# Patient Record
Sex: MALE | Race: WHITE | NOT HISPANIC OR LATINO | Employment: OTHER | URBAN - METROPOLITAN AREA
[De-identification: names, ages, dates, MRNs, and addresses within clinical notes are randomized per-mention and may not be internally consistent; named-entity substitution may affect disease eponyms.]

---

## 2017-01-05 ENCOUNTER — TELEPHONE (OUTPATIENT)
Dept: UROLOGY | Facility: CLINIC | Age: 82
End: 2017-01-05

## 2017-01-05 DIAGNOSIS — R31.9 HEMATURIA: Primary | ICD-10-CM

## 2017-01-07 ENCOUNTER — HOSPITAL ENCOUNTER (INPATIENT)
Facility: HOSPITAL | Age: 82
LOS: 2 days | Discharge: NURSING, REHAB OR LONG-TERM CARE | DRG: 392 | End: 2017-01-09
Attending: EMERGENCY MEDICINE | Admitting: INTERNAL MEDICINE
Payer: MEDICARE

## 2017-01-07 DIAGNOSIS — N17.9 AKI (ACUTE KIDNEY INJURY): Primary | ICD-10-CM

## 2017-01-07 DIAGNOSIS — K59.00 CONSTIPATION: ICD-10-CM

## 2017-01-07 PROBLEM — K59.01 CONSTIPATION BY DELAYED COLONIC TRANSIT: Status: ACTIVE | Noted: 2017-01-07

## 2017-01-07 LAB
ALBUMIN SERPL BCP-MCNC: 3.1 G/DL
ALP SERPL-CCNC: 128 U/L
ALT SERPL W/O P-5'-P-CCNC: 16 U/L
ANION GAP SERPL CALC-SCNC: 17 MMOL/L
AST SERPL-CCNC: 19 U/L
BASOPHILS # BLD AUTO: 0.01 K/UL
BASOPHILS NFR BLD: 0.1 %
BILIRUB SERPL-MCNC: 0.9 MG/DL
BUN SERPL-MCNC: 28 MG/DL
CALCIUM SERPL-MCNC: 8.7 MG/DL
CHLORIDE SERPL-SCNC: 86 MMOL/L
CO2 SERPL-SCNC: 29 MMOL/L
CREAT SERPL-MCNC: 1.9 MG/DL
DIFFERENTIAL METHOD: ABNORMAL
EOSINOPHIL # BLD AUTO: 0 K/UL
EOSINOPHIL NFR BLD: 0 %
ERYTHROCYTE [DISTWIDTH] IN BLOOD BY AUTOMATED COUNT: 14.5 %
EST. GFR  (AFRICAN AMERICAN): 36.9 ML/MIN/1.73 M^2
EST. GFR  (NON AFRICAN AMERICAN): 31.9 ML/MIN/1.73 M^2
GLUCOSE SERPL-MCNC: 106 MG/DL
HCT VFR BLD AUTO: 41 %
HGB BLD-MCNC: 13.5 G/DL
LYMPHOCYTES # BLD AUTO: 0.8 K/UL
LYMPHOCYTES NFR BLD: 5.4 %
MCH RBC QN AUTO: 27.9 PG
MCHC RBC AUTO-ENTMCNC: 32.9 %
MCV RBC AUTO: 85 FL
MONOCYTES # BLD AUTO: 0.6 K/UL
MONOCYTES NFR BLD: 3.7 %
NEUTROPHILS # BLD AUTO: 13.5 K/UL
NEUTROPHILS NFR BLD: 90.4 %
PLATELET # BLD AUTO: 236 K/UL
PMV BLD AUTO: 9.1 FL
POTASSIUM SERPL-SCNC: 4 MMOL/L
PROT SERPL-MCNC: 7.7 G/DL
RBC # BLD AUTO: 4.84 M/UL
SODIUM SERPL-SCNC: 132 MMOL/L
WBC # BLD AUTO: 14.93 K/UL

## 2017-01-07 PROCEDURE — 83930 ASSAY OF BLOOD OSMOLALITY: CPT

## 2017-01-07 PROCEDURE — 99283 EMERGENCY DEPT VISIT LOW MDM: CPT | Mod: ,,, | Performed by: EMERGENCY MEDICINE

## 2017-01-07 PROCEDURE — 96361 HYDRATE IV INFUSION ADD-ON: CPT

## 2017-01-07 PROCEDURE — 85025 COMPLETE CBC W/AUTO DIFF WBC: CPT | Mod: 91

## 2017-01-07 PROCEDURE — 80053 COMPREHEN METABOLIC PANEL: CPT

## 2017-01-07 PROCEDURE — 96360 HYDRATION IV INFUSION INIT: CPT

## 2017-01-07 PROCEDURE — 99285 EMERGENCY DEPT VISIT HI MDM: CPT | Mod: 25

## 2017-01-07 PROCEDURE — 99223 1ST HOSP IP/OBS HIGH 75: CPT | Mod: AI,,, | Performed by: INTERNAL MEDICINE

## 2017-01-07 PROCEDURE — 25000003 PHARM REV CODE 250: Performed by: ANESTHESIOLOGY

## 2017-01-07 PROCEDURE — 11000001 HC ACUTE MED/SURG PRIVATE ROOM

## 2017-01-07 RX ADMIN — SODIUM CHLORIDE 1000 ML: 0.9 INJECTION, SOLUTION INTRAVENOUS at 12:01

## 2017-01-07 NOTE — ED PROVIDER NOTES
Encounter Date: 1/7/2017    SCRIBE #1 NOTE: I, Dr. Seay, am scribing for, and in the presence of,  Esther Jimenez. I have scribed the following portions of the note - the Resident attestation.       History     Chief Complaint   Patient presents with    Nausea and Vomiting     from [facility]; nausea and vomiting x three days; lamenectomy last month     Review of patient's allergies indicates:  No Known Allergies  HPI Comments: admitted to inpatient rehabilitation on 12/30/2016 for gait instability following compression fractures with impaired mobility and ADLs. Patient remains appropriate for PT, OT, and as required Speech therapy. Patient continues to require 24 hour nursing care as well as daily Physician assessment. Patient sent from Rehab this morning for continued N/V and has not had BM in 5 days. Patient reports BM while waiting to be seen in the ED- and he feels much better. He now denies N/V, abdominal pain.     The history is provided by the patient and the EMS personnel.     Past Medical History   Diagnosis Date    Arthritis     Blood transfusion     CKD (chronic kidney disease) stage 3, GFR 30-59 ml/min     Compression fracture of lumbar vertebra     Depression     Dyslipidemia     GERD (gastroesophageal reflux disease)     Hypertension     Thyroid disease     UTI (urinary tract infection)      Past Medical History Pertinent Negatives   Diagnosis Date Noted    Anticoagulant long-term use 10/20/2016    Asthma 10/20/2016    Cancer 10/20/2016    CHF (congestive heart failure) 10/20/2016    COPD (chronic obstructive pulmonary disease) 10/20/2016    Coronary artery disease 10/20/2016    Diabetes mellitus 10/9/2013    Seizures 10/20/2016    Stroke 10/20/2016    Transfusion reaction 10/9/2013     Past Surgical History   Procedure Laterality Date    Hernia repair      Cholecystectomy      Parathyroidectomy      Total knee arthroplasty       Right    Joint replacement       Laminectomy  12/27/2016     L2-L4    Lumbar laminectomy  12/2016     Family History   Problem Relation Age of Onset    Hypertension Mother     Diabetes Father     Esophageal cancer Father      Social History   Substance Use Topics    Smoking status: Former Smoker     Years: 20.00    Smokeless tobacco: Never Used      Comment: quit 1999    Alcohol use 1.2 oz/week     2 Shots of liquor per week      Comment: 1 drink a week     Review of Systems   Constitutional: Positive for appetite change and fatigue. Negative for fever.   HENT: Negative.    Eyes: Negative.    Respiratory: Negative for cough and shortness of breath.    Cardiovascular: Negative for chest pain.   Gastrointestinal: Positive for constipation and nausea (now resolved). Negative for abdominal distention, abdominal pain, anal bleeding and rectal pain.   Endocrine: Negative.    Genitourinary: Negative for dysuria.   Musculoskeletal: Positive for back pain.   Skin: Negative for color change.   Allergic/Immunologic: Negative.    Neurological: Negative for light-headedness and headaches.   Hematological: Negative.    Psychiatric/Behavioral: Negative.        Physical Exam   Initial Vitals   BP Pulse Resp Temp SpO2   01/07/17 1208 01/07/17 1208 01/07/17 1208 01/07/17 1208 01/07/17 1208   137/67 94 16 97.7 °F (36.5 °C) 92 %     Physical Exam    Nursing note and vitals reviewed.  Constitutional: He appears well-developed. He is not diaphoretic. No distress.   HENT:   Head: Normocephalic and atraumatic.   Eyes: EOM are normal. Pupils are equal, round, and reactive to light. Right eye exhibits no discharge. Left eye exhibits no discharge.   Neck: Normal range of motion. Neck supple.   Cardiovascular: Normal rate, regular rhythm, normal heart sounds and intact distal pulses.   Pulmonary/Chest: Breath sounds normal. No respiratory distress.   Abdominal: Soft. Bowel sounds are normal. He exhibits no distension. There is no tenderness.   Musculoskeletal: Normal  range of motion. He exhibits no edema or tenderness.   Neurological: He is alert and oriented to person, place, and time.   Skin: Skin is warm and dry.   Psychiatric: He has a normal mood and affect. His behavior is normal. Judgment and thought content normal.         ED Course   Procedures  Labs Reviewed   COMPREHENSIVE METABOLIC PANEL   CBC W/ AUTO DIFFERENTIAL             Medical Decision Making:   History:   Old Medical Records: I decided to obtain old medical records.       APC / Resident Notes:   Patient with constipation and N/V, now resolved. Will order basic labs, flat and erect Xrays, and give IVFs. Will give PO challenge depending on Xrays.     Patient with TERE- increasing Cr over the last 2 days and with poor PO intake- Will admit to [facility] B-Dr. Corbin.       Scribe Attestation:   Scribe #1: I performed the above scribed service and the documentation accurately describes the services I performed. I attest to the accuracy of the note.    Attending Attestation:   Physician Attestation Statement for Resident:  As the supervising MD   Physician Attestation Statement: I have personally seen and examined this patient.   I agree with the above history. -: Pt sent over from rehab for vomiting. Pt feels much better after bowel movement. No further nausea or abdominal pain. Abdomen soft and right sided hernia is soft. Will check X-Rays and labs. If unremarkable will try a PO challenge   As the supervising MD I agree with the above PE.    As the supervising MD I agree with the above treatment, course, plan, and disposition.          Physician Attestation for Scribe:  Physician Attestation Statement for Scribe #1: I, Dr. Seay, reviewed documentation, as scribed by Esther Jimenez in my presence, and it is both accurate and complete.            [unfilled]     ED Course     Clinical Impression:   The encounter diagnosis was Constipation.    Disposition:   Disposition: Admitted  Condition: Fair        Immanuel Seay MD  01/11/17 0210

## 2017-01-07 NOTE — ED PROVIDER NOTES
Encounter Date: 1/7/2017    SCRIBE #1 NOTE: I, Esther Jimenez, am scribing for, and in the presence of,  Dr. Seay. I have scribed the following portions of the note - the Resident attestation.       History     Chief Complaint   Patient presents with    Nausea and Vomiting     from [facility]; nausea and vomiting x three days; lamenectomy last month     Review of patient's allergies indicates:  No Known Allergies  HPI  Past Medical History   Diagnosis Date    Arthritis     Blood transfusion     CKD (chronic kidney disease) stage 3, GFR 30-59 ml/min     Compression fracture of lumbar vertebra     Depression     Dyslipidemia     GERD (gastroesophageal reflux disease)     Hypertension     Thyroid disease     UTI (urinary tract infection)      Past Medical History Pertinent Negatives   Diagnosis Date Noted    Anticoagulant long-term use 10/20/2016    Asthma 10/20/2016    Cancer 10/20/2016    CHF (congestive heart failure) 10/20/2016    COPD (chronic obstructive pulmonary disease) 10/20/2016    Coronary artery disease 10/20/2016    Diabetes mellitus 10/9/2013    Seizures 10/20/2016    Stroke 10/20/2016    Transfusion reaction 10/9/2013     Past Surgical History   Procedure Laterality Date    Hernia repair      Cholecystectomy      Parathyroidectomy      Total knee arthroplasty       Right    Joint replacement      Laminectomy  12/27/2016     L2-L4    Lumbar laminectomy  12/2016     Family History   Problem Relation Age of Onset    Hypertension Mother     Diabetes Father     Esophageal cancer Father      Social History   Substance Use Topics    Smoking status: Former Smoker     Years: 20.00    Smokeless tobacco: Never Used      Comment: quit 1999    Alcohol use 1.2 oz/week     2 Shots of liquor per week      Comment: 1 drink a week     Review of Systems    Physical Exam   Initial Vitals   BP Pulse Resp Temp SpO2   01/07/17 1208 01/07/17 1208 01/07/17 1208 01/07/17 1208 01/07/17 1208    137/67 94 16 97.7 °F (36.5 °C) 92 %     Physical Exam    ED Course   Procedures  Labs Reviewed   COMPREHENSIVE METABOLIC PANEL   CBC W/ AUTO DIFFERENTIAL             Medical Decision Making:   History:   Old Medical Records: I decided to obtain old medical records.            Scribe Attestation:   Scribe #1: I performed the above scribed service and the documentation accurately describes the services I performed. I attest to the accuracy of the note.    Attending Attestation:   Physician Attestation Statement for Resident:  As the supervising MD   Physician Attestation Statement: I have personally seen and examined this patient.   I agree with the above history. -: Pt sent over from rehab for vomiting. Feels much better after having bowel movement. No further nausea or abdominal pain. Abdomen soft and right sided hernia is soft. Will check X-Rays and labs. If unremarkable will try a PO challenge.    As the supervising MD I agree with the above PE.    As the supervising MD I agree with the above treatment, course, plan, and disposition.          Physician Attestation for Scribe:  Physician Attestation Statement for Scribe #1: I, Dr. Seay, reviewed documentation, as scribed by Esther Jimenez in my presence, and it is both accurate and complete.            [unfilled]     ED Course     Clinical Impression:   The encounter diagnosis was Constipation.

## 2017-01-07 NOTE — H&P
[facility] Hospital Medicine  History & Physical    Patient Name: Chucho Prado  MRN: [id number]  Admission Date: 1/7/2017  Attending Physician: Howard Corbin MD  Primary Care Provider: Obed Mcguire MD    [facility] Medicine Team: [facility]OSP MED B Howard Crobin MD     Patient information was obtained from patient and ER records.     Subjective:     Principal Problem:<principal problem not specified>    Chief Complaint:  Abdominal pain, from rehab    HPI:  admitted to inpatient rehabilitation on 12/30/2016 for gait instability following compression fractures with impaired mobility and ADLs. Patient remains appropriate for PT, OT, and as required Speech therapy. Patient continues to require 24 hour nursing care as well as daily Physician assessment. Patient sent from Rehab this morning for continued N/V and has not had BM in 5 days. Patient reports BM while waiting to be seen in the ED- and he feels much better. He now denies N/V, abdominal pain    Past Medical History   Diagnosis Date    Arthritis     Blood transfusion     CKD (chronic kidney disease) stage 3, GFR 30-59 ml/min     Compression fracture of lumbar vertebra     Depression     Dyslipidemia     GERD (gastroesophageal reflux disease)     Hypertension     Thyroid disease     UTI (urinary tract infection)        Past Surgical History   Procedure Laterality Date    Hernia repair      Cholecystectomy      Parathyroidectomy      Total knee arthroplasty       Right    Joint replacement      Laminectomy  12/27/2016     L2-L4    Lumbar laminectomy  12/2016       Review of patient's allergies indicates:  No Known Allergies    Current Facility-Administered Medications on File Prior to Encounter   Medication    [MAR Hold - Suspended Admission] acetaminophen tablet 650 mg    [MAR Hold - Suspended Admission] aluminum-magnesium hydroxide-simethicone 200-200-20 mg/5 mL suspension 30 mL    [MAR Hold - Suspended Admission]  bisacodyl suppository 10 mg    ciprofloxacin HCl tablet 500 mg    [MAR Hold - Suspended Admission] gabapentin capsule 800 mg    [MAR Hold - Suspended Admission] heparin (porcine) injection 5,000 Units    [MAR Hold - Suspended Admission] hydrocodone-acetaminophen 10-325mg per tablet 1 tablet    [MAR Hold - Suspended Admission] hydrocodone-acetaminophen 5-325mg per tablet 1 tablet    [MAR Hold - Suspended Admission] lactulose 20 gram/30 mL solution Soln 20 g    [MAR Hold - Suspended Admission] levothyroxine tablet 50 mcg    [MAR Hold - Suspended Admission] lidocaine 5 % patch 1 patch    lidocaine HCl 2% urojet    [MAR Hold - Suspended Admission] magnesium hydroxide 400 mg/5 ml suspension 2,400 mg    [MAR Hold - Suspended Admission] methocarbamol tablet 500 mg    [MAR Hold - Suspended Admission] mirtazapine tablet 30 mg    [MAR Hold - Suspended Admission] ondansetron disintegrating tablet 8 mg    [MAR Hold - Suspended Admission] ramelteon tablet 8 mg    [MAR Hold - Suspended Admission] simethicone chewable tablet 80 mg    [MAR Hold - Suspended Admission] simvastatin tablet 40 mg    [MAR Hold - Suspended Admission] tamsulosin 24 hr capsule 0.4 mg    [DISCONTINUED] hydrocodone-acetaminophen 10-325mg per tablet 1 tablet    [DISCONTINUED] hydrocodone-acetaminophen 10-325mg per tablet 1 tablet    [DISCONTINUED] triamterene-hydrochlorothiazide 37.5-25 mg per capsule 1 capsule     Current Outpatient Prescriptions on File Prior to Encounter   Medication Sig    acetaminophen (TYLENOL) 500 MG tablet Take 2 tablets (1,000 mg total) by mouth 3 (three) times daily as needed for Pain.    aspirin 81 MG Chew Take 1 tablet (81 mg total) by mouth once daily.    calcitonin, salmon, (FORTICAL) 200 unit/actuation nasal spray 1 spray by Nasal route once daily.    cetirizine (ZYRTEC) 5 MG tablet Take 1 tablet (5 mg total) by mouth once daily. (Patient taking differently: Take 5 mg by mouth daily as needed for  Allergies. )    ferrous sulfate 325 (65 FE) MG EC tablet Take 1 tablet (325 mg total) by mouth every evening.    hydrocodone-acetaminophen 10-325mg (NORCO)  mg Tab Take 1 tablet by mouth every 4 (four) hours as needed for Pain (pain).    levothyroxine (SYNTHROID) 50 MCG tablet Take 1 tablet (50 mcg total) by mouth once daily.    mirtazapine (REMERON) 30 MG tablet Take 1 tablet (30 mg total) by mouth every evening.    ondansetron (ZOFRAN ODT) 4 MG TbDL Take 2 tablets (8 mg total) by mouth every 6 (six) hours as needed (nausea).    simvastatin (ZOCOR) 40 MG tablet Take 1 tablet (40 mg total) by mouth every evening.    trazodone (DESYREL) 50 MG tablet Take 1 tablet (50 mg total) by mouth every evening. Try half tablet at first    triamterene-hydrochlorothiazide 37.5-25 mg (DYAZIDE) 37.5-25 mg per capsule Take 1 capsule by mouth every morning.     Family History     Problem Relation (Age of Onset)    Diabetes Father    Esophageal cancer Father    Hypertension Mother        Social History Main Topics    Smoking status: Former Smoker     Years: 20.00    Smokeless tobacco: Never Used      Comment: quit 1999    Alcohol use 1.2 oz/week     2 Shots of liquor per week      Comment: 1 drink a week    Drug use: No    Sexual activity: No     Review of Systems   Constitutional: Negative.    Eyes: Negative.    Respiratory: Negative.    Gastrointestinal: Positive for abdominal distention and abdominal pain.   Genitourinary: Negative.    Allergic/Immunologic: Negative.    Neurological: Negative.    Hematological: Negative.    Psychiatric/Behavioral: Negative.      Objective:     Vital Signs (Most Recent):  Temp: 97.7 °F (36.5 °C) (01/07/17 1208)  Pulse: 94 (01/07/17 1208)  Resp: 16 (01/07/17 1208)  BP: 137/67 (01/07/17 1208)  SpO2: (!) 92 % (01/07/17 1208) Vital Signs (24h Range):  Temp:  [97.7 °F (36.5 °C)-98.2 °F (36.8 °C)] 97.7 °F (36.5 °C)  Pulse:  [71-94] 94  Resp:  [16-18] 16  BP: (112137)/(5867) 137/67         There is no height or weight on file to calculate BMI.    Physical Exam   Constitutional: He appears well-nourished.   HENT:   Head: Atraumatic.   Eyes: EOM are normal. Pupils are equal, round, and reactive to light.   Neck: Neck supple.   Cardiovascular: Normal rate and regular rhythm.    Pulmonary/Chest: Effort normal and breath sounds normal.   Abdominal: He exhibits distension. There is tenderness. There is guarding.   Neurological: He is alert. He has normal reflexes.   Skin: Skin is warm and dry.            Assessment/Plan:     Hypothyroidism  C/W synthroid      TERE (acute kidney injury)  Cautious hydration      VTE Risk Mitigation     None        Howard Corbin MD  Department of Hospital Medicine   [facility]

## 2017-01-07 NOTE — SUBJECTIVE & OBJECTIVE
Past Medical History   Diagnosis Date    Arthritis     Blood transfusion     CKD (chronic kidney disease) stage 3, GFR 30-59 ml/min     Compression fracture of lumbar vertebra     Depression     Dyslipidemia     GERD (gastroesophageal reflux disease)     Hypertension     Thyroid disease     UTI (urinary tract infection)        Past Surgical History   Procedure Laterality Date    Hernia repair      Cholecystectomy      Parathyroidectomy      Total knee arthroplasty       Right    Joint replacement      Laminectomy  12/27/2016     L2-L4    Lumbar laminectomy  12/2016       Review of patient's allergies indicates:  No Known Allergies    Current Facility-Administered Medications on File Prior to Encounter   Medication    [MAR Hold - Suspended Admission] acetaminophen tablet 650 mg    [MAR Hold - Suspended Admission] aluminum-magnesium hydroxide-simethicone 200-200-20 mg/5 mL suspension 30 mL    [MAR Hold - Suspended Admission] bisacodyl suppository 10 mg    ciprofloxacin HCl tablet 500 mg    [MAR Hold - Suspended Admission] gabapentin capsule 800 mg    [MAR Hold - Suspended Admission] heparin (porcine) injection 5,000 Units    [MAR Hold - Suspended Admission] hydrocodone-acetaminophen 10-325mg per tablet 1 tablet    [MAR Hold - Suspended Admission] hydrocodone-acetaminophen 5-325mg per tablet 1 tablet    [MAR Hold - Suspended Admission] lactulose 20 gram/30 mL solution Soln 20 g    [MAR Hold - Suspended Admission] levothyroxine tablet 50 mcg    [MAR Hold - Suspended Admission] lidocaine 5 % patch 1 patch    lidocaine HCl 2% urojet    [MAR Hold - Suspended Admission] magnesium hydroxide 400 mg/5 ml suspension 2,400 mg    [MAR Hold - Suspended Admission] methocarbamol tablet 500 mg    [MAR Hold - Suspended Admission] mirtazapine tablet 30 mg    [MAR Hold - Suspended Admission] ondansetron disintegrating tablet 8 mg    [MAR Hold - Suspended Admission] ramelteon tablet 8 mg    [MAR Hold -  Suspended Admission] simethicone chewable tablet 80 mg    [MAR Hold - Suspended Admission] simvastatin tablet 40 mg    [MAR Hold - Suspended Admission] tamsulosin 24 hr capsule 0.4 mg    [DISCONTINUED] hydrocodone-acetaminophen 10-325mg per tablet 1 tablet    [DISCONTINUED] hydrocodone-acetaminophen 10-325mg per tablet 1 tablet    [DISCONTINUED] triamterene-hydrochlorothiazide 37.5-25 mg per capsule 1 capsule     Current Outpatient Prescriptions on File Prior to Encounter   Medication Sig    acetaminophen (TYLENOL) 500 MG tablet Take 2 tablets (1,000 mg total) by mouth 3 (three) times daily as needed for Pain.    aspirin 81 MG Chew Take 1 tablet (81 mg total) by mouth once daily.    calcitonin, salmon, (FORTICAL) 200 unit/actuation nasal spray 1 spray by Nasal route once daily.    cetirizine (ZYRTEC) 5 MG tablet Take 1 tablet (5 mg total) by mouth once daily. (Patient taking differently: Take 5 mg by mouth daily as needed for Allergies. )    ferrous sulfate 325 (65 FE) MG EC tablet Take 1 tablet (325 mg total) by mouth every evening.    hydrocodone-acetaminophen 10-325mg (NORCO)  mg Tab Take 1 tablet by mouth every 4 (four) hours as needed for Pain (pain).    levothyroxine (SYNTHROID) 50 MCG tablet Take 1 tablet (50 mcg total) by mouth once daily.    mirtazapine (REMERON) 30 MG tablet Take 1 tablet (30 mg total) by mouth every evening.    ondansetron (ZOFRAN ODT) 4 MG TbDL Take 2 tablets (8 mg total) by mouth every 6 (six) hours as needed (nausea).    simvastatin (ZOCOR) 40 MG tablet Take 1 tablet (40 mg total) by mouth every evening.    trazodone (DESYREL) 50 MG tablet Take 1 tablet (50 mg total) by mouth every evening. Try half tablet at first    triamterene-hydrochlorothiazide 37.5-25 mg (DYAZIDE) 37.5-25 mg per capsule Take 1 capsule by mouth every morning.     Family History     Problem Relation (Age of Onset)    Diabetes Father    Esophageal cancer Father    Hypertension Mother         Social History Main Topics    Smoking status: Former Smoker     Years: 20.00    Smokeless tobacco: Never Used      Comment: quit 1999    Alcohol use 1.2 oz/week     2 Shots of liquor per week      Comment: 1 drink a week    Drug use: No    Sexual activity: No     Review of Systems   Constitutional: Negative.    Eyes: Negative.    Respiratory: Negative.    Gastrointestinal: Positive for abdominal distention and abdominal pain.   Genitourinary: Negative.    Allergic/Immunologic: Negative.    Neurological: Negative.    Hematological: Negative.    Psychiatric/Behavioral: Negative.      Objective:     Vital Signs (Most Recent):  Temp: 97.7 °F (36.5 °C) (01/07/17 1208)  Pulse: 94 (01/07/17 1208)  Resp: 16 (01/07/17 1208)  BP: 137/67 (01/07/17 1208)  SpO2: (!) 92 % (01/07/17 1208) Vital Signs (24h Range):  Temp:  [97.7 °F (36.5 °C)-98.2 °F (36.8 °C)] 97.7 °F (36.5 °C)  Pulse:  [71-94] 94  Resp:  [16-18] 16  BP: (112-137)/(58-67) 137/67        There is no height or weight on file to calculate BMI.    Physical Exam   Constitutional: He appears well-nourished.   HENT:   Head: Atraumatic.   Eyes: EOM are normal. Pupils are equal, round, and reactive to light.   Neck: Neck supple.   Cardiovascular: Normal rate and regular rhythm.    Pulmonary/Chest: Effort normal and breath sounds normal.   Abdominal: He exhibits distension. There is tenderness. There is guarding.   Neurological: He is alert. He has normal reflexes.   Skin: Skin is warm and dry.

## 2017-01-07 NOTE — ED NOTES
Pt initially refused xray.  States he had enough today and he has been through a lot.  Explained to pt with his chief complaint of abd pain with constipation we needed to take a look at his stomach.  Voiced understanding and agreed to xray

## 2017-01-07 NOTE — ED TRIAGE NOTES
Patient received with complaint of constipation for four days, nausea, vomiting for two days.  Elevated white blood cell count.  LBM just prior to arrival, feeling better.  Recent L2-L4 laminectomy.     No LDA's in place on arrival to department.    Family not present.    Pain:  Denies.    Psychosocial:  Patient is calm and cooperative.  Patients insight and judgement are appropriate to situation.  Appears clean, well maintained, with clothing appropriate to environment.  No evidence of delusions, hallucinations, or psychosis.    Neuro:  Eyes open spontaneously.  Awake, alert, oriented x 4.  Speech clear and appropriate.  Tolerating saliva secretions well.  Able to follow commands, demonstrating ability to actively and appropriately communicate within context of current conversation.  Symmetrical facial muscles.  Moving all extremities though bilateral lower leg weakness..      Airway:  Bilateral chest rise and fall.  RR regular and non-labored.  No crepitus or subcutaneous emphysema noted on palpation.      Circulatory:  Skin warm, dry, and pink.  Radial pulses strong and regular.  Capillary refill/skin blanching less than 3 seconds to distal of 4 extremities.    Abdomen:  See notes and primary nurse assessment.       Urinary:  YONI patient being assessed by physician at this time.    Extremities:  No redness, heat, swelling, deformity, or pain.    Skin:  Intact with no bruising/discolorations noted.

## 2017-01-07 NOTE — ED NOTES
Pt identifiers checked and correct.    LOC: The patient is awake, alert and aware of environment with an appropriate affect, the patient is oriented x 3 and speaking appropriately.   APPEARANCE: Patient resting comfortably and in no acute distress, patient is clean and well groomed, patient's clothing is properly fastened.   SKIN: The skin is warm and dry, color consistent with ethnicity, patient has normal skin turgor and moist mucus membranes, skin intact.  Redness noted to lower back and upper buttocks concerning for area that could become a bed sore as pt is s/p back surgery and is in rehab.   MUSCULOSKELETAL: Patient moving all extremities spontaneously, no obvious swelling or deformities noted.   RESPIRATORY: Airway is open and patent, respirations are spontaneous, patient has a normal effort and rate, no accessory muscle use noted.   CARDIAC: Patient has a normal rate and regular rhythm, no periphreal edema noted, capillary refill < 3 seconds.   ABDOMEN: Soft and non tender to palpation, no distention noted, active bowel sounds present in all four quadrants.     Presents with galvan to gravity in place from rehab.

## 2017-01-07 NOTE — ED NOTES
Pt identifiers checked and correct.    LOC: The patient is awake, alert and aware of environment with an appropriate affect, the patient is oriented x 3 and speaking appropriately.   APPEARANCE: Patient resting comfortably and in no acute distress, patient is clean and well groomed, patient's clothing is properly fastened.   SKIN: The skin is warm and dry, color consistent with ethnicity, patient has normal skin turgor and moist mucus membranes, skin intact, no breakdown or bruising noted.   MUSCULOSKELETAL: Patient moving all extremities spontaneously, no obvious swelling or deformities noted.   RESPIRATORY: Airway is open and patent, respirations are spontaneous, patient has a normal effort and rate, no accessory muscle use noted.   CARDIAC: Patient has a normal rate and regular rhythm, no periphreal edema noted, capillary refill < 3 seconds.   ABDOMEN: Soft and non tender to palpation,Large abd hernia to R quad.   NEUROLOGIC: PERRL, 3 mm bilaterally, eyes open spontaneously, behavior appropriate to situation, follows commands, facial expression symmetrical, bilateral hand grasp equal and even, purposeful motor response noted, normal sensation in all extremities when touched with a finger.

## 2017-01-08 PROCEDURE — 25000003 PHARM REV CODE 250: Performed by: INTERNAL MEDICINE

## 2017-01-08 PROCEDURE — 11000001 HC ACUTE MED/SURG PRIVATE ROOM

## 2017-01-08 PROCEDURE — 99232 SBSQ HOSP IP/OBS MODERATE 35: CPT | Mod: ,,, | Performed by: INTERNAL MEDICINE

## 2017-01-08 RX ORDER — SIMVASTATIN 40 MG/1
40 TABLET, FILM COATED ORAL NIGHTLY
Status: DISCONTINUED | OUTPATIENT
Start: 2017-01-08 | End: 2017-01-09 | Stop reason: HOSPADM

## 2017-01-08 RX ORDER — ONDANSETRON 4 MG/1
8 TABLET, ORALLY DISINTEGRATING ORAL EVERY 6 HOURS PRN
Status: DISCONTINUED | OUTPATIENT
Start: 2017-01-08 | End: 2017-01-09 | Stop reason: HOSPADM

## 2017-01-08 RX ORDER — HYDROCODONE BITARTRATE AND ACETAMINOPHEN 10; 325 MG/1; MG/1
1 TABLET ORAL EVERY 4 HOURS PRN
Status: DISCONTINUED | OUTPATIENT
Start: 2017-01-08 | End: 2017-01-09 | Stop reason: HOSPADM

## 2017-01-08 RX ORDER — MIRTAZAPINE 15 MG/1
30 TABLET, FILM COATED ORAL NIGHTLY
Status: DISCONTINUED | OUTPATIENT
Start: 2017-01-08 | End: 2017-01-09 | Stop reason: HOSPADM

## 2017-01-08 RX ORDER — TRIAMTERENE AND HYDROCHLOROTHIAZIDE 37.5; 25 MG/1; MG/1
1 CAPSULE ORAL EVERY MORNING
Status: DISCONTINUED | OUTPATIENT
Start: 2017-01-08 | End: 2017-01-09 | Stop reason: HOSPADM

## 2017-01-08 RX ORDER — LEVOTHYROXINE SODIUM 50 UG/1
50 TABLET ORAL DAILY
Status: DISCONTINUED | OUTPATIENT
Start: 2017-01-08 | End: 2017-01-09 | Stop reason: HOSPADM

## 2017-01-08 RX ADMIN — HYDROCODONE BITARTRATE AND ACETAMINOPHEN 1 TABLET: 10; 325 TABLET ORAL at 12:01

## 2017-01-08 RX ADMIN — TRIAMTERENE AND HYDROCHLOROTHIAZIDE 1 CAPSULE: 25; 37.5 CAPSULE ORAL at 12:01

## 2017-01-08 RX ADMIN — TRAZODONE HYDROCHLORIDE 50 MG: 50 TABLET ORAL at 08:01

## 2017-01-08 RX ADMIN — ONDANSETRON 8 MG: 4 TABLET, ORALLY DISINTEGRATING ORAL at 09:01

## 2017-01-08 RX ADMIN — HYDROCODONE BITARTRATE AND ACETAMINOPHEN 1 TABLET: 10; 325 TABLET ORAL at 04:01

## 2017-01-08 RX ADMIN — SIMVASTATIN 40 MG: 40 TABLET, FILM COATED ORAL at 08:01

## 2017-01-08 RX ADMIN — ONDANSETRON 8 MG: 4 TABLET, ORALLY DISINTEGRATING ORAL at 12:01

## 2017-01-08 RX ADMIN — LEVOTHYROXINE SODIUM 50 MCG: 50 TABLET ORAL at 12:01

## 2017-01-08 RX ADMIN — MIRTAZAPINE 30 MG: 15 TABLET, FILM COATED ORAL at 08:01

## 2017-01-08 NOTE — ED NOTES
The patient was incontinent of stool, linens changed and perineal care given. Patient repositioned for comfort, will continue to monitor.

## 2017-01-09 VITALS
HEART RATE: 66 BPM | BODY MASS INDEX: 27.02 KG/M2 | DIASTOLIC BLOOD PRESSURE: 59 MMHG | RESPIRATION RATE: 16 BRPM | OXYGEN SATURATION: 94 % | SYSTOLIC BLOOD PRESSURE: 127 MMHG | TEMPERATURE: 98 F | WEIGHT: 178.31 LBS | HEIGHT: 68 IN

## 2017-01-09 PROBLEM — K59.01 CONSTIPATION BY DELAYED COLONIC TRANSIT: Status: ACTIVE | Noted: 2017-01-09

## 2017-01-09 PROCEDURE — 99238 HOSP IP/OBS DSCHRG MGMT 30/<: CPT | Mod: ,,, | Performed by: INTERNAL MEDICINE

## 2017-01-09 PROCEDURE — 25000003 PHARM REV CODE 250: Performed by: INTERNAL MEDICINE

## 2017-01-09 RX ORDER — TRIAMTERENE AND HYDROCHLOROTHIAZIDE 37.5; 25 MG/1; MG/1
1 CAPSULE ORAL EVERY MORNING
Status: CANCELLED | OUTPATIENT
Start: 2017-01-09

## 2017-01-09 RX ORDER — LEVOTHYROXINE SODIUM 50 UG/1
50 TABLET ORAL DAILY
Status: CANCELLED | OUTPATIENT
Start: 2017-01-09

## 2017-01-09 RX ORDER — ONDANSETRON 4 MG/1
8 TABLET, ORALLY DISINTEGRATING ORAL EVERY 6 HOURS PRN
Status: CANCELLED | OUTPATIENT
Start: 2017-01-09

## 2017-01-09 RX ORDER — FERROUS SULFATE 325(65) MG
325 TABLET, DELAYED RELEASE (ENTERIC COATED) ORAL NIGHTLY
Status: CANCELLED | OUTPATIENT
Start: 2017-01-09

## 2017-01-09 RX ORDER — SIMVASTATIN 40 MG/1
40 TABLET, FILM COATED ORAL NIGHTLY
Status: CANCELLED | OUTPATIENT
Start: 2017-01-09

## 2017-01-09 RX ORDER — NAPROXEN SODIUM 220 MG/1
81 TABLET, FILM COATED ORAL DAILY
Status: CANCELLED | OUTPATIENT
Start: 2017-01-09

## 2017-01-09 RX ORDER — HYDROCODONE BITARTRATE AND ACETAMINOPHEN 10; 325 MG/1; MG/1
1 TABLET ORAL EVERY 4 HOURS PRN
Status: CANCELLED | OUTPATIENT
Start: 2017-01-09

## 2017-01-09 RX ORDER — MIRTAZAPINE 15 MG/1
30 TABLET, FILM COATED ORAL NIGHTLY
Status: CANCELLED | OUTPATIENT
Start: 2017-01-09

## 2017-01-09 RX ORDER — CALCITONIN SALMON 200 [IU]/.09ML
1 SPRAY, METERED NASAL DAILY
Status: CANCELLED | OUTPATIENT
Start: 2017-01-10

## 2017-01-09 RX ADMIN — HYDROCODONE BITARTRATE AND ACETAMINOPHEN 1 TABLET: 10; 325 TABLET ORAL at 01:01

## 2017-01-09 RX ADMIN — LEVOTHYROXINE SODIUM 50 MCG: 50 TABLET ORAL at 08:01

## 2017-01-09 RX ADMIN — TRIAMTERENE AND HYDROCHLOROTHIAZIDE 1 CAPSULE: 25; 37.5 CAPSULE ORAL at 08:01

## 2017-01-09 RX ADMIN — HYDROCODONE BITARTRATE AND ACETAMINOPHEN 1 TABLET: 10; 325 TABLET ORAL at 08:01

## 2017-01-09 NOTE — SUBJECTIVE & OBJECTIVE
Interval History: Plan on dc to rehab in am.    Review of Systems   Constitutional: Negative.    HENT: Negative.    Eyes: Negative.    Respiratory: Negative.    Cardiovascular: Negative.    Gastrointestinal: Negative.    Endocrine: Negative.    Genitourinary: Negative.    Allergic/Immunologic: Negative.    Neurological: Negative.    Hematological: Negative.      Objective:     Vital Signs (Most Recent):  Temp: 97.4 °F (36.3 °C) (01/08/17 1634)  Pulse: 85 (01/08/17 1634)  Resp: 18 (01/08/17 1634)  BP: 128/66 (01/08/17 1634)  SpO2: 98 % (01/08/17 1634) Vital Signs (24h Range):  Temp:  [97.4 °F (36.3 °C)-98.4 °F (36.9 °C)] 97.4 °F (36.3 °C)  Pulse:  [71-85] 85  Resp:  [16-18] 18  BP: (115-150)/(62-68) 128/66     Weight: 80.9 kg (178 lb 4.8 oz)  Body mass index is 27.11 kg/(m^2).    Intake/Output Summary (Last 24 hours) at 01/08/17 1822  Last data filed at 01/07/17 1855   Gross per 24 hour   Intake                0 ml   Output              400 ml   Net             -400 ml      Physical Exam   Constitutional: He is oriented to person, place, and time. He appears well-developed and well-nourished.   HENT:   Head: Normocephalic and atraumatic.   Eyes: EOM are normal. Pupils are equal, round, and reactive to light.   Neck: Normal range of motion. Neck supple.   Cardiovascular: Normal rate and regular rhythm.    Pulmonary/Chest: Effort normal and breath sounds normal.   Abdominal: Soft. Bowel sounds are normal.   Neurological: He is alert and oriented to person, place, and time.

## 2017-01-09 NOTE — PLAN OF CARE
Sw informed pt can return to [facility]. Shaina scheduled reliant WC for 12:30.     Liban Colunga, [facility]   [id number]

## 2017-01-09 NOTE — PROGRESS NOTES
[facility] Hospital Medicine  Progress Note    Patient Name: Chucho Prado  MRN: [id number]  Patient Class: IP- Inpatient   Admission Date: 1/7/2017  Length of Stay: 1 days  Expected Discharge Date: 1/11/2017  Attending Physician: Howard Corbin MD  Primary Care Provider: Obed Mcguire MD    [facility] Medicine Team: [facility] B Howard Corbin MD    Subjective:     Principal Problem:<principal problem not specified>    HPI:   admitted to inpatient rehabilitation on 12/30/2016 for gait instability following compression fractures with impaired mobility and ADLs. Patient remains appropriate for PT, OT, and as required Speech therapy. Patient continues to require 24 hour nursing care as well as daily Physician assessment. Patient sent from Rehab this morning for continued N/V and has not had BM in 5 days. Patient reports BM while waiting to be seen in the ED- and he feels much better. He now denies N/V, abdominal pain    Hospital Course:       Interval History: Plan on dc to rehab in am.    Review of Systems   Constitutional: Negative.    HENT: Negative.    Eyes: Negative.    Respiratory: Negative.    Cardiovascular: Negative.    Gastrointestinal: Negative.    Endocrine: Negative.    Genitourinary: Negative.    Allergic/Immunologic: Negative.    Neurological: Negative.    Hematological: Negative.      Objective:     Vital Signs (Most Recent):  Temp: 97.4 °F (36.3 °C) (01/08/17 1634)  Pulse: 85 (01/08/17 1634)  Resp: 18 (01/08/17 1634)  BP: 128/66 (01/08/17 1634)  SpO2: 98 % (01/08/17 1634) Vital Signs (24h Range):  Temp:  [97.4 °F (36.3 °C)-98.4 °F (36.9 °C)] 97.4 °F (36.3 °C)  Pulse:  [71-85] 85  Resp:  [16-18] 18  BP: (115-150)/(62-68) 128/66     Weight: 80.9 kg (178 lb 4.8 oz)  Body mass index is 27.11 kg/(m^2).    Intake/Output Summary (Last 24 hours) at 01/08/17 1822  Last data filed at 01/07/17 1855   Gross per 24 hour   Intake                0 ml   Output              400 ml   Net              -400 ml      Physical Exam   Constitutional: He is oriented to person, place, and time. He appears well-developed and well-nourished.   HENT:   Head: Normocephalic and atraumatic.   Eyes: EOM are normal. Pupils are equal, round, and reactive to light.   Neck: Normal range of motion. Neck supple.   Cardiovascular: Normal rate and regular rhythm.    Pulmonary/Chest: Effort normal and breath sounds normal.   Abdominal: Soft. Bowel sounds are normal.   Neurological: He is alert and oriented to person, place, and time.           Assessment/Plan:      Hypothyroidism  C/W synthroid      TERE (acute kidney injury)  Cautious hydration      Constipation by delayed colonic transit  Resolved      VTE Risk Mitigation     None          Howard Corbin MD  Department of Hospital Medicine   [facility]

## 2017-01-10 ENCOUNTER — ANESTHESIA EVENT (OUTPATIENT)
Dept: SURGERY | Facility: HOSPITAL | Age: 82
End: 2017-01-10
Payer: MEDICARE

## 2017-01-10 LAB — OSMOLALITY SERPL: 287 MOSM/KG

## 2017-01-10 NOTE — ANESTHESIA PREPROCEDURE EVALUATION
Anesthesia Assessment: Preoperative EQUATION     Planned Procedure: Procedure(s) (LRB):  CYSTOSCOPY/ RETROGRADE PYELOGRAM/ STENT PLACEMENT/STENT EXCHANGE (N/A)  Requested Anesthesia Type:General  Surgeon: Keyur Anderson MD  Service: Urology  Known or anticipated Date of Surgery:1/17/2017     Surgeon notes: reviewed     Electronic QUestionnaire Assessment completed via nurse interview with patient.                           Chucho Prado [[id number]] - 83 y.o. Male           Providers Outside of [facility]        No data to display          Surgical Risk Level        Surgical Risk Level:   1                caRDScore (Clinical Anesthesia Rapid Decision Score)           Moderate  Total Score: 20        20 Sum of Clinical Scores          caRDScores (Grouped)        caRDScore - Ane:   4                         caRDScore - CVD:   0                         caRDScore - Pul:   0                         caRDScore - Met:   8                         caRDScore - Phy:   8                caRDScore Items              Pre-admit from 1/17/2017 in [facility]    Pre-admit from 1/3/2017 in [facility]       Anesthesia             Very slow awakening from anesthesia       Yes       Has painful neck extension       Yes [fell recently]       Has severe degenerative arthritis (osteoarthritis)       Yes [radha knees]       Do you wake up frequently with an arm or leg temporarily numb or asleep?   Yes   Yes       CVD             Activity similar to best ability for maximal activity or exercise   Has a physical disability that limits assessment of max activity   Has a physical disability that limits assessment of max activity       Pulmonary             Metabolic             Currently drinks on most days   Yes   Yes       Has been diagnosed with CKD   Yes [stage 3]   Yes       Is on Rx for depression or bipolar disease   Yes   Yes       Thyroid disease (Specify)   Yes [hypothyroid]   Yes  [hypothyroid]       Has hyperlipoproteinemia   Yes           Physiologic             Currently or usually anemic       Yes       Having abnormal bleeding (intestinal, urinary, vaginal, coughing up blood, etc.)   Yes [hematuria]   Yes [hematuria]       Other blood thinners   Heparin [sq post op]           Has other disease or abnormality of nerves or muscles   Yes [recent fall requiring surgery]              Flags        Red Flag Score:   3                         Yellow Flag Score:   13                Red Flags              Pre-admit from 1/17/2017 in [facility]    Pre-admit from 1/3/2017 in [facility]      History of antibiotic resistant organism       Yes      Other blood thinners   Heparin [sq post op]          Has been diagnosed with CKD   Yes [stage 3]   Yes         Yellow Flags              Pre-admit from 1/17/2017 in [facility]    Pre-admit from 1/3/2017 in [facility]      Has had surgery within the last 3 months   Yes          Is or has been a Smoker   Yes   Yes      Currently or usually anemic       Yes      Has Iron Deficiency Anemia   Yes   Yes      Having abnormal bleeding (intestinal, urinary, vaginal, coughing up blood, etc.)   Yes [hematuria]   Yes [hematuria]      Aspirin   Yes   Yes      NSAID       Yes      Has painful neck extension       Yes [fell recently]      Do you wake up frequently with an arm or leg temporarily numb or asleep?   Yes   Yes      Has other disease or abnormality of nerves or muscles   Yes [recent fall requiring surgery]          Has pain   Yes   Yes         PONV Risk Score (assumes periop narcotic use = +1, Max=4)        PONV Risk Score:   2                PONV Risk Factors  Total Score: 1        1 Non-Smoker at present          Sleep Apnea  Total Score: 0           MAMI STOP-Bang Risk Factors (Max=8)  Total Score: 2        1 Age >50       1 Male          MAMI Risk Level - 1 (Low), 2 (Moderate), 3  (High)        MAMI Risk Level:   1                RCRI (Revised Cardiac Risk Indices of ACC/AHA guidelines, Max=6)  Total Score: 0           CAD Risk Factors  Total Score: 2        1 Male. Age >45       1 Has hyperlipoproteinemia          CHADS Score if applicable (history of atrial fib/flutter, Max=6)  Total Score: 1        1 Age >75          Maximal Exercise Capacity              Pre-admit from 1/17/2017 in [facility]       Maximal Exercise Capacity   Has a physical disability that limits assessment of max activity          Summary of Dependence  Total Score: 1        1 Partially dependent on others for activities of daily living          Phone Fraility Score (Max = 17)  Total Score: 4        1 Has had 1 hospitalization in last year       1 Uses 5 or more meds on reg basis       1 Describes health as Fair       1 intermediate dependency on others for higher functional activities          Pain Factors              Pre-admit from 1/17/2017 in [facility]       Has pain   Yes       Location and description of pain   -- [back pain]       Typical Pain Scores   7 to 10       Depends on strong Rx (opioids, adjunctive meds)   Yes [yes // norco]       Takes other adjunctive medications:   -- [muscle relaxers]          Risk Triggers (Evidence-Based Risk Triggers)           Pulmonary Risk Triggers  Total Score: 2        1 Age > or = 60       1 Currently drinks on most days          Renal Risk Triggers  Total Score: 1        1 Has been diagnosed with CKD          Delirium Risk Triggers  Total Score: 1        1 Age > or = 75          Urologic Risk Triggers  Total Score: 0           Logistics           Pre-op Clinic Logistics  Total Score: 13        1 Has had 1 hospitalization in last year       1 Has had surgery within the last 3 months       1 Has Hx of MRSA       1 Major Ambulatory limits (cane, walker, wheelchair, stretcher)       1 Has had anesthesia, either as adult or as a child        1 Very slow awakening from anesthesia       3 Has Iron Deficiency Anemia       2 NSAID       1 Has been diagnosed with CKD       1 Has chronic pain / depends on strong Rx (opioids, adjunctive meds)          DOSC Logistics  Total Score: 7        2 Has Hx of MRSA       1 Partially dependent on others for actvities of daily living       1 Major Ambulatory limits (cane, walker, wheelchair, stretcher)       1 Very slow awakening from anesthesia       1 NSAID       1 Has been diagnosed with CKD          Discharge Logistics  Total Score: 3        2 Major Ambulatory limits (cane, walker, wheelchair, stretcher)       1 Has severe degenerative arthritis (osteoarthritis)          Discharge Planning              Pre-admit from 1/17/2017 in [facility]    Pre-admit from 1/3/2017 in [facility]      Discharge Planning            Dependent on others for some functioning and activities of daily living       Yes [since fall]      Discharge plans   -- [pt currently at [facility]]   Home with assistance      Will assist patient 24/7, if needed   -- [neighbor]   -- [neighbor]         Fast Track <For office use only>        Total Score: 16           Surgical Risk Level Assessment                         Triage considerations:      The patient has no apparent active cardiac condition (No unstable coronary Syndrome such as severe unstable angina or recent [<1 month] myocardial infarction, decompensated CHF, severe valvular disease or significant arrhythmia)     Previous anesthesia records:THOM 12/27/16  Airway (Primary) Present Prior to Hospital Arrival?: No; Placement Date: 12/27/16; Placement Time: 1749; Method of Intubation: Direct laryngoscopy; Inserted by: CRNA; Airway Device: Endotracheal Tube; Mask Ventilation: Easy; Intubated: Postinduction; Blade: Deleon #2; Airway Device Size: 8.0; Style: Cuffed; Cuff Inflation: Minimal occlusive pressure; Placement Verified By: Auscultation,  Capnometry, ETT Condensation; Grade: Grade II; Complicating Factors: None; Intubation Findings: Positive EtCO2, Bilateral breath sounds, Atraumatic/Condition of teeth unchanged; Securment: Lips; Complications: None; Breath Sounds: Equal Bilateral; Insertion Attempts: 1; Removal Date: 12/27/16;  Removal Time: 2107 12         Last PCP note: within 3 months , within [facility] Dr. Mcguire  Subspecialty notes: Gastroenterology, Nephrology, infectious disease     Other important co-morbidities:        Diagnosis Date    Arthritis       Depression       Dyslipidemia       GERD (gastroesophageal reflux disease)       Hypertension       Thyroid disease           Tests already available:  Available tests, within 1 month , within [facility] .      Instructions given. (See in Nurse's note)     Optimization:   TBD-----recent anesthesia   Plan: TBD  Navigation:83 yr old male cards score 20// clifton 1. Pt. Fell during Christmas holiday / surgery 12/27 . Lumbar / laminectomy/ kyphoplasty/ currently in rehab. Potential discharge 1/13. Current labs// had EKG 12/20/16 was abnormal.   Phone  ILIANA Corrales RN BC 1/10/17      Electronically signed by Nora Corrales RN at 1/10/2017  3:49 PM                                                                                                               01/10/2017  Chucho Prado is a 83 y.o., male.    [facility] Anesthesia Evaluation         Review of Systems  Anesthesia Hx:  No problems with previous Anesthesia History of prior surgery of interest to airway management or planning: Denies Family Hx of Anesthesia complications.   Denies Personal Hx of Anesthesia complications.   Social:  Social Alcohol Use, Non-Smoker    Cardiovascular:   Hypertension, well controlled    Pulmonary:  Pulmonary Normal    Renal/:   Chronic Renal Disease, CRI    Hepatic/GI:   GERD    Endocrine:   Hypothyroidism    Psych:   Psychiatric History depression          Physical Exam  General:  Well nourished     Airway/Jaw/Neck:  Airway Findings: Mouth Opening: Normal Tongue: Normal  General Airway Assessment: Adult  Mallampati: I  Improves to II with phonation.  Jaw/Neck Findings:  Neck ROM: Normal ROM      Dental:  Dental Findings: In tact   Chest/Lungs:  Chest/Lungs Findings: Clear to auscultation     Heart/Vascular:  Heart Findings: Rate: Normal  Rhythm: Regular Rhythm  Sounds: Normal        Mental Status:  Mental Status Findings:  Cooperative         Anesthesia Plan  Type of Anesthesia, risks & benefits discussed:  Anesthesia Type:  general  Patient's Preference: general  Intra-op Monitoring Plan:   Intra-op Monitoring Plan Comments:   Post Op Pain Control Plan:   Post Op Pain Control Plan Comments:   Induction:   IV  Beta Blocker:  Patient is not currently on a Beta-Blocker (No further documentation required).       Informed Consent: Patient understands risks and agrees with Anesthesia plan.  Questions answered. Anesthesia consent signed with patient.  ASA Score: 3     Day of Surgery Review of History & Physical:    H&P update referred to the surgeon.         Ready For Surgery From Anesthesia Perspective.     Lab Results   Component Value Date    WBC 6.28 01/26/2017    HGB 10.8 (L) 01/26/2017    HCT 35.9 (L) 01/26/2017    MCV 88 01/26/2017     01/26/2017       Chemistry        Component Value Date/Time     (L) 01/26/2017 0542    K 4.2 01/26/2017 0542     01/26/2017 0542    CO2 26 01/26/2017 0542    BUN 17 01/26/2017 0542    CREATININE 1.5 (H) 01/26/2017 0542    GLU 79 01/26/2017 0542        Component Value Date/Time    CALCIUM 8.4 (L) 01/26/2017 0542    ALKPHOS 83 01/26/2017 0542    AST 16 01/26/2017 0542    ALT 7 (L) 01/26/2017 0542    BILITOT 0.4 01/26/2017 0542        Lab Results   Component Value Date    INR 1.0 12/27/2016    INR 0.9 12/25/2016    INR 1.0 10/19/2016

## 2017-01-17 ENCOUNTER — ANESTHESIA (OUTPATIENT)
Dept: SURGERY | Facility: HOSPITAL | Age: 82
End: 2017-01-17
Payer: MEDICARE

## 2017-01-17 ENCOUNTER — TELEPHONE (OUTPATIENT)
Dept: UROLOGY | Facility: CLINIC | Age: 82
End: 2017-01-17

## 2017-01-17 NOTE — TELEPHONE ENCOUNTER
----- Message from Keyur Anderson MD sent at 1/13/2017  8:23 AM CST -----  Deepali, please call Mr. Prado and let him know that we are doing this to rule out cancer.  Delaying the operation can lead to a delay in diagnosis of his cancer.  Please document with telephone note.    Keyur    ----- Message -----     From: Jane Isaac MA     Sent: 1/12/2017  12:18 PM       To: Keyur Anderson MD    Mr Prado nurse called patient cancel procedure into he get out of rehab.

## 2017-01-23 ENCOUNTER — OFFICE VISIT (OUTPATIENT)
Dept: NEUROSURGERY | Facility: CLINIC | Age: 82
DRG: 560 | End: 2017-01-23
Attending: PHYSICAL MEDICINE & REHABILITATION
Payer: MEDICARE

## 2017-01-23 ENCOUNTER — HOSPITAL ENCOUNTER (OUTPATIENT)
Dept: RADIOLOGY | Facility: HOSPITAL | Age: 82
Discharge: HOME | End: 2017-01-23
Attending: NEUROLOGICAL SURGERY
Payer: MEDICARE

## 2017-01-23 VITALS
SYSTOLIC BLOOD PRESSURE: 117 MMHG | HEIGHT: 68 IN | WEIGHT: 190 LBS | BODY MASS INDEX: 28.79 KG/M2 | DIASTOLIC BLOOD PRESSURE: 70 MMHG

## 2017-01-23 DIAGNOSIS — Z98.890 S/P KYPHOPLASTY: Primary | ICD-10-CM

## 2017-01-23 DIAGNOSIS — Z98.890 S/P LUMBAR LAMINECTOMY: ICD-10-CM

## 2017-01-23 DIAGNOSIS — Z98.890 S/P KYPHOPLASTY: ICD-10-CM

## 2017-01-23 PROCEDURE — 99999 PR PBB SHADOW E&M-EST. PATIENT-LVL IV: CPT | Mod: PBBFAC,,, | Performed by: PHYSICIAN ASSISTANT

## 2017-01-23 PROCEDURE — 72100 X-RAY EXAM L-S SPINE 2/3 VWS: CPT | Mod: 26,,, | Performed by: RADIOLOGY

## 2017-01-23 PROCEDURE — 99024 POSTOP FOLLOW-UP VISIT: CPT | Mod: ,,, | Performed by: PHYSICIAN ASSISTANT

## 2017-01-23 PROCEDURE — 72100 X-RAY EXAM L-S SPINE 2/3 VWS: CPT | Mod: TC

## 2017-01-23 PROCEDURE — 99214 OFFICE O/P EST MOD 30 MIN: CPT | Mod: PBBFAC | Performed by: PHYSICIAN ASSISTANT

## 2017-01-23 NOTE — TELEPHONE ENCOUNTER
Called pt to discuss surgery. Received v/m, unable to leave message, memory is full. No other contact numbers listed.  covering for  notified.

## 2017-01-23 NOTE — MR AVS SNAPSHOT
[facility]  [address], [address]  [place] [address]  Phone: [phone]                  Chucho Prado   2017 1:00 PM   Office Visit    Description:  Male : 1933   Provider:  Neeta Taylor PA-C   Department:  [place] [facility]           Reason for Visit     Follow-up                To Do List           Future Appointments        Provider Department Dept Phone    2017 1:00 PM Neeta Taylor PA-C [facility] [phone]      Your Future Surgeries/Procedures     2017   Surgery with Chaitanya Taylor Jr., MD   [facility])    [address] [address] [address] [phone]              Goals (5 Years of Data)     None      [facility]     [facility] Care Line -  Assistance  Registered nurses in the [facility] provide clinical advisement, health education, appointment booking, and other advisory services.  Call for this free service at [phone].             Medications           Message regarding Medications     Verify the changes and/or additions to your medication regime listed below are the same as discussed with your clinician today.  If any of these changes or additions are incorrect, please notify your healthcare provider.             Verify that the below list of medications is an accurate representation of the medications you are currently taking.  If none reported, the list may be blank. If incorrect, please contact your healthcare provider. Carry this list with you in case of emergency.           Current Medications     acetaminophen (TYLENOL) 500 MG tablet Take 2 tablets (1,000 mg total) by mouth 3 (three) times daily as needed for Pain.    aspirin 81 MG Chew Take 1 tablet (81 mg total) by mouth once daily.    calcitonin, salmon, (FORTICAL) 200 unit/actuation nasal spray 1 spray by Nasal route once daily.    cetirizine (ZYRTEC) 5 MG tablet  "Take 1 tablet (5 mg total) by mouth once daily.    ferrous sulfate 325 (65 FE) MG EC tablet Take 1 tablet (325 mg total) by mouth every evening.    hydrocodone-acetaminophen 10-325mg (NORCO)  mg Tab Take 1 tablet by mouth every 4 (four) hours as needed for Pain (pain).    levothyroxine (SYNTHROID) 50 MCG tablet Take 1 tablet (50 mcg total) by mouth once daily.    mirtazapine (REMERON) 30 MG tablet Take 1 tablet (30 mg total) by mouth every evening.    ondansetron (ZOFRAN ODT) 4 MG TbDL Take 2 tablets (8 mg total) by mouth every 6 (six) hours as needed (nausea).    simvastatin (ZOCOR) 40 MG tablet Take 1 tablet (40 mg total) by mouth every evening.    trazodone (DESYREL) 50 MG tablet Take 1 tablet (50 mg total) by mouth every evening. Try half tablet at first    triamterene-hydrochlorothiazide 37.5-25 mg (DYAZIDE) 37.5-25 mg per capsule Take 1 capsule by mouth every morning.           Clinical Reference Information           Vital Signs - Last Recorded  Most recent update: 1/23/2017  1:16 PM by Mila Pacheco MA    BP Ht Wt BMI       117/70 (BP Location: Right arm, Patient Position: Sitting) 5' 8" (1.727 m) 86.2 kg (190 lb) 28.89 kg/m2       Blood Pressure          Most Recent Value    BP  117/70      Allergies as of 1/23/2017     No Known Allergies      Immunizations Administered on Date of Encounter - 1/23/2017     None      Advance Directives     An advance directive is a document which, in the event you are no longer able to make decisions for yourself, tells your healthcare team what kind of treatment you do or do not want to receive, or who you would like to make those decisions for you.  If you do not currently have an advance directive, [facility] encourages you to create one.  For more information call:  [phone]), [phone] ([phone]),  or log on to [email or web address].        "

## 2017-01-23 NOTE — PROGRESS NOTES
[facility] - Neurosurgery  Progress Note      SUBJECTIVE:     Chief Complaint: follow up    History of Present Illness:  Chucho Prado is a 83 y.o. male who is 4 weeks status post L3 kyphoplasty and L2-4 laminectomy for L1 and L3 compression fractures with RLE radicular pain. Patient is currently in inpatient rehab. He reports he is enjoying his physical therapy at rehab. He is able to walk with a walker with therapy. Today, he is in a wheelchair. Patient reports occasional low back pain and occasional right anterior thigh pain at night. He describes the thigh pain as a deep, aching pain. Currently, denies back pain or leg pain. Mahajan in place for urinary retention.       Review of patient's allergies indicates:  No Known Allergies    Past Medical History   Diagnosis Date    Arthritis     Blood transfusion     CKD (chronic kidney disease) stage 3, GFR 30-59 ml/min     Compression fracture of lumbar vertebra     Depression     Dyslipidemia     GERD (gastroesophageal reflux disease)     Hypertension     Thyroid disease     UTI (urinary tract infection)      Past Surgical History   Procedure Laterality Date    Hernia repair      Cholecystectomy      Parathyroidectomy      Total knee arthroplasty       Right    Joint replacement      Laminectomy  12/27/2016     L2-L4    Lumbar laminectomy  12/2016     Family History   Problem Relation Age of Onset    Hypertension Mother     Diabetes Father     Esophageal cancer Father      Social History   Substance Use Topics    Smoking status: Former Smoker     Years: 20.00    Smokeless tobacco: Never Used      Comment: quit 1999    Alcohol use 1.2 oz/week     2 Shots of liquor per week      Comment: 1 drink a week        Review of Systems:  Constitutional: no fever, chills or night sweats. No changes in weight   Eyes: no visual changes   ENT: no nasal congestion or sore throat   Respiratory: no cough or shortness of breath   Cardiovascular: no chest pain  or palpitations   Gastrointestinal: no nausea or vomiting   Genitourinary: urinary retention, galvan in place   Musculoskeletal: no arthralgias or myalgias.   Neurological: no seizures or tremors       OBJECTIVE:     Vital Signs (Most Recent):  BP: 117/70 (01/23/17 1314)    Physical Exam:  General: well developed, well nourished, no distress.   Neurologic: Alert and oriented. Thought content appropriate.  Head: normocephalic, atraumatic  Eyes: EOMI.  Neck: trachea midline, no JVD   Cardiovascular: no LE edema  Pulmonary: normal respirations, no signs of respiratory distress  Abdomen: large right sided hernia   Sensory: intact to light touch throughout  Skin: Skin is warm, dry and intact.  Motor Strength:    Iliopsoas Quadriceps Knee  Flexion Tibialis  anterior Gastro- cnemius EHL   R 4/5 5/5 5/5 5/5 5/5 5/5   L 4/5 5/5 5/5 5/5 5/5 5/5     Wound is healing. Clean, dry, intact     Diagnostic Results:  N/A    ASSESSMENT/PLAN:     84 yo male 4 weeks s/p L3 kyphoplasty and L2-4 laminectomies. Improvement in low back pain and right leg pain.     -Continue PT at rehab  -Xray lumbar spine AP and Lateral today  -Ok to stop wearing TLSO brace except when walking   -Follow up in 6 weeks      Neeta Taylor PA-C  Neurosurgery

## 2017-01-26 ENCOUNTER — TELEPHONE (OUTPATIENT)
Dept: UROLOGY | Facility: CLINIC | Age: 82
End: 2017-01-26

## 2017-01-26 NOTE — TELEPHONE ENCOUNTER
Spoke with nurse at the rehab told patient need to arrive for 11;30 also went over fasting instructions.

## 2017-01-27 ENCOUNTER — HOSPITAL ENCOUNTER (OUTPATIENT)
Facility: HOSPITAL | Age: 82
Discharge: NURSING, REHAB OR LONG-TERM CARE | End: 2017-01-27
Attending: UROLOGY | Admitting: UROLOGY
Payer: MEDICARE

## 2017-01-27 VITALS
RESPIRATION RATE: 17 BRPM | HEART RATE: 86 BPM | BODY MASS INDEX: 28.79 KG/M2 | HEIGHT: 68 IN | WEIGHT: 190 LBS | DIASTOLIC BLOOD PRESSURE: 62 MMHG | OXYGEN SATURATION: 100 % | TEMPERATURE: 98 F | SYSTOLIC BLOOD PRESSURE: 134 MMHG

## 2017-01-27 DIAGNOSIS — R31.0 GROSS HEMATURIA: ICD-10-CM

## 2017-01-27 DIAGNOSIS — N13.5 URETERAL OBSTRUCTION: ICD-10-CM

## 2017-01-27 PROCEDURE — 36000707: Performed by: UROLOGY

## 2017-01-27 PROCEDURE — 71000015 HC POSTOP RECOV 1ST HR: Performed by: UROLOGY

## 2017-01-27 PROCEDURE — 74420 UROGRAPHY RTRGR +-KUB: CPT | Mod: 26,,, | Performed by: UROLOGY

## 2017-01-27 PROCEDURE — 37000009 HC ANESTHESIA EA ADD 15 MINS: Performed by: UROLOGY

## 2017-01-27 PROCEDURE — C1758 CATHETER, URETERAL: HCPCS | Performed by: UROLOGY

## 2017-01-27 PROCEDURE — 63600175 PHARM REV CODE 636 W HCPCS: Performed by: NURSE ANESTHETIST, CERTIFIED REGISTERED

## 2017-01-27 PROCEDURE — D9220A PRA ANESTHESIA: Mod: ANES,,, | Performed by: ANESTHESIOLOGY

## 2017-01-27 PROCEDURE — 36000706: Performed by: UROLOGY

## 2017-01-27 PROCEDURE — 25000003 PHARM REV CODE 250: Performed by: NURSE ANESTHETIST, CERTIFIED REGISTERED

## 2017-01-27 PROCEDURE — C1769 GUIDE WIRE: HCPCS | Performed by: UROLOGY

## 2017-01-27 PROCEDURE — 63600175 PHARM REV CODE 636 W HCPCS: Performed by: STUDENT IN AN ORGANIZED HEALTH CARE EDUCATION/TRAINING PROGRAM

## 2017-01-27 PROCEDURE — 52005 CYSTO W/URTRL CATHJ: CPT | Mod: GC,,, | Performed by: UROLOGY

## 2017-01-27 PROCEDURE — D9220A PRA ANESTHESIA: Mod: CRNA,,, | Performed by: NURSE ANESTHETIST, CERTIFIED REGISTERED

## 2017-01-27 PROCEDURE — 27200921 HC BAG, CYSTO DRAINAGE: Performed by: UROLOGY

## 2017-01-27 PROCEDURE — 27200971 HC CYSTO SUPPLY II (SCOPE PRCDR.): Performed by: UROLOGY

## 2017-01-27 PROCEDURE — 71000033 HC RECOVERY, INTIAL HOUR: Performed by: UROLOGY

## 2017-01-27 PROCEDURE — 25000003 PHARM REV CODE 250

## 2017-01-27 PROCEDURE — 25000003 PHARM REV CODE 250: Performed by: STUDENT IN AN ORGANIZED HEALTH CARE EDUCATION/TRAINING PROGRAM

## 2017-01-27 PROCEDURE — 37000008 HC ANESTHESIA 1ST 15 MINUTES: Performed by: UROLOGY

## 2017-01-27 PROCEDURE — 25500020 PHARM REV CODE 255: Performed by: UROLOGY

## 2017-01-27 RX ORDER — CIPROFLOXACIN 2 MG/ML
400 INJECTION, SOLUTION INTRAVENOUS
Status: DISCONTINUED | OUTPATIENT
Start: 2017-01-27 | End: 2017-01-27

## 2017-01-27 RX ORDER — TRIAMTERENE AND HYDROCHLOROTHIAZIDE 37.5; 25 MG/1; MG/1
1 CAPSULE ORAL EVERY MORNING
Status: DISCONTINUED | OUTPATIENT
Start: 2017-01-28 | End: 2017-01-27 | Stop reason: HOSPADM

## 2017-01-27 RX ORDER — ACETAMINOPHEN 500 MG
1000 TABLET ORAL 3 TIMES DAILY PRN
Status: DISCONTINUED | OUTPATIENT
Start: 2017-01-27 | End: 2017-01-27

## 2017-01-27 RX ORDER — DEXTROSE MONOHYDRATE AND SODIUM CHLORIDE 5; .45 G/100ML; G/100ML
INJECTION, SOLUTION INTRAVENOUS CONTINUOUS
Status: DISCONTINUED | OUTPATIENT
Start: 2017-01-27 | End: 2017-01-27

## 2017-01-27 RX ORDER — MIDAZOLAM HYDROCHLORIDE 1 MG/ML
INJECTION, SOLUTION INTRAMUSCULAR; INTRAVENOUS
Status: DISCONTINUED | OUTPATIENT
Start: 2017-01-27 | End: 2017-01-27

## 2017-01-27 RX ORDER — HYDROCODONE BITARTRATE AND ACETAMINOPHEN 5; 325 MG/1; MG/1
2 TABLET ORAL EVERY 4 HOURS PRN
Status: DISCONTINUED | OUTPATIENT
Start: 2017-01-27 | End: 2017-01-27 | Stop reason: HOSPADM

## 2017-01-27 RX ORDER — SODIUM CHLORIDE 9 MG/ML
INJECTION, SOLUTION INTRAVENOUS CONTINUOUS PRN
Status: DISCONTINUED | OUTPATIENT
Start: 2017-01-27 | End: 2017-01-27

## 2017-01-27 RX ORDER — PROPOFOL 10 MG/ML
VIAL (ML) INTRAVENOUS
Status: DISCONTINUED | OUTPATIENT
Start: 2017-01-27 | End: 2017-01-27

## 2017-01-27 RX ORDER — GENTAMICIN SULFATE 40 MG/ML
INJECTION, SOLUTION INTRAMUSCULAR; INTRAVENOUS
Status: DISCONTINUED
Start: 2017-01-27 | End: 2017-01-27 | Stop reason: HOSPADM

## 2017-01-27 RX ORDER — SIMVASTATIN 40 MG/1
40 TABLET, FILM COATED ORAL NIGHTLY
Status: DISCONTINUED | OUTPATIENT
Start: 2017-01-27 | End: 2017-01-27 | Stop reason: HOSPADM

## 2017-01-27 RX ORDER — HYDROCODONE BITARTRATE AND ACETAMINOPHEN 5; 325 MG/1; MG/1
TABLET ORAL
Status: COMPLETED
Start: 2017-01-27 | End: 2017-01-27

## 2017-01-27 RX ORDER — HYDROCODONE BITARTRATE AND ACETAMINOPHEN 5; 325 MG/1; MG/1
1 TABLET ORAL EVERY 4 HOURS PRN
Status: DISCONTINUED | OUTPATIENT
Start: 2017-01-27 | End: 2017-01-27 | Stop reason: HOSPADM

## 2017-01-27 RX ORDER — SODIUM CHLORIDE 0.9 % (FLUSH) 0.9 %
3 SYRINGE (ML) INJECTION EVERY 8 HOURS
Status: DISCONTINUED | OUTPATIENT
Start: 2017-01-27 | End: 2017-01-27 | Stop reason: HOSPADM

## 2017-01-27 RX ORDER — PROPOFOL 10 MG/ML
VIAL (ML) INTRAVENOUS CONTINUOUS PRN
Status: DISCONTINUED | OUTPATIENT
Start: 2017-01-27 | End: 2017-01-27

## 2017-01-27 RX ORDER — FENTANYL CITRATE 50 UG/ML
INJECTION, SOLUTION INTRAMUSCULAR; INTRAVENOUS
Status: DISCONTINUED | OUTPATIENT
Start: 2017-01-27 | End: 2017-01-27

## 2017-01-27 RX ORDER — TRAZODONE HYDROCHLORIDE 50 MG/1
50 TABLET ORAL NIGHTLY
Status: DISCONTINUED | OUTPATIENT
Start: 2017-01-27 | End: 2017-01-27 | Stop reason: HOSPADM

## 2017-01-27 RX ORDER — MIRTAZAPINE 30 MG/1
30 TABLET, FILM COATED ORAL NIGHTLY
Status: DISCONTINUED | OUTPATIENT
Start: 2017-01-27 | End: 2017-01-27 | Stop reason: HOSPADM

## 2017-01-27 RX ORDER — LEVOTHYROXINE SODIUM 50 UG/1
50 TABLET ORAL DAILY
Status: DISCONTINUED | OUTPATIENT
Start: 2017-01-28 | End: 2017-01-27 | Stop reason: HOSPADM

## 2017-01-27 RX ORDER — ONDANSETRON 8 MG/1
8 TABLET, ORALLY DISINTEGRATING ORAL EVERY 6 HOURS PRN
Status: DISCONTINUED | OUTPATIENT
Start: 2017-01-27 | End: 2017-01-27 | Stop reason: HOSPADM

## 2017-01-27 RX ADMIN — PROPOFOL 100 MCG/KG/MIN: 10 INJECTION, EMULSION INTRAVENOUS at 01:01

## 2017-01-27 RX ADMIN — FENTANYL CITRATE 50 MCG: 50 INJECTION, SOLUTION INTRAMUSCULAR; INTRAVENOUS at 01:01

## 2017-01-27 RX ADMIN — MIDAZOLAM HYDROCHLORIDE 1 MG: 1 INJECTION, SOLUTION INTRAMUSCULAR; INTRAVENOUS at 01:01

## 2017-01-27 RX ADMIN — SODIUM CHLORIDE: 0.9 INJECTION, SOLUTION INTRAVENOUS at 01:01

## 2017-01-27 RX ADMIN — PROPOFOL 20 MG: 10 INJECTION, EMULSION INTRAVENOUS at 01:01

## 2017-01-27 RX ADMIN — GENTAMICIN SULFATE 160 MG: 40 INJECTION, SOLUTION INTRAMUSCULAR; INTRAVENOUS at 01:01

## 2017-01-27 RX ADMIN — VANCOMYCIN HYDROCHLORIDE 1000 MG: 1 INJECTION, POWDER, LYOPHILIZED, FOR SOLUTION INTRAVENOUS at 01:01

## 2017-01-27 RX ADMIN — HYDROCODONE BITARTRATE AND ACETAMINOPHEN 1 TABLET: 5; 325 TABLET ORAL at 03:01

## 2017-01-27 RX ADMIN — PROPOFOL 20 MG: 10 INJECTION, EMULSION INTRAVENOUS at 02:01

## 2017-01-27 NOTE — ANESTHESIA RELEASE NOTE
Anesthesia Release from PACU Note    Patient: Chucho Prado    Procedure(s) Performed: Procedure(s) (LRB):  CYSTOSCOPY WITH RETROGRADE PYELOGRAM (Bilateral)    Anesthesia type: General    Post pain: Adequate analgesia    Post assessment: no apparent anesthetic complications    Last Vitals:   Vitals:    01/27/17 1515   BP: (!) 150/58   Pulse: 90   Resp: 14   Temp:    SpO2: 100%       Post vital signs: stable    Level of consciousness: awake    Complications: none    Airway Patency: patent    Respiratory: spontaneous    Cardiovascular: stable    Hydration: euvolemic

## 2017-01-27 NOTE — ANESTHESIA POSTPROCEDURE EVALUATION
"Anesthesia Post Evaluation    Patient: Chucho Prado    Procedure(s) Performed: Procedure(s) (LRB):  CYSTOSCOPY WITH RETROGRADE PYELOGRAM (Bilateral)    Final Anesthesia Type: general  Patient location during evaluation: PACU  Patient participation: Yes- Able to Participate  Level of consciousness: awake and alert  Post-procedure vital signs: reviewed and stable  Pain management: adequate  Airway patency: patent  PONV status at discharge: No PONV  Anesthetic complications: no      Cardiovascular status: blood pressure returned to baseline  Respiratory status: unassisted  Hydration status: euvolemic  Follow-up not needed.        Visit Vitals    BP (!) 150/58 (BP Location: Left arm, Patient Position: Lying, BP Method: Automatic)    Pulse 90    Temp 36.5 °C (97.7 °F) (Temporal)    Resp 14    Ht 5' 8" (1.727 m)    Wt 86.2 kg (190 lb)    SpO2 100%    BMI 28.89 kg/m2       Pain/Nathaniel Score: Pain Assessment Performed: Yes (1/27/2017  2:35 PM)  Presence of Pain: complains of pain/discomfort (1/27/2017  2:35 PM)  Pain Rating Prior to Med Admin: 4 (1/27/2017  3:10 PM)  Pain Rating Post Med Admin: 0 (1/26/2017  9:48 PM)  Nathaniel Score: 10 (1/27/2017  2:35 PM)      "

## 2017-01-27 NOTE — OP NOTE
[facility]  Operative Note    Date: 01/27/2017    Pre-Op Diagnosis: gross hematuria  Patient Active Problem List    Diagnosis Date Noted    Gross hematuria 01/27/2017    Constipation by delayed colonic transit 01/09/2017    TERE (acute kidney injury) 01/07/2017    Lumbar myelopathy 12/30/2016    Gait instability 12/30/2016    S/P lumbar laminectomy 12/28/2016    S/P kyphoplasty 12/28/2016    Sciatica neuralgia 12/26/2016    CKD (chronic kidney disease) stage 3, GFR 30-59 ml/min 12/26/2016    Abdominal wall hernia 12/26/2016    Urinary retention 12/25/2016    UTI due to Klebsiella species 12/25/2016    Primary osteoarthritis of both knees 11/08/2016    Staph aureus infection 10/21/2016    Hematuria 10/20/2016    Inguinal hernia unilateral, non-recurrent 10/09/2013    Renal impairment 07/31/2013    Iron deficiency anemia 07/31/2013    Hypothyroidism 07/30/2013    Hypertension 07/30/2013    Inguinal hernia, right 07/30/2013    Dyslipidemia 07/30/2013    Depression 07/30/2013    Anemia 07/30/2013         Post-Op Diagnosis: same    Procedure(s) Performed:   1.  Cystoscopy   2.  Bilateral retrograde pyelograms     Specimen(s): none    Staff Surgeon: Chaitanya Taylor MD    Assistant Surgeon: Fortunato Butts MD    Anesthesia: MAC    Indications: Chucho Prado is a 83 y.o. male with L1 and L3 compression fractures with aglvan catheter in place for urinary retention with intermittent gross hematuria for the past 15 years.  He underwent CT urogram in 2013, but never followed up for cystoscopy.  Recent Retroperitoneal US showed no masses in the kidneys.      Findings:   - tight phimosis  - prostate non-obstructing, bladder neck patent  - bladder severely trabeculated   - large bladder diverticulum with containing sediment  - bilateral retrograde pyelograms without filling defect and demonstrating delicate collecting system       Estimated Blood Loss: min    Drains: 18 fr  [place]     Procedure in Detail:  After risks, benefits and possible complications of cystoscopy were explained, the patient elected to undergo the procedure and informed consent was obtained. All questions were answered in the maverick-operative area. The patient was transferred to the cystoscopy suite and placed in the supine position.  SCDs were applied and working.  Anesthesia was administered.  Once the patient was adequately sedated, he was placed in the dorsal lithotomy position and prepped and draped in the usual sterile fashion.  Time out was performed, maverick-procedural antibiotics were confirmed.     The patient had a very tight phimosis.  A hemostat was used to gently spread the prepuce until wide enough to allow the cystoscope to pass.      A rigid cystoscope in a 22 Fr sheath was introduced into the bladder per urethra. This passed easily.  There was evidence of catheter irritation just distal to the urethral sphincter.  The entire urethra was visualized which showed no masses or strictures.      The bladder was very abnormal in appearance.  It was severely trabeculated with many diverticula, including one very large diverticula on the left posterior wall of the bladder. This contained a large amount of foul smelling pus.      The right and left ureteral orifices were identified in the normal anatomic position and were seen effluxing clear urine.  Formal cystoscopy was performed which revealed no masses or lesions suspicious for malignancy.  There was no evidence of bleeding from the ureteral orifices, bladder mucosa, diverticula, or prostate.     A Rutner catheter was introduced through the cystoscope into the bladder.  Bilateral retrograde pyelograms were performed in standard fashion.  This revealed delicate collecting systems bilaterally with no filling defects.      The patient tolerated the procedure well and was transferred to recovery in stable condition.    Disposition:  The patient will be admitted  to the floor for monitoring overnight.      Fortunato Butts MD

## 2017-01-27 NOTE — PLAN OF CARE
JIMBO spoke with Dr. Butts concerning pt. MD states pt can transfer back to [facility] tomorrow. JIMBO called Crystal from [facility]. JIMBO informed her pt arrived for procedure at 1143 today. She states pt can return to rehab tomorrow pt has not been in hospital for 3 days. Unless there are any changes no new orders needed.  Nurse to call report to [phone]. On call Sw to set up transportation.

## 2017-01-27 NOTE — PROGRESS NOTES
Admit Date: 1/27/2017   LOS: 0 days     HPI, PFSH reviewed -- No change from admission.    Interval History/ROS: Patient not seen this AM as he was in cystoscopy.   Functional:    Min A w bed mobility, Min A  for all transfers.     Physical Exam:  Vital Signs Range (Last 24H):  Temp:  [97.4 °F (36.3 °C)-98.5 °F (36.9 °C)]   Pulse:  [78-80]   Resp:  [18]   BP: (139-156)/(63-70)   SpO2:  [97 %]   General Appearance:  In no acute distress and comfortable, sitting up in WC   - Vital Signs reviewed  Neuro/Cognition: Alert, Awake, Oriented to person, place, situation    Psych/Affect: Appropriate   Cardiac: Normal rate   Pulmonary: No labored breathing, on RA   Abdomen: soft, non-tender, and not distended  - large right sided hernia    : + galvan   Extremities: No calf tenderness, no local swelling   Skin: Intact, Warm  - excoriation to buttocks  Musculoskeletal:   UE: WFL  LE HF 4/5, KE 5/5, DF/PF 5/5  R wrist pain w flexion, otherwise no pain w abd/adduction, or extent ion. No pain in snuff box. Residual bruising along forearm resolving.     Scheduled Medications:      PRN Medications:       Review of patient's allergies indicates:  No Known Allergies    Recent Laboratory Results:  Lab Results   Component Value Date    CREATININE 1.5 (H) 01/26/2017    BUN 17 01/26/2017     (L) 01/26/2017    K 4.2 01/26/2017     01/26/2017    CO2 26 01/26/2017     Lab Results   Component Value Date    WBC 6.28 01/26/2017    HGB 10.8 (L) 01/26/2017    HCT 35.9 (L) 01/26/2017    MCV 88 01/26/2017     01/26/2017     No results found for: PTT      Evaluation:   Chucho Prado is a 83 y.o. male admitted to inpatient rehabilitation on 1/27/2017 for gait instability following compression fractures with impaired mobility and ADLs. Patient remains appropriate for PT, OT, and as required Speech therapy. Patient continues to require 24 hour nursing care as well as daily Physician assessment.     Current Medical Mgmt:    L2 and L3 compression fractures with RLE radiculopathy   -- S/p L3 kyphoplasty and L2-4 laminectomy on 12/27  -- Pravalon boots to protect heels    -- neurosurgery appt 1/24. Ok to use TLSO brace only when standing. F/u in 6 weeks.   -- New Left heel ulcer developed 1/19; strict pressure relief      HTN  --   Triamterene/HCTZ on hold give TERE/dehydration. Monitor BP/HR, and will re-initiate as clinically indicated.   ---140, cont to monitor      Pain  -- Tylenol 650mg BID scheduled  -- Methocarbamol 500mg QHS scheduled  -- Norco QHS scheduled  -- cont w gabapentin to 800 mg TID, lidoderm patch to back, Norco PRN   -- Will monitor Acetaminophen intake < 3g daily     Urinary retention  -- HX of UTI s/p Rocephin course, repeat UA on 1/6 neg  -- Cont w Flomax  -- Galvan in place, leave for now  -- UA/UCX ordered on 1/20 for prep for cystoscopy - UA positive, with Ucx w > 100K E coli, will treat w Cipro. Day 5/7.   -- Cytoscopy today. Follow up results.      Bowel Management: monitor for continence   - Miralax scheduled daily  - Colace and Suppository PRN   - Will monitor for regularity    Hyponatremia (improved)  -- Serum Osm 287  -- Likely pseudohyponatremia 2/2 to increased TG's or Protein  -- No intervention needed at this time; will continue to monitor    TERE on CKD3 (improved)   -- Unclear etiology of TERE  -- FeNa suggestive of pre-renal, however can't fully exclude intrinsic etiology with previous nephrotic range proteinuria  -- Pt with known post-renal obstruction and bladder diverticulum; leave galvan in place  -- Daily weights and Strict I/O's  1/26 BUN/Cr 17/1.5. Within baseline. Encourage to take in plenty of fluids PO.     Monitoring:    Nutrition/Swallow Status: Reg/thin  - Prealbumin - 10, and Boost  - Nutritionist on board   Mood: stable, cont w Mirtazapine qHS  DVT ppx: Heparin Q8  Sleep: monitor; Ramelteon PRN  Hypothyroidism - cont w synthroid    Discharge Planning:  Tentative Discharge Date:  per TAYLER, 1/31  Tentative DC location: Home with Caregiver (former tenant) from [place]    Future Appointments  Date Time Provider Department Center   4/25/2017 1:00 PM Neeta Taylor PA-C [facility] NEUROSU NELLY [place]  [facility] [place]

## 2017-01-27 NOTE — TRANSFER OF CARE
"Anesthesia Transfer of Care Note    Patient: Chucho Prado    Procedure(s) Performed: Procedure(s) (LRB):  CYSTOSCOPY WITH RETROGRADE PYELOGRAM (Bilateral)    Patient location: PACU    Anesthesia Type: general    Transport from OR: Transported from OR on 6-10 L/min O2 by face mask with adequate spontaneous ventilation    Post pain: adequate analgesia    Post assessment: no apparent anesthetic complications and tolerated procedure well    Post vital signs: stable    Level of consciousness: awake    Nausea/Vomiting: no nausea/vomiting    Complications: none          Last vitals:   Visit Vitals    /86 (BP Location: Left arm, Patient Position: Lying, BP Method: Automatic)    Pulse 73    Temp 36.9 °C (98.5 °F) (Oral)    Resp 16    Ht 5' 8" (1.727 m)    Wt 86.2 kg (190 lb)    BMI 28.89 kg/m2     "

## 2017-01-27 NOTE — PROGRESS NOTES
"Procedure care complete, pt waiting on transportation from [facility]- transportation team called by RN states," they are on their way".   "

## 2017-01-27 NOTE — PROGRESS NOTES
Dr Taylor @ bedside talking to patient. Dr Taylor stated patient can go back to SNF @ [place] if patient wants to or can stay overnight. Pt [facility]. Pt AAO, in stable condition. No signs of distress noted.

## 2017-01-27 NOTE — PROGRESS NOTES
Spoke and gave report to Mindy ( nurse @ [facility]). Pt AAO. Denies pain. Tolerating clear liquids with no problems. Mahajan catheter intact draining clear yellow urine. Vital signs stable. Pt in stable condition, no signs of distress noted.

## 2017-01-27 NOTE — H&P
Urology ([facility]) H&P  Staff: Chaitanya Taylor MD    Is this patient in a research study?  No    CC: gross hematuria     HPI:  Chucho Prado is a 83 y.o. male with hx of urinary retention and gross hematuria multiple times over the past 15 years.  He had a CT Urogram in 2013 which demonstrated a very large bladder diverticulum and irregular appearance of the bladder wall, although he never followed up for cystoscopy.      He recently presented to [facility] for back pain and neurosurgery consultation. On CT he was noted to have severe distention of his bladder with diverticula present.     He has been at [facility] and has been doing well.  He has a 12 fr galvan catheter in.      ROS:  Neg except per HPI    Past Medical History   Diagnosis Date    Arthritis     Blood transfusion     CKD (chronic kidney disease) stage 3, GFR 30-59 ml/min     Compression fracture of lumbar vertebra     Depression     Dyslipidemia     GERD (gastroesophageal reflux disease)     Hypertension     Thyroid disease     UTI (urinary tract infection)        Past Surgical History   Procedure Laterality Date    Hernia repair      Cholecystectomy      Parathyroidectomy      Total knee arthroplasty       Right    Joint replacement      Laminectomy  12/27/2016     L2-L4    Lumbar laminectomy  12/2016       Social History     Social History    Marital status: Single     Spouse name: N/A    Number of children: N/A    Years of education: N/A     Social History Main Topics    Smoking status: Former Smoker     Years: 20.00    Smokeless tobacco: Never Used      Comment: quit 1999    Alcohol use 1.2 oz/week     2 Shots of liquor per week      Comment: 1 drink a week    Drug use: No    Sexual activity: No     Other Topics Concern    None     Social History Narrative    Lives alone, owns house and rents part.  helps. 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        Family History   Problem Relation Age of Onset     Hypertension Mother     Diabetes Father     Esophageal cancer Father        Review of patient's allergies indicates:  No Known Allergies    Current Facility-Administered Medications on File Prior to Encounter   Medication Dose Route Frequency Provider Last Rate Last Dose    [MAR Hold - Suspended Admission] acetaminophen tablet 650 mg  650 mg Oral BID Brayan Cleaning MD   650 mg at 01/26/17 1206    [MAR Hold - Suspended Admission] aluminum-magnesium hydroxide-simethicone 200-200-20 mg/5 mL suspension 30 mL  30 mL Oral Q4H PRN Sen Kinney MD   30 mL at 01/06/17 1343    [MAR Hold - Suspended Admission] ciprofloxacin HCl tablet 500 mg  500 mg Oral Q12H Chelsie Dueñas MD   500 mg at 01/26/17 2047    [MAR Hold - Suspended Admission] docusate sodium capsule 100 mg  100 mg Oral BID PRN Brayan Cleaning MD        [MAR Hold - Suspended Admission] gabapentin capsule 800 mg  800 mg Oral TID Chelsie Dueñas MD   800 mg at 01/26/17 2148    [MAR Hold - Suspended Admission] heparin (porcine) injection 5,000 Units  5,000 Units Subcutaneous Q8H Sen Kinney MD   Stopped at 01/26/17 2200    [MAR Hold - Suspended Admission] hydrocodone-acetaminophen 5-325mg per tablet 1 tablet  1 tablet Oral Q4H PRN Brayan Cleaning MD   1 tablet at 01/23/17 0909    [MAR Hold - Suspended Admission] hydrocodone-acetaminophen 5-325mg per tablet 1 tablet  1 tablet Oral QHS Chelsie Dueñas MD   1 tablet at 01/26/17 2048    [MAR Hold - Suspended Admission] levothyroxine tablet 50 mcg  50 mcg Oral Before breakfast Sen Kinney MD   50 mcg at 01/27/17 0730    [MAR Hold - Suspended Admission] lidocaine 5 % patch 1 patch  1 patch Transdermal Q24H Sen Kinney MD   1 patch at 01/26/17 0912    [MAR Hold - Suspended Admission] methocarbamol tablet 500 mg  500 mg Oral TID PRN Chelsie Dueñas MD   500 mg at 01/23/17 0909    [MAR Hold - Suspended Admission] methocarbamol tablet 500 mg  500 mg Oral QHS  Chelsie Dueñas MD   500 mg at 01/26/17 2048    [MAR Hold - Suspended Admission] mirtazapine tablet 15 mg  15 mg Oral QHS Chelsie Dueñas MD   15 mg at 01/26/17 2048    [MAR Hold - Suspended Admission] ondansetron disintegrating tablet 8 mg  8 mg Oral Q8H PRN Chelsie Dueñas MD   8 mg at 01/23/17 0909    [MAR Hold - Suspended Admission] polyethylene glycol packet 17 g  17 g Oral Daily Brayan Cleaning MD   17 g at 01/26/17 0738    [MAR Hold - Suspended Admission] ramelteon tablet 8 mg  8 mg Oral Nightly PRN Sen Kinney MD   8 mg at 01/22/17 1950    [MAR Hold - Suspended Admission] simethicone chewable tablet 80 mg  1 tablet Oral TID PRN Chelsie Dueñas MD   80 mg at 01/26/17 1208    [MAR Hold - Suspended Admission] simethicone chewable tablet 80 mg  1 tablet Oral TID PC Chelsie Dueñas MD   80 mg at 01/26/17 1927    [MAR Hold - Suspended Admission] simvastatin tablet 40 mg  40 mg Oral QHS Chelsie Dueñas MD   40 mg at 01/26/17 2048    [MAR Hold - Suspended Admission] tamsulosin 24 hr capsule 0.4 mg  0.4 mg Oral Daily Sen Kinney MD   0.4 mg at 01/26/17 0738     Current Outpatient Prescriptions on File Prior to Encounter   Medication Sig Dispense Refill    acetaminophen (TYLENOL) 500 MG tablet Take 2 tablets (1,000 mg total) by mouth 3 (three) times daily as needed for Pain. 30 tablet 0    aspirin 81 MG Chew Take 1 tablet (81 mg total) by mouth once daily. 30 tablet 0    calcitonin, salmon, (FORTICAL) 200 unit/actuation nasal spray 1 spray by Nasal route once daily. 1 Bottle 0    cetirizine (ZYRTEC) 5 MG tablet Take 1 tablet (5 mg total) by mouth once daily. (Patient taking differently: Take 5 mg by mouth daily as needed for Allergies. ) 30 tablet 11    ferrous sulfate 325 (65 FE) MG EC tablet Take 1 tablet (325 mg total) by mouth every evening. 30 tablet 11    hydrocodone-acetaminophen 10-325mg (NORCO)  mg Tab Take 1 tablet by mouth every 4 (four) hours as needed for Pain  (pain). 20 tablet 0    levothyroxine (SYNTHROID) 50 MCG tablet Take 1 tablet (50 mcg total) by mouth once daily. 30 tablet 11    mirtazapine (REMERON) 30 MG tablet Take 1 tablet (30 mg total) by mouth every evening. 30 tablet 11    ondansetron (ZOFRAN ODT) 4 MG TbDL Take 2 tablets (8 mg total) by mouth every 6 (six) hours as needed (nausea). 30 tablet 0    simvastatin (ZOCOR) 40 MG tablet Take 1 tablet (40 mg total) by mouth every evening. 30 tablet 11    trazodone (DESYREL) 50 MG tablet Take 1 tablet (50 mg total) by mouth every evening. Try half tablet at first 30 tablet 3    triamterene-hydrochlorothiazide 37.5-25 mg (DYAZIDE) 37.5-25 mg per capsule Take 1 capsule by mouth every morning. 90 capsule 3       Anticoagulation:  Yes aspirine    Physical Exam:  weight 190 lb/86.2 kg      AAOx4, NAD, WDWN  NC/AT, EOMI, PER, sclerae anicteric, speech normal, tongue midline  Nl effort  RRR  Soft, non-tender, non-distended  12 fr galvan catheter in place draining clear yellow urine    Labs:      Lab Results   Component Value Date    WBC 6.28 01/26/2017    HGB 10.8 (L) 01/26/2017    HCT 35.9 (L) 01/26/2017    MCV 88 01/26/2017     01/26/2017       BMP  Lab Results   Component Value Date     (L) 01/26/2017    K 4.2 01/26/2017     01/26/2017    CO2 26 01/26/2017    BUN 17 01/26/2017    CREATININE 1.5 (H) 01/26/2017    CALCIUM 8.4 (L) 01/26/2017    ANIONGAP 5 (L) 01/26/2017    ESTGFRAFRICA 49.1 (A) 01/26/2017    EGFRNONAA 42.4 (A) 01/26/2017       No results found for: PSA    Imaging:  CT A/p non-con 12/25/2016:  Distended bladder with diverticuula with debris in dependent portions of bladder and diverticula    Assessment: Chucho Prado is a 83 y.o. male with gross hematuria    Plan:     1. To OR today for cystoscopy with bilateral retrograde pyelograms, possible TURBT  2. Consents signed   3. I have explained the risk, benefits, and alternatives of the procedure in detail. The patient voices  understanding and all questions have been answered. The patient agrees to proceed as planned.       Fortunato Butts MD

## 2017-01-31 NOTE — DISCHARGE SUMMARY
[facility]  Discharge Note  Short Stay    Admit Date: 1/27/2017    Discharge Date and Time: 1/27/2017  5:15 PM     Attending Physician: Chaitanya Taylor MD     Discharge Provider: Fortunato Butts MD    Diagnoses:  Active Hospital Problems    Diagnosis  POA    Gross hematuria [R31.0]  Yes      Resolved Hospital Problems    Diagnosis Date Resolved POA   No resolved problems to display.       Discharged Condition: good    Hospital Course: Patient was admitted for a cystoscopy with bilateral retrograde pyelograms and tolerated the procedure well with no complications.    Final Diagnoses: Same as principal problem.    Disposition: Rehab Facility    Follow up/Patient Instructions:    Medications:  Reconciled Home Medications:   Discharge Medication List as of 1/27/2017  5:39 PM      CONTINUE these medications which have NOT CHANGED    Details   acetaminophen (TYLENOL) 500 MG tablet Take 2 tablets (1,000 mg total) by mouth 3 (three) times daily as needed for Pain., Starting 12/20/2016, Until Discontinued, No Print      aspirin 81 MG Chew Take 1 tablet (81 mg total) by mouth once daily., Starting 10/25/2016, Until Discontinued, Print      calcitonin, salmon, (FORTICAL) 200 unit/actuation nasal spray 1 spray by Nasal route once daily., Starting 12/20/2016, Until Wed 12/20/17, Print      ferrous sulfate 325 (65 FE) MG EC tablet Take 1 tablet (325 mg total) by mouth every evening., Starting 10/25/2016, Until Discontinued, Print      hydrocodone-acetaminophen 10-325mg (NORCO)  mg Tab Take 1 tablet by mouth every 4 (four) hours as needed for Pain (pain)., Starting 12/20/2016, Until Discontinued, Print      levothyroxine (SYNTHROID) 50 MCG tablet Take 1 tablet (50 mcg total) by mouth once daily., Starting 11/1/2016, Until Discontinued, Print      mirtazapine (REMERON) 30 MG tablet Take 1 tablet (30 mg total) by mouth every evening., Starting 11/1/2016, Until Wed 11/1/17, Print      ondansetron (ZOFRAN ODT) 4 MG  TbDL Take 2 tablets (8 mg total) by mouth every 6 (six) hours as needed (nausea)., Starting 12/20/2016, Until Discontinued, Print      simvastatin (ZOCOR) 40 MG tablet Take 1 tablet (40 mg total) by mouth every evening., Starting 11/1/2016, Until Wed 11/1/17, Print      trazodone (DESYREL) 50 MG tablet Take 1 tablet (50 mg total) by mouth every evening. Try half tablet at first, Starting 12/14/2016, Until Discontinued, Print      triamterene-hydrochlorothiazide 37.5-25 mg (DYAZIDE) 37.5-25 mg per capsule Take 1 capsule by mouth every morning., Starting 11/9/2016, Until Thu 11/9/17, Print      cetirizine (ZYRTEC) 5 MG tablet Take 1 tablet (5 mg total) by mouth once daily., Starting 10/25/2016, Until Wed 10/25/17, Print                 Discharge Procedure Orders (must include Diet, Follow-up, Activity):  Follow up with Dr. Anderson as needed

## 2017-02-06 ENCOUNTER — PATIENT OUTREACH (OUTPATIENT)
Dept: ADMINISTRATIVE | Facility: CLINIC | Age: 82
End: 2017-02-06
Payer: MEDICARE

## 2017-02-06 NOTE — PATIENT INSTRUCTIONS
Understanding Lumbar Radiculopathy    Lumbar radiculopathy is irritation or inflammation of a nerve root in the low back. It causes symptoms that spread out from the back down one or both legs. To understand this condition, it helps to understand the parts of the spine:  · Vertebrae. These are bones that stack to form the spine. The lumbar spine contains the 5 bottom vertebrae.  · Disks. These are soft pads of tissue between the vertebrae. They act as shock absorbers for the spine.  · Spinal canal. This is a tunnel formed within the stacked vertebrae. In the lumbar spine, nerves run through this canal.  · Nerves. These branch off and leave the spinal canal, traveling out to parts of the body. As they leave the spinal canal, nerves pass through openings between the vertebrae. The nerve root is the part of the nerve that is closest to the spinal canal.  · Sciatic nerve. This is a large nerve formed from several nerve roots in the low back. This nerve extends down the back of the leg to the foot.  With lumbar radiculopathy, nerve roots in the low back become irritated. This leads to pain and symptoms. The sciatic nerve is commonly involved, so the condition is often called sciatica.  What causes lumbar radiculopathy?  Aging, injury, poor posture, extra body weight, and other issues can lead to problems in the low back. These problems may then irritate nerve roots. They include:  · Damage to a disk in the lumbar spine. The damaged disk may then press on nearby nerve roots.  · Degeneration from wear and tear, and aging. This can lead to narrowing (stenosis) of the openings between the vertebrae. The narrowed openings press on nerve roots as they leave the spinal canal.  · Unstable spine. This is when a vertebra slips forward. It can then press on a nerve root.  Other, less common things can put pressure on nerves in the low back. These include diabetes, infection, or a tumor.  Symptoms of lumbar radiculopathy  These  include:  · Pain in the low back  · Pain, numbness, tingling, or weakness that travels into the buttocks, hip, groin, or leg  · Muscle spasms  Treatment for lumbar radiculopathy  In most cases, your healthcare provider will first try treatments that help relieve symptoms. These may include:  · Prescription and over-the-counter pain medicines. These help relieve pain, swelling, and irritation.  · Limits on positions and activities that increase pain. But lying in bed or avoiding all movement is only recommended for a short period of time.  · Physical therapy, including exercises and stretches. This helps decrease pain and increase movement and function.  · Steroid shots into the lower back. This may help relieve symptoms for a time.  · Weight-loss program. If you are overweight, losing extra pounds may help relieve symptoms.  In some cases, you may need surgery to fix the underlying problem. This depends on the cause, the symptoms, and how long the pain has lasted.  Possible complications  Over time, an irritated and inflamed nerve may become damaged. This may lead to long-lasting (permanent) numbness or weakness in your legs and feet. If symptoms change suddenly or get worse, be sure to let your healthcare provider know.  When to call your healthcare provider  Call your healthcare provider right away if you have any of these:  · New pain or pain that gets worse  · New or increasing weakness, tingling, or numbness in your leg or foot  · Problems controlling your bladder or bowel   Date Last Reviewed: 3/10/2016  © 9672-1464 [employer] [address] [place] [address], [place] [address]. All rights reserved. This information is not intended as a substitute for professional medical care. Always follow your healthcare professional's instructions.

## 2017-02-06 NOTE — PROGRESS NOTES
C3 nurse attempted to contact patient. The following occurred:   C3 nurse attempted to contact Chucho Prado for a TCC post hospital discharge follow up call. The patient is unable to conduct the call @ this time. The patient requested a callback.    The patient has a scheduled HOSFU appointment with Obed Mcguire MD on 3/9/17 @ 0386. Message sent to Physician staff.

## 2017-02-23 ENCOUNTER — OFFICE VISIT (OUTPATIENT)
Dept: UROLOGY | Facility: CLINIC | Age: 82
End: 2017-02-23
Payer: MEDICARE

## 2017-02-23 VITALS
DIASTOLIC BLOOD PRESSURE: 44 MMHG | BODY MASS INDEX: 28.79 KG/M2 | HEART RATE: 75 BPM | WEIGHT: 190 LBS | HEIGHT: 68 IN | SYSTOLIC BLOOD PRESSURE: 96 MMHG

## 2017-02-23 DIAGNOSIS — N31.9 NEUROGENIC BLADDER: Primary | ICD-10-CM

## 2017-02-23 DIAGNOSIS — N30.00 ACUTE CYSTITIS WITHOUT HEMATURIA: ICD-10-CM

## 2017-02-23 PROCEDURE — 99999 PR PBB SHADOW E&M-EST. PATIENT-LVL III: CPT | Mod: PBBFAC,,, | Performed by: UROLOGY

## 2017-02-23 PROCEDURE — 99213 OFFICE O/P EST LOW 20 MIN: CPT | Mod: PBBFAC | Performed by: UROLOGY

## 2017-02-23 PROCEDURE — 87086 URINE CULTURE/COLONY COUNT: CPT

## 2017-02-23 PROCEDURE — 99213 OFFICE O/P EST LOW 20 MIN: CPT | Mod: S$PBB,,, | Performed by: UROLOGY

## 2017-02-23 NOTE — MR AVS SNAPSHOT
[facility] Urology 4th Floor  [address]  [facility] [address]  Phone: [phone]                  Chucho Prado   2017 9:45 AM   Office Visit    Description:  Male : 1933   Provider:  Chaitanya Taylor Jr., MD   Department:  [facility] Urolog 4th Floor           Reason for Visit     Follow-up           Diagnoses this Visit        Comments    Neurogenic bladder    -  Primary     Acute cystitis without hematuria                To Do List           Future Appointments        Provider Department Dept Phone    3/9/2017 9:40 AM bOed Mcguire MD [facility] - Internal Medicine [phone]    3/24/2017 8:00 AM [facility], UROLOGY [facility]rolog 4th Floor [phone]    2017 10:00 AM Chelsie Dueñas MD [place] - Physical Med & Rehab [phone]    2017 1:00 PM [facility] - Neurosurgery [phone]      Goals (5 Years of Data)     None      [facility] On Call     [facility] On Call Nurse Care Line -  Assistance  Registered nurses in the [facility] On Call Center provide clinical advisement, health education, appointment booking, and other advisory services.  Call for this free service at [phone].             Medications           Message regarding Medications     Verify the changes and/or additions to your medication regime listed below are the same as discussed with your clinician today.  If any of these changes or additions are incorrect, please notify your healthcare provider.             Verify that the below list of medications is an accurate representation of the medications you are currently taking.  If none reported, the list may be blank. If incorrect, please contact your healthcare provider. Carry this list with you in case of emergency.           Current Medications     acetaminophen (TYLENOL) 500 MG tablet Take 2 tablets (1,000 mg total) by mouth 3 (three) times daily as needed for Pain.    aspirin 81 MG Chew Take 1 tablet (81 mg total) by mouth  "once daily. HOLD UNTIL TOLD TO RESUME BY PHYSICIAN    calcitonin, salmon, (FORTICAL) 200 unit/actuation nasal spray 1 spray by Nasal route once daily.    docusate sodium (COLACE) 100 MG capsule Take 1 capsule (100 mg total) by mouth 2 (two) times daily as needed for Constipation (2nd line constipation).    ferrous sulfate 325 (65 FE) MG EC tablet Take 1 tablet (325 mg total) by mouth every evening.    gabapentin (NEURONTIN) 400 MG capsule Take 2 capsules (800 mg total) by mouth 3 (three) times daily.    hydrocodone-acetaminophen 5-325mg (NORCO) 5-325 mg per tablet Take 1 tablet by mouth every 6 (six) hours as needed for Pain.    levothyroxine (SYNTHROID) 50 MCG tablet Take 1 tablet (50 mcg total) by mouth once daily.    lidocaine (LIDODERM) 5 % Place 1 patch onto the skin once daily. Remove & Discard patch within 12 hours or as directed by MD  Please apply to lower back every evening, remove in AM    methocarbamol (ROBAXIN) 500 MG Tab Take 1 tablet (500 mg total) by mouth 3 (three) times daily as needed (muscle spasms).    mirtazapine (REMERON) 30 MG tablet Take 0.5 tablets (15 mg total) by mouth every evening.    polyethylene glycol (GLYCOLAX) 17 gram PwPk Take 17 g by mouth once daily.    simethicone (MYLICON) 80 MG chewable tablet Take 1 tablet (80 mg total) by mouth 3 (three) times daily after meals.    simvastatin (ZOCOR) 40 MG tablet Take 1 tablet (40 mg total) by mouth every evening.    tamsulosin (FLOMAX) 0.4 mg Cp24 Take 1 capsule (0.4 mg total) by mouth once daily.    triamterene-hydrochlorothiazide 37.5-25 mg (DYAZIDE) 37.5-25 mg per capsule Take 1 capsule by mouth every morning. HOLD UNTIL TOLD TO RESUME BY PHYSICIAN           Clinical Reference Information           Your Vitals Were     BP Pulse Height Weight BMI    96/44 75 5' 8" (1.727 m) 86.2 kg (190 lb) 28.89 kg/m2      Blood Pressure          Most Recent Value    BP  (!)  96/44      Allergies as of 2/23/2017     No Known Allergies    "   Immunizations Administered on Date of Encounter - 2/23/2017     None      Orders Placed During Today's Visit      Normal Orders This Visit    Urine culture     Future Labs/Procedures Expected by Expires    Simple urodynamics  As directed 2/23/2018      Language Assistance Services     ATTENTION: Language assistance services are available, free of charge. Please call [phone].      ATENCIÓN: Si habla español, tiene a benson disposición servicios gratuitos de asistencia lingüística. Llame al [phone].     CHÚ Ý: N?u b?n nói Ti?ng Vi?t, có các d?ch v? h? tr? ngôn ng? mi?n phí dành cho b?n. G?i s? [phone].         [facility] 4th Floor complies with applicable Federal civil rights laws and does not discriminate on the basis of race, color, national origin, age, disability, or sex.

## 2017-02-23 NOTE — PROGRESS NOTES
Subjective:       Patient ID: Chucho Prado is a 83 y.o. male.    Chief Complaint: Follow-up (per patient he thinks that that cath needs to be changed or removed)    HPI patient is known to me and is status post cystoscopy bilateral retrograde pyelograms.  His upper tracts were normal and his prostate and bladder neck looked open.  He had large diverticulum with sediment.  He has a Mahajan catheter in place at the present time and has had recent neurosurgery.  Certain that he may have a neurogenic bladder salt change his catheter today get a culture start appropriate antibiotics and get a simple urodynamics.  He voices no complaints at this time    Past Medical History   Diagnosis Date    Arthritis     Blood transfusion     CKD (chronic kidney disease) stage 3, GFR 30-59 ml/min     Compression fracture of lumbar vertebra     Depression     Dyslipidemia     GERD (gastroesophageal reflux disease)     Hypertension     Thyroid disease     UTI (urinary tract infection)        Past Surgical History   Procedure Laterality Date    Hernia repair      Cholecystectomy      Parathyroidectomy      Total knee arthroplasty       Right    Joint replacement      Laminectomy  12/27/2016     L2-L4    Lumbar laminectomy  12/2016       Family History   Problem Relation Age of Onset    Hypertension Mother     Diabetes Father     Esophageal cancer Father        Social History     Social History    Marital status: Single     Spouse name: N/A    Number of children: N/A    Years of education: N/A     Occupational History    Not on file.     Social History Main Topics    Smoking status: Former Smoker     Years: 20.00    Smokeless tobacco: Never Used      Comment: quit 1999    Alcohol use 1.2 oz/week     2 Shots of liquor per week      Comment: 1 drink a week    Drug use: No    Sexual activity: No     Other Topics Concern    Not on file     Social History Narrative    Lives alone, owns house and rents  part.  helps. 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        Allergies:  Review of patient's allergies indicates no known allergies.    Medications:    Current Outpatient Prescriptions:     acetaminophen (TYLENOL) 500 MG tablet, Take 2 tablets (1,000 mg total) by mouth 3 (three) times daily as needed for Pain., Disp: 30 tablet, Rfl: 0    aspirin 81 MG Chew, Take 1 tablet (81 mg total) by mouth once daily. HOLD UNTIL TOLD TO RESUME BY PHYSICIAN, Disp: 30 tablet, Rfl: 0    calcitonin, salmon, (FORTICAL) 200 unit/actuation nasal spray, 1 spray by Nasal route once daily., Disp: 1 Bottle, Rfl: 0    docusate sodium (COLACE) 100 MG capsule, Take 1 capsule (100 mg total) by mouth 2 (two) times daily as needed for Constipation (2nd line constipation)., Disp: , Rfl: 0    ferrous sulfate 325 (65 FE) MG EC tablet, Take 1 tablet (325 mg total) by mouth every evening., Disp: 30 tablet, Rfl: 11    gabapentin (NEURONTIN) 400 MG capsule, Take 2 capsules (800 mg total) by mouth 3 (three) times daily., Disp: 180 capsule, Rfl: 11    hydrocodone-acetaminophen 5-325mg (NORCO) 5-325 mg per tablet, Take 1 tablet by mouth every 6 (six) hours as needed for Pain., Disp: 60 tablet, Rfl: 0    levothyroxine (SYNTHROID) 50 MCG tablet, Take 1 tablet (50 mcg total) by mouth once daily., Disp: 30 tablet, Rfl: 11    lidocaine (LIDODERM) 5 %, Place 1 patch onto the skin once daily. Remove & Discard patch within 12 hours or as directed by MD Please apply to lower back every evening, remove in AM, Disp: 30 patch, Rfl: 6    methocarbamol (ROBAXIN) 500 MG Tab, Take 1 tablet (500 mg total) by mouth 3 (three) times daily as needed (muscle spasms)., Disp: 60 tablet, Rfl: 0    mirtazapine (REMERON) 30 MG tablet, Take 0.5 tablets (15 mg total) by mouth every evening., Disp: 30 tablet, Rfl: 11    polyethylene glycol (GLYCOLAX) 17 gram PwPk, Take 17 g by mouth once daily., Disp: 30 each, Rfl: 0    simethicone (MYLICON) 80 MG chewable tablet, Take  1 tablet (80 mg total) by mouth 3 (three) times daily after meals., Disp: , Rfl: 0    simvastatin (ZOCOR) 40 MG tablet, Take 1 tablet (40 mg total) by mouth every evening., Disp: 30 tablet, Rfl: 11    tamsulosin (FLOMAX) 0.4 mg Cp24, Take 1 capsule (0.4 mg total) by mouth once daily., Disp: 30 capsule, Rfl: 3    triamterene-hydrochlorothiazide 37.5-25 mg (DYAZIDE) 37.5-25 mg per capsule, Take 1 capsule by mouth every morning. HOLD UNTIL TOLD TO RESUME BY PHYSICIAN, Disp: 90 capsule, Rfl: 3    Review of Systems   Constitutional: Negative for activity change, appetite change, chills, diaphoresis, fatigue, fever and unexpected weight change.   HENT: Negative for congestion, dental problem, hearing loss, mouth sores, postnasal drip, rhinorrhea, sinus pressure and trouble swallowing.    Eyes: Negative for pain, discharge and itching.   Respiratory: Negative for apnea, cough, choking, chest tightness, shortness of breath and wheezing.    Cardiovascular: Negative for chest pain, palpitations and leg swelling.   Gastrointestinal: Negative for abdominal distention, abdominal pain, anal bleeding, blood in stool, constipation, diarrhea, nausea, rectal pain and vomiting.   Endocrine: Negative for polydipsia and polyuria.   Genitourinary: Negative for decreased urine volume, difficulty urinating, discharge, dysuria, enuresis, flank pain, frequency, genital sores, hematuria, penile pain, penile swelling, scrotal swelling, testicular pain and urgency.        Mahajan in place   Musculoskeletal: Negative for arthralgias, back pain and myalgias.   Skin: Negative for color change, rash and wound.   Neurological: Negative for dizziness, syncope, speech difficulty, light-headedness and headaches.   Hematological: Negative for adenopathy. Does not bruise/bleed easily.   Psychiatric/Behavioral: Negative for behavioral problems, confusion, hallucinations and sleep disturbance.       Objective:      Physical Exam   Constitutional: He  appears well-developed.   HENT:   Head: Normocephalic.   Cardiovascular: Normal rate.    Pulmonary/Chest: Effort normal.   Abdominal: Soft.   Genitourinary: Prostate normal.   Neurological: He is alert.   Skin: Skin is warm.     Psychiatric: He has a normal mood and affect.       Assessment:       1. Neurogenic bladder        Plan:       Chucho was seen today for follow-up.    Diagnoses and all orders for this visit:    Neurogenic bladder  -     Simple urodynamics; Future        Mahajan was exchanged in culture was sent I will start culture specific antibiotics a week prior to the simple urodynamics.

## 2017-02-25 LAB — BACTERIA UR CULT: NORMAL

## 2017-02-27 DIAGNOSIS — N18.30 CHRONIC KIDNEY DISEASE, STAGE III (MODERATE): Primary | ICD-10-CM

## 2017-03-03 ENCOUNTER — TELEPHONE (OUTPATIENT)
Dept: PHYSICAL MEDICINE AND REHAB | Facility: CLINIC | Age: 82
End: 2017-03-03

## 2017-03-03 RX ORDER — HYDROCODONE BITARTRATE AND ACETAMINOPHEN 5; 325 MG/1; MG/1
1 TABLET ORAL EVERY 6 HOURS PRN
Qty: 60 TABLET | Refills: 0 | Status: SHIPPED | OUTPATIENT
Start: 2017-03-03 | End: 2017-04-12 | Stop reason: SDUPTHER

## 2017-03-03 NOTE — TELEPHONE ENCOUNTER
----- Message from Chelsie Dueñas MD sent at 3/3/2017  3:47 PM CST -----  Contact: Pt home health nurse Nancy  Will refill as patient has a follow up appt with me. No pharmacy listed, so will print the rx.     Thanks!    ----- Message -----     From: Kathie Butler MA     Sent: 3/3/2017   1:49 PM       To: Chelsie Dueñas MD        ----- Message -----     From: Kimberlee Talbert     Sent: 3/3/2017   1:19 PM       To: Leana Holguin Staff    Pt home health nurse Nancy would like a call back in regards to refilling pt rx hydrocodone-acetaminophen 5-325mg (NORCO) 5-325 mg per tablet    Pt home health nurse Nancy can be contacted at [phone]

## 2017-04-11 ENCOUNTER — OFFICE VISIT (OUTPATIENT)
Dept: ORTHOPEDICS | Facility: CLINIC | Age: 82
End: 2017-04-11
Payer: MEDICARE

## 2017-04-11 ENCOUNTER — HOSPITAL ENCOUNTER (OUTPATIENT)
Dept: RADIOLOGY | Facility: HOSPITAL | Age: 82
Discharge: HOME | End: 2017-04-11
Attending: ORTHOPAEDIC SURGERY
Payer: MEDICARE

## 2017-04-11 VITALS — HEIGHT: 68 IN

## 2017-04-11 DIAGNOSIS — G89.29 CHRONIC PAIN OF LEFT KNEE: Primary | ICD-10-CM

## 2017-04-11 DIAGNOSIS — M25.562 CHRONIC PAIN OF LEFT KNEE: Primary | ICD-10-CM

## 2017-04-11 DIAGNOSIS — M25.562 CHRONIC PAIN OF LEFT KNEE: ICD-10-CM

## 2017-04-11 DIAGNOSIS — M17.12 PRIMARY OSTEOARTHRITIS OF LEFT KNEE: ICD-10-CM

## 2017-04-11 DIAGNOSIS — G89.29 CHRONIC PAIN OF LEFT KNEE: ICD-10-CM

## 2017-04-11 PROCEDURE — 73562 X-RAY EXAM OF KNEE 3: CPT | Mod: 26,LT,, | Performed by: RADIOLOGY

## 2017-04-11 PROCEDURE — 73560 X-RAY EXAM OF KNEE 1 OR 2: CPT | Mod: TC,RT

## 2017-04-11 PROCEDURE — 99999 PR PBB SHADOW E&M-EST. PATIENT-LVL II: CPT | Mod: PBBFAC,,, | Performed by: ORTHOPAEDIC SURGERY

## 2017-04-11 PROCEDURE — 99204 OFFICE O/P NEW MOD 45 MIN: CPT | Mod: S$PBB,,, | Performed by: ORTHOPAEDIC SURGERY

## 2017-04-11 PROCEDURE — 73560 X-RAY EXAM OF KNEE 1 OR 2: CPT | Mod: 26,59,RT, | Performed by: RADIOLOGY

## 2017-04-11 NOTE — PROGRESS NOTES
HPI:    Chucho Prado is a 83 y.o. male who is here today for   Chief Complaint   Patient presents with    Left Knee - Pain   .  Patient had a right total knee done by me November 2004.  Patient now has severe valgus deformity and pain of the left knee and is using a wheelchair.  Once to have a left knee replacement.  Tolerated a spine stabilization procedure.  4 months ago.  Has a Mahajan now and needs a urologic procedure.    Duration: 12 months  Intensity: severe  Character of pain: sharp  Location: He reports that the pain is predominately  lateral    Past Medical History:   Diagnosis Date    Arthritis     Blood transfusion     CKD (chronic kidney disease) stage 3, GFR 30-59 ml/min     Compression fracture of lumbar vertebra     Depression     Dyslipidemia     GERD (gastroesophageal reflux disease)     Hypertension     Thyroid disease     UTI (urinary tract infection)           Current Outpatient Prescriptions:     acetaminophen (TYLENOL) 500 MG tablet, Take 2 tablets (1,000 mg total) by mouth 3 (three) times daily as needed for Pain., Disp: 30 tablet, Rfl: 0    aspirin 81 MG Chew, Take 1 tablet (81 mg total) by mouth once daily. HOLD UNTIL TOLD TO RESUME BY PHYSICIAN, Disp: 30 tablet, Rfl: 0    calcitonin, salmon, (FORTICAL) 200 unit/actuation nasal spray, 1 spray by Nasal route once daily., Disp: 1 Bottle, Rfl: 0    docusate sodium (COLACE) 100 MG capsule, Take 1 capsule (100 mg total) by mouth 2 (two) times daily as needed for Constipation (2nd line constipation)., Disp: , Rfl: 0    ferrous sulfate 325 (65 FE) MG EC tablet, Take 1 tablet (325 mg total) by mouth every evening., Disp: 30 tablet, Rfl: 11    gabapentin (NEURONTIN) 400 MG capsule, Take 2 capsules (800 mg total) by mouth 3 (three) times daily., Disp: 180 capsule, Rfl: 11    hydrocodone-acetaminophen 5-325mg (NORCO) 5-325 mg per tablet, Take 1 tablet by mouth every 6 (six) hours as needed for Pain., Disp: 60 tablet, Rfl:  "0    levothyroxine (SYNTHROID) 50 MCG tablet, Take 1 tablet (50 mcg total) by mouth once daily., Disp: 30 tablet, Rfl: 11    mirtazapine (REMERON) 30 MG tablet, Take 0.5 tablets (15 mg total) by mouth every evening., Disp: 30 tablet, Rfl: 11    simethicone (MYLICON) 80 MG chewable tablet, Take 1 tablet (80 mg total) by mouth 3 (three) times daily after meals., Disp: , Rfl: 0    simvastatin (ZOCOR) 40 MG tablet, Take 1 tablet (40 mg total) by mouth every evening., Disp: 30 tablet, Rfl: 11    tamsulosin (FLOMAX) 0.4 mg Cp24, Take 1 capsule (0.4 mg total) by mouth once daily., Disp: 30 capsule, Rfl: 3    triamterene-hydrochlorothiazide 37.5-25 mg (DYAZIDE) 37.5-25 mg per capsule, Take 1 capsule by mouth every morning. HOLD UNTIL TOLD TO RESUME BY PHYSICIAN, Disp: 90 capsule, Rfl: 3    polyethylene glycol (GLYCOLAX) 17 gram PwPk, Take 17 g by mouth once daily., Disp: 30 each, Rfl: 0     Review of patient's allergies indicates:  No Known Allergies     ROS  Constitutional: Negative for fever, Negative for weight loss  HENT Negative for congestion  Cardiovascular: Negative chest pain  Respiratory: Negative Shortness of breath  Heme: Negative excessive bleeding  Skin:NegativeItching, Negative breakdown  Musculoskeletal:Positive for back pain, Positive for joint pain, Positive muscle pain, Positive muscle weakness  Neurological: Negative for numbness and paresthesias   Psychiatric/Behavioral: Negative altered mental status, Negative for depression    Physical Exam:   Ht 5' 8" (1.727 m)  General appearance: This is a well-developed, Well nourished male No obvious acute distress.  Psychiatric: normal mood and affect;  pleasant and conversant; judgment and thought content normal  Patient has difficulty standing.  Coordination and strength are diminished.  Cardiovascular: There are no swelling or varicosities present.   Respiratory: normal respiratory effort   Lymphatic: no adenopathy   Neurologic: alert and oriented to " person, place, and time   Deep Tendon Reflexes are normal;  Coordination and Balance: Normal   Musculoskeletal   Neck    ROM shows normal flexion and extension and lateral rotation    Palpation: Non-tender    Stability is normal    Strength is normal    Skin is normal without masses and lesions    Sensation is intact to light touch       Skin shows no rashes or cafe au lait spots;     Sensation is intact to light touch    Right Knee  Swelling None  TendernessNone  Range of Motion: 120    Left Knee: Swelling Mild  TendernessLateral joint line  Range of Motion:   Large valgus deformity.severe    Radiograph   Osteoarthritis: severe  Angle: significant valgus    Assessment: Osteoarthritis and collapse of left knee    Plan:  Once the patient gets his urologic problems cleared up.  He cannot do a knee replacement with a Mahajan catheter present.  He will call and we can schedule left total knee.

## 2017-04-12 ENCOUNTER — OFFICE VISIT (OUTPATIENT)
Dept: INTERNAL MEDICINE | Facility: CLINIC | Age: 82
End: 2017-04-12
Payer: MEDICARE

## 2017-04-12 ENCOUNTER — OFFICE VISIT (OUTPATIENT)
Dept: PHYSICAL MEDICINE AND REHAB | Facility: CLINIC | Age: 82
End: 2017-04-12
Payer: MEDICARE

## 2017-04-12 VITALS
DIASTOLIC BLOOD PRESSURE: 64 MMHG | OXYGEN SATURATION: 98 % | SYSTOLIC BLOOD PRESSURE: 124 MMHG | HEART RATE: 79 BPM | HEIGHT: 68 IN | WEIGHT: 195.13 LBS | BODY MASS INDEX: 29.57 KG/M2

## 2017-04-12 VITALS
SYSTOLIC BLOOD PRESSURE: 142 MMHG | HEIGHT: 68 IN | DIASTOLIC BLOOD PRESSURE: 86 MMHG | WEIGHT: 195 LBS | HEART RATE: 73 BPM | BODY MASS INDEX: 29.55 KG/M2

## 2017-04-12 DIAGNOSIS — R33.9 URINARY RETENTION: ICD-10-CM

## 2017-04-12 DIAGNOSIS — N18.30 CKD (CHRONIC KIDNEY DISEASE) STAGE 3, GFR 30-59 ML/MIN: ICD-10-CM

## 2017-04-12 DIAGNOSIS — F32.A DEPRESSION, UNSPECIFIED DEPRESSION TYPE: Chronic | ICD-10-CM

## 2017-04-12 DIAGNOSIS — H54.3 DECREASED VISION IN BOTH EYES: ICD-10-CM

## 2017-04-12 DIAGNOSIS — M17.0 PRIMARY OSTEOARTHRITIS OF BOTH KNEES: ICD-10-CM

## 2017-04-12 DIAGNOSIS — I10 ESSENTIAL HYPERTENSION: Chronic | ICD-10-CM

## 2017-04-12 DIAGNOSIS — Z98.890 S/P LUMBAR LAMINECTOMY: ICD-10-CM

## 2017-04-12 DIAGNOSIS — R26.81 GAIT INSTABILITY: Primary | ICD-10-CM

## 2017-04-12 PROCEDURE — 99214 OFFICE O/P EST MOD 30 MIN: CPT | Mod: S$PBB,,, | Performed by: INTERNAL MEDICINE

## 2017-04-12 PROCEDURE — 99214 OFFICE O/P EST MOD 30 MIN: CPT | Mod: S$PBB,,, | Performed by: PHYSICAL MEDICINE & REHABILITATION

## 2017-04-12 PROCEDURE — 99999 PR PBB SHADOW E&M-EST. PATIENT-LVL III: CPT | Mod: PBBFAC,,, | Performed by: PHYSICAL MEDICINE & REHABILITATION

## 2017-04-12 PROCEDURE — 99213 OFFICE O/P EST LOW 20 MIN: CPT | Mod: PBBFAC,27,PO | Performed by: PHYSICAL MEDICINE & REHABILITATION

## 2017-04-12 PROCEDURE — 99213 OFFICE O/P EST LOW 20 MIN: CPT | Mod: PBBFAC | Performed by: INTERNAL MEDICINE

## 2017-04-12 PROCEDURE — 99999 PR PBB SHADOW E&M-EST. PATIENT-LVL III: CPT | Mod: PBBFAC,,, | Performed by: INTERNAL MEDICINE

## 2017-04-12 RX ORDER — HYDROCODONE BITARTRATE AND ACETAMINOPHEN 5; 325 MG/1; MG/1
1 TABLET ORAL EVERY 6 HOURS PRN
Qty: 60 TABLET | Refills: 0 | Status: SHIPPED | OUTPATIENT
Start: 2017-04-12 | End: 2017-06-16 | Stop reason: CLARIF

## 2017-04-12 RX ORDER — DICLOFENAC SODIUM 10 MG/G
2 GEL TOPICAL 3 TIMES DAILY
Qty: 100 G | Refills: 3 | Status: SHIPPED | OUTPATIENT
Start: 2017-04-12 | End: 2017-06-02 | Stop reason: SDUPTHER

## 2017-04-12 RX ORDER — POLYETHYLENE GLYCOL 3350 17 G/17G
17 POWDER, FOR SOLUTION ORAL DAILY
Qty: 30 EACH | Refills: 0 | Status: SHIPPED | OUTPATIENT
Start: 2017-04-12 | End: 2017-06-16 | Stop reason: CLARIF

## 2017-04-12 RX ORDER — MIRTAZAPINE 30 MG/1
30 TABLET, FILM COATED ORAL NIGHTLY
Qty: 30 TABLET | Refills: 6
Start: 2017-04-12 | End: 2017-04-12 | Stop reason: SDUPTHER

## 2017-04-12 RX ORDER — MIRTAZAPINE 30 MG/1
30 TABLET, FILM COATED ORAL NIGHTLY
Qty: 30 TABLET | Refills: 6 | Status: SHIPPED | OUTPATIENT
Start: 2017-04-12 | End: 2017-06-02 | Stop reason: SDUPTHER

## 2017-04-12 RX ORDER — DICLOFENAC SODIUM 10 MG/G
GEL TOPICAL 3 TIMES DAILY
Status: DISCONTINUED | OUTPATIENT
Start: 2017-04-12 | End: 2017-04-12

## 2017-04-12 RX ORDER — DOCUSATE SODIUM 100 MG/1
100 CAPSULE, LIQUID FILLED ORAL DAILY
Refills: 0 | COMMUNITY
Start: 2017-04-12 | End: 2017-06-16 | Stop reason: CLARIF

## 2017-04-12 NOTE — MR AVS SNAPSHOT
[facility]  [address]  [facility] [address]  Phone: [phone]                  Chucho Prado   2017 10:00 AM   Office Visit    Description:  Male : 1933   Provider:  Chelsie Dueñas MD   Department:  [facility]           Diagnoses this Visit        Comments    Depression, unspecified depression type                To Do List           Future Appointments        Provider Department Dept Phone    2017 1:00 PM Obed Mcguire MD [facility] - Internal Medicine [phone]    2017 1:00 PM Neeta Taylor PA-C [place] - Neurosurgery [phone]    2017 2:30 PM APPOINTMENT LAB, [facility] [phone]    2017 2:40 PM SPECIMEN, [facility] [phone]    2017 11:20 AM Jonathan Restrepo MD [facility] - Nephrology [phone]      Goals (5 Years of Data)     None      Follow-Up and Disposition     Return in about 6 weeks (around 2017).       These Medications        Disp Refills Start End    polyethylene glycol (GLYCOLAX) 17 gram PwPk 30 each 0 2017     Take 17 g by mouth once daily. - Oral    hydrocodone-acetaminophen 5-325mg (NORCO) 5-325 mg per tablet 60 tablet 0 2017     Take 1 tablet by mouth every 6 (six) hours as needed for Pain. - Oral    mirtazapine (REMERON) 30 MG tablet 30 tablet 6 2017    Take 1 tablet (30 mg total) by mouth every evening. - Oral    diclofenac sodium (VOLTAREN) 1 % Gel 100 g 3 2017     Apply 2 g topically 3 (three) times daily. To left knee - Topical      PURCHASE these Medications (No prescription required)        Start End    docusate sodium (COLACE) 100 MG capsule 2017     Sig - Route: Take 1 capsule (100 mg total) by mouth once daily. - Oral    Class: OTC      [facility]     [facility] Care [facility] -  Assistance  Unless otherwise directed by your provider, please contact  [facility], our nurse care line that is available for 24/7 assistance.     Registered nurses in the [facility] provide: appointment scheduling, clinical advisement, health education, and other advisory services.  Call: [phone] (toll free)               Medications           Message regarding Medications     Verify the changes and/or additions to your medication regime listed below are the same as discussed with your clinician today.  If any of these changes or additions are incorrect, please notify your healthcare provider.        START taking these NEW medications        Refills    diclofenac sodium (VOLTAREN) 1 % Gel 3    Sig: Apply 2 g topically 3 (three) times daily. To left knee    Class: Print    Route: Topical      CHANGE how you are taking these medications     Start Taking Instead of    docusate sodium (COLACE) 100 MG capsule docusate sodium (COLACE) 100 MG capsule    Dosage:  Take 1 capsule (100 mg total) by mouth once daily. Dosage:  Take 1 capsule (100 mg total) by mouth 2 (two) times daily as needed for Constipation (2nd line constipation).    Reason for Change:  Reorder     mirtazapine (REMERON) 30 MG tablet mirtazapine (REMERON) 30 MG tablet    Dosage:  Take 1 tablet (30 mg total) by mouth every evening. Dosage:  Take 0.5 tablets (15 mg total) by mouth every evening.    Reason for Change:  Reorder            Verify that the below list of medications is an accurate representation of the medications you are currently taking.  If none reported, the list may be blank. If incorrect, please contact your healthcare provider. Carry this list with you in case of emergency.           Current Medications     acetaminophen (TYLENOL) 500 MG tablet Take 2 tablets (1,000 mg total) by mouth 3 (three) times daily as needed for Pain.    aspirin 81 MG Chew Take 1 tablet (81 mg total) by mouth once daily. HOLD UNTIL TOLD TO RESUME BY PHYSICIAN    calcitonin, salmon, (FORTICAL) 200 unit/actuation  "nasal spray 1 spray by Nasal route once daily.    diclofenac sodium (VOLTAREN) 1 % Gel Apply 2 g topically 3 (three) times daily. To left knee    docusate sodium (COLACE) 100 MG capsule Take 1 capsule (100 mg total) by mouth once daily.    ferrous sulfate 325 (65 FE) MG EC tablet Take 1 tablet (325 mg total) by mouth every evening.    gabapentin (NEURONTIN) 400 MG capsule Take 2 capsules (800 mg total) by mouth 3 (three) times daily.    hydrocodone-acetaminophen 5-325mg (NORCO) 5-325 mg per tablet Take 1 tablet by mouth every 6 (six) hours as needed for Pain.    levothyroxine (SYNTHROID) 50 MCG tablet Take 1 tablet (50 mcg total) by mouth once daily.    mirtazapine (REMERON) 30 MG tablet Take 1 tablet (30 mg total) by mouth every evening.    polyethylene glycol (GLYCOLAX) 17 gram PwPk Take 17 g by mouth once daily.    simethicone (MYLICON) 80 MG chewable tablet Take 1 tablet (80 mg total) by mouth 3 (three) times daily after meals.    simvastatin (ZOCOR) 40 MG tablet Take 1 tablet (40 mg total) by mouth every evening.    tamsulosin (FLOMAX) 0.4 mg Cp24 Take 1 capsule (0.4 mg total) by mouth once daily.    triamterene-hydrochlorothiazide 37.5-25 mg (DYAZIDE) 37.5-25 mg per capsule Take 1 capsule by mouth every morning. HOLD UNTIL TOLD TO RESUME BY PHYSICIAN           Clinical Reference Information           Your Vitals Were     BP Pulse Height Weight BMI    142/86 73 5' 8" (1.727 m) 88.5 kg (195 lb) 29.65 kg/m2      Blood Pressure          Most Recent Value    BP  (!)  142/86      Allergies as of 4/12/2017     No Known Allergies      Immunizations Administered on Date of Encounter - 4/12/2017     None      Orders Placed During Today's Visit      Normal Orders This Visit    Ambulatory referral to Home Health       Instructions    - Resume PT to work on sit to stand, transfers, and Household ambulation w RW  - Awaiting surgical intervention to L knee  - Pls take Tylenol or 1 Norco prior to PT session. Cont with " Voltaren gel BID - TID to L knee to allow ongoing mobility   - resume Colace daily along w Miralx to address Bowel regularity   - Increase remeron to 30mg at bedtime to address insomnia. Return to 15mg if increased daytime sleepiness        Language Assistance Services     ATTENTION: Language assistance services are available, free of charge. Please call [phone].      ATENCIÓN: Si habla español, tiene a benson disposición servicios gratuitos de asistencia lingüística. Llame al [phone].     CHÚ Ý: N?u b?n nói Ti?ng Vi?t, có các d?ch v? h? tr? ngôn ng? mi?n phí dành cho b?n. G?i s? [phone].         [facility] complies with applicable Federal civil rights laws and does not discriminate on the basis of race, color, national origin, age, disability, or sex.

## 2017-04-12 NOTE — PROGRESS NOTES
Subjective:       Patient ID: Chucho Prado is a 83 y.o. male.    Chief Complaint: Follow-up    HPI Comments: Had a fall and was down on floor for 17 hours in December and sustained lumbar vertebral fxs.    Scheduled for UDS Suds testing in May, still mandy orellana.    Hoping for L knee TKA in future, which he hope will help mobility.    Currently has a home health aide comes daily 8-4.    W/o diuretic his feet swell too much.    No new other symptoms.    mood has been ok despite medical issues.    Review of Systems   Constitutional: Negative for activity change, appetite change, fatigue, fever and unexpected weight change.   Respiratory: Negative for cough, chest tightness, shortness of breath and wheezing.    Cardiovascular: Negative for chest pain, palpitations and leg swelling.   Gastrointestinal: Negative for abdominal distention, abdominal pain, nausea and vomiting.   Genitourinary: Positive for difficulty urinating. Negative for decreased urine volume, dysuria and hematuria.   Musculoskeletal: Positive for arthralgias and back pain (only when he is helped to lay down).   Skin: Negative for rash and wound.   Neurological: Negative for tremors, syncope and weakness.   Psychiatric/Behavioral: Negative for confusion.       Objective:      Physical Exam   Constitutional: He is oriented to person, place, and time. He appears well-developed and well-nourished. No distress.   Much stronger than last visit   HENT:   Head: Normocephalic and atraumatic.   Mouth/Throat: No oropharyngeal exudate.   Eyes: EOM are normal. Pupils are equal, round, and reactive to light. No scleral icterus.   Neck: Normal range of motion. Neck supple. No thyromegaly present.   Cardiovascular: Normal rate and regular rhythm.  Exam reveals no gallop and no friction rub.    Murmur heard.  Pulmonary/Chest: Effort normal and breath sounds normal. No respiratory distress. He has no wheezes. He has no rales. He exhibits no tenderness.    Abdominal: Soft. Bowel sounds are normal. He exhibits no distension and no mass. There is no tenderness. There is no rebound and no guarding. A hernia is present.   Large R sided abd wall hernia   Musculoskeletal: He exhibits no edema.   R knee TKA is not warm or swollen.    L knee decreased rom and significant crepitus   Lymphadenopathy:     He has no cervical adenopathy.   Neurological: He is alert and oriented to person, place, and time.   Skin: No rash noted. He is not diaphoretic.   Psychiatric: He has a normal mood and affect. His behavior is normal.       Assessment:       1. Decreased vision in both eyes        Plan:       Chucho was seen today for follow-up.    Diagnoses and all orders for this visit:    Decreased vision in both eyes  -     Ambulatory Referral to Ophthalmology  Has diff reading.    HTn and edema controlled on diuretic, remain on.    Neurogenic bladder, has upcoming SUDS.    L TKA, may benefit from TKA, first bladder.    Lumbar fx, much better, no current pain. Consider BMD in future.    advanced directives --  Patient Instructions   Call us with Carolina Pak's phone number  he would not want prolonged life support    Now wheelchair dep and has aide and med alert    Return in about 4 months (around 8/12/2017).

## 2017-04-12 NOTE — MR AVS SNAPSHOT
[facility] - [facility] Medicine  [address]  [facility] [address]  Phone: [phone]  Fax: [phone]                  Chucho Prado   2017 1:00 PM   Office Visit    Description:  Male : 1933   Provider:  Obed Mcguire MD   Department:  [facility] - Internal Medicine           Reason for Visit     Follow-up           Diagnoses this Visit        Comments    Decreased vision in both eyes    -  Primary            To Do List           Future Appointments        Provider Department Dept Phone    2017 1:00 PM Neeta Taylor PA-C [place] - Neurosurgery [phone]    2017 2:30 PM APPOINTMENT LAB, [facility] [phone]    2017 2:40 PM SPECIMEN, [facility] [phone]    2017 11:20 AM Jonathan Restrepo MD [facility] - Nephrology [phone]    5/10/2017 1:00 PM Chelsie Dueñas MD [place] [facility] [phone]      Goals (5 Years of Data)     None      Follow-Up and Disposition     Return in about 4 months (around 2017).      [facility]     [facility] Nurse Care [facility] -  Assistance  Unless otherwise directed by your provider, please contact [facility], our nurse care line that is available for  assistance.     Registered nurses in the [facility] provide: appointment scheduling, clinical advisement, health education, and other advisory services.  Call: [phone] (toll free)               Medications           Message regarding Medications     Verify the changes and/or additions to your medication regime listed below are the same as discussed with your clinician today.  If any of these changes or additions are incorrect, please notify your healthcare provider.             Verify that the below list of medications is an accurate representation of the medications you are currently taking.  If none reported, the list may be blank. If incorrect, please contact  "your healthcare provider. Carry this list with you in case of emergency.           Current Medications     acetaminophen (TYLENOL) 500 MG tablet Take 2 tablets (1,000 mg total) by mouth 3 (three) times daily as needed for Pain.    aspirin 81 MG Chew Take 1 tablet (81 mg total) by mouth once daily. HOLD UNTIL TOLD TO RESUME BY PHYSICIAN    calcitonin, salmon, (FORTICAL) 200 unit/actuation nasal spray 1 spray by Nasal route once daily.    diclofenac sodium (VOLTAREN) 1 % Gel Apply 2 g topically 3 (three) times daily. To left knee    docusate sodium (COLACE) 100 MG capsule Take 1 capsule (100 mg total) by mouth once daily.    ferrous sulfate 325 (65 FE) MG EC tablet Take 1 tablet (325 mg total) by mouth every evening.    gabapentin (NEURONTIN) 400 MG capsule Take 2 capsules (800 mg total) by mouth 3 (three) times daily.    hydrocodone-acetaminophen 5-325mg (NORCO) 5-325 mg per tablet Take 1 tablet by mouth every 6 (six) hours as needed for Pain.    levothyroxine (SYNTHROID) 50 MCG tablet Take 1 tablet (50 mcg total) by mouth once daily.    mirtazapine (REMERON) 30 MG tablet Take 1 tablet (30 mg total) by mouth every evening.    polyethylene glycol (GLYCOLAX) 17 gram PwPk Take 17 g by mouth once daily.    simethicone (MYLICON) 80 MG chewable tablet Take 1 tablet (80 mg total) by mouth 3 (three) times daily after meals.    simvastatin (ZOCOR) 40 MG tablet Take 1 tablet (40 mg total) by mouth every evening.    tamsulosin (FLOMAX) 0.4 mg Cp24 Take 1 capsule (0.4 mg total) by mouth once daily.    triamterene-hydrochlorothiazide 37.5-25 mg (DYAZIDE) 37.5-25 mg per capsule Take 1 capsule by mouth every morning. HOLD UNTIL TOLD TO RESUME BY PHYSICIAN           Clinical Reference Information           Your Vitals Were     BP Pulse Height Weight SpO2 BMI    124/64 (BP Location: Left arm, Patient Position: Sitting) 79 5' 8" (1.727 m) 88.5 kg (195 lb 1.7 oz) 98% 29.67 kg/m2      Blood Pressure          Most Recent Value    BP  " 124/64      Allergies as of 4/12/2017     No Known Allergies      Immunizations Administered on Date of Encounter - 4/12/2017     None      Orders Placed During Today's Visit      Normal Orders This Visit    Ambulatory Referral to Ophthalmology       Instructions    Call us with [facility] Pak's phone number       Language Assistance Services     ATTENTION: Language assistance services are available, free of charge. Please call [phone].      ATENCIÓN: Si habla español, tiene a benson disposición servicios gratuitos de asistencia lingüística. Llame al [phone].     CHÚ Ý: N?u b?n nói Ti?ng Vi?t, có các d?ch v? h? tr? ngôn ng? mi?n phí dành cho b?n. G?i s? [phone].         [facility] complies with applicable Federal civil rights laws and does not discriminate on the basis of race, color, national origin, age, disability, or sex.

## 2017-04-12 NOTE — PATIENT INSTRUCTIONS
- Resume Homehealth PT to work on sit to stand, transfers, and Household ambulation w RW  - Awaiting surgical intervention to L knee  - Pls take Tylenol or 1 Norco prior to PT session. Cont with Voltaren gel BID - TID to L knee to allow ongoing mobility   - resume Colace daily along w Miralx to address Bowel regularity   - Increase remeron to 30mg at bedtime to address insomnia. Return to 15mg if increased daytime sleepiness

## 2017-04-12 NOTE — PROGRESS NOTES
"Initial PM&R Outpatient Evaluation    CC: Impaired mobility and ADLs    HPI: Chucho Prado is a 83 y.o. male withL2 and L3 compression fractures with RLE radiculopathy, S/p L3 kyphoplasty and L2-4 laminectomy on 12/27. Discharged from acute inpatient rehab on 2/2/2017.     Major medical issues since discharge: F/U urology, Ortho for L knee, and no longer using TLSO  Diet: Appetite is stable   Mood: stable   Sleep:stable, cont w Remeron also not as effective as it once was   Bowel/bladder: Bladder stable (Mahajan), Bowel stable   Pain: L knee pain with an WB/mobility  Falls: once, when L knee " gave" out   Therapy: Finished HH 1 month ago   Other: has 2 attendants that provide assistance during day until midnight,   Uses WC the majority of time now since fall in Feb. Has not walked since then. Attendants help w ADLs. Can come from sit to stand with what appears to be Min A.     PMH:   Past Medical History:   Diagnosis Date    Arthritis     Blood transfusion     CKD (chronic kidney disease) stage 3, GFR 30-59 ml/min     Compression fracture of lumbar vertebra     Depression     Dyslipidemia     GERD (gastroesophageal reflux disease)     Hypertension     Thyroid disease     UTI (urinary tract infection)        PSH:   Past Surgical History:   Procedure Laterality Date    CHOLECYSTECTOMY      HERNIA REPAIR      JOINT REPLACEMENT      LAMINECTOMY  12/27/2016    L2-L4    LUMBAR LAMINECTOMY  12/2016    PARATHYROIDECTOMY      TOTAL KNEE ARTHROPLASTY      Right     Current Outpatient Prescriptions on File Prior to Visit   Medication Sig Dispense Refill    acetaminophen (TYLENOL) 500 MG tablet Take 2 tablets (1,000 mg total) by mouth 3 (three) times daily as needed for Pain. 30 tablet 0    aspirin 81 MG Chew Take 1 tablet (81 mg total) by mouth once daily. HOLD UNTIL TOLD TO RESUME BY PHYSICIAN 30 tablet 0    calcitonin, salmon, (FORTICAL) 200 unit/actuation nasal spray 1 spray by Nasal route once " daily. 1 Bottle 0    docusate sodium (COLACE) 100 MG capsule Take 1 capsule (100 mg total) by mouth 2 (two) times daily as needed for Constipation (2nd line constipation).  0    ferrous sulfate 325 (65 FE) MG EC tablet Take 1 tablet (325 mg total) by mouth every evening. 30 tablet 11    gabapentin (NEURONTIN) 400 MG capsule Take 2 capsules (800 mg total) by mouth 3 (three) times daily. 180 capsule 11    hydrocodone-acetaminophen 5-325mg (NORCO) 5-325 mg per tablet Take 1 tablet by mouth every 6 (six) hours as needed for Pain. 60 tablet 0    levothyroxine (SYNTHROID) 50 MCG tablet Take 1 tablet (50 mcg total) by mouth once daily. 30 tablet 11    mirtazapine (REMERON) 30 MG tablet Take 0.5 tablets (15 mg total) by mouth every evening. 30 tablet 11    polyethylene glycol (GLYCOLAX) 17 gram PwPk Take 17 g by mouth once daily. 30 each 0    simethicone (MYLICON) 80 MG chewable tablet Take 1 tablet (80 mg total) by mouth 3 (three) times daily after meals.  0    simvastatin (ZOCOR) 40 MG tablet Take 1 tablet (40 mg total) by mouth every evening. 30 tablet 11    tamsulosin (FLOMAX) 0.4 mg Cp24 Take 1 capsule (0.4 mg total) by mouth once daily. 30 capsule 3    triamterene-hydrochlorothiazide 37.5-25 mg (DYAZIDE) 37.5-25 mg per capsule Take 1 capsule by mouth every morning. HOLD UNTIL TOLD TO RESUME BY PHYSICIAN 90 capsule 3     No current facility-administered medications on file prior to visit.      Review of patient's allergies indicates:  No Known Allergies    Family History:   Family History   Problem Relation Age of Onset    Hypertension Mother     Diabetes Father     Esophageal cancer Father        Social History: Lives  alone in a house, with  steps to enter.       Tobacco:  denies   ETOH:  denies   Other drug use: denies    ROS:   CONSTITUTIONAL:  (-) weight change, fever, chills, night sweats   EYES:  (-)  double vision (-) blurry vision  EARS, NOSE, THROAT: (-) dysphagia (-) hearing loss  RESPIRATORY:  "(-)   shortness of breath  (-) cough  CARDIOVASCULAR (-) chest pain  (-) swelling  GENITOURINARY  (-) bladder incontinence (galvan)  (-) hematuria  GASTROINTESTINAL(-)  bowel incontinence (-) nausea/vomiting   ENDOCRINE  (-)  heat or cold intolerance  (-) hair loss  PSYCHIATRIC  (-) depression  (-) anxiety  HEMATOL/LYMPHATIC (-) easy bruising (-) enlarged lymph nodes  ALLERGIC/IMMUN  (-) seasonal allergies (-) allergies to environmental stimuli    SKIN (-) changes (-) rashes (-) wounds  The rest of the review of systems is unremarkable.          Pertinent Prior Work Up (Imaging/EMGs):  Study Date Pertinent findings   Reviewed                   Physical Examination:  Vitals: BP (!) 142/86  Pulse 73  Ht 5' 8" (1.727 m)  Wt 88.5 kg (195 lb)  BMI 29.65 kg/m2   Gen: NAD, appearing stated age sitting up in WC  Gait: NT  Mood/affect: pleasant and appropriate    Speech: Normal language, volume, pitch     No dysarthria       Neuro: Occulomotot intact, tongue midline  - Cognition:  Awake, alert, oriented x 3  Skin: Intact     Motor:    UE: SA 3/5, EF/EE 4/5,  4/5; HF 2.5, KE 4/5, DF/PF 4/5     Tone: Normal tone in bilateral upper and lower extremities.       Impression:   82 yo M progressing well functionally until fall, now awaiting operative intervention to L knee.  + galvan      Plan:     Medications: Risks/benefits reviewed  - Resume Norco 5/325mg Q6 hours PRN for knee, add Voltaren gel TID to L knee   - Colace 100mg daily   - Increase Remeron to 30mg to qhs   Therapy: REorder PT to work on sit to stand, transfers, and resume gait as tolerated given pain. In effort to maintain conditioning for pending surgery.  Other: F/U w Neuro surgery is scheduled       Patient instructions given:  "- Resume Homehealth PT to work on sit to stand, transfers, and Household ambulation w RW  - Awaiting surgical intervention to L knee  - Pls take Tylenol or 1 Norco prior to PT session. Cont with Voltaren gel BID - TID to L knee to " "allow ongoing mobility   - resume Colace daily along w Miralx to address Bowel regularity   - Increase remeron to 30mg at bedtime to address insomnia. Return to 15mg if increased daytime sleepiness "    Follow up: 6 weeks      Chelsie Dueñas MD    "

## 2017-04-17 ENCOUNTER — TELEPHONE (OUTPATIENT)
Dept: ADMINISTRATIVE | Facility: CLINIC | Age: 82
End: 2017-04-17

## 2017-04-28 ENCOUNTER — OFFICE VISIT (OUTPATIENT)
Dept: NEUROSURGERY | Facility: CLINIC | Age: 82
End: 2017-04-28
Payer: MEDICARE

## 2017-04-28 VITALS
HEIGHT: 68 IN | SYSTOLIC BLOOD PRESSURE: 139 MMHG | WEIGHT: 195 LBS | BODY MASS INDEX: 29.55 KG/M2 | HEART RATE: 65 BPM | DIASTOLIC BLOOD PRESSURE: 71 MMHG

## 2017-04-28 DIAGNOSIS — Z98.890 S/P LAMINECTOMY: ICD-10-CM

## 2017-04-28 DIAGNOSIS — Z98.890 S/P KYPHOPLASTY: Primary | ICD-10-CM

## 2017-04-28 PROCEDURE — 99212 OFFICE O/P EST SF 10 MIN: CPT | Mod: S$PBB,,, | Performed by: PHYSICIAN ASSISTANT

## 2017-04-28 PROCEDURE — 99213 OFFICE O/P EST LOW 20 MIN: CPT | Mod: PBBFAC,PO | Performed by: PHYSICIAN ASSISTANT

## 2017-04-28 PROCEDURE — 99999 PR PBB SHADOW E&M-EST. PATIENT-LVL III: CPT | Mod: PBBFAC,,, | Performed by: PHYSICIAN ASSISTANT

## 2017-04-28 NOTE — PROGRESS NOTES
[facility]  Progress Note      SUBJECTIVE:     Chief Complaint/Reason for Visit: follow up     History of Present Illness:  Chucho Prado is a 83 y.o. male who is 4 months s/p L3 kyphoplasty and L2-4 laminectomy for L1 and L3 compression fractures with RLE radicular pain. Patient was discharged from inpatient rehab on 2/2/17. Patient reports he no longer experiences back pain and denies radicular leg pain. He does report left knee pain. He was evaluated by ortho and is planning for total knee replacement. He is receiving home health therapy. He is able to ambulate with a walker with therapy, but is wheelchair bound otherwise due to knee pain. Mahajan in place for urinary retention. He is established with urology with a procedure scheduled in the future.        Review of patient's allergies indicates:  No Known Allergies    Past Medical History:   Diagnosis Date    Arthritis     Blood transfusion     CKD (chronic kidney disease) stage 3, GFR 30-59 ml/min     Compression fracture of lumbar vertebra     Depression     Dyslipidemia     GERD (gastroesophageal reflux disease)     Hypertension     Thyroid disease     UTI (urinary tract infection)      Past Surgical History:   Procedure Laterality Date    CHOLECYSTECTOMY      HERNIA REPAIR      JOINT REPLACEMENT      LAMINECTOMY  12/27/2016    L2-L4    LUMBAR LAMINECTOMY  12/2016    PARATHYROIDECTOMY      TOTAL KNEE ARTHROPLASTY      Right     Family History   Problem Relation Age of Onset    Hypertension Mother     Diabetes Father     Esophageal cancer Father      Social History   Substance Use Topics    Smoking status: Former Smoker     Years: 20.00    Smokeless tobacco: Never Used      Comment: quit 1999    Alcohol use 1.2 oz/week     2 Shots of liquor per week      Comment: 1 drink a week        Review of Systems:    Genitourinary: Neurogenic bladder. Mahajan in place   Musculoskeletal: Positive for left knee pain. Negative for  back pain         OBJECTIVE:     Vital Signs (Most Recent):  Vitals:    04/28/17 1525   BP: 139/71   Pulse: 65       Physical Exam:  General: well developed, well nourished, no distress.   Neurologic: Alert and oriented. Thought content appropriate.  GCS: Motor: 6/Verbal: 5/Eyes: 4 GCS Total: 15  Mental Status: Awake, Alert, Oriented x 4  Head: normocephalic, atraumatic  Eyes: EOMI  Neck: trachea midline, no JVD   Sensory: intact to light touch throughout  Skin: Skin is warm, dry and intact.    Motor Strength: Moves all extremities spontaneously with good tone.  No abnormal movements seen.     Iliopsoas Quadriceps Knee  Flexion Tibialis  anterior Gastro- cnemius EHL   Lower: R 5/5 5/5 5/5 5/5 5/5 5/5    L 5/5 5/5 5/5 5/5 5/5 5/5       Diagnostic Results:  N/A    ASSESSMENT/PLAN:     84 yo male 4 months s/p L3 kyphoplasty and L2-4 laminectomies. Resolution of back pain and leg pain. Full strength bilateral lower extremities    -follow up as needed       Neeta Taylor PA-C  Neurosurgery

## 2017-04-28 NOTE — MR AVS SNAPSHOT
[facility]  [address], [address]  [place] [address]  Phone: [phone]                  Chucho Prado   2017 3:00 PM   Office Visit    Description:  Male : 1933   Provider:  Neeta Taylor PA-C   Department:  [facility]           Reason for Visit     Post-op Evaluation           Diagnoses this Visit        Comments    S/P kyphoplasty    -  Primary     S/P laminectomy                To Do List           Future Appointments        Provider Department Dept Phone    2017 11:20 AM Jonathan Restrepo MD [facility] - Nephrology [phone]    5/10/2017 1:00 PM Chelsie Dueñas MD [place] - Physical Med & Rehab [phone]    2017 12:45 PM LISA, UROLOGY [facility] - Urology 4th Floor [phone]    2017 11:20 AM Obed Mcguire MD [facility] - Internal Medicine [phone]      Goals (5 Years of Data)     None      [facility]     [facility] -  Assistance  Unless otherwise directed by your provider, please contact [facility], our nurse care line that is available for  assistance.     Registered nurses in the [facility] provide: appointment scheduling, clinical advisement, health education, and other advisory services.  Call: [phone] (toll free)               Medications           Message regarding Medications     Verify the changes and/or additions to your medication regime listed below are the same as discussed with your clinician today.  If any of these changes or additions are incorrect, please notify your healthcare provider.             Verify that the below list of medications is an accurate representation of the medications you are currently taking.  If none reported, the list may be blank. If incorrect, please contact your healthcare provider. Carry this list with you in case of emergency.           Current Medications     acetaminophen (TYLENOL) 500 MG tablet Take 2 tablets (1,000 mg  "total) by mouth 3 (three) times daily as needed for Pain.    aspirin 81 MG Chew Take 1 tablet (81 mg total) by mouth once daily. HOLD UNTIL TOLD TO RESUME BY PHYSICIAN    calcitonin, salmon, (FORTICAL) 200 unit/actuation nasal spray 1 spray by Nasal route once daily.    diclofenac sodium (VOLTAREN) 1 % Gel Apply 2 g topically 3 (three) times daily. To left knee    docusate sodium (COLACE) 100 MG capsule Take 1 capsule (100 mg total) by mouth once daily.    ferrous sulfate 325 (65 FE) MG EC tablet Take 1 tablet (325 mg total) by mouth every evening.    gabapentin (NEURONTIN) 400 MG capsule Take 2 capsules (800 mg total) by mouth 3 (three) times daily.    hydrocodone-acetaminophen 5-325mg (NORCO) 5-325 mg per tablet Take 1 tablet by mouth every 6 (six) hours as needed for Pain.    levothyroxine (SYNTHROID) 50 MCG tablet Take 1 tablet (50 mcg total) by mouth once daily.    mirtazapine (REMERON) 30 MG tablet Take 1 tablet (30 mg total) by mouth every evening.    polyethylene glycol (GLYCOLAX) 17 gram PwPk Take 17 g by mouth once daily.    simethicone (MYLICON) 80 MG chewable tablet Take 1 tablet (80 mg total) by mouth 3 (three) times daily after meals.    simvastatin (ZOCOR) 40 MG tablet Take 1 tablet (40 mg total) by mouth every evening.    tamsulosin (FLOMAX) 0.4 mg Cp24 Take 1 capsule (0.4 mg total) by mouth once daily.    triamterene-hydrochlorothiazide 37.5-25 mg (DYAZIDE) 37.5-25 mg per capsule Take 1 capsule by mouth every morning. HOLD UNTIL TOLD TO RESUME BY PHYSICIAN           Clinical Reference Information           Your Vitals Were     BP Pulse Height Weight BMI    139/71 (BP Location: Right arm, Patient Position: Sitting) 65 5' 8" (1.727 m) 88.5 kg (195 lb) 29.65 kg/m2      Blood Pressure          Most Recent Value    BP  139/71      Allergies as of 4/28/2017     No Known Allergies      Immunizations Administered on Date of Encounter - 4/28/2017     None      Language Assistance Services     ATTENTION: " Language assistance services are available, free of charge. Please call [phone].      ATENCIÓN: Si habla lavonne, tiene a benson disposición servicios gratuitos de asistencia lingüística. Llame al [phone].     CHÚ Ý: N?u b?n nói Ti?ng Vi?t, có các d?ch v? h? tr? ngôn ng? mi?n phí dành cho b?n. G?i s? [phone].         [facility] complies with applicable Federal civil rights laws and does not discriminate on the basis of race, color, national origin, age, disability, or sex.

## 2017-05-01 ENCOUNTER — TELEPHONE (OUTPATIENT)
Dept: PHYSICAL MEDICINE AND REHAB | Facility: CLINIC | Age: 82
End: 2017-05-01

## 2017-05-01 NOTE — TELEPHONE ENCOUNTER
----- Message from Chelsie Dueñas MD sent at 5/1/2017  2:26 PM CDT -----  Contact: Pt  Spoke to the patient at length, clarified to take a tylenol or Norco as needed prior to therapy for knee pain.     Thanks!  ----- Message -----     From: Kathie Butler MA     Sent: 5/1/2017  10:04 AM       To: Chelsie Dueñas MD    He called back about the rx said he is not in any pain but you told him to start back taking it TID. Tried to tell him he did not need it. He said he wanted to do what you said.     ----- Message -----     From: Chelsie Dueñas MD     Sent: 4/28/2017   3:52 PM       To: VANIA Davidson gave a prescription on 4/12.     hydrocodone-acetaminophen 5-325mg (NORCO) 5-325 mg per tablet 60 tablet 0 4/12/2017  No  Sig - Route: Take 1 tablet by mouth every 6 (six) hours as needed for Pain. - Oral  Class: Print  Order: [id number]  Date/Time Signed: 4/12/2017 10:54 AM          Thanks!   ----- Message -----     From: Kathie Butler MA     Sent: 4/27/2017   1:31 PM       To: Chelsie Dueñas MD        ----- Message -----     From: Daron Lui MD     Sent: 4/27/2017   1:27 PM       To: Kathie Butler MA    This is Dr. Dueñas's pt    ----- Message -----     From: Kathie Butler MA     Sent: 4/27/2017  11:13 AM       To: Daron Lui MD        ----- Message -----     From: Carmen Donahue     Sent: 4/27/2017  10:44 AM       To: Leana Holguin Staff    Pt is requesting a refill on Norco 5-325mg to be sent to [facility] [phone]    Pt contact number  [phone]  Thanks

## 2017-05-05 ENCOUNTER — OFFICE VISIT (OUTPATIENT)
Dept: NEPHROLOGY | Facility: CLINIC | Age: 82
End: 2017-05-05
Payer: MEDICARE

## 2017-05-05 VITALS
DIASTOLIC BLOOD PRESSURE: 78 MMHG | WEIGHT: 195 LBS | HEIGHT: 68 IN | BODY MASS INDEX: 29.55 KG/M2 | SYSTOLIC BLOOD PRESSURE: 160 MMHG | OXYGEN SATURATION: 96 % | HEART RATE: 66 BPM

## 2017-05-05 DIAGNOSIS — N17.9 AKI (ACUTE KIDNEY INJURY): Primary | ICD-10-CM

## 2017-05-05 DIAGNOSIS — N18.30 CKD (CHRONIC KIDNEY DISEASE) STAGE 3, GFR 30-59 ML/MIN: ICD-10-CM

## 2017-05-05 PROCEDURE — 99213 OFFICE O/P EST LOW 20 MIN: CPT | Mod: PBBFAC | Performed by: INTERNAL MEDICINE

## 2017-05-05 PROCEDURE — 99214 OFFICE O/P EST MOD 30 MIN: CPT | Mod: S$PBB,,, | Performed by: INTERNAL MEDICINE

## 2017-05-05 PROCEDURE — 99999 PR PBB SHADOW E&M-EST. PATIENT-LVL III: CPT | Mod: PBBFAC,,, | Performed by: INTERNAL MEDICINE

## 2017-05-05 NOTE — PROGRESS NOTES
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     Interval Hx:  84 yo white man returns for follow up visit of unresolved TERE. He was treated for MRSA UTI in the past. During the hospitalization,  He was found to have a serum Cr of 4.0 mg/dL and a UPCR of 8 g/g. At that time, he had rona hematuria. After the hematuria resolved, his kidney function progressively improved and the proteinuria decreased. Today, he arrives for follow up stating that he has not noticed more gross hematuria. He does not take NSAIDs. He feels fine except for weakness and inability to ambulate. Uses a wheelchair. Scheduled to have knee surgery later this year.      PMH:  HTN  HPL  Depression  Hypothyroidism     OBJECTIVE:      Vital Signs (Most Recent)  BP: 140/80 mmHg     Physical Exam:  General: Well developed, frail, in a wheelchair  HEENT: Conjunctiva clear; Oropharynx clear  Neck: No JVD noted, Supple  CV- Normal S1, S2 with no murmurs,gallops,rubs  Resp- Lungs CTA Bilaterally, Unlabored  Abdomen- NTND, BS normoactive x4 quads, soft, urine bag noted  Extrem- No cyanosis, clubbing, 1+ pitting edema LE  Skin- No rashes, lesions, ulcers  Neuro: awake, Oriented x3, no FND     Laboratory:  Serum Cr 1.2 (from 1.4 mg/dL)  Urine: not done today    ASSESSMENT/PLAN:      1. 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 Likely caused by bladder outlet obstruction from blood clots/gross hematuria combined with volume depletion. Serum Cr gradually decreased from 4 to 1.2 mg/dL. Continue avoiding NSAIDs and staying well hydrated. Will continue holding ACEI for now.   2. Proteinuria. Resolved. Possibly artifactual in the setting of rona hematuria.    3. Hematuria. Had cystoscopy, no cancer. Has neurogenic bladder. Will monitor UA  4. HTN. Mild LE edema. Back on Dyazide. Pushing for low salt diet.       SUMMARY:  1. Continue Dyazide (37.3 / 25 mg) once daily  2. Avoid NSAIDs  3. Follow up with  urology  4. RTC in 4 months

## 2017-05-12 DIAGNOSIS — E78.5 DYSLIPIDEMIA: Chronic | ICD-10-CM

## 2017-05-12 RX ORDER — SIMVASTATIN 40 MG/1
40 TABLET, FILM COATED ORAL NIGHTLY
Qty: 90 TABLET | Refills: 11 | Status: SHIPPED | OUTPATIENT
Start: 2017-05-12 | End: 2018-08-02 | Stop reason: SDUPTHER

## 2017-05-12 NOTE — TELEPHONE ENCOUNTER
----- Message from Daniela Devlin sent at 5/12/2017 10:21 AM CDT -----  Contact: Self/ [phone]   Type: Rx    Name of medication(s):  simvastatin (ZOCOR) 40 MG tablet    Is this a refill? New rx? Refill     Who prescribed medication? Dr. Mcguire     Pharmacy Name, Phone, & Location: [facility]/ [phone]     Comments: pt is calling to have a refill on the medication above. Please call and advise       Thank you

## 2017-05-18 ENCOUNTER — PATIENT MESSAGE (OUTPATIENT)
Dept: UROLOGY | Facility: CLINIC | Age: 82
End: 2017-05-18

## 2017-05-22 ENCOUNTER — PROCEDURE VISIT (OUTPATIENT)
Dept: UROLOGY | Facility: CLINIC | Age: 82
End: 2017-05-22
Payer: MEDICARE

## 2017-05-22 VITALS
HEIGHT: 68 IN | DIASTOLIC BLOOD PRESSURE: 80 MMHG | BODY MASS INDEX: 29.86 KG/M2 | WEIGHT: 197 LBS | SYSTOLIC BLOOD PRESSURE: 167 MMHG | TEMPERATURE: 98 F | HEART RATE: 72 BPM

## 2017-05-22 DIAGNOSIS — N31.9 NEUROGENIC BLADDER: ICD-10-CM

## 2017-05-22 PROCEDURE — 51784 ANAL/URINARY MUSCLE STUDY: CPT | Mod: 26,51,S$PBB, | Performed by: UROLOGY

## 2017-05-22 PROCEDURE — 51784 ANAL/URINARY MUSCLE STUDY: CPT | Mod: PBBFAC | Performed by: UROLOGY

## 2017-05-22 PROCEDURE — 51728 CYSTOMETROGRAM W/VP: CPT | Mod: PBBFAC | Performed by: UROLOGY

## 2017-05-22 PROCEDURE — 51728 CYSTOMETROGRAM W/VP: CPT | Mod: 26,S$PBB,, | Performed by: UROLOGY

## 2017-05-22 PROCEDURE — 51797 INTRAABDOMINAL PRESSURE TEST: CPT | Mod: 26,S$PBB,, | Performed by: UROLOGY

## 2017-05-22 PROCEDURE — 51741 ELECTRO-UROFLOWMETRY FIRST: CPT | Mod: PBBFAC | Performed by: UROLOGY

## 2017-05-22 NOTE — PROCEDURES
Simple urodynamics  Date/Time: 5/22/2017 3:04 PM  Performed by: GEORGE SMITH JR  Authorized by: GEORGE SMITH JR   Preparation: Patient was prepped and draped in the usual sterile fashion.  Local anesthesia used: no    Anesthesia:  Local anesthesia used: no  Sedation:  Patient sedated: no    Patient tolerance: Patient tolerated the procedure well with no immediate complications         This office note has been dictated.  Procedure Date: 5/22/2017

## 2017-05-22 NOTE — PATIENT INSTRUCTIONS
SIMPLE URODYNAMIC STUDY (SUDS) & CYSTOSCOPY  UROLOGY CLINIC DISCHARGE INSTRUCTIONS    You have had a procedure that will require time to properly heal. Follow the instructions you have been given on how to care for yourself once you are home. Below is additional information to help in your recovery.    ACTIVITY  · There are no restrictions in activity. Start doing again the things you did before the procedure.  · You may experience a slight burning sensation. You may notice a small amount of blood in your urine. This will clear up within a day. Call the clinic if this continues beyond 48 hours.    DIET  · Continue your normal diet. You may eat the same foods you ate before your procedure.  · Drink plenty of fluids during the first 24-48 hours following your procedure.    MEDICATIONS  · Resume all other previous medications from your prescribing physician.  · Continue any pre=procedure antibiotics until they are all gone.    SIGNS AND SYMPTOMS TO REPORT TO THE DOCTOR  · Chills or fever greater than 101° F within 24 hours of procedure.  · Changes in urination, such as increased bleeding, foul smell, cloudy urine, or painful urination.  · Call your doctor with any questions or concerns.    For any emergency situation, call [phone] immediately or go to your nearest emergency room.    [facility]  [phone]      Instructed by_________________________________          Patient Signature___________________________________                                                                                                                                                                                             If any problems after hours or weekends, you may call [phone] and ask for the urology resident on call.

## 2017-05-23 ENCOUNTER — PATIENT MESSAGE (OUTPATIENT)
Dept: PHYSICAL MEDICINE AND REHAB | Facility: CLINIC | Age: 82
End: 2017-05-23

## 2017-05-23 RX ORDER — GABAPENTIN 400 MG/1
800 CAPSULE ORAL 3 TIMES DAILY
Qty: 180 CAPSULE | Refills: 11 | Status: CANCELLED | OUTPATIENT
Start: 2017-05-23 | End: 2018-05-23

## 2017-05-23 NOTE — PROCEDURES
DATE OF PROCEDURE:  05/22/2017.    PREOPERATIVE DIAGNOSIS:  BPH with outlet obstruction.    POSTOPERATIVE DIAGNOSES:  BPH with outlet obstruction plus neurogenic bladder.    OPERATION PERFORMED:  Simple urodynamics.    PROCEDURE IN DETAIL:  This patient has urinary retention.  He had an   obstructing-looking prostate, but it was felt that simple urodynamics was   indicated to rule out neurogenic bladder.  A two-catheter CMG study was   performed using subtractive rectal pressure monitoring.  Sterile water was used   as a medium at 30 mL per minute.  First sensation was at 76 mL and first desire   at 137.  The patient felt his capacity was 198; however, he was able to hold 236   mL.  He was asked to void and he was unable to do so.  His maximum detrusor   pressure was 15 cm of water.  His residual was 226.    IMPRESSION:  Probable neurogenic bladder.    RECOMMENDATIONS:  The Mahajan catheter was replaced.  We discussed the possibility   of a suprapubic tube versus in and out catheterizations versus Mahajan catheter.    He does not feel he could do in and out catheterizations and would prefer a   suprapubic tube.  He has had multiple abdominal surgeries and may need a CT scan   before doing suprapubic tube placement.  The patient will be called and we will   discuss this further.      KRISTINE  dd: 05/22/2017 15:08:01 (CDT)  td: 05/22/2017 19:04:58 (CDT)  Doc ID   #[id number]  Job ID #[id number]    CC:

## 2017-05-23 NOTE — TELEPHONE ENCOUNTER
02/02/17last RX refill  0412/17 last office visit          Melchor Guillermo's prescription for Gabapentin (400mg 2 capsules three times a day) was filed at [facility] in [place], but now that pharmacist is out of town and Melchor cannot refill  his prescription.  He's requesting one be called into to [facility] at [address]  [phone].   Thank you

## 2017-05-24 RX ORDER — GABAPENTIN 400 MG/1
800 CAPSULE ORAL 3 TIMES DAILY
Qty: 180 CAPSULE | Refills: 11 | Status: ON HOLD | OUTPATIENT
Start: 2017-05-24 | End: 2017-10-25 | Stop reason: HOSPADM

## 2017-05-30 ENCOUNTER — PATIENT MESSAGE (OUTPATIENT)
Dept: UROLOGY | Facility: CLINIC | Age: 82
End: 2017-05-30

## 2017-05-30 ENCOUNTER — TELEPHONE (OUTPATIENT)
Dept: UROLOGY | Facility: CLINIC | Age: 82
End: 2017-05-30

## 2017-05-30 DIAGNOSIS — N31.9 NEUROGENIC BLADDER: Primary | ICD-10-CM

## 2017-06-02 ENCOUNTER — PATIENT MESSAGE (OUTPATIENT)
Dept: PHYSICAL MEDICINE AND REHAB | Facility: CLINIC | Age: 82
End: 2017-06-02

## 2017-06-02 DIAGNOSIS — F32.A DEPRESSION, UNSPECIFIED DEPRESSION TYPE: Chronic | ICD-10-CM

## 2017-06-02 RX ORDER — MIRTAZAPINE 30 MG/1
30 TABLET, FILM COATED ORAL NIGHTLY
Qty: 30 TABLET | Refills: 6 | Status: ON HOLD | OUTPATIENT
Start: 2017-06-02 | End: 2017-11-06 | Stop reason: HOSPADM

## 2017-06-02 RX ORDER — DICLOFENAC SODIUM 10 MG/G
2 GEL TOPICAL 3 TIMES DAILY
Qty: 100 G | Refills: 3 | Status: SHIPPED | OUTPATIENT
Start: 2017-06-02 | End: 2017-08-11 | Stop reason: SDUPTHER

## 2017-06-02 RX ORDER — TAMSULOSIN HYDROCHLORIDE 0.4 MG/1
0.4 CAPSULE ORAL DAILY
Qty: 30 CAPSULE | Refills: 3 | Status: SHIPPED | OUTPATIENT
Start: 2017-06-02 | End: 2017-09-08

## 2017-06-05 ENCOUNTER — TELEPHONE (OUTPATIENT)
Dept: RADIOLOGY | Facility: HOSPITAL | Age: 82
End: 2017-06-05

## 2017-06-06 ENCOUNTER — HOSPITAL ENCOUNTER (OUTPATIENT)
Dept: RADIOLOGY | Facility: HOSPITAL | Age: 82
Discharge: HOME | End: 2017-06-06
Attending: UROLOGY
Payer: MEDICARE

## 2017-06-06 DIAGNOSIS — N31.9 NEUROGENIC BLADDER: ICD-10-CM

## 2017-06-06 PROCEDURE — 25500020 PHARM REV CODE 255: Performed by: UROLOGY

## 2017-06-06 PROCEDURE — 74177 CT ABD & PELVIS W/CONTRAST: CPT | Mod: TC

## 2017-06-06 PROCEDURE — 74177 CT ABD & PELVIS W/CONTRAST: CPT | Mod: 26,GC,, | Performed by: RADIOLOGY

## 2017-06-06 RX ADMIN — IOHEXOL 15 ML: 350 INJECTION, SOLUTION INTRAVENOUS at 02:06

## 2017-06-06 RX ADMIN — IOHEXOL 100 ML: 350 INJECTION, SOLUTION INTRAVENOUS at 03:06

## 2017-06-06 RX ADMIN — IOHEXOL 15 ML: 350 INJECTION, SOLUTION INTRAVENOUS at 01:06

## 2017-06-12 ENCOUNTER — TELEPHONE (OUTPATIENT)
Dept: INTERNAL MEDICINE | Facility: CLINIC | Age: 82
End: 2017-06-12

## 2017-06-12 NOTE — TELEPHONE ENCOUNTER
----- Message from Jane Isaac MA sent at 6/12/2017  3:04 PM CDT -----  Good Afternoon Dr Taylor would like to Set Mr Prado up for a sp tube but patient would need to be cleared first.

## 2017-06-12 NOTE — TELEPHONE ENCOUNTER
Mike Taylor, I cannot think of a medical reason to hold him back from this important procedure.  Let me know if you have any questions.  Obed Mcguire

## 2017-06-13 ENCOUNTER — TELEPHONE (OUTPATIENT)
Dept: UROLOGY | Facility: CLINIC | Age: 82
End: 2017-06-13

## 2017-06-13 DIAGNOSIS — R33.9 RETENTION OF URINE: Primary | ICD-10-CM

## 2017-06-14 ENCOUNTER — PATIENT MESSAGE (OUTPATIENT)
Dept: UROLOGY | Facility: CLINIC | Age: 82
End: 2017-06-14

## 2017-06-16 ENCOUNTER — ANESTHESIA EVENT (OUTPATIENT)
Dept: SURGERY | Facility: HOSPITAL | Age: 82
End: 2017-06-16
Payer: MEDICARE

## 2017-06-16 NOTE — ANESTHESIA PREPROCEDURE EVALUATION
Anesthesia Assessment: Preoperative EQUATION    Planned Procedure: Procedure(s) (LRB):  INSERTION-CATHETER-SUPRAPUBIC (N/A)  Requested Anesthesia Type:General  Surgeon: Chaitanya Taylor Jr., MD  Service: Urology  Known or anticipated Date of Surgery:6/23/2017    Surgeon notes: reviewed  Triage considerations:     The patient has no apparent active cardiac condition (No unstable coronary Syndrome such as severe unstable angina or recent [<1 month] myocardial infarction, decompensated CHF, severe valvular   disease or significant arrhythmia)    Previous anesthesia records:general    Last PCP note: within 3 months , [facility]   Subspecialty notes: Nephrology    Other important co-morbidities: hypertension, depression, anemia, CKD STAGE 3     Tests already available:  12/20/16 EKG, 5/5/17 CBC, BMP            Instructions given. (See in Nurse's note)    Optimization:  Anesthesia Preop Clinic Assessment NOT  Indicated        Plan:     Navigation:             Straight Line to surgery.               No tests, anesthesia preop clinic visit, or consult required.                                                                                                   06/16/2017  Chucho Prado is a 83 y.o., male.    Pre-op Assessment         Review of Systems  Social:  Tobacco Use: Former smoker Alcohol Use: Pt consumes 1.2 oz per wk,    Hematology/Oncology:         -- Anemia: Iron Deficiency Anemia   Cardiovascular:   Hypertension, well controlled  Hypertension, Essential Hypertension    Renal/:   Chronic Renal Disease CKD STAGE 3 Kidney Function/Disease    Hepatic/GI:   GERD, well controlled    Neurological:   Neuromuscular Disease,    Endocrine:   Hypothyroidism  Thyroid Disease Hypothyroidism    Psych:   Psychiatric History depression  Depression.         CARLA RODRÍGUEZ 6/16/17

## 2017-06-16 NOTE — ANESTHESIA PREPROCEDURE EVALUATION
06/16/2017  Chucho Prado is a 83 y.o., male with HTN and urinary retention here for insertion of suprapubic catheter.    Anesthesia Evaluation         Review of Systems  Social:  Former Smoker, Social Alcohol Use   Hematology/Oncology:         -- Anemia: Denies Current/Recent Cancer   Cardiovascular:   Hypertension  Functional Capacity very limited / < 3 METS  Hypertension    Pulmonary:   Denies Shortness of breath.    Renal/:   Chronic Renal Disease    Hepatic/GI:   GERD    Neurological:   Neuromuscular Disease,    Endocrine:   Hypothyroidism    Psych:   Psychiatric History depression          Physical Exam  General:  Well nourished    Airway/Jaw/Neck:  Airway Findings: Mouth Opening: Normal Tongue: Normal  General Airway Assessment: Adult  Oropharynx Findings: poor effort.  Mallampati: IV      Dental:  Dental Findings:   Chest/Lungs:  Chest/Lungs Findings: Normal Respiratory Rate, Clear to auscultation     Heart/Vascular:  Heart Findings: Rate: Normal  Rhythm: Regular Rhythm        Mental Status:  Mental Status Findings:  Cooperative, Alert and Oriented         Anesthesia Plan  Type of Anesthesia, risks & benefits discussed:  Anesthesia Type:  general  Patient's Preference:   Intra-op Monitoring Plan: standard ASA monitors  Intra-op Monitoring Plan Comments:   Post Op Pain Control Plan:   Post Op Pain Control Plan Comments:   Induction:   IV  Beta Blocker:  Patient is not currently on a Beta-Blocker (No further documentation required).       Informed Consent: Patient understands risks and agrees with Anesthesia plan.  Questions answered. Anesthesia consent signed with patient.  ASA Score: 3     Day of Surgery Review of History & Physical:    H&P update referred to the surgeon.         Ready For Surgery From Anesthesia Perspective.

## 2017-06-16 NOTE — PRE ADMISSION SCREENING
Anesthesia Assessment: Preoperative EQUATION    Planned Procedure: Procedure(s) (LRB):  INSERTION-CATHETER-SUPRAPUBIC (N/A)  Requested Anesthesia Type:General  Surgeon: Chaitanya Taylor Jr., MD  Service: Urology  Known or anticipated Date of Surgery:6/23/2017    Surgeon notes: reviewed  Triage considerations:     The patient has no apparent active cardiac condition (No unstable coronary Syndrome such as severe unstable angina or recent [<1 month] myocardial infarction, decompensated CHF, severe valvular   disease or significant arrhythmia)    Previous anesthesia records:general    Last PCP note: within 3 months , [facility]   Subspecialty notes: Nephrology    Other important co-morbidities: hypertension, depression, anemia, CKD STAGE 3     Tests already available:  12/20/16 EKG, 5/5/17 CBC, BMP            Instructions given. (See in Nurse's note)    Optimization:  Anesthesia Preop Clinic Assessment NOT  Indicated        Plan:     Navigation:             Straight Line to surgery.               No tests, anesthesia preop clinic visit, or consult required.

## 2017-06-21 ENCOUNTER — PATIENT MESSAGE (OUTPATIENT)
Dept: INTERNAL MEDICINE | Facility: CLINIC | Age: 82
End: 2017-06-21

## 2017-06-21 DIAGNOSIS — E03.9 HYPOTHYROIDISM, UNSPECIFIED TYPE: Chronic | ICD-10-CM

## 2017-06-21 RX ORDER — LEVOTHYROXINE SODIUM 50 UG/1
50 TABLET ORAL DAILY
Qty: 100 TABLET | Refills: 11 | Status: SHIPPED | OUTPATIENT
Start: 2017-06-21 | End: 2018-08-17 | Stop reason: SDUPTHER

## 2017-06-22 ENCOUNTER — TELEPHONE (OUTPATIENT)
Dept: UROLOGY | Facility: CLINIC | Age: 82
End: 2017-06-22

## 2017-06-22 NOTE — TELEPHONE ENCOUNTER
Called pt to confirm 9:30 arrival time for procedure. Gave pt NPO instructions and gave pt opportunity to ask questions. Pt verbalized understanding.

## 2017-06-23 ENCOUNTER — ANESTHESIA (OUTPATIENT)
Dept: SURGERY | Facility: HOSPITAL | Age: 82
End: 2017-06-23
Payer: MEDICARE

## 2017-06-23 ENCOUNTER — HOSPITAL ENCOUNTER (OUTPATIENT)
Facility: HOSPITAL | Age: 82
Discharge: HOME | End: 2017-06-23
Attending: UROLOGY | Admitting: UROLOGY
Payer: MEDICARE

## 2017-06-23 ENCOUNTER — SURGERY (OUTPATIENT)
Age: 82
End: 2017-06-23

## 2017-06-23 VITALS
WEIGHT: 195 LBS | RESPIRATION RATE: 20 BRPM | HEIGHT: 68 IN | TEMPERATURE: 98 F | SYSTOLIC BLOOD PRESSURE: 127 MMHG | BODY MASS INDEX: 29.55 KG/M2 | DIASTOLIC BLOOD PRESSURE: 83 MMHG | OXYGEN SATURATION: 92 % | HEART RATE: 110 BPM

## 2017-06-23 DIAGNOSIS — R33.9 URINARY RETENTION: ICD-10-CM

## 2017-06-23 PROCEDURE — 71000033 HC RECOVERY, INTIAL HOUR: Performed by: UROLOGY

## 2017-06-23 PROCEDURE — 27000221 HC OXYGEN, UP TO 24 HOURS

## 2017-06-23 PROCEDURE — 25000003 PHARM REV CODE 250: Performed by: STUDENT IN AN ORGANIZED HEALTH CARE EDUCATION/TRAINING PROGRAM

## 2017-06-23 PROCEDURE — 63600175 PHARM REV CODE 636 W HCPCS: Performed by: ANESTHESIOLOGY

## 2017-06-23 PROCEDURE — D9220A PRA ANESTHESIA: Mod: ,,, | Performed by: ANESTHESIOLOGY

## 2017-06-23 PROCEDURE — 37000008 HC ANESTHESIA 1ST 15 MINUTES: Performed by: UROLOGY

## 2017-06-23 PROCEDURE — C1729 CATH, DRAINAGE: HCPCS | Performed by: UROLOGY

## 2017-06-23 PROCEDURE — 71000016 HC POSTOP RECOV ADDL HR: Performed by: UROLOGY

## 2017-06-23 PROCEDURE — 27201037 HC PRESSURE MONITORING SET UP

## 2017-06-23 PROCEDURE — 37000009 HC ANESTHESIA EA ADD 15 MINS: Performed by: UROLOGY

## 2017-06-23 PROCEDURE — 36000706: Performed by: UROLOGY

## 2017-06-23 PROCEDURE — 25000003 PHARM REV CODE 250: Performed by: UROLOGY

## 2017-06-23 PROCEDURE — 36000707: Performed by: UROLOGY

## 2017-06-23 PROCEDURE — C2627 CATH, SUPRAPUBIC/CYSTOSCOPIC: HCPCS | Performed by: UROLOGY

## 2017-06-23 PROCEDURE — 71000039 HC RECOVERY, EACH ADD'L HOUR: Performed by: UROLOGY

## 2017-06-23 PROCEDURE — 25000003 PHARM REV CODE 250: Performed by: ANESTHESIOLOGY

## 2017-06-23 PROCEDURE — 63600175 PHARM REV CODE 636 W HCPCS

## 2017-06-23 PROCEDURE — 71000015 HC POSTOP RECOV 1ST HR: Performed by: UROLOGY

## 2017-06-23 PROCEDURE — 63600175 PHARM REV CODE 636 W HCPCS: Performed by: STUDENT IN AN ORGANIZED HEALTH CARE EDUCATION/TRAINING PROGRAM

## 2017-06-23 RX ORDER — PHENYLEPHRINE HYDROCHLORIDE 10 MG/ML
INJECTION INTRAVENOUS
Status: DISCONTINUED | OUTPATIENT
Start: 2017-06-23 | End: 2017-06-23

## 2017-06-23 RX ORDER — FENTANYL CITRATE 50 UG/ML
INJECTION, SOLUTION INTRAMUSCULAR; INTRAVENOUS
Status: COMPLETED
Start: 2017-06-23 | End: 2017-06-23

## 2017-06-23 RX ORDER — ONDANSETRON 2 MG/ML
INJECTION INTRAMUSCULAR; INTRAVENOUS
Status: DISCONTINUED | OUTPATIENT
Start: 2017-06-23 | End: 2017-06-23

## 2017-06-23 RX ORDER — FENTANYL CITRATE 50 UG/ML
25 INJECTION, SOLUTION INTRAMUSCULAR; INTRAVENOUS EVERY 5 MIN PRN
Status: COMPLETED | OUTPATIENT
Start: 2017-06-23 | End: 2017-06-23

## 2017-06-23 RX ORDER — ONDANSETRON 2 MG/ML
4 INJECTION INTRAMUSCULAR; INTRAVENOUS DAILY PRN
Status: DISCONTINUED | OUTPATIENT
Start: 2017-06-23 | End: 2017-06-23 | Stop reason: HOSPADM

## 2017-06-23 RX ORDER — TRAMADOL HYDROCHLORIDE 50 MG/1
50 TABLET ORAL EVERY 6 HOURS PRN
Qty: 21 TABLET | Refills: 0 | Status: SHIPPED | OUTPATIENT
Start: 2017-06-23 | End: 2017-07-03

## 2017-06-23 RX ORDER — FENTANYL CITRATE 50 UG/ML
INJECTION, SOLUTION INTRAMUSCULAR; INTRAVENOUS
Status: DISCONTINUED | OUTPATIENT
Start: 2017-06-23 | End: 2017-06-23

## 2017-06-23 RX ORDER — CLINDAMYCIN PHOSPHATE 900 MG/50ML
900 INJECTION, SOLUTION INTRAVENOUS
Status: COMPLETED | OUTPATIENT
Start: 2017-06-23 | End: 2017-06-23

## 2017-06-23 RX ORDER — SODIUM CHLORIDE 0.9 % (FLUSH) 0.9 %
3 SYRINGE (ML) INJECTION
Status: DISCONTINUED | OUTPATIENT
Start: 2017-06-23 | End: 2017-06-23 | Stop reason: HOSPADM

## 2017-06-23 RX ORDER — SODIUM CHLORIDE 9 MG/ML
INJECTION, SOLUTION INTRAVENOUS CONTINUOUS
Status: DISCONTINUED | OUTPATIENT
Start: 2017-06-23 | End: 2017-06-23 | Stop reason: HOSPADM

## 2017-06-23 RX ORDER — TRAMADOL HYDROCHLORIDE 50 MG/1
50 TABLET ORAL EVERY 6 HOURS PRN
Status: DISCONTINUED | OUTPATIENT
Start: 2017-06-23 | End: 2017-06-23 | Stop reason: HOSPADM

## 2017-06-23 RX ORDER — LIDOCAINE HYDROCHLORIDE 10 MG/ML
INJECTION, SOLUTION INTRAVENOUS
Status: DISCONTINUED | OUTPATIENT
Start: 2017-06-23 | End: 2017-06-23

## 2017-06-23 RX ORDER — SULFAMETHOXAZOLE AND TRIMETHOPRIM 800; 160 MG/1; MG/1
1 TABLET ORAL 2 TIMES DAILY
Qty: 14 TABLET | Refills: 0 | Status: SHIPPED | OUTPATIENT
Start: 2017-06-23 | End: 2017-06-30

## 2017-06-23 RX ORDER — LIDOCAINE HYDROCHLORIDE 10 MG/ML
1 INJECTION, SOLUTION EPIDURAL; INFILTRATION; INTRACAUDAL; PERINEURAL ONCE
Status: COMPLETED | OUTPATIENT
Start: 2017-06-23 | End: 2017-06-23

## 2017-06-23 RX ORDER — PROPOFOL 10 MG/ML
VIAL (ML) INTRAVENOUS
Status: DISCONTINUED | OUTPATIENT
Start: 2017-06-23 | End: 2017-06-23

## 2017-06-23 RX ORDER — SODIUM CHLORIDE 0.9 % (FLUSH) 0.9 %
3 SYRINGE (ML) INJECTION EVERY 8 HOURS
Status: DISCONTINUED | OUTPATIENT
Start: 2017-06-23 | End: 2017-06-23 | Stop reason: HOSPADM

## 2017-06-23 RX ORDER — POLYETHYLENE GLYCOL 3350 17 G/17G
17 POWDER, FOR SOLUTION ORAL DAILY
Qty: 30 PACKET | Refills: 0 | Status: SHIPPED | OUTPATIENT
Start: 2017-06-23 | End: 2017-09-08

## 2017-06-23 RX ADMIN — FENTANYL CITRATE 25 MCG: 50 INJECTION, SOLUTION INTRAMUSCULAR; INTRAVENOUS at 12:06

## 2017-06-23 RX ADMIN — LIDOCAINE HYDROCHLORIDE 50 MG: 10 INJECTION, SOLUTION INTRAVENOUS at 11:06

## 2017-06-23 RX ADMIN — PROPOFOL 40 MG: 10 INJECTION, EMULSION INTRAVENOUS at 11:06

## 2017-06-23 RX ADMIN — PROPOFOL 120 MG: 10 INJECTION, EMULSION INTRAVENOUS at 11:06

## 2017-06-23 RX ADMIN — FENTANYL CITRATE 25 MCG: 50 INJECTION, SOLUTION INTRAMUSCULAR; INTRAVENOUS at 01:06

## 2017-06-23 RX ADMIN — GENTAMICIN SULFATE 240 MG: 40 INJECTION, SOLUTION INTRAMUSCULAR; INTRAVENOUS at 11:06

## 2017-06-23 RX ADMIN — FENTANYL CITRATE 50 MCG: 50 INJECTION, SOLUTION INTRAMUSCULAR; INTRAVENOUS at 11:06

## 2017-06-23 RX ADMIN — TRAMADOL HYDROCHLORIDE 50 MG: 50 TABLET, FILM COATED ORAL at 12:06

## 2017-06-23 RX ADMIN — PHENYLEPHRINE HYDROCHLORIDE 100 MCG: 10 INJECTION INTRAVENOUS at 11:06

## 2017-06-23 RX ADMIN — LIDOCAINE HYDROCHLORIDE 0.1 MG: 10 INJECTION, SOLUTION EPIDURAL; INFILTRATION; INTRACAUDAL; PERINEURAL at 10:06

## 2017-06-23 RX ADMIN — ONDANSETRON 4 MG: 2 INJECTION INTRAMUSCULAR; INTRAVENOUS at 11:06

## 2017-06-23 RX ADMIN — SODIUM CHLORIDE: 0.9 INJECTION, SOLUTION INTRAVENOUS at 11:06

## 2017-06-23 RX ADMIN — CLINDAMYCIN IN 5 PERCENT DEXTROSE 900 MG: 18 INJECTION, SOLUTION INTRAVENOUS at 10:06

## 2017-06-23 NOTE — DISCHARGE INSTRUCTIONS
Discharge Instructions: Caring for Your Suprapubic Catheter  You are going home with a suprapubic catheter in place. This tube is placed directly into the bladder through your abdomen to drain urine from your bladder. You were shown how to care for your catheter in the hospital. This sheet will help remind you of those steps and guidelines when you are at home.  Follow-up  Make a follow-up appointment as directed by your health care provider.    Ok to remove dressing in 48 hours and shower. No need for new dressing. May place new dressing if patient would like.    When to call your health care provider  Call your health care provider right away if you have any of the following:  · Catheter that falls out, or is clogged or feels clogged  · Stitches that fall out  · Urine leaking around catheter  · Urine that is cloudy, bloody, or smells bad  · No urine drainage  · Bladder that feels full or painful  · Rash, itching, redness, swelling, or drainage at the catheter site  · Fever above 100.4°F (38.°C) or shaking chills   Date Last Reviewed: 9/24/2014  © 2018-6009 [employer]. [address] [place] [address]. All rights reserved. This information is not intended as a substitute for professional medical care. Always follow your healthcare professional's instructions.

## 2017-06-23 NOTE — PROGRESS NOTES
Dr. Elaina ha, returned page immediately. Asked about d/c orders, showering and dressing. States ok to remove dressing in 48 hours and shower in 24 hours. May replace dressing if patient wants, but not necessary.

## 2017-06-23 NOTE — ANESTHESIA POSTPROCEDURE EVALUATION
"Anesthesia Post Evaluation    Patient: Chucho Prado    Procedure(s) Performed: Procedure(s) (LRB):  INSERTION-CATHETER-SUPRAPUBIC (N/A)    Final Anesthesia Type: general  Patient location during evaluation: PACU  Patient participation: Yes- Able to Participate  Level of consciousness: awake and alert  Post-procedure vital signs: reviewed and stable  Pain management: adequate  Airway patency: patent  PONV status at discharge: No PONV  Anesthetic complications: no      Cardiovascular status: blood pressure returned to baseline  Respiratory status: unassisted, spontaneous ventilation and room air  Hydration status: euvolemic  Follow-up not needed.        Visit Vitals  /61   Pulse 78   Temp 36.4 °C (97.5 °F) (Oral)   Resp 18   Ht 5' 8" (1.727 m)   Wt 88.5 kg (195 lb)   SpO2 95%   BMI 29.65 kg/m²       Pain/Nathaniel Score: Pain Assessment Performed: Yes (6/23/2017  1:00 PM)  Presence of Pain: complains of pain/discomfort (6/23/2017  1:00 PM)  Pain Rating Prior to Med Admin: 5 (6/23/2017  1:20 PM)  Nathaniel Score: 10 (6/23/2017  1:00 PM)      "

## 2017-06-23 NOTE — DISCHARGE SUMMARY
[facility]  Discharge Note  Short Stay    Admit Date: 6/23/2017    Discharge Date and Time:  6/23/2017       Attending Physician: Chaitanya Taylor Jr., MD     Discharge Provider: Fortunato Butts MD    Diagnoses:  Active Hospital Problems    Diagnosis  POA    *Urinary retention [R33.9]  Yes    TERE (acute kidney injury) [N17.9]  Yes    Renal impairment [N28.9]  Yes    Iron deficiency anemia [D50.9]  Yes    Hypothyroidism [E03.9]  Yes     Chronic    Essential hypertension [I10]  Yes     Chronic    Inguinal hernia, right [K40.90]  Yes     Chronic    Dyslipidemia [E78.5]  Yes     Chronic    Depression [F32.9]  Yes     Chronic      Resolved Hospital Problems    Diagnosis Date Resolved POA   No resolved problems to display.       Discharged Condition: good    Hospital Course: Patient was admitted for a suprapubic tube insertion and tolerated the procedure well with no complications.    Final Diagnoses: Same as principal problem.    Disposition: Home or Self Care    Follow up/Patient Instructions:    Medications:  Reconciled Home Medications:   Current Discharge Medication List      START taking these medications    Details   tramadol (ULTRAM) 50 mg tablet Take 1 tablet (50 mg total) by mouth every 6 (six) hours as needed for Pain.  Qty: 21 tablet, Refills: 0         CONTINUE these medications which have NOT CHANGED    Details   acetaminophen (TYLENOL) 500 MG tablet Take 2 tablets (1,000 mg total) by mouth 3 (three) times daily as needed for Pain.  Qty: 30 tablet, Refills: 0    Associated Diagnoses: Lumbar compression fracture, closed, initial encounter      aspirin 81 MG Chew Take 1 tablet (81 mg total) by mouth once daily. HOLD UNTIL TOLD TO RESUME BY PHYSICIAN  Qty: 30 tablet, Refills: 0      gabapentin (NEURONTIN) 400 MG capsule Take 2 capsules (800 mg total) by mouth 3 (three) times daily.  Qty: 180 capsule, Refills: 11      levothyroxine (SYNTHROID) 50 MCG tablet Take 1 tablet (50 mcg total) by  mouth once daily.  Qty: 100 tablet, Refills: 11    Associated Diagnoses: Hypothyroidism, unspecified type      mirtazapine (REMERON) 30 MG tablet Take 1 tablet (30 mg total) by mouth every evening.  Qty: 30 tablet, Refills: 6    Associated Diagnoses: Depression, unspecified depression type      simvastatin (ZOCOR) 40 MG tablet Take 1 tablet (40 mg total) by mouth every evening.  Qty: 90 tablet, Refills: 11    Associated Diagnoses: Dyslipidemia      tamsulosin (FLOMAX) 0.4 mg Cp24 Take 1 capsule (0.4 mg total) by mouth once daily.  Qty: 30 capsule, Refills: 3      triamterene-hydrochlorothiazide 37.5-25 mg (DYAZIDE) 37.5-25 mg per capsule Take 1 capsule by mouth every morning. HOLD UNTIL TOLD TO RESUME BY PHYSICIAN  Qty: 90 capsule, Refills: 3    Associated Diagnoses: Essential hypertension      calcitonin, salmon, (FORTICAL) 200 unit/actuation nasal spray 1 spray by Nasal route once daily.  Qty: 1 Bottle, Refills: 0      diclofenac sodium (VOLTAREN) 1 % Gel Apply 2 g topically 3 (three) times daily. To left knee  Qty: 100 g, Refills: 3      simethicone (MYLICON) 80 MG chewable tablet Take 1 tablet (80 mg total) by mouth 3 (three) times daily after meals.  Refills: 0             Discharge Procedure Orders  Diet general     Activity as tolerated     Call MD for:  temperature >100.4     Call MD for:  persistent nausea and vomiting or diarrhea     Call MD for:  severe uncontrolled pain     Call MD for:  redness, tenderness, or signs of infection (pain, swelling, redness, odor or green/yellow discharge around incision site)     Call MD for:  difficulty breathing or increased cough     Call MD for:  severe persistent headache     Call MD for:  worsening rash     Call MD for:  persistent dizziness, light-headedness, or visual disturbances     Call MD for:  increased confusion or weakness     No dressing needed       Follow-up Information     Neelam Hooper NP In 6 weeks.    Specialties:  Family Medicine, Urology  Why:   post op percutaneous SP tube insertion-- needs first SP tube exchange   Contact information:  [address]  [facility] [address]  [phone]                   Discharge Procedure Orders (must include Diet, Follow-up, Activity):    Discharge Procedure Orders (must include Diet, Follow-up, Activity)  Diet general     Activity as tolerated     Call MD for:  temperature >100.4     Call MD for:  persistent nausea and vomiting or diarrhea     Call MD for:  severe uncontrolled pain     Call MD for:  redness, tenderness, or signs of infection (pain, swelling, redness, odor or green/yellow discharge around incision site)     Call MD for:  difficulty breathing or increased cough     Call MD for:  severe persistent headache     Call MD for:  worsening rash     Call MD for:  persistent dizziness, light-headedness, or visual disturbances     Call MD for:  increased confusion or weakness     No dressing needed

## 2017-06-23 NOTE — PLAN OF CARE
Patient arrived from PACU with ARTIS Preciado RN.  Patient stable.  Report received at this time.  Assumed care of patient at this time.

## 2017-06-23 NOTE — TRANSFER OF CARE
"Anesthesia Transfer of Care Note    Patient: Chucho Prado    Procedure(s) Performed: Procedure(s) (LRB):  INSERTION-CATHETER-SUPRAPUBIC (N/A)    Patient location: PACU    Anesthesia Type: general    Transport from OR: Transported from OR on 6-10 L/min O2 by face mask with adequate spontaneous ventilation    Post pain: adequate analgesia    Post assessment: no apparent anesthetic complications    Post vital signs: stable    Level of consciousness: awake and alert    Nausea/Vomiting: no nausea/vomiting    Complications: none          Last vitals:   Visit Vitals  BP (!) 142/65 (BP Location: Left arm, Patient Position: Lying, BP Method: Automatic)   Pulse 80   Temp 36.5 °C (97.7 °F) (Oral)   Resp 18   Ht 5' 8" (1.727 m)   Wt 88.5 kg (195 lb)   SpO2 100%   BMI 29.65 kg/m²     "

## 2017-06-23 NOTE — OP NOTE
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    CKD (chronic kidney disease) stage 3, GFR 30-59 ml/min 12/26/2016    Abdominal wall hernia 12/26/2016    Urinary retention 12/25/2016    UTI due to Klebsiella species 12/25/2016    Primary osteoarthritis of both knees 11/08/2016    Staph aureus infection 10/21/2016    Hematuria 10/20/2016    Inguinal hernia unilateral, non-recurrent 10/09/2013    Renal impairment 07/31/2013    Iron deficiency anemia 07/31/2013    Hypothyroidism 07/30/2013    Essential hypertension 07/30/2013    Inguinal hernia, right 07/30/2013    Dyslipidemia 07/30/2013    Depression 07/30/2013    Anemia 07/30/2013         Post-Op Diagnosis: same    Procedure(s) Performed:   1.  Percutaneous suprapubic catheter placement  2.  Cystoscopy     Specimen(s): none    Staff Surgeon: Chaitanya Taylor MD    Assistant Surgeon: Fortunato Butts MD    Anesthesia: General endotracheal anesthesia    Indications: Chucho Prado is a 83 y.o. male with neurogenic bladder and a chronic indwelling galvan catheter who presents today for SP tube placement.      Findings:  18 Fr SP tube placed without difficulty    Estimated Blood Loss: min    Drains: 18 Fr SP tube    Procedure in Detail:  After informed consent was obtained, the patient was brought to the operating suite and placed int he supine position.  SCDs were applied and working.  Anesthesia was administered.  The patient was then placed in the supine position and the urethral catheter was removed.  The patient was prepped and draped in  the usual sterile fashion.      A flexible cystoscope was advanced into the patient's bladder via the urethra.  This passed easily.  The entire urethra was visualized which showed no masses or strictures.  The right and left ureteral orifices were identified in the normal anatomic position and were seen effluxing clear urine.  Formal cystoscopy revealed no masses or lesions suspicious for malignancy, no bladder stones and no bladder diverticuli.  The bladder was filled with irrigation until it was distended.      A point two finger breadths above the pubic symphysis was marked in the midline. A spinal needle was passed through the skin at this point and into the identified bladder and clear fluid was returned, confirming bladder identification.  This was also seen directly entering the bladder with the cystoscope, again confirming identification.      The spinal needle was removed.  A small skin incision was made sharply in the location of the spinal needle.      The Cook one-step SP tube insertion kit was advanced through the skin incision, following the same course as the spinal needle.  This was advanced into the bladder, but could not be advanced into the bladder far enough for the sheath to penetrate the bladder wall.  The trocar was backed out, the angle was changed, angling slightly less inferiorly, and the trocar was advanced again.  This time the sheath of the Cook one-step was advanced into the bladder lumen.  The obturator was removed, and an 18 fr galvan catheter was inserted through the sheath.  The balloon was filled with 20cc.  The sheath was removed, leaving the suprapubic catheter in place.  This was sewn in place using a 2-0 nylon.  A sterile dressing was applied.     Disposition:  The patient will follow up with Neelam Hooper in 6 weeks for SP tube exchange.       Fortunato Butts MD

## 2017-06-23 NOTE — PROGRESS NOTES
Notified pt's friend, Josh Burr, to update plan of care. Friend verbalized an understanding. Care assumed by ANNE Lundberg.

## 2017-06-23 NOTE — PLAN OF CARE
Patient and patient's friend received discharge instructions and prescriptions from KYM Álvarez RN.  Patient and patient's friend verbalized understanding of all instructions given and all questions were addressed prior to patient's discharge.  Patient's vital signs are stable and within patient's baseline.  Patient tolerated clear liquids PO, but states he's still nauseated after administration of IV Zofran. However, patient states that nausea is tolerable and he wants to go home now.  Patient going home with suprapubic catheter in place draining light pink urine.  Patient denies pain surgical pain.  Patient denies nausea and vomiting at this time.  Patient meets all criteria for discharge at this time.  All required consents present in patient's chart upon patient's discharge.

## 2017-06-23 NOTE — ANESTHESIA RELEASE NOTE
"Anesthesia Release from PACU Note    Patient: Chucho Prado    Procedure(s) Performed: Procedure(s) (LRB):  INSERTION-CATHETER-SUPRAPUBIC (N/A)    Anesthesia type: GEN    Post pain: Adequate analgesia reported    Post assessment: no apparent anesthetic complications, tolerated procedure well and no evidence of recall    Post vital signs: /61   Pulse 78   Temp 36.4 °C (97.5 °F) (Oral)   Resp 18   Ht 5' 8" (1.727 m)   Wt 88.5 kg (195 lb)   SpO2 95%   BMI 29.65 kg/m²     Level of consciousness: awake, alert and oriented    Nausea/Vomiting: no nausea/no vomiting    Complications: none    Airway Patency: patent    Respiratory: unassisted, spontaneous ventilation, room air    Cardiovascular: stable and blood pressure at baseline    Hydration: euvolemic    "

## 2017-06-23 NOTE — PLAN OF CARE
Discharge instructions reviewed with patient and family. Verbalizes understanding. Copies of instructions given to patient. 3 prescriptions given to patient. Tolerating PO fluids.

## 2017-06-23 NOTE — H&P
Urology ([facility]) H&P  Staff: Chaitanya Taylor MD    Is this patient in a research study?  No    CC: neurogenic bladder     HPI:  Chucho Prado is a 83 y.o. male with neurogenic bladder and urinary retention with galvan catheter in place.      ROS:  Neg except per HPI    Past Medical History:   Diagnosis Date    Arthritis     Blood transfusion     CKD (chronic kidney disease) stage 3, GFR 30-59 ml/min     Compression fracture of lumbar vertebra     Depression     Dyslipidemia     GERD (gastroesophageal reflux disease)     Hypertension     Thyroid disease     UTI (urinary tract infection)        Past Surgical History:   Procedure Laterality Date    CHOLECYSTECTOMY      HERNIA REPAIR      JOINT REPLACEMENT      LAMINECTOMY  12/27/2016    L2-L4    LUMBAR LAMINECTOMY  12/2016    PARATHYROIDECTOMY      TOTAL KNEE ARTHROPLASTY      Right       Social History     Social History    Marital status: Single     Spouse name: N/A    Number of children: N/A    Years of education: N/A     Social History Main Topics    Smoking status: Former Smoker     Years: 20.00    Smokeless tobacco: Never Used      Comment: quit 1999    Alcohol use Yes      Comment: rarely/6 months    Drug use: No    Sexual activity: No     Other Topics Concern    None     Social History Narrative    Lives alone, owns house and rents part. [employer] helps. 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        Family History   Problem Relation Age of Onset    Hypertension Mother     Diabetes Father     Esophageal cancer Father        Review of patient's allergies indicates:  No Known Allergies    No current facility-administered medications on file prior to encounter.      Current Outpatient Prescriptions on File Prior to Encounter   Medication Sig Dispense Refill    acetaminophen (TYLENOL) 500 MG tablet Take 2 tablets (1,000 mg total) by mouth 3 (three) times daily as needed for Pain. 30 tablet 0    aspirin 81 MG Chew Take 1 tablet  (81 mg total) by mouth once daily. HOLD UNTIL TOLD TO RESUME BY PHYSICIAN 30 tablet 0    gabapentin (NEURONTIN) 400 MG capsule Take 2 capsules (800 mg total) by mouth 3 (three) times daily. 180 capsule 11    mirtazapine (REMERON) 30 MG tablet Take 1 tablet (30 mg total) by mouth every evening. 30 tablet 6    simvastatin (ZOCOR) 40 MG tablet Take 1 tablet (40 mg total) by mouth every evening. 90 tablet 11    tamsulosin (FLOMAX) 0.4 mg Cp24 Take 1 capsule (0.4 mg total) by mouth once daily. 30 capsule 3    triamterene-hydrochlorothiazide 37.5-25 mg (DYAZIDE) 37.5-25 mg per capsule Take 1 capsule by mouth every morning. HOLD UNTIL TOLD TO RESUME BY PHYSICIAN 90 capsule 3    calcitonin, salmon, (FORTICAL) 200 unit/actuation nasal spray 1 spray by Nasal route once daily. 1 Bottle 0    diclofenac sodium (VOLTAREN) 1 % Gel Apply 2 g topically 3 (three) times daily. To left knee 100 g 3    simethicone (MYLICON) 80 MG chewable tablet Take 1 tablet (80 mg total) by mouth 3 (three) times daily after meals.  0       Anticoagulation:  No    Physical Exam:  weight 195 lb/88.5 kg    AAOx4, NAD, WDWN  NC/AT, EOMI, PER, sclerae anicteric, speech normal, tongue midline  Nl effort, unlabored   RRR  Soft, non-tender, non-distended  Galvan draining clear yellow urine    Labs:      Lab Results   Component Value Date    WBC 8.40 05/05/2017    HGB 12.3 (L) 05/05/2017    HCT 37.6 (L) 05/05/2017    MCV 81 (L) 05/05/2017     05/05/2017       BMP  Lab Results   Component Value Date     (L) 05/05/2017    K 4.4 05/05/2017    CL 91 (L) 05/05/2017    CO2 29 05/05/2017    BUN 18 05/05/2017    CREATININE 1.0 06/06/2017    CALCIUM 9.4 05/05/2017    ANIONGAP 12 05/05/2017    ESTGFRAFRICA >60.0 06/06/2017    EGFRNONAA >60.0 06/06/2017       No results found for: PSA    Imaging:      Assessment: Chucho Prado is a 83 y.o. male with neurogenic bladder and urinary retention with galvan catheter in place who presents fro SP  tube insertion     Plan:     1. To OR today for suprapubic catheter insertion, percutaneous, possible open  2. Consents signed   3. I have explained the risk, benefits, and alternatives of the procedure in detail. The patient voices understanding and all questions have been answered. The patient agrees to proceed as planned.       Fortunato Butts MD

## 2017-06-24 RX ORDER — HYOSCYAMINE SULFATE 0.12 MG/1
0.12 TABLET SUBLINGUAL EVERY 4 HOURS PRN
Qty: 30 TABLET | Refills: 1 | Status: SHIPPED | OUTPATIENT
Start: 2017-06-24 | End: 2017-08-12 | Stop reason: SDUPTHER

## 2017-06-27 ENCOUNTER — TELEPHONE (OUTPATIENT)
Dept: UROLOGY | Facility: CLINIC | Age: 82
End: 2017-06-27

## 2017-06-27 NOTE — TELEPHONE ENCOUNTER
Pt states he was given levsin over the weekend but did not associate the leakage with spasms. He will take the levsin and let us know if he has any add'l problems

## 2017-06-27 NOTE — TELEPHONE ENCOUNTER
----- Message from Tasia Hooper sent at 6/27/2017 12:47 PM CDT -----  Contact: pt's neighbor Josh [phone]  Josh states pt had INSERTION-CATHETER-SUPRAPUBIC on 6/23 and it started to leak last night. Josh states the pt would like the nurse to call to discuss troubleshooting before possibly scheduling a office visit if needed. Please call pt at [phone]

## 2017-06-29 ENCOUNTER — TELEPHONE (OUTPATIENT)
Dept: UROLOGY | Facility: CLINIC | Age: 82
End: 2017-06-29

## 2017-06-30 ENCOUNTER — NURSE TRIAGE (OUTPATIENT)
Dept: ADMINISTRATIVE | Facility: CLINIC | Age: 82
End: 2017-06-30

## 2017-06-30 ENCOUNTER — TELEPHONE (OUTPATIENT)
Dept: UROLOGY | Facility: CLINIC | Age: 82
End: 2017-06-30

## 2017-06-30 NOTE — TELEPHONE ENCOUNTER
Spoke to his friend jocelyn-he was again advised to to to the ER. The friend states that the patient is aware , but wants to wait a day to see if it clears up. I told him that I would advise against that and to go to the ER today. He said he would relay the message.

## 2017-06-30 NOTE — TELEPHONE ENCOUNTER
Tried calling several times in the past hour. Line busy. Patient needs to go to the ER if he calls back

## 2017-06-30 NOTE — TELEPHONE ENCOUNTER
----- Message from Christy Chu MA sent at 6/30/2017  2:11 PM CDT -----  Contact: [phone]  I spoke with his friend  Josh , he is at the pt's home and I  explained that he needed to take him to the e.r.  He wants to talk to a nurse but said he will tell the pt he needs to go call this number back please [phone]  ----- Message -----  From: Helene Ellison LPN  Sent: 6/30/2017   1:58 PM  To: VANIA Cheatham-I tried calling several times-line busy. He needs to go to the ER if he calls back. I will document, thanks   ----- Message -----  From: Christy Chu MA  Sent: 6/30/2017  12:11 PM  To: Claudia CAMPOVERDE Jr Staff    1 week post op , feels dehydrated, black stool and vomit  and dark urine in his cath bag.   Said he is ok waiting  for a call back til after lunch

## 2017-07-03 ENCOUNTER — TELEPHONE (OUTPATIENT)
Dept: UROLOGY | Facility: CLINIC | Age: 82
End: 2017-07-03

## 2017-07-03 NOTE — TELEPHONE ENCOUNTER
Spoke with pt care attendant. Pt still with dark  blood in stool. Pt has not gone to ed. Instructed her that pt still needs to go ed. She will discuss with pt.

## 2017-07-03 NOTE — TELEPHONE ENCOUNTER
----- Message from Anu Maldonado sent at 7/3/2017  2:45 PM CDT -----  Contact: Pt# [phone]  Pt states he was calling to return nurse Jinny phone call back

## 2017-07-24 ENCOUNTER — PATIENT MESSAGE (OUTPATIENT)
Dept: INTERNAL MEDICINE | Facility: CLINIC | Age: 82
End: 2017-07-24

## 2017-07-25 ENCOUNTER — TELEPHONE (OUTPATIENT)
Dept: UROLOGY | Facility: CLINIC | Age: 82
End: 2017-07-25

## 2017-07-26 ENCOUNTER — PATIENT MESSAGE (OUTPATIENT)
Dept: INTERNAL MEDICINE | Facility: CLINIC | Age: 82
End: 2017-07-26

## 2017-07-26 NOTE — TELEPHONE ENCOUNTER
Good afternoon, unfortunately there isn't anything available after your Urology appointment at 1 pm.    VANIA Hutchins.    ----- Message -----     From: Melchor Johan     Sent: 7/26/2017  1:14 PM CDT       To: Obed Mcguire MD  Subject: Visit Follow-Up    Melchor Good has a urology appointment on the 8th at 1 p.m. which should take about 30 minutes.  Is it possible he can see Dr. Mcguire on the 8th after 2 p.m.?    Thank you

## 2017-07-27 ENCOUNTER — PATIENT MESSAGE (OUTPATIENT)
Dept: INTERNAL MEDICINE | Facility: CLINIC | Age: 82
End: 2017-07-27

## 2017-07-30 ENCOUNTER — PATIENT MESSAGE (OUTPATIENT)
Dept: INTERNAL MEDICINE | Facility: CLINIC | Age: 82
End: 2017-07-30

## 2017-07-30 DIAGNOSIS — I10 ESSENTIAL HYPERTENSION: Chronic | ICD-10-CM

## 2017-07-31 RX ORDER — TRIAMTERENE AND HYDROCHLOROTHIAZIDE 37.5; 25 MG/1; MG/1
1 CAPSULE ORAL EVERY MORNING
Qty: 90 CAPSULE | Refills: 3 | Status: ON HOLD
Start: 2017-07-31 | End: 2017-11-06 | Stop reason: HOSPADM

## 2017-08-01 RX ORDER — HYOSCYAMINE SULFATE 0.12 MG/1
0.12 TABLET SUBLINGUAL EVERY 4 HOURS PRN
Qty: 30 TABLET | Refills: 1 | Status: CANCELLED | OUTPATIENT
Start: 2017-08-01

## 2017-08-01 NOTE — TELEPHONE ENCOUNTER
I sent the resident the request for medication  HYOSCYAMINE 0.125mg  ( place under tongue every 4hrs as needed for bladder spasms) to Dr. Pratibha Delaney. raymundo sent request 2 times.

## 2017-08-08 ENCOUNTER — OFFICE VISIT (OUTPATIENT)
Dept: UROLOGY | Facility: CLINIC | Age: 82
End: 2017-08-08
Payer: MEDICARE

## 2017-08-08 VITALS
HEIGHT: 68 IN | HEART RATE: 78 BPM | RESPIRATION RATE: 16 BRPM | WEIGHT: 195 LBS | SYSTOLIC BLOOD PRESSURE: 157 MMHG | DIASTOLIC BLOOD PRESSURE: 72 MMHG | BODY MASS INDEX: 29.55 KG/M2

## 2017-08-08 DIAGNOSIS — R33.9 URINARY RETENTION: ICD-10-CM

## 2017-08-08 DIAGNOSIS — N31.9 NEUROGENIC BLADDER: Primary | ICD-10-CM

## 2017-08-08 DIAGNOSIS — Z43.5 ENCOUNTER FOR SUPRAPUBIC CATHETER CARE: ICD-10-CM

## 2017-08-08 PROCEDURE — 99214 OFFICE O/P EST MOD 30 MIN: CPT | Mod: S$PBB,25,, | Performed by: NURSE PRACTITIONER

## 2017-08-08 PROCEDURE — 3077F SYST BP >= 140 MM HG: CPT | Mod: ,,, | Performed by: NURSE PRACTITIONER

## 2017-08-08 PROCEDURE — 99214 OFFICE O/P EST MOD 30 MIN: CPT | Mod: PBBFAC,25 | Performed by: NURSE PRACTITIONER

## 2017-08-08 PROCEDURE — 99999 PR PBB SHADOW E&M-EST. PATIENT-LVL IV: CPT | Mod: PBBFAC,,, | Performed by: NURSE PRACTITIONER

## 2017-08-08 PROCEDURE — 1159F MED LIST DOCD IN RCRD: CPT | Mod: ,,, | Performed by: NURSE PRACTITIONER

## 2017-08-08 PROCEDURE — 51705 CHANGE OF BLADDER TUBE: CPT | Mod: S$PBB,,, | Performed by: NURSE PRACTITIONER

## 2017-08-08 PROCEDURE — 1126F AMNT PAIN NOTED NONE PRSNT: CPT | Mod: ,,, | Performed by: NURSE PRACTITIONER

## 2017-08-08 PROCEDURE — 3078F DIAST BP <80 MM HG: CPT | Mod: ,,, | Performed by: NURSE PRACTITIONER

## 2017-08-08 PROCEDURE — 51705 CHANGE OF BLADDER TUBE: CPT | Mod: PBBFAC | Performed by: NURSE PRACTITIONER

## 2017-08-08 NOTE — PROGRESS NOTES
Subjective:       Patient ID: Chucho Prado is a 83 y.o. male.    Chief Complaint: Urinary Retention (galvan change)    Chucho Prado is a 83 y.o. male with neurogenic bladder.  He had 18fr SPT placed by Dr. Taylor on 06/23/2017 to manage his Neurogenic Bladder.    He is here today for exchange.  He reports good urine flow.  Occasional bladder spasms;  Some tenderness around his SP stoma;         Past Medical History:  No date: Arthritis  No date: Blood transfusion  No date: CKD (chronic kidney disease) stage 3, GFR 30-5*  No date: Compression fracture of lumbar vertebra  No date: Depression  No date: Dyslipidemia  No date: GERD (gastroesophageal reflux disease)  No date: Hypertension  No date: Thyroid disease  No date: UTI (urinary tract infection)    Past Surgical History:  No date: CHOLECYSTECTOMY  No date: HERNIA REPAIR  No date: JOINT REPLACEMENT  12/27/2016: LAMINECTOMY      Comment: L2-L4  12/2016: LUMBAR LAMINECTOMY  No date: PARATHYROIDECTOMY  No date: TOTAL KNEE ARTHROPLASTY      Comment: Right    Review of patient's family history indicates:    Hypertension                   Mother                    Diabetes                       Father                    Esophageal cancer              Father                      Social History    Marital status: Single              Spouse name:                       Years of education:                 Number of children:               Occupational History    None on file    Social History Main Topics    Smoking status: Former Smoker                                                                Packs/day: 0.00      Years: 20.00       Smokeless tobacco: Never Used                        Comment: quit 1999    Alcohol use: Yes                Comment: rarely/6 months    Drug use: No              Sexual activity: No                   Other Topics            Concern    None on file    Social History Narrative    Lives alone, owns house and rents part.   helps. 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         Allergies:  Review of patient's allergies indicates no known allergies.    Medications:  Current Outpatient Prescriptions:   acetaminophen (TYLENOL) 500 MG tablet, Take 2 tablets (1,000 mg total) by mouth 3 (three) times daily as needed for Pain., Disp: 30 tablet, Rfl: 0  aspirin 81 MG Chew, Take 1 tablet (81 mg total) by mouth once daily. HOLD UNTIL TOLD TO RESUME BY PHYSICIAN, Disp: 30 tablet, Rfl: 0  calcitonin, salmon, (FORTICAL) 200 unit/actuation nasal spray, 1 spray by Nasal route once daily., Disp: 1 Bottle, Rfl: 0  diclofenac sodium (VOLTAREN) 1 % Gel, Apply 2 g topically 3 (three) times daily. To left knee, Disp: 100 g, Rfl: 3  gabapentin (NEURONTIN) 400 MG capsule, Take 2 capsules (800 mg total) by mouth 3 (three) times daily., Disp: 180 capsule, Rfl: 11  hyoscyamine (LEVSIN/SL) 0.125 mg Subl, Place 1 tablet (0.125 mg total) under the tongue every 4 (four) hours as needed (bladder spasms)., Disp: 30 tablet, Rfl: 1  levothyroxine (SYNTHROID) 50 MCG tablet, Take 1 tablet (50 mcg total) by mouth once daily., Disp: 100 tablet, Rfl: 11  mirtazapine (REMERON) 30 MG tablet, Take 1 tablet (30 mg total) by mouth every evening., Disp: 30 tablet, Rfl: 6  polyethylene glycol (GLYCOLAX) 17 gram PwPk, Take 17 g by mouth once daily., Disp: 30 packet, Rfl: 0  simethicone (MYLICON) 80 MG chewable tablet, Take 1 tablet (80 mg total) by mouth 3 (three) times daily after meals., Disp: , Rfl: 0  simvastatin (ZOCOR) 40 MG tablet, Take 1 tablet (40 mg total) by mouth every evening., Disp: 90 tablet, Rfl: 11  tamsulosin (FLOMAX) 0.4 mg Cp24, Take 1 capsule (0.4 mg total) by mouth once daily., Disp: 30 capsule, Rfl: 3  triamterene-hydrochlorothiazide 37.5-25 mg (DYAZIDE) 37.5-25 mg per capsule, Take 1 capsule by mouth every morning. HOLD UNTIL TOLD TO RESUME BY PHYSICIAN, Disp: 90 capsule, Rfl: 3                  Review of Systems   Constitutional: Negative.   Negative for activity change, appetite change and fever.   HENT: Negative.  Negative for facial swelling and trouble swallowing.    Eyes: Negative.    Respiratory: Negative.  Negative for shortness of breath.    Cardiovascular: Negative.  Negative for chest pain and palpitations.   Gastrointestinal: Positive for abdominal pain. Negative for constipation, diarrhea, nausea and vomiting.        Tenderness around SP stoma.     Genitourinary: Positive for difficulty urinating. Negative for dysuria, enuresis, flank pain, frequency, genital sores, nocturia, penile pain, scrotal swelling, testicular pain and urgency.   Musculoskeletal: Positive for arthralgias and gait problem. Negative for back pain and neck stiffness.   Skin: Negative.  Negative for wound.   Neurological: Negative for dizziness, tremors, seizures, syncope, speech difficulty, light-headedness and headaches.   Hematological: Does not bruise/bleed easily.   Psychiatric/Behavioral: Negative for confusion and hallucinations. The patient is not nervous/anxious.        Objective:      Physical Exam   Nursing note and vitals reviewed.  Constitutional: He is oriented to person, place, and time. Vital signs are normal. He appears well-developed and well-nourished. He is active and cooperative.  Non-toxic appearance. He does not have a sickly appearance.   HENT:   Head: Normocephalic and atraumatic.   Right Ear: External ear normal.   Left Ear: External ear normal.   Nose: Nose normal.   Mouth/Throat: Mucous membranes are normal.   Eyes: Conjunctivae and lids are normal. No scleral icterus.   Neck: Trachea normal, normal range of motion and full passive range of motion without pain. Neck supple. No JVD present. No tracheal deviation present.   Cardiovascular: Normal rate, regular rhythm, S1 normal and S2 normal.    Pulmonary/Chest: Effort normal and breath sounds normal. No respiratory distress. He exhibits no tenderness.   Abdominal: Soft. Normal appearance and  bowel sounds are normal. There is no hepatosplenomegaly. There is no tenderness. There is no rebound, no guarding and no CVA tenderness.       Genitourinary: No penile tenderness.   Musculoskeletal: Normal range of motion.   Neurological: He is alert and oriented to person, place, and time. He has normal strength.   Skin: Skin is warm, dry and intact.     Psychiatric: He has a normal mood and affect. His behavior is normal. Judgment and thought content normal.       Assessment:       1. Neurogenic bladder    2. Encounter for suprapubic catheter care    3. Urinary retention        Plan:         I spent 30 minutes with the patient of which more than half was spent in direct consultation with the patient in regards to our treatment and plan.    Education and recommendations of today's plan of care including home remedies.  I discussed his SPT and expectations; he still healing.  Diet modifications; avoiding bladder irritants.  I easily removed his SPT; the stitch holding it in place was not in place.   I placed a 18fr SPT easily using sterile technique.   I irrigated the bladder to verify the position then filled the balloon with 10cc sterile water.  Skin carrier cream placed around stoma; then applied dsg.  We discussed daily care.   Voiced understanding  RTC one month for new SPT change.

## 2017-08-10 ENCOUNTER — TELEPHONE (OUTPATIENT)
Dept: INTERNAL MEDICINE | Facility: CLINIC | Age: 82
End: 2017-08-10

## 2017-08-10 DIAGNOSIS — R26.81 GAIT INSTABILITY: Primary | ICD-10-CM

## 2017-08-10 NOTE — TELEPHONE ENCOUNTER
----- Message from Tonia Contreras sent at 8/10/2017 11:34 AM CDT -----  Contact: self [phone]  Patient's stated [facility]'s pharmacy don't have refill on medication triamterene-hydrochlorothiazide 37.5-25 mg (DYAZIDE) 37.5-25 mg per capsule . Please call and advise , Thanks !

## 2017-08-10 NOTE — TELEPHONE ENCOUNTER
Attempted to contact pt to inform him that his medication has been called in and awaiting him at the pharmacy, no success. Mailbox full could not leave a message.

## 2017-08-11 ENCOUNTER — PATIENT MESSAGE (OUTPATIENT)
Dept: UROLOGY | Facility: CLINIC | Age: 82
End: 2017-08-11

## 2017-08-11 RX ORDER — DICLOFENAC SODIUM 10 MG/G
2 GEL TOPICAL 3 TIMES DAILY
Qty: 100 G | Refills: 6 | Status: ON HOLD | OUTPATIENT
Start: 2017-08-11 | End: 2017-10-25 | Stop reason: HOSPADM

## 2017-08-12 RX ORDER — HYOSCYAMINE SULFATE 0.12 MG/1
0.12 TABLET SUBLINGUAL EVERY 4 HOURS PRN
Qty: 30 TABLET | Refills: 1 | Status: SHIPPED | OUTPATIENT
Start: 2017-08-12 | End: 2018-05-12 | Stop reason: SDUPTHER

## 2017-08-27 ENCOUNTER — PATIENT MESSAGE (OUTPATIENT)
Dept: INTERNAL MEDICINE | Facility: CLINIC | Age: 82
End: 2017-08-27

## 2017-08-29 RX ORDER — TRAZODONE HYDROCHLORIDE 50 MG/1
50 TABLET ORAL NIGHTLY
Qty: 30 TABLET | Refills: 11 | Status: SHIPPED | OUTPATIENT
Start: 2017-08-29 | End: 2017-09-08 | Stop reason: SDUPTHER

## 2017-09-07 ENCOUNTER — OUTPATIENT CASE MANAGEMENT (OUTPATIENT)
Dept: ADMINISTRATIVE | Facility: OTHER | Age: 82
End: 2017-09-07

## 2017-09-07 DIAGNOSIS — G95.9 LUMBAR MYELOPATHY: Primary | ICD-10-CM

## 2017-09-07 NOTE — PROGRESS NOTES
Thank you for the referral.   For your information:    The following patient has been assigned to Arielle Calderon RN with [facility] for high risk screening.    Reason: Lumbar myelopathy    Please contact [facility] at [phone] with any questions.    Thank you,  Neeta Saxena

## 2017-09-07 NOTE — PROGRESS NOTES
Contacted pt and he reports he is not interested in any assistance, does not need assistance of any kind at this time from [facility]. This nurse gave pt my contact information for future reference if needed. Will close case at this time. CANDIS Calderon,RN-OPCM

## 2017-09-08 ENCOUNTER — OFFICE VISIT (OUTPATIENT)
Dept: INTERNAL MEDICINE | Facility: CLINIC | Age: 82
End: 2017-09-08
Payer: MEDICARE

## 2017-09-08 ENCOUNTER — HOSPITAL ENCOUNTER (OUTPATIENT)
Dept: RADIOLOGY | Facility: HOSPITAL | Age: 82
Discharge: HOME | End: 2017-09-08
Attending: INTERNAL MEDICINE
Payer: MEDICARE

## 2017-09-08 ENCOUNTER — OFFICE VISIT (OUTPATIENT)
Dept: UROLOGY | Facility: CLINIC | Age: 82
End: 2017-09-08
Payer: MEDICARE

## 2017-09-08 VITALS
DIASTOLIC BLOOD PRESSURE: 78 MMHG | DIASTOLIC BLOOD PRESSURE: 68 MMHG | HEIGHT: 68 IN | SYSTOLIC BLOOD PRESSURE: 165 MMHG | SYSTOLIC BLOOD PRESSURE: 128 MMHG | HEART RATE: 67 BPM | WEIGHT: 195 LBS | HEART RATE: 73 BPM | OXYGEN SATURATION: 97 % | BODY MASS INDEX: 29.55 KG/M2

## 2017-09-08 DIAGNOSIS — M54.2 NECK PAIN: ICD-10-CM

## 2017-09-08 DIAGNOSIS — Z43.5 ENCOUNTER FOR SUPRAPUBIC CATHETER CARE: ICD-10-CM

## 2017-09-08 DIAGNOSIS — N31.9 NEUROGENIC BLADDER: Primary | ICD-10-CM

## 2017-09-08 DIAGNOSIS — M54.2 NECK PAIN: Primary | ICD-10-CM

## 2017-09-08 DIAGNOSIS — H57.9 BILATERAL EYE COMPLAINT: ICD-10-CM

## 2017-09-08 DIAGNOSIS — Z93.59 CHRONIC SUPRAPUBIC CATHETER: ICD-10-CM

## 2017-09-08 DIAGNOSIS — R33.9 URINARY RETENTION: ICD-10-CM

## 2017-09-08 PROCEDURE — 3074F SYST BP LT 130 MM HG: CPT | Mod: ,,, | Performed by: INTERNAL MEDICINE

## 2017-09-08 PROCEDURE — 1125F AMNT PAIN NOTED PAIN PRSNT: CPT | Mod: ,,, | Performed by: INTERNAL MEDICINE

## 2017-09-08 PROCEDURE — 99999 PR PBB SHADOW E&M-EST. PATIENT-LVL IV: CPT | Mod: PBBFAC,,, | Performed by: NURSE PRACTITIONER

## 2017-09-08 PROCEDURE — 99999 PR PBB SHADOW E&M-EST. PATIENT-LVL III: CPT | Mod: PBBFAC,,, | Performed by: INTERNAL MEDICINE

## 2017-09-08 PROCEDURE — 1126F AMNT PAIN NOTED NONE PRSNT: CPT | Mod: ,,, | Performed by: NURSE PRACTITIONER

## 2017-09-08 PROCEDURE — 51705 CHANGE OF BLADDER TUBE: CPT | Mod: PBBFAC | Performed by: NURSE PRACTITIONER

## 2017-09-08 PROCEDURE — 1159F MED LIST DOCD IN RCRD: CPT | Mod: ,,, | Performed by: NURSE PRACTITIONER

## 2017-09-08 PROCEDURE — 72040 X-RAY EXAM NECK SPINE 2-3 VW: CPT | Mod: 26,,, | Performed by: RADIOLOGY

## 2017-09-08 PROCEDURE — 3078F DIAST BP <80 MM HG: CPT | Mod: ,,, | Performed by: NURSE PRACTITIONER

## 2017-09-08 PROCEDURE — 72040 X-RAY EXAM NECK SPINE 2-3 VW: CPT | Mod: TC

## 2017-09-08 PROCEDURE — 99214 OFFICE O/P EST MOD 30 MIN: CPT | Mod: S$PBB,,, | Performed by: INTERNAL MEDICINE

## 2017-09-08 PROCEDURE — 1159F MED LIST DOCD IN RCRD: CPT | Mod: ,,, | Performed by: INTERNAL MEDICINE

## 2017-09-08 PROCEDURE — 51705 CHANGE OF BLADDER TUBE: CPT | Mod: S$PBB,,, | Performed by: NURSE PRACTITIONER

## 2017-09-08 PROCEDURE — 99213 OFFICE O/P EST LOW 20 MIN: CPT | Mod: PBBFAC,25,27 | Performed by: INTERNAL MEDICINE

## 2017-09-08 PROCEDURE — 3078F DIAST BP <80 MM HG: CPT | Mod: ,,, | Performed by: INTERNAL MEDICINE

## 2017-09-08 PROCEDURE — 3074F SYST BP LT 130 MM HG: CPT | Mod: ,,, | Performed by: NURSE PRACTITIONER

## 2017-09-08 PROCEDURE — 99214 OFFICE O/P EST MOD 30 MIN: CPT | Mod: S$PBB,25,, | Performed by: NURSE PRACTITIONER

## 2017-09-08 PROCEDURE — 99214 OFFICE O/P EST MOD 30 MIN: CPT | Mod: PBBFAC,25 | Performed by: NURSE PRACTITIONER

## 2017-09-08 RX ORDER — TRAZODONE HYDROCHLORIDE 50 MG/1
50 TABLET ORAL NIGHTLY PRN
Qty: 30 TABLET | Refills: 11
Start: 2017-09-08 | End: 2018-08-31

## 2017-09-08 NOTE — PROGRESS NOTES
Subjective:       Patient ID: Chucho Prado is a 83 y.o. male.    Chief Complaint: Neurogenic Bladder (here to have his cath change)    Chucho Prado is a 83 y.o. male with neurogenic bladder.  He had 18fr SPT placed by Dr. Taylor on 06/23/2017 to manage his Neurogenic Bladder.    He is here today for exchange.  His last exchange 8/08/2017    He reports good urine flow.  Occasional bladder spasms;  Some tenderness around his SP stoma;     Going to [employer] for b'day dinner          Past Medical History:  No date: Arthritis  No date: Blood transfusion  No date: CKD (chronic kidney disease) stage 3, GFR 30-5*  No date: Compression fracture of lumbar vertebra  No date: Depression  No date: Dyslipidemia  No date: GERD (gastroesophageal reflux disease)  No date: Hypertension  No date: Thyroid disease  No date: UTI (urinary tract infection)    Past Surgical History:  No date: CHOLECYSTECTOMY  No date: HERNIA REPAIR  No date: JOINT REPLACEMENT  12/27/2016: LAMINECTOMY      Comment: L2-L4  12/2016: LUMBAR LAMINECTOMY  No date: PARATHYROIDECTOMY  No date: TOTAL KNEE ARTHROPLASTY      Comment: Right    Review of patient's family history indicates:    Hypertension                   Mother                    Diabetes                       Father                    Esophageal cancer              Father                      Social History    Marital status: Single              Spouse name:                       Years of education:                 Number of children:               Occupational History    None on file    Social History Main Topics    Smoking status: Former Smoker                                                                Packs/day: 0.00      Years: 20.00       Smokeless tobacco: Never Used                        Comment: quit 1999    Alcohol use: Yes                Comment: rarely/6 months    Drug use: No              Sexual activity: No                   Other Topics            Concern     None on file    Social History Narrative    Lives alone, owns house and rents part. Delaney helps. 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         Allergies:  Review of patient's allergies indicates no known allergies.    Medications:  Current Outpatient Prescriptions:   acetaminophen (TYLENOL) 500 MG tablet, Take 2 tablets (1,000 mg total) by mouth 3 (three) times daily as needed for Pain., Disp: 30 tablet, Rfl: 0  aspirin 81 MG Chew, Take 1 tablet (81 mg total) by mouth once daily. HOLD UNTIL TOLD TO RESUME BY PHYSICIAN, Disp: 30 tablet, Rfl: 0  calcitonin, salmon, (FORTICAL) 200 unit/actuation nasal spray, 1 spray by Nasal route once daily., Disp: 1 Bottle, Rfl: 0  diclofenac sodium (VOLTAREN) 1 % Gel, Apply 2 g topically 3 (three) times daily. To left knee, Disp: 100 g, Rfl: 3  gabapentin (NEURONTIN) 400 MG capsule, Take 2 capsules (800 mg total) by mouth 3 (three) times daily., Disp: 180 capsule, Rfl: 11  hyoscyamine (LEVSIN/SL) 0.125 mg Subl, Place 1 tablet (0.125 mg total) under the tongue every 4 (four) hours as needed (bladder spasms)., Disp: 30 tablet, Rfl: 1  levothyroxine (SYNTHROID) 50 MCG tablet, Take 1 tablet (50 mcg total) by mouth once daily., Disp: 100 tablet, Rfl: 11  mirtazapine (REMERON) 30 MG tablet, Take 1 tablet (30 mg total) by mouth every evening., Disp: 30 tablet, Rfl: 6  polyethylene glycol (GLYCOLAX) 17 gram PwPk, Take 17 g by mouth once daily., Disp: 30 packet, Rfl: 0  simethicone (MYLICON) 80 MG chewable tablet, Take 1 tablet (80 mg total) by mouth 3 (three) times daily after meals., Disp: , Rfl: 0  simvastatin (ZOCOR) 40 MG tablet, Take 1 tablet (40 mg total) by mouth every evening., Disp: 90 tablet, Rfl: 11  tamsulosin (FLOMAX) 0.4 mg Cp24, Take 1 capsule (0.4 mg total) by mouth once daily., Disp: 30 capsule, Rfl: 3  triamterene-hydrochlorothiazide 37.5-25 mg (DYAZIDE) 37.5-25 mg per capsule, Take 1 capsule by mouth every morning. HOLD UNTIL TOLD TO RESUME BY PHYSICIAN, Disp:  90 capsule, Rfl: 3                  Review of Systems   Constitutional: Negative for activity change, appetite change and fever.   HENT: Negative.  Negative for facial swelling and trouble swallowing.    Eyes: Negative.    Respiratory: Negative.  Negative for shortness of breath.    Cardiovascular: Negative.  Negative for chest pain and palpitations.   Gastrointestinal: Negative for abdominal pain, constipation, diarrhea, nausea and vomiting.   Genitourinary: Positive for difficulty urinating. Negative for dysuria, enuresis, flank pain, frequency, genital sores, hematuria, nocturia, penile pain, scrotal swelling, testicular pain and urgency.        SPT has been draining well     Musculoskeletal: Positive for arthralgias and back pain. Negative for gait problem and neck stiffness.   Skin: Negative.  Negative for wound.   Neurological: Positive for weakness. Negative for dizziness, tremors, seizures, syncope, speech difficulty, light-headedness and headaches.   Hematological: Does not bruise/bleed easily.   Psychiatric/Behavioral: Negative for confusion and hallucinations. The patient is not nervous/anxious.        Objective:      Physical Exam   Nursing note and vitals reviewed.  Constitutional: He is oriented to person, place, and time. He appears well-developed and well-nourished.  Non-toxic appearance. He does not have a sickly appearance.   HENT:   Head: Normocephalic and atraumatic.   Right Ear: External ear normal.   Left Ear: External ear normal.   Nose: Nose normal.   Mouth/Throat: Mucous membranes are normal.   Eyes: Conjunctivae and lids are normal. No scleral icterus.   Neck: Trachea normal, normal range of motion and full passive range of motion without pain. Neck supple. No JVD present. No tracheal deviation present.   Cardiovascular: Normal rate, regular rhythm, S1 normal and S2 normal.    Pulmonary/Chest: Effort normal and breath sounds normal. No respiratory distress. He exhibits no tenderness.    Abdominal: Soft. Normal appearance and bowel sounds are normal. There is no hepatosplenomegaly. There is no tenderness. There is no rebound, no guarding and no CVA tenderness.       Musculoskeletal: Normal range of motion.   Neurological: He is alert and oriented to person, place, and time. He has normal strength.   Skin: Skin is warm, dry and intact.     Psychiatric: He has a normal mood and affect. His behavior is normal. Judgment and thought content normal.       Assessment:       1. Neurogenic bladder    2. Urinary retention    3. Chronic suprapubic catheter    4. Encounter for suprapubic catheter care        Plan:         I spent 30 minutes with the patient of which more than half was spent in direct consultation with the patient in regards to our treatment and plan.    Education and recommendations of today's plan of care including home remedies.  I was able to easily exchange out his 18fr SPT using sterile technique.  I irrigated the bladder to verify the position.  Balloon inflated with 9cc sterile water.  Tolerated well.  RTC one month

## 2017-09-08 NOTE — PATIENT INSTRUCTIONS
Take Trazodone only as needed, sleeping pill.    Gabapentin/Neurotin, take 1 instead 2 twice per day instead three times per day.  In 1 week try 1 tab once per day.  After one week then stop.    Moisturizer for your dry legs and arms, eucerin or cocoa butter.    Let home health nurse know that I want you to start physical therapy at the home for your neck.    Try over the counter artificial tears for the right

## 2017-09-08 NOTE — PROGRESS NOTES
Subjective:       Patient ID: Cuhcho Prado is a 83 y.o. male.    Chief Complaint: Follow-up (6 months)    Comes here once per month to have SPT changed.    Here for f/u.    Neck crooked, worsened recently.    No recent falls.    Mild R sided lumbar back pains, 4/10.    Bad taste in mouth, chronic as well.      Review of Systems   Constitutional: Negative for activity change, appetite change, fatigue, fever and unexpected weight change.   Respiratory: Negative for cough, chest tightness, shortness of breath and wheezing.    Cardiovascular: Negative for chest pain, palpitations and leg swelling.   Gastrointestinal: Negative for abdominal distention, abdominal pain, nausea and vomiting.   Genitourinary: Negative for decreased urine volume, difficulty urinating, dysuria and hematuria.   Musculoskeletal: Positive for arthralgias and back pain (only when he is helped to lay down).   Skin: Negative for rash and wound.   Neurological: Negative for tremors, syncope and weakness.   Psychiatric/Behavioral: Negative for confusion.       Objective:      Physical Exam   Constitutional: He is oriented to person, place, and time. He appears well-developed and well-nourished. No distress.   Much stronger than last visit   HENT:   Head: Normocephalic and atraumatic.   Mouth/Throat: No oropharyngeal exudate.   Eyes: EOM are normal. Pupils are equal, round, and reactive to light. No scleral icterus.   Neck: Normal range of motion. Neck supple. No thyromegaly present.   Head tilted to the R, decreased rom   Cardiovascular: Normal rate and regular rhythm.  Exam reveals no gallop and no friction rub.    Murmur heard.  Pulmonary/Chest: Effort normal and breath sounds normal. No respiratory distress. He has no wheezes. He has no rales. He exhibits no tenderness.   Abdominal: Soft. Bowel sounds are normal. He exhibits no distension and no mass. There is no tenderness. There is no rebound and no guarding. A hernia is present.    Large R sided abd wall hernia   Musculoskeletal: He exhibits no edema.   R knee TKA is not warm or swollen.    L knee decreased rom and significant crepitus   Lymphadenopathy:     He has no cervical adenopathy.   Neurological: He is alert and oriented to person, place, and time.   Skin: No rash noted. He is not diaphoretic.   Psychiatric: He has a normal mood and affect. His behavior is normal.       Assessment:       1. Neck pain    2. Bilateral eye complaint        Plan:       Chucho was seen today for follow-up.    Diagnoses and all orders for this visit:    Neck pain  -     X-Ray Cervical Spine AP And Lateral; Future  Not severe and more tilted is the complaint.  Not clearly med related.    Bilateral eye complaint  -     Ambulatory Referral to Optometry    Other orders  -     trazodone (DESYREL) 50 MG tablet; Take 1 tablet (50 mg total) by mouth nightly as needed for Insomnia.     polypharm  Patient Instructions   Take Trazodone only as needed, sleeping pill.    Gabapentin/Neurotin, take 1 instead 2 twice per day instead three times per day.  In 1 week try 1 tab once per day.  After one week then stop.    Moisturizer for your dry legs and arms, eucerin or cocoa butter.    Let home health nurse know that I want you to start physical therapy at the home for your neck.    Try over the counter artificial tears for the right    Return in about 6 months (around 3/8/2018).

## 2017-09-21 ENCOUNTER — PATIENT MESSAGE (OUTPATIENT)
Dept: ORTHOPEDICS | Facility: CLINIC | Age: 82
End: 2017-09-21

## 2017-09-25 ENCOUNTER — TELEPHONE (OUTPATIENT)
Dept: INTERNAL MEDICINE | Facility: CLINIC | Age: 82
End: 2017-09-25

## 2017-09-25 NOTE — TELEPHONE ENCOUNTER
----- Message from Leni Zuniga sent at 9/25/2017  2:54 PM CDT -----  Contact: Katie Rowell with [facility]  [phone]  Patient stated you were going to order more Neck therapy for his arthritis . Please reorder Physical therapy. The last one was in may. Need verbal ok. He want the previous  PT to do this.

## 2017-09-26 ENCOUNTER — OFFICE VISIT (OUTPATIENT)
Dept: ORTHOPEDICS | Facility: CLINIC | Age: 82
End: 2017-09-26
Payer: MEDICARE

## 2017-09-26 ENCOUNTER — HOSPITAL ENCOUNTER (OUTPATIENT)
Dept: RADIOLOGY | Facility: HOSPITAL | Age: 82
Discharge: HOME | End: 2017-09-26
Attending: ORTHOPAEDIC SURGERY
Payer: MEDICARE

## 2017-09-26 VITALS — HEIGHT: 68 IN | WEIGHT: 195 LBS | BODY MASS INDEX: 29.55 KG/M2

## 2017-09-26 DIAGNOSIS — M17.12 PRIMARY OSTEOARTHRITIS OF LEFT KNEE: ICD-10-CM

## 2017-09-26 DIAGNOSIS — M17.9 OSTEOARTHRITIS OF KNEE, UNSPECIFIED: Primary | ICD-10-CM

## 2017-09-26 DIAGNOSIS — M17.9 OSTEOARTHRITIS OF KNEE, UNSPECIFIED: ICD-10-CM

## 2017-09-26 PROCEDURE — 99214 OFFICE O/P EST MOD 30 MIN: CPT | Mod: S$PBB,,, | Performed by: ORTHOPAEDIC SURGERY

## 2017-09-26 PROCEDURE — 73560 X-RAY EXAM OF KNEE 1 OR 2: CPT | Mod: 26,LT,, | Performed by: RADIOLOGY

## 2017-09-26 PROCEDURE — 99212 OFFICE O/P EST SF 10 MIN: CPT | Mod: PBBFAC,25 | Performed by: ORTHOPAEDIC SURGERY

## 2017-09-26 PROCEDURE — 1125F AMNT PAIN NOTED PAIN PRSNT: CPT | Mod: ,,, | Performed by: ORTHOPAEDIC SURGERY

## 2017-09-26 PROCEDURE — 1159F MED LIST DOCD IN RCRD: CPT | Mod: ,,, | Performed by: ORTHOPAEDIC SURGERY

## 2017-09-26 PROCEDURE — 73560 X-RAY EXAM OF KNEE 1 OR 2: CPT | Mod: TC,LT

## 2017-09-26 PROCEDURE — 99999 PR PBB SHADOW E&M-EST. PATIENT-LVL II: CPT | Mod: PBBFAC,,, | Performed by: ORTHOPAEDIC SURGERY

## 2017-09-26 NOTE — PROGRESS NOTES
HPI:    Chucho Prado is a 84 y.o. male who is here today for   Chief Complaint   Patient presents with    Left Knee - Pain   .     Duration: 12 months  Intensity: severe  Character of pain: sharp  Location: He reports that the pain is predominately  lateral  Patient's pain increases with activity.  Pain is increased with weightbearing and interferes with activities of daily living.    He has tried conservative management including NSAIDS, injections, and activity modification without relief.    He has discussed options with his family and wishes to schedule TKA.     PMSFSH reviewed per clinic record       Past Medical History:   Diagnosis Date    Arthritis     Blood transfusion     CKD (chronic kidney disease) stage 3, GFR 30-59 ml/min     Compression fracture of lumbar vertebra     Depression     Dyslipidemia     GERD (gastroesophageal reflux disease)     Hypertension     Thyroid disease     UTI (urinary tract infection)           Current Outpatient Prescriptions:     acetaminophen (TYLENOL) 500 MG tablet, Take 2 tablets (1,000 mg total) by mouth 3 (three) times daily as needed for Pain., Disp: 30 tablet, Rfl: 0    aspirin 81 MG Chew, Take 1 tablet (81 mg total) by mouth once daily. HOLD UNTIL TOLD TO RESUME BY PHYSICIAN, Disp: 30 tablet, Rfl: 0    calcitonin, salmon, (FORTICAL) 200 unit/actuation nasal spray, 1 spray by Nasal route once daily., Disp: 1 Bottle, Rfl: 0    diclofenac sodium (VOLTAREN) 1 % Gel, Apply 2 g topically 3 (three) times daily. To left knee, Disp: 100 g, Rfl: 6    gabapentin (NEURONTIN) 400 MG capsule, Take 2 capsules (800 mg total) by mouth 3 (three) times daily., Disp: 180 capsule, Rfl: 11    hyoscyamine (LEVSIN/SL) 0.125 mg Subl, Place 1 tablet (0.125 mg total) under the tongue every 4 (four) hours as needed (bladder spasms)., Disp: 30 tablet, Rfl: 1    levothyroxine (SYNTHROID) 50 MCG tablet, Take 1 tablet (50 mcg total) by mouth once daily., Disp: 100  "tablet, Rfl: 11    mirtazapine (REMERON) 30 MG tablet, Take 1 tablet (30 mg total) by mouth every evening., Disp: 30 tablet, Rfl: 6    simethicone (MYLICON) 80 MG chewable tablet, Take 1 tablet (80 mg total) by mouth 3 (three) times daily after meals., Disp: , Rfl: 0    simvastatin (ZOCOR) 40 MG tablet, Take 1 tablet (40 mg total) by mouth every evening., Disp: 90 tablet, Rfl: 11    trazodone (DESYREL) 50 MG tablet, Take 1 tablet (50 mg total) by mouth nightly as needed for Insomnia., Disp: 30 tablet, Rfl: 11    triamterene-hydrochlorothiazide 37.5-25 mg (DYAZIDE) 37.5-25 mg per capsule, Take 1 capsule by mouth every morning. HOLD UNTIL TOLD TO RESUME BY PHYSICIAN, Disp: 90 capsule, Rfl: 3     Review of patient's allergies indicates:  No Known Allergies     ROS  Constitutional: Negative for fever, Negative for weight loss  HENT Negative for congestion  Cardiovascular: Negative chest pain  Respiratory: Negative Shortness of breath  Heme: Negative excessive bleeding  Skin:NegativeItching, Negative breakdown  Musculoskeletal:Positive for back pain, Positive for joint pain, Positive muscle pain, Positive muscle weakness  Neurological: Negative for numbness and paresthesias   Psychiatric/Behavioral: Negative altered mental status, Negative for depression    Physical Exam:   Ht 5' 8" (1.727 m)   Wt 88.5 kg (195 lb)   BMI 29.65 kg/m²   General appearance: This is a well-developed, Well nourished male No obvious acute distress.  Psychiatric: normal mood and affect;  pleasant and conversant; judgment and thought content normal  Gait is coordinated. Patient has antalgic gait to the left  Cardiovascular: There are no swelling or varicosities present.   Respiratory: normal respiratory effort   Lymphatic: no adenopathy   Neurologic: alert and oriented to person, place, and time   Deep Tendon Reflexes are normal;  Coordination and Balance: Normal   Musculoskeletal   Neck    ROM shows normal flexion and extension and " lateral rotation    Palpation: Non-tender    Stability is normal    Strength is normal    Skin is normal without masses and lesions    Sensation is intact to light touch   Back    ROM showsabnormal flexion, extension and     rotation   Kyphotic deformity.    Stability is normal    Strength to flexion and extension well maintained    Core strength is diminished    Skin shows no rashes or cafe au lait spots;     Sensation is intact to light touch  Right hip   Range of motion normal    Left Hip  Range of motionnormal    Right Knee  Swelling None  TendernessNone  Range of Motion: 120  Motion is painfulNo  Crepitance presentNo    Right Leg  Neurologic Intact  Pulses Intact    Left Knee: Swelling Mild  TendernessLateral joint line  Range of Motion:    Motion is painful Yes    Left Leg   Neurologic Intact  Pulses Intact    Radiograph: Show severe degenerative arthritis with subchondral sclerosis, periarticular osteophytes and narrowing of joint space.  Angle: significant valgus    Physical therapy is contraindicated due to potential bone loss on this severe arthritic joint.    Assessment:  Knee arthritis left      He will need to be cleared in our PreOp center.         .    He  has a past medical history of Arthritis; Blood transfusion; CKD (chronic kidney disease) stage 3, GFR 30-59 ml/min; Compression fracture of lumbar vertebra; Depression; Dyslipidemia; GERD (gastroesophageal reflux disease); Hypertension; Thyroid disease; and UTI (urinary tract infection). . We will have to take this into account. He  will be followed by the hospitalist service while in the hospital.       We have gone over the hospitalization and recovery with him as well.  This is typically around 2 weeks on a walker and transition to a cane after that.  He will have home health likely for 3-4 weeks and then transition to outpatient if necessary.  He is agreement with this plan of care and is ready to proceed.  I will see him back for clinical  recheck at the 2-week postop ronnie.  I will see him back for clinical recheck for any other questions or problems as needed and certainly for any other issues in the interim.    We have discussed risks of total knee replacement which include but are not limited to blood clots in the legs that can travel to the lungs (pulmonary embolism). Pulmonary embolism can cause shortness of breath, chest pain, and even shock. Other risks include urinary tract infection, nausea and vomiting (usually related to pain medication), chronic knee pain and stiffness, bleeding into the knee joint, nerve damage, blood vessel injury, and infection of the knee which can require re-operation. Furthermore, the risks of anesthesia include potential heart, lung, kidney, and liver damage.

## 2017-10-03 DIAGNOSIS — M17.9 OSTEOARTHRITIS OF KNEE, UNSPECIFIED (CODE): Primary | ICD-10-CM

## 2017-10-04 ENCOUNTER — TELEPHONE (OUTPATIENT)
Dept: ORTHOPEDICS | Facility: CLINIC | Age: 82
End: 2017-10-04

## 2017-10-04 NOTE — TELEPHONE ENCOUNTER
----- Message from Peyton Vilchis MA sent at 10/4/2017  1:05 PM CDT -----  Contact: self      ----- Message -----  From: Juan Haque  Sent: 10/4/2017  12:58 PM  To: [facility] Staff    Pt request a call to reschedule his PRE OP

## 2017-10-08 ENCOUNTER — ANESTHESIA EVENT (OUTPATIENT)
Dept: SURGERY | Facility: HOSPITAL | Age: 82
DRG: 470 | End: 2017-10-08
Payer: MEDICARE

## 2017-10-08 NOTE — PRE ADMISSION SCREENING
Anesthesia Assessment: Preoperative EQUATION    Planned Procedure: Procedure(s) (LRB):  REPLACEMENT-KNEE-TOTAL (Left)  Requested Anesthesia Type:Femoral Block  Surgeon: [facility] L. [facility] Jr., MD  Service: Orthopedics  Known or anticipated Date of Surgery:10/25/2017        Plan:    Testing:  Hematology Profile, BMP, PT/INR and UA   Pre-anesthesia  visit       Visit focus: possible regional anesthesia and/or nerve block  and discharge disposition     Consultation:Patient's PCP for a statement of optimization      Genna Chin RN  10/08/2017        :

## 2017-10-08 NOTE — ANESTHESIA PREPROCEDURE EVALUATION
Anesthesia Assessment: Preoperative EQUATION    Planned Procedure: Procedure(s) (LRB):  REPLACEMENT-KNEE-TOTAL (Left)  Requested Anesthesia Type:Femoral Block  Surgeon: [facility] Jr., MD  Service: Orthopedics  Known or anticipated Date of Surgery:10/25/2017        Plan:    Testing:  Hematology Profile, BMP, PT/INR and UA   Pre-anesthesia  visit       Visit focus: possible regional anesthesia and/or nerve block  and discharge disposition     Consultation:Patient's PCP for a statement of optimization or OPOC    Genna Chin RN  10/08/2017                                                                                                                  10/08/2017  Chucho Prado is a 84 y.o., male.    Anesthesia Evaluation    I have reviewed the Patient Summary Reports.     I have reviewed the Medications.     Review of Systems  Anesthesia Hx:  Hx of Anesthetic complications Patient reports slight memory impairment after anesthesia History of prior surgery of interest to airway management or planning:  Denies Personal Hx of Anesthesia complications.   Social:  Former Smoker, Social Alcohol Use  Tobacco Use: Former smoker of cigarette for 20 years, 1 pack per day, quit smoking >10 years ago Alcohol Use: Pt consumes @3 drinks per night,    Hematology/Oncology:         -- Anemia: Iron Deficiency Anemia   EENT/Dental:  EENT/Dental Normal Eyes: Visual Impairment    Cardiovascular:   Exercise tolerance: poor Hypertension Needs assistance with ADL's. Denies chestpain and GRANT with activity   Pulmonary:  Pulmonary Normal  Possible Obstructive Sleep Apnea , (STOP/BANG) Symptoms G - Gender (Male > Female), N - Neck circumference > 40 cms, A - Age > 50, P - Pressure being treated for high BP and S - Snoring (loud)    Renal/:   Chronic Renal Disease, CRI  Kidney Function/Disease, Chronic Kidney Disease (CKD) , CKD Stage III (GFR 30-59) Hx neurogenic bladder s/p suprapubic cath placement 6/2017, hx of MRSA UTI  in 2016 and current MRSA UTI    Hepatic/GI:   Hiatal Hernia, (large s/p repair) GERD (PRN meds. H/o difficulty swallowing 8/2013 Esophageal dilatation) Denies Liver Disease.  Hepatic/GI Symptoms:  Hernia, Inguinal Hernia (right)   Musculoskeletal:   Arthritis  Hx of fall 12/2016 with compression fx s/p kyphoplasty   Neurological:   Denies CVA. Neuromuscular Disease,  Denies Seizures.   Peripheral Neuropathy    Endocrine:   Hypothyroidism  Parathyroid Disease, s/p parathyroidectomy  Metabolic Disorders, Hyperlipoproteinemia  Dermatological:  Skin Normal    Psych:   Psychiatric History depression  Depression and Chronic Depression, Treated.          Physical Exam  General:  Obesity    Airway/Jaw/Neck:  Airway Findings: Mouth Opening: Normal Tongue: Normal  General Airway Assessment: Adult  Mallampati: III  Jaw/Neck Findings:  Neck ROM: Extension Decreased, Severe, Decreased Lateral Motion, to the right, to the left, Decreased flexion      Dental:  Dental Findings: In tact, molar caps   Chest/Lungs:  Chest/Lungs Clear    Heart/Vascular:  Heart Findings: Normal       Mental Status:  Mental Status Findings:  Cooperative, Alert and Oriented           Labs reviewed sodium level 126    Discharge disposition: Patient lives alone and has care givers that comes in to assist with ADL's from 8 am to 12 mn and his tenant is available after that if needed.(needs help getting out of the bed, dressing, bathing, toileting, etc.) Has a tenant that will bring patient to the hospital. Patient wants to go to SNF    Discuss urine culture results with Dr. Leopold.  Message sent to Dr. [facility], Dr. Garcia, and NP for urology.    Please refer to , Internal Medicine, perioperative risk assessment and recommendations.     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 I collected urine at last SPT change so they would  have results to treat prior to surgery. Asymptomatic at visit rx for bacteria in urine.    Patient contacted today and confirmed that he received his prescription for Bactrim DS. Patient took his first dose today.                  Anesthesia Plan  Type of Anesthesia, risks & benefits discussed:  Anesthesia Type:  regional, spinal, MAC, general  Patient's Preference:   Intra-op Monitoring Plan: standard ASA monitors  Intra-op Monitoring Plan Comments:   Post Op Pain Control Plan: multimodal analgesia, IV/PO Opioids PRN and peripheral nerve block  Post Op Pain Control Plan Comments:   Induction:   IV  Beta Blocker:  Patient is not currently on a Beta-Blocker (No further documentation required).       Informed Consent: Patient understands risks and agrees with Anesthesia plan.  Questions answered. Anesthesia consent signed with patient.  ASA Score: 3     Day of Surgery Review of History & Physical:    H&P update referred to the surgeon.         Ready For Surgery From Anesthesia Perspective.

## 2017-10-10 ENCOUNTER — LAB VISIT (OUTPATIENT)
Dept: LAB | Facility: HOSPITAL | Age: 82
End: 2017-10-10
Payer: MEDICARE

## 2017-10-10 ENCOUNTER — OFFICE VISIT (OUTPATIENT)
Dept: UROLOGY | Facility: CLINIC | Age: 82
End: 2017-10-10
Payer: MEDICARE

## 2017-10-10 ENCOUNTER — OFFICE VISIT (OUTPATIENT)
Dept: ORTHOPEDICS | Facility: CLINIC | Age: 82
End: 2017-10-10
Payer: MEDICARE

## 2017-10-10 ENCOUNTER — OFFICE VISIT (OUTPATIENT)
Dept: OPTOMETRY | Facility: CLINIC | Age: 82
End: 2017-10-10
Payer: MEDICARE

## 2017-10-10 VITALS — HEART RATE: 74 BPM | DIASTOLIC BLOOD PRESSURE: 86 MMHG | SYSTOLIC BLOOD PRESSURE: 189 MMHG

## 2017-10-10 DIAGNOSIS — H53.8 BLURRED VISION, BILATERAL: ICD-10-CM

## 2017-10-10 DIAGNOSIS — R82.71 BACTERIA IN URINE: ICD-10-CM

## 2017-10-10 DIAGNOSIS — I10 ESSENTIAL HYPERTENSION: Chronic | ICD-10-CM

## 2017-10-10 DIAGNOSIS — H52.4 PRESBYOPIA: ICD-10-CM

## 2017-10-10 DIAGNOSIS — H10.13 CONJUNCTIVITIS, ALLERGIC, BILATERAL: ICD-10-CM

## 2017-10-10 DIAGNOSIS — R33.9 URINARY RETENTION: ICD-10-CM

## 2017-10-10 DIAGNOSIS — N31.9 NEUROGENIC BLADDER: Primary | ICD-10-CM

## 2017-10-10 DIAGNOSIS — Z43.5 ENCOUNTER FOR CARE OR REPLACEMENT OF SUPRAPUBIC TUBE: ICD-10-CM

## 2017-10-10 DIAGNOSIS — Z93.59 CHRONIC SUPRAPUBIC CATHETER: ICD-10-CM

## 2017-10-10 DIAGNOSIS — H25.13 NS (NUCLEAR SCLEROSIS), BILATERAL: Primary | ICD-10-CM

## 2017-10-10 DIAGNOSIS — M17.9 OSTEOARTHRITIS OF KNEE, UNSPECIFIED (CODE): ICD-10-CM

## 2017-10-10 DIAGNOSIS — M17.12 PRIMARY OSTEOARTHRITIS OF LEFT KNEE: Primary | ICD-10-CM

## 2017-10-10 DIAGNOSIS — D31.32 CHOROIDAL NEVUS OF LEFT EYE: ICD-10-CM

## 2017-10-10 LAB
CRP SERPL-MCNC: 3.9 MG/L
ERYTHROCYTE [SEDIMENTATION RATE] IN BLOOD BY WESTERGREN METHOD: 18 MM/HR

## 2017-10-10 PROCEDURE — 99213 OFFICE O/P EST LOW 20 MIN: CPT | Mod: PBBFAC,25 | Performed by: NURSE PRACTITIONER

## 2017-10-10 PROCEDURE — 36415 COLL VENOUS BLD VENIPUNCTURE: CPT

## 2017-10-10 PROCEDURE — 86140 C-REACTIVE PROTEIN: CPT

## 2017-10-10 PROCEDURE — 99999 PR PBB SHADOW E&M-EST. PATIENT-LVL III: CPT | Mod: PBBFAC,,, | Performed by: PHYSICIAN ASSISTANT

## 2017-10-10 PROCEDURE — 99213 OFFICE O/P EST LOW 20 MIN: CPT | Mod: PBBFAC,27 | Performed by: OPTOMETRIST

## 2017-10-10 PROCEDURE — 51705 CHANGE OF BLADDER TUBE: CPT | Mod: S$PBB,,, | Performed by: NURSE PRACTITIONER

## 2017-10-10 PROCEDURE — 99213 OFFICE O/P EST LOW 20 MIN: CPT | Mod: PBBFAC,25,27 | Performed by: PHYSICIAN ASSISTANT

## 2017-10-10 PROCEDURE — 99499 UNLISTED E&M SERVICE: CPT | Mod: S$PBB,,, | Performed by: PHYSICIAN ASSISTANT

## 2017-10-10 PROCEDURE — 92004 COMPRE OPH EXAM NEW PT 1/>: CPT | Mod: S$PBB,,, | Performed by: OPTOMETRIST

## 2017-10-10 PROCEDURE — 87186 SC STD MICRODIL/AGAR DIL: CPT

## 2017-10-10 PROCEDURE — 99999 PR PBB SHADOW E&M-EST. PATIENT-LVL III: CPT | Mod: PBBFAC,,, | Performed by: OPTOMETRIST

## 2017-10-10 PROCEDURE — 87088 URINE BACTERIA CULTURE: CPT

## 2017-10-10 PROCEDURE — 85651 RBC SED RATE NONAUTOMATED: CPT

## 2017-10-10 PROCEDURE — 87077 CULTURE AEROBIC IDENTIFY: CPT

## 2017-10-10 PROCEDURE — 99999 PR PBB SHADOW E&M-EST. PATIENT-LVL III: CPT | Mod: PBBFAC,,, | Performed by: NURSE PRACTITIONER

## 2017-10-10 PROCEDURE — 51705 CHANGE OF BLADDER TUBE: CPT | Mod: PBBFAC | Performed by: NURSE PRACTITIONER

## 2017-10-10 PROCEDURE — 99214 OFFICE O/P EST MOD 30 MIN: CPT | Mod: S$PBB,25,, | Performed by: NURSE PRACTITIONER

## 2017-10-10 PROCEDURE — 87086 URINE CULTURE/COLONY COUNT: CPT

## 2017-10-10 RX ORDER — FLUOROMETHOLONE 1 MG/ML
1 SUSPENSION/ DROPS OPHTHALMIC 3 TIMES DAILY
Qty: 5 ML | Refills: 0 | Status: SHIPPED | OUTPATIENT
Start: 2017-10-10 | End: 2017-10-20

## 2017-10-10 NOTE — PROGRESS NOTES
HPI     Patient's age: 84 y.o.    Approximate date of last eye examination:  2 mos  Name of last eye doctor seen:     Pt states that he has a discharge from the right eye which hurts some and   sting, have a lot of eye strain when reading which causes eye pain on a   scale from 5 to 6.  Using a over the counter eye drop from another doctor   which gives temporally relief, can't remember the name of it.    Wears glasses? yes      Wears CLs?:  no            Headaches?  no  Eye pain/discomfort?  no                                                                                     Flashes?  no  Floaters?  no  Diplopia/Double vision?  no    Patient's Ocular History:         Any eye surgeries? no         Any eye injury?  no         Any treatment for eye disease?  no  Family history of eye disease?  no    Significant patient medical history:         1. Diabetes?  no       If yes, IDDM or NIDDM? no   2. HBP?  yes                 ! OTC eyedrops currently using:  none   ! Prescription eye meds currently using:            Last edited by Lisy España MA on 10/10/2017  2:49 PM. (History)            Assessment /Plan     For exam results, see Encounter Report.    NS (nuclear sclerosis), bilateral  Blurred vision, bilateral   Consult for cataract eval OU    Essential hypertension   No retinopathy, monitor    Conjunctivitis, allergic, bilateral   FML TID OU x 10 days   Then Zaditor PRN    Presbyopia   Current specs ok     PVD OU  Choroidal nevus of left eye   Stable, monitor

## 2017-10-10 NOTE — LETTER
October 10, 2017      Obed Mcguire MD  [address]  [facility] [id number]           [facility]  [address]  [facility] [address]  Phone: [phone]  Fax: [phone]          Patient: Chucho Prado   MR Number: [id number]   YOB: 1933   Date of Visit: 10/10/2017       Dear Dr. Obed Mcguire:    Thank you for referring Chucho Prado to me for evaluation. Attached you will find relevant portions of my assessment and plan of care.    If you have questions, please do not hesitate to call me. I look forward to following Chucho Prado along with you.    Sincerely,    Dionne Hernandez, OD    Enclosure  CC:  No Recipients    If you would like to receive this communication electronically, please contact [email or web address] or [phone] to request more information on [employer] access.    For providers and/or their staff who would like to refer a patient to [facility], please contact us through our one-stop-shop provider referral line, [facility], at [phone].    If you feel you have received this communication in error or would no longer like to receive these types of communications, please e-mail [email or web address]

## 2017-10-10 NOTE — PROGRESS NOTES
Subjective:       Patient ID: Chucho Prado is a 84 y.o. male.    Chief Complaint: Urinary Retention (Neurogenic Bladder)    Chucho Prado is a 83 y.o. male with neurogenic bladder.  He had 18fr SPT placed by Dr. Taylor on 06/23/2017 to manage his Neurogenic Bladder.    He is here today for exchange.  His last exchange 9/08/2017    He reports good urine flow.  Occasional bladder spasms;  Some tenderness around his SP stoma;     Went to [employer] for b'day dinner; had great time  He is about to have knee surgery in two weeks            Past Medical History:  No date: Arthritis  No date: Blood transfusion  No date: CKD (chronic kidney disease) stage 3, GFR 30-5*  No date: Compression fracture of lumbar vertebra  No date: Depression  No date: Dyslipidemia  No date: GERD (gastroesophageal reflux disease)  No date: Hypertension  No date: Thyroid disease  No date: UTI (urinary tract infection)    Past Surgical History:  No date: CHOLECYSTECTOMY  No date: HERNIA REPAIR  No date: JOINT REPLACEMENT  12/27/2016: LAMINECTOMY      Comment: L2-L4  12/2016: LUMBAR LAMINECTOMY  No date: PARATHYROIDECTOMY  No date: TOTAL KNEE ARTHROPLASTY      Comment: Right    Review of patient's family history indicates:    Hypertension                   Mother                    Diabetes                       Father                    Esophageal cancer              Father                      Social History    Marital status: Single              Spouse name:                       Years of education:                 Number of children:               Occupational History    None on file    Social History Main Topics    Smoking status: Former Smoker                                                                Packs/day: 0.00      Years: 20.00       Smokeless tobacco: Never Used                        Comment: quit 1999    Alcohol use: Yes                Comment: rarely/6 months    Drug use: No              Sexual activity: No                    Other Topics            Concern    None on file    Social History Narrative    Lives alone, owns house and rents part.  helps. 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         Allergies:  Review of patient's allergies indicates no known allergies.    Medications:  Current Outpatient Prescriptions:   acetaminophen (TYLENOL) 500 MG tablet, Take 2 tablets (1,000 mg total) by mouth 3 (three) times daily as needed for Pain., Disp: 30 tablet, Rfl: 0  aspirin 81 MG Chew, Take 1 tablet (81 mg total) by mouth once daily. HOLD UNTIL TOLD TO RESUME BY PHYSICIAN, Disp: 30 tablet, Rfl: 0  calcitonin, salmon, (FORTICAL) 200 unit/actuation nasal spray, 1 spray by Nasal route once daily., Disp: 1 Bottle, Rfl: 0  diclofenac sodium (VOLTAREN) 1 % Gel, Apply 2 g topically 3 (three) times daily. To left knee, Disp: 100 g, Rfl: 3  gabapentin (NEURONTIN) 400 MG capsule, Take 2 capsules (800 mg total) by mouth 3 (three) times daily., Disp: 180 capsule, Rfl: 11  hyoscyamine (LEVSIN/SL) 0.125 mg Subl, Place 1 tablet (0.125 mg total) under the tongue every 4 (four) hours as needed (bladder spasms)., Disp: 30 tablet, Rfl: 1  levothyroxine (SYNTHROID) 50 MCG tablet, Take 1 tablet (50 mcg total) by mouth once daily., Disp: 100 tablet, Rfl: 11  mirtazapine (REMERON) 30 MG tablet, Take 1 tablet (30 mg total) by mouth every evening., Disp: 30 tablet, Rfl: 6  polyethylene glycol (GLYCOLAX) 17 gram PwPk, Take 17 g by mouth once daily., Disp: 30 packet, Rfl: 0  simethicone (MYLICON) 80 MG chewable tablet, Take 1 tablet (80 mg total) by mouth 3 (three) times daily after meals., Disp: , Rfl: 0  simvastatin (ZOCOR) 40 MG tablet, Take 1 tablet (40 mg total) by mouth every evening., Disp: 90 tablet, Rfl: 11  tamsulosin (FLOMAX) 0.4 mg Cp24, Take 1 capsule (0.4 mg total) by mouth once daily., Disp: 30 capsule, Rfl: 3  triamterene-hydrochlorothiazide 37.5-25 mg (DYAZIDE) 37.5-25 mg per capsule, Take 1 capsule by mouth every  morning. HOLD UNTIL TOLD TO RESUME BY PHYSICIAN, Disp: 90 capsule, Rfl: 3                  Review of Systems   Constitutional: Negative.  Negative for activity change, appetite change and fever.   HENT: Negative.  Negative for facial swelling and trouble swallowing.    Eyes: Negative.    Respiratory: Negative.  Negative for shortness of breath.    Cardiovascular: Negative.  Negative for chest pain and palpitations.   Gastrointestinal: Negative for abdominal pain, constipation, diarrhea, nausea and vomiting.   Genitourinary: Positive for difficulty urinating. Negative for dysuria, enuresis, flank pain, frequency, genital sores, hematuria, nocturia, penile pain, scrotal swelling, testicular pain and urgency.        SPT draining well     Musculoskeletal: Positive for arthralgias, back pain and gait problem. Negative for neck stiffness.   Skin: Negative.  Negative for wound.   Neurological: Negative for dizziness, tremors, seizures, syncope, speech difficulty, light-headedness and headaches.   Hematological: Does not bruise/bleed easily.   Psychiatric/Behavioral: Negative for confusion and hallucinations. The patient is not nervous/anxious.        Objective:      Physical Exam   Nursing note and vitals reviewed.  Constitutional: He is oriented to person, place, and time. Vital signs are normal. He appears well-developed and well-nourished. He is active and cooperative.  Non-toxic appearance. He does not have a sickly appearance.   HENT:   Head: Normocephalic and atraumatic.   Right Ear: External ear normal.   Left Ear: External ear normal.   Nose: Nose normal.   Mouth/Throat: Mucous membranes are normal.   Eyes: Conjunctivae and lids are normal. No scleral icterus.   Neck: Trachea normal, normal range of motion and full passive range of motion without pain. Neck supple. No JVD present. No tracheal deviation present.   Cardiovascular: Normal rate, regular rhythm, S1 normal and S2 normal.    Pulmonary/Chest: Effort normal  and breath sounds normal. No respiratory distress. He exhibits no tenderness.   Abdominal: Soft. Normal appearance and bowel sounds are normal. There is no hepatosplenomegaly. There is no tenderness. There is no rebound, no guarding and no CVA tenderness.       Genitourinary: Penis normal.   Musculoskeletal: Normal range of motion.   Neurological: He is alert and oriented to person, place, and time. He has normal strength.   Skin: Skin is warm, dry and intact.     Psychiatric: He has a normal mood and affect. His behavior is normal. Judgment and thought content normal.       Assessment:       1. Neurogenic bladder    2. Encounter for care or replacement of suprapubic tube    3. Urinary retention    4. Bacteria in urine    5. Chronic suprapubic catheter        Plan:         I spent 25 minutes with the patient of which more than half was spent in direct consultation with the patient in regards to our treatment and plan.    Education and recommendations of today's plan of care including home remedies.  We discussed (+) bacteria in urine.  I will collect urine with new SPT change and send for cx; Ortho will have for pre-op.  I removed the old 18fr SPT without difficulty.  I easily placed a new 18fr SPT using sterile technique.  Irrigated the bladder to verify the position.  Urine was collected and sent to lab for cx  RTC one month for new SPT

## 2017-10-10 NOTE — Clinical Note
Please call pt to tell him to start on drops that were sent into pharmacy, let me know if that does not help with eyes

## 2017-10-11 ENCOUNTER — TELEPHONE (OUTPATIENT)
Dept: OPHTHALMOLOGY | Facility: CLINIC | Age: 82
End: 2017-10-11

## 2017-10-11 NOTE — TELEPHONE ENCOUNTER
Spoke with pt informed Dr[facility] Hernandez [facility]  To start using as soon as he gets it and let us know how things are going in a few days.

## 2017-10-11 NOTE — TELEPHONE ENCOUNTER
----- Message from Dionne Hernandez, OD sent at 10/10/2017  5:02 PM CDT -----  Please call pt to tell him to start on drops that were sent into pharmacy, let me know if that does not help with eyes

## 2017-10-13 ENCOUNTER — PATIENT MESSAGE (OUTPATIENT)
Dept: INTERNAL MEDICINE | Facility: CLINIC | Age: 82
End: 2017-10-13

## 2017-10-13 DIAGNOSIS — Z29.89 NEED FOR SBE (SUBACUTE BACTERIAL ENDOCARDITIS) PROPHYLAXIS: Primary | ICD-10-CM

## 2017-10-13 LAB — BACTERIA UR CULT: NORMAL

## 2017-10-16 VITALS — BODY MASS INDEX: 29.57 KG/M2 | HEIGHT: 68 IN | WEIGHT: 195.13 LBS

## 2017-10-16 RX ORDER — FAMOTIDINE 20 MG/1
20 TABLET, FILM COATED ORAL 2 TIMES DAILY
Status: CANCELLED | OUTPATIENT
Start: 2017-10-16

## 2017-10-16 RX ORDER — FENTANYL CITRATE 50 UG/ML
25 INJECTION, SOLUTION INTRAMUSCULAR; INTRAVENOUS EVERY 5 MIN PRN
Status: CANCELLED | OUTPATIENT
Start: 2017-10-16

## 2017-10-16 RX ORDER — SODIUM CHLORIDE 9 MG/ML
INJECTION, SOLUTION INTRAVENOUS
Status: CANCELLED | OUTPATIENT
Start: 2017-10-16

## 2017-10-16 RX ORDER — MUPIROCIN 20 MG/G
1 OINTMENT TOPICAL
Status: CANCELLED | OUTPATIENT
Start: 2017-10-16

## 2017-10-16 RX ORDER — OXYCODONE HYDROCHLORIDE 5 MG/1
5 TABLET ORAL
Status: CANCELLED | OUTPATIENT
Start: 2017-10-16

## 2017-10-16 RX ORDER — PREGABALIN 25 MG/1
75 CAPSULE ORAL
Status: CANCELLED | OUTPATIENT
Start: 2017-10-16

## 2017-10-16 RX ORDER — MIDAZOLAM HYDROCHLORIDE 5 MG/ML
1 INJECTION INTRAMUSCULAR; INTRAVENOUS EVERY 5 MIN PRN
Status: CANCELLED | OUTPATIENT
Start: 2017-10-16

## 2017-10-16 RX ORDER — AMOXICILLIN 500 MG/1
2000 TABLET, FILM COATED ORAL ONCE
Qty: 4 TABLET | Refills: 1 | Status: SHIPPED | OUTPATIENT
Start: 2017-10-16 | End: 2017-10-16

## 2017-10-16 RX ORDER — RAMELTEON 8 MG/1
8 TABLET ORAL NIGHTLY PRN
Status: CANCELLED | OUTPATIENT
Start: 2017-10-16

## 2017-10-16 RX ORDER — LIDOCAINE HYDROCHLORIDE 10 MG/ML
1 INJECTION, SOLUTION EPIDURAL; INFILTRATION; INTRACAUDAL; PERINEURAL
Status: CANCELLED | OUTPATIENT
Start: 2017-10-16

## 2017-10-16 RX ORDER — SODIUM CHLORIDE 0.9 % (FLUSH) 0.9 %
3 SYRINGE (ML) INJECTION EVERY 8 HOURS PRN
Status: CANCELLED | OUTPATIENT
Start: 2017-10-16

## 2017-10-16 RX ORDER — PREGABALIN 25 MG/1
75 CAPSULE ORAL NIGHTLY
Status: CANCELLED | OUTPATIENT
Start: 2017-10-16

## 2017-10-16 RX ORDER — AMOXICILLIN 250 MG
1 CAPSULE ORAL 2 TIMES DAILY
Status: CANCELLED | OUTPATIENT
Start: 2017-10-16

## 2017-10-16 RX ORDER — CELECOXIB 100 MG/1
400 CAPSULE ORAL
Status: CANCELLED | OUTPATIENT
Start: 2017-10-16

## 2017-10-16 RX ORDER — NALOXONE HCL 0.4 MG/ML
0.02 VIAL (ML) INJECTION
Status: CANCELLED | OUTPATIENT
Start: 2017-10-16

## 2017-10-16 RX ORDER — OXYCODONE HYDROCHLORIDE 5 MG/1
10 TABLET ORAL
Status: CANCELLED | OUTPATIENT
Start: 2017-10-16

## 2017-10-16 RX ORDER — ONDANSETRON 2 MG/ML
4 INJECTION INTRAMUSCULAR; INTRAVENOUS EVERY 12 HOURS PRN
Status: CANCELLED | OUTPATIENT
Start: 2017-10-16

## 2017-10-16 RX ORDER — ACETAMINOPHEN 10 MG/ML
1000 INJECTION, SOLUTION INTRAVENOUS ONCE
Status: CANCELLED | OUTPATIENT
Start: 2017-10-16 | End: 2017-10-16

## 2017-10-16 RX ORDER — ACETAMINOPHEN 500 MG
1000 TABLET ORAL EVERY 6 HOURS
Status: CANCELLED | OUTPATIENT
Start: 2017-10-16 | End: 2017-10-18

## 2017-10-16 RX ORDER — OXYCODONE HYDROCHLORIDE 5 MG/1
15 TABLET ORAL
Status: CANCELLED | OUTPATIENT
Start: 2017-10-16

## 2017-10-16 RX ORDER — ROPIVACAINE HYDROCHLORIDE 2 MG/ML
8 INJECTION, SOLUTION EPIDURAL; INFILTRATION; PERINEURAL CONTINUOUS
Status: CANCELLED | OUTPATIENT
Start: 2017-10-16

## 2017-10-16 RX ORDER — MORPHINE SULFATE 10 MG/ML
2 INJECTION, SOLUTION INTRAMUSCULAR; INTRAVENOUS
Status: CANCELLED | OUTPATIENT
Start: 2017-10-16

## 2017-10-16 RX ORDER — BISACODYL 10 MG
10 SUPPOSITORY, RECTAL RECTAL EVERY 12 HOURS PRN
Status: CANCELLED | OUTPATIENT
Start: 2017-10-16

## 2017-10-16 RX ORDER — ASPIRIN 325 MG
325 TABLET, DELAYED RELEASE (ENTERIC COATED) ORAL 2 TIMES DAILY
Status: CANCELLED | OUTPATIENT
Start: 2017-10-16

## 2017-10-16 RX ORDER — CELECOXIB 100 MG/1
200 CAPSULE ORAL DAILY
Status: CANCELLED | OUTPATIENT
Start: 2017-10-16

## 2017-10-16 RX ORDER — POLYETHYLENE GLYCOL 3350 17 G/17G
17 POWDER, FOR SOLUTION ORAL DAILY
Status: CANCELLED | OUTPATIENT
Start: 2017-10-16

## 2017-10-16 RX ORDER — SODIUM CHLORIDE 9 MG/ML
INJECTION, SOLUTION INTRAVENOUS CONTINUOUS
Status: CANCELLED | OUTPATIENT
Start: 2017-10-16 | End: 2017-10-17

## 2017-10-16 NOTE — H&P
CC: Left knee pain    Chucho Prado is a 84 y.o. male with several year history of Left knee pain. Pain is worse with activity and weight bearing.  Patient has experienced interference of activities of daily living due to decreased range of motion and an increase in joint pain and swelling.  Patient has failed non-operative treatment including NSAIDs, corticosteroid injections, viscosupplement injections, and activity modification.  Chucho Prado currently ambulates using assistive device.  Patient had R TKA in 2004.     Past Medical History:   Diagnosis Date    Arthritis     Blood transfusion     Cataract     CKD (chronic kidney disease) stage 3, GFR 30-59 ml/min     Compression fracture of lumbar vertebra     Depression     Dyslipidemia     GERD (gastroesophageal reflux disease)     Hypertension     Thyroid disease     UTI (urinary tract infection)        Past Surgical History:   Procedure Laterality Date    CHOLECYSTECTOMY      HERNIA REPAIR      JOINT REPLACEMENT      LAMINECTOMY  12/27/2016    L2-L4    LUMBAR LAMINECTOMY  12/2016    PARATHYROIDECTOMY      TOTAL KNEE ARTHROPLASTY      Right       Family History   Problem Relation Age of Onset    Hypertension Mother     Diabetes Father     Esophageal cancer Father        Review of patient's allergies indicates:  No Known Allergies      Current Outpatient Prescriptions:     acetaminophen (TYLENOL) 500 MG tablet, Take 2 tablets (1,000 mg total) by mouth 3 (three) times daily as needed for Pain., Disp: 30 tablet, Rfl: 0    aspirin 81 MG Chew, Take 1 tablet (81 mg total) by mouth once daily. HOLD UNTIL TOLD TO RESUME BY PHYSICIAN, Disp: 30 tablet, Rfl: 0    calcitonin, salmon, (FORTICAL) 200 unit/actuation nasal spray, 1 spray by Nasal route once daily., Disp: 1 Bottle, Rfl: 0    diclofenac sodium (VOLTAREN) 1 % Gel, Apply 2 g topically 3 (three) times daily. To left knee, Disp: 100 g, Rfl: 6    fluorometholone 0.1%  "(FML) 0.1 % DrpS, Place 1 drop into both eyes 3 (three) times daily., Disp: 5 mL, Rfl: 0    gabapentin (NEURONTIN) 400 MG capsule, Take 2 capsules (800 mg total) by mouth 3 (three) times daily., Disp: 180 capsule, Rfl: 11    hyoscyamine (LEVSIN/SL) 0.125 mg Subl, Place 1 tablet (0.125 mg total) under the tongue every 4 (four) hours as needed (bladder spasms)., Disp: 30 tablet, Rfl: 1    levothyroxine (SYNTHROID) 50 MCG tablet, Take 1 tablet (50 mcg total) by mouth once daily., Disp: 100 tablet, Rfl: 11    mirtazapine (REMERON) 30 MG tablet, Take 1 tablet (30 mg total) by mouth every evening., Disp: 30 tablet, Rfl: 6    simethicone (MYLICON) 80 MG chewable tablet, Take 1 tablet (80 mg total) by mouth 3 (three) times daily after meals., Disp: , Rfl: 0    simvastatin (ZOCOR) 40 MG tablet, Take 1 tablet (40 mg total) by mouth every evening., Disp: 90 tablet, Rfl: 11    trazodone (DESYREL) 50 MG tablet, Take 1 tablet (50 mg total) by mouth nightly as needed for Insomnia., Disp: 30 tablet, Rfl: 11    triamterene-hydrochlorothiazide 37.5-25 mg (DYAZIDE) 37.5-25 mg per capsule, Take 1 capsule by mouth every morning. HOLD UNTIL TOLD TO RESUME BY PHYSICIAN, Disp: 90 capsule, Rfl: 3    Review of Systems:  Constitutional: no fever or chills  Eyes: no visual changes  ENT: no nasal congestion or sore throat  Respiratory: no cough or shortness of breath  Cardiovascular: no chest pain or palpitations  Gastrointestinal: no nausea or vomiting, tolerating diet  Genitourinary: no hematuria or dysuria  Integument/Breast: no rash or pruritis  Hematologic/Lymphatic: no easy bruising or lymphadenopathy  Musculoskeletal: positive for left knee pain  Neurological: no seizures or tremors  Behavioral/Psych: no auditory or visual hallucinations  Endocrine: no heat or cold intolerance    PE:  Ht 5' 8" (1.727 m)   Wt 88.5 kg (195 lb 1.7 oz)   BMI 29.67 kg/m²   General: Pleasant, cooperative, NAD   Gait: antalgic  HEENT: NCAT, sclera " nonicteric   Lungs: Respirations clear bilaterally; equal and unlabored.   CV: S1S2; 2+ bilateral upper and lower extremity pulses.   Skin: Intact throughout with no rashes, erythema, or lesions  Extremities: No LE edema,  no erythema or warmth of the skin in either lower extremity.    Left knee exam:  Knee Range of Motion: active, pain with passive range of motion  Effusion:yes  Condition of skin:intact  Location of tenderness:Lateral joint line   Strength:4 of 5 quadriceps strength and 5 of 5 hamstring strength  Stability: stable to testing    Hip Examination:full painless range of motion, without tenderness    Radiographs: Radiographs reveal advanced degenerative changes including subchondral cyst formation, subchondral sclerosis, osteophyte formation, joint space narrowing.     Knee Alignment:  Moderate valgus    Diagnosis: osteoarthritis Left knee    Plan: Left total knee arthroplasty    Due to the serious nature of total joint infection and the high prevalence of community acquired MRSA, vancomycin will be used perioperatively.

## 2017-10-16 NOTE — PROGRESS NOTES
Chucho Prado is a 84 y.o. year old here today for a pre-operative visit in preparation for a Left total kene arthroplasty to be performed by  Dr. [facility] on 10/25/2017.  he was last seen and treated in the clinic on 9/26/2017. he will be medically optimized by the pre op center. There has been no significant change in medical status since last visit. No fever, chills, malaise, or unexplained weight change.      Allergies, Medications, past medical and surgical history reviewed.    Focused examination performed.    Dr. [facility] saw this patient today in clinic. All questions answered. Patient encouraged to call with questions. Contact information given.     Pre, maverick, and post operative procedures and expectations discussed. Questions were answered. Chucho Prado has been educated and is ready to proceed with surgery. Approximately 30 minutes was spent discussing surgical outcomes, plans, procedures pre, maverick, and post operative expections and care.  Surgical consent signed.    Chucho Prado will contact us if there are any questions, concerns, or changes in medical status prior to surgery.

## 2017-10-23 ENCOUNTER — HOSPITAL ENCOUNTER (OUTPATIENT)
Dept: PREADMISSION TESTING | Facility: HOSPITAL | Age: 82
Discharge: HOME | DRG: 470 | End: 2017-10-23
Attending: ANESTHESIOLOGY
Payer: MEDICARE

## 2017-10-23 ENCOUNTER — INITIAL CONSULT (OUTPATIENT)
Dept: INTERNAL MEDICINE | Facility: CLINIC | Age: 82
DRG: 470 | End: 2017-10-23
Payer: MEDICARE

## 2017-10-23 ENCOUNTER — TELEPHONE (OUTPATIENT)
Dept: UROLOGY | Facility: CLINIC | Age: 82
End: 2017-10-23

## 2017-10-23 VITALS
BODY MASS INDEX: 29.54 KG/M2 | HEIGHT: 68 IN | OXYGEN SATURATION: 96 % | HEART RATE: 78 BPM | RESPIRATION RATE: 20 BRPM | DIASTOLIC BLOOD PRESSURE: 58 MMHG | SYSTOLIC BLOOD PRESSURE: 131 MMHG | WEIGHT: 194.88 LBS

## 2017-10-23 VITALS
WEIGHT: 194.81 LBS | SYSTOLIC BLOOD PRESSURE: 131 MMHG | BODY MASS INDEX: 29.52 KG/M2 | TEMPERATURE: 96 F | DIASTOLIC BLOOD PRESSURE: 58 MMHG | OXYGEN SATURATION: 96 % | HEIGHT: 68 IN | HEART RATE: 78 BPM

## 2017-10-23 DIAGNOSIS — D64.9 ANEMIA, UNSPECIFIED TYPE: ICD-10-CM

## 2017-10-23 DIAGNOSIS — Z86.14 HISTORY OF MRSA INFECTION: ICD-10-CM

## 2017-10-23 DIAGNOSIS — Z98.890 PONV (POSTOPERATIVE NAUSEA AND VOMITING): ICD-10-CM

## 2017-10-23 DIAGNOSIS — N18.30 CKD (CHRONIC KIDNEY DISEASE) STAGE 3, GFR 30-59 ML/MIN: ICD-10-CM

## 2017-10-23 DIAGNOSIS — E03.9 HYPOTHYROIDISM, UNSPECIFIED TYPE: Chronic | ICD-10-CM

## 2017-10-23 DIAGNOSIS — I10 ESSENTIAL HYPERTENSION: Chronic | ICD-10-CM

## 2017-10-23 DIAGNOSIS — E87.1 HYPONATREMIA: ICD-10-CM

## 2017-10-23 DIAGNOSIS — Z93.59 SUPRAPUBIC CATHETER: ICD-10-CM

## 2017-10-23 DIAGNOSIS — R82.71 BACTERIA IN URINE: Primary | ICD-10-CM

## 2017-10-23 DIAGNOSIS — M43.6 STIFFNESS OF NECK: ICD-10-CM

## 2017-10-23 DIAGNOSIS — R60.9 EDEMA, UNSPECIFIED TYPE: ICD-10-CM

## 2017-10-23 DIAGNOSIS — E78.5 DYSLIPIDEMIA: Chronic | ICD-10-CM

## 2017-10-23 DIAGNOSIS — Z01.818 PREOP EXAMINATION: Primary | ICD-10-CM

## 2017-10-23 DIAGNOSIS — M79.606 PAIN OF LOWER EXTREMITY, UNSPECIFIED LATERALITY: Primary | ICD-10-CM

## 2017-10-23 DIAGNOSIS — Z98.890 S/P KYPHOPLASTY: ICD-10-CM

## 2017-10-23 DIAGNOSIS — R11.2 PONV (POSTOPERATIVE NAUSEA AND VOMITING): ICD-10-CM

## 2017-10-23 PROBLEM — K59.01 CONSTIPATION BY DELAYED COLONIC TRANSIT: Status: RESOLVED | Noted: 2017-01-09 | Resolved: 2017-10-23

## 2017-10-23 PROBLEM — N17.9 AKI (ACUTE KIDNEY INJURY): Status: RESOLVED | Noted: 2017-01-07 | Resolved: 2017-10-23

## 2017-10-23 PROBLEM — R31.0 GROSS HEMATURIA: Status: RESOLVED | Noted: 2017-01-27 | Resolved: 2017-10-23

## 2017-10-23 PROCEDURE — 99999 PR PBB SHADOW E&M-EST. PATIENT-LVL III: CPT | Mod: PBBFAC,,, | Performed by: HOSPITALIST

## 2017-10-23 PROCEDURE — 99214 OFFICE O/P EST MOD 30 MIN: CPT | Mod: S$PBB,,, | Performed by: HOSPITALIST

## 2017-10-23 PROCEDURE — 99213 OFFICE O/P EST LOW 20 MIN: CPT | Mod: PBBFAC | Performed by: HOSPITALIST

## 2017-10-23 RX ORDER — SULFAMETHOXAZOLE AND TRIMETHOPRIM 800; 160 MG/1; MG/1
1 TABLET ORAL 2 TIMES DAILY
Qty: 14 TABLET | Refills: 0 | Status: SHIPPED | OUTPATIENT
Start: 2017-10-23 | End: 2017-10-30

## 2017-10-23 NOTE — OUTPATIENT SUBJECTIVE & OBJECTIVE
Outpatient Subjective & Objective     Chief complaint-Preoperative evaluation, Perioperative Medical management, complication reduction plan     Active cardiac conditions- none    Revised cardiac risk index predictors- none    Functional capacity -Examples of physical activity ,limited due to knee , back problem  walks some in the house with therapist with walker ,since back surgery in Dec 2016 been on wheel chair, up until  Dec 2016, was active and was able to take a flight of stairsHe can undertake all the above activities without  chest pain,chest tightness,  making his exercise tolerance more  than 4 Mets unto Dec 2016 .   Winded on exertion, not new ,stable over time     Review of Systems   Constitutional: Negative for chills and fever.        No unusual weight changes   HENT:           Elevated BP  Age over 50 years  Neck size over 40 CM  Male gender   Eyes:         vision changes  Under follow up   Respiratory:        Occasional dry Cough   No hemoptysis    Cardiovascular:        As noted   Gastrointestinal:        Bowels- Regular   Some loose stools - since Dec 2016  No overt GI/ blood losses   Endocrine:        Recent steroid use- none   Genitourinary:        Catheter   Mild Supra pubic area pain    Musculoskeletal:          As above   Skin: Negative for rash.   Neurological: Negative for syncope.        No unilateral weakness   Hematological:        Current use of Anticoagulants- none   Psychiatric/Behavioral:        Depression controlled   No SI/HI   GERD controlled     No vascular stenting   Family History   Problem Relation Age of Onset    Hypertension Mother     Diabetes Father     Esophageal cancer Father      Past Surgical History:   Procedure Laterality Date    CHOLECYSTECTOMY      HERNIA REPAIR      JOINT REPLACEMENT      LAMINECTOMY  12/27/2016    L2-L4    LUMBAR LAMINECTOMY  12/2016    PARATHYROIDECTOMY      TOTAL KNEE ARTHROPLASTY      Right   No anesthesia, bleeding , cardiac  "problemswith previous surgeries/ procedures   FH- No anesthesia, thrombosis/ bleeding  ,in family   Mother and her side of family - had heart disease - mother  at 53 of heart disease  Help available  post op      No anesthesia, bleeding, cardiac problems with previous surgeries/procedures.  Medications and Allergies reviewed in epic.     Physical Exam   HENT:   Head: Normocephalic.       Physical Exam   HENT:   Head: Normocephalic.     Constitutional- Vitals - Body mass index is 29.62 kg/m².,   Vitals:    10/23/17 1606   BP: (!) 131/58   Pulse: 78   Temp: 96.4 °F (35.8 °C)     General appearance-Conscious,Coherent  Eyes- No conjunctival icterus,pupils  round  and reactive to light   ENT-Oral cavity- moist  , Hearing grossly normal   Neck- No thyromegaly ,Trachea -central, No jugular venous distension,   No Carotid Bruit   Cardiovascular -Heart Sounds- Normal  and  no murmur   , No gallop rhythm   Respiratory - Normal Respiratory Effort, Normal breath sounds,  Crepitationsrt base,  no wheeze  and  no forced expiratory wheeze    Peripheral pitting pedal edema-- mild and  bilateral lower extremity telangiectasia , no calf pain   Gastrointestinal -Soft abdomen, No palpable masses, Non Tender,Liver,Spleen not palpable. No-- free fluid and shifting dullness  Musculoskeletal- No finger Clubbing. Strength grossly normal   Lymphatic-No Palpable cervical, axillary,Inguinal lymphadenopathy   Psychiatric - normal effect,Orientation  Rt Dorsalis pedis pulses-palpable    Lt Dorsalis pedis pulses- palpable   Rt Posterior tibial pulses -palpable   Left posterior tibial pulses -palpable   Miscellaneous - hernia Rt UQ,  no asterixis,  no dupuytren's contracture,  Surgical scarRUQ ,  no renal bruit,  bowel sounds positive  and  examined on wheel chair   Blood pressure (!) 131/58, pulse 78, temperature 96.4 °F (35.8 °C), temperature source Oral, height 5' 8" (1.727 m), weight 88.4 kg (194 lb 12.8 oz), SpO2 96 " %.      Investigations  Lab and Imaging have been reviewed in [facility].    Review of Medicine tests    EKG- I had independently reviewed the EKG from--Dec 2016     Has occasional stabbing pains all over his body, rarely in  The chest, twinge , cannot remember last  theresa had it   No chest pain on exertion    May 2017     Liver: Normal in size and attenuation, with no focal hepatic lesions      Review of clinical lab tests-Date---6/6/2017  Creatinine-1.0  Date--5/5/2017 Hemoglobin--12.3 Platelet count--229    Review of old records- Was done and information gathered regards to events leading to surgery and health conditions of significance in the perioperative period.    Outpatient Subjective & Objective

## 2017-10-23 NOTE — HPI
History of present illness- I had the pleasure of meeting this pleasant 84 y.o. gentleman in the pre op clinic prior to his elective Orthopedic surgery. The patient is known  to me from hospitalization on 2013  .   Goes by his  Middle name Melchor   I have obtained the history by speaking to the patient and by reviewing the electronic health records.    Events leading up to surgery / History of presenting illness -    He has been troubled with moderate-severe  Left knee  pain for 1 year  . Pain increases with activity and decreases with resting.    Relevant health conditions of significance for the perioperative period/ History of presenting illness -    Fell on his back In Dec 2016 . It was late at night and was getting ready to go to bed and possibly stumbled and was on the floor of 17 hours until his tenant came ti help   Ct scan that the time Linear/oblique fracture extending from the anterior-superior aspect of the T 10 vertebral body extending into the inferior posterior portion.  There is no displacement of the fracture.  Had L3 kyphoplasty  . Had been skilled nursing unit   Has a helpful tenant .Lives alone and has care givers that comes in to assist with ADL's(needs help getting out of the bed, dressing, bathing, toileting, etc.)    Patient wants to go to SNF and to his understanding has discussed with surgeon       History of neurogenic bladder. unknown cause to him   He had 18fr SPT  to manage his Neurogenic Bladder.   History of MRSA UTI   Urine culture from 10/10 showed MRSA   Unable to find urinalysis from that day   Does not feel sick  Was commenced on Bactrim today     Essential hypertension   On Dyazide   When had spine surgery , Dec 2016  /JAN 2017- had one of his BP medication stopped   BP usually is about 130's/60's      Iron deficiency anemia , in   October 2016   Was on Iron   Most recent Hb-CBC in process         CKD (chronic kidney disease) stage 3, GFR 30-59 ml/min     Base line creat 1.2-  1.4   History of chronic NSAID use ( Aleve ) until last year     ASA - prevention reason    Not known to have heart disease , Diabetes Mellitus, Lung disease

## 2017-10-23 NOTE — LETTER
October 23, 2017      Justa Tam MD  [address] [facility] [address]           [facility]  [address] [address]  Phone: [phone]          Patient: Chucho Prado   MR Number: [id number]   YOB: 1933   Date of Visit: 10/23/2017       Dear Dr. Justa Tam:    Thank you for referring Chucho Prado to me for evaluation. Attached you will find relevant portions of my assessment and plan of care.    If you have questions, please do not hesitate to call me. I look forward to following Chucho Prado along with you.    Sincerely,    Millicent Garcia MD    Enclosure  CC:  Evan L. Dvorin, MD [facility]    If you would like to receive this communication electronically, please contact [email or web address] or [phone] to request more information on [facility] access.    For providers and/or their staff who would like to refer a patient to [facility], please contact us through our one-stop-shop provider referral line, [facility], at [phone].    If you feel you have received this communication in error or would no longer like to receive these types of communications, please e-mail [email or web address]

## 2017-10-23 NOTE — ASSESSMENT & PLAN NOTE
Drinks up to 50 ounces of water a day  Drinks per thirst which I suggested continuation  Suggest sodium monitoring periop   Did not limit fluid intake as he is drinking per thirst and fluid restriction might contribute to the risk of acute kidney injury with a back ground of CKD

## 2017-10-23 NOTE — ASSESSMENT & PLAN NOTE
I  suggest monitoring renal function, in put and out put status maverick-operatively. I  suggest avoiding nephrotoxic medication including NSAIDs, COX2 inhibitors, intravenous contrast agent,avoiding hypotension to prevent further renal impairment.

## 2017-10-23 NOTE — ASSESSMENT & PLAN NOTE
Anemia:  I suggest monitoring his Hemoglobin perioperatively in view of his history of pre existing anemia.

## 2017-10-23 NOTE — ASSESSMENT & PLAN NOTE
Hypertension-  Blood pressure is acceptable .I suggest holding Dyazide- on the morning of the surgery and can continue that  post operatively under blood pressure, electrolyte and renal function monitoring as long as they are acceptable.I suggest addressing pain control as uncontrolled pain can increased blood pressure

## 2017-10-23 NOTE — ASSESSMENT & PLAN NOTE
History of neurogenic bladder. unknown cause to him   He had 18fr SPT  to manage his Neurogenic Bladder.   History of MRSA UTI   Urine culture from 10/10/2017 showed MRSA   Unable to find urinalysis from that day   Does not feel sick  Was commenced on Bactrim today

## 2017-10-23 NOTE — ASSESSMENT & PLAN NOTE
Post operative nausea, vomiting - I suggest that the perioperative team be aware of this so that appropriate preventive care can be taken

## 2017-10-23 NOTE — TELEPHONE ENCOUNTER
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   I collected urine at last SPT change so they would have results to treat prior to surgery.  Asymptomatic at visit  rx for bacteria in urine;   Attempted to call him but unable to reach  He currently in preop

## 2017-10-23 NOTE — ASSESSMENT & PLAN NOTE
Edema- I suggested avoidance of added salt,avoidance of NSAID's, except ASA 81 mg  and suggested Limb elevation and aubree hose use

## 2017-10-23 NOTE — PROGRESS NOTES
[facility] - Pre Op Consult  Progress Note    Patient Name: Chucho Prado  MRN: [id number]  Date of Evaluation- 10/23/2017  PCP- Obed Mcguire MD    Future cases for Chucho Prado [[id number]]     Case ID Status Date Time Benji Procedure Provider Location    [id number] [facility] 10/25/2017  8:00  REPLACEMENT-KNEE-TOTAL [facility] [[id number]] [facility] OR 2ND FLR      Left     HPI:  History of present illness- I had the pleasure of meeting this pleasant 84 y.o. gentleman in the pre op clinic prior to his elective Orthopedic surgery. The patient is known  to me from hospitalization on 2013  .   Goes by his  Middle name Melchor   I have obtained the history by speaking to the patient and by reviewing the electronic health records.    Events leading up to surgery / History of presenting illness -    He has been troubled with moderate-severe  Left knee  pain for 1 year  . Pain increases with activity and decreases with resting.    Relevant health conditions of significance for the perioperative period/ History of presenting illness -    Fell on his back In Dec 2016 . It was late at night and was getting ready to go to bed and possibly stumbled and was on the floor of 17 hours until his tenant came ti help   Ct scan that the time Linear/oblique fracture extending from the anterior-superior aspect of the T 10 vertebral body extending into the inferior posterior portion.  There is no displacement of the fracture.  Had L3 kyphoplasty  . Had been skilled nursing unit   Has a helpful tenant .Lives alone and has care givers that comes in to assist with ADL's(needs help getting out of the bed, dressing, bathing, toileting, etc.)    Patient wants to go to SNF and to his understanding has discussed with surgeon       History of neurogenic bladder. unknown cause to him   He had 18fr SPT  to manage his Neurogenic Bladder.   History of MRSA UTI   Urine culture from 10/10 showed MRSA   Unable to find urinalysis from that day    Does not feel sick  Was commenced on Bactrim today     Essential hypertension   On Dyazide   When had spine surgery , Dec 2016  /JAN 2017- had one of his BP medication stopped   BP usually is about 130's/60's      Iron deficiency anemia , in   October 2016   Was on Iron   Most recent Hb-CBC in process         CKD (chronic kidney disease) stage 3, GFR 30-59 ml/min     Base line creat 1.2- 1.4   History of chronic NSAID use ( Aleve ) until last year     ASA - prevention reason    Not known to have heart disease , Diabetes Mellitus, Lung disease         Subjective/ Objective:          Chief complaint-Preoperative evaluation, Perioperative Medical management, complication reduction plan     Active cardiac conditions- none    Revised cardiac risk index predictors- none    Functional capacity -Examples of physical activity ,limited due to knee , back problem  walks some in the house with therapist with walker ,since back surgery in Dec 2016 been on wheel chair, up until  Dec 2016, was active and was able to take a flight of stairsHe can undertake all the above activities without  chest pain,chest tightness,  making his exercise tolerance more  than 4 Mets unto Dec 2016 .   Winded on exertion, not new ,stable over time     Review of Systems   Constitutional: Negative for chills and fever.        No unusual weight changes   HENT:           Elevated BP  Age over 50 years  Neck size over 40 CM  Male gender   Eyes:         vision changes  Under follow up   Respiratory:        Occasional dry Cough   No hemoptysis    Cardiovascular:        As noted   Gastrointestinal:        Bowels- Regular   Some loose stools - since Dec 2016  No overt GI/ blood losses   Endocrine:        Recent steroid use- none   Genitourinary:        Catheter   Mild Supra pubic area pain    Musculoskeletal:          As above   Skin: Negative for rash.   Neurological: Negative for syncope.        No unilateral weakness   Hematological:        Current use  of Anticoagulants- none   Psychiatric/Behavioral:        Depression controlled   No SI/HI   GERD controlled     No vascular stenting   Family History   Problem Relation Age of Onset    Hypertension Mother     Diabetes Father     Esophageal cancer Father      Past Surgical History:   Procedure Laterality Date    CHOLECYSTECTOMY      HERNIA REPAIR      JOINT REPLACEMENT      LAMINECTOMY  2016    L2-L4    LUMBAR LAMINECTOMY  2016    PARATHYROIDECTOMY      TOTAL KNEE ARTHROPLASTY      Right   No anesthesia, bleeding , cardiac problemswith previous surgeries/ procedures   FH- No anesthesia, thrombosis/ bleeding  ,in family   Mother and her side of family - had heart disease - mother  at 53 of heart disease  Help available  post op      No anesthesia, bleeding, cardiac problems with previous surgeries/procedures.  Medications and Allergies reviewed in epic.     Physical Exam   HENT:   Head: Normocephalic.       Physical Exam   HENT:   Head: Normocephalic.     Constitutional- Vitals - Body mass index is 29.62 kg/m².,   Vitals:    10/23/17 1606   BP: (!) 131/58   Pulse: 78   Temp: 96.4 °F (35.8 °C)     General appearance-Conscious,Coherent  Eyes- No conjunctival icterus,pupils  round  and reactive to light   ENT-Oral cavity- moist  , Hearing grossly normal   Neck- No thyromegaly ,Trachea -central, No jugular venous distension,   No Carotid Bruit   Cardiovascular -Heart Sounds- Normal  and  no murmur   , No gallop rhythm   Respiratory - Normal Respiratory Effort, Normal breath sounds,  Crepitationsrt base,  no wheeze  and  no forced expiratory wheeze    Peripheral pitting pedal edema-- mild and  bilateral lower extremity telangiectasia , no calf pain   Gastrointestinal -Soft abdomen, No palpable masses, Non Tender,Liver,Spleen not palpable. No-- free fluid and shifting dullness  Musculoskeletal- No finger Clubbing. Strength grossly normal   Lymphatic-No Palpable cervical, axillary,Inguinal  "lymphadenopathy   Psychiatric - normal effect,Orientation  Rt Dorsalis pedis pulses-palpable    Lt Dorsalis pedis pulses- palpable   Rt Posterior tibial pulses -palpable   Left posterior tibial pulses -palpable   Miscellaneous - hernia Rt UQ,  no asterixis,  no dupuytren's contracture,  Surgical scarRUQ ,  no renal bruit,  bowel sounds positive  and  examined on wheel chair   Blood pressure (!) 131/58, pulse 78, temperature 96.4 °F (35.8 °C), temperature source Oral, height 5' 8" (1.727 m), weight 88.4 kg (194 lb 12.8 oz), SpO2 96 %.      Investigations  Lab and Imaging have been reviewed in [facility].    Review of Medicine tests    EKG- I had independently reviewed the EKG from--Dec 2016     Has occasional stabbing pains all over his body, rarely in  The chest, twinge , cannot remember last  theresa had it   No chest pain on exertion    May 2017     Liver: Normal in size and attenuation, with no focal hepatic lesions      Review of clinical lab tests-Date---6/6/2017  Creatinine-1.0  Date--5/5/2017 Hemoglobin--12.3 Platelet count--229    Review of old records- Was done and information gathered regards to events leading to surgery and health conditions of significance in the perioperative period.        Preoperative cardiac risk assessment-  The patient does not have any active cardiac conditions . Revised cardiac risk index predictors- 0---.Functional capacity is less than 4 Mets. He will be undergoing a Orthopedic procedure that carries a intermediate risk     The estimated risk of the rate of adverse cardiac outcomes  0.4%     He has atypical chest pain, none recently , not related to exertion, no Myocardial infarction history   Discussed Pre op cardiology evaluation that he deferred at this time      American Society of Anesthesiologists Physical status classification ( ASA ) class: 3     Postoperative pulmonary complication risk assessment:    BP (!) 131/58   Pulse 78   Temp 96.4 °F (35.8 °C) (Oral)   Ht 5' 8" (1.727 " m)   Wt 88.4 kg (194 lb 12.8 oz)   SpO2 96%   BMI 29.62 kg/m²      ARISCAT ( Canet) risk index- risk class -  Low, if duration of surgery is under 2 hours, intermediate, if duration of surgery is over 2 hours        Assessment/Plan:     Dyslipidemia  HLD-I  suggest continuation of statin during the entire perioperative period.    Essential hypertension  Hypertension-  Blood pressure is acceptable .I suggest holding Dyazide- on the morning of the surgery and can continue that  post operatively under blood pressure, electrolyte and renal function monitoring as long as they are acceptable.I suggest addressing pain control as uncontrolled pain can increased blood pressure     History of MRSA infection  Urine Culture grew MRSA   Was commenced in Bactrim     Anemia  Anemia:  I suggest monitoring his Hemoglobin perioperatively in view of his history of pre existing anemia.      Hypothyroidism  Hypothyroidism- I suggest continuation of synthroid replacement in the perioperative period        PONV (postoperative nausea and vomiting)  Post operative nausea, vomiting - I suggest that the perioperative team be aware of this so that appropriate preventive care can be taken     Suprapubic catheter  History of neurogenic bladder. unknown cause to him   He had 18fr SPT  to manage his Neurogenic Bladder.   History of MRSA UTI   Urine culture from 10/10/2017 showed MRSA   Unable to find urinalysis from that day   Does not feel sick  Was commenced on Bactrim today     S/P kyphoplasty- L3 kyphoplasty     Had L3 kyphoplasty     I suggest that the perioperative team be aware of this       Edema  Edema- I suggested avoidance of added salt,avoidance of NSAID's, except ASA 81 mg  and suggested Limb elevation and aubree hose use    Stiffness of neck  Arthritis of neck         Preventive perioperative care    Thromboembolic prophylaxis:  His risk factors for thrombosis include surgical procedure, age and reduced mobility.I suggest   thromboembolic prophylaxis ( mechanical/pharmacological, weighing the risk benefits of pharmacological agent use considering maverick procedural bleeding )  during the perioperative period.I suggested being active in the post operative period.   The patient is a candidate for extended DVT prophylaxis     Postoperative pulmonary complication prophylaxis-Risk factors for post operative pulmonary complications include age over 65 years, ASA class >2 and functional dependence- I suggest incentive spirometry use, early ambulation and end tidal carbon dioxide monitoring  , oral care , head end of bed elevation     Renal complication prophylaxis-Risk factors for renal complications include age and hypertension . I suggest keeping him well hydrated .I suggested drinking 2 litre's of water a day      Surgical site Infection Prophylaxis-I  suggest appropriate antibiotic for Prophylaxis against Surgical site infections     Delirium prophylaxis-Risk factors - Advanced Age and Reduced Mobility - I suggest avoidance / minimizing the use of  Benzodiazepines ( unless the patient has been taking it on a regular basis ),Anticholinergic medication,Antihistamines ( like  Benadryl).I suggest minimizing the use of opioid medication and use of IV tylenol,if it is appropriate. I suggest using the lowest possible dose of opioids for the shortest duration possible in the perioperative period. I suggest to Keep shades/blinds open during the day, lights off and shades closed at night to encourage normal sleep/wake cycle.I encourage the presence of the family member with the patient at all times, if at all possible as mental status changes can be picked up early by the family members and they help with reorientation. I encouraged the presence of family to help with orientation in the perioperative period. Benadryl avoidance suggested      In view of regional anesthesia , joint replacement  the patient  is at risk of postoperative urinary retention.   I suggest avoidance / minimizing the of  Benzodiazepines,Anticholinergic medication,antihistamines ( Benadryl) , if possible in the perioperative period. I suggest using the minimum possible use of opioids for the minimum period of time in the perioperative period. Benadryl avoidance suggested      This visit was focused on Preoperative evaluation, Perioperative Medical management, complication reduction plans. I suggest that the patient follows up with primary care or relevant sub specialists for ongoing health care.    I appreciate the opportunity to be involved in this patients care. Please feel free to contact me if there were any questions about this consultation.    Patient is optimized    Millicentmatthew Garcia MD  Perioperative Medicine  [facility]   Pager [phone]  ---------    10/23- 18 19     CBC- Hb 12.8  BMP - Creat 1.3 - Sodium 126   Hyponatremia- his volume status is normovolemic  Apart from mild peripheral edema, no hypervolemia  HCTZ may be causing / contributing to the low sodium in addition to SIADH from pain  He is known to have hyponatremia  Spoke to patient   Drinks up to 50 ounces of water a day  Drinks per thirst which I suggested continuation  Did not limit fluid intake as he is drinking per thirst and fluid restriction might contribute to the risk of acute kidney injury with a back ground of CKD    Suggest avoidance of hypotonic IVFluids   ------\  10/24- 15 03     Called to follow up   Doing good   No changes to medication, Health   Plans on using Antibiotic through maverick op   suggest taking into account his  MRSA infection in deciding the agent for surgical site infections prophylaxis

## 2017-10-23 NOTE — DISCHARGE INSTRUCTIONS
Your surgery has been scheduled for:__________________________________________    You should report to:  ____[facility], located on the [employer] side of the first floor of the           [facility] ([phone])  ____[facility], located on the [facility] side of the            Second floor of the [facility] ([phone])  ____3rd Floor [facility] located on the [facility] side of the [facility] [phone]  Please Note   - Tell your doctor if you take Aspirin, products containing Aspirin, herbal medications  or blood thinners, such as Coumadin, Ticlid, or Plavix.  (Consult your provider regarding holding or stopping before surgery).  - Arrange for someone to drive you home following surgery.  You will not be allowed to leave the surgical facility alone or drive yourself home following sedation and anesthesia.  Before Surgery  - Stop taking all herbal medications 14days prior to surgery  - No Motrin/Advil (Ibuprofen) 7 days before surgery  - No Aleve (Naproxen) 7 days before surgery  - Stop Taking Asprin, products containing Asprin _____days before surgery  - Stop taking blood thinners_______days before surgery  - Refrain from drinking alcoholic beverages for 24hours before and after surgery  - Stop or limit smoking _________days before surgery  Night before Surgery  - DO NOT EAT OR DRINK ANYTHING AFTER MIDNIGHT, INCLUDING GUM, HARD CANDY, MINTS, OR CHEWING TOBACCO.  - Take a shower or bath (shower is recommended).  Bathe with Hibiclens soap or an antibacterial soap from the neck down.  If not supplied by your surgeon, hibiclens soap will need to be purchased over the counter in pharmacy.  Rinse soap off thoroughly.  - Shampoo your hair with your regular shampoo  The Day of Surgery  - Take another bath or shower with hibiclens or any antibacterial soap, to reduce the chance of infection.  - Take heart and blood  pressure medications with a small sip of water, as advised by the perioperative team.  - Do not take fluid pills  - You may brush your teeth and rinse your mouth, but do not swall any additional water.   - Do not apply perfumes, powder, body lotions or deodorant on the day of surgery.  - Nail polish should be removed.  - Do not wear makeup or moisturizer  - Wear comfortable clothes, such as a button front shirt and loose fitting pants.  - Leave all jewelry, including body piercings, and valuables at home.    - Bring any devices you will neeed after surgery such as crutches or canes.  - If you have sleep apnea, please bring your CPAP machine  In the event that your physical condition changes including the onset of a cold or respiratory illness, or if you have to delay or cancel your surgery, please notify your surgeon.Anesthesia: Regional Anesthesia  Youre scheduled for surgery. During surgery, youll receive medication called anesthesia to keep you comfortable and pain-free. Your surgeon has decided that youll receive regional anesthesia. This sheet tells you what to expect with this type of anesthesia.  What Is Regional Anesthesia?  Regional anesthesia numbs one region of your body. The anesthesia may be given around nerves or into veins in your arms, neck, or legs (nerve block or Tadeo block). Or it may be sent into the spinal fluid (spinal anesthesia) or into the space just outside the spinal fluid (epidural anesthesia). Sedatives may also be given to relax you.  Nerve Block or Tadeo Block  A small area of the body, such as an arm or leg, can be numbed using a nerve block or Galt block.  · Nerve block: During a nerve block, your skin is numbed. A needle is then inserted near nerves that serve the area to be numbed. Anesthetic is sent through the needle.  · IV regional or Tadeo block: For this type of block, an IV line is put into a vein. The blood flow to the area to be numbed is blocked for a short time.  Anesthetic is sent through the IV.  Spinal Anesthesia  Spinal anesthesia numbs your body from about the waist down.  · Anesthetic is injected into the spinal fluid. This is a substance that surrounds the spinal cord in your spinal column. The anesthetic blocks pain traveling from the body to the brain.  · To receive the anesthetic, your skin is numbed at the injection site.  · A needle is then inserted into the spinal fluid. Anesthetic is sent through the needle.  Anesthesia Tools and Medications  · Local anesthetic given through a needle numbs one region of your body.  · Electrocardiography leads (electrodes) record your heart rate and rhythm.  · A blood pressure cuff monitors your blood pressure.  · A pulse oximeter on the end of the finger measures your blood oxygen level.  · Sedatives may be given through an IV to relax you and keep you comfortable. You may stay awake or sleep slightly.  · Oxygen may be given to you through a facemask.  Risks and Possible Complications  Regional anesthesia carries some risks. These include:  · Nausea and vomiting  · Headache  · Backache  · Decreased blood pressure  · Allergic reaction to the anesthetic  · Ongoing numbness (rare)  · Irregular heartbeat (rare)  · Cardiac arrest (rare)   © 1214-4402 [employer] [facility], [address] [place] [address], [place] [address]. All rights reserved. This information is not intended as a substitute for professional medical care. Always follow your healthcare professional's instructions.

## 2017-10-24 ENCOUNTER — TELEPHONE (OUTPATIENT)
Dept: ORTHOPEDICS | Facility: CLINIC | Age: 82
End: 2017-10-24

## 2017-10-24 NOTE — TELEPHONE ENCOUNTER
Spoke with friend Mr Burr  : Reminded to eat light the night before surgery, NPO after midnight, take hibclens shower the night before surgery  & again in the am , sleep on clean sheets  in clean clothes, no laxatives or stool softeners , report to the 2nd floor surgery unit for 10:30 am tomorrow  He said he would go over everything with him

## 2017-10-25 ENCOUNTER — ANESTHESIA (OUTPATIENT)
Dept: SURGERY | Facility: HOSPITAL | Age: 82
DRG: 470 | End: 2017-10-25
Payer: MEDICARE

## 2017-10-25 ENCOUNTER — HOSPITAL ENCOUNTER (INPATIENT)
Facility: HOSPITAL | Age: 82
LOS: 1 days | Discharge: NURSING, REHAB OR LONG-TERM CARE | DRG: 470 | End: 2017-10-26
Attending: ORTHOPAEDIC SURGERY | Admitting: ORTHOPAEDIC SURGERY
Payer: MEDICARE

## 2017-10-25 DIAGNOSIS — M17.12 PRIMARY OSTEOARTHRITIS OF LEFT KNEE: Primary | ICD-10-CM

## 2017-10-25 LAB
ABO + RH BLD: NORMAL
ANION GAP SERPL CALC-SCNC: 10 MMOL/L
BLD GP AB SCN CELLS X3 SERPL QL: NORMAL
BUN SERPL-MCNC: 15 MG/DL
CALCIUM SERPL-MCNC: 8.3 MG/DL
CHLORIDE SERPL-SCNC: 97 MMOL/L
CO2 SERPL-SCNC: 25 MMOL/L
CREAT SERPL-MCNC: 1.3 MG/DL
EST. GFR  (AFRICAN AMERICAN): 57.9 ML/MIN/1.73 M^2
EST. GFR  (NON AFRICAN AMERICAN): 50.1 ML/MIN/1.73 M^2
GLUCOSE SERPL-MCNC: 118 MG/DL
POTASSIUM SERPL-SCNC: 3.1 MMOL/L
SODIUM SERPL-SCNC: 129 MMOL/L
SODIUM SERPL-SCNC: 132 MMOL/L

## 2017-10-25 PROCEDURE — 36415 COLL VENOUS BLD VENIPUNCTURE: CPT

## 2017-10-25 PROCEDURE — 27447 TOTAL KNEE ARTHROPLASTY: CPT | Mod: LT,,, | Performed by: ORTHOPAEDIC SURGERY

## 2017-10-25 PROCEDURE — 84295 ASSAY OF SERUM SODIUM: CPT

## 2017-10-25 PROCEDURE — 94799 UNLISTED PULMONARY SVC/PX: CPT

## 2017-10-25 PROCEDURE — 37000008 HC ANESTHESIA 1ST 15 MINUTES: Performed by: ORTHOPAEDIC SURGERY

## 2017-10-25 PROCEDURE — 97530 THERAPEUTIC ACTIVITIES: CPT

## 2017-10-25 PROCEDURE — 63600175 PHARM REV CODE 636 W HCPCS: Performed by: NURSE ANESTHETIST, CERTIFIED REGISTERED

## 2017-10-25 PROCEDURE — 63600175 PHARM REV CODE 636 W HCPCS: Performed by: PHYSICIAN ASSISTANT

## 2017-10-25 PROCEDURE — 0SRD0J9 REPLACEMENT OF LEFT KNEE JOINT WITH SYNTHETIC SUBSTITUTE, CEMENTED, OPEN APPROACH: ICD-10-PCS | Performed by: ORTHOPAEDIC SURGERY

## 2017-10-25 PROCEDURE — 76942 ECHO GUIDE FOR BIOPSY: CPT | Mod: 26,,, | Performed by: ANESTHESIOLOGY

## 2017-10-25 PROCEDURE — 97165 OT EVAL LOW COMPLEX 30 MIN: CPT

## 2017-10-25 PROCEDURE — 80048 BASIC METABOLIC PNL TOTAL CA: CPT

## 2017-10-25 PROCEDURE — 11000001 HC ACUTE MED/SURG PRIVATE ROOM

## 2017-10-25 PROCEDURE — 25000003 PHARM REV CODE 250: Performed by: NURSE ANESTHETIST, CERTIFIED REGISTERED

## 2017-10-25 PROCEDURE — 86850 RBC ANTIBODY SCREEN: CPT

## 2017-10-25 PROCEDURE — 25000003 PHARM REV CODE 250: Performed by: PHYSICIAN ASSISTANT

## 2017-10-25 PROCEDURE — 27447 TOTAL KNEE ARTHROPLASTY: CPT | Mod: AS,LT,, | Performed by: PHYSICIAN ASSISTANT

## 2017-10-25 PROCEDURE — 86900 BLOOD TYPING SEROLOGIC ABO: CPT

## 2017-10-25 PROCEDURE — 27800517 HC TRAY,EPIDURAL-CONTINUOUS: Performed by: ANESTHESIOLOGY

## 2017-10-25 PROCEDURE — D9220A PRA ANESTHESIA: Mod: ANES,,, | Performed by: ANESTHESIOLOGY

## 2017-10-25 PROCEDURE — C1713 ANCHOR/SCREW BN/BN,TIS/BN: HCPCS | Performed by: ORTHOPAEDIC SURGERY

## 2017-10-25 PROCEDURE — 27201423 OPTIME MED/SURG SUP & DEVICES STERILE SUPPLY: Performed by: ORTHOPAEDIC SURGERY

## 2017-10-25 PROCEDURE — 64448 NJX AA&/STRD FEM NRV NFS IMG: CPT | Mod: 59,LT,, | Performed by: ANESTHESIOLOGY

## 2017-10-25 PROCEDURE — 97535 SELF CARE MNGMENT TRAINING: CPT

## 2017-10-25 PROCEDURE — C1776 JOINT DEVICE (IMPLANTABLE): HCPCS | Performed by: ORTHOPAEDIC SURGERY

## 2017-10-25 PROCEDURE — 71000039 HC RECOVERY, EACH ADD'L HOUR: Performed by: ORTHOPAEDIC SURGERY

## 2017-10-25 PROCEDURE — 36000711: Performed by: ORTHOPAEDIC SURGERY

## 2017-10-25 PROCEDURE — 36000710: Performed by: ORTHOPAEDIC SURGERY

## 2017-10-25 PROCEDURE — 27100025 HC TUBING, SET FLUID WARMER: Performed by: NURSE ANESTHETIST, CERTIFIED REGISTERED

## 2017-10-25 PROCEDURE — 3E0T3BZ INTRODUCTION OF ANESTHETIC AGENT INTO PERIPHERAL NERVES AND PLEXI, PERCUTANEOUS APPROACH: ICD-10-PCS | Performed by: ANESTHESIOLOGY

## 2017-10-25 PROCEDURE — 88305 TISSUE EXAM BY PATHOLOGIST: CPT | Performed by: PATHOLOGY

## 2017-10-25 PROCEDURE — 64450 NJX AA&/STRD OTHER PN/BRANCH: CPT | Performed by: ANESTHESIOLOGY

## 2017-10-25 PROCEDURE — 71000033 HC RECOVERY, INTIAL HOUR: Performed by: ORTHOPAEDIC SURGERY

## 2017-10-25 PROCEDURE — 88305 TISSUE EXAM BY PATHOLOGIST: CPT | Mod: 26,,, | Performed by: PATHOLOGY

## 2017-10-25 PROCEDURE — 88311 DECALCIFY TISSUE: CPT | Mod: 26,,, | Performed by: PATHOLOGY

## 2017-10-25 PROCEDURE — 63600175 PHARM REV CODE 636 W HCPCS: Performed by: ORTHOPAEDIC SURGERY

## 2017-10-25 PROCEDURE — 97162 PT EVAL MOD COMPLEX 30 MIN: CPT

## 2017-10-25 PROCEDURE — D9220A PRA ANESTHESIA: Mod: CRNA,,, | Performed by: NURSE ANESTHETIST, CERTIFIED REGISTERED

## 2017-10-25 PROCEDURE — S0028 INJECTION, FAMOTIDINE, 20 MG: HCPCS | Performed by: NURSE ANESTHETIST, CERTIFIED REGISTERED

## 2017-10-25 PROCEDURE — 37000009 HC ANESTHESIA EA ADD 15 MINS: Performed by: ORTHOPAEDIC SURGERY

## 2017-10-25 PROCEDURE — 27200750 HC INSULATED NEEDLE/ STIMUPLEX: Performed by: ANESTHESIOLOGY

## 2017-10-25 PROCEDURE — 94760 N-INVAS EAR/PLS OXIMETRY 1: CPT

## 2017-10-25 DEVICE — IMPLANTABLE DEVICE: Type: IMPLANTABLE DEVICE | Site: KNEE | Status: FUNCTIONAL

## 2017-10-25 DEVICE — TIBIAL NEXGEN PRECOAT STEM: Type: IMPLANTABLE DEVICE | Site: KNEE | Status: FUNCTIONAL

## 2017-10-25 DEVICE — CEMENT BONE SIMPLE P INDIVID: Type: IMPLANTABLE DEVICE | Site: KNEE | Status: FUNCTIONAL

## 2017-10-25 RX ORDER — MIRTAZAPINE 15 MG/1
30 TABLET, FILM COATED ORAL NIGHTLY
Status: DISCONTINUED | OUTPATIENT
Start: 2017-10-25 | End: 2017-10-26 | Stop reason: HOSPADM

## 2017-10-25 RX ORDER — TRAZODONE HYDROCHLORIDE 50 MG/1
50 TABLET ORAL NIGHTLY PRN
Status: DISCONTINUED | OUTPATIENT
Start: 2017-10-25 | End: 2017-10-26 | Stop reason: HOSPADM

## 2017-10-25 RX ORDER — OXYCODONE HYDROCHLORIDE 5 MG/1
10 TABLET ORAL
Status: DISCONTINUED | OUTPATIENT
Start: 2017-10-25 | End: 2017-10-26 | Stop reason: HOSPADM

## 2017-10-25 RX ORDER — FAMOTIDINE 10 MG/ML
INJECTION INTRAVENOUS
Status: DISCONTINUED | OUTPATIENT
Start: 2017-10-25 | End: 2017-10-25

## 2017-10-25 RX ORDER — ROPIVACAINE HYDROCHLORIDE 2 MG/ML
8 INJECTION, SOLUTION EPIDURAL; INFILTRATION; PERINEURAL CONTINUOUS
Status: DISCONTINUED | OUTPATIENT
Start: 2017-10-25 | End: 2017-10-25

## 2017-10-25 RX ORDER — TRIAMTERENE AND HYDROCHLOROTHIAZIDE 37.5; 25 MG/1; MG/1
1 CAPSULE ORAL EVERY MORNING
Status: DISCONTINUED | OUTPATIENT
Start: 2017-10-26 | End: 2017-10-26 | Stop reason: HOSPADM

## 2017-10-25 RX ORDER — OXYCODONE AND ACETAMINOPHEN 10; 325 MG/1; MG/1
1 TABLET ORAL
Qty: 90 TABLET | Refills: 0 | Status: ON HOLD | OUTPATIENT
Start: 2017-10-25 | End: 2017-11-06 | Stop reason: HOSPADM

## 2017-10-25 RX ORDER — MORPHINE SULFATE 2 MG/ML
2 INJECTION, SOLUTION INTRAMUSCULAR; INTRAVENOUS
Status: DISCONTINUED | OUTPATIENT
Start: 2017-10-25 | End: 2017-10-26

## 2017-10-25 RX ORDER — LIDOCAINE HCL/PF 100 MG/5ML
SYRINGE (ML) INTRAVENOUS
Status: DISCONTINUED | OUTPATIENT
Start: 2017-10-25 | End: 2017-10-25

## 2017-10-25 RX ORDER — POLYETHYLENE GLYCOL 3350 17 G/17G
17 POWDER, FOR SOLUTION ORAL DAILY
Status: DISCONTINUED | OUTPATIENT
Start: 2017-10-25 | End: 2017-10-26 | Stop reason: HOSPADM

## 2017-10-25 RX ORDER — PROPOFOL 10 MG/ML
VIAL (ML) INTRAVENOUS
Status: DISCONTINUED | OUTPATIENT
Start: 2017-10-25 | End: 2017-10-25

## 2017-10-25 RX ORDER — KETAMINE HYDROCHLORIDE 100 MG/ML
INJECTION, SOLUTION INTRAMUSCULAR; INTRAVENOUS
Status: DISCONTINUED | OUTPATIENT
Start: 2017-10-25 | End: 2017-10-25

## 2017-10-25 RX ORDER — FAMOTIDINE 20 MG/1
20 TABLET, FILM COATED ORAL 2 TIMES DAILY
Status: DISCONTINUED | OUTPATIENT
Start: 2017-10-25 | End: 2017-10-26 | Stop reason: HOSPADM

## 2017-10-25 RX ORDER — LEVOTHYROXINE SODIUM 50 UG/1
50 TABLET ORAL
Status: DISCONTINUED | OUTPATIENT
Start: 2017-10-26 | End: 2017-10-26 | Stop reason: HOSPADM

## 2017-10-25 RX ORDER — ASPIRIN 325 MG
325 TABLET ORAL 2 TIMES DAILY
Qty: 60 TABLET | Refills: 0 | Status: SHIPPED | OUTPATIENT
Start: 2017-10-25 | End: 2018-02-07 | Stop reason: CLARIF

## 2017-10-25 RX ORDER — SODIUM CHLORIDE 9 MG/ML
INJECTION, SOLUTION INTRAVENOUS CONTINUOUS
Status: DISCONTINUED | OUTPATIENT
Start: 2017-10-25 | End: 2017-10-26 | Stop reason: HOSPADM

## 2017-10-25 RX ORDER — FENTANYL CITRATE 50 UG/ML
25 INJECTION, SOLUTION INTRAMUSCULAR; INTRAVENOUS EVERY 5 MIN PRN
Status: DISCONTINUED | OUTPATIENT
Start: 2017-10-25 | End: 2017-10-25 | Stop reason: HOSPADM

## 2017-10-25 RX ORDER — SODIUM CHLORIDE 9 MG/ML
INJECTION, SOLUTION INTRAVENOUS
Status: COMPLETED | OUTPATIENT
Start: 2017-10-25 | End: 2017-10-25

## 2017-10-25 RX ORDER — NALOXONE HCL 0.4 MG/ML
0.02 VIAL (ML) INJECTION
Status: DISCONTINUED | OUTPATIENT
Start: 2017-10-25 | End: 2017-10-26 | Stop reason: HOSPADM

## 2017-10-25 RX ORDER — ACETAMINOPHEN 10 MG/ML
1000 INJECTION, SOLUTION INTRAVENOUS ONCE
Status: COMPLETED | OUTPATIENT
Start: 2017-10-25 | End: 2017-10-25

## 2017-10-25 RX ORDER — FENTANYL CITRATE 50 UG/ML
INJECTION, SOLUTION INTRAMUSCULAR; INTRAVENOUS
Status: DISCONTINUED | OUTPATIENT
Start: 2017-10-25 | End: 2017-10-25

## 2017-10-25 RX ORDER — PROPOFOL 10 MG/ML
VIAL (ML) INTRAVENOUS CONTINUOUS PRN
Status: DISCONTINUED | OUTPATIENT
Start: 2017-10-25 | End: 2017-10-25

## 2017-10-25 RX ORDER — PHENYLEPHRINE HYDROCHLORIDE 10 MG/ML
INJECTION INTRAVENOUS
Status: DISCONTINUED | OUTPATIENT
Start: 2017-10-25 | End: 2017-10-25

## 2017-10-25 RX ORDER — PREGABALIN 75 MG/1
75 CAPSULE ORAL
Status: COMPLETED | OUTPATIENT
Start: 2017-10-25 | End: 2017-10-25

## 2017-10-25 RX ORDER — DEXAMETHASONE SODIUM PHOSPHATE 4 MG/ML
INJECTION, SOLUTION INTRA-ARTICULAR; INTRALESIONAL; INTRAMUSCULAR; INTRAVENOUS; SOFT TISSUE
Status: DISCONTINUED | OUTPATIENT
Start: 2017-10-25 | End: 2017-10-25

## 2017-10-25 RX ORDER — SODIUM CHLORIDE 0.9 % (FLUSH) 0.9 %
3 SYRINGE (ML) INJECTION
Status: DISCONTINUED | OUTPATIENT
Start: 2017-10-25 | End: 2017-10-25 | Stop reason: HOSPADM

## 2017-10-25 RX ORDER — DOCUSATE SODIUM 100 MG/1
100 CAPSULE, LIQUID FILLED ORAL 2 TIMES DAILY PRN
Qty: 60 CAPSULE | Refills: 0 | Status: SHIPPED | OUTPATIENT
Start: 2017-10-25 | End: 2019-04-04

## 2017-10-25 RX ORDER — CEFAZOLIN SODIUM 2 G/50ML
2 SOLUTION INTRAVENOUS
Status: COMPLETED | OUTPATIENT
Start: 2017-10-25 | End: 2017-10-26

## 2017-10-25 RX ORDER — SODIUM CHLORIDE 0.9 % (FLUSH) 0.9 %
3 SYRINGE (ML) INJECTION EVERY 8 HOURS PRN
Status: DISCONTINUED | OUTPATIENT
Start: 2017-10-25 | End: 2017-10-26 | Stop reason: HOSPADM

## 2017-10-25 RX ORDER — SIMVASTATIN 40 MG/1
40 TABLET, FILM COATED ORAL NIGHTLY
Status: DISCONTINUED | OUTPATIENT
Start: 2017-10-25 | End: 2017-10-26 | Stop reason: HOSPADM

## 2017-10-25 RX ORDER — RAMELTEON 8 MG/1
8 TABLET ORAL NIGHTLY PRN
Status: DISCONTINUED | OUTPATIENT
Start: 2017-10-25 | End: 2017-10-26 | Stop reason: HOSPADM

## 2017-10-25 RX ORDER — PREGABALIN 75 MG/1
75 CAPSULE ORAL NIGHTLY
Status: DISCONTINUED | OUTPATIENT
Start: 2017-10-25 | End: 2017-10-26 | Stop reason: HOSPADM

## 2017-10-25 RX ORDER — OXYCODONE HYDROCHLORIDE 5 MG/1
5 TABLET ORAL
Status: DISCONTINUED | OUTPATIENT
Start: 2017-10-25 | End: 2017-10-26 | Stop reason: HOSPADM

## 2017-10-25 RX ORDER — ACETAMINOPHEN 500 MG
1000 TABLET ORAL EVERY 6 HOURS
Status: DISCONTINUED | OUTPATIENT
Start: 2017-10-25 | End: 2017-10-26 | Stop reason: HOSPADM

## 2017-10-25 RX ORDER — GENTAMICIN SULFATE 40 MG/ML
INJECTION, SOLUTION INTRAMUSCULAR; INTRAVENOUS
Status: DISCONTINUED | OUTPATIENT
Start: 2017-10-25 | End: 2017-10-25 | Stop reason: HOSPADM

## 2017-10-25 RX ORDER — OXYCODONE HYDROCHLORIDE 5 MG/1
15 TABLET ORAL
Status: DISCONTINUED | OUTPATIENT
Start: 2017-10-25 | End: 2017-10-26 | Stop reason: HOSPADM

## 2017-10-25 RX ORDER — AMOXICILLIN 250 MG
1 CAPSULE ORAL 2 TIMES DAILY
Status: DISCONTINUED | OUTPATIENT
Start: 2017-10-25 | End: 2017-10-26 | Stop reason: HOSPADM

## 2017-10-25 RX ORDER — SIMETHICONE 80 MG
80 TABLET,CHEWABLE ORAL
Status: DISCONTINUED | OUTPATIENT
Start: 2017-10-25 | End: 2017-10-26 | Stop reason: HOSPADM

## 2017-10-25 RX ORDER — HYOSCYAMINE SULFATE 0.12 MG/1
0.12 TABLET SUBLINGUAL EVERY 4 HOURS PRN
Status: DISCONTINUED | OUTPATIENT
Start: 2017-10-25 | End: 2017-10-26 | Stop reason: HOSPADM

## 2017-10-25 RX ORDER — MIDAZOLAM HYDROCHLORIDE 1 MG/ML
1 INJECTION INTRAMUSCULAR; INTRAVENOUS EVERY 5 MIN PRN
Status: DISCONTINUED | OUTPATIENT
Start: 2017-10-25 | End: 2017-10-25 | Stop reason: HOSPADM

## 2017-10-25 RX ORDER — ONDANSETRON 2 MG/ML
4 INJECTION INTRAMUSCULAR; INTRAVENOUS EVERY 8 HOURS PRN
Status: DISCONTINUED | OUTPATIENT
Start: 2017-10-25 | End: 2017-10-26 | Stop reason: HOSPADM

## 2017-10-25 RX ORDER — LIDOCAINE HYDROCHLORIDE 10 MG/ML
1 INJECTION, SOLUTION EPIDURAL; INFILTRATION; INTRACAUDAL; PERINEURAL
Status: DISCONTINUED | OUTPATIENT
Start: 2017-10-25 | End: 2017-10-25 | Stop reason: HOSPADM

## 2017-10-25 RX ORDER — MUPIROCIN 20 MG/G
1 OINTMENT TOPICAL
Status: COMPLETED | OUTPATIENT
Start: 2017-10-25 | End: 2017-10-25

## 2017-10-25 RX ORDER — BISACODYL 10 MG
10 SUPPOSITORY, RECTAL RECTAL EVERY 12 HOURS PRN
Status: DISCONTINUED | OUTPATIENT
Start: 2017-10-25 | End: 2017-10-26 | Stop reason: HOSPADM

## 2017-10-25 RX ORDER — ONDANSETRON 8 MG/1
8 TABLET, ORALLY DISINTEGRATING ORAL EVERY 12 HOURS PRN
Qty: 20 TABLET | Refills: 0 | Status: ON HOLD | OUTPATIENT
Start: 2017-10-25 | End: 2017-11-06 | Stop reason: HOSPADM

## 2017-10-25 RX ORDER — ASPIRIN 325 MG
325 TABLET, DELAYED RELEASE (ENTERIC COATED) ORAL 2 TIMES DAILY
Status: DISCONTINUED | OUTPATIENT
Start: 2017-10-25 | End: 2017-10-26 | Stop reason: HOSPADM

## 2017-10-25 RX ORDER — SULFAMETHOXAZOLE AND TRIMETHOPRIM 800; 160 MG/1; MG/1
1 TABLET ORAL 2 TIMES DAILY
Status: DISCONTINUED | OUTPATIENT
Start: 2017-10-25 | End: 2017-10-26 | Stop reason: HOSPADM

## 2017-10-25 RX ORDER — ONDANSETRON 2 MG/ML
INJECTION INTRAMUSCULAR; INTRAVENOUS
Status: DISCONTINUED | OUTPATIENT
Start: 2017-10-25 | End: 2017-10-25

## 2017-10-25 RX ADMIN — PROPOFOL 20 MG: 10 INJECTION, EMULSION INTRAVENOUS at 02:10

## 2017-10-25 RX ADMIN — STANDARDIZED SENNA CONCENTRATE AND DOCUSATE SODIUM 1 TABLET: 8.6; 5 TABLET, FILM COATED ORAL at 09:10

## 2017-10-25 RX ADMIN — OXYCODONE HYDROCHLORIDE 10 MG: 5 TABLET ORAL at 04:10

## 2017-10-25 RX ADMIN — PHENYLEPHRINE HYDROCHLORIDE 100 MCG: 10 INJECTION INTRAVENOUS at 01:10

## 2017-10-25 RX ADMIN — SODIUM CHLORIDE: 0.9 INJECTION, SOLUTION INTRAVENOUS at 03:10

## 2017-10-25 RX ADMIN — FENTANYL CITRATE 25 MCG: 50 INJECTION, SOLUTION INTRAMUSCULAR; INTRAVENOUS at 02:10

## 2017-10-25 RX ADMIN — PHENYLEPHRINE HYDROCHLORIDE 100 MCG: 10 INJECTION INTRAVENOUS at 02:10

## 2017-10-25 RX ADMIN — LIDOCAINE HYDROCHLORIDE 40 MG: 20 INJECTION, SOLUTION INTRAVENOUS at 01:10

## 2017-10-25 RX ADMIN — KETAMINE HYDROCHLORIDE 20 MG: 100 INJECTION, SOLUTION, CONCENTRATE INTRAMUSCULAR; INTRAVENOUS at 01:10

## 2017-10-25 RX ADMIN — ASPIRIN 325 MG: 325 TABLET, DELAYED RELEASE ORAL at 09:10

## 2017-10-25 RX ADMIN — SODIUM CHLORIDE: 0.9 INJECTION, SOLUTION INTRAVENOUS at 02:10

## 2017-10-25 RX ADMIN — CEFAZOLIN SODIUM 2 G: 2 SOLUTION INTRAVENOUS at 09:10

## 2017-10-25 RX ADMIN — EPHEDRINE SULFATE 10 MG: 50 INJECTION, SOLUTION INTRAMUSCULAR; INTRAVENOUS; SUBCUTANEOUS at 02:10

## 2017-10-25 RX ADMIN — SODIUM CHLORIDE: 0.9 INJECTION, SOLUTION INTRAVENOUS at 01:10

## 2017-10-25 RX ADMIN — MORPHINE SULFATE 2 MG: 2 INJECTION, SOLUTION INTRAMUSCULAR; INTRAVENOUS at 06:10

## 2017-10-25 RX ADMIN — Medication 2 G: at 01:10

## 2017-10-25 RX ADMIN — SULFAMETHOXAZOLE AND TRIMETHOPRIM 1 TABLET: 800; 160 TABLET ORAL at 09:10

## 2017-10-25 RX ADMIN — ROPIVACAINE HYDROCHLORIDE 8 ML/HR: 2 INJECTION, SOLUTION EPIDURAL; INFILTRATION at 04:10

## 2017-10-25 RX ADMIN — OXYCODONE HYDROCHLORIDE 10 MG: 5 TABLET ORAL at 11:10

## 2017-10-25 RX ADMIN — PREGABALIN 75 MG: 75 CAPSULE ORAL at 09:10

## 2017-10-25 RX ADMIN — FAMOTIDINE 20 MG: 10 INJECTION, SOLUTION INTRAVENOUS at 01:10

## 2017-10-25 RX ADMIN — PROPOFOL 20 MG: 10 INJECTION, EMULSION INTRAVENOUS at 01:10

## 2017-10-25 RX ADMIN — ACETAMINOPHEN 1000 MG: 10 INJECTION, SOLUTION INTRAVENOUS at 04:10

## 2017-10-25 RX ADMIN — FENTANYL CITRATE 25 MCG: 50 INJECTION INTRAMUSCULAR; INTRAVENOUS at 11:10

## 2017-10-25 RX ADMIN — ACETAMINOPHEN 1000 MG: 500 TABLET ORAL at 09:10

## 2017-10-25 RX ADMIN — EPHEDRINE SULFATE 5 MG: 50 INJECTION, SOLUTION INTRAMUSCULAR; INTRAVENOUS; SUBCUTANEOUS at 02:10

## 2017-10-25 RX ADMIN — KETAMINE HYDROCHLORIDE 20 MG: 100 INJECTION, SOLUTION, CONCENTRATE INTRAMUSCULAR; INTRAVENOUS at 02:10

## 2017-10-25 RX ADMIN — MIRTAZAPINE 30 MG: 15 TABLET, FILM COATED ORAL at 09:10

## 2017-10-25 RX ADMIN — SIMVASTATIN 40 MG: 40 TABLET, FILM COATED ORAL at 09:10

## 2017-10-25 RX ADMIN — FENTANYL CITRATE 50 MCG: 50 INJECTION, SOLUTION INTRAMUSCULAR; INTRAVENOUS at 01:10

## 2017-10-25 RX ADMIN — EPHEDRINE SULFATE 10 MG: 50 INJECTION, SOLUTION INTRAMUSCULAR; INTRAVENOUS; SUBCUTANEOUS at 01:10

## 2017-10-25 RX ADMIN — MIDAZOLAM HYDROCHLORIDE 1 MG: 1 INJECTION, SOLUTION INTRAMUSCULAR; INTRAVENOUS at 11:10

## 2017-10-25 RX ADMIN — PHENYLEPHRINE HYDROCHLORIDE 100 MCG: 10 INJECTION INTRAVENOUS at 03:10

## 2017-10-25 RX ADMIN — MUPIROCIN 1 G: 20 OINTMENT TOPICAL at 11:10

## 2017-10-25 RX ADMIN — VANCOMYCIN HYDROCHLORIDE 1500 MG: 10 INJECTION, POWDER, LYOPHILIZED, FOR SOLUTION INTRAVENOUS at 11:10

## 2017-10-25 RX ADMIN — PROPOFOL 10 MG: 10 INJECTION, EMULSION INTRAVENOUS at 01:10

## 2017-10-25 RX ADMIN — PREGABALIN 75 MG: 75 CAPSULE ORAL at 11:10

## 2017-10-25 RX ADMIN — FAMOTIDINE 20 MG: 20 TABLET, FILM COATED ORAL at 09:10

## 2017-10-25 RX ADMIN — SODIUM CHLORIDE: 0.9 INJECTION, SOLUTION INTRAVENOUS at 12:10

## 2017-10-25 RX ADMIN — ONDANSETRON 4 MG: 2 INJECTION INTRAMUSCULAR; INTRAVENOUS at 01:10

## 2017-10-25 RX ADMIN — DEXAMETHASONE SODIUM PHOSPHATE 8 MG: 4 INJECTION, SOLUTION INTRAMUSCULAR; INTRAVENOUS at 01:10

## 2017-10-25 RX ADMIN — PROPOFOL 50 MCG/KG/MIN: 10 INJECTION, EMULSION INTRAVENOUS at 01:10

## 2017-10-25 RX ADMIN — PROPOFOL 10 MG: 10 INJECTION, EMULSION INTRAVENOUS at 02:10

## 2017-10-25 NOTE — ANESTHESIA PROCEDURE NOTES
Spinal    Diagnosis: arthritis  Patient location during procedure: OR  Start time: 10/25/2017 1:07 PM  Timeout: 10/25/2017 1:05 PM  End time: 10/25/2017 1:09 PM  Staffing  Anesthesiologist: SABINA JONES  Performed: anesthesiologist   Preanesthetic Checklist  Completed: patient identified, site marked, surgical consent, pre-op evaluation, timeout performed, IV checked, risks and benefits discussed and monitors and equipment checked  Spinal Block  Patient position: sitting  Prep: ChloraPrep  Patient monitoring: heart rate, cardiac monitor and continuous pulse ox  Approach: midline  Location: L4-5  Injection technique: single shot  CSF Fluid: clear free-flowing CSF  Needle  Needle type: pencil-tip   Needle gauge: 25 G  Needle length: 3.5 in  Additional Documentation: incremental injection, negative aspiration for heme and no paresthesia on injection  Needle localization: anatomical landmarks  Assessment  Patient's tolerance of the procedure: no complaints and comfortable throughout block  Medications:  Bolus administered: 2.4 mL of 2.0 mepivacaine  Epinephrine added: none

## 2017-10-25 NOTE — PROGRESS NOTES
Helene at bedside to reassess perineural catheter, line removed by Helene after several unsuccessful attempts to flush the catheter.

## 2017-10-25 NOTE — TRANSFER OF CARE
"Anesthesia Transfer of Care Note    Patient: Chucho Prado    Procedure(s) Performed: Procedure(s) (LRB):  REPLACEMENT-KNEE-TOTAL (Left)    Patient location: PACU    Anesthesia Type: spinal    Transport from OR: Transported from OR on room air with adequate spontaneous ventilation    Post pain: adequate analgesia    Post assessment: no apparent anesthetic complications and tolerated procedure well    Post vital signs: stable    Level of consciousness: awake, alert and oriented    Nausea/Vomiting: no nausea/vomiting    Complications: none    Transfer of care protocol was followed      Last vitals:   Visit Vitals  /60 (BP Location: Left arm, Patient Position: Lying)   Pulse 80   Temp 36.4 °C (97.5 °F) (Oral)   Resp 16   Ht 5' 9" (1.753 m)   Wt 86.2 kg (190 lb)   SpO2 96%   BMI 28.06 kg/m²     "

## 2017-10-25 NOTE — OPERATIVE NOTE ADDENDUM
Certification of Assistant at Surgery       Surgery Date: 10/25/2017     Participating Surgeons:  Surgeon(s) and Role:     * [facility] MD - Primary    Procedures:  Procedure(s) (LRB):  REPLACEMENT-KNEE-TOTAL (Left)    Assistant Surgeon's Certification of Necessity:  I understand that section 1842 (b) (6) (d) of the Social Security Act generally prohibits Medicare Part B reasonable charge payment for the services of assistants at surgery in teaching hospitals when qualified residents are available to furnish such services. I certify that the services for which payment is claimed were medically necessary, and that no qualified resident was available to perform the services. I further understand that these services are subject to post-payment review by the Medicare carrier.      Neeta Dunne PA-C    10/25/2017  3:25 PM

## 2017-10-25 NOTE — ANESTHESIA RELEASE NOTE
"Anesthesia Release from PACU Note    Patient: Chucho Prado    Procedure(s) Performed: Procedure(s) (LRB):  REPLACEMENT-KNEE-TOTAL (Left)    Anesthesia type: spinal      Post pain: Adequate analgesia reported    Post assessment: no apparent anesthetic complications, tolerated procedure well and no evidence of recall    Post vital signs: /62   Pulse 96   Temp 36.4 °C (97.5 °F) (Oral)   Resp 18   Ht 5' 9" (1.753 m)   Wt 86.2 kg (190 lb)   SpO2 95%   BMI 28.06 kg/m²     Level of consciousness: awake, alert and oriented    Nausea/Vomiting: no nausea/no vomiting    Complications: none    Airway Patency: patent    Respiratory: unassisted, spontaneous ventilation, room air    Cardiovascular: stable and blood pressure at baseline    Hydration: euvolemic    "

## 2017-10-25 NOTE — PROGRESS NOTES
Rec'd call from PACU, PNC occluded.  Attempts to flush catheter unsuccessful.  After donning sterile gloves, catheter cleaned, trimmed and replaced alligator clamp.  Still unable to flush catheter.  Attempted a second site, more distal without resolution of occlusion.  Catheter was pulled, tip intact.  Pt tolerated well.  Pt relates pain 2/10 presently.  Educated regarding continued pain management.  Per Dr. Esteban, will make patient NPO after midnight and, if pain is not well managed overnight, will replace adductor PNC in the AM.  Understanding verbalized.

## 2017-10-25 NOTE — PLAN OF CARE
Problem: Occupational Therapy Goal  Goal: Occupational Therapy Goal  Goals to be met by: 7 days    Patient will increase functional independence with ADLs by performing:    UE Dressing with Supervision.  LE Dressing with Supervision with AD as needed.  Grooming while standing with Supervision.  Toileting from bedside commode with Supervision for hygiene and clothing management.   Stand pivot transfers with Supervision.  Toilet transfer to bedside commode with Supervision.       DINO Garcia  10/25/2017

## 2017-10-25 NOTE — PLAN OF CARE
[facility]    HOME HEALTH ORDERS  FACE TO FACE ENCOUNTER    Patient Name: Chucho Prado  YOB: 1933    PCP: Obed Mcguire MD   [place] [address] [facility] [place] [address]  PCP Phone Number: [phone]  PCP Fax: [phone]    Encounter Date: 10/25/2017    Admit to Home Health    Diagnoses:  Active Hospital Problems    Diagnosis  POA    Primary osteoarthritis of left knee [M17.12]  Yes      Resolved Hospital Problems    Diagnosis Date Resolved POA   No resolved problems to display.       Future Appointments  Date Time Provider Department Center   11/7/2017 1:30 PM Neeta Dunne PA-C [facility] ORTHO [facility] [address]   11/8/2017 1:40 PM [facility]   11/29/2017 1:30 PM Trinity Vazquez MD [facility]     Follow-up Information     Follow up In 2 weeks.                   I have seen and examined this patient face to face today. My clinical findings that support the need for the home health skilled services and home bound status are the following:  Weakness/numbness causing balance and gait disturbance due to Joint Replacement making it taxing to leave home.  Requiring assistive device to leave home due to unsteady gait caused by Joint Replacement.    Allergies:Review of patient's allergies indicates:  No Known Allergies    Diet: regular diet    Activities: activity as tolerated and no driving for today    Home Health Admitting Clinician:   SN/PT to complete comprehensive assessment including routine vital signs. Instruct on disease process and s/s of complications to report to MD. Follow specific home health arthoplasty protocol. Review/verify medication list sent home with the patient at time of discharge  and instruct patient/caregiver as needed.  Notify MD if SBP > 160 or < 90; DBP > 90 or < 50; HR > 120 or < 50; Temp > 101    Home Medical Equipment:  Walker, 3-1 bedside commode, transfer tub bench    CONSULTS:    Physical  Therapy may admit if patient not on coumadin, PT to perform comprehensive assessment if performing admit visit and generate therapy plan of care. Evaluate for home safety and equipment needs; Establish/upgrade home exercise program. Perform/instruct on therapeutic exercises, gait training, transfer training, and Range of Motion.    WOUND CARE ORDERS:  Assess Surgical Incision/DSRG each TX  Aquacel AG drsg applied post-op leave on 14 days post op. Call MD if any drainage reaches border to border of drsg horizontally, s/s of infection, temp >101, induration, swelling or redness.  If dressing is removed per MD order, then apply island dressing, change/teach caregiver to perform daily dressing change if island dressing present.    Medications: Review discharge medications with patient and family and provide education.      Current Discharge Medication List      START taking these medications    Details   aspirin 325 MG tablet Take 1 tablet (325 mg total) by mouth 2 (two) times daily.  Qty: 60 tablet, Refills: 0      docusate sodium (COLACE) 100 MG capsule Take 1 capsule (100 mg total) by mouth 2 (two) times daily as needed for Constipation.  Qty: 60 capsule, Refills: 0      ondansetron (ZOFRAN-ODT) 8 MG TbDL Take 1 tablet (8 mg total) by mouth every 12 (twelve) hours as needed (nausea).  Qty: 20 tablet, Refills: 0      oxyCODONE-acetaminophen (PERCOCET)  mg per tablet Take 1 tablet by mouth every 4 to 6 hours as needed for Pain.  Qty: 90 tablet, Refills: 0         CONTINUE these medications which have NOT CHANGED    Details   hyoscyamine (LEVSIN/SL) 0.125 mg Subl Place 1 tablet (0.125 mg total) under the tongue every 4 (four) hours as needed (bladder spasms).  Qty: 30 tablet, Refills: 1      levothyroxine (SYNTHROID) 50 MCG tablet Take 1 tablet (50 mcg total) by mouth once daily.  Qty: 100 tablet, Refills: 11    Associated Diagnoses: Hypothyroidism, unspecified type      mirtazapine (REMERON) 30 MG tablet Take 1  tablet (30 mg total) by mouth every evening.  Qty: 30 tablet, Refills: 6    Associated Diagnoses: Depression, unspecified depression type      simethicone (MYLICON) 80 MG chewable tablet Take 1 tablet (80 mg total) by mouth 3 (three) times daily after meals.  Refills: 0      simvastatin (ZOCOR) 40 MG tablet Take 1 tablet (40 mg total) by mouth every evening.  Qty: 90 tablet, Refills: 11    Associated Diagnoses: Dyslipidemia      sulfamethoxazole-trimethoprim 800-160mg (BACTRIM DS) 800-160 mg Tab Take 1 tablet by mouth 2 (two) times daily.  Qty: 14 tablet, Refills: 0    Associated Diagnoses: Bacteria in urine      trazodone (DESYREL) 50 MG tablet Take 1 tablet (50 mg total) by mouth nightly as needed for Insomnia.  Qty: 30 tablet, Refills: 11      triamterene-hydrochlorothiazide 37.5-25 mg (DYAZIDE) 37.5-25 mg per capsule Take 1 capsule by mouth every morning. HOLD UNTIL TOLD TO RESUME BY PHYSICIAN  Qty: 90 capsule, Refills: 3    Associated Diagnoses: Essential hypertension         STOP taking these medications       acetaminophen (TYLENOL) 500 MG tablet Comments:   Reason for Stopping:         aspirin 81 MG Chew Comments:   Reason for Stopping:         diclofenac sodium (VOLTAREN) 1 % Gel Comments:   Reason for Stopping:         gabapentin (NEURONTIN) 400 MG capsule Comments:   Reason for Stopping:               I certify that this patient is confined to his home and needs intermittent skilled nursing care and physical therapy.

## 2017-10-25 NOTE — ADDENDUM NOTE
Addendum  created 10/25/17 [id number] by Helene Starr RN    Actions taken from a BestPractice Advisory, LDA properties accepted, Order list changed, Sign clinical note

## 2017-10-25 NOTE — NURSING TRANSFER
Nursing Transfer Note      10/25/2017     Transfer To: 504    Transfer via stretcher    Transfer with room air    Transported by Nurse    Medicines sent: Yes    Chart send with patient: Yes    Notified: Patient's guardian

## 2017-10-25 NOTE — PROGRESS NOTES
Spoke to Dr. [facility] about patient's kidney function and vancomycin order. Dr. [facility] would still like vancomycin given.

## 2017-10-25 NOTE — BRIEF OP NOTE
[facility]  Surgery Department  Operative Note    SUMMARY     Date of Procedure: 10/25/2017     Procedure: Procedure(s) (LRB):  REPLACEMENT-KNEE-TOTAL (Left)     Surgeon(s) and Role:     * [facility] L. [facility] Jr., MD - Primary    Assisting Surgeon: None    Pre-Operative Diagnosis: Osteoarthritis of knee, unspecified (CODE) [M17.9]    Post-Operative Diagnosis: Post-Op Diagnosis Codes:     * Osteoarthritis of knee, unspecified (CODE) [M17.9]    Anesthesia: Spinal    Technical Procedures Used:  LCCK       Description of the Findings of the Procedure: Valgus    Significant Surgical Tasks Conducted by the Assistant(s), if Applicable: closure    Complications: No    Estimated Blood Loss (EBL): 100cc             Implants:   Implant Name Type Inv. Item Serial No.  Lot No. LRB No. Used   CEMENT BONE SIMPLE P INDIVID - [id number]  CEMENT BONE SIMPLE P INDIVID  Two[employer]. [id number] Left 2   STEM FEM SHARP NEXGEN [id number] - [id number]  STEM FEM SHARP NEXGEN [id number]  NICOLE,INC [id number] Left 1   STEM FEM EXT NEXGEN [id number] - [id number]  STEM FEM EXT NEXGEN [id number]  NICOLE,INC [id number] Left 1   TIBIAL NEXGEN PRECOAT STEM - [id number]  TIBIAL NEXGEN PRECOAT STEM  NICOLE,INC [id number] Left 1   FEMORAL LCCK - [id number]  FEMORAL LCCK  NICOLE,INC [id number] Left 1   SCREW HEX HEAD - [id number]  SCREW HEX HEAD  NICOLE,INC [id number] Left 1   DRILL PIN TROCAR 3.2MM - [id number]  DRILL PIN TROCAR 3.2MM  NICOLE,INC  Left 2   SCREW HEX HEAD 3.5X38MM - [id number]  SCREW HEX HEAD 3.5X38MM  NICOLE,INC  Left 2   ARTICULAR CCK - [id number]   ARTICULAR CCK   NICOLE,INC [id number] Left 1       Specimens:   Specimen (12h ago through future)    Start     Ordered    10/25/17 1416  Specimen to Pathology - Surgery  Once     Comments:  1.  Bone an soft tissue, Left Knee - permanent      10/25/17 1415                  Condition: Good    Disposition: PACU - hemodynamically stable.    Attestation: I was present and scrubbed for the  entire procedure.

## 2017-10-25 NOTE — PROGRESS NOTES
Patient's perineural catheter is occluded, surgical not available at this time. Helene, pain control nurse notified and came to bedside to assess patient's perineural catheter. Infusion currently stopped, patient denies pain at present. Will continue to monitor.

## 2017-10-25 NOTE — ANESTHESIA POSTPROCEDURE EVALUATION
"Anesthesia Post Evaluation    Patient: Chucho Prado    Procedure(s) Performed: Procedure(s) (LRB):  REPLACEMENT-KNEE-TOTAL (Left)    Final Anesthesia Type: spinal  Patient location during evaluation: PACU  Patient participation: Yes- Able to Participate  Level of consciousness: awake and alert and oriented  Post-procedure vital signs: reviewed and stable  Pain management: adequate  Airway patency: patent  PONV status at discharge: No PONV  Anesthetic complications: no      Cardiovascular status: stable  Respiratory status: unassisted, spontaneous ventilation and room air  Hydration status: euvolemic  Follow-up not needed.        Visit Vitals  /62   Pulse 96   Temp 36.4 °C (97.5 °F) (Oral)   Resp 18   Ht 5' 9" (1.753 m)   Wt 86.2 kg (190 lb)   SpO2 95%   BMI 28.06 kg/m²       Pain/Nathaniel Score: Pain Assessment Performed: Yes (10/25/2017 12:40 PM)  Presence of Pain: denies (10/25/2017 12:40 PM)  Pain Rating Prior to Med Admin: 0 (10/25/2017  4:10 PM)  Nathaniel Score: 9 (10/25/2017 12:40 PM)      "

## 2017-10-25 NOTE — PLAN OF CARE
Problem: Physical Therapy Goal  Goal: Physical Therapy Goal  Goals to be met by: 10/31/17     Patient will increase functional independence with mobility by performin. Supine to sit with Supervision  2. Sit to supine with Supervision  3. Sit to stand transfer with Stand-by Assistance  4. Gait  x 150 feet with Stand-by Assistance using Rolling Walker.   5. Lower extremity exercise program x 30 reps per handout, with independence    Outcome: Ongoing (interventions implemented as appropriate)  PT eval complete and POC initiated.

## 2017-10-25 NOTE — SUBJECTIVE & OBJECTIVE
"Principal Problem:<principal problem not specified>    Principal Orthopedic Problem: s/p L TKA    Interval History:   POD #1 s/p L TKA, OSMANION. NPO overnight for placement of PNC this am.     Review of patient's allergies indicates:  No Known Allergies    Current Facility-Administered Medications   Medication    0.9%  NaCl infusion    acetaminophen (10 mg/mL) injection 1,000 mg    Followed by    acetaminophen tablet 1,000 mg    aspirin EC tablet 325 mg    bisacodyl suppository 10 mg    cefazolin (ANCEF) 2 gram in dextrose 5% 50 mL IVPB (premix)    famotidine tablet 20 mg    fentaNYL injection 25 mcg    fentaNYL injection 25 mcg    lidocaine (PF) 10 mg/ml (1%) injection 10 mg    midazolam (VERSED) 1 mg/mL injection 1 mg    morphine injection 2 mg    morphine injection 2 mg    morphine injection 2 mg    naloxone 0.4 mg/mL injection 0.02 mg    ondansetron injection 4 mg    oxyCODONE immediate release tablet 10 mg    oxyCODONE immediate release tablet 15 mg    oxyCODONE immediate release tablet 5 mg    polyethylene glycol packet 17 g    pregabalin capsule 75 mg    promethazine (PHENERGAN) 6.25 mg in dextrose 5 % 50 mL IVPB    ramelteon tablet 8 mg    ropivacaine (PF) 2 mg/ml (0.2%) infusion    senna-docusate 8.6-50 mg per tablet 1 tablet    sodium chloride 0.9% flush 3 mL    sodium chloride 0.9% flush 3 mL    [START ON 10/26/2017] vancomycin (VANCOCIN) 1,500 mg in dextrose 5 % 250 mL IVPB     Objective:     Vital Signs (Most Recent):  Temp: 97.5 °F (36.4 °C) (10/25/17 1533)  Pulse: 104 (10/25/17 1533)  Resp: 20 (10/25/17 1533)  BP: 127/60 (10/25/17 1533)  SpO2: 97 % (10/25/17 1533) Vital Signs (24h Range):  Temp:  [97.5 °F (36.4 °C)] 97.5 °F (36.4 °C)  Pulse:  [] 104  Resp:  [15-20] 20  SpO2:  [96 %-100 %] 97 %  BP: (102-150)/(41-67) 127/60     Weight: 86.2 kg (190 lb)  Height: 5' 9" (175.3 cm)  Body mass index is 28.06 kg/m².      Intake/Output Summary (Last 24 hours) at 10/25/17 " 1602  Last data filed at 10/25/17 1526   Gross per 24 hour   Intake             2600 ml   Output              700 ml   Net             1900 ml       Ortho/SPM Exam   AAOx4  NAD  RRR  No increased WOB  Aquacel c/d/i- bulky dressing d/c this am  Polar ice in place  SILT and motor intact T/SP/DP  WWP extremities  FCDs in place and functioning    Significant Labs:   BMP:     Recent Labs  Lab 10/25/17  1526   *   *   K 3.1*   CL 97   CO2 25   BUN 15   CREATININE 1.3   CALCIUM 8.3*     CBC: No results for input(s): WBC, HGB, HCT, PLT in the last 48 hours.  All pertinent labs within the past 24 hours have been reviewed.    Significant Imaging: I have reviewed and interpreted all pertinent imaging results/findings.

## 2017-10-25 NOTE — ANESTHESIA PROCEDURE NOTES
IPACK Single Injection Block    Patient location during procedure: pre-op   Block not for primary anesthetic.  Reason for block: at surgeon's request and post-op pain management   Post-op Pain Location: left knee pain  Start time: 10/25/2017 11:46 AM  Timeout: 10/25/2017 11:35 AM   End time: 10/25/2017 11:50 AM  Staffing  Anesthesiologist: KIKA LINK  Performed: anesthesiologist   Preanesthetic Checklist  Completed: patient identified, site marked, surgical consent, pre-op evaluation, timeout performed, IV checked, risks and benefits discussed and monitors and equipment checked  Peripheral Block  Patient position: supine  Prep: ChloraPrep  Patient monitoring: heart rate, cardiac monitor, continuous pulse ox, continuous capnometry and frequent blood pressure checks  Block type: I PACK  Laterality: left  Injection technique: single shot  Needle  Needle type: Stimuplex   Needle gauge: 21 G  Needle length: 4 in  Needle localization: anatomical landmarks and ultrasound guidance   -ultrasound image captured on disc.  Assessment  Injection assessment: negative aspiration, negative parasthesia and local visualized surrounding nerve  Paresthesia pain: none  Heart rate change: no  Slow fractionated injection: yes  Medications:  Bolus administered: 20 mL of 0.25 ropivacaine  Epinephrine added: 3.75 mcg/mL (1/300,000)  Additional Notes  VSS.  DOSC RN monitoring vitals throughout procedure.  Patient tolerated procedure well.

## 2017-10-25 NOTE — ANESTHESIA PROCEDURE NOTES
Adductor Canal Catheter    Patient location during procedure: pre-op   Block not for primary anesthetic.  Reason for block: at surgeon's request and post-op pain management   Post-op Pain Location: left knee pain  Start time: 10/25/2017 11:38 AM  Timeout: 10/25/2017 11:35 AM   End time: 10/25/2017 11:45 AM  Staffing  Anesthesiologist: KIKA LINK  Performed: anesthesiologist   Preanesthetic Checklist  Completed: patient identified, site marked, surgical consent, pre-op evaluation, timeout performed, IV checked, risks and benefits discussed and monitors and equipment checked  Peripheral Block  Patient position: supine  Prep: ChloraPrep and site prepped and draped  Patient monitoring: heart rate, cardiac monitor, continuous pulse ox, continuous capnometry and frequent blood pressure checks  Block type: adductor canal  Laterality: left  Injection technique: continuous  Needle  Needle type: Tuohy   Needle gauge: 17 G  Needle length: 3.5 in  Needle localization: anatomical landmarks and ultrasound guidance  Catheter type: spring wound  Catheter size: 19 G  Test dose: lidocaine 1.5% with Epi 1-to-200,000 and negative   -ultrasound image captured on disc.  Assessment  Injection assessment: negative aspiration, negative parasthesia and local visualized surrounding nerve  Paresthesia pain: none  Heart rate change: no  Slow fractionated injection: yes  Medications:  Bolus administered: 20 mL of 0.25 ropivacaine  Epinephrine added: 3.75 mcg/mL (1/300,000)  Additional Notes  VSS.  DOSC RN monitoring vitals throughout procedure.  Patient tolerated procedure well.

## 2017-10-26 ENCOUNTER — ANESTHESIA EVENT (OUTPATIENT)
Dept: ENDOSCOPY | Facility: HOSPITAL | Age: 82
DRG: 470 | End: 2017-10-26
Payer: MEDICARE

## 2017-10-26 ENCOUNTER — ANESTHESIA (OUTPATIENT)
Dept: ENDOSCOPY | Facility: HOSPITAL | Age: 82
DRG: 470 | End: 2017-10-26
Payer: MEDICARE

## 2017-10-26 ENCOUNTER — HOSPITAL ENCOUNTER (INPATIENT)
Facility: HOSPITAL | Age: 82
LOS: 7 days | Discharge: ANOTHER HOSPITAL | DRG: 560 | End: 2017-11-02
Attending: HOSPITALIST | Admitting: HOSPITALIST
Payer: MEDICARE

## 2017-10-26 VITALS
HEIGHT: 69 IN | TEMPERATURE: 98 F | RESPIRATION RATE: 18 BRPM | SYSTOLIC BLOOD PRESSURE: 135 MMHG | OXYGEN SATURATION: 95 % | BODY MASS INDEX: 28.14 KG/M2 | HEART RATE: 88 BPM | WEIGHT: 190 LBS | DIASTOLIC BLOOD PRESSURE: 68 MMHG

## 2017-10-26 DIAGNOSIS — M17.12 PRIMARY OSTEOARTHRITIS OF LEFT KNEE: ICD-10-CM

## 2017-10-26 DIAGNOSIS — R53.81 DEBILITY: ICD-10-CM

## 2017-10-26 PROCEDURE — 63600175 PHARM REV CODE 636 W HCPCS: Performed by: PHYSICIAN ASSISTANT

## 2017-10-26 PROCEDURE — 27800517 HC TRAY,EPIDURAL-CONTINUOUS: Performed by: ANESTHESIOLOGY

## 2017-10-26 PROCEDURE — 63600175 PHARM REV CODE 636 W HCPCS: Performed by: ANESTHESIOLOGY

## 2017-10-26 PROCEDURE — 97110 THERAPEUTIC EXERCISES: CPT

## 2017-10-26 PROCEDURE — 25000003 PHARM REV CODE 250: Performed by: STUDENT IN AN ORGANIZED HEALTH CARE EDUCATION/TRAINING PROGRAM

## 2017-10-26 PROCEDURE — 97116 GAIT TRAINING THERAPY: CPT

## 2017-10-26 PROCEDURE — 97535 SELF CARE MNGMENT TRAINING: CPT

## 2017-10-26 PROCEDURE — 94799 UNLISTED PULMONARY SVC/PX: CPT

## 2017-10-26 PROCEDURE — 99231 SBSQ HOSP IP/OBS SF/LOW 25: CPT | Mod: 25,,, | Performed by: ANESTHESIOLOGY

## 2017-10-26 PROCEDURE — 25000003 PHARM REV CODE 250: Performed by: PHYSICIAN ASSISTANT

## 2017-10-26 PROCEDURE — 76942 ECHO GUIDE FOR BIOPSY: CPT | Performed by: ANESTHESIOLOGY

## 2017-10-26 PROCEDURE — 63600175 PHARM REV CODE 636 W HCPCS

## 2017-10-26 PROCEDURE — 12000000 HC SNF SEMI-PRIVATE ROOM

## 2017-10-26 PROCEDURE — 94760 N-INVAS EAR/PLS OXIMETRY 1: CPT

## 2017-10-26 PROCEDURE — 64448 NJX AA&/STRD FEM NRV NFS IMG: CPT | Mod: LT,,, | Performed by: ANESTHESIOLOGY

## 2017-10-26 RX ORDER — HYOSCYAMINE SULFATE 0.12 MG/1
0.12 TABLET SUBLINGUAL EVERY 4 HOURS PRN
Status: CANCELLED | OUTPATIENT
Start: 2017-10-26

## 2017-10-26 RX ORDER — ROPIVACAINE HYDROCHLORIDE 2 MG/ML
INJECTION, SOLUTION EPIDURAL; INFILTRATION; PERINEURAL
Status: COMPLETED
Start: 2017-10-26 | End: 2017-10-26

## 2017-10-26 RX ORDER — ACETAMINOPHEN 325 MG/1
650 TABLET ORAL EVERY 6 HOURS PRN
Status: CANCELLED | OUTPATIENT
Start: 2017-10-26

## 2017-10-26 RX ORDER — TRAZODONE HYDROCHLORIDE 50 MG/1
50 TABLET ORAL NIGHTLY PRN
Status: DISCONTINUED | OUTPATIENT
Start: 2017-10-26 | End: 2017-11-03 | Stop reason: HOSPADM

## 2017-10-26 RX ORDER — SIMETHICONE 80 MG
80 TABLET,CHEWABLE ORAL
Status: CANCELLED | OUTPATIENT
Start: 2017-10-26

## 2017-10-26 RX ORDER — SIMVASTATIN 40 MG/1
40 TABLET, FILM COATED ORAL NIGHTLY
Status: CANCELLED | OUTPATIENT
Start: 2017-10-26

## 2017-10-26 RX ORDER — ONDANSETRON 2 MG/ML
4 INJECTION INTRAMUSCULAR; INTRAVENOUS EVERY 12 HOURS PRN
Status: DISCONTINUED | OUTPATIENT
Start: 2017-10-26 | End: 2017-10-26 | Stop reason: HOSPADM

## 2017-10-26 RX ORDER — AMOXICILLIN 250 MG
1 CAPSULE ORAL 2 TIMES DAILY
Status: DISCONTINUED | OUTPATIENT
Start: 2017-10-26 | End: 2017-10-26 | Stop reason: SDUPTHER

## 2017-10-26 RX ORDER — FAMOTIDINE 20 MG/1
20 TABLET, FILM COATED ORAL 2 TIMES DAILY
Status: DISCONTINUED | OUTPATIENT
Start: 2017-10-26 | End: 2017-11-03 | Stop reason: HOSPADM

## 2017-10-26 RX ORDER — POTASSIUM CHLORIDE 750 MG/1
50 CAPSULE, EXTENDED RELEASE ORAL DAILY
Status: DISCONTINUED | OUTPATIENT
Start: 2017-10-26 | End: 2017-10-26 | Stop reason: HOSPADM

## 2017-10-26 RX ORDER — POLYETHYLENE GLYCOL 3350 17 G/17G
17 POWDER, FOR SOLUTION ORAL DAILY
Status: DISCONTINUED | OUTPATIENT
Start: 2017-10-27 | End: 2017-11-03 | Stop reason: HOSPADM

## 2017-10-26 RX ORDER — OXYCODONE HYDROCHLORIDE 5 MG/1
5 TABLET ORAL
Status: DISCONTINUED | OUTPATIENT
Start: 2017-10-26 | End: 2017-10-30

## 2017-10-26 RX ORDER — AMOXICILLIN 250 MG
1 CAPSULE ORAL 2 TIMES DAILY
Status: CANCELLED | OUTPATIENT
Start: 2017-10-26

## 2017-10-26 RX ORDER — TRIAMTERENE AND HYDROCHLOROTHIAZIDE 37.5; 25 MG/1; MG/1
1 CAPSULE ORAL EVERY MORNING
Status: CANCELLED | OUTPATIENT
Start: 2017-10-27

## 2017-10-26 RX ORDER — FAMOTIDINE 20 MG/1
20 TABLET, FILM COATED ORAL 2 TIMES DAILY
Status: CANCELLED | OUTPATIENT
Start: 2017-10-26

## 2017-10-26 RX ORDER — ASPIRIN 325 MG
325 TABLET, DELAYED RELEASE (ENTERIC COATED) ORAL 2 TIMES DAILY
Status: DISCONTINUED | OUTPATIENT
Start: 2017-10-26 | End: 2017-11-03 | Stop reason: HOSPADM

## 2017-10-26 RX ORDER — RAMELTEON 8 MG/1
8 TABLET ORAL NIGHTLY PRN
Status: CANCELLED | OUTPATIENT
Start: 2017-10-26

## 2017-10-26 RX ORDER — LEVOTHYROXINE SODIUM 50 UG/1
50 TABLET ORAL
Status: DISCONTINUED | OUTPATIENT
Start: 2017-10-27 | End: 2017-11-03 | Stop reason: HOSPADM

## 2017-10-26 RX ORDER — OXYCODONE HYDROCHLORIDE 5 MG/1
10 TABLET ORAL
Status: CANCELLED | OUTPATIENT
Start: 2017-10-26

## 2017-10-26 RX ORDER — RAMELTEON 8 MG/1
8 TABLET ORAL NIGHTLY PRN
Status: DISCONTINUED | OUTPATIENT
Start: 2017-10-26 | End: 2017-10-27

## 2017-10-26 RX ORDER — ACETAMINOPHEN 325 MG/1
650 TABLET ORAL EVERY 6 HOURS PRN
Status: DISCONTINUED | OUTPATIENT
Start: 2017-10-26 | End: 2017-10-27

## 2017-10-26 RX ORDER — POLYETHYLENE GLYCOL 3350 17 G/17G
17 POWDER, FOR SOLUTION ORAL DAILY
Status: CANCELLED | OUTPATIENT
Start: 2017-10-27

## 2017-10-26 RX ORDER — SULFAMETHOXAZOLE AND TRIMETHOPRIM 800; 160 MG/1; MG/1
1 TABLET ORAL 2 TIMES DAILY
Status: CANCELLED | OUTPATIENT
Start: 2017-10-26

## 2017-10-26 RX ORDER — SIMVASTATIN 40 MG/1
40 TABLET, FILM COATED ORAL NIGHTLY
Status: DISCONTINUED | OUTPATIENT
Start: 2017-10-26 | End: 2017-11-03 | Stop reason: HOSPADM

## 2017-10-26 RX ORDER — OXYCODONE HYDROCHLORIDE 5 MG/1
5 TABLET ORAL
Status: CANCELLED | OUTPATIENT
Start: 2017-10-26

## 2017-10-26 RX ORDER — CALCIUM CARBONATE 200(500)MG
500 TABLET,CHEWABLE ORAL 2 TIMES DAILY PRN
Status: CANCELLED | OUTPATIENT
Start: 2017-10-26

## 2017-10-26 RX ORDER — HYOSCYAMINE SULFATE 0.12 MG/1
0.12 TABLET SUBLINGUAL EVERY 4 HOURS PRN
Status: DISCONTINUED | OUTPATIENT
Start: 2017-10-26 | End: 2017-11-03 | Stop reason: HOSPADM

## 2017-10-26 RX ORDER — AMOXICILLIN 250 MG
1 CAPSULE ORAL 2 TIMES DAILY
Status: DISCONTINUED | OUTPATIENT
Start: 2017-10-26 | End: 2017-11-03 | Stop reason: HOSPADM

## 2017-10-26 RX ORDER — ROPIVACAINE HYDROCHLORIDE 2 MG/ML
8 INJECTION, SOLUTION EPIDURAL; INFILTRATION; PERINEURAL CONTINUOUS
Status: DISCONTINUED | OUTPATIENT
Start: 2017-10-26 | End: 2017-10-26 | Stop reason: HOSPADM

## 2017-10-26 RX ORDER — FENTANYL CITRATE 50 UG/ML
25 INJECTION, SOLUTION INTRAMUSCULAR; INTRAVENOUS EVERY 5 MIN PRN
Status: DISCONTINUED | OUTPATIENT
Start: 2017-10-26 | End: 2017-10-26 | Stop reason: HOSPADM

## 2017-10-26 RX ORDER — TRIAMTERENE AND HYDROCHLOROTHIAZIDE 37.5; 25 MG/1; MG/1
1 CAPSULE ORAL EVERY MORNING
Status: DISCONTINUED | OUTPATIENT
Start: 2017-10-27 | End: 2017-11-03 | Stop reason: HOSPADM

## 2017-10-26 RX ORDER — TRAZODONE HYDROCHLORIDE 50 MG/1
50 TABLET ORAL NIGHTLY PRN
Status: CANCELLED | OUTPATIENT
Start: 2017-10-26

## 2017-10-26 RX ORDER — CALCIUM CARBONATE 200(500)MG
500 TABLET,CHEWABLE ORAL 2 TIMES DAILY PRN
Status: DISCONTINUED | OUTPATIENT
Start: 2017-10-26 | End: 2017-11-03 | Stop reason: HOSPADM

## 2017-10-26 RX ORDER — SULFAMETHOXAZOLE AND TRIMETHOPRIM 800; 160 MG/1; MG/1
1 TABLET ORAL 2 TIMES DAILY
Status: DISCONTINUED | OUTPATIENT
Start: 2017-10-26 | End: 2017-11-03 | Stop reason: HOSPADM

## 2017-10-26 RX ORDER — MIRTAZAPINE 15 MG/1
30 TABLET, FILM COATED ORAL NIGHTLY
Status: DISCONTINUED | OUTPATIENT
Start: 2017-10-26 | End: 2017-11-03 | Stop reason: HOSPADM

## 2017-10-26 RX ORDER — OXYCODONE HYDROCHLORIDE 5 MG/1
10 TABLET ORAL
Status: DISCONTINUED | OUTPATIENT
Start: 2017-10-26 | End: 2017-10-30

## 2017-10-26 RX ORDER — ASPIRIN 325 MG
325 TABLET, DELAYED RELEASE (ENTERIC COATED) ORAL 2 TIMES DAILY
Status: CANCELLED | OUTPATIENT
Start: 2017-10-26

## 2017-10-26 RX ORDER — SIMETHICONE 80 MG
80 TABLET,CHEWABLE ORAL
Status: DISCONTINUED | OUTPATIENT
Start: 2017-10-26 | End: 2017-11-03 | Stop reason: HOSPADM

## 2017-10-26 RX ORDER — LEVOTHYROXINE SODIUM 50 UG/1
50 TABLET ORAL
Status: CANCELLED | OUTPATIENT
Start: 2017-10-27

## 2017-10-26 RX ORDER — RAMELTEON 8 MG/1
8 TABLET ORAL NIGHTLY PRN
Status: DISCONTINUED | OUTPATIENT
Start: 2017-10-26 | End: 2017-10-26 | Stop reason: SDUPTHER

## 2017-10-26 RX ORDER — MIRTAZAPINE 15 MG/1
30 TABLET, FILM COATED ORAL NIGHTLY
Status: CANCELLED | OUTPATIENT
Start: 2017-10-26

## 2017-10-26 RX ADMIN — FAMOTIDINE 20 MG: 20 TABLET, FILM COATED ORAL at 08:10

## 2017-10-26 RX ADMIN — TRAZODONE HYDROCHLORIDE 50 MG: 50 TABLET ORAL at 08:10

## 2017-10-26 RX ADMIN — OXYCODONE HYDROCHLORIDE 10 MG: 5 TABLET ORAL at 11:10

## 2017-10-26 RX ADMIN — POTASSIUM CHLORIDE 50 MEQ: 750 CAPSULE, EXTENDED RELEASE ORAL at 02:10

## 2017-10-26 RX ADMIN — STANDARDIZED SENNA CONCENTRATE AND DOCUSATE SODIUM 1 TABLET: 8.6; 5 TABLET, FILM COATED ORAL at 09:10

## 2017-10-26 RX ADMIN — SULFAMETHOXAZOLE AND TRIMETHOPRIM 1 TABLET: 800; 160 TABLET ORAL at 08:10

## 2017-10-26 RX ADMIN — ROPIVACAINE HYDROCHLORIDE 8 ML/HR: 2 INJECTION, SOLUTION EPIDURAL; INFILTRATION at 09:10

## 2017-10-26 RX ADMIN — MORPHINE SULFATE 2 MG: 2 INJECTION, SOLUTION INTRAMUSCULAR; INTRAVENOUS at 03:10

## 2017-10-26 RX ADMIN — ACETAMINOPHEN 1000 MG: 500 TABLET ORAL at 12:10

## 2017-10-26 RX ADMIN — SIMVASTATIN 40 MG: 40 TABLET, FILM COATED ORAL at 08:10

## 2017-10-26 RX ADMIN — OXYCODONE HYDROCHLORIDE 10 MG: 5 TABLET ORAL at 03:10

## 2017-10-26 RX ADMIN — Medication: at 02:10

## 2017-10-26 RX ADMIN — SULFAMETHOXAZOLE AND TRIMETHOPRIM 1 TABLET: 800; 160 TABLET ORAL at 09:10

## 2017-10-26 RX ADMIN — STANDARDIZED SENNA CONCENTRATE AND DOCUSATE SODIUM 1 TABLET: 8.6; 5 TABLET, FILM COATED ORAL at 08:10

## 2017-10-26 RX ADMIN — CEFAZOLIN SODIUM 2 G: 2 SOLUTION INTRAVENOUS at 05:10

## 2017-10-26 RX ADMIN — FAMOTIDINE 20 MG: 20 TABLET, FILM COATED ORAL at 09:10

## 2017-10-26 RX ADMIN — MIRTAZAPINE 30 MG: 15 TABLET, FILM COATED ORAL at 08:10

## 2017-10-26 RX ADMIN — ASPIRIN 325 MG: 325 TABLET, DELAYED RELEASE ORAL at 09:10

## 2017-10-26 RX ADMIN — SIMETHICONE CHEW TAB 80 MG 80 MG: 80 TABLET ORAL at 09:10

## 2017-10-26 RX ADMIN — VANCOMYCIN HYDROCHLORIDE 1500 MG: 10 INJECTION, POWDER, LYOPHILIZED, FOR SOLUTION INTRAVENOUS at 12:10

## 2017-10-26 RX ADMIN — ASPIRIN 325 MG: 325 TABLET, DELAYED RELEASE ORAL at 08:10

## 2017-10-26 RX ADMIN — FENTANYL CITRATE 50 MCG: 50 INJECTION INTRAMUSCULAR; INTRAVENOUS at 08:10

## 2017-10-26 RX ADMIN — SIMETHICONE CHEW TAB 80 MG 80 MG: 80 TABLET ORAL at 08:10

## 2017-10-26 RX ADMIN — ROPIVACAINE HYDROCHLORIDE 8 ML/HR: 2 INJECTION, SOLUTION EPIDURAL; INFILTRATION; PERINEURAL at 09:10

## 2017-10-26 RX ADMIN — SIMETHICONE CHEW TAB 80 MG 80 MG: 80 TABLET ORAL at 01:10

## 2017-10-26 NOTE — PLAN OF CARE
8:04 AM  TAYLER received notification from CM that pt will need SNF. Pt's preference is [facility]. CM placed consult. Faxed referral via [employer].   Will inform Lilia with [facility] that consult has been placed.    Elsy George LMSW   [facility]  Ext [phone]

## 2017-10-26 NOTE — PLAN OF CARE
POD 1 s/p left TKA. PT/OT recommended SNF placement. Plans to discharge patient to [facility] today. CM updated patient on facility acceptance. Patient verbalized understanding. All questions and concerns addressed. SW and CM will continue to follow for any additional needs. Discharge and follow-up instructions to be completed by the bedside nurse.    Future Appointments  Date Time Provider Department Center   11/7/2017 1:30 PM Neeta Dunne PA-C [facility] ORTHO [facility]   11/8/2017 1:40 PM [facility]oper [facility]   11/29/2017 1:30 PM Trinity Vazquez MD [facility]      10/26/17 1343   Final Note   Assessment Type Final Discharge Note   Discharge Disposition SNF  ([facility])   What phone number can be called within the next 1-3 days to see how you are doing after discharge? ([phone])   Hospital Follow Up  Appt(s) scheduled? Yes   Discharge plans and expectations educations in teach back method with documentation complete? Yes

## 2017-10-26 NOTE — PROGRESS NOTES
Pt consents to converting left adductor PNC to OnQ.  Dressing CDI.  Educated regarding continued pain management, as well as discontinuing OnQ 10/28 at 1430.  Brochure given to Kailyn caregiver.  Understanding verbalized.

## 2017-10-26 NOTE — PLAN OF CARE
Medicare discharge appeals right discussed with patient. IMM signed and placed in patient's chart. Patient verbalized understanding of IMM rights.     10/26/17 0800   Medicare Message   Important Message from Medicare regarding Discharge Appeal Rights Given to patient/caregiver;Explained to patient/caregiver;Signed/date by patient/caregiver   Date IMM was signed 10/26/17   Time IMM was signed 0800

## 2017-10-26 NOTE — DISCHARGE SUMMARY
[facility]  Orthopedics  Discharge Summary      Patient Name: Chucho Prado  MRN: [id number]  Admission Date: 10/25/2017  Hospital Length of Stay: 1 days  Discharge Date and Time: 10/26/2017  3:34 PM  Attending Physician: [facility] L [facility], MD  Discharging Provider: Lucio Thayer MD  Primary Care Provider: Obed Mcguire MD    HPI:   Chucho Prado is a 84 y.o. male with several year history of Left knee pain. Pain is worse with activity and weight bearing.  Patient has experienced interference of activities of daily living due to decreased range of motion and an increase in joint pain and swelling.  Patient has failed non-operative treatment including NSAIDs, corticosteroid injections, viscosupplement injections, and activity modification.  Chucho Prado currently ambulates using assistive device.  Patient had R TKA in 2004. Admitted for L TKA.     Procedure(s) (LRB):  BLOCK-NERVE (Left)      Hospital Course:  Patient presented for above procedure.  Tolerated it well and was discharged to SNF POD1 after voiding, tolerating diet, ambulating, pain controlled.  Discharge instructions, follow-up appointment, and med rec are below.    Consults         Status Ordering Provider     Inpatient consult to [facility]  Once     Provider:  (Not yet assigned)    Acknowledged LUCIO THAYER     Inpatient consult to Social Work  Once     Provider:  (Not yet assigned)    Acknowledged GIOVANNY ALEJANDRO          Significant Diagnostic Studies: Labs:   BMP:   Recent Labs  Lab 10/25/17  1100 10/25/17  1526   GLU  --  118*   * 132*   K  --  3.1*   CL  --  97   CO2  --  25   BUN  --  15   CREATININE  --  1.3   CALCIUM  --  8.3*    and CBC No results for input(s): WBC, HGB, HCT, PLT in the last 48 hours.    Pending Diagnostic Studies:     None        Final Active Diagnoses:    Diagnosis Date Noted POA    PRINCIPAL PROBLEM:  Primary osteoarthritis of left knee [M17.12] 10/25/2017 Yes       Problems Resolved During this Admission:    Diagnosis Date Noted Date Resolved POA      Discharged Condition: stable    Disposition: Home or Self Care    Follow Up:  Follow-up Information     Follow up In 2 weeks.               Patient Instructions:     Diet general       Medications:  Reconciled Home Medications:   Discharge Medication List as of 10/26/2017  3:35 PM      START taking these medications    Details   aspirin 325 MG tablet Take 1 tablet (325 mg total) by mouth 2 (two) times daily., Starting Wed 10/25/2017, Until Fri 11/24/2017, Print      docusate sodium (COLACE) 100 MG capsule Take 1 capsule (100 mg total) by mouth 2 (two) times daily as needed for Constipation., Starting Wed 10/25/2017, Print      ondansetron (ZOFRAN-ODT) 8 MG TbDL Take 1 tablet (8 mg total) by mouth every 12 (twelve) hours as needed (nausea)., Starting Wed 10/25/2017, Print      oxyCODONE-acetaminophen (PERCOCET)  mg per tablet Take 1 tablet by mouth every 4 to 6 hours as needed for Pain., Starting Wed 10/25/2017, Until Sat 11/4/2017, Print         CONTINUE these medications which have NOT CHANGED    Details   hyoscyamine (LEVSIN/SL) 0.125 mg Subl Place 1 tablet (0.125 mg total) under the tongue every 4 (four) hours as needed (bladder spasms)., Starting Sat 8/12/2017, Normal      levothyroxine (SYNTHROID) 50 MCG tablet Take 1 tablet (50 mcg total) by mouth once daily., Starting Wed 6/21/2017, Normal      mirtazapine (REMERON) 30 MG tablet Take 1 tablet (30 mg total) by mouth every evening., Starting Fri 6/2/2017, Until Sat 6/2/2018, Normal      simethicone (MYLICON) 80 MG chewable tablet Take 1 tablet (80 mg total) by mouth 3 (three) times daily after meals., Starting 2/2/2017, Until Discontinued, OTC      simvastatin (ZOCOR) 40 MG tablet Take 1 tablet (40 mg total) by mouth every evening., Starting Fri 5/12/2017, Normal      sulfamethoxazole-trimethoprim 800-160mg (BACTRIM DS) 800-160 mg Tab Take 1 tablet by mouth 2  (two) times daily., Starting Mon 10/23/2017, Until Mon 10/30/2017, Normal      trazodone (DESYREL) 50 MG tablet Take 1 tablet (50 mg total) by mouth nightly as needed for Insomnia., Starting Fri 9/8/2017, No Print      triamterene-hydrochlorothiazide 37.5-25 mg (DYAZIDE) 37.5-25 mg per capsule Take 1 capsule by mouth every morning. HOLD UNTIL TOLD TO RESUME BY PHYSICIAN, Starting Mon 7/31/2017, Until Tue 7/31/2018, No Print         STOP taking these medications       acetaminophen (TYLENOL) 500 MG tablet Comments:   Reason for Stopping:         aspirin 81 MG Chew Comments:   Reason for Stopping:         diclofenac sodium (VOLTAREN) 1 % Gel Comments:   Reason for Stopping:         gabapentin (NEURONTIN) 400 MG capsule Comments:   Reason for Stopping:               Oma Nam MD  Orthopedics  [facility]

## 2017-10-26 NOTE — PLAN OF CARE
Problem: Patient Care Overview  Goal: Plan of Care Review  Outcome: Ongoing (interventions implemented as appropriate)  AAO x 4, VSS, no falls noted, fall precautions remain, skin intact, pain assessed, dressing clean, dry and intact, no pain noted, call light within reach, bed wheels locked, bed in lowest position, side rails x 2, safety maintained, NADN, will continue to monitor.

## 2017-10-26 NOTE — ANESTHESIA POST-OP PAIN MANAGEMENT
Acute Pain Service and Perioperative [facility] Home Progress Note    HPI  Chucho Prado is a 84 y.o., male,     Interval history      Surgery:  Procedure(s) (LRB):  REPLACEMENT-KNEE-TOTAL (Left)    Post Op Day #: 1    Catheter type: Perineural Adductor Canal. Removed last night as it was occluded.      Problem List:    Active Hospital Problems    Diagnosis  POA    *Primary osteoarthritis of left knee [M17.12]  Yes      Resolved Hospital Problems    Diagnosis Date Resolved POA   No resolved problems to display.       Subjective:       General appearance of alert, oriented, no complaints   Pain with rest: 2    Numbers   Pain with movement: 7    Numbers   Side Effects    1. Pruritis No    2. Nausea No      Schedule Medications:    acetaminophen  1,000 mg Oral Q6H    aspirin  325 mg Oral BID    famotidine  20 mg Oral BID    levothyroxine  50 mcg Oral Before breakfast    mirtazapine  30 mg Oral QHS    polyethylene glycol  17 g Oral Daily    pregabalin  75 mg Oral QHS    senna-docusate 8.6-50 mg  1 tablet Oral BID    simethicone  80 mg Oral TID PC    simvastatin  40 mg Oral QHS    sulfamethoxazole-trimethoprim 800-160mg  1 tablet Oral BID    triamterene-hydrochlorothiazide 37.5-25 mg  1 capsule Oral QAM        Continuous Infusions:   sodium chloride 0.9% 150 mL/hr at 10/25/17 1550        PRN Medications:  bisacodyl, hyoscyamine, influenza, morphine, morphine, morphine, naloxone, ondansetron, oxyCODONE, oxyCODONE, oxyCODONE, ramelteon, sodium chloride 0.9%, traZODone       Antibiotics:  Antibiotics     Start     Stop Route Frequency Ordered    10/25/17 2100  sulfamethoxazole-trimethoprim 800-160mg per tablet 1 tablet      -- Oral 2 times daily 10/25/17 1849             Objective:          Vital Signs (Most Recent):  Temp: 36.7 °C (98 °F) (10/26/17 0352)  Pulse: 89 (10/26/17 0352)  Resp: 18 (10/26/17 0352)  BP: (!) 162/74 (10/26/17 0352)  SpO2: 96 % (10/26/17 0352) Vital Signs Range (Last  24H):  Temp:  [36.3 °C (97.3 °F)-36.7 °C (98 °F)]   Pulse:  []   Resp:  [14-20]   BP: (100-162)/(41-76)   SpO2:  [94 %-100 %]          I & O (Last 24H):  Intake/Output Summary (Last 24 hours) at 10/26/17 0656  Last data filed at 10/26/17 0400   Gross per 24 hour   Intake             3315 ml   Output             1875 ml   Net             1440 ml       Physical Exam:    GA: Alert, comfortable, no acute distress.   Pulmonary: Clear to auscultation A/P/L. No wheezing, crackles, or rhonchi.  Cardiac: RRR S1 & S2 w/o rubs/murmurs/gallops.   Abdominal:Bowel sounds present. No tenderness to palpation or distension. No appreciable hepatosplenomegaly.   Skin: No jaundice, rashes, or visible lesions.         Laboratory:  CBC: Recent Labs      10/23/17   1536   WBC  8.09   RBC  4.65   HGB  12.8*   HCT  37.6*   PLT  234   MCV  81*   MCH  27.5   MCHC  34.0       BMP: Recent Labs      10/23/17   1536  10/25/17   1100  10/25/17   1526   NA  126*  129*  132*   K  3.7   --   3.1*   CO2  29   --   25   CL  86*   --   97   BUN  18   --   15   CREATININE  1.3   --   1.3   GLU  102   --   118*   CALCIUM  9.2   --   8.3*       Recent Labs      10/23/17   1536   INR  0.9         Anticoagulant dose ASA at 325mg.    Assessment:         Pain control adequate    Plan:     1) Pain:    Adductor canal perineural catheter removed yesterday as it could not be flushed. Multimodal pain regimen with acetaminophen, Celebrex, Lyrica, and prn oxycodone given  Will re-do adductor PNC this AM.   2) HTN: restarted home meds   3) FEN/GI: Tolerating liquids, advance diet as tolerated.    4) Dispo: Pt working well with PT/OT. Case management and SW for    placement. Plan for D/C to [facility] with [facility].      Evaluator Yanet Hopper MD    Pt seen and examined.  Dr. Hopper's note reviewed.  Agree with assessment and plan.

## 2017-10-26 NOTE — PLAN OF CARE
Problem: Physical Therapy Goal  Goal: Physical Therapy Goal  Goals to be met by: 10/31/17     Patient will increase functional independence with mobility by performin. Supine to sit with Supervision  2. Sit to supine with Supervision  3. Sit to stand transfer with Stand-by Assistance  4. Gait  x 150 feet with Stand-by Assistance using Rolling Walker.   5. Lower extremity exercise program x 30 reps per handout, with independence     Outcome: Ongoing (interventions implemented as appropriate)  Goals appropriate to improve functional mobility.    Nagi Freed III, DPT, PT  10/26/2017

## 2017-10-26 NOTE — ANESTHESIA PROCEDURE NOTES
Adductor Canal Catheter    Patient location during procedure: pre-op   Block not for primary anesthetic.  Reason for block: at surgeon's request and post-op pain management   Post-op Pain Location: left knee pain  Start time: 10/26/2017 8:37 AM  Timeout: 10/26/2017 8:36 AM   End time: 10/26/2017 8:56 AM  Staffing  Anesthesiologist: KIKA LINK  Other anesthesia staff: MARLYN JACKSON  Performed: other anesthesia staff   Preanesthetic Checklist  Completed: patient identified, site marked, surgical consent, pre-op evaluation, timeout performed, IV checked, risks and benefits discussed and monitors and equipment checked  Peripheral Block  Patient position: supine  Prep: ChloraPrep and site prepped and draped  Patient monitoring: heart rate, cardiac monitor, continuous pulse ox, continuous capnometry and frequent blood pressure checks  Block type: adductor canal  Laterality: left  Injection technique: continuous  Needle  Needle type: Tuohy   Needle gauge: 17 G  Needle length: 3.5 in  Needle localization: anatomical landmarks and ultrasound guidance  Catheter type: spring wound  Catheter size: 19 G  Test dose: lidocaine 1.5% with Epi 1-to-200,000 and negative   -ultrasound image captured on disc.  Assessment  Injection assessment: negative aspiration, negative parasthesia and local visualized surrounding nerve  Paresthesia pain: none  Heart rate change: no  Slow fractionated injection: yes  Medications:  Bolus administered: 20 mL of 0.25 ropivacaine  Epinephrine added: 3.75 mcg/mL (1/300,000)  Additional Notes  VSS.  [facility] RN monitoring vitals throughout procedure.  Patient tolerated procedure well.

## 2017-10-26 NOTE — HOSPITAL COURSE
Patient presented for above procedure.  Tolerated it well and was discharged to SNF POD[facility] after voiding, tolerating diet, ambulating, pain controlled.  Discharge instructions, follow-up appointment, and med rec are below.

## 2017-10-26 NOTE — PT/OT/SLP PROGRESS
Physical Therapy  Treatment    Chucho Prado   MRN: [id number]   Admitting Diagnosis: Primary osteoarthritis of left knee    PT Received On: 10/26/17  PT Start Time: 1110     PT Stop Time: 1135    PT Total Time (min): 25 min       Billable Minutes:  Gait Training 15 and Therapeutic Exercise 10    Treatment Type: Treatment  PT/PTA: PT             General Precautions: Standard, fall  Orthopedic Precautions: LLE weight bearing as tolerated   Braces: N/A         Subjective:  Communicated with RN prior to session.  Patient agreeable to PT session. Minimal reports of LLE pain at 3/10.     Pain/Comfort  Pain Rating 1: 3/10  Location - Side 1: Left  Location 1: knee  Pain Addressed 1: Reposition, Distraction, Cessation of Activity  Pain Rating Post-Intervention 1: 3/10    Objective:   Patient found with: perineural catheter, Polar ice, FCD, galvan catheter    Functional Mobility:  Bed Mobility:   Rolling/Turning to Left:  (not observed; patient up in chair upon room entry)    Transfers:  Sit <> Stand Assistance: Moderate Assistance  Sit <> Stand Assistive Device: Standard Walker  Bed <> Chair Assistance: Activity did not occur (chair follow)    Gait:   Gait Distance: 44ftx1  Assistance 1: Contact Guard Assistance  Gait Assistive Device: Standard walker  Gait Pattern: swing-to gait  Gait Deviation(s): decreased eri, increased time in double stance, decreased velocity of limb motion, decreased step length, decreased stride length, decreased swing-to-stance ratio, decreased toe-to-floor clearance    Stairs:  NT    Balance:   Static Sit: FAIR: Maintains without assist, but unable to take any challenges   Dynamic Sit: FAIR+: Maintains balance through MINIMAL excursions of active trunk motion  Static Stand: POOR+: Needs MINIMAL assist to maintain  Dynamic stand: FAIR: Needs CONTACT GUARD during gait     Therapeutic Activities and Exercises:  Patient educated on role of PT/POC.    Bed mobility not observed; patient  seated in bedside chair upon room entry.    LE therex TKA protocol performed x 15 reps except for  SAQs.    Sit<>stand Mod Assist from bedside chair. Despite education on forward lean and BUE push up; patient demonstrates excessive hip flexion with posterior lean during sit-stand transfer using standard walker.    Gait 44ftx1 CGA using standard walker. Patient prefers to use standard walker secondary to fear of falling. Good tolerance to gait training.  Educated on gait sequencing using standard walker    Safest to transfer and ambulate with assist of 1 using standard walker    AM-PAC 6 CLICK MOBILITY  How much help from another person does this patient currently need?   1 = Unable, Total/Dependent Assistance  2 = A lot, Maximum/Moderate Assistance  3 = A little, Minimum/Contact Guard/Supervision  4 = None, Modified [place]/Independent    Turning over in bed (including adjusting bedclothes, sheets and blankets)?: 4  Sitting down on and standing up from a chair with arms (e.g., wheelchair, bedside commode, etc.): 2  Moving from lying on back to sitting on the side of the bed?: 3  Moving to and from a bed to a chair (including a wheelchair)?: 3  Need to walk in hospital room?: 3  Climbing 3-5 steps with a railing?: 1  Total Score: 16    AM-PAC Raw Score CMS G-Code Modifier Level of Impairment Assistance   6 % Total / Unable   7 - 9 CM 80 - 100% Maximal Assist   10 - 14 CL 60 - 80% Moderate Assist   15 - 19 CK 40 - 60% Moderate Assist   20 - 22 CJ 20 - 40% Minimal Assist   23 CI 1-20% SBA / CGA   24 CH 0% Independent/ Mod I     Patient left up in chair with all lines intact, call button in reach, RN notified and PT technician and sitter present.    Assessment:  Chucho Prado is a 84 y.o. male with a medical diagnosis of Primary osteoarthritis of left knee s/p L TKA and presents with rehab identified impairments listed below. Bed mobility not observed. Sit<>stand transfer with Mod Assist  secondary to excessive posterior lean. Gait 44ftx1 CGA using standard walker. Patient with significant fear of falling. Good tolerance to seated LE therex in bedside chair. To benefit from continued skilled intervention to address deficits.    Rehab identified problem list/impairments: Rehab identified problem list/impairments: weakness, impaired endurance, impaired self care skills, impaired functional mobilty, gait instability, impaired balance, decreased lower extremity function, pain, decreased safety awareness, orthopedic precautions    Rehab potential is good.    Activity tolerance: Good    Discharge recommendations: Discharge Facility/Level Of Care Needs: nursing facility, skilled     Barriers to discharge: Barriers to Discharge: None    Equipment recommendations: Equipment Needed After Discharge:  (TBD)     GOALS:    Physical Therapy Goals        Problem: Physical Therapy Goal    Goal Priority Disciplines Outcome Goal Variances Interventions   Physical Therapy Goal     PT/OT, PT Ongoing (interventions implemented as appropriate)     Description:  Goals to be met by: 10/31/17     Patient will increase functional independence with mobility by performin. Supine to sit with Supervision  2. Sit to supine with Supervision  3. Sit to stand transfer with Stand-by Assistance  4. Gait  x 150 feet with Stand-by Assistance using Rolling Walker.   5. Lower extremity exercise program x 30 reps per handout, with independence                      PLAN:    Patient to be seen daily  to address the above listed problems via gait training, therapeutic activities, therapeutic exercises, neuromuscular re-education  Plan of Care expires: 17  Plan of Care reviewed with: patient (pato)         Nagi Freed III, PT  10/26/2017

## 2017-10-26 NOTE — PT/OT/SLP PROGRESS
Occupational Therapy  Treatment    Chucho Prado   MRN: [id number]   Admitting Diagnosis: Primary osteoarthritis of left knee    OT Date of Treatment: 10/26/17   OT Start Time: 0950  OT Stop Time: 1030  OT Total Time (min): 40 min    Billable Minutes:  Self Care/Home Management 40    General Precautions: Standard, fall  Orthopedic Precautions: LLE weight bearing as tolerated  Braces: N/A         Subjective:  Communicated with RN prior to session.    Pain/Comfort  Pain Rating 1: 0/10  Pain Rating Post-Intervention 1: 0/10    Objective:  Patient found with: perineural catheter, Polar ice, galvan catheter, FCD     Functional Mobility:  Bed Mobility:  Supine to Sit: Minimum Assistance    Transfers:   Sit <> Stand Assistance: Moderate Assistance  Sit <> Stand Assistive Device: Standard Walker  Bed <> Chair Technique: Stand Pivot  Bed <> Chair Transfer Assistance: Minimum Assistance  Bed <> Chair Assistive Device: Standard Walker        Activities of Daily Living:       UE Dressing Level of Assistance: Stand by assistance (To don t-shirt and scrub top.)    LE Dressing Level of Assistance: Maximum assistance, Minimum assistance (To don scrub pants and min A to don slip on shoes.)      Balance:   Static Sit: GOOD: Takes MODERATE challenges from all directions  Dynamic Sit: FAIR: Cannot move trunk without losing balance  Static Stand: FAIR: Maintains without assist but unable to take challenges  Dynamic stand: FAIR: Needs CONTACT GUARD during gait        AM-PAC 6 CLICK ADL   How much help from another person does this patient currently need?   1 = Unable, Total/Dependent Assistance  2 = A lot, Maximum/Moderate Assistance  3 = A little, Minimum/Contact Guard/Supervision  4 = None, Modified [place]/Independent    Putting on and taking off regular lower body clothing? : 2  Bathing (including washing, rinsing, drying)?: 2  Toileting, which includes using toilet, bedpan, or urinal? : 3  Putting on and taking off  "regular upper body clothing?: 3  Taking care of personal grooming such as brushing teeth?: 3  Eating meals?: 4  Total Score: 17     AM-PAC Raw Score CMS "G-Code Modifier Level of Impairment Assistance   6 % Total / Unable   7 - 8 CM 80 - 100% Maximal Assist   9-13 CL 60 - 80% Moderate Assist   14 - 19 CK 40 - 60% Moderate Assist   20 - 22 CJ 20 - 40% Minimal Assist   23 CI 1-20% SBA / CGA   24 CH 0% Independent/ Mod I       Patient left up in chair with all lines intact, call button in reach, RN notified and caregiver present    ASSESSMENT:  Chucho Prado is a 84 y.o. male with a medical diagnosis of Primary osteoarthritis of left knee and presents with deficits in ADL's, functional T/F's, decreased strength and endurance.  Pt was able to perform UB dressing c set-up and LB dressing c max A 2* decreased standing balance.  Able to perform supine/sit T/F c min A and sit/stand T/F c mod A and bed/chair T/F c min A.    Rehab identified problem list/impairments: Rehab identified problem list/impairments: impaired endurance, impaired self care skills, impaired functional mobilty, orthopedic precautions, weakness    Rehab potential is good.    Activity tolerance: Good    Discharge recommendations: Discharge Facility/Level Of Care Needs: nursing facility, skilled     Barriers to discharge: Barriers to Discharge: None    Equipment recommendations:  (TBD)     GOALS:    Occupational Therapy Goals        Problem: Occupational Therapy Goal    Goal Priority Disciplines Outcome Interventions   Occupational Therapy Goal     OT, PT/OT     Description:  Goals to be met by: 7 days    Patient will increase functional independence with ADLs by performing:    UE Dressing with Supervision.  LE Dressing with Supervision with AD as needed.  Grooming while standing with Supervision.  Toileting from bedside commode with Supervision for hygiene and clothing management.   Stand pivot transfers with Supervision.  Toilet " transfer to bedside commode with Supervision.                         Plan:  Patient to be seen daily to address the above listed problems via self-care/home management, therapeutic activities, therapeutic exercises  Plan of Care expires:    Plan of Care reviewed with: patient, caregiver         DINO Mendiola  10/26/2017

## 2017-10-26 NOTE — HPI
Chucho Prado is a 84 y.o. male with several year history of Left knee pain. Pain is worse with activity and weight bearing.  Patient has experienced interference of activities of daily living due to decreased range of motion and an increase in joint pain and swelling.  Patient has failed non-operative treatment including NSAIDs, corticosteroid injections, viscosupplement injections, and activity modification.  Chucho Prado currently ambulates using assistive device.  Patient had R TKA in 2004. Admitted for L TKA.

## 2017-10-26 NOTE — ADDENDUM NOTE
Addendum  created 10/26/17 5972 by Helene Starr RN    Sign clinical note, Visit Navigator SmartForm Flowsheet section accepted

## 2017-10-26 NOTE — CONSULTS
SNF consult-Patient approved to admit to SNF today, going to room 313B. Nurse to call report to [phone] and MD to call report to Dr Benson at [phone]

## 2017-10-26 NOTE — OP NOTE
DATE OF PROCEDURE:  10/25/2017.    SURGEON:  John Lockwood [facility], Jr., M.D.    ASSISTANT:  Neeta Dunne PA-C    OPERATION PERFORMED:  Left total knee arthroplasty.    PREOPERATIVE DIAGNOSIS:  Osteoarthritis, left knee.    POSTOPERATIVE DIAGNOSIS:  Osteoarthritis, left knee.    COMPONENTS USED:  Tanvi LCCK system.  This is the Legacy constrained knee.  We   used a size D left femur with a 10 x 175 stem tibia, size 3 NexGen tibia with a   11 x 75 stem.  The insert was a 10 mm articular surface constrained condylar   knee LCCK.    OPERATIVE TECHNIQUE:  No residents were available.  SHAWNEE Prajapati, scrubbed   on this case and was necessary.    ESTIMATED BLOOD LOSS:  100 mL    SPECIMEN:  Resected bone and tissue sent to Pathology for routine examination.    OPERATIVE TECHNIQUE:  The patient was placed supine on the operating table.    Anesthesia was introduced.  The left leg was prepped and draped in sterile   fashion.  The room was laminar airflow.  The patient was given preoperative   antibiotics and the OR team wore the sterile exhaust suits in order to minimize   risk for infection.  A foot pump was placed on the right foot to help prevent   DVT.  Left leg was exsanguinated and the tourniquet was inflated to 300 pounds   of pressure.  A straight anterior incision was made.  Sharp dissection was   carried down to the extensor mechanism.  The extensor mechanism was opened in a   straight Insall approach.  Partial release of the anterior fibers was performed.    It was a big time valgus knee, so we did the intramedullary guide in the   femur, made the distal cut on the femur first and we did it a +2 setting and   then we went ahead and there was a good flexion contracture, so I took a little   extra.  Then, we did the proximal tibial cut, measured the femur for a D and cut   it for the D.  The flexion gap was a little bigger than the extension gap, so I   took another 2 mm off the extension cut.  Then, we did  the notch and   chamfer-plasty on the femur.  I had to release popliteus, the lateral collateral   and the iliotibial band and posterolateral capsule.  I did a full release.    Then, we prepared the tibia and sized it to a 3 and drilled and prepared it with   the same rotation as the femur.  I did not want to do the patella because I was   afraid it might articulate with the LCCK post, but I did do a patelloplasty.    At that point, we went ahead and Pulsavac'd and dried the surfaces, cemented the   tibial baseplate, then the femoral component, removed the excess cement, put   the 10 mm trial in, put the knee out in extension and waited for the cement to   hard, bathed the knee in the Betadine solution.  Once the cement was hard, we   put the actual 10 mm LCCK insert in and then closed the extensor mechanism with   1 Vicryl over tranexamic acid solution, closed the subcutaneous tissues with 0   and 3-0 Vicryl and the skin with a running 3-0 Monocryl and a skin adhesive.    Sterile surgical dressing was applied.  There were no complications.      MATTHEW  dd: 10/25/2017 15:23:19 (CDT)  td: 10/25/2017 19:17:31 (CD)  Doc ID   #[id number]  Job ID #[id number]    CC:

## 2017-10-26 NOTE — PLAN OF CARE
Problem: Occupational Therapy Goal  Goal: Occupational Therapy Goal  Goals to be met by: 7 days    Patient will increase functional independence with ADLs by performing:    UE Dressing with Supervision.  LE Dressing with Supervision with AD as needed.  Grooming while standing with Supervision.  Toileting from bedside commode with Supervision for hygiene and clothing management.   Stand pivot transfers with Supervision.  Toilet transfer to bedside commode with Supervision.        Cont. POC

## 2017-10-26 NOTE — PLAN OF CARE
1:41 PM  SW received notification that nurse can call report to [phone]. Arranged transportation with [facility]. [facility] will arrive at 3:00PM. Informed RN, Tata.    Elsy George, ALEC   [facility]  Ext [phone]

## 2017-10-26 NOTE — PROGRESS NOTES
[facility]  Orthopedics  Progress Note    Patient Name: Chucho Prado  MRN: [id number]  Admission Date: 10/25/2017  Hospital Length of Stay: 1 days  Attending Provider: [facility] L. [facility] Jr., MD  Primary Care Provider: Obed Mcguire MD  Follow-up For: Procedure(s) (LRB):  REPLACEMENT-KNEE-TOTAL (Left)    Post-Operative Day: 1 Day Post-Op  Subjective:     Principal Problem:<principal problem not specified>    Principal Orthopedic Problem: s/p L TKA    Interval History:   POD #1 s/p L TKA, NAEON. NPO overnight for placement of PNC this am.     Review of patient's allergies indicates:  No Known Allergies    Current Facility-Administered Medications   Medication    0.9%  NaCl infusion    acetaminophen (10 mg/mL) injection 1,000 mg    Followed by    acetaminophen tablet 1,000 mg    aspirin EC tablet 325 mg    bisacodyl suppository 10 mg    cefazolin (ANCEF) 2 gram in dextrose 5% 50 mL IVPB (premix)    famotidine tablet 20 mg    fentaNYL injection 25 mcg    fentaNYL injection 25 mcg    lidocaine (PF) 10 mg/ml (1%) injection 10 mg    midazolam (VERSED) 1 mg/mL injection 1 mg    morphine injection 2 mg    morphine injection 2 mg    morphine injection 2 mg    naloxone 0.4 mg/mL injection 0.02 mg    ondansetron injection 4 mg    oxyCODONE immediate release tablet 10 mg    oxyCODONE immediate release tablet 15 mg    oxyCODONE immediate release tablet 5 mg    polyethylene glycol packet 17 g    pregabalin capsule 75 mg    promethazine (PHENERGAN) 6.25 mg in dextrose 5 % 50 mL IVPB    ramelteon tablet 8 mg    ropivacaine (PF) 2 mg/ml (0.2%) infusion    senna-docusate 8.6-50 mg per tablet 1 tablet    sodium chloride 0.9% flush 3 mL    sodium chloride 0.9% flush 3 mL    [START ON 10/26/2017] vancomycin (VANCOCIN) 1,500 mg in dextrose 5 % 250 mL IVPB     Objective:     Vital Signs (Most Recent):  Temp: 97.5 °F (36.4 °C) (10/25/17 1533)  Pulse: 104 (10/25/17 1533)  Resp: 20  "(10/25/17 1533)  BP: 127/60 (10/25/17 1533)  SpO2: 97 % (10/25/17 1533) Vital Signs (24h Range):  Temp:  [97.5 °F (36.4 °C)] 97.5 °F (36.4 °C)  Pulse:  [] 104  Resp:  [15-20] 20  SpO2:  [96 %-100 %] 97 %  BP: (102-150)/(41-67) 127/60     Weight: 86.2 kg (190 lb)  Height: 5' 9" (175.3 cm)  Body mass index is 28.06 kg/m².      Intake/Output Summary (Last 24 hours) at 10/25/17 1602  Last data filed at 10/25/17 1526   Gross per 24 hour   Intake             2600 ml   Output              700 ml   Net             1900 ml       Ortho/SPM Exam   AAOx4  NAD  RRR  No increased WOB  Aquacel c/d/i- bulky dressing d/c this am  Polar ice in place  SILT and motor intact T/SP/DP  WWP extremities  FCDs in place and functioning    Significant Labs:   BMP:     Recent Labs  Lab 10/25/17  1526   *   *   K 3.1*   CL 97   CO2 25   BUN 15   CREATININE 1.3   CALCIUM 8.3*     CBC: No results for input(s): WBC, HGB, HCT, PLT in the last 48 hours.  All pertinent labs within the past 24 hours have been reviewed.    Significant Imaging: I have reviewed and interpreted all pertinent imaging results/findings.    Assessment/Plan:     * Primary osteoarthritis of left knee    84 y.o. male POD1 s/p L TKA    Pain control: multimodal; surgical home  PT/OT: WBAT LLE  DVT PPx:  BID, FCDs at all times when not ambulating  Abx: postop Ancef  Mahajan: suprapubic cath in place    Dispo: f/u PT recs              Oma Nam MD  Orthopedics  [facility]  "

## 2017-10-26 NOTE — ADDENDUM NOTE
Addendum  created 10/26/17 1413 by Mikal Montano MD    Charge Capture section accepted, Sign clinical note

## 2017-10-26 NOTE — NURSING
RECEIVED ON FLOOR FROM [facility] BY WHEELCHAIR AND [facility]ELS TRANSPORT SERVICE ACCOMPANIED BY TRANSPORTER AND SITTER.AWAKE AND ALERT ORIENTED X4.NO COMPLAINT OF DISCOMFORT VERBALIZED.TRANSPORTED TO BED WITH ASSISTANCE.

## 2017-10-26 NOTE — PLAN OF CARE
POD 1 s/p left TKA. PT/OT ordered to eval and treat. PT/OT recommending SNF placement. Patient currently lives alone but has a sitter service ([employer]) that stays with him Monday through Sunday from 8:00 am till 12:00 midnight. Patient has good support at home. CM completed discharge assessment and planning with patient. Patient verbalized understanding. All questions and concerns addressed. SW and CM will continue to follow for any additional needs. Plan A to discharge to SNF as soon as medically stable. Plan B to discharge home with home health. Patient requested [facility].    PCP: Obed Mcguire MD    Pharmacy:   [facility] [id number] [place] [address] [address]  [address] [facility] [address]  Phone: [phone] Fax: [phone]    Payor: MEDICARE / Plan: MEDICARE PART A & B / Product Type: [facility] /      10/26/17 0800   Discharge Assessment   Assessment Type Discharge Planning Assessment   Confirmed/corrected address and phone number on facesheet? Yes   Assessment information obtained from? Patient;Medical Record   Expected Length of Stay (days) 3   Communicated expected length of stay with patient/caregiver yes   Prior to hospitilization cognitive status: Alert/Oriented   Prior to hospitalization functional status: Assistive Equipment   Current cognitive status: Alert/Oriented   Current Functional Status: Assistive Equipment   Lives With alone;other (see comments)  ([employer] Sitter Service Monday through Sunday from 8am till 12 midnight)   Able to Return to Prior Arrangements no   Is patient able to care for self after discharge? No   Who are your caregiver(s) and their phone number(s)? friend- Josh Engellivan [phone]   Patient's perception of discharge disposition skilled nursing facility   Readmission Within The Last 30 Days no previous admission in last 30 days   Patient currently being followed by outpatient case  management? No   Patient currently receives any other outside agency services? No   Equipment Currently Used at Home other (see comments);walker, rolling;walker, standard;bedside commode;shower chair  (elevator access to home)   Do you have any problems affording any of your prescribed medications? No   Is the patient taking medications as prescribed? yes   Does the patient have transportation home? Yes   Transportation Available family or friend will provide;van, wheelchair accessible   Does the patient receive services at the [facility]? No   Discharge Plan A Skilled Nursing Facility   Discharge Plan B Home Health;Home;Other  (sitter service)   Patient/Family In Agreement With Plan yes

## 2017-10-27 PROBLEM — R53.81 DEBILITY: Status: ACTIVE | Noted: 2017-10-27

## 2017-10-27 PROCEDURE — 97535 SELF CARE MNGMENT TRAINING: CPT

## 2017-10-27 PROCEDURE — 25000003 PHARM REV CODE 250: Performed by: STUDENT IN AN ORGANIZED HEALTH CARE EDUCATION/TRAINING PROGRAM

## 2017-10-27 PROCEDURE — 99306 1ST NF CARE HIGH MDM 50: CPT | Mod: ,,, | Performed by: HOSPITALIST

## 2017-10-27 PROCEDURE — 97530 THERAPEUTIC ACTIVITIES: CPT

## 2017-10-27 PROCEDURE — 97162 PT EVAL MOD COMPLEX 30 MIN: CPT

## 2017-10-27 PROCEDURE — 97116 GAIT TRAINING THERAPY: CPT

## 2017-10-27 PROCEDURE — 12000000 HC SNF SEMI-PRIVATE ROOM

## 2017-10-27 PROCEDURE — 97110 THERAPEUTIC EXERCISES: CPT

## 2017-10-27 PROCEDURE — 97166 OT EVAL MOD COMPLEX 45 MIN: CPT

## 2017-10-27 RX ORDER — ACETAMINOPHEN 325 MG/1
650 TABLET ORAL EVERY 6 HOURS PRN
Status: DISCONTINUED | OUTPATIENT
Start: 2017-10-27 | End: 2017-11-03 | Stop reason: HOSPADM

## 2017-10-27 RX ADMIN — SIMVASTATIN 40 MG: 40 TABLET, FILM COATED ORAL at 10:10

## 2017-10-27 RX ADMIN — FAMOTIDINE 20 MG: 20 TABLET, FILM COATED ORAL at 10:10

## 2017-10-27 RX ADMIN — STANDARDIZED SENNA CONCENTRATE AND DOCUSATE SODIUM 1 TABLET: 8.6; 5 TABLET, FILM COATED ORAL at 10:10

## 2017-10-27 RX ADMIN — MIRTAZAPINE 30 MG: 15 TABLET, FILM COATED ORAL at 08:10

## 2017-10-27 RX ADMIN — ASPIRIN 325 MG: 325 TABLET, DELAYED RELEASE ORAL at 10:10

## 2017-10-27 RX ADMIN — SULFAMETHOXAZOLE AND TRIMETHOPRIM 1 TABLET: 800; 160 TABLET ORAL at 10:10

## 2017-10-27 RX ADMIN — SIMETHICONE CHEW TAB 80 MG 80 MG: 80 TABLET ORAL at 10:10

## 2017-10-27 RX ADMIN — LEVOTHYROXINE SODIUM 50 MCG: 50 TABLET ORAL at 05:10

## 2017-10-27 RX ADMIN — FAMOTIDINE 20 MG: 20 TABLET, FILM COATED ORAL at 08:10

## 2017-10-27 RX ADMIN — TRAZODONE HYDROCHLORIDE 50 MG: 50 TABLET ORAL at 08:10

## 2017-10-27 RX ADMIN — SIMETHICONE CHEW TAB 80 MG 80 MG: 80 TABLET ORAL at 02:10

## 2017-10-27 RX ADMIN — ASPIRIN 325 MG: 325 TABLET, DELAYED RELEASE ORAL at 08:10

## 2017-10-27 RX ADMIN — SIMETHICONE CHEW TAB 80 MG 80 MG: 80 TABLET ORAL at 07:10

## 2017-10-27 RX ADMIN — POLYETHYLENE GLYCOL 3350 17 G: 17 POWDER, FOR SOLUTION ORAL at 10:10

## 2017-10-27 RX ADMIN — TRIAMTERENE AND HYDROCHLOROTHIAZIDE 1 CAPSULE: 25; 37.5 CAPSULE ORAL at 10:10

## 2017-10-27 NOTE — H&P
[facility] Department of Hospital Medicine  History & Physical    Patient Name: Chucho Prado  MRN: [id number]  Code Status: Full Code  Admission Date: 10/26/2017  Attending Physician: Fabio Benson MD   Primary Care Provider: Obed Mcguire MD    Subjective:     Principal Problem:Primary osteoarthritis of left knee    No chief complaint on file.       HPI: Chief Complaint/Reason for Admission: Debility    History of Present Illness:  Patient is a 84 y.o. male who has a past medical history of Arthritis; Cataract; CKD stage 3; Compression fracture of lumbar vertebra; Depression; Dyslipidemia; GERD; Hypertension; Thyroid disease presented with chronic left knee pain due to osteoarthritis. Pain has caused interference of activities of daily living. Patient has failed non-operative treatment and elected to undergo surgical management. He was admitted to orthopedics and underwent left total knee arthroplasty on 10/25/17 by Dr. [facility]. Patient tolerated procedure well with no significant post operative complications. Patient has been working with PT/OT who recommend SNF for further balance/mobility training. Patient currently has no complaints. States pain is well controlled and has not required any PRN pain medication. Sitter at bedside and updated on plan of care. All questions answered.       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    GERD (gastroesophageal reflux disease)     Hypertension     Thyroid disease     UTI (urinary tract infection)        Past Surgical History:   Procedure Laterality Date    CHOLECYSTECTOMY      HERNIA REPAIR       JOINT REPLACEMENT      LAMINECTOMY  12/27/2016    L2-L4    LUMBAR LAMINECTOMY  12/2016    PARATHYROIDECTOMY      TOTAL KNEE ARTHROPLASTY      Right       Review of patient's allergies indicates:  No Known Allergies    Current Facility-Administered Medications on File Prior to Encounter   Medication    [DISCONTINUED] 0.9%  NaCl infusion    [DISCONTINUED] acetaminophen tablet 1,000 mg    [DISCONTINUED] aspirin EC tablet 325 mg    [DISCONTINUED] bisacodyl suppository 10 mg    [DISCONTINUED] famotidine tablet 20 mg    [DISCONTINUED] fentaNYL injection 25 mcg    [DISCONTINUED] hyoscyamine SL tablet 0.125 mg    [DISCONTINUED] influenza (FLUZONE HIGH-DOSE) vaccine 0.5 mL    [DISCONTINUED] levothyroxine tablet 50 mcg    [DISCONTINUED] mirtazapine tablet 30 mg    [DISCONTINUED] naloxone 0.4 mg/mL injection 0.02 mg    [DISCONTINUED] ondansetron injection 4 mg    [DISCONTINUED] ondansetron injection 4 mg    [DISCONTINUED] oxyCODONE immediate release tablet 10 mg    [DISCONTINUED] oxyCODONE immediate release tablet 15 mg    [DISCONTINUED] oxyCODONE immediate release tablet 5 mg    [DISCONTINUED] polyethylene glycol packet 17 g    [DISCONTINUED] potassium chloride CR capsule 50 mEq    [DISCONTINUED] pregabalin capsule 75 mg    [DISCONTINUED] promethazine (PHENERGAN) 6.25 mg in dextrose 5 % 50 mL IVPB    [DISCONTINUED] ramelteon tablet 8 mg    [DISCONTINUED] ropivacaine (PF) 2 mg/ml (0.2%) infusion    [DISCONTINUED] ropivacaine 0.2% ON-Q C-BLOC 400 ML (SELECT A FLOW)    [DISCONTINUED] senna-docusate 8.6-50 mg per tablet 1 tablet    [DISCONTINUED] simethicone chewable tablet 80 mg    [DISCONTINUED] simvastatin tablet 40 mg    [DISCONTINUED] sodium chloride 0.9% flush 3 mL    [DISCONTINUED] sulfamethoxazole-trimethoprim 800-160mg per tablet 1 tablet    [DISCONTINUED] traZODone tablet 50 mg    [DISCONTINUED] triamterene-hydrochlorothiazide 37.5-25 mg per capsule 1 capsule     Current Outpatient  Prescriptions on File Prior to Encounter   Medication Sig    aspirin 325 MG tablet Take 1 tablet (325 mg total) by mouth 2 (two) times daily.    docusate sodium (COLACE) 100 MG capsule Take 1 capsule (100 mg total) by mouth 2 (two) times daily as needed for Constipation.    hyoscyamine (LEVSIN/SL) 0.125 mg Subl Place 1 tablet (0.125 mg total) under the tongue every 4 (four) hours as needed (bladder spasms).    levothyroxine (SYNTHROID) 50 MCG tablet Take 1 tablet (50 mcg total) by mouth once daily.    mirtazapine (REMERON) 30 MG tablet Take 1 tablet (30 mg total) by mouth every evening.    ondansetron (ZOFRAN-ODT) 8 MG TbDL Take 1 tablet (8 mg total) by mouth every 12 (twelve) hours as needed (nausea).    oxyCODONE-acetaminophen (PERCOCET)  mg per tablet Take 1 tablet by mouth every 4 to 6 hours as needed for Pain.    simethicone (MYLICON) 80 MG chewable tablet Take 1 tablet (80 mg total) by mouth 3 (three) times daily after meals.    simvastatin (ZOCOR) 40 MG tablet Take 1 tablet (40 mg total) by mouth every evening.    sulfamethoxazole-trimethoprim 800-160mg (BACTRIM DS) 800-160 mg Tab Take 1 tablet by mouth 2 (two) times daily.    trazodone (DESYREL) 50 MG tablet Take 1 tablet (50 mg total) by mouth nightly as needed for Insomnia.    triamterene-hydrochlorothiazide 37.5-25 mg (DYAZIDE) 37.5-25 mg per capsule Take 1 capsule by mouth every morning. HOLD UNTIL TOLD TO RESUME BY PHYSICIAN     Family History     Problem Relation (Age of Onset)    Diabetes Father    Esophageal cancer Father    Hypertension Mother        Social History Main Topics    Smoking status: Former Smoker     Years: 20.00     Quit date: 1990    Smokeless tobacco: Never Used      Comment: quit 1999    Alcohol use No      Comment: rarely/6 months    Drug use: No    Sexual activity: No     Review of Systems   Constitutional: Negative for chills, fatigue and fever.   HENT: Negative for congestion, facial swelling, hearing loss  and trouble swallowing.    Eyes: Negative for photophobia and visual disturbance.   Respiratory: Negative for chest tightness, shortness of breath and wheezing.    Cardiovascular: Negative for chest pain, palpitations and leg swelling.   Gastrointestinal: Negative for abdominal pain, blood in stool, constipation, diarrhea, nausea and vomiting.   Endocrine: Negative.    Genitourinary: Negative.    Musculoskeletal: Negative for back pain, joint swelling and myalgias.   Skin: Negative.    Allergic/Immunologic: Negative.    Neurological: Negative for dizziness, facial asymmetry, speech difficulty, weakness and numbness.   Hematological: Negative.    Psychiatric/Behavioral: Negative for agitation, confusion and dysphoric mood. The patient is not nervous/anxious.      Objective:     Vital Signs (Most Recent):  Temp: 98.1 °F (36.7 °C) (10/26/17 2100)  Pulse: 87 (10/26/17 2100)  Resp: 18 (10/26/17 2100)  BP: 128/68 (10/26/17 2100)  SpO2: 98 % (10/26/17 2100) Vital Signs (24h Range):  Temp:  [97.5 °F (36.4 °C)-98.1 °F (36.7 °C)] 98.1 °F (36.7 °C)  Pulse:  [86-98] 87  Resp:  [17-23] 18  SpO2:  [95 %-99 %] 98 %  BP: (118-154)/(60-76) 128/68     Weight: 88.4 kg (194 lb 14.2 oz)  Body mass index is 29.63 kg/m².    Physical Exam   Constitutional: He is oriented to person, place, and time. Vital signs are normal. He appears well-developed and well-nourished.  Non-toxic appearance. He does not appear ill. No distress.   HENT:   Head: Normocephalic and atraumatic.   Eyes: Conjunctivae and EOM are normal. Pupils are equal, round, and reactive to light. No scleral icterus.   Neck: Normal range of motion and full passive range of motion without pain. Neck supple. No JVD present. Carotid bruit is not present. No thyromegaly present.   Cardiovascular: Normal rate, regular rhythm, S1 normal, S2 normal, normal heart sounds and normal pulses.  Exam reveals no gallop, no S3, no S4 and no friction rub.    No murmur heard.  Pulmonary/Chest:  Effort normal and breath sounds normal. No accessory muscle usage. No tachypnea. No respiratory distress. He has no decreased breath sounds. He has no wheezes. He has no rhonchi. He has no rales.   Abdominal: Soft. Normal appearance and bowel sounds are normal. He exhibits no shifting dullness, no distension, no abdominal bruit and no ascites. There is no hepatosplenomegaly. There is no tenderness. There is no rigidity and no guarding.   Musculoskeletal: Normal range of motion. He exhibits no edema.        Left knee: Tenderness found.        Legs:  Neurological: He is alert and oriented to person, place, and time. He has normal strength. He is not disoriented. No cranial nerve deficit or sensory deficit. GCS eye subscore is 4. GCS verbal subscore is 5. GCS motor subscore is 6.   Skin: Skin is warm, dry and intact. No abrasion and no lesion noted.   Psychiatric: He has a normal mood and affect. His behavior is normal. Judgment and thought content normal. His mood appears not anxious. His speech is not slurred. He is not actively hallucinating. Cognition and memory are normal. He does not exhibit a depressed mood. He is attentive.       Significant Labs: All pertinent labs within the past 24 hours have been reviewed.    Significant Imaging: I have reviewed all pertinent imaging results/findings within the past 24 hours.    Assessment/Plan:     * Primary osteoarthritis of left knee    S/p left TKA  Surgical wound to be managed by ortho NP while at [facility]  Cont with current pain control   Cont bowel regimen for opioid induced constipation  Cont with PT/OT for gait training and strengthening and restoration of ADL's: WBAT per ortho   Fall precautions   Cont DVT prophylaxis with  mg per ortho. Cont famotidine for GI prophylaxis   Future Appointments  Date Time Provider Department Center   11/7/2017 1:30 PM Neeta Dunne PA-C [facility] ORTHO [place]   11/8/2017 1:40 PM Neelam Hooper NP [facility] UROLOGC [facility]    11/29/2017 1:30 PM Trinity Vazquez MD [facility] OPHTHAL [facility] [place]               Debility    Cont PT/OT as outlined above.        Hyponatremia    Cont to monitor  Likely due to HCTZ  Stable.         Edema    Low Na diet  BEE hose  Leg elevation           CKD (chronic kidney disease) stage 3, GFR 30-59 ml/min    Sr Cr stable  Cont to monitor   Avoid nephrotoxins.           History of MRSA infection    Cont Bactrim to treat.   Follow up as outpatient.         Urinary retention    Has history of neurogenic bladder  S/p suprapubic cath  Cont galvan care  Cont hyoscyamine for bladder spasms         Depression    Chronic and stable  Cont mirtazapine to treat.         Dyslipidemia    Cont with home simvastatin to treat.         Essential hypertension    Well controlled  Cont home Triamterene-HCTZ to treat  Cont to monitor BP  Cont to monitor electrolytes with biweekly labs.         Hypothyroidism    Cont home levothyroxine to treat            Fabio Brewer MD  Department of Hospital Medicine  [facility]

## 2017-10-27 NOTE — PROGRESS NOTES
"When making rounds during the night this patient was asleep with no signs of distress or discomfort noted. This patient was awaken by this nurse and Patient stated " I didn't sleep all night long ". There was water all over the top of his table and this nurse stated " you have water on your table ,let me get a towel and dry it up."  Patient stated the PCT carried the galvan bag and dumped it on the table". I spoke to the PCT Jv and she states " I emptied the bag on the other side of bed from the table on the floor and he said he couldn't sleep all night because everybody was arguing in the suero ". The halls were extremely quiet last night , we had no problems with anyone. This has been reported to the charge nurse Adan . Charge nurse also stated " there was no noise on the halls last night ". Will continue to monitor.  "

## 2017-10-27 NOTE — ASSESSMENT & PLAN NOTE
Well controlled  Cont home Triamterene-HCTZ to treat  Cont to monitor BP  Cont to monitor electrolytes with biweekly labs.

## 2017-10-27 NOTE — CLINICAL REVIEW
Clinical Pharmacy Chart Review Note      Admit Date: 10/26/2017   LOS: 1 day       Chucho Prado is a 84 y.o. male admitted to SNF for PT/OT after hospitalization for debility, primary arthritis of left knee.    Active Hospital Problems    Diagnosis  POA    *Primary osteoarthritis of left knee [M17.12]  Yes    Debility [R53.81]  Yes    Edema [R60.9]  Yes    Hyponatremia [E87.1]  Yes    CKD (chronic kidney disease) stage 3, GFR 30-59 ml/min [N18.3]  Yes    Urinary retention [R33.9]  Yes    History of MRSA infection [Z86.14]  Yes    Hypothyroidism [E03.9]  Yes     Chronic    Essential hypertension [I10]  Yes     Chronic    Dyslipidemia [E78.5]  Yes     Chronic    Depression [F32.9]  Yes     Chronic      Resolved Hospital Problems    Diagnosis Date Resolved POA   No resolved problems to display.     Review of patient's allergies indicates:  No Known Allergies  Patient Active Problem List    Diagnosis Date Noted    Debility 10/27/2017    Primary osteoarthritis of left knee 10/25/2017    PONV (postoperative nausea and vomiting) 10/23/2017    Suprapubic catheter 10/23/2017    Edema 10/23/2017    Stiffness of neck 10/23/2017    Hyponatremia 10/23/2017    Lumbar myelopathy 12/30/2016    Gait instability 12/30/2016    S/P lumbar laminectomy 12/28/2016    S/P kyphoplasty- L3 kyphoplasty  12/28/2016    Sciatica neuralgia 12/26/2016    CKD (chronic kidney disease) stage 3, GFR 30-59 ml/min 12/26/2016    Abdominal wall hernia 12/26/2016    Urinary retention 12/25/2016    History of MRSA infection 12/25/2016    Primary osteoarthritis of both knees 11/08/2016    Staph aureus infection 10/21/2016    Inguinal hernia unilateral, non-recurrent 10/09/2013    Renal impairment 07/31/2013    Iron deficiency anemia 07/31/2013    Hypothyroidism 07/30/2013    Essential hypertension 07/30/2013    Inguinal hernia, right 07/30/2013    Dyslipidemia 07/30/2013    Depression 07/30/2013    Anemia  07/30/2013       Scheduled Meds:    aspirin  325 mg Oral BID    famotidine  20 mg Oral BID    levothyroxine  50 mcg Oral Before breakfast    mirtazapine  30 mg Oral QHS    polyethylene glycol  17 g Oral Daily    senna-docusate 8.6-50 mg  1 tablet Oral BID    simethicone  80 mg Oral TID PC    simvastatin  40 mg Oral QHS    sulfamethoxazole-trimethoprim 800-160mg  1 tablet Oral BID    triamterene-hydrochlorothiazide 37.5-25 mg  1 capsule Oral QAM     Continuous Infusions:    PRN Meds: acetaminophen, calcium carbonate, hyoscyamine, oxyCODONE, oxyCODONE, traZODone    OBJECTIVE:     Vital Signs (Last 24H)  Temp:  [97.5 °F (36.4 °C)-98.1 °F (36.7 °C)]   Pulse:  [86-98]   Resp:  [18]   BP: (128-154)/(65-76)   SpO2:  [95 %-98 %]     Laboratory:  CBC:   Recent Labs  Lab 10/23/17  1536   WBC 8.09   RBC 4.65   HGB 12.8*   HCT 37.6*      MCV 81*   MCH 27.5   MCHC 34.0     BMP:   Recent Labs  Lab 10/23/17  1536 10/25/17  1100 10/25/17  1526     --  118*   * 129* 132*   K 3.7  --  3.1*   CL 86*  --  97   CO2 29  --  25   BUN 18  --  15   CREATININE 1.3  --  1.3   CALCIUM 9.2  --  8.3*     CMP:   Recent Labs  Lab 10/23/17  1536 10/25/17  1100 10/25/17  1526     --  118*   CALCIUM 9.2  --  8.3*   * 129* 132*   K 3.7  --  3.1*   CO2 29  --  25   CL 86*  --  97   BUN 18  --  15   CREATININE 1.3  --  1.3     LFTs: No results for input(s): ALT, AST, ALKPHOS, BILITOT, PROT, ALBUMIN in the last 168 hours.  Coagulation:   Recent Labs  Lab 10/23/17  1536   INR 0.9     Cardiac markers: No results for input(s): CKMB, TROPONINT, MYOGLOBIN in the last 168 hours.  ABGs: No results for input(s): PH, PCO2, PO2, HCO3, POCSATURATED, BE in the last 168 hours.  Microbiology Results (last 7 days)     ** No results found for the last 168 hours. **        Specimen (12h ago through future)    None        No results for input(s): COLORU, CLARITYU, SPECGRAV, PHUR, PROTEINUA, GLUCOSEU, BILIRUBINCON, BLOODU, WBCU,  RBCU, BACTERIA, MUCUS, NITRITE, LEUKOCYTESUR, UROBILINOGEN, HYALINECASTS in the last 168 hours.  Others: No results for input(s): [id number], TSH, T4FREE, LABLIPI in the last 168 hours.    Invalid input(s): A1C      ASSESSMENT/PLAN:     Active Hospital Problems    Diagnosis    *Primary osteoarthritis of left knee     Debility   --PT/OT: APAP 650 mg q5h prn mild pain; Oxycodone 5 mg q3h prn 4-5/10; 10 mg q3h 6-7/10 pain  --bowel regimen for constipation; hold for loose or frequent stools: Miralax daily; Senna-Docusate twice daily  --DVT prophylaxis:  mg twice daily (monitor CBC)  Lab Results   Component Value Date    WBC 8.09 10/23/2017    HGB 12.8 (L) 10/23/2017    HCT 37.6 (L) 10/23/2017    MCV 81 (L) 10/23/2017     10/23/2017       Edema  **Monitor weight  --Triamterene/HCTZ 37.5/25 mg q AM (monitor BMP)  BMP  Lab Results   Component Value Date     (L) 10/25/2017    K 3.1 (L) 10/25/2017    CL 97 10/25/2017    CO2 25 10/25/2017    BUN 15 10/25/2017    CREATININE 1.3 10/25/2017    CALCIUM 8.3 (L) 10/25/2017    ANIONGAP 10 10/25/2017    ESTGFRAFRICA 57.9 (A) 10/25/2017    EGFRNONAA 50.1 (A) 10/25/2017         Hyponatremia   **Monitor serum sodium  Serum Bt=663 on 10/25/2017    CKD (chronic kidney disease) stage 3  **Monitor renal function/SCr  10/27/2017 Estimated Creatinine Clearance: 45.7 mL/min (based on SCr of 1.3 mg/dL).       Urinary retention   --Hyoscyamine SL 0.125 mg q4h prn bladder spasms    History of MRSA infection   --Bactrim DS twice daily    Hypothyroidism   **Monitor thyroid levels/fuunction  --Levothyroxine 50 mcg daily before breakfast  Lab Results   Component Value Date    TSH 2.258 10/21/2016         Essential hypertension   **Monitor BP  --Triamterene/HCTZ daily  BP Readings from Last 3 Encounters:   10/27/17 (!) 140/65   10/26/17 135/68   10/23/17 (!) 131/58         Dyslipidemia   --Simvastatin 40 mg nightly  Lab Results   Component Value Date    CHOL 176  11/08/2016    CHOL 254 (H) 08/01/2005     Lab Results   Component Value Date    HDL 30 (L) 11/08/2016    HDL 32.0 (L) 08/01/2005     Lab Results   Component Value Date    LDLCALC 105.8 11/08/2016    LDLCALC 202.6 (H) 08/01/2005     Lab Results   Component Value Date    TRIG 201 (H) 11/08/2016    TRIG 97 08/01/2005     Lab Results   Component Value Date    CHOLHDL 17.0 (L) 11/08/2016    CHOLHDL 12.6 (L) 08/01/2005         Depression   --Mirtazapine 30 mg nightly  Monitor: mental status for depression, suicide ideation, anxiety, serotonin syndrome, hyponatremia        · GERD         --Famotidine 20 mg twice daily         --Simethicone 80 mg three times daily     · Trazodone 50 mg at bedtime as needed for insomnia, will monitor use

## 2017-10-27 NOTE — HPI
Chief Complaint/Reason for Admission: Debility    History of Present Illness:  Patient is a 84 y.o. male who has a past medical history of Arthritis; Cataract; CKD stage 3; Compression fracture of lumbar vertebra; Depression; Dyslipidemia; GERD; Hypertension; Thyroid disease presented with chronic left knee pain due to osteoarthritis. Pain has caused interference of activities of daily living. Patient has failed non-operative treatment and elected to undergo surgical management. He was admitted to orthopedics and underwent left total knee arthroplasty on 10/25/17 by Dr. [facility]. Patient tolerated procedure well with no significant post operative complications. Patient has been working with PT/OT who recommend SNF for further balance/mobility training. Patient currently has no complaints. States pain is well controlled and has not required any PRN pain medication. Sitter at bedside and updated on plan of care. All questions answered.

## 2017-10-27 NOTE — ASSESSMENT & PLAN NOTE
Has history of neurogenic bladder  S/p suprapubic cath  Cont galvan care  Cont hyoscyamine for bladder spasms

## 2017-10-27 NOTE — PT/OT/SLP EVAL
PhysicalTherapy   Evaluation and treatment    Chucho Prado   MRN: [id number]     PT Received On: 10/27/17  PT Start Time: 1324     PT Stop Time: 1424    PT Total Time (min): 60 min       Billable Minutes:  Evaluation 1, Gait [id number], Therapeutic Activity 1515 and Therapeutic Exercise 15    Diagnosis: Primary osteoarthritis of left knee  Past Medical History:   Diagnosis Date    Arthritis- neck     Blood transfusion     before 2005 - whe had gangrenous gall bladder    Cataract     CKD (chronic kidney disease) stage 3, GFR 30-59 ml/min     Compression fracture of lumbar vertebra * DEC 2016 -JAN 2017     Depression     Dyslipidemia     General anesthetics causing adverse effect in therapeutic use     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    GERD (gastroesophageal reflux disease)     Hypertension     Thyroid disease     UTI (urinary tract infection)       Past Surgical History:   Procedure Laterality Date    CHOLECYSTECTOMY      HERNIA REPAIR      JOINT REPLACEMENT      LAMINECTOMY  12/27/2016    L2-L4    LUMBAR LAMINECTOMY  12/2016    PARATHYROIDECTOMY      TOTAL KNEE ARTHROPLASTY      Right         General Precautions: Standard, fall  Orthopedic Precautions: LLE weight bearing as tolerated   Braces:      Do you have any cultural, spiritual, Sikh conflicts, given your current situation?: no    Patient History:  Lives With: alone ([employer] service (7 days a week, 8AM-12AM))  Living Arrangements: house (elevator access)  Transportation Available: family or friend will provide  Living Environment Comment: Pt live alone but has sitter services 18 hours /day.  He requires assistance for bathing/dressing. Uses his standard walker preferably at home (owns a rollator and rolling walker- feels these are unstable). Has a shower chair with clawfoot tub. Does not drive. Has a PhD in English from [employer] (from [place], [place]).  No family nearby. Has a suprapubic catheter. Lives on  [address] [facility].  Equipment Currently Used at Home: wheelchair, walker, rolling, walker, standard, shower chair, rollator  DME owned (not currently used): none    Previous Level of Function:       Subjective:  Communicated with pt prior to session.  Pt was agreeable to PT services today. He reports that he used to go to grammar school with [employer]ai. 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 He has poor posture due to reading and writing as well as arthritis.     Chief Complaint: Poor posture and inability to perform transfers on his own.  Patient goals: To be able to transfer on his own.    Pain/Comfort  Pain Rating 1: 0/10    Objective:  Patient found seated in wheelchair with   Reggie whyte, shonna.    Cognitive Exam:  Oriented to: Person, Place, Time and Situation  Follows Commands/attention: Follows multistep  commands  Communication: clear/fluent  Safety awareness/insight to disability: intact    Physical Exam:  Postural examination/scapula alignment: Rounded shoulder, Head forward, scapulae adducted, Posterior pelvic tilt and Kyphosis    Skin integrity: Visible skin intact  Edema: Mild L knee    Sensation:   Intact    Upper Extremity Range of Motion:  Right Upper Extremity: WNL- limited shoulder flexion/abduction due to posture  Left Upper Extremity: WNL- limited shoulder flexion/abduction due to posture    Upper Extremity Strength:   Right Upper Extremity: WNL biceps/triceps shoulders not tested   Left Upper Extremity: WNL biceps/tricps; shoulders not tested     Lower Extremity Range of Motion:  Right Lower Extremity: WNL  Left Lower Extremity: Deficits: knee flexion: 62 active, 80 degrees passive;  Knee extension (7 degrees deficit)    Lower Extremity Strength:  Right Lower Extremity: WNL  Left Lower Extremity: 2+/5 overall     Fine motor coordination:  Intact    Gross motor coordination: WFL    Functional Status:  MDS G  Bed Mobility Functional Status:  total(A)  Bed Mobility Level of (A): 3: Two+ persons physical (A) (on mat)  Transfer Functional Status: mod(A)-Max(A)  Transfer Level of (A):  (1-2 people from wheelchair/mat)  Walk in Room Functional Status: CGA-Min (A)  Walk in Corridor Functional Status: CGA-Min (A)  Locomotion on Unit Functional Status: total(A)  Eval Only: Number of L/E limb <4/5 MMT: 1    MDS GG  Sit to lying Performance: Substantial/maximal assistance., See goal below  Sit to lying Goal: Partial/moderate assistance.  Lying to sitting on side of bed Performance: Substantial/maximal assistance.  Sit to Stand Performance: Substantial/maximal assistance.  Chair/bed-to-chair transfer Performance: Partial/moderate assistance.  Does the resident walk?: Yes --> Continue to Walk 50 feet with two turns assessment  Walk 50 feet with two turns Performance: Partial/moderate assistance.  Walk 150 feet Performance: Not attempted due to medical condition or safety concerns  Does the resident use a wheelchair/scooter?: Yes --> Continue to Wheel 50 feet with two turns assessment  Wheel 50 feet with two turns Performance: Not attempted due to medical condition or safety concerns  Indicate the type of wheelchair/scooter used: Manual    Bed Mobility:  Sit>Supine: Total A (Max A x 2) on mat  Supine>Sit: Max A on mat    Transfers:  Sit<>Stand: Max A from edge of mat, wheelchair with a posterior lean and immobile pelvis (stuck in posterior pelvic tilt). Will need to address his pelvic mobility for transfer purposes and seated balance as well posture.  Stand Pivot Transfer: Min A with use of rolling walker    Gait:  Amb 50 feet with minimal assistance, step-to gait pattern, use of rolling walker, and forward flexed trunk.      Therex (x15 reps on LLE):  LAQ  Hip flexion  Ankle pumps  SAQ  Hip abduction  Heel slides  Quad sets    Seated rows with facilitation of scapulae- 20 reps     Balance:  Seated- SBA-CGA with forward flexed trunk and slight posterior  lean.  Standing- posterior lean noted, requiring CGA-Joe with use of rolling walker.    Patient left up in chair with call button in reach and Reggie whyte, present.    Assessment:  Chucho Prado is a 84 y.o. male with a medical diagnosis of Primary osteoarthritis of left knee. Mr. Prado presents with the following deficits: impaired knee flexion/extension, weakness in the structures surrounding his L knee, impaired ability to transfer (sit<>stand) and ambulate on his own with a slightly immobile pelvis that causes a posterior lean. His clinical presentation involves not only his L knee but his balance and posture, thus his clinical presentation is evolving. His personal factors that will affect the plan of care are as follows: alone from 12AM-8AM every day (needs 24 hr assistance at this time due to debility), chronic back pain (since Dec-Javy lumbar fracture/repair), and history of falls. Mr. Prado's evaluation is thus labeled moderate complexity, and he will benefit greatly from skilled PT services to improve his pelvic mobility, seated and standing balance, and ability to perform transfers and ambulate with a rolling walker safely.     Rehab identified problem list/impairments: weakness, impaired endurance, impaired self care skills, impaired functional mobilty, gait instability, impaired balance, decreased lower extremity function, decreased upper extremity function, decreased safety awareness, decreased ROM, edema, impaired joint extensibility    Rehab potential is fair.    Activity tolerance: Fair    Discharge recommendations: home health PT (with 24 hr care)     Barriers to discharge: Decreased caregiver support (Alone 12am-8am every day)    Equipment recommendations: bedside commode     GOALS:    Physical Therapy Goals        Problem: Physical Therapy Goal    Goal Priority Disciplines Outcome Goal Variances Interventions   Physical Therapy Goal     PT/OT, PT Ongoing (interventions  implemented as appropriate)     Description:  Goals to be met by: 2-3 weeks     Patient will increase functional independence with mobility by performin. Supine to sit with Minimal Assistance on mat. Not met  2. Sit to supine with Minimal Assistance on mat. Not met  3. Sit to stand transfer with Minimal Assistance from wheelchair. Not met  4. Bed to chair transfer with Minimal Assistance using Rolling Walker. Not met  5. Gait  x 150 feet with Stand-by Assistance using Rolling Walker. Not met  6. Ascend/Descend 4 inch curb step with Moderate Assistance using Rolling Walker. Not met  7. Stand for 3 minutes with Contact Guard Assistance using Rolling Walker while performing a task or partaking in an activity.  Not met  8. Lower extremity, TKA exercise program x 20 reps per handout, with assistance as needed.  Not met                      PLAN:    Patient to be seen  (5x/week)  to address the above listed problems via gait training, therapeutic activities, therapeutic exercises, neuromuscular re-education  Plan of Care Expires: 17    Luda Veronica, PT 10/27/2017

## 2017-10-27 NOTE — ASSESSMENT & PLAN NOTE
S/p left TKA  Surgical wound to be managed by ortho NP while at [facility]  Cont with current pain control   Cont bowel regimen for opioid induced constipation  Cont with PT/OT for gait training and strengthening and restoration of ADL's: WBAT per ortho   Fall precautions   Cont DVT prophylaxis with  mg per ortho. Cont famotidine for GI prophylaxis   Future Appointments  Date Time Provider Department Center   11/7/2017 1:30 PM Neeta Dunne PA-C [facility] ORTHO [facility]   11/8/2017 1:40 PM Neelam Hooper NP [facility] UROLOG [facility]   11/29/2017 1:30 PM Trinity Vazquez MD [facility] OPHTHAL [facility]

## 2017-10-27 NOTE — PT/OT/SLP EVAL
Occupational Therapy  Evaluation    Chucho Prado   MRN: [id number]   Admitting Diagnosis: Primary osteoarthritis of left knee     OT Date of Treatment: 10/27/17   OT Start Time: 0840  OT Stop Time: 0945  OT Total Time (min): 65 min    Billable Minutes:  Evaluation 30  Self Care/Home Management 35    Diagnosis: Primary osteoarthritis of left knee    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    GERD (gastroesophageal reflux disease)     Hypertension     Thyroid disease     UTI (urinary tract infection)       Past Surgical History:   Procedure Laterality Date    CHOLECYSTECTOMY      HERNIA REPAIR      JOINT REPLACEMENT      LAMINECTOMY  12/27/2016    L2-L4    LUMBAR LAMINECTOMY  12/2016    PARATHYROIDECTOMY      TOTAL KNEE ARTHROPLASTY      Right         General Precautions: Standard, fall  Orthopedic Precautions: LLE weight bearing as tolerated  Braces:      Do you have any cultural, spiritual, Christianity conflicts, given your current situation?: none noted     Patient History:  Lives With: alone ([email or web address] service (7 days a week, 8AM-12AM))  Living Arrangements: house (has elevator)  Transportation Available: family or friend will provide  Living Environment Comment: Patient lives alone however has 18 hour a day assistance with all self-care and homemaking tasks.  He uses a wheelchair primarily and reports prevously  walking 20 - 40 feet per day.  Allso reports preferring to sponge bathe vs shower secondary to being fearful of falling the bathroom.  Has medic-alert call button and reports his tenant is close by and can assist in an  "emergency.  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  "I haven't had any sleep in 3 days."  Chief Complaint: fatigue and inability to walk  Patient/Family stated goals: "To be able to walk like I used to every day about 85 feet a couple of times a day."    Pain/Comfort  Pain Rating 1: 5/10  Location - Side 1: Left  Location - Orientation 1: generalized  Location 1: knee  Pain Addressed 1: Pre-medicate for activity, Reposition, Distraction, Cessation of Activity  Pain Rating Post-Intervention 1: 5/10    Objective:   Patient found with: FCD, galvan catheter, Polar ice    Cognitive Exam:  Oriented to: Person, Place, Time and Situation  Follows Commands/attention: Easily distracted  Communication: clear/fluent  Memory:  Presents impaired for short term however may be confusion versus memory impairment  Safety awareness/insight to disability: impaired  Coping skills/emotional control: Appropriate to situation    Visual/perceptual:  Intact    Physical Exam:  Postural examination/scapula alignment: Rounded shoulder, Head forward, Posterior pelvic tilt and Abnormal trunk flexion  Skin integrity: Visible skin intact  Edema: Mild B feet    Sensation:   Intact    Upper Extremity Range of Motion:  Right Upper Extremity: Deficits: moderately impaired  Left Upper Extremity: Deficits: moderately impaired    Upper Extremity Strength:  Right Upper " Extremity: moderately impaired  Left Upper Extremity: moderately impaired   Strength: fair    Fine motor coordination:   Intact    Gross motor coordination: WFL    Functional Status:  MDS G  Bed Mobility Functional Status: total(A)  Transfer Functional Status: mod(A)-Max(A)  Dressing Functional Status: 3:mod(A)-Max(A)  Toilet Use Functional Status: mod(A)-Max(A)  Personal Hygiene Functional Status: mod(A)-Max(A)  Bathing Functional Status: mod(A)-Max(A)  Eval Only: Number of U/E limb <4/5 MMT: 2  Moving from seated to standing position: Not steady, only able to stabilize with staff assistance  Turning around and facing the opposite direction while walking: Not steady, only able to stabilize with staff assistance  Moving on and off the toilet: Not steady, only able to stabilize with staff assistance  Surface-to-surface transfer (transfer between bed and chair or wheelchair): Not steady, only able to stabilize with staff assistance    MDS GG  Eating Performance: Set-up or clean-up assistance.  Oral Hygiene Performance: Setup or clean-up assistance  Toileting Hygiene Performance: Substantial/maximal assistance.  Toilet transfer Performance: Partial/moderate assistance.      Treatment:  Education and training provided for safety during dressing, grooming, sponge bath and transfers. Personal care attendant present for evaluation and treatment session. Instruction given for safe hand placement, body positioning and sequencing of tasks to increase safety and independence and decrease risk of falls.    Patient left up in chair with call button in reach and personal care attendant present    Assessment:  Chucho Prado is a 84 y.o. male with a medical diagnosis of Primary osteoarthritis of left knee.  Patient presents s/p L TKA. Has been living at home alone with a personal care attendant providing minimum assistance with all self-care and homemaking tasks. Currently requires Mod/max A with decreased standing  balance, standing tolerance, safety and endurance for OOB activities.     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 He is a retired professor and enjoys reading and writing as well as socializing and having friends over. He would like to be able to walk around the [place] again as this is a very meaningful occupation for him.     Pt presented with a moderate complexity OT evaluation. Pt required an extensive and expanded review of medical history and occupational profile. Pt demod 5+ performance deficits (physical, cognitive, or psychosocial) resulting in limitations and engagement restrictions. Clinical decision making required analytical complexity with multiple treatment options. Pt with cormorbidities and required moderate modification of task/assistance with assessment.       Physical- skills refer to impairments of body structure or functions, balance, mobility; strength, endurance, FMC, GMC, sensation, dexterity, and posture.  Cognitive- skills refer to ability to attend, communicate, perceive, think, understand, problem solve, mentally sequence, learn, and remember resulting in ability to organize occupational performance in timely and safe manner.   Psychosocial- skills refer to interpersonal interactions, habits, routines, and behaviors, active use of coping strategies, and environmental adaptations to appropriately participate in everyday tasks and situations.        Rehab identified problem list/impairments: weakness, impaired endurance, impaired self care skills, impaired functional mobilty, gait instability, impaired balance, impaired cognition, decreased upper extremity function, decreased lower extremity function, decreased safety awareness, pain, decreased ROM, impaired coordination    Rehab potential is good    Activity tolerance: Poor    Discharge recommendations: home with home health, home health OT      Barriers to discharge: Decreased caregiver support     Equipment recommendations:  (TBD)     GOALS:    Occupational Therapy Goals        Problem: Occupational Therapy Goal    Goal Priority Disciplines Outcome Interventions   Occupational Therapy Goal     OT, PT/OT Ongoing (interventions implemented as appropriate)    Description:  Goals to be met by: 21 days  Patient will increase functional independence with ADLs by performing:    UE Dressing with Stand-by Assistance.  LE Dressing with Minimal Assistance.  Grooming while seated with Set-up Assistance.  Toileting from bedside commode with Stand-by Assistance for hygiene and clothing management.   Bathing from  edge of bed with Minimal Assistance.  Sitting at edge of bed x 15  minutes with Modified [place].  Stand pivot transfers with Supervision.  Toilet transfer to toilet with Stand-by Assistance.                      PLAN: Patient to be seen 5 x/week to address the above listed problems via self-care/home management, therapeutic activities, therapeutic exercises, neuromuscular re-education  Plan of Care expires:    Plan of Care reviewed with: patient    Yaz DINO Watts  10/27/2017

## 2017-10-27 NOTE — SUBJECTIVE & OBJECTIVE
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    GERD (gastroesophageal reflux disease)     Hypertension     Thyroid disease     UTI (urinary tract infection)        Past Surgical History:   Procedure Laterality Date    CHOLECYSTECTOMY      HERNIA REPAIR      JOINT REPLACEMENT      LAMINECTOMY  12/27/2016    L2-L4    LUMBAR LAMINECTOMY  12/2016    PARATHYROIDECTOMY      TOTAL KNEE ARTHROPLASTY      Right       Review of patient's allergies indicates:  No Known Allergies    Current Facility-Administered Medications on File Prior to Encounter   Medication    [DISCONTINUED] 0.9%  NaCl infusion    [DISCONTINUED] acetaminophen tablet 1,000 mg    [DISCONTINUED] aspirin EC tablet 325 mg    [DISCONTINUED] bisacodyl suppository 10 mg    [DISCONTINUED] famotidine tablet 20 mg    [DISCONTINUED] fentaNYL injection 25 mcg    [DISCONTINUED] hyoscyamine SL tablet 0.125 mg    [DISCONTINUED] influenza (FLUZONE HIGH-DOSE) vaccine 0.5 mL    [DISCONTINUED] levothyroxine tablet 50 mcg    [DISCONTINUED] mirtazapine tablet 30 mg    [DISCONTINUED] naloxone 0.4 mg/mL injection 0.02 mg    [DISCONTINUED] ondansetron injection 4 mg    [DISCONTINUED] ondansetron injection 4 mg    [DISCONTINUED] oxyCODONE immediate release tablet 10 mg    [DISCONTINUED] oxyCODONE immediate release tablet 15 mg    [DISCONTINUED] oxyCODONE immediate release tablet 5 mg    [DISCONTINUED] polyethylene glycol packet 17 g    [DISCONTINUED] potassium chloride CR capsule 50 mEq    [DISCONTINUED] pregabalin capsule 75 mg    [DISCONTINUED] promethazine (PHENERGAN) 6.25 mg in dextrose 5 % 50 mL IVPB     [DISCONTINUED] ramelteon tablet 8 mg    [DISCONTINUED] ropivacaine (PF) 2 mg/ml (0.2%) infusion    [DISCONTINUED] ropivacaine 0.2% ON-Q C-BLOC 400 ML (SELECT A FLOW)    [DISCONTINUED] senna-docusate 8.6-50 mg per tablet 1 tablet    [DISCONTINUED] simethicone chewable tablet 80 mg    [DISCONTINUED] simvastatin tablet 40 mg    [DISCONTINUED] sodium chloride 0.9% flush 3 mL    [DISCONTINUED] sulfamethoxazole-trimethoprim 800-160mg per tablet 1 tablet    [DISCONTINUED] traZODone tablet 50 mg    [DISCONTINUED] triamterene-hydrochlorothiazide 37.5-25 mg per capsule 1 capsule     Current Outpatient Prescriptions on File Prior to Encounter   Medication Sig    aspirin 325 MG tablet Take 1 tablet (325 mg total) by mouth 2 (two) times daily.    docusate sodium (COLACE) 100 MG capsule Take 1 capsule (100 mg total) by mouth 2 (two) times daily as needed for Constipation.    hyoscyamine (LEVSIN/SL) 0.125 mg Subl Place 1 tablet (0.125 mg total) under the tongue every 4 (four) hours as needed (bladder spasms).    levothyroxine (SYNTHROID) 50 MCG tablet Take 1 tablet (50 mcg total) by mouth once daily.    mirtazapine (REMERON) 30 MG tablet Take 1 tablet (30 mg total) by mouth every evening.    ondansetron (ZOFRAN-ODT) 8 MG TbDL Take 1 tablet (8 mg total) by mouth every 12 (twelve) hours as needed (nausea).    oxyCODONE-acetaminophen (PERCOCET)  mg per tablet Take 1 tablet by mouth every 4 to 6 hours as needed for Pain.    simethicone (MYLICON) 80 MG chewable tablet Take 1 tablet (80 mg total) by mouth 3 (three) times daily after meals.    simvastatin (ZOCOR) 40 MG tablet Take 1 tablet (40 mg total) by mouth every evening.    sulfamethoxazole-trimethoprim 800-160mg (BACTRIM DS) 800-160 mg Tab Take 1 tablet by mouth 2 (two) times daily.    trazodone (DESYREL) 50 MG tablet Take 1 tablet (50 mg total) by mouth nightly as needed for Insomnia.    triamterene-hydrochlorothiazide 37.5-25 mg (DYAZIDE) 37.5-25  mg per capsule Take 1 capsule by mouth every morning. HOLD UNTIL TOLD TO RESUME BY PHYSICIAN     Family History     Problem Relation (Age of Onset)    Diabetes Father    Esophageal cancer Father    Hypertension Mother        Social History Main Topics    Smoking status: Former Smoker     Years: 20.00     Quit date: 1990    Smokeless tobacco: Never Used      Comment: quit 1999    Alcohol use No      Comment: rarely/6 months    Drug use: No    Sexual activity: No     Review of Systems   Constitutional: Negative for chills, fatigue and fever.   HENT: Negative for congestion, facial swelling, hearing loss and trouble swallowing.    Eyes: Negative for photophobia and visual disturbance.   Respiratory: Negative for chest tightness, shortness of breath and wheezing.    Cardiovascular: Negative for chest pain, palpitations and leg swelling.   Gastrointestinal: Negative for abdominal pain, blood in stool, constipation, diarrhea, nausea and vomiting.   Endocrine: Negative.    Genitourinary: Negative.    Musculoskeletal: Negative for back pain, joint swelling and myalgias.   Skin: Negative.    Allergic/Immunologic: Negative.    Neurological: Negative for dizziness, facial asymmetry, speech difficulty, weakness and numbness.   Hematological: Negative.    Psychiatric/Behavioral: Negative for agitation, confusion and dysphoric mood. The patient is not nervous/anxious.      Objective:     Vital Signs (Most Recent):  Temp: 98.1 °F (36.7 °C) (10/26/17 2100)  Pulse: 87 (10/26/17 2100)  Resp: 18 (10/26/17 2100)  BP: 128/68 (10/26/17 2100)  SpO2: 98 % (10/26/17 2100) Vital Signs (24h Range):  Temp:  [97.5 °F (36.4 °C)-98.1 °F (36.7 °C)] 98.1 °F (36.7 °C)  Pulse:  [86-98] 87  Resp:  [17-23] 18  SpO2:  [95 %-99 %] 98 %  BP: (118-154)/(60-76) 128/68     Weight: 88.4 kg (194 lb 14.2 oz)  Body mass index is 29.63 kg/m².    Physical Exam   Constitutional: He is oriented to person, place, and time. Vital signs are normal. He appears  well-developed and well-nourished.  Non-toxic appearance. He does not appear ill. No distress.   HENT:   Head: Normocephalic and atraumatic.   Eyes: Conjunctivae and EOM are normal. Pupils are equal, round, and reactive to light. No scleral icterus.   Neck: Normal range of motion and full passive range of motion without pain. Neck supple. No JVD present. Carotid bruit is not present. No thyromegaly present.   Cardiovascular: Normal rate, regular rhythm, S1 normal, S2 normal, normal heart sounds and normal pulses.  Exam reveals no gallop, no S3, no S4 and no friction rub.    No murmur heard.  Pulmonary/Chest: Effort normal and breath sounds normal. No accessory muscle usage. No tachypnea. No respiratory distress. He has no decreased breath sounds. He has no wheezes. He has no rhonchi. He has no rales.   Abdominal: Soft. Normal appearance and bowel sounds are normal. He exhibits no shifting dullness, no distension, no abdominal bruit and no ascites. There is no hepatosplenomegaly. There is no tenderness. There is no rigidity and no guarding.   Musculoskeletal: Normal range of motion. He exhibits no edema.        Left knee: Tenderness found.        Legs:  Neurological: He is alert and oriented to person, place, and time. He has normal strength. He is not disoriented. No cranial nerve deficit or sensory deficit. GCS eye subscore is 4. GCS verbal subscore is 5. GCS motor subscore is 6.   Skin: Skin is warm, dry and intact. No abrasion and no lesion noted.   Psychiatric: He has a normal mood and affect. His behavior is normal. Judgment and thought content normal. His mood appears not anxious. His speech is not slurred. He is not actively hallucinating. Cognition and memory are normal. He does not exhibit a depressed mood. He is attentive.       Significant Labs: All pertinent labs within the past 24 hours have been reviewed.    Significant Imaging: I have reviewed all pertinent imaging results/findings within the past 24  hours.

## 2017-10-27 NOTE — PLAN OF CARE
Problem: Occupational Therapy Goal  Goal: Occupational Therapy Goal  Goals to be met by: 21 days  Patient will increase functional independence with ADLs by performing:    UE Dressing with Stand-by Assistance.  LE Dressing with Minimal Assistance.  Grooming while seated with Set-up Assistance.  Toileting from bedside commode with Stand-by Assistance for hygiene and clothing management.   Bathing from  edge of bed with Minimal Assistance.  Sitting at edge of bed x 15  minutes with Modified [place].  Stand pivot transfers with Supervision.  Toilet transfer to toilet with Stand-by Assistance.    Outcome: Ongoing (interventions implemented as appropriate)  DINO Harris  10/27/2017

## 2017-10-27 NOTE — PLAN OF CARE
Problem: Physical Therapy Goal  Goal: Physical Therapy Goal  Goals to be met by: 2-3 weeks     Patient will increase functional independence with mobility by performin. Supine to sit with Minimal Assistance on mat. Not met  2. Sit to supine with Minimal Assistance on mat. Not met  3. Sit to stand transfer with Minimal Assistance from wheelchair. Not met  4. Bed to chair transfer with Minimal Assistance using Rolling Walker. Not met  5. Gait  x 150 feet with Stand-by Assistance using Rolling Walker. Not met  6. Ascend/Descend 4 inch curb step with Moderate Assistance using Rolling Walker. Not met  7. Stand for 3 minutes with Contact Guard Assistance using Rolling Walker while performing a task or partaking in an activity.  Not met  8. Lower extremity, TKA exercise program x 20 reps per handout, with assistance as needed.  Not met    Outcome: Ongoing (interventions implemented as appropriate)  Goals set today.

## 2017-10-28 PROCEDURE — 25000003 PHARM REV CODE 250: Performed by: STUDENT IN AN ORGANIZED HEALTH CARE EDUCATION/TRAINING PROGRAM

## 2017-10-28 PROCEDURE — 12000000 HC SNF SEMI-PRIVATE ROOM

## 2017-10-28 PROCEDURE — 97802 MEDICAL NUTRITION INDIV IN: CPT | Performed by: NUTRITIONIST

## 2017-10-28 RX ADMIN — STANDARDIZED SENNA CONCENTRATE AND DOCUSATE SODIUM 1 TABLET: 8.6; 5 TABLET, FILM COATED ORAL at 09:10

## 2017-10-28 RX ADMIN — SULFAMETHOXAZOLE AND TRIMETHOPRIM 1 TABLET: 800; 160 TABLET ORAL at 09:10

## 2017-10-28 RX ADMIN — SIMETHICONE CHEW TAB 80 MG 80 MG: 80 TABLET ORAL at 09:10

## 2017-10-28 RX ADMIN — OXYCODONE HYDROCHLORIDE 10 MG: 5 TABLET ORAL at 02:10

## 2017-10-28 RX ADMIN — LEVOTHYROXINE SODIUM 50 MCG: 50 TABLET ORAL at 07:10

## 2017-10-28 RX ADMIN — OXYCODONE HYDROCHLORIDE 10 MG: 5 TABLET ORAL at 09:10

## 2017-10-28 RX ADMIN — OXYCODONE HYDROCHLORIDE 10 MG: 5 TABLET ORAL at 03:10

## 2017-10-28 RX ADMIN — FAMOTIDINE 20 MG: 20 TABLET, FILM COATED ORAL at 09:10

## 2017-10-28 RX ADMIN — SULFAMETHOXAZOLE AND TRIMETHOPRIM 1 TABLET: 800; 160 TABLET ORAL at 10:10

## 2017-10-28 RX ADMIN — SIMETHICONE CHEW TAB 80 MG 80 MG: 80 TABLET ORAL at 10:10

## 2017-10-28 RX ADMIN — SIMVASTATIN 40 MG: 40 TABLET, FILM COATED ORAL at 09:10

## 2017-10-28 RX ADMIN — FAMOTIDINE 20 MG: 20 TABLET, FILM COATED ORAL at 10:10

## 2017-10-28 RX ADMIN — TRIAMTERENE AND HYDROCHLOROTHIAZIDE 1 CAPSULE: 25; 37.5 CAPSULE ORAL at 06:10

## 2017-10-28 RX ADMIN — ASPIRIN 325 MG: 325 TABLET, DELAYED RELEASE ORAL at 09:10

## 2017-10-28 RX ADMIN — TRAZODONE HYDROCHLORIDE 50 MG: 50 TABLET ORAL at 09:10

## 2017-10-28 RX ADMIN — ASPIRIN 325 MG: 325 TABLET, DELAYED RELEASE ORAL at 10:10

## 2017-10-28 RX ADMIN — HYOSCYAMINE SULFATE 0.12 MG: 0.12 TABLET ORAL; SUBLINGUAL at 12:10

## 2017-10-28 RX ADMIN — MIRTAZAPINE 30 MG: 15 TABLET, FILM COATED ORAL at 09:10

## 2017-10-28 RX ADMIN — SIMETHICONE CHEW TAB 80 MG 80 MG: 80 TABLET ORAL at 01:10

## 2017-10-28 RX ADMIN — HYOSCYAMINE SULFATE 0.12 MG: 0.12 TABLET ORAL; SUBLINGUAL at 09:10

## 2017-10-28 NOTE — CONSULTS
"  [facility]  Adult Nutrition  Consult Note    SUMMARY     Recommendations    Recommendation/Intervention: 1.  Continue Rx diet   2.  Encourage increased PO intake  3.  Offer alternates when <50% consumed  4.  Consider supplement if PO does not improve   5.  RD to follow   Goals: Patient to meet >85% of EEN/EPN   Nutrition Goal Status: new  Communication of RD Recs: reviewed with RN    Continuum of Care Plan      Reason for Assessment    Reason for Assessment: physician consult  Diagnosis:  (Debility)  Relevent Medical History: Depression, Dyslipidemia, HTN   Interdisciplinary Rounds: did not attend          Nutrition Discharge Planning: Home on Regular diet with adequate PO intake     Nutrition Prescription Ordered    Current Diet Order: Regular   Nutrition Order Comments: Fair PO intake consuming 50% of meals           Oral Nutrition Supplement: May need to consider if PO does not improve     Evaluation of Received Nutrients/Fluid Intake    Energy Calories Required: not meeting needs                 Protein Required: not meeting needs  Fluid Required: meeting needs     Tolerance: tolerating       Nutrition Risk Screen     Nutrition Risk Screen: no indicators present    Nutrition/Diet History    Patient Reported Diet/Restrictions/Preferences: general  Typical Food/Fluid Intake: Fair prior to admit  Food Preferences: No cultural or Gnosticist preferences identified   Meal/Snack Patterns: Varies daily      Factors Affecting Nutritional Intake: pain, early satiety    Labs/Tests/Procedures/Meds       Pertinent Labs Reviewed: reviewed, pertinent     Pertinent Medications Reviewed: reviewed, pertinent       Physical Findings    Overall Physical Appearance: weak     Oral/Mouth Cavity: WDL  Skin: incision (Bruising )    Anthropometrics    Temp: 96.8 °F (36 °C)  Height Method: Stated  Height: 5' 8" (172.7 cm)  Weight Method: Bed Scale  Weight: 88.4 kg (194 lb 14.2 oz)  Ideal Body Weight (IBW), Male: 154 " lb     % Ideal Body Weight, Male (lb): 126.55 lb     BMI (Calculated): 29.7  BMI Grade: 25 - 29.9 - overweight    Estimated/Assessed Needs    Weight Used For Calorie Calculations: 88.4 kg (194 lb 14.2 oz)      Energy Calorie Requirements (kcal): 5127-1616  Energy Need Method: Kcal/kg (17-20kcals/kg/BW)       RMR ([employer]-St. Jeor Equation): 1548.5        Weight Used For Protein Calculations: 88.4 kg (194 lb 14.2 oz)  Protein Requirements: 100-125 (1.2-1.4gmsProtein/kg/BW)       Fluid Need Method: (S) RDA Method (1ml/1kcal or MD Rx)        RDA Method (mL): 1500               Assessment and Plan    P:  Potential for inadequate nutrient intake  E:  Related to diagnosis  S:  As evidenced by patient history; patient interview; RD assessment  Status:  New    Monitor and Evaluation    Food and Nutrient Intake: food and beverage intake  Food and Nutrient Adminstration: diet order     Physical Activity and Function: nutrition-related ADLs and IADLs  Anthropometric Measurements: weight, weight change  Biochemical Data, Medical Tests and Procedures: electrolyte and renal panel, gastrointestinal profile, glucose/endocrine profile, inflammatory profile  Nutrition-Focused Physical Findings: overall appearance    Nutrition Risk    Level of Risk: other (see comments) (FOllow up x2/week)    Nutrition Follow-Up    RD Follow-up?: Yes

## 2017-10-28 NOTE — PLAN OF CARE
Problem: Fall Risk (Adult)  Goal: Absence of Falls  Patient will demonstrate the desired outcomes by discharge/transition of care.   Outcome: Ongoing (interventions implemented as appropriate)   10/27/17 2000   Fall Risk (Adult)   Absence of Falls making progress toward outcome   ot instructed to call prior to getting oob. Call light w/i reach. Bedside table w/i reach will monitor

## 2017-10-28 NOTE — PLAN OF CARE
Problem: Patient Care Overview  Goal: Plan of Care Review   10/28/17 1534   Coping/Psychosocial   Plan Of Care Reviewed With patient       Problem: Fall Risk (Adult)  Goal: Absence of Falls  Patient will demonstrate the desired outcomes by discharge/transition of care.   Outcome: Ongoing (interventions implemented as appropriate)   10/28/17 1534   Fall Risk (Adult)   Absence of Falls making progress toward outcome       Problem: Pressure Ulcer Risk (Cooper Scale) (Adult,Obstetrics,Pediatric)  Goal: Skin Integrity  Patient will demonstrate the desired outcomes by discharge/transition of care.   Outcome: Ongoing (interventions implemented as appropriate)   10/28/17 1534   Pressure Ulcer Risk (Cooper Scale) (Adult,Obstetrics,Pediatric)   Skin Integrity making progress toward outcome       Problem: Kidney Disease, Chronic/End Stage Renal Disease (Adult)  Goal: Signs and Symptoms of Listed Potential Problems Will be Absent, Minimized or Managed (Kidney Disease, Chronic/End Stage Renal Disease)  Signs and symptoms of listed potential problems will be absent, minimized or managed by discharge/transition of care (reference Kidney Disease, Chronic/End Stage Renal Disease (Adult) CPG).   Outcome: Ongoing (interventions implemented as appropriate)   10/28/17 1534   Kidney Disease, Chronic/End Stage Renal Disease   Problems Assessed (Chronic Kidney Disease/ESRD) all   Problems Present (Chronic Kidney Disease/ESRD) none       Problem: Depression (Adult,Obstetrics,Pediatric)  Goal: Improved/Stable Mood  Patient will demonstrate the desired outcomes by discharge/transition of care.   Outcome: Ongoing (interventions implemented as appropriate)   10/28/17 1534   Depression (Adult,Obstetrics,Pediatric)   Improved/Stable Mood making progress toward outcome      10/28/17 1534   Depression (Adult,Obstetrics,Pediatric)   Improved/Stable Mood making progress toward outcome       Comments: Patient  Monitored every 1 to 2 hours for pain and  safety.  Safety maintained.  Patient has incision to Left knee with aquacel ag dressing and polar ice.  Patient has large hernia to right flank.  Patient instructed to call for assistance.Call  Light and persoanl items in reach.

## 2017-10-28 NOTE — PLAN OF CARE
Problem: Patient Care Overview  Goal: Plan of Care Review  Outcome: Ongoing (interventions implemented as appropriate)  Recommendations     Recommendation/Intervention: 1.  Continue Rx diet   2.  Encourage increased PO intake  3.  Offer alternates when <50% consumed  4.  Consider supplement if PO does not improve   5.  RD to follow   Goals: Patient to meet >85% of EEN/EPN   Nutrition Goal Status: new  Communication of RD Recs: reviewed with RN         Assessment and Plan     P:  Potential for inadequate nutrient intake  E:  Related to diagnosis  S:  As evidenced by patient history; patient interview; RD assessment  Status:  New

## 2017-10-29 ENCOUNTER — PATIENT MESSAGE (OUTPATIENT)
Dept: PHYSICAL MEDICINE AND REHAB | Facility: CLINIC | Age: 82
End: 2017-10-29

## 2017-10-29 PROCEDURE — 97535 SELF CARE MNGMENT TRAINING: CPT

## 2017-10-29 PROCEDURE — 97110 THERAPEUTIC EXERCISES: CPT

## 2017-10-29 PROCEDURE — 97116 GAIT TRAINING THERAPY: CPT

## 2017-10-29 PROCEDURE — 12000000 HC SNF SEMI-PRIVATE ROOM

## 2017-10-29 PROCEDURE — 25000003 PHARM REV CODE 250: Performed by: STUDENT IN AN ORGANIZED HEALTH CARE EDUCATION/TRAINING PROGRAM

## 2017-10-29 PROCEDURE — 97530 THERAPEUTIC ACTIVITIES: CPT

## 2017-10-29 PROCEDURE — 25000003 PHARM REV CODE 250: Performed by: HOSPITALIST

## 2017-10-29 RX ORDER — ONDANSETRON 4 MG/1
4 TABLET, ORALLY DISINTEGRATING ORAL EVERY 6 HOURS PRN
Status: DISCONTINUED | OUTPATIENT
Start: 2017-10-29 | End: 2017-10-31

## 2017-10-29 RX ADMIN — OXYCODONE HYDROCHLORIDE 10 MG: 5 TABLET ORAL at 05:10

## 2017-10-29 RX ADMIN — SULFAMETHOXAZOLE AND TRIMETHOPRIM 1 TABLET: 800; 160 TABLET ORAL at 09:10

## 2017-10-29 RX ADMIN — ASPIRIN 325 MG: 325 TABLET, DELAYED RELEASE ORAL at 09:10

## 2017-10-29 RX ADMIN — SIMVASTATIN 40 MG: 40 TABLET, FILM COATED ORAL at 09:10

## 2017-10-29 RX ADMIN — STANDARDIZED SENNA CONCENTRATE AND DOCUSATE SODIUM 1 TABLET: 8.6; 5 TABLET, FILM COATED ORAL at 09:10

## 2017-10-29 RX ADMIN — FAMOTIDINE 20 MG: 20 TABLET, FILM COATED ORAL at 09:10

## 2017-10-29 RX ADMIN — OXYCODONE HYDROCHLORIDE 10 MG: 5 TABLET ORAL at 10:10

## 2017-10-29 RX ADMIN — OXYCODONE HYDROCHLORIDE 10 MG: 5 TABLET ORAL at 09:10

## 2017-10-29 RX ADMIN — TRAZODONE HYDROCHLORIDE 50 MG: 50 TABLET ORAL at 09:10

## 2017-10-29 RX ADMIN — SIMETHICONE CHEW TAB 80 MG 80 MG: 80 TABLET ORAL at 09:10

## 2017-10-29 RX ADMIN — SIMETHICONE CHEW TAB 80 MG 80 MG: 80 TABLET ORAL at 01:10

## 2017-10-29 RX ADMIN — ONDANSETRON 4 MG: 4 TABLET, ORALLY DISINTEGRATING ORAL at 02:10

## 2017-10-29 RX ADMIN — MIRTAZAPINE 30 MG: 15 TABLET, FILM COATED ORAL at 09:10

## 2017-10-29 RX ADMIN — TRIAMTERENE AND HYDROCHLOROTHIAZIDE 1 CAPSULE: 25; 37.5 CAPSULE ORAL at 07:10

## 2017-10-29 RX ADMIN — LEVOTHYROXINE SODIUM 50 MCG: 50 TABLET ORAL at 05:10

## 2017-10-29 RX ADMIN — HYOSCYAMINE SULFATE 0.12 MG: 0.12 TABLET ORAL; SUBLINGUAL at 09:10

## 2017-10-29 RX ADMIN — POLYETHYLENE GLYCOL 3350 17 G: 17 POWDER, FOR SOLUTION ORAL at 09:10

## 2017-10-29 RX ADMIN — SIMETHICONE CHEW TAB 80 MG 80 MG: 80 TABLET ORAL at 06:10

## 2017-10-29 NOTE — PLAN OF CARE
Problem: Physical Therapy Goal  Goal: Physical Therapy Goal  Goals to be met by: 2-3 weeks     Patient will increase functional independence with mobility by performin. Supine to sit with Minimal Assistance on mat. Not met  2. Sit to supine with Minimal Assistance on mat. Not met  3. Sit to stand transfer with Minimal Assistance from wheelchair. Not met  4. Bed to chair transfer with Minimal Assistance using Rolling Walker. Not met  5. Gait  x 150 feet with Stand-by Assistance using Rolling Walker. Not met  6. Ascend/Descend 4 inch curb step with Moderate Assistance using Rolling Walker. Not met  7. Stand for 3 minutes with Contact Guard Assistance using Rolling Walker while performing a task or partaking in an activity.  Not met  8. Lower extremity, TKA exercise program x 20 reps per handout, with assistance as needed.  Not met     Outcome: Ongoing (interventions implemented as appropriate)  LTGs remain appropriate. Pt will continue PT POC.    Orly Mckinney, PT  10/29/2017

## 2017-10-29 NOTE — PLAN OF CARE
Problem: Fall Risk (Adult)  Goal: Absence of Falls  Patient will demonstrate the desired outcomes by discharge/transition of care.   Outcome: Ongoing (interventions implemented as appropriate)  Pt lying in bed,watching tv, ndn, pt is free of falls or injuries this shift. Call light in reach.

## 2017-10-29 NOTE — PLAN OF CARE
Problem: Occupational Therapy Goal  Goal: Occupational Therapy Goal  Goals to be met by: 21 days  Patient will increase functional independence with ADLs by performing:    UE Dressing with Stand-by Assistance.  LE Dressing with Minimal Assistance.  Grooming while seated with Set-up Assistance.  Toileting from bedside commode with Stand-by Assistance for hygiene and clothing management.   Bathing from  edge of bed with Minimal Assistance.  Sitting at edge of bed x 15  minutes with Modified [place].  Stand pivot transfers with Supervision.  Toilet transfer to toilet with Stand-by Assistance.     Outcome: Ongoing (interventions implemented as appropriate)  CHIDI Ramirez/GENARO      10/29/2017

## 2017-10-29 NOTE — PT/OT/SLP PROGRESS
Physical Therapy  Treatment    Chucho Prado   MRN: [id number]   Admitting Diagnosis: Primary osteoarthritis of left knee    PT Received On: 10/29/17  Total Time (min): 63       Billable Minutes:  Gait [id number], Therapeutic Activity 20, Therapeutic Exercise 30 and Total Time 63    Treatment Type: Treatment  PT/PTA: PT     PTA Visit Number: 0       General Precautions: Standard, fall  Orthopedic Precautions: LLE weight bearing as tolerated   Braces:      Do you have any cultural, spiritual, Yarsanism conflicts, given your current situation?: no    Subjective:  Communicated with nursing prior to session.  Pt was pre-medicated for PT session. Pt agreeable to session.    Pain/Comfort  Pain Rating 1: 9/10 (w/ mvmt)  Location - Side 1: Left  Location - Orientation 1: generalized  Location 1: knee  Pain Addressed 1: Pre-medicate for activity, Reposition  Pain Rating Post-Intervention 1: 3/10 (at rest)    Objective:  Patient found supine in bed w/ sitter present. Patient found with: galvan catheter     Functional Status:  MDS G  Bed Mobility Functional Status: total(A)  Bed Mobility Level of (A): 3: Two+ persons physical (A)  Transfer Functional Status: mod(A)-Max(A)  Walk in Room Functional Status: CGA-Min (A)  Walk in Corridor Functional Status: CGA-Min (A)  Locomotion on Unit Functional Status: total(A)    Bed Mobility:  Sit>Supine:on mat w/ ModA(x2) for trunk and BLE  Supine>Sit: on bed w/ MaxA(x2) for trunk, on mat w/ SBA    Transfers:  Sit<>Stand: to/from w/c w/ SW and ModA to rise  Stand Pivot Transfer: EOB>w/c w/ MaxA to rise, w/c<>EOM w/ Joe to rise, all w/ SW  Cueing for forward weight shifting upon standing    Gait:  Amb 41ft w/ SW and Min/CGA for stability , slow pace, limited by pain, cueing for upright posture (pt prefers to use SW)    Therex:  Supine and seated therex 2x10 reps per TKA protocol (GS, SAQ, AP, QS, HS, ABd/ADd, HF, LAQ, KF)  (L) knee ROM: -7 to 87 degrees AROM    Patient left up in  chair with all lines intact, call button in reach and sitter present.    Assessment:  Chucho Prado is a 84 y.o. male with a medical diagnosis of Primary osteoarthritis of left knee.  Pt limited t/o session by pain and nausea/vomitting. He attributes N&V to pain medication- nursing notified of vomiting which occurred during supine therex. Pt was still agreeable and participated well t/o session despite symptoms. His transfers and bed mobility vary greatly in assistance level. He improved t/o session requiring less assistance w/ each stand. Pt will continue PT POC.    Rehab identified problem list/impairments: weakness, impaired endurance, impaired self care skills, impaired functional mobilty, gait instability, impaired balance, decreased lower extremity function, decreased upper extremity function, decreased safety awareness, decreased ROM, edema, impaired joint extensibility    Rehab potential is good.    Activity tolerance: Good    Discharge recommendations: home health PT (with 24 hr care)     Barriers to discharge: Decreased caregiver support (Alone 12am-8am every day)    Equipment recommendations: bedside commode     GOALS:    Physical Therapy Goals        Problem: Physical Therapy Goal    Goal Priority Disciplines Outcome Goal Variances Interventions   Physical Therapy Goal     PT/OT, PT Ongoing (interventions implemented as appropriate)     Description:  Goals to be met by: 2-3 weeks     Patient will increase functional independence with mobility by performin. Supine to sit with Minimal Assistance on mat. Not met  2. Sit to supine with Minimal Assistance on mat. Not met  3. Sit to stand transfer with Minimal Assistance from wheelchair. Not met  4. Bed to chair transfer with Minimal Assistance using Rolling Walker. Not met  5. Gait  x 150 feet with Stand-by Assistance using Rolling Walker. Not met  6. Ascend/Descend 4 inch curb step with Moderate Assistance using Rolling Walker. Not met  7.  Stand for 3 minutes with Contact Guard Assistance using Rolling Walker while performing a task or partaking in an activity.  Not met  8. Lower extremity, TKA exercise program x 20 reps per handout, with assistance as needed.  Not met                      PLAN:    Patient to be seen  (5x/week)  to address the above listed problems via gait training, therapeutic activities, therapeutic exercises, neuromuscular re-education  Plan of Care expires: 11/26/17  Plan of Care reviewed with: patient    Orly Mckinney, PT  10/29/2017

## 2017-10-30 ENCOUNTER — PATIENT OUTREACH (OUTPATIENT)
Dept: ADMINISTRATIVE | Facility: CLINIC | Age: 82
End: 2017-10-30

## 2017-10-30 LAB
ANION GAP SERPL CALC-SCNC: 9 MMOL/L
BASOPHILS # BLD AUTO: 0.02 K/UL
BASOPHILS NFR BLD: 0.4 %
BUN SERPL-MCNC: 16 MG/DL
CALCIUM SERPL-MCNC: 8.8 MG/DL
CHLORIDE SERPL-SCNC: 92 MMOL/L
CO2 SERPL-SCNC: 28 MMOL/L
CREAT SERPL-MCNC: 1.4 MG/DL
DIFFERENTIAL METHOD: ABNORMAL
EOSINOPHIL # BLD AUTO: 0.3 K/UL
EOSINOPHIL NFR BLD: 5 %
ERYTHROCYTE [DISTWIDTH] IN BLOOD BY AUTOMATED COUNT: 14.8 %
EST. GFR  (AFRICAN AMERICAN): 53 ML/MIN/1.73 M^2
EST. GFR  (NON AFRICAN AMERICAN): 45.8 ML/MIN/1.73 M^2
GLUCOSE SERPL-MCNC: 74 MG/DL
HCT VFR BLD AUTO: 30.8 %
HGB BLD-MCNC: 9.8 G/DL
LYMPHOCYTES # BLD AUTO: 1.5 K/UL
LYMPHOCYTES NFR BLD: 27.3 %
MAGNESIUM SERPL-MCNC: 1.8 MG/DL
MCH RBC QN AUTO: 26.9 PG
MCHC RBC AUTO-ENTMCNC: 31.8 G/DL
MCV RBC AUTO: 85 FL
MONOCYTES # BLD AUTO: 0.5 K/UL
MONOCYTES NFR BLD: 9.5 %
NEUTROPHILS # BLD AUTO: 3.2 K/UL
NEUTROPHILS NFR BLD: 57.8 %
PHOSPHATE SERPL-MCNC: 3.1 MG/DL
PLATELET # BLD AUTO: 261 K/UL
PMV BLD AUTO: 9.5 FL
POTASSIUM SERPL-SCNC: 3.8 MMOL/L
RBC # BLD AUTO: 3.64 M/UL
SODIUM SERPL-SCNC: 129 MMOL/L
WBC # BLD AUTO: 5.56 K/UL

## 2017-10-30 PROCEDURE — 36415 COLL VENOUS BLD VENIPUNCTURE: CPT

## 2017-10-30 PROCEDURE — 80048 BASIC METABOLIC PNL TOTAL CA: CPT

## 2017-10-30 PROCEDURE — 85025 COMPLETE CBC W/AUTO DIFF WBC: CPT

## 2017-10-30 PROCEDURE — 97535 SELF CARE MNGMENT TRAINING: CPT

## 2017-10-30 PROCEDURE — 97803 MED NUTRITION INDIV SUBSEQ: CPT

## 2017-10-30 PROCEDURE — 25000003 PHARM REV CODE 250: Performed by: NURSE PRACTITIONER

## 2017-10-30 PROCEDURE — 84100 ASSAY OF PHOSPHORUS: CPT

## 2017-10-30 PROCEDURE — 25000003 PHARM REV CODE 250: Performed by: HOSPITALIST

## 2017-10-30 PROCEDURE — 25000003 PHARM REV CODE 250: Performed by: STUDENT IN AN ORGANIZED HEALTH CARE EDUCATION/TRAINING PROGRAM

## 2017-10-30 PROCEDURE — 97110 THERAPEUTIC EXERCISES: CPT

## 2017-10-30 PROCEDURE — 99309 SBSQ NF CARE MODERATE MDM 30: CPT | Mod: ,,, | Performed by: NURSE PRACTITIONER

## 2017-10-30 PROCEDURE — 83735 ASSAY OF MAGNESIUM: CPT

## 2017-10-30 PROCEDURE — 97530 THERAPEUTIC ACTIVITIES: CPT

## 2017-10-30 PROCEDURE — 12000000 HC SNF SEMI-PRIVATE ROOM

## 2017-10-30 RX ORDER — OXYCODONE HYDROCHLORIDE 5 MG/1
5 TABLET ORAL EVERY 4 HOURS PRN
Status: DISCONTINUED | OUTPATIENT
Start: 2017-10-30 | End: 2017-10-31

## 2017-10-30 RX ORDER — OXYCODONE HYDROCHLORIDE 5 MG/1
10 TABLET ORAL EVERY 4 HOURS PRN
Status: DISCONTINUED | OUTPATIENT
Start: 2017-10-30 | End: 2017-10-31

## 2017-10-30 RX ADMIN — LEVOTHYROXINE SODIUM 50 MCG: 50 TABLET ORAL at 05:10

## 2017-10-30 RX ADMIN — FAMOTIDINE 20 MG: 20 TABLET, FILM COATED ORAL at 08:10

## 2017-10-30 RX ADMIN — ASPIRIN 325 MG: 325 TABLET, DELAYED RELEASE ORAL at 11:10

## 2017-10-30 RX ADMIN — TRIAMTERENE AND HYDROCHLOROTHIAZIDE 1 CAPSULE: 25; 37.5 CAPSULE ORAL at 06:10

## 2017-10-30 RX ADMIN — OXYCODONE HYDROCHLORIDE 10 MG: 5 TABLET ORAL at 04:10

## 2017-10-30 RX ADMIN — FAMOTIDINE 20 MG: 20 TABLET, FILM COATED ORAL at 11:10

## 2017-10-30 RX ADMIN — ASPIRIN 325 MG: 325 TABLET, DELAYED RELEASE ORAL at 08:10

## 2017-10-30 RX ADMIN — STANDARDIZED SENNA CONCENTRATE AND DOCUSATE SODIUM 1 TABLET: 8.6; 5 TABLET, FILM COATED ORAL at 11:10

## 2017-10-30 RX ADMIN — SIMETHICONE CHEW TAB 80 MG 80 MG: 80 TABLET ORAL at 02:10

## 2017-10-30 RX ADMIN — SIMVASTATIN 40 MG: 40 TABLET, FILM COATED ORAL at 08:10

## 2017-10-30 RX ADMIN — SIMETHICONE CHEW TAB 80 MG 80 MG: 80 TABLET ORAL at 10:10

## 2017-10-30 RX ADMIN — SULFAMETHOXAZOLE AND TRIMETHOPRIM 1 TABLET: 800; 160 TABLET ORAL at 11:10

## 2017-10-30 RX ADMIN — MIRTAZAPINE 30 MG: 15 TABLET, FILM COATED ORAL at 08:10

## 2017-10-30 RX ADMIN — ONDANSETRON 4 MG: 4 TABLET, ORALLY DISINTEGRATING ORAL at 10:10

## 2017-10-30 RX ADMIN — SIMETHICONE CHEW TAB 80 MG 80 MG: 80 TABLET ORAL at 07:10

## 2017-10-30 RX ADMIN — ONDANSETRON 4 MG: 4 TABLET, ORALLY DISINTEGRATING ORAL at 02:10

## 2017-10-30 RX ADMIN — SULFAMETHOXAZOLE AND TRIMETHOPRIM 1 TABLET: 800; 160 TABLET ORAL at 08:10

## 2017-10-30 NOTE — PLAN OF CARE
Problem: Occupational Therapy Goal  Goal: Occupational Therapy Goal  Goals to be met by: 21 days  Patient will increase functional independence with ADLs by performing:    UE Dressing with Stand-by Assistance.  LE Dressing with Minimal Assistance.  Grooming while seated with Set-up Assistance.  Toileting from bedside commode with Stand-by Assistance for hygiene and clothing management.   Bathing from  edge of bed with Minimal Assistance.  Sitting at edge of bed x 15  minutes with Modified [place].  Stand pivot transfers with Supervision.  Toilet transfer to toilet with Stand-by Assistance.     Outcome: Ongoing (interventions implemented as appropriate)    Pt was agreeable to OT and was noted to make progress towards his goals in therapy. Pt met goal for LE dressing for socks today, however, goal maintained in order to work on donning/doffing pants/underwear as well.  Pt's goals remain appropriate at this time.  He will continue to benefit from skilled OT services in order to assist him with increasing his safety and level of independence with self care and mobility tasks.     Katie Bryan, OT  10/30/2017

## 2017-10-30 NOTE — PT/OT/SLP PROGRESS
Physical Therapy  Treatment    Chucho Prado   MRN: [id number]   Admitting Diagnosis: Primary osteoarthritis of left knee    PT Received On: 10/30/17  Total Time (min): 45       Billable Minutes:  Gait [id number], Therapeutic Activity 15 and Therapeutic Exercise 15    Treatment Type: Treatment  PT/PTA: PT     PTA Visit Number: 0       General Precautions: Standard, fall  Orthopedic Precautions: LLE weight bearing as tolerated   Braces:      Do you have any cultural, spiritual, Roman Catholic conflicts, given your current situation?: no    Subjective:  Communicated with pt prior to session.  Pt was agreeable to PT services but did complain of nausea.      Pain/Comfort  Pain Rating 1: 0/10    Objective:  Patient found supine in bed with polar ice attached to LLE with Patient found with: galvan catheter       Functional Status:  MDS G  Bed Mobility Functional Status: mod(A)-Max(A)  Bed Mobility Level of (A): 2: One person physical (A)  Transfer Functional Status: mod(A)-Max(A)  Walk in Room Functional Status: CGA-Min (A)  Walk in Corridor Functional Status: CGA-Min (A)          Bed Mobility:  Supine>Sit: Max A provided from supine in bed and on mat. Able to come up onto elbows for support.  Sit>supine: Max A x 2 on mat- unable to lift LEs onto mat.    Transfers:  Sit<>Stand: Mod A from edge of bed, mat, wheelchair with difficulty translating weight forward.  Stand Pivot Transfer: With RW- Joe  (forward flexion noted)    Gait:  Ambulated 30 feet from mat with forward flexed posture     Therex (x20 reps LLE):  LAQ  Hip flexion  Ankle pumps  SAQ  Hip abduction  Heel slides      Quad sets  Gluteal sets    L Knee AROM: -12 to 87 degrees measured in supine    Stretching: Pt's pec major and minor were stretched while seated x 20 seconds (3 trials).  His levator scapulae and SCM were stretched as well while supine, as pt tends to have tightness (torticollis) of R SCM.    Balance:  Seated- posterior lean and tightness noted  in pelvis. Requires SBA-CGA.    HEP: Was given a rolled towel on L side to maintain on his shoulder (as if holding a phone between one's ear and shoulder) to encourage L lateral cervical flexion/rotation.      Patient left up in chair with call button in reach.    Assessment:  Chucho Prado is a 84 y.o. male with a medical diagnosis of Primary osteoarthritis of left knee.  Mr. Prado still requires much assistance for bed mobility and transfers, displaying decreased mobility at the pelvis and decreased ROM in his L knee.  He will benefit from further PT services to continue improving functionally.    Rehab identified problem list/impairments: weakness, impaired endurance, impaired self care skills, impaired functional mobilty, gait instability, impaired balance, decreased lower extremity function, decreased upper extremity function, decreased safety awareness, decreased ROM, edema, impaired joint extensibility    Rehab potential is good.    Activity tolerance: Good    Discharge recommendations: home health PT (with 24 hr care)     Barriers to discharge: Decreased caregiver support (Alone 12am-8am every day)    Equipment recommendations: bedside commode     GOALS:    Physical Therapy Goals        Problem: Physical Therapy Goal    Goal Priority Disciplines Outcome Goal Variances Interventions   Physical Therapy Goal     PT/OT, PT Ongoing (interventions implemented as appropriate)     Description:  Goals to be met by: 2-3 weeks     Patient will increase functional independence with mobility by performin. Supine to sit with Minimal Assistance on mat. Not met  2. Sit to supine with Minimal Assistance on mat. Not met  3. Sit to stand transfer with Minimal Assistance from wheelchair. Not met  4. Bed to chair transfer with Minimal Assistance using Rolling Walker. Not met  5. Gait  x 150 feet with Stand-by Assistance using Rolling Walker. Not met  6. Ascend/Descend 4 inch curb step with Moderate  Assistance using Rolling Walker. Not met  7. Stand for 3 minutes with Contact Guard Assistance using Rolling Walker while performing a task or partaking in an activity.  Not met  8. Lower extremity, TKA exercise program x 20 reps per handout, with assistance as needed.  Not met                      PLAN:    Patient to be seen  (5x/week)  to address the above listed problems via gait training, therapeutic activities, therapeutic exercises, neuromuscular re-education  Plan of Care expires: 11/26/17  Plan of Care reviewed with: patient    Luda Veronica, PT  10/30/2017

## 2017-10-30 NOTE — PLAN OF CARE
Problem: Fall Risk (Adult)  Goal: Absence of Falls  Patient will demonstrate the desired outcomes by discharge/transition of care.   Outcome: Ongoing (interventions implemented as appropriate)   10/30/17 0655   Fall Risk (Adult)   Absence of Falls making progress toward outcome   Pt remain free from falls/inury/trauma. Call light w/i reach. Bedside table w/i reach. Will monitor

## 2017-10-30 NOTE — SUBJECTIVE & OBJECTIVE
Hospital Course:  10/26/17: Patient admitted to SNF for ongoing PT/OT following a hospitalization for left total knee arthroplasty.  10/30: Patient seen at bedside, reports pain is currently controlled, having intermittent nausea today without vomiting, reviewed labs, sitter at bedside    Interval History: Patient seen at bedside, denies vomiting but with intermittent nausea today, pain tolerable, no acute events overnight.    Review of Systems   Constitutional: Negative for appetite change, chills, fatigue and fever.   HENT: Negative for trouble swallowing.    Respiratory: Negative for cough, chest tightness, shortness of breath and wheezing.    Cardiovascular: Negative for chest pain, palpitations and leg swelling.   Gastrointestinal: Positive for nausea. Negative for abdominal pain, constipation and diarrhea.        Intermittent nausea   Genitourinary: Negative for difficulty urinating, frequency and urgency.   Musculoskeletal: Positive for arthralgias. Negative for myalgias.        Left knee pain 2/10 with medication   Skin: Negative for rash.   Neurological: Negative for dizziness, weakness, light-headedness and headaches.   Psychiatric/Behavioral: Negative for sleep disturbance.     Scheduled Meds:   aspirin  325 mg Oral BID    famotidine  20 mg Oral BID    levothyroxine  50 mcg Oral Before breakfast    mirtazapine  30 mg Oral QHS    polyethylene glycol  17 g Oral Daily    senna-docusate 8.6-50 mg  1 tablet Oral BID    simethicone  80 mg Oral TID PC    simvastatin  40 mg Oral QHS    sulfamethoxazole-trimethoprim 800-160mg  1 tablet Oral BID    triamterene-hydrochlorothiazide 37.5-25 mg  1 capsule Oral QAM     Continuous Infusions:   PRN Meds:.acetaminophen, calcium carbonate, hyoscyamine, ondansetron, oxyCODONE, oxyCODONE, traZODone    Objective:     Vital Signs (Most Recent):  Temp: 97.5 °F (36.4 °C) (10/30/17 0800)  Pulse: 92 (10/30/17 0800)  Resp: 18 (10/30/17 0800)  BP: (!) 142/64 (10/30/17  0800)  SpO2: 95 % (10/30/17 0800) Vital Signs (24h Range):  Temp:  [97 °F (36.1 °C)-97.5 °F (36.4 °C)] 97.5 °F (36.4 °C)  Pulse:  [79-92] 92  Resp:  [18-20] 18  SpO2:  [95 %] 95 %  BP: (142-152)/(64) 142/64     Weight: 88.4 kg (194 lb 14.2 oz)  Body mass index is 29.63 kg/m².    Intake/Output Summary (Last 24 hours) at 10/30/17 1319  Last data filed at 10/30/17 0830   Gross per 24 hour   Intake              420 ml   Output              725 ml   Net             -305 ml      Physical Exam   Constitutional: He is oriented to person, place, and time. He appears well-developed and well-nourished. No distress.   Cardiovascular: Normal rate, regular rhythm and normal heart sounds.  Exam reveals no gallop and no friction rub.    No murmur heard.  Pulmonary/Chest: Effort normal and breath sounds normal. No respiratory distress. He has no wheezes. He has no rales.   Abdominal: Soft. Bowel sounds are normal. He exhibits no distension. There is no tenderness.   Musculoskeletal: He exhibits edema and tenderness.   Left knee tenderness with 2+ edema to left knee without erythema, Surgical Aquacel dressing in place without drainage   Neurological: He is alert and oriented to person, place, and time.   Skin: Skin is warm and dry. No rash noted. He is not diaphoretic. No cyanosis. Nails show no clubbing.   Psychiatric: He has a normal mood and affect. His behavior is normal.     Significant Labs:     Recent Labs  Lab 10/23/17  1536 10/30/17  0432   WBC 8.09 5.56   HGB 12.8* 9.8*   HCT 37.6* 30.8*    261       Recent Labs  Lab 10/23/17  1536 10/25/17  1100 10/25/17  1526 10/30/17  0432   * 129* 132* 129*   K 3.7  --  3.1* 3.8   CL 86*  --  97 92*   CO2 29  --  25 28   BUN 18  --  15 16   CREATININE 1.3  --  1.3 1.4   CALCIUM 9.2  --  8.3* 8.8     Lab Results   Component Value Date    LABPROT 10.0 10/23/2017    ALBUMIN 2.5 (L) 01/26/2017     Lab Results   Component Value Date    CALCIUM 8.8 10/30/2017    PHOS 3.1  10/30/2017     Results for PENNY VALLE (MRN [id number]) as of 10/30/2017 13:25   Ref. Range 10/30/2017 04:32   Magnesium Latest Ref Range: 1.6 - 2.6 mg/dL 1.8     Significant Imaging: n/a

## 2017-10-30 NOTE — ASSESSMENT & PLAN NOTE
-S/p left TKA  -Surgical wound to be managed by ortho NP while at SNF  -Continue with current pain control with oxycodone 5/10mg every 4hrs prn pain and premedicate prior to therapy   -Continue bowel regimen for opioid induced constipation with senokot-s and miralax hold for loose stooling  -Continue with PT/OT for gait training and strengthening and restoration of ADL's: WBAT per ortho   -Fall precautions   -Continue DVT prophylaxis with  mg per ortho.   -Continue famotidine for GI prophylaxis

## 2017-10-30 NOTE — PROGRESS NOTES
[facility]  Adult Nutrition  Progress Note    SUMMARY     Recommendations    Recommendation/Intervention: [place] low sodium diet, Recommend boost breeze tid, RD to follow, Consider medicating for nausea prior to meals  Goals: Patient to meet >85% of EEN/EPN   Nutrition Goal Status: goal not met  Communication of RD Recs: reviewed with physician       Reason for Assessment    Reason for Assessment: RD follow-up  Diagnosis:  (sp TKA)  Relevent Medical History: Depression, Dyslipidemia, HTN   Interdisciplinary Rounds: did not attend     General Information Comments: Pt intake is poor due to nausea , pt believes the pain medicine is to blame, taking liquids, pt states this happened before    Nutrition Discharge Planning: DC home on low sodium diet    Nutrition Prescription Ordered    Current Diet Order: 2 gram sodium  Nutrition Order Comments: pt agrees to try Boost breeze if ordered peach flavored           Oral Nutrition Supplement: May need to consider if PO does not improve     Evaluation of Received Nutrients/Fluid Intake           Energy Calories Required: not meeting needs               Protein Required: not meeting needs             Fluid Required: meeting needs     Tolerance: tolerating     % Intake of Estimated Energy Needs: 25 - 50 %  % Meal Intake: 25%     Nutrition Risk Screen     Nutrition Risk Screen: no indicators present    Nutrition/Diet History    Patient Reported Diet/Restrictions/Preferences: general  Typical Food/Fluid Intake: Fair prior to admit  Food Preferences: No cultural or Quaker preferences identified   Meal/Snack Patterns: Varies daily      Factors Affecting Nutritional Intake: nausea/vomiting                Labs/Tests/Procedures/Meds       Pertinent Labs Reviewed: reviewed, pertinent     Pertinent Medications Reviewed: reviewed, pertinent       Physical Findings    Overall Physical Appearance: weak     Oral/Mouth Cavity: WDL  Skin: incision (Bruising  ")    Anthropometrics    Temp: 97.5 °F (36.4 °C)  Height Method: Stated  Height: 5' 8" (172.7 cm)  Weight Method: Bed Scale  Weight: 88.4 kg (194 lb 14.2 oz)  Ideal Body Weight (IBW), Male: 154 lb     % Ideal Body Weight, Male (lb): 126.55 lb     BMI (Calculated): 29.7  BMI Grade: 25 - 29.9 - overweight                  Estimated/Assessed Needs    Weight Used For Calorie Calculations: 88.4 kg (194 lb 14.2 oz)    Energy Calorie Requirements (kcal): 4600-7080  Energy Need Method: Kcal/kg (17-20kcals/kg/BW)      Weight Used For Protein Calculations: 88.4 kg (194 lb 14.2 oz)  Protein Requirements: 100-125 (1.2-1.4gmsProtein/kg/BW)       Fluid Need Method: (S) RDA Method (1ml/1kcal or MD Rx)   RDA Method (mL): 1500         Assessment and Plan    Inadequate oral intake related to appetite changes from nausea caused by pain meds as evidenced by po 25%    Monitor and Evaluation  Food and Nutrient Intake: food and beverage intake  Food and Nutrient Adminstration: diet order   Physical Activity and Function: nutrition-related ADLs and IADLs  Anthropometric Measurements: weight, weight change  Biochemical Data, Medical Tests and Procedures: electrolyte and renal panel, gastrointestinal profile, glucose/endocrine profile, inflammatory profile  Nutrition-Focused Physical Findings: overall appearance    Nutrition Risk    Level of Risk:  (follow two times per week)    Nutrition Follow-Up    RD Follow-up?: Yes  "

## 2017-10-30 NOTE — PROGRESS NOTES
[facility] Department of Hospital Medicine  Progress Note    Patient Name: Chucho Prado  MRN: [id number]  Code Status: Full Code  Admission Date: 10/26/2017  Length of Stay: 4 days  Attending Physician: Fabio Benson MD  Primary Care Provider: Obed Mcguire MD    Subjective:     Principal Problem:Primary osteoarthritis of left knee    Chief Complaint/Reason for Admission: Debility    History of Present Illness:  Patient is a 84 y.o. male who has a past medical history of Arthritis; Cataract; CKD stage 3; Compression fracture of lumbar vertebra; Depression; Dyslipidemia; GERD; Hypertension; Thyroid disease presented with chronic left knee pain due to osteoarthritis. Pain has caused interference of activities of daily living. Patient has failed non-operative treatment and elected to undergo surgical management. He was admitted to orthopedics and underwent left total knee arthroplasty on 10/25/17 by Dr. [facility]. Patient tolerated procedure well with no significant post operative complications. Patient has been working with PT/OT who recommend SNF for further balance/mobility training. Patient currently has no complaints. States pain is well controlled and has not required any PRN pain medication. Sitter at bedside and updated on plan of care. All questions answered.     Hospital Course:  10/26/17: Patient admitted to SNF for ongoing PT/OT following a hospitalization for left total knee arthroplasty.  10/30: Patient seen at bedside, reports pain is currently controlled, having intermittent nausea today without vomiting, reviewed labs, sitter at bedside    Interval History: Patient seen at bedside, denies vomiting but with intermittent nausea today, pain tolerable, no acute events overnight.    Review of Systems   Constitutional: Negative for appetite change, chills, fatigue and fever.   HENT: Negative for trouble swallowing.    Respiratory: Negative for cough, chest tightness, shortness of  breath and wheezing.    Cardiovascular: Negative for chest pain, palpitations and leg swelling.   Gastrointestinal: Positive for nausea. Negative for abdominal pain, constipation and diarrhea.        Intermittent nausea   Genitourinary: Negative for difficulty urinating, frequency and urgency.   Musculoskeletal: Positive for arthralgias. Negative for myalgias.        Left knee pain 2/10 with medication   Skin: Negative for rash.   Neurological: Negative for dizziness, weakness, light-headedness and headaches.   Psychiatric/Behavioral: Negative for sleep disturbance.     Scheduled Meds:   aspirin  325 mg Oral BID    famotidine  20 mg Oral BID    levothyroxine  50 mcg Oral Before breakfast    mirtazapine  30 mg Oral QHS    polyethylene glycol  17 g Oral Daily    senna-docusate 8.6-50 mg  1 tablet Oral BID    simethicone  80 mg Oral TID PC    simvastatin  40 mg Oral QHS    sulfamethoxazole-trimethoprim 800-160mg  1 tablet Oral BID    triamterene-hydrochlorothiazide 37.5-25 mg  1 capsule Oral QAM     Continuous Infusions:   PRN Meds:.acetaminophen, calcium carbonate, hyoscyamine, ondansetron, oxyCODONE, oxyCODONE, traZODone    Objective:     Vital Signs (Most Recent):  Temp: 97.5 °F (36.4 °C) (10/30/17 0800)  Pulse: 92 (10/30/17 0800)  Resp: 18 (10/30/17 0800)  BP: (!) 142/64 (10/30/17 0800)  SpO2: 95 % (10/30/17 0800) Vital Signs (24h Range):  Temp:  [97 °F (36.1 °C)-97.5 °F (36.4 °C)] 97.5 °F (36.4 °C)  Pulse:  [79-92] 92  Resp:  [18-20] 18  SpO2:  [95 %] 95 %  BP: (142-152)/(64) 142/64     Weight: 88.4 kg (194 lb 14.2 oz)  Body mass index is 29.63 kg/m².    Intake/Output Summary (Last 24 hours) at 10/30/17 1319  Last data filed at 10/30/17 0830   Gross per 24 hour   Intake              420 ml   Output              725 ml   Net             -305 ml      Physical Exam   Constitutional: He is oriented to person, place, and time. He appears well-developed and well-nourished. No distress.   Cardiovascular: Normal  rate, regular rhythm and normal heart sounds.  Exam reveals no gallop and no friction rub.    No murmur heard.  Pulmonary/Chest: Effort normal and breath sounds normal. No respiratory distress. He has no wheezes. He has no rales.   Abdominal: Soft. Bowel sounds are normal. He exhibits no distension. There is no tenderness.   Musculoskeletal: He exhibits edema and tenderness.   Left knee tenderness with 2+ edema to left knee without erythema, Surgical Aquacel dressing in place without drainage   Neurological: He is alert and oriented to person, place, and time.   Skin: Skin is warm and dry. No rash noted. He is not diaphoretic. No cyanosis. Nails show no clubbing.   Psychiatric: He has a normal mood and affect. His behavior is normal.     Significant Labs:     Recent Labs  Lab 10/23/17  1536 10/30/17  0432   WBC 8.09 5.56   HGB 12.8* 9.8*   HCT 37.6* 30.8*    261       Recent Labs  Lab 10/23/17  1536 10/25/17  1100 10/25/17  1526 10/30/17  0432   * 129* 132* 129*   K 3.7  --  3.1* 3.8   CL 86*  --  97 92*   CO2 29  --  25 28   BUN 18  --  15 16   CREATININE 1.3  --  1.3 1.4   CALCIUM 9.2  --  8.3* 8.8     Lab Results   Component Value Date    LABPROT 10.0 10/23/2017    ALBUMIN 2.5 (L) 01/26/2017     Lab Results   Component Value Date    CALCIUM 8.8 10/30/2017    PHOS 3.1 10/30/2017     Results for PENNY VLALE (MRN [id number]) as of 10/30/2017 13:25   Ref. Range 10/30/2017 04:32   Magnesium Latest Ref Range: 1.6 - 2.6 mg/dL 1.8     Significant Imaging: n/a    Assessment/Plan:      * Primary osteoarthritis of left knee    -S/p left TKA  -Surgical wound to be managed by ortho NP while at [facility]  -Continue with current pain control with oxycodone 5/10mg every 4hrs prn pain and premedicate prior to therapy   -Continue bowel regimen for opioid induced constipation with senokot-s and miralax hold for loose stooling  -Continue with PT/OT for gait training and strengthening and restoration of ADL's: WBAT  per ortho   -Fall precautions   -Continue DVT prophylaxis with  mg per ortho.   -Continue famotidine for GI prophylaxis         Debility    -Continue PT/OT as outlined above.  -fall precauctions  -personal sitter at bedside        Hyponatremia    -stable  -Cont to monitor  -Likely due to HCTZ  -continue to monitor with BIW labs         Edema    -Low Na diet  -BEE hose  -Leg elevation   -polar ice care for left knee        PONV (postoperative nausea and vomiting)    -was having vomiting with nausea through the weekend  -only with nausea today which is being managed with Zofran         CKD (chronic kidney disease) stage 3, GFR 30-59 ml/min    -Sr Cr stable  -Continue to monitor with BIW labs   -Avoid nephrotoxins.         History of MRSA infection    -Continue Bactrim to treat will clarify with orthopedics when stopping is recommended from their standpoint  -Follow up as outpatient.         Urinary retention    -Has history of neurogenic bladder  -S/p suprapubic cath  -Continue galvan care  -Continue hyoscyamine for bladder spasms         Depression    -Chronic and stable  -Continue mirtazapine 30mg nightly to treat.         Dyslipidemia    -Continue with home simvastatin 40mg nightly to treat.         Essential hypertension    -Well controlled  -Continue home Triamterene-HCTZ 37.5-25mg daily to treat  -Continue to monitor BP  -medication with hold parameters  -Continue to monitor electrolytes with biweekly labs.         Hypothyroidism    -Continue home levothyroxine to treat          Future Appointments  Date Time Provider Department Center   11/7/2017 1:30 PM Neeta Dunne PA-C [facility] ORTHO [place]   11/8/2017 1:40 PM Neelam Hooper NP [facility] UROLOGC [place]   11/29/2017 1:30 PM Trinity Vazquez MD [facility] OPHTHAL [place] NP  Department of Hospital Medicine  [facility]

## 2017-10-30 NOTE — PLAN OF CARE
Problem: Patient Care Overview  Goal: Plan of Care Review  Outcome: Ongoing (interventions implemented as appropriate)    Inadequate oral intake related to appetite changes from nausea caused by pain meds as evidenced by po 25%    Recommendation/Intervention: Contine low sodium diet, Recommend boost breeze tid, RD to follow, Consider medicating for nausea prior to meals  Goals: Patient to meet >85% of EEN/EPN   Nutrition Goal Status: goal not met

## 2017-10-30 NOTE — HOSPITAL COURSE
10/26/17: Patient admitted to SNF for ongoing PT/OT following a hospitalization for left total knee arthroplasty.  10/30: Patient seen at bedside, reports pain is currently controlled, having intermittent nausea today without vomiting, reviewed labs, sitter at bedside  11/1/17: (per ortho NP) pt working with therapist at bedside. He transferred from the bed to the chair with minimal assistance. Reports that he has had a lot of nausea with his pain medication. He is taking sublingual zofran and his pain medication was changed last night. He does report a lot of pain in the left knee.  11/2: nausea and vomiting persists, kub- with small bowel obstruction- sent patient to ED, ED MD unavailable for report, report given to MELBA López

## 2017-10-30 NOTE — ASSESSMENT & PLAN NOTE
-Well controlled  -Continue home Triamterene-HCTZ 37.5-25mg daily to treat  -Continue to monitor BP  -medication with hold parameters  -Continue to monitor electrolytes with biweekly labs.

## 2017-10-30 NOTE — ASSESSMENT & PLAN NOTE
-Continue Bactrim to treat will clarify with orthopedics when stopping is recommended from their standpoint  -Follow up as outpatient.

## 2017-10-30 NOTE — PT/OT/SLP PROGRESS
"Occupational Therapy  Treatment    Chucho Prado   MRN: [id number]   Admitting Diagnosis: Primary osteoarthritis of left knee    OT Date of Treatment: 10/30/17  Total Time (min): 58 min    Billable Minutes:  Self Care/Home Management 10, Therapeutic Activity 15 and Therapeutic Exercise 33    General Precautions: Standard, fall  Orthopedic Precautions: LLE weight bearing as tolerated  Braces: N/A    Do you have any cultural, spiritual, Sabianism conflicts, given your current situation?: none noted    Subjective:  Communicated with nurse prior to session.  "Can we bring the bucket with us?  I'm still nauseous.  Yesterday I threw up a few times."     Pain/Comfort  Pain Rating 1: 0/10  Pain Rating Post-Intervention 1: 0/10    Objective:  Patient found with: galvan catheter (sitter at bedside)    Functional Status:  MDS G  Bed Mobility Functional Status: mod(A)-Max(A)  Bed Mobility Level of (A):  (Mod A: Pt needed assist to lift B LEs onto bed)    Transfer Functional Status: mod(A)-Max(A)  Transfer Level of (A):  (pt required lifting assist from w/c)    Dressing Functional Status: 2:CGA-Min (A)  Dressing Level of (A) :  (Min A to freddie/doff socks using sock aide and reacher - assist needed to adjust sock once on his R foot)    Eating Functional Status: mod(I) - (I)    Toilet Use Functional Status: mod(A)-Max(A)    Moving from seated to standing position: Not steady, only able to stabilize with staff assistance  Walking (with assistive device if used): Not steady, only able to stabilize with staff assistance  Turning around and facing the opposite direction while walking: Not steady, only able to stabilize with staff assistance  Moving on and off the toilet: Not steady, only able to stabilize with staff assistance  Surface-to-surface transfer (transfer between bed and chair or wheelchair): Not steady, only able to stabilize with staff assistance          OT Exercises:  Pt worked on UE strength/endurance to facilitate " transfers, mobility (with RW and w/c mobility), and standing ADLs (grooming at sink, etc.)  · AROM using 1# dowel, pt completed 3 sets of 10 reps of the following exercises: shoulder flex/ext, biceps curls, shoulder hor abd/add and chest press  · UE Ergometer 15 minutes in forward motion with moderate resistance - pt only required 1 rest break    Additional Treatment:  · Pt worked on LE dressing using AD - pt familiar with use and able to perform with only 1 cue on proper placement of sock on sock aide for better alignment  · Pt required rest breaks during UE exercises  · Pt c/o nausea limited his mobility - pt declined attempt to propel w/c with UEs  · OT reviewed pt's POC      Patient left HOB elevated with call button in reach    ASSESSMENT:  Chucho Prado is a 84 y.o. male with a medical diagnosis of Primary osteoarthritis of left knee.  Pt was agreeable to OT and was noted to make progress towards his goals in therapy. Pt met goal for LE dressing for socks today, however, goal maintained in order to work on donning/doffing pants/underwear as well.  Pt's goals remain appropriate at this time. Pt's main barriers during today's session were nausea and decreased strength/endurance. He will continue to benefit from skilled OT services in order to assist him with increasing his safety and level of independence with self care and mobility tasks.         Rehab identified problem list/impairments: weakness, impaired endurance, impaired self care skills, impaired functional mobilty, gait instability, impaired balance, impaired cognition, decreased upper extremity function, decreased lower extremity function, decreased safety awareness, pain, decreased ROM, impaired coordination    Rehab potential is good    Activity tolerance: Fair    Discharge recommendations: home with home health, home health OT     Barriers to discharge: Decreased caregiver support     Equipment recommendations:  (TBD)     GOALS:     Occupational Therapy Goals        Problem: Occupational Therapy Goal    Goal Priority Disciplines Outcome Interventions   Occupational Therapy Goal     OT, PT/OT Ongoing (interventions implemented as appropriate)    Description:  Goals to be met by: 21 days  Patient will increase functional independence with ADLs by performing:    UE Dressing with Stand-by Assistance.  LE Dressing with Minimal Assistance.  Grooming while seated with Set-up Assistance.  Toileting from bedside commode with Stand-by Assistance for hygiene and clothing management.   Bathing from  edge of bed with Minimal Assistance.  Sitting at edge of bed x 15  minutes with Modified [place].  Stand pivot transfers with Supervision.  Toilet transfer to toilet with Stand-by Assistance.                      Plan:  Patient to be seen 5 x/week to address the above listed problems via self-care/home management, therapeutic activities, therapeutic exercises, neuromuscular re-education  Plan of Care expires: 11/27/17  Plan of Care reviewed with: patient    Katie CASON Leroyshayanon, OT  10/30/2017

## 2017-10-30 NOTE — ASSESSMENT & PLAN NOTE
-Has history of neurogenic bladder  -S/p suprapubic cath  -Continue galvan care  -Continue hyoscyamine for bladder spasms

## 2017-10-31 PROCEDURE — 97535 SELF CARE MNGMENT TRAINING: CPT

## 2017-10-31 PROCEDURE — 97116 GAIT TRAINING THERAPY: CPT

## 2017-10-31 PROCEDURE — 97530 THERAPEUTIC ACTIVITIES: CPT

## 2017-10-31 PROCEDURE — 25000003 PHARM REV CODE 250: Performed by: STUDENT IN AN ORGANIZED HEALTH CARE EDUCATION/TRAINING PROGRAM

## 2017-10-31 PROCEDURE — 25000003 PHARM REV CODE 250: Performed by: NURSE PRACTITIONER

## 2017-10-31 PROCEDURE — 25000003 PHARM REV CODE 250: Performed by: HOSPITALIST

## 2017-10-31 PROCEDURE — 12000000 HC SNF SEMI-PRIVATE ROOM

## 2017-10-31 PROCEDURE — 97110 THERAPEUTIC EXERCISES: CPT

## 2017-10-31 RX ORDER — ONDANSETRON 8 MG/1
8 TABLET, ORALLY DISINTEGRATING ORAL EVERY 6 HOURS PRN
Status: DISCONTINUED | OUTPATIENT
Start: 2017-10-31 | End: 2017-11-03 | Stop reason: HOSPADM

## 2017-10-31 RX ORDER — HYDROCODONE BITARTRATE AND ACETAMINOPHEN 5; 325 MG/1; MG/1
1 TABLET ORAL EVERY 6 HOURS PRN
Status: DISCONTINUED | OUTPATIENT
Start: 2017-10-31 | End: 2017-11-03 | Stop reason: HOSPADM

## 2017-10-31 RX ORDER — HYDROCODONE BITARTRATE AND ACETAMINOPHEN 7.5; 325 MG/1; MG/1
1 TABLET ORAL EVERY 6 HOURS PRN
Status: DISCONTINUED | OUTPATIENT
Start: 2017-10-31 | End: 2017-11-03 | Stop reason: HOSPADM

## 2017-10-31 RX ADMIN — FAMOTIDINE 20 MG: 20 TABLET, FILM COATED ORAL at 08:10

## 2017-10-31 RX ADMIN — OXYCODONE HYDROCHLORIDE 5 MG: 5 TABLET ORAL at 09:10

## 2017-10-31 RX ADMIN — ASPIRIN 325 MG: 325 TABLET, DELAYED RELEASE ORAL at 08:10

## 2017-10-31 RX ADMIN — ONDANSETRON 4 MG: 4 TABLET, ORALLY DISINTEGRATING ORAL at 09:10

## 2017-10-31 RX ADMIN — ONDANSETRON 8 MG: 8 TABLET, ORALLY DISINTEGRATING ORAL at 06:10

## 2017-10-31 RX ADMIN — STANDARDIZED SENNA CONCENTRATE AND DOCUSATE SODIUM 1 TABLET: 8.6; 5 TABLET, FILM COATED ORAL at 08:10

## 2017-10-31 RX ADMIN — STANDARDIZED SENNA CONCENTRATE AND DOCUSATE SODIUM 1 TABLET: 8.6; 5 TABLET, FILM COATED ORAL at 09:10

## 2017-10-31 RX ADMIN — SIMETHICONE CHEW TAB 80 MG 80 MG: 80 TABLET ORAL at 01:10

## 2017-10-31 RX ADMIN — MIRTAZAPINE 30 MG: 15 TABLET, FILM COATED ORAL at 09:10

## 2017-10-31 RX ADMIN — LEVOTHYROXINE SODIUM 50 MCG: 50 TABLET ORAL at 06:10

## 2017-10-31 RX ADMIN — SIMVASTATIN 40 MG: 40 TABLET, FILM COATED ORAL at 09:10

## 2017-10-31 RX ADMIN — ACETAMINOPHEN 650 MG: 325 TABLET ORAL at 10:10

## 2017-10-31 RX ADMIN — FAMOTIDINE 20 MG: 20 TABLET, FILM COATED ORAL at 09:10

## 2017-10-31 RX ADMIN — ASPIRIN 325 MG: 325 TABLET, DELAYED RELEASE ORAL at 09:10

## 2017-10-31 RX ADMIN — SULFAMETHOXAZOLE AND TRIMETHOPRIM 1 TABLET: 800; 160 TABLET ORAL at 09:10

## 2017-10-31 RX ADMIN — SIMETHICONE CHEW TAB 80 MG 80 MG: 80 TABLET ORAL at 06:10

## 2017-10-31 RX ADMIN — SIMETHICONE CHEW TAB 80 MG 80 MG: 80 TABLET ORAL at 09:10

## 2017-10-31 RX ADMIN — SULFAMETHOXAZOLE AND TRIMETHOPRIM 1 TABLET: 800; 160 TABLET ORAL at 08:10

## 2017-10-31 RX ADMIN — TRAZODONE HYDROCHLORIDE 50 MG: 50 TABLET ORAL at 09:10

## 2017-10-31 RX ADMIN — TRIAMTERENE AND HYDROCHLOROTHIAZIDE 1 CAPSULE: 25; 37.5 CAPSULE ORAL at 06:10

## 2017-10-31 NOTE — PLAN OF CARE
Problem: Fall Risk (Adult)  Goal: Absence of Falls  Patient will demonstrate the desired outcomes by discharge/transition of care.   Outcome: Ongoing (interventions implemented as appropriate)  Pt. Had no falls at this time. Will continue to monitor.

## 2017-10-31 NOTE — PLAN OF CARE
Problem: Pressure Ulcer Risk (Cooper Scale) (Adult,Obstetrics,Pediatric)  Intervention: Turn/Reposition Often   10/31/17 0100 10/31/17 0148   Skin Interventions   Pressure Reduction Techniques --  frequent weight shift encouraged;heels elevated off bed;weight shift assistance provided   Positioning   Body Position side-lying, left --

## 2017-10-31 NOTE — PLAN OF CARE
Problem: Occupational Therapy Goal  Goal: Occupational Therapy Goal  Goals to be met by: 21 days  Patient will increase functional independence with ADLs by performing:    UE Dressing with Stand-by Assistance.  LE Dressing with Minimal Assistance.  Grooming while seated with Set-up Assistance. GOAL MET 10/31/17  Toileting from bedside commode with Stand-by Assistance for hygiene and clothing management.   Bathing from  edge of bed with Minimal Assistance.  Sitting at edge of bed x 15  minutes with Modified [place].  Stand pivot transfers with Supervision.  Toilet transfer to toilet with Stand-by Assistance.     Outcome: Ongoing (interventions implemented as appropriate)    Pt was agreeable to OT and was noted to make progress towards his goals in therapy.  During today's session, pt met goal for grooming. Pt's goals remain appropriate at this time.  He will continue to benefit from skilled OT services in order to assist him with increasing his safety and level of independence with self care and mobility tasks.     Katie Bryan, OT  10/31/2017

## 2017-10-31 NOTE — PLAN OF CARE
Problem: Fall Risk (Adult)  Intervention: Patient Rounds   10/30/17 1951   Safety Interventions   Patient Rounds bed in low position;bed wheels locked;call light in reach;clutter free environment maintained;placement of personal items at bedside;toileting offered;visualized patient;ID band on

## 2017-10-31 NOTE — PLAN OF CARE
Problem: Fall Risk (Adult)  Goal: Absence of Falls  Patient will demonstrate the desired outcomes by discharge/transition of care.   Outcome: Ongoing (interventions implemented as appropriate)  Pt. Had no falls this shift.

## 2017-10-31 NOTE — PT/OT/SLP PROGRESS
"Occupational Therapy  Treatment    Chucho Prado   MRN: [id number]   Admitting Diagnosis: Primary osteoarthritis of left knee    OT Date of Treatment: 10/31/17  Total Time (min): 55 min    Billable Minutes:  Self Care/Home Management 45 and Therapeutic Activity 10    General Precautions: Standard, fall  Orthopedic Precautions: LLE weight bearing as tolerated  Braces: N/A    Do you have any cultural, spiritual, Sikhism conflicts, given your current situation?: none noted    Subjective:  Communicated with nurse prior to session.  "I'm feeling better so far."  Referring to nausea    Pain/Comfort  Pain Rating 1: 4/10  Location - Side 1: Left  Location 1: knee  Pain Addressed 1: Reposition, Distraction, Cessation of Activity  Pain Rating Post-Intervention 1: 4/10    Objective:  Patient found with:  (suprapubic catheter)    Functional Status:  MDS G  Bed Mobility Functional Status: mod(A)-Max(A)  Bed Mobility Level of (A):  (Mod A for supine to sit EOB - assist to lower L LE and lift trunk)    Transfer Functional Status: CGA-Min (A)  Transfer Level of (A):  (Min A for sit to stand and transfer to w/c using RW)    Dressing Functional Status: 3:mod(A)-Max(A)  Dressing Level of (A) :  (UE = min A; LE = mod A;  for UE dressing, pt needed assist to remove R UE from button down sweater and bring around back to remove - when donning sweater, pt needed assist to bring sweater around back - pt able to freddie/doff pull over style shirt;  For LE dressing, pt needed assistance donning pants with catheter)    Eating Functional Status: mod(I) - (I)  Eating Level of (A) :  (mod I)    Personal Hygiene Functional Status: S-SBA  Personal Hygiene Level of (A) :  (set up A to wash face, brush teeth and comb hair while seated in w/c)    Bathing Functional Status: mod(A)-Max(A)  Bathing Level of (A) :  (Mod A with sponge bath - needed A to wash buttocks and B lower legs)    Moving from seated to standing position: Not steady, only able " to stabilize with staff assistance  Walking (with assistive device if used): Not steady, only able to stabilize with staff assistance  Turning around and facing the opposite direction while walking: Not steady, only able to stabilize with staff assistance  Moving on and off the toilet: Not steady, only able to stabilize with staff assistance  Surface-to-surface transfer (transfer between bed and chair or wheelchair): Not steady, only able to stabilize with staff assistance        Additional Treatment:  · Pt completed ADLs and func mobility activities for tx session as noted above  · Pt required increased time to complete tasks and rest breaks due to fatigue and pain  · Pt needed assistance with reaching feet for bathing and donning pants with catheter.  · Whiteboard updated  · Pt educated on role of OT and POC      Patient left up in chair with all lines intact, call button in reach and caregiver present    ASSESSMENT:  Chucho Prado is a 84 y.o. male with a medical diagnosis of Primary osteoarthritis of left knee.  Pt was agreeable to OT and was noted to make progress towards his goals in therapy.  During today's session, pt met goal for grooming. Pt's goals remain appropriate at this time. He continues to require assistance reaching his feet for bathing and LB dressing which may be compensated for by further training on DME equipment (which he previously used).  Additional assistance needed due to pain and deconditioning.  He will continue to benefit from skilled OT services in order to assist him with increasing his safety and level of independence with self care and mobility tasks.         Rehab identified problem list/impairments: weakness, impaired endurance, impaired self care skills, impaired functional mobilty, gait instability, impaired balance, impaired cognition, decreased upper extremity function, decreased lower extremity function, decreased safety awareness, pain, decreased ROM, impaired  coordination    Rehab potential is fair    Activity tolerance: Fair    Discharge recommendations: home with home health, home health OT     Barriers to discharge: Decreased caregiver support     Equipment recommendations:  (TBD)     GOALS:    Occupational Therapy Goals        Problem: Occupational Therapy Goal    Goal Priority Disciplines Outcome Interventions   Occupational Therapy Goal     OT, PT/OT Ongoing (interventions implemented as appropriate)    Description:  Goals to be met by: 21 days  Patient will increase functional independence with ADLs by performing:    UE Dressing with Stand-by Assistance.  LE Dressing with Minimal Assistance.  Grooming while seated with Set-up Assistance. GOAL MET 10/31/17  Toileting from bedside commode with Stand-by Assistance for hygiene and clothing management.   Bathing from  edge of bed with Minimal Assistance.  Sitting at edge of bed x 15  minutes with Modified [place].  Stand pivot transfers with Supervision.  Toilet transfer to toilet with Stand-by Assistance.                       Plan:  Patient to be seen 5 x/week to address the above listed problems via self-care/home management, therapeutic activities, therapeutic exercises, neuromuscular re-education  Plan of Care expires: 11/27/17  Plan of Care reviewed with: patient    Katie CASON Randi, OT  10/31/2017

## 2017-11-01 PROCEDURE — 97530 THERAPEUTIC ACTIVITIES: CPT

## 2017-11-01 PROCEDURE — 97110 THERAPEUTIC EXERCISES: CPT

## 2017-11-01 PROCEDURE — 25000003 PHARM REV CODE 250: Performed by: NURSE PRACTITIONER

## 2017-11-01 PROCEDURE — 12000000 HC SNF SEMI-PRIVATE ROOM

## 2017-11-01 PROCEDURE — 97116 GAIT TRAINING THERAPY: CPT

## 2017-11-01 PROCEDURE — 25000003 PHARM REV CODE 250: Performed by: STUDENT IN AN ORGANIZED HEALTH CARE EDUCATION/TRAINING PROGRAM

## 2017-11-01 RX ADMIN — SULFAMETHOXAZOLE AND TRIMETHOPRIM 1 TABLET: 800; 160 TABLET ORAL at 09:11

## 2017-11-01 RX ADMIN — SIMETHICONE CHEW TAB 80 MG 80 MG: 80 TABLET ORAL at 02:11

## 2017-11-01 RX ADMIN — ASPIRIN 325 MG: 325 TABLET, DELAYED RELEASE ORAL at 09:11

## 2017-11-01 RX ADMIN — HYDROCODONE BITARTRATE AND ACETAMINOPHEN 1 TABLET: 7.5; 325 TABLET ORAL at 09:11

## 2017-11-01 RX ADMIN — SIMETHICONE CHEW TAB 80 MG 80 MG: 80 TABLET ORAL at 07:11

## 2017-11-01 RX ADMIN — TRIAMTERENE AND HYDROCHLOROTHIAZIDE 1 CAPSULE: 25; 37.5 CAPSULE ORAL at 06:11

## 2017-11-01 RX ADMIN — TRAZODONE HYDROCHLORIDE 50 MG: 50 TABLET ORAL at 09:11

## 2017-11-01 RX ADMIN — LEVOTHYROXINE SODIUM 50 MCG: 50 TABLET ORAL at 06:11

## 2017-11-01 RX ADMIN — FAMOTIDINE 20 MG: 20 TABLET, FILM COATED ORAL at 09:11

## 2017-11-01 RX ADMIN — SIMVASTATIN 40 MG: 40 TABLET, FILM COATED ORAL at 09:11

## 2017-11-01 RX ADMIN — MIRTAZAPINE 30 MG: 15 TABLET, FILM COATED ORAL at 09:11

## 2017-11-01 RX ADMIN — HYDROCODONE BITARTRATE AND ACETAMINOPHEN 1 TABLET: 7.5; 325 TABLET ORAL at 04:11

## 2017-11-01 NOTE — PLAN OF CARE
Problem: Occupational Therapy Goal  Goal: Occupational Therapy Goal  Goals to be met by: 21 days  Patient will increase functional independence with ADLs by performing:    UE Dressing with Stand-by Assistance.  LE Dressing with Minimal Assistance.  Grooming while seated with Set-up Assistance. GOAL MET 10/31/17  Toileting from bedside commode with Stand-by Assistance for hygiene and clothing management.   Bathing from  edge of bed with Minimal Assistance.  Sitting at edge of bed x 15  minutes with Modified [place].  Stand pivot transfers with Supervision.  Toilet transfer to toilet with Stand-by Assistance.      Outcome: Ongoing (interventions implemented as appropriate)    Pt was agreeable to OT and was noted to make progress towards his goals in therapy.  Pt's goals remain appropriate at this time.  He will continue to benefit from skilled OT services in order to assist him with increasing his safety and level of independence with self care and mobility tasks.     Katie Bryan, OT  11/1/2017

## 2017-11-01 NOTE — PLAN OF CARE
Problem: Patient Care Overview  Goal: Plan of Care Review  Outcome: Ongoing (interventions implemented as appropriate)   11/01/17 0128   Coping/Psychosocial   Plan Of Care Reviewed With patient

## 2017-11-01 NOTE — PT/OT/SLP PROGRESS
"Physical Therapy  Treatment (10/31/17)    Chucho Prado   MRN: [id number]   Admitting Diagnosis: Primary osteoarthritis of left knee    PT Received On: 11/01/17  Total Time (min): 45       Billable Minutes:  Gait [id number], Therapeutic Activity 15 and Therapeutic Exercise 15    Treatment Type: Treatment  PT/PTA: PT     PTA Visit Number: 0       General Precautions: Standard, fall  Orthopedic Precautions: LLE weight bearing as tolerated   Braces:      Do you have any cultural, spiritual, Mormon conflicts, given your current situation?: no    Subjective:  Communicated with pt prior to session.  "You may want to bring my pink bucket."      Pain/Comfort  Pain Rating 1: 5/10  Location - Side 1: Left  Location - Orientation 1: generalized  Location 1: knee  Pain Addressed 1: Reposition  Pain Rating Post-Intervention 1: 5/10    Objective:  Patient found seated.         Functional Status:  MDS G  Transfer Functional Status: mod(A)-Max(A)  Walk in Room Functional Status: CGA-Min (A)  Walk in Corridor Functional Status: CGA-Min (A)  Locomotion on Unit Functional Status: total(A)    Transfers:  Sit<>Stand: Min-ModA    Gait:  Ambulated 30 feet today with RW, CGA, step-to gait pattern, forward flexed posture.     Therex:  Seated- hip flexion, long arc quads, knee flexion, ankle pumps (x20 reps)    85 degrees flexion (unable to test extension)    Patient left up in chair with call button in reach.    Assessment:  Chucho Prado is a 84 y.o. male with a medical diagnosis of Primary osteoarthritis of left knee.  Mr. Prado will benefit from further PT services to improve his knee AROM and his overall functional mobility.    Rehab identified problem list/impairments: weakness, impaired endurance, impaired self care skills, impaired functional mobilty, gait instability, impaired balance, decreased lower extremity function, decreased upper extremity function, decreased safety awareness, decreased ROM, edema, " impaired joint extensibility    Rehab potential is good.    Activity tolerance: Fair    Discharge recommendations: home health PT (with 24 hr care)     Barriers to discharge: Decreased caregiver support (Alone 12am-8am every day)    Equipment recommendations: bedside commode     GOALS:    Physical Therapy Goals        Problem: Physical Therapy Goal    Goal Priority Disciplines Outcome Goal Variances Interventions   Physical Therapy Goal     PT/OT, PT Ongoing (interventions implemented as appropriate)     Description:  Goals to be met by: 2-3 weeks     Patient will increase functional independence with mobility by performin. Supine to sit with Minimal Assistance on mat. Not met  2. Sit to supine with Minimal Assistance on mat. Not met  3. Sit to stand transfer with Minimal Assistance from wheelchair. Not met  4. Bed to chair transfer with Minimal Assistance using Rolling Walker. Not met  5. Gait  x 150 feet with Stand-by Assistance using Rolling Walker. Not met  6. Ascend/Descend 4 inch curb step with Moderate Assistance using Rolling Walker. Not met  7. Stand for 3 minutes with Contact Guard Assistance using Rolling Walker while performing a task or partaking in an activity.  Not met  8. Lower extremity, TKA exercise program x 20 reps per handout, with assistance as needed.  Not met                      PLAN:    Patient to be seen  (5x/week)  to address the above listed problems via gait training, therapeutic activities, therapeutic exercises, neuromuscular re-education  Plan of Care expires: 17  Plan of Care reviewed with: patient    Luda Veronica, PT  2017

## 2017-11-01 NOTE — PLAN OF CARE
Problem: Physical Therapy Goal  Goal: Physical Therapy Goal  Goals to be met by: 2-3 weeks     Patient will increase functional independence with mobility by performin. Supine to sit with Minimal Assistance on mat. Not met  2. Sit to supine with Minimal Assistance on mat. Not met  3. Sit to stand transfer with Minimal Assistance from wheelchair. Not met  4. Bed to chair transfer with Minimal Assistance using Rolling Walker. Not met  5. Gait  x 150 feet with Stand-by Assistance using Rolling Walker. Not met  6. Ascend/Descend 4 inch curb step with Moderate Assistance using Rolling Walker. Not met  7. Stand for 3 minutes with Contact Guard Assistance using Rolling Walker while performing a task or partaking in an activity.  Not met  8. Lower extremity, TKA exercise program x 20 reps per handout, with assistance as needed.  Not met     Outcome: Ongoing (interventions implemented as appropriate)  LTGs remain appropriate. Pt will continue PT POC.    Orly Mckinney, PT  2017

## 2017-11-01 NOTE — PLAN OF CARE
Problem: Physical Therapy Goal  Goal: Physical Therapy Goal  Goals to be met by: 2-3 weeks     Patient will increase functional independence with mobility by performin. Supine to sit with Minimal Assistance on mat. Not met  2. Sit to supine with Minimal Assistance on mat. Not met  3. Sit to stand transfer with Minimal Assistance from wheelchair. Not met  4. Bed to chair transfer with Minimal Assistance using Rolling Walker. Not met  5. Gait  x 150 feet with Stand-by Assistance using Rolling Walker. Not met  6. Ascend/Descend 4 inch curb step with Moderate Assistance using Rolling Walker. Not met  7. Stand for 3 minutes with Contact Guard Assistance using Rolling Walker while performing a task or partaking in an activity.  Not met  8. Lower extremity, TKA exercise program x 20 reps per handout, with assistance as needed.  Not met     Outcome: Ongoing (interventions implemented as appropriate)  Goals remain appropriate.

## 2017-11-01 NOTE — PT/OT/SLP DISCHARGE
Physical Therapy Discharge Summary    Chucho Prado  MRN: [id number]   Primary osteoarthritis of left knee   Patient Discharged from acute Physical Therapy on 10/26/2017.  Please refer to prior PT noted date on 10/26/2017 for functional status.     Assessment:   Patient appropriate for care in another setting.  GOALS:    Physical Therapy Goals     Not on file          Multidisciplinary Problems (Resolved)        Problem: Physical Therapy Goal    Goal Priority Disciplines Outcome Goal Variances Interventions   Physical Therapy Goal   (Resolved)     PT/OT, PT Outcome(s) achieved     Description:  Goals to be met by: 10/31/17     Patient will increase functional independence with mobility by performin. Supine to sit with Supervision  2. Sit to supine with Supervision  3. Sit to stand transfer with Stand-by Assistance  4. Gait  x 150 feet with Stand-by Assistance using Rolling Walker.   5. Lower extremity exercise program x 30 reps per handout, with independence                    Reasons for Discontinuation of Therapy Services  Transfer to alternate level of care.      Plan:  Patient Discharged to: Skilled Nursing Facility.    Nagi Freed III, DPT, PT  2017

## 2017-11-01 NOTE — PT/OT/SLP PROGRESS
Physical Therapy  Treatment    Chucho Prado   MRN: [id number]   Admitting Diagnosis: Primary osteoarthritis of left knee    PT Received On: 11/01/17  Total Time (min): 59       Billable Minutes:  Gait [id number], Therapeutic Activity 15, Therapeutic Exercise 30 and Total Time 59    Treatment Type: Treatment  PT/PTA: PT     PTA Visit Number: 0       General Precautions: Standard, fall  Orthopedic Precautions: LLE weight bearing as tolerated   Braces:      Do you have any cultural, spiritual, Confucianism conflicts, given your current situation?: no    Subjective:  Pt agreeable to session.    Pain/Comfort  Pain Rating 1: 8/10 (w/ activity)  Location - Side 1: Left  Location - Orientation 1: generalized  Location 1: knee  Pain Addressed 1: Pre-medicate for activity, Reposition  Pain Rating Post-Intervention 1: 3/10 (at rest)    Objective:  Patient found sitting in w/c w/ Mahajan  catheter     Functional Status:  MDS G  Bed Mobility Functional Status: mod(A)-Max(A)  Transfer Functional Status: CGA-Min (A)  Walk in Room Functional Status: CGA-Min (A)  Walk in Corridor Functional Status: CGA-Min (A)  Locomotion on Unit Functional Status: total(A)    Bed Mobility:  Sit>Supine:on mat w/ ModA (x2)  Supine>Sit: on mat w/ SBA, inc'd time required    Transfers:  Sit<>Stand: to/from w/c w/ RW and Joe to rise  Stand Pivot Transfer: w/c<>EOM w/ RW and Joe to rise  inc'd time required to come to full stand    Gait:  Amb 64ft w/ RW and CGA for safety, cueing for LLE knee flexion in swing, slow pace     Therex:  Supine and seated BLE therex 2x10 reps per TKA protocol (GS, SAQ, AP, QS, HS, ABd/ADd, HF, LAQ, KF), inc'd time required  (L) knee ROM: -7 to 87 degrees AROM    Patient left up in chair with all lines intact, call button in reach and polar ice applied to left knee w/ towel layer.    Assessment:  Chucho Prado is a 84 y.o. male with a medical diagnosis of Primary osteoarthritis of left knee.  Pt wyatt session  well w/ good participation. He was able to inc amb distance on this date. His transfers are also improving. Pt is progressing well and will continue PT POC.    Rehab identified problem list/impairments: weakness, impaired endurance, impaired self care skills, impaired functional mobilty, gait instability, impaired balance, decreased lower extremity function, decreased upper extremity function, decreased safety awareness, decreased ROM, edema, impaired joint extensibility    Rehab potential is good.    Activity tolerance: Good    Discharge recommendations: home health PT (with 24 hr care)     Barriers to discharge: Decreased caregiver support (Alone 12am-8am every day)    Equipment recommendations: bedside commode     GOALS:    Physical Therapy Goals        Problem: Physical Therapy Goal    Goal Priority Disciplines Outcome Goal Variances Interventions   Physical Therapy Goal     PT/OT, PT Ongoing (interventions implemented as appropriate)     Description:  Goals to be met by: 2-3 weeks     Patient will increase functional independence with mobility by performin. Supine to sit with Minimal Assistance on mat. Not met  2. Sit to supine with Minimal Assistance on mat. Not met  3. Sit to stand transfer with Minimal Assistance from wheelchair. Not met  4. Bed to chair transfer with Minimal Assistance using Rolling Walker. Not met  5. Gait  x 150 feet with Stand-by Assistance using Rolling Walker. Not met  6. Ascend/Descend 4 inch curb step with Moderate Assistance using Rolling Walker. Not met  7. Stand for 3 minutes with Contact Guard Assistance using Rolling Walker while performing a task or partaking in an activity.  Not met  8. Lower extremity, TKA exercise program x 20 reps per handout, with assistance as needed.  Not met                      PLAN:    Patient to be seen  (5x/week)  to address the above listed problems via gait training, therapeutic activities, therapeutic exercises, neuromuscular  re-education  Plan of Care expires: 11/26/17  Plan of Care reviewed with: patient    Orly CASON Faye, PT  11/01/2017

## 2017-11-01 NOTE — PT/OT/SLP PROGRESS
"Occupational Therapy  Treatment    Chucho Prado   MRN: [id number]   Admitting Diagnosis: Primary osteoarthritis of left knee    OT Date of Treatment: 11/01/17  Total Time (min): 54 min    Billable Minutes:  Therapeutic Activity 8 and Therapeutic Exercise 46    General Precautions: Standard, fall  Orthopedic Precautions: LLE weight bearing as tolerated  Braces: N/A    Do you have any cultural, spiritual, Sikhism conflicts, given your current situation?: none noted    Subjective:  Communicated with nurse prior to session.  "These are the exercises my PT showed me when he would come to my house."    Pain/Comfort  Pain Rating 1: 4/10  Location - Side 1: Left  Location 1: knee  Pain Addressed 1: Pre-medicate for activity, Reposition, Distraction  Pain Rating Post-Intervention 1: 3/10    Objective:  Patient found with: galvan catheter, Polar ice (sitter present)    Functional Status:  MDS G  Transfer Functional Status: CGA-Min (A)  Transfer Level of (A):  (Min A for sit to stand)    Dressing Functional Status: 1: S-SBA  Dressing Level of (A) :  (set up A to freddie shirt)    Eating Functional Status: S-SBA  Eating Level of (A) :  (set up A to open container)    Moving from seated to standing position: Not steady, only able to stabilize with staff assistance  Walking (with assistive device if used): Not steady, only able to stabilize with staff assistance  Turning around and facing the opposite direction while walking: Not steady, only able to stabilize with staff assistance  Moving on and off the toilet: Not steady, only able to stabilize with staff assistance  Surface-to-surface transfer (transfer between bed and chair or wheelchair): Not steady, only able to stabilize with staff assistance       OT Exercises: Pt completed UE exercises to improve his overall strength/endurance in order to improve his mobility and transfers, as well as, standing activities.   · AROM using 1# dowel, pt completed 3 sets of 10 reps of " the following exercises: shoulder flex/ext, forward rows, reverse rows, chest press, shoulder hor abd/add, and biceps curls.  · UE Ergometer 15 minutes in forward motion with low resistance - pt only needed 1 rest break during task  · Nichol 10# - pt completed 6 sets of 10 reps amd 2 sets of 20 reps    Additional Treatment:  · Pt worked on UE strengthening exercises with rest breaks needed between sets.   · Pt educated on OT POC    Patient left up in chair with call button in reach and polar ice replaced on knee    ASSESSMENT:  Chucho Prado is a 84 y.o. male with a medical diagnosis of Primary osteoarthritis of left knee.  Pt was agreeable to OT and was noted to make progress towards his goals in therapy.  Pt's goals remain appropriate at this time. Today he worked on UE strength and endurance to improve the quality and safety of his transfers and mobility.  He will continue to benefit from skilled OT services in order to assist him with increasing his safety and level of independence with self care and mobility tasks.       Rehab identified problem list/impairments: weakness, impaired endurance, impaired self care skills, impaired functional mobilty, gait instability, impaired balance, impaired cognition, decreased upper extremity function, decreased lower extremity function, decreased safety awareness, pain, decreased ROM, impaired coordination    Rehab potential is good    Activity tolerance: Fair    Discharge recommendations: home with home health, home health OT     Barriers to discharge: Decreased caregiver support     Equipment recommendations:  (TBD)     GOALS:    Occupational Therapy Goals        Problem: Occupational Therapy Goal    Goal Priority Disciplines Outcome Interventions   Occupational Therapy Goal     OT, PT/OT Ongoing (interventions implemented as appropriate)    Description:  Goals to be met by: 21 days  Patient will increase functional independence with ADLs by performing:    UE  Dressing with Stand-by Assistance.  LE Dressing with Minimal Assistance.  Grooming while seated with Set-up Assistance. GOAL MET 10/31/17  Toileting from bedside commode with Stand-by Assistance for hygiene and clothing management.   Bathing from  edge of bed with Minimal Assistance.  Sitting at edge of bed x 15  minutes with Modified [place].  Stand pivot transfers with Supervision.  Toilet transfer to toilet with Stand-by Assistance.                       Plan:  Patient to be seen 5 x/week to address the above listed problems via self-care/home management, therapeutic activities, therapeutic exercises, neuromuscular re-education  Plan of Care expires: 11/27/17  Plan of Care reviewed with: patient    Katie CASON Randi, OT  11/01/2017

## 2017-11-01 NOTE — PLAN OF CARE
Problem: Patient Care Overview  Goal: Plan of Care Review  Outcome: Ongoing (interventions implemented as appropriate)   11/01/17 1751   Coping/Psychosocial   Plan Of Care Reviewed With patient       Problem: Fall Risk (Adult)  Goal: Absence of Falls  Patient will demonstrate the desired outcomes by discharge/transition of care.   Outcome: Ongoing (interventions implemented as appropriate)   11/01/17 1751   Fall Risk (Adult)   Absence of Falls making progress toward outcome       Problem: Pressure Ulcer Risk (Cooper Scale) (Adult,Obstetrics,Pediatric)  Goal: Skin Integrity  Patient will demonstrate the desired outcomes by discharge/transition of care.   Outcome: Ongoing (interventions implemented as appropriate)   11/01/17 1751   Pressure Ulcer Risk (Cooper Scale) (Adult,Obstetrics,Pediatric)   Skin Integrity making progress toward outcome       Problem: Kidney Disease, Chronic/End Stage Renal Disease (Adult)  Goal: Signs and Symptoms of Listed Potential Problems Will be Absent, Minimized or Managed (Kidney Disease, Chronic/End Stage Renal Disease)  Signs and symptoms of listed potential problems will be absent, minimized or managed by discharge/transition of care (reference Kidney Disease, Chronic/End Stage Renal Disease (Adult) CPG).   Outcome: Ongoing (interventions implemented as appropriate)   11/01/17 1751   Kidney Disease, Chronic/End Stage Renal Disease   Problems Assessed (Chronic Kidney Disease/ESRD) all   Problems Present (Chronic Kidney Disease/ESRD) none       Problem: Depression (Adult,Obstetrics,Pediatric)  Goal: Establish/Maintain Self-Care Routine  Patient will demonstrate the desired outcomes by discharge/transition of care.   Outcome: Ongoing (interventions implemented as appropriate)   11/01/17 1751   Depression (Adult,Obstetrics,Pediatric)   Establish/Maintain Self-Care Routine making progress toward outcome       Comments: Patient  Monitored every 1 to 2 hours for pain and safety.  Safety   Maintained.  Dressing to left knee dry and intact with polar ice intact.    Patient has supra pubic intact and draining clear yellow urine to gu bag.  Patient instructed to call for assistance.Call  Light and persoanl items in reach.

## 2017-11-01 NOTE — PLAN OF CARE
Problem: Patient Care Overview  Goal: Plan of Care Review  Outcome: Ongoing (interventions implemented as appropriate)   11/01/17 1756   Coping/Psychosocial   Plan Of Care Reviewed With patient       Problem: Fall Risk (Adult)  Goal: Absence of Falls  Patient will demonstrate the desired outcomes by discharge/transition of care.   Outcome: Ongoing (interventions implemented as appropriate)   11/01/17 1756   Fall Risk (Adult)   Absence of Falls making progress toward outcome       Problem: Pressure Ulcer Risk (Cooper Scale) (Adult,Obstetrics,Pediatric)  Goal: Skin Integrity  Patient will demonstrate the desired outcomes by discharge/transition of care.   Outcome: Ongoing (interventions implemented as appropriate)   11/01/17 1756   Pressure Ulcer Risk (Cooper Scale) (Adult,Obstetrics,Pediatric)   Skin Integrity making progress toward outcome       Problem: Kidney Disease, Chronic/End Stage Renal Disease (Adult)  Goal: Signs and Symptoms of Listed Potential Problems Will be Absent, Minimized or Managed (Kidney Disease, Chronic/End Stage Renal Disease)  Signs and symptoms of listed potential problems will be absent, minimized or managed by discharge/transition of care (reference Kidney Disease, Chronic/End Stage Renal Disease (Adult) CPG).   Outcome: Ongoing (interventions implemented as appropriate)   11/01/17 1756   Kidney Disease, Chronic/End Stage Renal Disease   Problems Assessed (Chronic Kidney Disease/ESRD) all   Problems Present (Chronic Kidney Disease/ESRD) none       Comments: Patient  Monitored every 1 to 2 hours for pain and safety.  Safety maintained.  Patient instructed to call for assistance.Call  Light and persoanl items in reach.

## 2017-11-02 ENCOUNTER — HOSPITAL ENCOUNTER (INPATIENT)
Facility: HOSPITAL | Age: 82
LOS: 4 days | Discharge: HOME HEALTH | DRG: 394 | End: 2017-11-06
Attending: EMERGENCY MEDICINE | Admitting: EMERGENCY MEDICINE
Payer: MEDICARE

## 2017-11-02 VITALS
HEART RATE: 83 BPM | OXYGEN SATURATION: 95 % | HEIGHT: 68 IN | SYSTOLIC BLOOD PRESSURE: 156 MMHG | RESPIRATION RATE: 18 BRPM | WEIGHT: 194.88 LBS | BODY MASS INDEX: 29.54 KG/M2 | TEMPERATURE: 98 F | DIASTOLIC BLOOD PRESSURE: 74 MMHG

## 2017-11-02 DIAGNOSIS — R53.81 DEBILITY: ICD-10-CM

## 2017-11-02 DIAGNOSIS — K56.609 INTESTINAL OBSTRUCTION, UNSPECIFIED CAUSE, UNSPECIFIED WHETHER PARTIAL OR COMPLETE: Primary | ICD-10-CM

## 2017-11-02 DIAGNOSIS — K56.690 OTHER PARTIAL INTESTINAL OBSTRUCTION: ICD-10-CM

## 2017-11-02 PROBLEM — K56.600 PARTIAL INTESTINAL OBSTRUCTION: Status: ACTIVE | Noted: 2017-11-02

## 2017-11-02 LAB
ALBUMIN SERPL BCP-MCNC: 3.6 G/DL
ALP SERPL-CCNC: 73 U/L
ALT SERPL W/O P-5'-P-CCNC: 5 U/L
ANION GAP SERPL CALC-SCNC: 11 MMOL/L
ANION GAP SERPL CALC-SCNC: 11 MMOL/L
AST SERPL-CCNC: 14 U/L
BASOPHILS # BLD AUTO: 0.02 K/UL
BASOPHILS # BLD AUTO: 0.02 K/UL
BASOPHILS NFR BLD: 0.2 %
BASOPHILS NFR BLD: 0.2 %
BILIRUB SERPL-MCNC: 0.9 MG/DL
BUN SERPL-MCNC: 18 MG/DL
BUN SERPL-MCNC: 21 MG/DL
CALCIUM SERPL-MCNC: 8.8 MG/DL
CALCIUM SERPL-MCNC: 9.2 MG/DL
CHLORIDE SERPL-SCNC: 88 MMOL/L
CHLORIDE SERPL-SCNC: 89 MMOL/L
CO2 SERPL-SCNC: 25 MMOL/L
CO2 SERPL-SCNC: 28 MMOL/L
CREAT SERPL-MCNC: 1.5 MG/DL
CREAT SERPL-MCNC: 1.6 MG/DL
DIFFERENTIAL METHOD: ABNORMAL
DIFFERENTIAL METHOD: ABNORMAL
EOSINOPHIL # BLD AUTO: 0 K/UL
EOSINOPHIL # BLD AUTO: 0.3 K/UL
EOSINOPHIL NFR BLD: 0.4 %
EOSINOPHIL NFR BLD: 2.6 %
ERYTHROCYTE [DISTWIDTH] IN BLOOD BY AUTOMATED COUNT: 14.4 %
ERYTHROCYTE [DISTWIDTH] IN BLOOD BY AUTOMATED COUNT: 14.6 %
EST. GFR  (AFRICAN AMERICAN): 45.1 ML/MIN/1.73 M^2
EST. GFR  (AFRICAN AMERICAN): 48.7 ML/MIN/1.73 M^2
EST. GFR  (NON AFRICAN AMERICAN): 39 ML/MIN/1.73 M^2
EST. GFR  (NON AFRICAN AMERICAN): 42.1 ML/MIN/1.73 M^2
GLUCOSE SERPL-MCNC: 89 MG/DL
GLUCOSE SERPL-MCNC: 99 MG/DL
HCT VFR BLD AUTO: 31.3 %
HCT VFR BLD AUTO: 32.8 %
HGB BLD-MCNC: 10.6 G/DL
HGB BLD-MCNC: 10.8 G/DL
IMM GRANULOCYTES # BLD AUTO: 0.05 K/UL
IMM GRANULOCYTES NFR BLD AUTO: 0.6 %
LACTATE SERPL-SCNC: 0.8 MMOL/L
LIPASE SERPL-CCNC: 11 U/L
LYMPHOCYTES # BLD AUTO: 1.4 K/UL
LYMPHOCYTES # BLD AUTO: 1.8 K/UL
LYMPHOCYTES NFR BLD: 15.8 %
LYMPHOCYTES NFR BLD: 16.4 %
MAGNESIUM SERPL-MCNC: 1.7 MG/DL
MAGNESIUM SERPL-MCNC: 1.9 MG/DL
MCH RBC QN AUTO: 27.1 PG
MCH RBC QN AUTO: 27.5 PG
MCHC RBC AUTO-ENTMCNC: 32.9 G/DL
MCHC RBC AUTO-ENTMCNC: 33.9 G/DL
MCV RBC AUTO: 80 FL
MCV RBC AUTO: 84 FL
MONOCYTES # BLD AUTO: 0.7 K/UL
MONOCYTES # BLD AUTO: 0.9 K/UL
MONOCYTES NFR BLD: 7.8 %
MONOCYTES NFR BLD: 8.3 %
NEUTROPHILS # BLD AUTO: 6.1 K/UL
NEUTROPHILS # BLD AUTO: 8.5 K/UL
NEUTROPHILS NFR BLD: 73.6 %
NEUTROPHILS NFR BLD: 74.1 %
NRBC BLD-RTO: 0 /100 WBC
PHOSPHATE SERPL-MCNC: 3.4 MG/DL
PHOSPHATE SERPL-MCNC: 4.2 MG/DL
PLATELET # BLD AUTO: 271 K/UL
PLATELET # BLD AUTO: 312 K/UL
PMV BLD AUTO: 9 FL
PMV BLD AUTO: 9.4 FL
POTASSIUM SERPL-SCNC: 3.3 MMOL/L
POTASSIUM SERPL-SCNC: 4 MMOL/L
PROT SERPL-MCNC: 7.2 G/DL
RBC # BLD AUTO: 3.91 M/UL
RBC # BLD AUTO: 3.93 M/UL
SODIUM SERPL-SCNC: 125 MMOL/L
SODIUM SERPL-SCNC: 127 MMOL/L
WBC # BLD AUTO: 11.61 K/UL
WBC # BLD AUTO: 8.24 K/UL

## 2017-11-02 PROCEDURE — 85025 COMPLETE CBC W/AUTO DIFF WBC: CPT

## 2017-11-02 PROCEDURE — 81003 URINALYSIS AUTO W/O SCOPE: CPT

## 2017-11-02 PROCEDURE — 99900058 HC 022 PAID UNDER SNF PPS

## 2017-11-02 PROCEDURE — 97803 MED NUTRITION INDIV SUBSEQ: CPT

## 2017-11-02 PROCEDURE — 99316 NF DSCHRG MGMT 30 MIN+: CPT | Mod: ,,, | Performed by: HOSPITALIST

## 2017-11-02 PROCEDURE — 25000003 PHARM REV CODE 250: Performed by: NURSE PRACTITIONER

## 2017-11-02 PROCEDURE — 25000003 PHARM REV CODE 250: Performed by: STUDENT IN AN ORGANIZED HEALTH CARE EDUCATION/TRAINING PROGRAM

## 2017-11-02 PROCEDURE — 83735 ASSAY OF MAGNESIUM: CPT

## 2017-11-02 PROCEDURE — 83735 ASSAY OF MAGNESIUM: CPT | Mod: 91

## 2017-11-02 PROCEDURE — 87086 URINE CULTURE/COLONY COUNT: CPT

## 2017-11-02 PROCEDURE — 84100 ASSAY OF PHOSPHORUS: CPT | Mod: 91

## 2017-11-02 PROCEDURE — 96374 THER/PROPH/DIAG INJ IV PUSH: CPT

## 2017-11-02 PROCEDURE — 25500020 PHARM REV CODE 255: Performed by: STUDENT IN AN ORGANIZED HEALTH CARE EDUCATION/TRAINING PROGRAM

## 2017-11-02 PROCEDURE — 81001 URINALYSIS AUTO W/SCOPE: CPT

## 2017-11-02 PROCEDURE — 83605 ASSAY OF LACTIC ACID: CPT

## 2017-11-02 PROCEDURE — 99285 EMERGENCY DEPT VISIT HI MDM: CPT | Mod: 25

## 2017-11-02 PROCEDURE — 85025 COMPLETE CBC W/AUTO DIFF WBC: CPT | Mod: 91

## 2017-11-02 PROCEDURE — 83690 ASSAY OF LIPASE: CPT

## 2017-11-02 PROCEDURE — 99285 EMERGENCY DEPT VISIT HI MDM: CPT | Mod: ,,, | Performed by: EMERGENCY MEDICINE

## 2017-11-02 PROCEDURE — 96361 HYDRATE IV INFUSION ADD-ON: CPT

## 2017-11-02 PROCEDURE — 84100 ASSAY OF PHOSPHORUS: CPT

## 2017-11-02 PROCEDURE — 63600175 PHARM REV CODE 636 W HCPCS: Performed by: STUDENT IN AN ORGANIZED HEALTH CARE EDUCATION/TRAINING PROGRAM

## 2017-11-02 PROCEDURE — 80053 COMPREHEN METABOLIC PANEL: CPT

## 2017-11-02 PROCEDURE — 36415 COLL VENOUS BLD VENIPUNCTURE: CPT

## 2017-11-02 PROCEDURE — 25000003 PHARM REV CODE 250: Performed by: EMERGENCY MEDICINE

## 2017-11-02 PROCEDURE — 80048 BASIC METABOLIC PNL TOTAL CA: CPT

## 2017-11-02 PROCEDURE — 18500000 HC LEAVE OF ABSENCE HOSPITAL SERVICES

## 2017-11-02 PROCEDURE — 12000002 HC ACUTE/MED SURGE SEMI-PRIVATE ROOM

## 2017-11-02 RX ORDER — BISACODYL 10 MG
10 SUPPOSITORY, RECTAL RECTAL DAILY PRN
Status: DISCONTINUED | OUTPATIENT
Start: 2017-11-02 | End: 2017-11-03 | Stop reason: HOSPADM

## 2017-11-02 RX ORDER — POTASSIUM CHLORIDE 20 MEQ/1
20 TABLET, EXTENDED RELEASE ORAL ONCE
Status: COMPLETED | OUTPATIENT
Start: 2017-11-02 | End: 2017-11-02

## 2017-11-02 RX ORDER — BISACODYL 10 MG
10 SUPPOSITORY, RECTAL RECTAL ONCE
Status: COMPLETED | OUTPATIENT
Start: 2017-11-02 | End: 2017-11-02

## 2017-11-02 RX ORDER — SODIUM CHLORIDE 9 MG/ML
1000 INJECTION, SOLUTION INTRAVENOUS
Status: COMPLETED | OUTPATIENT
Start: 2017-11-02 | End: 2017-11-02

## 2017-11-02 RX ORDER — PROMETHAZINE HYDROCHLORIDE 12.5 MG/1
12.5 TABLET ORAL EVERY 12 HOURS PRN
Status: DISCONTINUED | OUTPATIENT
Start: 2017-11-02 | End: 2017-11-03 | Stop reason: HOSPADM

## 2017-11-02 RX ORDER — ONDANSETRON 2 MG/ML
4 INJECTION INTRAMUSCULAR; INTRAVENOUS
Status: COMPLETED | OUTPATIENT
Start: 2017-11-02 | End: 2017-11-02

## 2017-11-02 RX ORDER — ONDANSETRON 2 MG/ML
4 INJECTION INTRAMUSCULAR; INTRAVENOUS EVERY 6 HOURS PRN
Status: DISCONTINUED | OUTPATIENT
Start: 2017-11-02 | End: 2017-11-06 | Stop reason: HOSPADM

## 2017-11-02 RX ADMIN — SODIUM CHLORIDE 1000 ML: 0.9 INJECTION, SOLUTION INTRAVENOUS at 10:11

## 2017-11-02 RX ADMIN — SULFAMETHOXAZOLE AND TRIMETHOPRIM 1 TABLET: 800; 160 TABLET ORAL at 08:11

## 2017-11-02 RX ADMIN — ASPIRIN 325 MG: 325 TABLET, DELAYED RELEASE ORAL at 08:11

## 2017-11-02 RX ADMIN — FAMOTIDINE 20 MG: 20 TABLET, FILM COATED ORAL at 08:11

## 2017-11-02 RX ADMIN — SIMETHICONE CHEW TAB 80 MG 80 MG: 80 TABLET ORAL at 02:11

## 2017-11-02 RX ADMIN — LEVOTHYROXINE SODIUM 50 MCG: 50 TABLET ORAL at 06:11

## 2017-11-02 RX ADMIN — POTASSIUM CHLORIDE 20 MEQ: 1500 TABLET, EXTENDED RELEASE ORAL at 12:11

## 2017-11-02 RX ADMIN — ONDANSETRON 8 MG: 8 TABLET, ORALLY DISINTEGRATING ORAL at 08:11

## 2017-11-02 RX ADMIN — TRIAMTERENE AND HYDROCHLOROTHIAZIDE 1 CAPSULE: 25; 37.5 CAPSULE ORAL at 06:11

## 2017-11-02 RX ADMIN — SIMETHICONE CHEW TAB 80 MG 80 MG: 80 TABLET ORAL at 09:11

## 2017-11-02 RX ADMIN — ONDANSETRON 4 MG: 2 INJECTION, SOLUTION INTRAMUSCULAR; INTRAVENOUS at 07:11

## 2017-11-02 RX ADMIN — SODIUM CHLORIDE 1000 ML: 0.9 INJECTION, SOLUTION INTRAVENOUS at 07:11

## 2017-11-02 RX ADMIN — IOHEXOL 100 ML: 350 INJECTION, SOLUTION INTRAVENOUS at 09:11

## 2017-11-02 RX ADMIN — BISACODYL 10 MG: 10 SUPPOSITORY RECTAL at 12:11

## 2017-11-02 NOTE — PT/OT/SLP PROGRESS
Physical Therapy      Chucho Melchor Prado  MRN: [id number]    Patient not seen today secondary to N&V in AM and then being sent out for imaging in PM  . Will follow-up tomorrow.    Orly Mckinney, PT   11/2/2017

## 2017-11-02 NOTE — SUBJECTIVE & OBJECTIVE
Interval History: c/o left knee pain and nausea with pain medication    Review of Systems   Constitutional: Positive for appetite change.   Respiratory: Negative.    Gastrointestinal: Positive for nausea.   Musculoskeletal:        C/o left knee pain   Skin: Negative.      Objective:     Vital Signs (Most Recent):  Temp: 97.9 °F (36.6 °C) (11/01/17 0745)  Pulse: 77 (11/01/17 0745)  Resp: 18 (11/01/17 0745)  BP: (!) 153/67 (11/01/17 0745)  SpO2: 96 % (11/01/17 0745) Vital Signs (24h Range):  Temp:  [97.5 °F (36.4 °C)-97.9 °F (36.6 °C)] 97.9 °F (36.6 °C)  Pulse:  [76-77] 77  Resp:  [18] 18  SpO2:  [96 %] 96 %  BP: (133-153)/(63-67) 153/67     Weight: 88.4 kg (194 lb 14.2 oz)  Body mass index is 29.63 kg/m².    Intake/Output Summary (Last 24 hours) at 11/01/17 1914  Last data filed at 11/01/17 0700   Gross per 24 hour   Intake                0 ml   Output             1000 ml   Net            -1000 ml      Physical Exam   Constitutional: He is oriented to person, place, and time. He appears well-developed and well-nourished.   Pulmonary/Chest: Effort normal.   Musculoskeletal:   Left knee range of motion limited by pain   Neurological: He is alert and oriented to person, place, and time.   Skin:   aquacel dressing in place to left knee with no erythema or drainage   Psychiatric: He has a normal mood and affect.

## 2017-11-02 NOTE — PROGRESS NOTES
"[facility]  Adult Nutrition  Progress Note    SUMMARY     Recommendations    Recommendation/Intervention: Contnue low sodium diet, Recommend change boost breeze to boost plus bid  Goals: Patient to meet >85% of EEN/EPN   Nutrition Goal Status: goal not met  Communication of RD Recs: discussed on rounds       Reason for Assessment    Reason for Assessment: RD follow-up  Diagnosis:  (sp TKA)  Relevent Medical History: Depression, Dyslipidemia, HTN   Interdisciplinary Rounds: attended     General Information Comments: pain meds have been changed to reduce nausea    Nutrition Discharge Planning: DC home on low sodium diet    Nutrition Prescription Ordered    Current Diet Order: 2 gram sodium  Nutrition Order Comments: poor po continues pt refusing when nauseated all therapies           Oral Nutrition Supplement: boost breeze pt does not like     Evaluation of Received Nutrients/Fluid Intake               Energy Calories Required: not meeting needs            Protein Required: not meeting needs            Fluid Required: meeting needs  Comments: Recommend trial of vanilla boost plus  Tolerance: not tolerating     % Intake of Estimated Energy Needs: 25 - 50 %  % Meal Intake: 50%     Nutrition Risk Screen     Nutrition Risk Screen: no indicators present    Nutrition/Diet History    Patient Reported Diet/Restrictions/Preferences: general  Typical Food/Fluid Intake: Fair prior to admit  Food Preferences: No cultural or [employer] preferences identified   Meal/Snack Patterns: Varies daily      Factors Affecting Nutritional Intake: nausea/vomiting                Labs/Tests/Procedures/Meds       Pertinent Labs Reviewed: other (see comments)     Pertinent Medications Reviewed: reviewed, pertinent       Physical Findings    Overall Physical Appearance:  (in bed nausea)     Oral/Mouth Cavity: WDL  Skin: incision (Bruising )    Anthropometrics    Temp: 97.8 °F (36.6 °C)  Height Method: Stated  Height: 5' 8" " (172.7 cm)  Weight Method: Bed Scale  Weight: 88.4 kg (194 lb 14.2 oz)  Ideal Body Weight (IBW), Male: 154 lb     % Ideal Body Weight, Male (lb): 126.55 lb     BMI (Calculated): 29.7  BMI Grade: 25 - 29.9 - overweight       Estimated/Assessed Needs  Weight Used For Calorie Calculations: 88.4 kg (194 lb 14.2 oz)    Energy Calorie Requirements (kcal): 2953-6353  Energy Need Method: Kcal/kg (17-20kcals/kg/BW)       Weight Used For Protein Calculations: 88.4 kg (194 lb 14.2 oz)  Protein Requirements: 100-125 (1.2-1.4gmsProtein/kg/BW)     Fluid Need Method: (S) RDA Method (1ml/1kcal or MD Rx)    RDA Method (mL): 1500     Assessment and Plan    Inadequate oral intake related to appetite changes from nausea caused by pain meds as evidenced by po 25%       Monitor and Evaluation    Food and Nutrient Intake: food and beverage intake  Food and Nutrient Adminstration: diet order   Physical Activity and Function: nutrition-related ADLs and IADLs  Anthropometric Measurements: weight, weight change  Biochemical Data, Medical Tests and Procedures: electrolyte and renal panel, gastrointestinal profile, glucose/endocrine profile, inflammatory profile  Nutrition-Focused Physical Findings: overall appearance    Nutrition Risk    Level of Risk:  (follow twice per week till po improves)    Nutrition Follow-Up    RD Follow-up?: Yes

## 2017-11-02 NOTE — PT/OT/SLP PROGRESS
Occupational Therapy      Chucho Prado  MRN: [id number]    Patient not seen today secondary to not feeling well (vomiting and with diarrhea).  OT attempted in AM and PM. Will follow-up at next scheduled visit.    Katie Bryan, ENRIQUE  11/2/2017

## 2017-11-02 NOTE — PLAN OF CARE
Problem: Patient Care Overview  Goal: Plan of Care Review  Outcome: Ongoing (interventions implemented as appropriate)   11/02/17 0200   Coping/Psychosocial   Plan Of Care Reviewed With patient

## 2017-11-02 NOTE — PLAN OF CARE
Problem: Patient Care Overview  Goal: Plan of Care Review  Outcome: Ongoing (interventions implemented as appropriate)  Inadequate oral intake related to appetite changes from nausea caused by pain meds as evidenced by po 25%   Recommendation/Intervention: Contnue low sodium diet, Recommend change boost breeze to boost plus bid  Goals: Patient to meet >85% of EEN/EPN   Nutrition Goal Status: goal not met  Communication of RD Recs: discussed on rounds

## 2017-11-02 NOTE — NURSING
REPORT CALLED TO [facility]  ROOM BY PHUC WORTHY NP.TO [facility] ROOM BY [facility] STRETCHER ACCOMPANIED BY EMT X2  FOR FURTHER EVALUATION OF COLON STATUS.SITTER PRESENT WITH PATIENT.AWAKE AND ALERT ORIENTED X4.

## 2017-11-02 NOTE — PROGRESS NOTES
[facility] Department of [facility] Medicine  Progress Note    Patient Name: Chucho Prado  MRN: [id number]  Code Status: Full Code  Admission Date: 10/26/2017  Length of Stay: 6 days  Attending Physician: Fabio Benson MD  Primary Care Provider: Obed Mcguire MD    Subjective:     Principal Problem:Primary osteoarthritis of left knee    HPI:  Chief Complaint/Reason for Admission: Debility    History of Present Illness:  Patient is a 84 y.o. male who has a past medical history of Arthritis; Cataract; CKD stage 3; Compression fracture of lumbar vertebra; Depression; Dyslipidemia; GERD; Hypertension; Thyroid disease presented with chronic left knee pain due to osteoarthritis. Pain has caused interference of activities of daily living. Patient has failed non-operative treatment and elected to undergo surgical management. He was admitted to orthopedics and underwent left total knee arthroplasty on 10/25/17 by Dr. [facility]. Patient tolerated procedure well with no significant post operative complications. Patient has been working with PT/OT who recommend SNF for further balance/mobility training. Patient currently has no complaints. States pain is well controlled and has not required any PRN pain medication. Sitter at bedside and updated on plan of care. All questions answered.     Interval History: c/o left knee pain and nausea with pain medication    Review of Systems   Constitutional: Positive for appetite change.   Respiratory: Negative.    Gastrointestinal: Positive for nausea.   Musculoskeletal:        C/o left knee pain   Skin: Negative.      Objective:     Vital Signs (Most Recent):  Temp: 97.9 °F (36.6 °C) (11/01/17 0745)  Pulse: 77 (11/01/17 0745)  Resp: 18 (11/01/17 0745)  BP: (!) 153/67 (11/01/17 0745)  SpO2: 96 % (11/01/17 0745) Vital Signs (24h Range):  Temp:  [97.5 °F (36.4 °C)-97.9 °F (36.6 °C)] 97.9 °F (36.6 °C)  Pulse:  [76-77] 77  Resp:  [18] 18  SpO2:  [96 %] 96 %  BP:  (133-153)/(63-67) 153/67     Weight: 88.4 kg (194 lb 14.2 oz)  Body mass index is 29.63 kg/m².    Intake/Output Summary (Last 24 hours) at 11/01/17 1914  Last data filed at 11/01/17 0700   Gross per 24 hour   Intake                0 ml   Output             1000 ml   Net            -1000 ml      Physical Exam   Constitutional: He is oriented to person, place, and time. He appears well-developed and well-nourished.   Pulmonary/Chest: Effort normal.   Musculoskeletal:   Left knee range of motion limited by pain   Neurological: He is alert and oriented to person, place, and time.   Skin:   aquacel dressing in place to left knee with no erythema or drainage   Psychiatric: He has a normal mood and affect.         Assessment/Plan:     Assessment:   1 week s/p left  total knee arthroplasty    Plan:  PT/OT  Pain Control  DVT Prophylaxis  Follow up in one week with SHAWNEE Prajapati and Dr. [facility] as scheduled        Roxann Zeng NP  Department of Orthopedic Surgery  [facility]

## 2017-11-02 NOTE — NURSING
TO AND FROM XRAY OF CHEST AND ABDOMEN BY STRETCHER ACCOMPANIED BY SITTER AND PCT.UA AND CULTURE SENT TO LAB.

## 2017-11-02 NOTE — NURSING
PHUC WORTHY NP NOTIFIED OF PATIENT HAVING PROBLEM WITH NAUSEA AND ZOFRAN GAVE ORAL WITH RELIEF OBTAINED.COMPLAINS OF FEELING WEAK TODAY AND CONSTIPATED.

## 2017-11-03 ENCOUNTER — TELEPHONE (OUTPATIENT)
Dept: INTERNAL MEDICINE | Facility: CLINIC | Age: 82
End: 2017-11-03

## 2017-11-03 PROBLEM — Z96.652 STATUS POST TOTAL LEFT KNEE REPLACEMENT: Status: ACTIVE | Noted: 2017-11-03

## 2017-11-03 PROBLEM — K45.8 FLANK HERNIA: Status: ACTIVE | Noted: 2017-11-03

## 2017-11-03 LAB
BACTERIA #/AREA URNS AUTO: ABNORMAL /HPF
BACTERIA UR CULT: NORMAL
BACTERIA UR CULT: NORMAL
BILIRUB UR QL STRIP: NEGATIVE
CLARITY UR REFRACT.AUTO: ABNORMAL
COLOR UR AUTO: ABNORMAL
GLUCOSE UR QL STRIP: NEGATIVE
HGB UR QL STRIP: ABNORMAL
HYALINE CASTS UR QL AUTO: 0 /LPF
KETONES UR QL STRIP: NEGATIVE
LEUKOCYTE ESTERASE UR QL STRIP: ABNORMAL
MICROSCOPIC COMMENT: ABNORMAL
NITRITE UR QL STRIP: NEGATIVE
PH UR STRIP: 6 [PH] (ref 5–8)
PROT UR QL STRIP: ABNORMAL
RBC #/AREA URNS AUTO: 16 /HPF (ref 0–4)
SP GR UR STRIP: 1.01 (ref 1–1.03)
URN SPEC COLLECT METH UR: ABNORMAL
UROBILINOGEN UR STRIP-ACNC: 2 EU/DL
WBC #/AREA URNS AUTO: 22 /HPF (ref 0–5)

## 2017-11-03 PROCEDURE — G8979 MOBILITY GOAL STATUS: HCPCS | Mod: CK

## 2017-11-03 PROCEDURE — 97161 PT EVAL LOW COMPLEX 20 MIN: CPT

## 2017-11-03 PROCEDURE — 25000003 PHARM REV CODE 250: Performed by: HOSPITALIST

## 2017-11-03 PROCEDURE — 63600175 PHARM REV CODE 636 W HCPCS: Performed by: HOSPITALIST

## 2017-11-03 PROCEDURE — 99233 SBSQ HOSP IP/OBS HIGH 50: CPT | Mod: ,,, | Performed by: HOSPITALIST

## 2017-11-03 PROCEDURE — 99223 1ST HOSP IP/OBS HIGH 75: CPT | Mod: ,,, | Performed by: SURGERY

## 2017-11-03 PROCEDURE — G8978 MOBILITY CURRENT STATUS: HCPCS | Mod: CK

## 2017-11-03 PROCEDURE — 99223 1ST HOSP IP/OBS HIGH 75: CPT | Mod: AI,,, | Performed by: HOSPITALIST

## 2017-11-03 PROCEDURE — 11000001 HC ACUTE MED/SURG PRIVATE ROOM

## 2017-11-03 RX ORDER — SYRING-NEEDL,DISP,INSUL,0.3 ML 29 G X1/2"
296 SYRINGE, EMPTY DISPOSABLE MISCELLANEOUS
Status: COMPLETED | OUTPATIENT
Start: 2017-11-03 | End: 2017-11-03

## 2017-11-03 RX ORDER — PSEUDOEPHEDRINE/ACETAMINOPHEN 30MG-500MG
100 TABLET ORAL
Status: COMPLETED | OUTPATIENT
Start: 2017-11-03 | End: 2017-11-03

## 2017-11-03 RX ORDER — MAGNESIUM SULFATE HEPTAHYDRATE 40 MG/ML
2 INJECTION, SOLUTION INTRAVENOUS ONCE
Status: COMPLETED | OUTPATIENT
Start: 2017-11-03 | End: 2017-11-03

## 2017-11-03 RX ORDER — ENOXAPARIN SODIUM 100 MG/ML
30 INJECTION SUBCUTANEOUS EVERY 24 HOURS
Status: DISCONTINUED | OUTPATIENT
Start: 2017-11-03 | End: 2017-11-04

## 2017-11-03 RX ORDER — SODIUM CHLORIDE 9 MG/ML
INJECTION, SOLUTION INTRAVENOUS CONTINUOUS
Status: ACTIVE | OUTPATIENT
Start: 2017-11-03 | End: 2017-11-04

## 2017-11-03 RX ORDER — MIRTAZAPINE 15 MG/1
30 TABLET, FILM COATED ORAL NIGHTLY
Status: DISCONTINUED | OUTPATIENT
Start: 2017-11-03 | End: 2017-11-06 | Stop reason: HOSPADM

## 2017-11-03 RX ORDER — TRAZODONE HYDROCHLORIDE 50 MG/1
50 TABLET ORAL NIGHTLY PRN
Status: DISCONTINUED | OUTPATIENT
Start: 2017-11-03 | End: 2017-11-06 | Stop reason: HOSPADM

## 2017-11-03 RX ORDER — LEVOTHYROXINE SODIUM 50 UG/1
50 TABLET ORAL DAILY
Status: DISCONTINUED | OUTPATIENT
Start: 2017-11-03 | End: 2017-11-06 | Stop reason: HOSPADM

## 2017-11-03 RX ORDER — SIMVASTATIN 20 MG/1
40 TABLET, FILM COATED ORAL NIGHTLY
Status: DISCONTINUED | OUTPATIENT
Start: 2017-11-03 | End: 2017-11-06 | Stop reason: HOSPADM

## 2017-11-03 RX ORDER — ASPIRIN 325 MG
325 TABLET ORAL 2 TIMES DAILY
Status: DISCONTINUED | OUTPATIENT
Start: 2017-11-03 | End: 2017-11-06 | Stop reason: HOSPADM

## 2017-11-03 RX ADMIN — MIRTAZAPINE 30 MG: 15 TABLET, FILM COATED ORAL at 09:11

## 2017-11-03 RX ADMIN — ASPIRIN 325 MG ORAL TABLET 325 MG: 325 PILL ORAL at 08:11

## 2017-11-03 RX ADMIN — Medication 100 ML: at 06:11

## 2017-11-03 RX ADMIN — LEVOTHYROXINE SODIUM 50 MCG: 50 TABLET ORAL at 06:11

## 2017-11-03 RX ADMIN — MIRTAZAPINE 30 MG: 15 TABLET, FILM COATED ORAL at 01:11

## 2017-11-03 RX ADMIN — SIMVASTATIN 40 MG: 20 TABLET, FILM COATED ORAL at 09:11

## 2017-11-03 RX ADMIN — ASPIRIN 325 MG ORAL TABLET 325 MG: 325 PILL ORAL at 09:11

## 2017-11-03 RX ADMIN — SODIUM CHLORIDE: 0.9 INJECTION, SOLUTION INTRAVENOUS at 02:11

## 2017-11-03 RX ADMIN — ENOXAPARIN SODIUM 30 MG: 100 INJECTION SUBCUTANEOUS at 06:11

## 2017-11-03 RX ADMIN — MAGNESIUM SULFATE IN WATER 2 G: 40 INJECTION, SOLUTION INTRAVENOUS at 01:11

## 2017-11-03 RX ADMIN — SIMVASTATIN 40 MG: 20 TABLET, FILM COATED ORAL at 01:11

## 2017-11-03 RX ADMIN — MAGESIUM CITRATE 296 ML: 1.75 LIQUID ORAL at 06:11

## 2017-11-03 NOTE — H&P
[facility] Medicine  History & Physical    Patient Name: Chucho Prado  MRN: [id number]  Admission Date: 11/2/2017  Attending Physician: Oscar Barbosa MD   Primary Care Provider: Obed Mcguire MD    [facility] Medicine Team: [facility] MED  Alessandro Stewart MD     Patient information was obtained from patient and ER records.     Subjective:     Principal Problem:Partial intestinal obstruction    Chief Complaint:   Chief Complaint   Patient presents with    Vomiting     possible bowel obstruction     Nausea        HPI: Mr. Prado is a 85 yo man from [facility] with recent left TKA 10/25/17, flank hernia, CKD stage 3, HLD, HTN, hypothyroid, depression who presented to ED on 11/2 with nausea and non-bloody vomiting since 10/27. There was concern for small bowel obstruction due to abdominal xray at [place], so pt was sent to ED. Last solid bowel movement was before surgery, though patient has had multiple loose Bms and continues to pas gas. Denies stomach cramping or enlargement of his hernia that has been present for years.     In Ed, CT abdomen was done which revealed partial small obstruction secondary to hernia. Patient was given 2 L NS and general surgery was consulted.    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    GERD (gastroesophageal reflux disease)     Hypertension     Thyroid disease     UTI (urinary tract infection)        Past Surgical History:   Procedure Laterality Date    CHOLECYSTECTOMY      HERNIA REPAIR      JOINT REPLACEMENT      LAMINECTOMY  12/27/2016    L2-L4    LUMBAR LAMINECTOMY  12/2016     PARATHYROIDECTOMY      TOTAL KNEE ARTHROPLASTY      Right       Review of patient's allergies indicates:  No Known Allergies    Current Facility-Administered Medications on File Prior to Encounter   Medication    [MAR Hold - Suspended Admission] acetaminophen tablet 650 mg    [MAR Hold - Suspended Admission] aspirin EC tablet 325 mg    [MAR Hold - Suspended Admission] bisacodyl suppository 10 mg    [COMPLETED] bisacodyl suppository 10 mg    [MAR Hold - Suspended Admission] calcium carbonate 200 mg calcium (500 mg) chewable tablet 500 mg    [MAR Hold - Suspended Admission] famotidine tablet 20 mg    [MAR Hold - Suspended Admission] hydrocodone-acetaminophen 5-325mg per tablet 1 tablet    [MAR Hold - Suspended Admission] hydrocodone-acetaminophen 7.5-325mg per tablet 1 tablet    [MAR Hold - Suspended Admission] hyoscyamine SL tablet 0.125 mg    [MAR Hold - Suspended Admission] levothyroxine tablet 50 mcg    [MAR Hold - Suspended Admission] mirtazapine tablet 30 mg    [MAR Hold - Suspended Admission] ondansetron disintegrating tablet 8 mg    [MAR Hold - Suspended Admission] polyethylene glycol packet 17 g    [COMPLETED] potassium chloride SA CR tablet 20 mEq    [MAR Hold - Suspended Admission] promethazine tablet 12.5 mg    [MAR Hold - Suspended Admission] senna-docusate 8.6-50 mg per tablet 1 tablet    [MAR Hold - Suspended Admission] simethicone chewable tablet 80 mg    [MAR Hold - Suspended Admission] simvastatin tablet 40 mg    [MAR Hold - Suspended Admission] sulfamethoxazole-trimethoprim 800-160mg per tablet 1 tablet    [MAR Hold - Suspended Admission] traZODone tablet 50 mg    [MAR Hold - Suspended Admission] triamterene-hydrochlorothiazide 37.5-25 mg per capsule 1 capsule     Current Outpatient Prescriptions on File Prior to Encounter   Medication Sig    aspirin 325 MG tablet Take 1 tablet (325 mg total) by mouth 2 (two) times daily.    docusate sodium (COLACE) 100 MG capsule Take 1  capsule (100 mg total) by mouth 2 (two) times daily as needed for Constipation.    hyoscyamine (LEVSIN/SL) 0.125 mg Subl Place 1 tablet (0.125 mg total) under the tongue every 4 (four) hours as needed (bladder spasms).    levothyroxine (SYNTHROID) 50 MCG tablet Take 1 tablet (50 mcg total) by mouth once daily.    mirtazapine (REMERON) 30 MG tablet Take 1 tablet (30 mg total) by mouth every evening.    ondansetron (ZOFRAN-ODT) 8 MG TbDL Take 1 tablet (8 mg total) by mouth every 12 (twelve) hours as needed (nausea).    oxyCODONE-acetaminophen (PERCOCET)  mg per tablet Take 1 tablet by mouth every 4 to 6 hours as needed for Pain.    simethicone (MYLICON) 80 MG chewable tablet Take 1 tablet (80 mg total) by mouth 3 (three) times daily after meals.    simvastatin (ZOCOR) 40 MG tablet Take 1 tablet (40 mg total) by mouth every evening.    trazodone (DESYREL) 50 MG tablet Take 1 tablet (50 mg total) by mouth nightly as needed for Insomnia.    triamterene-hydrochlorothiazide 37.5-25 mg (DYAZIDE) 37.5-25 mg per capsule Take 1 capsule by mouth every morning. HOLD UNTIL TOLD TO RESUME BY PHYSICIAN     Family History     Problem Relation (Age of Onset)    Diabetes Father    Esophageal cancer Father    Hypertension Mother        Social History Main Topics    Smoking status: Former Smoker     Years: 20.00     Quit date: 1990    Smokeless tobacco: Never Used      Comment: quit 1999    Alcohol use No      Comment: rarely/6 months    Drug use: No    Sexual activity: No     Review of Systems   Constitutional: Negative for appetite change, chills, fever and unexpected weight change.   HENT: Negative for congestion, rhinorrhea, sinus pain, sneezing and sore throat.    Eyes: Negative for visual disturbance.   Respiratory: Negative for cough, shortness of breath and wheezing.    Cardiovascular: Negative for chest pain, palpitations and leg swelling.   Gastrointestinal: Positive for diarrhea and nausea. Negative for  abdominal distention, abdominal pain, blood in stool, constipation and vomiting.   Genitourinary: Negative for discharge, dysuria and hematuria.   Skin: Negative for rash.   Neurological: Negative for dizziness, seizures, syncope and headaches.   Psychiatric/Behavioral: Negative for suicidal ideas.     Objective:     Vital Signs (Most Recent):  Temp: 98.3 °F (36.8 °C) (11/02/17 2230)  Pulse: 81 (11/02/17 2231)  Resp: 20 (11/02/17 2231)  BP: (!) 144/63 (11/02/17 2231)  SpO2: 98 % (11/02/17 2231) Vital Signs (24h Range):  Temp:  [97.8 °F (36.6 °C)-98.3 °F (36.8 °C)] 98.3 °F (36.8 °C)  Pulse:  [74-85] 81  Resp:  [15-25] 20  SpO2:  [95 %-98 %] 98 %  BP: (117-156)/(57-74) 144/63     Weight: 83.9 kg (185 lb)  Body mass index is 28.13 kg/m².    Physical Exam   Constitutional: He is oriented to person, place, and time. He appears well-developed and well-nourished.   HENT:   Head: Normocephalic and atraumatic.   Nose: Nose normal.   Mouth/Throat: Oropharynx is clear and moist. No oropharyngeal exudate.   Eyes: Pupils are equal, round, and reactive to light. Right eye exhibits no discharge. Left eye exhibits no discharge. No scleral icterus.   Neck: Normal range of motion. Neck supple. No JVD present.   Cardiovascular: Normal rate, regular rhythm and intact distal pulses.    Murmur heard.  Pulmonary/Chest: Effort normal and breath sounds normal. No respiratory distress. He has no wheezes. He exhibits no tenderness.   Abdominal: Soft. Bowel sounds are normal. He exhibits no distension. There is no tenderness. There is no guarding. A hernia is present.   Large right flank hernia wraps around to the back, non-reducible    Musculoskeletal: Normal range of motion. He exhibits no edema, tenderness or deformity.   Lymphadenopathy:     He has no cervical adenopathy.   Neurological: He is alert and oriented to person, place, and time. No cranial nerve deficit or sensory deficit.   Skin: Skin is warm and dry. No rash noted. No  erythema.   Psychiatric: He has a normal mood and affect. His behavior is normal. Judgment and thought content normal.        Significant Labs: All pertinent labs within the past 24 hours have been reviewed.    Significant Imaging: I have reviewed and interpreted all pertinent imaging results/findings within the past 24 hours.    Assessment/Plan:     * Partial intestinal obstruction    Patient with large flank hernia that has been present for years is likely culprit of nausea/vomiting and partial obstruction though post-op ileus due to recent left TKA 10/25/17 is a possibility. Patient is having loose bowel movements and passing gas. No longer complaining of nausea vomtiing Appreciate surgery reccs    - NPO   - Will hold off on NG unless pt has emesis with nausea   - soap suds enema ordered   - monitor electrolytes, will give Mg   - s/p 2 L NS            Flank hernia    Present for years, non-reducible. Per patient has not increased in size recently. No cramping. Continues to pass gas and have loose bowel movements.    - monitor          Status post total left knee replacement    On 10/25   - continue asa BID   - no DVT ppx   - PT ordered          Hyponatremia    Trending down likely due to hypovolemia from partial bowel obstruction   - fluid challenge s/p 2 L NS   - monitor and re-assess in am          CKD (chronic kidney disease) stage 3, GFR 30-59 ml/min    Creatinine fluctuates, but slightly above baseline.   - avoid nephrotoxic meds           Anemia    Hg on 10/23 12.8, now 10.6   - monitor   - continue asa for now   - no dvt ppx          Depression    - continue mirtazapine   - trazadone PRN          Hypothyroidism    - continue levothyroxine            VTE Risk Mitigation     None        LUPE THAPA MD  Attending Staff Physician   [facility] Medicine  Pager: [phone]  Spectralink: [id number]   Cell: [phone]

## 2017-11-03 NOTE — SUBJECTIVE & OBJECTIVE
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    GERD (gastroesophageal reflux disease)     Hypertension     Thyroid disease     UTI (urinary tract infection)        Past Surgical History:   Procedure Laterality Date    CHOLECYSTECTOMY      HERNIA REPAIR      JOINT REPLACEMENT      LAMINECTOMY  12/27/2016    L2-L4    LUMBAR LAMINECTOMY  12/2016    PARATHYROIDECTOMY      TOTAL KNEE ARTHROPLASTY      Right       Review of patient's allergies indicates:  No Known Allergies    Current Facility-Administered Medications on File Prior to Encounter   Medication    [MAR Hold - Suspended Admission] acetaminophen tablet 650 mg    [MAR Hold - Suspended Admission] aspirin EC tablet 325 mg    [MAR Hold - Suspended Admission] bisacodyl suppository 10 mg    [COMPLETED] bisacodyl suppository 10 mg    [MAR Hold - Suspended Admission] calcium carbonate 200 mg calcium (500 mg) chewable tablet 500 mg    [MAR Hold - Suspended Admission] famotidine tablet 20 mg    [MAR Hold - Suspended Admission] hydrocodone-acetaminophen 5-325mg per tablet 1 tablet    [MAR Hold - Suspended Admission] hydrocodone-acetaminophen 7.5-325mg per tablet 1 tablet    [MAR Hold - Suspended Admission] hyoscyamine SL tablet 0.125 mg    [MAR Hold - Suspended Admission] levothyroxine tablet 50 mcg    [MAR Hold - Suspended Admission] mirtazapine tablet 30 mg    [MAR Hold - Suspended Admission] ondansetron disintegrating tablet 8 mg    [MAR Hold - Suspended Admission] polyethylene glycol packet 17 g    [COMPLETED] potassium chloride SA CR tablet 20 mEq    [MAR Hold - Suspended Admission] promethazine  tablet 12.5 mg    [MAR Hold - Suspended Admission] senna-docusate 8.6-50 mg per tablet 1 tablet    [MAR Hold - Suspended Admission] simethicone chewable tablet 80 mg    [MAR Hold - Suspended Admission] simvastatin tablet 40 mg    [MAR Hold - Suspended Admission] sulfamethoxazole-trimethoprim 800-160mg per tablet 1 tablet    [MAR Hold - Suspended Admission] traZODone tablet 50 mg    [MAR Hold - Suspended Admission] triamterene-hydrochlorothiazide 37.5-25 mg per capsule 1 capsule     Current Outpatient Prescriptions on File Prior to Encounter   Medication Sig    aspirin 325 MG tablet Take 1 tablet (325 mg total) by mouth 2 (two) times daily.    docusate sodium (COLACE) 100 MG capsule Take 1 capsule (100 mg total) by mouth 2 (two) times daily as needed for Constipation.    hyoscyamine (LEVSIN/SL) 0.125 mg Subl Place 1 tablet (0.125 mg total) under the tongue every 4 (four) hours as needed (bladder spasms).    levothyroxine (SYNTHROID) 50 MCG tablet Take 1 tablet (50 mcg total) by mouth once daily.    mirtazapine (REMERON) 30 MG tablet Take 1 tablet (30 mg total) by mouth every evening.    ondansetron (ZOFRAN-ODT) 8 MG TbDL Take 1 tablet (8 mg total) by mouth every 12 (twelve) hours as needed (nausea).    oxyCODONE-acetaminophen (PERCOCET)  mg per tablet Take 1 tablet by mouth every 4 to 6 hours as needed for Pain.    simethicone (MYLICON) 80 MG chewable tablet Take 1 tablet (80 mg total) by mouth 3 (three) times daily after meals.    simvastatin (ZOCOR) 40 MG tablet Take 1 tablet (40 mg total) by mouth every evening.    trazodone (DESYREL) 50 MG tablet Take 1 tablet (50 mg total) by mouth nightly as needed for Insomnia.    triamterene-hydrochlorothiazide 37.5-25 mg (DYAZIDE) 37.5-25 mg per capsule Take 1 capsule by mouth every morning. HOLD UNTIL TOLD TO RESUME BY PHYSICIAN     Family History     Problem Relation (Age of Onset)    Diabetes Father    Esophageal cancer Father    Hypertension Mother         Social History Main Topics    Smoking status: Former Smoker     Years: 20.00     Quit date: 1990    Smokeless tobacco: Never Used      Comment: quit 1999    Alcohol use No      Comment: rarely/6 months    Drug use: No    Sexual activity: No     Review of Systems   Constitutional: Negative for appetite change, chills, fever and unexpected weight change.   HENT: Negative for congestion, rhinorrhea, sinus pain, sneezing and sore throat.    Eyes: Negative for visual disturbance.   Respiratory: Negative for cough, shortness of breath and wheezing.    Cardiovascular: Negative for chest pain, palpitations and leg swelling.   Gastrointestinal: Positive for diarrhea and nausea. Negative for abdominal distention, abdominal pain, blood in stool, constipation and vomiting.   Genitourinary: Negative for discharge, dysuria and hematuria.   Skin: Negative for rash.   Neurological: Negative for dizziness, seizures, syncope and headaches.   Psychiatric/Behavioral: Negative for suicidal ideas.     Objective:     Vital Signs (Most Recent):  Temp: 98.3 °F (36.8 °C) (11/02/17 2230)  Pulse: 81 (11/02/17 2231)  Resp: 20 (11/02/17 2231)  BP: (!) 144/63 (11/02/17 2231)  SpO2: 98 % (11/02/17 2231) Vital Signs (24h Range):  Temp:  [97.8 °F (36.6 °C)-98.3 °F (36.8 °C)] 98.3 °F (36.8 °C)  Pulse:  [74-85] 81  Resp:  [15-25] 20  SpO2:  [95 %-98 %] 98 %  BP: (117-156)/(57-74) 144/63     Weight: 83.9 kg (185 lb)  Body mass index is 28.13 kg/m².    Physical Exam   Constitutional: He is oriented to person, place, and time. He appears well-developed and well-nourished.   HENT:   Head: Normocephalic and atraumatic.   Nose: Nose normal.   Mouth/Throat: Oropharynx is clear and moist. No oropharyngeal exudate.   Eyes: Pupils are equal, round, and reactive to light. Right eye exhibits no discharge. Left eye exhibits no discharge. No scleral icterus.   Neck: Normal range of motion. Neck supple. No JVD present.   Cardiovascular: Normal rate,  regular rhythm and intact distal pulses.    Murmur heard.  Pulmonary/Chest: Effort normal and breath sounds normal. No respiratory distress. He has no wheezes. He exhibits no tenderness.   Abdominal: Soft. Bowel sounds are normal. He exhibits no distension. There is no tenderness. There is no guarding. A hernia is present.   Large right flank hernia wraps around to the back, non-reducible    Musculoskeletal: Normal range of motion. He exhibits no edema, tenderness or deformity.   Lymphadenopathy:     He has no cervical adenopathy.   Neurological: He is alert and oriented to person, place, and time. No cranial nerve deficit or sensory deficit.   Skin: Skin is warm and dry. No rash noted. No erythema.   Psychiatric: He has a normal mood and affect. His behavior is normal. Judgment and thought content normal.        Significant Labs: All pertinent labs within the past 24 hours have been reviewed.    Significant Imaging: I have reviewed and interpreted all pertinent imaging results/findings within the past 24 hours.

## 2017-11-03 NOTE — SUBJECTIVE & OBJECTIVE
Current Facility-Administered Medications on File Prior to Encounter   Medication    [MAR Hold - Suspended Admission] acetaminophen tablet 650 mg    [MAR Hold - Suspended Admission] aspirin EC tablet 325 mg    [MAR Hold - Suspended Admission] bisacodyl suppository 10 mg    [COMPLETED] bisacodyl suppository 10 mg    [MAR Hold - Suspended Admission] calcium carbonate 200 mg calcium (500 mg) chewable tablet 500 mg    [MAR Hold - Suspended Admission] famotidine tablet 20 mg    [MAR Hold - Suspended Admission] hydrocodone-acetaminophen 5-325mg per tablet 1 tablet    [MAR Hold - Suspended Admission] hydrocodone-acetaminophen 7.5-325mg per tablet 1 tablet    [MAR Hold - Suspended Admission] hyoscyamine SL tablet 0.125 mg    [MAR Hold - Suspended Admission] levothyroxine tablet 50 mcg    [MAR Hold - Suspended Admission] mirtazapine tablet 30 mg    [MAR Hold - Suspended Admission] ondansetron disintegrating tablet 8 mg    [MAR Hold - Suspended Admission] polyethylene glycol packet 17 g    [COMPLETED] potassium chloride SA CR tablet 20 mEq    [MAR Hold - Suspended Admission] promethazine tablet 12.5 mg    [MAR Hold - Suspended Admission] senna-docusate 8.6-50 mg per tablet 1 tablet    [MAR Hold - Suspended Admission] simethicone chewable tablet 80 mg    [MAR Hold - Suspended Admission] simvastatin tablet 40 mg    [MAR Hold - Suspended Admission] sulfamethoxazole-trimethoprim 800-160mg per tablet 1 tablet    [MAR Hold - Suspended Admission] traZODone tablet 50 mg    [MAR Hold - Suspended Admission] triamterene-hydrochlorothiazide 37.5-25 mg per capsule 1 capsule     Current Outpatient Prescriptions on File Prior to Encounter   Medication Sig    aspirin 325 MG tablet Take 1 tablet (325 mg total) by mouth 2 (two) times daily.    docusate sodium (COLACE) 100 MG capsule Take 1 capsule (100 mg total) by mouth 2 (two) times daily as needed for Constipation.    hyoscyamine (LEVSIN/SL) 0.125 mg Subl Place 1  tablet (0.125 mg total) under the tongue every 4 (four) hours as needed (bladder spasms).    levothyroxine (SYNTHROID) 50 MCG tablet Take 1 tablet (50 mcg total) by mouth once daily.    mirtazapine (REMERON) 30 MG tablet Take 1 tablet (30 mg total) by mouth every evening.    ondansetron (ZOFRAN-ODT) 8 MG TbDL Take 1 tablet (8 mg total) by mouth every 12 (twelve) hours as needed (nausea).    oxyCODONE-acetaminophen (PERCOCET)  mg per tablet Take 1 tablet by mouth every 4 to 6 hours as needed for Pain.    simethicone (MYLICON) 80 MG chewable tablet Take 1 tablet (80 mg total) by mouth 3 (three) times daily after meals.    simvastatin (ZOCOR) 40 MG tablet Take 1 tablet (40 mg total) by mouth every evening.    trazodone (DESYREL) 50 MG tablet Take 1 tablet (50 mg total) by mouth nightly as needed for Insomnia.    triamterene-hydrochlorothiazide 37.5-25 mg (DYAZIDE) 37.5-25 mg per capsule Take 1 capsule by mouth every morning. HOLD UNTIL TOLD TO RESUME BY PHYSICIAN       Review of patient's allergies indicates:  No Known Allergies    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    GERD (gastroesophageal reflux disease)     Hypertension     Thyroid disease     UTI (urinary tract infection)      Past Surgical History:   Procedure Laterality Date    CHOLECYSTECTOMY      HERNIA REPAIR      JOINT REPLACEMENT      LAMINECTOMY  12/27/2016    L2-L4    LUMBAR LAMINECTOMY  12/2016    PARATHYROIDECTOMY      TOTAL KNEE ARTHROPLASTY      Right     Family History     Problem Relation (Age of Onset)    Diabetes Father    Esophageal cancer Father    Hypertension Mother         Social History Main Topics    Smoking status: Former Smoker     Years: 20.00     Quit date: 1990    Smokeless tobacco: Never Used      Comment: quit 1999    Alcohol use No      Comment: rarely/6 months    Drug use: No    Sexual activity: No     Review of Systems   Constitutional: Positive for activity change (post operative) and appetite change (poor since surgery). Negative for chills, diaphoresis and fever.   HENT: Negative for congestion and sore throat.    Respiratory: Negative for chest tightness and shortness of breath.    Cardiovascular: Negative for chest pain and palpitations.   Gastrointestinal: Positive for constipation, nausea and vomiting (sporadic). Negative for abdominal pain.   Genitourinary:        SP tube for neurogenic bladder   Musculoskeletal: Positive for arthralgias and back pain.   Skin: Negative.    Allergic/Immunologic: Negative for immunocompromised state.   Neurological: Negative for dizziness, light-headedness and headaches.   Hematological: Does not bruise/bleed easily.   Psychiatric/Behavioral: Negative for behavioral problems and confusion. The patient is nervous/anxious.      Objective:     Vital Signs (Most Recent):  Temp: 98.3 °F (36.8 °C) (11/02/17 2230)  Pulse: 81 (11/02/17 2231)  Resp: 20 (11/02/17 2231)  BP: (!) 144/63 (11/02/17 2231)  SpO2: 98 % (11/02/17 2231) Vital Signs (24h Range):  Temp:  [97.8 °F (36.6 °C)-98.3 °F (36.8 °C)] 98.3 °F (36.8 °C)  Pulse:  [74-85] 81  Resp:  [15-25] 20  SpO2:  [95 %-98 %] 98 %  BP: (117-156)/(57-74) 144/63     Weight: 83.9 kg (185 lb)  Body mass index is 28.13 kg/m².    Physical Exam   Constitutional: He is oriented to person, place, and time. He appears well-developed and well-nourished. No distress.   HENT:   Head: Normocephalic and atraumatic.   Eyes: EOM are normal. Pupils are equal, round, and reactive to light. No scleral icterus.   Neck: Normal range of motion. No tracheal deviation present.   S/p thyroidectomy    Cardiovascular: Normal rate, regular rhythm and intact distal pulses.    Pulmonary/Chest: Effort normal. No respiratory distress.   Abdominal: Soft. He exhibits no distension. There is no tenderness. There is no guarding. A hernia is present.       Morbidly obese abdomen   Musculoskeletal:   Left knee brace   Neurological: He is alert and oriented to person, place, and time.   Psychiatric: He has a normal mood and affect.       Significant Labs:  CBC:   Recent Labs  Lab 11/02/17 1942   WBC 8.24   RBC 3.91*   HGB 10.6*   HCT 31.3*      MCV 80*   MCH 27.1   MCHC 33.9     BMP:   Recent Labs  Lab 11/02/17 1942   GLU 99   *   K 4.0   CL 89*   CO2 25   BUN 21   CREATININE 1.6*   CALCIUM 8.8   MG 1.7     LFTs:   Recent Labs  Lab 11/02/17 1942   ALT 5*   AST 14   ALKPHOS 73   BILITOT 0.9   PROT 7.2   ALBUMIN 3.6       Significant Diagnostics:  I have reviewed all pertinent imaging results/findings within the past 24 hours.   CT with stable hiatal hernia, right flank hernia containing distal small bowel and right colon; mild dilation of distal small bowel both within and upstream from hernia, up to 3.1cm just proximal to hernia. No proximal small bowel or gastric distention. Air and stool in colon with rectal stool burden.

## 2017-11-03 NOTE — PLAN OF CARE
Problem: Patient Care Overview  Goal: Plan of Care Review  Outcome: Ongoing (interventions implemented as appropriate)  Pt aaox4, vs stable, no falls or injuries. Fall precautions in place w/ personal items near by. No Pain verbalized. No signs of infection or distress. Call light in reach; will continue to monitor pt.

## 2017-11-03 NOTE — PT/OT/SLP EVAL
Physical Therapy  Evaluation    Chucho Prado   MRN: [id number]   Admitting Diagnosis: Partial intestinal obstruction    PT Received On: 11/03/17  PT Start Time: 1322     PT Stop Time: 1348    PT Total Time (min): 26 min       Billable Minutes:  Evaluation 26    Diagnosis: Partial intestinal obstruction      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    GERD (gastroesophageal reflux disease)     Hypertension     Thyroid disease     UTI (urinary tract infection)       Past Surgical History:   Procedure Laterality Date    CHOLECYSTECTOMY      HERNIA REPAIR      JOINT REPLACEMENT      LAMINECTOMY  12/27/2016    L2-L4    LUMBAR LAMINECTOMY  12/2016    PARATHYROIDECTOMY      TOTAL KNEE ARTHROPLASTY      Right       Referring physician: Alessandro Stewart MD  Date referred to PT: 11/3/2017    General Precautions: Standard, fall  Orthopedic Precautions: LLE weight bearing as tolerated   Braces:              Patient History:  Lives With: alone  Living Arrangements: house  Living Environment Comment: Pt. has 18-hour/day assistance. He uses w/c primarily, but amb. 20-40 feet/day. Pt. has Med-Alert call button  Equipment Currently Used at Home: 3-in-1 commode, walker, rolling, wheelchair, shower chair      Previous Level of Function:  Ambulation Skills: needs device  Transfer Skills: needs device  ADL Skills: needs assist    Subjective:  Communicated with nursing prior to session.    Chief Complaint: abd. discomfort  Patient goals: to go home    Pain/Comfort  Pain Rating 1:  (pt. did not rate)  Location - Side 1: Left  Location 1: knee  Pain Addressed 1:  Pre-medicate for activity, Reposition, Cessation of Activity      Objective:   Patient found with: FCD, SCD, galvan catheter, peripheral IV (sitter present)     Cognitive Exam:  Oriented to: Person, Place, Time and Situation    Follows Commands/attention: Follows multistep  commands  Communication: clear/fluent  Safety awareness/insight to disability: intact    Physical Exam:  Postural examination/scapula alignment: Rounded shoulder and Head forward    Skin integrity: Visible skin intact  Edema: Mild    Sensation:   Intact    Upper Extremity Range of Motion:  Right Upper Extremity: WFL  Left Upper Extremity: WFL    Upper Extremity Strength:  Right Upper Extremity: WFL  Left Upper Extremity: WFL    Lower Extremity Range of Motion:  Right Lower Extremity: WFL  Left Lower Extremity: pain limited    Lower Extremity Strength:  Right Lower Extremity: WFL  Left Lower Extremity: pain limited     Fine motor coordination:  Intact    Gross motor coordination: WFL    Functional Mobility:  Bed Mobility:  Rolling/Turning to Left: Minimum assistance  Scooting/Bridging: Contact Guard Assistance, Minimum Assistance  Supine to Sit: Minimum Assistance  Sit to Supine: Minimum Assistance, With leg lift    Transfers:  Sit <> Stand Assistance: Minimum Assistance  Sit <> Stand Assistive Device: Rolling Walker  Bed <> Chair Technique: Stand Pivot  Bed <> Chair Assistance: Minimum Assistance  Bed <> Chair Assistive Device: Rolling Walker    Gait:   Gait Distance: ~30'  Assistance 1: Contact Guard Assistance  Gait Assistive Device: Rolling walker  Gait Pattern: swing-to gait  Gait Deviation(s): decreased eri, decreased step length, decreased stride length, decreased toe-to-floor clearance    Stairs:      Balance:   Static Sit: FAIR+: Able to take MINIMAL challenges from all directions  Dynamic Sit: FAIR+: Maintains balance through MINIMAL excursions of active trunk motion  Static Stand: FAIR+: Takes MINIMAL challenges from all  directions  Dynamic stand: FAIR: Needs CONTACT GUARD during gait    Therapeutic Activities and Exercises:  Discussed PT POC    AM-PAC 6 CLICK MOBILITY  How much help from another person does this patient currently need?   1 = Unable, Total/Dependent Assistance  2 = A lot, Maximum/Moderate Assistance  3 = A little, Minimum/Contact Guard/Supervision  4 = None, Modified Steele/Independent    Turning over in bed (including adjusting bedclothes, sheets and blankets)?: 3  Sitting down on and standing up from a chair with arms (e.g., wheelchair, bedside commode, etc.): 3  Moving from lying on back to sitting on the side of the bed?: 3  Moving to and from a bed to a chair (including a wheelchair)?: 3  Need to walk in hospital room?: 3  Climbing 3-5 steps with a railing?: 1  Total Score: 16     AM-PAC Raw Score CMS G-Code Modifier Level of Impairment Assistance   6 % Total / Unable   7 - 9 CM 80 - 100% Maximal Assist   10 - 14 CL 60 - 80% Moderate Assist   15 - 19 CK 40 - 60% Moderate Assist   20 - 22 CJ 20 - 40% Minimal Assist   23 CI 1-20% SBA / CGA   24 CH 0% Independent/ Mod I     Patient left supine with all lines intact, call button in reach and sitter present.    Assessment:   Chucho Prado is a 84 y.o. male with a medical diagnosis of Partial intestinal obstruction and presents with deconditioning and abd. and (L) knee discomfort. Pt. cooperative and tolerated treatment fairly well. Pt. would benefit from continued PT to increase strength/endurance and improve functional mobility    Rehab identified problem list/impairments: Rehab identified problem list/impairments: weakness, impaired endurance, impaired self care skills, impaired functional mobilty, gait instability, impaired balance, decreased lower extremity function, decreased ROM, pain    Rehab potential is fair.    Activity tolerance: Fair    Discharge recommendations: Discharge Facility/Level Of Care Needs: nursing facility, skilled  (short-stay)     Barriers to discharge: Barriers to Discharge: Decreased caregiver support    Equipment recommendations:       GOALS:    Physical Therapy Goals        Problem: Physical Therapy Goal    Goal Priority Disciplines Outcome Goal Variances Interventions   Physical Therapy Goal     PT/OT, PT Ongoing (interventions implemented as appropriate)     Description:  Goals to be met by: 2017     Patient will increase functional independence with mobility by performin. Supine to sit with Contact Guard Assistance  2. Sit to supine with Contact Guard Assistance  3. Sit to stand transfer with Contact Guard Assistance  4. Bed to chair transfer with Contact Guard Assistance using Rolling/standard Walker  5. Gait  x 100 feet with Stand-by Assistance using Rolling/standard Walker.   6. Lower extremity exercise program x15 reps per handout, with assistance as needed                      PLAN:    Patient to be seen 5 x/week to address the above listed problems via gait training, therapeutic activities, therapeutic exercises  Plan of Care expires: 17  Plan of Care reviewed with: patient    Functional Assessment Tool Used: AM-PAC  Score: 16  Functional Limitation: Mobility: Walking and moving around  Mobility: Walking and Moving Around Current Status (): SANJAY  Mobility: Walking and Moving Around Goal Status (): SANJAY Copeland, PT  2017

## 2017-11-03 NOTE — CONSULTS
"[facility]  General Surgery  Consult Note    Patient Name: Chucho Prado  MRN: [id number]  Code Status: Prior  Admission Date: 11/2/2017  Hospital Length of Stay: 0 days  Attending Physician: Oscar Colin MD  Primary Care Provider: Obed Mcguire MD    Patient information was obtained from patient, past medical records and ER records.     Inpatient consult to General surgery  Consult performed by: DOMINICK CORLEY  Consult ordered by: DALLIN SOL    Inpatient consult to General surgery  Consult performed by: DOMINICK CORLEY  Consult ordered by: OSCAR COLIN        Subjective:     Principal Problem: Partial intestinal obstruction    History of Present Illness: Chucho Prado is an 84 y.o. male with h/o HTN, GERD, chronic back pain related to compression fractures, neurogenic bladder s/p SP tube placement and OA recently s/p left total knee replacement 10/25/2017. He did well post-operatively and was discharged from the medicine service to [facility]. Over the past few days he reports being "bunged up" - was not passing flatus or bowel movements for a few days but was given a suppository yesterday and since passed several BMs yesterday and one today. Has been passing flatus. Intermittently over this time has had some nausea that he mostly relates to pain medications but also reflux symptoms and occasional emesis - a few days ago as well as yesterday; none today. KUB today at the [facility] demonstrated mildly dilated loops of SB concerning for obstruction and he was sent to the ER. He does have a stable large R abdominal wall hernia. Vitals stable.  Labs wnl.       Current Facility-Administered Medications on File Prior to Encounter   Medication    [MAR Hold - Suspended Admission] acetaminophen tablet 650 mg    [MAR Hold - Suspended Admission] aspirin EC tablet 325 mg    [MAR Hold - Suspended Admission] bisacodyl suppository 10 mg    [COMPLETED] bisacodyl suppository " 10 mg    [MAR Hold - Suspended Admission] calcium carbonate 200 mg calcium (500 mg) chewable tablet 500 mg    [MAR Hold - Suspended Admission] famotidine tablet 20 mg    [MAR Hold - Suspended Admission] hydrocodone-acetaminophen 5-325mg per tablet 1 tablet    [MAR Hold - Suspended Admission] hydrocodone-acetaminophen 7.5-325mg per tablet 1 tablet    [MAR Hold - Suspended Admission] hyoscyamine SL tablet 0.125 mg    [MAR Hold - Suspended Admission] levothyroxine tablet 50 mcg    [MAR Hold - Suspended Admission] mirtazapine tablet 30 mg    [MAR Hold - Suspended Admission] ondansetron disintegrating tablet 8 mg    [MAR Hold - Suspended Admission] polyethylene glycol packet 17 g    [COMPLETED] potassium chloride SA CR tablet 20 mEq    [MAR Hold - Suspended Admission] promethazine tablet 12.5 mg    [MAR Hold - Suspended Admission] senna-docusate 8.6-50 mg per tablet 1 tablet    [MAR Hold - Suspended Admission] simethicone chewable tablet 80 mg    [MAR Hold - Suspended Admission] simvastatin tablet 40 mg    [MAR Hold - Suspended Admission] sulfamethoxazole-trimethoprim 800-160mg per tablet 1 tablet    [MAR Hold - Suspended Admission] traZODone tablet 50 mg    [MAR Hold - Suspended Admission] triamterene-hydrochlorothiazide 37.5-25 mg per capsule 1 capsule     Current Outpatient Prescriptions on File Prior to Encounter   Medication Sig    aspirin 325 MG tablet Take 1 tablet (325 mg total) by mouth 2 (two) times daily.    docusate sodium (COLACE) 100 MG capsule Take 1 capsule (100 mg total) by mouth 2 (two) times daily as needed for Constipation.    hyoscyamine (LEVSIN/SL) 0.125 mg Subl Place 1 tablet (0.125 mg total) under the tongue every 4 (four) hours as needed (bladder spasms).    levothyroxine (SYNTHROID) 50 MCG tablet Take 1 tablet (50 mcg total) by mouth once daily.    mirtazapine (REMERON) 30 MG tablet Take 1 tablet (30 mg total) by mouth every evening.    ondansetron (ZOFRAN-ODT) 8 MG TbDL  Take 1 tablet (8 mg total) by mouth every 12 (twelve) hours as needed (nausea).    oxyCODONE-acetaminophen (PERCOCET)  mg per tablet Take 1 tablet by mouth every 4 to 6 hours as needed for Pain.    simethicone (MYLICON) 80 MG chewable tablet Take 1 tablet (80 mg total) by mouth 3 (three) times daily after meals.    simvastatin (ZOCOR) 40 MG tablet Take 1 tablet (40 mg total) by mouth every evening.    trazodone (DESYREL) 50 MG tablet Take 1 tablet (50 mg total) by mouth nightly as needed for Insomnia.    triamterene-hydrochlorothiazide 37.5-25 mg (DYAZIDE) 37.5-25 mg per capsule Take 1 capsule by mouth every morning. HOLD UNTIL TOLD TO RESUME BY PHYSICIAN       Review of patient's allergies indicates:  No Known Allergies    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    GERD (gastroesophageal reflux disease)     Hypertension     Thyroid disease     UTI (urinary tract infection)      Past Surgical History:   Procedure Laterality Date    CHOLECYSTECTOMY      HERNIA REPAIR      JOINT REPLACEMENT      LAMINECTOMY  12/27/2016    L2-L4    LUMBAR LAMINECTOMY  12/2016    PARATHYROIDECTOMY      TOTAL KNEE ARTHROPLASTY      Right     Family History     Problem Relation (Age of Onset)    Diabetes Father    Esophageal cancer Father    Hypertension Mother        Social History Main Topics    Smoking status: Former Smoker     Years: 20.00     Quit date: 1990    Smokeless tobacco: Never Used      Comment: quit 1999    Alcohol use No      Comment: rarely/6 months    Drug use: No    Sexual activity: No     Review of Systems   Constitutional: Positive for activity  change (post operative) and appetite change (poor since surgery). Negative for chills, diaphoresis and fever.   HENT: Negative for congestion and sore throat.    Respiratory: Negative for chest tightness and shortness of breath.    Cardiovascular: Negative for chest pain and palpitations.   Gastrointestinal: Positive for constipation, nausea and vomiting (sporadic). Negative for abdominal pain.   Genitourinary:        SP tube for neurogenic bladder   Musculoskeletal: Positive for arthralgias and back pain.   Skin: Negative.    Allergic/Immunologic: Negative for immunocompromised state.   Neurological: Negative for dizziness, light-headedness and headaches.   Hematological: Does not bruise/bleed easily.   Psychiatric/Behavioral: Negative for behavioral problems and confusion. The patient is nervous/anxious.      Objective:     Vital Signs (Most Recent):  Temp: 98.3 °F (36.8 °C) (11/02/17 2230)  Pulse: 81 (11/02/17 2231)  Resp: 20 (11/02/17 2231)  BP: (!) 144/63 (11/02/17 2231)  SpO2: 98 % (11/02/17 2231) Vital Signs (24h Range):  Temp:  [97.8 °F (36.6 °C)-98.3 °F (36.8 °C)] 98.3 °F (36.8 °C)  Pulse:  [74-85] 81  Resp:  [15-25] 20  SpO2:  [95 %-98 %] 98 %  BP: (117-156)/(57-74) 144/63     Weight: 83.9 kg (185 lb)  Body mass index is 28.13 kg/m².    Physical Exam   Constitutional: He is oriented to person, place, and time. He appears well-developed and well-nourished. No distress.   HENT:   Head: Normocephalic and atraumatic.   Eyes: EOM are normal. Pupils are equal, round, and reactive to light. No scleral icterus.   Neck: Normal range of motion. No tracheal deviation present.   S/p thyroidectomy   Cardiovascular: Normal rate, regular rhythm and intact distal pulses.    Pulmonary/Chest: Effort normal. No respiratory distress.   Abdominal: Soft. He exhibits no distension. There is no tenderness. There is no guarding. A hernia is present.       Morbidly obese abdomen   Musculoskeletal:   Left knee brace   Neurological:  He is alert and oriented to person, place, and time.   Psychiatric: He has a normal mood and affect.       Significant Labs:  CBC:   Recent Labs  Lab 11/02/17 1942   WBC 8.24   RBC 3.91*   HGB 10.6*   HCT 31.3*      MCV 80*   MCH 27.1   MCHC 33.9     BMP:   Recent Labs  Lab 11/02/17 1942   GLU 99   *   K 4.0   CL 89*   CO2 25   BUN 21   CREATININE 1.6*   CALCIUM 8.8   MG 1.7     LFTs:   Recent Labs  Lab 11/02/17 1942   ALT 5*   AST 14   ALKPHOS 73   BILITOT 0.9   PROT 7.2   ALBUMIN 3.6       Significant Diagnostics:  I have reviewed all pertinent imaging results/findings within the past 24 hours.   CT with stable hiatal hernia, right flank hernia containing distal small bowel and right colon; mild dilation of distal small bowel both within and upstream from hernia, up to 3.1cm just proximal to hernia. No proximal small bowel or gastric distention. Air and stool in colon with rectal stool burden.    Assessment/Plan:     * Partial intestinal obstruction    84 y.o. male with chronic right flank hernia and recent orthopedic surgery. Given history, labs, imaging and recent return of bowel function favor resolving post-op ileus rather than partial SBO, though this remains a less likely possibility. Abdomen entirely benign. Rectal exam with some stool in vault but does not feel impacted.  -No acute surgical intervention  -Recommend admission for observation  -NPO for now; monitor for on going bowel function; mointor for nausea; if vomiting+ will need NG tube; will defer for now since imaging without significant proximal bowel distention.  -Replace electrolytes K>4, Phos>3, Mg>2; correct Na / Cl  -Enema now (ordered) given rectal stool burden on imaging  -Will follow  -Minimize narcotic pain medications      Hyponatremia    Na 125; monitor and correct.        Renal impairment    TERE on CKD  Recommend IV fluids          VTE Risk Mitigation     None          Thank you for your consult. I will follow-up with  patient. Please contact us if you have any additional questions.    Jodi Zhang MD  General Surgery  [facility]     I have personally performed a detailed history and physical examination on this patient. My findings are summarized in the resident's note included in the record.  Given patient's co-morbid conditions a trial of non-operative management is reasonable  Will follow closely  Dr Arthur on call in my absence  Will sign out this patient to him

## 2017-11-03 NOTE — HPI
Mr. Prado is a 85 yo man from [facility] with recent left TKA 10/25/17, flank hernia, chronic superpubic catheter, CKD stage 3, HLD, HTN, hypothyroid, depression who presented to ED on 11/2 with nausea and non-bloody vomiting since 10/27. There was concern for small bowel obstruction due to abdominal xray at [facility], so pt was sent to ED. Last solid bowel movement was before surgery, though patient has had multiple loose Bms and continues to pas gas. Denies stomach cramping or enlargement of his hernia that has been present for years.     In Ed, CT abdomen was done which revealed partial small obstruction secondary to hernia. Patient was given 2 L NS and general surgery was consulted.

## 2017-11-03 NOTE — HOSPITAL COURSE
11/3 - pt is comfortable, no abd pain, denies flattus or BMs,  Reviewed CT abd - dilaated loops of Small bowel, right hernia, sequella of constipation.  Will give enema today.      11/4 - feels better, + Flattus, xray abd neg for stool, abnormal gas pattern.will advance diet  11/5 - much better mentally, conversing well. Eating. No BMs yet.  11/6 - working w PT today, expresssed that he would prefer to go home. Has day time sitters. Eating, superpubic catheter is changed monthly.

## 2017-11-03 NOTE — ASSESSMENT & PLAN NOTE
Patient with large flank hernia that has been present for years is likely culprit of nausea/vomiting and partial obstruction though post-op ileus due to recent left TKA 10/25/17 is a possibility. Patient is having loose bowel movements and passing gas. No longer complaining of nausea vomtiing Appreciate surgery reccs    - NPO   - Will hold off on NG unless pt has emesis with nausea   - soap suds enema ordered   - monitor electrolytes, will give Mg   - s/p 2 L NS

## 2017-11-03 NOTE — PROGRESS NOTES
[facility] Hospital Medicine  Progress Note    Patient Name: Chucho Prado  MRN: [id number]  Patient Class: IP- Inpatient   Admission Date: 11/2/2017  Length of Stay: 1 days  Attending Physician: Selma Ndiaye MD  Primary Care Provider: Obed Mcguire MD    [facility] Medicine Team: [facility] Selma Ndiaye MD    Subjective:     Principal Problem:Partial intestinal obstruction    HPI:  Mr. Prado is a 83 yo man from [facility] with recent left TKA 10/25/17, flank hernia, CKD stage 3, HLD, HTN, hypothyroid, depression who presented to ED on 11/2 with nausea and non-bloody vomiting since 10/27. There was concern for small bowel obstruction due to abdominal xray at [place], so pt was sent to ED. Last solid bowel movement was before surgery, though patient has had multiple loose Bms and continues to pas gas. Denies stomach cramping or enlargement of his hernia that has been present for years.     In Ed, CT abdomen was done which revealed partial small obstruction secondary to hernia. Patient was given 2 L NS and general surgery was consulted.    Hospital Course:  11/3 - pt is comfortable, no abd pain, denies flattus or BMs,  Reviewed CT abd - dilaated loops of Small bowel, right hernia, sequella of constipation.  Will give enema today.      Interval History: comfortable    Review of Systems   Constitutional: Negative for chills, diaphoresis and fatigue.   HENT: Negative for trouble swallowing.    Eyes: Negative for visual disturbance.   Respiratory: Negative for cough, choking, shortness of breath and stridor.    Cardiovascular: Negative for chest pain and leg swelling.   Gastrointestinal: Positive for constipation. Negative for abdominal distention, abdominal pain, anal bleeding, blood in stool, nausea and vomiting.   Genitourinary: Negative for difficulty urinating and flank pain.   Musculoskeletal: Negative for arthralgias, back pain, myalgias and neck stiffness.        Left  knee brace      Skin: Negative for rash.   Neurological: Negative for dizziness and headaches.   Psychiatric/Behavioral: Negative for agitation, behavioral problems, confusion and decreased concentration.     Objective:     Vital Signs (Most Recent):  Temp: 98.9 °F (37.2 °C) (11/03/17 0815)  Pulse: 73 (11/03/17 0815)  Resp: 12 (11/03/17 0815)  BP: (!) 162/67 (11/03/17 0815)  SpO2: (!) 94 % (11/03/17 0815) Vital Signs (24h Range):  Temp:  [96 °F (35.6 °C)-98.9 °F (37.2 °C)] 98.9 °F (37.2 °C)  Pulse:  [73-85] 73  Resp:  [12-25] 12  SpO2:  [94 %-98 %] 94 %  BP: (117-162)/(57-75) 162/67     Weight: 83.9 kg (185 lb)  Body mass index is 28.13 kg/m².    Intake/Output Summary (Last 24 hours) at 11/03/17 1105  Last data filed at 11/03/17 0343   Gross per 24 hour   Intake             1000 ml   Output              400 ml   Net              600 ml      Physical Exam   Constitutional: He is oriented to person, place, and time. He appears well-developed and well-nourished.   HENT:   Head: Normocephalic and atraumatic.   Mouth/Throat: Oropharynx is clear and moist.   Eyes: EOM are normal. Pupils are equal, round, and reactive to light. No scleral icterus.   Neck: Normal range of motion. Neck supple.   Cardiovascular: Normal rate, regular rhythm, normal heart sounds and intact distal pulses.    Pulmonary/Chest: Effort normal and breath sounds normal. He has no wheezes. He has no rales.   Abdominal: Soft. He exhibits no distension and no mass. There is no tenderness. There is no rebound and no guarding. A hernia is present.   Quiet BS   Musculoskeletal: Normal range of motion. He exhibits no edema, tenderness or deformity.   Lymphadenopathy:     He has no cervical adenopathy.   Neurological: He is alert and oriented to person, place, and time.   Skin: Skin is warm and dry. No rash noted. He is not diaphoretic.   Psychiatric: He has a normal mood and affect. His behavior is normal. Thought content normal.       Significant Labs:    CBC:   Recent Labs  Lab 11/02/17 0442 11/02/17 1942   WBC 11.61 8.24   HGB 10.8* 10.6*   HCT 32.8* 31.3*    271     CMP:   Recent Labs  Lab 11/02/17 0442 11/02/17 1942   * 125*   K 3.3* 4.0   CL 88* 89*   CO2 28 25   GLU 89 99   BUN 18 21   CREATININE 1.5* 1.6*   CALCIUM 9.2 8.8   PROT  --  7.2   ALBUMIN  --  3.6   BILITOT  --  0.9   ALKPHOS  --  73   AST  --  14   ALT  --  5*   ANIONGAP 11 11   EGFRNONAA 42.1* 39.0*       Significant Imaging: I have reviewed and interpreted all pertinent imaging results/findings within the past 24 hours. CT abd    Assessment/Plan:      * Partial intestinal obstruction    Patient with large flank hernia that has been present for years is likely culprit of nausea/vomiting and partial obstruction though post-op ileus due to recent left TKA 10/25/17 is a possibility. Patient is having loose bowel movements and passing gas. No longer complaining of nausea vomtiing Appreciate surgery reccs    - NPO   - Will hold off on NG unless pt has emesis with nausea   - soap suds enema ordered   - monitor electrolytes, will give Mg   - s/p 2 L NS    11/3 - associated w constipation, enema today, hold constipating meds, normal saline            Flank hernia    Present for years, non-reducible. Per patient has not increased in size recently. No cramping. Continues to pass gas and have loose bowel movements.    - monitor          Status post total left knee replacement    On 10/25   - continue asa BID   - DVT ppx - ovenox 30, SCDs and BEE  - PT ordered          Hyponatremia    Trending down likely due to hypovolemia from partial bowel obstruction   - fluid challenge s/p 2 L NS   - monitor and re-assess in am  Normal saline 100 cc per hour.  Clear liquid diet          CKD (chronic kidney disease) stage 3, GFR 30-59 ml/min    Creatinine fluctuates, at baseline- avoid nephrotoxic meds           Anemia    Hg on 10/23 12.8, now 10.6   - monitor   - continue asa for now   - no dvt ppx           Depression    - continue mirtazapine   - trazadone PRN          Hypothyroidism    - continue levothyroxine            VTE Risk Mitigation         Ordered     enoxaparin injection 30 mg  Daily     Route:  Subcutaneous        11/03/17 1113     Place BEE hose  Until discontinued      11/03/17 1116     Place sequential compression device  Until discontinued      11/03/17 1116              Selma Ndiaye MD  Department of Hospital Medicine   [facility]

## 2017-11-03 NOTE — PROGRESS NOTES
Patient without signs of actual obstruction.    Significant constipation seen on CT scan with chronic long-standing hernia.    Patient refused attempt to reduce but bowel within hernia is soft.    Recommend aggressive bowel regimen with enema and miralax daily and colace BID.    Will follow.    Lynsey Molina MD  General Surgery, PGY-5  Pager: [phone]  Cell: [phone]

## 2017-11-03 NOTE — ASSESSMENT & PLAN NOTE
84 y.o. male with chronic right flank hernia and recent orthopedic surgery. Given history, labs, imaging and recent return of bowel function favor resolving post-op ileus rather than partial SBO, though this remains a less likely possibility. Abdomen entirely benign. Rectal exam with some stool in vault but does not feel impacted.  -No acute surgical intervention  -Recommend admission for observation  -NPO for now; monitor for on going bowel function; mointor for nausea; if vomiting+ will need NG tube; will defer for now since imaging without significant proximal bowel distention.  -Replace electrolytes K>4, Phos>3, Mg>2; correct Na / Cl  -Enema now (ordered) given rectal stool burden on imaging  -Will follow

## 2017-11-03 NOTE — ED PROVIDER NOTES
Encounter Date: 11/2/2017    SCRIBE #1 NOTE: I, Daron Peters, am scribing for, and in the presence of,  Dr. Barbosa. I have scribed the following portions of the note - the Resident attestation. Other sections scribed: CTabd.       History     Chief Complaint   Patient presents with    Vomiting     possible bowel obstruction     Nausea     84-year-old male with history of CKD, HLD, HTN, GERD, TKA on 10/25/17 by Dr. [facility] presents with concerns for constipation.  Patient endorses 4-5 days of constipation with daily passing of small hard stools.  He endorses passing gas.  He states he has been taking postop narcotics daily for pain, but he has decreased his bowel regiment because in the past he has had multiple bowel movements due to taking senna/Colace.  He was evaluated at his outpatient rehab today with upright abdominal x-ray, which is concerning for obstructive pattern.  He has a large abdominal ventral hernia along his right abdomen, which he endorses has been present for many years and is currently nontender.  He endorses nausea with occasional episodes of vomiting for the last 2 days.  His caretaker endorses the vomit has a foul odor.  He denies any abdominal pain.  He otherwise denies any fevers, chills, chest pain, shortness of breath, abdominal trauma.  He has had one abdominal hernia repair.           Review of patient's allergies indicates: working  No Known Allergies  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    GERD (gastroesophageal reflux disease)     Hypertension     Thyroid disease     UTI (urinary tract infection)      Past Surgical History:    Procedure Laterality Date    CHOLECYSTECTOMY      HERNIA REPAIR      JOINT REPLACEMENT      LAMINECTOMY  12/27/2016    L2-L4    LUMBAR LAMINECTOMY  12/2016    PARATHYROIDECTOMY      TOTAL KNEE ARTHROPLASTY      Right     Family History   Problem Relation Age of Onset    Hypertension Mother     Diabetes Father     Esophageal cancer Father      Social History   Substance Use Topics    Smoking status: Former Smoker     Years: 20.00     Quit date: 1990    Smokeless tobacco: Never Used      Comment: quit 1999    Alcohol use No      Comment: rarely/6 months     Review of Systems   Constitutional: Negative for chills and fever.   HENT: Negative for congestion and rhinorrhea.    Respiratory: Negative for chest tightness and shortness of breath.    Cardiovascular: Negative for chest pain and palpitations.   Gastrointestinal: Positive for abdominal distention (mild), nausea and vomiting. Negative for abdominal pain.   Genitourinary: Negative for dysuria and frequency.   Musculoskeletal: Positive for arthralgias. Negative for myalgias.   Skin: Negative for color change and pallor.   Neurological: Negative for dizziness and headaches.   Psychiatric/Behavioral: Negative for agitation and confusion.   All other systems reviewed and are negative.      Physical Exam     Initial Vitals [11/02/17 1726]   BP Pulse Resp Temp SpO2   136/68 85 16 97.9 °F (36.6 °C) 96 %      MAP       90.67         Physical Exam    Nursing note and vitals reviewed.  Constitutional: He appears well-developed. No distress.   HENT:   Head: Normocephalic and atraumatic.   Eyes: Conjunctivae are normal. Pupils are equal, round, and reactive to light.   Neck: No tracheal deviation present.   Cardiovascular: Normal rate and intact distal pulses.   Pulmonary/Chest: Breath sounds normal. No stridor. No respiratory distress.   Abdominal: Soft. He exhibits no distension. There is no tenderness. There is no rebound and no guarding.   Large abdominal  hernia, non-reducible, non-tender along right abdomen. Abdomen is tympanic to percussion, but not overtly distended.    Musculoskeletal: Normal range of motion. He exhibits no edema.   Neurological: He is alert and oriented to person, place, and time.   Skin: Skin is warm. No pallor.   Psychiatric: He has a normal mood and affect. His behavior is normal.         ED Course   Procedures  Labs Reviewed   CBC W/ AUTO DIFFERENTIAL - Abnormal; Notable for the following:        Result Value    RBC 3.91 (*)     Hemoglobin 10.6 (*)     Hematocrit 31.3 (*)     MCV 80 (*)     MPV 9.0 (*)     Immature Granulocytes 0.6 (*)     Immature Grans (Abs) 0.05 (*)     Gran% 74.1 (*)     Lymph% 16.4 (*)     All other components within normal limits   COMPREHENSIVE METABOLIC PANEL - Abnormal; Notable for the following:     Sodium 125 (*)     Chloride 89 (*)     Creatinine 1.6 (*)     ALT 5 (*)     eGFR if  45.1 (*)     eGFR if non  39.0 (*)     All other components within normal limits   MAGNESIUM   PHOSPHORUS   LIPASE   LACTIC ACID, PLASMA          X-Rays:   Independently Interpreted Readings:   Abdomen:   Abdomen and Pelvis CT with Contrast - Dilated loops of bowel with air fluid levels     Medical Decision Making:   History:   Old Medical Records: I decided to obtain old medical records.  Independently Interpreted Test(s):   I have ordered and independently interpreted X-rays - see prior notes.  Clinical Tests:   Lab Tests: Ordered and Reviewed  Radiological Study: Ordered and Reviewed  ED Management:  22:40  Attempted to call General Surgery.  Other:   I have discussed this case with another health care provider.       APC / Resident Notes:   HO-2 MDM:  This is an emergent evaluation of an 84-year-old male who is one week S/P TKA who seems to have developed postoperative constipation within the last 4-5 days, transferred to [facility] ED for concern for SBO, presenting with complaints of nausea/vomiting,  nonbilious and nonbloody, and abdominal exam notable for tympany to percussion and a large chronic right ventral hernia that is nonreducible.  Vitals are stable.  Abdominal exam really does not suggest acute abdomen.  This presentation is in the setting of daily postop narcotic regimen and decreased use of bowel regimen.  DDx includes but is not limited to partial SBO, SBO, volvulus, constipation from medication side effect.  We'll obtain CT abdomen and pelvis with contrast to evaluate for obstruction, and provide antiemetic.  We'll anticipate consult to surgical service pending results and placement of NG tube.  Lenin Duggan M.D.  [facility] Emergency Medicine, PGY-2  11/02/2017 7:10 PM    HO-2 Update:  Patient remains hemodynamically stable with nausea that is well controlled.  CT abdomen and pelvis returned with partial small bowel obstruction, with ventral hernia as potential cause.  General surgery consult for evaluation.  I will hold off on NG tube placement until patient is evaluated by general surgery.  Lenin Duggan M.D.  [facility] Emergency Medicine, PGY-2  11/02/2017 10:59 PM    HO-2 Update:  Pt to be admitted to medicine as primary with general surgery following. He remains hemodynamically stable. No nausea.  Lenin Duggan M.D.  [facility] Emergency Medicine, PGY-2  11/03/2017 12:01 AM         Scribe Attestation:   Scribe #1: I performed the above scribed service and the documentation accurately describes the services I performed. I attest to the accuracy of the note.    Attending Attestation:   Physician Attestation Statement for Resident:  As the supervising MD   Physician Attestation Statement: I have personally seen and examined this patient.   I agree with the above history. -:   As the supervising MD I agree with the above PE.   -: On exam, pt is ill-appearing and has dry mucous membranes. Soft, smells of stool   As the supervising MD I agree with the above treatment, course, plan, and disposition.   -:  Surgery consulted  Treatment plan includes physical exam, cardiac monitoring, labs, imaging studies, antiemetic, IVF and supportive care.    Patient improved after treatment and tolerating PO    Family and patient updated on care.  Pt agrees with assessment, disposition and treatment plan and has no further questions or complaints at this time.  Further plan per inpatient team    I have reviewed and agree with the residents interpretation of the following: lab data and CT scans.  I have reviewed the following: old records at this facility.          Physician Attestation for Scribe:      Comments: I, Dr. Oscar Barbosa, personally performed the services described in this documentation. All medical record entries made by the scribe were at my direction and in my presence.  I have reviewed the chart and agree that the record reflects my personal performance and is accurate and complete. Oscar Barbosa MD.  1:48 AM 11/03/2017         [unfilled]     ED Course      Clinical Impression:   The encounter diagnosis was Intestinal obstruction, unspecified cause, unspecified whether partial or complete.    Disposition:   Disposition: Admitted  Condition: Manuel Palacios MD  Resident  11/03/17 0001       Oscar Barbosa MD  11/03/17 0149

## 2017-11-03 NOTE — SUBJECTIVE & OBJECTIVE
Interval History: comfortable    Review of Systems   Constitutional: Negative for chills, diaphoresis and fatigue.   HENT: Negative for trouble swallowing.    Eyes: Negative for visual disturbance.   Respiratory: Negative for cough, choking, shortness of breath and stridor.    Cardiovascular: Negative for chest pain and leg swelling.   Gastrointestinal: Positive for constipation. Negative for abdominal distention, abdominal pain, anal bleeding, blood in stool, nausea and vomiting.   Genitourinary: Negative for difficulty urinating and flank pain.   Musculoskeletal: Negative for arthralgias, back pain, myalgias and neck stiffness.        Left knee brace      Skin: Negative for rash.   Neurological: Negative for dizziness and headaches.   Psychiatric/Behavioral: Negative for agitation, behavioral problems, confusion and decreased concentration.     Objective:     Vital Signs (Most Recent):  Temp: 98.9 °F (37.2 °C) (11/03/17 0815)  Pulse: 73 (11/03/17 0815)  Resp: 12 (11/03/17 0815)  BP: (!) 162/67 (11/03/17 0815)  SpO2: (!) 94 % (11/03/17 0815) Vital Signs (24h Range):  Temp:  [96 °F (35.6 °C)-98.9 °F (37.2 °C)] 98.9 °F (37.2 °C)  Pulse:  [73-85] 73  Resp:  [12-25] 12  SpO2:  [94 %-98 %] 94 %  BP: (117-162)/(57-75) 162/67     Weight: 83.9 kg (185 lb)  Body mass index is 28.13 kg/m².    Intake/Output Summary (Last 24 hours) at 11/03/17 1105  Last data filed at 11/03/17 0343   Gross per 24 hour   Intake             1000 ml   Output              400 ml   Net              600 ml      Physical Exam   Constitutional: He is oriented to person, place, and time. He appears well-developed and well-nourished.   HENT:   Head: Normocephalic and atraumatic.   Mouth/Throat: Oropharynx is clear and moist.   Eyes: EOM are normal. Pupils are equal, round, and reactive to light. No scleral icterus.   Neck: Normal range of motion. Neck supple.   Cardiovascular: Normal rate, regular rhythm, normal heart sounds and intact distal pulses.     Pulmonary/Chest: Effort normal and breath sounds normal. He has no wheezes. He has no rales.   Abdominal: Soft. He exhibits no distension and no mass. There is no tenderness. There is no rebound and no guarding. A hernia is present.   Quiet BS   Musculoskeletal: Normal range of motion. He exhibits no edema, tenderness or deformity.   Lymphadenopathy:     He has no cervical adenopathy.   Neurological: He is alert and oriented to person, place, and time.   Skin: Skin is warm and dry. No rash noted. He is not diaphoretic.   Psychiatric: He has a normal mood and affect. His behavior is normal. Thought content normal.       Significant Labs:   CBC:   Recent Labs  Lab 11/02/17 0442 11/02/17 1942   WBC 11.61 8.24   HGB 10.8* 10.6*   HCT 32.8* 31.3*    271     CMP:   Recent Labs  Lab 11/02/17 0442 11/02/17 1942   * 125*   K 3.3* 4.0   CL 88* 89*   CO2 28 25   GLU 89 99   BUN 18 21   CREATININE 1.5* 1.6*   CALCIUM 9.2 8.8   PROT  --  7.2   ALBUMIN  --  3.6   BILITOT  --  0.9   ALKPHOS  --  73   AST  --  14   ALT  --  5*   ANIONGAP 11 11   EGFRNONAA 42.1* 39.0*       Significant Imaging: I have reviewed and interpreted all pertinent imaging results/findings within the past 24 hours. CT abd

## 2017-11-03 NOTE — PLAN OF CARE
CM met with patient for discharge planning.  Plan is to discharge back to SNF when medically ready.    Pharmacy:    [id number] - [place], [place] - [address]  [address]  [facility] [address]  Phone: [phone] Fax: [phone]    PCP:  Obed Mcguire MD    Payor: MEDICARE / Plan: MEDICARE PART A & B / Product Type: [facility] /      11/03/17 1322   Discharge Assessment   Assessment Type Discharge Planning Assessment   Confirmed/corrected address and phone number on facesheet? Yes   Assessment information obtained from? Patient   Expected Length of Stay (days) 3   Communicated expected length of stay with patient/caregiver yes   Prior to hospitilization cognitive status: Alert/Oriented   Prior to hospitalization functional status: Assistive Equipment   Current cognitive status: Alert/Oriented   Current Functional Status: Assistive Equipment   Facility Arrived From: From [facility]   Lives With alone   Able to Return to Prior Arrangements unable to determine at this time (comments)   Is patient able to care for self after discharge? Unable to determine at this time (comments)   Who are your caregiver(s) and their phone number(s)? Josh Burr - friend [phone]   Patient's perception of discharge disposition skilled nursing facility   Readmission Within The Last 30 Days current reason for admission unrelated to previous admission   If yes, most recent facility name: [facility]   Patient currently being followed by outpatient case management? No   Patient currently receives any other outside agency services? No   Equipment Currently Used at Home 3-in-1 commode;walker, rolling;wheelchair;shower chair   Do you have any problems affording any of your prescribed medications? No   Is the patient taking medications as prescribed? yes   Does the patient have transportation home? Yes   Transportation Available family or friend will provide    Does the patient receive services at the [facility]? No   Discharge Plan A Skilled Nursing Facility   Discharge Plan B Home Health   Patient/Family In Agreement With Plan yes   Readmission Questionnaire   At the time of your discharge, did someone talk to you about what your health problems were? Yes   At the time of discharge, did someone talk to you about what to watch out for regarding worsening of your health problem? Yes   At the time of discharge, did someone talk to you about what to do if you experienced worsening of your health problem? Yes   At the time of discharge, did someone talk to you about which medication to take when you left the hospital and which ones to stop taking? Yes   At the time of discharge, did someone talk to you about when and where to follow up with a doctor after you left the hospital? Yes   Do you have problems taking your medications as prescribed? No   Do you have any problems affording any of  your prescribed medications? No   Do you have problems obtaining/receiving your medications? No   Living Arrangements house   Are you currently feeling confused? No   Are you currently having problems thinking? No   Are you currently having memory problems? No   Have you felt down, depressed, or hopeless? 0   Have you felt little interest or pleasure in doing things? 0

## 2017-11-03 NOTE — ASSESSMENT & PLAN NOTE
Patient with large flank hernia that has been present for years is likely culprit of nausea/vomiting and partial obstruction though post-op ileus due to recent left TKA 10/25/17 is a possibility. Patient is having loose bowel movements and passing gas. No longer complaining of nausea vomtiing Appreciate surgery reccs    - NPO   - Will hold off on NG unless pt has emesis with nausea   - soap suds enema ordered   - monitor electrolytes, will give Mg   - s/p 2 L NS    11/3 - associated w constipation, enema today, hold constipating meds, normal saline

## 2017-11-03 NOTE — HPI
"Chucho Prado is an 84 y.o. male with h/o HTN, GERD, chronic back pain related to compression fractures, neurogenic bladder s/p SP tube placement and OA recently s/p left total knee replacement 10/25/2017. He did well post-operatively and was discharged from the medicine service to [facility]. Over the past few days he reports being "bunged up" - was not passing flatus or bowel movements for a few days but was given a suppository yesterday and since passed several BMs yesterday and one today. Has been passing flatus. Intermittently over this time has had some nausea that he mostly relates to pain medications but also reflux symptoms and occasional emesis - a few days ago as well as yesterday; none today. KUB today at the [facility] demonstrated mildly dilated loops of SB concerning for obstruction and he was sent to the ER. He does have a stable large R abdominal wall hernia. Vitals stable.  Labs wnl.     "

## 2017-11-03 NOTE — ED TRIAGE NOTES
Pt states having nausea and vomiting for couple of days. (-) bowel movement for 4-5days. Denies abdominal pain, chestpain, sob. Pt with suprapubic catheter connected to urine bag      LOC: The patient is awake, alert, aware of environment with an appropriate affect. Oriented x4, speaking appropriately  APPEARANCE: Pt resting comfortably, in no acute distress, pt is clean and well groomed, clothing properly fastened  SKIN:The skin is warm and dry, color consistent with ethnicity, patient has normal skin turgor and moist mucus membranes  RESPIRATORY:Airway is open and patent, respirations are spontaneous, patient has a normal effort and rate, no accessory muscle use noted.  CARDIAC: normal rate  and  rhythm, no peripheral edema noted, capillary refill < 3 seconds, bilateral radial pulses 2+.  ABDOMEN: Soft, non tender,+ distention (hernia).  NEUROLOGIC: PERRLA, facial expression is symmetrical, patient moving all extremities spontaneously, normal sensation in all extremities when touched with a finger.  Follows all commands appropriately  MUSCULOSKELETAL: Patient moving all extremities spontaneously, no obvious swelling or deformities noted.

## 2017-11-03 NOTE — TELEPHONE ENCOUNTER
Spoke to Kirstin (friend) and states that the patient is currently in the hospital and Is wanting to know if Dr. Mcguire can see patient or talk to him to advise him whats going on---states she does not know who is in charged---advise her to talk to the floor nurse---pls advise

## 2017-11-03 NOTE — ASSESSMENT & PLAN NOTE
Trending down likely due to hypovolemia from partial bowel obstruction   - fluid challenge s/p 2 L NS   - monitor and re-assess in am

## 2017-11-03 NOTE — ADDENDUM NOTE
Addendum  created 11/03/17 1525 by Adelia Esteban MD    Anesthesia Intra Blocks edited, Sign clinical note

## 2017-11-03 NOTE — ASSESSMENT & PLAN NOTE
Present for years, non-reducible. Per patient has not increased in size recently. No cramping. Continues to pass gas and have loose bowel movements.    - monitor

## 2017-11-03 NOTE — PLAN OF CARE
Problem: Physical Therapy Goal  Goal: Physical Therapy Goal  Goals to be met by: 2017     Patient will increase functional independence with mobility by performin. Supine to sit with Contact Guard Assistance  2. Sit to supine with Contact Guard Assistance  3. Sit to stand transfer with Contact Guard Assistance  4. Bed to chair transfer with Contact Guard Assistance using Rolling/standard Walker  5. Gait  x 100 feet with Stand-by Assistance using Rolling/standard Walker.   6. Lower extremity exercise program x15 reps per handout, with assistance as needed    Outcome: Ongoing (interventions implemented as appropriate)  Goals set

## 2017-11-03 NOTE — ASSESSMENT & PLAN NOTE
Trending down likely due to hypovolemia from partial bowel obstruction   - fluid challenge s/p 2 L NS   - monitor and re-assess in am  Normal saline 100 cc per hour.  Clear liquid diet

## 2017-11-03 NOTE — PLAN OF CARE
11/03/2017  8:20 AM    Patient was sent to ER last night for evaluation on n/v, no BM. Patient admitted.    Future Appointments  Date Time Provider Department Center   11/7/2017 1:30 PM Neeta Dunne PA-C [facility] ORTHO [facility]   11/8/2017 1:40 PM Neelam Hooper NP [facility]   11/29/2017 1:30 PM Trinity Vazquez MD [facility] OPHTHAL [facility]        11/03/17 0820   Final Note   Assessment Type Final Discharge Note   Discharge Disposition Admitted     Lilia Zafar RN, CM Skilled  [id number]

## 2017-11-03 NOTE — TELEPHONE ENCOUNTER
----- Message from Karmen Solorzano sent at 11/3/2017  4:19 PM CDT -----  Contact: pt friend natividad [email or web address]  Pt is in the hospital,please advise

## 2017-11-04 LAB
ANION GAP SERPL CALC-SCNC: 8 MMOL/L
BASOPHILS # BLD AUTO: 0.01 K/UL
BASOPHILS NFR BLD: 0.2 %
BUN SERPL-MCNC: 16 MG/DL
CALCIUM SERPL-MCNC: 8.3 MG/DL
CHLORIDE SERPL-SCNC: 97 MMOL/L
CO2 SERPL-SCNC: 24 MMOL/L
CREAT SERPL-MCNC: 1.2 MG/DL
DIFFERENTIAL METHOD: ABNORMAL
EOSINOPHIL # BLD AUTO: 0.2 K/UL
EOSINOPHIL NFR BLD: 3.4 %
ERYTHROCYTE [DISTWIDTH] IN BLOOD BY AUTOMATED COUNT: 14.7 %
EST. GFR  (AFRICAN AMERICAN): >60 ML/MIN/1.73 M^2
EST. GFR  (NON AFRICAN AMERICAN): 55.2 ML/MIN/1.73 M^2
GLUCOSE SERPL-MCNC: 73 MG/DL
HCT VFR BLD AUTO: 29.4 %
HGB BLD-MCNC: 9.6 G/DL
IMM GRANULOCYTES # BLD AUTO: 0.04 K/UL
IMM GRANULOCYTES NFR BLD AUTO: 0.7 %
LYMPHOCYTES # BLD AUTO: 1.5 K/UL
LYMPHOCYTES NFR BLD: 25.4 %
MAGNESIUM SERPL-MCNC: 2.3 MG/DL
MCH RBC QN AUTO: 26.9 PG
MCHC RBC AUTO-ENTMCNC: 32.7 G/DL
MCV RBC AUTO: 82 FL
MONOCYTES # BLD AUTO: 0.6 K/UL
MONOCYTES NFR BLD: 11 %
NEUTROPHILS # BLD AUTO: 3.5 K/UL
NEUTROPHILS NFR BLD: 59.3 %
NRBC BLD-RTO: 0 /100 WBC
PHOSPHATE SERPL-MCNC: 2.8 MG/DL
PLATELET # BLD AUTO: 250 K/UL
PMV BLD AUTO: 8.9 FL
POTASSIUM SERPL-SCNC: 3.9 MMOL/L
RBC # BLD AUTO: 3.57 M/UL
SODIUM SERPL-SCNC: 129 MMOL/L
WBC # BLD AUTO: 5.83 K/UL

## 2017-11-04 PROCEDURE — 25000003 PHARM REV CODE 250: Performed by: HOSPITALIST

## 2017-11-04 PROCEDURE — 63600175 PHARM REV CODE 636 W HCPCS: Performed by: HOSPITALIST

## 2017-11-04 PROCEDURE — 11000001 HC ACUTE MED/SURG PRIVATE ROOM

## 2017-11-04 PROCEDURE — 83735 ASSAY OF MAGNESIUM: CPT

## 2017-11-04 PROCEDURE — 84100 ASSAY OF PHOSPHORUS: CPT

## 2017-11-04 PROCEDURE — 99233 SBSQ HOSP IP/OBS HIGH 50: CPT | Mod: ,,, | Performed by: HOSPITALIST

## 2017-11-04 PROCEDURE — 36415 COLL VENOUS BLD VENIPUNCTURE: CPT

## 2017-11-04 PROCEDURE — 80048 BASIC METABOLIC PNL TOTAL CA: CPT

## 2017-11-04 PROCEDURE — 85025 COMPLETE CBC W/AUTO DIFF WBC: CPT

## 2017-11-04 RX ORDER — AMLODIPINE BESYLATE 5 MG/1
5 TABLET ORAL DAILY
Status: DISCONTINUED | OUTPATIENT
Start: 2017-11-04 | End: 2017-11-05

## 2017-11-04 RX ORDER — ENOXAPARIN SODIUM 100 MG/ML
40 INJECTION SUBCUTANEOUS EVERY 24 HOURS
Status: DISCONTINUED | OUTPATIENT
Start: 2017-11-04 | End: 2017-11-06 | Stop reason: HOSPADM

## 2017-11-04 RX ADMIN — ASPIRIN 325 MG ORAL TABLET 325 MG: 325 PILL ORAL at 08:11

## 2017-11-04 RX ADMIN — MIRTAZAPINE 30 MG: 15 TABLET, FILM COATED ORAL at 09:11

## 2017-11-04 RX ADMIN — ENOXAPARIN SODIUM 40 MG: 100 INJECTION SUBCUTANEOUS at 05:11

## 2017-11-04 RX ADMIN — SIMVASTATIN 40 MG: 20 TABLET, FILM COATED ORAL at 09:11

## 2017-11-04 RX ADMIN — AMLODIPINE BESYLATE 5 MG: 5 TABLET ORAL at 08:11

## 2017-11-04 RX ADMIN — ASPIRIN 325 MG ORAL TABLET 325 MG: 325 PILL ORAL at 09:11

## 2017-11-04 RX ADMIN — LEVOTHYROXINE SODIUM 50 MCG: 50 TABLET ORAL at 05:11

## 2017-11-04 NOTE — PROGRESS NOTES
Patient continues without signs of bowel obstruction.    Passing flatus and having multiple bowel movements after enema yesterday evening.    Recommend continued aggressive bowel regimen with miralax daily and colace BID.    Surgery will sign off, please call with questions.      Lynsey Molina MD  General Surgery, PGY-5  Pager: [phone]  Cell: [phone]

## 2017-11-04 NOTE — ASSESSMENT & PLAN NOTE
Hg on 10/23 12.8, now 10.6   - monitor   - continue asa for now   - dvt ppx - lovenox 30  11/4 - Hb 9.6

## 2017-11-04 NOTE — ASSESSMENT & PLAN NOTE
Trending down likely due to hypovolemia from partial bowel obstruction   - fluid challenge s/p 2 L NS   - monitor and re-assess in am  Normal saline 100 cc per hour.  Clear liquid diet  11/4 Na 125 -> 129, pt reports he was not eating much on the SNF

## 2017-11-04 NOTE — SUBJECTIVE & OBJECTIVE
Interval History: no  No or V    Review of Systems   Constitutional: Negative for chills, diaphoresis and fatigue.   Respiratory: Negative for cough, choking, shortness of breath and stridor.    Cardiovascular: Negative for chest pain and leg swelling.   Gastrointestinal: Negative for abdominal distention, abdominal pain, blood in stool, constipation, nausea and vomiting.   Genitourinary: Negative for difficulty urinating and flank pain.   Musculoskeletal: Negative for back pain, myalgias and neck stiffness.        Left knee brace      Skin: Negative for rash.   Psychiatric/Behavioral: Negative for agitation, behavioral problems, confusion and decreased concentration.     Objective:     Vital Signs (Most Recent):  Temp: 97.5 °F (36.4 °C) (11/04/17 0850)  Pulse: 71 (11/04/17 0850)  Resp: 16 (11/04/17 0850)  BP: (!) 180/75 (11/04/17 0850)  SpO2: 98 % (11/04/17 0850) Vital Signs (24h Range):  Temp:  [96 °F (35.6 °C)-98.8 °F (37.1 °C)] 97.5 °F (36.4 °C)  Pulse:  [71-80] 71  Resp:  [16-20] 16  SpO2:  [95 %-99 %] 98 %  BP: (103-188)/(61-85) 180/75     Weight: 83.9 kg (185 lb)  Body mass index is 28.13 kg/m².    Intake/Output Summary (Last 24 hours) at 11/04/17 1143  Last data filed at 11/04/17 0500   Gross per 24 hour   Intake             2245 ml   Output             1500 ml   Net              745 ml      Physical Exam   Constitutional: He is oriented to person, place, and time. He appears well-developed and well-nourished.   HENT:   Head: Normocephalic and atraumatic.   Mouth/Throat: Oropharynx is clear and moist.   Eyes: EOM are normal. Pupils are equal, round, and reactive to light. No scleral icterus.   Neck: Normal range of motion. Neck supple.   Cardiovascular: Normal rate, regular rhythm and normal heart sounds.    Pulmonary/Chest: Effort normal and breath sounds normal. He has no wheezes. He has no rales.   Abdominal: Soft. Bowel sounds are normal. He exhibits no distension and no mass. There is no tenderness.  There is no rebound and no guarding. A hernia is present.   Musculoskeletal: Normal range of motion. He exhibits no edema, tenderness or deformity.   Neurological: He is alert and oriented to person, place, and time.   Skin: Skin is warm and dry. No rash noted. He is not diaphoretic.   Psychiatric: He has a normal mood and affect. His behavior is normal. Thought content normal.       Significant Labs:   CBC:   Recent Labs  Lab 11/02/17 1942 11/04/17  0533   WBC 8.24 5.83   HGB 10.6* 9.6*   HCT 31.3* 29.4*    250     CMP:   Recent Labs  Lab 11/02/17 1942 11/04/17  0533   * 129*   K 4.0 3.9   CL 89* 97   CO2 25 24   GLU 99 73   BUN 21 16   CREATININE 1.6* 1.2   CALCIUM 8.8 8.3*   PROT 7.2  --    ALBUMIN 3.6  --    BILITOT 0.9  --    ALKPHOS 73  --    AST 14  --    ALT 5*  --    ANIONGAP 11 8   EGFRNONAA 39.0* 55.2*

## 2017-11-04 NOTE — ASSESSMENT & PLAN NOTE
Patient with large flank hernia that has been present for years is likely culprit of nausea/vomiting and partial obstruction though post-op ileus due to recent left TKA 10/25/17 is a possibility. Patient is having loose bowel movements and passing gas. No longer complaining of nausea vomtiing Appreciate surgery reccs    - NPO   - Will hold off on NG unless pt has emesis with nausea   - soap suds enema ordered   - monitor electrolytes, will give Mg   - s/p 2 L NS    11/3 - associated w constipation, enema today, hold constipating meds, normal saline  11/4 - resolving, continue LR, full liquid diet

## 2017-11-04 NOTE — PLAN OF CARE
Problem: Patient Care Overview  Goal: Plan of Care Review  Outcome: Ongoing (interventions implemented as appropriate)  Pt aaox4, vs stable, no falls or injuries. Fall precautions in place w/ personal items near by. No Pain verbalized. No signs of infection or distress. caregiver at bedside. Call light in reach; will continue to monitor pt.

## 2017-11-04 NOTE — PROGRESS NOTES
[facility] Hospital Medicine  Progress Note    Patient Name: Chucho Prado  MRN: [id number]  Patient Class: IP- Inpatient   Admission Date: 11/2/2017  Length of Stay: 2 days  Attending Physician: Selma Ndiaye MD  Primary Care Provider: Obed Mcguire MD    [facility] [place] [facility] [place] Selma Ndiaye MD    Subjective:     Principal Problem:Partial intestinal obstruction    HPI:  Mr. Prado is a 83 yo man from [facility] with recent left TKA 10/25/17, flank hernia, CKD stage 3, HLD, HTN, hypothyroid, depression who presented to ED on 11/2 with nausea and non-bloody vomiting since 10/27. There was concern for small bowel obstruction due to abdominal xray at [place], so pt was sent to ED. Last solid bowel movement was before surgery, though patient has had multiple loose Bms and continues to pas gas. Denies stomach cramping or enlargement of his hernia that has been present for years.     In Ed, CT abdomen was done which revealed partial small obstruction secondary to hernia. Patient was given 2 L NS and general surgery was consulted.    Hospital Course:  11/3 - pt is comfortable, no abd pain, denies flattus or BMs,  Reviewed CT abd - dilaated loops of Small bowel, right hernia, sequella of constipation.  Will give enema today.      11/4 - feels better, + Flattus, xray abd neg for stool, abnormal gas pattern.will advance diet    Interval History: no  No or V    Review of Systems   Constitutional: Negative for chills, diaphoresis and fatigue.   Respiratory: Negative for cough, choking, shortness of breath and stridor.    Cardiovascular: Negative for chest pain and leg swelling.   Gastrointestinal: Negative for abdominal distention, abdominal pain, blood in stool, constipation, nausea and vomiting.   Genitourinary: Negative for difficulty urinating and flank pain.   Musculoskeletal: Negative for back pain, myalgias and neck stiffness.        Left knee brace      Skin:  Negative for rash.   Psychiatric/Behavioral: Negative for agitation, behavioral problems, confusion and decreased concentration.     Objective:     Vital Signs (Most Recent):  Temp: 97.5 °F (36.4 °C) (11/04/17 0850)  Pulse: 71 (11/04/17 0850)  Resp: 16 (11/04/17 0850)  BP: (!) 180/75 (11/04/17 0850)  SpO2: 98 % (11/04/17 0850) Vital Signs (24h Range):  Temp:  [96 °F (35.6 °C)-98.8 °F (37.1 °C)] 97.5 °F (36.4 °C)  Pulse:  [71-80] 71  Resp:  [16-20] 16  SpO2:  [95 %-99 %] 98 %  BP: (103-188)/(61-85) 180/75     Weight: 83.9 kg (185 lb)  Body mass index is 28.13 kg/m².    Intake/Output Summary (Last 24 hours) at 11/04/17 1143  Last data filed at 11/04/17 0500   Gross per 24 hour   Intake             2245 ml   Output             1500 ml   Net              745 ml      Physical Exam   Constitutional: He is oriented to person, place, and time. He appears well-developed and well-nourished.   HENT:   Head: Normocephalic and atraumatic.   Mouth/Throat: Oropharynx is clear and moist.   Eyes: EOM are normal. Pupils are equal, round, and reactive to light. No scleral icterus.   Neck: Normal range of motion. Neck supple.   Cardiovascular: Normal rate, regular rhythm and normal heart sounds.    Pulmonary/Chest: Effort normal and breath sounds normal. He has no wheezes. He has no rales.   Abdominal: Soft. Bowel sounds are normal. He exhibits no distension and no mass. There is no tenderness. There is no rebound and no guarding. A hernia is present.   Musculoskeletal: Normal range of motion. He exhibits no edema, tenderness or deformity.   Neurological: He is alert and oriented to person, place, and time.   Skin: Skin is warm and dry. No rash noted. He is not diaphoretic.   Psychiatric: He has a normal mood and affect. His behavior is normal. Thought content normal.       Significant Labs:   CBC:   Recent Labs  Lab 11/02/17 1942 11/04/17  0533   WBC 8.24 5.83   HGB 10.6* 9.6*   HCT 31.3* 29.4*    250     CMP:   Recent  Labs  Lab 11/02/17  1942 11/04/17  0533   * 129*   K 4.0 3.9   CL 89* 97   CO2 25 24   GLU 99 73   BUN 21 16   CREATININE 1.6* 1.2   CALCIUM 8.8 8.3*   PROT 7.2  --    ALBUMIN 3.6  --    BILITOT 0.9  --    ALKPHOS 73  --    AST 14  --    ALT 5*  --    ANIONGAP 11 8   EGFRNONAA 39.0* 55.2*         Assessment/Plan:      * Partial intestinal obstruction    Patient with large flank hernia that has been present for years is likely culprit of nausea/vomiting and partial obstruction though post-op ileus due to recent left TKA 10/25/17 is a possibility. Patient is having loose bowel movements and passing gas. No longer complaining of nausea vomtiing Appreciate surgery reccs    - NPO   - Will hold off on NG unless pt has emesis with nausea   - soap suds enema ordered   - monitor electrolytes, will give Mg   - s/p 2 L NS    11/3 - associated w constipation, enema today, hold constipating meds, normal saline  11/4 - resolving, continue LR, full liquid diet            Hyponatremia    Trending down likely due to hypovolemia from partial bowel obstruction   - fluid challenge s/p 2 L NS   - monitor and re-assess in am  Normal saline 100 cc per hour.  Clear liquid diet  11/4 Na 125 -> 129, pt reports he was not eating much on the SNF        CKD (chronic kidney disease) stage 3, GFR 30-59 ml/min    Creatinine fluctuates, at baseline- avoid nephrotoxic meds   11/4 - cr 1.6 -> 1.2          Status post total left knee replacement    On 10/25   - continue asa BID   - DVT ppx - ovenox 30, SCDs and BEE  - PT ordered          Flank hernia    Present for years, non-reducible. Per patient has not increased in size recently. No cramping. Continues to pass gas and have loose bowel movements.    - monitor          Anemia    Hg on 10/23 12.8, now 10.6   - monitor   - continue asa for now   - dvt ppx - lovenox 30  11/4 - Hb 9.6        Depression    - continue mirtazapine   - trazadone PRN          Hypothyroidism    - continue  levothyroxine            VTE Risk Mitigation         Ordered     enoxaparin injection 30 mg  Daily     Route:  Subcutaneous        11/03/17 1113     Place BEE hose  Until discontinued      11/03/17 1116     Place sequential compression device  Until discontinued      11/03/17 1116              Selma Ndiaye MD  Department of Hospital Medicine   [facility]

## 2017-11-05 LAB
ANION GAP SERPL CALC-SCNC: 9 MMOL/L
BASOPHILS # BLD AUTO: 0.03 K/UL
BASOPHILS NFR BLD: 0.4 %
BUN SERPL-MCNC: 15 MG/DL
CALCIUM SERPL-MCNC: 8.5 MG/DL
CHLORIDE SERPL-SCNC: 96 MMOL/L
CO2 SERPL-SCNC: 25 MMOL/L
CREAT SERPL-MCNC: 1.4 MG/DL
DIFFERENTIAL METHOD: ABNORMAL
EOSINOPHIL # BLD AUTO: 0.2 K/UL
EOSINOPHIL NFR BLD: 3.3 %
ERYTHROCYTE [DISTWIDTH] IN BLOOD BY AUTOMATED COUNT: 14.8 %
EST. GFR  (AFRICAN AMERICAN): 53 ML/MIN/1.73 M^2
EST. GFR  (NON AFRICAN AMERICAN): 45.8 ML/MIN/1.73 M^2
GLUCOSE SERPL-MCNC: 87 MG/DL
HCT VFR BLD AUTO: 28.1 %
HGB BLD-MCNC: 9.3 G/DL
IMM GRANULOCYTES # BLD AUTO: 0.05 K/UL
IMM GRANULOCYTES NFR BLD AUTO: 0.7 %
LYMPHOCYTES # BLD AUTO: 1.7 K/UL
LYMPHOCYTES NFR BLD: 23.7 %
MAGNESIUM SERPL-MCNC: 2.2 MG/DL
MCH RBC QN AUTO: 27.5 PG
MCHC RBC AUTO-ENTMCNC: 33.1 G/DL
MCV RBC AUTO: 83 FL
MONOCYTES # BLD AUTO: 0.7 K/UL
MONOCYTES NFR BLD: 10.2 %
NEUTROPHILS # BLD AUTO: 4.3 K/UL
NEUTROPHILS NFR BLD: 61.7 %
NRBC BLD-RTO: 0 /100 WBC
PHOSPHATE SERPL-MCNC: 3.1 MG/DL
PLATELET # BLD AUTO: 238 K/UL
PMV BLD AUTO: 8.8 FL
POTASSIUM SERPL-SCNC: 3.8 MMOL/L
RBC # BLD AUTO: 3.38 M/UL
SODIUM SERPL-SCNC: 130 MMOL/L
WBC # BLD AUTO: 6.99 K/UL

## 2017-11-05 PROCEDURE — 36415 COLL VENOUS BLD VENIPUNCTURE: CPT

## 2017-11-05 PROCEDURE — 97116 GAIT TRAINING THERAPY: CPT

## 2017-11-05 PROCEDURE — 99232 SBSQ HOSP IP/OBS MODERATE 35: CPT | Mod: ,,, | Performed by: HOSPITALIST

## 2017-11-05 PROCEDURE — 63600175 PHARM REV CODE 636 W HCPCS: Performed by: HOSPITALIST

## 2017-11-05 PROCEDURE — 80048 BASIC METABOLIC PNL TOTAL CA: CPT

## 2017-11-05 PROCEDURE — 85025 COMPLETE CBC W/AUTO DIFF WBC: CPT

## 2017-11-05 PROCEDURE — 97110 THERAPEUTIC EXERCISES: CPT

## 2017-11-05 PROCEDURE — 83735 ASSAY OF MAGNESIUM: CPT

## 2017-11-05 PROCEDURE — 11000001 HC ACUTE MED/SURG PRIVATE ROOM

## 2017-11-05 PROCEDURE — 84100 ASSAY OF PHOSPHORUS: CPT

## 2017-11-05 PROCEDURE — 25000003 PHARM REV CODE 250: Performed by: HOSPITALIST

## 2017-11-05 PROCEDURE — 97530 THERAPEUTIC ACTIVITIES: CPT

## 2017-11-05 RX ORDER — ACETAMINOPHEN 325 MG/1
650 TABLET ORAL EVERY 8 HOURS PRN
Status: DISCONTINUED | OUTPATIENT
Start: 2017-11-05 | End: 2017-11-06 | Stop reason: HOSPADM

## 2017-11-05 RX ORDER — AMOXICILLIN 250 MG
1 CAPSULE ORAL 2 TIMES DAILY
Status: DISCONTINUED | OUTPATIENT
Start: 2017-11-05 | End: 2017-11-06 | Stop reason: HOSPADM

## 2017-11-05 RX ORDER — POLYETHYLENE GLYCOL 3350 17 G/17G
17 POWDER, FOR SOLUTION ORAL 2 TIMES DAILY
Status: DISCONTINUED | OUTPATIENT
Start: 2017-11-05 | End: 2017-11-06 | Stop reason: HOSPADM

## 2017-11-05 RX ORDER — HEPARIN SODIUM 5000 [USP'U]/ML
5000 INJECTION, SOLUTION INTRAVENOUS; SUBCUTANEOUS EVERY 8 HOURS
Status: DISCONTINUED | OUTPATIENT
Start: 2017-11-05 | End: 2017-11-05

## 2017-11-05 RX ORDER — AMLODIPINE BESYLATE 10 MG/1
10 TABLET ORAL DAILY
Status: DISCONTINUED | OUTPATIENT
Start: 2017-11-05 | End: 2017-11-06 | Stop reason: HOSPADM

## 2017-11-05 RX ADMIN — ACETAMINOPHEN 650 MG: 325 TABLET ORAL at 03:11

## 2017-11-05 RX ADMIN — AMLODIPINE BESYLATE 10 MG: 10 TABLET ORAL at 08:11

## 2017-11-05 RX ADMIN — ASPIRIN 325 MG ORAL TABLET 325 MG: 325 PILL ORAL at 08:11

## 2017-11-05 RX ADMIN — LEVOTHYROXINE SODIUM 50 MCG: 50 TABLET ORAL at 05:11

## 2017-11-05 RX ADMIN — STANDARDIZED SENNA CONCENTRATE AND DOCUSATE SODIUM 1 TABLET: 8.6; 5 TABLET, FILM COATED ORAL at 08:11

## 2017-11-05 RX ADMIN — MIRTAZAPINE 30 MG: 15 TABLET, FILM COATED ORAL at 09:11

## 2017-11-05 RX ADMIN — ENOXAPARIN SODIUM 40 MG: 100 INJECTION SUBCUTANEOUS at 06:11

## 2017-11-05 RX ADMIN — ASPIRIN 325 MG ORAL TABLET 325 MG: 325 PILL ORAL at 09:11

## 2017-11-05 RX ADMIN — SIMVASTATIN 40 MG: 20 TABLET, FILM COATED ORAL at 09:11

## 2017-11-05 RX ADMIN — POLYETHYLENE GLYCOL 3350 17 G: 17 POWDER, FOR SOLUTION ORAL at 08:11

## 2017-11-05 NOTE — PLAN OF CARE
Problem: Patient Care Overview  Goal: Plan of Care Review  Outcome: Ongoing (interventions implemented as appropriate)  Pt AAO x 3 Pt reports no N/V overnight. Pain managed. Pt free from falls, Q 2 hour monitoring for pain and safety, SCD's on. Call light in reach.

## 2017-11-05 NOTE — PLAN OF CARE
Problem: Physical Therapy Goal  Goal: Physical Therapy Goal  Goals to be met by: 2017     Patient will increase functional independence with mobility by performin. Supine to sit with Contact Guard Assistance  2. Sit to supine with Contact Guard Assistance  3. Sit to stand transfer with Contact Guard Assistance  4. Bed to chair transfer with Contact Guard Assistance using Rolling/standard Walker  5. Gait  x 100 feet with Stand-by Assistance using Rolling/standard Walker.   6. Lower extremity exercise program x15 reps per handout, with assistance as needed     Outcome: Ongoing (interventions implemented as appropriate)  Patient fatigued very easily with gait training, but showed good endurance with There Ex.

## 2017-11-05 NOTE — ASSESSMENT & PLAN NOTE
Trending down likely due to hypovolemia from partial bowel obstruction   - fluid challenge s/p 2 L NS   - monitor and re-assess in am  Normal saline 100 cc per hour.  Clear liquid diet  11/4 Na 125 -> 129, pt reports he was not eating much on the SNF  11/5 - Na 130

## 2017-11-05 NOTE — PROGRESS NOTES
[facility] Hospital Medicine  Progress Note    Patient Name: Chucho Prado  MRN: [id number]  Patient Class: IP- Inpatient   Admission Date: 11/2/2017  Length of Stay: 3 days  Attending Physician: Selma Ndiaye MD  Primary Care Provider: Obed Mcguire MD    Hospital Medicine Team: [facility] [place] [facility]P MED G Selma Ndiaye MD    Subjective:     Principal Problem:Partial intestinal obstruction    HPI:  Mr. Prado is a 83 yo man from [facility] with recent left TKA 10/25/17, flank hernia, CKD stage 3, HLD, HTN, hypothyroid, depression who presented to ED on 11/2 with nausea and non-bloody vomiting since 10/27. There was concern for small bowel obstruction due to abdominal xray at [place], so pt was sent to ED. Last solid bowel movement was before surgery, though patient has had multiple loose Bms and continues to pas gas. Denies stomach cramping or enlargement of his hernia that has been present for years.     In Ed, CT abdomen was done which revealed partial small obstruction secondary to hernia. Patient was given 2 L NS and general surgery was consulted.    Hospital Course:  11/3 - pt is comfortable, no abd pain, denies flattus or BMs,  Reviewed CT abd - dilaated loops of Small bowel, right hernia, sequella of constipation.  Will give enema today.      11/4 - feels better, + Flattus, xray abd neg for stool, abnormal gas pattern.will advance diet  11/5 - much better mentally, conversing well. Eating. No BMs yet.    Interval History: no  No or V    Review of Systems   Constitutional: Negative for chills, diaphoresis and fatigue.   Respiratory: Negative for cough, choking and shortness of breath.    Cardiovascular: Negative for chest pain and leg swelling.   Gastrointestinal: Negative for abdominal distention, abdominal pain, blood in stool, constipation, nausea and vomiting.   Genitourinary: Negative for difficulty urinating and flank pain.   Musculoskeletal: Negative for back pain, myalgias and  neck stiffness.        Left knee brace      Skin: Negative for rash.   Psychiatric/Behavioral: Negative for agitation, behavioral problems, confusion and decreased concentration.     Objective:     Vital Signs (Most Recent):  Temp: 97.5 °F (36.4 °C) (11/05/17 1129)  Pulse: 72 (11/05/17 1129)  Resp: 18 (11/05/17 1129)  BP: (!) 141/65 (11/05/17 1129)  SpO2: 96 % (11/05/17 1129) Vital Signs (24h Range):  Temp:  [96.8 °F (36 °C)-97.8 °F (36.6 °C)] 97.5 °F (36.4 °C)  Pulse:  [60-80] 72  Resp:  [18-20] 18  SpO2:  [93 %-97 %] 96 %  BP: (141-164)/(65-76) 141/65     Weight: 83.9 kg (185 lb)  Body mass index is 28.13 kg/m².    Intake/Output Summary (Last 24 hours) at 11/05/17 1152  Last data filed at 11/05/17 1000   Gross per 24 hour   Intake             1160 ml   Output             1600 ml   Net             -440 ml      Physical Exam   Constitutional: He is oriented to person, place, and time. He appears well-developed and well-nourished.   HENT:   Head: Normocephalic and atraumatic.   Mouth/Throat: Oropharynx is clear and moist.   Eyes: EOM are normal. Pupils are equal, round, and reactive to light. No scleral icterus.   Neck: Normal range of motion. Neck supple.   Cardiovascular: Normal rate, regular rhythm and normal heart sounds.    Pulmonary/Chest: Effort normal and breath sounds normal. He has no wheezes. He has no rales.   Abdominal: Soft. Bowel sounds are normal. He exhibits no distension and no mass. There is no tenderness. There is no rebound and no guarding. A hernia is present.   Musculoskeletal: Normal range of motion. He exhibits no edema, tenderness or deformity.   Neurological: He is alert and oriented to person, place, and time.   Skin: Skin is warm and dry. No rash noted. He is not diaphoretic.   Psychiatric: He has a normal mood and affect. His behavior is normal. Thought content normal.       Significant Labs:   CBC:     Recent Labs  Lab 11/04/17  0533 11/05/17  0450   WBC 5.83 6.99   HGB 9.6* 9.3*   HCT  29.4* 28.1*    238     CMP:     Recent Labs  Lab 11/04/17  0533 11/05/17  0450   * 130*   K 3.9 3.8   CL 97 96   CO2 24 25   GLU 73 87   BUN 16 15   CREATININE 1.2 1.4   CALCIUM 8.3* 8.5*   ANIONGAP 8 9   EGFRNONAA 55.2* 45.8*         Assessment/Plan:      * Partial intestinal obstruction    Patient with large flank hernia that has been present for years is likely culprit of nausea/vomiting and partial obstruction though post-op ileus due to recent left TKA 10/25/17 is a possibility. Patient is having loose bowel movements and passing gas. No longer complaining of nausea vomtiing Appreciate surgery reccs    - NPO   - Will hold off on NG unless pt has emesis with nausea   - soap suds enema ordered   - monitor electrolytes, will give Mg   - s/p 2 L NS    11/3 - associated w constipation, enema today, hold constipating meds, normal saline  11/4 - resolving, continue LR, full liquid diet  11/5 - on regular diet, no BMs recorded yesterday            Hyponatremia    Trending down likely due to hypovolemia from partial bowel obstruction   - fluid challenge s/p 2 L NS   - monitor and re-assess in am  Normal saline 100 cc per hour.  Clear liquid diet  11/4 Na 125 -> 129, pt reports he was not eating much on the SNF  11/5 - Na 130        CKD (chronic kidney disease) stage 3, GFR 30-59 ml/min    Creatinine fluctuates, at baseline- avoid nephrotoxic meds   11/5 - cr 1.6 -> 1.2-> 1.4          Status post total left knee replacement    L TKR done on 10/25   - continue asa BID   - DVT ppx - Lovenox 30, SCDs and BEE  - PT ordered          Flank hernia    Present for years, non-reducible. Per patient has not increased in size recently. No cramping. Continues to pass gas and have loose bowel movements.    - monitor          Anemia    Hg on 10/23 12.8, now 10.6   - monitor   - continue asa for now   - dvt ppx - lovenox 30  11/5 - Hb 9.6 - > 9.3        Depression    - continue mirtazapine - may need to discontinue if  hyponatremia fails to improve   - trazadone PRN          Essential hypertension    11/4 - norvasc 5  Started  11/5 - increase norvasc 10, avoid HCTZ due to hyponatremia          Hypothyroidism    - continue levothyroxine            VTE Risk Mitigation         Ordered     High Risk of VTE  Once      11/05/17 0420     enoxaparin injection 40 mg  Daily     Route:  Subcutaneous        11/04/17 1210     Place BEE hose  Until discontinued      11/03/17 1116     Place sequential compression device  Until discontinued      11/03/17 1116              Selma Ndiaye MD  Department of Hospital Medicine   [facility]

## 2017-11-05 NOTE — ASSESSMENT & PLAN NOTE
- continue mirtazapine - may need to discontinue if hyponatremia fails to improve   - trazadone PRN

## 2017-11-05 NOTE — ASSESSMENT & PLAN NOTE
Patient with large flank hernia that has been present for years is likely culprit of nausea/vomiting and partial obstruction though post-op ileus due to recent left TKA 10/25/17 is a possibility. Patient is having loose bowel movements and passing gas. No longer complaining of nausea vomtiing Appreciate surgery reccs    - NPO   - Will hold off on NG unless pt has emesis with nausea   - soap suds enema ordered   - monitor electrolytes, will give Mg   - s/p 2 L NS    11/3 - associated w constipation, enema today, hold constipating meds, normal saline  11/4 - resolving, continue LR, full liquid diet  11/5 - on regular diet, no BMs recorded yesterday

## 2017-11-05 NOTE — SUBJECTIVE & OBJECTIVE
Interval History: no  No or V    Review of Systems   Constitutional: Negative for chills, diaphoresis and fatigue.   Respiratory: Negative for cough, choking and shortness of breath.    Cardiovascular: Negative for chest pain and leg swelling.   Gastrointestinal: Negative for abdominal distention, abdominal pain, blood in stool, constipation, nausea and vomiting.   Genitourinary: Negative for difficulty urinating and flank pain.   Musculoskeletal: Negative for back pain, myalgias and neck stiffness.        Left knee brace      Skin: Negative for rash.   Psychiatric/Behavioral: Negative for agitation, behavioral problems, confusion and decreased concentration.     Objective:     Vital Signs (Most Recent):  Temp: 97.5 °F (36.4 °C) (11/05/17 1129)  Pulse: 72 (11/05/17 1129)  Resp: 18 (11/05/17 1129)  BP: (!) 141/65 (11/05/17 1129)  SpO2: 96 % (11/05/17 1129) Vital Signs (24h Range):  Temp:  [96.8 °F (36 °C)-97.8 °F (36.6 °C)] 97.5 °F (36.4 °C)  Pulse:  [60-80] 72  Resp:  [18-20] 18  SpO2:  [93 %-97 %] 96 %  BP: (141-164)/(65-76) 141/65     Weight: 83.9 kg (185 lb)  Body mass index is 28.13 kg/m².    Intake/Output Summary (Last 24 hours) at 11/05/17 1152  Last data filed at 11/05/17 1000   Gross per 24 hour   Intake             1160 ml   Output             1600 ml   Net             -440 ml      Physical Exam   Constitutional: He is oriented to person, place, and time. He appears well-developed and well-nourished.   HENT:   Head: Normocephalic and atraumatic.   Mouth/Throat: Oropharynx is clear and moist.   Eyes: EOM are normal. Pupils are equal, round, and reactive to light. No scleral icterus.   Neck: Normal range of motion. Neck supple.   Cardiovascular: Normal rate, regular rhythm and normal heart sounds.    Pulmonary/Chest: Effort normal and breath sounds normal. He has no wheezes. He has no rales.   Abdominal: Soft. Bowel sounds are normal. He exhibits no distension and no mass. There is no tenderness. There is no  rebound and no guarding. A hernia is present.   Musculoskeletal: Normal range of motion. He exhibits no edema, tenderness or deformity.   Neurological: He is alert and oriented to person, place, and time.   Skin: Skin is warm and dry. No rash noted. He is not diaphoretic.   Psychiatric: He has a normal mood and affect. His behavior is normal. Thought content normal.       Significant Labs:   CBC:     Recent Labs  Lab 11/04/17 0533 11/05/17  0450   WBC 5.83 6.99   HGB 9.6* 9.3*   HCT 29.4* 28.1*    238     CMP:     Recent Labs  Lab 11/04/17 0533 11/05/17  0450   * 130*   K 3.9 3.8   CL 97 96   CO2 24 25   GLU 73 87   BUN 16 15   CREATININE 1.2 1.4   CALCIUM 8.3* 8.5*   ANIONGAP 8 9   EGFRNONAA 55.2* 45.8*

## 2017-11-05 NOTE — PT/OT/SLP PROGRESS
"Physical Therapy  Treatment    Chucho Prado   MRN: [id number]   Admitting Diagnosis: Partial intestinal obstruction    PT Received On: 11/05/17  PT Start Time: 1414     PT Stop Time: 1454    PT Total Time (min): 40 min       Billable Minutes:  Gait Training 10, Therapeutic Activity 10 and Therapeutic Exercise 20    Treatment Type: Treatment  PT/PTA: PTA     PTA Visit Number: 1       General Precautions: Standard, fall  Orthopedic Precautions: LLE weight bearing as tolerated   Braces: N/A         Subjective:  Communicated with NSG prior to session.  Patient states " I have a little bit of pain now, but when I move is when it hurts more".    Pain/Comfort  Pain Rating 1: 4/10  Location - Side 1: Left  Location - Orientation 1: generalized  Location 1: knee  Pain Addressed 1: Pre-medicate for activity, Reposition, Distraction  Pain Rating Post-Intervention 1: 3/10    Objective:   Patient found with: SCD, FCD, galvan catheter    Functional Mobility:  Bed Mobility:   Rolling/Turning to Left: Stand by assistance, With side rail  Scooting/Bridging: Maximum Assistance, With assist of 2  Supine to Sit: Minimum Assistance, With side rail  Sit to Supine: Minimum Assistance, With leg lift, With siderail    Transfers:  Sit <> Stand Assistance: Moderate Assistance  Sit <> Stand Assistive Device: Standard Walker  Bed <> Chair Technique: Stand Pivot  Bed <> Chair Assistance: Minimum Assistance  Bed <> Chair Assistive Device: Standard Walker    Gait:   Gait Distance: ~30 ft with cues for proper step sequence and SW management.  Assistance 1: Contact Guard Assistance, Minimum assistance  Gait Assistive Device: Standard walker  Gait Pattern: 3-point gait  Gait Deviation(s): decreased eri, increased time in double stance, decreased velocity of limb motion, decreased step length, decreased stride length, decreased swing-to-stance ratio, decreased weight-shifting ability    Stairs:  N/T    Balance:   Static Sit: GOOD-: Takes " MODERATE challenges from all directions but inconsistently  Dynamic Sit: GOOD-: Maintains balance through MODERATE excursions of active trunk movement,     Static Stand: FAIR+: Takes MINIMAL challenges from all directions  Dynamic stand: FAIR: Needs CONTACT GUARD during gait     Therapeutic Activities and Exercises:  Donned/Doffed a gown. Static standing balance at EOB to correct posture. There ex in supine: SAQ, HS, AP, QUAD SETS AND GLUT SETS 2X15 REPS B LE. Donned FCD and elavated L LE on pillow for comfort.     AM-PAC 6 CLICK MOBILITY  How much help from another person does this patient currently need?   1 = Unable, Total/Dependent Assistance  2 = A lot, Maximum/Moderate Assistance  3 = A little, Minimum/Contact Guard/Supervision  4 = None, Modified [place]/Independent    Turning over in bed (including adjusting bedclothes, sheets and blankets)?: 3  Sitting down on and standing up from a chair with arms (e.g., wheelchair, bedside commode, etc.): 2  Moving from lying on back to sitting on the side of the bed?: 3  Moving to and from a bed to a chair (including a wheelchair)?: 3  Need to walk in hospital room?: 3  Climbing 3-5 steps with a railing?: 1  Total Score: 15    AM-PAC Raw Score CMS G-Code Modifier Level of Impairment Assistance   6 % Total / Unable   7 - 9 CM 80 - 100% Maximal Assist   10 - 14 CL 60 - 80% Moderate Assist   15 - 19 CK 40 - 60% Moderate Assist   20 - 22 CJ 20 - 40% Minimal Assist   23 CI 1-20% SBA / CGA   24 CH 0% Independent/ Mod I     Patient left HOB elevated with all lines intact, call button in reach and sitter present.    Assessment:  Chucho Prado is a 84 y.o. male with a medical diagnosis of Partial intestinal obstruction and presents with decreased endurance. Patient showed difficulty with bed mobility and transfers due to generalized weakness and postural deficits due to scoliosis and kyphosis. Patient appeared to fatigue very easily with activities in  standing position, but showed good compliance and participation. Patient would benefit from continued P.T. To address deficits.    Rehab identified problem list/impairments: Rehab identified problem list/impairments: weakness, impaired endurance, impaired self care skills, impaired functional mobilty, impaired balance, decreased safety awareness, pain, decreased ROM, edema    Rehab potential is good.    Activity tolerance: Fair    Discharge recommendations: Discharge Facility/Level Of Care Needs: nursing facility, skilled (Short stay)     Barriers to discharge: Barriers to Discharge: Decreased caregiver support    Equipment recommendations: Equipment Needed After Discharge: none     GOALS:    Physical Therapy Goals        Problem: Physical Therapy Goal    Goal Priority Disciplines Outcome Goal Variances Interventions   Physical Therapy Goal     PT/OT, PT Ongoing (interventions implemented as appropriate)     Description:  Goals to be met by: 2017     Patient will increase functional independence with mobility by performin. Supine to sit with Contact Guard Assistance  2. Sit to supine with Contact Guard Assistance  3. Sit to stand transfer with Contact Guard Assistance  4. Bed to chair transfer with Contact Guard Assistance using Rolling/standard Walker  5. Gait  x 100 feet with Stand-by Assistance using Rolling/standard Walker.   6. Lower extremity exercise program x15 reps per handout, with assistance as needed                      PLAN:    Patient to be seen 5 x/week  to address the above listed problems via gait training, therapeutic activities, therapeutic exercises, neuromuscular re-education  Plan of Care expires: 17  Plan of Care reviewed with: patient, caregiver         Dileep Hampton, PTA  2017

## 2017-11-05 NOTE — ASSESSMENT & PLAN NOTE
Hg on 10/23 12.8, now 10.6   - monitor   - continue asa for now   - dvt ppx - lovenox 30  11/5 - Hb 9.6 - > 9.3

## 2017-11-06 VITALS
HEART RATE: 70 BPM | BODY MASS INDEX: 28.04 KG/M2 | RESPIRATION RATE: 18 BRPM | WEIGHT: 185 LBS | TEMPERATURE: 97 F | OXYGEN SATURATION: 95 % | SYSTOLIC BLOOD PRESSURE: 132 MMHG | HEIGHT: 68 IN | DIASTOLIC BLOOD PRESSURE: 62 MMHG

## 2017-11-06 LAB
ANION GAP SERPL CALC-SCNC: 9 MMOL/L
BASOPHILS # BLD AUTO: 0.04 K/UL
BASOPHILS NFR BLD: 0.6 %
BUN SERPL-MCNC: 13 MG/DL
CALCIUM SERPL-MCNC: 8.5 MG/DL
CHLORIDE SERPL-SCNC: 95 MMOL/L
CO2 SERPL-SCNC: 25 MMOL/L
CREAT SERPL-MCNC: 1.1 MG/DL
DIFFERENTIAL METHOD: ABNORMAL
EOSINOPHIL # BLD AUTO: 0.4 K/UL
EOSINOPHIL NFR BLD: 5.7 %
ERYTHROCYTE [DISTWIDTH] IN BLOOD BY AUTOMATED COUNT: 14.8 %
EST. GFR  (AFRICAN AMERICAN): >60 ML/MIN/1.73 M^2
EST. GFR  (NON AFRICAN AMERICAN): >60 ML/MIN/1.73 M^2
GLUCOSE SERPL-MCNC: 93 MG/DL
HCT VFR BLD AUTO: 30.6 %
HGB BLD-MCNC: 10 G/DL
IMM GRANULOCYTES # BLD AUTO: 0.04 K/UL
IMM GRANULOCYTES NFR BLD AUTO: 0.6 %
LYMPHOCYTES # BLD AUTO: 1.7 K/UL
LYMPHOCYTES NFR BLD: 25.1 %
MAGNESIUM SERPL-MCNC: 1.8 MG/DL
MCH RBC QN AUTO: 27.5 PG
MCHC RBC AUTO-ENTMCNC: 32.7 G/DL
MCV RBC AUTO: 84 FL
MONOCYTES # BLD AUTO: 0.7 K/UL
MONOCYTES NFR BLD: 9.9 %
NEUTROPHILS # BLD AUTO: 4 K/UL
NEUTROPHILS NFR BLD: 58.1 %
NRBC BLD-RTO: 0 /100 WBC
PHOSPHATE SERPL-MCNC: 3.4 MG/DL
PLATELET # BLD AUTO: 219 K/UL
PMV BLD AUTO: 9 FL
POTASSIUM SERPL-SCNC: 3.4 MMOL/L
RBC # BLD AUTO: 3.64 M/UL
SODIUM SERPL-SCNC: 129 MMOL/L
WBC # BLD AUTO: 6.9 K/UL

## 2017-11-06 PROCEDURE — 97535 SELF CARE MNGMENT TRAINING: CPT

## 2017-11-06 PROCEDURE — 36415 COLL VENOUS BLD VENIPUNCTURE: CPT

## 2017-11-06 PROCEDURE — 84100 ASSAY OF PHOSPHORUS: CPT

## 2017-11-06 PROCEDURE — 85025 COMPLETE CBC W/AUTO DIFF WBC: CPT

## 2017-11-06 PROCEDURE — 97116 GAIT TRAINING THERAPY: CPT

## 2017-11-06 PROCEDURE — 99239 HOSP IP/OBS DSCHRG MGMT >30: CPT | Mod: ,,, | Performed by: HOSPITALIST

## 2017-11-06 PROCEDURE — 83735 ASSAY OF MAGNESIUM: CPT

## 2017-11-06 PROCEDURE — G8980 MOBILITY D/C STATUS: HCPCS | Mod: CK

## 2017-11-06 PROCEDURE — 97110 THERAPEUTIC EXERCISES: CPT

## 2017-11-06 PROCEDURE — 80048 BASIC METABOLIC PNL TOTAL CA: CPT

## 2017-11-06 PROCEDURE — 97166 OT EVAL MOD COMPLEX 45 MIN: CPT

## 2017-11-06 PROCEDURE — G8979 MOBILITY GOAL STATUS: HCPCS | Mod: CK

## 2017-11-06 PROCEDURE — 25000003 PHARM REV CODE 250: Performed by: HOSPITALIST

## 2017-11-06 RX ORDER — POTASSIUM CHLORIDE 20 MEQ/1
40 TABLET, EXTENDED RELEASE ORAL ONCE
Status: COMPLETED | OUTPATIENT
Start: 2017-11-06 | End: 2017-11-06

## 2017-11-06 RX ORDER — POLYETHYLENE GLYCOL 3350 17 G/17G
17 POWDER, FOR SOLUTION ORAL 2 TIMES DAILY
Qty: 12 EACH | Refills: 0 | Status: SHIPPED | OUTPATIENT
Start: 2017-11-06 | End: 2018-10-09

## 2017-11-06 RX ORDER — AMLODIPINE BESYLATE 10 MG/1
10 TABLET ORAL DAILY
Qty: 30 TABLET | Refills: 11 | Status: SHIPPED | OUTPATIENT
Start: 2017-11-07 | End: 2018-09-18 | Stop reason: SDUPTHER

## 2017-11-06 RX ORDER — AMOXICILLIN 250 MG
1 CAPSULE ORAL 2 TIMES DAILY
COMMUNITY
Start: 2017-11-06 | End: 2019-04-04

## 2017-11-06 RX ADMIN — ASPIRIN 325 MG ORAL TABLET 325 MG: 325 PILL ORAL at 08:11

## 2017-11-06 RX ADMIN — STANDARDIZED SENNA CONCENTRATE AND DOCUSATE SODIUM 1 TABLET: 8.6; 5 TABLET, FILM COATED ORAL at 08:11

## 2017-11-06 RX ADMIN — LEVOTHYROXINE SODIUM 50 MCG: 50 TABLET ORAL at 05:11

## 2017-11-06 RX ADMIN — POLYETHYLENE GLYCOL 3350 17 G: 17 POWDER, FOR SOLUTION ORAL at 08:11

## 2017-11-06 RX ADMIN — POTASSIUM CHLORIDE 40 MEQ: 1500 TABLET, EXTENDED RELEASE ORAL at 08:11

## 2017-11-06 RX ADMIN — AMLODIPINE BESYLATE 10 MG: 10 TABLET ORAL at 08:11

## 2017-11-06 NOTE — NURSING
Pt discharged in stable condition, discharge instructions and prescriptions given, pt verbalized understanding. Patient waiting on transportation. Jovanna Askew RN

## 2017-11-06 NOTE — PT/OT/SLP DISCHARGE
Physical Therapy Discharge Summary    Chucho Prado  MRN: [id number]   Partial intestinal obstruction   Patient Discharged from acute Physical Therapy on 2017.  Please refer to prior PT noted date on 2017 for functional status.     Assessment:   Patient has not met goals.  GOALS:    Physical Therapy Goals        Problem: Physical Therapy Goal    Goal Priority Disciplines Outcome Goal Variances Interventions   Physical Therapy Goal     PT/OT, PT      Description:  Goals to be met by: 2017     Patient will increase functional independence with mobility by performin. Supine to sit with Contact Guard Assistance  2. Sit to supine with Contact Guard Assistance  3. Sit to stand transfer with Contact Guard Assistance  4. Bed to chair transfer with Contact Guard Assistance using Rolling/standard Walker  5. Gait  x 100 feet with Stand-by Assistance using Rolling/standard Walker.   6. Lower extremity exercise program x15 reps per handout, with assistance as needed                     Reasons for Discontinuation of Therapy Services  Transfer to alternate level of care.      Plan:  Patient Discharged to: Home with Home Health Service.    Liban Copeland, PT  2017

## 2017-11-06 NOTE — ASSESSMENT & PLAN NOTE
Hg on 10/23 12.8, now 10.6   - monitor   - continue asa for now   - dvt ppx - lovenox 30  11/5 - Hb 9.6 - > 9.3 -> 10

## 2017-11-06 NOTE — NURSING
Pt noted to have blisters x 2 on right medial buttock on 0830 assessment. Blisters closed and intact. Wound consult placed

## 2017-11-06 NOTE — DISCHARGE SUMMARY
[facility] Hospital Medicine  Discharge Summary      Patient Name: Chucho Prado  MRN: [id number]  Admission Date: 11/2/2017  Hospital Length of Stay: 4 days  Discharge Date and Time: No discharge date for patient encounter.  Attending Physician: Selma Ndiaye MD   Discharging Provider: Selma Ndiaye MD  Primary Care Provider: Obed Mcguire MD  Hospital Medicine Team: [facility] MED  Selma Ndiaye MD    HPI:   Mr. Prado is a 85 yo man from [facility] with recent left TKA 10/25/17, flank hernia, chronic superpubic catheter, CKD stage 3, HLD, HTN, hypothyroid, depression who presented to ED on 11/2 with nausea and non-bloody vomiting since 10/27. There was concern for small bowel obstruction due to abdominal xray at [place], so pt was sent to ED. Last solid bowel movement was before surgery, though patient has had multiple loose Bms and continues to pas gas. Denies stomach cramping or enlargement of his hernia that has been present for years.     In Ed, CT abdomen was done which revealed partial small obstruction secondary to hernia. Patient was given 2 L NS and general surgery was consulted.    * No surgery found *      Hospital Course:   11/3 - pt is comfortable, no abd pain, denies flattus or BMs,  Reviewed CT abd - dilaated loops of Small bowel, right hernia, sequella of constipation.  Will give enema today.      11/4 - feels better, + Flattus, xray abd neg for stool, abnormal gas pattern.will advance diet  11/5 - much better mentally, conversing well. Eating. No BMs yet.  11/6 - working w PT today, expresssed that he would prefer to go home. Has day time sitters. Eating, superpubic catheter is changed monthly.      Consults:   Consults         Status Ordering Provider     Inpatient consult to General surgery  Once     Provider:  (Not yet assigned)    Completed DEEPA COLIN     Inpatient consult to General surgery  Once     Provider:  (Not yet assigned)    Completed  DALLIN SOL          * Partial intestinal obstruction    Patient with large flank hernia that has been present for years is likely culprit of nausea/vomiting and partial obstruction though post-op ileus due to recent left TKA 10/25/17 is a possibility. Patient is having loose bowel movements and passing gas. No longer complaining of nausea vomtiing Appreciate surgery reccs    - NPO   - Will hold off on NG unless pt has emesis with nausea   - soap suds enema ordered   - monitor electrolytes, will give Mg   - s/p 2 L NS    11/3 - associated w constipation, enema today, hold constipating meds, normal saline  11/4 - resolving, continue LR, full liquid diet  11/5 - on regular diet, no BMs recorded yesterday  11/6 - doing well, will dc to home w HH and PT, has sitters 8 am till 11 pm             Hyponatremia    Trending down likely due to hypovolemia from partial bowel obstruction   - fluid challenge s/p 2 L NS   - monitor and re-assess in am  Normal saline 100 cc per hour.  Clear liquid diet  11/4 Na 125 -> 129, pt reports he was not eating much on the SN[facility]  11/5 - Na 130  11/6 - chronic continue free water restriction        CKD (chronic kidney disease) stage 3, GFR 30-59 ml/min    Creatinine fluctuates, at baseline- avoid nephrotoxic meds   11/6 - cr 1.6 -> 1.2-> 1.4 -> 1.1          Status post total left knee replacement    L TKR done on 10/25   - continue asa 325  BID per ortho  - DVT ppx - Lovenox 30, SCDs and BEE given while here.  - PT ordered          Flank hernia    Present for years, non-reducible. Per patient has not increased in size recently. No cramping. Continues to pass gas and have loose bowel movements.    - monitor          Anemia    Hg on 10/23 12.8, now 10.6   - monitor   - continue asa for now   - dvt ppx - lovenox 30  11/5 - Hb 9.6 - > 9.3 -> 10        Depression    - continue mirtazapine -discontinue  Due to hyponatremia  - trazadone PRN          Essential hypertension    11/4 -  norvasc 5  Started  11/5 - increase norvasc 10, avoid HCTZ due to hyponatremia          Hypothyroidism    - continue levothyroxine            Final Active Diagnoses:    Diagnosis Date Noted POA    PRINCIPAL PROBLEM:  Partial intestinal obstruction [K56.600] 11/02/2017 Yes    Hyponatremia [E87.1] 10/23/2017 Yes    CKD (chronic kidney disease) stage 3, GFR 30-59 ml/min [N18.3] 12/26/2016 Yes    Status post total left knee replacement [Z96.652] 11/03/2017 Not Applicable    Flank hernia [K45.8] 11/03/2017 Yes    Hypothyroidism [E03.9] 07/30/2013 Yes     Chronic    Depression [F32.9] 07/30/2013 Yes     Chronic    Anemia [D64.9] 07/30/2013 Yes    Essential hypertension [I10] 07/30/2013 Yes     Chronic      Problems Resolved During this Admission:    Diagnosis Date Noted Date Resolved POA       Discharged Condition: good    Disposition: Home or Self Care    Follow Up:  Follow-up Information     Obed Mcguire MD.    Specialty:  Internal Medicine  Contact information:  [address]  [place] [id number] [phone]                 Patient Instructions:     Diet general     Diet general     Activity as tolerated     Call MD for:  temperature >100.4     Call MD for:  persistent nausea and vomiting or diarrhea     Activity as tolerated         Significant Diagnostic Studies: Labs:   CMP   Recent Labs  Lab 11/05/17 0450 11/06/17 0458   * 129*   K 3.8 3.4*   CL 96 95   CO2 25 25   GLU 87 93   BUN 15 13   CREATININE 1.4 1.1   CALCIUM 8.5* 8.5*   ANIONGAP 9 9   ESTGFRAFRICA 53.0* >60.0   EGFRNONAA 45.8* >60.0    and CBC   Recent Labs  Lab 11/05/17 0450 11/06/17 0458   WBC 6.99 6.90   HGB 9.3* 10.0*   HCT 28.1* 30.6*    219       Pending Diagnostic Studies:     None         Medications:  Reconciled Home Medications:   Current Discharge Medication List      START taking these medications    Details   amLODIPine (NORVASC) 10 MG tablet Take 1 tablet (10 mg total) by mouth once daily.  Qty: 30 tablet,  Refills: 11      polyethylene glycol (GLYCOLAX) 17 gram PwPk Take 17 g by mouth 2 (two) times daily.  Qty: 12 each, Refills: 0      senna-docusate 8.6-50 mg (PERICOLACE) 8.6-50 mg per tablet Take 1 tablet by mouth 2 (two) times daily.         CONTINUE these medications which have NOT CHANGED    Details   aspirin 325 MG tablet Take 1 tablet (325 mg total) by mouth 2 (two) times daily.  Qty: 60 tablet, Refills: 0      docusate sodium (COLACE) 100 MG capsule Take 1 capsule (100 mg total) by mouth 2 (two) times daily as needed for Constipation.  Qty: 60 capsule, Refills: 0      hyoscyamine (LEVSIN/SL) 0.125 mg Subl Place 1 tablet (0.125 mg total) under the tongue every 4 (four) hours as needed (bladder spasms).  Qty: 30 tablet, Refills: 1      levothyroxine (SYNTHROID) 50 MCG tablet Take 1 tablet (50 mcg total) by mouth once daily.  Qty: 100 tablet, Refills: 11    Associated Diagnoses: Hypothyroidism, unspecified type      simvastatin (ZOCOR) 40 MG tablet Take 1 tablet (40 mg total) by mouth every evening.  Qty: 90 tablet, Refills: 11    Associated Diagnoses: Dyslipidemia      trazodone (DESYREL) 50 MG tablet Take 1 tablet (50 mg total) by mouth nightly as needed for Insomnia.  Qty: 30 tablet, Refills: 11         STOP taking these medications       mirtazapine (REMERON) 30 MG tablet Comments:   Reason for Stopping:         ondansetron (ZOFRAN-ODT) 8 MG TbDL Comments:   Reason for Stopping:         oxyCODONE-acetaminophen (PERCOCET)  mg per tablet Comments:   Reason for Stopping:         simethicone (MYLICON) 80 MG chewable tablet Comments:   Reason for Stopping:         triamterene-hydrochlorothiazide 37.5-25 mg (DYAZIDE) 37.5-25 mg per capsule Comments:   Reason for Stopping:               Indwelling Lines/Drains at time of discharge:   Lines/Drains/Airways     Drain                 Suprapubic Catheter 06/23/17 1200 18 Fr. 135 days          Epidural Line                 Perineural Analgesia/Anesthesia Assessment  (using dermatomes) Epidural 10/26/17 0837 11 days                Time spent on the discharge of patient: 40  minutes  Patient was seen and examined on the date of discharge and determined to be suitable for discharge.         Selma Ndiaye MD  Department of Hospital Medicine  [facility]

## 2017-11-06 NOTE — ASSESSMENT & PLAN NOTE
Patient with large flank hernia that has been present for years is likely culprit of nausea/vomiting and partial obstruction though post-op ileus due to recent left TKA 10/25/17 is a possibility. Patient is having loose bowel movements and passing gas. No longer complaining of nausea vomtiing Appreciate surgery reccs    - NPO   - Will hold off on NG unless pt has emesis with nausea   - soap suds enema ordered   - monitor electrolytes, will give Mg   - s/p 2 L NS    11/3 - associated w constipation, enema today, hold constipating meds, normal saline  11/4 - resolving, continue LR, full liquid diet  11/5 - on regular diet, no BMs recorded yesterday  11/6 - doing well, will dc to home w HH and PT, has sitters 8 am till 11 pm

## 2017-11-06 NOTE — PLAN OF CARE
Plan is to discharge home with [facility] .    Future Appointments  Date Time Provider Department Center   11/7/2017 1:30 PM Neeta Dunne PA-C [facility] ORTHO [facility]   11/17/2017 10:30 AM Obed Mcguire MD [facility] IM [facility] PCW   11/29/2017 1:30 PM Trinity Vazquez MD [facility]   11/29/2017 2:40 PM Neelam Hooper NP [facility]        11/06/17 0694   Final Note   Assessment Type Final Discharge Note   Discharge Disposition Home-[facility]   Hospital Follow Up  Appt(s) scheduled? Yes   Discharge plans and expectations educations in teach back method with documentation complete? Yes   Right Care Referral Info   Post Acute Recommendation Home-care   Referral Type Home Health   Facility Name [facility]

## 2017-11-06 NOTE — PROGRESS NOTES
Attempted to see for skin concerns.  Asked to return at a later time as pt was eating his lunch.   Katelyn Herrera RN CWON  [id number]

## 2017-11-06 NOTE — PLAN OF CARE
TAYLER met with pt in his room to discuss DC options.  Pt stated that he thought [facility] was the last agency that he had, but he was not sure.  TAYLER called Yolanda and was told that she was seeing that pt was with [facility] in the past, but since pt named [facility], she would check in with him and let me know.  Yolanda advised SW to send referral to [facility] and if this was incorrect, she would be in touch.  ADDENDUM: 1235pm--TAYLER spoke with Yolanda again and was told that pt confirmed that it was [facility] Not [facility], that he had in the past.  Referral sent to [facility].      Nahed Castro LMSW

## 2017-11-06 NOTE — PT/OT/SLP PROGRESS
Physical Therapy  Treatment    Chucho Prado   MRN: [id number]   Admitting Diagnosis: Partial intestinal obstruction    PT Received On: 11/06/17  PT Start Time: 1106     PT Stop Time: 1144    PT Total Time (min): 38 min       Billable Minutes:  Gait [id number], Therapeutic Activity 8 and Therapeutic Exercise 15    Treatment Type: Treatment  PT/PTA: PTA     PTA Visit Number: 2       General Precautions: Standard, fall  Orthopedic Precautions: LLE weight bearing as tolerated   Braces:           Subjective:  Communicated with RN prior to session.  I am leaving today    Pain/Comfort  Pain Rating 1: 3/10  Location - Side 1: Left  Location - Orientation 1: generalized  Location 1: knee  Pain Addressed 1: Pre-medicate for activity, Reposition, Distraction  Pain Rating Post-Intervention 1: 3/10    Objective:   Patient found with: SCD, FCD, galvan catheter    Functional Mobility:  Bed Mobility:        Transfers:  Sit <> Stand Assistance: Moderate Assistance  Sit <> Stand Assistive Device: Standard Walker (vcs for sequencing and safety)    Gait:   Gait Distance: 36 ft with vcs for sequensing   Assistance 1: Contact Guard Assistance  Gait Assistive Device: Standard walker  Gait Pattern: 3-point gait  Gait Deviation(s): decreased eri, increased time in double stance, decreased step length, decreased stride length, decreased swing-to-stance ratio, decreased weight-shifting ability, decreased velocity of limb motion     Therapeutic Activities and Exercises:  Discussed/educated patient on progress, safety, importance of OOB mobility for improving outcomes,d/c, PT POC   Patient performed therex X 15 reps seated in bedside chair B LE AROM AP, LAQ, Hip Flexion,knee flexion   White board updated     AM-PAC 6 CLICK MOBILITY  How much help from another person does this patient currently need?   1 = Unable, Total/Dependent Assistance  2 = A lot, Maximum/Moderate Assistance  3 = A little, Minimum/Contact Guard/Supervision  4 =  None, Modified [place]/Independent    Turning over in bed (including adjusting bedclothes, sheets and blankets)?: 3  Sitting down on and standing up from a chair with arms (e.g., wheelchair, bedside commode, etc.): 2  Moving from lying on back to sitting on the side of the bed?: 3  Moving to and from a bed to a chair (including a wheelchair)?: 3  Need to walk in hospital room?: 3  Climbing 3-5 steps with a railing?: 1  Total Score: 15    AM-PAC Raw Score CMS G-Code Modifier Level of Impairment Assistance   6 % Total / Unable   7 - 9 CM 80 - 100% Maximal Assist   10 - 14 CL 60 - 80% Moderate Assist   15 - 19 CK 40 - 60% Moderate Assist   20 - 22 CJ 20 - 40% Minimal Assist   23 CI 1-20% SBA / CGA   24 CH 0% Independent/ Mod I     Patient left up in chair with all lines intact, call button in reach, nsg notified and sitter present.    Assessment:  Chucho Prado is a 84 y.o. male with a medical diagnosis of Partial intestinal obstruction and presents with continued deficits as listed below. Pt tolerated treatment fairly well and is progressing slowly with mobility. Pt would continue to benefit from skilled PT to address overall functional mobility and goals. Goals remain appropriate      Rehab identified problem list/impairments: Rehab identified problem list/impairments: weakness, impaired endurance, impaired functional mobilty, gait instability, impaired self care skills, decreased lower extremity function, impaired balance, impaired muscle length, decreased ROM, pain, impaired joint extensibility    Rehab potential is good.    Activity tolerance: Good    Discharge recommendations: Discharge Facility/Level Of Care Needs: home health PT, home health OT     Barriers to discharge: Barriers to Discharge: Decreased caregiver support    Equipment recommendations: Equipment Needed After Discharge: none     GOALS:    Physical Therapy Goals        Problem: Physical Therapy Goal    Goal Priority  Disciplines Outcome Goal Variances Interventions   Physical Therapy Goal     PT/OT, PT      Description:  Goals to be met by: 2017     Patient will increase functional independence with mobility by performin. Supine to sit with Contact Guard Assistance  2. Sit to supine with Contact Guard Assistance  3. Sit to stand transfer with Contact Guard Assistance  4. Bed to chair transfer with Contact Guard Assistance using Rolling/standard Walker  5. Gait  x 100 feet with Stand-by Assistance using Rolling/standard Walker.   6. Lower extremity exercise program x15 reps per handout, with assistance as needed                       PLAN:    Patient to be seen 5 x/week  to address the above listed problems via gait training, therapeutic activities, therapeutic exercises, neuromuscular re-education  Plan of Care expires: 17  Plan of Care reviewed with: patient         Yoav Calabrese, PTA  2017

## 2017-11-06 NOTE — PLAN OF CARE
Problem: Patient Care Overview  Goal: Plan of Care Review  Outcome: Ongoing (interventions implemented as appropriate)  Pt continues with plan of care. No N/V overnight. Pain 3/10, SCD's on Q 2 hour monitoring for falls and safety. Pt free from falls. Call light in reach.

## 2017-11-06 NOTE — PT/OT/SLP EVAL
Occupational Therapy  Evaluation/Treatment    Chucho Prado   MRN: [id number]   Admitting Diagnosis: Partial intestinal obstruction    OT Date of Treatment: 11/06/17   OT Start Time: 0929  OT Stop Time: 1010  OT Total Time (min): 41 min    Billable Minutes:  Evaluation 15  Self Care/Home Management 26    Diagnosis: Partial intestinal obstruction   Pt had L TKA on 10/25/2017 and discharged to OSNF; re-admitted 11/2 2' N/V from an ileus.  H/O lumbar laminectomy; currently has large hernia on R side of trunk    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    GERD (gastroesophageal reflux disease)     Hypertension     Thyroid disease     UTI (urinary tract infection)       Past Surgical History:   Procedure Laterality Date    CHOLECYSTECTOMY      HERNIA REPAIR      JOINT REPLACEMENT      LAMINECTOMY  12/27/2016    L2-L4    LUMBAR LAMINECTOMY  12/2016    PARATHYROIDECTOMY      TOTAL KNEE ARTHROPLASTY      Right       Referring physician: Dr. Selma Orantes  Date referred to OT: 11/5/2017    General Precautions: Standard, fall  Orthopedic Precautions: LLE weight bearing as tolerated  Braces: N/A    Do you have any cultural, spiritual, Tenriism conflicts, given your current situation?: no issues     Patient History:  Living Environment  Lives With:  (Pt lives alone though has paid caregivers as above; lives in 1 story house with 4 [facility]; claw foot tub with shower.)  Equipment Currently Used at Home:  (BSC, standard walker, w/c, elec adjustable bed)    Prior level of function:      Type of Occupation: The pt is retired from writing professor at [employer]  "and from creating/guiding a literary walking tour of [place]  Pt has published 7 books on [place]. and Joesph Campbell.  IADL Comments: Pt has 8 AM to midnight paid caregivers.  Prior to the TKA, pt was able to dress, sponge bathe and toilet self without assist.  He has avoided showering for years due to fear of falling in tub.     Dominant hand: right    Subjective:  Communicated with RN prior to session.  No issues  Chief Complaint: "The food is terrible"    Patient/Family stated goals: Pt feels he can be assisted at home by paid caregivers.  He does not want to return to SNF, stating he did not like it as well as the IPR he had after back sx.    Pain/Comfort  Pain Rating 1:  (No c/o of pain during eval)    Objective:       Cognitive Exam:  Oriented to: Person, Place, Time and Situation  Follows Commands/attention: Follows one-step commands  Communication: clear/fluent  Memory:  Impaired STM; gaps noted in pt's narrative of his home situation.  He told OT that only help was a tenet of his, later stating he has paid caregivers; he first stated he sleeps flat in a bed, then stated he has an elec bed that adjusts   Safety awareness/insight to disability: Severe fear of falling causes pt to panic in standing and make poor decisions  Coping skills/emotional control: Focusing on controlling small things ie ice water, food    Visual/perceptual:  Intact    Physical Exam:  Postural examination/scapula alignment: Rounded shoulder, Head forward and Kyphosis  Skin integrity: bandage L knee  Edema: Mild L knee    Sensation:   Intact    Upper Extremity Range of Motion:  Right Upper Extremity: AROM sh fl 130 deg; other jts arom wnl  Left Upper Extremity: AROM sh fl 100 deg; other jts arom wnl    Upper Extremity Strength:  Right Upper Extremity: 3+/5  Left Upper Extremity: 3+/5   Strength: 3+/5      Fine motor coordination:   Mildly impaired 2' arthritic changes    Gross motor coordination: WFL    Functional Mobility:  Bed " Mobility:  Rolling/Turning to Left: With side rail, Supervision (Mod difficulty and increased time; bed flat)  Supine to Sit: Stand by Assistance, WIth side rail (Mod difficulty and increased time; bed was flat.  Pt did not let OT know that his bed at home is adjustable until after completing iw bed flat)    Transfers:  Sit <> Stand Assistance: Moderate Assistance (from bed, adjusted to height at home)  Sit <> Stand Assistive Device: Rolling Walker  Bed <> Chair Transfer Assistance:  (Mod a to come to stand then CGA to transfer to chair with RW)  Bed <> Chair Assistive Device: Rolling Walker    Functional Ambulation: Ambulate 3 steps to chair with RW and CG A    Activities of Daily Living:       UE Dressing Level of Assistance: Minimum assistance (don gown like a robe)    LE Dressing Level of Assistance: Maximum assistance (don slipper socks)    Grooming Position: Seated  Grooming Level of Assistance: Supervision (brush teeth, comb hair)     Toileting Level of Assistance:  (Not assessed; pt wearing diaper and galvan cath)            Balance:   Static Sit: FAIR+: Able to take MINIMAL challenges from all directions  Dynamic Sit: FAIR+: Maintains balance through MINIMAL excursions of active trunk motion  Static Stand: FAIR: Maintains without assist but unable to take challenges  Dynamic stand: FAIR: Needs CONTACT GUARD during gait    Therapeutic Activities and Exercises:  -Worked on sit to stand and static standing balance and tolerance.  As soon as pt was in standing, he wanted to sit due to fear of falling backward.  OT allowed to sit first time but second time, re-focused pt on thinking logically about standing and his panic faded.  Stood x3 min, working on erect posture and neck ext, before transferring to chair.  -Attempted to stand at sink for grooming but pt could not come sit to stand from chair in that position and was anxious about both standing and brushing teeth at same time.  Set up grooming to force  "unsupported sitting and wt bearing through BLEs.    AM-PAC 6 CLICK ADL  How much help from another person does this patient currently need?  1 = Unable, Total/Dependent Assistance  2 = A lot, Maximum/Moderate Assistance  3 = A little, Minimum/Contact Guard/Supervision  4 = None, Modified [place]/Independent    Putting on and taking off regular lower body clothing? : 2  Bathing (including washing, rinsing, drying)?: 2  Toileting, which includes using toilet, bedpan, or urinal? : 2  Putting on and taking off regular upper body clothing?: 3  Taking care of personal grooming such as brushing teeth?: 3  Eating meals?: 4  Total Score: 16    AM-PAC Raw Score CMS "G-Code Modifier Level of Impairment Assistance   6 % Total / Unable   7 - 9 CM 80 - 100% Maximal Assist   10-14 CL 60 - 80% Moderate Assist   15 - 19 CK 40 - 60% Moderate Assist   20 - 22 CJ 20 - 40% Minimal Assist   23 CI 1-20% SBA / CGA   24 CH 0% Independent/ Mod I       Patient left up in chair with all lines intact and call button in reach    Assessment:  Chucho Prado is a 84 y.o. male with a medical diagnosis of Partial intestinal obstruction and L TKA.  The pt is currently at supervision to max a level self-care 2' to impaired strength BUE/BLEs, impaired A/PROM LLE, impaired AROM B shoulders and neck ext, kyphosis and impaired standing posture and balance, severe fear of falling, impaired standing tolerance.  The pt was previously mod indep self-care using sock-aide and SW.  Though SNF is recommended for intense 5x/wk OT and PT, the pt is choosing to discharge home with  OT and PT.    Rehab identified problem list/impairments: Rehab identified problem list/impairments: weakness, impaired endurance, impaired self care skills, impaired functional mobilty, impaired balance, gait instability, decreased lower extremity function, pain, decreased ROM, impaired joint extensibility, impaired muscle length    Rehab potential is " good.    Activity tolerance: Good    Discharge recommendations: Discharge Facility/Level Of Care Needs: home health OT, home health PT (OT recommends SNF for 5x/wk OT and PT; however[facility]     Barriers to discharge:  None    Equipment recommendations: none     GOALS:    Occupational Therapy Goals        Problem: Occupational Therapy Goal    Goal Priority Disciplines Outcome Interventions   Occupational Therapy Goal     OT, PT/OT Ongoing (interventions implemented as appropriate)    Description:  Goals to be met by 11/13/2017:  1.  Toilet self with BSC with min a  2.  BSC transfer with min a and SW  3.  Stand for one grooming task, x5 min, with CG A for balance  4.  Don pants/underpants/brief with mod a                    PLAN:  Patient to be seen 5 x/week to address the above listed problems via self-care/home management, therapeutic activities, therapeutic exercises, neuromuscular re-education  Plan of Care expires: 12/06/17  Plan of Care reviewed with: patient         DINO Devlin  11/06/2017

## 2017-11-06 NOTE — ASSESSMENT & PLAN NOTE
L TKR done on 10/25   - continue asa 325  BID per ortho  - DVT ppx - Lovenox 30, SCDs and BEE given while here.  - PT ordered

## 2017-11-06 NOTE — ASSESSMENT & PLAN NOTE
Trending down likely due to hypovolemia from partial bowel obstruction   - fluid challenge s/p 2 L NS   - monitor and re-assess in am  Normal saline 100 cc per hour.  Clear liquid diet  11/4 Na 125 -> 129, pt reports he was not eating much on the SNF  11/5 - Na 130  11/6 - chronic continue free water restriction

## 2017-11-06 NOTE — PLAN OF CARE
[facility]    HOME HEALTH ORDERS  FACE TO FACE ENCOUNTER    Patient Name: Chucho Prado  YOB: 1933    PCP: Obed Mcguire MD   PCP Address: [address] [place] [address]  PCP Phone Number: [phone]  PCP Fax: [phone]    Encounter Date: 11/06/2017    Admit to Home Health    Diagnoses:  Active Hospital Problems    Diagnosis  POA    *Partial intestinal obstruction [K56.600]  Yes     Priority: 1 - High    Hyponatremia [E87.1]  Yes     Priority: 2     CKD (chronic kidney disease) stage 3, GFR 30-59 ml/min [N18.3]  Yes     Priority: 3     Status post total left knee replacement [Z96.652]  Not Applicable     Priority: 4     Flank hernia [K45.8]  Yes    Hypothyroidism [E03.9]  Yes     Chronic    Depression [F32.9]  Yes     Chronic    Anemia [D64.9]  Yes    Essential hypertension [I10]  Yes     Chronic      Resolved Hospital Problems    Diagnosis Date Resolved POA   No resolved problems to display.       Future Appointments  Date Time Provider Department Center   11/7/2017 1:30 PM Neeta Dunne PA-C [facility] ORTHO [facility]   11/8/2017 1:40 PM Neelam Hooper NP [facility] UROLOGC [facility]   11/29/2017 1:30 PM Trinity Vazquez MD [facility]     Follow-up Information     Obed Mcguire MD.    Specialty:  Internal Medicine  Contact information:  [address]  [facility] [address]  [phone]                     I have seen and examined this patient face to face today. My clinical findings that support the need for the home health skilled services and home bound status are the following:  Weakness/numbness causing balance and gait disturbance due to Joint Replacement making it taxing to leave home.    Allergies:Review of patient's allergies indicates:  No Known Allergies    Diet: renal diet and fluid restriction: one liter per day ( for low sodium)    Activities: activity as tolerated    Nursing:   SN to complete comprehensive assessment  including routine vital signs. Instruct on disease process and s/s of complications to report to MD. Review/verify medication list sent home with the patient at time of discharge  and instruct patient/caregiver as needed. Frequency may be adjusted depending on start of care date.    Notify MD if SBP > 160 or < 90; DBP > 90 or < 50; HR > 120 or < 50; Temp > 101; Other:       Dc Mirtazapine due to hyponatremia (low sodium)    Lab:  BMP on one week, f/u w pcp.       CONSULTS:    Physical Therapy to evaluate and treat. Evaluate for home safety and equipment needs; Establish/upgrade home exercise program. Perform / instruct on therapeutic exercises, gait training, transfer training, and Range of Motion.  Occupational Therapy to evaluate and treat. Evaluate home environment for safety and equipment needs. Perform/Instruct on transfers, ADL training, ROM, and therapeutic exercises.  Aide to provide assistance with personal care, ADLs, and vital signs.    MISCELLANEOUS CARE:  Mahajan Care: Instruct patient/caregiver to empty Mahajan bag.  Change Mahajan every month.    WOUND CARE ORDERS  n/a      Medications: Review discharge medications with patient and family and provide education.      Current Discharge Medication List      START taking these medications    Details   amLODIPine (NORVASC) 10 MG tablet Take 1 tablet (10 mg total) by mouth once daily.  Qty: 30 tablet, Refills: 11      polyethylene glycol (GLYCOLAX) 17 gram PwPk Take 17 g by mouth 2 (two) times daily.  Qty: 12 each, Refills: 0      senna-docusate 8.6-50 mg (PERICOLACE) 8.6-50 mg per tablet Take 1 tablet by mouth 2 (two) times daily.         CONTINUE these medications which have NOT CHANGED    Details   aspirin 325 MG tablet Take 1 tablet (325 mg total) by mouth 2 (two) times daily.  Qty: 60 tablet, Refills: 0      docusate sodium (COLACE) 100 MG capsule Take 1 capsule (100 mg total) by mouth 2 (two) times daily as needed for Constipation.  Qty: 60 capsule,  Refills: 0      hyoscyamine (LEVSIN/SL) 0.125 mg Subl Place 1 tablet (0.125 mg total) under the tongue every 4 (four) hours as needed (bladder spasms).  Qty: 30 tablet, Refills: 1      levothyroxine (SYNTHROID) 50 MCG tablet Take 1 tablet (50 mcg total) by mouth once daily.  Qty: 100 tablet, Refills: 11    Associated Diagnoses: Hypothyroidism, unspecified type      simvastatin (ZOCOR) 40 MG tablet Take 1 tablet (40 mg total) by mouth every evening.  Qty: 90 tablet, Refills: 11    Associated Diagnoses: Dyslipidemia      trazodone (DESYREL) 50 MG tablet Take 1 tablet (50 mg total) by mouth nightly as needed for Insomnia.  Qty: 30 tablet, Refills: 11         STOP taking these medications       mirtazapine (REMERON) 30 MG tablet Comments:   Reason for Stopping:         ondansetron (ZOFRAN-ODT) 8 MG TbDL Comments:   Reason for Stopping:         oxyCODONE-acetaminophen (PERCOCET)  mg per tablet Comments:   Reason for Stopping:         simethicone (MYLICON) 80 MG chewable tablet Comments:   Reason for Stopping:         triamterene-hydrochlorothiazide 37.5-25 mg (DYAZIDE) 37.5-25 mg per capsule Comments:   Reason for Stopping:               I certify that this patient is confined to his home and needs intermittent skilled nursing care, physical therapy and occupational therapy.

## 2017-11-06 NOTE — PLAN OF CARE
Problem: Occupational Therapy Goal  Goal: Occupational Therapy Goal  Goals to be met by 11/13/2017:  1.  Toilet self with BSC with min a  2.  BSC transfer with min a and SW  3.  Stand for one grooming task, x5 min, with CG A for balance  4.  Don pants/underpants/brief with mod a  Outcome: Ongoing (interventions implemented as appropriate)  Eval completed and goals established  DINO Jean  11/6/2017  [phone]

## 2017-11-06 NOTE — PLAN OF CARE
Problem: Physical Therapy Goal  Goal: Physical Therapy Goal  Goals to be met by: 2017     Patient will increase functional independence with mobility by performin. Supine to sit with Contact Guard Assistance  2. Sit to supine with Contact Guard Assistance  3. Sit to stand transfer with Contact Guard Assistance  4. Bed to chair transfer with Contact Guard Assistance using Rolling/standard Walker  5. Gait  x 100 feet with Stand-by Assistance using Rolling/standard Walker.   6. Lower extremity exercise program x15 reps per handout, with assistance as needed     Pt progressing towards goals. continue with PT POC.Goals remain appropriate.    Yoav Calabrese PTA  2017

## 2017-11-07 ENCOUNTER — OFFICE VISIT (OUTPATIENT)
Dept: ORTHOPEDICS | Facility: CLINIC | Age: 82
End: 2017-11-07
Payer: MEDICARE

## 2017-11-07 VITALS — HEIGHT: 68 IN | BODY MASS INDEX: 28.03 KG/M2 | WEIGHT: 184.94 LBS

## 2017-11-07 DIAGNOSIS — Z96.652 STATUS POST TOTAL LEFT KNEE REPLACEMENT: Primary | ICD-10-CM

## 2017-11-07 PROCEDURE — 99024 POSTOP FOLLOW-UP VISIT: CPT | Mod: ,,, | Performed by: PHYSICIAN ASSISTANT

## 2017-11-07 PROCEDURE — 99213 OFFICE O/P EST LOW 20 MIN: CPT | Mod: PBBFAC | Performed by: PHYSICIAN ASSISTANT

## 2017-11-07 PROCEDURE — 99999 PR PBB SHADOW E&M-EST. PATIENT-LVL III: CPT | Mod: PBBFAC,,, | Performed by: PHYSICIAN ASSISTANT

## 2017-11-07 RX ORDER — ONDANSETRON 8 MG/1
8 TABLET, ORALLY DISINTEGRATING ORAL EVERY 12 HOURS PRN
Qty: 14 TABLET | Refills: 0 | Status: ON HOLD | OUTPATIENT
Start: 2017-11-07 | End: 2018-12-06

## 2017-11-07 NOTE — PROGRESS NOTES
"Chucho Prado presents for initial post-operative visit following a left total knee arthroplasty performed by Dr. [facility] on 10/25/2017. Off of pain medication.  Back home.    Exam:   Height 5' 8" (1.727 m), weight 83.9 kg (184 lb 15.5 oz).   Ambulating well with assistive device.  Incision is clean and dry without drainage or erythema. ROM:0-95    Initial post-operative radiographs reviewed today revealing a well fixed and aligned prosthesis.    A/P:  2 weeks s/p left total knee replacement  Dr. [facility] interviewed and examined patient today and agrees with plan.   - The patient was advised to keep the incision clean and dry for the next 24 hours after which he may wash the area with antibacterial soap in the shower. Will not submerge until the incision is completely healed.   - Continue ASA for 1 month from surgery  - Follow up in 4 weeks with Dr. [facility]. Pt will call clinic with problems/concerns.     "

## 2017-11-08 NOTE — DISCHARGE SUMMARY
[facility]  Department of Hospital Medicine  Discharge Summary      Patient Name: Chucho Prado  MRN: [id number]  Admission Date: 10/26/2017  Hospital Length of Stay: 7 days  Discharge Date and Time: 11/2/2017  5:03 PM  Attending Physician: Fabio Benson  Discharging Provider: Reyna Hubbard NP  Primary Care Provider: Obed Mcguire MD    Chief Complaint/Reason for Admission: Debility    History of Present Illness:  Patient is a 84 y.o. male who has a past medical history of Arthritis; Cataract; CKD stage 3; Compression fracture of lumbar vertebra; Depression; Dyslipidemia; GERD; Hypertension; Thyroid disease presented with chronic left knee pain due to osteoarthritis. Pain has caused interference of activities of daily living. Patient has failed non-operative treatment and elected to undergo surgical management. He was admitted to orthopedics and underwent left total knee arthroplasty on 10/25/17 by Dr. [facility]. Patient tolerated procedure well with no significant post operative complications. Patient has been working with PT/OT who recommend SNF for further balance/mobility training. Patient currently has no complaints. States pain is well controlled and has not required any PRN pain medication. Sitter at bedside and updated on plan of care. All questions answered.      Hospital Course:   10/26/17: Patient admitted to SNF for ongoing PT/OT following a hospitalization for left total knee arthroplasty.  10/30: Patient seen at bedside, reports pain is currently controlled, having intermittent nausea today without vomiting, reviewed labs, sitter at bedside  11/1/17: (per ortho NP) pt working with therapist at bedside. He transferred from the bed to the chair with minimal assistance. Reports that he has had a lot of nausea with his pain medication. He is taking sublingual zofran and his pain medication was changed last night. He does report a lot of pain in the left knee.  11/2: nausea and  vomiting persists, kub- with small bowel obstruction- sent patient to ED, ED MD unavailable for report, report given to MELBA López     Significant Diagnostic Studies:   Results for PENNY VALLE (MRN [id number]) as of 11/8/2017 16:34   Ref. Range 11/2/2017 19:42   WBC Latest Ref Range: 3.90 - 12.70 K/uL 8.24   RBC Latest Ref Range: 4.60 - 6.20 M/uL 3.91 (L)   Hemoglobin Latest Ref Range: 14.0 - 18.0 g/dL 10.6 (L)   Hematocrit Latest Ref Range: 40.0 - 54.0 % 31.3 (L)   MCV Latest Ref Range: 82 - 98 fL 80 (L)   MCH Latest Ref Range: 27.0 - 31.0 pg 27.1   MCHC Latest Ref Range: 32.0 - 36.0 g/dL 33.9   RDW Latest Ref Range: 11.5 - 14.5 % 14.4   Platelets Latest Ref Range: 150 - 350 K/uL 271   Results for PENNY VALLE (MRN [id number]) as of 11/8/2017 16:34   Ref. Range 11/2/2017 19:42   WBC Latest Ref Range: 3.90 - 12.70 K/uL 8.24   RBC Latest Ref Range: 4.60 - 6.20 M/uL 3.91 (L)   Hemoglobin Latest Ref Range: 14.0 - 18.0 g/dL 10.6 (L)   Hematocrit Latest Ref Range: 40.0 - 54.0 % 31.3 (L)   MCV Latest Ref Range: 82 - 98 fL 80 (L)   MCH Latest Ref Range: 27.0 - 31.0 pg 27.1   MCHC Latest Ref Range: 32.0 - 36.0 g/dL 33.9   RDW Latest Ref Range: 11.5 - 14.5 % 14.4   Platelets Latest Ref Range: 150 - 350 K/uL 271     KUB  Concern for small bowel obstruction, possibly related to the large lateral abdominal wall hernia defect. Cross-sectional imaging advised.    Final Active Diagnoses:    Diagnosis Date Noted POA    PRINCIPAL PROBLEM:  Primary osteoarthritis of left knee [M17.12] 10/25/2017 Yes    Debility [R53.81] 10/27/2017 Yes    Edema [R60.9] 10/23/2017 Yes    Hyponatremia [E87.1] 10/23/2017 Yes    PONV (postoperative nausea and vomiting) [R11.2, Z98.890] 10/23/2017 Yes    CKD (chronic kidney disease) stage 3, GFR 30-59 ml/min [N18.3] 12/26/2016 Yes    Urinary retention [R33.9] 12/25/2016 Yes    History of MRSA infection [Z86.14] 12/25/2016 Yes    Hypothyroidism [E03.9] 07/30/2013 Yes      Chronic    Essential hypertension [I10] 07/30/2013 Yes     Chronic    Dyslipidemia [E78.5] 07/30/2013 Yes     Chronic    Depression [F32.9] 07/30/2013 Yes     Chronic      Problems Resolved During this Admission:    Diagnosis Date Noted Date Resolved POA      * Primary osteoarthritis of left knee    -S/p left TKA  -Surgical wound to be managed by ortho NP while at SNF  -Continue with current pain control with oxycodone 5/10mg every 4hrs prn pain and premedicate prior to therapy   -Continue bowel regimen for opioid induced constipation with senokot-s and miralax hold for loose stooling  -Continue with PT/OT for gait training and strengthening and restoration of ADL's: WBAT per ortho   -Fall precautions   -Continue DVT prophylaxis with  mg per ortho.   -Continue famotidine for GI prophylaxis         Debility    -Continue PT/OT as outlined above.  -fall precauctions  -personal sitter at bedside        Hyponatremia    -stable  -Cont to monitor  -Likely due to HCTZ  -continue to monitor with BIW labs         Edema    -Low Na diet  -BEE hose  -Leg elevation   -polar ice care for left knee        PONV (postoperative nausea and vomiting)    -was having vomiting with nausea through the weekend  -only with nausea today which is being managed with Zofran   -KUB with small bowel obstruction, sent patient to ED        CKD (chronic kidney disease) stage 3, GFR 30-59 ml/min    -Sr Cr stable  -Continue to monitor with BIW labs   -Avoid nephrotoxins.         History of MRSA infection    -Continue Bactrim to treat will clarify with orthopedics when stopping is recommended from their standpoint  -Follow up as outpatient.         Urinary retention    -Has history of neurogenic bladder  -S/p suprapubic cath  -Continue galvan care  -Continue hyoscyamine for bladder spasms         Depression    -Chronic and stable  -Continue mirtazapine 30mg nightly to treat.         Dyslipidemia    -Continue with home simvastatin 40mg nightly to  treat.         Essential hypertension    -Well controlled  -Continue home Triamterene-HCTZ 37.5-25mg daily to treat  -Continue to monitor BP  -medication with hold parameters  -Continue to monitor electrolytes with biweekly labs.         Hypothyroidism    -Continue home levothyroxine to treat          Discharged Condition: stable    Disposition: Short Term Hospital patient readmitted    Medications:  Reconciled Home Medications:   Discharge Medication List as of 11/3/2017 10:09 AM      CONTINUE these medications which have NOT CHANGED    Details   aspirin 325 MG tablet Take 1 tablet (325 mg total) by mouth 2 (two) times daily., Starting Wed 10/25/2017, Until Fri 11/24/2017, Print      docusate sodium (COLACE) 100 MG capsule Take 1 capsule (100 mg total) by mouth 2 (two) times daily as needed for Constipation., Starting Wed 10/25/2017, Print      hyoscyamine (LEVSIN/SL) 0.125 mg Subl Place 1 tablet (0.125 mg total) under the tongue every 4 (four) hours as needed (bladder spasms)., Starting Sat 8/12/2017, Normal      levothyroxine (SYNTHROID) 50 MCG tablet Take 1 tablet (50 mcg total) by mouth once daily., Starting Wed 6/21/2017, Normal      simvastatin (ZOCOR) 40 MG tablet Take 1 tablet (40 mg total) by mouth every evening., Starting Fri 5/12/2017, Normal      trazodone (DESYREL) 50 MG tablet Take 1 tablet (50 mg total) by mouth nightly as needed for Insomnia., Starting Fri 9/8/2017, No Print      mirtazapine (REMERON) 30 MG tablet Take 1 tablet (30 mg total) by mouth every evening., Starting Fri 6/2/2017, Until Sat 6/2/2018, Normal      ondansetron (ZOFRAN-ODT) 8 MG TbDL Take 1 tablet (8 mg total) by mouth every 12 (twelve) hours as needed (nausea)., Starting Wed 10/25/2017, Print      oxyCODONE-acetaminophen (PERCOCET)  mg per tablet Take 1 tablet by mouth every 4 to 6 hours as needed for Pain., Starting Wed 10/25/2017, Until Sat 11/4/2017, Print      simethicone (MYLICON) 80 MG chewable tablet Take 1 tablet  (80 mg total) by mouth 3 (three) times daily after meals., Starting 2/2/2017, Until Discontinued, OTC      triamterene-hydrochlorothiazide 37.5-25 mg (DYAZIDE) 37.5-25 mg per capsule Take 1 capsule by mouth every morning. HOLD UNTIL TOLD TO RESUME BY PHYSICIAN, Starting Mon 7/31/2017, Until Tue 7/31/2018, No Print         STOP taking these medications       sulfamethoxazole-trimethoprim 800-160mg (BACTRIM DS) 800-160 mg Tab Comments:   Reason for Stopping:             Time spent on the discharge of patient: 30 minutes    Reyna Hubbard NP  Department of Hospital Medicine  [facility]

## 2017-11-08 NOTE — ASSESSMENT & PLAN NOTE
-was having vomiting with nausea through the weekend  -only with nausea today which is being managed with Zofran   -KUB with small bowel obstruction, sent patient to ED

## 2017-11-15 ENCOUNTER — PATIENT MESSAGE (OUTPATIENT)
Dept: INTERNAL MEDICINE | Facility: CLINIC | Age: 82
End: 2017-11-15

## 2017-11-20 ENCOUNTER — TELEPHONE (OUTPATIENT)
Dept: INTERNAL MEDICINE | Facility: CLINIC | Age: 82
End: 2017-11-20

## 2017-11-20 NOTE — PT/OT/SLP DISCHARGE
Occupational Therapy Discharge Summary    Chucho Prado  MRN: [id number]   Partial intestinal obstruction   Patient Discharged from acute Occupational Therapy on 11/6/2017.  Please refer to prior OT note dated on 11/6/2017 for functional status.     Assessment:   Patient appropriate for care in another setting.  GOALS:    Occupational Therapy Goals     Not on file          Multidisciplinary Problems (Resolved)        Problem: Occupational Therapy Goal    Goal Priority Disciplines Outcome Interventions   Occupational Therapy Goal   (Resolved)     OT, PT/OT Outcome(s) achieved    Description:  Goals to be met by 11/13/2017:  1.  Toilet self with BSC with min a  2.  BSC transfer with min a and SW  3.  Stand for one grooming task, x5 min, with CG A for balance  4.  Don pants/underpants/brief with mod a                  Reasons for Discontinuation of Therapy Services  Transfer to alternate level of care.      Plan:  Patient Discharged to: Home with  OT and PT; OT had recommended SNF.

## 2017-11-20 NOTE — TELEPHONE ENCOUNTER
----- Message from Tonia Contreras sent at 11/20/2017  4:23 PM CST -----  Contact: Katie [phone]  Home health agent nurse registry is calling to inform MD they wasn't able to do lab today , but will try again tomorrow  with a different nurse . Please advise , Thanks

## 2017-11-22 ENCOUNTER — TELEPHONE (OUTPATIENT)
Dept: ORTHOPEDICS | Facility: CLINIC | Age: 82
End: 2017-11-22

## 2017-11-22 NOTE — TELEPHONE ENCOUNTER
----- Message from Josh Clements sent at 11/22/2017  2:13 PM CST -----  Contact: [email or web address] number or [phone]  Josh called in stating that they left a message Monday asking for a call back because Pt is experiencing a lot of swelling in the legs. He advised that Pt is using compression socks, but they still have a lot of post- op questions and need a call back as soon as possible     Thank you

## 2017-11-29 ENCOUNTER — OFFICE VISIT (OUTPATIENT)
Dept: OPHTHALMOLOGY | Facility: CLINIC | Age: 82
End: 2017-11-29
Payer: MEDICARE

## 2017-11-29 ENCOUNTER — OFFICE VISIT (OUTPATIENT)
Dept: UROLOGY | Facility: CLINIC | Age: 82
End: 2017-11-29
Payer: MEDICARE

## 2017-11-29 VITALS
HEIGHT: 68 IN | DIASTOLIC BLOOD PRESSURE: 75 MMHG | SYSTOLIC BLOOD PRESSURE: 120 MMHG | HEART RATE: 80 BPM | BODY MASS INDEX: 28.03 KG/M2 | WEIGHT: 184.94 LBS

## 2017-11-29 DIAGNOSIS — N31.9 NEUROGENIC BLADDER: Primary | ICD-10-CM

## 2017-11-29 DIAGNOSIS — R33.9 URINARY RETENTION: ICD-10-CM

## 2017-11-29 DIAGNOSIS — H25.13 NUCLEAR SCLEROSIS, BILATERAL: Primary | ICD-10-CM

## 2017-11-29 DIAGNOSIS — Z43.5 ENCOUNTER FOR SUPRAPUBIC CATHETER CARE: ICD-10-CM

## 2017-11-29 DIAGNOSIS — Z93.59 CHRONIC SUPRAPUBIC CATHETER: ICD-10-CM

## 2017-11-29 PROCEDURE — 92014 COMPRE OPH EXAM EST PT 1/>: CPT | Mod: S$PBB,,, | Performed by: OPHTHALMOLOGY

## 2017-11-29 PROCEDURE — 51705 CHANGE OF BLADDER TUBE: CPT | Mod: S$PBB,,, | Performed by: NURSE PRACTITIONER

## 2017-11-29 PROCEDURE — 99999 PR PBB SHADOW E&M-EST. PATIENT-LVL III: CPT | Mod: PBBFAC,,, | Performed by: NURSE PRACTITIONER

## 2017-11-29 PROCEDURE — 99999 PR PBB SHADOW E&M-EST. PATIENT-LVL II: CPT | Mod: PBBFAC,,, | Performed by: OPHTHALMOLOGY

## 2017-11-29 PROCEDURE — 92136 OPHTHALMIC BIOMETRY: CPT | Mod: PBBFAC,RT | Performed by: OPHTHALMOLOGY

## 2017-11-29 PROCEDURE — 99213 OFFICE O/P EST LOW 20 MIN: CPT | Mod: PBBFAC,27,25 | Performed by: NURSE PRACTITIONER

## 2017-11-29 PROCEDURE — 51705 CHANGE OF BLADDER TUBE: CPT | Mod: PBBFAC | Performed by: NURSE PRACTITIONER

## 2017-11-29 PROCEDURE — 99212 OFFICE O/P EST SF 10 MIN: CPT | Mod: PBBFAC,25 | Performed by: OPHTHALMOLOGY

## 2017-11-29 PROCEDURE — 99214 OFFICE O/P EST MOD 30 MIN: CPT | Mod: S$PBB,25,, | Performed by: NURSE PRACTITIONER

## 2017-11-29 RX ORDER — MOXIFLOXACIN 5 MG/ML
1 SOLUTION/ DROPS OPHTHALMIC
Status: CANCELLED | OUTPATIENT
Start: 2017-11-29

## 2017-11-29 RX ORDER — TETRACAINE HYDROCHLORIDE 5 MG/ML
1 SOLUTION OPHTHALMIC
Status: CANCELLED | OUTPATIENT
Start: 2017-11-29

## 2017-11-29 RX ORDER — LIDOCAINE HYDROCHLORIDE 10 MG/ML
1 INJECTION, SOLUTION EPIDURAL; INFILTRATION; INTRACAUDAL; PERINEURAL ONCE
Status: CANCELLED | OUTPATIENT
Start: 2017-11-29 | End: 2017-11-29

## 2017-11-29 RX ORDER — TROPICAMIDE 10 MG/ML
1 SOLUTION/ DROPS OPHTHALMIC
Status: CANCELLED | OUTPATIENT
Start: 2017-11-29

## 2017-11-29 RX ORDER — PHENYLEPHRINE HYDROCHLORIDE 25 MG/ML
1 SOLUTION/ DROPS OPHTHALMIC
Status: CANCELLED | OUTPATIENT
Start: 2017-11-29

## 2017-11-29 NOTE — PROGRESS NOTES
Subjective:       Patient ID: Chucho Prado is a 84 y.o. male.    Chief Complaint: Urinary Retention (Neurogenic Bladder)    Chucho Prado is a 83 y.o. male with neurogenic bladder.  He had 18fr SPT placed by Dr. Taylor on 06/23/2017 to manage his Neurogenic Bladder.    He is here today for exchange.  His last exchange 10/10/2017    He reports good urine flow.  Occasional bladder spasms;  SPT draining well;     Went to [facility] for b'day dinner; had great time  10/25/2017 (R) TKR with Dr. [facility].            Past Medical History:  No date: Arthritis  No date: Blood transfusion  No date: CKD (chronic kidney disease) stage 3, GFR 30-5*  No date: Compression fracture of lumbar vertebra  No date: Depression  No date: Dyslipidemia  No date: GERD (gastroesophageal reflux disease)  No date: Hypertension  No date: Thyroid disease  No date: UTI (urinary tract infection)    Past Surgical History:  No date: CHOLECYSTECTOMY  No date: HERNIA REPAIR  No date: JOINT REPLACEMENT  12/27/2016: LAMINECTOMY      Comment: L2-L4  12/2016: LUMBAR LAMINECTOMY  No date: PARATHYROIDECTOMY  No date: TOTAL KNEE ARTHROPLASTY      Comment: Right    Review of patient's family history indicates:    Hypertension                   Mother                    Diabetes                       Father                    Esophageal cancer              Father                      Social History    Marital status: Single              Spouse name:                       Years of education:                 Number of children:               Occupational History    None on file    Social History Main Topics    Smoking status: Former Smoker                                                                Packs/day: 0.00      Years: 20.00       Smokeless tobacco: Never Used                        Comment: quit 1999    Alcohol use: Yes                Comment: rarely/6 months    Drug use: No              Sexual activity: No                   Other  Topics            Concern    None on file    Social History Narrative    Lives alone, owns house and rents part. Delaney helps. 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         Allergies:  Review of patient's allergies indicates no known allergies.    Medications:  Current Outpatient Prescriptions:   acetaminophen (TYLENOL) 500 MG tablet, Take 2 tablets (1,000 mg total) by mouth 3 (three) times daily as needed for Pain., Disp: 30 tablet, Rfl: 0  aspirin 81 MG Chew, Take 1 tablet (81 mg total) by mouth once daily. HOLD UNTIL TOLD TO RESUME BY PHYSICIAN, Disp: 30 tablet, Rfl: 0  calcitonin, salmon, (FORTICAL) 200 unit/actuation nasal spray, 1 spray by Nasal route once daily., Disp: 1 Bottle, Rfl: 0  diclofenac sodium (VOLTAREN) 1 % Gel, Apply 2 g topically 3 (three) times daily. To left knee, Disp: 100 g, Rfl: 3  gabapentin (NEURONTIN) 400 MG capsule, Take 2 capsules (800 mg total) by mouth 3 (three) times daily., Disp: 180 capsule, Rfl: 11  hyoscyamine (LEVSIN/SL) 0.125 mg Subl, Place 1 tablet (0.125 mg total) under the tongue every 4 (four) hours as needed (bladder spasms)., Disp: 30 tablet, Rfl: 1  levothyroxine (SYNTHROID) 50 MCG tablet, Take 1 tablet (50 mcg total) by mouth once daily., Disp: 100 tablet, Rfl: 11  mirtazapine (REMERON) 30 MG tablet, Take 1 tablet (30 mg total) by mouth every evening., Disp: 30 tablet, Rfl: 6  polyethylene glycol (GLYCOLAX) 17 gram PwPk, Take 17 g by mouth once daily., Disp: 30 packet, Rfl: 0  simethicone (MYLICON) 80 MG chewable tablet, Take 1 tablet (80 mg total) by mouth 3 (three) times daily after meals., Disp: , Rfl: 0  simvastatin (ZOCOR) 40 MG tablet, Take 1 tablet (40 mg total) by mouth every evening., Disp: 90 tablet, Rfl: 11  tamsulosin (FLOMAX) 0.4 mg Cp24, Take 1 capsule (0.4 mg total) by mouth once daily., Disp: 30 capsule, Rfl: 3  triamterene-hydrochlorothiazide 37.5-25 mg (DYAZIDE) 37.5-25 mg per capsule, Take 1 capsule by mouth every morning. HOLD UNTIL TOLD  TO RESUME BY PHYSICIAN, Disp: 90 capsule, Rfl: 3                  Review of Systems   Constitutional: Negative.  Negative for activity change, appetite change and fever.   HENT: Negative.  Negative for facial swelling and trouble swallowing.    Eyes: Negative.    Respiratory: Negative.  Negative for shortness of breath.    Cardiovascular: Negative.  Negative for chest pain and palpitations.   Gastrointestinal: Negative for abdominal pain, constipation, diarrhea, nausea and vomiting.   Genitourinary: Positive for difficulty urinating. Negative for dysuria, enuresis, flank pain, frequency, genital sores, hematuria, nocturia, penile pain, scrotal swelling, testicular pain and urgency.        SPT draining well.     Musculoskeletal: Positive for arthralgias and gait problem. Negative for back pain and neck stiffness.   Skin: Negative.  Negative for wound.   Neurological: Positive for numbness. Negative for dizziness, tremors, seizures, syncope, speech difficulty, light-headedness and headaches.        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     Hematological: Does not bruise/bleed easily.   Psychiatric/Behavioral: Negative for confusion and hallucinations. The patient is not nervous/anxious.        Objective:      Physical Exam   Nursing note and vitals reviewed.  Constitutional: He is oriented to person, place, and time. Vital signs are normal. He appears well-developed and well-nourished. He is active and cooperative.  Non-toxic appearance. He does not have a sickly appearance.   HENT:   Head: Normocephalic and atraumatic.   Right Ear: External ear normal.   Left Ear: External ear normal.   Nose: Nose normal.   Mouth/Throat: Mucous membranes are normal.   Eyes: Conjunctivae and lids are normal. No scleral icterus.   Neck: Trachea normal, normal range of motion and full passive range of motion without pain. Neck supple. No JVD present. No tracheal deviation present.   Cardiovascular: Normal rate, regular rhythm, S1 normal and S2 normal.     Pulmonary/Chest: Effort normal and breath sounds normal. No respiratory distress. He exhibits no tenderness.   Abdominal: Soft. Normal appearance and bowel sounds are normal. There is no hepatosplenomegaly. There is no tenderness. There is no rebound, no guarding and no CVA tenderness.       Genitourinary: Testes normal.   Genitourinary Comments: Hidden penis     Musculoskeletal: Normal range of motion.   Neurological: He is alert and oriented to person, place, and time. He has normal strength.   Skin: Skin is warm, dry and intact.     Psychiatric: He has a normal mood and affect. His behavior is normal. Judgment and thought content normal.       Assessment:       1. Neurogenic bladder    2. Chronic suprapubic catheter    3. Encounter for suprapubic catheter care    4. Urinary retention        Plan:         I spent 30 minutes with the patient of which more than half was spent in direct consultation with the patient in regards to our treatment and plan.    Education and recommendations of today's plan of care including home remedies.  We discussed daily SPT care.  Diet modifications; continue PT and exercise.  Old SPT easily removed.  New 18fr SPT was easily placed using sterile technique.   I irrigated the bladder to verify the positioning.  Balloon inflated with 10 cc sterile water.   Tolerated well;   RTC one month

## 2017-11-29 NOTE — PROGRESS NOTES
HPI     Cataract    Additional comments: Both Eyes.            Comments   85 y/o male referred by Dr Hernandez for cataract evaluation. Pt states   vision has been decreasing over the last year.  No longer able to read for   long periods of time.  Eye will hurt.  No longer able to see the TV   clearly either.     AT's prn OU         Last edited by Shellie Lopez on 11/29/2017  2:19 PM. (History)            Assessment /Plan     For exam results, see Encounter Report.    Nuclear sclerosis, bilateral  -     IOL Master - MOD 26 - OD- Right eye      Visually Significant Cataract: Patient reports decreased vision consistent with the clinical amount of lenticular opacity, which reaches the level of visual significance and affects activities of daily living. Risks, benefits, and alternatives to cataract surgery were discussed and the consent reviewed. IOL options were discussed, including ATIOLs and the associated side effects and additional patient cost associated with them.   IOL Selections:   Right eye  IOL: pcboo 20     Left eye  IOL: pcboo 20.0    Pt wishes to have right eye done first.  Diff mobility, neck stiff, wheelchair

## 2017-12-12 ENCOUNTER — OFFICE VISIT (OUTPATIENT)
Dept: ORTHOPEDICS | Facility: CLINIC | Age: 82
End: 2017-12-12
Payer: MEDICARE

## 2017-12-12 ENCOUNTER — IMMUNIZATION (OUTPATIENT)
Dept: INTERNAL MEDICINE | Facility: CLINIC | Age: 82
End: 2017-12-12
Payer: MEDICARE

## 2017-12-12 VITALS — HEIGHT: 68 IN | WEIGHT: 196 LBS | BODY MASS INDEX: 29.7 KG/M2

## 2017-12-12 DIAGNOSIS — Z96.652 STATUS POST TOTAL LEFT KNEE REPLACEMENT: Primary | ICD-10-CM

## 2017-12-12 PROCEDURE — 99212 OFFICE O/P EST SF 10 MIN: CPT | Mod: PBBFAC,25 | Performed by: ORTHOPAEDIC SURGERY

## 2017-12-12 PROCEDURE — G0008 ADMIN INFLUENZA VIRUS VAC: HCPCS | Mod: PBBFAC

## 2017-12-12 PROCEDURE — 99999 PR PBB SHADOW E&M-EST. PATIENT-LVL II: CPT | Mod: PBBFAC,,, | Performed by: ORTHOPAEDIC SURGERY

## 2017-12-12 PROCEDURE — 99024 POSTOP FOLLOW-UP VISIT: CPT | Mod: ,,, | Performed by: ORTHOPAEDIC SURGERY

## 2017-12-12 RX ORDER — GABAPENTIN 400 MG/1
400 CAPSULE ORAL 3 TIMES DAILY
COMMUNITY
End: 2018-10-09

## 2017-12-12 NOTE — PROGRESS NOTES
Patient is here today for 6 week PO FU of his TKA. He is progressing well.     xrays are  reviewed today with good alignment. Still swollen. Needs elevation    Plan; Patient to return in 6 weeks.

## 2017-12-28 ENCOUNTER — TELEPHONE (OUTPATIENT)
Dept: OPHTHALMOLOGY | Facility: CLINIC | Age: 82
End: 2017-12-28

## 2017-12-28 DIAGNOSIS — H25.11 NUCLEAR SCLEROTIC CATARACT OF RIGHT EYE: Primary | ICD-10-CM

## 2018-01-02 ENCOUNTER — TELEPHONE (OUTPATIENT)
Dept: ORTHOPEDICS | Facility: CLINIC | Age: 83
End: 2018-01-02

## 2018-01-02 NOTE — TELEPHONE ENCOUNTER
----- Message from Chanelle Alonso MA sent at 12/29/2017  1:32 PM CST -----  Contact: Mansi, from Nurses Registry      ----- Message -----  From: Adelia Talbert  Sent: 12/29/2017  11:53 AM  To: [facility] Staff    Mansi is calling to speak with the nurse regarding pts physical therapy.  She would like for you to extend pts order for 2 more weeks, twice a week starting next week.    Mansi can be reached at [phone]     Left a message on her phone    Dr [facility] said yes   She may extend PT

## 2018-01-04 ENCOUNTER — PATIENT MESSAGE (OUTPATIENT)
Dept: PHYSICAL MEDICINE AND REHAB | Facility: CLINIC | Age: 83
End: 2018-01-04

## 2018-01-05 ENCOUNTER — OFFICE VISIT (OUTPATIENT)
Dept: UROLOGY | Facility: CLINIC | Age: 83
End: 2018-01-05
Payer: MEDICARE

## 2018-01-05 ENCOUNTER — PATIENT MESSAGE (OUTPATIENT)
Dept: UROLOGY | Facility: CLINIC | Age: 83
End: 2018-01-05

## 2018-01-05 VITALS
BODY MASS INDEX: 29.7 KG/M2 | HEIGHT: 68 IN | WEIGHT: 196 LBS | SYSTOLIC BLOOD PRESSURE: 141 MMHG | DIASTOLIC BLOOD PRESSURE: 75 MMHG | HEART RATE: 73 BPM

## 2018-01-05 DIAGNOSIS — N31.9 NEUROGENIC BLADDER: Primary | ICD-10-CM

## 2018-01-05 DIAGNOSIS — Z93.59 CHRONIC SUPRAPUBIC CATHETER: ICD-10-CM

## 2018-01-05 DIAGNOSIS — Z43.5 ENCOUNTER FOR SUPRAPUBIC CATHETER CARE: ICD-10-CM

## 2018-01-05 DIAGNOSIS — R33.9 URINARY RETENTION: ICD-10-CM

## 2018-01-05 PROCEDURE — 99213 OFFICE O/P EST LOW 20 MIN: CPT | Mod: PBBFAC,25 | Performed by: NURSE PRACTITIONER

## 2018-01-05 PROCEDURE — 99999 PR PBB SHADOW E&M-EST. PATIENT-LVL III: CPT | Mod: PBBFAC,,, | Performed by: NURSE PRACTITIONER

## 2018-01-05 PROCEDURE — 51705 CHANGE OF BLADDER TUBE: CPT | Mod: PBBFAC | Performed by: NURSE PRACTITIONER

## 2018-01-05 PROCEDURE — 99214 OFFICE O/P EST MOD 30 MIN: CPT | Mod: S$PBB,25,, | Performed by: NURSE PRACTITIONER

## 2018-01-05 PROCEDURE — 51705 CHANGE OF BLADDER TUBE: CPT | Mod: S$PBB,,, | Performed by: NURSE PRACTITIONER

## 2018-01-05 NOTE — PROGRESS NOTES
Subjective:       Patient ID: Chucho Prado is a 84 y.o. male.    Chief Complaint: Catheterization and neurogenic bladder    Chucho Prado is a 84 y.o. male with neurogenic bladder.  He had 18fr SPT placed by Dr. Taylor on 06/23/2017 to manage his Neurogenic Bladder.    He is here today for exchange.  His last exchange 11/29/2017    He reports good urine flow.  Occasional bladder spasms;  SPT draining well;     Went to [facility] for b'day dinner; had great time  10/25/2017 (R) TKR with Dr. [facility].            Past Medical History:  No date: Arthritis  No date: Blood transfusion  No date: CKD (chronic kidney disease) stage 3, GFR 30-5*  No date: Compression fracture of lumbar vertebra  No date: Depression  No date: Dyslipidemia  No date: GERD (gastroesophageal reflux disease)  No date: Hypertension  No date: Thyroid disease  No date: UTI (urinary tract infection)    Past Surgical History:  No date: CHOLECYSTECTOMY  No date: HERNIA REPAIR  No date: JOINT REPLACEMENT  12/27/2016: LAMINECTOMY      Comment: L2-L4  12/2016: LUMBAR LAMINECTOMY  No date: PARATHYROIDECTOMY  No date: TOTAL KNEE ARTHROPLASTY      Comment: Right    Review of patient's family history indicates:    Hypertension                   Mother                    Diabetes                       Father                    Esophageal cancer              Father                      Social History    Marital status: Single              Spouse name:                       Years of education:                 Number of children:               Occupational History    None on file    Social History Main Topics    Smoking status: Former Smoker                                                                Packs/day: 0.00      Years: 20.00       Smokeless tobacco: Never Used                        Comment: quit 1999    Alcohol use: Yes                Comment: rarely/6 months    Drug use: No              Sexual activity: No                   Other  Topics            Concern    None on file    Social History Narrative    Lives alone, owns house and rents part. Delaney helps. 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         Allergies:  Review of patient's allergies indicates no known allergies.    Medications:  Current Outpatient Prescriptions:   acetaminophen (TYLENOL) 500 MG tablet, Take 2 tablets (1,000 mg total) by mouth 3 (three) times daily as needed for Pain., Disp: 30 tablet, Rfl: 0  aspirin 81 MG Chew, Take 1 tablet (81 mg total) by mouth once daily. HOLD UNTIL TOLD TO RESUME BY PHYSICIAN, Disp: 30 tablet, Rfl: 0  calcitonin, salmon, (FORTICAL) 200 unit/actuation nasal spray, 1 spray by Nasal route once daily., Disp: 1 Bottle, Rfl: 0  diclofenac sodium (VOLTAREN) 1 % Gel, Apply 2 g topically 3 (three) times daily. To left knee, Disp: 100 g, Rfl: 3  gabapentin (NEURONTIN) 400 MG capsule, Take 2 capsules (800 mg total) by mouth 3 (three) times daily., Disp: 180 capsule, Rfl: 11  hyoscyamine (LEVSIN/SL) 0.125 mg Subl, Place 1 tablet (0.125 mg total) under the tongue every 4 (four) hours as needed (bladder spasms)., Disp: 30 tablet, Rfl: 1  levothyroxine (SYNTHROID) 50 MCG tablet, Take 1 tablet (50 mcg total) by mouth once daily., Disp: 100 tablet, Rfl: 11  mirtazapine (REMERON) 30 MG tablet, Take 1 tablet (30 mg total) by mouth every evening., Disp: 30 tablet, Rfl: 6  polyethylene glycol (GLYCOLAX) 17 gram PwPk, Take 17 g by mouth once daily., Disp: 30 packet, Rfl: 0  simethicone (MYLICON) 80 MG chewable tablet, Take 1 tablet (80 mg total) by mouth 3 (three) times daily after meals., Disp: , Rfl: 0  simvastatin (ZOCOR) 40 MG tablet, Take 1 tablet (40 mg total) by mouth every evening., Disp: 90 tablet, Rfl: 11  tamsulosin (FLOMAX) 0.4 mg Cp24, Take 1 capsule (0.4 mg total) by mouth once daily., Disp: 30 capsule, Rfl: 3  triamterene-hydrochlorothiazide 37.5-25 mg (DYAZIDE) 37.5-25 mg per capsule, Take 1 capsule by mouth every morning. HOLD UNTIL TOLD  TO RESUME BY PHYSICIAN, Disp: 90 capsule, Rfl: 3                  Review of Systems   Constitutional: Negative.  Negative for activity change, appetite change and fever.   HENT: Negative.  Negative for facial swelling and trouble swallowing.    Eyes: Negative.    Respiratory: Negative.  Negative for shortness of breath.    Cardiovascular: Negative.  Negative for chest pain and palpitations.   Gastrointestinal: Negative for abdominal pain, constipation, diarrhea, nausea and vomiting.   Genitourinary: Positive for difficulty urinating. Negative for dysuria, enuresis, flank pain, frequency, genital sores, hematuria, nocturia, penile pain, scrotal swelling, testicular pain and urgency.        Mahajan draining well     Musculoskeletal: Negative for back pain, gait problem and neck stiffness.   Skin: Negative.  Negative for wound.   Neurological: Negative for dizziness, tremors, seizures, syncope, speech difficulty, light-headedness and headaches.   Hematological: Does not bruise/bleed easily.   Psychiatric/Behavioral: Negative for confusion and hallucinations. The patient is not nervous/anxious.        Objective:      Physical Exam   Nursing note and vitals reviewed.  Constitutional: He is oriented to person, place, and time. Vital signs are normal. He appears well-developed and well-nourished. He is active and cooperative.  Non-toxic appearance. He does not have a sickly appearance.   HENT:   Head: Normocephalic and atraumatic.   Right Ear: External ear normal.   Left Ear: External ear normal.   Nose: Nose normal.   Mouth/Throat: Mucous membranes are normal.   Eyes: Conjunctivae and lids are normal. No scleral icterus.   Neck: Trachea normal, normal range of motion and full passive range of motion without pain. Neck supple. No JVD present. No tracheal deviation present.   Cardiovascular: Normal rate, regular rhythm, S1 normal and S2 normal.    Pulmonary/Chest: Effort normal and breath sounds normal. No respiratory  distress. He exhibits no tenderness.   Abdominal: Soft. Normal appearance and bowel sounds are normal. There is no hepatosplenomegaly. There is no tenderness. There is no rebound, no guarding and no CVA tenderness.       Genitourinary: Testes normal. No penile tenderness.   Musculoskeletal: Normal range of motion.   Neurological: He is alert and oriented to person, place, and time. He has normal strength.   Skin: Skin is warm, dry and intact.     Psychiatric: He has a normal mood and affect. His behavior is normal. Judgment and thought content normal.       Assessment:       1. Neurogenic bladder    2. Encounter for suprapubic catheter care    3. Chronic suprapubic catheter    4. Urinary retention        Plan:          I spent 30 minutes with the patient of which more than half was spent in direct consultation with the patient in regards to our treatment and plan.    Education and recommendations of today's plan of care including home remedies.  We discussed daily SPT care.  Diet modifications; continue PT and exercise.  Old SPT easily removed.  New 18fr SPT was easily placed using sterile technique.   I irrigated the bladder to verify the positioning.  Balloon inflated with 10 cc sterile water.   Tolerated well;   RTC one month

## 2018-01-10 ENCOUNTER — PATIENT MESSAGE (OUTPATIENT)
Dept: ADMINISTRATIVE | Facility: OTHER | Age: 83
End: 2018-01-10

## 2018-01-23 ENCOUNTER — OFFICE VISIT (OUTPATIENT)
Dept: ORTHOPEDICS | Facility: CLINIC | Age: 83
End: 2018-01-23
Payer: MEDICARE

## 2018-01-23 VITALS — BODY MASS INDEX: 30.14 KG/M2 | HEIGHT: 68 IN | WEIGHT: 198.88 LBS

## 2018-01-23 DIAGNOSIS — Z96.652 STATUS POST TOTAL LEFT KNEE REPLACEMENT: Primary | ICD-10-CM

## 2018-01-23 PROCEDURE — 99999 PR PBB SHADOW E&M-EST. PATIENT-LVL II: CPT | Mod: PBBFAC,,, | Performed by: ORTHOPAEDIC SURGERY

## 2018-01-23 PROCEDURE — 99024 POSTOP FOLLOW-UP VISIT: CPT | Mod: POP,,, | Performed by: ORTHOPAEDIC SURGERY

## 2018-01-23 PROCEDURE — 99212 OFFICE O/P EST SF 10 MIN: CPT | Mod: PBBFAC | Performed by: ORTHOPAEDIC SURGERY

## 2018-01-23 RX ORDER — OXYCODONE AND ACETAMINOPHEN 5; 325 MG/1; MG/1
1 TABLET ORAL EVERY 4 HOURS PRN
Qty: 60 TABLET | Refills: 0 | Status: SHIPPED | OUTPATIENT
Start: 2018-01-23 | End: 2018-02-02

## 2018-01-23 NOTE — PROGRESS NOTES
Patient is here today for 12 week PO FU of his TKA on 11/2/17. He is progressing well.      We will allow him to return as needed for repeat radiographs and certainly for any other questions.    xrays are  reviewed today with good alignment.    We will check repeat radiographs in 1 year.

## 2018-01-27 NOTE — PT/OT/SLP EVAL
[facility] paged to verify magnesium orders.   Occupational Therapy   Evaluation    Chucho Prado   MRN: [id number]     OT Received On: 10/25/2017  OT Start Time: 1635  OT Stop Time: 1655   OT Total Time: 20 minutes     Billable Minutes:  Evaluation 12 minutes  Self Care/Home Management 8 minutes    Diagnosis: S/p L TKA    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    GERD (gastroesophageal reflux disease)     Hypertension     Thyroid disease     UTI (urinary tract infection)      Past Surgical History:   Procedure Laterality Date    CHOLECYSTECTOMY      HERNIA REPAIR      JOINT REPLACEMENT      LAMINECTOMY  12/27/2016    L2-L4    LUMBAR LAMINECTOMY  12/2016    PARATHYROIDECTOMY      TOTAL KNEE ARTHROPLASTY      Right       General Precautions: Standard, fall  Orthopedic Precautions: LLE weight bearing as tolerated     Pt found with blood pressure cuff, polar ice, PNC, telemetry, PCEA, pulse ox, Peripheral IV and FCD.      Patient History:  Pt lives alone with caregivers 18 hours/day in [facility] with no concerns    DME owned: standard walker    Previous Level of Function:  Bed Mobility/Transfers: needs (A) and device  ADLs: needs (A)    Subjective:  Communicated with RN prior to session.    Pt agreeable to OT eval    Pain level: 3/10 in L knee    Objective:  Patient found Supine    Upper Extremity Range of Motion:  Right Upper Extremity: WFL  Left Upper Extremity: WFL     Upper Extremity Strength:  Right Upper Extremity: WFL  Left Upper Extremity: WFL    Functional Mobility:    Bed Mobility:    Supine to sit: ModA  Sit to supine: MaxA    Transfers:    Sit<>Stand: MaxA    Ambulation:    MaxA with no AD 3  side stesp    Activities of Daily Living:  UE dressing: Maximum Assistance to freddie gown around back  LE dressing: Total Assistance to freddie socks   Pt educated on LE dressing techniques     Patient left supine with all lines intact and RN notified.    AM-PAC 6 CLICK MOBILITY  1 = Unable, Total/Dependent Assistance  2 = A lot, Maximum/Moderate Assistance  3 = A little, Minimum/Contact Guard/Supervision  4 = None, Modified Wirt/Independent    Putting on and taking off regular lower body clothing? : 2  Bathing (including washing, rinsing, drying)?: 2  Toileting, which includes using toilet, bedpan, or urinal? : 2  Putting on and taking off regular upper body clothing?: 3  Taking care of personal grooming such as brushing teeth?: 3  Eating meals?: 4  Total Score: 16    AM-PAC Raw Score   CMS G-Code Modifier   Level of Impairment   Assistance     6   CN   100%         Total / Unable   7 - 9   CM   80 - 100%   Maximal Assist     10 - 14   CL   60 - 80%   Moderate Assist     15 - 19   CK   40 - 60%   Moderate Assist     20 - 22   CJ   20 - 40%   Minimal Assist     23   CI   1-20%         SBA / CGA     24 CH   0%   Independent/Modified Independent       Assessment:  Chucho Prado is a 84 y.o. male with a medical diagnosis of s/p L TKA. He presents with decreased function and orthopedic precautions of L LE impeding his ability to perform ADLs and functional mobility and would benefit from OT services to maximize functional (I) and safety.      Rehab identified problem list/impairments: weakness, impaired endurance, impaired sensation, impaired self care skills, impaired functional mobilty, gait instability, impaired balance, decreased coordination, decreased lower extremity function, decreased safety awareness, pain, decreased ROM, impaired skin, edema, orthopedic precautions    Rehab potential is good.    Activity tolerance: good    Discharge recommendations: Skilled Nursing Facility     Barriers to  discharge: Decreased caregiver support     Equipment recommendations: none    GOALS:    Occupational Therapy Goals        Problem: Occupational Therapy Goal    Goal Priority Disciplines Outcome Interventions   Occupational Therapy Goal     OT, PT/OT     Description:  Goals to be met by: 7 days    Patient will increase functional independence with ADLs by performing:    UE Dressing with Supervision.  LE Dressing with Supervision with AD as needed.  Grooming while standing with Supervision.  Toileting from bedside commode with Supervision for hygiene and clothing management.   Stand pivot transfers with Supervision.  Toilet transfer to bedside commode with Supervision.                         PLAN:    Patient to be seen QD to address the above listed problems via self-care/home management, therapeutic activities, therapeutic exercises  Plan of Care reviewed with: patient    DINO Garcia  10/25/2017

## 2018-02-07 RX ORDER — ASPIRIN 81 MG/1
81 TABLET ORAL DAILY
COMMUNITY
End: 2019-04-04

## 2018-02-14 ENCOUNTER — OFFICE VISIT (OUTPATIENT)
Dept: UROLOGY | Facility: CLINIC | Age: 83
End: 2018-02-14
Payer: MEDICARE

## 2018-02-14 ENCOUNTER — TELEPHONE (OUTPATIENT)
Dept: OPTOMETRY | Facility: CLINIC | Age: 83
End: 2018-02-14

## 2018-02-14 VITALS — SYSTOLIC BLOOD PRESSURE: 140 MMHG | DIASTOLIC BLOOD PRESSURE: 70 MMHG | HEART RATE: 75 BPM

## 2018-02-14 DIAGNOSIS — Z43.5 ENCOUNTER FOR SUPRAPUBIC CATHETER CARE: ICD-10-CM

## 2018-02-14 DIAGNOSIS — N31.9 NEUROGENIC BLADDER: Primary | ICD-10-CM

## 2018-02-14 DIAGNOSIS — Z93.59 CHRONIC SUPRAPUBIC CATHETER: ICD-10-CM

## 2018-02-14 DIAGNOSIS — R33.9 URINARY RETENTION: ICD-10-CM

## 2018-02-14 PROCEDURE — 51705 CHANGE OF BLADDER TUBE: CPT | Mod: PBBFAC | Performed by: NURSE PRACTITIONER

## 2018-02-14 PROCEDURE — 51705 CHANGE OF BLADDER TUBE: CPT | Mod: S$PBB,,, | Performed by: NURSE PRACTITIONER

## 2018-02-14 PROCEDURE — 99999 PR PBB SHADOW E&M-EST. PATIENT-LVL III: CPT | Mod: PBBFAC,,, | Performed by: NURSE PRACTITIONER

## 2018-02-14 PROCEDURE — 99213 OFFICE O/P EST LOW 20 MIN: CPT | Mod: PBBFAC | Performed by: NURSE PRACTITIONER

## 2018-02-14 PROCEDURE — 1126F AMNT PAIN NOTED NONE PRSNT: CPT | Mod: ,,, | Performed by: NURSE PRACTITIONER

## 2018-02-14 PROCEDURE — 99214 OFFICE O/P EST MOD 30 MIN: CPT | Mod: S$PBB,25,, | Performed by: NURSE PRACTITIONER

## 2018-02-14 PROCEDURE — 1159F MED LIST DOCD IN RCRD: CPT | Mod: ,,, | Performed by: NURSE PRACTITIONER

## 2018-02-14 NOTE — PROGRESS NOTES
Subjective:       Patient ID: Chucho Prado is a 84 y.o. male.    Chief Complaint: Neurogenic Bladder (Chronic SPT)    Chucho Prado is a 84 y.o. male with neurogenic bladder.  He had 18fr SPT placed by Dr. Taylor on 06/23/2017 to manage his Neurogenic Bladder.    He is here today for exchange.  His last exchange 01/05/2018.     He reports good urine flow.  Occasional bladder spasms;  SPT draining well;     Went to [place] for b'day dinner; had great time  10/25/2017 (R) TKR with Dr. [facility].            Past Medical History:  No date: Arthritis  No date: Blood transfusion  No date: CKD (chronic kidney disease) stage 3, GFR 30-5*  No date: Compression fracture of lumbar vertebra  No date: Depression  No date: Dyslipidemia  No date: GERD (gastroesophageal reflux disease)  No date: Hypertension  No date: Thyroid disease  No date: UTI (urinary tract infection)    Past Surgical History:  No date: CHOLECYSTECTOMY  No date: HERNIA REPAIR  No date: JOINT REPLACEMENT  12/27/2016: LAMINECTOMY      Comment: L2-L4  12/2016: LUMBAR LAMINECTOMY  No date: PARATHYROIDECTOMY  No date: TOTAL KNEE ARTHROPLASTY      Comment: Right    Review of patient's family history indicates:    Hypertension                   Mother                    Diabetes                       Father                    Esophageal cancer              Father                      Social History    Marital status: Single              Spouse name:                       Years of education:                 Number of children:               Occupational History    None on file    Social History Main Topics    Smoking status: Former Smoker                                                                Packs/day: 0.00      Years: 20.00       Smokeless tobacco: Never Used                        Comment: quit 1999    Alcohol use: Yes                Comment: rarely/6 months    Drug use: No              Sexual activity: No                   Other Topics             Concern    None on file    Social History Narrative    Lives alone, owns house and rents part. Delaney helps. 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         Allergies:  Review of patient's allergies indicates no known allergies.    Medications:  Current Outpatient Prescriptions:   acetaminophen (TYLENOL) 500 MG tablet, Take 2 tablets (1,000 mg total) by mouth 3 (three) times daily as needed for Pain., Disp: 30 tablet, Rfl: 0  aspirin 81 MG Chew, Take 1 tablet (81 mg total) by mouth once daily. HOLD UNTIL TOLD TO RESUME BY PHYSICIAN, Disp: 30 tablet, Rfl: 0  calcitonin, salmon, (FORTICAL) 200 unit/actuation nasal spray, 1 spray by Nasal route once daily., Disp: 1 Bottle, Rfl: 0  diclofenac sodium (VOLTAREN) 1 % Gel, Apply 2 g topically 3 (three) times daily. To left knee, Disp: 100 g, Rfl: 3  gabapentin (NEURONTIN) 400 MG capsule, Take 2 capsules (800 mg total) by mouth 3 (three) times daily., Disp: 180 capsule, Rfl: 11  hyoscyamine (LEVSIN/SL) 0.125 mg Subl, Place 1 tablet (0.125 mg total) under the tongue every 4 (four) hours as needed (bladder spasms)., Disp: 30 tablet, Rfl: 1  levothyroxine (SYNTHROID) 50 MCG tablet, Take 1 tablet (50 mcg total) by mouth once daily., Disp: 100 tablet, Rfl: 11  mirtazapine (REMERON) 30 MG tablet, Take 1 tablet (30 mg total) by mouth every evening., Disp: 30 tablet, Rfl: 6  polyethylene glycol (GLYCOLAX) 17 gram PwPk, Take 17 g by mouth once daily., Disp: 30 packet, Rfl: 0  simethicone (MYLICON) 80 MG chewable tablet, Take 1 tablet (80 mg total) by mouth 3 (three) times daily after meals., Disp: , Rfl: 0  simvastatin (ZOCOR) 40 MG tablet, Take 1 tablet (40 mg total) by mouth every evening., Disp: 90 tablet, Rfl: 11  tamsulosin (FLOMAX) 0.4 mg Cp24, Take 1 capsule (0.4 mg total) by mouth once daily., Disp: 30 capsule, Rfl: 3  triamterene-hydrochlorothiazide 37.5-25 mg (DYAZIDE) 37.5-25 mg per capsule, Take 1 capsule by mouth every morning. HOLD UNTIL TOLD TO RESUME BY  PHYSICIAN, Disp: 90 capsule, Rfl: 3                  Review of Systems   Constitutional: Negative for activity change, appetite change, chills and fever.   HENT: Negative for facial swelling and trouble swallowing.    Eyes: Negative for visual disturbance.   Respiratory: Negative for chest tightness and shortness of breath.    Cardiovascular: Negative for chest pain and palpitations.   Gastrointestinal: Positive for constipation and diarrhea. Negative for abdominal pain, nausea and vomiting.        Having GI symptoms.     Genitourinary: Positive for difficulty urinating. Negative for dysuria, flank pain, hematuria, penile pain, penile swelling, scrotal swelling and testicular pain.   Musculoskeletal: Positive for arthralgias and gait problem. Negative for back pain, myalgias and neck stiffness.   Skin: Negative for rash.   Neurological: Negative for dizziness and speech difficulty.   Hematological: Does not bruise/bleed easily.   Psychiatric/Behavioral: Negative for behavioral problems.       Objective:      Physical Exam   Nursing note and vitals reviewed.  Constitutional: He is oriented to person, place, and time. Vital signs are normal. He appears well-developed and well-nourished. He is active and cooperative.  Non-toxic appearance. He does not have a sickly appearance.   HENT:   Head: Normocephalic and atraumatic.   Right Ear: External ear normal.   Left Ear: External ear normal.   Nose: Nose normal.   Mouth/Throat: Mucous membranes are normal.   Eyes: Conjunctivae and lids are normal. No scleral icterus.   Neck: Trachea normal, normal range of motion and full passive range of motion without pain. Neck supple. No JVD present. No tracheal deviation present.   Cardiovascular: Normal rate, S1 normal and S2 normal.    Pulmonary/Chest: Effort normal. No respiratory distress. He exhibits no tenderness.   Abdominal: Soft. Normal appearance and bowel sounds are normal. There is no hepatosplenomegaly. There is no  tenderness. There is no CVA tenderness.       Musculoskeletal: Normal range of motion.   Neurological: He is alert and oriented to person, place, and time. He has normal strength.   Skin: Skin is warm, dry and intact.     Psychiatric: He has a normal mood and affect. His behavior is normal. Judgment and thought content normal.       Assessment:       1. Neurogenic bladder    2. Chronic suprapubic catheter    3. Encounter for suprapubic catheter care    4. Urinary retention        Plan:         I spent 30 minutes with the patient of which more than half was spent in direct consultation with the patient in regards to our treatment and plan.    Education and recommendations of today's plan of care including home remedies.  We discussed daily SPT care.  Diet modifications; continue PT and exercise.  Old SPT easily removed.  New 18fr SPT was easily placed using sterile technique.   I irrigated the bladder to verify the positioning.  Balloon inflated with 10 cc sterile water.   Tolerated well;   RTC one month

## 2018-02-16 NOTE — DISCHARGE INSTRUCTIONS
Trinity Vazquez MD  [facility]  Department of Ophthalmology      AFTER: Cataract Surgery:  Relax at home and DO NOT exert yourself for the rest of the day.  Plan to see Dr. Vazquez tomorrow at the eye clinic:   [facility] [facility] [address]  [place] [address]    Refer to attached eye drop instruction sheet     Precautions:  DO NOT rub your eye.  You may resume moderate activity the day after surgery.  Wear protective sunglasses during the day and a shield at night for 1(one) week.  If you have pain, redness and decreased vision, call Dr. Vazquez (or the on-call doctor after hours) @[phone].    Home Care Instructions for Eye Surgeries    1. ACTIVITY:  Limit your activity today. Relax at home and DO NOT exert yourself for the rest of the day. Increase activity gradually. You may return to work or school as directed by your physician.    2. DIET:  Drink plenty of fluids. Resume your normal diet unless instructed otherwise.    3. PAIN:  Expect a moderate amount of pain. If a prescription for pain is not sent home with you, you may take your commonly used pain reliever as directed. If this is not sufficient, call your physician. You may resume any other prescription medication unless otherwise directed by your physician.     Discuss any problem with your physician as soon as it arises. Do not Delay.      EMERGENCY- If you are unable to contact your physician, please go to the nearest Emergency Room.       Anesthesia: Monitored Anesthesia Care (MAC)    Anesthesia Safety  · Have an adult family member or friend drive you home after the procedure.  · For the first 24 hours after your surgery:  · Do not drive or use heavy equipment.  · Do not make important decisions or sign documents.  · Avoid alcohol.  · Have someone stay with you, if possible. They can watch for problems and help keep you safe.

## 2018-02-18 ENCOUNTER — PATIENT MESSAGE (OUTPATIENT)
Dept: SURGERY | Facility: OTHER | Age: 83
End: 2018-02-18

## 2018-02-19 ENCOUNTER — ANESTHESIA (OUTPATIENT)
Dept: SURGERY | Facility: OTHER | Age: 83
End: 2018-02-19
Payer: MEDICARE

## 2018-02-19 ENCOUNTER — ANESTHESIA EVENT (OUTPATIENT)
Dept: SURGERY | Facility: OTHER | Age: 83
End: 2018-02-19
Payer: MEDICARE

## 2018-02-19 ENCOUNTER — SURGERY (OUTPATIENT)
Age: 83
End: 2018-02-19

## 2018-02-19 ENCOUNTER — HOSPITAL ENCOUNTER (OUTPATIENT)
Facility: OTHER | Age: 83
Discharge: HOME | End: 2018-02-19
Attending: OPHTHALMOLOGY | Admitting: OPHTHALMOLOGY
Payer: MEDICARE

## 2018-02-19 VITALS
TEMPERATURE: 98 F | DIASTOLIC BLOOD PRESSURE: 67 MMHG | HEIGHT: 68 IN | OXYGEN SATURATION: 95 % | HEART RATE: 83 BPM | RESPIRATION RATE: 16 BRPM | BODY MASS INDEX: 30.16 KG/M2 | SYSTOLIC BLOOD PRESSURE: 141 MMHG | WEIGHT: 199 LBS

## 2018-02-19 DIAGNOSIS — H25.13 NUCLEAR SCLEROSIS, BILATERAL: ICD-10-CM

## 2018-02-19 PROCEDURE — 71000015 HC POSTOP RECOV 1ST HR: Performed by: OPHTHALMOLOGY

## 2018-02-19 PROCEDURE — 63600175 PHARM REV CODE 636 W HCPCS: Performed by: NURSE ANESTHETIST, CERTIFIED REGISTERED

## 2018-02-19 PROCEDURE — V2632 POST CHMBR INTRAOCULAR LENS: HCPCS | Performed by: OPHTHALMOLOGY

## 2018-02-19 PROCEDURE — 37000009 HC ANESTHESIA EA ADD 15 MINS: Performed by: OPHTHALMOLOGY

## 2018-02-19 PROCEDURE — 37000008 HC ANESTHESIA 1ST 15 MINUTES: Performed by: OPHTHALMOLOGY

## 2018-02-19 PROCEDURE — 25000003 PHARM REV CODE 250: Performed by: OPHTHALMOLOGY

## 2018-02-19 PROCEDURE — 25000003 PHARM REV CODE 250: Performed by: ANESTHESIOLOGY

## 2018-02-19 PROCEDURE — 36000707: Performed by: OPHTHALMOLOGY

## 2018-02-19 PROCEDURE — 36000706: Performed by: OPHTHALMOLOGY

## 2018-02-19 PROCEDURE — A4216 STERILE WATER/SALINE, 10 ML: HCPCS | Performed by: ANESTHESIOLOGY

## 2018-02-19 PROCEDURE — 66984 XCAPSL CTRC RMVL W/O ECP: CPT | Mod: RT,,, | Performed by: OPHTHALMOLOGY

## 2018-02-19 DEVICE — LENS IOL ITEC PRELOAD 20.0D: Type: IMPLANTABLE DEVICE | Site: EYE | Status: FUNCTIONAL

## 2018-02-19 RX ORDER — MIDAZOLAM HYDROCHLORIDE 1 MG/ML
INJECTION, SOLUTION INTRAMUSCULAR; INTRAVENOUS
Status: DISCONTINUED | OUTPATIENT
Start: 2018-02-19 | End: 2018-02-19

## 2018-02-19 RX ORDER — FENTANYL CITRATE 50 UG/ML
25 INJECTION, SOLUTION INTRAMUSCULAR; INTRAVENOUS EVERY 5 MIN PRN
Status: DISCONTINUED | OUTPATIENT
Start: 2018-02-19 | End: 2018-02-19 | Stop reason: HOSPADM

## 2018-02-19 RX ORDER — MEPERIDINE HYDROCHLORIDE 50 MG/ML
12.5 INJECTION INTRAMUSCULAR; INTRAVENOUS; SUBCUTANEOUS ONCE AS NEEDED
Status: DISCONTINUED | OUTPATIENT
Start: 2018-02-19 | End: 2018-02-19 | Stop reason: HOSPADM

## 2018-02-19 RX ORDER — TROPICAMIDE 10 MG/ML
1 SOLUTION/ DROPS OPHTHALMIC
Status: COMPLETED | OUTPATIENT
Start: 2018-02-19 | End: 2018-02-19

## 2018-02-19 RX ORDER — ACETAMINOPHEN 325 MG/1
650 TABLET ORAL EVERY 4 HOURS PRN
Status: DISCONTINUED | OUTPATIENT
Start: 2018-02-19 | End: 2018-02-19 | Stop reason: HOSPADM

## 2018-02-19 RX ORDER — OXYCODONE HYDROCHLORIDE 5 MG/1
5 TABLET ORAL
Status: DISCONTINUED | OUTPATIENT
Start: 2018-02-19 | End: 2018-02-19 | Stop reason: HOSPADM

## 2018-02-19 RX ORDER — MOXIFLOXACIN 5 MG/ML
1 SOLUTION/ DROPS OPHTHALMIC
Status: COMPLETED | OUTPATIENT
Start: 2018-02-19 | End: 2018-02-19

## 2018-02-19 RX ORDER — PHENYLEPHRINE HYDROCHLORIDE 25 MG/ML
1 SOLUTION/ DROPS OPHTHALMIC
Status: COMPLETED | OUTPATIENT
Start: 2018-02-19 | End: 2018-02-19

## 2018-02-19 RX ORDER — MOXIFLOXACIN 5 MG/ML
SOLUTION/ DROPS OPHTHALMIC
Status: DISCONTINUED | OUTPATIENT
Start: 2018-02-19 | End: 2018-02-19 | Stop reason: HOSPADM

## 2018-02-19 RX ORDER — TETRACAINE HYDROCHLORIDE 5 MG/ML
SOLUTION OPHTHALMIC
Status: DISCONTINUED | OUTPATIENT
Start: 2018-02-19 | End: 2018-02-19 | Stop reason: HOSPADM

## 2018-02-19 RX ORDER — TETRACAINE HYDROCHLORIDE 5 MG/ML
1 SOLUTION OPHTHALMIC
Status: COMPLETED | OUTPATIENT
Start: 2018-02-19 | End: 2018-02-19

## 2018-02-19 RX ORDER — SODIUM CHLORIDE 0.9 % (FLUSH) 0.9 %
3 SYRINGE (ML) INJECTION
Status: DISCONTINUED | OUTPATIENT
Start: 2018-02-19 | End: 2018-02-19 | Stop reason: HOSPADM

## 2018-02-19 RX ORDER — ONDANSETRON 2 MG/ML
4 INJECTION INTRAMUSCULAR; INTRAVENOUS DAILY PRN
Status: DISCONTINUED | OUTPATIENT
Start: 2018-02-19 | End: 2018-02-19 | Stop reason: HOSPADM

## 2018-02-19 RX ORDER — LIDOCAINE HYDROCHLORIDE 10 MG/ML
1 INJECTION, SOLUTION EPIDURAL; INFILTRATION; INTRACAUDAL; PERINEURAL ONCE
Status: DISCONTINUED | OUTPATIENT
Start: 2018-02-19 | End: 2018-02-19 | Stop reason: HOSPADM

## 2018-02-19 RX ORDER — PROPARACAINE HYDROCHLORIDE 5 MG/ML
1 SOLUTION/ DROPS OPHTHALMIC
Status: DISCONTINUED | OUTPATIENT
Start: 2018-02-19 | End: 2018-02-19 | Stop reason: HOSPADM

## 2018-02-19 RX ADMIN — MIDAZOLAM 1 MG: 1 INJECTION INTRAMUSCULAR; INTRAVENOUS at 11:02

## 2018-02-19 RX ADMIN — MOXIFLOXACIN HYDROCHLORIDE 1 DROP: 5 SOLUTION/ DROPS OPHTHALMIC at 12:02

## 2018-02-19 RX ADMIN — TROPICAMIDE 1 DROP: 10 SOLUTION/ DROPS OPHTHALMIC at 10:02

## 2018-02-19 RX ADMIN — PHENYLEPHRINE HYDROCHLORIDE 1 DROP: 25 SOLUTION/ DROPS OPHTHALMIC at 10:02

## 2018-02-19 RX ADMIN — TETRACAINE HYDROCHLORIDE 1 DROP: 5 SOLUTION OPHTHALMIC at 10:02

## 2018-02-19 RX ADMIN — MOXIFLOXACIN HYDROCHLORIDE 1 DROP: 5 SOLUTION/ DROPS OPHTHALMIC at 10:02

## 2018-02-19 RX ADMIN — SODIUM CHLORIDE, PRESERVATIVE FREE 10 ML: 5 INJECTION INTRAVENOUS at 11:02

## 2018-02-19 RX ADMIN — MOXIFLOXACIN HYDROCHLORIDE 1 DROP: 5 SOLUTION/ DROPS OPHTHALMIC at 11:02

## 2018-02-19 RX ADMIN — BALANCED SALT SOLUTION ENRICHED WITH BICARBONATE, DEXTROSE, AND GLUTATHIONE 500 ML: KIT at 11:02

## 2018-02-19 RX ADMIN — TETRACAINE HYDROCHLORIDE 2 DROP: 5 SOLUTION OPHTHALMIC at 11:02

## 2018-02-19 NOTE — ANESTHESIA POSTPROCEDURE EVALUATION
"Anesthesia Post Evaluation    Patient: Chucho Prado    Procedure(s) Performed: Procedure(s) (LRB):  PHACOEMULSIFICATION-ASPIRATION-CATARACT (Right)  INSERTION-INTRAOCULAR LENS (IOL) (Right)    Final Anesthesia Type: MAC  Patient location during evaluation: [facility]  Patient participation: Yes- Able to Participate  Level of consciousness: awake and alert  Post-procedure vital signs: reviewed and stable  Pain management: adequate  Airway patency: patent  PONV status at discharge: No PONV  Anesthetic complications: no      Cardiovascular status: blood pressure returned to baseline  Respiratory status: unassisted  Hydration status: euvolemic  Follow-up not needed.        Visit Vitals  BP (!) 141/67 (BP Location: Right arm, Patient Position: Sitting)   Pulse 78   Temp 36.7 °C (98.1 °F) (Oral)   Resp 16   Ht 5' 8" (1.727 m)   Wt 90.3 kg (199 lb)   SpO2 98%   BMI 30.26 kg/m²       Pain/Nathaniel Score: No Data Recorded      "

## 2018-02-19 NOTE — OP NOTE
SURGEON:  Trinity Vazquez M.D.    PREOPERATIVE DIAGNOSIS:    Nuclear Sclerotic Cataract Right Eye    POSTOPERATIVE DIAGNOSIS:    Nuclear Sclerotic Cataract Right Eye    PROCEDURES:    Phacoemulsification with  intraocular lens, Right eye ([id number])    DATE OF SURGERY: 02/19/2018    IMPLANT: pcboo 20.0    ANESTHESIA:  MAC with topical Lidocaine    COMPLICATIONS:  None    ESTIMATED BLOOD LOSS: None    SPECIMENS: None    INDICATIONS:    The patient has a history of painless progressive visual loss and  difficulty with activities of daily living secondary to cataract formation.  After a thorough discussion of the risks, benefits, and alternatives to cataract surgery, including, but not limited to, the rare risks of infection, retinal detachment, hemorrhage, need for additional surgery, loss of vision, and even loss of the eye, the patient voices understanding and desires to proceed.    DESCRIPTION OF PROCEDURE:    The patients IOL calculations were reviewed, and the lens selection confirmed.   After verification and marking of the proper eye in the preop holding area, the patient was brought to the operating room in supine position where the eye was prepped and draped in standard sterile fashion with 5% Betadine and a lid speculum placed in the eye.   Topical 4% Lidocaine was used in addition to the preoperative anesthesia and the procedure was begun by the creation of a paracentesis incision through which viscoelastic was used to fill the anterior chamber.  Next, a keratome blade was used to create a triplanar temporal clear corneal incision and a cystotome and Utrata forceps used to fashion a continuous curvilinear capsulorrhexis.  Hydrodissection was carried out using the Muro hydrodissection cannula and the nucleus was found to be mobile.  Phacoemulsification of the nucleus was carried out using a quick chop technique, and all remaining epinuclear and cortical material was removed.  The eye was then reformed with  Viscoelastic and the  intraocular lens was implanted into the capsular bag.  All remaining viscoelastics were removed from the eye and at the end of the case the pupil was round, the lens was well-centered within the capsular bag and all wounds were found to be water tight.  Drops of Vigamox and Pred Forte were instilled and a shield was placed over the eye. The patient will follow up with Dr. Vazquez in the morning.

## 2018-02-19 NOTE — DISCHARGE SUMMARY
Outcome: Successful outpatient ophthalmic surgical procedure  Preprinted Instructions given to patient.  Regular diet.  Activity: No restrictions  Meds: see Med Rec  Condition: stable  Follow up: 1 day with Dr Vazquez  Disposition: Home  Diagnosis: s/p eye surgery

## 2018-02-19 NOTE — ANESTHESIA PREPROCEDURE EVALUATION
02/19/2018  Chucho Prado is a 84 y.o., male.    Anesthesia Evaluation    I have reviewed the Patient Summary Reports.    I have reviewed the Nursing Notes.   I have reviewed the Medications.     Review of Systems  Anesthesia Hx:  Denies Hx of Anesthetic complications    Social:  Non-Smoker    Cardiovascular:   Hypertension, well controlled    Renal/:   Chronic Renal Disease, CRI    Hepatic/GI:   GERD, well controlled    Musculoskeletal:   Arthritis     Neurological:   Neuromuscular Disease,    Endocrine:   Hypothyroidism    Psych:   Psychiatric History          Physical Exam  General:  Well nourished    Airway/Jaw/Neck:  Airway Findings: Mouth Opening: Normal Tongue: Normal  General Airway Assessment: Adult  Mallampati: II  TM Distance: Normal, at least 6 cm  Jaw/Neck Findings:     Neck ROM: Normal ROM     Eyes/Ears/Nose:  Eyes/Ears/Nose Findings:    Dental:  Dental Findings: In tact             Anesthesia Plan  Type of Anesthesia, risks & benefits discussed:  Anesthesia Type:  MAC  Patient's Preference:   Intra-op Monitoring Plan:   Intra-op Monitoring Plan Comments:   Post Op Pain Control Plan:   Post Op Pain Control Plan Comments:   Induction:   IV  Beta Blocker:         Informed Consent: Patient understands risks and agrees with Anesthesia plan.  Questions answered. Anesthesia consent signed with patient.  ASA Score: 3     Day of Surgery Review of History & Physical:    H&P update referred to the surgeon.         Ready For Surgery From Anesthesia Perspective.

## 2018-02-20 ENCOUNTER — OFFICE VISIT (OUTPATIENT)
Dept: OPHTHALMOLOGY | Facility: CLINIC | Age: 83
End: 2018-02-20
Attending: OPHTHALMOLOGY
Payer: MEDICARE

## 2018-02-20 DIAGNOSIS — Z98.890 POST-OPERATIVE STATE: Primary | ICD-10-CM

## 2018-02-20 DIAGNOSIS — H25.11 NUCLEAR SCLEROTIC CATARACT OF RIGHT EYE: ICD-10-CM

## 2018-02-20 PROCEDURE — 99212 OFFICE O/P EST SF 10 MIN: CPT | Mod: PBBFAC | Performed by: OPHTHALMOLOGY

## 2018-02-20 PROCEDURE — 99999 PR PBB SHADOW E&M-EST. PATIENT-LVL II: CPT | Mod: PBBFAC,,, | Performed by: OPHTHALMOLOGY

## 2018-02-20 PROCEDURE — 99024 POSTOP FOLLOW-UP VISIT: CPT | Mod: POP,,, | Performed by: OPHTHALMOLOGY

## 2018-02-20 NOTE — PROGRESS NOTES
HPI     Post-op Evaluation    Additional comments: 1 day check.            Comments   POD 1 CE with IOL OD    Pt states doing well,  No complaints.    PMB tid OD         Last edited by Shellie Lopez on 2/20/2018  9:58 AM. (History)            Assessment /Plan     For exam results, see Encounter Report.    Post-operative state    Nuclear sclerotic cataract of right eye      Slit lamp exam:  L/L: nl  K: clear, wound sealed  AC: 1+ cell  Lens: IOL centered and stable    POD1 s/p Phaco/IOL  Appropriate precautions and post op medications reviewed.  Patient instructed to call or come in if symptoms of redness, decreased vision, or pain are experienced.

## 2018-02-27 ENCOUNTER — TELEPHONE (OUTPATIENT)
Dept: ORTHOPEDICS | Facility: CLINIC | Age: 83
End: 2018-02-27

## 2018-02-27 NOTE — TELEPHONE ENCOUNTER
----- Message from Oma Hoffmann sent at 2/27/2018 12:20 PM CST -----  Contact: self  Pt is requesting for Dr. [facility] to right an order for him to continue at Home Therapy. Pt could be reached at [phone]         We spoke  Per Dr [facility] yes  He may continue PT  I asked him to have them fax us an order to continue  , or call us   He will find out who the home health agency is and let us know

## 2018-03-01 ENCOUNTER — TELEPHONE (OUTPATIENT)
Dept: ORTHOPEDICS | Facility: CLINIC | Age: 83
End: 2018-03-01

## 2018-03-01 NOTE — TELEPHONE ENCOUNTER
----- Message from Ani Ellison MA sent at 3/1/2018  3:55 PM CST -----  Contact: self   Pt is calling requesting continuation of PT at [facility] phone [phone] / Fax [phone]. Pt can be reached at [phone].  Spoke with Nurse Mcmanus  Physical therapy has been extended per [facility] DR [facility]

## 2018-03-02 ENCOUNTER — TELEPHONE (OUTPATIENT)
Dept: ORTHOPEDICS | Facility: CLINIC | Age: 83
End: 2018-03-02

## 2018-03-02 DIAGNOSIS — Z96.659 TOTAL KNEE REPLACEMENT STATUS, UNSPECIFIED LATERALITY: Primary | ICD-10-CM

## 2018-03-02 NOTE — TELEPHONE ENCOUNTER
----- Message from Liset Adhikari sent at 3/1/2018  4:50 PM CST -----  Contact: Dayan from [facility]  Dayan called stating she received an order from [facility] requesting an extension for PT  For pt. The pt has stopped going to PT since the pt was discharged 1/12th. She wanted to make sure [facility] was aware and wanted to clarify that a new order will be put in. Please call her at [phone]       We spoke  Will have Ms Dunne PAC check my entry  Or add her new entry

## 2018-03-14 ENCOUNTER — OFFICE VISIT (OUTPATIENT)
Dept: UROLOGY | Facility: CLINIC | Age: 83
End: 2018-03-14
Payer: MEDICARE

## 2018-03-14 VITALS
WEIGHT: 199.06 LBS | HEART RATE: 80 BPM | BODY MASS INDEX: 30.17 KG/M2 | DIASTOLIC BLOOD PRESSURE: 72 MMHG | SYSTOLIC BLOOD PRESSURE: 140 MMHG | HEIGHT: 68 IN

## 2018-03-14 DIAGNOSIS — R33.9 URINARY RETENTION: Primary | ICD-10-CM

## 2018-03-14 DIAGNOSIS — Z93.59 SUPRAPUBIC CATHETER: ICD-10-CM

## 2018-03-14 PROCEDURE — 51705 CHANGE OF BLADDER TUBE: CPT | Mod: PBBFAC | Performed by: NURSE PRACTITIONER

## 2018-03-14 PROCEDURE — 51705 CHANGE OF BLADDER TUBE: CPT | Mod: S$PBB,,, | Performed by: NURSE PRACTITIONER

## 2018-03-14 PROCEDURE — 99213 OFFICE O/P EST LOW 20 MIN: CPT | Mod: PBBFAC | Performed by: NURSE PRACTITIONER

## 2018-03-14 PROCEDURE — 99999 PR PBB SHADOW E&M-EST. PATIENT-LVL III: CPT | Mod: PBBFAC,,, | Performed by: NURSE PRACTITIONER

## 2018-03-14 PROCEDURE — 99213 OFFICE O/P EST LOW 20 MIN: CPT | Mod: S$PBB,25,, | Performed by: NURSE PRACTITIONER

## 2018-03-14 NOTE — PROGRESS NOTES
Subjective:       Patient ID: Chucho Prado is a 84 y.o. male.    Chief Complaint: SPT change    Chucho Prado is a 84 y.o. male with neurogenic bladder.  He had 18fr SPT placed by Dr. Taylor on 06/23/2017 to manage his Neurogenic Bladder.    He is here today for exchange.  His last exchange 02/14/2018.     He reports good urine flow.  Occasional bladder spasms;  SPT draining well;     Went to [employer] for b'day dinner; had great time  10/25/2017 (R) TKR with Dr. [facility].            Past Medical History:  No date: Arthritis  No date: Blood transfusion  No date: CKD (chronic kidney disease) stage 3, GFR 30-5*  No date: Compression fracture of lumbar vertebra  No date: Depression  No date: Dyslipidemia  No date: GERD (gastroesophageal reflux disease)  No date: Hypertension  No date: Thyroid disease  No date: UTI (urinary tract infection)    Past Surgical History:  No date: CHOLECYSTECTOMY  No date: HERNIA REPAIR  No date: JOINT REPLACEMENT  12/27/2016: LAMINECTOMY      Comment: L2-L4  12/2016: LUMBAR LAMINECTOMY  No date: PARATHYROIDECTOMY  No date: TOTAL KNEE ARTHROPLASTY      Comment: Right    Review of patient's family history indicates:    Hypertension                   Mother                    Diabetes                       Father                    Esophageal cancer              Father                      Social History    Marital status: Single              Spouse name:                       Years of education:                 Number of children:               Occupational History    None on file    Social History Main Topics    Smoking status: Former Smoker                                                                Packs/day: 0.00      Years: 20.00       Smokeless tobacco: Never Used                        Comment: quit 1999    Alcohol use: Yes                Comment: rarely/6 months    Drug use: No              Sexual activity: No                   Other Topics            Concern    None  on file    Social History Narrative    Lives alone, owns house and rents part. Delaney helps. 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         Allergies:  Review of patient's allergies indicates no known allergies.    Medications:  Current Outpatient Prescriptions:   acetaminophen (TYLENOL) 500 MG tablet, Take 2 tablets (1,000 mg total) by mouth 3 (three) times daily as needed for Pain., Disp: 30 tablet, Rfl: 0  aspirin 81 MG Chew, Take 1 tablet (81 mg total) by mouth once daily. HOLD UNTIL TOLD TO RESUME BY PHYSICIAN, Disp: 30 tablet, Rfl: 0  calcitonin, salmon, (FORTICAL) 200 unit/actuation nasal spray, 1 spray by Nasal route once daily., Disp: 1 Bottle, Rfl: 0  diclofenac sodium (VOLTAREN) 1 % Gel, Apply 2 g topically 3 (three) times daily. To left knee, Disp: 100 g, Rfl: 3  gabapentin (NEURONTIN) 400 MG capsule, Take 2 capsules (800 mg total) by mouth 3 (three) times daily., Disp: 180 capsule, Rfl: 11  hyoscyamine (LEVSIN/SL) 0.125 mg Subl, Place 1 tablet (0.125 mg total) under the tongue every 4 (four) hours as needed (bladder spasms)., Disp: 30 tablet, Rfl: 1  levothyroxine (SYNTHROID) 50 MCG tablet, Take 1 tablet (50 mcg total) by mouth once daily., Disp: 100 tablet, Rfl: 11  mirtazapine (REMERON) 30 MG tablet, Take 1 tablet (30 mg total) by mouth every evening., Disp: 30 tablet, Rfl: 6  polyethylene glycol (GLYCOLAX) 17 gram PwPk, Take 17 g by mouth once daily., Disp: 30 packet, Rfl: 0  simethicone (MYLICON) 80 MG chewable tablet, Take 1 tablet (80 mg total) by mouth 3 (three) times daily after meals., Disp: , Rfl: 0  simvastatin (ZOCOR) 40 MG tablet, Take 1 tablet (40 mg total) by mouth every evening., Disp: 90 tablet, Rfl: 11  tamsulosin (FLOMAX) 0.4 mg Cp24, Take 1 capsule (0.4 mg total) by mouth once daily., Disp: 30 capsule, Rfl: 3  triamterene-hydrochlorothiazide 37.5-25 mg (DYAZIDE) 37.5-25 mg per capsule, Take 1 capsule by mouth every morning. HOLD UNTIL TOLD TO RESUME BY PHYSICIAN, Disp: 90  capsule, Rfl: 3                  Review of Systems   Constitutional: Negative for activity change, appetite change, chills and fever.   HENT: Negative for facial swelling and trouble swallowing.    Eyes: Negative for visual disturbance.   Respiratory: Negative for chest tightness and shortness of breath.    Cardiovascular: Negative for chest pain and palpitations.   Gastrointestinal: Positive for constipation and diarrhea. Negative for abdominal pain, nausea and vomiting.        Having GI symptoms.     Genitourinary: Positive for difficulty urinating. Negative for dysuria, flank pain, hematuria, penile pain, penile swelling, scrotal swelling and testicular pain.   Musculoskeletal: Positive for arthralgias and gait problem. Negative for back pain, myalgias and neck stiffness.   Skin: Negative for rash.   Neurological: Negative for dizziness and speech difficulty.   Hematological: Does not bruise/bleed easily.   Psychiatric/Behavioral: Negative for behavioral problems.       Objective:      Physical Exam   Nursing note and vitals reviewed.  Constitutional: He is oriented to person, place, and time. Vital signs are normal. He appears well-developed and well-nourished. He is active and cooperative.  Non-toxic appearance. He does not have a sickly appearance.   HENT:   Head: Normocephalic and atraumatic.   Right Ear: External ear normal.   Left Ear: External ear normal.   Nose: Nose normal.   Mouth/Throat: Mucous membranes are normal.   Eyes: Conjunctivae and lids are normal. No scleral icterus.   Neck: Trachea normal, normal range of motion and full passive range of motion without pain. Neck supple. No JVD present. No tracheal deviation present.   Cardiovascular: Normal rate, S1 normal and S2 normal.    Pulmonary/Chest: Effort normal. No respiratory distress. He exhibits no tenderness.   Abdominal: Soft. Normal appearance and bowel sounds are normal. There is no hepatosplenomegaly. There is no tenderness. There is no  CVA tenderness.       Musculoskeletal: Normal range of motion.   Neurological: He is alert and oriented to person, place, and time. He has normal strength.   Skin: Skin is warm, dry and intact.     Psychiatric: He has a normal mood and affect. His behavior is normal. Judgment and thought content normal.       Assessment:       1. Urinary retention    2. Suprapubic catheter        Plan:         I spent 30 minutes with the patient of which more than half was spent in direct consultation with the patient in regards to our treatment and plan.    Education and recommendations of today's plan of care including home remedies.  We discussed daily SPT care.  Diet modifications; continue PT and exercise.  Old SPT easily removed.  New 18fr SPT was easily placed using sterile technique.   I irrigated the bladder to verify the positioning.  Balloon inflated with 10 cc sterile water.   Silver nitrate applied to granulated tissue  Tolerated well;   RTC one month

## 2018-03-21 ENCOUNTER — TELEPHONE (OUTPATIENT)
Dept: OPHTHALMOLOGY | Facility: CLINIC | Age: 83
End: 2018-03-21

## 2018-03-21 DIAGNOSIS — H25.12 NUCLEAR SCLEROTIC CATARACT OF LEFT EYE: Primary | ICD-10-CM

## 2018-04-01 ENCOUNTER — EXTERNAL CHRONIC CARE MANAGEMENT (OUTPATIENT)
Dept: PRIMARY CARE CLINIC | Facility: CLINIC | Age: 83
End: 2018-04-01
Payer: MEDICARE

## 2018-04-07 ENCOUNTER — NURSE TRIAGE (OUTPATIENT)
Dept: ADMINISTRATIVE | Facility: CLINIC | Age: 83
End: 2018-04-07

## 2018-04-07 NOTE — TELEPHONE ENCOUNTER
"  Reason for Disposition   [1] SEVERE pain AND [2] age > 60    Answer Assessment - Initial Assessment Questions  1. LOCATION: "Where does it hurt?"       Across the entire abdomen   2. RADIATION: "Does the pain shoot anywhere else?" (e.g., chest, back)      Back pain but unsure if it is radiating   3. ONSET: "When did the pain begin?" (Minutes, hours or days ago)       yesterda  4. SUDDEN: "Gradual or sudden onset?"    Sudden   5. PATTERN "Does the pain come and go, or is it constant?"     - If constant: "Is it getting better, staying the same, or worsening?"       (Note: Constant means the pain never goes away completely; most serious pain is constant and it progresses)      - If intermittent: "How long does it last?" "Do you have pain now?"      (Note: Intermittent means the pain goes away completely between bouts)      Constant   6. SEVERITY: "How bad is the pain?"  (e.g., Scale 1-10; mild, moderate, or severe)     - MILD (1-3): doesn't interfere with normal activities, abdomen soft and not tender to touch      - MODERATE (4-7): interferes with normal activities or awakens from sleep, tender to touch      - SEVERE (8-10): excruciating pain, doubled over, unable to do any normal activities       3/10 while lying, sitting up or standing 7/10  7. RECURRENT SYMPTOM: "Have you ever had this type of abdominal pain before?" If so, ask: "When was the last time?" and "What happened that time?"       Yes, but not this bad. It feels like gas  8. CAUSE: "What do you think is causing the abdominal pain?"      Gas?   9. RELIEVING/AGGRAVATING FACTORS: "What makes it better or worse?" (e.g., movement, antacids, bowel movement)      Lying down helps the pain  10. OTHER SYMPTOMS: "Has there been any vomiting, diarrhea, constipation, or urine problems?"        Not urinating as much    Protocols used: ST ABDOMINAL PAIN - MALE-A-AH    Patient reports diffuse pain across his lower abdomen and is lying down because it is too painful " to sit or stand. Patient had 3 bowel movements today which he describes as normal. Mr. Prado's friend Mr. Burr was present during the phone call and will try to get the patient to go to the ED although he refused initially.

## 2018-04-10 ENCOUNTER — OFFICE VISIT (OUTPATIENT)
Dept: UROLOGY | Facility: CLINIC | Age: 83
End: 2018-04-10
Payer: MEDICARE

## 2018-04-10 ENCOUNTER — OFFICE VISIT (OUTPATIENT)
Dept: OPHTHALMOLOGY | Facility: CLINIC | Age: 83
End: 2018-04-10
Attending: OPHTHALMOLOGY
Payer: MEDICARE

## 2018-04-10 VITALS
BODY MASS INDEX: 30.17 KG/M2 | SYSTOLIC BLOOD PRESSURE: 141 MMHG | HEART RATE: 89 BPM | HEIGHT: 68 IN | DIASTOLIC BLOOD PRESSURE: 63 MMHG | WEIGHT: 199.06 LBS

## 2018-04-10 DIAGNOSIS — Z43.5 ENCOUNTER FOR CARE OR REPLACEMENT OF SUPRAPUBIC TUBE: ICD-10-CM

## 2018-04-10 DIAGNOSIS — N31.9 NEUROGENIC BLADDER: Primary | ICD-10-CM

## 2018-04-10 DIAGNOSIS — R33.9 URINARY RETENTION: ICD-10-CM

## 2018-04-10 DIAGNOSIS — Z93.59 CHRONIC SUPRAPUBIC CATHETER: ICD-10-CM

## 2018-04-10 DIAGNOSIS — H25.12 NUCLEAR SCLEROSIS OF LEFT EYE: ICD-10-CM

## 2018-04-10 DIAGNOSIS — Z98.890 POST-OPERATIVE STATE: Primary | ICD-10-CM

## 2018-04-10 DIAGNOSIS — H25.11 NUCLEAR SCLEROTIC CATARACT OF RIGHT EYE: ICD-10-CM

## 2018-04-10 PROCEDURE — 99999 PR PBB SHADOW E&M-EST. PATIENT-LVL III: CPT | Mod: PBBFAC,,, | Performed by: NURSE PRACTITIONER

## 2018-04-10 PROCEDURE — 99214 OFFICE O/P EST MOD 30 MIN: CPT | Mod: S$PBB,25,, | Performed by: NURSE PRACTITIONER

## 2018-04-10 PROCEDURE — 99212 OFFICE O/P EST SF 10 MIN: CPT | Mod: PBBFAC,27 | Performed by: OPHTHALMOLOGY

## 2018-04-10 PROCEDURE — 99213 OFFICE O/P EST LOW 20 MIN: CPT | Mod: PBBFAC,25 | Performed by: NURSE PRACTITIONER

## 2018-04-10 PROCEDURE — 99999 PR PBB SHADOW E&M-EST. PATIENT-LVL II: CPT | Mod: PBBFAC,,, | Performed by: OPHTHALMOLOGY

## 2018-04-10 PROCEDURE — 51705 CHANGE OF BLADDER TUBE: CPT | Mod: PBBFAC | Performed by: NURSE PRACTITIONER

## 2018-04-10 PROCEDURE — 99024 POSTOP FOLLOW-UP VISIT: CPT | Mod: POP,,, | Performed by: OPHTHALMOLOGY

## 2018-04-10 PROCEDURE — 51705 CHANGE OF BLADDER TUBE: CPT | Mod: S$PBB,,, | Performed by: NURSE PRACTITIONER

## 2018-04-10 RX ORDER — TETRACAINE HYDROCHLORIDE 5 MG/ML
1 SOLUTION OPHTHALMIC
Status: CANCELLED | OUTPATIENT
Start: 2018-04-10

## 2018-04-10 RX ORDER — MOXIFLOXACIN 5 MG/ML
1 SOLUTION/ DROPS OPHTHALMIC
Status: CANCELLED | OUTPATIENT
Start: 2018-04-10

## 2018-04-10 RX ORDER — PHENYLEPHRINE HYDROCHLORIDE 25 MG/ML
1 SOLUTION/ DROPS OPHTHALMIC
Status: CANCELLED | OUTPATIENT
Start: 2018-04-10

## 2018-04-10 RX ORDER — TROPICAMIDE 10 MG/ML
1 SOLUTION/ DROPS OPHTHALMIC
Status: CANCELLED | OUTPATIENT
Start: 2018-04-10

## 2018-04-10 NOTE — PROGRESS NOTES
Subjective:       Patient ID: Chucho Prado is a 84 y.o. male.    Chief Complaint: Neurogenic bladder    Chucho Prado is a 84 y.o. male with neurogenic bladder.  He had 18fr SPT placed by Dr. Taylor on 06/23/2017 to manage his Neurogenic Bladder.    He is here today for exchange.  His last exchange 03/14/2018.     He reports good urine flow.  Occasional bladder spasms;  SPT draining well;     Went to [employer] for b'day dinner; had great time  10/25/2017 (R) TKR with Dr. [facility].            Past Medical History:  No date: Arthritis  No date: Blood transfusion  No date: CKD (chronic kidney disease) stage 3, GFR 30-5*  No date: Compression fracture of lumbar vertebra  No date: Depression  No date: Dyslipidemia  No date: GERD (gastroesophageal reflux disease)  No date: Hypertension  No date: Thyroid disease  No date: UTI (urinary tract infection)    Past Surgical History:  No date: CHOLECYSTECTOMY  No date: HERNIA REPAIR  No date: JOINT REPLACEMENT  12/27/2016: LAMINECTOMY      Comment: L2-L4  12/2016: LUMBAR LAMINECTOMY  No date: PARATHYROIDECTOMY  No date: TOTAL KNEE ARTHROPLASTY      Comment: Right    Review of patient's family history indicates:    Hypertension                   Mother                    Diabetes                       Father                    Esophageal cancer              Father                      Social History    Marital status: Single              Spouse name:                       Years of education:                 Number of children:               Occupational History    None on file    Social History Main Topics    Smoking status: Former Smoker                                                                Packs/day: 0.00      Years: 20.00       Smokeless tobacco: Never Used                        Comment: quit 1999    Alcohol use: Yes                Comment: rarely/6 months    Drug use: No              Sexual activity: No                   Other Topics             Concern    None on file    Social History Narrative    Lives alone, owns house and rents part. Delaney helps. 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         Allergies:  Review of patient's allergies indicates no known allergies.    Medications:  Current Outpatient Prescriptions:   acetaminophen (TYLENOL) 500 MG tablet, Take 2 tablets (1,000 mg total) by mouth 3 (three) times daily as needed for Pain., Disp: 30 tablet, Rfl: 0  aspirin 81 MG Chew, Take 1 tablet (81 mg total) by mouth once daily. HOLD UNTIL TOLD TO RESUME BY PHYSICIAN, Disp: 30 tablet, Rfl: 0  calcitonin, salmon, (FORTICAL) 200 unit/actuation nasal spray, 1 spray by Nasal route once daily., Disp: 1 Bottle, Rfl: 0  diclofenac sodium (VOLTAREN) 1 % Gel, Apply 2 g topically 3 (three) times daily. To left knee, Disp: 100 g, Rfl: 3  gabapentin (NEURONTIN) 400 MG capsule, Take 2 capsules (800 mg total) by mouth 3 (three) times daily., Disp: 180 capsule, Rfl: 11  hyoscyamine (LEVSIN/SL) 0.125 mg Subl, Place 1 tablet (0.125 mg total) under the tongue every 4 (four) hours as needed (bladder spasms)., Disp: 30 tablet, Rfl: 1  levothyroxine (SYNTHROID) 50 MCG tablet, Take 1 tablet (50 mcg total) by mouth once daily., Disp: 100 tablet, Rfl: 11  mirtazapine (REMERON) 30 MG tablet, Take 1 tablet (30 mg total) by mouth every evening., Disp: 30 tablet, Rfl: 6  polyethylene glycol (GLYCOLAX) 17 gram PwPk, Take 17 g by mouth once daily., Disp: 30 packet, Rfl: 0  simethicone (MYLICON) 80 MG chewable tablet, Take 1 tablet (80 mg total) by mouth 3 (three) times daily after meals., Disp: , Rfl: 0  simvastatin (ZOCOR) 40 MG tablet, Take 1 tablet (40 mg total) by mouth every evening., Disp: 90 tablet, Rfl: 11  tamsulosin (FLOMAX) 0.4 mg Cp24, Take 1 capsule (0.4 mg total) by mouth once daily., Disp: 30 capsule, Rfl: 3  triamterene-hydrochlorothiazide 37.5-25 mg (DYAZIDE) 37.5-25 mg per capsule, Take 1 capsule by mouth every morning. HOLD UNTIL TOLD TO RESUME BY  PHYSICIAN, Disp: 90 capsule, Rfl: 3                  Review of Systems   Constitutional: Negative for activity change, appetite change, chills and fever.   HENT: Negative for facial swelling and trouble swallowing.    Eyes: Negative for visual disturbance.   Respiratory: Negative for chest tightness and shortness of breath.    Cardiovascular: Negative for chest pain and palpitations.   Gastrointestinal: Negative.  Negative for abdominal pain, constipation, diarrhea, nausea and vomiting.   Genitourinary: Positive for difficulty urinating. Negative for dysuria, flank pain, hematuria, penile pain, penile swelling, scrotal swelling and testicular pain.        SPT has been draining well     Musculoskeletal: Positive for arthralgias and gait problem. Negative for back pain, myalgias and neck stiffness.        Using walker at home.     Skin: Negative for rash.   Neurological: Positive for weakness. Negative for dizziness and speech difficulty.   Hematological: Does not bruise/bleed easily.   Psychiatric/Behavioral: Negative for behavioral problems.       Objective:      Physical Exam   Nursing note and vitals reviewed.  Constitutional: He is oriented to person, place, and time. Vital signs are normal. He appears well-developed and well-nourished. He is active and cooperative.  Non-toxic appearance. He does not have a sickly appearance.   HENT:   Head: Normocephalic and atraumatic.   Right Ear: External ear normal.   Left Ear: External ear normal.   Nose: Nose normal.   Mouth/Throat: Mucous membranes are normal.   Eyes: Conjunctivae and lids are normal. No scleral icterus.   Neck: Trachea normal, normal range of motion and full passive range of motion without pain. Neck supple. No JVD present. No tracheal deviation present.   Cardiovascular: Normal rate, S1 normal and S2 normal.    Pulmonary/Chest: Effort normal. No respiratory distress. He exhibits no tenderness.   Abdominal: Soft. Normal appearance and bowel sounds are  normal. There is no hepatosplenomegaly. There is no tenderness. There is no CVA tenderness.       Musculoskeletal: Normal range of motion.   Neurological: He is alert and oriented to person, place, and time. He has normal strength.   Skin: Skin is warm, dry and intact.     Psychiatric: He has a normal mood and affect. His behavior is normal. Judgment and thought content normal.       Assessment:       1. Neurogenic bladder    2. Chronic suprapubic catheter    3. Encounter for care or replacement of suprapubic tube    4. Urinary retention        Plan:         I spent 25 minutes with the patient of which more than half was spent in direct consultation with the patient in regards to our treatment and plan.    Education and recommendations of today's plan of care including home remedies.  We discussed daily SPT and skin care.   Diet modifications; continue PT and exercise.  Old SPT easily removed.  New 18fr SPT was easily placed using sterile technique.   I irrigated the bladder to verify the positioning.  Balloon inflated with 10 cc sterile water.   Tolerated well;   RTC one month

## 2018-04-23 ENCOUNTER — PATIENT MESSAGE (OUTPATIENT)
Dept: UROLOGY | Facility: CLINIC | Age: 83
End: 2018-04-23

## 2018-04-30 PROCEDURE — 99490 CHRNC CARE MGMT STAFF 1ST 20: CPT | Mod: PBBFAC | Performed by: INTERNAL MEDICINE

## 2018-04-30 PROCEDURE — 99490 CHRNC CARE MGMT STAFF 1ST 20: CPT | Mod: S$PBB,,, | Performed by: INTERNAL MEDICINE

## 2018-05-01 ENCOUNTER — EXTERNAL CHRONIC CARE MANAGEMENT (OUTPATIENT)
Dept: PRIMARY CARE CLINIC | Facility: CLINIC | Age: 83
End: 2018-05-01
Payer: MEDICARE

## 2018-05-04 ENCOUNTER — PATIENT MESSAGE (OUTPATIENT)
Dept: ORTHOPEDICS | Facility: CLINIC | Age: 83
End: 2018-05-04

## 2018-05-10 ENCOUNTER — OFFICE VISIT (OUTPATIENT)
Dept: UROLOGY | Facility: CLINIC | Age: 83
End: 2018-05-10
Payer: MEDICARE

## 2018-05-10 VITALS
DIASTOLIC BLOOD PRESSURE: 80 MMHG | HEART RATE: 76 BPM | BODY MASS INDEX: 30.17 KG/M2 | SYSTOLIC BLOOD PRESSURE: 136 MMHG | WEIGHT: 199.06 LBS | HEIGHT: 68 IN

## 2018-05-10 DIAGNOSIS — N31.9 NEUROGENIC BLADDER: Primary | ICD-10-CM

## 2018-05-10 DIAGNOSIS — R33.9 URINARY RETENTION: ICD-10-CM

## 2018-05-10 DIAGNOSIS — Z43.5 ENCOUNTER FOR SUPRAPUBIC CATHETER CARE: ICD-10-CM

## 2018-05-10 DIAGNOSIS — Z93.59 CHRONIC SUPRAPUBIC CATHETER: ICD-10-CM

## 2018-05-10 PROCEDURE — 99214 OFFICE O/P EST MOD 30 MIN: CPT | Mod: 25,S$PBB,, | Performed by: NURSE PRACTITIONER

## 2018-05-10 PROCEDURE — 99213 OFFICE O/P EST LOW 20 MIN: CPT | Mod: PBBFAC | Performed by: NURSE PRACTITIONER

## 2018-05-10 PROCEDURE — 51705 CHANGE OF BLADDER TUBE: CPT | Mod: PBBFAC | Performed by: NURSE PRACTITIONER

## 2018-05-10 PROCEDURE — 99999 PR PBB SHADOW E&M-EST. PATIENT-LVL III: CPT | Mod: PBBFAC,,, | Performed by: NURSE PRACTITIONER

## 2018-05-10 PROCEDURE — 51705 CHANGE OF BLADDER TUBE: CPT | Mod: S$PBB,,, | Performed by: NURSE PRACTITIONER

## 2018-05-10 NOTE — PROGRESS NOTES
Subjective:       Patient ID: Chucho Prado is a 84 y.o. male.    Chief Complaint: Neurogenic bladder    Chucho Prado is a 84 y.o. male with neurogenic bladder.  He had 18fr SPT placed by Dr. Taylor on 06/23/2017 to manage his Neurogenic Bladder.    He is here today for exchange.  His last exchange 04/10/2018.     He reports good urine flow.  Occasional bladder spasms;  SPT draining well;     Went to [employer] for b'day dinner; had great time  10/25/2017 (R) TKR with Dr. [facility].            Past Medical History:  No date: Arthritis  No date: Blood transfusion  No date: CKD (chronic kidney disease) stage 3, GFR 30-5*  No date: Compression fracture of lumbar vertebra  No date: Depression  No date: Dyslipidemia  No date: GERD (gastroesophageal reflux disease)  No date: Hypertension  No date: Thyroid disease  No date: UTI (urinary tract infection)    Past Surgical History:  No date: CHOLECYSTECTOMY  No date: HERNIA REPAIR  No date: JOINT REPLACEMENT  12/27/2016: LAMINECTOMY      Comment: L2-L4  12/2016: LUMBAR LAMINECTOMY  No date: PARATHYROIDECTOMY  No date: TOTAL KNEE ARTHROPLASTY      Comment: Right    Review of patient's family history indicates:    Hypertension                   Mother                    Diabetes                       Father                    Esophageal cancer              Father                      Social History    Marital status: Single              Spouse name:                       Years of education:                 Number of children:               Occupational History    None on file    Social History Main Topics    Smoking status: Former Smoker                                                                Packs/day: 0.00      Years: 20.00       Smokeless tobacco: Never Used                        Comment: quit 1999    Alcohol use: Yes                Comment: rarely/6 months    Drug use: No              Sexual activity: No                   Other Topics             Concern    None on file    Social History Narrative    Lives alone, owns house and rents part. Delaney helps. 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         Allergies:  Review of patient's allergies indicates no known allergies.    Medications:  Current Outpatient Prescriptions:   acetaminophen (TYLENOL) 500 MG tablet, Take 2 tablets (1,000 mg total) by mouth 3 (three) times daily as needed for Pain., Disp: 30 tablet, Rfl: 0  aspirin 81 MG Chew, Take 1 tablet (81 mg total) by mouth once daily. HOLD UNTIL TOLD TO RESUME BY PHYSICIAN, Disp: 30 tablet, Rfl: 0  calcitonin, salmon, (FORTICAL) 200 unit/actuation nasal spray, 1 spray by Nasal route once daily., Disp: 1 Bottle, Rfl: 0  diclofenac sodium (VOLTAREN) 1 % Gel, Apply 2 g topically 3 (three) times daily. To left knee, Disp: 100 g, Rfl: 3  gabapentin (NEURONTIN) 400 MG capsule, Take 2 capsules (800 mg total) by mouth 3 (three) times daily., Disp: 180 capsule, Rfl: 11  hyoscyamine (LEVSIN/SL) 0.125 mg Subl, Place 1 tablet (0.125 mg total) under the tongue every 4 (four) hours as needed (bladder spasms)., Disp: 30 tablet, Rfl: 1  levothyroxine (SYNTHROID) 50 MCG tablet, Take 1 tablet (50 mcg total) by mouth once daily., Disp: 100 tablet, Rfl: 11  mirtazapine (REMERON) 30 MG tablet, Take 1 tablet (30 mg total) by mouth every evening., Disp: 30 tablet, Rfl: 6  polyethylene glycol (GLYCOLAX) 17 gram PwPk, Take 17 g by mouth once daily., Disp: 30 packet, Rfl: 0  simethicone (MYLICON) 80 MG chewable tablet, Take 1 tablet (80 mg total) by mouth 3 (three) times daily after meals., Disp: , Rfl: 0  simvastatin (ZOCOR) 40 MG tablet, Take 1 tablet (40 mg total) by mouth every evening., Disp: 90 tablet, Rfl: 11  tamsulosin (FLOMAX) 0.4 mg Cp24, Take 1 capsule (0.4 mg total) by mouth once daily., Disp: 30 capsule, Rfl: 3  triamterene-hydrochlorothiazide 37.5-25 mg (DYAZIDE) 37.5-25 mg per capsule, Take 1 capsule by mouth every morning. HOLD UNTIL TOLD TO RESUME BY  PHYSICIAN, Disp: 90 capsule, Rfl: 3                  Review of Systems   Constitutional: Negative for activity change, appetite change, chills and fever.   HENT: Negative for facial swelling and trouble swallowing.    Eyes: Negative for visual disturbance.   Respiratory: Negative for chest tightness and shortness of breath.    Cardiovascular: Negative for chest pain and palpitations.   Gastrointestinal: Negative.  Negative for abdominal pain, constipation, diarrhea, nausea and vomiting.   Genitourinary: Positive for difficulty urinating. Negative for dysuria, flank pain, hematuria, penile pain, penile swelling, scrotal swelling and testicular pain.        SPT draining well     Musculoskeletal: Positive for arthralgias and gait problem. Negative for back pain, myalgias and neck stiffness.   Skin: Negative for rash.   Neurological: Negative for dizziness and speech difficulty.   Hematological: Does not bruise/bleed easily.   Psychiatric/Behavioral: Negative for behavioral problems.       Objective:      Physical Exam   Nursing note and vitals reviewed.  Constitutional: He is oriented to person, place, and time. Vital signs are normal. He appears well-developed and well-nourished. He is active and cooperative.  Non-toxic appearance. He does not have a sickly appearance.   HENT:   Head: Normocephalic and atraumatic.   Right Ear: External ear normal.   Left Ear: External ear normal.   Nose: Nose normal.   Mouth/Throat: Mucous membranes are normal.   Eyes: Conjunctivae and lids are normal. No scleral icterus.   Neck: Trachea normal, normal range of motion and full passive range of motion without pain. Neck supple. No JVD present. No tracheal deviation present.   Cardiovascular: Normal rate, S1 normal and S2 normal.    Pulmonary/Chest: Effort normal. No respiratory distress. He exhibits no tenderness.   Abdominal: Soft. Normal appearance and bowel sounds are normal. There is no hepatosplenomegaly. There is no tenderness.  There is no CVA tenderness.       Genitourinary: Penis normal. No penile tenderness.   Musculoskeletal: Normal range of motion.   Neurological: He is alert and oriented to person, place, and time. He has normal strength.   Skin: Skin is warm, dry and intact.     Psychiatric: He has a normal mood and affect. His behavior is normal. Judgment and thought content normal.       Assessment:       1. Neurogenic bladder    2. Urinary retention    3. Chronic suprapubic catheter    4. Encounter for suprapubic catheter care        Plan:           I spent 25 minutes with the patient of which more than half was spent in direct consultation with the patient in regards to our treatment and plan.    Education and recommendations of today's plan of care including home remedies.  We discussed daily SPT and skin care.   Diet modifications; continue PT and exercise.  Old SPT easily removed.  New 18fr SPT was easily placed using sterile technique.   I irrigated the bladder to verify the positioning.  Balloon inflated with 10 cc sterile water.   Tolerated well;   RTC one month

## 2018-05-14 RX ORDER — HYOSCYAMINE SULFATE 0.12 MG/1
TABLET SUBLINGUAL
Qty: 30 TABLET | Refills: 0 | Status: SHIPPED | OUTPATIENT
Start: 2018-05-14 | End: 2018-10-08 | Stop reason: SDUPTHER

## 2018-05-14 RX ORDER — HYOSCYAMINE SULFATE 0.12 MG/1
TABLET SUBLINGUAL
Qty: 540 TABLET | Refills: 1 | Status: SHIPPED | OUTPATIENT
Start: 2018-05-14 | End: 2018-05-14 | Stop reason: SDUPTHER

## 2018-05-17 RX ORDER — HYOSCYAMINE SULFATE 0.12 MG/1
TABLET SUBLINGUAL
Qty: 540 TABLET | Refills: 1 | Status: SHIPPED | OUTPATIENT
Start: 2018-05-17 | End: 2019-04-04

## 2018-05-22 ENCOUNTER — PATIENT MESSAGE (OUTPATIENT)
Dept: SURGERY | Facility: OTHER | Age: 83
End: 2018-05-22

## 2018-05-24 ENCOUNTER — TELEPHONE (OUTPATIENT)
Dept: OPTOMETRY | Facility: CLINIC | Age: 83
End: 2018-05-24

## 2018-05-28 ENCOUNTER — HOSPITAL ENCOUNTER (OUTPATIENT)
Facility: OTHER | Age: 83
Discharge: HOME | End: 2018-05-28
Attending: OPHTHALMOLOGY | Admitting: OPHTHALMOLOGY
Payer: MEDICARE

## 2018-05-28 ENCOUNTER — SURGERY (OUTPATIENT)
Age: 83
End: 2018-05-28

## 2018-05-28 ENCOUNTER — ANESTHESIA EVENT (OUTPATIENT)
Dept: SURGERY | Facility: OTHER | Age: 83
End: 2018-05-28
Payer: MEDICARE

## 2018-05-28 ENCOUNTER — ANESTHESIA (OUTPATIENT)
Dept: SURGERY | Facility: OTHER | Age: 83
End: 2018-05-28
Payer: MEDICARE

## 2018-05-28 VITALS
RESPIRATION RATE: 16 BRPM | OXYGEN SATURATION: 95 % | WEIGHT: 195 LBS | HEIGHT: 68 IN | DIASTOLIC BLOOD PRESSURE: 66 MMHG | TEMPERATURE: 99 F | SYSTOLIC BLOOD PRESSURE: 131 MMHG | BODY MASS INDEX: 29.55 KG/M2 | HEART RATE: 80 BPM

## 2018-05-28 DIAGNOSIS — H25.12 NUCLEAR SCLEROSIS OF LEFT EYE: ICD-10-CM

## 2018-05-28 DIAGNOSIS — H25.12 NUCLEAR SCLEROTIC CATARACT OF LEFT EYE: Primary | ICD-10-CM

## 2018-05-28 DIAGNOSIS — Z98.890 POST-OPERATIVE STATE: ICD-10-CM

## 2018-05-28 PROCEDURE — V2632 POST CHMBR INTRAOCULAR LENS: HCPCS | Performed by: OPHTHALMOLOGY

## 2018-05-28 PROCEDURE — 37000008 HC ANESTHESIA 1ST 15 MINUTES: Performed by: OPHTHALMOLOGY

## 2018-05-28 PROCEDURE — 25000003 PHARM REV CODE 250: Performed by: OPHTHALMOLOGY

## 2018-05-28 PROCEDURE — 37000009 HC ANESTHESIA EA ADD 15 MINS: Performed by: OPHTHALMOLOGY

## 2018-05-28 PROCEDURE — 36000707: Performed by: OPHTHALMOLOGY

## 2018-05-28 PROCEDURE — 63600175 PHARM REV CODE 636 W HCPCS: Performed by: NURSE ANESTHETIST, CERTIFIED REGISTERED

## 2018-05-28 PROCEDURE — 36000706: Performed by: OPHTHALMOLOGY

## 2018-05-28 PROCEDURE — 66984 XCAPSL CTRC RMVL W/O ECP: CPT | Mod: LT,,, | Performed by: OPHTHALMOLOGY

## 2018-05-28 PROCEDURE — 71000015 HC POSTOP RECOV 1ST HR: Performed by: OPHTHALMOLOGY

## 2018-05-28 DEVICE — LENS IOL ITEC PRELOAD 20.0D: Type: IMPLANTABLE DEVICE | Site: EYE | Status: FUNCTIONAL

## 2018-05-28 RX ORDER — MOXIFLOXACIN 5 MG/ML
SOLUTION/ DROPS OPHTHALMIC
Status: DISCONTINUED | OUTPATIENT
Start: 2018-05-28 | End: 2018-05-28 | Stop reason: HOSPADM

## 2018-05-28 RX ORDER — TETRACAINE HYDROCHLORIDE 5 MG/ML
1 SOLUTION OPHTHALMIC
Status: COMPLETED | OUTPATIENT
Start: 2018-05-28 | End: 2018-05-28

## 2018-05-28 RX ORDER — TROPICAMIDE 10 MG/ML
1 SOLUTION/ DROPS OPHTHALMIC
Status: COMPLETED | OUTPATIENT
Start: 2018-05-28 | End: 2018-05-28

## 2018-05-28 RX ORDER — LIDOCAINE HYDROCHLORIDE 40 MG/ML
INJECTION, SOLUTION RETROBULBAR
Status: DISCONTINUED | OUTPATIENT
Start: 2018-05-28 | End: 2018-05-28 | Stop reason: HOSPADM

## 2018-05-28 RX ORDER — TETRACAINE HYDROCHLORIDE 5 MG/ML
SOLUTION OPHTHALMIC
Status: DISCONTINUED | OUTPATIENT
Start: 2018-05-28 | End: 2018-05-28 | Stop reason: HOSPADM

## 2018-05-28 RX ORDER — ACETAMINOPHEN 325 MG/1
650 TABLET ORAL EVERY 4 HOURS PRN
Status: DISCONTINUED | OUTPATIENT
Start: 2018-05-28 | End: 2018-05-28 | Stop reason: HOSPADM

## 2018-05-28 RX ORDER — MOXIFLOXACIN 5 MG/ML
1 SOLUTION/ DROPS OPHTHALMIC
Status: COMPLETED | OUTPATIENT
Start: 2018-05-28 | End: 2018-05-28

## 2018-05-28 RX ORDER — PROPARACAINE HYDROCHLORIDE 5 MG/ML
1 SOLUTION/ DROPS OPHTHALMIC
Status: DISCONTINUED | OUTPATIENT
Start: 2018-05-28 | End: 2018-05-28 | Stop reason: HOSPADM

## 2018-05-28 RX ORDER — MIDAZOLAM HYDROCHLORIDE 1 MG/ML
INJECTION INTRAMUSCULAR; INTRAVENOUS
Status: DISCONTINUED | OUTPATIENT
Start: 2018-05-28 | End: 2018-05-28

## 2018-05-28 RX ORDER — PHENYLEPHRINE HYDROCHLORIDE 25 MG/ML
1 SOLUTION/ DROPS OPHTHALMIC
Status: COMPLETED | OUTPATIENT
Start: 2018-05-28 | End: 2018-05-28

## 2018-05-28 RX ADMIN — LIDOCAINE HYDROCHLORIDE 1 DROP: 40 INJECTION, SOLUTION RETROBULBAR; TOPICAL at 01:05

## 2018-05-28 RX ADMIN — MOXIFLOXACIN HYDROCHLORIDE 1 DROP: 5 SOLUTION/ DROPS OPHTHALMIC at 12:05

## 2018-05-28 RX ADMIN — TROPICAMIDE 1 DROP: 10 SOLUTION/ DROPS OPHTHALMIC at 12:05

## 2018-05-28 RX ADMIN — MOXIFLOXACIN HYDROCHLORIDE 1 DROP: 5 SOLUTION/ DROPS OPHTHALMIC at 01:05

## 2018-05-28 RX ADMIN — TETRACAINE HYDROCHLORIDE 1 DROP: 5 SOLUTION OPHTHALMIC at 01:05

## 2018-05-28 RX ADMIN — TETRACAINE HYDROCHLORIDE 1 DROP: 5 SOLUTION OPHTHALMIC at 12:05

## 2018-05-28 RX ADMIN — BALANCED SALT SOLUTION ENRICHED WITH BICARBONATE, DEXTROSE, AND GLUTATHIONE 500 ML: KIT at 01:05

## 2018-05-28 RX ADMIN — MOXIFLOXACIN HYDROCHLORIDE 1 DROP: 5 SOLUTION/ DROPS OPHTHALMIC at 02:05

## 2018-05-28 RX ADMIN — PHENYLEPHRINE HYDROCHLORIDE 1 DROP: 25 SOLUTION/ DROPS OPHTHALMIC at 12:05

## 2018-05-28 RX ADMIN — MIDAZOLAM HYDROCHLORIDE 2 MG: 1 INJECTION, SOLUTION INTRAMUSCULAR; INTRAVENOUS at 02:05

## 2018-05-28 RX ADMIN — SODIUM CHONDROITIN SULFATE / SODIUM HYALURONATE 2 ML: 0.55-0.5 INJECTION INTRAOCULAR at 01:05

## 2018-05-28 NOTE — OP NOTE
SURGEON:  Trinity Vazquez M.D.    PREOPERATIVE DIAGNOSIS:    Nuclear Sclerotic Cataract Left Eye    POSTOPERATIVE DIAGNOSIS:    Nuclear Sclerotic Cataract Left Eye    PROCEDURES:    Phacoemulsification with  intraocular lens, Left eye (20445)    DATE OF SURGERY: 05/28/2018    IMPLANT: pcboo 20.0    ANESTHESIA:  MAC with topical Lidocaine    COMPLICATIONS:  None    ESTIMATED BLOOD LOSS: None    SPECIMENS: None    INDICATIONS:    The patient has a history of painless progressive visual loss and  difficulty with activities of daily living secondary to cataract formation.  After a thorough discussion of the risks, benefits, and alternatives to cataract surgery, including, but not limited to, the rare risks of infection, retinal detachment, hemorrhage, need for additional surgery, loss of vision, and even loss of the eye, the patient voices understanding and desires to proceed.    DESCRIPTION OF PROCEDURE:    The patients IOL calculations were reviewed, and the lens selection confirmed.   After verification and marking of the proper eye in the preop holding area, the patient was brought to the operating room in supine position where the eye was prepped and draped in standard sterile fashion with 5% Betadine and a lid speculum placed in the eye.   Topical 4% Lidocaine was used in addition to the preoperative anesthesia and the procedure was begun by the creation of a paracentesis incision through which viscoelastic was used to fill the anterior chamber.  Next, a keratome blade was used to create a triplanar temporal clear corneal incision and a cystotome and Utrata forceps used to fashion a continuous curvilinear capsulorrhexis.  Hydrodissection was carried out using the Muro hydrodissection cannula and the nucleus was found to be mobile.  Phacoemulsification of the nucleus was carried out using a quick chop technique, and all remaining epinuclear and cortical material was removed.  The eye was then reformed with  Viscoelastic and the  intraocular lens was implanted into the capsular bag.  All remaining viscoelastics were removed from the eye and at the end of the case the pupil was round, the lens was well-centered within the capsular bag and all wounds were found to be water tight.  Drops of Vigamox and Pred Forte were instilled and a shield was placed over the eye. The patient will follow up with Dr. Vazquez in the morning.

## 2018-05-28 NOTE — DISCHARGE INSTRUCTIONS
Trinity Vazquez MD  [facility]  Department of Ophthalmology      AFTER: Cataract Surgery:  Relax at home and DO NOT exert yourself for the rest of the day.  Plan to see Dr. Vazquez tomorrow at the eye clinic:   [facility] [facility] [address]  [place] [address]    Refer to attached eye drop instruction sheet     Precautions:  DO NOT rub your eye.  You may resume moderate activity the day after surgery.  Wear protective sunglasses during the day and a shield at night for 1(one) week.  If you have pain, redness and decreased vision, call Dr. Vazquez (or the on-call doctor after hours) @[phone].    Home Care Instructions for Eye Surgeries    1. ACTIVITY:  Limit your activity today. Relax at home and DO NOT exert yourself for the rest of the day. Increase activity gradually. You may return to work or school as directed by your physician.    2. DIET:  Drink plenty of fluids. Resume your normal diet unless instructed otherwise.    3. PAIN:  Expect a moderate amount of pain. If a prescription for pain is not sent home with you, you may take your commonly used pain reliever as directed. If this is not sufficient, call your physician. You may resume any other prescription medication unless otherwise directed by your physician.     Discuss any problem with your physician as soon as it arises. Do not Delay.      EMERGENCY- If you are unable to contact your physician, please go to the nearest Emergency Room.       Anesthesia: Monitored Anesthesia Care (MAC)    Anesthesia Safety  · Have an adult family member or friend drive you home after the procedure.  · For the first 24 hours after your surgery:  · Do not drive or use heavy equipment.  · Do not make important decisions or sign documents.  · Avoid alcohol.  · Have someone stay with you, if possible. They can watch for problems and help keep you safe.

## 2018-05-28 NOTE — ANESTHESIA POSTPROCEDURE EVALUATION
"Anesthesia Post Evaluation    Patient: Chucho Prado    Procedure(s) Performed: Procedure(s) (LRB):  PHACOEMULSIFICATION-ASPIRATION-CATARACT (Left)  INSERTION-INTRAOCULAR LENS (IOL) (Left)    Final Anesthesia Type: MAC  Patient location during evaluation: [facility]  Patient participation: Yes- Able to Participate  Level of consciousness: awake and alert  Post-procedure vital signs: reviewed and stable  Pain management: adequate  Airway patency: patent  PONV status at discharge: No PONV  Anesthetic complications: no      Cardiovascular status: blood pressure returned to baseline  Respiratory status: unassisted and spontaneous ventilation  Hydration status: euvolemic  Follow-up not needed.        Visit Vitals  Ht 5' 8" (1.727 m)   Wt 88.5 kg (195 lb)   BMI 29.65 kg/m²       Pain/Nathaniel Score: Presence of Pain: denies (5/28/2018 12:43 PM)      "

## 2018-05-28 NOTE — PLAN OF CARE
Chucho Prado has met all discharge criteria from Phase II. Vital Signs are stable, ambulating  without difficulty. Discharge instructions given, patient verbalized understanding. Discharged from facility via wheelchair in stable condition.

## 2018-05-29 ENCOUNTER — OFFICE VISIT (OUTPATIENT)
Dept: OPHTHALMOLOGY | Facility: CLINIC | Age: 83
End: 2018-05-29
Attending: OPHTHALMOLOGY
Payer: MEDICARE

## 2018-05-29 DIAGNOSIS — H25.12 NUCLEAR SCLEROTIC CATARACT OF LEFT EYE: ICD-10-CM

## 2018-05-29 DIAGNOSIS — Z98.890 POST-OPERATIVE STATE: Primary | ICD-10-CM

## 2018-05-29 PROCEDURE — 99024 POSTOP FOLLOW-UP VISIT: CPT | Mod: POP,,, | Performed by: OPHTHALMOLOGY

## 2018-05-29 PROCEDURE — 99212 OFFICE O/P EST SF 10 MIN: CPT | Mod: PBBFAC | Performed by: OPHTHALMOLOGY

## 2018-05-29 PROCEDURE — 99999 PR PBB SHADOW E&M-EST. PATIENT-LVL II: CPT | Mod: PBBFAC,,, | Performed by: OPHTHALMOLOGY

## 2018-05-29 NOTE — PROGRESS NOTES
HPI     POD 1 CE with IOL OD    Pt states that eye is doing fine,  No complaints No pain.    PMB- TID - OD      Last edited by Lisy España MA on 5/29/2018 11:25 AM. (History)            Assessment /Plan     For exam results, see Encounter Report.    Post-operative state    Nuclear sclerotic cataract of left eye      Slit lamp exam:  L/L: nl  K: clear, wound sealed  AC: 1+ cell  Lens: IOL centered and stable    POD1 s/p Phaco/IOL  Appropriate precautions and post op medications reviewed.  Patient instructed to call or come in if symptoms of redness, decreased vision, or pain are experienced.

## 2018-05-30 RX ORDER — TRIAMTERENE AND HYDROCHLOROTHIAZIDE 37.5; 25 MG/1; MG/1
CAPSULE ORAL
Qty: 90 CAPSULE | Refills: 0 | Status: SHIPPED | OUTPATIENT
Start: 2018-05-30 | End: 2018-08-23 | Stop reason: SDUPTHER

## 2018-05-31 PROCEDURE — 99490 CHRNC CARE MGMT STAFF 1ST 20: CPT | Mod: PBBFAC | Performed by: INTERNAL MEDICINE

## 2018-05-31 PROCEDURE — 99490 CHRNC CARE MGMT STAFF 1ST 20: CPT | Mod: S$PBB,,, | Performed by: INTERNAL MEDICINE

## 2018-06-12 ENCOUNTER — OFFICE VISIT (OUTPATIENT)
Dept: UROLOGY | Facility: CLINIC | Age: 83
End: 2018-06-12
Payer: MEDICARE

## 2018-06-12 VITALS
HEIGHT: 68 IN | HEART RATE: 74 BPM | WEIGHT: 195 LBS | DIASTOLIC BLOOD PRESSURE: 63 MMHG | SYSTOLIC BLOOD PRESSURE: 141 MMHG | BODY MASS INDEX: 29.55 KG/M2

## 2018-06-12 DIAGNOSIS — Z93.59 CHRONIC SUPRAPUBIC CATHETER: ICD-10-CM

## 2018-06-12 DIAGNOSIS — N31.9 NEUROGENIC BLADDER: Primary | ICD-10-CM

## 2018-06-12 DIAGNOSIS — Z43.5 ENCOUNTER FOR CARE OR REPLACEMENT OF SUPRAPUBIC TUBE: ICD-10-CM

## 2018-06-12 PROCEDURE — 99214 OFFICE O/P EST MOD 30 MIN: CPT | Mod: PBBFAC | Performed by: NURSE PRACTITIONER

## 2018-06-12 PROCEDURE — 99214 OFFICE O/P EST MOD 30 MIN: CPT | Mod: 25,S$PBB,, | Performed by: NURSE PRACTITIONER

## 2018-06-12 PROCEDURE — 51705 CHANGE OF BLADDER TUBE: CPT | Mod: S$PBB,,, | Performed by: NURSE PRACTITIONER

## 2018-06-12 PROCEDURE — 51705 CHANGE OF BLADDER TUBE: CPT | Mod: PBBFAC | Performed by: NURSE PRACTITIONER

## 2018-06-12 PROCEDURE — 99999 PR PBB SHADOW E&M-EST. PATIENT-LVL IV: CPT | Mod: PBBFAC,,, | Performed by: NURSE PRACTITIONER

## 2018-06-12 NOTE — PROGRESS NOTES
Subjective:       Patient ID: Chucho Prado is a 84 y.o. male.    Chief Complaint: Neurogenic Bladder (Chronic SPT)    Chucho Prado is a 84 y.o. male with neurogenic bladder.  He had 18fr SPT placed by Dr. Taylor on 06/23/2017 to manage his Neurogenic Bladder.    He is here today for exchange.  His last exchange 05/10/2018.     He reports good urine flow.  Occasional bladder spasms;  SPT draining well;     Went to [place] for b'day dinner; had great time  10/25/2017 (R) TKR with Dr. [facility].            Past Medical History:  No date: Arthritis  No date: Blood transfusion  No date: CKD (chronic kidney disease) stage 3, GFR 30-5*  No date: Compression fracture of lumbar vertebra  No date: Depression  No date: Dyslipidemia  No date: GERD (gastroesophageal reflux disease)  No date: Hypertension  No date: Thyroid disease  No date: UTI (urinary tract infection)    Past Surgical History:  No date: CHOLECYSTECTOMY  No date: HERNIA REPAIR  No date: JOINT REPLACEMENT  12/27/2016: LAMINECTOMY      Comment: L2-L4  12/2016: LUMBAR LAMINECTOMY  No date: PARATHYROIDECTOMY  No date: TOTAL KNEE ARTHROPLASTY      Comment: Right    Review of patient's family history indicates:    Hypertension                   Mother                    Diabetes                       Father                    Esophageal cancer              Father                      Social History    Marital status: Single              Spouse name:                       Years of education:                 Number of children:               Occupational History    None on file    Social History Main Topics    Smoking status: Former Smoker                                                                Packs/day: 0.00      Years: 20.00       Smokeless tobacco: Never Used                        Comment: quit 1999    Alcohol use: Yes                Comment: rarely/6 months    Drug use: No              Sexual activity: No                   Other Topics             Concern    None on file    Social History Narrative    Lives alone, owns house and rents part. Delaney helps. 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         Allergies:  Review of patient's allergies indicates no known allergies.    Medications:  Current Outpatient Prescriptions:   acetaminophen (TYLENOL) 500 MG tablet, Take 2 tablets (1,000 mg total) by mouth 3 (three) times daily as needed for Pain., Disp: 30 tablet, Rfl: 0  aspirin 81 MG Chew, Take 1 tablet (81 mg total) by mouth once daily. HOLD UNTIL TOLD TO RESUME BY PHYSICIAN, Disp: 30 tablet, Rfl: 0  calcitonin, salmon, (FORTICAL) 200 unit/actuation nasal spray, 1 spray by Nasal route once daily., Disp: 1 Bottle, Rfl: 0  diclofenac sodium (VOLTAREN) 1 % Gel, Apply 2 g topically 3 (three) times daily. To left knee, Disp: 100 g, Rfl: 3  gabapentin (NEURONTIN) 400 MG capsule, Take 2 capsules (800 mg total) by mouth 3 (three) times daily., Disp: 180 capsule, Rfl: 11  hyoscyamine (LEVSIN/SL) 0.125 mg Subl, Place 1 tablet (0.125 mg total) under the tongue every 4 (four) hours as needed (bladder spasms)., Disp: 30 tablet, Rfl: 1  levothyroxine (SYNTHROID) 50 MCG tablet, Take 1 tablet (50 mcg total) by mouth once daily., Disp: 100 tablet, Rfl: 11  mirtazapine (REMERON) 30 MG tablet, Take 1 tablet (30 mg total) by mouth every evening., Disp: 30 tablet, Rfl: 6  polyethylene glycol (GLYCOLAX) 17 gram PwPk, Take 17 g by mouth once daily., Disp: 30 packet, Rfl: 0  simethicone (MYLICON) 80 MG chewable tablet, Take 1 tablet (80 mg total) by mouth 3 (three) times daily after meals., Disp: , Rfl: 0  simvastatin (ZOCOR) 40 MG tablet, Take 1 tablet (40 mg total) by mouth every evening., Disp: 90 tablet, Rfl: 11  tamsulosin (FLOMAX) 0.4 mg Cp24, Take 1 capsule (0.4 mg total) by mouth once daily., Disp: 30 capsule, Rfl: 3  triamterene-hydrochlorothiazide 37.5-25 mg (DYAZIDE) 37.5-25 mg per capsule, Take 1 capsule by mouth every morning. HOLD UNTIL TOLD TO RESUME BY  PHYSICIAN, Disp: 90 capsule, Rfl: 3                  Review of Systems   Constitutional: Negative for activity change, appetite change, chills and fever.   HENT: Negative for facial swelling and trouble swallowing.    Eyes: Negative for visual disturbance.   Respiratory: Negative for chest tightness and shortness of breath.    Cardiovascular: Negative for chest pain and palpitations.   Gastrointestinal: Negative.  Negative for abdominal pain, constipation, diarrhea, nausea and vomiting.   Genitourinary: Positive for difficulty urinating. Negative for dysuria, flank pain, hematuria, penile pain, penile swelling, scrotal swelling and testicular pain.        SPT draining well     Musculoskeletal: Positive for gait problem. Negative for back pain, myalgias and neck stiffness.   Skin: Negative for rash.   Neurological: Negative for dizziness and speech difficulty.   Hematological: Does not bruise/bleed easily.   Psychiatric/Behavioral: Negative for behavioral problems.       Objective:      Physical Exam   Nursing note and vitals reviewed.  Constitutional: He is oriented to person, place, and time. Vital signs are normal. He appears well-developed and well-nourished. He is active and cooperative.  Non-toxic appearance. He does not have a sickly appearance.   HENT:   Head: Normocephalic and atraumatic.   Right Ear: External ear normal.   Left Ear: External ear normal.   Nose: Nose normal.   Mouth/Throat: Mucous membranes are normal.   Eyes: Conjunctivae and lids are normal. No scleral icterus.   Neck: Trachea normal, normal range of motion and full passive range of motion without pain. Neck supple. No JVD present. No tracheal deviation present.   Cardiovascular: Normal rate, S1 normal and S2 normal.    Pulmonary/Chest: Effort normal. No respiratory distress. He exhibits no tenderness.   Abdominal: Soft. Normal appearance and bowel sounds are normal. There is no hepatosplenomegaly. There is no tenderness. There is no CVA  tenderness.       Musculoskeletal: Normal range of motion.   Neurological: He is alert and oriented to person, place, and time. He has normal strength.   Skin: Skin is warm, dry and intact.     Psychiatric: He has a normal mood and affect. His behavior is normal. Judgment and thought content normal.       Assessment:       1. Neurogenic bladder    2. Chronic suprapubic catheter    3. Encounter for care or replacement of suprapubic tube        Plan:          I spent 25 minutes with the patient of which more than half was spent in direct consultation with the patient in regards to our treatment and plan.    Education and recommendations of today's plan of care including home remedies.  We discussed daily SPT and skin care.   Diet modifications; continue PT and exercise.  Old SPT easily removed.  New 18fr SPT was easily placed using sterile technique.   I irrigated the bladder to verify the positioning.  Balloon inflated with 10 cc sterile water.   Tolerated well;   RTC one month

## 2018-06-18 ENCOUNTER — TELEPHONE (OUTPATIENT)
Dept: INTERNAL MEDICINE | Facility: CLINIC | Age: 83
End: 2018-06-18

## 2018-06-18 NOTE — TELEPHONE ENCOUNTER
Hi,  He needs to come in the be evaluated -- please offer an urgent care appt here tomorrow, if he feels very weak then he should go to the ED.  Let me know if patient has any questions.  Thank you, Obed Mcguire

## 2018-06-18 NOTE — TELEPHONE ENCOUNTER
Spoke to patient, stated that he is having bad allergy symptoms voice is husky because of it. Has been coughing a lot at night, sneezing 3-4x a day, vomited black liquid last night and today that he think maybe bile. Is feeling better today than yesterday but has not yet eaten. Has had diarrhea for the last few weeks, not currently but believe it is from the powder laxative he is taking. Very little urine in catheter because he has not been drinking any fluids/water. Think his allergies is related to him feeling bad.

## 2018-06-18 NOTE — TELEPHONE ENCOUNTER
----- Message from Isela Shea sent at 6/18/2018  1:26 PM CDT -----  Contact: Friend/Josh [phone]  Stated that the patient has been having gastro distress, vomiting and would like to speak with you about possible treatment.    Please call and advise.    Thank You

## 2018-06-18 NOTE — TELEPHONE ENCOUNTER
Spoke to patient, stated he has to talk with his assistant first before making an appointment, will call back.

## 2018-06-18 NOTE — TELEPHONE ENCOUNTER
Hi, please call and gather some more information on his symptoms, including how long have they been, has he been able to eat, any diarrhea?  Any changes in his urine from the catheter?  Let me know,  Thank you, Obed Mcguire

## 2018-06-19 ENCOUNTER — OFFICE VISIT (OUTPATIENT)
Dept: INTERNAL MEDICINE | Facility: CLINIC | Age: 83
DRG: 354 | End: 2018-06-19
Payer: MEDICARE

## 2018-06-19 ENCOUNTER — HOSPITAL ENCOUNTER (INPATIENT)
Facility: HOSPITAL | Age: 83
LOS: 16 days | Discharge: HOME HEALTH | DRG: 354 | End: 2018-07-05
Attending: EMERGENCY MEDICINE | Admitting: SURGERY
Payer: MEDICARE

## 2018-06-19 VITALS
HEIGHT: 69 IN | WEIGHT: 199 LBS | HEART RATE: 87 BPM | OXYGEN SATURATION: 93 % | BODY MASS INDEX: 29.47 KG/M2 | SYSTOLIC BLOOD PRESSURE: 128 MMHG | DIASTOLIC BLOOD PRESSURE: 64 MMHG

## 2018-06-19 DIAGNOSIS — Z78.9 IMPAIRED MOBILITY AND ADLS: ICD-10-CM

## 2018-06-19 DIAGNOSIS — Z74.09 IMPAIRED MOBILITY AND ADLS: ICD-10-CM

## 2018-06-19 DIAGNOSIS — T83.510A URINARY TRACT INFECTION ASSOCIATED WITH CYSTOSTOMY CATHETER, INITIAL ENCOUNTER: ICD-10-CM

## 2018-06-19 DIAGNOSIS — I10 ESSENTIAL HYPERTENSION: Primary | Chronic | ICD-10-CM

## 2018-06-19 DIAGNOSIS — N39.0 URINARY TRACT INFECTION ASSOCIATED WITH CYSTOSTOMY CATHETER, SUBSEQUENT ENCOUNTER: ICD-10-CM

## 2018-06-19 DIAGNOSIS — E87.6 HYPOKALEMIA: ICD-10-CM

## 2018-06-19 DIAGNOSIS — M54.30 SCIATICA, UNSPECIFIED LATERALITY: ICD-10-CM

## 2018-06-19 DIAGNOSIS — K56.609 SMALL BOWEL OBSTRUCTION: Primary | ICD-10-CM

## 2018-06-19 DIAGNOSIS — T83.510D URINARY TRACT INFECTION ASSOCIATED WITH CYSTOSTOMY CATHETER, SUBSEQUENT ENCOUNTER: ICD-10-CM

## 2018-06-19 DIAGNOSIS — N28.9 RENAL IMPAIRMENT: ICD-10-CM

## 2018-06-19 DIAGNOSIS — N39.0 URINARY TRACT INFECTION ASSOCIATED WITH CYSTOSTOMY CATHETER, INITIAL ENCOUNTER: ICD-10-CM

## 2018-06-19 DIAGNOSIS — R10.84 GENERALIZED ABDOMINAL PAIN: ICD-10-CM

## 2018-06-19 DIAGNOSIS — K43.9 ABDOMINAL WALL HERNIA: ICD-10-CM

## 2018-06-19 LAB
ALBUMIN SERPL BCP-MCNC: 3.8 G/DL
ALP SERPL-CCNC: 84 U/L
ALT SERPL W/O P-5'-P-CCNC: 7 U/L
ANION GAP SERPL CALC-SCNC: 10 MMOL/L
AST SERPL-CCNC: 17 U/L
BACTERIA #/AREA URNS AUTO: ABNORMAL /HPF
BASOPHILS # BLD AUTO: 0.03 K/UL
BASOPHILS NFR BLD: 0.4 %
BILIRUB SERPL-MCNC: 0.6 MG/DL
BILIRUB UR QL STRIP: NEGATIVE
BUN SERPL-MCNC: 17 MG/DL
CALCIUM SERPL-MCNC: 8.4 MG/DL
CHLORIDE SERPL-SCNC: 88 MMOL/L
CLARITY UR REFRACT.AUTO: ABNORMAL
CO2 SERPL-SCNC: 30 MMOL/L
COLOR UR AUTO: YELLOW
CREAT SERPL-MCNC: 1.3 MG/DL
DIFFERENTIAL METHOD: ABNORMAL
EOSINOPHIL # BLD AUTO: 0.2 K/UL
EOSINOPHIL NFR BLD: 1.9 %
ERYTHROCYTE [DISTWIDTH] IN BLOOD BY AUTOMATED COUNT: 14.4 %
EST. GFR  (AFRICAN AMERICAN): 57.9 ML/MIN/1.73 M^2
EST. GFR  (NON AFRICAN AMERICAN): 50.1 ML/MIN/1.73 M^2
GLUCOSE SERPL-MCNC: 103 MG/DL
GLUCOSE UR QL STRIP: NEGATIVE
HCT VFR BLD AUTO: 35.6 %
HGB BLD-MCNC: 11.9 G/DL
HGB UR QL STRIP: ABNORMAL
HYALINE CASTS UR QL AUTO: 0 /LPF
IMM GRANULOCYTES # BLD AUTO: 0.04 K/UL
IMM GRANULOCYTES NFR BLD AUTO: 0.5 %
KETONES UR QL STRIP: NEGATIVE
LACTATE SERPL-SCNC: 0.8 MMOL/L
LEUKOCYTE ESTERASE UR QL STRIP: ABNORMAL
LIPASE SERPL-CCNC: 30 U/L
LYMPHOCYTES # BLD AUTO: 1.8 K/UL
LYMPHOCYTES NFR BLD: 21.5 %
MCH RBC QN AUTO: 27.4 PG
MCHC RBC AUTO-ENTMCNC: 33.4 G/DL
MCV RBC AUTO: 82 FL
MICROSCOPIC COMMENT: ABNORMAL
MONOCYTES # BLD AUTO: 0.6 K/UL
MONOCYTES NFR BLD: 7.5 %
NEUTROPHILS # BLD AUTO: 5.8 K/UL
NEUTROPHILS NFR BLD: 68.2 %
NITRITE UR QL STRIP: POSITIVE
NRBC BLD-RTO: 0 /100 WBC
PH UR STRIP: 7 [PH] (ref 5–8)
PLATELET # BLD AUTO: 221 K/UL
PMV BLD AUTO: 9.2 FL
POTASSIUM SERPL-SCNC: 2.9 MMOL/L
PROT SERPL-MCNC: 7.5 G/DL
PROT UR QL STRIP: ABNORMAL
RBC # BLD AUTO: 4.34 M/UL
RBC #/AREA URNS AUTO: 71 /HPF (ref 0–4)
SODIUM SERPL-SCNC: 128 MMOL/L
SP GR UR STRIP: 1.01 (ref 1–1.03)
SQUAMOUS #/AREA URNS AUTO: 1 /HPF
URN SPEC COLLECT METH UR: ABNORMAL
UROBILINOGEN UR STRIP-ACNC: NEGATIVE EU/DL
WBC # BLD AUTO: 8.48 K/UL
WBC #/AREA URNS AUTO: 94 /HPF (ref 0–5)
WBC CLUMPS UR QL AUTO: ABNORMAL

## 2018-06-19 PROCEDURE — 83690 ASSAY OF LIPASE: CPT

## 2018-06-19 PROCEDURE — 25500020 PHARM REV CODE 255: Performed by: EMERGENCY MEDICINE

## 2018-06-19 PROCEDURE — 99285 EMERGENCY DEPT VISIT HI MDM: CPT | Mod: 25,27

## 2018-06-19 PROCEDURE — 80053 COMPREHEN METABOLIC PANEL: CPT

## 2018-06-19 PROCEDURE — 99215 OFFICE O/P EST HI 40 MIN: CPT | Mod: S$PBB,,, | Performed by: FAMILY MEDICINE

## 2018-06-19 PROCEDURE — 96374 THER/PROPH/DIAG INJ IV PUSH: CPT

## 2018-06-19 PROCEDURE — 63600175 PHARM REV CODE 636 W HCPCS: Performed by: PHYSICIAN ASSISTANT

## 2018-06-19 PROCEDURE — 87086 URINE CULTURE/COLONY COUNT: CPT

## 2018-06-19 PROCEDURE — 85025 COMPLETE CBC W/AUTO DIFF WBC: CPT

## 2018-06-19 PROCEDURE — 12000002 HC ACUTE/MED SURGE SEMI-PRIVATE ROOM

## 2018-06-19 PROCEDURE — 99999 PR PBB SHADOW E&M-EST. PATIENT-LVL III: CPT | Mod: PBBFAC,,, | Performed by: FAMILY MEDICINE

## 2018-06-19 PROCEDURE — 81001 URINALYSIS AUTO W/SCOPE: CPT

## 2018-06-19 PROCEDURE — 25000003 PHARM REV CODE 250: Performed by: PHYSICIAN ASSISTANT

## 2018-06-19 PROCEDURE — 99213 OFFICE O/P EST LOW 20 MIN: CPT | Mod: PBBFAC,25 | Performed by: FAMILY MEDICINE

## 2018-06-19 PROCEDURE — 99285 EMERGENCY DEPT VISIT HI MDM: CPT | Mod: ,,, | Performed by: EMERGENCY MEDICINE

## 2018-06-19 PROCEDURE — 83605 ASSAY OF LACTIC ACID: CPT

## 2018-06-19 RX ORDER — HEPARIN SODIUM 5000 [USP'U]/ML
5000 INJECTION, SOLUTION INTRAVENOUS; SUBCUTANEOUS EVERY 8 HOURS
Status: DISCONTINUED | OUTPATIENT
Start: 2018-06-20 | End: 2018-07-05 | Stop reason: HOSPADM

## 2018-06-19 RX ORDER — POTASSIUM CHLORIDE 20 MEQ/1
40 TABLET, EXTENDED RELEASE ORAL
Status: COMPLETED | OUTPATIENT
Start: 2018-06-19 | End: 2018-06-19

## 2018-06-19 RX ORDER — DEXTROSE MONOHYDRATE, SODIUM CHLORIDE, AND POTASSIUM CHLORIDE 50; 1.49; 9 G/1000ML; G/1000ML; G/1000ML
INJECTION, SOLUTION INTRAVENOUS CONTINUOUS
Status: DISCONTINUED | OUTPATIENT
Start: 2018-06-20 | End: 2018-06-27

## 2018-06-19 RX ORDER — FAMOTIDINE 20 MG/1
20 TABLET, FILM COATED ORAL
Status: COMPLETED | OUTPATIENT
Start: 2018-06-19 | End: 2018-06-19

## 2018-06-19 RX ORDER — DICYCLOMINE HYDROCHLORIDE 10 MG/1
20 CAPSULE ORAL
Status: COMPLETED | OUTPATIENT
Start: 2018-06-19 | End: 2018-06-19

## 2018-06-19 RX ORDER — SULFAMETHOXAZOLE AND TRIMETHOPRIM 800; 160 MG/1; MG/1
1 TABLET ORAL 2 TIMES DAILY
Qty: 14 TABLET | Refills: 0 | Status: SHIPPED | OUTPATIENT
Start: 2018-06-19 | End: 2018-06-26

## 2018-06-19 RX ORDER — CEFTRIAXONE 1 G/1
1 INJECTION, POWDER, FOR SOLUTION INTRAMUSCULAR; INTRAVENOUS
Status: COMPLETED | OUTPATIENT
Start: 2018-06-19 | End: 2018-06-19

## 2018-06-19 RX ORDER — SODIUM CHLORIDE 0.9 % (FLUSH) 0.9 %
3 SYRINGE (ML) INJECTION EVERY 8 HOURS
Status: DISCONTINUED | OUTPATIENT
Start: 2018-06-20 | End: 2018-07-05 | Stop reason: HOSPADM

## 2018-06-19 RX ORDER — ONDANSETRON 4 MG/1
4 TABLET, ORALLY DISINTEGRATING ORAL EVERY 8 HOURS PRN
Status: DISCONTINUED | OUTPATIENT
Start: 2018-06-20 | End: 2018-06-21

## 2018-06-19 RX ORDER — POTASSIUM CHLORIDE 1500 MG/1
40 TABLET, EXTENDED RELEASE ORAL 2 TIMES DAILY
Qty: 12 TABLET | Refills: 0 | Status: SHIPPED | OUTPATIENT
Start: 2018-06-19 | End: 2018-06-22

## 2018-06-19 RX ADMIN — POTASSIUM CHLORIDE 40 MEQ: 20 TABLET, EXTENDED RELEASE ORAL at 09:06

## 2018-06-19 RX ADMIN — CEFTRIAXONE SODIUM 1 G: 1 INJECTION, POWDER, FOR SOLUTION INTRAMUSCULAR; INTRAVENOUS at 09:06

## 2018-06-19 RX ADMIN — FAMOTIDINE 20 MG: 20 TABLET, FILM COATED ORAL at 09:06

## 2018-06-19 RX ADMIN — IOHEXOL 100 ML: 350 INJECTION, SOLUTION INTRAVENOUS at 09:06

## 2018-06-19 RX ADMIN — DICYCLOMINE HYDROCHLORIDE 20 MG: 10 CAPSULE ORAL at 09:06

## 2018-06-19 RX ADMIN — IOHEXOL 15 ML: 350 INJECTION, SOLUTION INTRAVENOUS at 07:06

## 2018-06-19 RX ADMIN — IOHEXOL 15 ML: 350 INJECTION, SOLUTION INTRAVENOUS at 08:06

## 2018-06-19 NOTE — ED NOTES
"Pt identifiers checked and accurate with Chucho Prado     Pt reports to ED with complaints of indigestion with gas starting last night. Pt reports episodes of N/V last night and today. Pt reports history of right abdominal hernia and suprapubic urinary catheter. Pt reports "occasioanl SOB with exertion". Pt denies diarrhea, fever, chills, CP     LOC: The patient is awake, alert and aware of environment with an appropriate affect, the patient is oriented x 3 and speaking appropriately.  APPEARANCE: Patient resting comfortably and in no acute distress, patient is clean and well groomed  SKIN: The skin is warm and dry, color consistent with ethnicity, patient has normal skin turgor and moist mucus membranes, skin intact  MUSCULOSKELETAL: Pt presents in wheelchair   RESPIRATORY: Airway is open and patent, breath sounds clear throughout all lung fields; respirations are spontaneous, patient has a normal effort and rate, no accessory muscle use noted. Pt denies SOB at this time.   CARDIAC: Patient has no peripheral edema noted, capillary refill < 3 seconds. No complaints of chest pain at this time.   ABDOMEN: Soft and non tender to palpation. Hyperactive bowel sounds present x 4. Pt presents with right abdominal hernia.   NEUROLOGIC: PERRL, 3 mm bilaterally, eyes open spontaneously, behavior appropriate to situation, follows commands, facial expression symmetrical.     "

## 2018-06-19 NOTE — PROGRESS NOTES
Subjective:       Patient ID: Chucho Prado is a 84 y.o. male.    Chief Complaint: Emesis (2-3 day)  Chucho Prado 84 y.o. male is here for office visit to review care and physical exam, has been having a lot of stomach issues, has had SBO.  LAtely noted increased abd pain, nausea, worse with po.  Had dark vomit recently.      HPI  Review of Systems   Constitutional: Negative for activity change, appetite change, fatigue, fever and unexpected weight change.   HENT: Negative for congestion, hearing loss, postnasal drip and rhinorrhea.    Eyes: Negative for pain, discharge and visual disturbance.   Respiratory: Negative for cough, choking and shortness of breath.    Cardiovascular: Negative for chest pain, palpitations and leg swelling.   Gastrointestinal: Negative for abdominal pain, diarrhea and vomiting.   Genitourinary: Negative for dysuria, flank pain, hematuria and urgency.   Musculoskeletal: Negative for arthralgias, back pain, joint swelling and neck pain.   Skin: Negative for color change and rash.   Neurological: Negative for dizziness, tremors, syncope, weakness, numbness and headaches.   Psychiatric/Behavioral: Negative for agitation and confusion. The patient is not hyperactive.        Objective:      Physical Exam   Constitutional: He is oriented to person, place, and time. He appears well-developed and well-nourished.   HENT:   Head: Normocephalic.   Eyes: EOM are normal. Pupils are equal, round, and reactive to light.   Neck: Normal range of motion. Neck supple. No thyromegaly present.   Cardiovascular: Normal rate.  Exam reveals no gallop and no friction rub.    No murmur heard.  Pulmonary/Chest: Effort normal. No respiratory distress. He has no wheezes.   Abdominal: Soft. Bowel sounds are normal. He exhibits no mass. There is no tenderness.   Musculoskeletal: He exhibits no edema or tenderness.   Lymphadenopathy:     He has no cervical adenopathy.   Neurological: He is alert and oriented to  person, place, and time. He has normal reflexes. No cranial nerve deficit.   Skin: Skin is warm. No rash noted.   Psychiatric: He has a normal mood and affect. His behavior is normal.       Assessment:       1. Essential hypertension    2. Renal impairment    3. Generalized abdominal pain        Plan:       Chucho was seen today for emesis.    Diagnoses and all orders for this visit:    Essential hypertension    Renal impairment    Generalized abdominal pain    Chucho was seen today for emesis.    Diagnoses and all orders for this visit:    Essential hypertension    Renal impairment    Generalized abdominal pain    - er advised, called report

## 2018-06-20 ENCOUNTER — ANESTHESIA EVENT (OUTPATIENT)
Dept: SURGERY | Facility: HOSPITAL | Age: 83
DRG: 354 | End: 2018-06-20
Payer: MEDICARE

## 2018-06-20 ENCOUNTER — SURGERY (OUTPATIENT)
Age: 83
End: 2018-06-20

## 2018-06-20 ENCOUNTER — ANESTHESIA (OUTPATIENT)
Dept: SURGERY | Facility: HOSPITAL | Age: 83
DRG: 354 | End: 2018-06-20
Payer: MEDICARE

## 2018-06-20 PROBLEM — K56.609 SMALL BOWEL OBSTRUCTION: Status: ACTIVE | Noted: 2018-06-20

## 2018-06-20 PROBLEM — K43.0 INCISIONAL HERNIA WITH OBSTRUCTION BUT NO GANGRENE: Status: ACTIVE | Noted: 2018-06-20

## 2018-06-20 LAB
BACTERIA UR CULT: NORMAL
BACTERIA UR CULT: NORMAL

## 2018-06-20 PROCEDURE — C1781 MESH (IMPLANTABLE): HCPCS | Performed by: SURGERY

## 2018-06-20 PROCEDURE — D9220A PRA ANESTHESIA: Mod: ANES,,, | Performed by: ANESTHESIOLOGY

## 2018-06-20 PROCEDURE — 63600175 PHARM REV CODE 636 W HCPCS: Performed by: NURSE ANESTHETIST, CERTIFIED REGISTERED

## 2018-06-20 PROCEDURE — 25000003 PHARM REV CODE 250: Performed by: NURSE ANESTHETIST, CERTIFIED REGISTERED

## 2018-06-20 PROCEDURE — 25000003 PHARM REV CODE 250: Performed by: SURGERY

## 2018-06-20 PROCEDURE — 0WUF0JZ SUPPLEMENT ABDOMINAL WALL WITH SYNTHETIC SUBSTITUTE, OPEN APPROACH: ICD-10-PCS | Performed by: SURGERY

## 2018-06-20 PROCEDURE — 99223 1ST HOSP IP/OBS HIGH 75: CPT | Mod: AI,57,, | Performed by: SURGERY

## 2018-06-20 PROCEDURE — 94640 AIRWAY INHALATION TREATMENT: CPT

## 2018-06-20 PROCEDURE — 63600175 PHARM REV CODE 636 W HCPCS

## 2018-06-20 PROCEDURE — A4216 STERILE WATER/SALINE, 10 ML: HCPCS | Performed by: SURGERY

## 2018-06-20 PROCEDURE — 25000003 PHARM REV CODE 250: Performed by: STUDENT IN AN ORGANIZED HEALTH CARE EDUCATION/TRAINING PROGRAM

## 2018-06-20 PROCEDURE — 88302 TISSUE EXAM BY PATHOLOGIST: CPT | Performed by: PATHOLOGY

## 2018-06-20 PROCEDURE — D9220A PRA ANESTHESIA: Mod: CRNA,,, | Performed by: NURSE ANESTHETIST, CERTIFIED REGISTERED

## 2018-06-20 PROCEDURE — 25000242 PHARM REV CODE 250 ALT 637 W/ HCPCS: Performed by: ANESTHESIOLOGY

## 2018-06-20 PROCEDURE — 37000008 HC ANESTHESIA 1ST 15 MINUTES: Performed by: SURGERY

## 2018-06-20 PROCEDURE — 94761 N-INVAS EAR/PLS OXIMETRY MLT: CPT

## 2018-06-20 PROCEDURE — 63600175 PHARM REV CODE 636 W HCPCS: Performed by: SURGERY

## 2018-06-20 PROCEDURE — 71000033 HC RECOVERY, INTIAL HOUR: Performed by: SURGERY

## 2018-06-20 PROCEDURE — 63600175 PHARM REV CODE 636 W HCPCS: Performed by: ANESTHESIOLOGY

## 2018-06-20 PROCEDURE — 36000707: Performed by: SURGERY

## 2018-06-20 PROCEDURE — 88302 TISSUE EXAM BY PATHOLOGIST: CPT | Mod: 26,,, | Performed by: PATHOLOGY

## 2018-06-20 PROCEDURE — 37000009 HC ANESTHESIA EA ADD 15 MINS: Performed by: SURGERY

## 2018-06-20 PROCEDURE — 49561 PR REPAIR INCISIONAL HERNIA,STRANG: CPT | Mod: ,,, | Performed by: SURGERY

## 2018-06-20 PROCEDURE — 71000039 HC RECOVERY, EACH ADD'L HOUR: Performed by: SURGERY

## 2018-06-20 PROCEDURE — 36000706: Performed by: SURGERY

## 2018-06-20 PROCEDURE — 49568 PR IMPLANT MESH HERNIA REPAIR/DEBRIDEMENT CLOSURE: CPT | Mod: ,,, | Performed by: SURGERY

## 2018-06-20 PROCEDURE — 12000002 HC ACUTE/MED SURGE SEMI-PRIVATE ROOM

## 2018-06-20 DEVICE — PROCEED MESH 6X6 SQUARE: Type: IMPLANTABLE DEVICE | Site: ABDOMEN | Status: FUNCTIONAL

## 2018-06-20 RX ORDER — CEFTRIAXONE 1 G/1
1 INJECTION, POWDER, FOR SOLUTION INTRAMUSCULAR; INTRAVENOUS
Status: COMPLETED | OUTPATIENT
Start: 2018-06-21 | End: 2018-06-30

## 2018-06-20 RX ORDER — BACITRACIN 50000 [IU]/1
INJECTION, POWDER, FOR SOLUTION INTRAMUSCULAR
Status: DISCONTINUED | OUTPATIENT
Start: 2018-06-20 | End: 2018-06-20 | Stop reason: HOSPADM

## 2018-06-20 RX ORDER — IPRATROPIUM BROMIDE AND ALBUTEROL SULFATE 2.5; .5 MG/3ML; MG/3ML
3 SOLUTION RESPIRATORY (INHALATION) ONCE
Status: COMPLETED | OUTPATIENT
Start: 2018-06-20 | End: 2018-06-20

## 2018-06-20 RX ORDER — HYDRALAZINE HYDROCHLORIDE 20 MG/ML
5 INJECTION INTRAMUSCULAR; INTRAVENOUS EVERY 6 HOURS PRN
Status: DISCONTINUED | OUTPATIENT
Start: 2018-06-20 | End: 2018-06-20

## 2018-06-20 RX ORDER — GLYCOPYRROLATE 0.2 MG/ML
INJECTION INTRAMUSCULAR; INTRAVENOUS
Status: DISCONTINUED | OUTPATIENT
Start: 2018-06-20 | End: 2018-06-20

## 2018-06-20 RX ORDER — FENTANYL CITRATE 50 UG/ML
INJECTION, SOLUTION INTRAMUSCULAR; INTRAVENOUS
Status: DISCONTINUED | OUTPATIENT
Start: 2018-06-20 | End: 2018-06-20

## 2018-06-20 RX ORDER — FENTANYL CITRATE 50 UG/ML
INJECTION, SOLUTION INTRAMUSCULAR; INTRAVENOUS
Status: COMPLETED
Start: 2018-06-20 | End: 2018-06-20

## 2018-06-20 RX ORDER — ACETAMINOPHEN 10 MG/ML
INJECTION, SOLUTION INTRAVENOUS
Status: DISCONTINUED | OUTPATIENT
Start: 2018-06-20 | End: 2018-06-20

## 2018-06-20 RX ORDER — AMLODIPINE BESYLATE 10 MG/1
10 TABLET ORAL DAILY
Status: DISCONTINUED | OUTPATIENT
Start: 2018-06-21 | End: 2018-07-05 | Stop reason: HOSPADM

## 2018-06-20 RX ORDER — POTASSIUM CHLORIDE 7.45 MG/ML
10 INJECTION INTRAVENOUS
Status: DISCONTINUED | OUTPATIENT
Start: 2018-06-20 | End: 2018-06-20

## 2018-06-20 RX ORDER — LIDOCAINE HCL/PF 100 MG/5ML
SYRINGE (ML) INTRAVENOUS
Status: DISCONTINUED | OUTPATIENT
Start: 2018-06-20 | End: 2018-06-20

## 2018-06-20 RX ORDER — CEFTRIAXONE 1 G/1
1 INJECTION, POWDER, FOR SOLUTION INTRAMUSCULAR; INTRAVENOUS
Status: DISCONTINUED | OUTPATIENT
Start: 2018-06-20 | End: 2018-06-20

## 2018-06-20 RX ORDER — FENTANYL CITRATE 50 UG/ML
25 INJECTION, SOLUTION INTRAMUSCULAR; INTRAVENOUS EVERY 5 MIN PRN
Status: DISCONTINUED | OUTPATIENT
Start: 2018-06-20 | End: 2018-06-20 | Stop reason: HOSPADM

## 2018-06-20 RX ORDER — ROCURONIUM BROMIDE 10 MG/ML
INJECTION, SOLUTION INTRAVENOUS
Status: DISCONTINUED | OUTPATIENT
Start: 2018-06-20 | End: 2018-06-20

## 2018-06-20 RX ORDER — LEVOTHYROXINE SODIUM 50 UG/1
50 TABLET ORAL
Status: DISCONTINUED | OUTPATIENT
Start: 2018-06-21 | End: 2018-07-05 | Stop reason: HOSPADM

## 2018-06-20 RX ORDER — GABAPENTIN 400 MG/1
400 CAPSULE ORAL 3 TIMES DAILY
Status: DISCONTINUED | OUTPATIENT
Start: 2018-06-20 | End: 2018-07-05 | Stop reason: HOSPADM

## 2018-06-20 RX ORDER — IPRATROPIUM BROMIDE AND ALBUTEROL SULFATE 2.5; .5 MG/3ML; MG/3ML
SOLUTION RESPIRATORY (INHALATION)
Status: DISPENSED
Start: 2018-06-20 | End: 2018-06-21

## 2018-06-20 RX ORDER — PROPOFOL 10 MG/ML
VIAL (ML) INTRAVENOUS
Status: DISCONTINUED | OUTPATIENT
Start: 2018-06-20 | End: 2018-06-20

## 2018-06-20 RX ORDER — OXYCODONE HYDROCHLORIDE 5 MG/1
5 TABLET ORAL
Status: DISCONTINUED | OUTPATIENT
Start: 2018-06-20 | End: 2018-06-20 | Stop reason: HOSPADM

## 2018-06-20 RX ORDER — MORPHINE SULFATE 1 MG/ML
INJECTION INTRAVENOUS CONTINUOUS
Status: DISCONTINUED | OUTPATIENT
Start: 2018-06-20 | End: 2018-06-21

## 2018-06-20 RX ORDER — SODIUM CHLORIDE 9 MG/ML
INJECTION, SOLUTION INTRAVENOUS CONTINUOUS PRN
Status: DISCONTINUED | OUTPATIENT
Start: 2018-06-20 | End: 2018-06-20

## 2018-06-20 RX ORDER — ONDANSETRON 2 MG/ML
INJECTION INTRAMUSCULAR; INTRAVENOUS
Status: DISCONTINUED | OUTPATIENT
Start: 2018-06-20 | End: 2018-06-20

## 2018-06-20 RX ORDER — NALOXONE HCL 0.4 MG/ML
0.02 VIAL (ML) INJECTION
Status: DISCONTINUED | OUTPATIENT
Start: 2018-06-20 | End: 2018-06-21

## 2018-06-20 RX ORDER — ASPIRIN 81 MG/1
81 TABLET ORAL DAILY
Status: DISCONTINUED | OUTPATIENT
Start: 2018-06-21 | End: 2018-07-05 | Stop reason: HOSPADM

## 2018-06-20 RX ORDER — NEOSTIGMINE METHYLSULFATE 1 MG/ML
INJECTION, SOLUTION INTRAVENOUS
Status: DISCONTINUED | OUTPATIENT
Start: 2018-06-20 | End: 2018-06-20

## 2018-06-20 RX ORDER — CEFAZOLIN SODIUM 1 G/3ML
INJECTION, POWDER, FOR SOLUTION INTRAMUSCULAR; INTRAVENOUS
Status: DISCONTINUED | OUTPATIENT
Start: 2018-06-20 | End: 2018-06-20

## 2018-06-20 RX ADMIN — BACITRACIN 50000 UNITS: 50000 INJECTION, POWDER, LYOPHILIZED, FOR SOLUTION INTRAMUSCULAR at 10:06

## 2018-06-20 RX ADMIN — FENTANYL CITRATE 100 MCG: 50 INJECTION, SOLUTION INTRAMUSCULAR; INTRAVENOUS at 09:06

## 2018-06-20 RX ADMIN — PROPOFOL 140 MG: 10 INJECTION, EMULSION INTRAVENOUS at 09:06

## 2018-06-20 RX ADMIN — ONDANSETRON 4 MG: 4 TABLET, ORALLY DISINTEGRATING ORAL at 02:06

## 2018-06-20 RX ADMIN — Medication 10 ML: at 12:06

## 2018-06-20 RX ADMIN — SODIUM CHLORIDE, SODIUM GLUCONATE, SODIUM ACETATE, POTASSIUM CHLORIDE, MAGNESIUM CHLORIDE, SODIUM PHOSPHATE, DIBASIC, AND POTASSIUM PHOSPHATE: .53; .5; .37; .037; .03; .012; .00082 INJECTION, SOLUTION INTRAVENOUS at 10:06

## 2018-06-20 RX ADMIN — ONDANSETRON 4 MG: 4 TABLET, ORALLY DISINTEGRATING ORAL at 12:06

## 2018-06-20 RX ADMIN — FENTANYL CITRATE 25 MCG: 50 INJECTION, SOLUTION INTRAMUSCULAR; INTRAVENOUS at 12:06

## 2018-06-20 RX ADMIN — ROCURONIUM BROMIDE 20 MG: 10 INJECTION, SOLUTION INTRAVENOUS at 10:06

## 2018-06-20 RX ADMIN — FENTANYL CITRATE 25 MCG: 50 INJECTION, SOLUTION INTRAMUSCULAR; INTRAVENOUS at 11:06

## 2018-06-20 RX ADMIN — SODIUM CHLORIDE: 0.9 INJECTION, SOLUTION INTRAVENOUS at 09:06

## 2018-06-20 RX ADMIN — LIDOCAINE HYDROCHLORIDE 100 MG: 20 INJECTION, SOLUTION INTRAVENOUS at 09:06

## 2018-06-20 RX ADMIN — Medication 3 ML: at 09:06

## 2018-06-20 RX ADMIN — ACETAMINOPHEN 1000 MG: 10 INJECTION, SOLUTION INTRAVENOUS at 10:06

## 2018-06-20 RX ADMIN — IPRATROPIUM BROMIDE AND ALBUTEROL SULFATE 3 ML: .5; 3 SOLUTION RESPIRATORY (INHALATION) at 12:06

## 2018-06-20 RX ADMIN — HEPARIN SODIUM 5000 UNITS: 5000 INJECTION, SOLUTION INTRAVENOUS; SUBCUTANEOUS at 01:06

## 2018-06-20 RX ADMIN — GLYCOPYRROLATE 0.6 MG: 0.2 INJECTION, SOLUTION INTRAMUSCULAR; INTRAVENOUS at 11:06

## 2018-06-20 RX ADMIN — DEXTROSE MONOHYDRATE, SODIUM CHLORIDE, AND POTASSIUM CHLORIDE: 50; 9; 1.49 INJECTION, SOLUTION INTRAVENOUS at 12:06

## 2018-06-20 RX ADMIN — GABAPENTIN 400 MG: 400 CAPSULE ORAL at 05:06

## 2018-06-20 RX ADMIN — ROCURONIUM BROMIDE 30 MG: 10 INJECTION, SOLUTION INTRAVENOUS at 09:06

## 2018-06-20 RX ADMIN — NEOSTIGMINE METHYLSULFATE 5 MG: 1 INJECTION INTRAVENOUS at 11:06

## 2018-06-20 RX ADMIN — HEPARIN SODIUM 5000 UNITS: 5000 INJECTION, SOLUTION INTRAVENOUS; SUBCUTANEOUS at 09:06

## 2018-06-20 RX ADMIN — ONDANSETRON 4 MG: 2 INJECTION INTRAMUSCULAR; INTRAVENOUS at 10:06

## 2018-06-20 RX ADMIN — Medication: at 12:06

## 2018-06-20 RX ADMIN — CEFAZOLIN 2 G: 330 INJECTION, POWDER, FOR SOLUTION INTRAMUSCULAR; INTRAVENOUS at 09:06

## 2018-06-20 RX ADMIN — DEXTROSE MONOHYDRATE, SODIUM CHLORIDE, AND POTASSIUM CHLORIDE: 50; 9; 1.49 INJECTION, SOLUTION INTRAVENOUS at 01:06

## 2018-06-20 NOTE — PLAN OF CARE
Problem: Patient Care Overview  Goal: Plan of Care Review  Outcome: Ongoing (interventions implemented as appropriate)  Pt verbalize understanding of ongoing care

## 2018-06-20 NOTE — PLAN OF CARE
Pt vital signs wnl.  Pt verbalizes pain is tolerable with pca.  Pt verbalizes no nausea.  Abdominal dressing intact with CANDACE drain in place.

## 2018-06-20 NOTE — H&P
[facility]  Attending: Smita   Resident: Fabian   CC: Abdominal pain and vomiting    HPI: Chucho Prado is a pleasant 84 y.o. man with incisional hernia post open helen 15 years ago, who presents to ED with vomiting x 2 days.    Hernia has been present for about ~14 years.  It started becoming painful over the last 6 months.  Pain is intermittent, associated with eating.  It has gotten worse in severity over the last month.    He started having nausea and vomiting (nursing aide describes as feculent) two days ago.  3-4 episodes the night before last.  3-4 more episodes during the day time, and then none since then.  He has been avoiding PO intake due to the vomiting.    He has been having daily BM, although nurse states that yesterday was small.  States he is still passing gas.    He does have a cough and sinus congestion, and has had foul smelling urine.  UA in ED consistent with UTI.  He has suprapubic catheter due to urinary retention.    CT in ED suggests bowel obstruction associated with hernia.     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    GERD (gastroesophageal reflux disease)     Hypertension     Thyroid disease     UTI (urinary tract infection)        Past Surgical History:   Procedure Laterality Date    CHOLECYSTECTOMY      EYE SURGERY Right     IOL    HERNIA REPAIR      INTRAOCULAR PROSTHESES INSERTION Left 5/28/2018    Procedure: INSERTION-INTRAOCULAR LENS (IOL);  Surgeon: Trinity Vazquez MD;  Location: [facility];  Service: Ophthalmology;  Laterality: Left;    JOINT REPLACEMENT      LAMINECTOMY  12/27/2016    L2-L4    LUMBAR LAMINECTOMY   12/2016    PARATHYROIDECTOMY      PHACOEMULSIFICATION OF CATARACT Left 5/28/2018    Procedure: PHACOEMULSIFICATION-ASPIRATION-CATARACT;  Surgeon: Trinity Vazquez MD;  Location: [facility];  Service: Ophthalmology;  Laterality: Left;    TOTAL KNEE ARTHROPLASTY Bilateral     TOTAL KNEE ARTHROPLASTY Left 10/25/2017    TKR       Family History   Problem Relation Age of Onset    Hypertension Mother     Diabetes Father     Esophageal cancer Father        Social History     Social History    Marital status: Single     Spouse name: N/A    Number of children: N/A    Years of education: N/A     Occupational History    Not on file.     Social History Main Topics    Smoking status: Former Smoker     Years: 20.00     Quit date: 1990    Smokeless tobacco: Never Used      Comment: quit 1990    Alcohol use No      Comment: rarely/6 months    Drug use: No    Sexual activity: No     Other Topics Concern    Not on file     Social History Narrative    Lives alone, owns house and rents part. [employer] helps. 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        No current facility-administered medications on file prior to encounter.      Current Outpatient Prescriptions on File Prior to Encounter   Medication Sig Dispense Refill    aspirin (ECOTRIN) 81 MG EC tablet Take 81 mg by mouth once daily.      docusate sodium (COLACE) 100 MG capsule Take 1 capsule (100 mg total) by mouth 2 (two) times daily as needed for Constipation. (Patient taking differently: Take 100 mg by mouth as needed. ) 60 capsule 0    gabapentin (NEURONTIN) 400 MG capsule Take 400 mg by mouth 3 (three) times daily.      levothyroxine (SYNTHROID) 50 MCG tablet Take 1 tablet (50 mcg total) by mouth once daily. 100 tablet 11    simvastatin (ZOCOR) 40 MG tablet Take 1 tablet (40 mg total) by mouth every evening. 90 tablet 11    amLODIPine (NORVASC) 10 MG tablet Take 1 tablet (10 mg total) by mouth once daily. 30 tablet 11    hyoscyamine (LEVSIN/SL) 0.125 mg Subl  "DISSOLVE 1 TABLET UNDER THE TONGUE EVERY 4 HOURS AS NEEDED 30 tablet 0    hyoscyamine (LEVSIN/SL) 0.125 mg Subl DISSOLVE 1 TABLET UNDER THE TONGUE EVERY 4 HOURS AS NEEDED 540 tablet 1    ondansetron (ZOFRAN-ODT) 8 MG TbDL Take 1 tablet (8 mg total) by mouth every 12 (twelve) hours as needed. 14 tablet 0    polyethylene glycol (GLYCOLAX) 17 gram PwPk Take 17 g by mouth 2 (two) times daily. 12 each 0    senna-docusate 8.6-50 mg (PERICOLACE) 8.6-50 mg per tablet Take 1 tablet by mouth 2 (two) times daily.      trazodone (DESYREL) 50 MG tablet Take 1 tablet (50 mg total) by mouth nightly as needed for Insomnia. 30 tablet 11    triamterene-hydrochlorothiazide 37.5-25 mg (DYAZIDE) 37.5-25 mg per capsule TAKE 1 CAPSULE BY MOUTH ONCE DAILY IN THE MORNING 90 capsule 0       Review of patient's allergies indicates:  No Known Allergies    ROS: Constitutional: no fever or chills, pain controlled   Respiratory: no cough or shortness of breath   Cardiovascular: no chest pain or palpitations   Genitourinary: no hematuria or dysuria   Hematologic/Lymphatic: no easy bruising or lymphadenopathy   Musculoskeletal: no arthralgias or myalgias   Neurological: no seizures or tremors     Phys:  Vitals:    06/19/18 1618 06/19/18 2113 06/19/18 2308   BP: (!) 142/65 (!) 151/67 (!) 147/67   BP Location:  Left arm Left arm   Patient Position:  Sitting Sitting   Pulse: 85 85 75   Resp: 18 20 16   Temp: 98.8 °F (37.1 °C)  98.3 °F (36.8 °C)   TempSrc: Oral  Oral   SpO2: 95% (!) 94% (!) 94%   Weight: 90.3 kg (199 lb)     Height: 5' 8.5" (1.74 m)       Phys:   Gen: NAD   HEENT: NCAT, trachea midline  CV: RRR, no m/r/g   Pulm: Unlabored  Abd: Soft, mild to moderate ttp over hernia.  Mildly distended (baseline per patient). Remainder of abdomen is soft and nontender.  No rebound, guarding.   Extremities: no cyanosis or edema, or clubbing  Skin: Skin color, texture, turgor normal. No rashes or lesions     CT: Suggests transition point in hernia.  " No contrast past this point    A/P 84 year old man with hernia associated SBO vs ileus    Admit to Surgery, Dr. Ordoñez.  NPO, IVF    Given resolved nausea, patient declines NGT at this time.  If he has any more, he agrees to have it placed  KUB in the AM to assess contrast  Holding home meds for now  UTI: Continue rocephin QD x 10 days.  F/u urine culture    Сергей Peralta MD  General Surgery, PGY-4  [phone]      I have personally performed a detailed history and physical examination on this patient. My findings are summarized in the resident's note included in the record.  Incarcerated flank hernia  Plan surgery today

## 2018-06-20 NOTE — NURSING
"Patient arrived unit, Room 525-B via wheelchair.  PAtient AAO X 4 and able to make needs known.  Patient denies pain.  L AC 20 g with D5 NS with 20 KL @ 125 cc/hr.  Mahajan [ suprapubic] intact and patent, draining clear, yellow urine.  Mahajan needed or dx urinary retention.  Skin intact and patent reports Last BM 6-18-18.  Patient requested something or sleep, Dr. Peralta paged and informed of patient requesting home medication of trazodone 50 mg [rn qhs], Dr. Peralta sad, " lets hold off medications for sleep."   Patient oriented to room, call light, bed in low position and locked.    "

## 2018-06-20 NOTE — BRIEF OP NOTE
[facility]  Brief Operative Note    SUMMARY     Surgery Date: 6/20/2018     Surgeon(s) and Role:     * Guilherme Ordoñez MD - Primary     * Reyna Chan MD - Resident - Assisting     * Jonathan Schoen, MD - Resident - Assisting        Pre-op Diagnosis:  Small bowel obstruction [K56.609]    Post-op Diagnosis:  Post-Op Diagnosis Codes:     * Small bowel obstruction [K56.609]    Procedure(s) (LRB):  REPAIR, HERNIA, VENTRAL, INCARCERATED, WITHOUT HISTORY OF PRIOR REPAIR (N/A)    Anesthesia: Choice    Description of Procedure:   Incisional hernia repair from defect at lateral edge of prior open helen incision. Fascial defect measuring about 7cm, incarcerated bowel viable. Proceed mesh placed and covered with fascial edge. 19Fr yohana drain in subcutaneous tissue.    Estimated Blood Loss: 50 mL         Specimens:   Specimen (12h ago through future)    Start     Ordered    06/20/18 1037  Specimen to Pathology - Surgery  Once     Comments:  1.) Hernia sac (permanent)      06/20/18 1036        Reyna Chan MD, PGY-2  General Surgery  [phone]

## 2018-06-20 NOTE — TRANSFER OF CARE
"Anesthesia Transfer of Care Note    Patient: Chucho Prado    Procedure(s) Performed: Procedure(s) (LRB):  REPAIR, HERNIA, VENTRAL, INCARCERATED, WITHOUT HISTORY OF PRIOR REPAIR (N/A)    Patient location: PACU    Anesthesia Type: general    Transport from OR: Transported from OR on 6-10 L/min O2 by face mask with adequate spontaneous ventilation    Post pain: adequate analgesia    Post assessment: no apparent anesthetic complications and tolerated procedure well    Post vital signs: stable    Level of consciousness: awake    Nausea/Vomiting: no vomiting    Complications: none    Transfer of care protocol was followed      Last vitals:   Visit Vitals  BP (!) 140/65 (BP Location: Right arm, Patient Position: Lying)   Pulse 80   Temp 37 °C (98.6 °F) (Oral)   Resp 18   Ht 5' 8.5" (1.74 m)   Wt 91.2 kg (201 lb 1 oz)   SpO2 (!) 92%   BMI 30.13 kg/m²     "

## 2018-06-20 NOTE — ED PROVIDER NOTES
Encounter Date: 6/19/2018    SCRIBE #1 NOTE: I, Kaliagrant Alvarez, am scribing for, and in the presence of,  Dr. Chery. I have scribed the following portions of the note - the APC attestation.       History     Chief Complaint   Patient presents with    Abdominal Pain     nausea, vomiting, has suprapubic cath, sent from dr luo's office,      84-year-old male with hypertension, CKD 3, GERD, suprapubic catheter for urinary retention presents for abdominal pain x1 week.  Pain described as aching, localized to the lower abdomen radiating to the right upper quadrant. Reports associated nausea, belching and passing gas.  Reports 1 episode of dark, foul-smelling vomit last night.  Patient reports feeling improvement of pain after vomiting. States that his urine has been strong smelling and appears cloudy urine than usual.  Denies fevers, diarrhea, back pain, blood in stool or inability to tolerate p.o. intake.  Last BM was yesterday.  Nursing aide states that he did not have a bowel movement yesterday (?).  Patient has large hernia for which he has previously had SBOs.          Review of patient's allergies indicates:  No Known Allergies  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    GERD (gastroesophageal reflux disease)     Hypertension     Thyroid disease     UTI (urinary tract infection)      Past Surgical History:   Procedure Laterality Date    CHOLECYSTECTOMY      EYE SURGERY Right     IOL    HERNIA REPAIR      INTRAOCULAR PROSTHESES INSERTION Left 5/28/2018    Procedure: INSERTION-INTRAOCULAR LENS (IOL);  Surgeon: Trinity Vazquez MD;  Location: [facility];  Service:  Ophthalmology;  Laterality: Left;    JOINT REPLACEMENT      LAMINECTOMY  12/27/2016    L2-L4    LUMBAR LAMINECTOMY  12/2016    PARATHYROIDECTOMY      PHACOEMULSIFICATION OF CATARACT Left 5/28/2018    Procedure: PHACOEMULSIFICATION-ASPIRATION-CATARACT;  Surgeon: Trinity Vazquez MD;  [facility];  Service: Ophthalmology;  Laterality: Left;    TOTAL KNEE ARTHROPLASTY Bilateral     TOTAL KNEE ARTHROPLASTY Left 10/25/2017    TKR     Family History   Problem Relation Age of Onset    Hypertension Mother     Diabetes Father     Esophageal cancer Father      Social History   Substance Use Topics    Smoking status: Former Smoker     Years: 20.00     Quit date: 1990    Smokeless tobacco: Never Used      Comment: quit 1990    Alcohol use No      Comment: rarely/6 months     Review of Systems   Constitutional: Negative for fatigue and fever.   Respiratory: Positive for shortness of breath. Negative for cough and chest tightness.    Cardiovascular: Negative for chest pain, palpitations and leg swelling.   Gastrointestinal: Positive for abdominal pain, nausea and vomiting. Negative for abdominal distention, anal bleeding, blood in stool, constipation, diarrhea and rectal pain.   Endocrine: Negative for polyuria.   Genitourinary: Negative for difficulty urinating, dysuria, flank pain, frequency, hematuria and urgency.   Musculoskeletal: Negative for back pain.   Skin: Negative for rash.   Neurological: Negative for light-headedness and headaches.   Hematological: Does not bruise/bleed easily.       Physical Exam     Initial Vitals [06/19/18 1618]   BP Pulse Resp Temp SpO2   (!) 142/65 85 18 98.8 °F (37.1 °C) 95 %      MAP       --         Physical Exam    Nursing note and vitals reviewed.  Constitutional: He appears well-developed and well-nourished. He is not diaphoretic. No distress.   In wheelchair, nursing aide at bedside   HENT:   Head: Normocephalic and atraumatic.   Eyes: EOM are normal. Pupils are equal,  round, and reactive to light.   Neck: Normal range of motion. Neck supple.   Cardiovascular: Normal rate, regular rhythm, normal heart sounds and intact distal pulses. Exam reveals no gallop and no friction rub.    No murmur heard.  Pulmonary/Chest: Breath sounds normal. No respiratory distress. He has no wheezes. He has no rhonchi. He has no rales. He exhibits no tenderness.   Abdominal: Soft. Bowel sounds are normal. He exhibits mass. He exhibits no distension. There is tenderness in the suprapubic area. There is no rigidity, no rebound, no guarding, no CVA tenderness, no tenderness at McBurney's point and negative Medina's sign. A hernia is present.       A large protruding mass to right upper quadrant, slightly tender to palpation, not reducible.  Suprapubic catheter in place, no surrounding erythema, edema. Draining large amount of yellow urine. Suprapubic tenderness to palpation. No CVA tenderness.   Musculoskeletal: Normal range of motion. He exhibits no edema or tenderness.   Neurological: He is alert and oriented to person, place, and time.   Skin: Skin is warm and dry.   Psychiatric: He has a normal mood and affect.         ED Course   Procedures  Labs Reviewed   CBC W/ AUTO DIFFERENTIAL - Abnormal; Notable for the following:        Result Value    RBC 4.34 (*)     Hemoglobin 11.9 (*)     Hematocrit 35.6 (*)     All other components within normal limits   COMPREHENSIVE METABOLIC PANEL - Abnormal; Notable for the following:     Sodium 128 (*)     Potassium 2.9 (*)     Chloride 88 (*)     CO2 30 (*)     Calcium 8.4 (*)     ALT 7 (*)     eGFR if  57.9 (*)     eGFR if non  50.1 (*)     All other components within normal limits   URINALYSIS, REFLEX TO URINE CULTURE - Abnormal; Notable for the following:     Appearance, UA Hazy (*)     Protein, UA 1+ (*)     Occult Blood UA 2+ (*)     Nitrite, UA Positive (*)     Leukocytes, UA 3+ (*)     All other components within normal limits     Narrative:     Preferred Collection Type->Urine, Clean Catch   URINALYSIS MICROSCOPIC - Abnormal; Notable for the following:     RBC, UA 71 (*)     WBC, UA 94 (*)     WBC Clumps, UA Few (*)     Bacteria, UA Many (*)     All other components within normal limits    Narrative:     Preferred Collection Type->Urine, Clean Catch   CULTURE, URINE   LACTIC ACID, PLASMA   LIPASE          Imaging Results          X-Ray Abdomen AP 1 View (Final result)  Result time 06/19/18 23:49:10    Final result by Salo Rodriguez MD (06/19/18 23:49:10)                 Impression:      Enteric tube is not visualized.      Electronically signed by: Salo Rodriguez MD  Date:    06/19/2018  Time:    23:49             Narrative:    EXAMINATION:  XR ABDOMEN AP 1 VIEW    CLINICAL HISTORY:  Post NGT insertion;    TECHNIQUE:  Single AP View of the abdomen was performed centered over the diaphragm for tube placement.    COMPARISON:  November 4, 2017.    FINDINGS:  Enteric tube is not visualized.    Distended small bowel loops suggesting partial small bowel obstruction with some gas in nondistended colon present.    Surgical clips in the upper abdomen.  Visualized osseous structures appear unchanged from prior.  Partially visualized electronic device overlies the left lateral margin of the image.                               CT Abdomen Pelvis With Contrast (Final result)     Abnormal  Result time 06/19/18 22:49:33    Final result by Salo Rodriguez MD (06/19/18 22:49:33)                 Impression:      Findings concerning for small bowel obstruction with possible transition point at the terminal ileum noting multiple dilated loops of small bowel, fat stranding and inflammatory changes within the right flank hernia.  Thickening of the wall of the distal terminal ileum likely reflect inflammatory/infectious process (terminal ileitis).  No organized fluid collections.    Small and atrophic kidneys compatible with chronic medical renal  disease.    Stable compression fractures of L1 and L3 vertebral bodies with vertebroplasty changes at L3.    Collapsed urinary bladder around suprapubic Mahajan catheter with bilateral posterior bladder wall diverticuli, similar to prior.    Additional stable findings as detailed above.    This report was flagged in Epic as abnormal.    Electronically signed by resident: Nazia Mathias  Date:    06/19/2018  Time:    22:16    Electronically signed by: Salo Rodriguez MD  Date:    06/19/2018  Time:    22:49             Narrative:    EXAMINATION:  CT ABDOMEN PELVIS WITH CONTRAST    CLINICAL HISTORY:  Hernia, complicated;Concern for bowel obstruction;    TECHNIQUE:  Axial CT images of the abdomen and pelvis obtained after the administration of intravenous contrast 100 cc of Omnipaque 350.  The patient was also administered oral contrast 30 mL of Omnipaque 350.  Coronal and sagittal reformats are provided.    COMPARISON:  CT abdomen and pelvis 11/02/2017.    FINDINGS:  The lung bases demonstrate mild atelectatic changes.  There is elevation of the left hemidiaphragm, similar to prior examination.  No pleural effusion.    The heart is not enlarged.  There is coronary artery atherosclerosis.  There are calcifications of the mitral and aortic annulus.  No pericardial effusion.    There is a moderate-sized hiatal hernia.  Postsurgical changes noted along the gastric wall in the pyloric region.    The liver is normal in size and attenuation.  No focal hepatic abnormality is seen.  Cholecystectomy clips noted.  No intra or extrahepatic biliary ductal dilatation.  The spleen is normal in size.    The adrenal glands are within normal limits.  Small calcification noted within the body of the pancreas, similar to prior examination.  Could be sequela of prior pancreatitis.    The kidneys are small and atrophic compatible with chronic medical renal disease.  Postsurgical changes noted within the left kidney from prior partial  nephrectomy.  A small hypodense lesion noted in the upper pole of the left kidney, too small to characterize and is unchanged from prior.  No hydronephrosis or nephrolithiasis.  The ureters appear within normal limits.  The bladder is collapsed around suprapubic catheter.  Posterior bladder wall diverticuli again noted.  The prostate is atrophic or possibly absent.    There are multiple dilated loops of small bowel.  There is a large right flank hernia containing loops of small and large bowel.  There is wall thickening involving the distal and terminal ileum which could reflect terminal ileitis.  There are some inflammatory changes and fat stranding within the hernia sac.  Transition point appears to be within the hernia sac possibly at the level of the terminal ileum.  Constellation of findings concerning for small bowel obstruction.  There is colonic diverticulosis.    No lymphadenopathy within the abdomen and pelvis.  No free intraperitoneal air or organized fluid collections.    There are degenerative changes involving the osseous structures with redemonstration of compression fractures involving the L3 and L1 vertebral bodies with vertebroplasty changes.    There are multiple fat containing hernias along the anterior midline abdomen.                                 Medical Decision Making:   History:   Old Medical Records: I decided to obtain old medical records.  Clinical Tests:   Lab Tests: Ordered and Reviewed  Radiological Study: Reviewed and Ordered       APC / Resident Notes:   84-year-old male presenting with abdominal pain with nausea, vomiting and strong smelling urine.  Febrile, vitals stable, abdomen with a large protruding hernia, slightly tender to palpation. Suprapubic tenderness to palpation. DDx includes UTI, small-bowel obstruction, pyelonephritis.  I considered incarcerated hernia, however patient has very under tenderness to palpation. Will obtain labs, do CT abdomen pelvis reassess. Patient  offered medication for pain, he would like medication for gas this time, will give Bentyl.    Labs notable for nitrite positive UTI, hyponatremia at 128 and hypokalemia at 2.9.  Patient is persistently hyponatremic so I suspect this is baseline.  Will replete potassium, give ceftriaxone UTI and likely discharge with Bactrim.  Patient has had multiple previous UTIs growing MRSA, Klebsiella, E coli, etc however all been sensitive to Bactrim.       Scribe Attestation:   Scribe #1: I performed the above scribed service and the documentation accurately describes the services I performed. I attest to the accuracy of the note.    Attending Attestation:     Physician Attestation Statement for NP/PA:   I have conducted a face to face encounter with this patient in addition to the NP/PA, due to Medical Complexity    Other NP/PA Attestation Additions:    History of Present Illness: Referred from PCP for abdominal pain. Pt endorses lower abdominal pain radiating to bilateral flanks, dark vomit, change in quality of his urine and foul smelling urine. UA consistent with UTI. Will send culture and treat with ceftriaxone. Pt has chronic hyponatremia of 128 at baseline. Hypokalemia of 2.9, will replace. CT abdomen obtained reveals small bowel obstruction. General surgery consulted for management and will admit         Physician Attestation for Scribe:      Comments: I, Dr. Jey Chery, personally performed the services described in this documentation. All medical record entries made by the scribe were at my direction and in my presence.  I have reviewed the chart and agree that the record reflects my personal performance and is accurate and complete. Jey Chery MD.  12:40 AM 06/20/2018                 Clinical Impression:   The primary encounter diagnosis was Small bowel obstruction. Diagnoses of Urinary tract infection associated with cystostomy catheter, initial encounter and Hypokalemia were also pertinent to this  visit.                             Jey Chery MD  06/20/18 0040

## 2018-06-20 NOTE — ANESTHESIA PREPROCEDURE EVALUATION
06/20/2018  Chucho Prado is a 84 y.o., male.    Pre-op Assessment    I have reviewed the Patient Summary Reports.     I have reviewed the Nursing Notes.   I have reviewed the Medications.     Review of Systems  Anesthesia Hx:  Denies Hx of Anesthetic complications    Social:  Non-Smoker    Cardiovascular:   Hypertension, well controlled    Renal/:   Chronic Renal Disease, CRI    Hepatic/GI:   GERD, well controlled    Musculoskeletal:   Arthritis     Neurological:   Neuromuscular Disease,    Endocrine:   Hypothyroidism    Psych:   Psychiatric History          Physical Exam  General:  Well nourished    Airway/Jaw/Neck:  Airway Findings: Mouth Opening: Normal Tongue: Normal  General Airway Assessment: Adult  Mallampati: II  TM Distance: Normal, at least 6 cm  Jaw/Neck Findings:     Neck ROM: Normal ROM     Eyes/Ears/Nose:  Eyes/Ears/Nose Findings:    Dental:  Dental Findings: In tact           Patient Active Problem List   Diagnosis    Hypothyroidism    Essential hypertension    Inguinal hernia, right    Dyslipidemia    Depression    Anemia    Renal impairment    Iron deficiency anemia    Inguinal hernia unilateral, non-recurrent    Staph aureus infection    Primary osteoarthritis of both knees    Urinary retention    History of MRSA infection    Sciatica neuralgia    CKD (chronic kidney disease) stage 3, GFR 30-59 ml/min    Abdominal wall hernia    S/P lumbar laminectomy    S/P kyphoplasty- L3 kyphoplasty     Lumbar myelopathy    Gait instability    PONV (postoperative nausea and vomiting)    Suprapubic catheter    Edema    Stiffness of neck    Hyponatremia    Primary osteoarthritis of left knee    Debility    Partial intestinal obstruction    Status post total left knee replacement    Flank hernia    Nuclear sclerosis, bilateral    Nuclear sclerotic cataract of left eye     Generalized abdominal pain    Urinary tract infection associated with cystostomy catheter         Anesthesia Plan  Type of Anesthesia, risks & benefits discussed:  Anesthesia Type:  general  Patient's Preference:   Intra-op Monitoring Plan: standard ASA monitors  Intra-op Monitoring Plan Comments:   Post Op Pain Control Plan: per primary service following discharge from PACU  Post Op Pain Control Plan Comments:   Induction:    Beta Blocker:  Patient is on a Beta-Blocker and has received one dose within the past 24 hours (No further documentation required).       Informed Consent: Patient understands risks and agrees with Anesthesia plan.  Questions answered. Anesthesia consent signed with patient.  ASA Score: 3     Day of Surgery Review of History & Physical:    H&P update referred to the surgeon.         Ready For Surgery From Anesthesia Perspective.

## 2018-06-20 NOTE — NURSING
Pt returned back to the unit from recovery. Vs: 128/66, 84, 97.9, 20, O2 sat 91 % 3 liters O2. Safety precautions in place

## 2018-06-20 NOTE — OP NOTE
DATE OF PROCEDURE:  06/20/2018.    PREOPERATIVE DIAGNOSIS:  Incarcerated flank hernia.    POSTOPERATIVE DIAGNOSIS:  Incarcerated flank hernia.    PROCEDURE PERFORMED:  Repair of flank hernia with mesh, a defect of 10 x 10 cm.    SURGEON:  Guilherme Ordoñez M.D.    ASSISTANTS:  Jonathan Schoen, M.D., M.P.H. (RES) and Reyna Chan M.D. (RES).    ANESTHESIA:  General.    BLOOD LOSS:  Minimal.    COMPLICATIONS:  None.    INDICATIONS FOR PROCEDURE:  This 84-year-old patient was admitted through the   Emergency Department with incarcerated flank hernia.  The origin of the hernia   was from the lateral-most portion of a subcostal incision for cholecystectomy in   the remote past.  The patient had significant amount of intestine   extra-abdominally in the hernia sac.    OPERATIVE FINDINGS:  The hernia sac was able to be excised back to strong   fascial edges.  The hernia did have to be slightly enlarged in order to allow   for complete reduction of viscera back into the abdominal cavity.    OPERATIVE REPORT IN DETAIL:  The patient was brought to the Operating Room,   placed in the supine position, and prepped and draped in sterile fashion.  Once   satisfactory general anesthesia was induced, the patient was then turned up into   the left lateral decubitus position, and with the aid of a beanbag and straps   and axillary roll in an arm port, the patient was optimally positioned.  The   prior subcostal incision was partially opened at its lateral edge.  Skin and   subcutaneous tissues were divided.  Attenuated fascial hernia sac was identified   and excised down to its attachments to stronger normal fascia.  The viscera   were protected.  The hernia was opened slightly another 2 to 3 cm medially,   which allowed for complete reduction of all of the intestinal contents without   difficulty.  A piece of Proceed mesh 6 x 6 inches was then cut to 4 x 4 inches   and the defect was able to be closed with circumferential U sutures  of #1   Prolene.  With this completed, a #19 Tao drain was brought in through a stab   incision and used to drain the dead space at the location of the former hernia   sac.  SubQ was irrigated and then the skin was reapproximated with clips.    Needle, sponge and instrument counts were correct.  The patient tolerated the   procedure well and was stable at the completion of the operation.      KULDIP  dd: 06/20/2018 11:02:17 (CDT)  td: 06/20/2018 12:35:39 (CDT)  Doc ID   #[id number]  Job ID #[id number]    CC:

## 2018-06-20 NOTE — NURSING TRANSFER
Nursing Transfer Note      6/20/2018     Transfer To: [address]    Transfer via stretcher    Transfer with ivf,pca,oc    Transported by pct    Medicines sent: ivf,pca,o2    Chart send with patient: Yes    Notified: family going back to the room    Patient reassessed at: 6/20/18

## 2018-06-20 NOTE — PLAN OF CARE
Patient lives alone in a 2 story house w/4 AMALIA w/railings & elevator access to entrance;He lives on 1st floor. He states his Assistant/Tenant-Josh will help him, & he pays sitters to care for him as well-see below. He has past h/o bilateral knee replacements & broke his back 1 1/2 yrs ago from a fall.     Not medically stable for discharge;S/p Open hernia repair today.     [facility] Packet given to patient after informed about it;patient verbalized their understanding.        06/20/18 1520   Discharge Assessment   Assessment Type Discharge Planning Assessment   Confirmed/corrected address and phone number on facesheet? Yes   Assessment information obtained from? Patient;Medical Record;Other  (Personal Sitter at )   Expected Length of Stay (days) (TBD/? 2-3 days)   Communicated expected length of stay with patient/caregiver no  (Per MD)   Prior to hospitilization cognitive status: Alert/Oriented;No Deficits   Prior to hospitalization functional status: Independent;Assistive Equipment;Needs Assistance   Current cognitive status: Alert/Oriented;No Deficits   Current Functional Status: Independent;Assistive Equipment;Needs Assistance   Facility Arrived From: (N/A)   Lives With alone   Able to Return to Prior Arrangements yes   Is patient able to care for self after discharge? Yes   Who are your caregiver(s) and their phone number(s)? (One of his tenants who is his assistant: Josh Burr Friend [phone]. He will assist him as needed. Patient has paid sitters: daily at home, beginning 08:00 AM-12 MN, arranged through [facility]. )   Patient's perception of discharge disposition home or selfcare;home health  (Needs TBD/PT/OT recs pending. Is current w/[facility] for nurse & nurse aide. PT was discontinued about a  month ago. He is happy w/[facility]. )   Readmission Within The Last 30 Days unable to assess   Patient currently being followed by outpatient case management? No    Patient currently receives any other outside agency services? Yes   How many hours a day does the patient receive services? (8 AM-12 MN daily)   Name and contact number of agency or person providing outside services ([facility] Provider for sitters)   Is it the patient/care giver preference to resume care with the current outside agency? Yes   Equipment Currently Used at Home wheelchair;walker, standard;bedside commode;shower chair;cane, straight  (Not using straight cane. )   Do you have any problems affording any of your prescribed medications? No   Is the patient taking medications as prescribed? yes   Does the patient have transportation home? Yes   Transportation Available car;family or friend will provide   Dialysis Name and Scheduled days (N/A)   Does the patient receive services at the [facility]? No   Discharge Plan A Home Health;Home;Other  (resuming sitters as above. )   Discharge Plan B Home;Home Health;Other  (as above)   Patient/Family In Agreement With Plan yes

## 2018-06-21 LAB
ANION GAP SERPL CALC-SCNC: 10 MMOL/L
BASOPHILS # BLD AUTO: 0.03 K/UL
BASOPHILS NFR BLD: 0.3 %
BUN SERPL-MCNC: 14 MG/DL
CALCIUM SERPL-MCNC: 8.1 MG/DL
CHLORIDE SERPL-SCNC: 99 MMOL/L
CO2 SERPL-SCNC: 27 MMOL/L
CREAT SERPL-MCNC: 1.4 MG/DL
DIFFERENTIAL METHOD: ABNORMAL
EOSINOPHIL # BLD AUTO: 0 K/UL
EOSINOPHIL NFR BLD: 0.2 %
ERYTHROCYTE [DISTWIDTH] IN BLOOD BY AUTOMATED COUNT: 15.2 %
EST. GFR  (AFRICAN AMERICAN): 53 ML/MIN/1.73 M^2
EST. GFR  (NON AFRICAN AMERICAN): 45.8 ML/MIN/1.73 M^2
GLUCOSE SERPL-MCNC: 101 MG/DL
HCT VFR BLD AUTO: 35 %
HGB BLD-MCNC: 11.7 G/DL
IMM GRANULOCYTES # BLD AUTO: 0.04 K/UL
IMM GRANULOCYTES NFR BLD AUTO: 0.4 %
LYMPHOCYTES # BLD AUTO: 0.9 K/UL
LYMPHOCYTES NFR BLD: 9.9 %
MAGNESIUM SERPL-MCNC: 2.1 MG/DL
MCH RBC QN AUTO: 28.3 PG
MCHC RBC AUTO-ENTMCNC: 33.4 G/DL
MCV RBC AUTO: 85 FL
MONOCYTES # BLD AUTO: 0.7 K/UL
MONOCYTES NFR BLD: 7.7 %
NEUTROPHILS # BLD AUTO: 7.7 K/UL
NEUTROPHILS NFR BLD: 81.5 %
NRBC BLD-RTO: 0 /100 WBC
PHOSPHATE SERPL-MCNC: 3.9 MG/DL
PLATELET # BLD AUTO: 242 K/UL
PMV BLD AUTO: 9.7 FL
POTASSIUM SERPL-SCNC: 3.6 MMOL/L
RBC # BLD AUTO: 4.14 M/UL
SODIUM SERPL-SCNC: 136 MMOL/L
WBC # BLD AUTO: 9.42 K/UL

## 2018-06-21 PROCEDURE — 83735 ASSAY OF MAGNESIUM: CPT

## 2018-06-21 PROCEDURE — G8978 MOBILITY CURRENT STATUS: HCPCS | Mod: CL

## 2018-06-21 PROCEDURE — 25000003 PHARM REV CODE 250: Performed by: STUDENT IN AN ORGANIZED HEALTH CARE EDUCATION/TRAINING PROGRAM

## 2018-06-21 PROCEDURE — 85025 COMPLETE CBC W/AUTO DIFF WBC: CPT

## 2018-06-21 PROCEDURE — 97161 PT EVAL LOW COMPLEX 20 MIN: CPT

## 2018-06-21 PROCEDURE — 63600175 PHARM REV CODE 636 W HCPCS: Performed by: SURGERY

## 2018-06-21 PROCEDURE — G8979 MOBILITY GOAL STATUS: HCPCS | Mod: CK

## 2018-06-21 PROCEDURE — 12000002 HC ACUTE/MED SURGE SEMI-PRIVATE ROOM

## 2018-06-21 PROCEDURE — 25000003 PHARM REV CODE 250: Performed by: SURGERY

## 2018-06-21 PROCEDURE — 84100 ASSAY OF PHOSPHORUS: CPT

## 2018-06-21 PROCEDURE — 97530 THERAPEUTIC ACTIVITIES: CPT

## 2018-06-21 PROCEDURE — 36415 COLL VENOUS BLD VENIPUNCTURE: CPT

## 2018-06-21 PROCEDURE — A4216 STERILE WATER/SALINE, 10 ML: HCPCS | Performed by: SURGERY

## 2018-06-21 PROCEDURE — 80048 BASIC METABOLIC PNL TOTAL CA: CPT

## 2018-06-21 PROCEDURE — 63600175 PHARM REV CODE 636 W HCPCS: Performed by: STUDENT IN AN ORGANIZED HEALTH CARE EDUCATION/TRAINING PROGRAM

## 2018-06-21 RX ORDER — OXYCODONE AND ACETAMINOPHEN 5; 325 MG/1; MG/1
1 TABLET ORAL EVERY 4 HOURS PRN
Status: DISCONTINUED | OUTPATIENT
Start: 2018-06-21 | End: 2018-07-05 | Stop reason: HOSPADM

## 2018-06-21 RX ORDER — ENOXAPARIN SODIUM 100 MG/ML
40 INJECTION SUBCUTANEOUS EVERY 24 HOURS
Status: DISCONTINUED | OUTPATIENT
Start: 2018-06-21 | End: 2018-06-21

## 2018-06-21 RX ORDER — ONDANSETRON 2 MG/ML
8 INJECTION INTRAMUSCULAR; INTRAVENOUS EVERY 8 HOURS PRN
Status: DISCONTINUED | OUTPATIENT
Start: 2018-06-21 | End: 2018-07-05 | Stop reason: HOSPADM

## 2018-06-21 RX ORDER — OXYCODONE AND ACETAMINOPHEN 10; 325 MG/1; MG/1
1 TABLET ORAL EVERY 4 HOURS PRN
Status: DISCONTINUED | OUTPATIENT
Start: 2018-06-21 | End: 2018-07-05 | Stop reason: HOSPADM

## 2018-06-21 RX ADMIN — AMLODIPINE BESYLATE 10 MG: 10 TABLET ORAL at 08:06

## 2018-06-21 RX ADMIN — Medication: at 04:06

## 2018-06-21 RX ADMIN — CEFTRIAXONE SODIUM 1 G: 1 INJECTION, POWDER, FOR SOLUTION INTRAMUSCULAR; INTRAVENOUS at 05:06

## 2018-06-21 RX ADMIN — HEPARIN SODIUM 5000 UNITS: 5000 INJECTION, SOLUTION INTRAVENOUS; SUBCUTANEOUS at 02:06

## 2018-06-21 RX ADMIN — GABAPENTIN 400 MG: 400 CAPSULE ORAL at 09:06

## 2018-06-21 RX ADMIN — OXYCODONE HYDROCHLORIDE AND ACETAMINOPHEN 1 TABLET: 10; 325 TABLET ORAL at 10:06

## 2018-06-21 RX ADMIN — OXYCODONE HYDROCHLORIDE AND ACETAMINOPHEN 1 TABLET: 5; 325 TABLET ORAL at 09:06

## 2018-06-21 RX ADMIN — GABAPENTIN 400 MG: 400 CAPSULE ORAL at 08:06

## 2018-06-21 RX ADMIN — DEXTROSE MONOHYDRATE, SODIUM CHLORIDE, AND POTASSIUM CHLORIDE: 50; 9; 1.49 INJECTION, SOLUTION INTRAVENOUS at 04:06

## 2018-06-21 RX ADMIN — Medication 3 ML: at 05:06

## 2018-06-21 RX ADMIN — OXYCODONE HYDROCHLORIDE AND ACETAMINOPHEN 1 TABLET: 10; 325 TABLET ORAL at 03:06

## 2018-06-21 RX ADMIN — ASPIRIN 81 MG: 81 TABLET, COATED ORAL at 08:06

## 2018-06-21 RX ADMIN — HEPARIN SODIUM 5000 UNITS: 5000 INJECTION, SOLUTION INTRAVENOUS; SUBCUTANEOUS at 05:06

## 2018-06-21 RX ADMIN — GABAPENTIN 400 MG: 400 CAPSULE ORAL at 02:06

## 2018-06-21 RX ADMIN — Medication 3 ML: at 09:06

## 2018-06-21 RX ADMIN — HEPARIN SODIUM 5000 UNITS: 5000 INJECTION, SOLUTION INTRAVENOUS; SUBCUTANEOUS at 09:06

## 2018-06-21 RX ADMIN — Medication 3 ML: at 02:06

## 2018-06-21 NOTE — PROGRESS NOTES
[facility]  General Surgery  Progress Note    Subjective:     History of Present Illness:  No notes on file    Post-Op Info:  Procedure(s) (LRB):  REPAIR, HERNIA, VENTRAL, INCARCERATED, WITHOUT HISTORY OF PRIOR REPAIR (N/A)   1 Day Post-Op     Interval History: No acute events overnight. Pain well controlled. Does not like PCA. Passed very small amount of flatus.    Medications:  Continuous Infusions:   dextrose 5 % and 0.9 % NaCl with KCl 20 mEq 125 mL/hr at 06/21/18 0436    morphine       Scheduled Meds:   amLODIPine  10 mg Oral Daily    aspirin  81 mg Oral Daily    cefTRIAXone (ROCEPHIN) IVPB  1 g Intravenous Q24H    gabapentin  400 mg Oral TID    heparin (porcine)  5,000 Units Subcutaneous Q8H    levothyroxine  50 mcg Oral Before breakfast    sodium chloride 0.9%  3 mL Intravenous Q8H     PRN Meds:naloxone, ondansetron     Review of patient's allergies indicates:  No Known Allergies  Objective:     Vital Signs (Most Recent):  Temp: 97.3 °F (36.3 °C) (06/21/18 0600)  Pulse: 78 (06/21/18 0600)  Resp: 18 (06/21/18 0600)  BP: 138/69 (06/21/18 0600)  SpO2: 95 % (06/21/18 0600) Vital Signs (24h Range):  Temp:  [95.5 °F (35.3 °C)-99.1 °F (37.3 °C)] 97.3 °F (36.3 °C)  Pulse:  [78-95] 78  Resp:  [18-36] 18  SpO2:  [84 %-98 %] 95 %  BP: (114-178)/(58-91) 138/69     Weight: 91.2 kg (201 lb 1 oz)  Body mass index is 30.13 kg/m².    Intake/Output - Last 3 Shifts       06/19 0700 - 06/20 0659 06/20 0700 - 06/21 0659 06/21 0700 - 06/22 0659    I.V. (mL/kg)  1221.2 (13.4)     Total Intake(mL/kg)  1221.2 (13.4)     Urine (mL/kg/hr) 2160 1050 (0.5)     Other  255 (0.1)     Blood  50 (0)     Total Output 2160 1355      Net -2160 -133.8                   Physical Exam   Constitutional: He appears well-developed and well-nourished. No distress.   Cardiovascular: Normal rate and regular rhythm.    Pulmonary/Chest: No respiratory distress.   Abdominal: Soft. He exhibits no distension. There is tenderness  (expected post op tenderness. Dressings are clean, dry and intact.). There is no rebound and no guarding.       Significant Labs:  CBC:   Recent Labs  Lab 06/21/18  0508   WBC 9.42   RBC 4.14*   HGB 11.7*   HCT 35.0*      MCV 85   MCH 28.3   MCHC 33.4     CMP:   Recent Labs  Lab 06/19/18  1910 06/21/18  0508    101   CALCIUM 8.4* 8.1*   ALBUMIN 3.8  --    PROT 7.5  --    * 136   K 2.9* 3.6   CO2 30* 27   CL 88* 99   BUN 17 14   CREATININE 1.3 1.4   ALKPHOS 84  --    ALT 7*  --    AST 17  --    BILITOT 0.6  --      Assessment/Plan:     Small bowel obstruction    85 yo male POD1 s/p ventral hernia repair of incarcerated hernia    Plan:  - PO pain  - Clears sparingly  - PT/OT  - DVT prophylaxis  - MIVF  - Replace lytes PRN              Giselle Osborne MD  General Surgery  [facility]

## 2018-06-21 NOTE — PLAN OF CARE
Problem: Physical Therapy Goal  Goal: Physical Therapy Goal  Goals to be met by: 2018     Patient will increase functional independence with mobility by performin. Supine to sit with Stand-by Assistance  2. Sit to supine with Stand-by Assistance  3. Sit to stand transfer with Stand-by Assistance  4. Bed to chair transfer with Stand-by Assistance using Standard Walker  5. Gait  x 75 feet with Stand-by Assistance using Standard Walker.   6. Lower extremity exercise program x15 reps per handout, with supervision    Outcome: Ongoing (interventions implemented as appropriate)  Goals set

## 2018-06-21 NOTE — ANESTHESIA POSTPROCEDURE EVALUATION
"Anesthesia Post Evaluation    Patient: Chucho Prado    Procedure(s) Performed: Procedure(s) (LRB):  REPAIR, HERNIA, VENTRAL, INCARCERATED, WITHOUT HISTORY OF PRIOR REPAIR (N/A)    Final Anesthesia Type: general  Patient location during evaluation: PACU  Patient participation: Yes- Able to Participate  Level of consciousness: awake and alert and oriented  Post-procedure vital signs: reviewed and stable  Pain management: adequate  Airway patency: patent  PONV status at discharge: No PONV  Anesthetic complications: no      Cardiovascular status: stable  Respiratory status: unassisted  Hydration status: euvolemic  Follow-up not needed.        Visit Vitals  /73   Pulse 79   Temp 35.6 °C (96 °F)   Resp 20   Ht 5' 8.5" (1.74 m)   Wt 91.2 kg (201 lb 1 oz)   SpO2 96%   BMI 30.13 kg/m²       Pain/Nathaniel Score: Pain Assessment Performed: Yes (6/20/2018  6:00 PM)  Presence of Pain: complains of pain/discomfort (6/20/2018  6:00 PM)  Pain Rating Prior to Med Admin: 10 (6/20/2018 12:03 PM)  Pain Rating Post Med Admin: 6 (6/20/2018  1:00 PM)  Nathaniel Score: 9 (6/20/2018  1:00 PM)      "

## 2018-06-21 NOTE — PT/OT/SLP EVAL
Physical Therapy Evaluation    Patient Name:  Chucho Prado   MRN:  [id number]    Recommendations:     Discharge Recommendations:  nursing facility, skilled   Discharge Equipment Recommendations: none   Barriers to discharge: None    Assessment:     Chucho Prado is a 84 y.o. male admitted with a medical diagnosis of Urinary tract infection associated with cystostomy catheter.  He presents with the following impairments/functional limitations:  weakness, impaired endurance, impaired functional mobilty, gait instability, impaired balance, pain, decreased lower extremity function, decreased upper extremity function, impaired self care skills Pt. cooperative and tolerated treatment well, but fatigues quickly in standing.    Rehab Prognosis:  fair; patient would benefit from acute skilled PT services to address these deficits and reach maximum level of function.      Recent Surgery: Procedure(s) (LRB):  REPAIR, HERNIA, VENTRAL, INCARCERATED, WITHOUT HISTORY OF PRIOR REPAIR (N/A) 1 Day Post-Op    Plan:     During this hospitalization, patient to be seen 5 x/week to address the above listed problems via gait training, therapeutic activities, therapeutic exercises  · Plan of Care Expires:  07/21/18   Plan of Care Reviewed with: patient    Subjective     Communicated with nursing prior to session.  Patient found supine upon PT entry to room, agreeable to evaluation.      Chief Complaint: abd. discomfort and sinus HA  Patient comments/goals: to move better  Pain/Comfort:  · Pain Rating 1: 4/10  · Location - Orientation 1: generalized  · Location 1: abdomen  · Pain Addressed 1: Pre-medicate for activity, Reposition, Cessation of Activity  · Pain Rating Post-Intervention 1: 4/10    Patients cultural, spiritual, Sabianism conflicts given the current situation: no    Living Environment:  Pt. Lives alone in apartment with elevator access, but has hired caregivers to assist him during the day.  Prior to admission,  patients level of function was mod. indep.  Patient has the following equipment: wheelchair, walker, rolling, walker, standard, bedside commode, shower chair.  Upon discharge, patient will have assistance from neighbor and hired caregivers.    Objective:     Patient found with: oxygen, peripheral IV, SCD     General Precautions: Standard, fall   Orthopedic Precautions:N/A   Braces:       Exams:  · RUE ROM: WFL  · RUE Strength: WFL  · LUE ROM: WFL  · LUE Strength: WFL  · RLE ROM: WFL  · RLE Strength: WFL  · LLE ROM: WFL  · LLE Strength: WFL    Functional Mobility:  · Bed Mobility:     · Rolling Left:  minimum assistance  · Scooting: minimum assistance  · Supine to Sit: moderate assistance  · Sit to Supine: moderate assistance  · Transfers:     · Sit to Stand:  minimum assistance and moderate assistance with standard walker  · Gait: 2 small side steps and 2 steps forward/backward with SW and Mod A  · Balance: fair sitting and poor standing    AM-PAC 6 CLICK MOBILITY  Total Score:13       Therapeutic Activities and Exercises:   Pt. Performed sit<->stand x3 trials with SW with Min-Mod A. Discussed therapy needs, goals, and POC.    Patient left supine with all lines intact and call button in reach.    GOALS:    Physical Therapy Goals        Problem: Physical Therapy Goal    Goal Priority Disciplines Outcome Goal Variances Interventions   Physical Therapy Goal     PT/OT, PT Ongoing (interventions implemented as appropriate)     Description:  Goals to be met by: 2018     Patient will increase functional independence with mobility by performin. Supine to sit with Stand-by Assistance  2. Sit to supine with Stand-by Assistance  3. Sit to stand transfer with Stand-by Assistance  4. Bed to chair transfer with Stand-by Assistance using Standard Walker  5. Gait  x 75 feet with Stand-by Assistance using Standard Walker.   6. Lower extremity exercise program x15 reps per handout, with supervision                       History:     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    GERD (gastroesophageal reflux disease)     Hypertension     Thyroid disease     UTI (urinary tract infection)        Past Surgical History:   Procedure Laterality Date    CHOLECYSTECTOMY      EYE SURGERY Right     IOL    HERNIA REPAIR      INTRAOCULAR PROSTHESES INSERTION Left 5/28/2018    Procedure: INSERTION-INTRAOCULAR LENS (IOL);  Surgeon: Trinity Vazquez MD;  Location: [facility];  Service: Ophthalmology;  Laterality: Left;    JOINT REPLACEMENT      LAMINECTOMY  12/27/2016    L2-L4    LUMBAR LAMINECTOMY  12/2016    PARATHYROIDECTOMY      PHACOEMULSIFICATION OF CATARACT Left 5/28/2018    Procedure: PHACOEMULSIFICATION-ASPIRATION-CATARACT;  Surgeon: Trinity Vazquez MD;  Location: [facility];  Service: Ophthalmology;  Laterality: Left;    REPAIR OF INCARCERATED VENTRAL HERNIA WITHOUT HISTORY OF PRIOR REPAIR N/A 6/20/2018    Procedure: REPAIR, HERNIA, VENTRAL, INCARCERATED, WITHOUT HISTORY OF PRIOR REPAIR;  Surgeon: Guilherme Ordoñez MD;  Location: [address];  Service: General;  Laterality: N/A;    TOTAL KNEE ARTHROPLASTY Bilateral     TOTAL KNEE ARTHROPLASTY Left 10/25/2017    TKR       Clinical Decision Making:     History  Co-morbidities and personal factors that may impact the plan of care Examination  Body Structures and Functions, activity limitations and participation restrictions that may impact the plan of care Clinical Presentation   Decision Making/ Complexity Score   Co-morbidities:   [] Time since onset of injury / illness / exacerbation  [] Status of current  condition  []Patient's cognitive status and safety concerns    [] Multiple Medical Problems (see med hx)  Personal Factors:   [] Patient's age  [] Prior Level of function   [] Patient's home situation (environment and family support)  [] Patient's level of motivation  [] Expected progression of patient      HISTORY:(criteria)    [] 37593 - no personal factors/history    [] 20426 - has 1-2 personal factor/comorbidity     [] 73215 - has >3 personal factor/comorbidity     Body Regions:  [] Objective examination findings  [] Head     []  Neck  [] Trunk   [] Upper Extremity  [] Lower Extremity    Body Systems:  [] For communication ability, affect, cognition, language, and learning style: the assessment of the ability to make needs known, consciousness, orientation (person, place, and time), expected emotional /behavioral responses, and learning preferences (eg, learning barriers, education  needs)  [] For the neuromuscular system: a general assessment of gross coordinated movement (eg, balance, gait, locomotion, transfers, and transitions) and motor function  (motor control and motor learning)  [] For the musculoskeletal system: the assessment of gross symmetry, gross range of motion, gross strength, height, and weight  [] For the integumentary system: the assessment of pliability(texture), presence of scar formation, skin color, and skin integrity  [] For cardiovascular/pulmonary system: the assessment of heart rate, respiratory rate, blood pressure, and edema     Activity limitations:    [] Patient's cognitive status and saf ety concerns          [] Status of current condition      [] Weight bearing restriction  [] Cardiopulmunary Restriction    Participation Restrictions:   [] Goals and goal agreement with the patient     [] Rehab potential (prognosis) and probable outcome      Examination of Body System: (criteria)    [] 60007 - addressing 1-2 elements    [] 50373 - addressing a total of 3 or more elements     []  75599 -  Addressing a total of 4 or more elements         Clinical Presentation: (criteria)  Choose one     On examination of body system using standardized tests and measures patient presents with (CHOOSE ONE) elements from any of the following: body structures and functions, activity limitations, and/or participation restrictions.  Leading to a clinical presentation that is considered (CHOOSE ONE)                              Clinical Decision Making  (Eval Complexity):  Choose One     Time Tracking:     PT Received On: 06/21/18  PT Start Time: 1418     PT Stop Time: 1450  PT Total Time (min): 32 min     Billable Minutes: Evaluation 22 and Therapeutic Activity 10      Liban Copeland, PT  06/21/2018

## 2018-06-21 NOTE — SUBJECTIVE & OBJECTIVE
Interval History: No acute events overnight. Pain well controlled. Does not like PCA. Passed very small amount of flatus.    Medications:  Continuous Infusions:   dextrose 5 % and 0.9 % NaCl with KCl 20 mEq 125 mL/hr at 06/21/18 0436    morphine       Scheduled Meds:   amLODIPine  10 mg Oral Daily    aspirin  81 mg Oral Daily    cefTRIAXone (ROCEPHIN) IVPB  1 g Intravenous Q24H    gabapentin  400 mg Oral TID    heparin (porcine)  5,000 Units Subcutaneous Q8H    levothyroxine  50 mcg Oral Before breakfast    sodium chloride 0.9%  3 mL Intravenous Q8H     PRN Meds:naloxone, ondansetron     Review of patient's allergies indicates:  No Known Allergies  Objective:     Vital Signs (Most Recent):  Temp: 97.3 °F (36.3 °C) (06/21/18 0600)  Pulse: 78 (06/21/18 0600)  Resp: 18 (06/21/18 0600)  BP: 138/69 (06/21/18 0600)  SpO2: 95 % (06/21/18 0600) Vital Signs (24h Range):  Temp:  [95.5 °F (35.3 °C)-99.1 °F (37.3 °C)] 97.3 °F (36.3 °C)  Pulse:  [78-95] 78  Resp:  [18-36] 18  SpO2:  [84 %-98 %] 95 %  BP: (114-178)/(58-91) 138/69     Weight: 91.2 kg (201 lb 1 oz)  Body mass index is 30.13 kg/m².    Intake/Output - Last 3 Shifts       06/19 0700 - 06/20 0659 06/20 0700 - 06/21 0659 06/21 0700 - 06/22 0659    I.V. (mL/kg)  1221.2 (13.4)     Total Intake(mL/kg)  1221.2 (13.4)     Urine (mL/kg/hr) 2160 1050 (0.5)     Other  255 (0.1)     Blood  50 (0)     Total Output 2160 1355      Net -2160 -133.8                   Physical Exam   Constitutional: He appears well-developed and well-nourished. No distress.   Cardiovascular: Normal rate and regular rhythm.    Pulmonary/Chest: No respiratory distress.   Abdominal: Soft. He exhibits no distension. There is tenderness (expected post op tenderness. Dressings are clean, dry and intact.). There is no rebound and no guarding.       Significant Labs:  CBC:   Recent Labs  Lab 06/21/18  0508   WBC 9.42   RBC 4.14*   HGB 11.7*   HCT 35.0*      MCV 85   MCH 28.3   MCHC 33.4      CMP:   Recent Labs  Lab 06/19/18  1910 06/21/18  0508    101   CALCIUM 8.4* 8.1*   ALBUMIN 3.8  --    PROT 7.5  --    * 136   K 2.9* 3.6   CO2 30* 27   CL 88* 99   BUN 17 14   CREATININE 1.3 1.4   ALKPHOS 84  --    ALT 7*  --    AST 17  --    BILITOT 0.6  --

## 2018-06-22 LAB
ANION GAP SERPL CALC-SCNC: 11 MMOL/L
BASOPHILS # BLD AUTO: 0.02 K/UL
BASOPHILS NFR BLD: 0.3 %
BUN SERPL-MCNC: 17 MG/DL
CALCIUM SERPL-MCNC: 8.6 MG/DL
CHLORIDE SERPL-SCNC: 101 MMOL/L
CO2 SERPL-SCNC: 24 MMOL/L
CREAT SERPL-MCNC: 1.8 MG/DL
DIFFERENTIAL METHOD: ABNORMAL
EOSINOPHIL # BLD AUTO: 0.1 K/UL
EOSINOPHIL NFR BLD: 1.5 %
ERYTHROCYTE [DISTWIDTH] IN BLOOD BY AUTOMATED COUNT: 15.7 %
EST. GFR  (AFRICAN AMERICAN): 39.1 ML/MIN/1.73 M^2
EST. GFR  (NON AFRICAN AMERICAN): 33.8 ML/MIN/1.73 M^2
GLUCOSE SERPL-MCNC: 117 MG/DL
HCT VFR BLD AUTO: 36.3 %
HGB BLD-MCNC: 11.8 G/DL
IMM GRANULOCYTES # BLD AUTO: 0.02 K/UL
IMM GRANULOCYTES NFR BLD AUTO: 0.3 %
LYMPHOCYTES # BLD AUTO: 0.8 K/UL
LYMPHOCYTES NFR BLD: 10.4 %
MAGNESIUM SERPL-MCNC: 2.2 MG/DL
MCH RBC QN AUTO: 28.5 PG
MCHC RBC AUTO-ENTMCNC: 32.5 G/DL
MCV RBC AUTO: 88 FL
MONOCYTES # BLD AUTO: 0.7 K/UL
MONOCYTES NFR BLD: 9.3 %
NEUTROPHILS # BLD AUTO: 6.2 K/UL
NEUTROPHILS NFR BLD: 78.2 %
NRBC BLD-RTO: 0 /100 WBC
PHOSPHATE SERPL-MCNC: 4 MG/DL
PLATELET # BLD AUTO: 245 K/UL
PMV BLD AUTO: 9.4 FL
POTASSIUM SERPL-SCNC: 4.2 MMOL/L
RBC # BLD AUTO: 4.14 M/UL
SODIUM SERPL-SCNC: 136 MMOL/L
WBC # BLD AUTO: 7.92 K/UL

## 2018-06-22 PROCEDURE — 36415 COLL VENOUS BLD VENIPUNCTURE: CPT

## 2018-06-22 PROCEDURE — 99900035 HC TECH TIME PER 15 MIN (STAT)

## 2018-06-22 PROCEDURE — 84100 ASSAY OF PHOSPHORUS: CPT

## 2018-06-22 PROCEDURE — 80048 BASIC METABOLIC PNL TOTAL CA: CPT

## 2018-06-22 PROCEDURE — 94799 UNLISTED PULMONARY SVC/PX: CPT

## 2018-06-22 PROCEDURE — 97165 OT EVAL LOW COMPLEX 30 MIN: CPT

## 2018-06-22 PROCEDURE — 85025 COMPLETE CBC W/AUTO DIFF WBC: CPT

## 2018-06-22 PROCEDURE — 25000003 PHARM REV CODE 250: Performed by: STUDENT IN AN ORGANIZED HEALTH CARE EDUCATION/TRAINING PROGRAM

## 2018-06-22 PROCEDURE — 94664 DEMO&/EVAL PT USE INHALER: CPT

## 2018-06-22 PROCEDURE — 63600175 PHARM REV CODE 636 W HCPCS: Performed by: STUDENT IN AN ORGANIZED HEALTH CARE EDUCATION/TRAINING PROGRAM

## 2018-06-22 PROCEDURE — 94761 N-INVAS EAR/PLS OXIMETRY MLT: CPT

## 2018-06-22 PROCEDURE — 94640 AIRWAY INHALATION TREATMENT: CPT

## 2018-06-22 PROCEDURE — 27000646 HC AEROBIKA DEVICE

## 2018-06-22 PROCEDURE — 25000242 PHARM REV CODE 250 ALT 637 W/ HCPCS: Performed by: STUDENT IN AN ORGANIZED HEALTH CARE EDUCATION/TRAINING PROGRAM

## 2018-06-22 PROCEDURE — A4216 STERILE WATER/SALINE, 10 ML: HCPCS | Performed by: SURGERY

## 2018-06-22 PROCEDURE — 25000003 PHARM REV CODE 250: Performed by: SURGERY

## 2018-06-22 PROCEDURE — 63600175 PHARM REV CODE 636 W HCPCS: Performed by: SURGERY

## 2018-06-22 PROCEDURE — 27000221 HC OXYGEN, UP TO 24 HOURS

## 2018-06-22 PROCEDURE — 12000002 HC ACUTE/MED SURGE SEMI-PRIVATE ROOM

## 2018-06-22 PROCEDURE — 83735 ASSAY OF MAGNESIUM: CPT

## 2018-06-22 RX ORDER — IPRATROPIUM BROMIDE AND ALBUTEROL SULFATE 2.5; .5 MG/3ML; MG/3ML
3 SOLUTION RESPIRATORY (INHALATION) EVERY 4 HOURS
Status: DISCONTINUED | OUTPATIENT
Start: 2018-06-22 | End: 2018-07-05 | Stop reason: HOSPADM

## 2018-06-22 RX ORDER — MORPHINE SULFATE 4 MG/ML
2 INJECTION, SOLUTION INTRAMUSCULAR; INTRAVENOUS EVERY 4 HOURS PRN
Status: DISCONTINUED | OUTPATIENT
Start: 2018-06-22 | End: 2018-06-27

## 2018-06-22 RX ORDER — IPRATROPIUM BROMIDE AND ALBUTEROL SULFATE 2.5; .5 MG/3ML; MG/3ML
3 SOLUTION RESPIRATORY (INHALATION) EVERY 4 HOURS PRN
Status: DISCONTINUED | OUTPATIENT
Start: 2018-06-22 | End: 2018-06-22

## 2018-06-22 RX ADMIN — LEVOTHYROXINE SODIUM 50 MCG: 50 TABLET ORAL at 05:06

## 2018-06-22 RX ADMIN — SODIUM CHLORIDE 500 ML: 0.9 INJECTION, SOLUTION INTRAVENOUS at 09:06

## 2018-06-22 RX ADMIN — GABAPENTIN 400 MG: 400 CAPSULE ORAL at 09:06

## 2018-06-22 RX ADMIN — Medication 3 ML: at 06:06

## 2018-06-22 RX ADMIN — HEPARIN SODIUM 5000 UNITS: 5000 INJECTION, SOLUTION INTRAVENOUS; SUBCUTANEOUS at 05:06

## 2018-06-22 RX ADMIN — AMLODIPINE BESYLATE 10 MG: 10 TABLET ORAL at 09:06

## 2018-06-22 RX ADMIN — IPRATROPIUM BROMIDE AND ALBUTEROL SULFATE 3 ML: .5; 3 SOLUTION RESPIRATORY (INHALATION) at 07:06

## 2018-06-22 RX ADMIN — HEPARIN SODIUM 5000 UNITS: 5000 INJECTION, SOLUTION INTRAVENOUS; SUBCUTANEOUS at 10:06

## 2018-06-22 RX ADMIN — IPRATROPIUM BROMIDE AND ALBUTEROL SULFATE 3 ML: .5; 3 SOLUTION RESPIRATORY (INHALATION) at 03:06

## 2018-06-22 RX ADMIN — ASPIRIN 81 MG: 81 TABLET, COATED ORAL at 09:06

## 2018-06-22 RX ADMIN — MORPHINE SULFATE 2 MG: 4 INJECTION INTRAVENOUS at 06:06

## 2018-06-22 RX ADMIN — Medication 3 ML: at 10:06

## 2018-06-22 RX ADMIN — CEFTRIAXONE SODIUM 1 G: 1 INJECTION, POWDER, FOR SOLUTION INTRAMUSCULAR; INTRAVENOUS at 05:06

## 2018-06-22 RX ADMIN — Medication 3 ML: at 02:06

## 2018-06-22 RX ADMIN — GABAPENTIN 400 MG: 400 CAPSULE ORAL at 03:06

## 2018-06-22 RX ADMIN — IPRATROPIUM BROMIDE AND ALBUTEROL SULFATE 3 ML: .5; 3 SOLUTION RESPIRATORY (INHALATION) at 12:06

## 2018-06-22 RX ADMIN — HEPARIN SODIUM 5000 UNITS: 5000 INJECTION, SOLUTION INTRAVENOUS; SUBCUTANEOUS at 02:06

## 2018-06-22 RX ADMIN — IPRATROPIUM BROMIDE AND ALBUTEROL SULFATE 3 ML: .5; 3 SOLUTION RESPIRATORY (INHALATION) at 08:06

## 2018-06-22 NOTE — PLAN OF CARE
Problem: Patient Care Overview  Goal: Plan of Care Review  Outcome: Ongoing (interventions implemented as appropriate)  Patient resting in bed comfortably. Resp even and unlabored. Bed lock in lowest position. Patient instructed to call for assistance. Skin integrity maintained. Complains of pain managed with PRN meds. No other complaints or concerns. Will monitor and follow plan of care.

## 2018-06-22 NOTE — PT/OT/SLP EVAL
Occupational Therapy   Evaluation    Name: Chucho Prado  MRN: [id number]  Admitting Diagnosis:  Urinary tract infection associated with cystostomy catheter 2 Days Post-Op    Recommendations:     Discharge Recommendations: nursing facility, skilled  Discharge Equipment Recommendations:  none  Barriers to discharge:  None    History:     Occupational Profile:  Living Environment: Pt lives in a h   Previous level of function: Pt lives in an apt w/ elevator access. Pt has caregivers 17 hrs a tatum 7 days a week.   Roles and Routines: N/A  Equipment Owned:  wheelchair, walker, rolling, walker, standard, bedside commode, shower chair  Assistance upon Discharge: Pt has assistance upon D/C.     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    GERD (gastroesophageal reflux disease)     Hypertension     Thyroid disease     UTI (urinary tract infection)        Past Surgical History:   Procedure Laterality Date    CHOLECYSTECTOMY      EYE SURGERY Right     IOL    HERNIA REPAIR      INTRAOCULAR PROSTHESES INSERTION Left 5/28/2018    Procedure: INSERTION-INTRAOCULAR LENS (IOL);  Surgeon: Trinity Vazquez MD;  [facility];  Service: Ophthalmology;  Laterality: Left;    JOINT REPLACEMENT      LAMINECTOMY  12/27/2016    L2-L4    LUMBAR LAMINECTOMY  12/2016    PARATHYROIDECTOMY      PHACOEMULSIFICATION OF CATARACT Left 5/28/2018    Procedure: PHACOEMULSIFICATION-ASPIRATION-CATARACT;  Surgeon: Trinity Vazquez MD;  [facility];  Service: Ophthalmology;  Laterality: Left;    REPAIR OF INCARCERATED VENTRAL HERNIA WITHOUT HISTORY OF PRIOR REPAIR N/A 6/20/2018    Procedure:  REPAIR, HERNIA, VENTRAL, INCARCERATED, WITHOUT HISTORY OF PRIOR REPAIR;  Surgeon: Guilherme Ordoñez MD;  Location: [facility] OR 2ND FLR;  Service: General;  Laterality: N/A;    TOTAL KNEE ARTHROPLASTY Bilateral     TOTAL KNEE ARTHROPLASTY Left 10/25/2017    TKR       Subjective     Chief Complaint: abdominal pain   Patient/Family stated goals: return home   Communicated with: RN prior to session.  Pain/Comfort:  · Pain Rating 1:  (Did not rate)  · Location - Side 1: Bilateral  · Location - Orientation 1: generalized  · Location 1: abdomen  · Pain Addressed 1: Pre-medicate for activity, Reposition, Distraction, Cessation of Activity  · Pain Rating Post-Intervention 1:  (did not rate)    Patients cultural, spiritual, Alevism conflicts given the current situation:      Objective:     Patient found with: oxygen, peripheral IV, SCD    General Precautions: Standard, fall   Orthopedic Precautions:N/A   Braces: N/A     Occupational Performance:    Bed Mobility:    · Patient completed Rolling/Turning to Left with  maximal assistance  · Patient completed Rolling/Turning to Right with maximal assistance  · Patient completed Scooting/Bridging with maximal assistance  · Patient completed Supine to Sit with maximal assistance  · Patient completed Sit to Supine with maximal assistance    Functional Mobility/Transfers:  · Pt refused on this date 2* pain     Activities of Daily Living:  · LB Dressing: maximal assistance donned/doffed socks EOB    Cognitive/Visual Perceptual:  Cognitive/Psychosocial Skills:     -       Oriented to: Person, Place, Time and Situation   -       Follows Commands/attention:Follows multistep  commands  -       Communication: clear/fluent  -       Memory: No Deficits noted  -       Safety awareness/insight to disability: impaired   -       Mood/Affect/Coping skills/emotional control: Anxious  Visual/Perceptual:      -Intact    Physical Exam:  Balance:    -       Poor overall balance   Postural  "examination/scapula alignment:    -       Rounded shoulders  -       Forward head  Skin integrity: Visible skin intact  Upper Extremity Range of Motion:     -       Right Upper Extremity: WFL  -       Left Upper Extremity: WFL  Upper Extremity Strength:    -       Right Upper Extremity: WFL  -       Left Upper Extremity: WFL   Strength:    -       Right Upper Extremity: WFL  -       Left Upper Extremity: WFL  Fine Motor Coordination:    -       Intact  Gross motor coordination:   WFL    Patient left HOB elevated with all lines intact, call button in reach and caregiver and RN present    [facility] 6 Click:  [facility] Total Score: 14    Treatment & Education:  Pt educated on POC.  Education:    Assessment:     Chucho Prado is a 84 y.o. male with a medical diagnosis of Urinary tract infection associated with cystostomy catheter.  He presents with impairments listed below. Pt would benefit from skilled OT services to improve independence and overall occupational functioning.      Performance deficits affecting function are weakness, impaired endurance, impaired self care skills, impaired functional mobilty, gait instability, impaired balance, decreased coordination, decreased upper extremity function, decreased lower extremity function, impaired cardiopulmonary response to activity, decreased safety awareness.      Rehab Prognosis:  good; patient would benefit from acute skilled OT services to address these deficits and reach maximum level of function.         Clinical Decision Makin.  OT Low:  "Pt evaluation falls under low complexity for evaluation coding due to performance deficits noted in 1-3 areas as stated above and 0 co-morbities affecting current functional status. Data obtained from problem focused assessments. No modifications or assistance was required for completion of evaluation. Only brief occupational profile and history review completed."     Plan:     Patient to be seen 4 x/week to address " the above listed problems via self-care/home management, therapeutic activities, therapeutic exercises, neuromuscular re-education  · Plan of Care Expires: 07/22/18  · Plan of Care Reviewed with: patient    This Plan of care has been discussed with the patient who was involved in its development and understands and is in agreement with the identified goals and treatment plan    GOALS:    Occupational Therapy Goals        Problem: Occupational Therapy Goal    Goal Priority Disciplines Outcome Interventions   Occupational Therapy Goal     OT, PT/OT     Description:  Goals to be met by: 6/28/2018     Patient will increase functional independence with ADLs by performing:    UE Dressing with Moderate Assistance.  LE Dressing with Moderate Assistance.  Grooming while seated with Minimal Assistance.  Toileting from bedside commode with Maximum Assistance for hygiene and clothing management.   Toilet transfer to bedside commode with Moderate Assistance.                      Time Tracking:     OT Date of Treatment: 06/22/18  OT Start Time: 1351  OT Stop Time: 1402  OT Total Time (min): 11 min    Billable Minutes:Evaluation 11 minutes    Marino Ruano OT  6/22/2018

## 2018-06-22 NOTE — SUBJECTIVE & OBJECTIVE
Interval History: No acute events overnight. Feels distended and nauseated this morning.     Medications:  Continuous Infusions:   dextrose 5 % and 0.9 % NaCl with KCl 20 mEq 75 mL/hr at 06/21/18 0857     Scheduled Meds:   amLODIPine  10 mg Oral Daily    aspirin  81 mg Oral Daily    cefTRIAXone (ROCEPHIN) IVPB  1 g Intravenous Q24H    gabapentin  400 mg Oral TID    heparin (porcine)  5,000 Units Subcutaneous Q8H    levothyroxine  50 mcg Oral Before breakfast    sodium chloride 0.9%  3 mL Intravenous Q8H     PRN Meds:albuterol-ipratropium, ondansetron, oxyCODONE-acetaminophen, oxyCODONE-acetaminophen     Review of patient's allergies indicates:  No Known Allergies  Objective:     Vital Signs (Most Recent):  Temp: 97.9 °F (36.6 °C) (06/22/18 0821)  Pulse: 102 (06/22/18 0821)  Resp: 18 (06/22/18 0821)  BP: (!) 141/74 (06/22/18 0821)  SpO2: (!) 90 % (06/22/18 0821) Vital Signs (24h Range):  Temp:  [97.7 °F (36.5 °C)-100.8 °F (38.2 °C)] 97.9 °F (36.6 °C)  Pulse:  [] 102  Resp:  [17-22] 18  SpO2:  [90 %-97 %] 90 %  BP: (113-150)/(55-74) 141/74     Weight: 91.2 kg (201 lb 1 oz)  Body mass index is 30.13 kg/m².    Intake/Output - Last 3 Shifts       06/20 0700 - 06/21 0659 06/21 0700 - 06/22 0659 06/22 0700 - 06/23 0659    I.V. (mL/kg) 1221.2 (13.4) 547 (6)     Total Intake(mL/kg) 1221.2 (13.4) 547 (6)     Urine (mL/kg/hr) 1050 (0.5) 1050 (0.5)     Other 255 (0.1) 100 (0)     Blood 50 (0)      Total Output 1355 1150      Net -133.8 -603                   Physical Exam   Constitutional: He appears well-developed and well-nourished. No distress.   Cardiovascular: Normal rate and regular rhythm.    Pulmonary/Chest: No respiratory distress.   Abdominal: Soft. He exhibits no distension. There is tenderness (expected post op tenderness. Dressings are clean, dry and intact.). There is no rebound and no guarding.       Significant Labs:  CBC:     Recent Labs  Lab 06/22/18  0515   WBC 7.92   RBC 4.14*   HGB 11.8*   HCT  36.3*      MCV 88   MCH 28.5   MCHC 32.5     CMP:   Recent Labs  Lab 06/19/18  1910  06/22/18  0515     < > 117*   CALCIUM 8.4*  < > 8.6*   ALBUMIN 3.8  --   --    PROT 7.5  --   --    *  < > 136   K 2.9*  < > 4.2   CO2 30*  < > 24   CL 88*  < > 101   BUN 17  < > 17   CREATININE 1.3  < > 1.8*   ALKPHOS 84  --   --    ALT 7*  --   --    AST 17  --   --    BILITOT 0.6  --   --    < > = values in this interval not displayed.

## 2018-06-22 NOTE — PLAN OF CARE
Problem: Occupational Therapy Goal  Goal: Occupational Therapy Goal  Goals to be met by: 6/28/2018     Patient will increase functional independence with ADLs by performing:    UE Dressing with Moderate Assistance.  LE Dressing with Moderate Assistance.  Grooming while seated with Minimal Assistance.  Toileting from bedside commode with Maximum Assistance for hygiene and clothing management.   Toilet transfer to bedside commode with Moderate Assistance.    Initiate OT POC     Comments: Marino Ruano OTR/L  6/22/2018

## 2018-06-22 NOTE — PROGRESS NOTES
[facility]  General Surgery  Progress Note    Subjective:     History of Present Illness:  No notes on file    Post-Op Info:  Procedure(s) (LRB):  REPAIR, HERNIA, VENTRAL, INCARCERATED, WITHOUT HISTORY OF PRIOR REPAIR (N/A)   2 Days Post-Op     Interval History: No acute events overnight. Feels distended and nauseated this morning.     Medications:  Continuous Infusions:   dextrose 5 % and 0.9 % NaCl with KCl 20 mEq 75 mL/hr at 06/21/18 0857     Scheduled Meds:   amLODIPine  10 mg Oral Daily    aspirin  81 mg Oral Daily    cefTRIAXone (ROCEPHIN) IVPB  1 g Intravenous Q24H    gabapentin  400 mg Oral TID    heparin (porcine)  5,000 Units Subcutaneous Q8H    levothyroxine  50 mcg Oral Before breakfast    sodium chloride 0.9%  3 mL Intravenous Q8H     PRN Meds:albuterol-ipratropium, ondansetron, oxyCODONE-acetaminophen, oxyCODONE-acetaminophen     Review of patient's allergies indicates:  No Known Allergies  Objective:     Vital Signs (Most Recent):  Temp: 97.9 °F (36.6 °C) (06/22/18 0821)  Pulse: 102 (06/22/18 0821)  Resp: 18 (06/22/18 0821)  BP: (!) 141/74 (06/22/18 0821)  SpO2: (!) 90 % (06/22/18 0821) Vital Signs (24h Range):  Temp:  [97.7 °F (36.5 °C)-100.8 °F (38.2 °C)] 97.9 °F (36.6 °C)  Pulse:  [] 102  Resp:  [17-22] 18  SpO2:  [90 %-97 %] 90 %  BP: (113-150)/(55-74) 141/74     Weight: 91.2 kg (201 lb 1 oz)  Body mass index is 30.13 kg/m².    Intake/Output - Last 3 Shifts       06/20 0700 - 06/21 0659 06/21 0700 - 06/22 0659 06/22 0700 - 06/23 0659    I.V. (mL/kg) 1221.2 (13.4) 547 (6)     Total Intake(mL/kg) 1221.2 (13.4) 547 (6)     Urine (mL/kg/hr) 1050 (0.5) 1050 (0.5)     Other 255 (0.1) 100 (0)     Blood 50 (0)      Total Output 1355 1150      Net -133.8 -603                   Physical Exam   Constitutional: He appears well-developed and well-nourished. No distress.   Cardiovascular: Normal rate and regular rhythm.    Pulmonary/Chest: No respiratory distress.   Abdominal:  Soft. He exhibits no distension. There is tenderness (expected post op tenderness. Dressings are clean, dry and intact.). There is no rebound and no guarding.       Significant Labs:  CBC:     Recent Labs  Lab 06/22/18  0515   WBC 7.92   RBC 4.14*   HGB 11.8*   HCT 36.3*      MCV 88   MCH 28.5   MCHC 32.5     CMP:   Recent Labs  Lab 06/19/18  1910  06/22/18  0515     < > 117*   CALCIUM 8.4*  < > 8.6*   ALBUMIN 3.8  --   --    PROT 7.5  --   --    *  < > 136   K 2.9*  < > 4.2   CO2 30*  < > 24   CL 88*  < > 101   BUN 17  < > 17   CREATININE 1.3  < > 1.8*   ALKPHOS 84  --   --    ALT 7*  --   --    AST 17  --   --    BILITOT 0.6  --   --    < > = values in this interval not displayed.    Assessment/Plan:     Small bowel obstruction    85 yo male POD1 s/p ventral hernia repair of incarcerated hernia    Plan:  - PO pain  - NPO  - PT/OT  - DVT prophylaxis  - MIVF  - Replace lytes PRN  - KUB  - CXR  - Will schedule beryloneangelito Osborne MD  General Surgery  [facility]

## 2018-06-22 NOTE — PLAN OF CARE
11:45 AM Visited patient. AAOX4. His personal sitter is at [facility]. Discussed w/him PT/OT recs for safe discharge home;Asked him if he would like to be discharged to SNF, facility where he would go to be taken care of, have daily IP PT/OT to gain more strength before he would go home vs resume HH services, w/[facility], & have PT/OT added for 2-3 visits/week & resume his personal sitters as before? He verbalized his understanding. He states he wants to be discharged home resuming HH& using his personal sitters.     1:15 PM Updated TAYLER Dejesus, on above.

## 2018-06-23 LAB
ANION GAP SERPL CALC-SCNC: 8 MMOL/L
BASOPHILS # BLD AUTO: 0.03 K/UL
BASOPHILS NFR BLD: 0.4 %
BUN SERPL-MCNC: 19 MG/DL
CALCIUM SERPL-MCNC: 8.6 MG/DL
CHLORIDE SERPL-SCNC: 105 MMOL/L
CO2 SERPL-SCNC: 24 MMOL/L
CREAT SERPL-MCNC: 1.5 MG/DL
DIFFERENTIAL METHOD: ABNORMAL
EOSINOPHIL # BLD AUTO: 0.1 K/UL
EOSINOPHIL NFR BLD: 0.9 %
ERYTHROCYTE [DISTWIDTH] IN BLOOD BY AUTOMATED COUNT: 15.6 %
EST. GFR  (AFRICAN AMERICAN): 48.7 ML/MIN/1.73 M^2
EST. GFR  (NON AFRICAN AMERICAN): 42.1 ML/MIN/1.73 M^2
GLUCOSE SERPL-MCNC: 139 MG/DL
HCT VFR BLD AUTO: 35 %
HGB BLD-MCNC: 11.1 G/DL
IMM GRANULOCYTES # BLD AUTO: 0.03 K/UL
IMM GRANULOCYTES NFR BLD AUTO: 0.4 %
LYMPHOCYTES # BLD AUTO: 0.9 K/UL
LYMPHOCYTES NFR BLD: 11.3 %
MAGNESIUM SERPL-MCNC: 2.1 MG/DL
MCH RBC QN AUTO: 28 PG
MCHC RBC AUTO-ENTMCNC: 31.7 G/DL
MCV RBC AUTO: 88 FL
MONOCYTES # BLD AUTO: 0.8 K/UL
MONOCYTES NFR BLD: 10.1 %
NEUTROPHILS # BLD AUTO: 6.1 K/UL
NEUTROPHILS NFR BLD: 76.9 %
NRBC BLD-RTO: 0 /100 WBC
PHOSPHATE SERPL-MCNC: 3 MG/DL
PLATELET # BLD AUTO: 231 K/UL
PMV BLD AUTO: 9.5 FL
POTASSIUM SERPL-SCNC: 4.1 MMOL/L
RBC # BLD AUTO: 3.96 M/UL
SODIUM SERPL-SCNC: 137 MMOL/L
WBC # BLD AUTO: 7.93 K/UL

## 2018-06-23 PROCEDURE — 97530 THERAPEUTIC ACTIVITIES: CPT

## 2018-06-23 PROCEDURE — 85025 COMPLETE CBC W/AUTO DIFF WBC: CPT

## 2018-06-23 PROCEDURE — 83735 ASSAY OF MAGNESIUM: CPT

## 2018-06-23 PROCEDURE — 84100 ASSAY OF PHOSPHORUS: CPT

## 2018-06-23 PROCEDURE — 97110 THERAPEUTIC EXERCISES: CPT

## 2018-06-23 PROCEDURE — 25000242 PHARM REV CODE 250 ALT 637 W/ HCPCS: Performed by: STUDENT IN AN ORGANIZED HEALTH CARE EDUCATION/TRAINING PROGRAM

## 2018-06-23 PROCEDURE — 25000003 PHARM REV CODE 250: Performed by: STUDENT IN AN ORGANIZED HEALTH CARE EDUCATION/TRAINING PROGRAM

## 2018-06-23 PROCEDURE — 63600175 PHARM REV CODE 636 W HCPCS: Performed by: STUDENT IN AN ORGANIZED HEALTH CARE EDUCATION/TRAINING PROGRAM

## 2018-06-23 PROCEDURE — 27000221 HC OXYGEN, UP TO 24 HOURS

## 2018-06-23 PROCEDURE — 12000002 HC ACUTE/MED SURGE SEMI-PRIVATE ROOM

## 2018-06-23 PROCEDURE — 80048 BASIC METABOLIC PNL TOTAL CA: CPT

## 2018-06-23 PROCEDURE — 36415 COLL VENOUS BLD VENIPUNCTURE: CPT

## 2018-06-23 PROCEDURE — 94761 N-INVAS EAR/PLS OXIMETRY MLT: CPT

## 2018-06-23 PROCEDURE — A4216 STERILE WATER/SALINE, 10 ML: HCPCS | Performed by: SURGERY

## 2018-06-23 PROCEDURE — 27000646 HC AEROBIKA DEVICE

## 2018-06-23 PROCEDURE — 94799 UNLISTED PULMONARY SVC/PX: CPT

## 2018-06-23 PROCEDURE — 25000003 PHARM REV CODE 250: Performed by: SURGERY

## 2018-06-23 PROCEDURE — 99900035 HC TECH TIME PER 15 MIN (STAT)

## 2018-06-23 PROCEDURE — 94640 AIRWAY INHALATION TREATMENT: CPT

## 2018-06-23 PROCEDURE — 63600175 PHARM REV CODE 636 W HCPCS: Performed by: SURGERY

## 2018-06-23 PROCEDURE — 94664 DEMO&/EVAL PT USE INHALER: CPT

## 2018-06-23 PROCEDURE — 94770 HC EXHALED C02 TEST: CPT

## 2018-06-23 RX ADMIN — IPRATROPIUM BROMIDE AND ALBUTEROL SULFATE 3 ML: .5; 3 SOLUTION RESPIRATORY (INHALATION) at 09:06

## 2018-06-23 RX ADMIN — ASPIRIN 81 MG: 81 TABLET, COATED ORAL at 09:06

## 2018-06-23 RX ADMIN — Medication 3 ML: at 02:06

## 2018-06-23 RX ADMIN — HEPARIN SODIUM 5000 UNITS: 5000 INJECTION, SOLUTION INTRAVENOUS; SUBCUTANEOUS at 02:06

## 2018-06-23 RX ADMIN — AMLODIPINE BESYLATE 10 MG: 10 TABLET ORAL at 09:06

## 2018-06-23 RX ADMIN — GABAPENTIN 400 MG: 400 CAPSULE ORAL at 09:06

## 2018-06-23 RX ADMIN — IPRATROPIUM BROMIDE AND ALBUTEROL SULFATE 3 ML: .5; 3 SOLUTION RESPIRATORY (INHALATION) at 07:06

## 2018-06-23 RX ADMIN — CEFTRIAXONE SODIUM 1 G: 1 INJECTION, POWDER, FOR SOLUTION INTRAMUSCULAR; INTRAVENOUS at 06:06

## 2018-06-23 RX ADMIN — IPRATROPIUM BROMIDE AND ALBUTEROL SULFATE 3 ML: .5; 3 SOLUTION RESPIRATORY (INHALATION) at 03:06

## 2018-06-23 RX ADMIN — LEVOTHYROXINE SODIUM 50 MCG: 50 TABLET ORAL at 06:06

## 2018-06-23 RX ADMIN — IPRATROPIUM BROMIDE AND ALBUTEROL SULFATE 3 ML: .5; 3 SOLUTION RESPIRATORY (INHALATION) at 01:06

## 2018-06-23 RX ADMIN — IPRATROPIUM BROMIDE AND ALBUTEROL SULFATE 3 ML: .5; 3 SOLUTION RESPIRATORY (INHALATION) at 12:06

## 2018-06-23 RX ADMIN — MORPHINE SULFATE 2 MG: 4 INJECTION INTRAVENOUS at 11:06

## 2018-06-23 RX ADMIN — GABAPENTIN 400 MG: 400 CAPSULE ORAL at 02:06

## 2018-06-23 RX ADMIN — HEPARIN SODIUM 5000 UNITS: 5000 INJECTION, SOLUTION INTRAVENOUS; SUBCUTANEOUS at 06:06

## 2018-06-23 RX ADMIN — HEPARIN SODIUM 5000 UNITS: 5000 INJECTION, SOLUTION INTRAVENOUS; SUBCUTANEOUS at 09:06

## 2018-06-23 RX ADMIN — Medication 3 ML: at 09:06

## 2018-06-23 NOTE — PLAN OF CARE
Problem: Patient Care Overview  Goal: Plan of Care Review  Outcome: Ongoing (interventions implemented as appropriate)  Patient resting in bed comfortably. Respirations even and unlabored. Bed lock in lowest position. Patient instructed to call for assistance. Skin integrity maintained. Complains of pain managed with PRN meds. No other complaints or concerns. Will monitor and follow plan of care.

## 2018-06-23 NOTE — SUBJECTIVE & OBJECTIVE
Interval History: No acute events overnight. Patient continues to feel distended and this morning, had some nausea overnight. Frustrated about not being able to eat. NGT with 50ml output overnight. Still some wheezing    Medications:  Continuous Infusions:   dextrose 5 % and 0.9 % NaCl with KCl 20 mEq 125 mL/hr at 06/22/18 0934     Scheduled Meds:   albuterol-ipratropium  3 mL Nebulization Q4H    amLODIPine  10 mg Oral Daily    aspirin  81 mg Oral Daily    cefTRIAXone (ROCEPHIN) IVPB  1 g Intravenous Q24H    gabapentin  400 mg Oral TID    heparin (porcine)  5,000 Units Subcutaneous Q8H    levothyroxine  50 mcg Oral Before breakfast    sodium chloride 0.9%  3 mL Intravenous Q8H     PRN Meds:morphine, ondansetron, oxyCODONE-acetaminophen, oxyCODONE-acetaminophen     Review of patient's allergies indicates:  No Known Allergies  Objective:     Vital Signs (Most Recent):  Temp: 97.8 °F (36.6 °C) (06/23/18 0749)  Pulse: 102 (06/23/18 0749)  Resp: 18 (06/23/18 0749)  BP: (!) 171/82 (06/23/18 0749)  SpO2: (!) 93 % (06/23/18 0749) Vital Signs (24h Range):  Temp:  [96.3 °F (35.7 °C)-98.5 °F (36.9 °C)] 97.8 °F (36.6 °C)  Pulse:  [] 102  Resp:  [16-20] 18  SpO2:  [90 %-96 %] 93 %  BP: (133-171)/(58-86) 171/82     Weight: 91.2 kg (201 lb 1 oz)  Body mass index is 30.13 kg/m².    Intake/Output - Last 3 Shifts       06/21 0700 - 06/22 0659 06/22 0700 - 06/23 0659 06/23 0700 - 06/24 0659    I.V. (mL/kg) 547 (6) 1300 (14.3)     Total Intake(mL/kg) 547 (6) 1300 (14.3)     Urine (mL/kg/hr) 1050 (0.5) 1650 (0.8)     Drains  250 (0.1)     Other 100 (0) 70 (0)     Total Output 1150 1970      Net -603 -214                   Physical Exam   Constitutional: He appears well-developed and well-nourished. No distress.   Cardiovascular: Normal rate and regular rhythm.    Pulmonary/Chest: No respiratory distress. He has wheezes.   Abdominal: Soft. He exhibits distension. There is tenderness (expected post op tenderness. Dressings  are clean, dry and intact.). There is no rebound and no guarding.       Significant Labs:  CBC:     Recent Labs  Lab 06/23/18  0526   WBC 7.93   RBC 3.96*   HGB 11.1*   HCT 35.0*      MCV 88   MCH 28.0   MCHC 31.7*     CMP:   Recent Labs  Lab 06/19/18  1910  06/23/18  0525     < > 139*   CALCIUM 8.4*  < > 8.6*   ALBUMIN 3.8  --   --    PROT 7.5  --   --    *  < > 137   K 2.9*  < > 4.1   CO2 30*  < > 24   CL 88*  < > 105   BUN 17  < > 19   CREATININE 1.3  < > 1.5*   ALKPHOS 84  --   --    ALT 7*  --   --    AST 17  --   --    BILITOT 0.6  --   --    < > = values in this interval not displayed.

## 2018-06-23 NOTE — PLAN OF CARE
Problem: Physical Therapy Goal  Goal: Physical Therapy Goal  Goals to be met by: 2018     Patient will increase functional independence with mobility by performin. Supine to sit with Stand-by Assistance  2. Sit to supine with Stand-by Assistance  3. Sit to stand transfer with Stand-by Assistance  4. Bed to chair transfer with Stand-by Assistance using Standard Walker  5. Gait  x 75 feet with Stand-by Assistance using Standard Walker.   6. Lower extremity exercise program x15 reps per handout, with supervision     Outcome: Ongoing (interventions implemented as appropriate)  Functional mobility continues to be limited by pain and anxiety.

## 2018-06-23 NOTE — PROGRESS NOTES
[facility]  General Surgery  Progress Note    Subjective:     History of Present Illness:  No notes on file    Post-Op Info:  Procedure(s) (LRB):  REPAIR, HERNIA, VENTRAL, INCARCERATED, WITHOUT HISTORY OF PRIOR REPAIR (N/A)   3 Days Post-Op     Interval History: No acute events overnight. Patient continues to feel distended and this morning, had some nausea overnight. Frustrated about not being able to eat. NGT with 50ml output overnight. Still some wheezing    Medications:  Continuous Infusions:   dextrose 5 % and 0.9 % NaCl with KCl 20 mEq 125 mL/hr at 06/22/18 0934     Scheduled Meds:   albuterol-ipratropium  3 mL Nebulization Q4H    amLODIPine  10 mg Oral Daily    aspirin  81 mg Oral Daily    cefTRIAXone (ROCEPHIN) IVPB  1 g Intravenous Q24H    gabapentin  400 mg Oral TID    heparin (porcine)  5,000 Units Subcutaneous Q8H    levothyroxine  50 mcg Oral Before breakfast    sodium chloride 0.9%  3 mL Intravenous Q8H     PRN Meds:morphine, ondansetron, oxyCODONE-acetaminophen, oxyCODONE-acetaminophen     Review of patient's allergies indicates:  No Known Allergies  Objective:     Vital Signs (Most Recent):  Temp: 97.8 °F (36.6 °C) (06/23/18 0749)  Pulse: 102 (06/23/18 0749)  Resp: 18 (06/23/18 0749)  BP: (!) 171/82 (06/23/18 0749)  SpO2: (!) 93 % (06/23/18 0749) Vital Signs (24h Range):  Temp:  [96.3 °F (35.7 °C)-98.5 °F (36.9 °C)] 97.8 °F (36.6 °C)  Pulse:  [] 102  Resp:  [16-20] 18  SpO2:  [90 %-96 %] 93 %  BP: (133-171)/(58-86) 171/82     Weight: 91.2 kg (201 lb 1 oz)  Body mass index is 30.13 kg/m².    Intake/Output - Last 3 Shifts       06/21 0700 - 06/22 0659 06/22 0700 - 06/23 0659 06/23 0700 - 06/24 0659    I.V. (mL/kg) 547 (6) 1300 (14.3)     Total Intake(mL/kg) 547 (6) 1300 (14.3)     Urine (mL/kg/hr) 1050 (0.5) 1650 (0.8)     Drains  250 (0.1)     Other 100 (0) 70 (0)     Total Output 1150 1970      Net -701 -488                   Physical Exam   Constitutional: He  appears well-developed and well-nourished. No distress.   Cardiovascular: Normal rate and regular rhythm.    Pulmonary/Chest: No respiratory distress. He has wheezes.   Abdominal: Soft. He exhibits distension. Hypoactive bowel sounds.There is tenderness (expected post op tenderness. Dressings are clean, dry and intact.). There is no rebound and no guarding.       Significant Labs:  CBC:     Recent Labs  Lab 06/23/18  0526   WBC 7.93   RBC 3.96*   HGB 11.1*   HCT 35.0*      MCV 88   MCH 28.0   MCHC 31.7*     CMP:   Recent Labs  Lab 06/19/18  1910  06/23/18  0525     < > 139*   CALCIUM 8.4*  < > 8.6*   ALBUMIN 3.8  --   --    PROT 7.5  --   --    *  < > 137   K 2.9*  < > 4.1   CO2 30*  < > 24   CL 88*  < > 105   BUN 17  < > 19   CREATININE 1.3  < > 1.5*   ALKPHOS 84  --   --    ALT 7*  --   --    AST 17  --   --    BILITOT 0.6  --   --    < > = values in this interval not displayed.    Assessment/Plan:     Small bowel obstruction    83 yo male POD1 s/p ventral hernia repair of incarcerated hernia    Plan:  - PO pain  - NPO, continue NGT  - PT/OT, must get up in chair today  - DVT prophylaxis- subcu heparin  - MIVF  - Replace lytes PRN, creatinine trending down  - continue sebasbs              Angelita Lambert MD  General Surgery  [facility]

## 2018-06-23 NOTE — PT/OT/SLP PROGRESS
Physical Therapy Treatment    Patient Name:  Chucho Prado   MRN:  [id number]    Recommendations:     Discharge Recommendations:  nursing facility, skilled   Discharge Equipment Recommendations: none   Barriers to discharge: Inaccessible home and Decreased caregiver support    Assessment:     Chucho Prado is a 84 y.o. male admitted with a medical diagnosis of Urinary tract infection associated with cystostomy catheter.  He presents with the following impairments/functional limitations:  weakness, impaired endurance, impaired self care skills, impaired functional mobilty, impaired balance, decreased coordination, decreased lower extremity function, pain, decreased ROM, edema, impaired skin, decreased safety awareness, impaired cardiopulmonary response to activity . Patient was very anxious and impulsive, with difficulty leaning forward and maintaining a proper posture while sitting due to abdominal pain. Patient requiring min. stretch fwd in order to transfer into standing position, but once Patient was into standing position his balance and safety improved.    Rehab Prognosis:  fair; patient would benefit from acute skilled PT services to address these deficits and reach maximum level of function.      Recent Surgery: Procedure(s) (LRB):  REPAIR, HERNIA, VENTRAL, INCARCERATED, WITHOUT HISTORY OF PRIOR REPAIR (N/A) 3 Days Post-Op    Plan:     During this hospitalization, patient to be seen 5 x/week to address the above listed problems via gait training, therapeutic activities, therapeutic exercises, neuromuscular re-education  · Plan of Care Expires:  07/21/18   Plan of Care Reviewed with: patient    Subjective     Communicated with NSG prior to session.  Patient found with HOB elevated upon PT entry to room, agreeable to treatment.      Chief Complaint: pain and weakness  Patient comments/goals: to get stronger  Pain/Comfort:  · Pain Rating 1:  (Pain only with mobility)  · Location - Orientation 1:  generalized  · Location 1: abdomen  · Pain Addressed 1: Pre-medicate for activity, Reposition, Distraction, Cessation of Activity  · Pain Rating Post-Intervention 1: 1/10    Patients cultural, spiritual, Buddhism conflicts given the current situation: None stated    Objective:     Patient found with: oxygen, peripheral IV, SCD, CANDACE drain, galvan catheter     General Precautions: Standard, fall   Orthopedic Precautions:N/A   Braces: N/A     Functional Mobility:  · Bed Mobility:     · Rolling Right: moderate assistance  · Scooting: moderate assistance  · Supine to Sit: maximal assistance  · Transfers:     · Sit to Stand:  moderate assistance with no AD  · Bed to Chair: moderate assistance with  no AD  using  Stand Pivot  · Gait: 3 side steps to bedside chair with no AD      AM-PAC 6 CLICK MOBILITY  Turning over in bed (including adjusting bedclothes, sheets and blankets)?: 2  Sitting down on and standing up from a chair with arms (e.g., wheelchair, bedside commode, etc.): 2  Moving from lying on back to sitting on the side of the bed?: 2  Moving to and from a bed to a chair (including a wheelchair)?: 2  Need to walk in hospital room?: 2  Climbing 3-5 steps with a railing?: 1  Total Score: 11       Therapeutic Activities and Exercises:   Donned a second gown. Patient sat at EOB x 14 minutes to prepare for treatment and to improve sitting balance, due to tendency to lean back. Patient performed There ex: GLUT SETS, HS AND AP 2X12 REPS B LE.  Patient transferred back to bed in PM with mod assistance of 2 . Repositioned in bed for comfort.    Patient left up in chair with all lines intact, call button in reach and NSG notified..    GOALS:    Physical Therapy Goals        Problem: Physical Therapy Goal    Goal Priority Disciplines Outcome Goal Variances Interventions   Physical Therapy Goal     PT/OT, PT Ongoing (interventions implemented as appropriate)     Description:  Goals to be met by: 7/5/2018     Patient will  increase functional independence with mobility by performin. Supine to sit with Stand-by Assistance  2. Sit to supine with Stand-by Assistance  3. Sit to stand transfer with Stand-by Assistance  4. Bed to chair transfer with Stand-by Assistance using Standard Walker  5. Gait  x 75 feet with Stand-by Assistance using Standard Walker.   6. Lower extremity exercise program x15 reps per handout, with supervision                      Time Tracking:     PT Received On: 18  PT Start Time: 1055 and 1445     PT Stop Time: 1120 and 1502  PT Total Time (min): 42 min     Billable Minutes: Therapeutic Activity 16 and Therapeutic Exercise 9    Treatment Type: Treatment  PT/PTA: PTA     PTA Visit Number: Dany Hampton PTA  2018

## 2018-06-24 LAB
ANION GAP SERPL CALC-SCNC: 9 MMOL/L
BASOPHILS # BLD AUTO: 0.02 K/UL
BASOPHILS NFR BLD: 0.2 %
BUN SERPL-MCNC: 14 MG/DL
CALCIUM SERPL-MCNC: 8.6 MG/DL
CHLORIDE SERPL-SCNC: 105 MMOL/L
CO2 SERPL-SCNC: 25 MMOL/L
CREAT SERPL-MCNC: 1.2 MG/DL
DIFFERENTIAL METHOD: ABNORMAL
EOSINOPHIL # BLD AUTO: 0.3 K/UL
EOSINOPHIL NFR BLD: 2.9 %
ERYTHROCYTE [DISTWIDTH] IN BLOOD BY AUTOMATED COUNT: 15.7 %
EST. GFR  (AFRICAN AMERICAN): >60 ML/MIN/1.73 M^2
EST. GFR  (NON AFRICAN AMERICAN): 55.2 ML/MIN/1.73 M^2
GLUCOSE SERPL-MCNC: 119 MG/DL
HCT VFR BLD AUTO: 35.7 %
HGB BLD-MCNC: 11.1 G/DL
IMM GRANULOCYTES # BLD AUTO: 0.06 K/UL
IMM GRANULOCYTES NFR BLD AUTO: 0.7 %
LYMPHOCYTES # BLD AUTO: 1 K/UL
LYMPHOCYTES NFR BLD: 11.1 %
MAGNESIUM SERPL-MCNC: 2 MG/DL
MCH RBC QN AUTO: 27.1 PG
MCHC RBC AUTO-ENTMCNC: 31.1 G/DL
MCV RBC AUTO: 87 FL
MONOCYTES # BLD AUTO: 0.8 K/UL
MONOCYTES NFR BLD: 9.7 %
NEUTROPHILS # BLD AUTO: 6.5 K/UL
NEUTROPHILS NFR BLD: 75.4 %
NRBC BLD-RTO: 0 /100 WBC
PHOSPHATE SERPL-MCNC: 2.5 MG/DL
PLATELET # BLD AUTO: 256 K/UL
PMV BLD AUTO: 9.7 FL
POTASSIUM SERPL-SCNC: 3.6 MMOL/L
RBC # BLD AUTO: 4.1 M/UL
SODIUM SERPL-SCNC: 139 MMOL/L
WBC # BLD AUTO: 8.56 K/UL

## 2018-06-24 PROCEDURE — A4216 STERILE WATER/SALINE, 10 ML: HCPCS | Performed by: SURGERY

## 2018-06-24 PROCEDURE — 83735 ASSAY OF MAGNESIUM: CPT

## 2018-06-24 PROCEDURE — 94761 N-INVAS EAR/PLS OXIMETRY MLT: CPT

## 2018-06-24 PROCEDURE — 63600175 PHARM REV CODE 636 W HCPCS: Performed by: STUDENT IN AN ORGANIZED HEALTH CARE EDUCATION/TRAINING PROGRAM

## 2018-06-24 PROCEDURE — 94640 AIRWAY INHALATION TREATMENT: CPT

## 2018-06-24 PROCEDURE — 27000221 HC OXYGEN, UP TO 24 HOURS

## 2018-06-24 PROCEDURE — 25000003 PHARM REV CODE 250: Performed by: SURGERY

## 2018-06-24 PROCEDURE — 63600175 PHARM REV CODE 636 W HCPCS: Performed by: SURGERY

## 2018-06-24 PROCEDURE — 25000003 PHARM REV CODE 250: Performed by: STUDENT IN AN ORGANIZED HEALTH CARE EDUCATION/TRAINING PROGRAM

## 2018-06-24 PROCEDURE — 85025 COMPLETE CBC W/AUTO DIFF WBC: CPT

## 2018-06-24 PROCEDURE — 94799 UNLISTED PULMONARY SVC/PX: CPT

## 2018-06-24 PROCEDURE — 80048 BASIC METABOLIC PNL TOTAL CA: CPT

## 2018-06-24 PROCEDURE — 25000242 PHARM REV CODE 250 ALT 637 W/ HCPCS: Performed by: STUDENT IN AN ORGANIZED HEALTH CARE EDUCATION/TRAINING PROGRAM

## 2018-06-24 PROCEDURE — 94664 DEMO&/EVAL PT USE INHALER: CPT

## 2018-06-24 PROCEDURE — 99900035 HC TECH TIME PER 15 MIN (STAT)

## 2018-06-24 PROCEDURE — 36415 COLL VENOUS BLD VENIPUNCTURE: CPT

## 2018-06-24 PROCEDURE — 84100 ASSAY OF PHOSPHORUS: CPT

## 2018-06-24 PROCEDURE — 12000002 HC ACUTE/MED SURGE SEMI-PRIVATE ROOM

## 2018-06-24 RX ADMIN — IPRATROPIUM BROMIDE AND ALBUTEROL SULFATE 3 ML: .5; 3 SOLUTION RESPIRATORY (INHALATION) at 07:06

## 2018-06-24 RX ADMIN — GABAPENTIN 400 MG: 400 CAPSULE ORAL at 08:06

## 2018-06-24 RX ADMIN — IPRATROPIUM BROMIDE AND ALBUTEROL SULFATE 3 ML: .5; 3 SOLUTION RESPIRATORY (INHALATION) at 04:06

## 2018-06-24 RX ADMIN — IPRATROPIUM BROMIDE AND ALBUTEROL SULFATE 3 ML: .5; 3 SOLUTION RESPIRATORY (INHALATION) at 12:06

## 2018-06-24 RX ADMIN — HEPARIN SODIUM 5000 UNITS: 5000 INJECTION, SOLUTION INTRAVENOUS; SUBCUTANEOUS at 01:06

## 2018-06-24 RX ADMIN — ASPIRIN 81 MG: 81 TABLET, COATED ORAL at 08:06

## 2018-06-24 RX ADMIN — HEPARIN SODIUM 5000 UNITS: 5000 INJECTION, SOLUTION INTRAVENOUS; SUBCUTANEOUS at 05:06

## 2018-06-24 RX ADMIN — POTASSIUM PHOSPHATE, MONOBASIC AND POTASSIUM PHOSPHATE, DIBASIC 30 MMOL: 224; 236 INJECTION, SOLUTION, CONCENTRATE INTRAVENOUS at 08:06

## 2018-06-24 RX ADMIN — HEPARIN SODIUM 5000 UNITS: 5000 INJECTION, SOLUTION INTRAVENOUS; SUBCUTANEOUS at 08:06

## 2018-06-24 RX ADMIN — Medication 3 ML: at 01:06

## 2018-06-24 RX ADMIN — AMLODIPINE BESYLATE 10 MG: 10 TABLET ORAL at 08:06

## 2018-06-24 RX ADMIN — LEVOTHYROXINE SODIUM 50 MCG: 50 TABLET ORAL at 05:06

## 2018-06-24 RX ADMIN — IPRATROPIUM BROMIDE AND ALBUTEROL SULFATE 3 ML: .5; 3 SOLUTION RESPIRATORY (INHALATION) at 05:06

## 2018-06-24 RX ADMIN — CEFTRIAXONE SODIUM 1 G: 1 INJECTION, POWDER, FOR SOLUTION INTRAMUSCULAR; INTRAVENOUS at 05:06

## 2018-06-24 RX ADMIN — GABAPENTIN 400 MG: 400 CAPSULE ORAL at 03:06

## 2018-06-24 RX ADMIN — OXYCODONE HYDROCHLORIDE AND ACETAMINOPHEN 1 TABLET: 10; 325 TABLET ORAL at 08:06

## 2018-06-24 RX ADMIN — IPRATROPIUM BROMIDE AND ALBUTEROL SULFATE 3 ML: .5; 3 SOLUTION RESPIRATORY (INHALATION) at 01:06

## 2018-06-24 NOTE — SUBJECTIVE & OBJECTIVE
Interval History: No acute events overnight. Patient states distention is improved this morning, no abdominal pain on exam. NGT with 125ml output overnight, 425 over 24 hours, dark brown output. Getting a breathing treatment this morning.    Medications:  Continuous Infusions:   dextrose 5 % and 0.9 % NaCl with KCl 20 mEq 125 mL/hr at 06/22/18 0934     Scheduled Meds:   albuterol-ipratropium  3 mL Nebulization Q4H    amLODIPine  10 mg Oral Daily    aspirin  81 mg Oral Daily    cefTRIAXone (ROCEPHIN) IVPB  1 g Intravenous Q24H    gabapentin  400 mg Oral TID    heparin (porcine)  5,000 Units Subcutaneous Q8H    levothyroxine  50 mcg Oral Before breakfast    sodium chloride 0.9%  3 mL Intravenous Q8H     PRN Meds:morphine, ondansetron, oxyCODONE-acetaminophen, oxyCODONE-acetaminophen     Review of patient's allergies indicates:  No Known Allergies  Objective:     Vital Signs (Most Recent):  Temp: 96.3 °F (35.7 °C) (06/24/18 0408)  Pulse: 98 (06/24/18 0729)  Resp: 18 (06/24/18 0729)  BP: (!) 168/84 (06/24/18 0408)  SpO2: 95 % (06/24/18 0729) Vital Signs (24h Range):  Temp:  [96.3 °F (35.7 °C)-97.9 °F (36.6 °C)] 96.3 °F (35.7 °C)  Pulse:  [] 98  Resp:  [18-20] 18  SpO2:  [92 %-100 %] 95 %  BP: (146-174)/(70-88) 168/84     Weight: 91.2 kg (201 lb 1 oz)  Body mass index is 30.13 kg/m².    Intake/Output - Last 3 Shifts       06/22 0700 - 06/23 0659 06/23 0700 - 06/24 0659 06/24 0700 - 06/25 0659    I.V. (mL/kg) 1300 (14.3) 800 (8.8)     Total Intake(mL/kg) 1300 (14.3) 800 (8.8)     Urine (mL/kg/hr) 1650 (0.8) 1875 (0.9)     Drains 250 (0.1) 425 (0.2)     Other 70 (0) 30 (0)     Total Output 1970 2330      Net -047 -6110                   Physical Exam   Constitutional: He appears well-developed and well-nourished. No distress.   Cardiovascular: Normal rate and regular rhythm.    Pulmonary/Chest: No respiratory distress.   Abdominal: Soft. He exhibits distension (Improved from yesterday). There is no  tenderness (Dressings are clean, dry and intact.). There is no rebound and no guarding. A hernia (Ventral hernia, reducible) is present.   Nursing note and vitals reviewed.      Significant Labs:  CBC:     Recent Labs  Lab 06/24/18  0443   WBC 8.56   RBC 4.10*   HGB 11.1*   HCT 35.7*      MCV 87   MCH 27.1   MCHC 31.1*     CMP:   Recent Labs  Lab 06/19/18  1910  06/24/18  0443     < > 119*   CALCIUM 8.4*  < > 8.6*   ALBUMIN 3.8  --   --    PROT 7.5  --   --    *  < > 139   K 2.9*  < > 3.6   CO2 30*  < > 25   CL 88*  < > 105   BUN 17  < > 14   CREATININE 1.3  < > 1.2   ALKPHOS 84  --   --    ALT 7*  --   --    AST 17  --   --    BILITOT 0.6  --   --    < > = values in this interval not displayed.

## 2018-06-24 NOTE — PLAN OF CARE
Problem: Patient Care Overview  Goal: Plan of Care Review  Outcome: Ongoing (interventions implemented as appropriate)  Pt AAO x 4. Pt denies pain, call light in reach, free from falls, skin intact, Ng, intact, ddrain intact. Q 2 hour monitoring for pain and safety. Will continue to monitor.

## 2018-06-24 NOTE — PROGRESS NOTES
[facility]  General Surgery  Progress Note    Subjective:     History of Present Illness:  No notes on file    Post-Op Info:  Procedure(s) (LRB):  REPAIR, HERNIA, VENTRAL, INCARCERATED, WITHOUT HISTORY OF PRIOR REPAIR (N/A)   4 Days Post-Op     Interval History: No acute events overnight. Patient states distention is improved this morning, no abdominal pain on exam. NGT with 125ml output overnight, 425 over 24 hours, dark brown output. Getting a breathing treatment this morning.    Medications:  Continuous Infusions:   dextrose 5 % and 0.9 % NaCl with KCl 20 mEq 125 mL/hr at 06/22/18 0934     Scheduled Meds:   albuterol-ipratropium  3 mL Nebulization Q4H    amLODIPine  10 mg Oral Daily    aspirin  81 mg Oral Daily    cefTRIAXone (ROCEPHIN) IVPB  1 g Intravenous Q24H    gabapentin  400 mg Oral TID    heparin (porcine)  5,000 Units Subcutaneous Q8H    levothyroxine  50 mcg Oral Before breakfast    sodium chloride 0.9%  3 mL Intravenous Q8H     PRN Meds:morphine, ondansetron, oxyCODONE-acetaminophen, oxyCODONE-acetaminophen     Review of patient's allergies indicates:  No Known Allergies  Objective:     Vital Signs (Most Recent):  Temp: 96.3 °F (35.7 °C) (06/24/18 0408)  Pulse: 98 (06/24/18 0729)  Resp: 18 (06/24/18 0729)  BP: (!) 168/84 (06/24/18 0408)  SpO2: 95 % (06/24/18 0729) Vital Signs (24h Range):  Temp:  [96.3 °F (35.7 °C)-97.9 °F (36.6 °C)] 96.3 °F (35.7 °C)  Pulse:  [] 98  Resp:  [18-20] 18  SpO2:  [92 %-100 %] 95 %  BP: (146-174)/(70-88) 168/84     Weight: 91.2 kg (201 lb 1 oz)  Body mass index is 30.13 kg/m².    Intake/Output - Last 3 Shifts       06/22 0700 - 06/23 0659 06/23 0700 - 06/24 0659 06/24 0700 - 06/25 0659    I.V. (mL/kg) 1300 (14.3) 800 (8.8)     Total Intake(mL/kg) 1300 (14.3) 800 (8.8)     Urine (mL/kg/hr) 1650 (0.8) 1875 (0.9)     Drains 250 (0.1) 425 (0.2)     Other 70 (0) 30 (0)     Total Output 1970 2800      Net -479 -4533                   Physical  Exam   Constitutional: He appears well-developed and well-nourished. No distress.   Cardiovascular: Normal rate and regular rhythm.    Pulmonary/Chest: No respiratory distress.   Abdominal: Soft. He exhibits distension (Improved from yesterday). There is no tenderness (Dressings are clean, dry and intact.). There is no rebound and no guarding. A hernia (Ventral hernia, not reducible) is present.   Nursing note and vitals reviewed.      Significant Labs:  CBC:     Recent Labs  Lab 06/24/18  0443   WBC 8.56   RBC 4.10*   HGB 11.1*   HCT 35.7*      MCV 87   MCH 27.1   MCHC 31.1*     CMP:   Recent Labs  Lab 06/19/18  1910  06/24/18  0443     < > 119*   CALCIUM 8.4*  < > 8.6*   ALBUMIN 3.8  --   --    PROT 7.5  --   --    *  < > 139   K 2.9*  < > 3.6   CO2 30*  < > 25   CL 88*  < > 105   BUN 17  < > 14   CREATININE 1.3  < > 1.2   ALKPHOS 84  --   --    ALT 7*  --   --    AST 17  --   --    BILITOT 0.6  --   --    < > = values in this interval not displayed.    Assessment/Plan:     Small bowel obstruction    85 yo male s/p ventral hernia repair of incarcerated hernia 6/20/18    Plan:  - PO pain  - NPO, continue NGT- will consider d/c'ing later today if decreased output continues  - PT/OT, must get up in chair again today  - DVT prophylaxis- subcu heparin  - MIVF  - Replace lytes PRN, creatinine continues trending down  - continue neetu Lambert MD  General Surgery  [facility]

## 2018-06-25 PROBLEM — E87.6 HYPOKALEMIA: Status: ACTIVE | Noted: 2018-06-25

## 2018-06-25 LAB
ANION GAP SERPL CALC-SCNC: 10 MMOL/L
BASOPHILS # BLD AUTO: 0.02 K/UL
BASOPHILS NFR BLD: 0.3 %
BUN SERPL-MCNC: 10 MG/DL
CALCIUM SERPL-MCNC: 8.5 MG/DL
CHLORIDE SERPL-SCNC: 105 MMOL/L
CO2 SERPL-SCNC: 27 MMOL/L
CREAT SERPL-MCNC: 1 MG/DL
DIFFERENTIAL METHOD: ABNORMAL
EOSINOPHIL # BLD AUTO: 0.4 K/UL
EOSINOPHIL NFR BLD: 5.5 %
ERYTHROCYTE [DISTWIDTH] IN BLOOD BY AUTOMATED COUNT: 15.6 %
EST. GFR  (AFRICAN AMERICAN): >60 ML/MIN/1.73 M^2
EST. GFR  (NON AFRICAN AMERICAN): >60 ML/MIN/1.73 M^2
GLUCOSE SERPL-MCNC: 92 MG/DL
HCT VFR BLD AUTO: 35.6 %
HGB BLD-MCNC: 11.2 G/DL
IMM GRANULOCYTES # BLD AUTO: 0.06 K/UL
IMM GRANULOCYTES NFR BLD AUTO: 0.8 %
LYMPHOCYTES # BLD AUTO: 0.9 K/UL
LYMPHOCYTES NFR BLD: 12.3 %
MAGNESIUM SERPL-MCNC: 1.9 MG/DL
MCH RBC QN AUTO: 27.5 PG
MCHC RBC AUTO-ENTMCNC: 31.5 G/DL
MCV RBC AUTO: 88 FL
MONOCYTES # BLD AUTO: 0.9 K/UL
MONOCYTES NFR BLD: 11.2 %
NEUTROPHILS # BLD AUTO: 5.4 K/UL
NEUTROPHILS NFR BLD: 69.9 %
NRBC BLD-RTO: 0 /100 WBC
PHOSPHATE SERPL-MCNC: 3.6 MG/DL
PLATELET # BLD AUTO: 273 K/UL
PMV BLD AUTO: 9.6 FL
POTASSIUM SERPL-SCNC: 3.7 MMOL/L
RBC # BLD AUTO: 4.07 M/UL
SODIUM SERPL-SCNC: 142 MMOL/L
WBC # BLD AUTO: 7.67 K/UL

## 2018-06-25 PROCEDURE — 25500020 PHARM REV CODE 255: Performed by: STUDENT IN AN ORGANIZED HEALTH CARE EDUCATION/TRAINING PROGRAM

## 2018-06-25 PROCEDURE — 97530 THERAPEUTIC ACTIVITIES: CPT

## 2018-06-25 PROCEDURE — 83735 ASSAY OF MAGNESIUM: CPT

## 2018-06-25 PROCEDURE — 25000003 PHARM REV CODE 250: Performed by: SURGERY

## 2018-06-25 PROCEDURE — 25000242 PHARM REV CODE 250 ALT 637 W/ HCPCS: Performed by: STUDENT IN AN ORGANIZED HEALTH CARE EDUCATION/TRAINING PROGRAM

## 2018-06-25 PROCEDURE — 94640 AIRWAY INHALATION TREATMENT: CPT

## 2018-06-25 PROCEDURE — 63600175 PHARM REV CODE 636 W HCPCS: Performed by: STUDENT IN AN ORGANIZED HEALTH CARE EDUCATION/TRAINING PROGRAM

## 2018-06-25 PROCEDURE — 94799 UNLISTED PULMONARY SVC/PX: CPT

## 2018-06-25 PROCEDURE — 27000646 HC AEROBIKA DEVICE

## 2018-06-25 PROCEDURE — 97110 THERAPEUTIC EXERCISES: CPT

## 2018-06-25 PROCEDURE — 25000003 PHARM REV CODE 250: Performed by: PHYSICIAN ASSISTANT

## 2018-06-25 PROCEDURE — 25000003 PHARM REV CODE 250: Performed by: STUDENT IN AN ORGANIZED HEALTH CARE EDUCATION/TRAINING PROGRAM

## 2018-06-25 PROCEDURE — 97535 SELF CARE MNGMENT TRAINING: CPT

## 2018-06-25 PROCEDURE — 12000002 HC ACUTE/MED SURGE SEMI-PRIVATE ROOM

## 2018-06-25 PROCEDURE — 94761 N-INVAS EAR/PLS OXIMETRY MLT: CPT

## 2018-06-25 PROCEDURE — 63600175 PHARM REV CODE 636 W HCPCS: Performed by: SURGERY

## 2018-06-25 PROCEDURE — 36415 COLL VENOUS BLD VENIPUNCTURE: CPT

## 2018-06-25 PROCEDURE — 99900035 HC TECH TIME PER 15 MIN (STAT)

## 2018-06-25 PROCEDURE — A4216 STERILE WATER/SALINE, 10 ML: HCPCS | Performed by: SURGERY

## 2018-06-25 PROCEDURE — 94664 DEMO&/EVAL PT USE INHALER: CPT

## 2018-06-25 PROCEDURE — 85025 COMPLETE CBC W/AUTO DIFF WBC: CPT

## 2018-06-25 PROCEDURE — 27000221 HC OXYGEN, UP TO 24 HOURS

## 2018-06-25 PROCEDURE — 25500020 PHARM REV CODE 255: Performed by: SURGERY

## 2018-06-25 PROCEDURE — 80048 BASIC METABOLIC PNL TOTAL CA: CPT

## 2018-06-25 PROCEDURE — 84100 ASSAY OF PHOSPHORUS: CPT

## 2018-06-25 RX ORDER — POTASSIUM CHLORIDE 750 MG/1
30 CAPSULE, EXTENDED RELEASE ORAL ONCE
Status: COMPLETED | OUTPATIENT
Start: 2018-06-25 | End: 2018-06-25

## 2018-06-25 RX ADMIN — LEVOTHYROXINE SODIUM 50 MCG: 50 TABLET ORAL at 05:06

## 2018-06-25 RX ADMIN — IPRATROPIUM BROMIDE AND ALBUTEROL SULFATE 3 ML: .5; 3 SOLUTION RESPIRATORY (INHALATION) at 07:06

## 2018-06-25 RX ADMIN — HEPARIN SODIUM 5000 UNITS: 5000 INJECTION, SOLUTION INTRAVENOUS; SUBCUTANEOUS at 10:06

## 2018-06-25 RX ADMIN — AMLODIPINE BESYLATE 10 MG: 10 TABLET ORAL at 09:06

## 2018-06-25 RX ADMIN — Medication 3 ML: at 10:06

## 2018-06-25 RX ADMIN — POTASSIUM CHLORIDE 30 MEQ: 750 CAPSULE, EXTENDED RELEASE ORAL at 09:06

## 2018-06-25 RX ADMIN — ONDANSETRON 8 MG: 2 INJECTION INTRAMUSCULAR; INTRAVENOUS at 11:06

## 2018-06-25 RX ADMIN — IOHEXOL 15 ML: 350 INJECTION, SOLUTION INTRAVENOUS at 01:06

## 2018-06-25 RX ADMIN — IOHEXOL 100 ML: 350 INJECTION, SOLUTION INTRAVENOUS at 03:06

## 2018-06-25 RX ADMIN — GABAPENTIN 400 MG: 400 CAPSULE ORAL at 09:06

## 2018-06-25 RX ADMIN — DEXTROSE MONOHYDRATE, SODIUM CHLORIDE, AND POTASSIUM CHLORIDE: 50; 9; 1.49 INJECTION, SOLUTION INTRAVENOUS at 07:06

## 2018-06-25 RX ADMIN — IOHEXOL 15 ML: 350 INJECTION, SOLUTION INTRAVENOUS at 12:06

## 2018-06-25 RX ADMIN — IPRATROPIUM BROMIDE AND ALBUTEROL SULFATE 3 ML: .5; 3 SOLUTION RESPIRATORY (INHALATION) at 12:06

## 2018-06-25 RX ADMIN — OXYCODONE HYDROCHLORIDE AND ACETAMINOPHEN 1 TABLET: 10; 325 TABLET ORAL at 07:06

## 2018-06-25 RX ADMIN — CEFTRIAXONE SODIUM 1 G: 1 INJECTION, POWDER, FOR SOLUTION INTRAMUSCULAR; INTRAVENOUS at 05:06

## 2018-06-25 RX ADMIN — HEPARIN SODIUM 5000 UNITS: 5000 INJECTION, SOLUTION INTRAVENOUS; SUBCUTANEOUS at 05:06

## 2018-06-25 RX ADMIN — IPRATROPIUM BROMIDE AND ALBUTEROL SULFATE 3 ML: .5; 3 SOLUTION RESPIRATORY (INHALATION) at 03:06

## 2018-06-25 RX ADMIN — ASPIRIN 81 MG: 81 TABLET, COATED ORAL at 09:06

## 2018-06-25 RX ADMIN — IPRATROPIUM BROMIDE AND ALBUTEROL SULFATE 3 ML: .5; 3 SOLUTION RESPIRATORY (INHALATION) at 11:06

## 2018-06-25 RX ADMIN — IPRATROPIUM BROMIDE AND ALBUTEROL SULFATE 3 ML: .5; 3 SOLUTION RESPIRATORY (INHALATION) at 08:06

## 2018-06-25 RX ADMIN — OXYCODONE HYDROCHLORIDE AND ACETAMINOPHEN 1 TABLET: 10; 325 TABLET ORAL at 11:06

## 2018-06-25 NOTE — PROGRESS NOTES
[facility]  General Surgery  Progress Note    Subjective:     Post-Op Info:  Procedure(s) (LRB):  REPAIR, HERNIA, VENTRAL, INCARCERATED, WITHOUT HISTORY OF PRIOR REPAIR (N/A)   5 Days Post-Op     Interval History:   Patient seen and examined, no acute events overnight  Pain well controlled  +F/no BM  NGT put out 250mL overnight  CANDACE put out 50  Hypertensive, afebrile    Medications:  Continuous Infusions:   dextrose 5 % and 0.9 % NaCl with KCl 20 mEq 125 mL/hr at 06/22/18 0934     Scheduled Meds:   albuterol-ipratropium  3 mL Nebulization Q4H    amLODIPine  10 mg Oral Daily    aspirin  81 mg Oral Daily    cefTRIAXone (ROCEPHIN) IVPB  1 g Intravenous Q24H    gabapentin  400 mg Oral TID    heparin (porcine)  5,000 Units Subcutaneous Q8H    levothyroxine  50 mcg Oral Before breakfast    sodium chloride 0.9%  3 mL Intravenous Q8H     PRN Meds:morphine, ondansetron, oxyCODONE-acetaminophen, oxyCODONE-acetaminophen     Review of patient's allergies indicates:  No Known Allergies  Objective:     Vital Signs (Most Recent):  Temp: 97.2 °F (36.2 °C) (06/25/18 0418)  Pulse: 88 (06/25/18 0418)  Resp: 18 (06/25/18 0418)  BP: (!) 164/81 (06/25/18 0418)  SpO2: (!) 94 % (06/25/18 0418) Vital Signs (24h Range):  Temp:  [95.9 °F (35.5 °C)-97.9 °F (36.6 °C)] 97.2 °F (36.2 °C)  Pulse:  [76-99] 88  Resp:  [16-18] 18  SpO2:  [90 %-98 %] 94 %  BP: (138-166)/(74-81) 164/81     Weight: 91.2 kg (201 lb 1 oz)  Body mass index is 30.13 kg/m².    Intake/Output - Last 3 Shifts       06/23 0700 - 06/24 0659 06/24 0700 - 06/25 0659    P.O.  0    I.V. (mL/kg) 800 (8.8) 500 (5.5)    IV Piggyback  500    Total Intake(mL/kg) 800 (8.8) 1000 (11)    Urine (mL/kg/hr) 1875 (0.9) 3150 (1.4)    Drains 425 (0.2) 250 (0.1)    Other 30 (0) 50 (0)    Total Output 2330 3450    Net -1530 -2450          Stool Occurrence  0 x    Emesis Occurrence  0 x          Physical Exam   Constitutional: He appears well-developed and well-nourished.  No distress.   HENT:   Head: Normocephalic and atraumatic.   Cardiovascular: Normal rate and regular rhythm.    Pulmonary/Chest: Effort normal. No respiratory distress.   Abdominal:   Soft, appropriate TTP, distension improving  Incision - clean, dry and intact  CANDACE with serosang drainage  Ventral hernia, reducible        Significant Labs:  CBC:   Recent Labs  Lab 06/25/18 0454   WBC 7.67   RBC 4.07*   HGB 11.2*   HCT 35.6*      MCV 88   MCH 27.5   MCHC 31.5*     BMP:   Recent Labs  Lab 06/25/18  0454   GLU 92      K 3.7      CO2 27   BUN 10   CREATININE 1.0   CALCIUM 8.5*   MG 1.9     CMP:   Recent Labs  Lab 06/19/18 1910 06/25/18  0454     < > 92   CALCIUM 8.4*  < > 8.5*   ALBUMIN 3.8  --   --    PROT 7.5  --   --    *  < > 142   K 2.9*  < > 3.7   CO2 30*  < > 27   CL 88*  < > 105   BUN 17  < > 10   CREATININE 1.3  < > 1.0   ALKPHOS 84  --   --    ALT 7*  --   --    AST 17  --   --    BILITOT 0.6  --   --    < > = values in this interval not displayed.  LFTs:   Recent Labs  Lab 06/19/18 1910   ALT 7*   AST 17   ALKPHOS 84   BILITOT 0.6   PROT 7.5   ALBUMIN 3.8     Assessment/Plan:     Hypokalemia    Replace         Small bowel obstruction    85 yo male s/p ventral hernia repair of incarcerated hernia 6/20/18    Plan:  - CT scan today  - NPO  - may dc NGT/start ice chips after CT scan  - PO pain  - nausea meds PRN  - abx - IV rocephin started 6/21, end date 6/31  - IVF - d5 125  - PT/OT/IS  - DVT prophylaxis        Abdominal wall hernia    Reducible  Continue to monitor        Sciatica neuralgia    Home meds  -gabapentin        Essential hypertension    Home meds  -amlodipine        Hypothyroidism    Home meds  -levothyroxine            Grazyna Rose PA-C   [id number]  General Surgery  [facility]

## 2018-06-25 NOTE — PLAN OF CARE
Discharge plan is unchanged: when medically stable he will discharge home resuming HH& using his personal sitters.        06/25/18 1326   Discharge Reassessment   Assessment Type Discharge Planning Reassessment   Provided patient/caregiver education on the expected discharge date and the discharge plan Yes  (D/C date to be determined. Patient is not medically stable for discharge;POD # 5: NGT to [facility] for ileus. CT A/P ordered today. )   Do you have any problems affording any of your prescribed medications? No   Discharge Plan A Home;Home Health;Other  (Has Private duty sitters)   Discharge Plan B Home;Home Health;Other  (as above)   Patient choice form signed by patient/caregiver N/A   Can the patient answer the patient profile reliably? Yes, cognitively intact

## 2018-06-25 NOTE — PT/OT/SLP PROGRESS
Occupational Therapy   Treatment    Name: Chucho Prado  MRN: [id number]  Admitting Diagnosis:  Urinary tract infection associated with cystostomy catheter  5 Days Post-Op    Recommendations:     Discharge Recommendations: nursing facility, skilled  Discharge Equipment Recommendations:  none  Barriers to discharge:  None    Subjective     Communicated with: RN prior to session.  Pain/Comfort:  · Pain Rating 1: 4/10  · Location - Side 1: Bilateral  · Location - Orientation 1: generalized  · Location 1: abdomen  · Pain Addressed 1: Reposition, Cessation of Activity, Distraction  · Pain Rating Post-Intervention 1: 1/10    Patients cultural, spiritual, Uatsdin conflicts given the current situation:      Objective:     Patient found with: oxygen, peripheral IV, CANDACE drain, SCD, galvan catheter, NG tube    General Precautions: Standard, aspiration, fall   Orthopedic Precautions:N/A   Braces: N/A     Occupational Performance:    Bed Mobility:    · Patient completed Rolling/Turning to Right with maximal assistance  · Patient completed Scooting/Bridging with maximal assistance  · Patient completed Supine to Sit with maximal assistance  · Patient completed Sit to Supine with moderate assistance     Functional Mobility/Transfers:  · Patient completed Sit <> Stand Transfer with maximal assistance  with  no assistive device, rolling walker and 1st trial from EOB w/ RW; 2nd trial from bedside chair at max a w/o AD d/t pt unable to perfrom w/ RW from bedside chair    · Patient completed Bed <> Chair Transfer using Stand Pivot technique with moderate assistance and maximal assistance with no assistive device, rolling walker and 1st trial mod a w/ RW; 2nd trial max a no AD.  · Functional Mobility: Pt ambulated total of 8 steps at mod a w/ RW and 2nd trial no AD (Hodling on to OT's arms).     Activities of Daily Living:  · Feeding:  set-up assistance performing motor-feeding pattern using spoon to eat ice chips   · Grooming: set-up  assistance  oral and facial hygiene   · LB Dressing: maximal assistance donned/doffed socks    Patient left initially UIC then OT returned to left pt in bed w/ HOB elevated  with all lines intact, call button in reach and RN present    [facility] 6 Click:  [facility] Total Score: 17    Treatment & Education:  Pt sat EOB ~15-20 min a cga. Initially OT worked on sitting balance and posture while PT assisted in PT BLE exercises.   Pt sat EOB and performed ADLs mentioned above as PTA assisted w/ sitting balance.  Pt t/f from bed<>bedside chair at mod a w/ RW. PT worked on gait while OT assisted w/ posture and balance.   OT performed t/f from bedside chair to bed at max a w/o AD.     Education:    Assessment:     Chucho Prado is a 84 y.o. male with a medical diagnosis of Urinary tract infection associated with cystostomy catheter.  He presents with impairments listed below. Pt would benefit from skilled OT services to improve independence and overall occupational functioning.      Performance deficits affecting function are weakness, impaired endurance, impaired self care skills, impaired functional mobilty, gait instability, impaired balance, decreased lower extremity function, impaired cardiopulmonary response to activity, decreased ROM.      Rehab Prognosis:  good; patient would benefit from acute skilled OT services to address these deficits and reach maximum level of function.       Plan:     Patient to be seen 4 x/week to address the above listed problems via self-care/home management, therapeutic activities, therapeutic exercises, neuromuscular re-education  · Plan of Care Expires: 07/22/18  · Plan of Care Reviewed with: patient    This Plan of care has been discussed with the patient who was involved in its development and understands and is in agreement with the identified goals and treatment plan    GOALS:    Occupational Therapy Goals        Problem: Occupational Therapy Goal    Goal Priority Disciplines Outcome  Interventions   Occupational Therapy Goal     OT, PT/OT     Description:  Goals to be met by: 6/28/2018     Patient will increase functional independence with ADLs by performing:    UE Dressing with Moderate Assistance.  LE Dressing with Moderate Assistance.  Grooming while seated with Minimal Assistance. Met  Toileting from bedside commode with Maximum Assistance for hygiene and clothing management.   Toilet transfer to bedside commode with Moderate Assistance.                       Time Tracking:     OT Date of Treatment: 06/25/18  OT Start Time: 0859  OT Stop Time: 0927  OT Total Time (min): 28 min   OT 2 time: 4119-6008= 13 minutes  OT total time= 41 minutes    Billable Minutes:Self Care/Home Management 15 minutes  Therapeutic Activity 26 minutes    Marino Ruano, OT  6/25/2018

## 2018-06-25 NOTE — PLAN OF CARE
Based on CM's earlier discussion with Pt, TAYLER placed call to [facility] and was told that Pt wasn't current, nor did they have any record of him ever being with their agency. TAYLER called into Pt's room who said no home health was coming out to his house. Pt said he didn't have a preference for home health agency. TAYLER sent all necessary referral information to [facility] via [facility] and informed them of Pt's anticipated discharge date.     Florina Brock LCSW     Update: TAYLER learned from [facility] that Pt was current with another home health agency, per Medicare. Pt is current with [facility]. TAYLER sent all necessary referral information to this agency via [facility].

## 2018-06-25 NOTE — PLAN OF CARE
[facility]    HOME HEALTH ORDERS  FACE TO FACE ENCOUNTER    Patient Name: Chucho Prado  YOB: 1933    PCP: Obed Mcguire MD   PCP Address: [address] / [place] [address]  PCP Phone Number: [phone]  PCP Fax: [phone]    Encounter Date: 07/05/2018    Admit to Home Health    Diagnoses:  Active Hospital Problems    Diagnosis  POA    *Abdominal wall hernia [K43.9]  Yes    Fecal incontinence [R15.9]  No    Impaired mobility and ADLs [Z74.09]  Yes    Incisional hernia with obstruction but no gangrene [K43.0]  Yes    Small bowel obstruction [K56.609]  Yes    Urinary tract infection associated with cystostomy catheter [T83.510A, N39.0]  Yes    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]  Yes    Essential hypertension [I10]  Yes     Chronic    Hypothyroidism [E03.9]  Yes     Chronic      Resolved Hospital Problems    Diagnosis Date Resolved POA    Hypomagnesemia [E83.42] 06/30/2018 No    Hypokalemia [E87.6] 06/30/2018 Yes       Future Appointments  Date Time Provider Department Center   7/12/2018 1:40 PM Neelam Hooper NP [facility] UROLOG [facility]   7/19/2018 11:00 AM Guilherme Ordoñez MD [facility] GENSU [place] [facility]     Follow-up Information     Guilherme Ordoñez MD.    Specialties:  General Surgery, Surgery  Contact information:  [address]  [facility] [address]  [phone]                     I have seen and examined this patient face to face today. My clinical findings that support the need for the home health skilled services and home bound status are the following:  Medical restrictions requiring assistance of another human to leave home due to  Post surgery monitoring.    Allergies:Review of patient's allergies indicates:  No Known Allergies    Diet: regular diet    Activities: no heavy lifting, nothing heavier than 10lbs    Nursing:   SN to complete comprehensive assessment including routine vital signs. Instruct on disease process and s/s of complications to  report to MD. Review/verify medication list sent home with the patient at time of discharge  and instruct patient/caregiver as needed. Frequency may be adjusted depending on start of care date.    Notify MD if SBP > 160 or < 90; DBP > 90 or < 50; HR > 120 or < 50; Temp > 101      CONSULTS:    Physical Therapy to evaluate and treat. Evaluate for home safety and equipment needs; Establish/upgrade home exercise program. Perform / instruct on therapeutic exercises, gait training, transfer training, and Range of Motion.  Occupational Therapy to evaluate and treat. Evaluate home environment for safety and equipment needs. Perform/Instruct on transfers, ADL training, ROM, and therapeutic exercises.  Aide to provide assistance with personal care, ADLs, and vital signs.      Medications: Review discharge medications with patient and family and provide education.      Current Discharge Medication List      START taking these medications    Details   oxyCODONE-acetaminophen (PERCOCET) 5-325 mg per tablet Take 1 tablet by mouth every 4 (four) hours as needed.  Qty: 20 tablet, Refills: 0         CONTINUE these medications which have NOT CHANGED    Details   aspirin (ECOTRIN) 81 MG EC tablet Take 81 mg by mouth once daily.      docusate sodium (COLACE) 100 MG capsule Take 1 capsule (100 mg total) by mouth 2 (two) times daily as needed for Constipation.  Qty: 60 capsule, Refills: 0      gabapentin (NEURONTIN) 400 MG capsule Take 400 mg by mouth 3 (three) times daily.      levothyroxine (SYNTHROID) 50 MCG tablet Take 1 tablet (50 mcg total) by mouth once daily.  Qty: 100 tablet, Refills: 11    Associated Diagnoses: Hypothyroidism, unspecified type      simvastatin (ZOCOR) 40 MG tablet Take 1 tablet (40 mg total) by mouth every evening.  Qty: 90 tablet, Refills: 11    Associated Diagnoses: Dyslipidemia      amLODIPine (NORVASC) 10 MG tablet Take 1 tablet (10 mg total) by mouth once daily.  Qty: 30 tablet, Refills: 11      !!  hyoscyamine (LEVSIN/SL) 0.125 mg Subl DISSOLVE 1 TABLET UNDER THE TONGUE EVERY 4 HOURS AS NEEDED  Qty: 30 tablet, Refills: 0      !! hyoscyamine (LEVSIN/SL) 0.125 mg Subl DISSOLVE 1 TABLET UNDER THE TONGUE EVERY 4 HOURS AS NEEDED  Qty: 540 tablet, Refills: 1    Comments: **Patient requests 90 days supply**      ondansetron (ZOFRAN-ODT) 8 MG TbDL Take 1 tablet (8 mg total) by mouth every 12 (twelve) hours as needed.  Qty: 14 tablet, Refills: 0    Associated Diagnoses: Status post total left knee replacement      polyethylene glycol (GLYCOLAX) 17 gram PwPk Take 17 g by mouth 2 (two) times daily.  Qty: 12 each, Refills: 0      senna-docusate 8.6-50 mg (PERICOLACE) 8.6-50 mg per tablet Take 1 tablet by mouth 2 (two) times daily.      trazodone (DESYREL) 50 MG tablet Take 1 tablet (50 mg total) by mouth nightly as needed for Insomnia.  Qty: 30 tablet, Refills: 11      triamterene-hydrochlorothiazide 37.5-25 mg (DYAZIDE) 37.5-25 mg per capsule TAKE 1 CAPSULE BY MOUTH ONCE DAILY IN THE MORNING  Qty: 90 capsule, Refills: 0       !! - Potential duplicate medications found. Please discuss with provider.      STOP taking these medications       potassium chloride (K-TAB) 20 mEq Comments:   Reason for Stopping:         sulfamethoxazole-trimethoprim 800-160mg (BACTRIM DS) 800-160 mg Tab Comments:   Reason for Stopping:               I certify that this patient is confined to his home and needs intermittent skilled nursing care, physical therapy and occupational therapy.

## 2018-06-25 NOTE — PLAN OF CARE
Problem: Occupational Therapy Goal  Goal: Occupational Therapy Goal  Goals to be met by: 6/28/2018     Patient will increase functional independence with ADLs by performing:    UE Dressing with Moderate Assistance.  LE Dressing with Moderate Assistance.  Grooming while seated with Minimal Assistance. Met  Toileting from bedside commode with Maximum Assistance for hygiene and clothing management.   Toilet transfer to bedside commode with Moderate Assistance.     Continue OT POC     Comments: Marino Ruano OTR/L  6/25/2018

## 2018-06-25 NOTE — SUBJECTIVE & OBJECTIVE
Interval History:   Patient seen and examined, no acute events overnight  Pain well controlled  +F/no BM  NGT put out 250mL overnight  CANDACE put out 50  Hypertensive, afebrile    Medications:  Continuous Infusions:   dextrose 5 % and 0.9 % NaCl with KCl 20 mEq 125 mL/hr at 06/22/18 0934     Scheduled Meds:   albuterol-ipratropium  3 mL Nebulization Q4H    amLODIPine  10 mg Oral Daily    aspirin  81 mg Oral Daily    cefTRIAXone (ROCEPHIN) IVPB  1 g Intravenous Q24H    gabapentin  400 mg Oral TID    heparin (porcine)  5,000 Units Subcutaneous Q8H    levothyroxine  50 mcg Oral Before breakfast    sodium chloride 0.9%  3 mL Intravenous Q8H     PRN Meds:morphine, ondansetron, oxyCODONE-acetaminophen, oxyCODONE-acetaminophen     Review of patient's allergies indicates:  No Known Allergies  Objective:     Vital Signs (Most Recent):  Temp: 97.2 °F (36.2 °C) (06/25/18 0418)  Pulse: 88 (06/25/18 0418)  Resp: 18 (06/25/18 0418)  BP: (!) 164/81 (06/25/18 0418)  SpO2: (!) 94 % (06/25/18 0418) Vital Signs (24h Range):  Temp:  [95.9 °F (35.5 °C)-97.9 °F (36.6 °C)] 97.2 °F (36.2 °C)  Pulse:  [76-99] 88  Resp:  [16-18] 18  SpO2:  [90 %-98 %] 94 %  BP: (138-166)/(74-81) 164/81     Weight: 91.2 kg (201 lb 1 oz)  Body mass index is 30.13 kg/m².    Intake/Output - Last 3 Shifts       06/23 0700 - 06/24 0659 06/24 0700 - 06/25 0659    P.O.  0    I.V. (mL/kg) 800 (8.8) 500 (5.5)    IV Piggyback  500    Total Intake(mL/kg) 800 (8.8) 1000 (11)    Urine (mL/kg/hr) 1875 (0.9) 3150 (1.4)    Drains 425 (0.2) 250 (0.1)    Other 30 (0) 50 (0)    Total Output 2330 3450    Net -1530 -2450          Stool Occurrence  0 x    Emesis Occurrence  0 x          Physical Exam   Constitutional: He appears well-developed and well-nourished. No distress.   HENT:   Head: Normocephalic and atraumatic.   Cardiovascular: Normal rate and regular rhythm.    Pulmonary/Chest: Effort normal. No respiratory distress.   Abdominal:   Soft, appropriate TTP,  distension improving  Incision - clean, dry and intact  CANDACE with serosang drainage  Ventral hernia, reducible        Significant Labs:  CBC:   Recent Labs  Lab 06/25/18  0454   WBC 7.67   RBC 4.07*   HGB 11.2*   HCT 35.6*      MCV 88   MCH 27.5   MCHC 31.5*     BMP:   Recent Labs  Lab 06/25/18  0454   GLU 92      K 3.7      CO2 27   BUN 10   CREATININE 1.0   CALCIUM 8.5*   MG 1.9     CMP:   Recent Labs  Lab 06/19/18 1910 06/25/18  0454     < > 92   CALCIUM 8.4*  < > 8.5*   ALBUMIN 3.8  --   --    PROT 7.5  --   --    *  < > 142   K 2.9*  < > 3.7   CO2 30*  < > 27   CL 88*  < > 105   BUN 17  < > 10   CREATININE 1.3  < > 1.0   ALKPHOS 84  --   --    ALT 7*  --   --    AST 17  --   --    BILITOT 0.6  --   --    < > = values in this interval not displayed.  LFTs:   Recent Labs  Lab 06/19/18 1910   ALT 7*   AST 17   ALKPHOS 84   BILITOT 0.6   PROT 7.5   ALBUMIN 3.8

## 2018-06-25 NOTE — PT/OT/SLP PROGRESS
Physical Therapy Treatment    Patient Name:  Chucho Prado   MRN:  [id number]    Recommendations:     Discharge Recommendations:  nursing facility, skilled   Discharge Equipment Recommendations: none   Barriers to discharge: Inaccessible home and Decreased caregiver support    Assessment:     Chucho Prado is a 84 y.o. male admitted with a medical diagnosis of Urinary tract infection associated with cystostomy catheter.  He presents with the following impairments/functional limitations:  weakness, impaired endurance, impaired self care skills, impaired functional mobilty, gait instability, impaired balance, decreased coordination, decreased lower extremity function . Pt tolerated treatment fairly well with the exception of abdominal pain. Pt demonstrated improved sitting posture, although does still require VCs to lean forward. Pt would benefit from continued PT services.    Rehab Prognosis:  good; patient would benefit from acute skilled PT services to address these deficits and reach maximum level of function.      Recent Surgery: Procedure(s) (LRB):  REPAIR, HERNIA, VENTRAL, INCARCERATED, WITHOUT HISTORY OF PRIOR REPAIR (N/A) 5 Days Post-Op    Plan:     During this hospitalization, patient to be seen 5 x/week to address the above listed problems via therapeutic activities, therapeutic exercises  · Plan of Care Expires:  07/21/18   Plan of Care Reviewed with: patient    Subjective     Communicated with RN prior to session.  Patient found supine upon PT entry to room, agreeable to treatment.      Chief Complaint: pain  Patient comments/goals: to get stronger  Pain/Comfort:  · Pain Rating 1: 4/10  · Location - Side 1: Bilateral  · Location 1: abdomen  · Pain Addressed 1: Reposition, Distraction, Cessation of Activity    Patients cultural, spiritual, Sabianist conflicts given the current situation: No    Objective:     Patient found with: oxygen, peripheral IV, CANDACE drain, SCD, galvan catheter ,NG  tube    General Precautions: Standard, aspiration, fall   Orthopedic Precautions:N/A   Braces: N/A     Functional Mobility:  · Bed Mobility:     · Rolling Right: maximal assistance x 2  · Supine to Sit: maximal assistance x 2  · Transfers:     · Bed to Chair: moderate assistance x 2 with rolling walker using  Stand Pivot   · Balance: Pt able to balance seated EOB with SBA to CGA with VCs to lean forward.      AM-PAC 6 CLICK MOBILITY  Turning over in bed (including adjusting bedclothes, sheets and blankets)?: 3  Sitting down on and standing up from a chair with arms (e.g., wheelchair, bedside commode, etc.): 2  Moving from lying on back to sitting on the side of the bed?: 2  Moving to and from a bed to a chair (including a wheelchair)?: 2  Need to walk in hospital room?: 2  Climbing 3-5 steps with a railing?: 1  Total Score: 12       Therapeutic Activities and Exercises:  Pt educated on importance of OOB activity  Pt educated on POC  Pt educated on safety  LE ther-ex seated EOB:  - B LAQ x 20  - B marching x 20  - B AP x 20    Patient left up in chair with all lines intact and call button in reach..    GOALS:    Physical Therapy Goals        Problem: Physical Therapy Goal    Goal Priority Disciplines Outcome Goal Variances Interventions   Physical Therapy Goal     PT/OT, PT Ongoing (interventions implemented as appropriate)     Description:  Goals to be met by: 2018     Patient will increase functional independence with mobility by performin. Supine to sit with Stand-by Assistance  2. Sit to supine with Stand-by Assistance  3. Sit to stand transfer with Stand-by Assistance  4. Bed to chair transfer with Stand-by Assistance using Standard Walker  5. Gait  x 75 feet with Stand-by Assistance using Standard Walker.   6. Lower extremity exercise program x15 reps per handout, with supervision                      Time Tracking:     PT Received On: 18  PT Start Time: 09     PT Stop Time: 930  PT Total  Time (min): 30 min     Billable Minutes: Therapeutic Activity 15 and Therapeutic Exercise 15    Treatment Type: Treatment  PT/PTA: PTA     PTA Visit Number: 1     Jane Mistry, [facility]  06/25/2018   I certify that I was present in the room directing the student in service delivery and guiding them using my skilled judgment. As the co-signing therapist I have reviewed the students documentation and am responsible for the treatment, assessment, and plan. Yoav Calabrese PTA

## 2018-06-25 NOTE — PLAN OF CARE
Problem: Physical Therapy Goal  Goal: Physical Therapy Goal  Goals to be met by: 2018     Patient will increase functional independence with mobility by performin. Supine to sit with Stand-by Assistance - not met  2. Sit to supine with Stand-by Assistance - not met  3. Sit to stand transfer with Stand-by Assistance - not met  4. Bed to chair transfer with Stand-by Assistance using Standard Walker - not met  5. Gait  x 75 feet with Stand-by Assistance using Standard Walker. - not met  6. Lower extremity exercise program x15 reps per handout, with supervision - not met     Pt progressing towards goals. Continue with POC.  Jane Mistry, SPTA  2018    I certify that I was present in the room directing the student in service delivery and guiding them using my skilled judgment. As the co-signing therapist I have reviewed the students documentation and am responsible for the treatment, assessment, and plan. Yoav Calabrese PTA  2018

## 2018-06-26 LAB
ANION GAP SERPL CALC-SCNC: 9 MMOL/L
BASOPHILS # BLD AUTO: 0.04 K/UL
BASOPHILS NFR BLD: 0.4 %
BUN SERPL-MCNC: 8 MG/DL
CALCIUM SERPL-MCNC: 8.7 MG/DL
CHLORIDE SERPL-SCNC: 105 MMOL/L
CO2 SERPL-SCNC: 29 MMOL/L
CREAT SERPL-MCNC: 1.1 MG/DL
DIFFERENTIAL METHOD: ABNORMAL
EOSINOPHIL # BLD AUTO: 0.4 K/UL
EOSINOPHIL NFR BLD: 4.4 %
ERYTHROCYTE [DISTWIDTH] IN BLOOD BY AUTOMATED COUNT: 15.6 %
EST. GFR  (AFRICAN AMERICAN): >60 ML/MIN/1.73 M^2
EST. GFR  (NON AFRICAN AMERICAN): >60 ML/MIN/1.73 M^2
GLUCOSE SERPL-MCNC: 114 MG/DL
HCT VFR BLD AUTO: 37.6 %
HGB BLD-MCNC: 11.7 G/DL
IMM GRANULOCYTES # BLD AUTO: 0.09 K/UL
IMM GRANULOCYTES NFR BLD AUTO: 1 %
LYMPHOCYTES # BLD AUTO: 1.1 K/UL
LYMPHOCYTES NFR BLD: 12 %
MAGNESIUM SERPL-MCNC: 1.7 MG/DL
MCH RBC QN AUTO: 27.5 PG
MCHC RBC AUTO-ENTMCNC: 31.1 G/DL
MCV RBC AUTO: 89 FL
MONOCYTES # BLD AUTO: 0.9 K/UL
MONOCYTES NFR BLD: 9.7 %
NEUTROPHILS # BLD AUTO: 6.6 K/UL
NEUTROPHILS NFR BLD: 72.5 %
NRBC BLD-RTO: 0 /100 WBC
PHOSPHATE SERPL-MCNC: 3.7 MG/DL
PLATELET # BLD AUTO: 290 K/UL
PMV BLD AUTO: 9.6 FL
POTASSIUM SERPL-SCNC: 4 MMOL/L
RBC # BLD AUTO: 4.25 M/UL
SODIUM SERPL-SCNC: 143 MMOL/L
WBC # BLD AUTO: 9.05 K/UL

## 2018-06-26 PROCEDURE — G8988 SELF CARE GOAL STATUS: HCPCS | Mod: CJ

## 2018-06-26 PROCEDURE — 25000242 PHARM REV CODE 250 ALT 637 W/ HCPCS: Performed by: STUDENT IN AN ORGANIZED HEALTH CARE EDUCATION/TRAINING PROGRAM

## 2018-06-26 PROCEDURE — 12000002 HC ACUTE/MED SURGE SEMI-PRIVATE ROOM

## 2018-06-26 PROCEDURE — 63600175 PHARM REV CODE 636 W HCPCS: Performed by: SURGERY

## 2018-06-26 PROCEDURE — 97530 THERAPEUTIC ACTIVITIES: CPT

## 2018-06-26 PROCEDURE — 25000003 PHARM REV CODE 250: Performed by: STUDENT IN AN ORGANIZED HEALTH CARE EDUCATION/TRAINING PROGRAM

## 2018-06-26 PROCEDURE — G8987 SELF CARE CURRENT STATUS: HCPCS | Mod: CK

## 2018-06-26 PROCEDURE — 84100 ASSAY OF PHOSPHORUS: CPT

## 2018-06-26 PROCEDURE — 27000221 HC OXYGEN, UP TO 24 HOURS

## 2018-06-26 PROCEDURE — 97110 THERAPEUTIC EXERCISES: CPT

## 2018-06-26 PROCEDURE — 94640 AIRWAY INHALATION TREATMENT: CPT

## 2018-06-26 PROCEDURE — 85025 COMPLETE CBC W/AUTO DIFF WBC: CPT

## 2018-06-26 PROCEDURE — 63600175 PHARM REV CODE 636 W HCPCS: Performed by: STUDENT IN AN ORGANIZED HEALTH CARE EDUCATION/TRAINING PROGRAM

## 2018-06-26 PROCEDURE — 25000003 PHARM REV CODE 250: Performed by: SURGERY

## 2018-06-26 PROCEDURE — 83735 ASSAY OF MAGNESIUM: CPT

## 2018-06-26 PROCEDURE — 94664 DEMO&/EVAL PT USE INHALER: CPT

## 2018-06-26 PROCEDURE — 36415 COLL VENOUS BLD VENIPUNCTURE: CPT

## 2018-06-26 PROCEDURE — 97116 GAIT TRAINING THERAPY: CPT

## 2018-06-26 PROCEDURE — 94761 N-INVAS EAR/PLS OXIMETRY MLT: CPT

## 2018-06-26 PROCEDURE — 80048 BASIC METABOLIC PNL TOTAL CA: CPT

## 2018-06-26 PROCEDURE — A4216 STERILE WATER/SALINE, 10 ML: HCPCS | Performed by: SURGERY

## 2018-06-26 PROCEDURE — 27000646 HC AEROBIKA DEVICE

## 2018-06-26 RX ADMIN — IPRATROPIUM BROMIDE AND ALBUTEROL SULFATE 3 ML: .5; 3 SOLUTION RESPIRATORY (INHALATION) at 03:06

## 2018-06-26 RX ADMIN — LEVOTHYROXINE SODIUM 50 MCG: 50 TABLET ORAL at 06:06

## 2018-06-26 RX ADMIN — HEPARIN SODIUM 5000 UNITS: 5000 INJECTION, SOLUTION INTRAVENOUS; SUBCUTANEOUS at 04:06

## 2018-06-26 RX ADMIN — ASPIRIN 81 MG: 81 TABLET, COATED ORAL at 09:06

## 2018-06-26 RX ADMIN — IPRATROPIUM BROMIDE AND ALBUTEROL SULFATE 3 ML: .5; 3 SOLUTION RESPIRATORY (INHALATION) at 07:06

## 2018-06-26 RX ADMIN — HEPARIN SODIUM 5000 UNITS: 5000 INJECTION, SOLUTION INTRAVENOUS; SUBCUTANEOUS at 09:06

## 2018-06-26 RX ADMIN — IPRATROPIUM BROMIDE AND ALBUTEROL SULFATE 3 ML: .5; 3 SOLUTION RESPIRATORY (INHALATION) at 11:06

## 2018-06-26 RX ADMIN — DEXTROSE MONOHYDRATE, SODIUM CHLORIDE, AND POTASSIUM CHLORIDE: 50; 9; 1.49 INJECTION, SOLUTION INTRAVENOUS at 05:06

## 2018-06-26 RX ADMIN — OXYCODONE HYDROCHLORIDE AND ACETAMINOPHEN 1 TABLET: 5; 325 TABLET ORAL at 09:06

## 2018-06-26 RX ADMIN — HEPARIN SODIUM 5000 UNITS: 5000 INJECTION, SOLUTION INTRAVENOUS; SUBCUTANEOUS at 06:06

## 2018-06-26 RX ADMIN — GABAPENTIN 400 MG: 400 CAPSULE ORAL at 09:06

## 2018-06-26 RX ADMIN — Medication 3 ML: at 02:06

## 2018-06-26 RX ADMIN — CEFTRIAXONE SODIUM 1 G: 1 INJECTION, POWDER, FOR SOLUTION INTRAMUSCULAR; INTRAVENOUS at 05:06

## 2018-06-26 RX ADMIN — Medication 3 ML: at 09:06

## 2018-06-26 RX ADMIN — AMLODIPINE BESYLATE 10 MG: 10 TABLET ORAL at 09:06

## 2018-06-26 NOTE — PT/OT/SLP PROGRESS
Occupational Therapy   Treatment    Name: Chucho Prado  MRN: [id number]  Admitting Diagnosis:  Urinary tract infection associated with cystostomy catheter  6 Days Post-Op    Recommendations:     Discharge Recommendations: nursing facility, skilled  Discharge Equipment Recommendations:  none  Barriers to discharge:  None    Subjective     Communicated with: RN prior to session.  Pain/Comfort:  · Pain Rating 1: 2/10  · Location - Side 1: Bilateral  · Location - Orientation 1: generalized  · Location 1: abdomen  · Pain Addressed 1: Reposition, Distraction, Cessation of Activity  · Pain Rating Post-Intervention 1: 2/10    Patients cultural, spiritual, Anabaptist conflicts given the current situation:      Objective:     Patient found with: peripheral IV, CANDACE drain, galvan catheter    General Precautions: Standard, fall, aspiration   Orthopedic Precautions:N/A   Braces: N/A     Occupational Performance:    Bed Mobility:    · Patient completed Sit to Supine with minimum assistance     Functional Mobility/Transfers:  · Patient completed Sit <> Stand Transfer from chair with maximal assistance and of 2 persons with rolling walker   · Patient completed Bed <> Chair Transfer using Step Transfer technique with contact guard assistance with rolling walker  · Functional Mobility: Pt took 3 steps from chair to bed with CGA using RW    Activities of Daily Living:  · LB Dressing: maximal assistance to don/doff B shoes    Patient left HOB elevated with all lines intact, call button in reach and caregiver present    [facility] 6 Click:  [facility] Total Score: 17    Treatment & Education:  BUE AROM exercises 15 x 1 for shoulder flex, elbow flex/ext, chest press, overhead press, and shoulder shrugs while seated UIC  Pt educated on role of OT/POC  White board/communication board updated  Education:    Assessment:     Chucho Prado is a 84 y.o. male with a medical diagnosis of Urinary tract infection associated with cystostomy catheter.  He  presents with abdominal pain and generalized weakness limiting transfer ability.  Performance deficits affecting function are weakness, impaired endurance, impaired functional mobilty, impaired self care skills, impaired balance, gait instability, pain, impaired cardiopulmonary response to activity.      Rehab Prognosis:  Fair; patient would benefit from acute skilled OT services to address these deficits and reach maximum level of function.       Plan:     Patient to be seen 4 x/week to address the above listed problems via self-care/home management, therapeutic activities, therapeutic exercises  · Plan of Care Expires: 07/22/18  · Plan of Care Reviewed with: patient, caregiver    This Plan of care has been discussed with the patient who was involved in its development and understands and is in agreement with the identified goals and treatment plan    GOALS:    Occupational Therapy Goals        Problem: Occupational Therapy Goal    Goal Priority Disciplines Outcome Interventions   Occupational Therapy Goal     OT, PT/OT Ongoing (interventions implemented as appropriate)    Description:  Goals to be met by: 6/28/2018     Patient will increase functional independence with ADLs by performing:    UE Dressing with Moderate Assistance.  LE Dressing with Moderate Assistance.  Grooming while seated with Minimal Assistance. Met  Toileting from bedside commode with Maximum Assistance for hygiene and clothing management.   Toilet transfer to bedside commode with Moderate Assistance.                       Time Tracking:     OT Date of Treatment: 06/26/18  OT Start Time: 1447  OT Stop Time: 1510  OT Total Time (min): 23 min    Billable Minutes:Therapeutic Activity 10  Therapeutic Exercise 13    Preethi Chatterjee OT  6/26/2018

## 2018-06-26 NOTE — PROGRESS NOTES
[facility]  General Surgery  Progress Note    Subjective:     History of Present Illness:  No notes on file    Post-Op Info:  Procedure(s) (LRB):  REPAIR, HERNIA, VENTRAL, INCARCERATED, WITHOUT HISTORY OF PRIOR REPAIR (N/A)   6 Days Post-Op     Interval History:   Patient accidentally pulled NGT. No nausea or vomiting. Hoarse voice this AM but does not feel short of breath.     Medications:  Continuous Infusions:   dextrose 5 % and 0.9 % NaCl with KCl 20 mEq 125 mL/hr at 06/26/18 0504     Scheduled Meds:   albuterol-ipratropium  3 mL Nebulization Q4H    amLODIPine  10 mg Oral Daily    aspirin  81 mg Oral Daily    cefTRIAXone (ROCEPHIN) IVPB  1 g Intravenous Q24H    gabapentin  400 mg Oral TID    heparin (porcine)  5,000 Units Subcutaneous Q8H    levothyroxine  50 mcg Oral Before breakfast    sodium chloride 0.9%  3 mL Intravenous Q8H     PRN Meds:morphine, ondansetron, oxyCODONE-acetaminophen, oxyCODONE-acetaminophen     Review of patient's allergies indicates:  No Known Allergies  Objective:     Vital Signs (Most Recent):  Temp: 96.3 °F (35.7 °C) (06/26/18 0755)  Pulse: 86 (06/26/18 0755)  Resp: 16 (06/26/18 0755)  BP: (!) 149/70 (06/26/18 0755)  SpO2: 96 % (06/26/18 0755) Vital Signs (24h Range):  Temp:  [96.3 °F (35.7 °C)-98.3 °F (36.8 °C)] 96.3 °F (35.7 °C)  Pulse:  [] 86  Resp:  [16-22] 16  SpO2:  [92 %-96 %] 96 %  BP: (141-169)/(70-93) 149/70     Weight: 91.2 kg (201 lb 1 oz)  Body mass index is 30.13 kg/m².    Intake/Output - Last 3 Shifts       06/24 0700 - 06/25 0659 06/25 0700 - 06/26 0659 06/26 0700 - 06/27 0659    P.O. 0      I.V. (mL/kg) 500 (5.5) 1100 (12.1)     IV Piggyback 500      Total Intake(mL/kg) 1000 (11) 1100 (12.1)     Urine (mL/kg/hr) 3150 (1.4) 1950 (0.9)     Drains 250 (0.1)      Other 50 (0) 20 (0)     Total Output 3450 1970      Net -2450 -870             Stool Occurrence 0 x      Emesis Occurrence 0 x            Physical Exam   Constitutional: He is  oriented to person, place, and time. He appears well-developed and well-nourished. No distress.   Cardiovascular: Normal rate.    Pulmonary/Chest: Effort normal. He has wheezes.   Abdominal: Soft. He exhibits no distension. There is no tenderness.   Neurological: He is alert and oriented to person, place, and time.   Skin: Skin is warm and dry.   Psychiatric: He has a normal mood and affect. His behavior is normal.       Significant Labs:  CBC:   Recent Labs  Lab 06/26/18  0539   WBC 9.05   RBC 4.25*   HGB 11.7*   HCT 37.6*      MCV 89   MCH 27.5   MCHC 31.1*     BMP:   Recent Labs  Lab 06/26/18  0539   *      K 4.0      CO2 29   BUN 8   CREATININE 1.1   CALCIUM 8.7   MG 1.7       Significant Diagnostics:  I have reviewed all pertinent imaging results/findings within the past 24 hours.    Assessment/Plan:     * Urinary tract infection associated with cystostomy catheter    On rocephin for 10 days                  Small bowel obstruction    85 yo male s/p hernia repair of incarcerated incisional hernia from prior open helen 6/20/18    Plan:  - CT scan showed dilated loops of small bowel with contrast to TI.   - NPO  - NGT removed by patient. Will keep NPO and see how he tolerates having NG removed  - PO pain  - nausea meds PRN  - abx - IV rocephin started 6/21, end date 6/31  - IVF - d5 125  - PT/OT/IS  - DVT prophylaxis        Abdominal wall hernia    CT showed on fat in ventral hernia  Reducible        Sciatica neuralgia    Home meds  -gabapentin        Essential hypertension    Home meds  -amlodipine        Hypothyroidism    Home meds  -levothyroxine            Reyna Chan MD  General Surgery  [facility]

## 2018-06-26 NOTE — SUBJECTIVE & OBJECTIVE
Interval History:   Patient accidentally pulled NGT. No nausea or vomiting. Hoarse voice this AM but does not feel short of breath.     Medications:  Continuous Infusions:   dextrose 5 % and 0.9 % NaCl with KCl 20 mEq 125 mL/hr at 06/26/18 0504     Scheduled Meds:   albuterol-ipratropium  3 mL Nebulization Q4H    amLODIPine  10 mg Oral Daily    aspirin  81 mg Oral Daily    cefTRIAXone (ROCEPHIN) IVPB  1 g Intravenous Q24H    gabapentin  400 mg Oral TID    heparin (porcine)  5,000 Units Subcutaneous Q8H    levothyroxine  50 mcg Oral Before breakfast    sodium chloride 0.9%  3 mL Intravenous Q8H     PRN Meds:morphine, ondansetron, oxyCODONE-acetaminophen, oxyCODONE-acetaminophen     Review of patient's allergies indicates:  No Known Allergies  Objective:     Vital Signs (Most Recent):  Temp: 96.3 °F (35.7 °C) (06/26/18 0755)  Pulse: 86 (06/26/18 0755)  Resp: 16 (06/26/18 0755)  BP: (!) 149/70 (06/26/18 0755)  SpO2: 96 % (06/26/18 0755) Vital Signs (24h Range):  Temp:  [96.3 °F (35.7 °C)-98.3 °F (36.8 °C)] 96.3 °F (35.7 °C)  Pulse:  [] 86  Resp:  [16-22] 16  SpO2:  [92 %-96 %] 96 %  BP: (141-169)/(70-93) 149/70     Weight: 91.2 kg (201 lb 1 oz)  Body mass index is 30.13 kg/m².    Intake/Output - Last 3 Shifts       06/24 0700 - 06/25 0659 06/25 0700 - 06/26 0659 06/26 0700 - 06/27 0659    P.O. 0      I.V. (mL/kg) 500 (5.5) 1100 (12.1)     IV Piggyback 500      Total Intake(mL/kg) 1000 (11) 1100 (12.1)     Urine (mL/kg/hr) 3150 (1.4) 1950 (0.9)     Drains 250 (0.1)      Other 50 (0) 20 (0)     Total Output 3450 1970      Net -2450 -870             Stool Occurrence 0 x      Emesis Occurrence 0 x            Physical Exam   Constitutional: He is oriented to person, place, and time. He appears well-developed and well-nourished. No distress.   Cardiovascular: Normal rate.    Pulmonary/Chest: Effort normal. He has wheezes.   Abdominal: Soft. He exhibits no distension. There is no tenderness.   Neurological: He  is alert and oriented to person, place, and time.   Skin: Skin is warm and dry.   Psychiatric: He has a normal mood and affect. His behavior is normal.       Significant Labs:  CBC:   Recent Labs  Lab 06/26/18  0539   WBC 9.05   RBC 4.25*   HGB 11.7*   HCT 37.6*      MCV 89   MCH 27.5   MCHC 31.1*     BMP:   Recent Labs  Lab 06/26/18  0539   *      K 4.0      CO2 29   BUN 8   CREATININE 1.1   CALCIUM 8.7   MG 1.7       Significant Diagnostics:  I have reviewed all pertinent imaging results/findings within the past 24 hours.

## 2018-06-26 NOTE — PLAN OF CARE
Problem: Physical Therapy Goal  Goal: Physical Therapy Goal  Goals to be met by: 2018     Patient will increase functional independence with mobility by performin. Supine to sit with Stand-by Assistance - not met  2. Sit to supine with Stand-by Assistance - not met  3. Sit to stand transfer with Stand-by Assistance - not met  4. Bed to chair transfer with Stand-by Assistance using Standard Walker - not met  5. Gait  x 75 feet with Stand-by Assistance using Standard Walker. - not met  6. Lower extremity exercise program x15 reps per handout, with supervision - not met      Pts goals remain appropriate. Continue with POC.  Jane Mistry, SPTA  2018  I certify that I was present in the room directing the student in service delivery and guiding them using my skilled judgment. As the co-signing therapist I have reviewed the students documentation and am responsible for the treatment, assessment, and plan. Yoav Calabrese PTA  2018

## 2018-06-26 NOTE — PT/OT/SLP PROGRESS
Physical Therapy Treatment    Patient Name:  Chucho Prado   MRN:  [id number]    Recommendations:     Discharge Recommendations:  nursing facility, skilled   Discharge Equipment Recommendations: none   Barriers to discharge: None    Assessment:     Chucho Prado is a 84 y.o. male admitted with a medical diagnosis of Urinary tract infection associated with cystostomy catheter.  He presents with the following impairments/functional limitations:  weakness, impaired endurance, impaired self care skills, gait instability, impaired functional mobilty, decreased lower extremity function, decreased coordination, pain . Pt limited in gait training today due to abdominal pain and weakness in legs. Pt requires VCs to lean forward and maintain an upright posture while seated. Pt would benefit from continued PT services.     Rehab Prognosis:  Good; patient would benefit from acute skilled PT services to address these deficits and reach maximum level of function.      Recent Surgery: Procedure(s) (LRB):  REPAIR, HERNIA, VENTRAL, INCARCERATED, WITHOUT HISTORY OF PRIOR REPAIR (N/A) 6 Days Post-Op    Plan:     During this hospitalization, patient to be seen 5 x/week to address the above listed problems via gait training, therapeutic activities, therapeutic exercises  · Plan of Care Expires:  07/21/18   Plan of Care Reviewed with: patient    Subjective     Communicated with RN prior to session.  Patient found supine upon PT entry to room, agreeable to treatment.      Chief Complaint: abdominal pain  Patient comments/goals: to gain strength  Pain/Comfort:  · Pain Rating 1: 6/10  · Location - Side 1: Bilateral  · Location 1: abdomen  · Pain Addressed 1: Reposition, Distraction, Cessation of Activity    Patients cultural, spiritual, Orthodox conflicts given the current situation: No    Objective:     Patient found with: peripheral IV, SCD, oxygen, CANDACE drain, NG tube, galvan catheter     General Precautions: Standard, aspiration,  fall   Orthopedic Precautions:N/A   Braces: N/A     Functional Mobility:  · Bed Mobility:     · Rolling Left:  minimum assistance  · Supine to Sit: moderate assistance  · Transfers:     · Sit to Stand:  maximal assistance and  Devan of another person with rolling walker x 1 trial. Pt then requested to use a standard walker and sit to stand was performed again x 3  trails with max assistance x 1 and Devan of another person.  · Balance: Pt able to tolerate sitting EOB with min to SBA  · Gait: Pt took 3 steps forwards and backwards with a standard walker and mod assistance x 2 people. Pt declined to go farther due to pain and weakness.   · Bed to Chair : Transfer performed using stand pivot x 2 persons and mod assistance with a standard walker.       AM-PAC 6 CLICK MOBILITY  Turning over in bed (including adjusting bedclothes, sheets and blankets)?: 3  Sitting down on and standing up from a chair with arms (e.g., wheelchair, bedside commode, etc.): 2  Moving from lying on back to sitting on the side of the bed?: 2  Moving to and from a bed to a chair (including a wheelchair)?: 2  Need to walk in hospital room?: 2  Climbing 3-5 steps with a railing?: 1  Basic Mobility Total Score: 12       Therapeutic Activities and Exercises:  Pt educated on POC  Pt educated on safety  White board updated  LE There ex seated in chair:  -B ankle pumps x 10  -B marching x 10  -B LAQ x 10  -B Hip Adductions x 10      Patient left up in chair with all lines intact and call button in reach..    GOALS:    Physical Therapy Goals        Problem: Physical Therapy Goal    Goal Priority Disciplines Outcome Goal Variances Interventions   Physical Therapy Goal     PT/OT, PT Ongoing (interventions implemented as appropriate)     Description:  Goals to be met by: 2018     Patient will increase functional independence with mobility by performin. Supine to sit with Stand-by Assistance - not met  2. Sit to supine with Stand-by Assistance - not  met  3. Sit to stand transfer with Stand-by Assistance - not met  4. Bed to chair transfer with Stand-by Assistance using Standard Walker - not met  5. Gait  x 75 feet with Stand-by Assistance using Standard Walker. - not met  6. Lower extremity exercise program x15 reps per handout, with supervision - not met                       Time Tracking:     PT Received On: 06/26/18  PT Start Time: 0817     PT Stop Time: 0856  PT Total Time (min): 39 min     Billable Minutes: Therapeutic Activity 29 and Therapeutic Exercise 10    Treatment Type: Treatment  PT/PTA: PTA     PTA Visit Number: 2     Jane Mistry, [facility]  06/26/2018   I certify that I was present in the room directing the student in service delivery and guiding them using my skilled judgment. As the co-signing therapist I have reviewed the students documentation and am responsible for the treatment, assessment, and plan. Yoav Calabrese PTA

## 2018-06-26 NOTE — PLAN OF CARE
Problem: Occupational Therapy Goal  Goal: Occupational Therapy Goal  Goals to be met by: 6/28/2018     Patient will increase functional independence with ADLs by performing:    UE Dressing with Moderate Assistance.  LE Dressing with Moderate Assistance.  Grooming while seated with Minimal Assistance. Met  Toileting from bedside commode with Maximum Assistance for hygiene and clothing management.   Toilet transfer to bedside commode with Moderate Assistance.      Outcome: Ongoing (interventions implemented as appropriate)  Pt progressing toward goals    Comments: Continue OT MEKA Chatterjee OT  6/26/2018

## 2018-06-27 PROBLEM — E83.42 HYPOMAGNESEMIA: Status: ACTIVE | Noted: 2018-06-27

## 2018-06-27 LAB
ANION GAP SERPL CALC-SCNC: 8 MMOL/L
BASOPHILS # BLD AUTO: 0.03 K/UL
BASOPHILS NFR BLD: 0.5 %
BUN SERPL-MCNC: 6 MG/DL
CALCIUM SERPL-MCNC: 8.6 MG/DL
CHLORIDE SERPL-SCNC: 109 MMOL/L
CO2 SERPL-SCNC: 27 MMOL/L
CREAT SERPL-MCNC: 1 MG/DL
DIFFERENTIAL METHOD: ABNORMAL
EOSINOPHIL # BLD AUTO: 0.4 K/UL
EOSINOPHIL NFR BLD: 6.1 %
ERYTHROCYTE [DISTWIDTH] IN BLOOD BY AUTOMATED COUNT: 15.7 %
EST. GFR  (AFRICAN AMERICAN): >60 ML/MIN/1.73 M^2
EST. GFR  (NON AFRICAN AMERICAN): >60 ML/MIN/1.73 M^2
GLUCOSE SERPL-MCNC: 103 MG/DL
HCT VFR BLD AUTO: 35.9 %
HGB BLD-MCNC: 11 G/DL
IMM GRANULOCYTES # BLD AUTO: 0.04 K/UL
IMM GRANULOCYTES NFR BLD AUTO: 0.6 %
LYMPHOCYTES # BLD AUTO: 0.9 K/UL
LYMPHOCYTES NFR BLD: 13.1 %
MAGNESIUM SERPL-MCNC: 1.6 MG/DL
MCH RBC QN AUTO: 27.6 PG
MCHC RBC AUTO-ENTMCNC: 30.6 G/DL
MCV RBC AUTO: 90 FL
MONOCYTES # BLD AUTO: 0.5 K/UL
MONOCYTES NFR BLD: 7.6 %
NEUTROPHILS # BLD AUTO: 4.7 K/UL
NEUTROPHILS NFR BLD: 72.1 %
NRBC BLD-RTO: 0 /100 WBC
PHOSPHATE SERPL-MCNC: 3.4 MG/DL
PLATELET # BLD AUTO: 253 K/UL
PMV BLD AUTO: 9.6 FL
POTASSIUM SERPL-SCNC: 4.1 MMOL/L
RBC # BLD AUTO: 3.98 M/UL
SODIUM SERPL-SCNC: 144 MMOL/L
WBC # BLD AUTO: 6.54 K/UL

## 2018-06-27 PROCEDURE — 94761 N-INVAS EAR/PLS OXIMETRY MLT: CPT

## 2018-06-27 PROCEDURE — 63600175 PHARM REV CODE 636 W HCPCS: Performed by: STUDENT IN AN ORGANIZED HEALTH CARE EDUCATION/TRAINING PROGRAM

## 2018-06-27 PROCEDURE — 97530 THERAPEUTIC ACTIVITIES: CPT

## 2018-06-27 PROCEDURE — 97535 SELF CARE MNGMENT TRAINING: CPT

## 2018-06-27 PROCEDURE — 25000003 PHARM REV CODE 250: Performed by: STUDENT IN AN ORGANIZED HEALTH CARE EDUCATION/TRAINING PROGRAM

## 2018-06-27 PROCEDURE — 12000002 HC ACUTE/MED SURGE SEMI-PRIVATE ROOM

## 2018-06-27 PROCEDURE — 36415 COLL VENOUS BLD VENIPUNCTURE: CPT

## 2018-06-27 PROCEDURE — 25000242 PHARM REV CODE 250 ALT 637 W/ HCPCS: Performed by: STUDENT IN AN ORGANIZED HEALTH CARE EDUCATION/TRAINING PROGRAM

## 2018-06-27 PROCEDURE — 97110 THERAPEUTIC EXERCISES: CPT

## 2018-06-27 PROCEDURE — 25000003 PHARM REV CODE 250: Performed by: SURGERY

## 2018-06-27 PROCEDURE — 94664 DEMO&/EVAL PT USE INHALER: CPT

## 2018-06-27 PROCEDURE — 63600175 PHARM REV CODE 636 W HCPCS: Performed by: SURGERY

## 2018-06-27 PROCEDURE — 84100 ASSAY OF PHOSPHORUS: CPT

## 2018-06-27 PROCEDURE — 27000221 HC OXYGEN, UP TO 24 HOURS

## 2018-06-27 PROCEDURE — 80048 BASIC METABOLIC PNL TOTAL CA: CPT

## 2018-06-27 PROCEDURE — G8988 SELF CARE GOAL STATUS: HCPCS | Mod: CJ

## 2018-06-27 PROCEDURE — 94640 AIRWAY INHALATION TREATMENT: CPT

## 2018-06-27 PROCEDURE — A4216 STERILE WATER/SALINE, 10 ML: HCPCS | Performed by: SURGERY

## 2018-06-27 PROCEDURE — 83735 ASSAY OF MAGNESIUM: CPT

## 2018-06-27 PROCEDURE — G8987 SELF CARE CURRENT STATUS: HCPCS | Mod: CK

## 2018-06-27 PROCEDURE — 25000003 PHARM REV CODE 250: Performed by: PHYSICIAN ASSISTANT

## 2018-06-27 PROCEDURE — 85025 COMPLETE CBC W/AUTO DIFF WBC: CPT

## 2018-06-27 PROCEDURE — 99900035 HC TECH TIME PER 15 MIN (STAT)

## 2018-06-27 RX ORDER — LANOLIN ALCOHOL/MO/W.PET/CERES
400 CREAM (GRAM) TOPICAL ONCE
Status: COMPLETED | OUTPATIENT
Start: 2018-06-27 | End: 2018-06-27

## 2018-06-27 RX ADMIN — Medication 400 MG: at 09:06

## 2018-06-27 RX ADMIN — HEPARIN SODIUM 5000 UNITS: 5000 INJECTION, SOLUTION INTRAVENOUS; SUBCUTANEOUS at 05:06

## 2018-06-27 RX ADMIN — OXYCODONE HYDROCHLORIDE AND ACETAMINOPHEN 1 TABLET: 10; 325 TABLET ORAL at 09:06

## 2018-06-27 RX ADMIN — IPRATROPIUM BROMIDE AND ALBUTEROL SULFATE 3 ML: .5; 3 SOLUTION RESPIRATORY (INHALATION) at 08:06

## 2018-06-27 RX ADMIN — OXYCODONE HYDROCHLORIDE AND ACETAMINOPHEN 1 TABLET: 10; 325 TABLET ORAL at 01:06

## 2018-06-27 RX ADMIN — IPRATROPIUM BROMIDE AND ALBUTEROL SULFATE 3 ML: .5; 3 SOLUTION RESPIRATORY (INHALATION) at 04:06

## 2018-06-27 RX ADMIN — DEXTROSE MONOHYDRATE, SODIUM CHLORIDE, AND POTASSIUM CHLORIDE: 50; 9; 1.49 INJECTION, SOLUTION INTRAVENOUS at 09:06

## 2018-06-27 RX ADMIN — Medication 3 ML: at 05:06

## 2018-06-27 RX ADMIN — ASPIRIN 81 MG: 81 TABLET, COATED ORAL at 09:06

## 2018-06-27 RX ADMIN — OXYCODONE HYDROCHLORIDE AND ACETAMINOPHEN 1 TABLET: 5; 325 TABLET ORAL at 10:06

## 2018-06-27 RX ADMIN — CEFTRIAXONE SODIUM 1 G: 1 INJECTION, POWDER, FOR SOLUTION INTRAMUSCULAR; INTRAVENOUS at 05:06

## 2018-06-27 RX ADMIN — HEPARIN SODIUM 5000 UNITS: 5000 INJECTION, SOLUTION INTRAVENOUS; SUBCUTANEOUS at 01:06

## 2018-06-27 RX ADMIN — IPRATROPIUM BROMIDE AND ALBUTEROL SULFATE 3 ML: .5; 3 SOLUTION RESPIRATORY (INHALATION) at 12:06

## 2018-06-27 RX ADMIN — HEPARIN SODIUM 5000 UNITS: 5000 INJECTION, SOLUTION INTRAVENOUS; SUBCUTANEOUS at 09:06

## 2018-06-27 RX ADMIN — Medication 3 ML: at 09:06

## 2018-06-27 RX ADMIN — GABAPENTIN 400 MG: 400 CAPSULE ORAL at 09:06

## 2018-06-27 RX ADMIN — LEVOTHYROXINE SODIUM 50 MCG: 50 TABLET ORAL at 06:06

## 2018-06-27 RX ADMIN — AMLODIPINE BESYLATE 10 MG: 10 TABLET ORAL at 09:06

## 2018-06-27 RX ADMIN — IPRATROPIUM BROMIDE AND ALBUTEROL SULFATE 3 ML: .5; 3 SOLUTION RESPIRATORY (INHALATION) at 03:06

## 2018-06-27 NOTE — SUBJECTIVE & OBJECTIVE
Interval History:   Patient seen and examined, no acute events overnight  Tolerated ice with no N/V  +F/BM  CANDACE drain put out 40mL overnight  Afebrile/VSS    Medications:  Continuous Infusions:   dextrose 5 % and 0.9 % NaCl with KCl 20 mEq 125 mL/hr at 06/26/18 0504     Scheduled Meds:   albuterol-ipratropium  3 mL Nebulization Q4H    amLODIPine  10 mg Oral Daily    aspirin  81 mg Oral Daily    cefTRIAXone (ROCEPHIN) IVPB  1 g Intravenous Q24H    gabapentin  400 mg Oral TID    heparin (porcine)  5,000 Units Subcutaneous Q8H    levothyroxine  50 mcg Oral Before breakfast    sodium chloride 0.9%  3 mL Intravenous Q8H     PRN Meds:morphine, ondansetron, oxyCODONE-acetaminophen, oxyCODONE-acetaminophen     Review of patient's allergies indicates:  No Known Allergies  Objective:     Vital Signs (Most Recent):  Temp: 98 °F (36.7 °C) (06/27/18 0528)  Pulse: 74 (06/27/18 0528)  Resp: 18 (06/27/18 0528)  BP: 125/74 (06/27/18 0528)  SpO2: 95 % (06/27/18 0528) Vital Signs (24h Range):  Temp:  [97.3 °F (36.3 °C)-98.2 °F (36.8 °C)] 98 °F (36.7 °C)  Pulse:  [67-88] 74  Resp:  [14-20] 18  SpO2:  [94 %-98 %] 95 %  BP: (125-142)/(58-74) 125/74     Weight: 91.2 kg (201 lb 1 oz)  Body mass index is 30.13 kg/m².    Intake/Output - Last 3 Shifts       06/25 0700 - 06/26 0659 06/26 0700 - 06/27 0659 06/27 0700 - 06/28 0659    P.O.  250     I.V. (mL/kg) 1100 (12.1)      Total Intake(mL/kg) 1100 (12.1) 250 (2.7)     Urine (mL/kg/hr) 1950 (0.9) 1400 (0.6)     Other 20 (0) 40 (0)     Total Output 1970 1440      Net -870 -1190                   Physical Exam   Constitutional: He appears well-developed and well-nourished. No distress.   Cardiovascular: Normal rate.    Pulmonary/Chest: Effort normal. He has wheezes.   Abdominal:   Soft, appropriate TTP, ND  Hernia reducible  Incision - clean, dry and intact, staples in place  CANDACE drain with serosang drainage   Neurological: He is alert.       Significant Labs:  CBC:   Recent Labs  Lab  06/27/18 0621   WBC 6.54   RBC 3.98*   HGB 11.0*   HCT 35.9*      MCV 90   MCH 27.6   MCHC 30.6*     BMP:   Recent Labs  Lab 06/27/18 0621         K 4.1      CO2 27   BUN 6*   CREATININE 1.0   CALCIUM 8.6*   MG 1.6     CMP:   Recent Labs  Lab 06/27/18 0621      CALCIUM 8.6*      K 4.1   CO2 27      BUN 6*   CREATININE 1.0

## 2018-06-27 NOTE — PROGRESS NOTES
[facility]  General Surgery  Progress Note    Subjective:     Post-Op Info:  Procedure(s) (LRB):  REPAIR, HERNIA, VENTRAL, INCARCERATED, WITHOUT HISTORY OF PRIOR REPAIR (N/A)   7 Days Post-Op     Interval History:   Patient seen and examined, no acute events overnight  Tolerated ice with no N/V  +F/BM  CANDACE drain put out 40mL overnight  Afebrile/VSS    Medications:  Continuous Infusions:   dextrose 5 % and 0.9 % NaCl with KCl 20 mEq 125 mL/hr at 06/26/18 0504     Scheduled Meds:   albuterol-ipratropium  3 mL Nebulization Q4H    amLODIPine  10 mg Oral Daily    aspirin  81 mg Oral Daily    cefTRIAXone (ROCEPHIN) IVPB  1 g Intravenous Q24H    gabapentin  400 mg Oral TID    heparin (porcine)  5,000 Units Subcutaneous Q8H    levothyroxine  50 mcg Oral Before breakfast    sodium chloride 0.9%  3 mL Intravenous Q8H     PRN Meds:morphine, ondansetron, oxyCODONE-acetaminophen, oxyCODONE-acetaminophen     Review of patient's allergies indicates:  No Known Allergies  Objective:     Vital Signs (Most Recent):  Temp: 98 °F (36.7 °C) (06/27/18 0528)  Pulse: 74 (06/27/18 0528)  Resp: 18 (06/27/18 0528)  BP: 125/74 (06/27/18 0528)  SpO2: 95 % (06/27/18 0528) Vital Signs (24h Range):  Temp:  [97.3 °F (36.3 °C)-98.2 °F (36.8 °C)] 98 °F (36.7 °C)  Pulse:  [67-88] 74  Resp:  [14-20] 18  SpO2:  [94 %-98 %] 95 %  BP: (125-142)/(58-74) 125/74     Weight: 91.2 kg (201 lb 1 oz)  Body mass index is 30.13 kg/m².    Intake/Output - Last 3 Shifts       06/25 0700 - 06/26 0659 06/26 0700 - 06/27 0659 06/27 0700 - 06/28 0659    P.O.  250     I.V. (mL/kg) 1100 (12.1)      Total Intake(mL/kg) 1100 (12.1) 250 (2.7)     Urine (mL/kg/hr) 1950 (0.9) 1400 (0.6)     Other 20 (0) 40 (0)     Total Output 1970 1440      Net -870 -1190                   Physical Exam   Constitutional: He appears well-developed and well-nourished. No distress.   Cardiovascular: Normal rate.    Pulmonary/Chest: Effort normal. He has wheezes.    Abdominal:   Soft, appropriate TTP, ND  Hernia reducible  Incision - clean, dry and intact, staples in place  CANDACE drain with serosang drainage   Neurological: He is alert.       Significant Labs:  CBC:   Recent Labs  Lab 06/27/18 0621   WBC 6.54   RBC 3.98*   HGB 11.0*   HCT 35.9*      MCV 90   MCH 27.6   MCHC 30.6*     BMP:   Recent Labs  Lab 06/27/18 0621         K 4.1      CO2 27   BUN 6*   CREATININE 1.0   CALCIUM 8.6*   MG 1.6     CMP:   Recent Labs  Lab 06/27/18 0621      CALCIUM 8.6*      K 4.1   CO2 27      BUN 6*   CREATININE 1.0     Assessment/Plan:     * Urinary tract infection associated with cystostomy catheter    On rocephin for 10 days, started 6/21        Hypomagnesemia    Replace         Hypokalemia    Replace         Small bowel obstruction    85 yo male s/p ventral hernia repair of incarcerated hernia 6/20/18    - CLD  - PO pain  - nausea meds PRN  - abx - IV rocephin started 6/21, end date 6/31  - IVF - d5 125  - PT/OT/IS - patient refusing SNF as recommended by PT/OT; plan for HH on discharge   - DVT prophylaxis        Abdominal wall hernia    CT showed fat in ventral hernia  Reducible        Sciatica neuralgia    Home meds  -gabapentin        Essential hypertension    Home meds  -amlodipine        Hypothyroidism    Home meds  -levothyroxine            Grazyna Rose PA-C   [id number]  General Surgery  [facility]

## 2018-06-27 NOTE — PT/OT/SLP PROGRESS
Physical Therapy Treatment    Patient Name:  Chucho Prado   MRN:  [id number]    Recommendations:     Discharge Recommendations:  nursing facility, skilled   Discharge Equipment Recommendations: none   Barriers to discharge: None    Assessment:     Chucho Prado is a 84 y.o. male admitted with a medical diagnosis of Urinary tract infection associated with cystostomy catheter.  He presents with the following impairments/functional limitations:  weakness, impaired endurance, impaired self care skills, impaired functional mobilty, gait instability, decreased coordination, decreased lower extremity function, pain . Pt still requiring mod/max assist for transfers due to abdominal pain and weakness in legs. Pt would benefit from continued PT services     Rehab Prognosis:  Good; patient would benefit from acute skilled PT services to address these deficits and reach maximum level of function.      Recent Surgery: Procedure(s) (LRB):  REPAIR, HERNIA, VENTRAL, INCARCERATED, WITHOUT HISTORY OF PRIOR REPAIR (N/A) 7 Days Post-Op    Plan:     During this hospitalization, patient to be seen 5 x/week to address the above listed problems via therapeutic activities, therapeutic exercises  · Plan of Care Expires:  07/21/18   Plan of Care Reviewed with: patient    Subjective     Communicated with RN prior to session.  Patient found seated in chair upon PT entry to room, agreeable to treatment.      Chief Complaint: pain  Patient comments/goals: to get back in bed  Pain/Comfort:  · Pain Rating 1:  (number not stated)  · Location - Side 1: Bilateral  · Location 1: abdomen  · Pain Addressed 1: Reposition, Distraction, Cessation of Activity    Patients cultural, spiritual, Church conflicts given the current situation: No    Objective:     Patient found with: oxygen, peripheral IV, galvan catheter, CANDACE drain     General Precautions: Standard, fall   Orthopedic Precautions:N/A   Braces: N/A     Functional Mobility:  · Bed Mobility:      · Rolling Right: minimum assistance  · Transfers:   · Sit to Stand: Maximal assistance x 2 persons with RW   vcs for sequencing and hand placement  · Chair to Bed: moderate assistance x 2 persons with rolling walker using Stand Pivot.  · Sit to Supine: Moderate assistance x 2 persons  · Scooting: Max assistance x 2 persons scooting to HOB      AM-PAC 6 CLICK MOBILITY  Turning over in bed (including adjusting bedclothes, sheets and blankets)?: 3  Sitting down on and standing up from a chair with arms (e.g., wheelchair, bedside commode, etc.): 2  Moving from lying on back to sitting on the side of the bed?: 2  Moving to and from a bed to a chair (including a wheelchair)?: 2  Need to walk in hospital room?: 2  Climbing 3-5 steps with a railing?: 1  Basic Mobility Total Score: 12       Therapeutic Activities and Exercises:  Pt educated on POC  Pt educated on safety with mobility  White board updated  LE there-ex seated in chair:  - B Marching x 15  - B Ankle Pumps x 15  - B LAQ x 15  - B Hip Adductions x 15      Patient left supine with call button in reach and sitter present..    GOALS:    Physical Therapy Goals        Problem: Physical Therapy Goal    Goal Priority Disciplines Outcome Goal Variances Interventions   Physical Therapy Goal     PT/OT, PT Ongoing (interventions implemented as appropriate)     Description:  Goals to be met by: 2018     Patient will increase functional independence with mobility by performin. Supine to sit with Stand-by Assistance - not met  2. Sit to supine with Stand-by Assistance - not met  3. Sit to stand transfer with Stand-by Assistance - not met  4. Bed to chair transfer with Stand-by Assistance using Standard Walker - not met  5. Gait  x 75 feet with Stand-by Assistance using Standard Walker. - not met  6. Lower extremity exercise program x15 reps per handout, with supervision - not met                       Time Tracking:     PT Received On: 18  PT Start Time:  1158     PT Stop Time: 1221  PT Total Time (min): 23 min     Billable Minutes: Therapeutic Activity 13 and Therapeutic Exercise 10    Treatment Type: Treatment  PT/PTA: PTA     PTA Visit Number: 3     Jane Mistry, SPTA  06/27/2018   I certify that I was present in the room directing the student in service delivery and guiding them using my skilled judgment. As the co-signing therapist I have reviewed the students documentation and am responsible for the treatment, assessment, and plan. Yoav Calabrese PTA  6/27/2018

## 2018-06-27 NOTE — PLAN OF CARE
Problem: Physical Therapy Goal  Goal: Physical Therapy Goal  Goals to be met by: 2018     Patient will increase functional independence with mobility by performin. Supine to sit with Stand-by Assistance - not met  2. Sit to supine with Stand-by Assistance - not met  3. Sit to stand transfer with Stand-by Assistance - not met  4. Bed to chair transfer with Stand-by Assistance using Standard Walker - not met  5. Gait  x 75 feet with Stand-by Assistance using Standard Walker. - not met  6. Lower extremity exercise program x15 reps per handout, with supervision - not met      Goals remain appropriate. Continue with POC.  Jane Mistry, SPTA  2018  I certify that I was present in the room directing the student in service delivery and guiding them using my skilled judgment. As the co-signing therapist I have reviewed the students documentation and am responsible for the treatment, assessment, and plan. Yoav Calabrese PTA  2018

## 2018-06-27 NOTE — PLAN OF CARE
Problem: Occupational Therapy Goal  Goal: Occupational Therapy Goal  Goals to be met by: 6/28/2018     Patient will increase functional independence with ADLs by performing:    UE Dressing with Moderate Assistance.  LE Dressing with Moderate Assistance.  Grooming while seated with Minimal Assistance. GOAL MET   REVISED: grooming while seated with setup assistance  Toileting from bedside commode with Maximum Assistance for hygiene and clothing management.   Toilet transfer to bedside commode with Moderate Assistance.      Outcome: Ongoing (interventions implemented as appropriate)  Grooming goal revised to setup assist level; pt progressing toward remaining goals     Comments: Continue OT POC    Preethi Chatterjee OT  6/27/2018

## 2018-06-27 NOTE — PLAN OF CARE
"12:55 PM Visited patient. AAOX4. He states he has to have NGT replaced. His personal sitter is at [facility].   JIMBO asked him if he still agrees to discharge plan he agreed on last week: home w/HH, resuming his personal sitters? He verbalized his understanding & states,"I have been at [facility] w/my knee surgery in the past & [facility] in the past w/my back. I liked the [facility] & want to go there when I am discharged. I would like them to come talk to me. I do not want to go to [facility]."   JIMBO informed him this referral can be sent, but his PT/OT notes need to have this as their recommendations in order to qualify. His current notes denote SNF. Informed him CM would inform PT & OT. His participation is important, while he is here, & he will need to tolerated 3hrs of therapy daily. He verbalized his understanding & states," I did it before."     15:40 PM JIMBO spoke to PT, Yoav, & OT, Preethi, who worked w/him today. Informed them of above. Preethi states he may be able to be a candidate for Rehab;Yoav was doubtful as he doesn't cooperate w/therapies & wants to stay in bed. Informed them patient not feeling well today. JIMBO will look at future PT/OT notes & discuss again w/DrMyles & patient.       "

## 2018-06-27 NOTE — PT/OT/SLP PROGRESS
Occupational Therapy   Treatment    Name: Chucho Prado  MRN: [id number]  Admitting Diagnosis:  Urinary tract infection associated with cystostomy catheter  7 Days Post-Op    Recommendations:     Discharge Recommendations: nursing facility, skilled  Discharge Equipment Recommendations:   (TBD)  Barriers to discharge:  None    Subjective     Communicated with: Rn prior to session.  Pain/Comfort:  · Pain Rating 1:  (6.5/10)  · Location - Side 1: Bilateral  · Location - Orientation 1: generalized  · Location 1: abdomen  · Pain Addressed 1: Reposition, Distraction, Cessation of Activity  · Pain Rating Post-Intervention 1:  (7.5/10)    Patients cultural, spiritual, L[employer] day conflicts given the current situation:      Objective:     Patient found with: peripheral IV, CANDACE drain, galvan catheter    General Precautions: Standard, fall, aspiration   Orthopedic Precautions:N/A   Braces: N/A     Occupational Performance:    Bed Mobility:    · Patient completed Scooting/Bridging with contact guard assistance  · Patient completed Supine to Sit with moderate assistance     Functional Mobility/Transfers:  · Patient completed Sit <> Stand Transfer with maximal assistance  with  rolling walker   · Patient completed Bed <> Chair Transfer using Step Transfer technique with minimum assistance with rolling walker  · Functional Mobility: Pt took 3 steps from bed to chair with min A using RW     Activities of Daily Living:  · Grooming: setup assistance to wash face while seated UIC  · LB Dressing: maximal assistance to don B shoes while seated EOB    Patient left up in chair with all lines intact, call button in reach and RN notified    [facility] 6 Click:  AMPA Total Score: 16    Treatment & Education:  Pt educated on role of OT/POC  Pt educated on importance of EOB/UIC   White board/communication board updated  Education:    Assessment:     Chucho Prado is a 84 y.o. male with a medical diagnosis of Urinary tract infection associated  with cystostomy catheter.  He presents with pain limiting participation in functional mobility and self care.  Performance deficits affecting function are weakness, impaired endurance, impaired self care skills, impaired functional mobilty, gait instability, impaired balance, pain, decreased safety awareness, impaired cardiopulmonary response to activity.      Rehab Prognosis:  Fair; patient would benefit from acute skilled OT services to address these deficits and reach maximum level of function.       Plan:     Patient to be seen 4 x/week to address the above listed problems via self-care/home management, therapeutic activities, therapeutic exercises  · Plan of Care Expires: 07/22/18  · Plan of Care Reviewed with: patient    This Plan of care has been discussed with the patient who was involved in its development and understands and is in agreement with the identified goals and treatment plan    GOALS:    Occupational Therapy Goals        Problem: Occupational Therapy Goal    Goal Priority Disciplines Outcome Interventions   Occupational Therapy Goal     OT, PT/OT Ongoing (interventions implemented as appropriate)    Description:  Goals to be met by: 6/28/2018     Patient will increase functional independence with ADLs by performing:    UE Dressing with Moderate Assistance.  LE Dressing with Moderate Assistance.  Grooming while seated with Minimal Assistance. GOAL MET   REVISED: grooming while seated with setup assistance  Toileting from bedside commode with Maximum Assistance for hygiene and clothing management.   Toilet transfer to bedside commode with Moderate Assistance.                        Time Tracking:     OT Date of Treatment: 06/27/18  OT Start Time: 0852  OT Stop Time: 0915  OT Total Time (min): 23 min    Billable Minutes:Self Care/Home Management 10  Therapeutic Activity 14    Preethi Chatterjee OT  6/27/2018

## 2018-06-27 NOTE — PLAN OF CARE
OT spoke with JIMBO Christianson this PM who stated pt was wanting to go to rehab instead of SNF as recommended by OT/PT. OT explain pt would need to increase participation in sessions to become a good rehab candidate. Pt not wanting to ambulate to bathroom for self-care and complaining about having to sit up in chair at OT session this AM. OT/PT will communicate need for improved participation for rehab to be a viable d/c option.    Preethi Chatterjee OT  6/27/2018

## 2018-06-28 PROBLEM — Z78.9 IMPAIRED MOBILITY AND ADLS: Status: ACTIVE | Noted: 2018-06-28

## 2018-06-28 PROBLEM — Z74.09 IMPAIRED MOBILITY AND ADLS: Status: ACTIVE | Noted: 2018-06-28

## 2018-06-28 LAB
ANION GAP SERPL CALC-SCNC: 11 MMOL/L
BASOPHILS # BLD AUTO: 0.04 K/UL
BASOPHILS NFR BLD: 0.5 %
BUN SERPL-MCNC: 7 MG/DL
CALCIUM SERPL-MCNC: 8.7 MG/DL
CHLORIDE SERPL-SCNC: 108 MMOL/L
CO2 SERPL-SCNC: 27 MMOL/L
CREAT SERPL-MCNC: 1.2 MG/DL
DIFFERENTIAL METHOD: ABNORMAL
EOSINOPHIL # BLD AUTO: 0.4 K/UL
EOSINOPHIL NFR BLD: 5.1 %
ERYTHROCYTE [DISTWIDTH] IN BLOOD BY AUTOMATED COUNT: 15.6 %
EST. GFR  (AFRICAN AMERICAN): >60 ML/MIN/1.73 M^2
EST. GFR  (NON AFRICAN AMERICAN): 55.2 ML/MIN/1.73 M^2
GLUCOSE SERPL-MCNC: 71 MG/DL
HCT VFR BLD AUTO: 35.7 %
HGB BLD-MCNC: 10.7 G/DL
IMM GRANULOCYTES # BLD AUTO: 0.05 K/UL
IMM GRANULOCYTES NFR BLD AUTO: 0.6 %
LYMPHOCYTES # BLD AUTO: 1.1 K/UL
LYMPHOCYTES NFR BLD: 13.9 %
MAGNESIUM SERPL-MCNC: 1.8 MG/DL
MCH RBC QN AUTO: 26.8 PG
MCHC RBC AUTO-ENTMCNC: 30 G/DL
MCV RBC AUTO: 90 FL
MONOCYTES # BLD AUTO: 0.6 K/UL
MONOCYTES NFR BLD: 7.3 %
NEUTROPHILS # BLD AUTO: 5.9 K/UL
NEUTROPHILS NFR BLD: 72.6 %
NRBC BLD-RTO: 0 /100 WBC
PHOSPHATE SERPL-MCNC: 3.6 MG/DL
PLATELET # BLD AUTO: 270 K/UL
PMV BLD AUTO: 9.7 FL
POTASSIUM SERPL-SCNC: 4 MMOL/L
RBC # BLD AUTO: 3.99 M/UL
SODIUM SERPL-SCNC: 146 MMOL/L
WBC # BLD AUTO: 8.06 K/UL

## 2018-06-28 PROCEDURE — 63600175 PHARM REV CODE 636 W HCPCS: Performed by: STUDENT IN AN ORGANIZED HEALTH CARE EDUCATION/TRAINING PROGRAM

## 2018-06-28 PROCEDURE — 97530 THERAPEUTIC ACTIVITIES: CPT

## 2018-06-28 PROCEDURE — 25000003 PHARM REV CODE 250: Performed by: PHYSICIAN ASSISTANT

## 2018-06-28 PROCEDURE — 36415 COLL VENOUS BLD VENIPUNCTURE: CPT

## 2018-06-28 PROCEDURE — 97110 THERAPEUTIC EXERCISES: CPT

## 2018-06-28 PROCEDURE — 84100 ASSAY OF PHOSPHORUS: CPT

## 2018-06-28 PROCEDURE — 94640 AIRWAY INHALATION TREATMENT: CPT

## 2018-06-28 PROCEDURE — 63600175 PHARM REV CODE 636 W HCPCS: Performed by: SURGERY

## 2018-06-28 PROCEDURE — 12000002 HC ACUTE/MED SURGE SEMI-PRIVATE ROOM

## 2018-06-28 PROCEDURE — 94664 DEMO&/EVAL PT USE INHALER: CPT

## 2018-06-28 PROCEDURE — 94799 UNLISTED PULMONARY SVC/PX: CPT

## 2018-06-28 PROCEDURE — 99222 1ST HOSP IP/OBS MODERATE 55: CPT | Mod: ,,, | Performed by: NURSE PRACTITIONER

## 2018-06-28 PROCEDURE — A4216 STERILE WATER/SALINE, 10 ML: HCPCS | Performed by: SURGERY

## 2018-06-28 PROCEDURE — 25000003 PHARM REV CODE 250: Performed by: SURGERY

## 2018-06-28 PROCEDURE — 85025 COMPLETE CBC W/AUTO DIFF WBC: CPT

## 2018-06-28 PROCEDURE — 25000242 PHARM REV CODE 250 ALT 637 W/ HCPCS: Performed by: STUDENT IN AN ORGANIZED HEALTH CARE EDUCATION/TRAINING PROGRAM

## 2018-06-28 PROCEDURE — 83735 ASSAY OF MAGNESIUM: CPT

## 2018-06-28 PROCEDURE — 25000003 PHARM REV CODE 250: Performed by: STUDENT IN AN ORGANIZED HEALTH CARE EDUCATION/TRAINING PROGRAM

## 2018-06-28 PROCEDURE — 99900035 HC TECH TIME PER 15 MIN (STAT)

## 2018-06-28 PROCEDURE — 94761 N-INVAS EAR/PLS OXIMETRY MLT: CPT

## 2018-06-28 PROCEDURE — 27000221 HC OXYGEN, UP TO 24 HOURS

## 2018-06-28 PROCEDURE — 80048 BASIC METABOLIC PNL TOTAL CA: CPT

## 2018-06-28 PROCEDURE — 27000646 HC AEROBIKA DEVICE

## 2018-06-28 RX ORDER — SULFAMETHOXAZOLE AND TRIMETHOPRIM 800; 160 MG/1; MG/1
1 TABLET ORAL 2 TIMES DAILY
Status: COMPLETED | OUTPATIENT
Start: 2018-06-28 | End: 2018-06-29

## 2018-06-28 RX ORDER — LANOLIN ALCOHOL/MO/W.PET/CERES
400 CREAM (GRAM) TOPICAL ONCE
Status: COMPLETED | OUTPATIENT
Start: 2018-06-28 | End: 2018-06-28

## 2018-06-28 RX ADMIN — IPRATROPIUM BROMIDE AND ALBUTEROL SULFATE 3 ML: .5; 3 SOLUTION RESPIRATORY (INHALATION) at 07:06

## 2018-06-28 RX ADMIN — IPRATROPIUM BROMIDE AND ALBUTEROL SULFATE 3 ML: .5; 3 SOLUTION RESPIRATORY (INHALATION) at 08:06

## 2018-06-28 RX ADMIN — CEFTRIAXONE SODIUM 1 G: 1 INJECTION, POWDER, FOR SOLUTION INTRAMUSCULAR; INTRAVENOUS at 05:06

## 2018-06-28 RX ADMIN — LEVOTHYROXINE SODIUM 50 MCG: 50 TABLET ORAL at 05:06

## 2018-06-28 RX ADMIN — HEPARIN SODIUM 5000 UNITS: 5000 INJECTION, SOLUTION INTRAVENOUS; SUBCUTANEOUS at 05:06

## 2018-06-28 RX ADMIN — IPRATROPIUM BROMIDE AND ALBUTEROL SULFATE 3 ML: .5; 3 SOLUTION RESPIRATORY (INHALATION) at 11:06

## 2018-06-28 RX ADMIN — SULFAMETHOXAZOLE AND TRIMETHOPRIM 1 TABLET: 800; 160 TABLET ORAL at 11:06

## 2018-06-28 RX ADMIN — SULFAMETHOXAZOLE AND TRIMETHOPRIM 1 TABLET: 800; 160 TABLET ORAL at 09:06

## 2018-06-28 RX ADMIN — Medication 400 MG: at 08:06

## 2018-06-28 RX ADMIN — Medication 3 ML: at 05:06

## 2018-06-28 RX ADMIN — IPRATROPIUM BROMIDE AND ALBUTEROL SULFATE 3 ML: .5; 3 SOLUTION RESPIRATORY (INHALATION) at 04:06

## 2018-06-28 RX ADMIN — ASPIRIN 81 MG: 81 TABLET, COATED ORAL at 08:06

## 2018-06-28 RX ADMIN — GABAPENTIN 400 MG: 400 CAPSULE ORAL at 08:06

## 2018-06-28 RX ADMIN — IPRATROPIUM BROMIDE AND ALBUTEROL SULFATE 3 ML: .5; 3 SOLUTION RESPIRATORY (INHALATION) at 12:06

## 2018-06-28 RX ADMIN — HEPARIN SODIUM 5000 UNITS: 5000 INJECTION, SOLUTION INTRAVENOUS; SUBCUTANEOUS at 02:06

## 2018-06-28 RX ADMIN — Medication 3 ML: at 09:06

## 2018-06-28 RX ADMIN — AMLODIPINE BESYLATE 10 MG: 10 TABLET ORAL at 08:06

## 2018-06-28 RX ADMIN — HEPARIN SODIUM 5000 UNITS: 5000 INJECTION, SOLUTION INTRAVENOUS; SUBCUTANEOUS at 09:06

## 2018-06-28 NOTE — CONSULTS
Inpatient consult to Physical Medicine Rehab  Consult performed by: JORDIN KO  Consult ordered by: ALAINA MOSHER  Reason for consult: rehab evaluation       Reviewed patient history and current admission.  Rehab team following.  Full consult to follow.    TEODORO Han, FNP-C  Physical Medicine & Rehabilitation   06/28/2018  Spectralink: [id number]

## 2018-06-28 NOTE — PT/OT/SLP PROGRESS
Physical Therapy Treatment    Patient Name:  Chucho Prado   MRN:  [id number]    Recommendations:     Discharge Recommendations:  nursing facility, skilled   Discharge Equipment Recommendations: none   Barriers to discharge: None    Assessment:     Chucho Prado is a 84 y.o. male admitted with a medical diagnosis of Urinary tract infection associated with cystostomy catheter.  He presents with the following impairments/functional limitations:  weakness, impaired endurance, impaired functional mobilty, gait instability, impaired balance, impaired self care skills, decreased lower extremity function, decreased upper extremity function, pain Pt. cooperative and tolerated treatment fairly well, but fatigues quickly with activity.    Rehab Prognosis:  fair; patient would benefit from acute skilled PT services to address these deficits and reach maximum level of function.      Recent Surgery: Procedure(s) (LRB):  REPAIR, HERNIA, VENTRAL, INCARCERATED, WITHOUT HISTORY OF PRIOR REPAIR (N/A) 8 Days Post-Op    Plan:     During this hospitalization, patient to be seen 5 x/week to address the above listed problems via gait training, therapeutic activities, therapeutic exercises  · Plan of Care Expires:  07/21/18   Plan of Care Reviewed with: patient    Subjective     Communicated with nursing prior to session.  Patient found supine upon PT entry to room, agreeable to treatment.      Chief Complaint: weakness  Patient comments/goals: to get stronger  Pain/Comfort:  · Pain Rating 1:  (pt. did not rate)    Patients cultural, spiritual, Nondenominational conflicts given the current situation: no    Objective:     Patient found with: oxygen, peripheral IV     General Precautions: Standard, fall   Orthopedic Precautions:N/A   Braces:       Functional Mobility:  · Bed Mobility:     · Rolling Left:  minimum assistance  · Scooting: minimum assistance  · Supine to Sit: maximal assistance  · Transfers:     · Sit to Stand:  moderate  assistance with no AD  · Gait: 4 small steps from bed to bedside chair with support of PT and Mod A  · Balance: poor+      AM-PAC 6 CLICK MOBILITY  Turning over in bed (including adjusting bedclothes, sheets and blankets)?: 3  Sitting down on and standing up from a chair with arms (e.g., wheelchair, bedside commode, etc.): 2  Moving from lying on back to sitting on the side of the bed?: 2  Moving to and from a bed to a chair (including a wheelchair)?: 2  Need to walk in hospital room?: 2  Climbing 3-5 steps with a railing?: 1  Basic Mobility Total Score: 12       Therapeutic Activities and Exercises:   Pt. Performed (B) LE therex x10 reps seated in bedside chair. Discussed pt.'s progress, goals, and POC.    Patient left up in chair with all lines intact and call button in reach..    GOALS:    Physical Therapy Goals        Problem: Physical Therapy Goal    Goal Priority Disciplines Outcome Goal Variances Interventions   Physical Therapy Goal     PT/OT, PT Ongoing (interventions implemented as appropriate)     Description:  Goals to be met by: 2018     Patient will increase functional independence with mobility by performin. Supine to sit with Stand-by Assistance - not met  2. Sit to supine with Stand-by Assistance - not met  3. Sit to stand transfer with Stand-by Assistance - not met  4. Bed to chair transfer with Stand-by Assistance using Standard Walker - not met  5. Gait  x 75 feet with Stand-by Assistance using Standard Walker. - not met  6. Lower extremity exercise program x15 reps per handout, with supervision - not met                       Time Tracking:     PT Received On: 18  PT Start Time: 1502     PT Stop Time: 1530  PT Total Time (min): 28 min     Billable Minutes: Therapeutic Activity 15 and Therapeutic Exercise 13    Treatment Type: Treatment  PT/PTA: PT     PTA Visit Number: 0     Liban Copeland, PT  2018

## 2018-06-28 NOTE — PROGRESS NOTES
[facility]  General Surgery  Progress Note    Subjective:     Post-Op Info:  Procedure(s) (LRB):  REPAIR, HERNIA, VENTRAL, INCARCERATED, WITHOUT HISTORY OF PRIOR REPAIR (N/A)   8 Days Post-Op     Interval History:   Patient seen and examined, no acute events overnight  Tolerated clear liquid diet with no N/V  +F/BM  CANDACE drain put out 20mL overnight  Afebrile/VSS    Medications:  Continuous Infusions:  Scheduled Meds:   albuterol-ipratropium  3 mL Nebulization Q4H    amLODIPine  10 mg Oral Daily    aspirin  81 mg Oral Daily    cefTRIAXone (ROCEPHIN) IVPB  1 g Intravenous Q24H    gabapentin  400 mg Oral TID    heparin (porcine)  5,000 Units Subcutaneous Q8H    levothyroxine  50 mcg Oral Before breakfast    sodium chloride 0.9%  3 mL Intravenous Q8H     PRN Meds:ondansetron, oxyCODONE-acetaminophen, oxyCODONE-acetaminophen     Review of patient's allergies indicates:  No Known Allergies  Objective:     Vital Signs (Most Recent):  Temp: 97.2 °F (36.2 °C) (06/28/18 0521)  Pulse: 83 (06/28/18 0706)  Resp: 20 (06/28/18 0706)  BP: (!) 150/68 (06/28/18 0521)  SpO2: 96 % (06/28/18 0706) Vital Signs (24h Range):  Temp:  [97 °F (36.1 °C)-98.5 °F (36.9 °C)] 97.2 °F (36.2 °C)  Pulse:  [80-89] 83  Resp:  [16-20] 20  SpO2:  [94 %-98 %] 96 %  BP: (130-161)/(68-76) 150/68     Weight: 91.2 kg (201 lb 1 oz)  Body mass index is 30.13 kg/m².    Intake/Output - Last 3 Shifts       06/26 0700 - 06/27 0659 06/27 0700 - 06/28 0659 06/28 0700 - 06/29 0659    P.O. 250 800     I.V. (mL/kg)  875 (9.6)     Total Intake(mL/kg) 250 (2.7) 1675 (18.4)     Urine (mL/kg/hr) 1400 (0.6) 800 (0.4)     Drains  20 (0)     Other 40 (0) 20 (0)     Total Output 1440 840      Net -1190 +835                   Physical Exam   Constitutional: He appears well-developed and well-nourished. No distress.   Cardiovascular: Normal rate.    Pulmonary/Chest: Effort normal. No respiratory distress.   Abdominal:   Soft, appropriate TTP, ND  Hernia  reducible  Incision - clean, dry and intact, staples in place  CANDACE drain with serosang drainage   Neurological: He is alert.     Significant Labs:  CBC:   Recent Labs  Lab 06/28/18 0422   WBC 8.06   RBC 3.99*   HGB 10.7*   HCT 35.7*      MCV 90   MCH 26.8*   MCHC 30.0*     BMP:   Recent Labs  Lab 06/28/18  0422   GLU 71   *   K 4.0      CO2 27   BUN 7*   CREATININE 1.2   CALCIUM 8.7   MG 1.8     CMP:   Recent Labs  Lab 06/28/18  0422   GLU 71   CALCIUM 8.7   *   K 4.0   CO2 27      BUN 7*   CREATININE 1.2     Assessment/Plan:     * Urinary tract infection associated with cystostomy catheter    On rocephin for 10 days, started 6/21        Hypomagnesemia    Replace         Hypokalemia    Replace         Small bowel obstruction    85 yo male s/p ventral hernia repair of incarcerated hernia 6/20/18    - regular diet  - PO pain  - nausea meds PRN  - abx - IV rocephin started 6/21, end date 6/31  - IVF - d5 125  - PT/OT/IS - patient refusing SNF as recommended by PT/OT; plan for HH vs rehab on discharge tomorrow  - DVT prophylaxis        Abdominal wall hernia    CT showed fat in ventral hernia  Reducible        Sciatica neuralgia    Home meds  -gabapentin        Essential hypertension    Home meds  -amlodipine        Hypothyroidism    Home meds  -levothyroxine            Grazyna Rose PA-C   [id number]  General Surgery  [facility]

## 2018-06-28 NOTE — PLAN OF CARE
Problem: Physical Therapy Goal  Goal: Physical Therapy Goal  Goals to be met by: 2018     Patient will increase functional independence with mobility by performin. Supine to sit with Stand-by Assistance - not met  2. Sit to supine with Stand-by Assistance - not met  3. Sit to stand transfer with Stand-by Assistance - not met  4. Bed to chair transfer with Stand-by Assistance using Standard Walker - not met  5. Gait  x 75 feet with Stand-by Assistance using Standard Walker. - not met  6. Lower extremity exercise program x15 reps per handout, with supervision - not met      Outcome: Ongoing (interventions implemented as appropriate)  No goals met today

## 2018-06-28 NOTE — HOSPITAL COURSE
6/21/18:  Evaluated by PT.  Bed mobility min-modA.  Sit to stand min-modA with RW.  Ambulated 2 side steps and 2 steps forward and backward modA.  6/22/18:  Evaluated by OT.  Bed mobility maxA.  Refused functional mobility/transfers.  LBD maxA.   6/26/18:  Participated with PT and OT.  Bed mobility Devan.  Sit to stand totalA (maxA x 2) with RW and transfers CGA with RW.  Ambulated 3 steps CGA with RW.  LBD maxA.  6/27/18:  Participated with PT and OT.  Bed mobility Devan.  Sit to stand maxA (OT)-totalA (maxA x 2) (PT) with RW and transfers Devan (OT)-totalA (modA x 2) (PT) with RW.  Ambulated 3 steps Devan with RW with OT.  LBD maxA.  Grooming set-up assist.   6/28/18:  Participated with PT.  Bed mobility min-maxA.  Sit to stand modA.  Ambulated 4 small steps modA.

## 2018-06-28 NOTE — SUBJECTIVE & OBJECTIVE
Interval History:   Patient seen and examined, no acute events overnight  Tolerated clear liquid diet with no N/V  +F/BM  CANDACE drain put out 20mL overnight  Afebrile/VSS    Medications:  Continuous Infusions:  Scheduled Meds:   albuterol-ipratropium  3 mL Nebulization Q4H    amLODIPine  10 mg Oral Daily    aspirin  81 mg Oral Daily    cefTRIAXone (ROCEPHIN) IVPB  1 g Intravenous Q24H    gabapentin  400 mg Oral TID    heparin (porcine)  5,000 Units Subcutaneous Q8H    levothyroxine  50 mcg Oral Before breakfast    sodium chloride 0.9%  3 mL Intravenous Q8H     PRN Meds:ondansetron, oxyCODONE-acetaminophen, oxyCODONE-acetaminophen     Review of patient's allergies indicates:  No Known Allergies  Objective:     Vital Signs (Most Recent):  Temp: 97.2 °F (36.2 °C) (06/28/18 0521)  Pulse: 83 (06/28/18 0706)  Resp: 20 (06/28/18 0706)  BP: (!) 150/68 (06/28/18 0521)  SpO2: 96 % (06/28/18 0706) Vital Signs (24h Range):  Temp:  [97 °F (36.1 °C)-98.5 °F (36.9 °C)] 97.2 °F (36.2 °C)  Pulse:  [80-89] 83  Resp:  [16-20] 20  SpO2:  [94 %-98 %] 96 %  BP: (130-161)/(68-76) 150/68     Weight: 91.2 kg (201 lb 1 oz)  Body mass index is 30.13 kg/m².    Intake/Output - Last 3 Shifts       06/26 0700 - 06/27 0659 06/27 0700 - 06/28 0659 06/28 0700 - 06/29 0659    P.O. 250 800     I.V. (mL/kg)  875 (9.6)     Total Intake(mL/kg) 250 (2.7) 1675 (18.4)     Urine (mL/kg/hr) 1400 (0.6) 800 (0.4)     Drains  20 (0)     Other 40 (0) 20 (0)     Total Output 1440 840      Net -1190 +835                   Physical Exam   Constitutional: He appears well-developed and well-nourished. No distress.   Cardiovascular: Normal rate.    Pulmonary/Chest: Effort normal. No respiratory distress.   Abdominal:   Soft, appropriate TTP, ND  Hernia reducible  Incision - clean, dry and intact, staples in place  CANDACE drain with serosang drainage   Neurological: He is alert.     Significant Labs:  CBC:   Recent Labs  Lab 06/28/18  0422   WBC 8.06   RBC 3.99*   HGB  10.7*   HCT 35.7*      MCV 90   MCH 26.8*   MCHC 30.0*     BMP:   Recent Labs  Lab 06/28/18 0422   GLU 71   *   K 4.0      CO2 27   BUN 7*   CREATININE 1.2   CALCIUM 8.7   MG 1.8     CMP:   Recent Labs  Lab 06/28/18 0422   GLU 71   CALCIUM 8.7   *   K 4.0   CO2 27      BUN 7*   CREATININE 1.2

## 2018-06-28 NOTE — SUBJECTIVE & OBJECTIVE
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    GERD (gastroesophageal reflux disease)     Hypertension     Thyroid disease     UTI (urinary tract infection)      Past Surgical History:   Procedure Laterality Date    CHOLECYSTECTOMY      EYE SURGERY Right     IOL    HERNIA REPAIR      INTRAOCULAR PROSTHESES INSERTION Left 5/28/2018    Procedure: INSERTION-INTRAOCULAR LENS (IOL);  Surgeon: Trinity Vazquez MD;  Location: [facility];  Service: Ophthalmology;  Laterality: Left;    JOINT REPLACEMENT      LAMINECTOMY  12/27/2016    L2-L4    LUMBAR LAMINECTOMY  12/2016    PARATHYROIDECTOMY      PHACOEMULSIFICATION OF CATARACT Left 5/28/2018    Procedure: PHACOEMULSIFICATION-ASPIRATION-CATARACT;  Surgeon: Trinity Vazquez MD;  Location: [facility];  Service: Ophthalmology;  Laterality: Left;    REPAIR OF INCARCERATED VENTRAL HERNIA WITHOUT HISTORY OF PRIOR REPAIR N/A 6/20/2018    Procedure: REPAIR, HERNIA, VENTRAL, INCARCERATED, WITHOUT HISTORY OF PRIOR REPAIR;  Surgeon: Guilherme Ordoñez MD;  Location: [address];  Service: General;  Laterality: N/A;    TOTAL KNEE ARTHROPLASTY Bilateral     TOTAL KNEE ARTHROPLASTY Left 10/25/2017    TKR     Review of patient's allergies indicates:  No Known Allergies    Scheduled Medications:    albuterol-ipratropium  3 mL Nebulization Q4H    amLODIPine  10 mg Oral Daily    aspirin  81 mg Oral Daily    cefTRIAXone (ROCEPHIN) IVPB  1 g Intravenous Q24H    gabapentin  400 mg Oral TID    heparin (porcine)  5,000 Units Subcutaneous Q8H    levothyroxine  50 mcg Oral Before breakfast    sodium chloride 0.9%  3 mL Intravenous Q8H     sulfamethoxazole-trimethoprim 800-160mg  1 tablet Oral BID       PRN Medications: ondansetron, oxyCODONE-acetaminophen, oxyCODONE-acetaminophen    Family History     Problem Relation (Age of Onset)    Diabetes Father    Esophageal cancer Father    Hypertension Mother        Social History Main Topics    Smoking status: Former Smoker     Years: 20.00     Quit date: 1990    Smokeless tobacco: Never Used      Comment: quit 1990    Alcohol use No      Comment: rarely/6 months    Drug use: No    Sexual activity: No     Review of Systems   Constitutional: Positive for activity change and fatigue. Negative for chills and fever.   HENT: Negative for drooling, hearing loss, trouble swallowing and voice change.    Eyes: Negative for pain and visual disturbance.   Respiratory: Negative for cough, shortness of breath and wheezing.    Cardiovascular: Positive for leg swelling. Negative for chest pain and palpitations.   Gastrointestinal: Positive for abdominal pain. Negative for nausea and vomiting.   Genitourinary: Negative for difficulty urinating and flank pain.   Musculoskeletal: Positive for arthralgias, back pain, gait problem and myalgias. Negative for neck pain.   Skin: Positive for wound. Negative for rash.   Neurological: Positive for weakness. Negative for dizziness, numbness and headaches.   Psychiatric/Behavioral: Negative for agitation and hallucinations. The patient is not nervous/anxious.      Objective:     Vital Signs (Most Recent):  Temp: 96.2 °F (35.7 °C) (06/28/18 1153)  Pulse: 89 (06/28/18 1153)  Resp: 19 (06/28/18 1153)  BP: (!) 143/71 (06/28/18 1153)  SpO2: (!) 93 % (06/28/18 1153)    Vital Signs (24h Range):  Temp:  [96.2 °F (35.7 °C)-98.5 °F (36.9 °C)] 96.2 °F (35.7 °C)  Pulse:  [82-93] 89  Resp:  [16-20] 19  SpO2:  [93 %-98 %] 93 %  BP: (130-161)/(68-73) 143/71     Body mass index is 30.13 kg/m².    Physical Exam   Constitutional: He appears well-developed and well-nourished. He has a sickly  appearance. No distress.   HENT:   Head: Normocephalic and atraumatic.   Right Ear: External ear normal.   Left Ear: External ear normal.   Nose: Nose normal.   Eyes: Right eye exhibits no discharge. Left eye exhibits no discharge. No scleral icterus.   Neck: Normal range of motion.   Cardiovascular: Normal rate, regular rhythm and intact distal pulses.    Pulmonary/Chest: Effort normal. No respiratory distress. He has no wheezes.   Abdominal: Soft. There is tenderness.   Incision CDI, drain in place   Musculoskeletal: Normal range of motion. He exhibits no edema or tenderness.   BLE edema   Neurological: He is alert. He is disoriented. He exhibits normal muscle tone.   -  Mental Status:  Awake and alert.  Oriented to person and place.  Follows commands.  Answers correct age and .   -  Speech and language:  no aphasia or dysarthria.   -  Motor:  No pronator drift. RUE: 4/5.  LUE: 4/5.  RLE: 4-/5.  LLE: 4-/5.  -  Tone:  Normal.   -  Sensory:  Intact to light touch and pin prick.   Skin: Skin is warm and dry. No rash noted.   Psychiatric: He has a normal mood and affect. His behavior is normal. Thought content normal. Cognition and memory are impaired.   Vitals reviewed.         Diagnostic Results:   Labs: Reviewed  X-Ray: Reviewed  CT: Reviewed

## 2018-06-28 NOTE — HPI
Chucho Prado is an 84-year-old male with PMHx of HTN, HLD, CKD III, GERD, depression, incisional hernia s/p open cholecystectomy ~15 year ago, urinary retention s/p suprapubic catheter, arthritis s/p bilateral TKA (L TKA on 10/25/17 with subsequent [facility] admission 10/26/17-11/2/17 and readmission for partial small bowel obstruction), sciatica neuralgia, and lumbar compression fracture s/p L3 kyphoplasty and L2-L4 laminectomy on 12/27/16 with subsequent [facility] admission 12/30/16-2/2/17.  Patient presented to C from PCP on 6/19/18 for RUQ pain x 1 week with associated N/V and dark, foul-smelling urine.  On arrival, found to have UTI and small bowel obstruction associated with hernia.  General Surgery consulted, and he underwent ventral hernia repair of incarcerated hernia on 6/20/18 and started on Rocephin on 6/21/18 (end date 7/1/18).    Functional History: Patient lives in [place], alone, in an apartment with elevator access.  Prior to admission, he was mod(I) with mobility.  Hired caregivers to assist daily.  DME: RW, WC, SW, BSC, shower chair.

## 2018-06-28 NOTE — CONSULTS
[facility]  Physical Medicine & Rehab  Consult Note    Patient Name: Chucho Prado  MRN: [id number]  Admission Date: 6/19/2018  Hospital Length of Stay: 9 days  Attending Physician: Guilherme Ordoñez MD     Inpatient consult to Physical Medicine & Rehabilitation  Consult performed by: Jeanine Hoffmann NP  Consult requested by:  Guilherme Ordoñez MD    Collaborating Physician: Chelsie Dueñas MD  Reason for Consult:  Rehab evaluation     Consults  Subjective:     Principal Problem: Urinary tract infection associated with cystostomy catheter    HPI: Chucho Prado is an 84-year-old male with PMHx of HTN, HLD, CKD III, GERD, depression, incisional hernia s/p open cholecystectomy ~15 year ago, urinary retention s/p suprapubic catheter, arthritis s/p bilateral TKA (L TKA on 10/25/17 with subsequent [facility] admission 10/26/17-11/2/17 and readmission for partial small bowel obstruction), sciatica neuralgia, and lumbar compression fracture s/p L3 kyphoplasty and L2-L4 laminectomy on 12/27/16 with subsequent [facility] admission 12/30/16-2/2/17.  Patient presented to C from PCP on 6/19/18 for RUQ pain x 1 week with associated N/V and dark, foul-smelling urine.  On arrival, found to have UTI and small bowel obstruction associated with hernia.  General Surgery consulted, and he underwent ventral hernia repair of incarcerated hernia on 6/20/18 and started on Rocephin on 6/21/18 (end date 7/1/18).    Functional History: Patient lives in [place], alone, in an apartment with elevator access.  Prior to admission, he was mod(I) with mobility.  Hired caregivers to assist daily.  DME: RW, WC, SW, BSC, shower chair.    Hospital Course:   6/21/18:  Evaluated by PT.  Bed mobility min-modA.  Sit to stand min-modA with RW.  Ambulated 2 side steps and 2 steps forward and backward modA.  6/22/18:  Evaluated by OT.  Bed mobility maxA.  Refused functional mobility/transfers.  LBD maxA.   6/26/18:  Participated  with PT and OT.  Bed mobility Devan.  Sit to stand totalA (maxA x 2) with RW and transfers CGA with RW.  Ambulated 3 steps CGA with RW.  LBD maxA.  6/27/18:  Participated with PT and OT.  Bed mobility Devan.  Sit to stand maxA (OT)-totalA (maxA x 2) (PT) with RW and transfers Devan (OT)-totalA (modA x 2) (PT) with RW.  Ambulated 3 steps Devan with RW with OT.  LBD maxA.  Grooming set-up assist.     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    GERD (gastroesophageal reflux disease)     Hypertension     Thyroid disease     UTI (urinary tract infection)      Past Surgical History:   Procedure Laterality Date    CHOLECYSTECTOMY      EYE SURGERY Right     IOL    HERNIA REPAIR      INTRAOCULAR PROSTHESES INSERTION Left 5/28/2018    Procedure: INSERTION-INTRAOCULAR LENS (IOL);  Surgeon: Trinity Vazquez MD;  Location: [facility];  Service: Ophthalmology;  Laterality: Left;    JOINT REPLACEMENT      LAMINECTOMY  12/27/2016    L2-L4    LUMBAR LAMINECTOMY  12/2016    PARATHYROIDECTOMY      PHACOEMULSIFICATION OF CATARACT Left 5/28/2018    Procedure: PHACOEMULSIFICATION-ASPIRATION-CATARACT;  Surgeon: Trinity Vazquez MD;  Location: [facility];  Service: Ophthalmology;  Laterality: Left;    REPAIR OF INCARCERATED VENTRAL HERNIA WITHOUT HISTORY OF PRIOR REPAIR N/A 6/20/2018    Procedure: REPAIR, HERNIA, VENTRAL, INCARCERATED, WITHOUT HISTORY OF PRIOR REPAIR;  Surgeon: Guilherme Ordoñez MD;  Location: [address];  Service: General;  Laterality: N/A;    TOTAL KNEE ARTHROPLASTY Bilateral     TOTAL KNEE ARTHROPLASTY Left 10/25/2017    TKR     Review of patient's allergies  indicates:  No Known Allergies    Scheduled Medications:    albuterol-ipratropium  3 mL Nebulization Q4H    amLODIPine  10 mg Oral Daily    aspirin  81 mg Oral Daily    cefTRIAXone (ROCEPHIN) IVPB  1 g Intravenous Q24H    gabapentin  400 mg Oral TID    heparin (porcine)  5,000 Units Subcutaneous Q8H    levothyroxine  50 mcg Oral Before breakfast    sodium chloride 0.9%  3 mL Intravenous Q8H    sulfamethoxazole-trimethoprim 800-160mg  1 tablet Oral BID       PRN Medications: ondansetron, oxyCODONE-acetaminophen, oxyCODONE-acetaminophen    Family History     Problem Relation (Age of Onset)    Diabetes Father    Esophageal cancer Father    Hypertension Mother        Social History Main Topics    Smoking status: Former Smoker     Years: 20.00     Quit date: 1990    Smokeless tobacco: Never Used      Comment: quit 1990    Alcohol use No      Comment: rarely/6 months    Drug use: No    Sexual activity: No     Review of Systems   Constitutional: Positive for activity change and fatigue. Negative for chills and fever.   HENT: Negative for drooling, hearing loss, trouble swallowing and voice change.    Eyes: Negative for pain and visual disturbance.   Respiratory: Negative for cough, shortness of breath and wheezing.    Cardiovascular: Positive for leg swelling. Negative for chest pain and palpitations.   Gastrointestinal: Positive for abdominal pain. Negative for nausea and vomiting.   Genitourinary: Negative for difficulty urinating and flank pain.   Musculoskeletal: Positive for arthralgias, back pain, gait problem and myalgias. Negative for neck pain.   Skin: Positive for wound. Negative for rash.   Neurological: Positive for weakness. Negative for dizziness, numbness and headaches.   Psychiatric/Behavioral: Negative for agitation and hallucinations. The patient is not nervous/anxious.      Objective:     Vital Signs (Most Recent):  Temp: 96.2 °F (35.7 °C) (06/28/18 1153)  Pulse: 89 (06/28/18 1153)  Resp:  19 (18 1153)  BP: (!) 143/71 (18 1153)  SpO2: (!) 93 % (18 1153)    Vital Signs (24h Range):  Temp:  [96.2 °F (35.7 °C)-98.5 °F (36.9 °C)] 96.2 °F (35.7 °C)  Pulse:  [82-93] 89  Resp:  [16-20] 19  SpO2:  [93 %-98 %] 93 %  BP: (130-161)/(68-73) 143/71     Body mass index is 30.13 kg/m².    Physical Exam   Constitutional: He appears well-developed and well-nourished. He has a sickly appearance. No distress.   HENT:   Head: Normocephalic and atraumatic.   Right Ear: External ear normal.   Left Ear: External ear normal.   Nose: Nose normal.   Eyes: Right eye exhibits no discharge. Left eye exhibits no discharge. No scleral icterus.   Neck: Normal range of motion.   Cardiovascular: Normal rate, regular rhythm and intact distal pulses.    Pulmonary/Chest: Effort normal. No respiratory distress. He has no wheezes.   Abdominal: Soft. There is tenderness.   Incision CDI, drain in place   Musculoskeletal: Normal range of motion. He exhibits no edema or tenderness.   BLE edema   Neurological: He is alert. He is disoriented. He exhibits normal muscle tone.   -  Mental Status:  Awake and alert.  Oriented to person and place.  Follows commands.  Answers correct age and .   -  Speech and language:  no aphasia or dysarthria.   -  Motor:  No pronator drift. RUE: 4/5.  LUE: 4/5.  RLE: 4-/5.  LLE: 4-/5.  -  Tone:  Normal.   -  Sensory:  Intact to light touch and pin prick.   Skin: Skin is warm and dry. No rash noted.   Psychiatric: He has a normal mood and affect. His behavior is normal. Thought content normal. Cognition and memory are impaired.   Vitals reviewed.    Diagnostic Results:   Labs: Reviewed  X-Ray: Reviewed  CT: Reviewed    Assessment/Plan:     * Urinary tract infection associated with cystostomy catheter    -  Found to have UTI on arrival  -  Rocephin started on 6/21/18 x 10 days-->end date 18        Impaired mobility and ADLs    -  Prior to admission--> hired caregivers and mod(I) with  functional mobility  -  [facility] admission 10/2017  -  [facility] admission 12/2016-2/2017  Recommendations  -  Encourage mobility, OOB in chair, and early ambulation as appropriate  -  PT/OT evaluate and treat  -  Pain management  -  Monitor for and prevent skin breakdown and pressure ulcers  · Early mobility, repositioning/weight shifting every 20-30 minutes when sitting, turn patient every 2 hours, proper mattress/overlay and chair cushioning, pressure relief/heel protector boots  -  DVT prophylaxis:  BEE, SCD        Small bowel obstruction    -  Presented with RUQ pain x 1 week with associated N/V and dark, foul-smelling urine  -  CT showed small bowel obstruction associated with hernia   -  S/p ventral hernia repair of incarcerated hernia on 6/20/18        Abdominal wall hernia    -  See SBO        Sciatica neuralgia    -  On Neurontin   -  PT and OT following         Patient with rehab goals, likely more appropriate for SNF.  Will need to show motivation/consistency and progress with therapy to be Inpatient Rehab candidate.  Will follow progress for final post-acute/rehab recommendation.    Thank you for your consult.     TRAN Han  Department of Physical Medicine & Rehab  [facility]

## 2018-06-28 NOTE — PLAN OF CARE
Consult to [facility] was already placed, but SW requested official referral in [facility] be initiated as well.     DEE MartinezW

## 2018-06-28 NOTE — PLAN OF CARE
Problem: Patient Care Overview  Goal: Plan of Care Review  Outcome: Ongoing (interventions implemented as appropriate)  pt assisted with shifting positions while in bed, no new skin breakdown noted. no s/s of infection noted, able to answer questions appropriately, but forgetful at times, VSS, no acute distress noted. instructed to call for assistance if needed, call light in reach. will continue to monitor.

## 2018-06-29 PROBLEM — R15.9 FECAL INCONTINENCE: Status: ACTIVE | Noted: 2018-06-29

## 2018-06-29 LAB
ANION GAP SERPL CALC-SCNC: 9 MMOL/L
BASOPHILS # BLD AUTO: 0.03 K/UL
BASOPHILS NFR BLD: 0.5 %
BUN SERPL-MCNC: 9 MG/DL
CALCIUM SERPL-MCNC: 8.3 MG/DL
CHLORIDE SERPL-SCNC: 104 MMOL/L
CO2 SERPL-SCNC: 27 MMOL/L
CREAT SERPL-MCNC: 1.1 MG/DL
DIFFERENTIAL METHOD: ABNORMAL
EOSINOPHIL # BLD AUTO: 0.2 K/UL
EOSINOPHIL NFR BLD: 3.5 %
ERYTHROCYTE [DISTWIDTH] IN BLOOD BY AUTOMATED COUNT: 15.8 %
EST. GFR  (AFRICAN AMERICAN): >60 ML/MIN/1.73 M^2
EST. GFR  (NON AFRICAN AMERICAN): >60 ML/MIN/1.73 M^2
GLUCOSE SERPL-MCNC: 69 MG/DL
HCT VFR BLD AUTO: 33.4 %
HGB BLD-MCNC: 10.1 G/DL
IMM GRANULOCYTES # BLD AUTO: 0.03 K/UL
IMM GRANULOCYTES NFR BLD AUTO: 0.5 %
LYMPHOCYTES # BLD AUTO: 1.1 K/UL
LYMPHOCYTES NFR BLD: 18.2 %
MAGNESIUM SERPL-MCNC: 1.5 MG/DL
MCH RBC QN AUTO: 27.2 PG
MCHC RBC AUTO-ENTMCNC: 30.2 G/DL
MCV RBC AUTO: 90 FL
MONOCYTES # BLD AUTO: 0.5 K/UL
MONOCYTES NFR BLD: 7.4 %
NEUTROPHILS # BLD AUTO: 4.4 K/UL
NEUTROPHILS NFR BLD: 69.9 %
NRBC BLD-RTO: 0 /100 WBC
PHOSPHATE SERPL-MCNC: 3.2 MG/DL
PLATELET # BLD AUTO: 226 K/UL
PMV BLD AUTO: 9.4 FL
POTASSIUM SERPL-SCNC: 3.8 MMOL/L
RBC # BLD AUTO: 3.71 M/UL
SODIUM SERPL-SCNC: 140 MMOL/L
WBC # BLD AUTO: 6.22 K/UL

## 2018-06-29 PROCEDURE — 99900035 HC TECH TIME PER 15 MIN (STAT)

## 2018-06-29 PROCEDURE — 63600175 PHARM REV CODE 636 W HCPCS: Performed by: SURGERY

## 2018-06-29 PROCEDURE — 84100 ASSAY OF PHOSPHORUS: CPT

## 2018-06-29 PROCEDURE — 83735 ASSAY OF MAGNESIUM: CPT

## 2018-06-29 PROCEDURE — 25000003 PHARM REV CODE 250: Performed by: STUDENT IN AN ORGANIZED HEALTH CARE EDUCATION/TRAINING PROGRAM

## 2018-06-29 PROCEDURE — 80048 BASIC METABOLIC PNL TOTAL CA: CPT

## 2018-06-29 PROCEDURE — A4216 STERILE WATER/SALINE, 10 ML: HCPCS | Performed by: SURGERY

## 2018-06-29 PROCEDURE — 94640 AIRWAY INHALATION TREATMENT: CPT

## 2018-06-29 PROCEDURE — 11000001 HC ACUTE MED/SURG PRIVATE ROOM

## 2018-06-29 PROCEDURE — 85025 COMPLETE CBC W/AUTO DIFF WBC: CPT

## 2018-06-29 PROCEDURE — 36415 COLL VENOUS BLD VENIPUNCTURE: CPT

## 2018-06-29 PROCEDURE — 27000221 HC OXYGEN, UP TO 24 HOURS

## 2018-06-29 PROCEDURE — 99232 SBSQ HOSP IP/OBS MODERATE 35: CPT | Mod: ,,, | Performed by: NURSE PRACTITIONER

## 2018-06-29 PROCEDURE — 94761 N-INVAS EAR/PLS OXIMETRY MLT: CPT

## 2018-06-29 PROCEDURE — 25000242 PHARM REV CODE 250 ALT 637 W/ HCPCS: Performed by: STUDENT IN AN ORGANIZED HEALTH CARE EDUCATION/TRAINING PROGRAM

## 2018-06-29 PROCEDURE — 63600175 PHARM REV CODE 636 W HCPCS: Performed by: STUDENT IN AN ORGANIZED HEALTH CARE EDUCATION/TRAINING PROGRAM

## 2018-06-29 PROCEDURE — 94664 DEMO&/EVAL PT USE INHALER: CPT

## 2018-06-29 PROCEDURE — 94799 UNLISTED PULMONARY SVC/PX: CPT

## 2018-06-29 PROCEDURE — 25000003 PHARM REV CODE 250: Performed by: SURGERY

## 2018-06-29 PROCEDURE — 87449 NOS EACH ORGANISM AG IA: CPT

## 2018-06-29 PROCEDURE — 27000646 HC AEROBIKA DEVICE

## 2018-06-29 RX ADMIN — HEPARIN SODIUM 5000 UNITS: 5000 INJECTION, SOLUTION INTRAVENOUS; SUBCUTANEOUS at 02:06

## 2018-06-29 RX ADMIN — IPRATROPIUM BROMIDE AND ALBUTEROL SULFATE 3 ML: .5; 3 SOLUTION RESPIRATORY (INHALATION) at 07:06

## 2018-06-29 RX ADMIN — LEVOTHYROXINE SODIUM 50 MCG: 50 TABLET ORAL at 06:06

## 2018-06-29 RX ADMIN — Medication 3 ML: at 06:06

## 2018-06-29 RX ADMIN — ASPIRIN 81 MG: 81 TABLET, COATED ORAL at 08:06

## 2018-06-29 RX ADMIN — GABAPENTIN 400 MG: 400 CAPSULE ORAL at 09:06

## 2018-06-29 RX ADMIN — Medication 3 ML: at 10:06

## 2018-06-29 RX ADMIN — HEPARIN SODIUM 5000 UNITS: 5000 INJECTION, SOLUTION INTRAVENOUS; SUBCUTANEOUS at 06:06

## 2018-06-29 RX ADMIN — AMLODIPINE BESYLATE 10 MG: 10 TABLET ORAL at 08:06

## 2018-06-29 RX ADMIN — HEPARIN SODIUM 5000 UNITS: 5000 INJECTION, SOLUTION INTRAVENOUS; SUBCUTANEOUS at 09:06

## 2018-06-29 RX ADMIN — CEFTRIAXONE SODIUM 1 G: 1 INJECTION, POWDER, FOR SOLUTION INTRAMUSCULAR; INTRAVENOUS at 06:06

## 2018-06-29 RX ADMIN — GABAPENTIN 400 MG: 400 CAPSULE ORAL at 08:06

## 2018-06-29 RX ADMIN — IPRATROPIUM BROMIDE AND ALBUTEROL SULFATE 3 ML: .5; 3 SOLUTION RESPIRATORY (INHALATION) at 04:06

## 2018-06-29 RX ADMIN — IPRATROPIUM BROMIDE AND ALBUTEROL SULFATE 3 ML: .5; 3 SOLUTION RESPIRATORY (INHALATION) at 11:06

## 2018-06-29 RX ADMIN — IPRATROPIUM BROMIDE AND ALBUTEROL SULFATE 3 ML: .5; 3 SOLUTION RESPIRATORY (INHALATION) at 03:06

## 2018-06-29 RX ADMIN — OXYCODONE HYDROCHLORIDE AND ACETAMINOPHEN 1 TABLET: 10; 325 TABLET ORAL at 08:06

## 2018-06-29 RX ADMIN — SULFAMETHOXAZOLE AND TRIMETHOPRIM 1 TABLET: 800; 160 TABLET ORAL at 08:06

## 2018-06-29 RX ADMIN — Medication 3 ML: at 02:06

## 2018-06-29 NOTE — PLAN OF CARE
Problem: Patient Care Overview  Goal: Plan of Care Review  Outcome: Ongoing (interventions implemented as appropriate)  Care Plan reviewed and acknowledged.  Pt is free from any new or additional falls/injury/skin breakdown.  Pt had no major complaints that were not handled with PRN/scheduled medication.  VSS; no signs of distress; will continue to monitor.

## 2018-06-29 NOTE — SUBJECTIVE & OBJECTIVE
Interval History 6/29/2018:  Patient is seen for follow-up rehab evaluation and recommendations: No acute events over night.  Participating with therapy.    HPI, Past Medical, Family, and Social History remains the same as documented in the initial encounter.    Scheduled Medications:    albuterol-ipratropium  3 mL Nebulization Q4H    amLODIPine  10 mg Oral Daily    aspirin  81 mg Oral Daily    cefTRIAXone (ROCEPHIN) IVPB  1 g Intravenous Q24H    gabapentin  400 mg Oral TID    heparin (porcine)  5,000 Units Subcutaneous Q8H    levothyroxine  50 mcg Oral Before breakfast    sodium chloride 0.9%  3 mL Intravenous Q8H     PRN Medications: ondansetron, oxyCODONE-acetaminophen, oxyCODONE-acetaminophen    Review of Systems   Constitutional: Positive for activity change and fatigue. Negative for chills and fever.   HENT: Negative for drooling, hearing loss, trouble swallowing and voice change.    Eyes: Negative for pain and visual disturbance.   Respiratory: Negative for cough, shortness of breath and wheezing.    Cardiovascular: Positive for leg swelling. Negative for chest pain and palpitations.   Gastrointestinal: Positive for abdominal pain. Negative for nausea and vomiting.   Genitourinary: Negative for difficulty urinating and flank pain.   Musculoskeletal: Positive for arthralgias, back pain, gait problem and myalgias. Negative for neck pain.   Skin: Positive for wound. Negative for rash.   Neurological: Positive for weakness. Negative for dizziness, numbness and headaches.   Psychiatric/Behavioral: Negative for agitation and hallucinations. The patient is not nervous/anxious.      Objective:     Vital Signs (Most Recent):  Temp: 97.1 °F (36.2 °C) (06/29/18 1316)  Pulse: 84 (06/29/18 1316)  Resp: 20 (06/29/18 1316)  BP: (!) 145/68 (06/29/18 1316)  SpO2: 96 % (06/29/18 1316)    Vital Signs (24h Range):  Temp:  [96 °F (35.6 °C)-97.2 °F (36.2 °C)] 97.1 °F (36.2 °C)  Pulse:  [78-87] 84  Resp:  [16-20] 20  SpO2:   [93 %-98 %] 96 %  BP: (132-146)/(63-71) 145/68     Physical Exam   Constitutional: He appears well-developed and well-nourished. He has a sickly appearance. No distress.   HENT:   Head: Normocephalic and atraumatic.   Right Ear: External ear normal.   Left Ear: External ear normal.   Nose: Nose normal.   Eyes: Right eye exhibits no discharge. Left eye exhibits no discharge. No scleral icterus.   Neck: Normal range of motion.   Cardiovascular: Normal rate, regular rhythm and intact distal pulses.    Pulmonary/Chest: Effort normal. No respiratory distress. He has no wheezes.   Abdominal: Soft. There is tenderness.   Incision CDI, drain in place   Musculoskeletal: Normal range of motion. He exhibits no edema or tenderness.   BLE edema   Neurological: He is alert. He is disoriented. He exhibits normal muscle tone.   -  Mental Status:  Awake and alert.  Oriented to person and place.  Follows commands.  Answers correct age and .   -  Speech and language:  no aphasia or dysarthria.   -  Motor:  No pronator drift. RUE: 4/5.  LUE: 4/5.  RLE: 4-/5.  LLE: 4-/5.  -  Tone:  Normal.   -  Sensory:  Intact to light touch and pin prick.   Skin: Skin is warm and dry. No rash noted.   Psychiatric: He has a normal mood and affect. His behavior is normal. Thought content normal. Cognition and memory are impaired.   Vitals reviewed.        Diagnostic Results:   Labs: Reviewed  X-Ray: Reviewed  CT: Reviewed

## 2018-06-29 NOTE — PLAN OF CARE
Problem: Patient Care Overview  Goal: Plan of Care Review    Pt is at risk for malnutrition 2/2 poor po intake and mild muscle wasting in temple.    Recommendations    Recommendation/Intervention:     1. Continue regular diet.   2. Recommend adding novasource renal w/ all meals 2/2 poor po intake.   3. Encourage po intake.   4. RD following.     Goals: meet >85% EEN/EPN  Nutrition Goal Status: new

## 2018-06-29 NOTE — PROGRESS NOTES
" [facility]  Adult Nutrition  Progress Note    SUMMARY     Pt is at risk for malnutrition 2/2 poor po intake and mild muscle wasting in temple.    Recommendations    Recommendation/Intervention:     1. Continue regular diet.   2. Recommend adding novasource renal w/ all meals 2/2 poor po intake.   3. Encourage po intake.   4. RD following.     Goals: meet >85% EEN/EPN  Nutrition Goal Status: new  Communication of RD Recs:  (POC)    Reason for Assessment    Reason for Assessment: length of stay  Diagnosis: other (see comments) (UTI associated with cystostomy catheter )  Relevant Medical History: incisional hernia post open helen 15 years ago  Interdisciplinary Rounds: attended  General Information Comments: S/p ventral hernia repair of incarcerated hernia 6/20/18. Pt reports very good appetite PTA. States that appetite is currently bad, but is still trying to eat. Denies N/V/D/C. Denies wt change. Wt stable per chart review. RD feels that pt is at risk for malnutrition 2/2 poor po intake and mild muscle wasting in temple.  Nutrition Discharge Planning: adequate po intake    Nutrition Risk Screen    Nutrition Risk Screen: no indicators present    Nutrition/Diet History    Do you have any cultural, spiritual, Faith conflicts, given your current situation?: no  Factors Affecting Nutritional Intake: decreased appetite    Anthropometrics    Temp: 97 °F (36.1 °C)  Height Method: Stated  Height: 5' 8.5" (174 cm)  Height (inches): 68.5 in  Weight Method: Bed Scale  Weight: 91.2 kg (201 lb 1 oz)  Weight (lb): 201.06 lb  Ideal Body Weight (IBW), Male: 157 lb  % Ideal Body Weight, Male (lb): 128.06 lb  BMI (Calculated): 30.2  BMI Grade: (P) 30 - 34.9- obesity - grade I     Lab/Procedures/Meds    Pertinent Labs Reviewed: reviewed  Pertinent Labs Comments: glucose 69L, Mg 1.5L  Pertinent Medications Reviewed: reviewed  Pertinent Medications Comments: amlodipine, aspirin, levothyroxine, Mg oxide "     Physical Findings/Assessment    Overall Physical Appearance: loss of muscle mass, overweight  Skin: incision(s)    Estimated/Assessed Needs    Weight Used For Calorie Calculations: 91.2 kg (201 lb 1 oz)  Energy Calorie Requirements (kcal): 2200 kcal/day  Energy Need Method: Kcal/kg (25 kcal/day)  Protein Requirements:  gm/day (1.0-1.2 gm/kg)  Weight Used For Protein Calculations: 91.2 kg (201 lb 1 oz)  Fluid Requirements (mL): 1 mL/hr or per MD  Fluid Need Method: other (see comments) (per MD)  RDA Method (mL): 2200     Nutrition Prescription Ordered    Current Diet Order: regular    Evaluation of Received Nutrient/Fluid Intake    I/O: -9.15L since admit  Comments: LBM 6/28/18  Tolerance: tolerating     % Intake of Estimated Energy Needs: 0 - 25 %  % Meal Intake: 0 - 25 %    Nutrition Risk    Level of Risk/Frequency of Follow-up: low (f/u 1 X wk)     Assessment and Plan    Nutrition Problem  Inadequate Oral Intake    Related to (etiology):   Decreased appetite     Signs and Symptoms (as evidenced by):   Pt reports eating 25% of meals. Per chart review, pt eating <50% of meals.      Nutrition Diagnosis Status:   New    Monitor and Evaluation    Food and Nutrient Intake: energy intake, food and beverage intake  Food and Nutrient Adminstration: diet order  Physical Activity and Function: nutrition-related ADLs and IADLs  Anthropometric Measurements: weight, weight change, body mass index  Biochemical Data, Medical Tests and Procedures: electrolyte and renal panel, gastrointestinal profile, glucose/endocrine profile, inflammatory profile, lipid profile  Nutrition-Focused Physical Findings: overall appearance     Nutrition Follow-Up    RD Follow-up?: Yes

## 2018-06-29 NOTE — PROGRESS NOTES
[facility]  Physical Medicine & Rehab  Progress Note    Patient Name: Chucho Prado  MRN: [id number]  Admission Date: 6/19/2018  Length of Stay: 10 days  Attending Physician: Guilherme Ordoñez MD    Subjective:     Principal Problem:Urinary tract infection associated with cystostomy catheter    Hospital Course:   6/21/18:  Evaluated by PT.  Bed mobility min-modA.  Sit to stand min-modA with RW.  Ambulated 2 side steps and 2 steps forward and backward modA.  6/22/18:  Evaluated by OT.  Bed mobility maxA.  Refused functional mobility/transfers.  LBD maxA.   6/26/18:  Participated with PT and OT.  Bed mobility Devan.  Sit to stand totalA (maxA x 2) with RW and transfers CGA with RW.  Ambulated 3 steps CGA with RW.  LBD maxA.  6/27/18:  Participated with PT and OT.  Bed mobility Devan.  Sit to stand maxA (OT)-totalA (maxA x 2) (PT) with RW and transfers Devan (OT)-totalA (modA x 2) (PT) with RW.  Ambulated 3 steps Devan with RW with OT.  LBD maxA.  Grooming set-up assist.   6/28/18:  Participated with PT.  Bed mobility min-maxA.  Sit to stand modA.  Ambulated 4 small steps modA.    Interval History 6/29/2018:  Patient is seen for follow-up rehab evaluation and recommendations: No acute events over night.  Participating with therapy.    HPI, Past Medical, Family, and Social History remains the same as documented in the initial encounter.    Scheduled Medications:    albuterol-ipratropium  3 mL Nebulization Q4H    amLODIPine  10 mg Oral Daily    aspirin  81 mg Oral Daily    cefTRIAXone (ROCEPHIN) IVPB  1 g Intravenous Q24H    gabapentin  400 mg Oral TID    heparin (porcine)  5,000 Units Subcutaneous Q8H    levothyroxine  50 mcg Oral Before breakfast    sodium chloride 0.9%  3 mL Intravenous Q8H     PRN Medications: ondansetron, oxyCODONE-acetaminophen, oxyCODONE-acetaminophen    Review of Systems   Constitutional: Positive for activity change and fatigue. Negative for chills and fever.   HENT:  Negative for drooling, hearing loss, trouble swallowing and voice change.    Eyes: Negative for pain and visual disturbance.   Respiratory: Negative for cough, shortness of breath and wheezing.    Cardiovascular: Positive for leg swelling. Negative for chest pain and palpitations.   Gastrointestinal: Positive for abdominal pain. Negative for nausea and vomiting.   Genitourinary: Negative for difficulty urinating and flank pain.   Musculoskeletal: Positive for arthralgias, back pain, gait problem and myalgias. Negative for neck pain.   Skin: Positive for wound. Negative for rash.   Neurological: Positive for weakness. Negative for dizziness, numbness and headaches.   Psychiatric/Behavioral: Negative for agitation and hallucinations. The patient is not nervous/anxious.      Objective:     Vital Signs (Most Recent):  Temp: 97.1 °F (36.2 °C) (06/29/18 1316)  Pulse: 84 (06/29/18 1316)  Resp: 20 (06/29/18 1316)  BP: (!) 145/68 (06/29/18 1316)  SpO2: 96 % (06/29/18 1316)    Vital Signs (24h Range):  Temp:  [96 °F (35.6 °C)-97.2 °F (36.2 °C)] 97.1 °F (36.2 °C)  Pulse:  [78-87] 84  Resp:  [16-20] 20  SpO2:  [93 %-98 %] 96 %  BP: (132-146)/(63-71) 145/68     Physical Exam   Constitutional: He appears well-developed and well-nourished. He has a sickly appearance. No distress.   HENT:   Head: Normocephalic and atraumatic.   Right Ear: External ear normal.   Left Ear: External ear normal.   Nose: Nose normal.   Eyes: Right eye exhibits no discharge. Left eye exhibits no discharge. No scleral icterus.   Neck: Normal range of motion.   Cardiovascular: Normal rate, regular rhythm and intact distal pulses.    Pulmonary/Chest: Effort normal. No respiratory distress. He has no wheezes.   Abdominal: Soft. There is tenderness.   Incision CDI, drain in place   Musculoskeletal: Normal range of motion. He exhibits no edema or tenderness.   BLE edema   Neurological: He is alert. He is disoriented. He exhibits normal muscle tone.   -  Mental  Status:  Awake and alert.  Oriented to person and place.  Follows commands.  Answers correct age and .   -  Speech and language:  no aphasia or dysarthria.   -  Motor:  No pronator drift. RUE: 4/5.  LUE: 4/5.  RLE: 4-/5.  LLE: 4-/5.  -  Tone:  Normal.   -  Sensory:  Intact to light touch and pin prick.   Skin: Skin is warm and dry. No rash noted.   Psychiatric: He has a normal mood and affect. His behavior is normal. Thought content normal. Cognition and memory are impaired.   Vitals reviewed.        Diagnostic Results:   Labs: Reviewed  X-Ray: Reviewed  CT: Reviewed      Assessment/Plan:      * Urinary tract infection associated with cystostomy catheter    -  Found to have UTI on arrival  -  Rocephin started on 6/21/18 x 10 days-->end date 18        Impaired mobility and ADLs    -  Prior to admission--> hired caregivers and mod(I) with functional mobility  -  [facility] admission [facility]2017  -  [facility] admission 2016-2017  Recommendations  -  Encourage mobility, OOB in chair, and early ambulation as appropriate  -  PT/OT evaluate and treat  -  Pain management  -  Monitor for and prevent skin breakdown and pressure ulcers  · Early mobility, repositioning/weight shifting every 20-30 minutes when sitting, turn patient every 2 hours, proper mattress/overlay and chair cushioning, pressure relief/heel protector boots  -  DVT prophylaxis:  BEE, SCD        Small bowel obstruction    -  Presented with RUQ pain x 1 week with associated N/V and dark, foul-smelling urine  -  CT showed small bowel obstruction associated with hernia   -  S/p ventral hernia repair of incarcerated hernia on 18        Abdominal wall hernia    -  See SBO        Sciatica neuralgia    -  On Neurontin   -  PT and OT following         Patient with rehab goals; however, could not tolerate inpatient rehab program/3 hours of therapy daily at this time.  Recommend SNF.  Will sign off.  Please call with questions/concerns or re-consult if  situation changes.     TRAN Han  Department of Physical Medicine & Rehab   [facility]

## 2018-06-29 NOTE — PROGRESS NOTES
[facility]  General Surgery  Progress Note    Subjective:     Post-Op Info:  Procedure(s) (LRB):  REPAIR, HERNIA, VENTRAL, INCARCERATED, WITHOUT HISTORY OF PRIOR REPAIR (N/A)   9 Days Post-Op     Interval History:   Patient seen and examined, no acute events overnight  Tolerated regular diet with no N/V  +F/BMx3 - patient reporting incontinence of bowel  Afebrile/VSS    Medications:  Continuous Infusions:  Scheduled Meds:   albuterol-ipratropium  3 mL Nebulization Q4H    amLODIPine  10 mg Oral Daily    aspirin  81 mg Oral Daily    cefTRIAXone (ROCEPHIN) IVPB  1 g Intravenous Q24H    gabapentin  400 mg Oral TID    heparin (porcine)  5,000 Units Subcutaneous Q8H    levothyroxine  50 mcg Oral Before breakfast    sodium chloride 0.9%  3 mL Intravenous Q8H    sulfamethoxazole-trimethoprim 800-160mg  1 tablet Oral BID     PRN Meds:ondansetron, oxyCODONE-acetaminophen, oxyCODONE-acetaminophen     Review of patient's allergies indicates:  No Known Allergies  Objective:     Vital Signs (Most Recent):  Temp: 97 °F (36.1 °C) (06/29/18 0810)  Pulse: 87 (06/29/18 0810)  Resp: 16 (06/29/18 0810)  BP: 132/63 (06/29/18 0810)  SpO2: 97 % (06/29/18 0810) Vital Signs (24h Range):  Temp:  [96 °F (35.6 °C)-97.2 °F (36.2 °C)] 97 °F (36.1 °C)  Pulse:  [78-93] 87  Resp:  [16-20] 16  SpO2:  [93 %-98 %] 97 %  BP: (132-161)/(63-73) 132/63     Weight: 91.2 kg (201 lb 1 oz)  Body mass index is 30.13 kg/m².    Intake/Output - Last 3 Shifts       06/27 0700 - 06/28 0659 06/28 0700 - 06/29 0659 06/29 0700 - 06/30 0659    P.O. 800 400     I.V. (mL/kg) 875 (9.6) 3 (0)     Total Intake(mL/kg) 1675 (18.4) 403 (4.4)     Urine (mL/kg/hr) 800 (0.4) 750 (0.3)     Drains 20 (0)      Other 20 (0) 35 (0)     Total Output 840 785      Net +835 -382             Stool Occurrence  3 x           Physical Exam   Constitutional: He appears well-developed and well-nourished. No distress.   Cardiovascular: Normal rate.    Pulmonary/Chest:  Effort normal. No respiratory distress.   Abdominal:   Soft, appropriate TTP, ND  Hernia reducible  Incision - clean, dry and intact, staples in place   Neurological: He is alert.       Significant Labs:  CBC:   Recent Labs  Lab 06/29/18  0509   WBC 6.22   RBC 3.71*   HGB 10.1*   HCT 33.4*      MCV 90   MCH 27.2   MCHC 30.2*     BMP:   Recent Labs  Lab 06/29/18  0509   GLU 69*      K 3.8      CO2 27   BUN 9   CREATININE 1.1   CALCIUM 8.3*   MG 1.5*     CMP:   Recent Labs  Lab 06/29/18  0509   GLU 69*   CALCIUM 8.3*      K 3.8   CO2 27      BUN 9   CREATININE 1.1     Assessment/Plan:     * Urinary tract infection associated with cystostomy catheter    On rocephin for 10 days, started 6/21        Fecal incontinence    Will obtain c.diff        Hypomagnesemia    Replace         Hypokalemia    Replace         Small bowel obstruction    83 yo male s/p ventral hernia repair of incarcerated hernia 6/20/18    - regular diet  - PO pain  - nausea meds PRN  - abx - IV rocephin started 6/21, end date 6/31  - PT/OT/IS - patient amenable to going home with home health  - DVT prophylaxis        Abdominal wall hernia    CT showed fat in ventral hernia  Reducible        Sciatica neuralgia    Home meds  -gabapentin        Essential hypertension    Home meds  -amlodipine        Hypothyroidism    Home meds  -levothyroxine            Grazyna Rose PA-C   [id number]  General Surgery  [facility]

## 2018-06-29 NOTE — SUBJECTIVE & OBJECTIVE
Interval History:   Patient seen and examined, no acute events overnight  Tolerated regular diet with no N/V  +F/BMx3 - patient reporting incontinence of bowel  Afebrile/VSS    Medications:  Continuous Infusions:  Scheduled Meds:   albuterol-ipratropium  3 mL Nebulization Q4H    amLODIPine  10 mg Oral Daily    aspirin  81 mg Oral Daily    cefTRIAXone (ROCEPHIN) IVPB  1 g Intravenous Q24H    gabapentin  400 mg Oral TID    heparin (porcine)  5,000 Units Subcutaneous Q8H    levothyroxine  50 mcg Oral Before breakfast    sodium chloride 0.9%  3 mL Intravenous Q8H    sulfamethoxazole-trimethoprim 800-160mg  1 tablet Oral BID     PRN Meds:ondansetron, oxyCODONE-acetaminophen, oxyCODONE-acetaminophen     Review of patient's allergies indicates:  No Known Allergies  Objective:     Vital Signs (Most Recent):  Temp: 97 °F (36.1 °C) (06/29/18 0810)  Pulse: 87 (06/29/18 0810)  Resp: 16 (06/29/18 0810)  BP: 132/63 (06/29/18 0810)  SpO2: 97 % (06/29/18 0810) Vital Signs (24h Range):  Temp:  [96 °F (35.6 °C)-97.2 °F (36.2 °C)] 97 °F (36.1 °C)  Pulse:  [78-93] 87  Resp:  [16-20] 16  SpO2:  [93 %-98 %] 97 %  BP: (132-161)/(63-73) 132/63     Weight: 91.2 kg (201 lb 1 oz)  Body mass index is 30.13 kg/m².    Intake/Output - Last 3 Shifts       06/27 0700 - 06/28 0659 06/28 0700 - 06/29 0659 06/29 0700 - 06/30 0659    P.O. 800 400     I.V. (mL/kg) 875 (9.6) 3 (0)     Total Intake(mL/kg) 1675 (18.4) 403 (4.4)     Urine (mL/kg/hr) 800 (0.4) 750 (0.3)     Drains 20 (0)      Other 20 (0) 35 (0)     Total Output 840 785      Net +835 -382             Stool Occurrence  3 x           Physical Exam   Constitutional: He appears well-developed and well-nourished. No distress.   Cardiovascular: Normal rate.    Pulmonary/Chest: Effort normal. No respiratory distress.   Abdominal:   Soft, appropriate TTP, ND  Hernia reducible  Incision - clean, dry and intact, staples in place   Neurological: He is alert.       Significant Labs:  CBC:    Recent Labs  Lab 06/29/18  0509   WBC 6.22   RBC 3.71*   HGB 10.1*   HCT 33.4*      MCV 90   MCH 27.2   MCHC 30.2*     BMP:   Recent Labs  Lab 06/29/18  0509   GLU 69*      K 3.8      CO2 27   BUN 9   CREATININE 1.1   CALCIUM 8.3*   MG 1.5*     CMP:   Recent Labs  Lab 06/29/18  0509   GLU 69*   CALCIUM 8.3*      K 3.8   CO2 27      BUN 9   CREATININE 1.1

## 2018-06-29 NOTE — PLAN OF CARE
SW spoke to [facility] and informed them that Pt may discharge home over the weekend, pending c-diff results.     DEE MartinezW

## 2018-06-29 NOTE — PLAN OF CARE
SW learned in morning huddle that they are testing Pt for c-diff today. Rehab states that Pt is needing to participate more with therapy. Surgery PA, Grazyna Rose, stated that Pt is ok with returning home and resuming his home health (referral already sent to [facility]). PA said Pt would likely discharge home over the weekend, pending result of c-diff, with home health.     DEE MartinezW

## 2018-06-29 NOTE — PLAN OF CARE
"12:40 PM Visited patient. Asleep. Easy to wake up. AAOX4. Personal sitter is at BS. CM discussed w/him: Per Surgery PA, Grazyna Rose: patient will not be discharged today for testing stool for c-diff today;And Rehab states that Pt is needing to participate more with therapy for him to be considered to be a candidate. He verbalized his understanding. Asked him what he wanted to do when they discharge him. After long pause he states,"Rehab. I am working w/them." Reiterated that Rehab recs are for patient to be able to tolerate 3 hrs therapy daily. Plan B: remains home w/resuming , personal sitters. He verbalized his understanding. (Patient has already declined to go to SNF when this was talked about earlier in his stay.)    3:30 PM CM noted PMR recs: they have declines patient for he is not able to partake in therapy 3 hrs/day. They rec: SNF. Patient has declined SNF.   Plan: B: will now be his Plan A: Home, [facility]uming [facility] , & resuming his personal sitters. Updated floor nurse, Katie Diamond ([phone]) on above.   "

## 2018-06-30 ENCOUNTER — EXTERNAL CHRONIC CARE MANAGEMENT (OUTPATIENT)
Dept: PRIMARY CARE CLINIC | Facility: CLINIC | Age: 83
End: 2018-06-30
Payer: MEDICARE

## 2018-06-30 PROBLEM — E87.6 HYPOKALEMIA: Status: RESOLVED | Noted: 2018-06-25 | Resolved: 2018-06-30

## 2018-06-30 PROBLEM — E83.42 HYPOMAGNESEMIA: Status: RESOLVED | Noted: 2018-06-27 | Resolved: 2018-06-30

## 2018-06-30 LAB
ANION GAP SERPL CALC-SCNC: 9 MMOL/L
BASOPHILS # BLD AUTO: 0.02 K/UL
BASOPHILS NFR BLD: 0.3 %
BUN SERPL-MCNC: 9 MG/DL
C DIFF GDH STL QL: NEGATIVE
C DIFF TOX A+B STL QL IA: NEGATIVE
CALCIUM SERPL-MCNC: 8.2 MG/DL
CHLORIDE SERPL-SCNC: 102 MMOL/L
CO2 SERPL-SCNC: 28 MMOL/L
CREAT SERPL-MCNC: 1.2 MG/DL
DIFFERENTIAL METHOD: ABNORMAL
EOSINOPHIL # BLD AUTO: 0.2 K/UL
EOSINOPHIL NFR BLD: 4.1 %
ERYTHROCYTE [DISTWIDTH] IN BLOOD BY AUTOMATED COUNT: 15.5 %
EST. GFR  (AFRICAN AMERICAN): >60 ML/MIN/1.73 M^2
EST. GFR  (NON AFRICAN AMERICAN): 55.2 ML/MIN/1.73 M^2
GLUCOSE SERPL-MCNC: 80 MG/DL
HCT VFR BLD AUTO: 34 %
HGB BLD-MCNC: 10.7 G/DL
IMM GRANULOCYTES # BLD AUTO: 0.05 K/UL
IMM GRANULOCYTES NFR BLD AUTO: 0.8 %
LYMPHOCYTES # BLD AUTO: 1.1 K/UL
LYMPHOCYTES NFR BLD: 18.4 %
MAGNESIUM SERPL-MCNC: 1.6 MG/DL
MCH RBC QN AUTO: 27.6 PG
MCHC RBC AUTO-ENTMCNC: 31.5 G/DL
MCV RBC AUTO: 88 FL
MONOCYTES # BLD AUTO: 0.4 K/UL
MONOCYTES NFR BLD: 7.3 %
NEUTROPHILS # BLD AUTO: 4.1 K/UL
NEUTROPHILS NFR BLD: 69.1 %
NRBC BLD-RTO: 0 /100 WBC
PHOSPHATE SERPL-MCNC: 3.1 MG/DL
PLATELET # BLD AUTO: 218 K/UL
PMV BLD AUTO: 10.3 FL
POTASSIUM SERPL-SCNC: 3.8 MMOL/L
RBC # BLD AUTO: 3.88 M/UL
SODIUM SERPL-SCNC: 139 MMOL/L
WBC # BLD AUTO: 5.91 K/UL

## 2018-06-30 PROCEDURE — 85025 COMPLETE CBC W/AUTO DIFF WBC: CPT

## 2018-06-30 PROCEDURE — 84100 ASSAY OF PHOSPHORUS: CPT

## 2018-06-30 PROCEDURE — 25000242 PHARM REV CODE 250 ALT 637 W/ HCPCS: Performed by: STUDENT IN AN ORGANIZED HEALTH CARE EDUCATION/TRAINING PROGRAM

## 2018-06-30 PROCEDURE — 99490 CHRNC CARE MGMT STAFF 1ST 20: CPT | Mod: S$PBB,,, | Performed by: INTERNAL MEDICINE

## 2018-06-30 PROCEDURE — 36415 COLL VENOUS BLD VENIPUNCTURE: CPT

## 2018-06-30 PROCEDURE — 94664 DEMO&/EVAL PT USE INHALER: CPT

## 2018-06-30 PROCEDURE — A4216 STERILE WATER/SALINE, 10 ML: HCPCS | Performed by: SURGERY

## 2018-06-30 PROCEDURE — 25000003 PHARM REV CODE 250: Performed by: STUDENT IN AN ORGANIZED HEALTH CARE EDUCATION/TRAINING PROGRAM

## 2018-06-30 PROCEDURE — 11000001 HC ACUTE MED/SURG PRIVATE ROOM

## 2018-06-30 PROCEDURE — 94640 AIRWAY INHALATION TREATMENT: CPT

## 2018-06-30 PROCEDURE — 94761 N-INVAS EAR/PLS OXIMETRY MLT: CPT

## 2018-06-30 PROCEDURE — 99490 CHRNC CARE MGMT STAFF 1ST 20: CPT | Mod: PBBFAC | Performed by: INTERNAL MEDICINE

## 2018-06-30 PROCEDURE — 83735 ASSAY OF MAGNESIUM: CPT

## 2018-06-30 PROCEDURE — 25000003 PHARM REV CODE 250: Performed by: SURGERY

## 2018-06-30 PROCEDURE — 63600175 PHARM REV CODE 636 W HCPCS: Performed by: SURGERY

## 2018-06-30 PROCEDURE — 27000221 HC OXYGEN, UP TO 24 HOURS

## 2018-06-30 PROCEDURE — 63600175 PHARM REV CODE 636 W HCPCS: Performed by: STUDENT IN AN ORGANIZED HEALTH CARE EDUCATION/TRAINING PROGRAM

## 2018-06-30 PROCEDURE — 80048 BASIC METABOLIC PNL TOTAL CA: CPT

## 2018-06-30 PROCEDURE — 99900035 HC TECH TIME PER 15 MIN (STAT)

## 2018-06-30 PROCEDURE — 94799 UNLISTED PULMONARY SVC/PX: CPT

## 2018-06-30 RX ORDER — OXYCODONE AND ACETAMINOPHEN 5; 325 MG/1; MG/1
1 TABLET ORAL EVERY 4 HOURS PRN
Qty: 20 TABLET | Refills: 0 | Status: SHIPPED | OUTPATIENT
Start: 2018-06-30 | End: 2018-10-09 | Stop reason: SDUPTHER

## 2018-06-30 RX ORDER — OXYCODONE AND ACETAMINOPHEN 5; 325 MG/1; MG/1
1 TABLET ORAL EVERY 4 HOURS PRN
Qty: 20 TABLET | Refills: 0 | OUTPATIENT
Start: 2018-06-30 | End: 2018-06-30

## 2018-06-30 RX ADMIN — IPRATROPIUM BROMIDE AND ALBUTEROL SULFATE 3 ML: .5; 3 SOLUTION RESPIRATORY (INHALATION) at 12:06

## 2018-06-30 RX ADMIN — CEFTRIAXONE SODIUM 1 G: 1 INJECTION, POWDER, FOR SOLUTION INTRAMUSCULAR; INTRAVENOUS at 06:06

## 2018-06-30 RX ADMIN — IPRATROPIUM BROMIDE AND ALBUTEROL SULFATE 3 ML: .5; 3 SOLUTION RESPIRATORY (INHALATION) at 07:06

## 2018-06-30 RX ADMIN — GABAPENTIN 400 MG: 400 CAPSULE ORAL at 10:06

## 2018-06-30 RX ADMIN — ASPIRIN 81 MG: 81 TABLET, COATED ORAL at 10:06

## 2018-06-30 RX ADMIN — IPRATROPIUM BROMIDE AND ALBUTEROL SULFATE 3 ML: .5; 3 SOLUTION RESPIRATORY (INHALATION) at 04:06

## 2018-06-30 RX ADMIN — HEPARIN SODIUM 5000 UNITS: 5000 INJECTION, SOLUTION INTRAVENOUS; SUBCUTANEOUS at 06:06

## 2018-06-30 RX ADMIN — OXYCODONE HYDROCHLORIDE AND ACETAMINOPHEN 1 TABLET: 10; 325 TABLET ORAL at 09:06

## 2018-06-30 RX ADMIN — Medication 3 ML: at 06:06

## 2018-06-30 RX ADMIN — OXYCODONE HYDROCHLORIDE AND ACETAMINOPHEN 1 TABLET: 10; 325 TABLET ORAL at 10:06

## 2018-06-30 RX ADMIN — HEPARIN SODIUM 5000 UNITS: 5000 INJECTION, SOLUTION INTRAVENOUS; SUBCUTANEOUS at 02:06

## 2018-06-30 RX ADMIN — GABAPENTIN 400 MG: 400 CAPSULE ORAL at 02:06

## 2018-06-30 RX ADMIN — LEVOTHYROXINE SODIUM 50 MCG: 50 TABLET ORAL at 06:06

## 2018-06-30 RX ADMIN — GABAPENTIN 400 MG: 400 CAPSULE ORAL at 09:06

## 2018-06-30 RX ADMIN — AMLODIPINE BESYLATE 10 MG: 10 TABLET ORAL at 10:06

## 2018-06-30 RX ADMIN — IPRATROPIUM BROMIDE AND ALBUTEROL SULFATE 3 ML: .5; 3 SOLUTION RESPIRATORY (INHALATION) at 03:06

## 2018-06-30 RX ADMIN — HEPARIN SODIUM 5000 UNITS: 5000 INJECTION, SOLUTION INTRAVENOUS; SUBCUTANEOUS at 09:06

## 2018-06-30 NOTE — PLAN OF CARE
[facility]    HOME HEALTH ORDERS  FACE TO FACE ENCOUNTER    Patient Name: Chucho Prado  YOB: 1933    PCP: Obed Mcguire MD   PCP Address: [address] / [place] [address]  PCP Phone Number: [phone]  PCP Fax: [phone]    Encounter Date: 06/30/2018    Admit to Home Health    Diagnoses:  Active Hospital Problems    Diagnosis  POA    *Abdominal wall hernia [K43.9]  Yes    Fecal incontinence [R15.9]  No    Impaired mobility and ADLs [Z74.09]  Yes    Hypomagnesemia [E83.42]  No    Hypokalemia [E87.6]  Yes    Incisional hernia with obstruction but no gangrene [K43.0]  Yes    Small bowel obstruction [K56.609]  Yes    Urinary tract infection associated with cystostomy catheter [T83.510A, N39.0]  Yes    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]  Yes    Essential hypertension [I10]  Yes     Chronic    Hypothyroidism [E03.9]  Yes     Chronic      Resolved Hospital Problems    Diagnosis Date Resolved POA   No resolved problems to display.       Future Appointments  Date Time Provider Department Center   7/12/2018 1:40 PM Neelam Hooper NP [facility] UROLOG [facility]     Follow-up Information     Guilherme Ordoñez MD In 2 weeks.    Specialties:  General Surgery, Surgery  Contact information:  [address]  [facility] [address]  [phone]             Nurses Registry.    Specialty:  Home Health Services  Why:  Resuming Home health, & personal sitters  Contact information:  [address] [place]  [facility] [address]  [phone]                     I have seen and examined this patient face to face today. My clinical findings that support the need for the home health skilled services and home bound status are the following:  Weakness/numbness causing balance and gait disturbance due to Weakness/Debility and Surgery making it taxing to leave home.    Allergies:Review of patient's allergies indicates:  No Known Allergies    Diet: regular diet    Activities: no  heavy lifting over 10 lbs. for 4 weeks    Nursing:   SN to complete comprehensive assessment including routine vital signs. Instruct on disease process and s/s of complications to report to MD. Review/verify medication list sent home with the patient at time of discharge  and instruct patient/caregiver as needed. Frequency may be adjusted depending on start of care date. If patient has enteral feeding tube (NG, PEG, J-tube, G-tube), flush tube before and after feeding and/or medication administration with 20-30 mL of water.    Notify MD if SBP > 160 or < 90; DBP > 90 or < 50; HR > 120 or < 50; Temp > 101      CONSULTS:    Physical Therapy to evaluate and treat. Evaluate for home safety and equipment needs; Establish/upgrade home exercise program. Perform / instruct on therapeutic exercises, gait training, transfer training, and Range of Motion.  Occupational Therapy to evaluate and treat. Evaluate home environment for safety and equipment needs. Perform/Instruct on transfers, ADL training, ROM, and therapeutic exercises.    MISCELLANEOUS CARE:  N/A    WOUND CARE ORDERS  no      Medications: Review discharge medications with patient and family and provide education.      Current Discharge Medication List      START taking these medications    Details   oxyCODONE-acetaminophen (PERCOCET) 5-325 mg per tablet Take 1 tablet by mouth every 4 (four) hours as needed.  Qty: 20 tablet, Refills: 0         CONTINUE these medications which have NOT CHANGED    Details   aspirin (ECOTRIN) 81 MG EC tablet Take 81 mg by mouth once daily.      docusate sodium (COLACE) 100 MG capsule Take 1 capsule (100 mg total) by mouth 2 (two) times daily as needed for Constipation.  Qty: 60 capsule, Refills: 0      gabapentin (NEURONTIN) 400 MG capsule Take 400 mg by mouth 3 (three) times daily.      levothyroxine (SYNTHROID) 50 MCG tablet Take 1 tablet (50 mcg total) by mouth once daily.  Qty: 100 tablet, Refills: 11    Associated Diagnoses:  Hypothyroidism, unspecified type      simvastatin (ZOCOR) 40 MG tablet Take 1 tablet (40 mg total) by mouth every evening.  Qty: 90 tablet, Refills: 11    Associated Diagnoses: Dyslipidemia      amLODIPine (NORVASC) 10 MG tablet Take 1 tablet (10 mg total) by mouth once daily.  Qty: 30 tablet, Refills: 11      !! hyoscyamine (LEVSIN/SL) 0.125 mg Subl DISSOLVE 1 TABLET UNDER THE TONGUE EVERY 4 HOURS AS NEEDED  Qty: 30 tablet, Refills: 0      !! hyoscyamine (LEVSIN/SL) 0.125 mg Subl DISSOLVE 1 TABLET UNDER THE TONGUE EVERY 4 HOURS AS NEEDED  Qty: 540 tablet, Refills: 1    Comments: **Patient requests 90 days supply**      ondansetron (ZOFRAN-ODT) 8 MG TbDL Take 1 tablet (8 mg total) by mouth every 12 (twelve) hours as needed.  Qty: 14 tablet, Refills: 0    Associated Diagnoses: Status post total left knee replacement      polyethylene glycol (GLYCOLAX) 17 gram PwPk Take 17 g by mouth 2 (two) times daily.  Qty: 12 each, Refills: 0      senna-docusate 8.6-50 mg (PERICOLACE) 8.6-50 mg per tablet Take 1 tablet by mouth 2 (two) times daily.      trazodone (DESYREL) 50 MG tablet Take 1 tablet (50 mg total) by mouth nightly as needed for Insomnia.  Qty: 30 tablet, Refills: 11      triamterene-hydrochlorothiazide 37.5-25 mg (DYAZIDE) 37.5-25 mg per capsule TAKE 1 CAPSULE BY MOUTH ONCE DAILY IN THE MORNING  Qty: 90 capsule, Refills: 0       !! - Potential duplicate medications found. Please discuss with provider.      STOP taking these medications       potassium chloride (K-TAB) 20 mEq Comments:   Reason for Stopping:         sulfamethoxazole-trimethoprim 800-160mg (BACTRIM DS) 800-160 mg Tab Comments:   Reason for Stopping:               I certify that this patient is confined to his home and needs physical therapy and occupational therapy.    Reyna Chan MD, PGY-2  General Surgery  [phone]

## 2018-06-30 NOTE — SUBJECTIVE & OBJECTIVE
Interval History:   NAEON. C.diff negative. Tolerating regular diet. Normal BMs. Pain controlled.     Medications:  Continuous Infusions:  Scheduled Meds:   albuterol-ipratropium  3 mL Nebulization Q4H    amLODIPine  10 mg Oral Daily    aspirin  81 mg Oral Daily    gabapentin  400 mg Oral TID    heparin (porcine)  5,000 Units Subcutaneous Q8H    levothyroxine  50 mcg Oral Before breakfast    sodium chloride 0.9%  3 mL Intravenous Q8H     PRN Meds:ondansetron, oxyCODONE-acetaminophen, oxyCODONE-acetaminophen     Review of patient's allergies indicates:  No Known Allergies  Objective:     Vital Signs (Most Recent):  Temp: 96.2 °F (35.7 °C) (06/30/18 0452)  Pulse: 79 (06/30/18 0705)  Resp: 18 (06/30/18 0705)  BP: 138/64 (06/30/18 0452)  SpO2: 99 % (06/30/18 0705) Vital Signs (24h Range):  Temp:  [96.2 °F (35.7 °C)-97.1 °F (36.2 °C)] 96.2 °F (35.7 °C)  Pulse:  [77-88] 79  Resp:  [16-20] 18  SpO2:  [87 %-99 %] 99 %  BP: (119-145)/(56-68) 138/64     Weight: 91.2 kg (201 lb 1 oz)  Body mass index is 30.13 kg/m².    Intake/Output - Last 3 Shifts       06/28 0700 - 06/29 0659 06/29 0700 - 06/30 0659 06/30 0700 - 07/01 0659    P.O. 400      I.V. (mL/kg) 3 (0)      Total Intake(mL/kg) 403 (4.4)      Urine (mL/kg/hr) 750 (0.3) 1100 (0.5)     Other 35 (0)      Total Output 785 1100      Net -382 -1100             Stool Occurrence 3 x 2 x           Physical Exam   Constitutional: He is oriented to person, place, and time. He appears well-developed and well-nourished. No distress.   Cardiovascular: Normal rate.    Pulmonary/Chest: Effort normal.   Abdominal: Soft. He exhibits no distension. There is no tenderness.   Neurological: He is alert and oriented to person, place, and time.   Skin: Skin is warm and dry.   Psychiatric: He has a normal mood and affect. His behavior is normal.       Significant Labs:  CBC:   Recent Labs  Lab 06/30/18  0353   WBC 5.91   RBC 3.88*   HGB 10.7*   HCT 34.0*      MCV 88   MCH 27.6    MCHC 31.5*     BMP:   Recent Labs  Lab 06/30/18  0353   GLU 80      K 3.8      CO2 28   BUN 9   CREATININE 1.2   CALCIUM 8.2*   MG 1.6

## 2018-06-30 NOTE — NURSING
Pt awake.  Some confusion noted  With gathering his thoughts but able to state give me a minute, in order to finish his statement. Pt had discharge orders pending a negative C Diff report. Patient discharge to home with home health. PT sitter at the bedside stating that pt first agreed to go to a rehab but then changed his mind and stated that he would go home with home health instead per patient. Patient has a medical power of  who spoke with me by phone stating that he was being kicked out and that he was not able to be home on his own with just home health. Medical  Power of  was informed that he was not being kicked out , and the sitter stated that the patient made that decision himself to go home with home health.   A call was placed into the on call  through the . Awaiting a call back from the .

## 2018-06-30 NOTE — NURSING
Pt awake alert resp even and unlabored skin warm and dry to touch. Able to make his needs known. Safety measures in place. Sitter at the bedside

## 2018-06-30 NOTE — PLAN OF CARE
CM received call from Gen Surg service that pt will d/c today - CM forwarded h/h orders via RC to Nurse's Registry and contacted h/h on call.    Update: no return call received from h/h on call. Pt did not d/c; will follow for d/c or changes in plan.    Deepali Ye, Wknd   [phone]

## 2018-06-30 NOTE — PLAN OF CARE
Problem: Patient Care Overview  Goal: Plan of Care Review  Outcome: Ongoing (interventions implemented as appropriate)  Pt verbalizes understanding of on going care

## 2018-06-30 NOTE — PROGRESS NOTES
[facility]  General Surgery  Progress Note    Subjective:     History of Present Illness:  No notes on file    Post-Op Info:  Procedure(s) (LRB):  REPAIR, HERNIA, VENTRAL, INCARCERATED, WITHOUT HISTORY OF PRIOR REPAIR (N/A)   10 Days Post-Op     Interval History:   NAEON. C.diff negative. Tolerating regular diet. Normal BMs. Pain controlled.     Medications:  Continuous Infusions:  Scheduled Meds:   albuterol-ipratropium  3 mL Nebulization Q4H    amLODIPine  10 mg Oral Daily    aspirin  81 mg Oral Daily    gabapentin  400 mg Oral TID    heparin (porcine)  5,000 Units Subcutaneous Q8H    levothyroxine  50 mcg Oral Before breakfast    sodium chloride 0.9%  3 mL Intravenous Q8H     PRN Meds:ondansetron, oxyCODONE-acetaminophen, oxyCODONE-acetaminophen     Review of patient's allergies indicates:  No Known Allergies  Objective:     Vital Signs (Most Recent):  Temp: 96.2 °F (35.7 °C) (06/30/18 0452)  Pulse: 79 (06/30/18 0705)  Resp: 18 (06/30/18 0705)  BP: 138/64 (06/30/18 0452)  SpO2: 99 % (06/30/18 0705) Vital Signs (24h Range):  Temp:  [96.2 °F (35.7 °C)-97.1 °F (36.2 °C)] 96.2 °F (35.7 °C)  Pulse:  [77-88] 79  Resp:  [16-20] 18  SpO2:  [87 %-99 %] 99 %  BP: (119-145)/(56-68) 138/64     Weight: 91.2 kg (201 lb 1 oz)  Body mass index is 30.13 kg/m².    Intake/Output - Last 3 Shifts       06/28 0700 - 06/29 0659 06/29 0700 - 06/30 0659 06/30 0700 - 07/01 0659    P.O. 400      I.V. (mL/kg) 3 (0)      Total Intake(mL/kg) 403 (4.4)      Urine (mL/kg/hr) 750 (0.3) 1100 (0.5)     Other 35 (0)      Total Output 785 1100      Net -382 -1100             Stool Occurrence 3 x 2 x           Physical Exam   Constitutional: He is oriented to person, place, and time. He appears well-developed and well-nourished. No distress.   Cardiovascular: Normal rate.    Pulmonary/Chest: Effort normal.   Abdominal: Soft. He exhibits no distension. There is no tenderness.   Neurological: He is alert and oriented to  person, place, and time.   Skin: Skin is warm and dry.   Psychiatric: He has a normal mood and affect. His behavior is normal.       Significant Labs:  CBC:   Recent Labs  Lab 06/30/18  0353   WBC 5.91   RBC 3.88*   HGB 10.7*   HCT 34.0*      MCV 88   MCH 27.6   MCHC 31.5*     BMP:   Recent Labs  Lab 06/30/18  0353   GLU 80      K 3.8      CO2 28   BUN 9   CREATININE 1.2   CALCIUM 8.2*   MG 1.6           Assessment/Plan:     * Abdominal wall hernia    CT showed fat in ventral hernia  Reducible        Fecal incontinence    C.diff negative        Small bowel obstruction    83 yo male s/p ventral hernia repair of incarcerated hernia 6/20/18    - regular diet  - PO pain  - nausea meds PRN  - PT/OT/IS - patient amenable to going home with home health  - DVT prophylaxis  - Home today with home health        Urinary tract infection associated with cystostomy catheter    On rocephin for 10 days, started 6/21        Sciatica neuralgia    Home meds  -gabapentin        Essential hypertension    Home meds  -amlodipine        Hypothyroidism    Home meds  -levothyroxine            Renya Chan MD  General Surgery  [facility]

## 2018-07-01 LAB
ANION GAP SERPL CALC-SCNC: 11 MMOL/L
BASOPHILS # BLD AUTO: 0.03 K/UL
BASOPHILS NFR BLD: 0.5 %
BUN SERPL-MCNC: 8 MG/DL
CALCIUM SERPL-MCNC: 8.3 MG/DL
CHLORIDE SERPL-SCNC: 98 MMOL/L
CO2 SERPL-SCNC: 27 MMOL/L
CREAT SERPL-MCNC: 1.1 MG/DL
DIFFERENTIAL METHOD: ABNORMAL
EOSINOPHIL # BLD AUTO: 0.3 K/UL
EOSINOPHIL NFR BLD: 5.6 %
ERYTHROCYTE [DISTWIDTH] IN BLOOD BY AUTOMATED COUNT: 15 %
EST. GFR  (AFRICAN AMERICAN): >60 ML/MIN/1.73 M^2
EST. GFR  (NON AFRICAN AMERICAN): >60 ML/MIN/1.73 M^2
GLUCOSE SERPL-MCNC: 84 MG/DL
HCT VFR BLD AUTO: 33.8 %
HGB BLD-MCNC: 10.7 G/DL
IMM GRANULOCYTES # BLD AUTO: 0.06 K/UL
IMM GRANULOCYTES NFR BLD AUTO: 1 %
LYMPHOCYTES # BLD AUTO: 1.3 K/UL
LYMPHOCYTES NFR BLD: 22.5 %
MAGNESIUM SERPL-MCNC: 1.6 MG/DL
MCH RBC QN AUTO: 27.2 PG
MCHC RBC AUTO-ENTMCNC: 31.7 G/DL
MCV RBC AUTO: 86 FL
MONOCYTES # BLD AUTO: 0.5 K/UL
MONOCYTES NFR BLD: 8.7 %
NEUTROPHILS # BLD AUTO: 3.5 K/UL
NEUTROPHILS NFR BLD: 61.7 %
NRBC BLD-RTO: 0 /100 WBC
PHOSPHATE SERPL-MCNC: 3.1 MG/DL
PLATELET # BLD AUTO: 205 K/UL
PMV BLD AUTO: 10.2 FL
POTASSIUM SERPL-SCNC: 4 MMOL/L
RBC # BLD AUTO: 3.94 M/UL
SODIUM SERPL-SCNC: 136 MMOL/L
WBC # BLD AUTO: 5.73 K/UL

## 2018-07-01 PROCEDURE — 80048 BASIC METABOLIC PNL TOTAL CA: CPT

## 2018-07-01 PROCEDURE — 25000242 PHARM REV CODE 250 ALT 637 W/ HCPCS: Performed by: STUDENT IN AN ORGANIZED HEALTH CARE EDUCATION/TRAINING PROGRAM

## 2018-07-01 PROCEDURE — 99900035 HC TECH TIME PER 15 MIN (STAT)

## 2018-07-01 PROCEDURE — 85025 COMPLETE CBC W/AUTO DIFF WBC: CPT

## 2018-07-01 PROCEDURE — 27000646 HC AEROBIKA DEVICE

## 2018-07-01 PROCEDURE — 94640 AIRWAY INHALATION TREATMENT: CPT

## 2018-07-01 PROCEDURE — 25000003 PHARM REV CODE 250: Performed by: STUDENT IN AN ORGANIZED HEALTH CARE EDUCATION/TRAINING PROGRAM

## 2018-07-01 PROCEDURE — 94761 N-INVAS EAR/PLS OXIMETRY MLT: CPT

## 2018-07-01 PROCEDURE — 94664 DEMO&/EVAL PT USE INHALER: CPT

## 2018-07-01 PROCEDURE — 63600175 PHARM REV CODE 636 W HCPCS: Performed by: SURGERY

## 2018-07-01 PROCEDURE — 11000001 HC ACUTE MED/SURG PRIVATE ROOM

## 2018-07-01 PROCEDURE — 83735 ASSAY OF MAGNESIUM: CPT

## 2018-07-01 PROCEDURE — 84100 ASSAY OF PHOSPHORUS: CPT

## 2018-07-01 PROCEDURE — 36415 COLL VENOUS BLD VENIPUNCTURE: CPT

## 2018-07-01 RX ORDER — LANOLIN ALCOHOL/MO/W.PET/CERES
400 CREAM (GRAM) TOPICAL ONCE
Status: COMPLETED | OUTPATIENT
Start: 2018-07-01 | End: 2018-07-01

## 2018-07-01 RX ADMIN — GABAPENTIN 400 MG: 400 CAPSULE ORAL at 09:07

## 2018-07-01 RX ADMIN — IPRATROPIUM BROMIDE AND ALBUTEROL SULFATE 3 ML: .5; 3 SOLUTION RESPIRATORY (INHALATION) at 04:07

## 2018-07-01 RX ADMIN — OXYCODONE HYDROCHLORIDE AND ACETAMINOPHEN 1 TABLET: 10; 325 TABLET ORAL at 09:07

## 2018-07-01 RX ADMIN — HEPARIN SODIUM 5000 UNITS: 5000 INJECTION, SOLUTION INTRAVENOUS; SUBCUTANEOUS at 09:07

## 2018-07-01 RX ADMIN — IPRATROPIUM BROMIDE AND ALBUTEROL SULFATE 3 ML: .5; 3 SOLUTION RESPIRATORY (INHALATION) at 07:07

## 2018-07-01 RX ADMIN — OXYCODONE HYDROCHLORIDE AND ACETAMINOPHEN 1 TABLET: 10; 325 TABLET ORAL at 06:07

## 2018-07-01 RX ADMIN — IPRATROPIUM BROMIDE AND ALBUTEROL SULFATE 3 ML: .5; 3 SOLUTION RESPIRATORY (INHALATION) at 08:07

## 2018-07-01 RX ADMIN — IPRATROPIUM BROMIDE AND ALBUTEROL SULFATE 3 ML: .5; 3 SOLUTION RESPIRATORY (INHALATION) at 12:07

## 2018-07-01 RX ADMIN — AMLODIPINE BESYLATE 10 MG: 10 TABLET ORAL at 09:07

## 2018-07-01 RX ADMIN — Medication 400 MG: at 06:07

## 2018-07-01 RX ADMIN — ASPIRIN 81 MG: 81 TABLET, COATED ORAL at 09:07

## 2018-07-01 RX ADMIN — IPRATROPIUM BROMIDE AND ALBUTEROL SULFATE 3 ML: .5; 3 SOLUTION RESPIRATORY (INHALATION) at 11:07

## 2018-07-01 RX ADMIN — HEPARIN SODIUM 5000 UNITS: 5000 INJECTION, SOLUTION INTRAVENOUS; SUBCUTANEOUS at 01:07

## 2018-07-01 RX ADMIN — IPRATROPIUM BROMIDE AND ALBUTEROL SULFATE 3 ML: .5; 3 SOLUTION RESPIRATORY (INHALATION) at 01:07

## 2018-07-01 RX ADMIN — GABAPENTIN 400 MG: 400 CAPSULE ORAL at 02:07

## 2018-07-01 RX ADMIN — HEPARIN SODIUM 5000 UNITS: 5000 INJECTION, SOLUTION INTRAVENOUS; SUBCUTANEOUS at 05:07

## 2018-07-01 RX ADMIN — LEVOTHYROXINE SODIUM 50 MCG: 50 TABLET ORAL at 05:07

## 2018-07-01 RX ADMIN — OXYCODONE HYDROCHLORIDE AND ACETAMINOPHEN 1 TABLET: 10; 325 TABLET ORAL at 05:07

## 2018-07-01 NOTE — SUBJECTIVE & OBJECTIVE
Interval History:   NAEON. Tolerating regular diet. Normal BMs. Pain controlled.     Medications:  Continuous Infusions:  Scheduled Meds:   albuterol-ipratropium  3 mL Nebulization Q4H    amLODIPine  10 mg Oral Daily    aspirin  81 mg Oral Daily    gabapentin  400 mg Oral TID    heparin (porcine)  5,000 Units Subcutaneous Q8H    levothyroxine  50 mcg Oral Before breakfast    sodium chloride 0.9%  3 mL Intravenous Q8H     PRN Meds:ondansetron, oxyCODONE-acetaminophen, oxyCODONE-acetaminophen     Review of patient's allergies indicates:  No Known Allergies  Objective:     Vital Signs (Most Recent):  Temp: 97.4 °F (36.3 °C) (07/01/18 0756)  Pulse: 89 (07/01/18 0850)  Resp: 20 (07/01/18 0850)  BP: 129/62 (07/01/18 0756)  SpO2: (!) 88 % (07/01/18 0850) Vital Signs (24h Range):  Temp:  [96 °F (35.6 °C)-98.5 °F (36.9 °C)] 97.4 °F (36.3 °C)  Pulse:  [72-89] 89  Resp:  [16-22] 20  SpO2:  [88 %-98 %] 88 %  BP: (125-154)/(60-70) 129/62     Weight: 91.2 kg (201 lb 1 oz)  Body mass index is 30.13 kg/m².    Intake/Output - Last 3 Shifts       06/29 0700 - 06/30 0659 06/30 0700 - 07/01 0659 07/01 0700 - 07/02 0659    P.O.  720 240    I.V. (mL/kg)   0 (0)    Total Intake(mL/kg)  720 (7.9) 240 (2.6)    Urine (mL/kg/hr) 1100 (0.5) 2125 (1) 400 (1.5)    Total Output 1100 2125 400    Net -1100 -1405 -160           Stool Occurrence 2 x            Physical Exam   Constitutional: He is oriented to person, place, and time. He appears well-developed and well-nourished. No distress.   Cardiovascular: Normal rate.    Pulmonary/Chest: Effort normal.   Abdominal: Soft. He exhibits no distension. There is no tenderness.   Incision c/d/i with staples   Neurological: He is alert and oriented to person, place, and time.   Skin: Skin is warm and dry.   Psychiatric: He has a normal mood and affect. His behavior is normal.       Significant Labs:  CBC:     Recent Labs  Lab 07/01/18  0451   WBC 5.73   RBC 3.94*   HGB 10.7*   HCT 33.8*       MCV 86   MCH 27.2   MCHC 31.7*     BMP:     Recent Labs  Lab 07/01/18  0451   GLU 84      K 4.0   CL 98   CO2 27   BUN 8   CREATININE 1.1   CALCIUM 8.3*   MG 1.6

## 2018-07-01 NOTE — PROGRESS NOTES
[facility]  General Surgery  Progress Note    Subjective:     History of Present Illness:  No notes on file    Post-Op Info:  Procedure(s) (LRB):  REPAIR, HERNIA, VENTRAL, INCARCERATED, WITHOUT HISTORY OF PRIOR REPAIR (N/A)   11 Days Post-Op     Interval History:   NAEON. Tolerating regular diet. Normal BMs. Pain controlled.     Medications:  Continuous Infusions:  Scheduled Meds:   albuterol-ipratropium  3 mL Nebulization Q4H    amLODIPine  10 mg Oral Daily    aspirin  81 mg Oral Daily    gabapentin  400 mg Oral TID    heparin (porcine)  5,000 Units Subcutaneous Q8H    levothyroxine  50 mcg Oral Before breakfast    sodium chloride 0.9%  3 mL Intravenous Q8H     PRN Meds:ondansetron, oxyCODONE-acetaminophen, oxyCODONE-acetaminophen     Review of patient's allergies indicates:  No Known Allergies  Objective:     Vital Signs (Most Recent):  Temp: 97.4 °F (36.3 °C) (07/01/18 0756)  Pulse: 89 (07/01/18 0850)  Resp: 20 (07/01/18 0850)  BP: 129/62 (07/01/18 0756)  SpO2: (!) 88 % (07/01/18 0850) Vital Signs (24h Range):  Temp:  [96 °F (35.6 °C)-98.5 °F (36.9 °C)] 97.4 °F (36.3 °C)  Pulse:  [72-89] 89  Resp:  [16-22] 20  SpO2:  [88 %-98 %] 88 %  BP: (125-154)/(60-70) 129/62     Weight: 91.2 kg (201 lb 1 oz)  Body mass index is 30.13 kg/m².    Intake/Output - Last 3 Shifts       06/29 0700 - 06/30 0659 06/30 0700 - 07/01 0659 07/01 0700 - 07/02 0659    P.O.  720 240    I.V. (mL/kg)   0 (0)    Total Intake(mL/kg)  720 (7.9) 240 (2.6)    Urine (mL/kg/hr) 1100 (0.5) 2125 (1) 400 (1.5)    Total Output 1100 2125 400    Net -1100 -1405 -160           Stool Occurrence 2 x            Physical Exam   Constitutional: He is oriented to person, place, and time. He appears well-developed and well-nourished. No distress.   Cardiovascular: Normal rate.    Pulmonary/Chest: Effort normal.   Abdominal: Soft. He exhibits no distension. There is no tenderness.   Incision c/d/i with staples   Neurological: He is alert  and oriented to person, place, and time.   Skin: Skin is warm and dry.   Psychiatric: He has a normal mood and affect. His behavior is normal.       Significant Labs:  CBC:     Recent Labs  Lab 07/01/18  0451   WBC 5.73   RBC 3.94*   HGB 10.7*   HCT 33.8*      MCV 86   MCH 27.2   MCHC 31.7*     BMP:     Recent Labs  Lab 07/01/18  0451   GLU 84      K 4.0   CL 98   CO2 27   BUN 8   CREATININE 1.1   CALCIUM 8.3*   MG 1.6           Assessment/Plan:     * Abdominal wall hernia    S/p repair         Fecal incontinence    C.diff negative        Small bowel obstruction    85 yo male s/p ventral hernia repair of incarcerated hernia 6/20/18    - regular diet  - PO pain  - nausea meds PRN  - PT/OT/IS - home with  vs SNF  - DVT prophylaxis  - Appreciate SW help for placement        Urinary tract infection associated with cystostomy catheter    Completed course of Rocephin        Sciatica neuralgia    Home meds  -gabapentin        Essential hypertension    Home meds  -amlodipine        Hypothyroidism    Home meds  -levothyroxine            Jesús Neil MD  General Surgery  [facility]

## 2018-07-02 LAB
ANION GAP SERPL CALC-SCNC: 11 MMOL/L
BASOPHILS # BLD AUTO: 0.04 K/UL
BASOPHILS NFR BLD: 0.6 %
BUN SERPL-MCNC: 9 MG/DL
CALCIUM SERPL-MCNC: 8.7 MG/DL
CHLORIDE SERPL-SCNC: 96 MMOL/L
CO2 SERPL-SCNC: 28 MMOL/L
CREAT SERPL-MCNC: 1.2 MG/DL
DIFFERENTIAL METHOD: ABNORMAL
EOSINOPHIL # BLD AUTO: 0.3 K/UL
EOSINOPHIL NFR BLD: 4.8 %
ERYTHROCYTE [DISTWIDTH] IN BLOOD BY AUTOMATED COUNT: 15.2 %
EST. GFR  (AFRICAN AMERICAN): >60 ML/MIN/1.73 M^2
EST. GFR  (NON AFRICAN AMERICAN): 55.2 ML/MIN/1.73 M^2
GLUCOSE SERPL-MCNC: 79 MG/DL
HCT VFR BLD AUTO: 37.6 %
HGB BLD-MCNC: 12.1 G/DL
IMM GRANULOCYTES # BLD AUTO: 0.06 K/UL
IMM GRANULOCYTES NFR BLD AUTO: 0.9 %
LYMPHOCYTES # BLD AUTO: 1.8 K/UL
LYMPHOCYTES NFR BLD: 27.3 %
MAGNESIUM SERPL-MCNC: 1.9 MG/DL
MCH RBC QN AUTO: 27.8 PG
MCHC RBC AUTO-ENTMCNC: 32.2 G/DL
MCV RBC AUTO: 86 FL
MONOCYTES # BLD AUTO: 0.6 K/UL
MONOCYTES NFR BLD: 8.9 %
NEUTROPHILS # BLD AUTO: 3.8 K/UL
NEUTROPHILS NFR BLD: 57.5 %
NRBC BLD-RTO: 0 /100 WBC
PHOSPHATE SERPL-MCNC: 3.6 MG/DL
PLATELET # BLD AUTO: 233 K/UL
PMV BLD AUTO: 10.1 FL
POTASSIUM SERPL-SCNC: 4.1 MMOL/L
RBC # BLD AUTO: 4.36 M/UL
SODIUM SERPL-SCNC: 135 MMOL/L
WBC # BLD AUTO: 6.62 K/UL

## 2018-07-02 PROCEDURE — 99900035 HC TECH TIME PER 15 MIN (STAT)

## 2018-07-02 PROCEDURE — 36415 COLL VENOUS BLD VENIPUNCTURE: CPT

## 2018-07-02 PROCEDURE — 84100 ASSAY OF PHOSPHORUS: CPT

## 2018-07-02 PROCEDURE — 25000242 PHARM REV CODE 250 ALT 637 W/ HCPCS: Performed by: STUDENT IN AN ORGANIZED HEALTH CARE EDUCATION/TRAINING PROGRAM

## 2018-07-02 PROCEDURE — 80048 BASIC METABOLIC PNL TOTAL CA: CPT

## 2018-07-02 PROCEDURE — A4216 STERILE WATER/SALINE, 10 ML: HCPCS | Performed by: SURGERY

## 2018-07-02 PROCEDURE — 85025 COMPLETE CBC W/AUTO DIFF WBC: CPT

## 2018-07-02 PROCEDURE — 94664 DEMO&/EVAL PT USE INHALER: CPT

## 2018-07-02 PROCEDURE — 83735 ASSAY OF MAGNESIUM: CPT

## 2018-07-02 PROCEDURE — 11000001 HC ACUTE MED/SURG PRIVATE ROOM

## 2018-07-02 PROCEDURE — 25000003 PHARM REV CODE 250: Performed by: SURGERY

## 2018-07-02 PROCEDURE — 27000221 HC OXYGEN, UP TO 24 HOURS

## 2018-07-02 PROCEDURE — 94761 N-INVAS EAR/PLS OXIMETRY MLT: CPT

## 2018-07-02 PROCEDURE — 25000003 PHARM REV CODE 250: Performed by: STUDENT IN AN ORGANIZED HEALTH CARE EDUCATION/TRAINING PROGRAM

## 2018-07-02 PROCEDURE — 94640 AIRWAY INHALATION TREATMENT: CPT

## 2018-07-02 PROCEDURE — 25000003 PHARM REV CODE 250

## 2018-07-02 PROCEDURE — 97110 THERAPEUTIC EXERCISES: CPT

## 2018-07-02 PROCEDURE — 99024 POSTOP FOLLOW-UP VISIT: CPT | Mod: GC,,, | Performed by: SURGERY

## 2018-07-02 PROCEDURE — 63600175 PHARM REV CODE 636 W HCPCS: Performed by: SURGERY

## 2018-07-02 RX ORDER — LANOLIN ALCOHOL/MO/W.PET/CERES
800 CREAM (GRAM) TOPICAL ONCE
Status: COMPLETED | OUTPATIENT
Start: 2018-07-02 | End: 2018-07-02

## 2018-07-02 RX ORDER — ONDANSETRON 8 MG/1
8 TABLET, ORALLY DISINTEGRATING ORAL EVERY 8 HOURS PRN
Status: DISCONTINUED | OUTPATIENT
Start: 2018-07-02 | End: 2018-07-05 | Stop reason: HOSPADM

## 2018-07-02 RX ORDER — ONDANSETRON 8 MG/1
TABLET, ORALLY DISINTEGRATING ORAL
Status: COMPLETED
Start: 2018-07-02 | End: 2018-07-02

## 2018-07-02 RX ADMIN — OXYCODONE HYDROCHLORIDE AND ACETAMINOPHEN 1 TABLET: 5; 325 TABLET ORAL at 01:07

## 2018-07-02 RX ADMIN — GABAPENTIN 400 MG: 400 CAPSULE ORAL at 04:07

## 2018-07-02 RX ADMIN — GABAPENTIN 400 MG: 400 CAPSULE ORAL at 09:07

## 2018-07-02 RX ADMIN — IPRATROPIUM BROMIDE AND ALBUTEROL SULFATE 3 ML: .5; 3 SOLUTION RESPIRATORY (INHALATION) at 11:07

## 2018-07-02 RX ADMIN — HEPARIN SODIUM 5000 UNITS: 5000 INJECTION, SOLUTION INTRAVENOUS; SUBCUTANEOUS at 09:07

## 2018-07-02 RX ADMIN — ASPIRIN 81 MG: 81 TABLET, COATED ORAL at 10:07

## 2018-07-02 RX ADMIN — IPRATROPIUM BROMIDE AND ALBUTEROL SULFATE 3 ML: .5; 3 SOLUTION RESPIRATORY (INHALATION) at 03:07

## 2018-07-02 RX ADMIN — OXYCODONE HYDROCHLORIDE AND ACETAMINOPHEN 1 TABLET: 10; 325 TABLET ORAL at 05:07

## 2018-07-02 RX ADMIN — IPRATROPIUM BROMIDE AND ALBUTEROL SULFATE 3 ML: .5; 3 SOLUTION RESPIRATORY (INHALATION) at 08:07

## 2018-07-02 RX ADMIN — LEVOTHYROXINE SODIUM 50 MCG: 50 TABLET ORAL at 05:07

## 2018-07-02 RX ADMIN — AMLODIPINE BESYLATE 10 MG: 10 TABLET ORAL at 10:07

## 2018-07-02 RX ADMIN — Medication 800 MG: at 10:07

## 2018-07-02 RX ADMIN — ONDANSETRON 8 MG: 8 TABLET, ORALLY DISINTEGRATING ORAL at 01:07

## 2018-07-02 RX ADMIN — Medication 3 ML: at 10:07

## 2018-07-02 RX ADMIN — OXYCODONE HYDROCHLORIDE AND ACETAMINOPHEN 1 TABLET: 10; 325 TABLET ORAL at 09:07

## 2018-07-02 RX ADMIN — HEPARIN SODIUM 5000 UNITS: 5000 INJECTION, SOLUTION INTRAVENOUS; SUBCUTANEOUS at 01:07

## 2018-07-02 RX ADMIN — GABAPENTIN 400 MG: 400 CAPSULE ORAL at 10:07

## 2018-07-02 RX ADMIN — HEPARIN SODIUM 5000 UNITS: 5000 INJECTION, SOLUTION INTRAVENOUS; SUBCUTANEOUS at 05:07

## 2018-07-02 RX ADMIN — IPRATROPIUM BROMIDE AND ALBUTEROL SULFATE 3 ML: .5; 3 SOLUTION RESPIRATORY (INHALATION) at 04:07

## 2018-07-02 NOTE — SUBJECTIVE & OBJECTIVE
Interval History: NAEON, pain well controlled, denies nausea or vomiting, afebrile    Medications:  Continuous Infusions:  Scheduled Meds:   albuterol-ipratropium  3 mL Nebulization Q4H    amLODIPine  10 mg Oral Daily    aspirin  81 mg Oral Daily    gabapentin  400 mg Oral TID    heparin (porcine)  5,000 Units Subcutaneous Q8H    levothyroxine  50 mcg Oral Before breakfast    sodium chloride 0.9%  3 mL Intravenous Q8H     PRN Meds:ondansetron, oxyCODONE-acetaminophen, oxyCODONE-acetaminophen     Review of patient's allergies indicates:  No Known Allergies  Objective:     Vital Signs (Most Recent):  Temp: 97.2 °F (36.2 °C) (07/02/18 0752)  Pulse: 77 (07/02/18 0752)  Resp: 14 (07/02/18 0752)  BP: 132/65 (07/02/18 0752)  SpO2: (!) 93 % (07/02/18 0752) Vital Signs (24h Range):  Temp:  [96.2 °F (35.7 °C)-98.4 °F (36.9 °C)] 97.2 °F (36.2 °C)  Pulse:  [74-89] 77  Resp:  [14-20] 14  SpO2:  [88 %-99 %] 93 %  BP: (132-162)/(65-76) 132/65     Weight: 91.2 kg (201 lb 1 oz)  Body mass index is 30.13 kg/m².    Intake/Output - Last 3 Shifts       06/30 0700 - 07/01 0659 07/01 0700 - 07/02 0659 07/02 0700 - 07/03 0659    P.O. 720 600     I.V. (mL/kg)  0 (0)     Total Intake(mL/kg) 720 (7.9) 600 (6.6)     Urine (mL/kg/hr) 2125 (1) 1150 (0.5)     Total Output 2125 1150      Net -1405 -550                   Physical Exam   Constitutional: He is oriented to person, place, and time. He appears well-developed and well-nourished. No distress.   Cardiovascular: Normal rate.    Pulmonary/Chest: Effort normal.   Abdominal: Soft. He exhibits no distension. There is no tenderness.   Incision c/d/i with staples   Neurological: He is alert and oriented to person, place, and time.   Skin: Skin is warm and dry.   Psychiatric: He has a normal mood and affect. His behavior is normal.       Significant Labs:  CBC:   Recent Labs  Lab 07/02/18  0419   WBC 6.62   RBC 4.36*   HGB 12.1*   HCT 37.6*      MCV 86   MCH 27.8   MCHC 32.2     CMP:    Recent Labs  Lab 07/02/18  0419   GLU 79   CALCIUM 8.7   *   K 4.1   CO2 28   CL 96   BUN 9   CREATININE 1.2       Significant Diagnostics:  I have reviewed all pertinent imaging results/findings within the past 24 hours.

## 2018-07-02 NOTE — PLAN OF CARE
"09:30 AM Per SHAWNEE Long, patient was medically stable to be discharged to home resuming his HH, personal sitters, this weekend, but his Executor refused for him to be discharged home because he is unable to care for himself. JIMBO informed Grazyna patient has declined O SNF. [facility] has denied acceptance for he is unable to partake in 3 hrs of therapy daily. CM will talk w/him today.     11:30 AM Visited patient. AAOX4. Personal sitter at . Discussed above w/patient. Informed him, he is able to make his own decisions for he is of sound mind. Asked him what did he want. He verbalized his understanding & states, "Yes, I will go to O SNF, but I want to go home for 1 day first."  JIMBO informed him he could not do that;The O SNF facility, if accepts him, would have him go directly there when he is discharged from here/the Hospital. He verbalized his understanding. Personal sitter called his Executor-Kirstin Pak ([phone]) & she was placed on speaker phone so patient could hear our conversation. Updated on above. She verbalized her understanding & is adamant that the patient is not ready to be discharged home & she wants him to go to O SNF before he returns home. Informed them that a referral will be sent to O SNF. They will determine if he is accepted. If he is not, then the SW, ROSALIE Brock, would be in touch to obtain further SNF options. She verbalized her understanding & states,"I do not want him to go to another SNF. I only want O SNF." JIMBO informed her then the only option, should he not be accepted to O SNF, would be to discharge home resuming his HH, & personal sitters. She verbalized her understanding.     12:15 PM SHAWNEE Long, & SW updated.   "

## 2018-07-02 NOTE — PLAN OF CARE
[facility]     Department of Hospital Medicine     [address] [place] [address]     [phone] after hours  [phone] fax       NURSING HOME ORDERS    07/02/2018    Admit to Nursing Home:  Skilled Bed      Diagnoses:  Active Hospital Problems    Diagnosis  POA    *Abdominal wall hernia [K43.9]  Yes    Fecal incontinence [R15.9]  No    Impaired mobility and ADLs [Z74.09]  Yes    Incisional hernia with obstruction but no gangrene [K43.0]  Yes    Small bowel obstruction [K56.609]  Yes    Urinary tract infection associated with cystostomy catheter [T83.510A, N39.0]  Yes    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]  Yes    Essential hypertension [I10]  Yes     Chronic    Hypothyroidism [E03.9]  Yes     Chronic      Resolved Hospital Problems    Diagnosis Date Resolved POA    Hypomagnesemia [E83.42] 06/30/2018 No    Hypokalemia [E87.6] 06/30/2018 Yes       Patient is homebound due to:  Abdominal wall hernia    Allergies:Review of patient's allergies indicates:  No Known Allergies    Vitals:       Every shift (Skilled Nursing patients)    Diet: adult regular    Supplement:  1 can every three times a day with meals                         Type:   Ensure     Acitivities:      - Up in a chair each morning as tolerated   - Ambulate with assistance to bathroom   - Scheduled walks once each shift (every 8 hours)   - May ambulate independently   - May use walker or cane    LABS:  Per facility protocol    Nursing Precautions:    - Aspiration precautions:             - Total assistance with meals            -  Upright 90 degrees befor during and after meals             -  Suction at bedside          - Fall precautions per nursing home protocol   - Seizure precaution per assisted protocol   - Decubitus precautions:        -  for positioning   - Pressure reducing foam mattress   - Turn patient every two hours. Use wedge pillows to anchor patient    CONSULTS:       Physical Therapy to evaluate and treat     Occupational Therapy to evaluate and treat     Speech Therapy  to evaluate and treat     Nutrition to evaluate and recommend diet     Psychiatry to evaluate and follow patients for delirium    MISCELLANEOUS CARE:               Routine Skin for Bedridden Patients:  Apply moisture barrier cream to all    skin folds and wet areas in perineal area daily and after baths and                           all bowel movements.       Medications: Discontinue all previous medication orders, if any. See new list below.     Chucho Prado   Home Medication Instructions ESTIVEN:[id number]    Printed on:07/02/18 8707   Medication Information                      amLODIPine (NORVASC) 10 MG tablet  Take 1 tablet (10 mg total) by mouth once daily.             aspirin (ECOTRIN) 81 MG EC tablet  Take 81 mg by mouth once daily.             docusate sodium (COLACE) 100 MG capsule  Take 1 capsule (100 mg total) by mouth 2 (two) times daily as needed for Constipation.             gabapentin (NEURONTIN) 400 MG capsule  Take 400 mg by mouth 3 (three) times daily.             hyoscyamine (LEVSIN/SL) 0.125 mg Subl  DISSOLVE 1 TABLET UNDER THE TONGUE EVERY 4 HOURS AS NEEDED             hyoscyamine (LEVSIN/SL) 0.125 mg Subl  DISSOLVE 1 TABLET UNDER THE TONGUE EVERY 4 HOURS AS NEEDED             levothyroxine (SYNTHROID) 50 MCG tablet  Take 1 tablet (50 mcg total) by mouth once daily.             ondansetron (ZOFRAN-ODT) 8 MG TbDL  Take 1 tablet (8 mg total) by mouth every 12 (twelve) hours as needed.                        polyethylene glycol (GLYCOLAX) 17 gram PwPk  Take 17 g by mouth 2 (two) times daily.             senna-docusate 8.6-50 mg (PERICOLACE) 8.6-50 mg per tablet  Take 1 tablet by mouth 2 (two) times daily.             simvastatin (ZOCOR) 40 MG tablet  Take 1 tablet (40 mg total) by mouth every evening.             trazodone (DESYREL) 50 MG tablet  Take 1 tablet (50 mg total) by mouth  nightly as needed for Insomnia.             triamterene-hydrochlorothiazide 37.5-25 mg (DYAZIDE) 37.5-25 mg per capsule  TAKE 1 CAPSULE BY MOUTH ONCE DAILY IN THE MORNING                       _________________________________  Bethel Au Jr., MD  07/02/2018

## 2018-07-02 NOTE — PT/OT/SLP PROGRESS
Occupational Therapy      Patient Name:  Chucho Prado   MRN:  [id number]    Patient not seen today secondary to Patient ill (Comment). Pt decline OT 2/2 dizziness and nausea/vomitting that has been occurring all day. Will follow-up tomorrow.    Preethi Chatterjee, OT  7/2/2018

## 2018-07-02 NOTE — PROGRESS NOTES
[facility]  General Surgery  Progress Note    Subjective:     History of Present Illness:  No notes on file    Post-Op Info:  Procedure(s) (LRB):  REPAIR, HERNIA, VENTRAL, INCARCERATED, WITHOUT HISTORY OF PRIOR REPAIR (N/A)   12 Days Post-Op     Interval History: NAEON, pain well controlled, denies nausea or vomiting, afebrile    Medications:  Continuous Infusions:  Scheduled Meds:   albuterol-ipratropium  3 mL Nebulization Q4H    amLODIPine  10 mg Oral Daily    aspirin  81 mg Oral Daily    gabapentin  400 mg Oral TID    heparin (porcine)  5,000 Units Subcutaneous Q8H    levothyroxine  50 mcg Oral Before breakfast    sodium chloride 0.9%  3 mL Intravenous Q8H     PRN Meds:ondansetron, oxyCODONE-acetaminophen, oxyCODONE-acetaminophen     Review of patient's allergies indicates:  No Known Allergies  Objective:     Vital Signs (Most Recent):  Temp: 97.2 °F (36.2 °C) (07/02/18 0752)  Pulse: 77 (07/02/18 0752)  Resp: 14 (07/02/18 0752)  BP: 132/65 (07/02/18 0752)  SpO2: (!) 93 % (07/02/18 0752) Vital Signs (24h Range):  Temp:  [96.2 °F (35.7 °C)-98.4 °F (36.9 °C)] 97.2 °F (36.2 °C)  Pulse:  [74-89] 77  Resp:  [14-20] 14  SpO2:  [88 %-99 %] 93 %  BP: (132-162)/(65-76) 132/65     Weight: 91.2 kg (201 lb 1 oz)  Body mass index is 30.13 kg/m².    Intake/Output - Last 3 Shifts       06/30 0700 - 07/01 0659 07/01 0700 - 07/02 0659 07/02 0700 - 07/03 0659    P.O. 720 600     I.V. (mL/kg)  0 (0)     Total Intake(mL/kg) 720 (7.9) 600 (6.6)     Urine (mL/kg/hr) 2125 (1) 1150 (0.5)     Total Output 2125 1150      Net -1405 -550                   Physical Exam   Constitutional: He is oriented to person, place, and time. He appears well-developed and well-nourished. No distress.   Cardiovascular: Normal rate.    Pulmonary/Chest: Effort normal.   Abdominal: Soft. He exhibits no distension. There is no tenderness.   Incision c/d/i with staples   Neurological: He is alert and oriented to person, place, and  time.   Skin: Skin is warm and dry.   Psychiatric: He has a normal mood and affect. His behavior is normal.       Significant Labs:  CBC:   Recent Labs  Lab 07/02/18  0419   WBC 6.62   RBC 4.36*   HGB 12.1*   HCT 37.6*      MCV 86   MCH 27.8   MCHC 32.2     CMP:   Recent Labs  Lab 07/02/18  0419   GLU 79   CALCIUM 8.7   *   K 4.1   CO2 28   CL 96   BUN 9   CREATININE 1.2       Significant Diagnostics:  I have reviewed all pertinent imaging results/findings within the past 24 hours.    Assessment/Plan:     * Abdominal wall hernia    S/p repair         Fecal incontinence    C.diff negative        Small bowel obstruction    85 yo male s/p ventral hernia repair of incarcerated hernia 6/20/18    - regular diet  - PO pain  - nausea meds PRN  - PT/OT/IS - home with HH vs SNF  - DVT prophylaxis  - Discussed placement options with patient. He is still considering his options. PT continues to recommend SNF. Appreciate  help for placement.    Dispo: awaiting placement        Urinary tract infection associated with cystostomy catheter    Completed course of Rocephin        Sciatica neuralgia    Home meds  -gabapentin        Essential hypertension    Home meds  -amlodipine        Hypothyroidism    Home meds  -levothyroxine            Demetrio Mcgill MD  General Surgery  [facility]

## 2018-07-02 NOTE — PLAN OF CARE
Problem: Physical Therapy Goal  Goal: Physical Therapy Goal  Goals to be met by: 2018     Patient will increase functional independence with mobility by performin. Supine to sit with Stand-by Assistance - not met  2. Sit to supine with Stand-by Assistance - not met  3. Sit to stand transfer with Stand-by Assistance - not met  4. Bed to chair transfer with Stand-by Assistance using Standard Walker - not met  5. Gait  x 75 feet with Stand-by Assistance using Standard Walker. - not met  6. Lower extremity exercise program x15 reps per handout, with supervision - not met      Outcome: Ongoing (interventions implemented as appropriate)  Pt progressing towards goals. All goals remain appropriate at this time.    Jeanine Vickers, SPT  2018

## 2018-07-02 NOTE — PT/OT/SLP PROGRESS
Physical Therapy Treatment    Patient Name:  Chucho Prado   MRN:  [id number]    Recommendations:     Discharge Recommendations:  nursing facility, skilled   Discharge Equipment Recommendations: none   Barriers to discharge: None    Assessment:     Chucho Prado is a 84 y.o. male admitted with a medical diagnosis of Abdominal wall hernia.  He presents with the following impairments/functional limitations:  weakness, impaired endurance, impaired self care skills, impaired functional mobilty, gait instability, impaired balance, decreased lower extremity function, decreased upper extremity function, decreased ROM. Pt tolerated PT session well. Pt completed supine therex with assistance. Pt requried CGA for sit<>stand transfer, with symptomatic nausea present. When returned to supine, pt's symptoms improved. Pt will benefit from acute skilled PT services 5x/wk to address these deficits and reach maximum level of function. After D/C, PT recommendation for SNF to improve strength and mobility.    Recent Surgery: Procedure(s) (LRB):  REPAIR, HERNIA, VENTRAL, INCARCERATED, WITHOUT HISTORY OF PRIOR REPAIR (N/A) 12 Days Post-Op    Plan:     During this hospitalization, patient to be seen 5 x/week to address the above listed problems via gait training, therapeutic activities, therapeutic exercises  · Plan of Care Expires:  07/21/18   Plan of Care Reviewed with: patient    Subjective     Communicated with RN prior to session.  Patient found HoB elevated upon PT entry to room, agreeable to treatment.      Chief Complaint: nausea present upon sitting EoB  Patient comments/goals: none stated  Pain/Comfort:  · Pain Rating 1: 0/10    Patients cultural, spiritual, Restoration conflicts given the current situation: none stated    Objective:     Patient found with:  (all lines intact)     General Precautions: Standard, fall   Orthopedic Precautions:Full weight bearing   Braces: N/A     Functional Mobility:  · Bed Mobility:      · Scooting: total assistance  · Supine to Sit: contact guard assistance (HoB elevated)  · Upon sitting EoB for 20 sec, pt exhibited symptomatic nausea and returned to supine.  · Sit to Supine: minimum assistance (SPT assist with body positioning in bed)      AM-PAC 6 CLICK MOBILITY  Turning over in bed (including adjusting bedclothes, sheets and blankets)?: 4  Sitting down on and standing up from a chair with arms (e.g., wheelchair, bedside commode, etc.): 3  Moving from lying on back to sitting on the side of the bed?: 3  Moving to and from a bed to a chair (including a wheelchair)?: 3  Need to walk in hospital room?: 3  Climbing 3-5 steps with a railing?: 2  Basic Mobility Total Score: 18       Therapeutic Activities and Exercises:   Pt completed supine therex 10 reps of hip abd/add, SAQ, heel slides, glute set, and quad set.    Patient left HOB elevated with all lines intact and call button in reach.    GOALS:    Physical Therapy Goals        Problem: Physical Therapy Goal    Goal Priority Disciplines Outcome Goal Variances Interventions   Physical Therapy Goal     PT/OT, PT Ongoing (interventions implemented as appropriate)     Description:  Goals to be met by: 2018     Patient will increase functional independence with mobility by performin. Supine to sit with Stand-by Assistance - not met  2. Sit to supine with Stand-by Assistance - not met  3. Sit to stand transfer with Stand-by Assistance - not met  4. Bed to chair transfer with Stand-by Assistance using Standard Walker - not met  5. Gait  x 75 feet with Stand-by Assistance using Standard Walker. - not met  6. Lower extremity exercise program x15 reps per handout, with supervision - not met                       Time Tracking:     PT Received On: 18  PT Start Time: 1522     PT Stop Time: 1540  PT Total Time (min): 18 min     Billable Minutes: Therapeutic Exercise 18    Treatment Type: Treatment  PT/PTA: PT     PTA Visit Number: 0      Jeanine Vickers, SPT  07/02/2018

## 2018-07-02 NOTE — PLAN OF CARE
Per Adelia CHOI's, note from earlier today, SW initiated referral to [facility] via [facility].     Florina Brock, DEEW

## 2018-07-03 ENCOUNTER — TELEPHONE (OUTPATIENT)
Dept: ADMINISTRATIVE | Facility: CLINIC | Age: 83
End: 2018-07-03

## 2018-07-03 LAB
ANION GAP SERPL CALC-SCNC: 9 MMOL/L
BASOPHILS # BLD AUTO: 0.02 K/UL
BASOPHILS NFR BLD: 0.2 %
BUN SERPL-MCNC: 9 MG/DL
CALCIUM SERPL-MCNC: 8.3 MG/DL
CHLORIDE SERPL-SCNC: 96 MMOL/L
CO2 SERPL-SCNC: 28 MMOL/L
CREAT SERPL-MCNC: 1.1 MG/DL
DIFFERENTIAL METHOD: ABNORMAL
EOSINOPHIL # BLD AUTO: 0.3 K/UL
EOSINOPHIL NFR BLD: 2.7 %
ERYTHROCYTE [DISTWIDTH] IN BLOOD BY AUTOMATED COUNT: 15.1 %
EST. GFR  (AFRICAN AMERICAN): >60 ML/MIN/1.73 M^2
EST. GFR  (NON AFRICAN AMERICAN): >60 ML/MIN/1.73 M^2
GLUCOSE SERPL-MCNC: 92 MG/DL
HCT VFR BLD AUTO: 33.8 %
HGB BLD-MCNC: 10.8 G/DL
IMM GRANULOCYTES # BLD AUTO: 0.06 K/UL
IMM GRANULOCYTES NFR BLD AUTO: 0.6 %
LYMPHOCYTES # BLD AUTO: 1.6 K/UL
LYMPHOCYTES NFR BLD: 14.4 %
MAGNESIUM SERPL-MCNC: 1.9 MG/DL
MCH RBC QN AUTO: 27.3 PG
MCHC RBC AUTO-ENTMCNC: 32 G/DL
MCV RBC AUTO: 85 FL
MONOCYTES # BLD AUTO: 0.7 K/UL
MONOCYTES NFR BLD: 6.2 %
NEUTROPHILS # BLD AUTO: 8.2 K/UL
NEUTROPHILS NFR BLD: 75.9 %
NRBC BLD-RTO: 0 /100 WBC
PHOSPHATE SERPL-MCNC: 3.2 MG/DL
PLATELET # BLD AUTO: 237 K/UL
PMV BLD AUTO: 10.7 FL
POTASSIUM SERPL-SCNC: 3.9 MMOL/L
RBC # BLD AUTO: 3.96 M/UL
SODIUM SERPL-SCNC: 133 MMOL/L
WBC # BLD AUTO: 10.73 K/UL

## 2018-07-03 PROCEDURE — 63600175 PHARM REV CODE 636 W HCPCS: Performed by: SURGERY

## 2018-07-03 PROCEDURE — G8987 SELF CARE CURRENT STATUS: HCPCS | Mod: CK

## 2018-07-03 PROCEDURE — 80048 BASIC METABOLIC PNL TOTAL CA: CPT

## 2018-07-03 PROCEDURE — 94640 AIRWAY INHALATION TREATMENT: CPT

## 2018-07-03 PROCEDURE — 94664 DEMO&/EVAL PT USE INHALER: CPT

## 2018-07-03 PROCEDURE — 36415 COLL VENOUS BLD VENIPUNCTURE: CPT

## 2018-07-03 PROCEDURE — 85025 COMPLETE CBC W/AUTO DIFF WBC: CPT

## 2018-07-03 PROCEDURE — 94761 N-INVAS EAR/PLS OXIMETRY MLT: CPT

## 2018-07-03 PROCEDURE — 11000001 HC ACUTE MED/SURG PRIVATE ROOM

## 2018-07-03 PROCEDURE — 83735 ASSAY OF MAGNESIUM: CPT

## 2018-07-03 PROCEDURE — 99900035 HC TECH TIME PER 15 MIN (STAT)

## 2018-07-03 PROCEDURE — 25000242 PHARM REV CODE 250 ALT 637 W/ HCPCS: Performed by: STUDENT IN AN ORGANIZED HEALTH CARE EDUCATION/TRAINING PROGRAM

## 2018-07-03 PROCEDURE — 97110 THERAPEUTIC EXERCISES: CPT

## 2018-07-03 PROCEDURE — 27000646 HC AEROBIKA DEVICE

## 2018-07-03 PROCEDURE — 84100 ASSAY OF PHOSPHORUS: CPT

## 2018-07-03 PROCEDURE — 25000003 PHARM REV CODE 250: Performed by: SURGERY

## 2018-07-03 PROCEDURE — G8988 SELF CARE GOAL STATUS: HCPCS | Mod: CJ

## 2018-07-03 PROCEDURE — 25000003 PHARM REV CODE 250: Performed by: STUDENT IN AN ORGANIZED HEALTH CARE EDUCATION/TRAINING PROGRAM

## 2018-07-03 PROCEDURE — A4216 STERILE WATER/SALINE, 10 ML: HCPCS | Performed by: SURGERY

## 2018-07-03 PROCEDURE — 97116 GAIT TRAINING THERAPY: CPT

## 2018-07-03 PROCEDURE — 97530 THERAPEUTIC ACTIVITIES: CPT

## 2018-07-03 PROCEDURE — 27000221 HC OXYGEN, UP TO 24 HOURS

## 2018-07-03 RX ADMIN — HEPARIN SODIUM 5000 UNITS: 5000 INJECTION, SOLUTION INTRAVENOUS; SUBCUTANEOUS at 05:07

## 2018-07-03 RX ADMIN — IPRATROPIUM BROMIDE AND ALBUTEROL SULFATE 3 ML: .5; 3 SOLUTION RESPIRATORY (INHALATION) at 07:07

## 2018-07-03 RX ADMIN — ONDANSETRON 8 MG: 2 INJECTION INTRAMUSCULAR; INTRAVENOUS at 04:07

## 2018-07-03 RX ADMIN — ONDANSETRON 8 MG: 2 INJECTION INTRAMUSCULAR; INTRAVENOUS at 06:07

## 2018-07-03 RX ADMIN — LEVOTHYROXINE SODIUM 50 MCG: 50 TABLET ORAL at 05:07

## 2018-07-03 RX ADMIN — AMLODIPINE BESYLATE 10 MG: 10 TABLET ORAL at 10:07

## 2018-07-03 RX ADMIN — ONDANSETRON 8 MG: 8 TABLET, ORALLY DISINTEGRATING ORAL at 05:07

## 2018-07-03 RX ADMIN — ASPIRIN 81 MG: 81 TABLET, COATED ORAL at 10:07

## 2018-07-03 RX ADMIN — GABAPENTIN 400 MG: 400 CAPSULE ORAL at 04:07

## 2018-07-03 RX ADMIN — Medication 3 ML: at 10:07

## 2018-07-03 RX ADMIN — IPRATROPIUM BROMIDE AND ALBUTEROL SULFATE 3 ML: .5; 3 SOLUTION RESPIRATORY (INHALATION) at 11:07

## 2018-07-03 RX ADMIN — Medication 3 ML: at 02:07

## 2018-07-03 RX ADMIN — IPRATROPIUM BROMIDE AND ALBUTEROL SULFATE 3 ML: .5; 3 SOLUTION RESPIRATORY (INHALATION) at 03:07

## 2018-07-03 RX ADMIN — IPRATROPIUM BROMIDE AND ALBUTEROL SULFATE 3 ML: .5; 3 SOLUTION RESPIRATORY (INHALATION) at 04:07

## 2018-07-03 RX ADMIN — HEPARIN SODIUM 5000 UNITS: 5000 INJECTION, SOLUTION INTRAVENOUS; SUBCUTANEOUS at 10:07

## 2018-07-03 RX ADMIN — HEPARIN SODIUM 5000 UNITS: 5000 INJECTION, SOLUTION INTRAVENOUS; SUBCUTANEOUS at 04:07

## 2018-07-03 RX ADMIN — IPRATROPIUM BROMIDE AND ALBUTEROL SULFATE 3 ML: .5; 3 SOLUTION RESPIRATORY (INHALATION) at 08:07

## 2018-07-03 RX ADMIN — GABAPENTIN 400 MG: 400 CAPSULE ORAL at 10:07

## 2018-07-03 RX ADMIN — OXYCODONE HYDROCHLORIDE AND ACETAMINOPHEN 1 TABLET: 10; 325 TABLET ORAL at 10:07

## 2018-07-03 RX ADMIN — OXYCODONE HYDROCHLORIDE AND ACETAMINOPHEN 1 TABLET: 10; 325 TABLET ORAL at 06:07

## 2018-07-03 RX ADMIN — GABAPENTIN 400 MG: 400 CAPSULE ORAL at 09:07

## 2018-07-03 NOTE — PROGRESS NOTES
[facility]  General Surgery  Progress Note    Subjective:     History of Present Illness:  No notes on file    Post-Op Info:  Procedure(s) (LRB):  REPAIR, HERNIA, VENTRAL, INCARCERATED, WITHOUT HISTORY OF PRIOR REPAIR (N/A)   13 Days Post-Op     Interval History: No acute events overnight. Had some emesis and diarrhea this am. Will get KUB.    Medications:  Continuous Infusions:  Scheduled Meds:   albuterol-ipratropium  3 mL Nebulization Q4H    amLODIPine  10 mg Oral Daily    aspirin  81 mg Oral Daily    gabapentin  400 mg Oral TID    heparin (porcine)  5,000 Units Subcutaneous Q8H    levothyroxine  50 mcg Oral Before breakfast    sodium chloride 0.9%  3 mL Intravenous Q8H     PRN Meds:ondansetron, ondansetron, oxyCODONE-acetaminophen, oxyCODONE-acetaminophen     Review of patient's allergies indicates:  No Known Allergies  Objective:     Vital Signs (Most Recent):  Temp: 98.1 °F (36.7 °C) (07/03/18 0459)  Pulse: 85 (07/03/18 0719)  Resp: 18 (07/03/18 0719)  BP: (!) 147/70 (07/03/18 0459)  SpO2: (!) 94 % (07/03/18 0719) Vital Signs (24h Range):  Temp:  [96.9 °F (36.1 °C)-98.1 °F (36.7 °C)] 98.1 °F (36.7 °C)  Pulse:  [72-87] 85  Resp:  [14-20] 18  SpO2:  [92 %-98 %] 94 %  BP: (132-147)/(65-72) 147/70     Weight: 91.2 kg (201 lb 1 oz)  Body mass index is 30.13 kg/m².    Intake/Output - Last 3 Shifts       07/01 0700 - 07/02 0659 07/02 0700 - 07/03 0659 07/03 0700 - 07/04 0659    P.O. 600 660     I.V. (mL/kg) 0 (0)      Total Intake(mL/kg) 600 (6.6) 660 (7.2)     Urine (mL/kg/hr) 1150 (0.5) 700 (0.3)     Total Output 1150 700      Net -550 -40             Stool Occurrence  1 x           Physical Exam   Constitutional: He is oriented to person, place, and time. He appears well-developed and well-nourished. No distress.   Cardiovascular: Normal rate.    Pulmonary/Chest: Effort normal.   Abdominal: Soft. He exhibits no distension. There is no tenderness.   Incision c/d/i with staples    Neurological: He is alert and oriented to person, place, and time.   Skin: Skin is warm and dry.   Psychiatric: He has a normal mood and affect. His behavior is normal.       Significant Labs:  CBC:   Recent Labs  Lab 07/03/18 0427   WBC 10.73   RBC 3.96*   HGB 10.8*   HCT 33.8*      MCV 85   MCH 27.3   MCHC 32.0     CMP:   Recent Labs  Lab 07/03/18 0427   GLU 92   CALCIUM 8.3*   *   K 3.9   CO2 28   CL 96   BUN 9   CREATININE 1.1       Significant Diagnostics:  KUB pending    Assessment/Plan:     * Abdominal wall hernia    S/p repair         Fecal incontinence    C.diff negative        Small bowel obstruction    85 yo male s/p ventral hernia repair of incarcerated hernia 6/20/18    - regular diet  - PO pain  - nausea meds PRN  - KUB pending  - PT/OT/IS - home with HH  - DVT prophylaxis  - Plan for home with HH. Appreciate  help for discharge planning.    Dispo: Discharge today vs tomorrow        Urinary tract infection associated with cystostomy catheter    Completed course of Rocephin        Sciatica neuralgia    Home meds  -gabapentin        Essential hypertension    Home meds  -amlodipine        Hypothyroidism    Home meds  -levothyroxine            Steve Gray MD  General Surgery  [facility]

## 2018-07-03 NOTE — PLAN OF CARE
TAYLER followed up with Aminata from [facility] to follow-up on consult placed the previous day. Aminata stated she needed both PT and OT notes and also said Pt was getting a KUB today and was having some nausea and vomiting. Also, they do not have staffing to admit any Pts today or tomorrow.    Florina Brock, DEEW

## 2018-07-03 NOTE — SUBJECTIVE & OBJECTIVE
Interval History: No acute events overnight. Had some emesis and diarrhea this am. Will get KUB.    Medications:  Continuous Infusions:  Scheduled Meds:   albuterol-ipratropium  3 mL Nebulization Q4H    amLODIPine  10 mg Oral Daily    aspirin  81 mg Oral Daily    gabapentin  400 mg Oral TID    heparin (porcine)  5,000 Units Subcutaneous Q8H    levothyroxine  50 mcg Oral Before breakfast    sodium chloride 0.9%  3 mL Intravenous Q8H     PRN Meds:ondansetron, ondansetron, oxyCODONE-acetaminophen, oxyCODONE-acetaminophen     Review of patient's allergies indicates:  No Known Allergies  Objective:     Vital Signs (Most Recent):  Temp: 98.1 °F (36.7 °C) (07/03/18 0459)  Pulse: 85 (07/03/18 0719)  Resp: 18 (07/03/18 0719)  BP: (!) 147/70 (07/03/18 0459)  SpO2: (!) 94 % (07/03/18 0719) Vital Signs (24h Range):  Temp:  [96.9 °F (36.1 °C)-98.1 °F (36.7 °C)] 98.1 °F (36.7 °C)  Pulse:  [72-87] 85  Resp:  [14-20] 18  SpO2:  [92 %-98 %] 94 %  BP: (132-147)/(65-72) 147/70     Weight: 91.2 kg (201 lb 1 oz)  Body mass index is 30.13 kg/m².    Intake/Output - Last 3 Shifts       07/01 0700 - 07/02 0659 07/02 0700 - 07/03 0659 07/03 0700 - 07/04 0659    P.O. 600 660     I.V. (mL/kg) 0 (0)      Total Intake(mL/kg) 600 (6.6) 660 (7.2)     Urine (mL/kg/hr) 1150 (0.5) 700 (0.3)     Total Output 1150 700      Net -550 -40             Stool Occurrence  1 x           Physical Exam   Constitutional: He is oriented to person, place, and time. He appears well-developed and well-nourished. No distress.   Cardiovascular: Normal rate.    Pulmonary/Chest: Effort normal.   Abdominal: Soft. He exhibits no distension. There is no tenderness.   Incision c/d/i with staples   Neurological: He is alert and oriented to person, place, and time.   Skin: Skin is warm and dry.   Psychiatric: He has a normal mood and affect. His behavior is normal.       Significant Labs:  CBC:   Recent Labs  Lab 07/03/18  0427   WBC 10.73   RBC 3.96*   HGB 10.8*   HCT  33.8*      MCV 85   MCH 27.3   MCHC 32.0     CMP:   Recent Labs  Lab 07/03/18  0427   GLU 92   CALCIUM 8.3*   *   K 3.9   CO2 28   CL 96   BUN 9   CREATININE 1.1       Significant Diagnostics:  KUB pending

## 2018-07-03 NOTE — PLAN OF CARE
Problem: Occupational Therapy Goal  Goal: Occupational Therapy Goal  Goals to be met by: 6/28/2018     Patient will increase functional independence with ADLs by performing:    UE Dressing with Moderate Assistance.  LE Dressing with Moderate Assistance.  Grooming while seated with Minimal Assistance. GOAL MET   REVISED: grooming while seated with setup assistance  Toileting from bedside commode with Maximum Assistance for hygiene and clothing management.   Toilet transfer to bedside commode with Moderate Assistance.       Outcome: Ongoing (interventions implemented as appropriate)  Pt progressing toward remaining goals

## 2018-07-03 NOTE — PLAN OF CARE
Problem: Physical Therapy Goal  Goal: Physical Therapy Goal  Goals to be met by: 2018     Patient will increase functional independence with mobility by performin. Supine to sit with Stand-by Assistance - not met  2. Sit to supine with Stand-by Assistance - not met  3. Sit to stand transfer with moderate Assistance - not met  4. Bed to chair transfer with moderate Assistance using Standard Walker - not met  5. Gait  x 45 feet with minimal Assistance using Standard Walker. - not met  6. Lower extremity exercise program x15 reps per handout, with supervision - not met      Outcome: Ongoing (interventions implemented as appropriate)  Pt's goals revised as needed and pt will continue to benefit from skilled PT services to work towards improved functional mobility including: bed mobility, transfers, and gait.   Hillary Chin, PT  7/3/2018

## 2018-07-03 NOTE — PT/OT/SLP PROGRESS
"Physical Therapy Treatment    Patient Name:  Chucho Prado   MRN:  [id number]    Recommendations:     Discharge Recommendations:  nursing facility, skilled   Discharge Equipment Recommendations:  (may require lift device, will continue to assess)   Barriers to discharge: Decreased caregiver support requires significant assist for mobility    Assessment:     Chucho Prado is a 84 y.o. male admitted with a medical diagnosis of Abdominal wall hernia.  He presents with the following impairments/functional limitations:  weakness, impaired endurance, impaired functional mobilty, gait instability, impaired balance, decreased lower extremity function, pain.     Rehab Prognosis:  good; patient would benefit from acute skilled PT services to address these deficits and reach maximum level of function.      Recent Surgery: Procedure(s) (LRB):  REPAIR, HERNIA, VENTRAL, INCARCERATED, WITHOUT HISTORY OF PRIOR REPAIR (N/A) 13 Days Post-Op    Plan:     During this hospitalization, patient to be seen 5 x/week to address the above listed problems via gait training, therapeutic activities, therapeutic exercises  · Plan of Care Expires:  07/21/18   Plan of Care Reviewed with: patient    Subjective     Communicated with nurse prior to session.  Patient found supine upon PT entry to room, agreeable to treatment.    "I want to find out why I am going to the bathroom so much (BM). What can I take to make it stop?"   Chief Complaint: having BMs  Pain/Comfort:  · Pain Rating 1: 6/10  · Location - Side 1: Right  · Location 1: leg  · Pain Addressed 1: Reposition, Distraction, Cessation of Activity  · Pain Rating Post-Intervention 1:  (pt states no pain as long as no one is touching his legs)    Patients cultural, spiritual, Hoahaoism conflicts given the current situation: none noted    Objective:     Patient found with: galvan catheter, oxygen     General Precautions: Standard, fall   Orthopedic Precautions:N/A   Braces: N/A "     Functional Mobility:  · Bed Mobility:     · Supine to Sit: total assistance  · Sit to Supine: total assistance  · Transfers:     · Sit to Stand:  total assistance and of 2 persons with standard walker; x 5 trials from bedside chair with SW in front. Pt with posterior instability upon standing requiring moderate manual cues to remain upright.  · Bed to Chair: total assistance and of 1 persons with  no AD  using  Squat Pivot  · Gait: 3 steps then 6 steps then 10 steps with SW with moderate/maximum assist +1 to follow with w/c . Pt's shoes donned per pt request. Pt performed gait with flexed trunk, decreased step length, decreased clearance of L>R foot during swing phase, and at slow pace. Pt required manual cues to advance the walker at times.     AM-PAC 6 CLICK MOBILITY  Turning over in bed (including adjusting bedclothes, sheets and blankets)?: 2  Sitting down on and standing up from a chair with arms (e.g., wheelchair, bedside commode, etc.): 2  Moving from lying on back to sitting on the side of the bed?: 2  Moving to and from a bed to a chair (including a wheelchair)?: 2  Need to walk in hospital room?: 2  Climbing 3-5 steps with a railing?: 1  Basic Mobility Total Score: 11       Therapeutic Activities and Exercises:   Pt rolled to L and R with total assist to be cleaned and changed x 2 trials during treatment due to BMs. Nurse notified.     Patient left supine (up in bedside chair ~ 45 min during treatment, returned to bed due to BM) with all lines intact, call button in reach and sitter present..    GOALS:    Physical Therapy Goals        Problem: Physical Therapy Goal    Goal Priority Disciplines Outcome Goal Variances Interventions   Physical Therapy Goal     PT/OT, PT Ongoing (interventions implemented as appropriate)     Description:  Goals to be met by: 2018     Patient will increase functional independence with mobility by performin. Supine to sit with Stand-by Assistance - not met  2.  Sit to supine with Stand-by Assistance - not met  3. Sit to stand transfer with moderate Assistance - not met  4. Bed to chair transfer with moderate Assistance using Standard Walker - not met  5. Gait  x 45 feet with minimal Assistance using Standard Walker. - not met  6. Lower extremity exercise program x15 reps per handout, with supervision - not met                        Time Tracking:     PT Received On: 07/03/18  PT Start Time: 1028     PT Stop Time: 1124  PT Total Time (min): 56 min     Billable Minutes: Gait Training 30 and Therapeutic Activity 26    Treatment Type: Treatment  PT/PTA: PT     PTA Visit Number: 0     Hillary Chin, PT  07/03/2018

## 2018-07-03 NOTE — PLAN OF CARE
10:15 AM CM noted MD progress notes: Had some emesis and diarrhea this am;Will get KUB. CM noted KUB results: mild ileus. Updated TAYLER, ROSALIE Brock.     2:57 PM CM denotes [facility] note stating they have declined to accept patient for they rec: long term SNF. Per Executor, Carolina, as CM spoke w/yesterday, she does not want patient to go to a long term SNF. See CM note from yesterday. Patient was current w/[facility], & personal sitters prior to this admit. Patient is not medically stable for discharge.    Updated TAYLER Dejesus. CM left voice message, regarding above, on Executor phone, [phone]. (Unable to leave message on cell ([phone])-phone box is full.)    3:40 PM SHAWNEE Long, updated on above.

## 2018-07-03 NOTE — PT/OT/SLP PROGRESS
Occupational Therapy   Treatment    Name: Chucho Prado  MRN: [id number]  Admitting Diagnosis:  Abdominal wall hernia  13 Days Post-Op    Recommendations:     Discharge Recommendations: nursing facility, skilled  Discharge Equipment Recommendations:   (TBD)  Barriers to discharge:  None    Subjective     Communicated with: RN prior to session.  Pain/Comfort:  · Pain Rating 1: 6/10  · Location - Side 1: Bilateral  · Location - Orientation 1: generalized  · Location 1: abdomen  · Pain Addressed 1: Reposition, Distraction, Cessation of Activity  · Pain Rating Post-Intervention 1: 5/10    Patients cultural, spiritual, Cheondoism conflicts given the current situation:      Objective:     Patient found with: oxygen, galvan catheter    General Precautions: Standard, fall   Orthopedic Precautions:N/A   Braces: N/A     Occupational Performance:    Bed Mobility:    · Patient completed Scooting/Bridging with contact guard assistance  · Patient completed Supine to Sit with moderate assistance  · Patient completed Sit to Supine with maximal assistance   · Pt seated EOB for 10 min with SBA-min A for balance (min A when engaged in activity using LUE previously used to prop for balance); pt request return to supine 2/2 feeling nauseous     Functional Mobility/Transfers:  · NT 2/2 pt request to return to supine following 10 min at EOB    Activities of Daily Living:  · NT    Patient left HOB elevated with all lines intact, call button in reach and caregiver present    [facility] 6 Click:  [facility] Total Score: 16    Treatment & Education:  BUE AROM exercises 10 x 2 for shoulder flex, elbow flex/ext, and chest press while seated EOB  Pt educated on role of OT/POC  White board/communication board updated  Education:    Assessment:     Chucho Prado is a 84 y.o. male with a medical diagnosis of Abdominal wall hernia.  He presents with pain and nausea limiting functional mobility this date.  Performance deficits affecting function are  weakness, impaired endurance, impaired self care skills, impaired functional mobilty, gait instability, impaired balance, pain, decreased safety awareness, decreased ROM, impaired cardiopulmonary response to activity.      Rehab Prognosis:  Fair; patient would benefit from acute skilled OT services to address these deficits and reach maximum level of function.       Plan:     Patient to be seen 4 x/week to address the above listed problems via self-care/home management, therapeutic activities, therapeutic exercises  · Plan of Care Expires: 07/22/18  · Plan of Care Reviewed with: patient, caregiver    This Plan of care has been discussed with the patient who was involved in its development and understands and is in agreement with the identified goals and treatment plan    GOALS:    Occupational Therapy Goals        Problem: Occupational Therapy Goal    Goal Priority Disciplines Outcome Interventions   Occupational Therapy Goal     OT, PT/OT Ongoing (interventions implemented as appropriate)    Description:  Goals to be met by: 6/28/2018     Patient will increase functional independence with ADLs by performing:    UE Dressing with Moderate Assistance.  LE Dressing with Moderate Assistance.  Grooming while seated with Minimal Assistance. GOAL MET   REVISED: grooming while seated with setup assistance  Toileting from bedside commode with Maximum Assistance for hygiene and clothing management.   Toilet transfer to bedside commode with Moderate Assistance.                        Time Tracking:     OT Date of Treatment: 07/03/18  OT Start Time: 1344  OT Stop Time: 1357  OT Total Time (min): 13 min    Billable Minutes:Therapeutic Exercise 13    Preethi Chatterjee OT  7/3/2018

## 2018-07-04 LAB
ANION GAP SERPL CALC-SCNC: 10 MMOL/L
BASOPHILS # BLD AUTO: 0.02 K/UL
BASOPHILS NFR BLD: 0.2 %
BUN SERPL-MCNC: 8 MG/DL
CALCIUM SERPL-MCNC: 8.4 MG/DL
CHLORIDE SERPL-SCNC: 96 MMOL/L
CO2 SERPL-SCNC: 29 MMOL/L
CREAT SERPL-MCNC: 1.1 MG/DL
DIFFERENTIAL METHOD: ABNORMAL
EOSINOPHIL # BLD AUTO: 0.3 K/UL
EOSINOPHIL NFR BLD: 3.5 %
ERYTHROCYTE [DISTWIDTH] IN BLOOD BY AUTOMATED COUNT: 15.7 %
EST. GFR  (AFRICAN AMERICAN): >60 ML/MIN/1.73 M^2
EST. GFR  (NON AFRICAN AMERICAN): >60 ML/MIN/1.73 M^2
GLUCOSE SERPL-MCNC: 73 MG/DL
HCT VFR BLD AUTO: 33 %
HGB BLD-MCNC: 10.2 G/DL
IMM GRANULOCYTES # BLD AUTO: 0.05 K/UL
IMM GRANULOCYTES NFR BLD AUTO: 0.5 %
LYMPHOCYTES # BLD AUTO: 1.9 K/UL
LYMPHOCYTES NFR BLD: 20.7 %
MAGNESIUM SERPL-MCNC: 1.9 MG/DL
MCH RBC QN AUTO: 27.6 PG
MCHC RBC AUTO-ENTMCNC: 30.9 G/DL
MCV RBC AUTO: 89 FL
MONOCYTES # BLD AUTO: 0.7 K/UL
MONOCYTES NFR BLD: 7.2 %
NEUTROPHILS # BLD AUTO: 6.3 K/UL
NEUTROPHILS NFR BLD: 67.9 %
NRBC BLD-RTO: 0 /100 WBC
PHOSPHATE SERPL-MCNC: 2.8 MG/DL
PLATELET # BLD AUTO: 227 K/UL
PMV BLD AUTO: 11.1 FL
POTASSIUM SERPL-SCNC: 3.8 MMOL/L
RBC # BLD AUTO: 3.69 M/UL
SODIUM SERPL-SCNC: 135 MMOL/L
WBC # BLD AUTO: 9.31 K/UL

## 2018-07-04 PROCEDURE — 25000003 PHARM REV CODE 250: Performed by: SURGERY

## 2018-07-04 PROCEDURE — A4216 STERILE WATER/SALINE, 10 ML: HCPCS | Performed by: SURGERY

## 2018-07-04 PROCEDURE — 94664 DEMO&/EVAL PT USE INHALER: CPT

## 2018-07-04 PROCEDURE — 83735 ASSAY OF MAGNESIUM: CPT

## 2018-07-04 PROCEDURE — 25000242 PHARM REV CODE 250 ALT 637 W/ HCPCS: Performed by: STUDENT IN AN ORGANIZED HEALTH CARE EDUCATION/TRAINING PROGRAM

## 2018-07-04 PROCEDURE — 84100 ASSAY OF PHOSPHORUS: CPT

## 2018-07-04 PROCEDURE — 36415 COLL VENOUS BLD VENIPUNCTURE: CPT

## 2018-07-04 PROCEDURE — 11000001 HC ACUTE MED/SURG PRIVATE ROOM

## 2018-07-04 PROCEDURE — 85025 COMPLETE CBC W/AUTO DIFF WBC: CPT

## 2018-07-04 PROCEDURE — 99900035 HC TECH TIME PER 15 MIN (STAT)

## 2018-07-04 PROCEDURE — 27000221 HC OXYGEN, UP TO 24 HOURS

## 2018-07-04 PROCEDURE — 63600175 PHARM REV CODE 636 W HCPCS: Performed by: SURGERY

## 2018-07-04 PROCEDURE — 80048 BASIC METABOLIC PNL TOTAL CA: CPT

## 2018-07-04 PROCEDURE — 94640 AIRWAY INHALATION TREATMENT: CPT

## 2018-07-04 PROCEDURE — 25000003 PHARM REV CODE 250: Performed by: STUDENT IN AN ORGANIZED HEALTH CARE EDUCATION/TRAINING PROGRAM

## 2018-07-04 PROCEDURE — 94761 N-INVAS EAR/PLS OXIMETRY MLT: CPT

## 2018-07-04 RX ORDER — SODIUM,POTASSIUM PHOSPHATES 280-250MG
2 POWDER IN PACKET (EA) ORAL ONCE
Status: COMPLETED | OUTPATIENT
Start: 2018-07-04 | End: 2018-07-04

## 2018-07-04 RX ORDER — LANOLIN ALCOHOL/MO/W.PET/CERES
800 CREAM (GRAM) TOPICAL ONCE
Status: COMPLETED | OUTPATIENT
Start: 2018-07-04 | End: 2018-07-04

## 2018-07-04 RX ADMIN — AMLODIPINE BESYLATE 10 MG: 10 TABLET ORAL at 09:07

## 2018-07-04 RX ADMIN — IPRATROPIUM BROMIDE AND ALBUTEROL SULFATE 3 ML: .5; 3 SOLUTION RESPIRATORY (INHALATION) at 07:07

## 2018-07-04 RX ADMIN — GABAPENTIN 400 MG: 400 CAPSULE ORAL at 02:07

## 2018-07-04 RX ADMIN — Medication 3 ML: at 06:07

## 2018-07-04 RX ADMIN — POTASSIUM & SODIUM PHOSPHATES POWDER PACK 280-160-250 MG 2 PACKET: 280-160-250 PACK at 10:07

## 2018-07-04 RX ADMIN — IPRATROPIUM BROMIDE AND ALBUTEROL SULFATE 3 ML: .5; 3 SOLUTION RESPIRATORY (INHALATION) at 01:07

## 2018-07-04 RX ADMIN — IPRATROPIUM BROMIDE AND ALBUTEROL SULFATE 3 ML: .5; 3 SOLUTION RESPIRATORY (INHALATION) at 08:07

## 2018-07-04 RX ADMIN — HEPARIN SODIUM 5000 UNITS: 5000 INJECTION, SOLUTION INTRAVENOUS; SUBCUTANEOUS at 08:07

## 2018-07-04 RX ADMIN — IPRATROPIUM BROMIDE AND ALBUTEROL SULFATE 3 ML: .5; 3 SOLUTION RESPIRATORY (INHALATION) at 04:07

## 2018-07-04 RX ADMIN — ONDANSETRON 8 MG: 8 TABLET, ORALLY DISINTEGRATING ORAL at 06:07

## 2018-07-04 RX ADMIN — IPRATROPIUM BROMIDE AND ALBUTEROL SULFATE 3 ML: .5; 3 SOLUTION RESPIRATORY (INHALATION) at 11:07

## 2018-07-04 RX ADMIN — ASPIRIN 81 MG: 81 TABLET, COATED ORAL at 09:07

## 2018-07-04 RX ADMIN — HEPARIN SODIUM 5000 UNITS: 5000 INJECTION, SOLUTION INTRAVENOUS; SUBCUTANEOUS at 02:07

## 2018-07-04 RX ADMIN — LEVOTHYROXINE SODIUM 50 MCG: 50 TABLET ORAL at 06:07

## 2018-07-04 RX ADMIN — Medication 800 MG: at 10:07

## 2018-07-04 RX ADMIN — Medication 3 ML: at 02:07

## 2018-07-04 RX ADMIN — GABAPENTIN 400 MG: 400 CAPSULE ORAL at 08:07

## 2018-07-04 RX ADMIN — GABAPENTIN 400 MG: 400 CAPSULE ORAL at 09:07

## 2018-07-04 RX ADMIN — HEPARIN SODIUM 5000 UNITS: 5000 INJECTION, SOLUTION INTRAVENOUS; SUBCUTANEOUS at 06:07

## 2018-07-04 RX ADMIN — OXYCODONE HYDROCHLORIDE AND ACETAMINOPHEN 1 TABLET: 10; 325 TABLET ORAL at 06:07

## 2018-07-04 NOTE — PROGRESS NOTES
[facility]  General Surgery  Progress Note    Subjective:     History of Present Illness:  No notes on file    Post-Op Info:  Procedure(s) (LRB):  REPAIR, HERNIA, VENTRAL, INCARCERATED, WITHOUT HISTORY OF PRIOR REPAIR (N/A)   14 Days Post-Op     Interval History: NAEON, pain well controlled, tolerating diet, having BM    Medications:  Continuous Infusions:  Scheduled Meds:   albuterol-ipratropium  3 mL Nebulization Q4H    amLODIPine  10 mg Oral Daily    aspirin  81 mg Oral Daily    gabapentin  400 mg Oral TID    heparin (porcine)  5,000 Units Subcutaneous Q8H    levothyroxine  50 mcg Oral Before breakfast    sodium chloride 0.9%  3 mL Intravenous Q8H     PRN Meds:ondansetron, ondansetron, oxyCODONE-acetaminophen, oxyCODONE-acetaminophen     Review of patient's allergies indicates:  No Known Allergies  Objective:     Vital Signs (Most Recent):  Temp: 97.5 °F (36.4 °C) (07/04/18 0725)  Pulse: 95 (07/04/18 0725)  Resp: 18 (07/04/18 0725)  BP: (!) 141/67 (07/04/18 0725)  SpO2: 96 % (07/04/18 0725) Vital Signs (24h Range):  Temp:  [97 °F (36.1 °C)-98.2 °F (36.8 °C)] 97.5 °F (36.4 °C)  Pulse:  [74-95] 95  Resp:  [15-18] 18  SpO2:  [93 %-97 %] 96 %  BP: (129-141)/(60-67) 141/67     Weight: 91.2 kg (201 lb 1 oz)  Body mass index is 30.13 kg/m².    Intake/Output - Last 3 Shifts       07/02 0700 - 07/03 0659 07/03 0700 - 07/04 0659 07/04 0700 - 07/05 0659    P.O. 660 340     Total Intake(mL/kg) 660 (7.2) 340 (3.7)     Urine (mL/kg/hr) 700 (0.3)      Total Output 700        Net -40 +340             Stool Occurrence 1 x            Physical Exam   Constitutional: He is oriented to person, place, and time. He appears well-developed and well-nourished. No distress.   Cardiovascular: Normal rate.    Pulmonary/Chest: Effort normal.   Abdominal: Soft. He exhibits no distension. There is no tenderness.   Incision c/d/i with staples   Neurological: He is alert and oriented to person, place, and time.   Skin:  Skin is warm and dry.   Psychiatric: He has a normal mood and affect. His behavior is normal.       Significant Labs:  CBC:   Recent Labs  Lab 07/03/18  0427   WBC 10.73   RBC 3.96*   HGB 10.8*   HCT 33.8*      MCV 85   MCH 27.3   MCHC 32.0     CMP:   Recent Labs  Lab 07/04/18  0532   GLU 73   CALCIUM 8.4*   *   K 3.8   CO2 29   CL 96   BUN 8   CREATININE 1.1       Significant Diagnostics:  I have reviewed all pertinent imaging results/findings within the past 24 hours.    Assessment/Plan:     * Abdominal wall hernia    S/p repair         Fecal incontinence    C.diff negative        Small bowel obstruction    85 yo male s/p ventral hernia repair of incarcerated hernia 6/20/18    - regular diet  - PO pain  - nausea meds PRN  - KUB pending  - PT/OT/IS - home with HH  - DVT prophylaxis  - Plan for home with HH. Appreciate  help for discharge planning.    Dispo: Probable discharge tomorrow        Urinary tract infection associated with cystostomy catheter    Completed course of Rocephin        Sciatica neuralgia    Home meds  -gabapentin        Essential hypertension    Home meds  -amlodipine        Hypothyroidism    Home meds  -levothyroxine            Demetrio Mcgill MD  General Surgery  [facility]

## 2018-07-04 NOTE — PLAN OF CARE
Problem: Patient Care Overview  Goal: Plan of Care Review  Plan of care reviewed and updated . Pt AA+O . Pt's pain is managed with medication ordered at this timed . Pt's V/S are as charted . No falls this shift. . Pt is oriented to room and call system . Will continue to monitor .

## 2018-07-04 NOTE — SUBJECTIVE & OBJECTIVE
Interval History: NAEON, pain well controlled, tolerating diet, having BM    Medications:  Continuous Infusions:  Scheduled Meds:   albuterol-ipratropium  3 mL Nebulization Q4H    amLODIPine  10 mg Oral Daily    aspirin  81 mg Oral Daily    gabapentin  400 mg Oral TID    heparin (porcine)  5,000 Units Subcutaneous Q8H    levothyroxine  50 mcg Oral Before breakfast    sodium chloride 0.9%  3 mL Intravenous Q8H     PRN Meds:ondansetron, ondansetron, oxyCODONE-acetaminophen, oxyCODONE-acetaminophen     Review of patient's allergies indicates:  No Known Allergies  Objective:     Vital Signs (Most Recent):  Temp: 97.5 °F (36.4 °C) (07/04/18 0725)  Pulse: 95 (07/04/18 0725)  Resp: 18 (07/04/18 0725)  BP: (!) 141/67 (07/04/18 0725)  SpO2: 96 % (07/04/18 0725) Vital Signs (24h Range):  Temp:  [97 °F (36.1 °C)-98.2 °F (36.8 °C)] 97.5 °F (36.4 °C)  Pulse:  [74-95] 95  Resp:  [15-18] 18  SpO2:  [93 %-97 %] 96 %  BP: (129-141)/(60-67) 141/67     Weight: 91.2 kg (201 lb 1 oz)  Body mass index is 30.13 kg/m².    Intake/Output - Last 3 Shifts       07/02 0700 - 07/03 0659 07/03 0700 - 07/04 0659 07/04 0700 - 07/05 0659    P.O. 660 340     Total Intake(mL/kg) 660 (7.2) 340 (3.7)     Urine (mL/kg/hr) 700 (0.3)      Total Output 700        Net -40 +340             Stool Occurrence 1 x            Physical Exam   Constitutional: He is oriented to person, place, and time. He appears well-developed and well-nourished. No distress.   Cardiovascular: Normal rate.    Pulmonary/Chest: Effort normal.   Abdominal: Soft. He exhibits no distension. There is no tenderness.   Incision c/d/i with staples   Neurological: He is alert and oriented to person, place, and time.   Skin: Skin is warm and dry.   Psychiatric: He has a normal mood and affect. His behavior is normal.       Significant Labs:  CBC:   Recent Labs  Lab 07/03/18  0427   WBC 10.73   RBC 3.96*   HGB 10.8*   HCT 33.8*      MCV 85   MCH 27.3   MCHC 32.0     CMP:   Recent  Labs  Lab 07/04/18  0532   GLU 73   CALCIUM 8.4*   *   K 3.8   CO2 29   CL 96   BUN 8   CREATININE 1.1       Significant Diagnostics:  I have reviewed all pertinent imaging results/findings within the past 24 hours.

## 2018-07-04 NOTE — NURSING
Notified Dr Ollie Haskins of patient c/o of x1 large diarrhea stool . No new orders  Monitor patient for diarrhea . Notified Marley on evening shift .

## 2018-07-05 VITALS
HEART RATE: 95 BPM | OXYGEN SATURATION: 98 % | BODY MASS INDEX: 29.78 KG/M2 | WEIGHT: 201.06 LBS | DIASTOLIC BLOOD PRESSURE: 82 MMHG | HEIGHT: 69 IN | SYSTOLIC BLOOD PRESSURE: 136 MMHG | RESPIRATION RATE: 18 BRPM | TEMPERATURE: 97 F

## 2018-07-05 LAB
ANION GAP SERPL CALC-SCNC: 13 MMOL/L
BASOPHILS # BLD AUTO: 0.07 K/UL
BASOPHILS NFR BLD: 0.6 %
BUN SERPL-MCNC: 7 MG/DL
CALCIUM SERPL-MCNC: 8.7 MG/DL
CHLORIDE SERPL-SCNC: 93 MMOL/L
CO2 SERPL-SCNC: 29 MMOL/L
CREAT SERPL-MCNC: 1 MG/DL
DIFFERENTIAL METHOD: ABNORMAL
EOSINOPHIL # BLD AUTO: 0.4 K/UL
EOSINOPHIL NFR BLD: 3.4 %
ERYTHROCYTE [DISTWIDTH] IN BLOOD BY AUTOMATED COUNT: 15.4 %
EST. GFR  (AFRICAN AMERICAN): >60 ML/MIN/1.73 M^2
EST. GFR  (NON AFRICAN AMERICAN): >60 ML/MIN/1.73 M^2
GLUCOSE SERPL-MCNC: 89 MG/DL
HCT VFR BLD AUTO: 39 %
HGB BLD-MCNC: 12.3 G/DL
IMM GRANULOCYTES # BLD AUTO: 0.14 K/UL
IMM GRANULOCYTES NFR BLD AUTO: 1.2 %
LYMPHOCYTES # BLD AUTO: 2.8 K/UL
LYMPHOCYTES NFR BLD: 23.1 %
MAGNESIUM SERPL-MCNC: 1.9 MG/DL
MCH RBC QN AUTO: 26.8 PG
MCHC RBC AUTO-ENTMCNC: 31.5 G/DL
MCV RBC AUTO: 85 FL
MONOCYTES # BLD AUTO: 0.8 K/UL
MONOCYTES NFR BLD: 6.2 %
NEUTROPHILS # BLD AUTO: 7.9 K/UL
NEUTROPHILS NFR BLD: 65.5 %
NRBC BLD-RTO: 0 /100 WBC
PHOSPHATE SERPL-MCNC: 3.1 MG/DL
PLATELET # BLD AUTO: 328 K/UL
PMV BLD AUTO: 10.5 FL
POTASSIUM SERPL-SCNC: 4.7 MMOL/L
RBC # BLD AUTO: 4.59 M/UL
SODIUM SERPL-SCNC: 135 MMOL/L
WBC # BLD AUTO: 12.04 K/UL

## 2018-07-05 PROCEDURE — 94664 DEMO&/EVAL PT USE INHALER: CPT

## 2018-07-05 PROCEDURE — G8987 SELF CARE CURRENT STATUS: HCPCS | Mod: CK

## 2018-07-05 PROCEDURE — 94640 AIRWAY INHALATION TREATMENT: CPT

## 2018-07-05 PROCEDURE — 83735 ASSAY OF MAGNESIUM: CPT

## 2018-07-05 PROCEDURE — 25000003 PHARM REV CODE 250: Performed by: STUDENT IN AN ORGANIZED HEALTH CARE EDUCATION/TRAINING PROGRAM

## 2018-07-05 PROCEDURE — 94761 N-INVAS EAR/PLS OXIMETRY MLT: CPT

## 2018-07-05 PROCEDURE — A4216 STERILE WATER/SALINE, 10 ML: HCPCS | Performed by: SURGERY

## 2018-07-05 PROCEDURE — 27000646 HC AEROBIKA DEVICE

## 2018-07-05 PROCEDURE — 25000242 PHARM REV CODE 250 ALT 637 W/ HCPCS: Performed by: STUDENT IN AN ORGANIZED HEALTH CARE EDUCATION/TRAINING PROGRAM

## 2018-07-05 PROCEDURE — 85025 COMPLETE CBC W/AUTO DIFF WBC: CPT

## 2018-07-05 PROCEDURE — 36415 COLL VENOUS BLD VENIPUNCTURE: CPT

## 2018-07-05 PROCEDURE — G8989 SELF CARE D/C STATUS: HCPCS | Mod: CK

## 2018-07-05 PROCEDURE — 84100 ASSAY OF PHOSPHORUS: CPT

## 2018-07-05 PROCEDURE — 25000003 PHARM REV CODE 250: Performed by: SURGERY

## 2018-07-05 PROCEDURE — 80048 BASIC METABOLIC PNL TOTAL CA: CPT

## 2018-07-05 PROCEDURE — 63600175 PHARM REV CODE 636 W HCPCS: Performed by: SURGERY

## 2018-07-05 PROCEDURE — 99900035 HC TECH TIME PER 15 MIN (STAT)

## 2018-07-05 PROCEDURE — G8988 SELF CARE GOAL STATUS: HCPCS | Mod: CK

## 2018-07-05 RX ADMIN — IPRATROPIUM BROMIDE AND ALBUTEROL SULFATE 3 ML: .5; 3 SOLUTION RESPIRATORY (INHALATION) at 03:07

## 2018-07-05 RX ADMIN — HEPARIN SODIUM 5000 UNITS: 5000 INJECTION, SOLUTION INTRAVENOUS; SUBCUTANEOUS at 05:07

## 2018-07-05 RX ADMIN — AMLODIPINE BESYLATE 10 MG: 10 TABLET ORAL at 08:07

## 2018-07-05 RX ADMIN — GABAPENTIN 400 MG: 400 CAPSULE ORAL at 08:07

## 2018-07-05 RX ADMIN — IPRATROPIUM BROMIDE AND ALBUTEROL SULFATE 3 ML: .5; 3 SOLUTION RESPIRATORY (INHALATION) at 07:07

## 2018-07-05 RX ADMIN — Medication 3 ML: at 06:07

## 2018-07-05 RX ADMIN — LEVOTHYROXINE SODIUM 50 MCG: 50 TABLET ORAL at 05:07

## 2018-07-05 RX ADMIN — ASPIRIN 81 MG: 81 TABLET, COATED ORAL at 08:07

## 2018-07-05 RX ADMIN — IPRATROPIUM BROMIDE AND ALBUTEROL SULFATE 3 ML: .5; 3 SOLUTION RESPIRATORY (INHALATION) at 11:07

## 2018-07-05 NOTE — PROGRESS NOTES
[facility]  General Surgery  Progress Note    Subjective:     Post-Op Info:  Procedure(s) (LRB):  REPAIR, HERNIA, VENTRAL, INCARCERATED, WITHOUT HISTORY OF PRIOR REPAIR (N/A)   15 Days Post-Op     Interval History:   Patient seen and examined, no acute events overnight  Tolerating diet with no N/V  +F/BM  Afebrile/VSS      Medications:  Continuous Infusions:  Scheduled Meds:   albuterol-ipratropium  3 mL Nebulization Q4H    amLODIPine  10 mg Oral Daily    aspirin  81 mg Oral Daily    gabapentin  400 mg Oral TID    heparin (porcine)  5,000 Units Subcutaneous Q8H    levothyroxine  50 mcg Oral Before breakfast    sodium chloride 0.9%  3 mL Intravenous Q8H     PRN Meds:ondansetron, ondansetron, oxyCODONE-acetaminophen, oxyCODONE-acetaminophen     Review of patient's allergies indicates:  No Known Allergies  Objective:     Vital Signs (Most Recent):  Temp: 97.8 °F (36.6 °C) (07/05/18 0422)  Pulse: 87 (07/05/18 0422)  Resp: 16 (07/05/18 0422)  BP: 130/66 (07/05/18 0422)  SpO2: (!) 94 % (07/05/18 0422) Vital Signs (24h Range):  Temp:  [97.1 °F (36.2 °C)-98 °F (36.7 °C)] 97.8 °F (36.6 °C)  Pulse:  [81-95] 87  Resp:  [16-18] 16  SpO2:  [79 %-96 %] 94 %  BP: (130-141)/(59-67) 130/66     Weight: 91.2 kg (201 lb 1 oz)  Body mass index is 30.13 kg/m².    Intake/Output - Last 3 Shifts       07/03 0700 - 07/04 0659 07/04 0700 - 07/05 0659    P.O. 340 908    Total Intake(mL/kg) 340 (3.7) 908 (10)    Urine (mL/kg/hr)  3800 (1.7)    Emesis/NG output  0 (0)    Other  0 (0)    Stool  0 (0)    Blood  0 (0)    Total Output   3800    Net +340 -2892                Physical Exam   Constitutional: He is oriented to person, place, and time. He appears well-developed and well-nourished. No distress.   Cardiovascular: Normal rate.    Pulmonary/Chest: Effort normal.   Abdominal: Soft. He exhibits no distension. There is no tenderness.   Incision c/d/i with staples   Neurological: He is alert and oriented to person,  place, and time.   Skin: Skin is warm and dry.   Psychiatric: He has a normal mood and affect. His behavior is normal.       Significant Labs:  CBC:   Recent Labs  Lab 07/04/18  0532   WBC 9.31   RBC 3.69*   HGB 10.2*   HCT 33.0*      MCV 89   MCH 27.6   MCHC 30.9*     BMP:   Recent Labs  Lab 07/04/18  0532   GLU 73   *   K 3.8   CL 96   CO2 29   BUN 8   CREATININE 1.1   CALCIUM 8.4*   MG 1.9     CMP:   Recent Labs  Lab 07/04/18  0532   GLU 73   CALCIUM 8.4*   *   K 3.8   CO2 29   CL 96   BUN 8   CREATININE 1.1     Assessment/Plan:     * Abdominal wall hernia    Easily reducible, no surgical intervention at this time        Fecal incontinence    C.diff negative        Small bowel obstruction    85 yo male s/p ventral hernia repair of incarcerated hernia 6/20/18    - regular diet  - PO pain  - nausea meds PRN  - KUB pending  - PT/OT/IS - home with HH  - DVT prophylaxis  - Plan for home with HH. Appreciate  help for discharge planning.    Dispo: Probable discharge tomorrow        Urinary tract infection associated with cystostomy catheter    Completed course of Rocephin        Sciatica neuralgia    Home meds  -gabapentin        Essential hypertension    Home meds  -amlodipine        Hypothyroidism    Home meds  -levothyroxine            Grazyna Rose PA-C   [id number]  General Surgery  [facility]

## 2018-07-05 NOTE — PLAN OF CARE
Problem: Physical Therapy Goal  Goal: Physical Therapy Goal  Goals to be met by: 2018     Patient will increase functional independence with mobility by performin. Supine to sit with Stand-by Assistance - not met  2. Sit to supine with Stand-by Assistance - not met  3. Sit to stand transfer with moderate Assistance - not met  4. Bed to chair transfer with moderate Assistance using Standard Walker - not met  5. Gait  x 45 feet with minimal Assistance using Standard Walker. - not met  6. Lower extremity exercise program x15 reps per handout, with supervision - not met       Pts goals remain appropriate. Continue with POC.  Jane Mistry, SPTA  2018    I certify that I was present in the room directing the student in service delivery and guiding them using my skilled judgment. As the co-signing therapist I have reviewed the students documentation and am responsible for the treatment, assessment, and plan. Yoav Calabrese PTA  2018

## 2018-07-05 NOTE — DISCHARGE SUMMARY
[facility]  [facility]  Discharge Summary      Patient Name: Chucho Prado  MRN: [id number]  Admission Date: 6/19/2018  Hospital Length of Stay: 16 days  Discharge Date and Time:  07/05/2018 9:38 AM  Attending Physician: Guilherme Ordoñez MD   Discharging Provider: Grazyna Rose PA-C  Primary Care Provider: Obed Mcguire MD    HPI:   HPI: Chucho Prado is a pleasant 84 y.o. man with incisional hernia post open helen 15 years ago, who presents to ED with vomiting x 2 days.     Hernia has been present for about ~14 years.  It started becoming painful over the last 6 months.  Pain is intermittent, associated with eating.  It has gotten worse in severity over the last month.     He started having nausea and vomiting (nursing aide describes as feculent) two days ago.  3-4 episodes the night before last.  3-4 more episodes during the day time, and then none since then.  He has been avoiding PO intake due to the vomiting.     He has been having daily BM, although nurse states that yesterday was small.  States he is still passing gas.     He does have a cough and sinus congestion, and has had foul smelling urine.  UA in ED consistent with UTI.  He has suprapubic catheter due to urinary retention.     CT in ED suggests bowel obstruction associated with hernia.     Procedure(s) (LRB):  REPAIR, HERNIA, VENTRAL, INCARCERATED, WITHOUT HISTORY OF PRIOR REPAIR (N/A)      Indwelling Lines/Drains at time of discharge:   Lines/Drains/Airways     Drain                 Suprapubic Catheter 06/23/17 1200 18 Fr. 376 days         Suprapubic Catheter 06/19/18 0800 16 days          Epidural Line                 Perineural Analgesia/Anesthesia Assessment (using dermatomes) Epidural 10/26/17 0837 252 days              Hospital Course:   Patient presented to ER with incarcerated abdominal wall hernia. He underwent: Procedure(s) (LRB):  REPAIR, HERNIA, VENTRAL, INCARCERATED, WITHOUT HISTORY OF PRIOR REPAIR  (N/A). He tolerated the procedure well and was transferred to the floor after recovery from anesthesia. Please see the operative note for further procedure details. Vitals remained stable, and he was afebrile all throughout the post operative period. Labs were reviewed and electrolytes were replaced appropriately. Physical exam was appropriate for post operative state.  Diet was advanced, and he was able to tolerate a regular diet prior to discharge. He was working with PT/OT and had normal bowel function prior to discharge. He had multiple episodes of diarrhea, but was found to be c.diff negative.  Patient was deemed suitable for discharge on 15 Days Post-Op. He was discharged home with home health with medications and instructions as below. He voiced understanding of the instructions prior to discharge. He had an extended hospitalization due to issues with placement, insurance and family wishes.    For more thorough information, please refer to the hospital record and operative report.    Consults:   Consults         Status Ordering Provider     Inpatient consult to General surgery  Once     Provider:  (Not yet assigned)    Completed TRIPP SCHULTZ     Inpatient consult to Midline team  Once     Provider:  (Not yet assigned)    Completed CHARLES PENDLETON     Inpatient consult to Physical Medicine Rehab  Once     Provider:  (Not yet assigned)    Completed ALAINA MOSHER     Inpatient consult to [facility]  Once     Provider:  (Not yet assigned)    Completed CHARLES PENDLETON          Significant Diagnostic Studies: Labs:   BMP:   Recent Labs  Lab 07/04/18  0532   GLU 73   *   K 3.8   CL 96   CO2 29   BUN 8   CREATININE 1.1   CALCIUM 8.4*   MG 1.9   , CMP   Recent Labs  Lab 07/04/18  0532   *   K 3.8   CL 96   CO2 29   GLU 73   BUN 8   CREATININE 1.1   CALCIUM 8.4*   ANIONGAP 10   ESTGFRAFRICA >60.0   EGFRNONAA >60.0   , CBC   Recent Labs  Lab 07/04/18  0532   WBC 9.31   HGB 10.2*   HCT 33.0*        All labs within the past 24 hours have been reviewed    Radiology: X-Ray: CXR: X-Ray Chest 1 View (CXR):   Results for orders placed or performed during the hospital encounter of 06/19/18   X-Ray Chest 1 View    Narrative    EXAMINATION:  XR CHEST 1 VIEW    CLINICAL HISTORY:  shortness of breath;    TECHNIQUE:  Single frontal view of the chest was performed.    COMPARISON:  11/02/2017    FINDINGS:  The cardiac size is enlarged and aorta is tortuous.  Left hemidiaphragm is elevated and I believe there is little more atelectasis and loss of volume at the left lung base on today's study than on the previous exam.  Right lung is clear.      Impression    See above      Electronically signed by: Daron Burr MD  Date:    06/22/2018  Time:    09:47      KUB: X-Ray Abdomen AP 1 View (KUB):   Results for orders placed or performed during the hospital encounter of 06/19/18   X-Ray Abdomen AP 1 View    Narrative    EXAMINATION:  XR ABDOMEN AP 1 VIEW    CLINICAL HISTORY:  emesis;    FINDINGS:  One view: There is cardiomegaly bibasilar edema aortic plaque and left pleural fluid.  There is mild ileus.  There is postsurgical change.      Impression    See above      Electronically signed by: Gianni Dubose MD  Date:    07/03/2018  Time:    08:50     CT scan: CT ABDOMEN PELVIS WITH CONTRAST:   Results for orders placed or performed during the hospital encounter of 06/19/18   CT Abdomen Pelvis With Contrast    Narrative    EXAMINATION:  CT ABDOMEN PELVIS WITH CONTRAST    CLINICAL HISTORY:  Infection, abdomen-pelvis    TECHNIQUE:  Low dose axial images, sagittal and coronal reformations were obtained from the lung bases to the pubic symphysis following the IV administration of 100 mL of Omnipaque 350 and the oral administration of 30 mL of Omnipaque 350.    COMPARISON:  CT abdomen pelvis 06/19/2018    FINDINGS:  ABDOMEN:    - Heart: Normal in size.  No pericardial fluid.  There is coronary atherosclerosis.    -  Lung bases: There is chronic elevation of the left hemidiaphragm.  There is bibasilar subsegmental atelectasis/consolidation.  Small left and trace right pleural effusions, increased from prior.    - Liver: Normal in size with no focal abnormality identified.    - Gallbladder: Surgically absent.    - Bile Ducts: No evidence of intra or extra hepatic biliary ductal dilation.    - Spleen: Negative.    - Kidneys: The left kidney is markedly decreased in size with multiple areas of cortical thinning compatible with medical renal disease.  There is a stable exophytic cyst at the upper pole of the left kidney.  Additional subcentimeter hypodensities in the left kidney are too small to definitively characterize.  No evidence of nephrolithiasis or hydronephrosis.    - Adrenals: Unremarkable.    - Pancreas: No mass or peripancreatic fat stranding.    - Retroperitoneum:  No significant adenopathy.    - Vascular: The abdominal aorta is tortuous in caliber appear.  There is extensive atherosclerosis without evidence of aneurysm.  There is fusiform ectasia of the left common iliac artery measuring up to 2.1 cm.    - Abdominal wall:  Interval postoperative changes compatible with right lateral ventral hernia repair.  There is a surgical drain coursing within the operative bed.  Scattered free fluid and subcutaneous stranding likely reflects postoperative inflammatory change without discrete fluid collection identified to suggest abscess.  There is a single tiny focus of subcutaneous air near the surgical drain, likely postoperative.  There are 2 fat containing ventral hernias in the abdominal midline.  There is mild anasarca.  There is thickening of the left rectus abdominus muscle which is nonspecific and may reflect postoperative edema or hematoma.    PELVIS:    No pelvic mass, adenopathy, or free fluid.  There is diffuse bladder wall thickening with a right posterior bladder diverticulum or this appearance may be postoperative  in nature.  Regardless, appearance of the bladder is stable with a suprapubic catheter.    BOWEL/MESENTERY:    Enteric tube visualized in the distal esophagus and stomach.  Multiple dilated loops of small bowel are noted measuring up to 3.7 cm with a decompressed terminal ileum.  Contrast is noted in the proximal small bowel and colon without contrast in the mid to distal small bowel.  Contrast in the colon may be residual contrast from the prior exam or reflect contrast from the current exam.  There is swirling in the mesentery best seen on sagittal images 49-61. Overall findings are non-specific and suggest a possible ileus or partial small bowel obstruction.  Extensive sigmoid diverticulosis without evidence of diverticulitis.  No discrete intra-abdominal fluid collection identified to suggest abscess.  No significant ascites.  No intraperitoneal free air.    BONES:  No acute osseous abnormality and no suspicious lytic or blastic lesion.  Stable severe compression deformities of the L1 and L3 vertebral bodies status post L3 vertebroplasty.      Impression    Multiple dilated loops of small bowel and a decompressed terminal ileum.  Findings may represent a possible partial small bowel obstruction versus ileus.    Interval lateral ventral hernia repair with mild free fluid and fat stranding in the operative bed likely reflecting postsurgical inflammatory change.  No discrete fluid collection identified to suggest subcutaneous or intra-abdominal abscess.    Interval thickening of the left rectus abdominus muscle which may reflect postoperative edema or hematoma.    Small left and trace right pleural effusions with bibasilar subsegmental atelectasis.  Left lower lobe consolidation not excluded    Two fat containing ventral hernias.    Anasarca.    Additional findings as detailed above.    This report was flagged in Epic as abnormal.    Electronically signed by resident: Justa  Donny  Date:    06/25/2018  Time:    15:45    Electronically signed by: Donavan Cowan MD  Date:    06/25/2018  Time:    17:11       Pending Diagnostic Studies:     Procedure Component Value Units Date/Time    Basic metabolic panel [[id number]] Collected:  07/05/18 0928    Order Status:  Sent Lab Status:  In process Updated:  07/05/18 0936    Specimen:  Blood from Blood     Narrative:       Collection has been rescheduled by JA1 at 7/5/2018 04:17 Reason:   patient refused notified rn Son    CBC auto differential [[id number]] Collected:  07/05/18 0928    Order Status:  Sent Lab Status:  In process Updated:  07/05/18 0936    Specimen:  Blood from Blood     Narrative:       Collection has been rescheduled by JA1 at 7/5/2018 04:17 Reason:   patient refused notified rn Son    Magnesium [[id number]] Collected:  07/05/18 0928    Order Status:  Sent Lab Status:  In process Updated:  07/05/18 0936    Specimen:  Blood from Blood     Narrative:       Collection has been rescheduled by JA1 at 7/5/2018 04:17 Reason:   patient refused notified rn Son    Phosphorus [[id number]] Collected:  07/05/18 0928    Order Status:  Sent Lab Status:  In process Updated:  07/05/18 0936    Specimen:  Blood from Blood     Narrative:       Collection has been rescheduled by JA1 at 7/5/2018 04:17 Reason:   patient refused notified rn Star        Final Active Diagnoses:    Diagnosis Date Noted POA    PRINCIPAL PROBLEM:  Abdominal wall hernia [K43.9] 12/26/2016 Yes    Fecal incontinence [R15.9] 06/29/2018 No    Impaired mobility and ADLs [Z74.09] 06/28/2018 Yes    Incisional hernia with obstruction but no gangrene [K43.0] 06/20/2018 Yes    Small bowel obstruction [K56.609] 06/20/2018 Yes    Urinary tract infection associated with cystostomy catheter [T83.510A, N39.0] 06/19/2018 Yes    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] 12/26/2016 Yes    Essential hypertension [I10] 07/30/2013 Yes     Chronic    Hypothyroidism [E03.9] 07/30/2013 Yes     Chronic       Problems Resolved During this Admission:    Diagnosis Date Noted Date Resolved POA    Hypomagnesemia [E83.42] 06/27/2018 06/30/2018 No    Hypokalemia [E87.6] 06/25/2018 06/30/2018 Yes      Patient Active Problem List   Diagnosis    Hypothyroidism    Essential hypertension    Inguinal hernia, right    Dyslipidemia    Depression    Anemia    Renal impairment    Iron deficiency anemia    Inguinal hernia unilateral, non-recurrent    Staph aureus infection    Primary osteoarthritis of both knees    Urinary retention    History of MRSA infection    Sciatica neuralgia    CKD (chronic kidney disease) stage 3, GFR 30-59 ml/min    Abdominal wall hernia    S/P lumbar laminectomy    S/P kyphoplasty- L3 kyphoplasty     Lumbar myelopathy    Gait instability    PONV (postoperative nausea and vomiting)    Suprapubic catheter    Edema    Stiffness of neck    Hyponatremia    Primary osteoarthritis of left knee    Debility    Partial intestinal obstruction    Status post total left knee replacement    Flank hernia    Nuclear sclerosis, bilateral    Nuclear sclerotic cataract of left eye    Generalized abdominal pain    Urinary tract infection associated with cystostomy catheter    Incisional hernia with obstruction but no gangrene    Small bowel obstruction    Impaired mobility and ADLs    Fecal incontinence     Discharged Condition: good    Disposition: Home or Self Care    Follow Up:  Follow-up Information     Guilherme Ordoñez MD In 2 weeks.    Specialties:  General Surgery, Surgery  Why:  For wound re-check/Office to arrange & call you.   Contact information:  [address]  [facility] [address]  [phone]             Nurses Registry.    Specialty:  Home Health Services  Why:  Resuming Home health, & personal sitters  Contact information:  [address] [place]  [place] [id number] [phone]                 Patient Instructions:     Diet Adult Regular     Diet Adult  Regular     Lifting restrictions   Order Comments: No lifting more than 10lbs for 6 weeks     Notify your health care provider if you experience any of the following:  increased confusion or weakness     Notify your health care provider if you experience any of the following:  persistent dizziness, light-headedness, or visual disturbances     Notify your health care provider if you experience any of the following:  worsening rash     Notify your health care provider if you experience any of the following:  severe persistent headache     Notify your health care provider if you experience any of the following:  difficulty breathing or increased cough     Notify your health care provider if you experience any of the following:  redness, tenderness, or signs of infection (pain, swelling, redness, odor or green/yellow discharge around incision site)     Notify your health care provider if you experience any of the following:  severe uncontrolled pain     Notify your health care provider if you experience any of the following:  persistent nausea and vomiting or diarrhea     Notify your health care provider if you experience any of the following:  temperature >100.4     No dressing needed     Lifting restrictions   Order Comments: No heavy lifting, nothing heavier than 10lbs     Notify your health care provider if you experience any of the following:  difficulty breathing or increased cough     Notify your health care provider if you experience any of the following:  redness, tenderness, or signs of infection (pain, swelling, redness, odor or green/yellow discharge around incision site)     Notify your health care provider if you experience any of the following:  severe uncontrolled pain     Notify your health care provider if you experience any of the following:  persistent nausea and vomiting or diarrhea     Notify your health care provider if you experience any of the following:  temperature >100.4        Medications:  Reconciled Home Medications:      Medication List      START taking these medications    oxyCODONE-acetaminophen 5-325 mg per tablet  Commonly known as:  PERCOCET  Take 1 tablet by mouth every 4 (four) hours as needed.        CHANGE how you take these medications    docusate sodium 100 MG capsule  Commonly known as:  COLACE  Take 1 capsule (100 mg total) by mouth 2 (two) times daily as needed for Constipation.  What changed:  · when to take this  · reasons to take this        CONTINUE taking these medications    amLODIPine 10 MG tablet  Commonly known as:  NORVASC  Take 1 tablet (10 mg total) by mouth once daily.     aspirin 81 MG EC tablet  Commonly known as:  ECOTRIN  Take 81 mg by mouth once daily.     gabapentin 400 MG capsule  Commonly known as:  NEURONTIN  Take 400 mg by mouth 3 (three) times daily.     * hyoscyamine 0.125 mg Subl  Commonly known as:  LEVSIN/SL  DISSOLVE 1 TABLET UNDER THE TONGUE EVERY 4 HOURS AS NEEDED     * hyoscyamine 0.125 mg Subl  Commonly known as:  LEVSIN/SL  DISSOLVE 1 TABLET UNDER THE TONGUE EVERY 4 HOURS AS NEEDED     levothyroxine 50 MCG tablet  Commonly known as:  SYNTHROID  Take 1 tablet (50 mcg total) by mouth once daily.     ondansetron 8 MG Tbdl  Commonly known as:  ZOFRAN-ODT  Take 1 tablet (8 mg total) by mouth every 12 (twelve) hours as needed.     polyethylene glycol 17 gram Pwpk  Commonly known as:  GLYCOLAX  Take 17 g by mouth 2 (two) times daily.     senna-docusate 8.6-50 mg 8.6-50 mg per tablet  Commonly known as:  PERICOLACE  Take 1 tablet by mouth 2 (two) times daily.     simvastatin 40 MG tablet  Commonly known as:  ZOCOR  Take 1 tablet (40 mg total) by mouth every evening.     traZODone 50 MG tablet  Commonly known as:  DESYREL  Take 1 tablet (50 mg total) by mouth nightly as needed for Insomnia.     triamterene-hydrochlorothiazide 37.5-25 mg 37.5-25 mg per capsule  Commonly known as:  DYAZIDE  TAKE 1 CAPSULE BY MOUTH ONCE DAILY IN THE MORNING         * This list has 2 medication(s) that are the same as other medications prescribed for you. Read the directions carefully, and ask your doctor or other care provider to review them with you.            ASK your doctor about these medications    potassium chloride 20 mEq  Commonly known as:  K-TAB  Take 2 tablets (40 mEq total) by mouth 2 (two) times daily.  Ask about: Should I take this medication?     sulfamethoxazole-trimethoprim 800-160mg 800-160 mg Tab  Commonly known as:  BACTRIM DS  Take 1 tablet by mouth 2 (two) times daily.  Ask about: Should I take this medication?          Time spent on the discharge of patient: 2 minutes    Grazyna Rose PA-C  General Surgery  [facility]

## 2018-07-05 NOTE — PROGRESS NOTES
Pt AA0x3 and VSS.  Two side rails up, bed locked, call light within reach. No falls noted as these precautions remain. Incontinence care provided. Frequent weight shifts provided. Pain controlled well with PRN meds. Hourly rounds made and no complaints at this time noted. Will resume with plan of care.

## 2018-07-05 NOTE — PLAN OF CARE
TAYLER learned that Pt is discharging home today. All parties (Pt, Pt's Executor and team) are now in agreement that Pt is safe to return to home with his home health and sitters. TAYLER informed Nurse[facility] that Pt is discharging home today.     Florina Brock LCSW

## 2018-07-05 NOTE — SUBJECTIVE & OBJECTIVE
Interval History:   Patient seen and examined, no acute events overnight  Tolerating diet with no N/V  +F/BM  Afebrile/VSS      Medications:  Continuous Infusions:  Scheduled Meds:   albuterol-ipratropium  3 mL Nebulization Q4H    amLODIPine  10 mg Oral Daily    aspirin  81 mg Oral Daily    gabapentin  400 mg Oral TID    heparin (porcine)  5,000 Units Subcutaneous Q8H    levothyroxine  50 mcg Oral Before breakfast    sodium chloride 0.9%  3 mL Intravenous Q8H     PRN Meds:ondansetron, ondansetron, oxyCODONE-acetaminophen, oxyCODONE-acetaminophen     Review of patient's allergies indicates:  No Known Allergies  Objective:     Vital Signs (Most Recent):  Temp: 97.8 °F (36.6 °C) (07/05/18 0422)  Pulse: 87 (07/05/18 0422)  Resp: 16 (07/05/18 0422)  BP: 130/66 (07/05/18 0422)  SpO2: (!) 94 % (07/05/18 0422) Vital Signs (24h Range):  Temp:  [97.1 °F (36.2 °C)-98 °F (36.7 °C)] 97.8 °F (36.6 °C)  Pulse:  [81-95] 87  Resp:  [16-18] 16  SpO2:  [79 %-96 %] 94 %  BP: (130-141)/(59-67) 130/66     Weight: 91.2 kg (201 lb 1 oz)  Body mass index is 30.13 kg/m².    Intake/Output - Last 3 Shifts       07/03 0700 - 07/04 0659 07/04 0700 - 07/05 0659    P.O. 340 908    Total Intake(mL/kg) 340 (3.7) 908 (10)    Urine (mL/kg/hr)  3800 (1.7)    Emesis/NG output  0 (0)    Other  0 (0)    Stool  0 (0)    Blood  0 (0)    Total Output   3800    Net +340 -2892                Physical Exam   Constitutional: He is oriented to person, place, and time. He appears well-developed and well-nourished. No distress.   Cardiovascular: Normal rate.    Pulmonary/Chest: Effort normal.   Abdominal: Soft. He exhibits no distension. There is no tenderness.   Incision c/d/i with staples   Neurological: He is alert and oriented to person, place, and time.   Skin: Skin is warm and dry.   Psychiatric: He has a normal mood and affect. His behavior is normal.       Significant Labs:  CBC:   Recent Labs  Lab 07/04/18  0532   WBC 9.31   RBC 3.69*   HGB 10.2*    HCT 33.0*      MCV 89   MCH 27.6   MCHC 30.9*     BMP:   Recent Labs  Lab 07/04/18  0532   GLU 73   *   K 3.8   CL 96   CO2 29   BUN 8   CREATININE 1.1   CALCIUM 8.4*   MG 1.9     CMP:   Recent Labs  Lab 07/04/18  0532   GLU 73   CALCIUM 8.4*   *   K 3.8   CO2 29   CL 96   BUN 8   CREATININE 1.1

## 2018-07-05 NOTE — PLAN OF CARE
07/05/18 1200   Final Note   Assessment Type Final Discharge Note   Discharge Disposition Home-Health  (Resuming Nurse[facility] , personal sitters 24 hrs/day)   What phone number can be called within the next 1-3 days to see how you are doing after discharge? ([phone])   Hospital Follow Up  Appt(s) scheduled? Yes   Discharge plans and expectations educations in teach back method with documentation complete? Yes   Right Care Referral Info   Post Acute Recommendation SNF / Sub-Acute Rehab

## 2018-07-05 NOTE — PLAN OF CARE
09:20 AM Discussed discharge plan, in huddle this morning w/SHAWNEE Long: discharge home, today, w//Nurse Registry HH, & personal sitters.     09:30 AM Received call from patient's Executor, Carolina, saying she is on her way to the hospital;She will be here in about an hour & wants to talk to  & JIMBO. CM discussed above w/her. She verbalized her understanding.     Updated SHAWNEE Long.    11:15 AM JIMBO, along w/SHAWNEE Long, met w/patient's Executor, Carolina, as she requested as she is here now.   She states patient is doing better today & they are in agreement to discharge home w/SHAYNA, resuming sitters, today. She had medical questions;Grazyna answered her questions. Floor nurse, Amber, updated.

## 2018-07-05 NOTE — PT/OT/SLP PROGRESS
"Physical Therapy Treatment    Patient Name:  Chucho Prado   MRN:  [id number]    Recommendations:     Discharge Recommendations:  nursing facility, skilled   Discharge Equipment Recommendations: walker, rolling   Barriers to discharge: Decreased caregiver support    Assessment:     Chucho Prado is a 84 y.o. male admitted with a medical diagnosis of Abdominal wall hernia.  He presents with the following impairments/functional limitations:  weakness, impaired endurance, impaired self care skills, impaired functional mobilty, gait instability, impaired balance, decreased coordination, decreased lower extremity function. Pt cooperative and tolerated treatment fairly well. Pt slowly progressing with mobility. Pt would continue to benefit from PT services.      Rehab Prognosis:  good; patient would benefit from acute skilled PT services to address these deficits and reach maximum level of function.      Recent Surgery: Procedure(s) (LRB):  REPAIR, HERNIA, VENTRAL, INCARCERATED, WITHOUT HISTORY OF PRIOR REPAIR (N/A) 15 Days Post-Op    Plan:     During this hospitalization, patient to be seen 5 x/week to address the above listed problems via therapeutic activities, therapeutic exercises  · Plan of Care Expires:  07/21/18   Plan of Care Reviewed with: patient    Subjective     Communicated with RN prior to session.  Patient found supine upon PT entry to room, agreeable to treatment.      Chief Complaint: none stated  Patient comments/goals: " I will try my best"   Pain/Comfort:  · Pain Rating 1: 0/10  · Pain Rating Post-Intervention 1: 0/10    Patients cultural, spiritual, Sikh conflicts given the current situation: no    Objective:     Patient found with: galvan catheter     General Precautions: Standard, fall   Orthopedic Precautions:N/A   Braces: N/A     Functional Mobility:  · Bed Mobility:     · Rolling Right: contact guard assistance  · Supine to Sit: moderate assistance  · Transfers:     · Sit to Stand:  " maximal assistance x 2 persons x 4 trials with standard walker and VCs to stand upright  · Toilet Transfer: moderate/max assistance x 2 persons x multiple trials with rolling walker using Stand Pivot to bedside commode  · Commode to chair transfer: Min assist x 2 persons with SW and VC's for sequencing using stand pivot technique.   · Gait : Pt able to take 5 steps during toilet transfer and 10 steps during chair transfer requiring mod/min assist x 2 persons. With SW assistance for balance and SW management      AM-PAC 6 CLICK MOBILITY  Turning over in bed (including adjusting bedclothes, sheets and blankets)?: 3  Sitting down on and standing up from a chair with arms (e.g., wheelchair, bedside commode, etc.): 2  Moving from lying on back to sitting on the side of the bed?: 3  Moving to and from a bed to a chair (including a wheelchair)?: 2  Need to walk in hospital room?: 2  Climbing 3-5 steps with a railing?: 1  Basic Mobility Total Score: 13       Therapeutic Activities and Exercises:  Pt educated on safety  Pt educated on POC and importance of OOB activity  White board updated  Donned shoes  Toilet transfer and clean up    Patient left up in chair with doctor present..    GOALS:    Physical Therapy Goals        Problem: Physical Therapy Goal    Goal Priority Disciplines Outcome Goal Variances Interventions   Physical Therapy Goal     PT/OT, PT Ongoing (interventions implemented as appropriate)     Description:  Goals to be met by: 2018     Patient will increase functional independence with mobility by performin. Supine to sit with Stand-by Assistance - not met  2. Sit to supine with Stand-by Assistance - not met  3. Sit to stand transfer with moderate Assistance - not met  4. Bed to chair transfer with moderate Assistance using Standard Walker - not met  5. Gait  x 45 feet with minimal Assistance using Standard Walker. - not met  6. Lower extremity exercise program x15 reps per handout, with  supervision - not met                        Time Tracking:     PT Received On: 07/05/18  PT Start Time: 0901     PT Stop Time: 0953  PT Total Time (min): 53 min     Billable Minutes: Therapeutic Activity 53    Treatment Type: Treatment  PT/PTA: PTA     PTA Visit Number: 1     Jane Mistry, SPTA  07/05/2018   I certify that I was present in the room directing the student in service delivery and guiding them using my skilled judgment. As the co-signing therapist I have reviewed the students documentation and am responsible for the treatment, assessment, and plan. Yoav Calabrese PTA

## 2018-07-06 NOTE — PT/OT/SLP DISCHARGE
Occupational Therapy Discharge Summary    Chucho Prado  MRN: [id number]   Principal Problem: Abdominal wall hernia      Patient Discharged from acute Occupational Therapy on 7/6/18.  Please refer to prior OT note dated 7/3/18 for functional status.    Assessment:      Goals partially met.    Objective:     GOALS:    Occupational Therapy Goals     Not on file          Multidisciplinary Problems (Resolved)        Problem: Occupational Therapy Goal    Goal Priority Disciplines Outcome Interventions   Occupational Therapy Goal   (Resolved)     OT, PT/OT Outcome(s) achieved    Description:  Goals to be met by: 6/28/2018     Patient will increase functional independence with ADLs by performing:    UE Dressing with Moderate Assistance. NOT MET   LE Dressing with Moderate Assistance. NOT MET   Grooming while seated with Minimal Assistance. GOAL MET   REVISED: grooming while seated with setup assistance NOT MET   Toileting from bedside commode with Maximum Assistance for hygiene and clothing management. NOT MET   Toilet transfer to bedside commode with Moderate Assistance. NOT MET                          Reasons for Discontinuation of Therapy Services  Transfer to alternate level of care.      Plan:     Patient Discharged to: Home with Home Health Service and 24/7 pato Chatterjee, OT  7/6/2018

## 2018-07-06 NOTE — PLAN OF CARE
Problem: Occupational Therapy Goal  Goal: Occupational Therapy Goal  Goals to be met by: 6/28/2018     Patient will increase functional independence with ADLs by performing:    UE Dressing with Moderate Assistance. NOT MET   LE Dressing with Moderate Assistance. NOT MET   Grooming while seated with Minimal Assistance. GOAL MET   REVISED: grooming while seated with setup assistance NOT MET   Toileting from bedside commode with Maximum Assistance for hygiene and clothing management. NOT MET   Toilet transfer to bedside commode with Moderate Assistance. NOT MET        Outcome: Outcome(s) achieved Date Met: 07/06/18  Goals partially met; pt d/c from hospital

## 2018-07-06 NOTE — PROGRESS NOTES
Physical Therapy Discharge Summary    Name: Chucho Prado  MRN: [id number]   Principal Problem: Abdominal wall hernia     Patient Discharged from acute Physical Therapy on 18.  Please refer to prior PT noted date on 18 for functional status.     Assessment:     Patient appropriate for care in another setting.    Objective:     GOALS:    Physical Therapy Goals        Problem: Physical Therapy Goal    Goal Priority Disciplines Outcome Goal Variances Interventions   Physical Therapy Goal     PT/OT, PT Ongoing (interventions implemented as appropriate)     Description:  Goals to be met by: 2018     Patient will increase functional independence with mobility by performin. Supine to sit with Stand-by Assistance - not met  2. Sit to supine with Stand-by Assistance - not met  3. Sit to stand transfer with moderate Assistance - not met  4. Bed to chair transfer with moderate Assistance using Standard Walker - not met  5. Gait  x 45 feet with minimal Assistance using Standard Walker. - not met  6. Lower extremity exercise program x15 reps per handout, with supervision - not met                        Reasons for Discontinuation of Therapy Services  Transfer to alternate level of care.      Plan:     Patient Discharged to: Home with Home Health Service with 24 hour assist from corry.    Hillary Chin, PT  2018

## 2018-07-19 ENCOUNTER — PATIENT MESSAGE (OUTPATIENT)
Dept: SURGERY | Facility: CLINIC | Age: 83
End: 2018-07-19

## 2018-07-19 ENCOUNTER — PATIENT MESSAGE (OUTPATIENT)
Dept: INTERNAL MEDICINE | Facility: CLINIC | Age: 83
End: 2018-07-19

## 2018-07-21 ENCOUNTER — PATIENT MESSAGE (OUTPATIENT)
Dept: UROLOGY | Facility: CLINIC | Age: 83
End: 2018-07-21

## 2018-07-26 ENCOUNTER — OFFICE VISIT (OUTPATIENT)
Dept: SURGERY | Facility: CLINIC | Age: 83
End: 2018-07-26
Payer: MEDICARE

## 2018-07-26 ENCOUNTER — OFFICE VISIT (OUTPATIENT)
Dept: UROLOGY | Facility: CLINIC | Age: 83
End: 2018-07-26
Payer: MEDICARE

## 2018-07-26 VITALS — HEART RATE: 79 BPM | DIASTOLIC BLOOD PRESSURE: 69 MMHG | SYSTOLIC BLOOD PRESSURE: 138 MMHG

## 2018-07-26 VITALS
WEIGHT: 199 LBS | HEIGHT: 68 IN | SYSTOLIC BLOOD PRESSURE: 137 MMHG | TEMPERATURE: 98 F | BODY MASS INDEX: 30.16 KG/M2 | HEART RATE: 71 BPM | DIASTOLIC BLOOD PRESSURE: 66 MMHG

## 2018-07-26 DIAGNOSIS — Z93.59 SUPRAPUBIC CATHETER: Primary | ICD-10-CM

## 2018-07-26 DIAGNOSIS — R33.9 URINARY RETENTION: ICD-10-CM

## 2018-07-26 DIAGNOSIS — K43.9 VENTRAL HERNIA WITHOUT OBSTRUCTION OR GANGRENE: Primary | ICD-10-CM

## 2018-07-26 PROCEDURE — 99213 OFFICE O/P EST LOW 20 MIN: CPT | Mod: 25,S$PBB,, | Performed by: NURSE PRACTITIONER

## 2018-07-26 PROCEDURE — 99213 OFFICE O/P EST LOW 20 MIN: CPT | Mod: PBBFAC | Performed by: SURGERY

## 2018-07-26 PROCEDURE — 99999 PR PBB SHADOW E&M-EST. PATIENT-LVL III: CPT | Mod: PBBFAC,,, | Performed by: NURSE PRACTITIONER

## 2018-07-26 PROCEDURE — 99999 PR PBB SHADOW E&M-EST. PATIENT-LVL III: CPT | Mod: PBBFAC,,, | Performed by: SURGERY

## 2018-07-26 PROCEDURE — 51705 CHANGE OF BLADDER TUBE: CPT | Mod: S$PBB,,, | Performed by: NURSE PRACTITIONER

## 2018-07-26 PROCEDURE — 99213 OFFICE O/P EST LOW 20 MIN: CPT | Mod: PBBFAC,27,25 | Performed by: NURSE PRACTITIONER

## 2018-07-26 PROCEDURE — 51705 CHANGE OF BLADDER TUBE: CPT | Mod: PBBFAC | Performed by: NURSE PRACTITIONER

## 2018-07-26 PROCEDURE — 99024 POSTOP FOLLOW-UP VISIT: CPT | Mod: POP,,, | Performed by: SURGERY

## 2018-07-26 NOTE — PROGRESS NOTES
HPI:  The patient is status post-incarcerated ventral hernia repair 6/20/2018 and prolonged hospital stay secondary to delayed return of bowel function and social work issues.  He reports some episodes of nausea since discharge (no vomiting) that have resolved, and diffuse abdominal pain (6/10) that has also resolved. Eating and drinking well.  Urine output normal and BM similar to baseline (diarrhea).  Denies fever.        PHYSICAL EXAM:  Vitals:    07/26/18 1051   BP: 137/66   Pulse: 71   Temp: 97.6 °F (36.4 °C)       Physical Exam   Constitutional: He is oriented to person, place, and time. He appears well-developed and well-nourished. No distress.   HENT:   Head: Normocephalic and atraumatic.   Eyes: EOM are normal. Pupils are equal, round, and reactive to light.   Neck: Normal range of motion. Neck supple.   Cardiovascular: Normal rate and regular rhythm.    Pulmonary/Chest: Effort normal. No respiratory distress.   Abdominal: Soft. He exhibits no distension. There is no tenderness. There is no guarding.   Incision CDI, no erythema or swelling   Musculoskeletal: Normal range of motion.   Neurological: He is alert and oriented to person, place, and time.   Skin: Skin is warm and dry. He is not diaphoretic.   Psychiatric: He has a normal mood and affect. His behavior is normal. Judgment and thought content normal.   Nursing note and vitals reviewed.       Right flank incision well healed. No erythema, drainage, swelling.   No hernia palpable with coughing.     ASSESSMENT:    The patient is doing well after surgery.      PLAN:    Follow up PRN.        I have personally performed a detailed history and physical examination on this patient. My findings are summarized in the resident's note included in the record.

## 2018-07-26 NOTE — PROGRESS NOTES
Subjective:       Patient ID: Chucho Prado is a 84 y.o. male.    Chief Complaint: SPT tube exchange    Chucho Prado is a 84 y.o. male with neurogenic bladder.  He had 18fr SPT placed by Dr. Taylor on 06/23/2017 to manage his Neurogenic Bladder.    He is here today for SPT exchange.  His last exchange 06/12/2018.     He reports his SPT is draining well  Occasional bladder spasms;    10/25/2017 (R) TKR with Dr. [facility].      Past Medical History:  No date: Arthritis  No date: Blood transfusion  No date: CKD (chronic kidney disease) stage 3, GFR 30-5*  No date: Compression fracture of lumbar vertebra  No date: Depression  No date: Dyslipidemia  No date: GERD (gastroesophageal reflux disease)  No date: Hypertension  No date: Thyroid disease  No date: UTI (urinary tract infection)    Past Surgical History:  No date: CHOLECYSTECTOMY  No date: HERNIA REPAIR  No date: JOINT REPLACEMENT  12/27/2016: LAMINECTOMY      Comment: L2-L4  12/2016: LUMBAR LAMINECTOMY  No date: PARATHYROIDECTOMY  No date: TOTAL KNEE ARTHROPLASTY      Comment: Right    Review of patient's family history indicates:    Hypertension                   Mother                    Diabetes                       Father                    Esophageal cancer              Father                      Social History    Marital status: Single              Spouse name:                       Years of education:                 Number of children:               Occupational History    None on file    Social History Main Topics    Smoking status: Former Smoker                                                                Packs/day: 0.00      Years: 20.00       Smokeless tobacco: Never Used                        Comment: quit 1999    Alcohol use: Yes                Comment: rarely/6 months    Drug use: No              Sexual activity: No                   Other Topics            Concern    None on file    Social History Narrative    Lives alone, owns  house and [employer] part.  hel[employer]. 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         Allergies:  Review of patient's allergies indicates no known allergies.    Medications:  Current Outpatient Prescriptions:   acetaminophen (TYLENOL) 500 MG tablet, Take 2 tablets (1,000 mg total) by mouth 3 (three) times daily as needed for Pain., Disp: 30 tablet, Rfl: 0  aspirin 81 MG Chew, Take 1 tablet (81 mg total) by mouth once daily. HOLD UNTIL TOLD TO RESUME BY PHYSICIAN, Disp: 30 tablet, Rfl: 0  calcitonin, salmon, (FORTICAL) 200 unit/actuation nasal spray, 1 spray by Nasal route once daily., Disp: 1 Bottle, Rfl: 0  diclofenac sodium (VOLTAREN) 1 % Gel, Apply 2 g topically 3 (three) times daily. To left knee, Disp: 100 g, Rfl: 3  gabapentin (NEURONTIN) 400 MG capsule, Take 2 capsules (800 mg total) by mouth 3 (three) times daily., Disp: 180 capsule, Rfl: 11  hyoscyamine (LEVSIN/SL) 0.125 mg Subl, Place 1 tablet (0.125 mg total) under the tongue every 4 (four) hours as needed (bladder spasms)., Disp: 30 tablet, Rfl: 1  levothyroxine (SYNTHROID) 50 MCG tablet, Take 1 tablet (50 mcg total) by mouth once daily., Disp: 100 tablet, Rfl: 11  mirtazapine (REMERON) 30 MG tablet, Take 1 tablet (30 mg total) by mouth every evening., Disp: 30 tablet, Rfl: 6  polyethylene glycol (GLYCOLAX) 17 gram PwPk, Take 17 g by mouth once daily., Disp: 30 packet, Rfl: 0  simethicone (MYLICON) 80 MG chewable tablet, Take 1 tablet (80 mg total) by mouth 3 (three) times daily after meals., Disp: , Rfl: 0  simvastatin (ZOCOR) 40 MG tablet, Take 1 tablet (40 mg total) by mouth every evening., Disp: 90 tablet, Rfl: 11  tamsulosin (FLOMAX) 0.4 mg Cp24, Take 1 capsule (0.4 mg total) by mouth once daily., Disp: 30 capsule, Rfl: 3  triamterene-hydrochlorothiazide 37.5-25 mg (DYAZIDE) 37.5-25 mg per capsule, Take 1 capsule by mouth every morning. HOLD UNTIL TOLD TO RESUME BY PHYSICIAN, Disp: 90 capsule, Rfl: 3                  Review of Systems    Constitutional: Negative for activity change, appetite change, chills and fever.   HENT: Negative for facial swelling and trouble swallowing.    Eyes: Negative for visual disturbance.   Respiratory: Negative for chest tightness and shortness of breath.    Cardiovascular: Negative for chest pain and palpitations.   Gastrointestinal: Negative.  Negative for abdominal pain, constipation, diarrhea, nausea and vomiting.   Genitourinary: Positive for difficulty urinating. Negative for dysuria, flank pain, hematuria, penile pain, penile swelling, scrotal swelling and testicular pain.        SPT draining well     Musculoskeletal: Positive for gait problem. Negative for back pain, myalgias and neck stiffness.   Skin: Negative for rash.   Neurological: Negative for dizziness and speech difficulty.   Hematological: Does not bruise/bleed easily.   Psychiatric/Behavioral: Negative for behavioral problems.       Objective:      Physical Exam   Nursing note and vitals reviewed.  Constitutional: He is oriented to person, place, and time. Vital signs are normal. He appears well-developed and well-nourished. He is active and cooperative.  Non-toxic appearance. He does not have a sickly appearance.   HENT:   Head: Normocephalic and atraumatic.   Right Ear: External ear normal.   Left Ear: External ear normal.   Nose: Nose normal.   Mouth/Throat: Mucous membranes are normal.   Eyes: Conjunctivae and lids are normal. No scleral icterus.   Neck: Trachea normal, normal range of motion and full passive range of motion without pain. Neck supple. No JVD present. No tracheal deviation present.   Cardiovascular: Normal rate, S1 normal and S2 normal.    Pulmonary/Chest: Effort normal. No respiratory distress. He exhibits no tenderness.   Abdominal: Soft. Normal appearance and bowel sounds are normal. There is no hepatosplenomegaly. There is no tenderness. There is no CVA tenderness.       Musculoskeletal: Normal range of motion.    Neurological: He is alert and oriented to person, place, and time. He has normal strength.   Skin: Skin is warm, dry and intact.     Psychiatric: He has a normal mood and affect. His behavior is normal. Judgment and thought content normal.       Assessment:       1. Suprapubic catheter    2. Urinary retention        Plan:          I spent 25 minutes with the patient of which more than half was spent in direct consultation with the patient in regards to our treatment and plan.    Education and recommendations of today's plan of care including home remedies.  We discussed daily SPT and skin care.   Diet modifications; continue PT and exercise.  Old SPT easily removed.  New 18fr SPT was easily placed using sterile technique.   I irrigated the bladder to verify the positioning.  Balloon inflated with 10 cc sterile water.   Tolerated well;   RTC one month

## 2018-07-31 ENCOUNTER — EXTERNAL CHRONIC CARE MANAGEMENT (OUTPATIENT)
Dept: PRIMARY CARE CLINIC | Facility: CLINIC | Age: 83
End: 2018-07-31
Payer: MEDICARE

## 2018-07-31 PROCEDURE — 99490 CHRNC CARE MGMT STAFF 1ST 20: CPT | Mod: PBBFAC | Performed by: INTERNAL MEDICINE

## 2018-07-31 PROCEDURE — 99490 CHRNC CARE MGMT STAFF 1ST 20: CPT | Mod: S$PBB,,, | Performed by: INTERNAL MEDICINE

## 2018-08-02 DIAGNOSIS — E78.5 DYSLIPIDEMIA: Chronic | ICD-10-CM

## 2018-08-05 RX ORDER — SIMVASTATIN 40 MG/1
TABLET, FILM COATED ORAL
Qty: 90 TABLET | Refills: 0 | Status: SHIPPED | OUTPATIENT
Start: 2018-08-05 | End: 2018-08-22 | Stop reason: SDUPTHER

## 2018-08-17 ENCOUNTER — PATIENT MESSAGE (OUTPATIENT)
Dept: ADMINISTRATIVE | Facility: OTHER | Age: 83
End: 2018-08-17

## 2018-08-17 ENCOUNTER — PATIENT MESSAGE (OUTPATIENT)
Dept: SURGERY | Facility: CLINIC | Age: 83
End: 2018-08-17

## 2018-08-17 DIAGNOSIS — E03.9 HYPOTHYROIDISM, UNSPECIFIED TYPE: Chronic | ICD-10-CM

## 2018-08-17 RX ORDER — LEVOTHYROXINE SODIUM 50 UG/1
TABLET ORAL
Qty: 100 TABLET | Refills: 0 | Status: SHIPPED | OUTPATIENT
Start: 2018-08-17 | End: 2018-08-31 | Stop reason: SDUPTHER

## 2018-08-22 DIAGNOSIS — E78.5 DYSLIPIDEMIA: Chronic | ICD-10-CM

## 2018-08-23 RX ORDER — SIMVASTATIN 40 MG/1
TABLET, FILM COATED ORAL
Qty: 90 TABLET | Refills: 0 | Status: SHIPPED | OUTPATIENT
Start: 2018-08-23 | End: 2018-10-11 | Stop reason: SDUPTHER

## 2018-08-23 NOTE — TELEPHONE ENCOUNTER
Hi, please call patient and let the patient know that at [facility] we have developed a hypertension registry of patients and I see that this patient is due to see me back and have the high blood pressure evaluated along with overall health. Please offer an appointment.  I have sent in his simvastatin.  Thank you, Obed Mcguire

## 2018-08-24 ENCOUNTER — OFFICE VISIT (OUTPATIENT)
Dept: UROLOGY | Facility: CLINIC | Age: 83
End: 2018-08-24
Payer: MEDICARE

## 2018-08-24 VITALS
HEART RATE: 81 BPM | SYSTOLIC BLOOD PRESSURE: 128 MMHG | DIASTOLIC BLOOD PRESSURE: 71 MMHG | HEIGHT: 68 IN | BODY MASS INDEX: 30.26 KG/M2

## 2018-08-24 DIAGNOSIS — R26.81 GAIT INSTABILITY: ICD-10-CM

## 2018-08-24 DIAGNOSIS — R33.9 URINARY RETENTION: ICD-10-CM

## 2018-08-24 DIAGNOSIS — N31.9 NEUROGENIC BLADDER: ICD-10-CM

## 2018-08-24 DIAGNOSIS — Z93.59 SUPRAPUBIC CATHETER: ICD-10-CM

## 2018-08-24 DIAGNOSIS — Z43.5 ENCOUNTER FOR CARE OR REPLACEMENT OF SUPRAPUBIC TUBE: Primary | ICD-10-CM

## 2018-08-24 PROCEDURE — 51705 CHANGE OF BLADDER TUBE: CPT | Mod: S$PBB,,, | Performed by: NURSE PRACTITIONER

## 2018-08-24 PROCEDURE — 51705 CHANGE OF BLADDER TUBE: CPT | Mod: PBBFAC | Performed by: NURSE PRACTITIONER

## 2018-08-24 PROCEDURE — 99999 PR PBB SHADOW E&M-EST. PATIENT-LVL III: CPT | Mod: PBBFAC,,, | Performed by: NURSE PRACTITIONER

## 2018-08-24 PROCEDURE — 99214 OFFICE O/P EST MOD 30 MIN: CPT | Mod: S$PBB,25,, | Performed by: NURSE PRACTITIONER

## 2018-08-24 PROCEDURE — 99213 OFFICE O/P EST LOW 20 MIN: CPT | Mod: PBBFAC,25 | Performed by: NURSE PRACTITIONER

## 2018-08-24 RX ORDER — TRIAMTERENE AND HYDROCHLOROTHIAZIDE 37.5; 25 MG/1; MG/1
CAPSULE ORAL
Qty: 90 CAPSULE | Refills: 0 | Status: SHIPPED | OUTPATIENT
Start: 2018-08-24 | End: 2018-08-31 | Stop reason: SDUPTHER

## 2018-08-24 NOTE — PROGRESS NOTES
Subjective:       Patient ID: Chucho Prado is a 84 y.o. male.    Chief Complaint: Neurogenic Bladder (SPT 18 fr)    Chucho Prado is a 84 y.o. male with neurogenic bladder.  He had 18fr SPT placed by Dr. Taylor on 06/23/2017 to manage his Neurogenic Bladder.    He is here today for exchange.  His last exchange 07/26/2018.     He reports good urine flow.  Occasional bladder spasms;  SPT draining well;     Going to [employer] for b'day dinner; had great time  10/25/2017 (R) TKR with Dr. [facility].            Past Medical History:  No date: Arthritis  No date: Blood transfusion  No date: CKD (chronic kidney disease) stage 3, GFR 30-5*  No date: Compression fracture of lumbar vertebra  No date: Depression  No date: Dyslipidemia  No date: GERD (gastroesophageal reflux disease)  No date: Hypertension  No date: Thyroid disease  No date: UTI (urinary tract infection)    Past Surgical History:  No date: CHOLECYSTECTOMY  No date: HERNIA REPAIR  No date: JOINT REPLACEMENT  12/27/2016: LAMINECTOMY      Comment: L2-L4  12/2016: LUMBAR LAMINECTOMY  No date: PARATHYROIDECTOMY  No date: TOTAL KNEE ARTHROPLASTY      Comment: Right    Review of patient's family history indicates:    Hypertension                   Mother                    Diabetes                       Father                    Esophageal cancer              Father                      Social History    Marital status: Single              Spouse name:                       Years of education:                 Number of children:               Occupational History    None on file    Social History Main Topics    Smoking status: Former Smoker                                                                Packs/day: 0.00      Years: 20.00       Smokeless tobacco: Never Used                        Comment: quit 1999    Alcohol use: Yes                Comment: rarely/6 months    Drug use: No              Sexual activity: No                   Other Topics             Concern    None on file    Social History Narrative    Lives alone, owns house and rents part. Delaney helps. 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         Allergies:  Review of patient's allergies indicates no known allergies.    Medications:  Current Outpatient Prescriptions:   acetaminophen (TYLENOL) 500 MG tablet, Take 2 tablets (1,000 mg total) by mouth 3 (three) times daily as needed for Pain., Disp: 30 tablet, Rfl: 0  aspirin 81 MG Chew, Take 1 tablet (81 mg total) by mouth once daily. HOLD UNTIL TOLD TO RESUME BY PHYSICIAN, Disp: 30 tablet, Rfl: 0  calcitonin, salmon, (FORTICAL) 200 unit/actuation nasal spray, 1 spray by Nasal route once daily., Disp: 1 Bottle, Rfl: 0  diclofenac sodium (VOLTAREN) 1 % Gel, Apply 2 g topically 3 (three) times daily. To left knee, Disp: 100 g, Rfl: 3  gabapentin (NEURONTIN) 400 MG capsule, Take 2 capsules (800 mg total) by mouth 3 (three) times daily., Disp: 180 capsule, Rfl: 11  hyoscyamine (LEVSIN/SL) 0.125 mg Subl, Place 1 tablet (0.125 mg total) under the tongue every 4 (four) hours as needed (bladder spasms)., Disp: 30 tablet, Rfl: 1  levothyroxine (SYNTHROID) 50 MCG tablet, Take 1 tablet (50 mcg total) by mouth once daily., Disp: 100 tablet, Rfl: 11  mirtazapine (REMERON) 30 MG tablet, Take 1 tablet (30 mg total) by mouth every evening., Disp: 30 tablet, Rfl: 6  polyethylene glycol (GLYCOLAX) 17 gram PwPk, Take 17 g by mouth once daily., Disp: 30 packet, Rfl: 0  simethicone (MYLICON) 80 MG chewable tablet, Take 1 tablet (80 mg total) by mouth 3 (three) times daily after meals., Disp: , Rfl: 0  simvastatin (ZOCOR) 40 MG tablet, Take 1 tablet (40 mg total) by mouth every evening., Disp: 90 tablet, Rfl: 11  tamsulosin (FLOMAX) 0.4 mg Cp24, Take 1 capsule (0.4 mg total) by mouth once daily., Disp: 30 capsule, Rfl: 3  triamterene-hydrochlorothiazide 37.5-25 mg (DYAZIDE) 37.5-25 mg per capsule, Take 1 capsule by mouth every morning. HOLD UNTIL TOLD TO RESUME BY  PHYSICIAN, Disp: 90 capsule, Rfl: 3                  Review of Systems   Constitutional: Negative for activity change, appetite change, chills and fever.   HENT: Negative for facial swelling and trouble swallowing.    Eyes: Negative for visual disturbance.   Respiratory: Negative for chest tightness and shortness of breath.    Cardiovascular: Positive for leg swelling. Negative for chest pain and palpitations.   Gastrointestinal: Negative.  Negative for abdominal pain, constipation, diarrhea, nausea and vomiting.   Genitourinary: Positive for difficulty urinating. Negative for dysuria, flank pain, hematuria, penile pain, penile swelling, scrotal swelling and testicular pain.        SPT draining well     Musculoskeletal: Positive for arthralgias and gait problem. Negative for back pain, myalgias and neck stiffness.   Skin: Negative for rash.   Neurological: Positive for weakness. Negative for dizziness and speech difficulty.   Hematological: Does not bruise/bleed easily.   Psychiatric/Behavioral: Negative for behavioral problems.       Objective:      Physical Exam   Nursing note and vitals reviewed.  Constitutional: He is oriented to person, place, and time. Vital signs are normal. He appears well-developed and well-nourished. He is active and cooperative.  Non-toxic appearance. He does not have a sickly appearance.   HENT:   Head: Normocephalic and atraumatic.   Right Ear: External ear normal.   Left Ear: External ear normal.   Nose: Nose normal.   Mouth/Throat: Mucous membranes are normal.   Eyes: Conjunctivae and lids are normal. No scleral icterus.   Neck: Trachea normal, normal range of motion and full passive range of motion without pain. Neck supple. No JVD present. No tracheal deviation present.   Cardiovascular: Normal rate, S1 normal and S2 normal.    Pulmonary/Chest: Effort normal. No respiratory distress. He exhibits no tenderness.   Abdominal: Soft. Normal appearance and bowel sounds are normal. There  is no hepatosplenomegaly. There is no tenderness. There is no CVA tenderness.       Musculoskeletal: Normal range of motion.   Neurological: He is alert and oriented to person, place, and time. He has normal strength.   Skin: Skin is warm, dry and intact.     Psychiatric: He has a normal mood and affect. His behavior is normal. Judgment and thought content normal.       Assessment:       1. Encounter for care or replacement of suprapubic tube    2. Gait instability    3. Suprapubic catheter    4. Urinary retention    5. Neurogenic bladder        Plan:            I spent 25 minutes with the patient of which more than half was spent in direct consultation with the patient in regards to our treatment and plan.    Education and recommendations of today's plan of care including home remedies.  We discussed daily SPT and skin care.   Diet modifications; continue PT and exercise.  Old SPT easily removed.  New 18fr SPT was easily placed using sterile technique.   I irrigated the bladder to verify the positioning.  Balloon inflated with 10 cc sterile water.   Tolerated well;   RTC one month

## 2018-08-29 ENCOUNTER — TELEPHONE (OUTPATIENT)
Dept: ADMINISTRATIVE | Facility: CLINIC | Age: 83
End: 2018-08-29

## 2018-08-29 NOTE — TELEPHONE ENCOUNTER
Home Health Recert 07/28/2018 to 09/25/2018 with Nurse [facility], Dr Obed Mcguire. SN, PT services.

## 2018-08-31 ENCOUNTER — EXTERNAL CHRONIC CARE MANAGEMENT (OUTPATIENT)
Dept: PRIMARY CARE CLINIC | Facility: CLINIC | Age: 83
End: 2018-08-31
Payer: MEDICARE

## 2018-08-31 DIAGNOSIS — E03.9 HYPOTHYROIDISM, UNSPECIFIED TYPE: Chronic | ICD-10-CM

## 2018-08-31 PROCEDURE — 99490 CHRNC CARE MGMT STAFF 1ST 20: CPT | Mod: S$PBB,,, | Performed by: INTERNAL MEDICINE

## 2018-08-31 PROCEDURE — 99490 CHRNC CARE MGMT STAFF 1ST 20: CPT | Mod: PBBFAC | Performed by: INTERNAL MEDICINE

## 2018-08-31 RX ORDER — TRIAMTERENE AND HYDROCHLOROTHIAZIDE 37.5; 25 MG/1; MG/1
CAPSULE ORAL
Qty: 90 CAPSULE | Refills: 0 | Status: SHIPPED | OUTPATIENT
Start: 2018-08-31 | End: 2018-10-11

## 2018-08-31 RX ORDER — TRAZODONE HYDROCHLORIDE 50 MG/1
TABLET ORAL
Qty: 30 TABLET | Refills: 0 | Status: SHIPPED | OUTPATIENT
Start: 2018-08-31 | End: 2018-08-31 | Stop reason: SDUPTHER

## 2018-08-31 RX ORDER — LEVOTHYROXINE SODIUM 50 UG/1
TABLET ORAL
Qty: 100 TABLET | Refills: 0 | Status: SHIPPED | OUTPATIENT
Start: 2018-08-31 | End: 2018-10-11

## 2018-09-04 RX ORDER — TRAZODONE HYDROCHLORIDE 50 MG/1
TABLET ORAL
Qty: 90 TABLET | Refills: 0 | Status: SHIPPED | OUTPATIENT
Start: 2018-09-04 | End: 2018-10-11

## 2018-09-06 ENCOUNTER — TELEPHONE (OUTPATIENT)
Dept: ADMINISTRATIVE | Facility: HOSPITAL | Age: 83
End: 2018-09-06

## 2018-09-06 NOTE — TELEPHONE ENCOUNTER
"Pt has an appt scheduled for 10/9/18. I contacted pt to advise that PCP would need to see him to evaluate symptoms. I asked him if he would like to be seen sooner and he declined due to issues with his transportation. Pt says pain is in "lower belly" and is an aching pain, usually occurs after he eats, lasts for several hours but will go away. Pt says he has "difficulty expelling gas" and taking Beano gas pills helps a little. Pt has suprapubic cath and wonders if it cause of pain.   "

## 2018-09-06 NOTE — TELEPHONE ENCOUNTER
Hi, I have not seen him in one year, I would need to see him to better evaluate these symptoms and his health overall.  Thank you, Obed Mcguire

## 2018-09-06 NOTE — TELEPHONE ENCOUNTER
"Please see message below from [facility] nurse and advise. Thanks.     Spoke with pt. Pt. having severe gas along with stomach pain. Pt. taking Myralax but not daily as instructed. Pt. request call from Dr. Mcguire' office.     Thanks,     Sukhdev [place] LPN (Bobby) CC   [email or web address]   "

## 2018-09-18 ENCOUNTER — PATIENT MESSAGE (OUTPATIENT)
Dept: INTERNAL MEDICINE | Facility: CLINIC | Age: 83
End: 2018-09-18

## 2018-09-18 RX ORDER — AMLODIPINE BESYLATE 10 MG/1
TABLET ORAL
Qty: 90 TABLET | Refills: 0 | Status: SHIPPED | OUTPATIENT
Start: 2018-09-18 | End: 2018-10-11

## 2018-09-18 RX ORDER — AMLODIPINE BESYLATE 10 MG/1
10 TABLET ORAL DAILY
Qty: 30 TABLET | Refills: 0 | Status: SHIPPED | OUTPATIENT
Start: 2018-09-18 | End: 2018-09-18 | Stop reason: SDUPTHER

## 2018-09-21 ENCOUNTER — PATIENT MESSAGE (OUTPATIENT)
Dept: UROLOGY | Facility: CLINIC | Age: 83
End: 2018-09-21

## 2018-09-30 ENCOUNTER — EXTERNAL CHRONIC CARE MANAGEMENT (OUTPATIENT)
Dept: PRIMARY CARE CLINIC | Facility: CLINIC | Age: 83
End: 2018-09-30
Payer: MEDICARE

## 2018-09-30 PROCEDURE — 99490 CHRNC CARE MGMT STAFF 1ST 20: CPT | Mod: S$PBB,,, | Performed by: INTERNAL MEDICINE

## 2018-09-30 PROCEDURE — 99490 CHRNC CARE MGMT STAFF 1ST 20: CPT | Mod: PBBFAC | Performed by: INTERNAL MEDICINE

## 2018-10-04 ENCOUNTER — TELEPHONE (OUTPATIENT)
Dept: UROLOGY | Facility: CLINIC | Age: 83
End: 2018-10-04

## 2018-10-04 DIAGNOSIS — N32.89 BLADDER SPASMS: Primary | ICD-10-CM

## 2018-10-04 DIAGNOSIS — Z93.59 CHRONIC SUPRAPUBIC CATHETER: ICD-10-CM

## 2018-10-04 NOTE — TELEPHONE ENCOUNTER
----- Message from Debbi Fink sent at 10/4/2018  3:37 PM CDT -----  Contact: pt: [phone]  Rx Refill/Request     Is this a Refill or New Rx:  Refill  Rx Name and Strength:  hyoscyamine (LEVSIN/SL) 0.125 mg Subl       Preferred Pharmacy with phone number:[facility] [address] [place] [address] [address] [phone] (Phone)  [phone] (Fax)         Communication Preference: [phone]    Additional Information: having painful bladder spasms

## 2018-10-08 RX ORDER — HYOSCYAMINE SULFATE 0.12 MG/1
TABLET SUBLINGUAL
Qty: 30 TABLET | Refills: 0 | Status: SHIPPED | OUTPATIENT
Start: 2018-10-08 | End: 2019-02-27 | Stop reason: SDUPTHER

## 2018-10-09 ENCOUNTER — LAB VISIT (OUTPATIENT)
Dept: LAB | Facility: HOSPITAL | Age: 83
End: 2018-10-09
Attending: INTERNAL MEDICINE
Payer: MEDICARE

## 2018-10-09 ENCOUNTER — TELEPHONE (OUTPATIENT)
Dept: INTERNAL MEDICINE | Facility: CLINIC | Age: 83
End: 2018-10-09

## 2018-10-09 ENCOUNTER — OFFICE VISIT (OUTPATIENT)
Dept: UROLOGY | Facility: CLINIC | Age: 83
End: 2018-10-09
Payer: MEDICARE

## 2018-10-09 ENCOUNTER — OFFICE VISIT (OUTPATIENT)
Dept: INTERNAL MEDICINE | Facility: CLINIC | Age: 83
End: 2018-10-09
Payer: MEDICARE

## 2018-10-09 ENCOUNTER — IMMUNIZATION (OUTPATIENT)
Dept: INTERNAL MEDICINE | Facility: CLINIC | Age: 83
End: 2018-10-09
Payer: MEDICARE

## 2018-10-09 VITALS
OXYGEN SATURATION: 96 % | DIASTOLIC BLOOD PRESSURE: 64 MMHG | SYSTOLIC BLOOD PRESSURE: 124 MMHG | HEIGHT: 68 IN | HEART RATE: 83 BPM | BODY MASS INDEX: 30.26 KG/M2

## 2018-10-09 VITALS
BODY MASS INDEX: 30.16 KG/M2 | HEART RATE: 64 BPM | DIASTOLIC BLOOD PRESSURE: 56 MMHG | HEIGHT: 68 IN | WEIGHT: 199 LBS | SYSTOLIC BLOOD PRESSURE: 171 MMHG

## 2018-10-09 DIAGNOSIS — G95.9 LUMBAR MYELOPATHY: Primary | ICD-10-CM

## 2018-10-09 DIAGNOSIS — Z93.59 CHRONIC SUPRAPUBIC CATHETER: ICD-10-CM

## 2018-10-09 DIAGNOSIS — E78.5 DYSLIPIDEMIA: Chronic | ICD-10-CM

## 2018-10-09 DIAGNOSIS — E03.9 HYPOTHYROIDISM, UNSPECIFIED TYPE: Chronic | ICD-10-CM

## 2018-10-09 DIAGNOSIS — L92.9 GRANULATION TISSUE: ICD-10-CM

## 2018-10-09 DIAGNOSIS — N31.9 NEUROGENIC BLADDER: ICD-10-CM

## 2018-10-09 DIAGNOSIS — M17.0 PRIMARY OSTEOARTHRITIS OF BOTH KNEES: ICD-10-CM

## 2018-10-09 DIAGNOSIS — M43.6 STIFFNESS OF NECK: ICD-10-CM

## 2018-10-09 DIAGNOSIS — Z43.5 ENCOUNTER FOR CARE OR REPLACEMENT OF SUPRAPUBIC TUBE: ICD-10-CM

## 2018-10-09 DIAGNOSIS — Z74.09 IMPAIRED MOBILITY AND ADLS: ICD-10-CM

## 2018-10-09 DIAGNOSIS — Z78.9 IMPAIRED MOBILITY AND ADLS: ICD-10-CM

## 2018-10-09 LAB
CHOLEST SERPL-MCNC: 222 MG/DL
CHOLEST/HDLC SERPL: 5 {RATIO}
HDLC SERPL-MCNC: 44 MG/DL
HDLC SERPL: 19.8 %
LDLC SERPL CALC-MCNC: 146.6 MG/DL
NONHDLC SERPL-MCNC: 178 MG/DL
TRIGL SERPL-MCNC: 157 MG/DL
TSH SERPL DL<=0.005 MIU/L-ACNC: 1.83 UIU/ML

## 2018-10-09 PROCEDURE — 99214 OFFICE O/P EST MOD 30 MIN: CPT | Mod: PBBFAC,27 | Performed by: INTERNAL MEDICINE

## 2018-10-09 PROCEDURE — 51705 CHANGE OF BLADDER TUBE: CPT | Mod: S$PBB,,, | Performed by: NURSE PRACTITIONER

## 2018-10-09 PROCEDURE — 99214 OFFICE O/P EST MOD 30 MIN: CPT | Mod: S$PBB,25,, | Performed by: NURSE PRACTITIONER

## 2018-10-09 PROCEDURE — 90732 PPSV23 VACC 2 YRS+ SUBQ/IM: CPT | Mod: PBBFAC

## 2018-10-09 PROCEDURE — 99999 PR PBB SHADOW E&M-EST. PATIENT-LVL IV: CPT | Mod: PBBFAC,,, | Performed by: INTERNAL MEDICINE

## 2018-10-09 PROCEDURE — 80061 LIPID PANEL: CPT

## 2018-10-09 PROCEDURE — 99214 OFFICE O/P EST MOD 30 MIN: CPT | Mod: S$PBB,,, | Performed by: INTERNAL MEDICINE

## 2018-10-09 PROCEDURE — 99213 OFFICE O/P EST LOW 20 MIN: CPT | Mod: PBBFAC,25 | Performed by: NURSE PRACTITIONER

## 2018-10-09 PROCEDURE — 99999 PR PBB SHADOW E&M-EST. PATIENT-LVL III: CPT | Mod: PBBFAC,,, | Performed by: NURSE PRACTITIONER

## 2018-10-09 PROCEDURE — 51705 CHANGE OF BLADDER TUBE: CPT | Mod: PBBFAC | Performed by: NURSE PRACTITIONER

## 2018-10-09 PROCEDURE — 36415 COLL VENOUS BLD VENIPUNCTURE: CPT

## 2018-10-09 PROCEDURE — 84443 ASSAY THYROID STIM HORMONE: CPT

## 2018-10-09 PROCEDURE — 17250 CHEM CAUT OF GRANLTJ TISSUE: CPT | Mod: S$PBB,51,, | Performed by: NURSE PRACTITIONER

## 2018-10-09 PROCEDURE — 90662 IIV NO PRSV INCREASED AG IM: CPT | Mod: PBBFAC

## 2018-10-09 PROCEDURE — 17250 CHEM CAUT OF GRANLTJ TISSUE: CPT | Mod: PBBFAC | Performed by: NURSE PRACTITIONER

## 2018-10-09 RX ORDER — OXYCODONE AND ACETAMINOPHEN 5; 325 MG/1; MG/1
1 TABLET ORAL EVERY 12 HOURS PRN
Qty: 30 TABLET | Refills: 0 | Status: ON HOLD | OUTPATIENT
Start: 2018-10-09 | End: 2018-12-06

## 2018-10-09 NOTE — MEDICAL/APP STUDENT
Subjective:       Patient ID: Chucho Prado is a 85 y.o. male.    Chief Complaint: Follow-up (gabapentin and oxycodone refill, has been having trouble with gas not sure if it is from catherter )    Pt is here for check up.  He is overall in good health.    Main complaint today is of increased gas.  Gas began 2 years ago after his back surgery.  He has tried beano, gas-x, as well as a probiotic which did not help.  He states he doesn't eat much dairy and he has mostly cut out beans from his diet trying to prevent the gas.  He does not associate the gas with any specific foods.  States he feels bloated but denies cramping or pain.  Denies change in bowels habits or constipation.      HTN managed with amlodipine and HCTZ.  He measures his pressure at home and gets normal values.    HLD managed with simvastatin.    Hypothyroidism managed with synthroid.  He denies any symptoms.    Hx of a spine fx and surgery which he still has occasional pain from.  Takes percocet occasionally for the pain.    Has stiffness in his neck and deformity.  Had home PT previously which helped but he ran out of sessions and would like to see if he is eligible for any more.    Would like flu vaccine and pneumococcal vaccine today.      Review of Systems   Constitutional: Negative for chills and fever.   HENT: Negative.    Eyes: Positive for discharge. Negative for pain and visual disturbance.        Clear discharge from right eye. Worse in mornings when first waking up.   Respiratory: Negative for shortness of breath.    Cardiovascular: Negative.  Negative for chest pain, palpitations and leg swelling.   Gastrointestinal: Negative for abdominal pain, constipation, diarrhea, nausea and vomiting.   Endocrine: Negative.    Genitourinary:        Suprapubic catheter   Musculoskeletal: Positive for back pain and neck stiffness.   Skin: Negative.    Neurological: Negative.  Negative for dizziness, weakness, light-headedness, numbness and  "headaches.   Psychiatric/Behavioral: Negative.        Current Outpatient Medications:     amLODIPine (NORVASC) 10 MG tablet, TAKE 1 TABLET(10 MG) BY MOUTH EVERY DAY, Disp: 90 tablet, Rfl: 0    aspirin (ECOTRIN) 81 MG EC tablet, Take 81 mg by mouth once daily., Disp: , Rfl:     docusate sodium (COLACE) 100 MG capsule, Take 1 capsule (100 mg total) by mouth 2 (two) times daily as needed for Constipation. (Patient taking differently: Take 100 mg by mouth as needed. ), Disp: 60 capsule, Rfl: 0    hyoscyamine (LEVSIN/SL) 0.125 mg Subl, DISSOLVE 1 TABLET UNDER THE TONGUE EVERY 4 HOURS AS NEEDED, Disp: 540 tablet, Rfl: 1    hyoscyamine (LEVSIN/SL) 0.125 mg Subl, DISSOLVE 1 TABLET UNDER THE TONGUE EVERY 4 HOURS AS NEEDED, Disp: 30 tablet, Rfl: 0    levothyroxine (SYNTHROID) 50 MCG tablet, GIVE "PENNY" 1 TABLET BY MOUTH ONCE DAILY., Disp: 100 tablet, Rfl: 0    ondansetron (ZOFRAN-ODT) 8 MG TbDL, Take 1 tablet (8 mg total) by mouth every 12 (twelve) hours as needed., Disp: 14 tablet, Rfl: 0    oxyCODONE-acetaminophen (PERCOCET) 5-325 mg per tablet, Take 1 tablet by mouth every 12 (twelve) hours as needed., Disp: 30 tablet, Rfl: 0    senna-docusate 8.6-50 mg (PERICOLACE) 8.6-50 mg per tablet, Take 1 tablet by mouth 2 (two) times daily., Disp: , Rfl:     simvastatin (ZOCOR) 40 MG tablet, TAKE 1 TABLET BY MOUTH EVERY EVENING, Disp: 90 tablet, Rfl: 0    traZODone (DESYREL) 50 MG tablet, TAKE 1 TABLET BY MOUTH EVERY NIGHT AT BEDTIME, Disp: 90 tablet, Rfl: 0    triamterene-hydrochlorothiazide 37.5-25 mg (DYAZIDE) 37.5-25 mg per capsule, TAKE 1 CAPSULE BY MOUTH EVERY DAY IN THE MORNING, Disp: 90 capsule, Rfl: 0  Past Medical History:   Diagnosis Date    Arthritis     Blood transfusion     before 2005 - whe had gangrenous gall bladder    Cataract     CKD (chronic kidney disease) stage 3, GFR 30-59 ml/min     Compression fracture of lumbar vertebra     Depression     Dyslipidemia     General anesthetics causing " adverse effect in therapeutic use     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    GERD (gastroesophageal reflux disease)     Hypertension     Thyroid disease     UTI (urinary tract infection)      Past Surgical History:   Procedure Laterality Date    BLOCK-NERVE Left 10/26/2017    Performed by [place] Surgeon at [facility] ASIA    CHOLECYSTECTOMY      CYSTOSCOPY WITH RETROGRADE PYELOGRAM Bilateral 1/27/2017    Performed by Chaitanya Taylor Jr., MD at [facility] OR 1ST FLR    EGD (ESOPHAGOGASTRODUODENOSCOPY) N/A 8/2/2013    Performed by Nagi Talbert MD at [facility] ENDO (2ND FLR)    EYE SURGERY Right     IOL    HERNIA REPAIR      INSERTION-CATHETER-SUPRAPUBIC N/A 6/23/2017    Performed by Chaitanya Taylor Jr., MD at [facility] OR 2ND FLR    INSERTION-INTRAOCULAR LENS (IOL) Left 5/28/2018    Performed by Trinity V[facility]quez MD at [facility] OR    INSERTION-INTRAOCULAR LENS (IOL) Right 2/19/2018    Performed by Trinity Vazquez MD at [facility] OR    INTRAOCULAR PROSTHESES INSERTION Left 5/28/2018    Procedure: INSERTION-INTRAOCULAR LENS (IOL);  Surgeon: Trinity Vazquez MD;  Location: [facility];  Service: Ophthalmology;  Laterality: Left;    JOINT REPLACEMENT      KYPHOPLASTY L3 N/A 12/27/2016    Performed by Donavan Martinez MD at [facility] OR 2ND FLR    LAMINECTOMY  12/27/2016    L2-L4    LAMINECTOMY-MINIMALLY INVASIVE L2,3 and L3,4; globus, neuromonitoring Right 12/27/2016    Performed by Donavan Martinez MD at [facility] OR 2ND FLR    LUMBAR LAMINECTOMY  12/2016    PARATHYROIDECTOMY      PHACOEMULSIFICATION OF CATARACT Left 5/28/2018    Procedure: PHACOEMULSIFICATION-ASPIRATION-CATARACT;  Surgeon: Trinity Vazquez MD;  Location: [facility];  Service: Ophthalmology;  Laterality: Left;    PHACOEMULSIFICATION-ASPIRATION-CATARACT Left 5/28/2018    Performed by Trinity Vazquez MD at [facility] OR    PHACOEMULSIFICATION-ASPIRATION-CATARACT Right 2/19/2018    Performed by Trinity Vazquez MD at [facility] OR    REPAIR OF INCARCERATED VENTRAL HERNIA WITHOUT HISTORY OF  PRIOR REPAIR N/A 6/20/2018    Procedure: REPAIR, HERNIA, VENTRAL, INCARCERATED, WITHOUT HISTORY OF PRIOR REPAIR;  Surgeon: Guilherme Ordoñez MD;  Location: [facility] OR [address] [place] [address];  Service: General;  Laterality: N/A;    REPAIR, HERNIA, INGUINAL, WITHOUT HISTORY OF PRIOR REPAIR, AGE 5 YEARS OR OLDER Right 10/9/2013    Performed by Daron Arthur MD at [facility] OR [facility] FLR    REPAIR, HERNIA, VENTRAL, INCARCERATED, WITHOUT HISTORY OF PRIOR REPAIR N/A 6/20/2018    Performed by Guilherme Ordoñez MD at [facility] OR [facility] FLR    REPLACEMENT-KNEE-TOTAL Left 10/25/2017    [facility]formed [facility]    TOTAL KNEE ARTHROPLASTY Bilateral     TOTAL KNEE ARTHROPLASTY Left 10/25/2017    TKR       Objective:       Vitals:    10/09/18 1508   BP: 124/64   Pulse: 83       Physical Exam   Constitutional: He is oriented to person, place, and time. He appears well-developed and well-nourished.   HENT:   Head: Normocephalic and atraumatic.   Neck: Neck rigidity present. Decreased range of motion present. No thyromegaly present.   Head permanently tilted to the right.  Unable to touch left ear to left shoulder   Cardiovascular: Normal rate, regular rhythm and normal heart sounds.   No murmur heard.  Pulses:       Radial pulses are 2+ on the right side, and 2+ on the left side.        Dorsalis pedis pulses are 0 on the right side, and 0 on the left side.        Posterior tibial pulses are 0 on the right side, and 0 on the left side.   Pulmonary/Chest: Effort normal and breath sounds normal. He has no wheezes.   Abdominal: Soft. Bowel sounds are normal. He exhibits no distension and no mass. There is tenderness. There is no guarding.   Mildly TTP, diffusely   Genitourinary:   Genitourinary Comments: Suprapubic catheter   Musculoskeletal:   Using compression stockings. Edema in toes distal to the stockings   Lymphadenopathy:     He has no cervical adenopathy.   Neurological: He is alert and oriented to person, place,  and time.   Skin: Skin is warm and dry.   Psychiatric: He has a normal mood and affect. His behavior is normal.       Assessment:       1. Lumbar myelopathy    2. Primary osteoarthritis of both knees    3. Hypothyroidism, unspecified type    4. Dyslipidemia      5. Increased Gas  6. Neck stiffness  Plan:       Lumbar myopathy  - S/p of kyphoplasty at L3  - Percocet 5-325mg PRN for chronic back pain    OA of both knees  - S/p bilat replacement    Hypothyroidism  - Check TSH  - Continue synthroid 50mcg daily    Dyslipidemia  - Check lipids (non-fasting)  - Continue simvastatin 40mg daily    Increased Gas  - OTC anti-gas medications as needed    Neck stiffness  - Referral to home PT  - Continue exercises from last PT sessions on own    Health maintenance  - Give flu vaccine  - Give pneumococcal PPSV23 vaccine  - Shingles vaccine at next visit    F/u in 6 months unless pt needs to be seen sooner

## 2018-10-09 NOTE — PROGRESS NOTES
Subjective:       Patient ID: Chucho Prado is a 85 y.o. male.    Chief Complaint: Neurogenic Bladder (Chronic SPT)    Chucho Prado is a 85 y.o. male with neurogenic bladder.  He had 18fr SPT placed by Dr. Taylor on 06/23/2017 to manage his Neurogenic Bladder.    He is here today for exchange.  His last exchange 08/24/2018.     He reports good urine flow.  Occasional bladder spasms;  SPT draining well;     Going to [employer] for b'day dinner; had great time  10/25/2017 (R) TKR with Dr. [facility].    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            Past Medical History:  No date: Arthritis  No date: Blood transfusion  No date: CKD (chronic kidney disease) stage 3, GFR 30-5*  No date: Compression fracture of lumbar vertebra  No date: Depression  No date: Dyslipidemia  No date: GERD (gastroesophageal reflux disease)  No date: Hypertension  No date: Thyroid disease  No date: UTI (urinary tract infection)    Past Surgical History:  No date: CHOLECYSTECTOMY  No date: HERNIA REPAIR  No date: JOINT REPLACEMENT  12/27/2016: LAMINECTOMY      Comment: L2-L4  12/2016: LUMBAR LAMINECTOMY  No date: PARATHYROIDECTOMY  No date: TOTAL KNEE ARTHROPLASTY      Comment: Right    Review of patient's family history indicates:    Hypertension                   Mother                    Diabetes                       Father                    Esophageal cancer              Father                      Social History    Marital status: Single              Spouse name:                       Years of education:                 Number of children:               Occupational History    None on file    Social History Main Topics    Smoking status: Former Smoker                                                                Packs/day: 0.00      Years: 20.00       Smokeless tobacco: Never Used                        Comment: quit 1999    Alcohol use: Yes                Comment: rarely/6  months    Drug use: No              Sexual activity: No                   Other Topics            Concern    None on file    Social History Narrative    Lives alone, owns house and rents part.  helps. 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         Allergies:  Review of patient's allergies indicates no known allergies.    Medications:  Current Outpatient Prescriptions:   acetaminophen (TYLENOL) 500 MG tablet, Take 2 tablets (1,000 mg total) by mouth 3 (three) times daily as needed for Pain., Disp: 30 tablet, Rfl: 0  aspirin 81 MG Chew, Take 1 tablet (81 mg total) by mouth once daily. HOLD UNTIL TOLD TO RESUME BY PHYSICIAN, Disp: 30 tablet, Rfl: 0  calcitonin, salmon, (FORTICAL) 200 unit/actuation nasal spray, 1 spray by Nasal route once daily., Disp: 1 Bottle, Rfl: 0  diclofenac sodium (VOLTAREN) 1 % Gel, Apply 2 g topically 3 (three) times daily. To left knee, Disp: 100 g, Rfl: 3  gabapentin (NEURONTIN) 400 MG capsule, Take 2 capsules (800 mg total) by mouth 3 (three) times daily., Disp: 180 capsule, Rfl: 11  hyoscyamine (LEVSIN/SL) 0.125 mg Subl, Place 1 tablet (0.125 mg total) under the tongue every 4 (four) hours as needed (bladder spasms)., Disp: 30 tablet, Rfl: 1  levothyroxine (SYNTHROID) 50 MCG tablet, Take 1 tablet (50 mcg total) by mouth once daily., Disp: 100 tablet, Rfl: 11  mirtazapine (REMERON) 30 MG tablet, Take 1 tablet (30 mg total) by mouth every evening., Disp: 30 tablet, Rfl: 6  polyethylene glycol (GLYCOLAX) 17 gram PwPk, Take 17 g by mouth once daily., Disp: 30 packet, Rfl: 0  simethicone (MYLICON) 80 MG chewable tablet, Take 1 tablet (80 mg total) by mouth 3 (three) times daily after meals., Disp: , Rfl: 0  simvastatin (ZOCOR) 40 MG tablet, Take 1 tablet (40 mg total) by mouth every evening., Disp: 90 tablet, Rfl: 11  tamsulosin (FLOMAX) 0.4 mg Cp24, Take 1 capsule (0.4 mg total) by mouth once daily., Disp: 30 capsule, Rfl: 3  triamterene-hydrochlorothiazide 37.5-25 mg (DYAZIDE)  37.5-25 mg per capsule, Take 1 capsule by mouth every morning. HOLD UNTIL TOLD TO RESUME BY PHYSICIAN, Disp: 90 capsule, Rfl: 3                  Review of Systems   Constitutional: Negative for activity change, appetite change, chills and fever.   HENT: Negative for facial swelling and trouble swallowing.    Eyes: Negative for visual disturbance.   Respiratory: Negative for chest tightness and shortness of breath.    Cardiovascular: Negative for chest pain and palpitations.   Gastrointestinal: Negative.  Negative for abdominal pain, constipation, diarrhea, nausea and vomiting.   Genitourinary: Positive for difficulty urinating. Negative for dysuria, flank pain, hematuria, penile pain, penile swelling, scrotal swelling and testicular pain.        SPT draining well     Musculoskeletal: Positive for arthralgias and gait problem. Negative for back pain, myalgias and neck stiffness.        Walking much better;     Skin: Negative for rash.   Neurological: Negative for dizziness and speech difficulty.   Hematological: Does not bruise/bleed easily.   Psychiatric/Behavioral: Negative for behavioral problems.       Objective:      Physical Exam   Nursing note and vitals reviewed.  Constitutional: He is oriented to person, place, and time. Vital signs are normal. He appears well-developed and well-nourished. He is cooperative.  Non-toxic appearance. He does not have a sickly appearance.   HENT:   Head: Normocephalic and atraumatic.   Right Ear: External ear normal.   Left Ear: External ear normal.   Nose: Nose normal.   Mouth/Throat: Mucous membranes are normal.   Eyes: Conjunctivae and lids are normal. No scleral icterus.   Neck: Trachea normal, normal range of motion and full passive range of motion without pain. Neck supple. No JVD present. No tracheal deviation present.   Cardiovascular: Normal rate, S1 normal and S2 normal.    Pulmonary/Chest: Effort normal. No respiratory distress. He exhibits no tenderness.   Abdominal:  Soft. Normal appearance and bowel sounds are normal. There is no hepatosplenomegaly. There is no tenderness. There is no CVA tenderness.       Genitourinary: Testes normal and penis normal.   Musculoskeletal: Normal range of motion.   Neurological: He is alert and oriented to person, place, and time. He has normal strength.   Skin: Skin is warm, dry and intact.     Psychiatric: He has a normal mood and affect. His behavior is normal. Judgment and thought content normal.       Assessment:       1. Neurogenic bladder    2. Encounter for care or replacement of suprapubic tube    3. Chronic suprapubic catheter    4. Granulation tissue        Plan:         I spent 25 minutes with the patient of which more than half was spent in direct consultation with the patient in regards to our treatment and plan.    Education and recommendations of today's plan of care including home remedies.  I removed his old SPT.  I then placed a new 18fr SPT without difficulty using sterile procedure.  Irrigated to verify the position.  Silver nitrite sticks x 2 to the granulation tissue.  Skin barrier cream applied to surrounding tissue.  Dressing applied.  RTC one month

## 2018-10-09 NOTE — PROGRESS NOTES
Subjective:       Patient ID: Chucho Prado is a 85 y.o. male.    Chief Complaint: Follow-up (gabapentin and oxycodone refill, has been having trouble with gas not sure if it is from catherter )    Patient is here for followup for chronic conditions.    Some increased gas for a few yrs. NR beano and probiotic and NR to cutting out beans and gassy foods. Gas from below. Denies constipation at this time and prev mirilax made gas worse.    Follow-up (gabapentin and oxycodone refill, has been having trouble with gas not sure if it is from catherter )    Chronic neck pains, would like more PT at home.  Hx of a spine fx and surgery which he still has occasional pain from.  Takes percocet occasionally for the pain.  Has stiffness in his neck and deformity.  Had home PT previously which helped but he ran out of sessions and would like to see if he is eligible for any more.     Would like flu vaccine and pneumococcal vaccine today.             Review of Systems   Constitutional: Negative for activity change, appetite change, fatigue, fever and unexpected weight change.   Respiratory: Negative for cough, chest tightness, shortness of breath and wheezing.    Cardiovascular: Negative for chest pain, palpitations and leg swelling.   Gastrointestinal: Negative for abdominal distention, abdominal pain, nausea and vomiting.   Genitourinary: Positive for difficulty urinating. Negative for decreased urine volume, dysuria and hematuria.        Chronic SPT use   Musculoskeletal: Positive for arthralgias, back pain (only when he is helped to lay down) and neck stiffness.   Skin: Negative for rash and wound.   Neurological: Negative for tremors, syncope and weakness.   Psychiatric/Behavioral: Negative for confusion.       Objective:      Physical Exam   Constitutional: He is oriented to person, place, and time. He appears well-developed and well-nourished. No distress.   Wheelchair bound, unable to rise to exam table   HENT:   Head:  Normocephalic and atraumatic.   Mouth/Throat: No oropharyngeal exudate.   Eyes: EOM are normal. Pupils are equal, round, and reactive to light. No scleral icterus.   Neck: Normal range of motion. Neck supple. No thyromegaly present.   Head tilted to the R, decreased rom   Cardiovascular: Normal rate and regular rhythm. Exam reveals no gallop and no friction rub.   Murmur heard.  Pulmonary/Chest: Effort normal and breath sounds normal. No respiratory distress. He has no wheezes. He has no rales. He exhibits no tenderness.   Abdominal: Soft. Bowel sounds are normal. He exhibits no distension and no mass. There is no tenderness. There is no rebound and no guarding.   No longer abd wall hernia, sug scars CDI   Genitourinary:   Genitourinary Comments: Has SPT in place   Musculoskeletal: He exhibits edema.   R knee TKA is not warm or swollen.    L knee decreased rom and significant crepitus    Mild edema --  Using compression stockings. Edema in toes distal to the stockings    Lymphadenopathy:     He has no cervical adenopathy.   Neurological: He is alert and oriented to person, place, and time.   Skin: No rash noted. He is not diaphoretic.   Psychiatric: He has a normal mood and affect. His behavior is normal.       Assessment:       1. Lumbar myelopathy    2. Primary osteoarthritis of both knees    3. Hypothyroidism, unspecified type    4. Dyslipidemia    5. Stiffness of neck    6. Impaired mobility and ADLs        Plan:       Chucho was seen today for follow-up.    Diagnoses and all orders for this visit:    Lumbar myelopathy  -     oxyCODONE-acetaminophen (PERCOCET) 5-325 mg per tablet; Take 1 tablet by mouth every 12 (twelve) hours as needed.  Uses occ  -     Ambulatory referral to Home Health    Primary osteoarthritis of both knees  -     oxyCODONE-acetaminophen (PERCOCET) 5-325 mg per tablet; Take 1 tablet by mouth every 12 (twelve) hours as needed.    Hypothyroidism, unspecified type  -     TSH;  Future    Dyslipidemia  -     Lipid panel; Future    Stiffness of neck  -     Ambulatory referral to Home Health  Impaired mobility and ADLs  -     Ambulatory referral to Home Health  Need home PT since too taxing to go to PT    Increased gas -- I do not have a specific clear cause, continue to monitor symptoms.    Health Maintenance       Date Due Completion Date    TETANUS VACCINE 09/18/1951 ---    Zoster Vaccine 09/18/1993 ---    Lipid Panel 10/09/2019 10/9/2018        Patient Instructions   Flu and pneumovax 23 please  shingles next time      Follow-up in about 6 months (around 4/9/2019).    Future Appointments   Date Time Provider Department Center   11/6/2018  1:40 PM Neelam Hooper NP [facility]

## 2018-10-10 ENCOUNTER — TELEPHONE (OUTPATIENT)
Dept: INTERNAL MEDICINE | Facility: CLINIC | Age: 83
End: 2018-10-10

## 2018-10-10 NOTE — TELEPHONE ENCOUNTER
Message left for [facility] mally to call office to confirm that it is okay to start home health on 10/15/18

## 2018-10-10 NOTE — TELEPHONE ENCOUNTER
----- Message from Cee Haque sent at 10/10/2018  2:03 PM CDT -----  Contact: [facility] (cell) [phone]  [facility] in regards to the pt's orders to start home healthcare on tomorrow 10/11/18. She states that she was told by the pt that he would be out of town until Monday and she wanted to know if the orders could be put in to start the home healthcare on Monday 10/15/18. Please call back and advise.        Thanks

## 2018-10-11 DIAGNOSIS — E78.5 DYSLIPIDEMIA: Chronic | ICD-10-CM

## 2018-10-11 DIAGNOSIS — E03.9 HYPOTHYROIDISM, UNSPECIFIED TYPE: Chronic | ICD-10-CM

## 2018-10-11 RX ORDER — AMLODIPINE BESYLATE 10 MG/1
TABLET ORAL
Qty: 90 TABLET | Refills: 11 | Status: SHIPPED | OUTPATIENT
Start: 2018-10-11 | End: 2019-01-03

## 2018-10-11 RX ORDER — TRAZODONE HYDROCHLORIDE 50 MG/1
50 TABLET ORAL NIGHTLY
Qty: 90 TABLET | Refills: 11 | Status: SHIPPED | OUTPATIENT
Start: 2018-10-11 | End: 2019-04-04

## 2018-10-11 RX ORDER — TRIAMTERENE AND HYDROCHLOROTHIAZIDE 37.5; 25 MG/1; MG/1
CAPSULE ORAL
Qty: 90 CAPSULE | Refills: 11 | Status: ON HOLD | OUTPATIENT
Start: 2018-10-11 | End: 2018-12-08 | Stop reason: HOSPADM

## 2018-10-11 RX ORDER — LEVOTHYROXINE SODIUM 50 UG/1
TABLET ORAL
Qty: 90 TABLET | Refills: 11 | Status: SHIPPED | OUTPATIENT
Start: 2018-10-11 | End: 2019-04-04

## 2018-10-11 RX ORDER — SIMVASTATIN 40 MG/1
40 TABLET, FILM COATED ORAL NIGHTLY
Qty: 90 TABLET | Refills: 11 | Status: SHIPPED | OUTPATIENT
Start: 2018-10-11 | End: 2018-10-16

## 2018-10-16 ENCOUNTER — TELEPHONE (OUTPATIENT)
Dept: INTERNAL MEDICINE | Facility: CLINIC | Age: 83
End: 2018-10-16

## 2018-10-16 DIAGNOSIS — E78.5 DYSLIPIDEMIA: Primary | Chronic | ICD-10-CM

## 2018-10-16 RX ORDER — ATORVASTATIN CALCIUM 20 MG/1
20 TABLET, FILM COATED ORAL DAILY
Qty: 30 TABLET | Refills: 11
Start: 2018-10-16 | End: 2018-10-16

## 2018-10-16 RX ORDER — ATORVASTATIN CALCIUM 20 MG/1
20 TABLET, FILM COATED ORAL DAILY
Qty: 30 TABLET | Refills: 11 | Status: SHIPPED | OUTPATIENT
Start: 2018-10-16 | End: 2019-04-04

## 2018-10-16 RX ORDER — ATORVASTATIN CALCIUM 20 MG/1
20 TABLET, FILM COATED ORAL DAILY
Qty: 30 TABLET | Refills: 11 | Status: SHIPPED | OUTPATIENT
Start: 2018-10-16 | End: 2018-10-16 | Stop reason: SDUPTHER

## 2018-10-16 NOTE — TELEPHONE ENCOUNTER
Hi, last week I sent a letter to him that his cholesterol was high and I advised that I sent in a simvastatin cholesterol med refill.  I just recall that Simvastatin does not mix well with Amlodipine. So I recommend he take Atorvastatin cholesterol medicine instead of simvastatin, please call the patient and let him know and confirm with him which pharmacy he uses.  Thank you, Obed Mcguire

## 2018-10-29 ENCOUNTER — TELEPHONE (OUTPATIENT)
Dept: INTERNAL MEDICINE | Facility: CLINIC | Age: 83
End: 2018-10-29

## 2018-10-29 DIAGNOSIS — K21.9 GASTROESOPHAGEAL REFLUX DISEASE, ESOPHAGITIS PRESENCE NOT SPECIFIED: ICD-10-CM

## 2018-10-29 DIAGNOSIS — K21.9 GASTROESOPHAGEAL REFLUX DISEASE, ESOPHAGITIS PRESENCE NOT SPECIFIED: Primary | ICD-10-CM

## 2018-10-29 RX ORDER — OMEPRAZOLE 40 MG/1
CAPSULE, DELAYED RELEASE ORAL
Qty: 90 CAPSULE | Refills: 1 | OUTPATIENT
Start: 2018-10-29

## 2018-10-29 RX ORDER — OMEPRAZOLE 40 MG/1
40 CAPSULE, DELAYED RELEASE ORAL EVERY MORNING
Qty: 30 CAPSULE | Refills: 1 | Status: SHIPPED | OUTPATIENT
Start: 2018-10-29 | End: 2019-02-10 | Stop reason: SDUPTHER

## 2018-10-29 NOTE — TELEPHONE ENCOUNTER
Hi, please call patient and let him know that I sent in Omeprazole to [facility] for acid reflux, I want to hear from him if the symptoms do not improve.  Let me know if patient has any questions.  Thank you, Obed Mcguire

## 2018-10-29 NOTE — TELEPHONE ENCOUNTER
----- Message from Nereida Lino MA sent at 10/29/2018  3:24 PM CDT -----  Contact: [facility]/[phone]      ----- Message -----  From: Tiana Acharya  Sent: 10/29/2018   3:19 PM  To: Maynor Clay Staff    Type:Home Health(orders,updates,clarifications,etc)    Home Health Agency/Nurse: [facility]    Phone number: [phone]    Reason for call:    Comments: patient was stating that was having bad acid reflux and asking for rx for it.  Thank you!!!

## 2018-10-31 ENCOUNTER — EXTERNAL CHRONIC CARE MANAGEMENT (OUTPATIENT)
Dept: PRIMARY CARE CLINIC | Facility: CLINIC | Age: 83
End: 2018-10-31
Payer: MEDICARE

## 2018-10-31 PROCEDURE — 99490 CHRNC CARE MGMT STAFF 1ST 20: CPT | Mod: S$PBB,,, | Performed by: INTERNAL MEDICINE

## 2018-10-31 PROCEDURE — 99490 CHRNC CARE MGMT STAFF 1ST 20: CPT | Mod: PBBFAC | Performed by: INTERNAL MEDICINE

## 2018-11-09 ENCOUNTER — TELEPHONE (OUTPATIENT)
Dept: INTERNAL MEDICINE | Facility: CLINIC | Age: 83
End: 2018-11-09

## 2018-11-09 NOTE — TELEPHONE ENCOUNTER
----- Message from Nereida Lino MA sent at 11/9/2018  2:16 PM CST -----  Contact: [place]/[facility]/[phone]      ----- Message -----  From: Krunal Dias  Sent: 11/9/2018   1:27 PM  To: Maynor Clay Staff    Needs extension on home health physical therapy. Nurse states that a verbal order would be fine if  is ok with it. Please advise.        Thanks

## 2018-11-09 NOTE — TELEPHONE ENCOUNTER
----- Message from Nereida Lino MA sent at 11/9/2018  2:16 PM CST -----  Contact: [place]/[facility]/[phone]      ----- Message -----  From: Krunal Dias  Sent: 11/9/2018   1:27 PM  To: Maynor Clay Staff    Needs extension on home health physical therapy. Nurse states that a verbal order would be fine if  is ok with it. Please advise.        Thanks

## 2018-11-12 ENCOUNTER — TELEPHONE (OUTPATIENT)
Dept: INTERNAL MEDICINE | Facility: CLINIC | Age: 83
End: 2018-11-12

## 2018-11-12 RX ORDER — LACTULOSE 10 G/15ML
20 SOLUTION ORAL 2 TIMES DAILY PRN
Qty: 900 ML | Refills: 1 | Status: SHIPPED | OUTPATIENT
Start: 2018-11-12 | End: 2019-04-04

## 2018-11-12 NOTE — TELEPHONE ENCOUNTER
----- Message from Tiana Acharya sent at 11/12/2018  1:07 PM CST -----  [phone]  Type:Home Health(orders,updates,clarifications,etc)    Home Health Agency/Nurse: [facility]    Phone number: [phone]    Reason for call:    Comments: patient is recording that he is constipate for the pass 3 days, has take miralex and has not help.

## 2018-11-12 NOTE — TELEPHONE ENCOUNTER
Hi, I will send in a syrup laxative lactulose, lets see if that can help his symptoms.  Thank you, Obed Mcguire

## 2018-11-13 ENCOUNTER — OFFICE VISIT (OUTPATIENT)
Dept: UROLOGY | Facility: CLINIC | Age: 83
End: 2018-11-13
Payer: MEDICARE

## 2018-11-13 VITALS
BODY MASS INDEX: 30.17 KG/M2 | WEIGHT: 199.06 LBS | HEART RATE: 88 BPM | DIASTOLIC BLOOD PRESSURE: 77 MMHG | HEIGHT: 68 IN | SYSTOLIC BLOOD PRESSURE: 144 MMHG

## 2018-11-13 DIAGNOSIS — Z93.59 SUPRAPUBIC CATHETER: Primary | ICD-10-CM

## 2018-11-13 DIAGNOSIS — N31.9 NEUROGENIC BLADDER: ICD-10-CM

## 2018-11-13 DIAGNOSIS — Z43.5 ENCOUNTER FOR CARE OR REPLACEMENT OF SUPRAPUBIC TUBE: ICD-10-CM

## 2018-11-13 PROCEDURE — 99214 OFFICE O/P EST MOD 30 MIN: CPT | Mod: S$PBB,25,, | Performed by: NURSE PRACTITIONER

## 2018-11-13 PROCEDURE — 51705 CHANGE OF BLADDER TUBE: CPT | Mod: S$PBB,,, | Performed by: NURSE PRACTITIONER

## 2018-11-13 PROCEDURE — 17250 CHEM CAUT OF GRANLTJ TISSUE: CPT | Mod: PBBFAC

## 2018-11-13 PROCEDURE — 51705 CHANGE OF BLADDER TUBE: CPT | Mod: PBBFAC | Performed by: NURSE PRACTITIONER

## 2018-11-13 PROCEDURE — 17250 CHEM CAUT OF GRANLTJ TISSUE: CPT | Mod: 51,S$PBB,, | Performed by: NURSE PRACTITIONER

## 2018-11-13 PROCEDURE — 99999 PR PBB SHADOW E&M-EST. PATIENT-LVL III: CPT | Mod: PBBFAC,,, | Performed by: NURSE PRACTITIONER

## 2018-11-13 PROCEDURE — 99213 OFFICE O/P EST LOW 20 MIN: CPT | Mod: PBBFAC | Performed by: NURSE PRACTITIONER

## 2018-11-13 RX ORDER — LACTULOSE 10 G/15ML
SOLUTION ORAL; RECTAL
Qty: 5400 ML | Refills: 1 | OUTPATIENT
Start: 2018-11-13

## 2018-11-13 NOTE — PROGRESS NOTES
"Subjective:       Patient ID: Penny Prado is a 85 y.o. male.    Chief Complaint: SPT change    HPI: Penny Prado is a 85 y.o. White male who presents today for evaluation and management of SPT change. His last clinic visit was with GABRIELLE Hooper NP 10/9/18.    Pt is 85 y.o. male with neurogenic bladder.  He had 18fr SPT placed by Dr. Taylor on 06/23/2017 to manage his Neurogenic Bladder.     He is here today for exchange.  His last exchange 10/9/2018.      He reports SPT draining without difficulty. Sediment in catheter tubing. Denies hematuria or flank pain.  Occasional bladder spasms but tolerable.  Denies fever or chills.    10/25/2017 (R) TKR with Dr. [facility].        Review of patient's allergies indicates:  No Known Allergies    Current Outpatient Medications   Medication Sig Dispense Refill    amLODIPine (NORVASC) 10 MG tablet TAKE 1 TABLET(10 MG) BY MOUTH EVERY DAY 90 tablet 11    aspirin (ECOTRIN) 81 MG EC tablet Take 81 mg by mouth once daily.      atorvastatin (LIPITOR) 20 MG tablet Take 1 tablet (20 mg total) by mouth once daily. 30 tablet 11    docusate sodium (COLACE) 100 MG capsule Take 1 capsule (100 mg total) by mouth 2 (two) times daily as needed for Constipation. (Patient taking differently: Take 100 mg by mouth as needed. ) 60 capsule 0    hyoscyamine (LEVSIN/SL) 0.125 mg Subl DISSOLVE 1 TABLET UNDER THE TONGUE EVERY 4 HOURS AS NEEDED 540 tablet 1    hyoscyamine (LEVSIN/SL) 0.125 mg Subl DISSOLVE 1 TABLET UNDER THE TONGUE EVERY 4 HOURS AS NEEDED 30 tablet 0    levothyroxine (SYNTHROID) 50 MCG tablet GIVE "PENNY" 1 TABLET BY MOUTH ONCE DAILY. 90 tablet 11    oxyCODONE-acetaminophen (PERCOCET) 5-325 mg per tablet Take 1 tablet by mouth every 12 (twelve) hours as needed. 30 tablet 0    senna-docusate 8.6-50 mg (PERICOLACE) 8.6-50 mg per tablet Take 1 tablet by mouth 2 (two) times daily.      traZODone (DESYREL) 50 MG tablet Take 1 tablet (50 mg total) by mouth nightly. 90 tablet " 11    triamterene-hydrochlorothiazide 37.5-25 mg (DYAZIDE) 37.5-25 mg per capsule TAKE 1 CAPSULE BY MOUTH EVERY DAY IN THE MORNING 90 capsule 11    influenza (FLUZONE HIGH-DOSE) 180 mcg/0.5 mL vaccine Inject 0.5 mLs into the muscle. 0.5 mL 0    lactulose (CHRONULAC) 20 gram/30 mL Soln Take 30 mLs (20 g total) by mouth 2 (two) times daily as needed. 900 mL 1    omeprazole (PRILOSEC) 40 MG capsule Take 1 capsule (40 mg total) by mouth every morning. Take 30 minutes before eating on an empty stomach 30 capsule 1    ondansetron (ZOFRAN-ODT) 8 MG TbDL Take 1 tablet (8 mg total) by mouth every 12 (twelve) hours as needed. 14 tablet 0    pneumococcal 23-raquel ps vaccine (PNEUMOVAX 23) 25 mcg/0.5 mL Inject 0.5 mLs into the muscle. 0.5 mL 0     No current facility-administered medications for this visit.        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    GERD (gastroesophageal reflux disease)     Hypertension     Thyroid disease     UTI (urinary tract infection)        Past Surgical History:   Procedure Laterality Date    BLOCK-NERVE Left 10/26/2017    Performed by Asia Surgeon at [facility] ASIA    CHOLECYSTECTOMY      CYSTOSCOPY WITH RETROGRADE PYELOGRAM Bilateral 1/27/2017    Performed by Chaitanya Taylor Jr., MD at [facility] OR 1ST FLR    EGD (ESOPHAGOGASTRODUODENOSCOPY) N/A 8/2/2013    Performed by Nagi Talbert MD at [facility] ENDO (2ND FLR)    EYE SURGERY Right     IOL    HERNIA REPAIR      INSERTION-CATHETER-SUPRAPUBIC N/A 6/23/2017    Performed by Chaitanya Taylor Jr., MD at [facility] OR 2ND FLR    INSERTION-INTRAOCULAR LENS (IOL) Left 5/28/2018    Performed by Trinity Vazquez,  MD at [facility] OR    INSERTION-INTRAOCULAR LENS (IOL) Right 2/19/2018    Performed by Trinity [facility]quez MD at [facility] OR    INTRAOCULAR PROSTHESES INSERTION Left 5/28/2018    Procedure: INSERTION-INTRAOCULAR LENS (IOL);  Surgeon: Trinity Vazquez MD;  Location: [facility];  Service: Ophthalmology;  Laterality: Left;    JOINT REPLACEMENT      KYPHOPLASTY L3 N/A 12/27/2016    Performed by Donavan Martinez MD at [facility] OR 2ND FLR    LAMINECTOMY  12/27/2016    L2-L4    LAMINECTOMY-MINIMALLY INVASIVE L2,3 and L3,4; globus, neuromonitoring Right 12/27/2016    Performed by Donavan Martinez MD at [facility] OR 2ND FLR    LUMBAR LAMINECTOMY  12/2016    PARATHYROIDECTOMY      PHACOEMULSIFICATION OF CATARACT Left 5/28/2018    Procedure: PHACOEMULSIFICATION-ASPIRATION-CATARACT;  Surgeon: Trinity Vazquez MD;  Location: [facility];  Service: Ophthalmology;  Laterality: Left;    PHACOEMULSIFICATION-ASPIRATION-CATARACT Left 5/28/2018    Performed by Trinity [facility]quez MD at [facility] OR    PHACOEMULSIFICATION-ASPIRATION-CATARACT Right 2/19/2018    Performed by Trinity [facility]quez MD at [facility] OR    REPAIR OF INCARCERATED VENTRAL HERNIA WITHOUT HISTORY OF PRIOR REPAIR N/A 6/20/2018    Procedure: REPAIR, HERNIA, VENTRAL, INCARCERATED, WITHOUT HISTORY OF PRIOR REPAIR;  Surgeon: Guilherme Ordoñez MD;  [facility];  Service: General;  Laterality: N/A;    REPAIR, HERNIA, INGUINAL, WITHOUT HISTORY OF PRIOR REPAIR, AGE 5 YEARS OR OLDER Right 10/9/2013    Performed by Daron Arthur MD at [facility] OR 2ND FLR    REPAIR, HERNIA, VENTRAL, INCARCERATED, WITHOUT HISTORY OF PRIOR REPAIR N/A 6/20/2018    Performed by Guilherme Ordoñez MD at [facility] OR 2ND FLR    REPLACEMENT-KNEE-TOTAL Left 10/25/2017    Performed by [facility] at [facility] OR [facility] FLR    TOTAL KNEE ARTHROPLASTY Bilateral     TOTAL KNEE ARTHROPLASTY Left 10/25/2017    TKR       Family History   Problem Relation Age of Onset    Hypertension Mother     Diabetes Father     Esophageal  cancer Father        Review of Systems   Constitutional: Negative for chills and fever.   HENT: Negative for congestion.    Eyes: Negative for discharge.   Respiratory: Negative for chest tightness and shortness of breath.    Cardiovascular: Positive for leg swelling. Negative for chest pain and palpitations.   Gastrointestinal: Negative for nausea and vomiting.   Genitourinary: Positive for difficulty urinating. Negative for decreased urine volume, discharge, flank pain, hematuria, penile pain, penile swelling, scrotal swelling and testicular pain.   Musculoskeletal: Positive for arthralgias and gait problem.   Skin: Negative for rash.   Allergic/Immunologic: Negative for immunocompromised state.   Neurological: Negative for seizures and headaches.   Hematological: Negative for adenopathy.   Psychiatric/Behavioral: Negative for confusion.         All other systems were reviewed and were negative.    Objective:     Vitals:    11/13/18 1417   BP: (!) 144/77   Pulse: 88        Physical Exam   Nursing note and vitals reviewed.  Constitutional: He is oriented to person, place, and time.  Non-toxic appearance. He does not have a sickly appearance. He does not appear ill. No distress.   HENT:   Head: Normocephalic.   Eyes: Right eye exhibits no discharge. Left eye exhibits no discharge.   Neck: Normal range of motion.   Cardiovascular: Normal rate and regular rhythm.    Pulmonary/Chest: Effort normal. No respiratory distress.   Abdominal: Soft.   SP stoma patent. No redness or drainage around stoma site.  Minimal granulating tissue.     Musculoskeletal: He exhibits edema.   Decreased strength to BLE; uses walker to ambulate   Neurological: He is alert and oriented to person, place, and time.   Skin: Skin is warm and dry.     Psychiatric: He has a normal mood and affect. His behavior is normal. Judgment and thought content normal.         Lab Results   Component Value Date    CREATININE 1.0 07/05/2018     Lab Results    Component Value Date    EGFRNONAA >60.0 07/05/2018     Lab Results   Component Value Date    ESTGFRAFRICA >60.0 07/05/2018         Assessment:       1. Suprapubic catheter    2. Encounter for care or replacement of suprapubic tube    3. Neurogenic bladder        Plan:     Chucho was seen today for follow-up.    Diagnoses and all orders for this visit:    Suprapubic catheter    Encounter for care or replacement of suprapubic tube    Neurogenic bladder      -I deflated balloon and removed old SPT without any difficulty and properly disposed.   Stoma cleaned and prepped with betadine.   I placed a 18 fr SPT using sterile technique, without difficulty.   Bladder was irrigated with 120 ml and a good catheter position was confirmed.  Clear yellow urine received and balloon inflated by with ~10 ml sterile water.   The catheter was connected with a drainage bag and catheter care instructions were given.  The wound was cleaned and dressed.  Silver nitrate to granulated tissue  The patient tolerated well.  Daily skin care and suprapubic catheter care discussed.  Educational materials given.  Increase water intake to help with sediment.   RTC 4 weeks for next SPT change.  Voiced understanding.          I spent 25 minutes with the patient of which more than half was spent in coordinating the patient's care as well as in direct consultation with the patient in regards to our treatment and plan.

## 2018-11-14 ENCOUNTER — TELEPHONE (OUTPATIENT)
Dept: ADMINISTRATIVE | Facility: CLINIC | Age: 83
End: 2018-11-14

## 2018-11-30 ENCOUNTER — EXTERNAL CHRONIC CARE MANAGEMENT (OUTPATIENT)
Dept: PRIMARY CARE CLINIC | Facility: CLINIC | Age: 83
End: 2018-11-30
Payer: MEDICARE

## 2018-11-30 PROCEDURE — 99490 CHRNC CARE MGMT STAFF 1ST 20: CPT | Mod: S$PBB,,, | Performed by: INTERNAL MEDICINE

## 2018-11-30 PROCEDURE — 99490 CHRNC CARE MGMT STAFF 1ST 20: CPT | Mod: PBBFAC | Performed by: INTERNAL MEDICINE

## 2018-12-03 ENCOUNTER — TELEPHONE (OUTPATIENT)
Dept: SURGERY | Facility: CLINIC | Age: 83
End: 2018-12-03

## 2018-12-03 ENCOUNTER — HOSPITAL ENCOUNTER (INPATIENT)
Facility: HOSPITAL | Age: 83
LOS: 6 days | Discharge: HOME HEALTH | DRG: 354 | End: 2018-12-11
Attending: EMERGENCY MEDICINE | Admitting: SURGERY
Payer: MEDICARE

## 2018-12-03 DIAGNOSIS — E78.5 DYSLIPIDEMIA: Chronic | ICD-10-CM

## 2018-12-03 DIAGNOSIS — E87.1 HYPONATREMIA: ICD-10-CM

## 2018-12-03 DIAGNOSIS — I10 ESSENTIAL HYPERTENSION: ICD-10-CM

## 2018-12-03 DIAGNOSIS — K46.0 INCARCERATED HERNIA: ICD-10-CM

## 2018-12-03 DIAGNOSIS — E03.9 ACQUIRED HYPOTHYROIDISM: ICD-10-CM

## 2018-12-03 DIAGNOSIS — K21.9 GASTROESOPHAGEAL REFLUX DISEASE WITHOUT ESOPHAGITIS: ICD-10-CM

## 2018-12-03 DIAGNOSIS — E87.6 HYPOKALEMIA: ICD-10-CM

## 2018-12-03 DIAGNOSIS — N18.30 CKD (CHRONIC KIDNEY DISEASE) STAGE 3, GFR 30-59 ML/MIN: ICD-10-CM

## 2018-12-03 DIAGNOSIS — G47.9 SLEEP DISORDER: ICD-10-CM

## 2018-12-03 DIAGNOSIS — Z01.818 PREOPERATIVE EVALUATION TO RULE OUT SURGICAL CONTRAINDICATION: ICD-10-CM

## 2018-12-03 DIAGNOSIS — N31.9 NEUROGENIC BLADDER: ICD-10-CM

## 2018-12-03 DIAGNOSIS — Z01.810 PREOP CARDIOVASCULAR EXAM: ICD-10-CM

## 2018-12-03 DIAGNOSIS — K43.6 VENTRAL HERNIA WITH OBSTRUCTION: Primary | ICD-10-CM

## 2018-12-03 DIAGNOSIS — R10.9 ABDOMINAL PAIN: ICD-10-CM

## 2018-12-03 LAB
ALBUMIN SERPL BCP-MCNC: 4.2 G/DL
ALP SERPL-CCNC: 107 U/L
ALT SERPL W/O P-5'-P-CCNC: 7 U/L
AMORPH CRY UR QL COMP ASSIST: ABNORMAL
ANION GAP SERPL CALC-SCNC: 13 MMOL/L
AST SERPL-CCNC: 17 U/L
BACTERIA #/AREA URNS AUTO: ABNORMAL /HPF
BASOPHILS # BLD AUTO: 0.02 K/UL
BASOPHILS NFR BLD: 0.2 %
BILIRUB SERPL-MCNC: 1.6 MG/DL
BILIRUB UR QL STRIP: NEGATIVE
BUN SERPL-MCNC: 22 MG/DL
CALCIUM SERPL-MCNC: 10.2 MG/DL
CHLORIDE SERPL-SCNC: 86 MMOL/L
CLARITY UR REFRACT.AUTO: ABNORMAL
CO2 SERPL-SCNC: 32 MMOL/L
COLOR UR AUTO: ABNORMAL
CREAT SERPL-MCNC: 1.5 MG/DL
DIFFERENTIAL METHOD: ABNORMAL
EOSINOPHIL # BLD AUTO: 0 K/UL
EOSINOPHIL NFR BLD: 0.2 %
ERYTHROCYTE [DISTWIDTH] IN BLOOD BY AUTOMATED COUNT: 15.3 %
EST. GFR  (AFRICAN AMERICAN): 48.4 ML/MIN/1.73 M^2
EST. GFR  (NON AFRICAN AMERICAN): 41.8 ML/MIN/1.73 M^2
GLUCOSE SERPL-MCNC: 112 MG/DL
GLUCOSE UR QL STRIP: NEGATIVE
HCT VFR BLD AUTO: 44.5 %
HGB BLD-MCNC: 15.5 G/DL
HGB UR QL STRIP: ABNORMAL
HYALINE CASTS UR QL AUTO: 0 /LPF
IMM GRANULOCYTES # BLD AUTO: 0.03 K/UL
IMM GRANULOCYTES NFR BLD AUTO: 0.3 %
KETONES UR QL STRIP: NEGATIVE
LACTATE SERPL-SCNC: 1.6 MMOL/L
LEUKOCYTE ESTERASE UR QL STRIP: ABNORMAL
LIPASE SERPL-CCNC: 5 U/L
LYMPHOCYTES # BLD AUTO: 1.5 K/UL
LYMPHOCYTES NFR BLD: 17 %
MCH RBC QN AUTO: 26.9 PG
MCHC RBC AUTO-ENTMCNC: 34.8 G/DL
MCV RBC AUTO: 77 FL
MICROSCOPIC COMMENT: ABNORMAL
MONOCYTES # BLD AUTO: 0.6 K/UL
MONOCYTES NFR BLD: 6.2 %
NEUTROPHILS # BLD AUTO: 6.7 K/UL
NEUTROPHILS NFR BLD: 76.1 %
NITRITE UR QL STRIP: NEGATIVE
NON-SQ EPI CELLS #/AREA URNS AUTO: 3 /HPF
NRBC BLD-RTO: 0 /100 WBC
PH UR STRIP: 7 [PH] (ref 5–8)
PLATELET # BLD AUTO: 271 K/UL
PMV BLD AUTO: 9.1 FL
POTASSIUM SERPL-SCNC: 3.2 MMOL/L
PROT SERPL-MCNC: 8.9 G/DL
PROT UR QL STRIP: ABNORMAL
RBC # BLD AUTO: 5.77 M/UL
RBC #/AREA URNS AUTO: 49 /HPF (ref 0–4)
SODIUM SERPL-SCNC: 131 MMOL/L
SP GR UR STRIP: 1.01 (ref 1–1.03)
SQUAMOUS #/AREA URNS AUTO: 12 /HPF
URN SPEC COLLECT METH UR: ABNORMAL
WBC # BLD AUTO: 8.82 K/UL
WBC #/AREA URNS AUTO: >100 /HPF (ref 0–5)
WBC CLUMPS UR QL AUTO: ABNORMAL

## 2018-12-03 PROCEDURE — 87086 URINE CULTURE/COLONY COUNT: CPT

## 2018-12-03 PROCEDURE — 99223 1ST HOSP IP/OBS HIGH 75: CPT | Mod: AI,,, | Performed by: SURGERY

## 2018-12-03 PROCEDURE — 99285 EMERGENCY DEPT VISIT HI MDM: CPT | Mod: ,,, | Performed by: EMERGENCY MEDICINE

## 2018-12-03 PROCEDURE — 93010 ELECTROCARDIOGRAM REPORT: CPT | Mod: ,,, | Performed by: INTERNAL MEDICINE

## 2018-12-03 PROCEDURE — 25500020 PHARM REV CODE 255: Performed by: EMERGENCY MEDICINE

## 2018-12-03 PROCEDURE — 25000003 PHARM REV CODE 250: Performed by: EMERGENCY MEDICINE

## 2018-12-03 PROCEDURE — 96375 TX/PRO/DX INJ NEW DRUG ADDON: CPT

## 2018-12-03 PROCEDURE — 87186 SC STD MICRODIL/AGAR DIL: CPT

## 2018-12-03 PROCEDURE — 93005 ELECTROCARDIOGRAM TRACING: CPT

## 2018-12-03 PROCEDURE — 87077 CULTURE AEROBIC IDENTIFY: CPT

## 2018-12-03 PROCEDURE — 80053 COMPREHEN METABOLIC PANEL: CPT

## 2018-12-03 PROCEDURE — 99285 EMERGENCY DEPT VISIT HI MDM: CPT | Mod: 25

## 2018-12-03 PROCEDURE — 96361 HYDRATE IV INFUSION ADD-ON: CPT

## 2018-12-03 PROCEDURE — 85025 COMPLETE CBC W/AUTO DIFF WBC: CPT

## 2018-12-03 PROCEDURE — 96367 TX/PROPH/DG ADDL SEQ IV INF: CPT

## 2018-12-03 PROCEDURE — 87088 URINE BACTERIA CULTURE: CPT

## 2018-12-03 PROCEDURE — G0378 HOSPITAL OBSERVATION PER HR: HCPCS

## 2018-12-03 PROCEDURE — 63600175 PHARM REV CODE 636 W HCPCS: Performed by: EMERGENCY MEDICINE

## 2018-12-03 PROCEDURE — 93010 ELECTROCARDIOGRAM REPORT: CPT | Mod: 76,,, | Performed by: INTERNAL MEDICINE

## 2018-12-03 PROCEDURE — 81001 URINALYSIS AUTO W/SCOPE: CPT

## 2018-12-03 PROCEDURE — 83690 ASSAY OF LIPASE: CPT

## 2018-12-03 PROCEDURE — 87040 BLOOD CULTURE FOR BACTERIA: CPT | Mod: 59

## 2018-12-03 PROCEDURE — 83605 ASSAY OF LACTIC ACID: CPT

## 2018-12-03 PROCEDURE — 96376 TX/PRO/DX INJ SAME DRUG ADON: CPT

## 2018-12-03 RX ORDER — NALOXONE HCL 0.4 MG/ML
0.02 VIAL (ML) INJECTION
Status: DISCONTINUED | OUTPATIENT
Start: 2018-12-04 | End: 2018-12-11 | Stop reason: HOSPADM

## 2018-12-03 RX ORDER — HYDROMORPHONE HYDROCHLORIDE 1 MG/ML
0.5 INJECTION, SOLUTION INTRAMUSCULAR; INTRAVENOUS; SUBCUTANEOUS
Status: COMPLETED | OUTPATIENT
Start: 2018-12-03 | End: 2018-12-03

## 2018-12-03 RX ORDER — ONDANSETRON 2 MG/ML
4 INJECTION INTRAMUSCULAR; INTRAVENOUS EVERY 8 HOURS PRN
Status: DISCONTINUED | OUTPATIENT
Start: 2018-12-04 | End: 2018-12-07

## 2018-12-03 RX ORDER — LIDOCAINE HYDROCHLORIDE 20 MG/ML
JELLY TOPICAL
Status: COMPLETED | OUTPATIENT
Start: 2018-12-03 | End: 2018-12-03

## 2018-12-03 RX ORDER — PANTOPRAZOLE SODIUM 40 MG/10ML
40 INJECTION, POWDER, LYOPHILIZED, FOR SOLUTION INTRAVENOUS DAILY
Status: DISCONTINUED | OUTPATIENT
Start: 2018-12-04 | End: 2018-12-06

## 2018-12-03 RX ORDER — SODIUM CHLORIDE 0.9 % (FLUSH) 0.9 %
3 SYRINGE (ML) INJECTION EVERY 8 HOURS
Status: DISCONTINUED | OUTPATIENT
Start: 2018-12-04 | End: 2018-12-11 | Stop reason: HOSPADM

## 2018-12-03 RX ORDER — ONDANSETRON 2 MG/ML
4 INJECTION INTRAMUSCULAR; INTRAVENOUS
Status: COMPLETED | OUTPATIENT
Start: 2018-12-03 | End: 2018-12-03

## 2018-12-03 RX ORDER — SODIUM CHLORIDE 9 MG/ML
1000 INJECTION, SOLUTION INTRAVENOUS
Status: COMPLETED | OUTPATIENT
Start: 2018-12-03 | End: 2018-12-03

## 2018-12-03 RX ORDER — ENOXAPARIN SODIUM 100 MG/ML
40 INJECTION SUBCUTANEOUS EVERY 24 HOURS
Status: DISCONTINUED | OUTPATIENT
Start: 2018-12-04 | End: 2018-12-11 | Stop reason: HOSPADM

## 2018-12-03 RX ORDER — HYDROMORPHONE HYDROCHLORIDE 1 MG/ML
0.5 INJECTION, SOLUTION INTRAMUSCULAR; INTRAVENOUS; SUBCUTANEOUS
Status: DISCONTINUED | OUTPATIENT
Start: 2018-12-04 | End: 2018-12-06

## 2018-12-03 RX ORDER — SODIUM CHLORIDE 9 MG/ML
INJECTION, SOLUTION INTRAVENOUS CONTINUOUS
Status: DISCONTINUED | OUTPATIENT
Start: 2018-12-04 | End: 2018-12-06

## 2018-12-03 RX ORDER — POTASSIUM CHLORIDE 7.45 MG/ML
10 INJECTION INTRAVENOUS
Status: COMPLETED | OUTPATIENT
Start: 2018-12-04 | End: 2018-12-04

## 2018-12-03 RX ADMIN — Medication 0.5 MG: at 06:12

## 2018-12-03 RX ADMIN — PROMETHAZINE HYDROCHLORIDE 12.5 MG: 25 INJECTION INTRAMUSCULAR; INTRAVENOUS at 06:12

## 2018-12-03 RX ADMIN — ONDANSETRON 4 MG: 2 INJECTION INTRAMUSCULAR; INTRAVENOUS at 06:12

## 2018-12-03 RX ADMIN — LIDOCAINE HYDROCHLORIDE 10 ML: 20 JELLY TOPICAL at 06:12

## 2018-12-03 RX ADMIN — SODIUM CHLORIDE 1000 ML: 0.9 INJECTION, SOLUTION INTRAVENOUS at 11:12

## 2018-12-03 RX ADMIN — COCAINE HYDROCHLORIDE 5 ML: 40 SOLUTION TOPICAL at 06:12

## 2018-12-03 RX ADMIN — SODIUM CHLORIDE 1000 ML: 0.9 INJECTION, SOLUTION INTRAVENOUS at 06:12

## 2018-12-03 RX ADMIN — IOHEXOL 100 ML: 350 INJECTION, SOLUTION INTRAVENOUS at 10:12

## 2018-12-03 RX ADMIN — Medication 0.5 MG: at 08:12

## 2018-12-03 NOTE — ED PROVIDER NOTES
Encounter Date: 12/3/2018    SCRIBE #1 NOTE: I, Jeanninepaul Robertson, am scribing for, and in the presence of, Dr. Gupta.       History     Chief Complaint   Patient presents with    Abdominal Pain     vomiting bile, pain in chest,      85 y.o. male with a history of hernia surgery in June 2018 presents to the ED complaining of abdominal pain. Associated symptoms include nausea, vomiting, back pain, neck pain, and flatulence. Patient describes vomiting for 2 days and a dozen times last night producing a brown liquid. He complains of generalized abdominal pain especially under pressure. He also has leg swelling at baseline. Patient uses walker to ambulate normally but has had decreased activity the past couple of days. He denies diarrhea, SOB, black stool, or associated symptoms. Patient also has a suprapubic galvan catheter that's changed once a month.       The history is provided by the patient and medical records.     Review of patient's allergies indicates:  No Known Allergies  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    GERD (gastroesophageal reflux disease)     Hypertension     Thyroid disease     UTI (urinary tract infection)      Past Surgical History:   Procedure Laterality Date    BLOCK-NERVE Left 10/26/2017    Performed by Asia Surgeon at [facility] ASIA    CHOLECYSTECTOMY      CYSTOSCOPY WITH RETROGRADE PYELOGRAM Bilateral 1/27/2017    Performed by Chaitanya Taylor Jr., MD at [facility] OR 1ST FLR    EGD (ESOPHAGOGASTRODUODENOSCOPY) N/A 8/2/2013    Performed by Nagi Talbert MD at [facility] ENDO (2ND FLR)    EYE SURGERY Right     IOL    HERNIA REPAIR       INSERTION-CATHETER-SUPRAPUBIC N/A 6/23/2017    Performed by [facility]ait[facility] Jr., MD at [facility] OR 2ND FLR    INSERTION-INTRAOCULAR LENS (IOL) Left 5/28/2018    Performed by [facility]quez MD at [facility] OR    INSERTION-INTRAOCULAR LENS (IOL) Right 2/19/2018    Performed by [facility]quez MD at [facility] OR    INTRAOCULAR PROSTHESES INSERTION Left 5/28/2018    Procedure: INSERTION-INTRAOCULAR LENS (IOL);  Surgeon: Trinity Vazquez MD;  Location: [facility];  Service: Ophthalmology;  Laterality: Left;    JOINT REPLACEMENT      KYPHOPLASTY L3 N/A 12/27/2016    Performed by Donavan Martinez MD at [facility] OR 2ND FLR    LAMINECTOMY  12/27/2016    L2-L4    LAMINECTOMY-MINIMALLY INVASIVE L2,3 and L3,4; globus, neuromonitoring Right 12/27/2016    Performed by Donavan Martinez MD at [facility] OR 2ND FLR    LUMBAR LAMINECTOMY  12/2016    PARATHYROIDECTOMY      PHACOEMULSIFICATION OF CATARACT Left 5/28/2018    Procedure: PHACOEMULSIFICATION-ASPIRATION-CATARACT;  Surgeon: Trinity Vazquez MD;  Location: [facility];  Service: Ophthalmology;  Laterality: Left;    PHACOEMULSIFICATION-ASPIRATION-CATARACT Left 5/28/2018    Performed by [facility]quez MD at [facility] OR    PHACOEMULSIFICATION-ASPIRATION-CATARACT Right 2/19/2018    Performed by [facility]quez MD at [facility] OR    REPAIR OF INCARCERATED VENTRAL HERNIA WITHOUT HISTORY OF PRIOR REPAIR N/A 6/20/2018    Procedure: REPAIR, HERNIA, VENTRAL, INCARCERATED, WITHOUT HISTORY OF PRIOR REPAIR;  Surgeon: Guilherme Ordoñez MD;  [facility];  Service: General;  Laterality: N/A;    REPAIR, HERNIA, INGUINAL, WITHOUT HISTORY OF PRIOR REPAIR, AGE 5 YEARS OR OLDER Right 10/9/2013    Performed by Daron Arthur MD at [facility] OR 2ND FLR    REPAIR, HERNIA, VENTRAL, INCARCERATED, WITHOUT HISTORY OF PRIOR REPAIR N/A 6/20/2018    Performed by Guilherme Ordoñez MD at [facility] OR 2ND FLR    REPLACEMENT-KNEE-TOTAL Left 10/25/2017    Performed by [facility] at [facility] OR [facility] FLR    TOTAL KNEE  ARTHROPLASTY Bilateral     TOTAL KNEE ARTHROPLASTY Left 10/25/2017    TKR     Family History   Problem Relation Age of Onset    Hypertension Mother     Diabetes Father     Esophageal cancer Father      Social History     Tobacco Use    Smoking status: Former Smoker     Years: 20.00     Last attempt to quit:      Years since quittin.9    Smokeless tobacco: Never Used    Tobacco comment: quit    Substance Use Topics    Alcohol use: No     Comment: rarely/6 months    Drug use: No     Review of Systems   Constitutional: Positive for activity change. Negative for fever.   HENT: Negative for sore throat.    Respiratory: Negative for shortness of breath.    Cardiovascular: Positive for leg swelling (at baseline). Negative for chest pain.   Gastrointestinal: Positive for abdominal pain, nausea and vomiting. Negative for diarrhea.        Flatulence.   Genitourinary: Negative for flank pain.   Musculoskeletal: Positive for back pain and neck pain. Negative for joint swelling.   Skin: Negative for rash.   Neurological: Negative for weakness.   Psychiatric/Behavioral: Negative for confusion.       Physical Exam     Initial Vitals [18 1609]   BP Pulse Resp Temp SpO2   127/67 84 18 98.3 °F (36.8 °C) (!) 94 %      MAP       --         Physical Exam    Constitutional: He is cooperative. He appears ill. He appears distressed.   HENT:   Mouth/Throat: Mucous membranes are dry.   Eyes: Conjunctivae and EOM are normal. No scleral icterus.   Neck: Normal range of motion. Neck supple. No JVD present.   Cardiovascular: Normal rate, regular rhythm and normal heart sounds.   Pulmonary/Chest: No accessory muscle usage. No tachypnea. No respiratory distress.   Abdominal: A hernia is present. Hernia confirmed positive in the ventral area.       There is a firm ventral hernia that is mildly tender to pressure; very concern for possible incarcerated hernia   Genitourinary:   Genitourinary Comments: Has a suprapubic  catheter in place  Hazy urine in drainage tubing   Musculoskeletal:   generalized muscle wasting   Skin:   Poor skin turgor         ED Course   Procedures  Labs Reviewed - No data to display       Imaging Results    None       X-Rays:   Independently Interpreted Readings:   Abdomen: Dilated loops of small bowel very suggestive of early small-bowel obstruction   Other Readings:  Portable abdominal x-ray shows dilated loops of bowel. Very concerning for bowel obstruction. Will proceed to CT scan.    Medical Decision Making:   Independently Interpreted Test(s):   I have ordered and independently interpreted X-rays - see prior notes.  Clinical Tests:   Lab Tests: Ordered and Reviewed  Radiological Study: Ordered and Reviewed  Medical Tests: Ordered and Reviewed            Scribe Attestation:   Scribe #1: I performed the above scribed service and the documentation accurately describes the services I performed. I attest to the accuracy of the note.    Attending Attestation:             Attending ED Notes:   6:06 PM  Was just informed that the caregiver had noted a feculent smell to his vomit last night    After viewing the x-ray with his history particularly with his of hernia that is possibly incarcerated it was decided to go ahead and an NG tube; we prepped the nasal area with cocaine sprayed into the region and it made ingestion less painful we did not get a lot gastric contents.  We will wait for CT results with both IV contrasted study of his abdomen pelvis.    I have spoken to general surgery consultant and the patient will be seen by them after the CT I will arm and this patient off to another ER physician will follow up with this it is felt that if there is no bowel obstruction noted that the NG tube can taken out and the patient could be placed in an Internal Medicine Service I really feel that at this time the patient is obstructed             Clinical Impression:   The encounter diagnosis was Abdominal  pain.                             Dez Gupta MD  12/03/18 4683

## 2018-12-03 NOTE — TELEPHONE ENCOUNTER
----- Message from Anitha Vázquez sent at 12/3/2018  9:27 AM CST -----  Contact: Patient   Needs Advice    Reason for call: Patient states that he would like to speak to a nurse in Dr. Ordoñez's office as he thinks he has another hernia. Patient states that his stomach is bothering him, and he threw up brown liquid last night all night long approximately 15 times         Communication Preference:[phone]    Additional Information: please contact the caller

## 2018-12-03 NOTE — PROVIDER PROGRESS NOTES - EMERGENCY DEPT.
Encounter Date: 12/3/2018    ED Physician Progress Notes         EKG - STEMI Decision  Initial Reading: No STEMI present.    I, Nikko Vázquez, am scribing for, and in the presence of, Dr. Seay. I performed the above scribed service and the documentation accurately describes the services I performed. I attest to the accuracy of the note.

## 2018-12-03 NOTE — TELEPHONE ENCOUNTER
Returned call and discussed the issues and symptoms.  Spoke with Dr Valentin and he feels that if the patient is that ill he should report to the ED.  Informed patient of that decision and he was not very happy but will go to the ED to be evaluated.

## 2018-12-04 ENCOUNTER — ANESTHESIA EVENT (OUTPATIENT)
Dept: SURGERY | Facility: HOSPITAL | Age: 83
DRG: 354 | End: 2018-12-04
Payer: MEDICARE

## 2018-12-04 PROBLEM — R15.9 FECAL INCONTINENCE: Status: RESOLVED | Noted: 2018-06-29 | Resolved: 2018-12-04

## 2018-12-04 PROBLEM — M54.9 CHRONIC BACK PAIN: Status: ACTIVE | Noted: 2018-12-04

## 2018-12-04 PROBLEM — K43.6 VENTRAL HERNIA WITH OBSTRUCTION: Chronic | Status: ACTIVE | Noted: 2018-12-03

## 2018-12-04 PROBLEM — K43.0 INCISIONAL HERNIA WITH OBSTRUCTION BUT NO GANGRENE: Status: RESOLVED | Noted: 2018-06-20 | Resolved: 2018-12-04

## 2018-12-04 PROBLEM — G89.29 CHRONIC BACK PAIN: Status: ACTIVE | Noted: 2018-12-04

## 2018-12-04 PROBLEM — Z96.652 STATUS POST TOTAL LEFT KNEE REPLACEMENT: Status: RESOLVED | Noted: 2017-11-03 | Resolved: 2018-12-04

## 2018-12-04 PROBLEM — K21.9 GERD (GASTROESOPHAGEAL REFLUX DISEASE): Status: ACTIVE | Noted: 2018-12-04

## 2018-12-04 PROBLEM — R11.2 PONV (POSTOPERATIVE NAUSEA AND VOMITING): Status: RESOLVED | Noted: 2017-10-23 | Resolved: 2018-12-04

## 2018-12-04 PROBLEM — R60.9 EDEMA: Status: RESOLVED | Noted: 2017-10-23 | Resolved: 2018-12-04

## 2018-12-04 PROBLEM — K46.0 INCARCERATED HERNIA: Status: ACTIVE | Noted: 2018-12-04

## 2018-12-04 PROBLEM — Z86.59 HISTORY OF POSTOPERATIVE DELIRIUM: Status: ACTIVE | Noted: 2018-12-04

## 2018-12-04 PROBLEM — N31.9 NEUROGENIC BLADDER: Status: ACTIVE | Noted: 2018-12-04

## 2018-12-04 PROBLEM — Z98.890 PONV (POSTOPERATIVE NAUSEA AND VOMITING): Status: RESOLVED | Noted: 2017-10-23 | Resolved: 2018-12-04

## 2018-12-04 PROBLEM — K56.600 PARTIAL INTESTINAL OBSTRUCTION: Status: RESOLVED | Noted: 2017-11-02 | Resolved: 2018-12-04

## 2018-12-04 PROBLEM — R10.84 GENERALIZED ABDOMINAL PAIN: Status: RESOLVED | Noted: 2018-06-19 | Resolved: 2018-12-04

## 2018-12-04 PROBLEM — G47.9 SLEEP DISORDER: Status: ACTIVE | Noted: 2018-12-04

## 2018-12-04 PROBLEM — M43.6 STIFFNESS OF NECK: Status: RESOLVED | Noted: 2017-10-23 | Resolved: 2018-12-04

## 2018-12-04 PROBLEM — K45.8 FLANK HERNIA: Status: RESOLVED | Noted: 2017-11-03 | Resolved: 2018-12-04

## 2018-12-04 PROBLEM — R53.81 DEBILITY: Status: RESOLVED | Noted: 2017-10-27 | Resolved: 2018-12-04

## 2018-12-04 PROBLEM — M17.12 PRIMARY OSTEOARTHRITIS OF LEFT KNEE: Status: RESOLVED | Noted: 2017-10-25 | Resolved: 2018-12-04

## 2018-12-04 PROBLEM — N39.0 URINARY TRACT INFECTION ASSOCIATED WITH CYSTOSTOMY CATHETER: Status: RESOLVED | Noted: 2018-06-19 | Resolved: 2018-12-04

## 2018-12-04 PROBLEM — K56.609 SMALL BOWEL OBSTRUCTION: Status: RESOLVED | Noted: 2018-06-20 | Resolved: 2018-12-04

## 2018-12-04 PROBLEM — Z01.818 PREOPERATIVE EVALUATION TO RULE OUT SURGICAL CONTRAINDICATION: Status: ACTIVE | Noted: 2018-12-04

## 2018-12-04 PROBLEM — T83.510A URINARY TRACT INFECTION ASSOCIATED WITH CYSTOSTOMY CATHETER: Status: RESOLVED | Noted: 2018-06-19 | Resolved: 2018-12-04

## 2018-12-04 LAB
ANION GAP SERPL CALC-SCNC: 10 MMOL/L
ASCENDING AORTA: 2.88 CM
AV INDEX (PROSTH): 0.43
AV MEAN GRADIENT: 17.19 MMHG
AV PEAK GRADIENT: 28.94 MMHG
AV VALVE AREA: 1.41 CM2
BASOPHILS # BLD AUTO: 0.03 K/UL
BASOPHILS NFR BLD: 0.4 %
BSA FOR ECHO PROCEDURE: 1.93 M2
BUN SERPL-MCNC: 21 MG/DL
CALCIUM SERPL-MCNC: 8.4 MG/DL
CHLORIDE SERPL-SCNC: 89 MMOL/L
CO2 SERPL-SCNC: 29 MMOL/L
CREAT SERPL-MCNC: 1.2 MG/DL
CV ECHO LV RWT: 0.41 CM
DIFFERENTIAL METHOD: ABNORMAL
DOP CALC AO PEAK VEL: 2.69 M/S
DOP CALC AO VTI: 54.53 CM
DOP CALC LVOT AREA: 3.3 CM2
DOP CALC LVOT DIAMETER: 2.05 CM
DOP CALC LVOT STROKE VOLUME: 77 CM3
DOP CALCLVOT PEAK VEL VTI: 23.34 CM
E WAVE DECELERATION TIME: 417.68 MSEC
E/E' RATIO: 15.57
ECHO LV POSTERIOR WALL: 0.74 CM (ref 0.6–1.1)
EOSINOPHIL # BLD AUTO: 0.2 K/UL
EOSINOPHIL NFR BLD: 1.9 %
ERYTHROCYTE [DISTWIDTH] IN BLOOD BY AUTOMATED COUNT: 15.2 %
EST. GFR  (AFRICAN AMERICAN): >60 ML/MIN/1.73 M^2
EST. GFR  (NON AFRICAN AMERICAN): 54.8 ML/MIN/1.73 M^2
FRACTIONAL SHORTENING: 34 % (ref 28–44)
GLUCOSE SERPL-MCNC: 89 MG/DL
HCT VFR BLD AUTO: 35.5 %
HGB BLD-MCNC: 12 G/DL
IMM GRANULOCYTES # BLD AUTO: 0.02 K/UL
IMM GRANULOCYTES NFR BLD AUTO: 0.2 %
INTERVENTRICULAR SEPTUM: 0.82 CM (ref 0.6–1.1)
IVRT: 0.08 MSEC
LA MAJOR: 6.39 CM
LA MINOR: 6.4 CM
LA WIDTH: 4.47 CM
LEFT ATRIUM SIZE: 3.37 CM
LEFT ATRIUM VOLUME INDEX: 42.8 ML/M2
LEFT ATRIUM VOLUME: 81.88 CM3
LEFT INTERNAL DIMENSION IN SYSTOLE: 2.39 CM (ref 2.1–4)
LEFT VENTRICLE DIASTOLIC VOLUME INDEX: 29.28 ML/M2
LEFT VENTRICLE DIASTOLIC VOLUME: 55.99 ML
LEFT VENTRICLE MASS INDEX: 40.5 G/M2
LEFT VENTRICLE SYSTOLIC VOLUME INDEX: 10.4 ML/M2
LEFT VENTRICLE SYSTOLIC VOLUME: 19.98 ML
LEFT VENTRICULAR INTERNAL DIMENSION IN DIASTOLE: 3.64 CM (ref 3.5–6)
LEFT VENTRICULAR MASS: 77.46 G
LV LATERAL E/E' RATIO: 13.63
LV SEPTAL E/E' RATIO: 18.17
LYMPHOCYTES # BLD AUTO: 1.7 K/UL
LYMPHOCYTES NFR BLD: 21.6 %
MAGNESIUM SERPL-MCNC: 2.2 MG/DL
MCH RBC QN AUTO: 26.9 PG
MCHC RBC AUTO-ENTMCNC: 33.8 G/DL
MCV RBC AUTO: 80 FL
MONOCYTES # BLD AUTO: 0.7 K/UL
MONOCYTES NFR BLD: 8.1 %
MV PEAK E VEL: 1.09 M/S
NEUTROPHILS # BLD AUTO: 5.5 K/UL
NEUTROPHILS NFR BLD: 67.8 %
NRBC BLD-RTO: 0 /100 WBC
PHOSPHATE SERPL-MCNC: 3.6 MG/DL
PISA TR MAX VEL: 2.59 M/S
PLATELET # BLD AUTO: 229 K/UL
PMV BLD AUTO: 9.4 FL
POTASSIUM SERPL-SCNC: 3 MMOL/L
PULM VEIN S/D RATIO: 1.71
PV PEAK D VEL: 0.14 M/S
PV PEAK S VEL: 0.24 M/S
RA MAJOR: 5.69 CM
RA WIDTH: 4.54 CM
RBC # BLD AUTO: 4.46 M/UL
RIGHT VENTRICULAR END-DIASTOLIC DIMENSION: 3.5 CM
RV TISSUE DOPPLER FREE WALL SYSTOLIC VELOCITY 1 (APICAL 4 CHAMBER VIEW): 15.74 M/S
SINUS: 2.9 CM
SODIUM SERPL-SCNC: 128 MMOL/L
STJ: 2.82 CM
TDI LATERAL: 0.08
TDI SEPTAL: 0.06
TDI: 0.07
TR MAX PG: 26.83 MMHG
TRICUSPID ANNULAR PLANE SYSTOLIC EXCURSION: 1.48 CM
WBC # BLD AUTO: 8.06 K/UL

## 2018-12-04 PROCEDURE — 83735 ASSAY OF MAGNESIUM: CPT

## 2018-12-04 PROCEDURE — A4216 STERILE WATER/SALINE, 10 ML: HCPCS | Performed by: SURGERY

## 2018-12-04 PROCEDURE — 63600175 PHARM REV CODE 636 W HCPCS: Performed by: STUDENT IN AN ORGANIZED HEALTH CARE EDUCATION/TRAINING PROGRAM

## 2018-12-04 PROCEDURE — G0378 HOSPITAL OBSERVATION PER HR: HCPCS

## 2018-12-04 PROCEDURE — 96376 TX/PRO/DX INJ SAME DRUG ADON: CPT

## 2018-12-04 PROCEDURE — 25000003 PHARM REV CODE 250: Performed by: STUDENT IN AN ORGANIZED HEALTH CARE EDUCATION/TRAINING PROGRAM

## 2018-12-04 PROCEDURE — 25000003 PHARM REV CODE 250: Performed by: EMERGENCY MEDICINE

## 2018-12-04 PROCEDURE — 84100 ASSAY OF PHOSPHORUS: CPT

## 2018-12-04 PROCEDURE — 96366 THER/PROPH/DIAG IV INF ADDON: CPT

## 2018-12-04 PROCEDURE — 99219 PR INITIAL OBSERVATION CARE,LEVL II: CPT | Mod: GC,,, | Performed by: INTERNAL MEDICINE

## 2018-12-04 PROCEDURE — 25000003 PHARM REV CODE 250: Performed by: SURGERY

## 2018-12-04 PROCEDURE — 63600175 PHARM REV CODE 636 W HCPCS: Performed by: SURGERY

## 2018-12-04 PROCEDURE — 96365 THER/PROPH/DIAG IV INF INIT: CPT

## 2018-12-04 PROCEDURE — C9113 INJ PANTOPRAZOLE SODIUM, VIA: HCPCS | Performed by: SURGERY

## 2018-12-04 PROCEDURE — 96367 TX/PROPH/DG ADDL SEQ IV INF: CPT

## 2018-12-04 PROCEDURE — 85025 COMPLETE CBC W/AUTO DIFF WBC: CPT

## 2018-12-04 PROCEDURE — 63600175 PHARM REV CODE 636 W HCPCS: Performed by: EMERGENCY MEDICINE

## 2018-12-04 PROCEDURE — 80048 BASIC METABOLIC PNL TOTAL CA: CPT

## 2018-12-04 RX ORDER — POTASSIUM CHLORIDE 7.45 MG/ML
10 INJECTION INTRAVENOUS
Status: DISPENSED | OUTPATIENT
Start: 2018-12-04 | End: 2018-12-04

## 2018-12-04 RX ADMIN — HYDROMORPHONE HYDROCHLORIDE 0.5 MG: 1 INJECTION, SOLUTION INTRAMUSCULAR; INTRAVENOUS; SUBCUTANEOUS at 08:12

## 2018-12-04 RX ADMIN — POTASSIUM CHLORIDE 10 MEQ: 7.46 INJECTION, SOLUTION INTRAVENOUS at 04:12

## 2018-12-04 RX ADMIN — PROMETHAZINE HYDROCHLORIDE 12.5 MG: 25 INJECTION INTRAMUSCULAR; INTRAVENOUS at 10:12

## 2018-12-04 RX ADMIN — HYDROMORPHONE HYDROCHLORIDE 0.5 MG: 1 INJECTION, SOLUTION INTRAMUSCULAR; INTRAVENOUS; SUBCUTANEOUS at 02:12

## 2018-12-04 RX ADMIN — POTASSIUM CHLORIDE 10 MEQ: 7.46 INJECTION, SOLUTION INTRAVENOUS at 01:12

## 2018-12-04 RX ADMIN — Medication 3 ML: at 06:12

## 2018-12-04 RX ADMIN — HYDROMORPHONE HYDROCHLORIDE 0.5 MG: 1 INJECTION, SOLUTION INTRAMUSCULAR; INTRAVENOUS; SUBCUTANEOUS at 05:12

## 2018-12-04 RX ADMIN — Medication 3 ML: at 02:12

## 2018-12-04 RX ADMIN — VANCOMYCIN HYDROCHLORIDE 1250 MG: 10 INJECTION, POWDER, LYOPHILIZED, FOR SOLUTION INTRAVENOUS at 12:12

## 2018-12-04 RX ADMIN — HYDROMORPHONE HYDROCHLORIDE 0.5 MG: 1 INJECTION, SOLUTION INTRAMUSCULAR; INTRAVENOUS; SUBCUTANEOUS at 04:12

## 2018-12-04 RX ADMIN — PANTOPRAZOLE SODIUM 40 MG: 40 INJECTION, POWDER, FOR SOLUTION INTRAVENOUS at 08:12

## 2018-12-04 RX ADMIN — POTASSIUM CHLORIDE 10 MEQ: 7.46 INJECTION, SOLUTION INTRAVENOUS at 05:12

## 2018-12-04 RX ADMIN — Medication 3 ML: at 09:12

## 2018-12-04 RX ADMIN — ONDANSETRON 4 MG: 2 INJECTION INTRAMUSCULAR; INTRAVENOUS at 01:12

## 2018-12-04 RX ADMIN — HYDROMORPHONE HYDROCHLORIDE 0.5 MG: 1 INJECTION, SOLUTION INTRAMUSCULAR; INTRAVENOUS; SUBCUTANEOUS at 01:12

## 2018-12-04 RX ADMIN — ONDANSETRON 4 MG: 2 INJECTION INTRAMUSCULAR; INTRAVENOUS at 05:12

## 2018-12-04 RX ADMIN — POTASSIUM CHLORIDE 10 MEQ: 7.46 INJECTION, SOLUTION INTRAVENOUS at 02:12

## 2018-12-04 RX ADMIN — POTASSIUM CHLORIDE 10 MEQ: 7.46 INJECTION, SOLUTION INTRAVENOUS at 06:12

## 2018-12-04 RX ADMIN — HYDROMORPHONE HYDROCHLORIDE 0.5 MG: 1 INJECTION, SOLUTION INTRAMUSCULAR; INTRAVENOUS; SUBCUTANEOUS at 10:12

## 2018-12-04 RX ADMIN — POTASSIUM CHLORIDE 10 MEQ: 7.46 INJECTION, SOLUTION INTRAVENOUS at 12:12

## 2018-12-04 RX ADMIN — SODIUM CHLORIDE: 0.9 INJECTION, SOLUTION INTRAVENOUS at 08:12

## 2018-12-04 RX ADMIN — ENOXAPARIN SODIUM 40 MG: 100 INJECTION SUBCUTANEOUS at 07:12

## 2018-12-04 NOTE — PLAN OF CARE
12/04/18 1518   Discharge Assessment   Assessment Type Discharge Planning Assessment   Confirmed/corrected address and phone number on facesheet? Yes   Assessment information obtained from? Patient   Expected Length of Stay (days) 3   Communicated expected length of stay with patient/caregiver yes   Prior to hospitilization cognitive status: Alert/Oriented   Prior to hospitalization functional status: Needs Assistance;Assistive Equipment  (Patient has in home caregiver 16 hrs per day.  Needs assist with adls and uses a standard walker )   Current cognitive status: Alert/Oriented   Current Functional Status: Needs Assistance   Lives With alone   Able to Return to Prior Arrangements unable to determine at this time (comments)   Is patient able to care for self after discharge? Unable to determine at this time (comments)   Who are your caregiver(s) and their phone number(s)? Josh Leno (friend)  [phone]   Patient's perception of discharge disposition home or selfcare   Readmission Within The Last 30 Days no previous admission in last 30 days   Patient currently being followed by outpatient case management? No   Patient currently receives any other outside agency services? Yes   How many hours a day does the patient receive services? 16   Name and contact number of agency or person providing outside services [facility]  (Private caretaker)     Is it the patient/care giver preference to resume care with the current outside agency? Yes   Equipment Currently Used at Home walker, standard;wheelchair;bedside commode;shower chair   Do you have any problems affording any of your prescribed medications? No   Is the patient taking medications as prescribed? yes   Does the patient have transportation home? Yes   Transportation Available family or friend will provide   Does the patient receive services at the [facility]? No   Discharge Plan A Home;Home Health  (and caretaker )   Discharge Plan B  Home  (caretaker)   Patient/Family In Agreement With Plan yes           Discharge/ My Health Packet Folder Given to patient/family:      yes      PCP:  Obed Mcguire MD        Pharmacy:    [facility] [address] [place], [place] - [address]  [address]  [facility] [address]  Phone: [phone] Fax: [phone]        Emergency Contacts:    Extended Emergency Contact Information  Primary Emergency Contact: Josh Burr   [facility]  Home Phone: [phone]  Mobile Phone: [phone]  Relation: Friend  Secondary Emergency Contact: Carolina Pak   [facility]  Home Phone: [phone]  Mobile Phone: [phone]  Relation: None    Insurance:  Payor: MEDICARE / Plan: MEDICARE PART A & B / Product Type: Government /       Jessei Vegas RN, CCRN-K, [facility]  Neuro-Critical Care   X [id number]

## 2018-12-04 NOTE — ASSESSMENT & PLAN NOTE
- several year history of hyponatremia  - Recommend stopping Triamterene-HCTZ given history of hyponatremia and hypokalemia and starting ACEi or ARB after surgery.

## 2018-12-04 NOTE — HPI
Chucho Prado is an 85 year old male with HTN, CKD-3, HLD, GERD, neurogenic bladder with chronic suprapubic catheter , h/o ventral incisional hernia repair (at former open helen right subcostal incision 7/18 with mesh) presents abdominal pain and vomiting. Reports intermittent abdominal pain for the past 3-4 months and c/o gas (both in his abdomen, and passing gas). About 1 week ago noticed bulged in mid abdomen. Yesterday had multiple episodes of emesis, vomiting all night. States still passing a little gas, last BM Saturday. Patient denies chest pain, shortness of breath, fevers, chills, and headache.     NG placed in ED with ~500 ml green output.

## 2018-12-04 NOTE — ASSESSMENT & PLAN NOTE
Plan for ventral hernia repair tomorrow with general surgery.      Cardiovascular Risk Assessment:  1. Non-emergent surgery  2. No history of CAD and no active cardiac problems (such as unstable angina, decompensated heart failure, significant uncontrolled arrhythmias or severe valvular disease).  3. Functional Status: unable to climb flight of stairs, can get short of breath walking across house with walker (< 4 METS)  4. His revised cardiac risk index is 1      This individual is at low risk for cardiovascular events during the moderate risk surgery. The revised cardiovascular risk index is <1 and is associated with a <1% risk of major cardiovascular events. He  has no risk factors but will obtain ECHO. He does not have the ability to perform >4 METS but has no active cardiac conditions     Recommendation:  1. OK to proceed to Surgery without additional testing

## 2018-12-04 NOTE — ASSESSMENT & PLAN NOTE
86 yo M with ventral hernia and small bowel obstruction    -with several minutes of steady pressure ventral hernia reduced in ED, defect approx 2 cm  -only mild abdominal pain  -cont NG to LIWS for now, strict NPO  -lactate normal, does have electrolyte abnormalities likely related to emesis, no signs of bowel compromise on CT  -TERE- cont IVF  -will obs o/n and eval for any signs of bowel compromise with serial abdominal exams, no urgent need for operative exploration at this time

## 2018-12-04 NOTE — NURSING
Pt arrived to floor with NG tube to left nare and suprapubic cath intact via transport and stretcher.

## 2018-12-04 NOTE — SUBJECTIVE & OBJECTIVE
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    GERD (gastroesophageal reflux disease)     Hypertension     Thyroid disease     UTI (urinary tract infection)        Past Surgical History:   Procedure Laterality Date    BLOCK-NERVE Left 10/26/2017    Performed by [facility] Surgeon at [facility] ASIA    CHOLECYSTECTOMY      CYSTOSCOPY WITH RETROGRADE PYELOGRAM Bilateral 1/27/2017    Performed by Chaitanya Taylor Jr., MD at [facility] OR 1ST FLR    EGD (ESOPHAGOGASTRODUODENOSCOPY) N/A 8/2/2013    Performed by Nagi Talbert MD at [facility] ENDO (2ND FLR)    EYE SURGERY Right     IOL    HERNIA REPAIR      INSERTION-CATHETER-SUPRAPUBIC N/A 6/23/2017    Performed by Chaitanya Taylor Jr., MD at [facility] OR 2ND FLR    INSERTION-INTRAOCULAR LENS (IOL) Left 5/28/2018    [facility]    INSERTION-INTRAOCULAR LENS (IOL) Right 2/19/2018    Performed by [facility] at [facility] OR    INTRAOCULAR PROSTHESES INSERTION Left 5/28/2018    Procedure: INSERTION-INTRAOCULAR LENS (IOL);  Surgeon: Trinity Vazquez MD;  Location: [facility];  Service: Ophthalmology;  Laterality: Left;    JOINT REPLACEMENT      KYPHOPLASTY L3 N/A 12/27/2016    Performed by Donavan Martinez MD at [facility] OR 2ND FLR    LAMINECTOMY  12/27/2016    L2-L4    LAMINECTOMY-MINIMALLY INVASIVE L2,3 and L3,4; globus, neuromonitoring Right 12/27/2016    Performed by Donavan Martinez MD at [facility] OR 2ND FLR    LUMBAR LAMINECTOMY  12/2016    PARATHYROIDECTOMY      PHACOEMULSIFICATION OF CATARACT Left 5/28/2018    Procedure: PHACOEMULSIFICATION-ASPIRATION-CATARACT;  Surgeon: Trinity Vazquez MD;  Location: [facility];   Service: Ophthalmology;  Laterality: Left;    PHACOEMULSIFICATION-ASPIRATION-CATARACT Left 5/28/2018    Performed by [facility] at [facility] OR    PHACOEMULSIFICATION-ASPIRATION-CATARACT Right 2/19/2018    Performed by Trinity [facility]quez [facility] at [facility] OR    REPAIR OF INCARCERATED VENTRAL HERNIA WITHOUT HISTORY OF PRIOR REPAIR N/A 6/20/2018    Procedure: REPAIR, HERNIA, VENTRAL, INCARCERATED, WITHOUT HISTORY OF PRIOR REPAIR;  Surgeon: Guilherme Ordoñez MD;  Location: [facility] OR [address] [place] [address];  Service: General;  Laterality: N/A;    REPAIR, HERNIA, INGUINAL, WITHOUT HISTORY OF PRIOR REPAIR, AGE 5 YEARS OR OLDER Right 10/9/2013    Performed by Daorn Arthur MD at [facility] OR [facility] FLR    REPAIR, HERNIA, VENTRAL, INCARCERATED, WITHOUT HISTORY OF PRIOR REPAIR N/A 6/20/2018    Performed by Guilherme Ordoñez MD at [facility] OR [facility] FLR    REPLACEMENT-KNEE-TOTAL Left 10/25/2017    [facility]    TOTAL KNEE ARTHROPLASTY Bilateral     TOTAL KNEE ARTHROPLASTY Left 10/25/2017    TKR       Review of patient's allergies indicates:  No Known Allergies    No current facility-administered medications on file prior to encounter.      Current Outpatient Medications on File Prior to Encounter   Medication Sig    amLODIPine (NORVASC) 10 MG tablet TAKE 1 TABLET(10 MG) BY MOUTH EVERY DAY    aspirin (ECOTRIN) 81 MG EC tablet Take 81 mg by mouth once daily.    atorvastatin (LIPITOR) 20 MG tablet Take 1 tablet (20 mg total) by mouth once daily.    docusate sodium (COLACE) 100 MG capsule Take 1 capsule (100 mg total) by mouth 2 (two) times daily as needed for Constipation. (Patient taking differently: Take 100 mg by mouth as needed. )    hyoscyamine (LEVSIN/SL) 0.125 mg Subl DISSOLVE 1 TABLET UNDER THE TONGUE EVERY 4 HOURS AS NEEDED    hyoscyamine (LEVSIN/SL) 0.125 mg Subl DISSOLVE 1 TABLET UNDER THE TONGUE EVERY 4 HOURS AS NEEDED    influenza (FLUZONE HIGH-DOSE) 180 mcg/0.5 mL vaccine Inject 0.5 mLs  "into the muscle.    lactulose (CHRONULAC) 20 gram/30 mL Soln Take 30 mLs (20 g total) by mouth 2 (two) times daily as needed.    levothyroxine (SYNTHROID) 50 MCG tablet GIVE "PENNY" 1 TABLET BY MOUTH ONCE DAILY.    omeprazole (PRILOSEC) 40 MG capsule Take 1 capsule (40 mg total) by mouth every morning. Take 30 minutes before eating on an empty stomach    ondansetron (ZOFRAN-ODT) 8 MG TbDL Take 1 tablet (8 mg total) by mouth every 12 (twelve) hours as needed.    oxyCODONE-acetaminophen (PERCOCET) 5-325 mg per tablet Take 1 tablet by mouth every 12 (twelve) hours as needed.    pneumococcal 23-raquel ps vaccine (PNEUMOVAX 23) 25 mcg/0.5 mL Inject 0.5 mLs into the muscle.    senna-docusate 8.6-50 mg (PERICOLACE) 8.6-50 mg per tablet Take 1 tablet by mouth 2 (two) times daily.    traZODone (DESYREL) 50 MG tablet Take 1 tablet (50 mg total) by mouth nightly.    triamterene-hydrochlorothiazide 37.5-25 mg (DYAZIDE) 37.5-25 mg per capsule TAKE 1 CAPSULE BY MOUTH EVERY DAY IN THE MORNING     Family History     Problem Relation (Age of Onset)    Diabetes Father    Esophageal cancer Father    Hypertension Mother        Tobacco Use    Smoking status: Former Smoker     Years: 20.00     Last attempt to quit:      Years since quittin.9    Smokeless tobacco: Never Used    Tobacco comment: quit    Substance and Sexual Activity    Alcohol use: No     Comment: rarely/6 months    Drug use: No    Sexual activity: No     Review of Systems   Constitutional: Negative for chills, fatigue and fever.   HENT: Negative for congestion.    Eyes: Negative for visual disturbance.   Respiratory: Negative for cough, choking, chest tightness, shortness of breath, wheezing and stridor.    Cardiovascular: Positive for leg swelling. Negative for chest pain and palpitations.   Gastrointestinal: Positive for abdominal distention, abdominal pain, nausea and vomiting. Negative for blood in stool, constipation and diarrhea. "   Endocrine: Negative for polyuria.   Genitourinary: Negative for dysuria and hematuria.   Musculoskeletal: Positive for arthralgias and back pain.   Skin: Negative for color change, pallor, rash and wound.   Allergic/Immunologic: Negative.    Neurological: Negative for dizziness, facial asymmetry, light-headedness and headaches.   Hematological: Negative.    Psychiatric/Behavioral: Negative.      Objective:     Vital Signs (Most Recent):  Temp: 97.8 °F (36.6 °C) (12/04/18 1138)  Pulse: 73 (12/04/18 1138)  Resp: 20 (12/04/18 1138)  BP: 139/64 (12/04/18 1138)  SpO2: 97 % (12/04/18 1138) Vital Signs (24h Range):  Temp:  [97.5 °F (36.4 °C)-98.8 °F (37.1 °C)] 97.8 °F (36.6 °C)  Pulse:  [64-88] 73  Resp:  [12-29] 20  SpO2:  [89 %-97 %] 97 %  BP: (121-153)/(57-87) 139/64     Weight: 77.4 kg (170 lb 10.2 oz)  Body mass index is 25.57 kg/m².    Physical Exam   Constitutional: He is oriented to person, place, and time. He appears well-developed and well-nourished. No distress.   HENT:   Head: Normocephalic and atraumatic.   Eyes: EOM are normal.   Neck: Normal range of motion.   Cardiovascular: Normal rate, regular rhythm, normal heart sounds and intact distal pulses. Exam reveals no gallop and no friction rub.   No murmur heard.  Pulmonary/Chest: Effort normal and breath sounds normal. No stridor. No respiratory distress. He has no wheezes. He has no rales.   Abdominal: Soft. Bowel sounds are normal. He exhibits distension. There is tenderness in the epigastric area. There is no rigidity and no guarding. A hernia is present. Hernia confirmed positive in the ventral area.   Musculoskeletal: Normal range of motion. He exhibits edema.   Neurological: He is alert and oriented to person, place, and time.   Skin: He is not diaphoretic.   Nursing note and vitals reviewed.      Significant Labs: All pertinent labs within the past 24 hours have been reviewed.    Significant Imaging: I have reviewed all pertinent imaging  results/findings within the past 24 hours.

## 2018-12-04 NOTE — HPI
86 yo M with htn, h/o ventral incisional hernia repair (at former open helen right subcostal incision 7/18 with mesh) p/w abdominal pain and vomiting. Reports intermittent abdominal pain for the past 3-4 months and c/o gas (both in his abdomen, and passing gas). About 1 week ago noticed bulged in mid abdomen. Yesterday had multiple episodes of emesis, vomiting all night. States still passing a little gas, last BM Saturday. NG placed in ED with ~500 ml green output.

## 2018-12-04 NOTE — ED NOTES
Adult focused assessment    Gastro: Bowel sounds audible and active x4 quadrants. Abdomen is round, tender, and distended, Hernia noticeable. Patient has NG tube left nostril in place and connected to suction. Currently states he is comfortable and does not need any pain medication

## 2018-12-04 NOTE — ED NOTES
Adult Physical Assessment:    Appearance: Patient resting comfortably on stretcher, and is in no acute distress at this time. Patient is clean and well groomed gown on patient. Patient not currently complaining of discomfort     Cardiac: Heart sounds audible and without abnormalities noted. Patient attached to continuous cardiac monitor, automatic/cycling BP cuff, and continuous pulse ox. Patient has a normal rate and regular rhythm. Patient wears BEE hose for bilateral lower limb edema. Does not appear swollen at the moment     Neuro/LOC: Eyes open spontaneously, behavior appropriate to situation, follows commands, facial expression symmetrical, purposeful motor response noted. AAOx4; The patient is awake, alert and aware of environment with an appropriate affect, and speaking appropriately. Patient denies dizziness and HA    Respiratory: Breath sounds audible, equal and clear bilaterally. Airway is open and patent, respirations are spontaneous, even, and unlabored. Normal effort and rate noted, and without accessory muscle use.    Skin: Intact, warm and dry. Color is consistent with ethnicity. Normal skin turgor and moist mucous membranes.    Gastro: Bowel sounds audible and active x4 quadrants. Abdomen is round and tender. Appears somewhat distended. Ventral Hernia noted. Patient states it was pushed back in by MD but was unsuccessful. Patient has NG tube in place and connected to suction left nostril. Patient not complaining of nausea or vomiting at the moment but states it comes and goes    GE: Patient has suprapubic galvan which was in place at arrival. Site is clean and intact. Dependent drainage to galvan bag    Musculoskeletal: Patient moving all extremities spontaneously. No obvious swelling or deformities noted.     Peripheral vascular: Pulses +2 x4 upper/lower extremities.     -Patient identifiers verified and correct for this patient.  -Patient resting with bedrails up x2 for safety, bed locked in low  position, and call bell within reach.  Fall risk band applied to patient for safety

## 2018-12-04 NOTE — PROGRESS NOTES
[facility]  General Surgery  Progress Note    Subjective:     History of Present Illness:  84 yo M with htn, h/o ventral incisional hernia repair (at former open helen right subcostal incision 7/18 with mesh) p/w abdominal pain and vomiting. Reports intermittent abdominal pain for the past 3-4 months and c/o gas (both in his abdomen, and passing gas). About 1 week ago noticed bulged in mid abdomen. Yesterday had multiple episodes of emesis, vomiting all night. States still passing a little gas, last BM Saturday. NG placed in ED with ~500 ml green output.     Post-Op Info:  * No surgery found *         Interval History: Feels better. No nausea. +flatus, no bm. Does not want to eat.    Medications:  Continuous Infusions:   sodium chloride 0.9% 125 mL/hr at 12/04/18 0855     Scheduled Meds:   enoxaparin  40 mg Subcutaneous Daily    pantoprazole  40 mg Intravenous Daily    sodium chloride 0.9%  3 mL Intravenous Q8H     PRN Meds:HYDROmorphone, naloxone, ondansetron     Review of patient's allergies indicates:  No Known Allergies  Objective:     Vital Signs (Most Recent):  Temp: 97.8 °F (36.6 °C) (12/04/18 1138)  Pulse: 73 (12/04/18 1138)  Resp: 20 (12/04/18 1138)  BP: 139/64 (12/04/18 1138)  SpO2: 97 % (12/04/18 1138) Vital Signs (24h Range):  Temp:  [97.5 °F (36.4 °C)-98.8 °F (37.1 °C)] 97.8 °F (36.6 °C)  Pulse:  [64-88] 73  Resp:  [12-29] 20  SpO2:  [89 %-97 %] 97 %  BP: (121-153)/(57-87) 139/64     Weight: 77.4 kg (170 lb 10.2 oz)  Body mass index is 25.57 kg/m².    Intake/Output - Last 3 Shifts       12/02 0700 - 12/03 0659 12/03 0700 - 12/04 0659 12/04 0700 - 12/05 0659    IV Piggyback  1300     Total Intake(mL/kg)  1300 (16.8)     Urine (mL/kg/hr)  400     Drains  600     Total Output  1000     Net  +300                  Physical Exam   Constitutional: He is oriented to person, place, and time. He appears well-developed and well-nourished. No distress.   Cardiovascular: Normal rate.    Pulmonary/Chest: Effort normal.   Abdominal:   Midline hernia, bowel out again. Easily reducible with some effort.  Only mild discomfort.  NGT thick brown   Musculoskeletal: Normal range of motion. He exhibits no edema.   Neurological: He is alert and oriented to person, place, and time.   Skin: Skin is warm and dry.   Psychiatric: He has a normal mood and affect. His behavior is normal.       Significant Labs:  CBC:   Recent Labs   Lab 12/04/18  0351   WBC 8.06   RBC 4.46*   HGB 12.0*   HCT 35.5*      MCV 80*   MCH 26.9*   MCHC 33.8     CMP:   Recent Labs   Lab 12/03/18  1751 12/04/18  0351   * 89   CALCIUM 10.2 8.4*   ALBUMIN 4.2  --    PROT 8.9*  --    * 128*   K 3.2* 3.0*   CO2 32* 29   CL 86* 89*   BUN 22 21   CREATININE 1.5* 1.2   ALKPHOS 107  --    ALT 7*  --    AST 17  --    BILITOT 1.6*  --        Significant Diagnostics:  CT: I have reviewed all pertinent results/findings within the past 24 hours and my personal findings are:  SBO    Assessment/Plan:     * Ventral hernia with obstruction    84 yo M with ventral hernia and small bowel obstruction    Hernia reducible but comes back out and incarcerates. We are recommending surgery this admission to prevent obstruction/strangulation.  Medical clearance: former alcoholic, HTN, CKD, debility.  NS, slow correction of electrolytes.  Plan for operation Wed v Thurs pending medical consult    Jovanna Killian PGY5  [phone]           Jovanna Killian MD  General Surgery  [facility]

## 2018-12-04 NOTE — CONSULTS
[facility]  General Surgery  Consult Note    Patient Name: Chucho Prado  MRN: [id number]  Code Status: Full Code  Admission Date: 12/3/2018  Hospital Length of Stay: 0 days  Attending Physician: Dez Gupta, *  Primary Care Provider: Obed Mcguire MD    Patient information was obtained from patient, past medical records and ER records.     Inpatient consult to General surgery  Consult performed by: Susie Rachel MD  Consult ordered by: Dez Gupta MD        Subjective:     Principal Problem: <principal problem not specified>    History of Present Illness: 86 yo M with htn, h/o ventral incisional hernia repair (at former [facility] right subcostal incision 7/18 with mesh) p/w abdominal pain and vomiting. Reports intermittent abdominal pain for the past 3-4 months and c/o gas (both in his abdomen, and passing gas). About 1 week ago noticed bulged in mid abdomen. Yesterday had multiple episodes of emesis, vomiting all night. States still passing a little gas, last BM Saturday. NG placed in ED with ~500 ml green output.     No current facility-administered medications on file prior to encounter.      Current Outpatient Medications on File Prior to Encounter   Medication Sig    amLODIPine (NORVASC) 10 MG tablet TAKE 1 TABLET(10 MG) BY MOUTH EVERY DAY    aspirin (ECOTRIN) 81 MG EC tablet Take 81 mg by mouth once daily.    atorvastatin (LIPITOR) 20 MG tablet Take 1 tablet (20 mg total) by mouth once daily.    docusate sodium (COLACE) 100 MG capsule Take 1 capsule (100 mg total) by mouth 2 (two) times daily as needed for Constipation. (Patient taking differently: Take 100 mg by mouth as needed. )    hyoscyamine (LEVSIN/SL) 0.125 mg Subl DISSOLVE 1 TABLET UNDER THE TONGUE EVERY 4 HOURS AS NEEDED    hyoscyamine (LEVSIN/SL) 0.125 mg Subl DISSOLVE 1 TABLET UNDER THE TONGUE EVERY 4 HOURS AS NEEDED    influenza (FLUZONE HIGH-DOSE) 180 mcg/0.5 mL vaccine Inject 0.5 mLs into  "the muscle.    lactulose (CHRONULAC) 20 gram/30 mL Soln Take 30 mLs (20 g total) by mouth 2 (two) times daily as needed.    levothyroxine (SYNTHROID) 50 MCG tablet GIVE "PENNY" 1 TABLET BY MOUTH ONCE DAILY.    omeprazole (PRILOSEC) 40 MG capsule Take 1 capsule (40 mg total) by mouth every morning. Take 30 minutes before eating on an empty stomach    ondansetron (ZOFRAN-ODT) 8 MG TbDL Take 1 tablet (8 mg total) by mouth every 12 (twelve) hours as needed.    oxyCODONE-acetaminophen (PERCOCET) 5-325 mg per tablet Take 1 tablet by mouth every 12 (twelve) hours as needed.    pneumococcal 23-raquel ps vaccine (PNEUMOVAX 23) 25 mcg/0.5 mL Inject 0.5 mLs into the muscle.    senna-docusate 8.6-50 mg (PERICOLACE) 8.6-50 mg per tablet Take 1 tablet by mouth 2 (two) times daily.    traZODone (DESYREL) 50 MG tablet Take 1 tablet (50 mg total) by mouth nightly.    triamterene-hydrochlorothiazide 37.5-25 mg (DYAZIDE) 37.5-25 mg per capsule TAKE 1 CAPSULE BY MOUTH EVERY DAY IN THE MORNING       Review of patient's allergies indicates:  No Known Allergies    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    GERD (gastroesophageal reflux disease)     Hypertension     Thyroid disease     UTI (urinary tract infection)      Past Surgical History:   Procedure Laterality Date    BLOCK-NERVE Left 10/26/2017    Performed by [facility] Surgeon at [facility] ASIA    CHOLECYSTECTOMY      CYSTOSCOPY WITH RETROGRADE PYELOGRAM Bilateral 1/27/2017    Performed by Chaitanya Taylor Jr., MD at [facility] OR 1ST FLR    EGD (ESOPHAGOGASTRODUODENOSCOPY) N/A 8/2/2013    Performed by Naig Talbert MD at [facility] ENDO " (2ND FLR)    EYE SURGERY Right     IOL    HERNIA REPAIR      INSERTION-CATHETER-SUPRAPUBIC N/A 6/23/2017    Performed by [facility]anya [facility] Jr., MD at [facility] OR 2ND FLR    INSERTION-INTRAOCULAR LENS (IOL) Left 5/28/2018    Performed by [facility]quez MD at [facility] OR    INSERTION-INTRAOCULAR LENS (IOL) Right 2/19/2018    Performed by Trinity [facility]quez MD at [facility] OR    INTRAOCULAR PROSTHESES INSERTION Left 5/28/2018    Procedure: INSERTION-INTRAOCULAR LENS (IOL);  Surgeon: Trinity Vazquez MD;  Location: [facility] OR;  Service: Ophthalmology;  Laterality: Left;    JOINT REPLACEMENT      KYPHOPLASTY L3 N/A 12/27/2016    Performed by Donavan Martinez MD at [facility] OR 2ND FLR    LAMINECTOMY  12/27/2016    L2-L4    LAMINECTOMY-MINIMALLY INVASIVE L2,3 and L3,4; globus, neuromonitoring Right 12/27/2016    Performed by Donavan Martinez MD at [facility] OR 2ND FLR    LUMBAR LAMINECTOMY  12/2016    PARATHYROIDECTOMY      PHACOEMULSIFICATION OF CATARACT Left 5/28/2018    Procedure: PHACOEMULSIFICATION-ASPIRATION-CATARACT;  Surgeon: Trinity Vazquez MD;  Location: [facility];  Service: Ophthalmology;  Laterality: Left;    PHACOEMULSIFICATION-ASPIRATION-CATARACT Left 5/28/2018    Performed by Trinity V[facility]quez MD at [facility] OR    PHACOEMULSIFICATION-ASPIRATION-CATARACT Right 2/19/2018    Performed by Trinity [facility]quez MD at [facility] OR    REPAIR OF INCARCERATED VENTRAL HERNIA WITHOUT HISTORY OF PRIOR REPAIR N/A 6/20/2018    Procedure: REPAIR, HERNIA, VENTRAL, INCARCERATED, WITHOUT HISTORY OF PRIOR REPAIR;  Surgeon: Guilherme Ordoñez MD;  [facility];  Service: General;  Laterality: N/A;    REPAIR, HERNIA, INGUINAL, WITHOUT HISTORY OF PRIOR REPAIR, AGE 5 YEARS OR OLDER Right 10/9/2013    Performed by Daron Arthur MD at [facility] OR 2ND FLR    REPAIR, HERNIA, VENTRAL, INCARCERATED, WITHOUT HISTORY OF PRIOR REPAIR N/A 6/20/2018    Performed by Guilherme Ordoñez MD at [facility] OR 2ND FLR    REPLACEMENT-KNEE-TOTAL Left 10/25/2017    Performed  by [facility] F[facility]    TOTAL KNEE ARTHROPLASTY Bilateral     TOTAL KNEE ARTHROPLASTY Left 10/25/2017    TKR     Family History     Problem Relation (Age of Onset)    Diabetes Father    Esophageal cancer Father    Hypertension Mother        Tobacco Use    Smoking status: Former Smoker     Years: 20.00     Last attempt to quit:      Years since quittin.9    Smokeless tobacco: Never Used    Tobacco comment: quit    Substance and Sexual Activity    Alcohol use: No     Comment: rarely/6 months    Drug use: No    Sexual activity: No     Review of Systems   Constitutional: Positive for activity change. Negative for fever.   Respiratory: Negative for shortness of breath.    Cardiovascular: Negative for chest pain.   Gastrointestinal: Positive for abdominal distention, abdominal pain, constipation, nausea and vomiting.   Genitourinary:        SPT in place   Skin: Negative for color change and wound.   Neurological: Positive for weakness.     Objective:     Vital Signs (Most Recent):  Temp: 98.8 °F (37.1 °C) (18 230)  Pulse: 82 (18 2302)  Resp: 17 (18)  BP: (!) 148/65 (18 230)  SpO2: 95 % (182) Vital Signs (24h Range):  Temp:  [98.3 °F (36.8 °C)-98.8 °F (37.1 °C)] 98.8 °F (37.1 °C)  Pulse:  [80-88] 82  Resp:  [12-29] 17  SpO2:  [93 %-95 %] 95 %  BP: (121-153)/(57-87) 148/65     Weight: 90.3 kg (199 lb)  Body mass index is 29.82 kg/m².    Physical Exam   Constitutional: He is oriented to person, place, and time. No distress.   HENT:   Head: Normocephalic and atraumatic.   NG in place with green output   Cardiovascular: Normal rate.   Pulmonary/Chest: Effort normal. No respiratory distress.   Abdominal:   Right subcostal incision well-healed  Patient with hernia with bowel to the left and just above umbilicus with mild tenderness in this area, no significant tenderness in the rest of his abdomen, no overlying skin changes   Neurological: He  is alert and oriented to person, place, and time.   Skin: Skin is warm and dry. He is not diaphoretic.   Psychiatric: He has a normal mood and affect. His behavior is normal.       Significant Labs:  CBC:   Recent Labs   Lab 12/03/18  1751   WBC 8.82   RBC 5.77   HGB 15.5   HCT 44.5      MCV 77*   MCH 26.9*   MCHC 34.8     Lactate 1.6    Significant Diagnostics:  CT abd- hernia by umbilicus with bowel without wall thickening or fluid- bowel dilated proximally and collapsed distally      Assessment/Plan:     Ventral hernia with obstruction    84 yo M with ventral hernia and small bowel obstruction    -with several minutes of steady pressure ventral hernia reduced in ED, defect approx 2 cm  -only mild abdominal pain  -cont NG to LIWS for now, strict NPO  -lactate normal, does have electrolyte abnormalities likely related to emesis, no signs of bowel compromise on CT  -TERE- cont IVF  -will obs o/n and eval for any signs of bowel compromise with serial abdominal exams, no urgent need for operative exploration at this time       VTE Risk Mitigation (From admission, onward)        Ordered     enoxaparin injection 40 mg  Daily      12/03/18 2322     Place sequential compression device  Until discontinued      12/03/18 2322     IP VTE HIGH RISK PATIENT  Once      12/03/18 2322          Thank you for your consult. I will follow-up with patient. Please contact us if you have any additional questions.    Susie Rachel MD  General Surgery  [facility]

## 2018-12-04 NOTE — ANESTHESIA PREPROCEDURE EVALUATION
[facility]  Anesthesia Pre-Operative Evaluation         Patient Name: Chucho Prado  YOB: 1933  MRN: [id number]    SUBJECTIVE:     Pre-operative evaluation for Procedure(s) (LRB):  REPAIR, HERNIA, INCISIONAL, INCARCERATED, WITHOUT HISTORY OF PRIOR REPAIR (N/A)     12/04/2018    Chucho Prado is a 85 y.o. male w/ a significant PMHx of HTN, HLD, GERD, CKD3, hypothyroidism, arthritis,  ventral incisional hernia repair who presented with abdominal pain and vomiting.   Patient with bulge in mid abdomen.  CT scan revealing SBO in umbilical hernia.        Patient now scheduled for the above procedure(s).      LDA: None documented.       Peripheral IV - Single Lumen 12/03/18 1707 Right Forearm (Active)   Site Assessment Clean;Intact;Dry;No redness;No swelling 12/4/2018  5:32 AM   Line Status Infusing 12/4/2018  5:32 AM   Dressing Status Clean;Dry;Intact 12/4/2018  5:32 AM   Number of days: 0            NG/OG Tube 12/03/18 1913 nasogastric Left nostril (Active)   Tolerance no signs/symptoms of discomfort 12/4/2018 12:30 AM   Securement taped to nostril center 12/4/2018  5:32 AM   Clamp Status/Tolerance nausea;unclamped 12/4/2018 12:30 AM   Suction Setting/Drainage Method low;intermittent setting 12/4/2018  5:32 AM   Insertion Site Appearance no redness, warmth, tenderness, skin breakdown, drainage 12/4/2018  5:32 AM   Drainage Bile;Green 12/4/2018  5:32 AM   Tube Output(mL)(Include Discarded Residual) 200 mL 12/4/2018  4:06 AM   Number of days: 0            Suprapubic Catheter 06/23/17 1200 18 Fr. (Active)   Number of days: 529       Prev airway: None documented.    Drips: None documented.   sodium chloride 0.9% 125 mL/hr at 12/04/18 0855       Patient Active Problem List   Diagnosis    Hypothyroidism    Essential hypertension    Inguinal hernia, right    Dyslipidemia    Depression    Iron deficiency anemia    Sciatica neuralgia    CKD (chronic kidney disease) stage 3, GFR  "30-59 ml/min    S/P lumbar laminectomy    S/P kyphoplasty- L3 kyphoplasty     Suprapubic catheter    Hyponatremia    Nuclear sclerosis, bilateral    Nuclear sclerotic cataract of left eye    Hypokalemia    Impaired mobility and ADLs    Ventral hernia with obstruction    Neurogenic bladder    Incarcerated hernia    History of postoperative delirium    GERD (gastroesophageal reflux disease)    Sleep disorder    Chronic back pain       Review of patient's allergies indicates:  No Known Allergies    Current Inpatient Medications:   enoxaparin  40 mg Subcutaneous Daily    pantoprazole  40 mg Intravenous Daily    potassium chloride  10 mEq Intravenous Q1H    sodium chloride 0.9%  3 mL Intravenous Q8H       No current facility-administered medications on file prior to encounter.      Current Outpatient Medications on File Prior to Encounter   Medication Sig Dispense Refill    amLODIPine (NORVASC) 10 MG tablet TAKE 1 TABLET(10 MG) BY MOUTH EVERY DAY 90 tablet 11    aspirin (ECOTRIN) 81 MG EC tablet Take 81 mg by mouth once daily.      atorvastatin (LIPITOR) 20 MG tablet Take 1 tablet (20 mg total) by mouth once daily. 30 tablet 11    docusate sodium (COLACE) 100 MG capsule Take 1 capsule (100 mg total) by mouth 2 (two) times daily as needed for Constipation. (Patient taking differently: Take 100 mg by mouth as needed. ) 60 capsule 0    hyoscyamine (LEVSIN/SL) 0.125 mg Subl DISSOLVE 1 TABLET UNDER THE TONGUE EVERY 4 HOURS AS NEEDED 540 tablet 1    hyoscyamine (LEVSIN/SL) 0.125 mg Subl DISSOLVE 1 TABLET UNDER THE TONGUE EVERY 4 HOURS AS NEEDED 30 tablet 0    influenza (FLUZONE HIGH-DOSE) 180 mcg/0.5 mL vaccine Inject 0.5 mLs into the muscle. 0.5 mL 0    lactulose (CHRONULAC) 20 gram/30 mL Soln Take 30 mLs (20 g total) by mouth 2 (two) times daily as needed. 900 mL 1    levothyroxine (SYNTHROID) 50 MCG tablet GIVE "PENNY" 1 TABLET BY MOUTH ONCE DAILY. 90 tablet 11    omeprazole (PRILOSEC) 40 MG " capsule Take 1 capsule (40 mg total) by mouth every morning. Take 30 minutes before eating on an empty stomach 30 capsule 1    ondansetron (ZOFRAN-ODT) 8 MG TbDL Take 1 tablet (8 mg total) by mouth every 12 (twelve) hours as needed. 14 tablet 0    oxyCODONE-acetaminophen (PERCOCET) 5-325 mg per tablet Take 1 tablet by mouth every 12 (twelve) hours as needed. 30 tablet 0    pneumococcal 23-raquel ps vaccine (PNEUMOVAX 23) 25 mcg/0.5 mL Inject 0.5 mLs into the muscle. 0.5 mL 0    senna-docusate 8.6-50 mg (PERICOLACE) 8.6-50 mg per tablet Take 1 tablet by mouth 2 (two) times daily.      traZODone (DESYREL) 50 MG tablet Take 1 tablet (50 mg total) by mouth nightly. 90 tablet 11    triamterene-hydrochlorothiazide 37.5-25 mg (DYAZIDE) 37.5-25 mg per capsule TAKE 1 CAPSULE BY MOUTH EVERY DAY IN THE MORNING 90 capsule 11       Past Surgical History:   Procedure Laterality Date    BLOCK-NERVE Left 10/26/2017    Performed by Asia Surgeon at [facility] ASIA    CHOLECYSTECTOMY      CYSTOSCOPY WITH RETROGRADE PYELOGRAM Bilateral 1/27/2017    Performed by Chaitanya Taylor Jr., MD at [facility] OR 1ST FLR    EGD (ESOPHAGOGASTRODUODENOSCOPY) N/A 8/2/2013    Performed by Nagi Talbert MD at [facility] ENDO (2ND FLR)    EYE SURGERY Right     IOL    HERNIA REPAIR      INSERTION-CATHETER-SUPRAPUBIC N/A 6/23/2017    Performed by Chaitanya Taylor Jr., MD at [facility] OR 2ND FLR    INSERTION-INTRAOCULAR LENS (IOL) Left 5/28/2018    Performed by [facility]quez MD at [facility] OR    INSERTION-INTRAOCULAR LENS (IOL) Right 2/19/2018    Performed by Trinity [facility]quez MD at [facility] OR    INTRAOCULAR PROSTHESES INSERTION Left 5/28/2018    Procedure: INSERTION-INTRAOCULAR LENS (IOL);  Surgeon: Trinity Vazquez MD;  Location: [facility] OR;  Service: Ophthalmology;  Laterality: Left;    JOINT REPLACEMENT      KYPHOPLASTY L3 N/A 12/27/2016    Performed by Donavan Martinez MD at [facility] OR 2ND FLR    LAMINECTOMY  12/27/2016    L2-L4    LAMINECTOMY-MINIMALLY INVASIVE L2,3 and  L3,4; globus, neuromonitoring Right 12/27/2016    Performed by Donavan Martinez MD at [facility] OR 2ND FLR    LUMBAR LAMINECTOMY  12/2016    PARATHYROIDECTOMY      PHACOEMULSIFICATION OF CATARACT Left 5/28/2018    Procedure: PHACOEMULSIFICATION-ASPIRATION-CATARACT;  Surgeon: Trinity Vazquez MD;  Location: [facility] OR;  Service: Ophthalmology;  Laterality: Left;    PHACOEMULSIFICATION-ASPIRATION-CATARACT Left 5/28/2018    Performed by Trinity [facility]quez MD at [facility] OR    PHACOEMULSIFICATION-ASPIRATION-CATARACT Right 2/19/2018    Performed by Trinity [facility]quez MD at [facility] OR    REPAIR OF INCARCERATED VENTRAL HERNIA WITHOUT HISTORY OF PRIOR REPAIR N/A 6/20/2018    Procedure: REPAIR, HERNIA, VENTRAL, INCARCERATED, WITHOUT HISTORY OF PRIOR REPAIR;  Surgeon: Guilherme Ordoñez MD;  [facility];  Service: General;  Laterality: N/A;    REPAIR, HERNIA, INGUINAL, WITHOUT HISTORY OF PRIOR REPAIR, AGE 5 YEARS OR OLDER Right 10/9/2013    Performed by Daron Arthur MD at [facility] OR 2ND FLR    REPAIR, HERNIA, VENTRAL, INCARCERATED, WITHOUT HISTORY OF PRIOR REPAIR N/A 6/20/2018    Performed by [facility]herme [facility]ñez MD at [facility] OR [facility]LR    REPLACEMENT-KNEE-TOTAL Left 10/25/2017    Performed by [facility] at [facility] OR 2ND FLR    TOTAL KNEE ARTHROPLASTY Bilateral     TOTAL KNEE ARTHROPLASTY Left 10/25/2017    TKR       Social History     Socioeconomic History    Marital status: Single     Spouse name: Not on file    Number of children: Not on file    Years of education: Not on file    Highest education level: Not on file   Social Needs    Financial resource strain: Not on file    Food insecurity - worry: Not on file    Food insecurity - inability: Not on file    Transportation needs - medical: Not on file    Transportation needs - non-medical: Not on file   Occupational History    Not on file   Tobacco Use    Smoking status: Former Smoker     Years: 20.00     Last attempt to quit: 1990     Years since  quittin.9    Smokeless tobacco: Never Used    Tobacco comment: quit    Substance and Sexual Activity    Alcohol use: No     Comment: rarely/6 months    Drug use: No    Sexual activity: No   Other Topics Concern    Not on file   Social History Narrative    Lives alone, owns house and rents part. Delaney helps. 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        OBJECTIVE:     Vital Signs Range (Last 24H):  Temp:  [36.4 °C (97.5 °F)-37.1 °C (98.8 °F)]   Pulse:  [64-88]   Resp:  [12-29]   BP: (121-153)/(57-87)   SpO2:  [89 %-97 %]       Significant Labs:  Lab Results   Component Value Date    WBC 8.06 2018    HGB 12.0 (L) 2018    HCT 35.5 (L) 2018     2018    CHOL 222 (H) 10/09/2018    TRIG 157 (H) 10/09/2018    HDL 44 10/09/2018    ALT 7 (L) 2018    AST 17 2018     (L) 2018    K 3.0 (L) 2018    CL 89 (L) 2018    CREATININE 1.2 2018    BUN 21 2018    CO2 29 2018    TSH 1.833 10/09/2018    INR 0.9 10/23/2017       Diagnostic Studies: No relevant studies.    EKG:   Sinus rhythm with Premature supraventricular complexes and Fusion complexes  LVH with repolarization abnormality  Abnormal ECG  When compared with ECG of 20-DEC-2016 14:55,  Fusion complexes are now Present  Premature supraventricular complexes are now Present  Nonspecific T wave abnormality no longer evident in Inferior leads    2D ECHO:  No results found for this or any previous visit.      ASSESSMENT/PLAN:         Anesthesia Evaluation    I have reviewed the Patient Summary Reports.    I have reviewed the Nursing Notes.   I have reviewed the Medications.     Review of Systems  Anesthesia Hx:  No problems with previous Anesthesia Denies Hx of Anesthetic complications    Social:  Non-Smoker    Hematology/Oncology:  Hematology Normal   Oncology Normal     EENT/Dental:EENT/Dental Normal   Cardiovascular:   Hypertension, well controlled    Pulmonary:  Pulmonary Normal     Renal/:   Chronic Renal Disease, CRI    Hepatic/GI:   GERD, well controlled    Musculoskeletal:   Arthritis     Neurological:   Neuromuscular Disease,    Endocrine:   Hypothyroidism    Dermatological:  Skin Normal    Psych:   Psychiatric History          Physical Exam  General:  Well nourished    Airway/Jaw/Neck:  Airway Findings: Mouth Opening: Normal Tongue: Normal  General Airway Assessment: Adult  Mallampati: II  TM Distance: Normal, at least 6 cm  Jaw/Neck Findings:     Neck ROM: Normal ROM     Eyes/Ears/Nose:  Eyes/Ears/Nose Findings: NGT in place    Dental:  Dental Findings: In tact   Chest/Lungs:  Chest/Lungs Findings: Clear to auscultation     Heart/Vascular:  Heart Findings: Rate: Normal  Rhythm: Regular Rhythm             Anesthesia Plan  Type of Anesthesia, risks & benefits discussed:  Anesthesia Type:  general  Patient's Preference:   Intra-op Monitoring Plan: standard ASA monitors  Intra-op Monitoring Plan Comments:   Post Op Pain Control Plan: per primary service following discharge from PACU  Post Op Pain Control Plan Comments:   Induction:   IV  Beta Blocker:  Patient is not currently on a Beta-Blocker (No further documentation required).       Informed Consent: Patient understands risks and agrees with Anesthesia plan.  Questions answered. Anesthesia consent signed with patient.  ASA Score: 3     Day of Surgery Review of History & Physical:    H&P update referred to the surgeon.         Ready For Surgery From Anesthesia Perspective.

## 2018-12-04 NOTE — CONSULTS
[facility] Hospital Medicine  Consult Note    Patient Name: Chucho Prado  MRN: [id number]  Admission Date: 12/3/2018  Hospital Length of Stay: 0 days  Attending Physician: Steve Valentin MD   Primary Care Provider: Obed Mcguire MD     [place] [facility] Medicine Team: Networked reference to record PCT  Liban Kelley MD      Patient information was obtained from patient, past medical records and ER records.     Inpatient consult to Hospital Medicine-General  Consult performed by: Liban Kelley MD  Consult ordered by: Jovanna Killian MD        Subjective:     Principal Problem: Ventral hernia with obstruction    Chief Complaint:   Chief Complaint   Patient presents with    Abdominal Pain     vomiting bile, pain in chest,         HPI: Chucho Prado is an 85 year old male with HTN, CKD-3, HLD, GERD, neurogenic bladder with chronic suprapubic catheter , h/o ventral incisional hernia repair (at former open helen right subcostal incision 7/18 with mesh) presents abdominal pain and vomiting. Reports intermittent abdominal pain for the past 3-4 months and c/o gas (both in his abdomen, and passing gas). About 1 week ago noticed bulged in mid abdomen. Yesterday had multiple episodes of emesis, vomiting all night. States still passing a little gas, last BM Saturday. Patient denies chest pain, shortness of breath, fevers, chills, and headache.     NG placed in ED with ~500 ml green output.         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    GERD (gastroesophageal reflux disease)     Hypertension     Thyroid disease     UTI (urinary tract infection)        Past  Surgical History:   Procedure Laterality Date    BLOCK-NERVE Left 10/26/2017    Performed by [place] Surgeon at [facility] ASIA    CHOLECYSTECTOMY      CYSTOSCOPY WITH RETROGRADE PYELOGRAM Bilateral 1/27/2017    Performed by Chaitanya Taylor Jr., MD at [facility] OR 1ST FLR    EGD (ESOPHAGOGASTRODUODENOSCOPY) N/A 8/2/2013    Performed by Nagi Talbert MD at [facility] ENDO (2ND FLR)    EYE SURGERY Right     IOL    HERNIA REPAIR      INSERTION-CATHETER-SUPRAPUBIC N/A 6/23/2017    Performed by Chaitanya Taylor Jr., MD at [facility] OR 2ND FLR    INSERTION-INTRAOCULAR LENS (IOL) Left 5/28/2018    Performed by [facility] OR    INSERTION-INTRAOCULAR LENS (IOL) Right 2/19/2018    Performed by Trinity Vazquez MD at [facility] OR    INTRAOCULAR PROSTHESES INSERTION Left 5/28/2018    Procedure: INSERTION-INTRAOCULAR LENS (IOL);  Surgeon: Trinity Vazquez MD;  Location: [facility];  Service: Ophthalmology;  Laterality: Left;    JOINT REPLACEMENT      KYPHOPLASTY L3 N/A 12/27/2016    Performed by Donavan Martinez MD at [facility] OR 2ND FLR    LAMINECTOMY  12/27/2016    L2-L4    LAMINECTOMY-MINIMALLY INVASIVE L2,3 and L3,4; globus, neuromonitoring Right 12/27/2016    Performed by Donavan Martinez MD at [facility] OR 2ND FLR    LUMBAR LAMINECTOMY  12/2016    PARATHYROIDECTOMY      PHACOEMULSIFICATION OF CATARACT Left 5/28/2018    Procedure: PHACOEMULSIFICATION-ASPIRATION-CATARACT;  Surgeon: Trinity Vazquez MD;  Location: [facility];  Service: Ophthalmology;  Laterality: Left;    PHACOEMULSIFICATION-ASPIRATION-CATARACT Left 5/28/2018    Performed by Trinity V[facility]quez [facility] at [facility] OR    PHACOEMULSIFICATION-ASPIRATION-CATARACT Right 2/19/2018    Performed by Trinity Vazquez [facility] at [facility] OR    REPAIR OF INCARCERATED VENTRAL HERNIA WITHOUT HISTORY OF PRIOR REPAIR N/A 6/20/2018    Procedure: REPAIR, HERNIA, VENTRAL, INCARCERATED, WITHOUT HISTORY OF PRIOR REPAIR;  Surgeon: Guilherme Ordoñez MD;  [facility];  Service: General;  Laterality: N/A;  "   REPAIR, HERNIA, INGUINAL, WITHOUT HISTORY OF PRIOR REPAIR, AGE 5 YEARS OR OLDER Right 10/9/2013    Performed by Daron Arthur MD at [facility] OR 2ND FLR    REPAIR, HERNIA, VENTRAL, INCARCERATED, WITHOUT HISTORY OF PRIOR REPAIR N/A 6/20/2018    Performed by Guilherme Ordoñez MD at [facility] OR 2ND FLR    REPLACEMENT-KNEE-TOTAL Left 10/25/2017    [facility] by [facility] OR 2ND FLR    TOTAL KNEE ARTHROPLASTY Bilateral     TOTAL KNEE ARTHROPLASTY Left 10/25/2017    TKR       Review of patient's allergies indicates:  No Known Allergies    No current facility-administered medications on file prior to encounter.      Current Outpatient Medications on File Prior to Encounter   Medication Sig    amLODIPine (NORVASC) 10 MG tablet TAKE 1 TABLET(10 MG) BY MOUTH EVERY DAY    aspirin (ECOTRIN) 81 MG EC tablet Take 81 mg by mouth once daily.    atorvastatin (LIPITOR) 20 MG tablet Take 1 tablet (20 mg total) by mouth once daily.    docusate sodium (COLACE) 100 MG capsule Take 1 capsule (100 mg total) by mouth 2 (two) times daily as needed for Constipation. (Patient taking differently: Take 100 mg by mouth as needed. )    hyoscyamine (LEVSIN/SL) 0.125 mg Subl DISSOLVE 1 TABLET UNDER THE TONGUE EVERY 4 HOURS AS NEEDED    hyoscyamine (LEVSIN/SL) 0.125 mg Subl DISSOLVE 1 TABLET UNDER THE TONGUE EVERY 4 HOURS AS NEEDED    influenza (FLUZONE HIGH-DOSE) 180 mcg/0.5 mL vaccine Inject 0.5 mLs into the muscle.    lactulose (CHRONULAC) 20 gram/30 mL Soln Take 30 mLs (20 g total) by mouth 2 (two) times daily as needed.    levothyroxine (SYNTHROID) 50 MCG tablet GIVE "PENNY" 1 TABLET BY MOUTH ONCE DAILY.    omeprazole (PRILOSEC) 40 MG capsule Take 1 capsule (40 mg total) by mouth every morning. Take 30 minutes before eating on an empty stomach    ondansetron (ZOFRAN-ODT) 8 MG TbDL Take 1 tablet (8 mg total) by mouth every 12 (twelve) hours as needed.    oxyCODONE-acetaminophen (PERCOCET) 5-325 mg per " tablet Take 1 tablet by mouth every 12 (twelve) hours as needed.    pneumococcal 23-raquel ps vaccine (PNEUMOVAX 23) 25 mcg/0.5 mL Inject 0.5 mLs into the muscle.    senna-docusate 8.6-50 mg (PERICOLACE) 8.6-50 mg per tablet Take 1 tablet by mouth 2 (two) times daily.    traZODone (DESYREL) 50 MG tablet Take 1 tablet (50 mg total) by mouth nightly.    triamterene-hydrochlorothiazide 37.5-25 mg (DYAZIDE) 37.5-25 mg per capsule TAKE 1 CAPSULE BY MOUTH EVERY DAY IN THE MORNING     Family History     Problem Relation (Age of Onset)    Diabetes Father    Esophageal cancer Father    Hypertension Mother        Tobacco Use    Smoking status: Former Smoker     Years: 20.00     Last attempt to quit:      Years since quittin.9    Smokeless tobacco: Never Used    Tobacco comment: quit    Substance and Sexual Activity    Alcohol use: No     Comment: rarely/6 months    Drug use: No    Sexual activity: No     Review of Systems   Constitutional: Negative for chills, fatigue and fever.   HENT: Negative for congestion.    Eyes: Negative for visual disturbance.   Respiratory: Negative for cough, choking, chest tightness, shortness of breath, wheezing and stridor.    Cardiovascular: Positive for leg swelling. Negative for chest pain and palpitations.   Gastrointestinal: Positive for abdominal distention, abdominal pain, nausea and vomiting. Negative for blood in stool, constipation and diarrhea.   Endocrine: Negative for polyuria.   Genitourinary: Negative for dysuria and hematuria.   Musculoskeletal: Positive for arthralgias and back pain.   Skin: Negative for color change, pallor, rash and wound.   Allergic/Immunologic: Negative.    Neurological: Negative for dizziness, facial asymmetry, light-headedness and headaches.   Hematological: Negative.    Psychiatric/Behavioral: Negative.      Objective:     Vital Signs (Most Recent):  Temp: 97.8 °F (36.6 °C) (18 1138)  Pulse: 73 (18 1138)  Resp: 20 (18  1138)  BP: 139/64 (12/04/18 1138)  SpO2: 97 % (12/04/18 1138) Vital Signs (24h Range):  Temp:  [97.5 °F (36.4 °C)-98.8 °F (37.1 °C)] 97.8 °F (36.6 °C)  Pulse:  [64-88] 73  Resp:  [12-29] 20  SpO2:  [89 %-97 %] 97 %  BP: (121-153)/(57-87) 139/64     Weight: 77.4 kg (170 lb 10.2 oz)  Body mass index is 25.57 kg/m².    Physical Exam   Constitutional: He is oriented to person, place, and time. He appears well-developed and well-nourished. No distress.   HENT:   Head: Normocephalic and atraumatic.   Eyes: EOM are normal.   Neck: Normal range of motion.   Cardiovascular: Normal rate, regular rhythm, normal heart sounds and intact distal pulses. Exam reveals no gallop and no friction rub.   No murmur heard.  Pulmonary/Chest: Effort normal and breath sounds normal. No stridor. No respiratory distress. He has no wheezes. He has no rales.   Abdominal: Soft. Bowel sounds are normal. He exhibits distension. There is tenderness in the epigastric area. There is no rigidity and no guarding. A hernia is present. Hernia confirmed positive in the ventral area.   Musculoskeletal: Normal range of motion. He exhibits edema.   Neurological: He is alert and oriented to person, place, and time.   Skin: He is not diaphoretic.   Nursing note and vitals reviewed.      Significant Labs: All pertinent labs within the past 24 hours have been reviewed.    Significant Imaging: I have reviewed all pertinent imaging results/findings within the past 24 hours.    Assessment/Plan:     * Ventral hernia with obstruction    Plan for ventral hernia repair tomorrow with general surgery.      Cardiovascular Risk Assessment:  1. Non-emergent surgery  2. No history of CAD and no active cardiac problems (such as unstable angina, decompensated heart failure, significant uncontrolled arrhythmias or severe valvular disease).  3. Functional Status: unable to climb flight of stairs, can get short of breath walking across house with walker (< 4 METS)  4. His revised  cardiac risk index is 1      This individual is at low risk for cardiovascular events during the moderate risk surgery. The revised cardiovascular risk index is <1 and is associated with a <1% risk of major cardiovascular events. He  has no risk factors but will obtain ECHO. He does not have the ability to perform >4 METS but has no active cardiac conditions     Recommendation:  1. OK to proceed to Surgery without additional testing     History of postoperative delirium    - Patient reports 1 week history of post-op delirium 7/2018  - Monitor electrolytes, UA, and urine cx  - Delirium precautions   - Can use Seroquel 25 mg QHS prn              Chronic back pain    - Takes Percocet 5-325 at home  - Can continue current abdominal pain control with Dilaudid 0.5 mg IV Q3H prn      Sleep disorder    - continue Trazodone QHS     GERD (gastroesophageal reflux disease)    - continue home Prilosec             Incarcerated hernia    - incarcerated hernia was reduced in the ED and NG placed       Neurogenic bladder    - suprapubic catheter in place  - follows with urology once a month and due to have galvan changed next week   - UA shows WBC >100 and Leukocytes 3+ and urine cx pending  - Patient denies dysuria  - Will f/u urine cx but 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 Will not treat at this time.         Hypokalemia    - Potassium 3.0 today  - Replete and monitor daily        Hyponatremia    - several year history of hyponatremia  - Recommend stopping Triamterene-HCTZ given history of hyponatremia and hypokalemia and starting ACEi or ARB after surgery.        CKD (chronic kidney disease) stage 3, GFR 30-59 ml/min    - Cr in ED yesterday 1.5  - Today Cr. 1.2 with a baseline of 1.1   - TERE most likely pre-renal due to dehydration from vomiting  - Received bolus in ED and IV NS continuous at 125 ml/hr       Dyslipidemia    - continue home Lipitor       Essential hypertension    - continue home Norvasc 10 mg    - Recommend stopping Triamterene-HCTZ given history of hyponatremia and hypokalemia and starting ACEi or ARB after surgery.      Hypothyroidism    - continue home synthroid         VTE Risk Mitigation (From admission, onward)        Ordered     enoxaparin injection 40 mg  Daily      12/03/18 2322     Place sequential compression device  Until discontinued      12/03/18 2322     IP VTE HIGH RISK PATIENT  Once      12/03/18 2322              Thank you for your consult. I will follow-up with patient. Please contact us if you have any additional questions.    Liban eKlley MD  Department of Hospital Medicine   [facility]

## 2018-12-04 NOTE — ED NOTES
Bed rails are up and call light is within patient reach. Pt waiting on CT scan. Oral contrast completed. Personal sitter at bedside.

## 2018-12-04 NOTE — ASSESSMENT & PLAN NOTE
- continue home Norvasc 10 mg   - Recommend stopping Triamterene-HCTZ given history of hyponatremia and hypokalemia and starting ACEi or ARB after surgery.

## 2018-12-04 NOTE — SUBJECTIVE & OBJECTIVE
Interval History: Feels better. No nausea. +flatus, no bm. Does not want to eat.    Medications:  Continuous Infusions:   sodium chloride 0.9% 125 mL/hr at 12/04/18 0855     Scheduled Meds:   enoxaparin  40 mg Subcutaneous Daily    pantoprazole  40 mg Intravenous Daily    sodium chloride 0.9%  3 mL Intravenous Q8H     PRN Meds:HYDROmorphone, naloxone, ondansetron     Review of patient's allergies indicates:  No Known Allergies  Objective:     Vital Signs (Most Recent):  Temp: 97.8 °F (36.6 °C) (12/04/18 1138)  Pulse: 73 (12/04/18 1138)  Resp: 20 (12/04/18 1138)  BP: 139/64 (12/04/18 1138)  SpO2: 97 % (12/04/18 1138) Vital Signs (24h Range):  Temp:  [97.5 °F (36.4 °C)-98.8 °F (37.1 °C)] 97.8 °F (36.6 °C)  Pulse:  [64-88] 73  Resp:  [12-29] 20  SpO2:  [89 %-97 %] 97 %  BP: (121-153)/(57-87) 139/64     Weight: 77.4 kg (170 lb 10.2 oz)  Body mass index is 25.57 kg/m².    Intake/Output - Last 3 Shifts       12/02 0700 - 12/03 0659 12/03 0700 - 12/04 0659 12/04 0700 - 12/05 0659    IV Piggyback  1300     Total Intake(mL/kg)  1300 (16.8)     Urine (mL/kg/hr)  400     Drains  600     Total Output  1000     Net  +300                  Physical Exam   Constitutional: He is oriented to person, place, and time. He appears well-developed and well-nourished. No distress.   Cardiovascular: Normal rate.   Pulmonary/Chest: Effort normal.   Abdominal:   Midline hernia, bowel out again. Easily reducible with some effort.  Only mild discomfort.  NGT thick brown   Musculoskeletal: Normal range of motion. He exhibits no edema.   Neurological: He is alert and oriented to person, place, and time.   Skin: Skin is warm and dry.   Psychiatric: He has a normal mood and affect. His behavior is normal.       Significant Labs:  CBC:   Recent Labs   Lab 12/04/18  0351   WBC 8.06   RBC 4.46*   HGB 12.0*   HCT 35.5*      MCV 80*   MCH 26.9*   MCHC 33.8     CMP:   Recent Labs   Lab 12/03/18  1751 12/04/18  0351   * 89   CALCIUM 10.2  8.4*   ALBUMIN 4.2  --    PROT 8.9*  --    * 128*   K 3.2* 3.0*   CO2 32* 29   CL 86* 89*   BUN 22 21   CREATININE 1.5* 1.2   ALKPHOS 107  --    ALT 7*  --    AST 17  --    BILITOT 1.6*  --        Significant Diagnostics:  CT: I have reviewed all pertinent results/findings within the past 24 hours and my personal findings are:  SBO

## 2018-12-04 NOTE — ASSESSMENT & PLAN NOTE
84 yo M with ventral hernia and small bowel obstruction    Hernia reducible but comes back out and incarcerates. We are recommending surgery this admission to prevent obstruction/strangulation.  Medical clearance: former alcoholic, HTN, CKD, debility.  NS, slow correction of electrolytes.  Plan for operation Wed v Thurs pending medical consult    Jovanna Killian PGY5  [phone]

## 2018-12-04 NOTE — ASSESSMENT & PLAN NOTE
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 Will not treat at this time.

## 2018-12-04 NOTE — ASSESSMENT & PLAN NOTE
- Cr in ED yesterday 1.5  - Today Cr. 1.2 with a baseline of 1.1   - TERE most likely pre-renal due to dehydration from vomiting  - Received bolus in ED and IV NS continuous at 125 ml/hr

## 2018-12-04 NOTE — ED NOTES
Assumed care of patient after receiving report from ANNE Nugent. I acknowledge care for this patient. The patient is awake, alert, and cooperating with a calm affect. RR spontaneous, even, and unlabored, with regular effort and rate. Patient is in NAD, and resting calmly on stretcher with blood pressure cuff, pulse ox, and cardiac monitor attached. Bed locked in low position with side rails up x2 for safety, and call bell within reach. Patient is aware of POC, and has no complaints or concerns at this time. Will continue to monitor.

## 2018-12-04 NOTE — ED NOTES
Assumed care of pt at this time. MD at bedside. Family at bedside. Pt states he feels okay right now and isn't nauseated. Pt has no needs at this time. Pt has sitter with him at bedside that helps take care of him.     LOC/ APPEARANCE: The patient is awake, alert and oriented x 4. Pt is speaking appropriately, no slurred speech. Patient resting comfortably and in no acute distress.   SKIN: Skin is warm, dry and intact. Capillary refill <3 seconds. No breakdown or brusing note. Pt has moist mucus membranes.   MUSCULOSKELETAL: Full range of motion present in all extremities. Hand  equal and leg strength strong bilaterally.   RESPIRATORY: Airway is open and patent. Respirations are even and non labored. Denies shortness of breath.   CARDIAC: Patient has regular rate and rhythm. No peripheral edema noted in extremities. Patient denies chest pain.  GI: has vomiting. Last BM was 2 days ago. Has had intermittent abd pain.  NEUROLOGIC: Eyes open spontaneously and facial expression symmetrical. Pt behavior appropriate to situation, and pt follows commands.    : pt has suprapubic cath in place.

## 2018-12-04 NOTE — SUBJECTIVE & OBJECTIVE
"No current facility-administered medications on file prior to encounter.      Current Outpatient Medications on File Prior to Encounter   Medication Sig    amLODIPine (NORVASC) 10 MG tablet TAKE 1 TABLET(10 MG) BY MOUTH EVERY DAY    aspirin (ECOTRIN) 81 MG EC tablet Take 81 mg by mouth once daily.    atorvastatin (LIPITOR) 20 MG tablet Take 1 tablet (20 mg total) by mouth once daily.    docusate sodium (COLACE) 100 MG capsule Take 1 capsule (100 mg total) by mouth 2 (two) times daily as needed for Constipation. (Patient taking differently: Take 100 mg by mouth as needed. )    hyoscyamine (LEVSIN/SL) 0.125 mg Subl DISSOLVE 1 TABLET UNDER THE TONGUE EVERY 4 HOURS AS NEEDED    hyoscyamine (LEVSIN/SL) 0.125 mg Subl DISSOLVE 1 TABLET UNDER THE TONGUE EVERY 4 HOURS AS NEEDED    influenza (FLUZONE HIGH-DOSE) 180 mcg/0.5 mL vaccine Inject 0.5 mLs into the muscle.    lactulose (CHRONULAC) 20 gram/30 mL Soln Take 30 mLs (20 g total) by mouth 2 (two) times daily as needed.    levothyroxine (SYNTHROID) 50 MCG tablet GIVE "PENNY" 1 TABLET BY MOUTH ONCE DAILY.    omeprazole (PRILOSEC) 40 MG capsule Take 1 capsule (40 mg total) by mouth every morning. Take 30 minutes before eating on an empty stomach    ondansetron (ZOFRAN-ODT) 8 MG TbDL Take 1 tablet (8 mg total) by mouth every 12 (twelve) hours as needed.    oxyCODONE-acetaminophen (PERCOCET) 5-325 mg per tablet Take 1 tablet by mouth every 12 (twelve) hours as needed.    pneumococcal 23-raquel ps vaccine (PNEUMOVAX 23) 25 mcg/0.5 mL Inject 0.5 mLs into the muscle.    senna-docusate 8.6-50 mg (PERICOLACE) 8.6-50 mg per tablet Take 1 tablet by mouth 2 (two) times daily.    traZODone (DESYREL) 50 MG tablet Take 1 tablet (50 mg total) by mouth nightly.    triamterene-hydrochlorothiazide 37.5-25 mg (DYAZIDE) 37.5-25 mg per capsule TAKE 1 CAPSULE BY MOUTH EVERY DAY IN THE MORNING       Review of patient's allergies indicates:  No Known Allergies    Past Medical " History:   Diagnosis Date    Arthritis     Blood transfusion     before 2005 - whe had gangrenous gall bladder    Cataract     CKD (chronic kidney disease) stage 3, GFR 30-59 ml/min     Compression fracture of lumbar vertebra     Depression     Dyslipidemia     General anesthetics causing adverse effect in therapeutic use     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    GERD (gastroesophageal reflux disease)     Hypertension     Thyroid disease     UTI (urinary tract infection)      Past Surgical History:   Procedure Laterality Date    BLOCK-NERVE Left 10/26/2017    Performed by [place] Surgeon at [facility] ASIA    CHOLECYSTECTOMY      CYSTOSCOPY WITH RETROGRADE PYELOGRAM Bilateral 1/27/2017    Performed by Chaitanya Taylor Jr., MD at [facility] OR 1ST FLR    EGD (ESOPHAGOGASTRODUODENOSCOPY) N/A 8/2/2013    Performed by Nagi Talbert MD at [facility] ENDO (2ND FLR)    EYE SURGERY Right     IOL    HERNIA REPAIR      INSERTION-CATHETER-SUPRAPUBIC N/A 6/23/2017    Performed by Chaitanya Taylor Jr., MD at [facility] OR 2ND FLR    INSERTION-INTRAOCULAR LENS (IOL) Left 5/28/2018    Performed by Trinity [facility]quez MD at [facility] OR    INSERTION-INTRAOCULAR LENS (IOL) Right 2/19/2018    Performed by Trinity Vazquez MD at [facility] OR    INTRAOCULAR PROSTHESES INSERTION Left 5/28/2018    Procedure: INSERTION-INTRAOCULAR LENS (IOL);  Surgeon: Trinity Vazquez MD;  Location: [facility];  Service: Ophthalmology;  Laterality: Left;    JOINT REPLACEMENT      KYPHOPLASTY L3 N/A 12/27/2016    Performed by Donavan Martinez MD at [facility] OR 2ND FLR    LAMINECTOMY  12/27/2016    L2-L4    LAMINECTOMY-MINIMALLY INVASIVE L2,3 and L3,4; globus, neuromonitoring Right 12/27/2016    Performed by Donavan Martinez MD at [facility] OR 2ND FLR    LUMBAR LAMINECTOMY  12/2016    PARATHYROIDECTOMY      PHACOEMULSIFICATION OF CATARACT Left 5/28/2018    Procedure: PHACOEMULSIFICATION-ASPIRATION-CATARACT;  Surgeon: Trinity Vazquez MD;  Location: [facility];  Service:  Ophthalmology;  Laterality: Left;    PHACOEMULSIFICATION-ASPIRATION-CATARACT Left 2018    Performed by Trinity V[facility]quez MD at [facility] OR    PHACOEMULSIFICATION-ASPIRATION-CATARACT Right 2018    Performed by Trinity Vazquez MD at [facility] OR    REPAIR OF INCARCERATED VENTRAL HERNIA WITHOUT HISTORY OF PRIOR REPAIR N/A 2018    Procedure: REPAIR, HERNIA, VENTRAL, INCARCERATED, WITHOUT HISTORY OF PRIOR REPAIR;  Surgeon: Guilherme Ordoñez MD;  Location: [facility] OR [address] [place] [address];  Service: General;  Laterality: N/A;    REPAIR, HERNIA, INGUINAL, WITHOUT HISTORY OF PRIOR REPAIR, AGE 5 YEARS OR OLDER Right 10/9/2013    Performed by Daron Arthur MD at [facility] OR 2ND FLR    REPAIR, HERNIA, VENTRAL, INCARCERATED, WITHOUT HISTORY OF PRIOR REPAIR N/A 2018    Performed by Guilherme Ordoñez MD at [facility] OR [facility] FLR    REPLACEMENT-KNEE-TOTAL Left 10/25/2017    Performed by [facility] at [facility] OR [facility] FLR    TOTAL KNEE ARTHROPLASTY Bilateral     TOTAL KNEE ARTHROPLASTY Left 10/25/2017    TKR     Family History     Problem Relation (Age of Onset)    Diabetes Father    Esophageal cancer Father    Hypertension Mother        Tobacco Use    Smoking status: Former Smoker     Years: 20.00     Last attempt to quit:      Years since quittin.9    Smokeless tobacco: Never Used    Tobacco comment: quit    Substance and Sexual Activity    Alcohol use: No     Comment: rarely/6 months    Drug use: No    Sexual activity: No     Review of Systems   Constitutional: Positive for activity change. Negative for fever.   Respiratory: Negative for shortness of breath.    Cardiovascular: Negative for chest pain.   Gastrointestinal: Positive for abdominal distention, abdominal pain, constipation, nausea and vomiting.   Genitourinary:        SPT in place   Skin: Negative for color change and wound.   Neurological: Positive for weakness.     Objective:     Vital Signs (Most Recent):  Temp: 98.8 °F (37.1 °C) (18  2302)  Pulse: 82 (12/03/18 2302)  Resp: 17 (12/03/18 2302)  BP: (!) 148/65 (12/03/18 2302)  SpO2: 95 % (12/03/18 2302) Vital Signs (24h Range):  Temp:  [98.3 °F (36.8 °C)-98.8 °F (37.1 °C)] 98.8 °F (37.1 °C)  Pulse:  [80-88] 82  Resp:  [12-29] 17  SpO2:  [93 %-95 %] 95 %  BP: (121-153)/(57-87) 148/65     Weight: 90.3 kg (199 lb)  Body mass index is 29.82 kg/m².    Physical Exam   Constitutional: He is oriented to person, place, and time. No distress.   HENT:   Head: Normocephalic and atraumatic.   NG in place with green output   Cardiovascular: Normal rate.   Pulmonary/Chest: Effort normal. No respiratory distress.   Abdominal:   Right subcostal incision well-healed  Patient with hernia with bowel to the left and just above umbilicus with mild tenderness in this area, no significant tenderness in the rest of his abdomen, no overlying skin changes   Neurological: He is alert and oriented to person, place, and time.   Skin: Skin is warm and dry. He is not diaphoretic.   Psychiatric: He has a normal mood and affect. His behavior is normal.       Significant Labs:  CBC:   Recent Labs   Lab 12/03/18  1751   WBC 8.82   RBC 5.77   HGB 15.5   HCT 44.5      MCV 77*   MCH 26.9*   MCHC 34.8     Lactate 1.6    Significant Diagnostics:  CT abd- hernia by umbilicus with bowel without wall thickening or fluid- bowel dilated proximally and collapsed distally

## 2018-12-05 ENCOUNTER — ANESTHESIA (OUTPATIENT)
Dept: SURGERY | Facility: HOSPITAL | Age: 83
DRG: 354 | End: 2018-12-05
Payer: MEDICARE

## 2018-12-05 LAB
ANION GAP SERPL CALC-SCNC: 11 MMOL/L
ANION GAP SERPL CALC-SCNC: 13 MMOL/L
BACTERIA UR CULT: NORMAL
BACTERIA UR CULT: NORMAL
BASOPHILS # BLD AUTO: 0.02 K/UL
BASOPHILS NFR BLD: 0.2 %
BUN SERPL-MCNC: 15 MG/DL
BUN SERPL-MCNC: 15 MG/DL
CALCIUM SERPL-MCNC: 8.3 MG/DL
CALCIUM SERPL-MCNC: 8.5 MG/DL
CHLORIDE SERPL-SCNC: 93 MMOL/L
CHLORIDE SERPL-SCNC: 97 MMOL/L
CO2 SERPL-SCNC: 25 MMOL/L
CO2 SERPL-SCNC: 26 MMOL/L
CREAT SERPL-MCNC: 1 MG/DL
CREAT SERPL-MCNC: 1 MG/DL
DIFFERENTIAL METHOD: ABNORMAL
EOSINOPHIL # BLD AUTO: 0.2 K/UL
EOSINOPHIL NFR BLD: 2.1 %
ERYTHROCYTE [DISTWIDTH] IN BLOOD BY AUTOMATED COUNT: 15.2 %
EST. GFR  (AFRICAN AMERICAN): >60 ML/MIN/1.73 M^2
EST. GFR  (AFRICAN AMERICAN): >60 ML/MIN/1.73 M^2
EST. GFR  (NON AFRICAN AMERICAN): >60 ML/MIN/1.73 M^2
EST. GFR  (NON AFRICAN AMERICAN): >60 ML/MIN/1.73 M^2
GLUCOSE SERPL-MCNC: 66 MG/DL
GLUCOSE SERPL-MCNC: 76 MG/DL
HCT VFR BLD AUTO: 37.6 %
HGB BLD-MCNC: 12.2 G/DL
IMM GRANULOCYTES # BLD AUTO: 0.02 K/UL
IMM GRANULOCYTES NFR BLD AUTO: 0.2 %
LYMPHOCYTES # BLD AUTO: 1.6 K/UL
LYMPHOCYTES NFR BLD: 18.2 %
MAGNESIUM SERPL-MCNC: 2.1 MG/DL
MCH RBC QN AUTO: 26.8 PG
MCHC RBC AUTO-ENTMCNC: 32.4 G/DL
MCV RBC AUTO: 83 FL
MONOCYTES # BLD AUTO: 0.7 K/UL
MONOCYTES NFR BLD: 7.5 %
NEUTROPHILS # BLD AUTO: 6.4 K/UL
NEUTROPHILS NFR BLD: 71.8 %
NRBC BLD-RTO: 0 /100 WBC
PHOSPHATE SERPL-MCNC: 3.1 MG/DL
PLATELET # BLD AUTO: 223 K/UL
PMV BLD AUTO: 9.6 FL
POTASSIUM SERPL-SCNC: 2.9 MMOL/L
POTASSIUM SERPL-SCNC: 4.2 MMOL/L
RBC # BLD AUTO: 4.56 M/UL
SODIUM SERPL-SCNC: 132 MMOL/L
SODIUM SERPL-SCNC: 133 MMOL/L
WBC # BLD AUTO: 8.97 K/UL

## 2018-12-05 PROCEDURE — 27201423 OPTIME MED/SURG SUP & DEVICES STERILE SUPPLY: Performed by: SURGERY

## 2018-12-05 PROCEDURE — 63600175 PHARM REV CODE 636 W HCPCS: Performed by: STUDENT IN AN ORGANIZED HEALTH CARE EDUCATION/TRAINING PROGRAM

## 2018-12-05 PROCEDURE — 88305 TISSUE EXAM BY PATHOLOGIST: CPT | Performed by: PATHOLOGY

## 2018-12-05 PROCEDURE — 36000706: Performed by: SURGERY

## 2018-12-05 PROCEDURE — 25000003 PHARM REV CODE 250: Performed by: SURGERY

## 2018-12-05 PROCEDURE — 25000003 PHARM REV CODE 250: Performed by: NURSE ANESTHETIST, CERTIFIED REGISTERED

## 2018-12-05 PROCEDURE — 63600175 PHARM REV CODE 636 W HCPCS: Performed by: SURGERY

## 2018-12-05 PROCEDURE — 49568 PR IMPLANT MESH HERNIA REPAIR/DEBRIDEMENT CLOSURE: CPT | Mod: GC,,, | Performed by: SURGERY

## 2018-12-05 PROCEDURE — 0WUF0JZ SUPPLEMENT ABDOMINAL WALL WITH SYNTHETIC SUBSTITUTE, OPEN APPROACH: ICD-10-PCS | Performed by: SURGERY

## 2018-12-05 PROCEDURE — D9220A PRA ANESTHESIA: Mod: CRNA,,, | Performed by: NURSE ANESTHETIST, CERTIFIED REGISTERED

## 2018-12-05 PROCEDURE — A4216 STERILE WATER/SALINE, 10 ML: HCPCS | Performed by: SURGERY

## 2018-12-05 PROCEDURE — C1781 MESH (IMPLANTABLE): HCPCS | Performed by: SURGERY

## 2018-12-05 PROCEDURE — 99232 SBSQ HOSP IP/OBS MODERATE 35: CPT | Mod: ,,, | Performed by: INTERNAL MEDICINE

## 2018-12-05 PROCEDURE — 37000009 HC ANESTHESIA EA ADD 15 MINS: Performed by: SURGERY

## 2018-12-05 PROCEDURE — D9220A PRA ANESTHESIA: Mod: ANES,,, | Performed by: ANESTHESIOLOGY

## 2018-12-05 PROCEDURE — 85025 COMPLETE CBC W/AUTO DIFF WBC: CPT

## 2018-12-05 PROCEDURE — 88305 TISSUE EXAM BY PATHOLOGIST: CPT | Mod: 26,,, | Performed by: PATHOLOGY

## 2018-12-05 PROCEDURE — C9113 INJ PANTOPRAZOLE SODIUM, VIA: HCPCS | Performed by: SURGERY

## 2018-12-05 PROCEDURE — 63600175 PHARM REV CODE 636 W HCPCS: Performed by: NURSE ANESTHETIST, CERTIFIED REGISTERED

## 2018-12-05 PROCEDURE — 80048 BASIC METABOLIC PNL TOTAL CA: CPT | Mod: 91

## 2018-12-05 PROCEDURE — 63600175 PHARM REV CODE 636 W HCPCS: Performed by: ANESTHESIOLOGY

## 2018-12-05 PROCEDURE — 80048 BASIC METABOLIC PNL TOTAL CA: CPT

## 2018-12-05 PROCEDURE — S0020 INJECTION, BUPIVICAINE HYDRO: HCPCS | Performed by: SURGERY

## 2018-12-05 PROCEDURE — 49561 PR REPAIR INCISIONAL HERNIA,STRANG: CPT | Mod: GC,,, | Performed by: SURGERY

## 2018-12-05 PROCEDURE — 37000008 HC ANESTHESIA 1ST 15 MINUTES: Performed by: SURGERY

## 2018-12-05 PROCEDURE — 88302 TISSUE EXAM BY PATHOLOGIST: CPT | Performed by: PATHOLOGY

## 2018-12-05 PROCEDURE — 71000033 HC RECOVERY, INTIAL HOUR: Performed by: SURGERY

## 2018-12-05 PROCEDURE — 71000039 HC RECOVERY, EACH ADD'L HOUR: Performed by: SURGERY

## 2018-12-05 PROCEDURE — 25000003 PHARM REV CODE 250: Performed by: ANESTHESIOLOGY

## 2018-12-05 PROCEDURE — 20600001 HC STEP DOWN PRIVATE ROOM

## 2018-12-05 PROCEDURE — 84100 ASSAY OF PHOSPHORUS: CPT

## 2018-12-05 PROCEDURE — 36000707: Performed by: SURGERY

## 2018-12-05 PROCEDURE — 36415 COLL VENOUS BLD VENIPUNCTURE: CPT

## 2018-12-05 PROCEDURE — 83735 ASSAY OF MAGNESIUM: CPT

## 2018-12-05 PROCEDURE — 94761 N-INVAS EAR/PLS OXIMETRY MLT: CPT

## 2018-12-05 PROCEDURE — 88302 TISSUE EXAM BY PATHOLOGIST: CPT | Mod: 26,,, | Performed by: PATHOLOGY

## 2018-12-05 DEVICE — MESH PATCH HERNIA VENTRALEX: Type: IMPLANTABLE DEVICE | Site: ABDOMEN | Status: FUNCTIONAL

## 2018-12-05 RX ORDER — SODIUM CHLORIDE 9 MG/ML
INJECTION, SOLUTION INTRAVENOUS CONTINUOUS PRN
Status: DISCONTINUED | OUTPATIENT
Start: 2018-12-05 | End: 2018-12-05

## 2018-12-05 RX ORDER — LIDOCAINE HCL/PF 100 MG/5ML
SYRINGE (ML) INTRAVENOUS
Status: DISCONTINUED | OUTPATIENT
Start: 2018-12-05 | End: 2018-12-05

## 2018-12-05 RX ORDER — PROPOFOL 10 MG/ML
VIAL (ML) INTRAVENOUS
Status: DISCONTINUED | OUTPATIENT
Start: 2018-12-05 | End: 2018-12-05

## 2018-12-05 RX ORDER — SODIUM CHLORIDE 0.9 % (FLUSH) 0.9 %
3 SYRINGE (ML) INJECTION
Status: DISCONTINUED | OUTPATIENT
Start: 2018-12-05 | End: 2018-12-11 | Stop reason: HOSPADM

## 2018-12-05 RX ORDER — ROCURONIUM BROMIDE 10 MG/ML
INJECTION, SOLUTION INTRAVENOUS
Status: DISCONTINUED | OUTPATIENT
Start: 2018-12-05 | End: 2018-12-05

## 2018-12-05 RX ORDER — FENTANYL CITRATE 50 UG/ML
25 INJECTION, SOLUTION INTRAMUSCULAR; INTRAVENOUS EVERY 5 MIN PRN
Status: DISCONTINUED | OUTPATIENT
Start: 2018-12-05 | End: 2018-12-05 | Stop reason: HOSPADM

## 2018-12-05 RX ORDER — SUCCINYLCHOLINE CHLORIDE 20 MG/ML
INJECTION INTRAMUSCULAR; INTRAVENOUS
Status: DISCONTINUED | OUTPATIENT
Start: 2018-12-05 | End: 2018-12-05

## 2018-12-05 RX ORDER — HYDROMORPHONE HYDROCHLORIDE 1 MG/ML
0.2 INJECTION, SOLUTION INTRAMUSCULAR; INTRAVENOUS; SUBCUTANEOUS EVERY 5 MIN PRN
Status: COMPLETED | OUTPATIENT
Start: 2018-12-05 | End: 2018-12-05

## 2018-12-05 RX ORDER — POTASSIUM CHLORIDE 14.9 MG/ML
INJECTION INTRAVENOUS CONTINUOUS PRN
Status: DISCONTINUED | OUTPATIENT
Start: 2018-12-05 | End: 2018-12-05

## 2018-12-05 RX ORDER — FENTANYL CITRATE 50 UG/ML
INJECTION, SOLUTION INTRAMUSCULAR; INTRAVENOUS
Status: DISCONTINUED | OUTPATIENT
Start: 2018-12-05 | End: 2018-12-05

## 2018-12-05 RX ORDER — LORAZEPAM 2 MG/ML
0.25 INJECTION INTRAMUSCULAR ONCE AS NEEDED
Status: DISCONTINUED | OUTPATIENT
Start: 2018-12-05 | End: 2018-12-05 | Stop reason: HOSPADM

## 2018-12-05 RX ORDER — PHENYLEPHRINE HYDROCHLORIDE 10 MG/ML
INJECTION INTRAVENOUS
Status: DISCONTINUED | OUTPATIENT
Start: 2018-12-05 | End: 2018-12-05

## 2018-12-05 RX ORDER — BUPIVACAINE HYDROCHLORIDE 5 MG/ML
INJECTION, SOLUTION EPIDURAL; INTRACAUDAL
Status: DISCONTINUED | OUTPATIENT
Start: 2018-12-05 | End: 2018-12-05 | Stop reason: HOSPADM

## 2018-12-05 RX ORDER — POTASSIUM CHLORIDE 7.45 MG/ML
10 INJECTION INTRAVENOUS
Status: COMPLETED | OUTPATIENT
Start: 2018-12-05 | End: 2018-12-05

## 2018-12-05 RX ADMIN — SUGAMMADEX 160 MG: 100 INJECTION, SOLUTION INTRAVENOUS at 09:12

## 2018-12-05 RX ADMIN — ONDANSETRON 4 MG: 2 INJECTION INTRAMUSCULAR; INTRAVENOUS at 09:12

## 2018-12-05 RX ADMIN — FENTANYL CITRATE 50 MCG: 50 INJECTION, SOLUTION INTRAMUSCULAR; INTRAVENOUS at 08:12

## 2018-12-05 RX ADMIN — Medication 3 ML: at 02:12

## 2018-12-05 RX ADMIN — PANTOPRAZOLE SODIUM 40 MG: 40 INJECTION, POWDER, FOR SOLUTION INTRAVENOUS at 10:12

## 2018-12-05 RX ADMIN — HYDROMORPHONE HYDROCHLORIDE 0.5 MG: 1 INJECTION, SOLUTION INTRAMUSCULAR; INTRAVENOUS; SUBCUTANEOUS at 08:12

## 2018-12-05 RX ADMIN — HYDROMORPHONE HYDROCHLORIDE 0.5 MG: 1 INJECTION, SOLUTION INTRAMUSCULAR; INTRAVENOUS; SUBCUTANEOUS at 06:12

## 2018-12-05 RX ADMIN — HYDROMORPHONE HYDROCHLORIDE 0.2 MG: 1 INJECTION, SOLUTION INTRAMUSCULAR; INTRAVENOUS; SUBCUTANEOUS at 10:12

## 2018-12-05 RX ADMIN — POTASSIUM CHLORIDE 10 MEQ: 7.46 INJECTION, SOLUTION INTRAVENOUS at 09:12

## 2018-12-05 RX ADMIN — ENOXAPARIN SODIUM 40 MG: 100 INJECTION SUBCUTANEOUS at 05:12

## 2018-12-05 RX ADMIN — LIDOCAINE HYDROCHLORIDE 80 MG: 20 INJECTION, SOLUTION INTRAVENOUS at 08:12

## 2018-12-05 RX ADMIN — POTASSIUM CHLORIDE 10 MEQ: 7.46 INJECTION, SOLUTION INTRAVENOUS at 12:12

## 2018-12-05 RX ADMIN — PROMETHAZINE HYDROCHLORIDE 6.25 MG: 25 INJECTION INTRAMUSCULAR; INTRAVENOUS at 10:12

## 2018-12-05 RX ADMIN — PHENYLEPHRINE HYDROCHLORIDE 100 MCG: 10 INJECTION INTRAVENOUS at 08:12

## 2018-12-05 RX ADMIN — POTASSIUM CHLORIDE: 14.9 INJECTION, SOLUTION INTRAVENOUS at 08:12

## 2018-12-05 RX ADMIN — ONDANSETRON 4 MG: 2 INJECTION INTRAMUSCULAR; INTRAVENOUS at 06:12

## 2018-12-05 RX ADMIN — HYDROMORPHONE HYDROCHLORIDE 0.5 MG: 1 INJECTION, SOLUTION INTRAMUSCULAR; INTRAVENOUS; SUBCUTANEOUS at 05:12

## 2018-12-05 RX ADMIN — POTASSIUM CHLORIDE 10 MEQ: 7.46 INJECTION, SOLUTION INTRAVENOUS at 02:12

## 2018-12-05 RX ADMIN — SODIUM CHLORIDE: 0.9 INJECTION, SOLUTION INTRAVENOUS at 09:12

## 2018-12-05 RX ADMIN — HYDROMORPHONE HYDROCHLORIDE 0.5 MG: 1 INJECTION, SOLUTION INTRAMUSCULAR; INTRAVENOUS; SUBCUTANEOUS at 02:12

## 2018-12-05 RX ADMIN — ONDANSETRON 4 MG: 2 INJECTION INTRAMUSCULAR; INTRAVENOUS at 08:12

## 2018-12-05 RX ADMIN — POTASSIUM CHLORIDE 10 MEQ: 7.46 INJECTION, SOLUTION INTRAVENOUS at 10:12

## 2018-12-05 RX ADMIN — POTASSIUM CHLORIDE 10 MEQ: 7.46 INJECTION, SOLUTION INTRAVENOUS at 11:12

## 2018-12-05 RX ADMIN — SUCCINYLCHOLINE CHLORIDE 180 MG: 20 INJECTION, SOLUTION INTRAMUSCULAR; INTRAVENOUS at 08:12

## 2018-12-05 RX ADMIN — PROPOFOL 120 MG: 10 INJECTION, EMULSION INTRAVENOUS at 08:12

## 2018-12-05 RX ADMIN — Medication 3 ML: at 10:12

## 2018-12-05 RX ADMIN — POTASSIUM CHLORIDE 10 MEQ: 7.46 INJECTION, SOLUTION INTRAVENOUS at 08:12

## 2018-12-05 RX ADMIN — SODIUM CHLORIDE: 0.9 INJECTION, SOLUTION INTRAVENOUS at 07:12

## 2018-12-05 RX ADMIN — ROCURONIUM BROMIDE 20 MG: 10 INJECTION, SOLUTION INTRAVENOUS at 08:12

## 2018-12-05 RX ADMIN — HYDROMORPHONE HYDROCHLORIDE 0.2 MG: 1 INJECTION, SOLUTION INTRAMUSCULAR; INTRAVENOUS; SUBCUTANEOUS at 09:12

## 2018-12-05 RX ADMIN — Medication 3 ML: at 06:12

## 2018-12-05 NOTE — NURSING
AMADEO Wilson. On-call with General Surgery notified of patient complaint of nausea as well as occurrence of 14 PVC's in under a minute.  Phenergan 12.5 mg ordered IVPB prn q6 hours. Will continue to monitor.

## 2018-12-05 NOTE — PROGRESS NOTES
[facility] Hospital Medicine  Progress Note    Patient Name: Chucho Prado  MRN: [id number]  Patient Class: OP- Observation   Admission Date: 12/3/2018  Length of Stay: 0 days  Attending Physician: Steve Valentin MD  Primary Care Provider: Obed Mcguire MD    Hospital Medicine Team: Networked reference to record PCT  Liban Kelley MD    Subjective:     Principal Problem:Ventral hernia with obstruction    HPI:  Chucho Prado is an 85 year old male with HTN, CKD-3, HLD, GERD, neurogenic bladder with chronic suprapubic catheter , h/o ventral incisional hernia repair (at former open helen right subcostal incision 7/18 with mesh) presents abdominal pain and vomiting. Reports intermittent abdominal pain for the past 3-4 months and c/o gas (both in his abdomen, and passing gas). About 1 week ago noticed bulged in mid abdomen. Yesterday had multiple episodes of emesis, vomiting all night. States still passing a little gas, last BM Saturday. Patient denies chest pain, shortness of breath, fevers, chills, and headache.     NG placed in ED with ~500 ml green output.         Hospital Course:  No notes on file    Interval History: NAEON. Patient had hernia repaired this AM. Urine cx growing gram negative rods, but this is most likely chronic colonization. Patient denies suprapubic pain. Will consider changing galvan before discharge.     Review of Systems   Constitutional: Negative for chills, fatigue and fever.   HENT: Negative for congestion.    Eyes: Negative for visual disturbance.   Respiratory: Negative for cough, choking, chest tightness, shortness of breath, wheezing and stridor.    Cardiovascular: Positive for leg swelling. Negative for chest pain and palpitations.   Gastrointestinal: Positive for abdominal distention and abdominal pain. Negative for blood in stool, constipation, diarrhea, nausea and vomiting.   Endocrine: Negative for polyuria.   Genitourinary: Negative for dysuria and  hematuria.   Musculoskeletal: Positive for arthralgias and back pain.   Skin: Negative for color change, pallor, rash and wound.   Allergic/Immunologic: Negative.    Neurological: Negative for dizziness, facial asymmetry, light-headedness and headaches.   Hematological: Negative.    Psychiatric/Behavioral: Negative.      Objective:     Vital Signs (Most Recent):  Temp: 97.6 °F (36.4 °C) (12/05/18 0943)  Pulse: 82 (12/05/18 1100)  Resp: 10 (12/05/18 1100)  BP: 137/60 (12/05/18 1100)  SpO2: 96 % (12/05/18 1100) Vital Signs (24h Range):  Temp:  [96.6 °F (35.9 °C)-98.8 °F (37.1 °C)] 97.6 °F (36.4 °C)  Pulse:  [] 82  Resp:  [10-28] 10  SpO2:  [91 %-97 %] 96 %  BP: (128-163)/(58-78) 137/60     Weight: 77.6 kg (171 lb)  Body mass index is 26 kg/m².    Intake/Output Summary (Last 24 hours) at 12/5/2018 1123  Last data filed at 12/5/2018 0600  Gross per 24 hour   Intake --   Output 2100 ml   Net -2100 ml      Physical Exam   Constitutional: He is oriented to person, place, and time. He appears well-developed and well-nourished. No distress.   HENT:   Head: Normocephalic and atraumatic.   Eyes: EOM are normal.   Neck: Normal range of motion.   Cardiovascular: Normal rate, regular rhythm, normal heart sounds and intact distal pulses. Exam reveals no gallop and no friction rub.   No murmur heard.  Pulmonary/Chest: Effort normal and breath sounds normal. No stridor. No respiratory distress. He has no wheezes. He has no rales.   Abdominal: Soft. Bowel sounds are normal. He exhibits distension. There is tenderness in the epigastric area. There is no rigidity and no guarding. A hernia is present. Hernia confirmed positive in the ventral area.   Musculoskeletal: Normal range of motion. He exhibits edema.   Neurological: He is alert and oriented to person, place, and time.   Skin: He is not diaphoretic.   Nursing note and vitals reviewed.      Significant Labs: All pertinent labs within the past 24 hours have been  reviewed.    Significant Imaging: I have reviewed all pertinent imaging results/findings within the past 24 hours.    Assessment/Plan:      * Ventral hernia with obstruction    Plan for ventral hernia repair tomorrow with general surgery.      Cardiovascular Risk Assessment:  1. Non-emergent surgery  2. No history of CAD and no active cardiac problems (such as unstable angina, decompensated heart failure, significant uncontrolled arrhythmias or severe valvular disease).  3. Functional Status: unable to climb flight of stairs, can get short of breath walking across house with walker (< 4 METS)  4. His revised cardiac risk index is 1      This individual is at low risk for cardiovascular events during the moderate risk surgery. The revised cardiovascular risk index is <1 and is associated with a <1% risk of major cardiovascular events. He  has no risk factors but will obtain ECHO. He does not have the ability to perform >4 METS but has no active cardiac conditions     Recommendation:  1. OK to proceed to Surgery without additional testing     History of postoperative delirium           Gastroesophageal reflux disease without esophagitis           Incarcerated hernia    - incarcerated hernia was reduced in the ED and NG placed       Neurogenic bladder    - suprapubic catheter in place  - follows with urology once a month and due to have galvan changed next week   - UA shows WBC >100 and Leukocytes 3+ and urine cx pending  - Patient denies dysuria  - Will f/u urine cx but 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 Will not treat at this time.         Hypokalemia    - Potassium 2.9 today  - Replete and monitor daily        Hyponatremia    - several year history of hyponatremia  - Recommend stopping Triamterene-HCTZ given history of hyponatremia and hypokalemia and starting ACEi or ARB after surgery.   - Hyponatremia improving after fluid resuscitation.        CKD (chronic kidney disease) stage 3, GFR 30-59  ml/min    - Cr in ED yesterday 1.5  - Today Cr. 1.2 with a baseline of 1.1   - TERE most likely pre-renal due to dehydration from vomiting  - Received bolus in ED and IV NS continuous at 125 ml/hr       Dyslipidemia    - continue home Lipitor       Essential hypertension    - continue home Norvasc 10 mg   - Recommend stopping Triamterene-HCTZ given history of hyponatremia and hypokalemia and starting ACEi or ARB after surgery.      Acquired hypothyroidism    - continue home synthroid         VTE Risk Mitigation (From admission, onward)        Ordered     enoxaparin injection 40 mg  Daily      12/03/18 2322     Place sequential compression device  Until discontinued      12/03/18 2322     IP VTE HIGH RISK PATIENT  Once      12/03/18 2322              Liban Kelley MD  Department of Hospital Medicine   [facility]

## 2018-12-05 NOTE — NURSING TRANSFER
Nursing Transfer Note      12/5/2018     Transfer To: 1008    Transfer via stretcher    Transfer with cardiac monitoring, tele tech notified     Transported by pct    Medicines sent: Pot Chlo IV x 1 dose     Chart send with patient: Yes    Notified: none, per patient.     Report given to ANNE Kimble. Potassium Chloride 10 meq IV to be given. Last dose of 6.

## 2018-12-05 NOTE — PLAN OF CARE
Problem: Patient Care Overview  Goal: Plan of Care Review  Outcome: Ongoing (interventions implemented as appropriate)  POC reviewed with pt, pt verbalized understanding. AAOx4. VSS on 2L NC. Tele d/c'd, pt was having PACs and PVCs. K replaced. NPO post-op. NGT to LIWS, minimal bile drainage. Suprapubic catheter in place, adequate UOP. LBM 12/1/18. No pressure injuries or skin breakdown. ML with dermabond, c/o abd tenderness. PRN pain meds given. Caregiver at bedside. Call light within reach, bed low. WCTM.

## 2018-12-05 NOTE — ASSESSMENT & PLAN NOTE
- several year history of hyponatremia  - Recommend stopping Triamterene-HCTZ given history of hyponatremia and hypokalemia and starting ACEi or ARB after surgery.   - Hyponatremia improving after fluid resuscitation.

## 2018-12-05 NOTE — TRANSFER OF CARE
"Anesthesia Transfer of Care Note    Patient: Chucho Prado    Procedure(s) Performed: Procedure(s) (LRB):  REPAIR, HERNIA, INCISIONAL, INCARCERATED, WITHOUT HISTORY OF PRIOR REPAIR (N/A)    Patient location: PACU    Anesthesia Type: general    Transport from OR: Transported from OR on 6-10 L/min O2 by face mask with adequate spontaneous ventilation    Post pain: adequate analgesia    Post assessment: no apparent anesthetic complications and tolerated procedure well    Post vital signs: stable    Level of consciousness: awake and alert    Nausea/Vomiting: no vomiting    Complications: none    Transfer of care protocol was followed      Last vitals:   Visit Vitals  BP (!) 163/74   Pulse 104   Temp 36.4 °C (97.6 °F) (Temporal)   Resp (!) 22   Ht 5' 8" (1.727 m)   Wt 77.6 kg (171 lb)   SpO2 95%   BMI 26.00 kg/m²     "

## 2018-12-05 NOTE — SUBJECTIVE & OBJECTIVE
Interval History: NAEON. Patient had hernia repaired this AM. Urine cx growing gram negative rods, but this is most likely chronic colonization. Patient denies suprapubic pain. Will consider changing galvan before discharge.     Review of Systems   Constitutional: Negative for chills, fatigue and fever.   HENT: Negative for congestion.    Eyes: Negative for visual disturbance.   Respiratory: Negative for cough, choking, chest tightness, shortness of breath, wheezing and stridor.    Cardiovascular: Positive for leg swelling. Negative for chest pain and palpitations.   Gastrointestinal: Positive for abdominal distention and abdominal pain. Negative for blood in stool, constipation, diarrhea, nausea and vomiting.   Endocrine: Negative for polyuria.   Genitourinary: Negative for dysuria and hematuria.   Musculoskeletal: Positive for arthralgias and back pain.   Skin: Negative for color change, pallor, rash and wound.   Allergic/Immunologic: Negative.    Neurological: Negative for dizziness, facial asymmetry, light-headedness and headaches.   Hematological: Negative.    Psychiatric/Behavioral: Negative.      Objective:     Vital Signs (Most Recent):  Temp: 97.6 °F (36.4 °C) (12/05/18 0943)  Pulse: 82 (12/05/18 1100)  Resp: 10 (12/05/18 1100)  BP: 137/60 (12/05/18 1100)  SpO2: 96 % (12/05/18 1100) Vital Signs (24h Range):  Temp:  [96.6 °F (35.9 °C)-98.8 °F (37.1 °C)] 97.6 °F (36.4 °C)  Pulse:  [] 82  Resp:  [10-28] 10  SpO2:  [91 %-97 %] 96 %  BP: (128-163)/(58-78) 137/60     Weight: 77.6 kg (171 lb)  Body mass index is 26 kg/m².    Intake/Output Summary (Last 24 hours) at 12/5/2018 1123  Last data filed at 12/5/2018 0600  Gross per 24 hour   Intake --   Output 2100 ml   Net -2100 ml      Physical Exam   Constitutional: He is oriented to person, place, and time. He appears well-developed and well-nourished. No distress.   HENT:   Head: Normocephalic and atraumatic.   Eyes: EOM are normal.   Neck: Normal range of  motion.   Cardiovascular: Normal rate, regular rhythm, normal heart sounds and intact distal pulses. Exam reveals no gallop and no friction rub.   No murmur heard.  Pulmonary/Chest: Effort normal and breath sounds normal. No stridor. No respiratory distress. He has no wheezes. He has no rales.   Abdominal: Soft. Bowel sounds are normal. He exhibits distension. There is tenderness in the epigastric area. There is no rigidity and no guarding. A hernia is present. Hernia confirmed positive in the ventral area.   Musculoskeletal: Normal range of motion. He exhibits edema.   Neurological: He is alert and oriented to person, place, and time.   Skin: He is not diaphoretic.   Nursing note and vitals reviewed.      Significant Labs: All pertinent labs within the past 24 hours have been reviewed.    Significant Imaging: I have reviewed all pertinent imaging results/findings within the past 24 hours.

## 2018-12-05 NOTE — SUBJECTIVE & OBJECTIVE
Interval History: Feels good.  Ready for surgery today    Medications:  Continuous Infusions:   sodium chloride 0.9% 125 mL/hr at 12/05/18 0946     Scheduled Meds:   enoxaparin  40 mg Subcutaneous Daily    pantoprazole  40 mg Intravenous Daily    potassium chloride  10 mEq Intravenous Q1H    sodium chloride 0.9%  3 mL Intravenous Q8H     PRN Meds:HYDROmorphone, HYDROmorphone, lorazepam, naloxone, ondansetron, promethazine (PHENERGAN) IVPB, promethazine (PHENERGAN) IVPB, sodium chloride 0.9%     Review of patient's allergies indicates:  No Known Allergies  Objective:     Vital Signs (Most Recent):  Temp: 97.6 °F (36.4 °C) (12/05/18 0943)  Pulse: 90 (12/05/18 1000)  Resp: 15 (12/05/18 1000)  BP: (!) 160/67 (12/05/18 0945)  SpO2: (!) 91 % (12/05/18 1000) Vital Signs (24h Range):  Temp:  [96.6 °F (35.9 °C)-98.8 °F (37.1 °C)] 97.6 °F (36.4 °C)  Pulse:  [] 90  Resp:  [15-28] 15  SpO2:  [91 %-97 %] 91 %  BP: (133-163)/(60-78) 160/67     Weight: 77.6 kg (171 lb)  Body mass index is 26 kg/m².    Intake/Output - Last 3 Shifts       12/03 0700 - 12/04 0659 12/04 0700 - 12/05 0659 12/05 0700 - 12/06 0659    IV Piggyback 1300      Total Intake(mL/kg) 1300 (16.8)      Urine (mL/kg/hr) 400 1750 (0.9)     Drains 600 350     Total Output 1000 2100     Net +300 -2100            Urine Occurrence  1 x           Physical Exam   Constitutional: He is oriented to person, place, and time. He appears well-developed and well-nourished. No distress.   Cardiovascular: Normal rate.   Pulmonary/Chest: Effort normal.   Abdominal:   Midline hernia, bowel out again. Easily reducible with some effort.  Only mild discomfort.  NGT thick brown   Musculoskeletal: Normal range of motion. He exhibits no edema.   Neurological: He is alert and oriented to person, place, and time.   Skin: Skin is warm and dry.   Psychiatric: He has a normal mood and affect. His behavior is normal.       Significant Labs:  CBC:   Recent Labs   Lab 12/05/18  0403    WBC 8.97   RBC 4.56*   HGB 12.2*   HCT 37.6*      MCV 83   MCH 26.8*   MCHC 32.4     CMP:   Recent Labs   Lab 12/03/18  1751  12/05/18  0403   *   < > 66*   CALCIUM 10.2   < > 8.5*   ALBUMIN 4.2  --   --    PROT 8.9*  --   --    *   < > 132*   K 3.2*   < > 2.9*   CO2 32*   < > 26   CL 86*   < > 93*   BUN 22   < > 15   CREATININE 1.5*   < > 1.0   ALKPHOS 107  --   --    ALT 7*  --   --    AST 17  --   --    BILITOT 1.6*  --   --     < > = values in this interval not displayed.       Significant Diagnostics:  CT: I have reviewed all pertinent results/findings within the past 24 hours and my personal findings are:  SBO

## 2018-12-05 NOTE — NURSING
Patient is lying in bed AAOx4 with no complaint of pain or discomfort. Vitals are WDL, and fall precautions are in place. NG tube is patent and still in place. Bed is low and locked, and call light is within reach. Will continue to monitor until arrival of day shift.

## 2018-12-05 NOTE — PROGRESS NOTES
[facility]  General Surgery  Progress Note    Subjective:     History of Present Illness:  84 yo M with htn, h/o ventral incisional hernia repair (at former open [facility] right subcostal incision 7/18 with mesh) p/w abdominal pain and vomiting. Reports intermittent abdominal pain for the past 3-4 months and c/o gas (both in his abdomen, and passing gas). About 1 week ago noticed bulged in mid abdomen. Yesterday had multiple episodes of emesis, vomiting all night. States still passing a little gas, last BM Saturday. NG placed in ED with ~500 ml green output.     Post-Op Info:  Procedure(s) (LRB):  REPAIR, HERNIA, INCISIONAL, INCARCERATED, WITHOUT HISTORY OF PRIOR REPAIR (N/A)   Day of Surgery     Interval History: Feels good.  Ready for surgery today    Medications:  Continuous Infusions:   sodium chloride 0.9% 125 mL/hr at 12/05/18 0946     Scheduled Meds:   enoxaparin  40 mg Subcutaneous Daily    pantoprazole  40 mg Intravenous Daily    potassium chloride  10 mEq Intravenous Q1H    sodium chloride 0.9%  3 mL Intravenous Q8H     PRN Meds:HYDROmorphone, HYDROmorphone, lorazepam, naloxone, ondansetron, promethazine (PHENERGAN) IVPB, promethazine (PHENERGAN) IVPB, sodium chloride 0.9%     Review of patient's allergies indicates:  No Known Allergies  Objective:     Vital Signs (Most Recent):  Temp: 97.6 °F (36.4 °C) (12/05/18 0943)  Pulse: 90 (12/05/18 1000)  Resp: 15 (12/05/18 1000)  BP: (!) 160/67 (12/05/18 0945)  SpO2: (!) 91 % (12/05/18 1000) Vital Signs (24h Range):  Temp:  [96.6 °F (35.9 °C)-98.8 °F (37.1 °C)] 97.6 °F (36.4 °C)  Pulse:  [] 90  Resp:  [15-28] 15  SpO2:  [91 %-97 %] 91 %  BP: (133-163)/(60-78) 160/67     Weight: 77.6 kg (171 lb)  Body mass index is 26 kg/m².    Intake/Output - Last 3 Shifts       12/03 0700 - 12/04 0659 12/04 0700 - 12/05 0659 12/05 0700 - 12/06 0659    IV Piggyback 1300      Total Intake(mL/kg) 1300 (16.8)      Urine (mL/kg/hr) 400 1750 (0.9)     Drains  600 350     Total Output 1000 2100     Net +300 -2100            Urine Occurrence  1 x           Physical Exam   Constitutional: He is oriented to person, place, and time. He appears well-developed and well-nourished. No distress.   Cardiovascular: Normal rate.   Pulmonary/Chest: Effort normal.   Abdominal:   Midline hernia, bowel out again. Easily reducible with some effort.  Only mild discomfort.  NGT thick brown   Musculoskeletal: Normal range of motion. He exhibits no edema.   Neurological: He is alert and oriented to person, place, and time.   Skin: Skin is warm and dry.   Psychiatric: He has a normal mood and affect. His behavior is normal.       Significant Labs:  CBC:   Recent Labs   Lab 12/05/18  0403   WBC 8.97   RBC 4.56*   HGB 12.2*   HCT 37.6*      MCV 83   MCH 26.8*   MCHC 32.4     CMP:   Recent Labs   Lab 12/03/18  1751  12/05/18  0403   *   < > 66*   CALCIUM 10.2   < > 8.5*   ALBUMIN 4.2  --   --    PROT 8.9*  --   --    *   < > 132*   K 3.2*   < > 2.9*   CO2 32*   < > 26   CL 86*   < > 93*   BUN 22   < > 15   CREATININE 1.5*   < > 1.0   ALKPHOS 107  --   --    ALT 7*  --   --    AST 17  --   --    BILITOT 1.6*  --   --     < > = values in this interval not displayed.       Significant Diagnostics:  CT: I have reviewed all pertinent results/findings within the past 24 hours and my personal findings are:  SBO    Assessment/Plan:     * Ventral hernia with obstruction    86 yo M with ventral hernia and small bowel obstruction    To OR for repair today           Jesús Neil MD  General Surgery  [facility]

## 2018-12-05 NOTE — NURSING
On-call for general surgery, Brodie Forman M.D. Notified of patient having episodes of bigeminy and trigeminy with stable vitals signs and no signs of acute distress. M.D. Stated that AM labs will be reviewed as well as latest EKG. Morning team will also be notified. Will continue to monitor until arrival of day shift.

## 2018-12-05 NOTE — ANESTHESIA POSTPROCEDURE EVALUATION
"Anesthesia Post Evaluation    Patient: Chucho Prado    Procedure(s) Performed: Procedure(s) (LRB):  REPAIR, HERNIA, INCISIONAL, INCARCERATED, WITHOUT HISTORY OF PRIOR REPAIR (N/A)    Final Anesthesia Type: general  Patient location during evaluation: PACU  Patient participation: Yes- Able to Participate  Level of consciousness: awake and alert  Post-procedure vital signs: reviewed and stable  Pain management: adequate  Airway patency: patent  PONV status at discharge: No PONV  Anesthetic complications: no      Cardiovascular status: blood pressure returned to baseline and stable  Respiratory status: unassisted  Hydration status: euvolemic  Follow-up not needed.        Visit Vitals  BP (!) 151/69   Pulse 86   Temp 36.6 °C (97.9 °F) (Oral)   Resp 18   Ht 5' 8" (1.727 m)   Wt 77.6 kg (171 lb)   SpO2 95%   BMI 26.00 kg/m²       Pain/Nathaniel Score: Pain Assessment Performed: Yes (12/5/2018  1:00 PM)  Presence of Pain: non-verbal indicators absent (12/5/2018  1:00 PM)  Pain Rating Prior to Med Admin: 7 (12/5/2018 10:40 AM)  Pain Rating Post Med Admin: 5 (12/5/2018  1:00 PM)  Nathaniel Score: 9 (12/5/2018  1:00 PM)        "

## 2018-12-05 NOTE — OP NOTE
DATE OF PROCEDURE: 12/5/2018    PREOPERATIVE DIAGNOSIS: Ventral hernia    POSTOPERATIVE DIAGNOSIS: Ventral hernia    PROCEDURES PERFORMED:  1. Ventral hernia repair with mesh    ATTENDING SURGEON: Steve Valentin MD    RESIDENT: Jesús Neil MD     ANESTHESIA: THOM    KEY FINDINGS: 3cm ventral hernia containing incarcerated omentum.  Repaired with mesh    ESTIMATED BLOOD LOSS: 10 ml    DRAINS: None    COMPLICATIONS: None    INDICATIONS FOR PROCEDURE: The patient is a 85 y.o. male   who presented with incarcerated ventral hernia. Based on this surgery was indicated.    PROCEDURE IN DETAIL: After informed consent was obtained, the patient was brought to the Operative Theater and placed in in the supine position. After adequate anesthesia the patient was prepped and draped in the usual sterile fashion. A timeout procedure was performed and a midline incision was performed over the palpable hernia defect just above the umbilicus.  Soft tissue was divided with cautery.  The hernia sac was encountered and was dissected from the surrounding tissues bluntly and with cautery.  The fascial edges were cleaned off circumferentially around the defect.  The hernia sac was opened and it contained a piece of chronically incarcerated omentum which was suture ligated and sent for pathology.  The hernia sac was excised along the clean fascial edges and was also sent to pathology.  We then measured the hernia defect which was 3x3cm.  We then brought the ventralex 8cm mesh onto the field and inserted it into the hernia defect.  Four trans fascial sutures were placed in the four cardinal directions anchoring the mesh in placed.  The [employer] Optifix tacker was then used to place tacks circumferentially beneath the mesh.  The overlying fascia was closed with a running PDS suture.  Excellent hemostasis was achieved.  Local anesthesia was infiltrated in the subcutaneous tissue. The wound was closed in layers with absorbable suture. Dermabond was  applied to the incision.  All needle, sponge, and instrument counts were correct at the end of the case. The patient tolerated the procedure well and was transported to the recovery room in stable condition.

## 2018-12-06 LAB
ANION GAP SERPL CALC-SCNC: 15 MMOL/L
BASOPHILS # BLD AUTO: 0.03 K/UL
BASOPHILS NFR BLD: 0.3 %
BUN SERPL-MCNC: 11 MG/DL
CALCIUM SERPL-MCNC: 8.4 MG/DL
CHLORIDE SERPL-SCNC: 97 MMOL/L
CO2 SERPL-SCNC: 22 MMOL/L
CREAT SERPL-MCNC: 1 MG/DL
DIFFERENTIAL METHOD: ABNORMAL
EOSINOPHIL # BLD AUTO: 0.1 K/UL
EOSINOPHIL NFR BLD: 0.8 %
ERYTHROCYTE [DISTWIDTH] IN BLOOD BY AUTOMATED COUNT: 15.2 %
EST. GFR  (AFRICAN AMERICAN): >60 ML/MIN/1.73 M^2
EST. GFR  (NON AFRICAN AMERICAN): >60 ML/MIN/1.73 M^2
GLUCOSE SERPL-MCNC: 68 MG/DL
HCT VFR BLD AUTO: 38.8 %
HGB BLD-MCNC: 12.5 G/DL
IMM GRANULOCYTES # BLD AUTO: 0.05 K/UL
IMM GRANULOCYTES NFR BLD AUTO: 0.4 %
LYMPHOCYTES # BLD AUTO: 1.2 K/UL
LYMPHOCYTES NFR BLD: 10.1 %
MAGNESIUM SERPL-MCNC: 1.7 MG/DL
MCH RBC QN AUTO: 27 PG
MCHC RBC AUTO-ENTMCNC: 32.2 G/DL
MCV RBC AUTO: 84 FL
MONOCYTES # BLD AUTO: 0.8 K/UL
MONOCYTES NFR BLD: 6.3 %
NEUTROPHILS # BLD AUTO: 9.8 K/UL
NEUTROPHILS NFR BLD: 82.1 %
NRBC BLD-RTO: 0 /100 WBC
PHOSPHATE SERPL-MCNC: 3 MG/DL
PLATELET # BLD AUTO: 226 K/UL
PMV BLD AUTO: 9.4 FL
POTASSIUM SERPL-SCNC: 2.9 MMOL/L
RBC # BLD AUTO: 4.63 M/UL
SODIUM SERPL-SCNC: 134 MMOL/L
WBC # BLD AUTO: 11.95 K/UL

## 2018-12-06 PROCEDURE — 63600175 PHARM REV CODE 636 W HCPCS: Performed by: STUDENT IN AN ORGANIZED HEALTH CARE EDUCATION/TRAINING PROGRAM

## 2018-12-06 PROCEDURE — 25000003 PHARM REV CODE 250: Performed by: SURGERY

## 2018-12-06 PROCEDURE — 36415 COLL VENOUS BLD VENIPUNCTURE: CPT

## 2018-12-06 PROCEDURE — A4216 STERILE WATER/SALINE, 10 ML: HCPCS | Performed by: SURGERY

## 2018-12-06 PROCEDURE — C9113 INJ PANTOPRAZOLE SODIUM, VIA: HCPCS | Performed by: SURGERY

## 2018-12-06 PROCEDURE — 84100 ASSAY OF PHOSPHORUS: CPT

## 2018-12-06 PROCEDURE — 63600175 PHARM REV CODE 636 W HCPCS: Performed by: SURGERY

## 2018-12-06 PROCEDURE — 80048 BASIC METABOLIC PNL TOTAL CA: CPT

## 2018-12-06 PROCEDURE — 99232 SBSQ HOSP IP/OBS MODERATE 35: CPT | Mod: GC,,, | Performed by: INTERNAL MEDICINE

## 2018-12-06 PROCEDURE — 20600001 HC STEP DOWN PRIVATE ROOM

## 2018-12-06 PROCEDURE — 83735 ASSAY OF MAGNESIUM: CPT

## 2018-12-06 PROCEDURE — 85025 COMPLETE CBC W/AUTO DIFF WBC: CPT

## 2018-12-06 RX ORDER — MAGNESIUM SULFATE HEPTAHYDRATE 40 MG/ML
2 INJECTION, SOLUTION INTRAVENOUS ONCE
Status: COMPLETED | OUTPATIENT
Start: 2018-12-06 | End: 2018-12-06

## 2018-12-06 RX ORDER — OXYCODONE AND ACETAMINOPHEN 10; 325 MG/1; MG/1
1 TABLET ORAL EVERY 4 HOURS PRN
Status: DISCONTINUED | OUTPATIENT
Start: 2018-12-06 | End: 2018-12-08

## 2018-12-06 RX ORDER — SODIUM CHLORIDE AND POTASSIUM CHLORIDE 150; 450 MG/100ML; MG/100ML
INJECTION, SOLUTION INTRAVENOUS CONTINUOUS
Status: DISCONTINUED | OUTPATIENT
Start: 2018-12-06 | End: 2018-12-06

## 2018-12-06 RX ORDER — POTASSIUM CHLORIDE 20 MEQ/15ML
40 SOLUTION ORAL 2 TIMES DAILY
Status: DISCONTINUED | OUTPATIENT
Start: 2018-12-06 | End: 2018-12-07

## 2018-12-06 RX ORDER — TRAZODONE HYDROCHLORIDE 50 MG/1
50 TABLET ORAL NIGHTLY
Status: DISCONTINUED | OUTPATIENT
Start: 2018-12-06 | End: 2018-12-11 | Stop reason: HOSPADM

## 2018-12-06 RX ORDER — ATORVASTATIN CALCIUM 20 MG/1
20 TABLET, FILM COATED ORAL DAILY
Status: DISCONTINUED | OUTPATIENT
Start: 2018-12-06 | End: 2018-12-11 | Stop reason: HOSPADM

## 2018-12-06 RX ORDER — POTASSIUM CHLORIDE 7.45 MG/ML
10 INJECTION INTRAVENOUS
Status: DISCONTINUED | OUTPATIENT
Start: 2018-12-06 | End: 2018-12-06

## 2018-12-06 RX ORDER — ASPIRIN 81 MG/1
81 TABLET ORAL DAILY
Status: DISCONTINUED | OUTPATIENT
Start: 2018-12-06 | End: 2018-12-11 | Stop reason: HOSPADM

## 2018-12-06 RX ORDER — AMLODIPINE BESYLATE 10 MG/1
10 TABLET ORAL DAILY
Status: DISCONTINUED | OUTPATIENT
Start: 2018-12-06 | End: 2018-12-11 | Stop reason: HOSPADM

## 2018-12-06 RX ORDER — TRIAMTERENE AND HYDROCHLOROTHIAZIDE 37.5; 25 MG/1; MG/1
1 CAPSULE ORAL DAILY
Status: DISCONTINUED | OUTPATIENT
Start: 2018-12-06 | End: 2018-12-06

## 2018-12-06 RX ORDER — LEVOTHYROXINE SODIUM 25 UG/1
50 TABLET ORAL
Status: DISCONTINUED | OUTPATIENT
Start: 2018-12-06 | End: 2018-12-11 | Stop reason: HOSPADM

## 2018-12-06 RX ORDER — PANTOPRAZOLE SODIUM 40 MG/1
40 TABLET, DELAYED RELEASE ORAL DAILY
Status: DISCONTINUED | OUTPATIENT
Start: 2018-12-06 | End: 2018-12-11 | Stop reason: HOSPADM

## 2018-12-06 RX ORDER — OXYCODONE AND ACETAMINOPHEN 5; 325 MG/1; MG/1
1 TABLET ORAL EVERY 4 HOURS PRN
Status: DISCONTINUED | OUTPATIENT
Start: 2018-12-06 | End: 2018-12-08

## 2018-12-06 RX ADMIN — LEVOTHYROXINE SODIUM 50 MCG: 25 TABLET ORAL at 08:12

## 2018-12-06 RX ADMIN — TRAZODONE HYDROCHLORIDE 50 MG: 50 TABLET ORAL at 09:12

## 2018-12-06 RX ADMIN — OXYCODONE HYDROCHLORIDE AND ACETAMINOPHEN 1 TABLET: 10; 325 TABLET ORAL at 07:12

## 2018-12-06 RX ADMIN — Medication 3 ML: at 02:12

## 2018-12-06 RX ADMIN — POTASSIUM CHLORIDE AND SODIUM CHLORIDE: 450; 150 INJECTION, SOLUTION INTRAVENOUS at 09:12

## 2018-12-06 RX ADMIN — OXYCODONE HYDROCHLORIDE AND ACETAMINOPHEN 1 TABLET: 10; 325 TABLET ORAL at 02:12

## 2018-12-06 RX ADMIN — TRIAMTERENE AND HYDROCHLOROTHIAZIDE 1 CAPSULE: 25; 37.5 CAPSULE ORAL at 08:12

## 2018-12-06 RX ADMIN — OXYCODONE HYDROCHLORIDE AND ACETAMINOPHEN 1 TABLET: 10; 325 TABLET ORAL at 11:12

## 2018-12-06 RX ADMIN — PANTOPRAZOLE SODIUM 40 MG: 40 INJECTION, POWDER, FOR SOLUTION INTRAVENOUS at 07:12

## 2018-12-06 RX ADMIN — ATORVASTATIN CALCIUM 20 MG: 20 TABLET, FILM COATED ORAL at 08:12

## 2018-12-06 RX ADMIN — POTASSIUM CHLORIDE 40 MEQ: 20 SOLUTION ORAL at 09:12

## 2018-12-06 RX ADMIN — POTASSIUM CHLORIDE 10 MEQ: 7.46 INJECTION, SOLUTION INTRAVENOUS at 07:12

## 2018-12-06 RX ADMIN — HYDROMORPHONE HYDROCHLORIDE 0.5 MG: 1 INJECTION, SOLUTION INTRAMUSCULAR; INTRAVENOUS; SUBCUTANEOUS at 04:12

## 2018-12-06 RX ADMIN — HYDROMORPHONE HYDROCHLORIDE 0.5 MG: 1 INJECTION, SOLUTION INTRAMUSCULAR; INTRAVENOUS; SUBCUTANEOUS at 12:12

## 2018-12-06 RX ADMIN — AMLODIPINE BESYLATE 10 MG: 10 TABLET ORAL at 08:12

## 2018-12-06 RX ADMIN — Medication 3 ML: at 10:12

## 2018-12-06 RX ADMIN — MAGNESIUM SULFATE IN WATER 2 G: 40 INJECTION, SOLUTION INTRAVENOUS at 09:12

## 2018-12-06 RX ADMIN — ENOXAPARIN SODIUM 40 MG: 100 INJECTION SUBCUTANEOUS at 04:12

## 2018-12-06 RX ADMIN — OXYCODONE HYDROCHLORIDE AND ACETAMINOPHEN 1 TABLET: 10; 325 TABLET ORAL at 09:12

## 2018-12-06 NOTE — ASSESSMENT & PLAN NOTE
- POD1. NG tube removed. Patient tolerating clear liquid diet  - Would stop continuous fluids as patient is now eating  - Incentive spirometry to prevent atelectasis

## 2018-12-06 NOTE — SUBJECTIVE & OBJECTIVE
Interval History: Open ventral hernia repair yesterday.  Tolerated well.  Pain controlled.  Minimal NG output.  Passing gas    Medications:  Continuous Infusions:   0.45 % NaCl with KCl 20 mEq       Scheduled Meds:   enoxaparin  40 mg Subcutaneous Daily    magnesium sulfate IVPB  2 g Intravenous Once    pantoprazole  40 mg Intravenous Daily    potassium chloride  10 mEq Intravenous Q1H    sodium chloride 0.9%  3 mL Intravenous Q8H     PRN Meds:HYDROmorphone, naloxone, ondansetron, promethazine (PHENERGAN) IVPB, sodium chloride 0.9%     Review of patient's allergies indicates:  No Known Allergies  Objective:     Vital Signs (Most Recent):  Temp: 97.7 °F (36.5 °C) (12/06/18 0455)  Pulse: 92 (12/06/18 0455)  Resp: 17 (12/06/18 0455)  BP: (!) 146/68 (12/06/18 0455)  SpO2: 96 % (12/06/18 0455) Vital Signs (24h Range):  Temp:  [97 °F (36.1 °C)-98.7 °F (37.1 °C)] 97.7 °F (36.5 °C)  Pulse:  [] 92  Resp:  [10-28] 17  SpO2:  [91 %-98 %] 96 %  BP: (124-170)/(58-90) 146/68     Weight: 77.6 kg (171 lb)  Body mass index is 26 kg/m².    Intake/Output - Last 3 Shifts       12/04 0700 - 12/05 0659 12/05 0700 - 12/06 0659 12/06 0700 - 12/07 0659    P.O.  0     I.V. (mL/kg)  1981.1 (25.5)     IV Piggyback  450     Total Intake(mL/kg)  2431.1 (31.3)     Urine (mL/kg/hr) 1750 (0.9) 1250 (0.7)     Drains 350 150     Stool  0     Total Output 2100 1400     Net -2100 +1031.1            Urine Occurrence 1 x      Stool Occurrence  0 x           Physical Exam   Constitutional: He is oriented to person, place, and time. He appears well-developed and well-nourished. No distress.   Cardiovascular: Normal rate.   Pulmonary/Chest: Effort normal.   Abdominal:   Soft, ND, appropriately tender, incision site c/d/i   Musculoskeletal: Normal range of motion. He exhibits no edema.   Neurological: He is alert and oriented to person, place, and time.   Skin: Skin is warm and dry.   Psychiatric: He has a normal mood and affect. His behavior is  normal.       Significant Labs:  CBC:   Recent Labs   Lab 12/06/18  0604   WBC 11.95   RBC 4.63   HGB 12.5*   HCT 38.8*      MCV 84   MCH 27.0   MCHC 32.2     CMP:   Recent Labs   Lab 12/03/18  1751  12/06/18  0604   *   < > 68*   CALCIUM 10.2   < > 8.4*   ALBUMIN 4.2  --   --    PROT 8.9*  --   --    *   < > 134*   K 3.2*   < > 2.9*   CO2 32*   < > 22*   CL 86*   < > 97   BUN 22   < > 11   CREATININE 1.5*   < > 1.0   ALKPHOS 107  --   --    ALT 7*  --   --    AST 17  --   --    BILITOT 1.6*  --   --     < > = values in this interval not displayed.       Significant Diagnostics:  CT: I have reviewed all pertinent results/findings within the past 24 hours and my personal findings are:  SBO

## 2018-12-06 NOTE — PLAN OF CARE
Problem: Patient Care Overview  Goal: Plan of Care Review  Plan of care reviewed with patient who verbalized understanding.  AAOx4.  Pt refused to work with therapy today 2/2 feeling tired.  Pt refuses to be turned using a wedge/pillow.  Educated pt on the importance of turning for skin integrity.  NGT removed per MD this am, tolerating clears with no c/o nausea.  Pain managed with PO percocet.  SCDs on.  TEDs on.  Call bell remains within reach.  Bed in lowest position.  Frequent rounds made for pt safety.  Patient remains free of any new or additional falls/injury at this time. VSS, will continue to monitor.

## 2018-12-06 NOTE — ASSESSMENT & PLAN NOTE
- continue home Norvasc 10 mg   - Stop Triamterene-HCTZ given history of hyponatremia and hypokalemia  - if BP remains elevated (SBP>150), would start losartan 25 mg daily. Can be titrated outpatient if needed

## 2018-12-06 NOTE — PT/OT/SLP PROGRESS
"Physical Therapy      Patient Name:  Chucho Prado   MRN:  [id number]    Patient not seen today. Pt adamantly unwilling to participate in evaluation stating "I haven't slept in 24 hours." Pt educated on importance of early participation in therapy to maximize recover. Pt verbalized understanding but continued to be unwilling to participate.  Will follow-up when appropriate.    Tiffanie Nicolas, PT, DPT  12/6/2018  [phone]    "

## 2018-12-06 NOTE — ASSESSMENT & PLAN NOTE
- Patient reports 1 week history of post-op delirium 7/2018  - Monitor electrolytes, UA, and urine cx  - Delirium precautions   - Can use Seroquel 25 mg QHS prn

## 2018-12-06 NOTE — PT/OT/SLP PROGRESS
Occupational Therapy      Patient Name:  Chucho Prado   MRN:  [id number]    Pt admitted 12/3 for abdominal pain and found to have a ventral hernia. Pt underwent hernia repair on 12/5. Upon attempting to complete OT evaluation, pt declining therapy, stating he had not slept and was not going to engage in therapy at that time. Ot and PT providing max encouragement and education on importance of OOB mobility post-surgery, however pt continuing to decline. Will follow up tomorrow.     Kinga Johnson, OT  12/6/2018

## 2018-12-06 NOTE — ASSESSMENT & PLAN NOTE
- suprapubic catheter in place  - follows with urology once a month and due to have galvan changed next week   - UA shows WBC >100 and Leukocytes 3+  - Urine culture growing [employer]   - Patient denies dysuria.   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 Will not treat at this time.    - if possible, would try to change out galvan while inpatient

## 2018-12-06 NOTE — ASSESSMENT & PLAN NOTE
- several year history of hyponatremia  - Hyponatremia improving after fluid resuscitation.   - Would recommend stopping Triamterene-HCTZ at discharge given history of hyponatremia and hypokalemia

## 2018-12-06 NOTE — SUBJECTIVE & OBJECTIVE
Interval History: POD1 for Open ventral hernia repair. NG tube removed, transitioned to liquid diet. No bowel movement. Was placed on oxygen overnight for low O2 sat. Denies SOB at rest. Na improved to 134 but K still low at 2.9    Review of Systems   Constitutional: Negative for chills, fatigue and fever.   HENT: Negative for congestion.    Eyes: Negative for visual disturbance.   Respiratory: Negative for cough, choking, chest tightness, shortness of breath, wheezing and stridor.    Cardiovascular: Positive for leg swelling. Negative for chest pain and palpitations.   Gastrointestinal: Positive for abdominal distention and abdominal pain. Negative for blood in stool, constipation, diarrhea, nausea and vomiting.   Endocrine: Negative for polyuria.   Genitourinary: Negative for dysuria and hematuria.   Musculoskeletal: Positive for arthralgias and back pain.   Skin: Negative for color change, pallor, rash and wound.   Allergic/Immunologic: Negative.    Neurological: Negative for dizziness, facial asymmetry, light-headedness and headaches.   Hematological: Negative.    Psychiatric/Behavioral: Negative.      Objective:     Vital Signs (Most Recent):  Temp: 97.8 °F (36.6 °C) (12/06/18 1237)  Pulse: 75 (12/06/18 1237)  Resp: 20 (12/06/18 1237)  BP: (!) 122/58 (12/06/18 1237)  SpO2: 97 % (12/06/18 1237) Vital Signs (24h Range):  Temp:  [97 °F (36.1 °C)-98.7 °F (37.1 °C)] 97.8 °F (36.6 °C)  Pulse:  [] 75  Resp:  [16-20] 20  SpO2:  [96 %-98 %] 97 %  BP: (122-170)/(58-90) 122/58     Weight: 77.6 kg (171 lb)  Body mass index is 26 kg/m².    Intake/Output Summary (Last 24 hours) at 12/6/2018 1401  Last data filed at 12/6/2018 1100  Gross per 24 hour   Intake 2139.58 ml   Output 2050 ml   Net 89.58 ml      Physical Exam   Constitutional: He is oriented to person, place, and time. He appears well-developed and well-nourished. No distress.   HENT:   Head: Normocephalic and atraumatic.   Eyes: EOM are normal.   Neck: Normal  range of motion.   Cardiovascular: Normal rate, regular rhythm, normal heart sounds and intact distal pulses. Exam reveals no gallop and no friction rub.   No murmur heard.  Pulmonary/Chest: Effort normal and breath sounds normal. No stridor. No respiratory distress. He has no wheezes. He has no rales.   Abdominal: Soft. Bowel sounds are normal. He exhibits distension. There is tenderness in the epigastric area. There is no rigidity and no guarding. A hernia is present. Hernia confirmed positive in the ventral area.   Umbilical Incision clean/dry/intact    Musculoskeletal: Normal range of motion. He exhibits edema.   Neurological: He is alert and oriented to person, place, and time.   Skin: He is not diaphoretic.   Nursing note and vitals reviewed.      Significant Labs: All pertinent labs within the past 24 hours have been reviewed.    Significant Imaging: I have reviewed all pertinent imaging results/findings within the past 24 hours.

## 2018-12-06 NOTE — PLAN OF CARE
Problem: Patient Care Overview  Goal: Plan of Care Review  Outcome: Ongoing (interventions implemented as appropriate)  POC reviewed and understood by pt. AAOx4. IV intact and patent. Left NGT to LIWS. Pain managed by prn pain meds per MD order. Suprapubic cath intact- clear yellow output. NPO. VSS on 2L NC. Call bell within reach. Bed in lowest position. WCTM.

## 2018-12-06 NOTE — ASSESSMENT & PLAN NOTE
84 yo M with ventral hernia and small bowel obstruction s/p repair on 12/5/18    D/c NG tube  Clears  PRN pain meds  Home meds  PT/OT

## 2018-12-06 NOTE — PROGRESS NOTES
[facility]  General Surgery  Progress Note    Subjective:     History of Present Illness:  84 yo M with htn, h/o ventral incisional hernia repair (at former open helen right subcostal incision 7/18 with mesh) p/w abdominal pain and vomiting. Reports intermittent abdominal pain for the past 3-4 months and c/o gas (both in his abdomen, and passing gas). About 1 week ago noticed bulged in mid abdomen. Yesterday had multiple episodes of emesis, vomiting all night. States still passing a little gas, last BM Saturday. NG placed in ED with ~500 ml green output.     Post-Op Info:  Procedure(s) (LRB):  REPAIR, HERNIA, INCISIONAL, INCARCERATED, WITHOUT HISTORY OF PRIOR REPAIR (N/A)   1 Day Post-Op     Interval History: Open ventral hernia repair yesterday.  Tolerated well.  Pain controlled.  Minimal NG output.  Passing gas    Medications:  Continuous Infusions:   0.45 % NaCl with KCl 20 mEq       Scheduled Meds:   enoxaparin  40 mg Subcutaneous Daily    magnesium sulfate IVPB  2 g Intravenous Once    pantoprazole  40 mg Intravenous Daily    potassium chloride  10 mEq Intravenous Q1H    sodium chloride 0.9%  3 mL Intravenous Q8H     PRN Meds:HYDROmorphone, naloxone, ondansetron, promethazine (PHENERGAN) IVPB, sodium chloride 0.9%     Review of patient's allergies indicates:  No Known Allergies  Objective:     Vital Signs (Most Recent):  Temp: 97.7 °F (36.5 °C) (12/06/18 0455)  Pulse: 92 (12/06/18 0455)  Resp: 17 (12/06/18 0455)  BP: (!) 146/68 (12/06/18 0455)  SpO2: 96 % (12/06/18 0455) Vital Signs (24h Range):  Temp:  [97 °F (36.1 °C)-98.7 °F (37.1 °C)] 97.7 °F (36.5 °C)  Pulse:  [] 92  Resp:  [10-28] 17  SpO2:  [91 %-98 %] 96 %  BP: (124-170)/(58-90) 146/68     Weight: 77.6 kg (171 lb)  Body mass index is 26 kg/m².    Intake/Output - Last 3 Shifts       12/04 0700 - 12/05 0659 12/05 0700 - 12/06 0659 12/06 0700 - 12/07 0659    P.O.  0     I.V. (mL/kg)  1981.1 (25.5)     IV Piggyback  450      Total Intake(mL/kg)  2431.1 (31.3)     Urine (mL/kg/hr) 1750 (0.9) 1250 (0.7)     Drains 350 150     Stool  0     Total Output 2100 1400     Net -2100 +1031.1            Urine Occurrence 1 x      Stool Occurrence  0 x           Physical Exam   Constitutional: He is oriented to person, place, and time. He appears well-developed and well-nourished. No distress.   Cardiovascular: Normal rate.   Pulmonary/Chest: Effort normal.   Abdominal:   Soft, ND, appropriately tender, incision site c/d/i   Musculoskeletal: Normal range of motion. He exhibits no edema.   Neurological: He is alert and oriented to person, place, and time.   Skin: Skin is warm and dry.   Psychiatric: He has a normal mood and affect. His behavior is normal.       Significant Labs:  CBC:   Recent Labs   Lab 12/06/18  0604   WBC 11.95   RBC 4.63   HGB 12.5*   HCT 38.8*      MCV 84   MCH 27.0   MCHC 32.2     CMP:   Recent Labs   Lab 12/03/18  1751  12/06/18  0604   *   < > 68*   CALCIUM 10.2   < > 8.4*   ALBUMIN 4.2  --   --    PROT 8.9*  --   --    *   < > 134*   K 3.2*   < > 2.9*   CO2 32*   < > 22*   CL 86*   < > 97   BUN 22   < > 11   CREATININE 1.5*   < > 1.0   ALKPHOS 107  --   --    ALT 7*  --   --    AST 17  --   --    BILITOT 1.6*  --   --     < > = values in this interval not displayed.       Significant Diagnostics:  CT: I have reviewed all pertinent results/findings within the past 24 hours and my personal findings are:  SBO    Assessment/Plan:     * Ventral hernia with obstruction    84 yo M with ventral hernia and small bowel obstruction s/p repair on 12/5/18    D/c NG tube  Clears  PRN pain meds  Home meds  PT/OT     History of postoperative delirium    - Patient reports 1 week history of post-op delirium 7/2018  - Monitor electrolytes, UA, and urine cx  - Delirium precautions   - Can use Seroquel 25 mg QHS prn          Jesús Neil MD  General Surgery  [facility]

## 2018-12-06 NOTE — PROGRESS NOTES
[facility] Hospital Medicine  Progress Note    Patient Name: Chucho Prado  MRN: [id number]  Patient Class: IP- Inpatient   Admission Date: 12/3/2018  Length of Stay: 1 days  Attending Physician: Steve Valentin MD  Primary Care Provider: Obed Mcguire MD    Hospital Medicine Team: Networked reference to record PCT  Hadley Elizabeth MD    Subjective:     Principal Problem:Ventral hernia with obstruction    HPI:  Chucho Prado is an 85 year old male with HTN, CKD-3, HLD, GERD, neurogenic bladder with chronic suprapubic catheter , h/o ventral incisional hernia repair (at former open helen right subcostal incision 7/18 with mesh) presents abdominal pain and vomiting. Reports intermittent abdominal pain for the past 3-4 months and c/o gas (both in his abdomen, and passing gas). About 1 week ago noticed bulged in mid abdomen. Yesterday had multiple episodes of emesis, vomiting all night. States still passing a little gas, last BM Saturday. Patient denies chest pain, shortness of breath, fevers, chills, and headache.     NG placed in ED with ~500 ml green output.         Hospital Course:  No notes on file    Interval History: POD1 for Open ventral hernia repair. NG tube removed, transitioned to liquid diet. No bowel movement. Was placed on oxygen overnight for low O2 sat. Denies SOB at rest. Na improved to 134 but K still low at 2.9    Review of Systems   Constitutional: Negative for chills, fatigue and fever.   HENT: Negative for congestion.    Eyes: Negative for visual disturbance.   Respiratory: Negative for cough, choking, chest tightness, shortness of breath, wheezing and stridor.    Cardiovascular: Positive for leg swelling. Negative for chest pain and palpitations.   Gastrointestinal: Positive for abdominal distention and abdominal pain. Negative for blood in stool, constipation, diarrhea, nausea and vomiting.   Endocrine: Negative for polyuria.   Genitourinary: Negative for dysuria and  hematuria.   Musculoskeletal: Positive for arthralgias and back pain.   Skin: Negative for color change, pallor, rash and wound.   Allergic/Immunologic: Negative.    Neurological: Negative for dizziness, facial asymmetry, light-headedness and headaches.   Hematological: Negative.    Psychiatric/Behavioral: Negative.      Objective:     Vital Signs (Most Recent):  Temp: 97.8 °F (36.6 °C) (12/06/18 1237)  Pulse: 75 (12/06/18 1237)  Resp: 20 (12/06/18 1237)  BP: (!) 122/58 (12/06/18 1237)  SpO2: 97 % (12/06/18 1237) Vital Signs (24h Range):  Temp:  [97 °F (36.1 °C)-98.7 °F (37.1 °C)] 97.8 °F (36.6 °C)  Pulse:  [] 75  Resp:  [16-20] 20  SpO2:  [96 %-98 %] 97 %  BP: (122-170)/(58-90) 122/58     Weight: 77.6 kg (171 lb)  Body mass index is 26 kg/m².    Intake/Output Summary (Last 24 hours) at 12/6/2018 1401  Last data filed at 12/6/2018 1100  Gross per 24 hour   Intake 2139.58 ml   Output 2050 ml   Net 89.58 ml      Physical Exam   Constitutional: He is oriented to person, place, and time. He appears well-developed and well-nourished. No distress.   HENT:   Head: Normocephalic and atraumatic.   Eyes: EOM are normal.   Neck: Normal range of motion.   Cardiovascular: Normal rate, regular rhythm, normal heart sounds and intact distal pulses. Exam reveals no gallop and no friction rub.   No murmur heard.  Pulmonary/Chest: Effort normal and breath sounds normal. No stridor. No respiratory distress. He has no wheezes. He has no rales.   Abdominal: Soft. Bowel sounds are normal. He exhibits distension. There is tenderness in the epigastric area. There is no rigidity and no guarding. A hernia is present. Hernia confirmed positive in the ventral area.   Umbilical Incision clean/dry/intact    Musculoskeletal: Normal range of motion. He exhibits edema.   Neurological: He is alert and oriented to person, place, and time.   Skin: He is not diaphoretic.   Nursing note and vitals reviewed.      Significant Labs: All pertinent labs  within the past 24 hours have been reviewed.    Significant Imaging: I have reviewed all pertinent imaging results/findings within the past 24 hours.    Assessment/Plan:      * Ventral hernia with obstruction    - POD1. NG tube removed. Patient tolerating clear liquid diet  - Would stop continuous fluids as patient is now eating  - Incentive spirometry to prevent atelectasis      Gastroesophageal reflux disease without esophagitis    - continue home Prilosec       History of postoperative delirium    - Patient reports 1 week history of post-op delirium 7/2018  - Monitor electrolytes, UA, and urine cx  - Delirium precautions   - Can use Seroquel 25 mg QHS prn      Incarcerated hernia    - POD1 for open ventral hernia repair        Neurogenic bladder    - suprapubic catheter in place  - follows with urology once a month and due to have galvan changed next week   - UA shows WBC >100 and Leukocytes 3+  - Urine culture growing [employer]   - Patient denies dysuria.   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 Will not treat at this time.    - if possible, would try to change out galvan while inpatient        Hypokalemia    - Could be related to NG tube suctioning   - Potassium 2.9 today  - Replete and monitor daily        Hyponatremia    - several year history of hyponatremia  - Hyponatremia improving after fluid resuscitation.   - Would recommend stopping Triamterene-HCTZ at discharge given history of hyponatremia and hypokalemia         CKD (chronic kidney disease) stage 3, GFR 30-59 ml/min    - Cr in ED yesterday 1.5  - Today Cr. 1.0 with a baseline of 1.1   - TERE most likely pre-renal due to dehydration from vomiting  - Received bolus in ED and IV NS continuous at 125 ml/hr   - Would stop IV fluids as patient is now drinking        Dyslipidemia    - continue home Lipitor       Essential hypertension    - continue home Norvasc 10 mg   - Stop Triamterene-HCTZ given history of hyponatremia and  hypokalemia  - if BP remains elevated (SBP>150), would start losartan 25 mg daily. Can be titrated outpatient if needed     Acquired hypothyroidism    - continue home synthroid         VTE Risk Mitigation (From admission, onward)        Ordered     enoxaparin injection 40 mg  Daily      12/03/18 2322     Place sequential compression device  Until discontinued      12/03/18 2322     IP VTE HIGH RISK PATIENT  Once      12/03/18 2322              Hadley Elizabeth MD  Department of Hospital Medicine   [facility]

## 2018-12-06 NOTE — ASSESSMENT & PLAN NOTE
- Cr in ED yesterday 1.5  - Today Cr. 1.0 with a baseline of 1.1   - TERE most likely pre-renal due to dehydration from vomiting  - Received bolus in ED and IV NS continuous at 125 ml/hr   - Would stop IV fluids as patient is now drinking

## 2018-12-07 LAB
ANION GAP SERPL CALC-SCNC: 10 MMOL/L
BASOPHILS # BLD AUTO: 0.04 K/UL
BASOPHILS NFR BLD: 0.3 %
BUN SERPL-MCNC: 13 MG/DL
CALCIUM SERPL-MCNC: 8.9 MG/DL
CHLORIDE SERPL-SCNC: 97 MMOL/L
CO2 SERPL-SCNC: 25 MMOL/L
CREAT SERPL-MCNC: 1.3 MG/DL
DIFFERENTIAL METHOD: ABNORMAL
EOSINOPHIL # BLD AUTO: 0.4 K/UL
EOSINOPHIL NFR BLD: 2.9 %
ERYTHROCYTE [DISTWIDTH] IN BLOOD BY AUTOMATED COUNT: 15.6 %
EST. GFR  (AFRICAN AMERICAN): 57.5 ML/MIN/1.73 M^2
EST. GFR  (NON AFRICAN AMERICAN): 49.8 ML/MIN/1.73 M^2
GLUCOSE SERPL-MCNC: 101 MG/DL
HCT VFR BLD AUTO: 36.5 %
HGB BLD-MCNC: 11.6 G/DL
IMM GRANULOCYTES # BLD AUTO: 0.05 K/UL
IMM GRANULOCYTES NFR BLD AUTO: 0.4 %
LYMPHOCYTES # BLD AUTO: 1.6 K/UL
LYMPHOCYTES NFR BLD: 12.4 %
MAGNESIUM SERPL-MCNC: 2.3 MG/DL
MCH RBC QN AUTO: 26.8 PG
MCHC RBC AUTO-ENTMCNC: 31.8 G/DL
MCV RBC AUTO: 84 FL
MONOCYTES # BLD AUTO: 0.8 K/UL
MONOCYTES NFR BLD: 6.2 %
NEUTROPHILS # BLD AUTO: 10.1 K/UL
NEUTROPHILS NFR BLD: 77.8 %
NRBC BLD-RTO: 0 /100 WBC
PHOSPHATE SERPL-MCNC: 2.4 MG/DL
PLATELET # BLD AUTO: 250 K/UL
PMV BLD AUTO: 9.3 FL
POTASSIUM SERPL-SCNC: 3.9 MMOL/L
RBC # BLD AUTO: 4.33 M/UL
SODIUM SERPL-SCNC: 132 MMOL/L
WBC # BLD AUTO: 13.03 K/UL

## 2018-12-07 PROCEDURE — 25000003 PHARM REV CODE 250: Performed by: SURGERY

## 2018-12-07 PROCEDURE — 84100 ASSAY OF PHOSPHORUS: CPT

## 2018-12-07 PROCEDURE — G8978 MOBILITY CURRENT STATUS: HCPCS | Mod: CK

## 2018-12-07 PROCEDURE — 80048 BASIC METABOLIC PNL TOTAL CA: CPT

## 2018-12-07 PROCEDURE — 36415 COLL VENOUS BLD VENIPUNCTURE: CPT

## 2018-12-07 PROCEDURE — 63600175 PHARM REV CODE 636 W HCPCS: Performed by: STUDENT IN AN ORGANIZED HEALTH CARE EDUCATION/TRAINING PROGRAM

## 2018-12-07 PROCEDURE — 27000221 HC OXYGEN, UP TO 24 HOURS

## 2018-12-07 PROCEDURE — 25000003 PHARM REV CODE 250: Performed by: STUDENT IN AN ORGANIZED HEALTH CARE EDUCATION/TRAINING PROGRAM

## 2018-12-07 PROCEDURE — 63600175 PHARM REV CODE 636 W HCPCS: Performed by: SURGERY

## 2018-12-07 PROCEDURE — G8987 SELF CARE CURRENT STATUS: HCPCS | Mod: CK

## 2018-12-07 PROCEDURE — 20600001 HC STEP DOWN PRIVATE ROOM

## 2018-12-07 PROCEDURE — 99231 SBSQ HOSP IP/OBS SF/LOW 25: CPT | Mod: GC,,, | Performed by: INTERNAL MEDICINE

## 2018-12-07 PROCEDURE — 97165 OT EVAL LOW COMPLEX 30 MIN: CPT

## 2018-12-07 PROCEDURE — 99900035 HC TECH TIME PER 15 MIN (STAT)

## 2018-12-07 PROCEDURE — 85025 COMPLETE CBC W/AUTO DIFF WBC: CPT

## 2018-12-07 PROCEDURE — G8988 SELF CARE GOAL STATUS: HCPCS | Mod: CJ

## 2018-12-07 PROCEDURE — 83735 ASSAY OF MAGNESIUM: CPT

## 2018-12-07 PROCEDURE — 97161 PT EVAL LOW COMPLEX 20 MIN: CPT

## 2018-12-07 PROCEDURE — A4216 STERILE WATER/SALINE, 10 ML: HCPCS | Performed by: SURGERY

## 2018-12-07 PROCEDURE — G8979 MOBILITY GOAL STATUS: HCPCS | Mod: CJ

## 2018-12-07 RX ORDER — SODIUM,POTASSIUM PHOSPHATES 280-250MG
2 POWDER IN PACKET (EA) ORAL
Status: COMPLETED | OUTPATIENT
Start: 2018-12-07 | End: 2018-12-07

## 2018-12-07 RX ORDER — POTASSIUM CHLORIDE 20 MEQ/15ML
40 SOLUTION ORAL 2 TIMES DAILY
Status: DISCONTINUED | OUTPATIENT
Start: 2018-12-07 | End: 2018-12-08

## 2018-12-07 RX ORDER — ONDANSETRON 2 MG/ML
4 INJECTION INTRAMUSCULAR; INTRAVENOUS EVERY 6 HOURS PRN
Status: DISCONTINUED | OUTPATIENT
Start: 2018-12-07 | End: 2018-12-11 | Stop reason: HOSPADM

## 2018-12-07 RX ADMIN — ONDANSETRON 4 MG: 2 INJECTION INTRAMUSCULAR; INTRAVENOUS at 11:12

## 2018-12-07 RX ADMIN — OXYCODONE HYDROCHLORIDE AND ACETAMINOPHEN 1 TABLET: 10; 325 TABLET ORAL at 05:12

## 2018-12-07 RX ADMIN — POTASSIUM & SODIUM PHOSPHATES POWDER PACK 280-160-250 MG 2 PACKET: 280-160-250 PACK at 04:12

## 2018-12-07 RX ADMIN — PANTOPRAZOLE SODIUM 40 MG: 40 TABLET, DELAYED RELEASE ORAL at 09:12

## 2018-12-07 RX ADMIN — Medication 3 ML: at 02:12

## 2018-12-07 RX ADMIN — LEVOTHYROXINE SODIUM 50 MCG: 25 TABLET ORAL at 05:12

## 2018-12-07 RX ADMIN — AMLODIPINE BESYLATE 10 MG: 10 TABLET ORAL at 09:12

## 2018-12-07 RX ADMIN — ONDANSETRON 4 MG: 2 INJECTION INTRAMUSCULAR; INTRAVENOUS at 08:12

## 2018-12-07 RX ADMIN — PROMETHAZINE HYDROCHLORIDE 12.5 MG: 25 INJECTION INTRAMUSCULAR; INTRAVENOUS at 03:12

## 2018-12-07 RX ADMIN — Medication 3 ML: at 10:12

## 2018-12-07 RX ADMIN — POTASSIUM & SODIUM PHOSPHATES POWDER PACK 280-160-250 MG 2 PACKET: 280-160-250 PACK at 11:12

## 2018-12-07 RX ADMIN — ASPIRIN 81 MG: 81 TABLET, COATED ORAL at 09:12

## 2018-12-07 RX ADMIN — ENOXAPARIN SODIUM 40 MG: 100 INJECTION SUBCUTANEOUS at 05:12

## 2018-12-07 RX ADMIN — ATORVASTATIN CALCIUM 20 MG: 20 TABLET, FILM COATED ORAL at 09:12

## 2018-12-07 RX ADMIN — SODIUM CHLORIDE, SODIUM LACTATE, POTASSIUM CHLORIDE, AND CALCIUM CHLORIDE 1000 ML: .6; .31; .03; .02 INJECTION, SOLUTION INTRAVENOUS at 09:12

## 2018-12-07 RX ADMIN — TRAZODONE HYDROCHLORIDE 50 MG: 50 TABLET ORAL at 09:12

## 2018-12-07 RX ADMIN — OXYCODONE HYDROCHLORIDE AND ACETAMINOPHEN 1 TABLET: 10; 325 TABLET ORAL at 03:12

## 2018-12-07 RX ADMIN — POTASSIUM CHLORIDE 40 MEQ: 20 SOLUTION ORAL at 09:12

## 2018-12-07 NOTE — ASSESSMENT & PLAN NOTE
- Patient reports 1 week history of post-op delirium 7/2018  - Delirium precautions   - Can use Seroquel 25 mg QHS prn

## 2018-12-07 NOTE — PLAN OF CARE
Problem: Occupational Therapy Goal  Goal: Occupational Therapy Goal  Goals to be met by: 12/14/18    Patient will increase functional independence with ADLs by performing:    LE Dressing with Moderate Assistance and Assistive Devices as needed.  Grooming while EOB with Set-up Assistance.  Toileting from toilet with Minimal Assistance for hygiene and clothing management.   Toilet transfer to toilet with Contact Guard Assistance.    Outcome: Ongoing (interventions implemented as appropriate)  Eval completed; goals established    Comments: Initiate OT MEKA Chatterjee OT  12/7/2018

## 2018-12-07 NOTE — PROGRESS NOTES
[facility] Hospital Medicine  Progress Note    Patient Name: Chucho Prado  MRN: [id number]  Patient Class: IP- Inpatient   Admission Date: 12/3/2018  Length of Stay: 2 days  Attending Physician: Steve Valentin MD  Primary Care Provider: Obed Mcguire MD    Hospital Medicine Team: Networked reference to record PCT  Hadley Elizabeth MD    Subjective:     Principal Problem:Ventral hernia with obstruction    HPI:  Chucho Prado is an 85 year old male with HTN, CKD-3, HLD, GERD, neurogenic bladder with chronic suprapubic catheter , h/o ventral incisional hernia repair (at former open helen right subcostal incision 7/18 with mesh) presents abdominal pain and vomiting. Reports intermittent abdominal pain for the past 3-4 months and c/o gas (both in his abdomen, and passing gas). About 1 week ago noticed bulged in mid abdomen. Yesterday had multiple episodes of emesis, vomiting all night. States still passing a little gas, last BM Saturday. Patient denies chest pain, shortness of breath, fevers, chills, and headache.     NG placed in ED with ~500 ml green output.         Hospital Course:  No notes on file    Interval History: POD2 for ventral hernia repair. Some nausea today but tolerating clear liquids. Has pain near incision site. No fever/chills. He has not had a bowel movement yet but is passing gas. Currently on 2 L of O2 but denies SOB.    Review of Systems   Constitutional: Negative for chills, fatigue and fever.   HENT: Negative for congestion.    Eyes: Negative for visual disturbance.   Respiratory: Negative for cough, choking, chest tightness, shortness of breath, wheezing and stridor.    Cardiovascular: Negative for chest pain, palpitations and leg swelling.   Gastrointestinal: Positive for abdominal pain and nausea. Negative for abdominal distention, blood in stool, constipation, diarrhea and vomiting.   Endocrine: Negative for polyuria.   Genitourinary: Negative for dysuria and  hematuria.   Musculoskeletal: Positive for arthralgias and back pain.   Skin: Negative for color change, pallor, rash and wound.   Allergic/Immunologic: Negative.    Neurological: Negative for dizziness, facial asymmetry, light-headedness and headaches.   Hematological: Negative.    Psychiatric/Behavioral: Negative.      Objective:     Vital Signs (Most Recent):  Temp: 97.9 °F (36.6 °C) (12/07/18 1219)  Pulse: 82 (12/07/18 1219)  Resp: 18 (12/07/18 1219)  BP: (!) 140/61 (12/07/18 1219)  SpO2: (!) 92 % (12/07/18 1219) Vital Signs (24h Range):  Temp:  [97.5 °F (36.4 °C)-98.5 °F (36.9 °C)] 97.9 °F (36.6 °C)  Pulse:  [72-93] 82  Resp:  [17-20] 18  SpO2:  [92 %-97 %] 92 %  BP: (126-143)/(60-67) 140/61     Weight: 77.6 kg (171 lb)  Body mass index is 26 kg/m².    Intake/Output Summary (Last 24 hours) at 12/7/2018 1606  Last data filed at 12/7/2018 0100  Gross per 24 hour   Intake 200 ml   Output 700 ml   Net -500 ml      Physical Exam   Constitutional: He is oriented to person, place, and time. He appears well-developed and well-nourished. No distress.   HENT:   Head: Normocephalic and atraumatic.   Eyes: EOM are normal.   Neck: Normal range of motion.   Cardiovascular: Normal rate, regular rhythm, normal heart sounds and intact distal pulses. Exam reveals no gallop and no friction rub.   No murmur heard.  Pulmonary/Chest: Effort normal and breath sounds normal. No stridor. No respiratory distress. He has no wheezes. He has no rales.   Abdominal: Soft. Bowel sounds are normal. He exhibits distension. There is tenderness in the epigastric area. There is no rigidity and no guarding. A hernia is present. Hernia confirmed positive in the ventral area.   Umbilical Incision clean/dry/intact    Musculoskeletal: Normal range of motion. He exhibits edema.   Neurological: He is alert and oriented to person, place, and time.   Skin: He is not diaphoretic.   Nursing note and vitals reviewed.      Significant Labs: All pertinent labs  within the past 24 hours have been reviewed.    Significant Imaging: I have reviewed all pertinent imaging results/findings within the past 24 hours.    Assessment/Plan:      * Ventral hernia with obstruction    - POD2. Patient tolerating clear liquid diet  - Incentive spirometry to prevent atelectasis      Gastroesophageal reflux disease without esophagitis    - continue home Prilosec       History of postoperative delirium    - Patient reports 1 week history of post-op delirium 7/2018  - Delirium precautions   - Can use Seroquel 25 mg QHS prn      Incarcerated hernia    - POD2 for open ventral hernia repair        Neurogenic bladder    - suprapubic catheter in place  - follows with urology once a month and due to have galvan changed next week   - UA shows WBC >100 and Leukocytes 3+  - Urine culture growing [employer]   - Patient denies dysuria.   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 Will not treat at this time.    - if possible, would try to change out galvan while inpatient        Hypokalemia    - Could be related to NG tube suctioning   - Potassium 3.9 today  - Replete and monitor daily        Hyponatremia    - several year history of hyponatremia  - Hyponatremia improving after fluid resuscitation.   - Would recommend stopping Triamterene-HCTZ at discharge given history of hyponatremia and hypokalemia         CKD (chronic kidney disease) stage 3, GFR 30-59 ml/min    - Today Cr increased to 1.3  - ETRE likely pre-renal from use of dyazide and poor po intake   - 1 L of LR ordered by primary        Dyslipidemia    - continue home Lipitor       Essential hypertension    - continue home Norvasc 10 mg   - Stop Triamterene-HCTZ given history of hyponatremia and hypokalemia  - if BP remains elevated (SBP>150), would start losartan 25 mg daily. Can be titrated outpatient if needed     Acquired hypothyroidism    - continue home synthroid         VTE Risk Mitigation (From admission, onward)         Ordered     enoxaparin injection 40 mg  Daily      12/03/18 2322     Place sequential compression device  Until discontinued      12/03/18 2322     IP VTE HIGH RISK PATIENT  Once      12/03/18 2322              Hadley Elizabeth MD  Department of Hospital Medicine   [facility]

## 2018-12-07 NOTE — PLAN OF CARE
Problem: Patient Care Overview  Goal: Plan of Care Review  Outcome: Ongoing (interventions implemented as appropriate)  POC reviewed and understood by pt.  AAOx4. Pt is refusing to be turned and refuses the wedge/pillow.  Pain managed by prn pain meds per MD order.  SCDs/TEDs on. VSS on RM air.  Call bell remains within reach.  Bed in lowest position. WCTM.

## 2018-12-07 NOTE — PT/OT/SLP EVAL
Occupational Therapy   Evaluation    Name: Chucho Prado  MRN: [id number]  Admitting Diagnosis:  Ventral hernia with obstruction 2 Days Post-Op    Recommendations:     Discharge Recommendations:  HHOT  Discharge Equipment Recommendations:  none  Barriers to discharge:  None    History:     Occupational Profile:  Living Environment: Pt lives alone in 1st floor apt with elevator access.   Previous level of function: PTA, pt reports caregiver assistance with all ADL and IADL. Pt reports caregivers available 8A - 12A daily. Caregivers assist with bathing while standing at bedside and with dressing. Pt reports ambulating in his home using SW with caregiver supervision and assist as needed. Pt has HH therapy 2x/week  Roles and Routines: writer  Equipment Used at Home:  walker, standard, wheelchair, bedside commode, shower chair, cane, straight, cane, quad, walker, rolling(adjustable bed)  Assistance upon Discharge: Caregiver assist as needed following d/c.    Past Medical History:   Diagnosis Date    Acquired hypothyroidism 7/30/2013    Arthritis     Blood transfusion     before 2005 - whe had gangrenous gall bladder    Cataract     Chronic back pain 12/4/2018    - Takes Percocet 5-325 at home - Can continue current abdominal pain control with Dilaudid 0.5 mg IV Q3H prn     CKD (chronic kidney disease) stage 3, GFR 30-59 ml/min     Compression fracture of lumbar vertebra     Depression     Dyslipidemia     Essential hypertension 7/30/2013    Gastroesophageal reflux disease without esophagitis 12/4/2018    - continue home Prilosec    General anesthetics causing adverse effect in therapeutic use     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    GERD (gastroesophageal reflux disease)     History of postoperative delirium 12/4/2018    - Patient reports 1 week history of post-op delirium 7/2018 - Monitor electrolytes, UA, and urine cx - Delirium precautions  - Can use Seroquel 25 mg QHS prn     Hypertension      Hyponatremia 10/23/2017    Impaired mobility and ADLs 6/28/2018    Neurogenic bladder 12/4/2018    Sleep disorder 12/4/2018    - continue Trazodone QHS    Thyroid disease     UTI (urinary tract infection)        Past Surgical History:   Procedure Laterality Date    BLOCK-NERVE Left 10/26/2017    Performed by [place] Surgeon at [facility] ASIA    CHOLECYSTECTOMY      CYSTOSCOPY WITH RETROGRADE PYELOGRAM Bilateral 1/27/2017    Performed by Chaitanya Taylor Jr., MD at [facility] OR 1ST FLR    EGD (ESOPHAGOGASTRODUODENOSCOPY) N/A 8/2/2013    Performed by Nagi Talbert MD at [facility] ENDO (2ND FLR)    EYE SURGERY Right     IOL    HERNIA REPAIR      INSERTION-CATHETER-SUPRAPUBIC N/A 6/23/2017    Performed by Chaitanya Taylor Jr., MD at [facility] OR 2ND FLR    INSERTION-INTRAOCULAR LENS (IOL) Left 5/28/2018    Performed by Trinity Vazquez MD at [facility] OR    INSERTION-INTRAOCULAR LENS (IOL) Right 2/19/2018    Performed by Trinity Vazquez MD at [facility] OR    INTRAOCULAR PROSTHESES INSERTION Left 5/28/2018    Procedure: INSERTION-INTRAOCULAR LENS (IOL);  Surgeon: Trinity Vazquez MD;  Location: [facility] OR;  Service: Ophthalmology;  Laterality: Left;    JOINT REPLACEMENT      KYPHOPLASTY L3 N/A 12/27/2016    Performed by Donavan Martinez MD at [facility] OR 2ND FLR    LAMINECTOMY  12/27/2016    L2-L4    LAMINECTOMY-MINIMALLY INVASIVE L2,3 and L3,4; globus, neuromonitoring Right 12/27/2016    Performed by Donavan Martinez MD at [facility] OR 2ND FLR    LUMBAR LAMINECTOMY  12/2016    PARATHYROIDECTOMY      PHACOEMULSIFICATION OF CATARACT Left 5/28/2018    Procedure: PHACOEMULSIFICATION-ASPIRATION-CATARACT;  Surgeon: Trinity Vazquez MD;  Location: [facility] OR;  Service: Ophthalmology;  Laterality: Left;    PHACOEMULSIFICATION-ASPIRATION-CATARACT Left 5/28/2018    Performed by Trinity Vazquez MD at [facility] OR    PHACOEMULSIFICATION-ASPIRATION-CATARACT Right 2/19/2018    Performed by Trinity Vazquez MD at [facility] OR    REPAIR OF INCARCERATED INCISIONAL HERNIA  WITHOUT HISTORY OF PRIOR REPAIR N/A 12/5/2018    Procedure: REPAIR, HERNIA, INCISIONAL, INCARCERATED, WITHOUT HISTORY OF PRIOR REPAIR;  Surgeon: Steve Valentin MD;  Location: [facility] OR [address] [place] [address];  Service: General;  Laterality: N/A;    REPAIR OF INCARCERATED VENTRAL HERNIA WITHOUT HISTORY OF PRIOR REPAIR N/A 6/20/2018    Procedure: REPAIR, HERNIA, VENTRAL, INCARCERATED, WITHOUT HISTORY OF PRIOR REPAIR;  Surgeon: Guilherme Ordoñez MD;  Location: [facility] OR [address] [place] [address];  Service: General;  Laterality: N/A;    REPAIR, HERNIA, INCISIONAL, INCARCERATED, WITHOUT HISTORY OF PRIOR REPAIR N/A 12/5/2018    Performed by Steve Valentin MD at [facility] OR [address] [place] [address]    REPAIR, HERNIA, INGUINAL, WITHOUT HISTORY OF PRIOR REPAIR, AGE 5 YEARS OR OLDER Right 10/9/2013    Performed by Daron Arthur [facility] at [facility]    REPAIR, HERNIA, VENTRAL, INCARCERATED, WITHOUT HISTORY OF PRIOR REPAIR N/A 6/20/2018    Performed by Guilherme Ordoñez MD at [facility]    REPLACEMENT-KNEE-TOTAL Left 10/25/2017    Performed by John L. [facility] Jr., MD at [facility]    TOTAL KNEE ARTHROPLASTY Bilateral     TOTAL KNEE ARTHROPLASTY Left 10/25/2017    TKR       Subjective     Chief Complaint: pain  Patient/Family Comments/goals: return home    Pain/Comfort:  · Pain Rating 1: 2/10  · Location - Side 1: Bilateral  · Location - Orientation 1: lower  · Location 1: abdomen(and back)  · Pain Addressed 1: Reposition, Distraction, Cessation of Activity  · Pain Rating Post-Intervention 1: 2/10    Patients cultural, spiritual, Congregational conflicts given the current situation: none reported    Objective:     Communicated with: RN prior to session.  Patient found with: all lines intact, call button in reach and RN notified and peripheral IV, oxygen(suprapubic catheter) upon OT entry to room.    General Precautions: Standard, fall   Orthopedic Precautions:N/A   Braces: N/A     Occupational Performance:    Bed Mobility:    · Patient completed  Scooting/Bridging with stand by assistance  · Patient completed Supine to Sit with stand by assistance    Functional Mobility/Transfers:  · Patient completed Sit <> Stand Transfer with minimum assistance  with  standard walker   · Patient completed Bed <> Chair Transfer using Step Transfer technique with contact guard assistance with standard walker  · Functional Mobility: Pt took 4 steps bed>chair with CGA using SW    Activities of Daily Living:  · Upper Body Dressing: setup assistance to don backward gown while seated EOB    Cognitive/Visual Perceptual:  Cognitive/Psychosocial Skills:     -       Oriented to: Person, Place, Time and Situation   -       Follows Commands/attention:Follows multistep  commands  -       Communication: clear/fluent  -       Memory: No Deficits noted  -       Safety awareness/insight to disability: intact   -       Mood/Affect/Coping skills/emotional control: Appropriate to situation  Visual/Perceptual:      -Pt wears reading glasses; hearing intact    Physical Exam:  Balance:    -       good sitting/standing balance  Postural examination/scapula alignment:    -       Rounded shoulders  -       Forward head  -       Kyphosis  Skin integrity: Visible skin intact  Edema:  Mild B upper legs  Sensation:    -       Impaired  Pt reports occasional numbness/tingling in hands and feet  Dominant hand:    -       R hand  Upper Extremity Range of Motion:     -       Right Upper Extremity: WFL  -       Left Upper Extremity: WFL  Upper Extremity Strength:    -       Right Upper Extremity: WFL  -       Left Upper Extremity: WFL   Strength:    -       Right Upper Extremity: WFL  -       Left Upper Extremity: WFL  Fine Motor Coordination:    -       Intact  Gross motor coordination:   WFL    AMPAC 6 Click ADL:  AMPAC Total Score: 18    Treatment & Education:  Pt educated on role of OT/POC  Pt educated on importance of ambulation/UIC  White board/communication board updated  Education:    Patient  "left up in chair with all lines intact, call button in reach and RN notified    Assessment:     Chucho Prado is a 85 y.o. male with a medical diagnosis of Ventral hernia with obstruction.  He presents with the following performance deficits affecting function: impaired endurance, impaired balance, gait instability, weakness, impaired self care skills, impaired functional mobilty, decreased safety awareness.      Rehab Prognosis: Fair; patient would benefit from acute skilled OT services to address these deficits and reach maximum level of function.         Clinical Decision Makin.  OT Low:  "Pt evaluation falls under low complexity for evaluation coding due to performance deficits noted in 1-3 areas as stated above and 0 co-morbities affecting current functional status. Data obtained from problem focused assessments. No modifications or assistance was required for completion of evaluation. Only brief occupational profile and history review completed."     Plan:     Patient to be seen 3 x/week to address the above listed problems via self-care/home management, therapeutic activities, therapeutic exercises  · Plan of Care Expires: 19  · Plan of Care Reviewed with: patient    This Plan of care has been discussed with the patient who was involved in its development and understands and is in agreement with the identified goals and treatment plan    GOALS:   Multidisciplinary Problems     Occupational Therapy Goals        Problem: Occupational Therapy Goal    Goal Priority Disciplines Outcome Interventions   Occupational Therapy Goal     OT, PT/OT Ongoing (interventions implemented as appropriate)    Description:  Goals to be met by: 18    Patient will increase functional independence with ADLs by performing:    LE Dressing with Moderate Assistance and Assistive Devices as needed.  Grooming while EOB with Set-up Assistance.  Toileting from toilet with Minimal Assistance for hygiene and clothing " management.   Toilet transfer to toilet with Contact Guard Assistance.                      Time Tracking:     OT Date of Treatment: 12/07/18  OT Start Time: 0901  OT Stop Time: 0925  OT Total Time (min): 24 min    Billable Minutes:Evaluation 24    Preethi Chatterjee OT  12/7/2018

## 2018-12-07 NOTE — SUBJECTIVE & OBJECTIVE
Interval History: POD2 for ventral hernia repair. Some nausea today but tolerating clear liquids. Has pain near incision site. No fever/chills. He has not had a bowel movement yet but is passing gas. Currently on 2 L of O2 but denies SOB.    Review of Systems   Constitutional: Negative for chills, fatigue and fever.   HENT: Negative for congestion.    Eyes: Negative for visual disturbance.   Respiratory: Negative for cough, choking, chest tightness, shortness of breath, wheezing and stridor.    Cardiovascular: Negative for chest pain, palpitations and leg swelling.   Gastrointestinal: Positive for abdominal pain and nausea. Negative for abdominal distention, blood in stool, constipation, diarrhea and vomiting.   Endocrine: Negative for polyuria.   Genitourinary: Negative for dysuria and hematuria.   Musculoskeletal: Positive for arthralgias and back pain.   Skin: Negative for color change, pallor, rash and wound.   Allergic/Immunologic: Negative.    Neurological: Negative for dizziness, facial asymmetry, light-headedness and headaches.   Hematological: Negative.    Psychiatric/Behavioral: Negative.      Objective:     Vital Signs (Most Recent):  Temp: 97.9 °F (36.6 °C) (12/07/18 1219)  Pulse: 82 (12/07/18 1219)  Resp: 18 (12/07/18 1219)  BP: (!) 140/61 (12/07/18 1219)  SpO2: (!) 92 % (12/07/18 1219) Vital Signs (24h Range):  Temp:  [97.5 °F (36.4 °C)-98.5 °F (36.9 °C)] 97.9 °F (36.6 °C)  Pulse:  [72-93] 82  Resp:  [17-20] 18  SpO2:  [92 %-97 %] 92 %  BP: (126-143)/(60-67) 140/61     Weight: 77.6 kg (171 lb)  Body mass index is 26 kg/m².    Intake/Output Summary (Last 24 hours) at 12/7/2018 1606  Last data filed at 12/7/2018 0100  Gross per 24 hour   Intake 200 ml   Output 700 ml   Net -500 ml      Physical Exam   Constitutional: He is oriented to person, place, and time. He appears well-developed and well-nourished. No distress.   HENT:   Head: Normocephalic and atraumatic.   Eyes: EOM are normal.   Neck: Normal  range of motion.   Cardiovascular: Normal rate, regular rhythm, normal heart sounds and intact distal pulses. Exam reveals no gallop and no friction rub.   No murmur heard.  Pulmonary/Chest: Effort normal and breath sounds normal. No stridor. No respiratory distress. He has no wheezes. He has no rales.   Abdominal: Soft. Bowel sounds are normal. He exhibits distension. There is tenderness in the epigastric area. There is no rigidity and no guarding. A hernia is present. Hernia confirmed positive in the ventral area.   Umbilical Incision clean/dry/intact    Musculoskeletal: Normal range of motion. He exhibits edema.   Neurological: He is alert and oriented to person, place, and time.   Skin: He is not diaphoretic.   Nursing note and vitals reviewed.      Significant Labs: All pertinent labs within the past 24 hours have been reviewed.    Significant Imaging: I have reviewed all pertinent imaging results/findings within the past 24 hours.

## 2018-12-07 NOTE — SUBJECTIVE & OBJECTIVE
Interval History:     Medications:  Continuous Infusions:    Scheduled Meds:   amLODIPine  10 mg Oral Daily    aspirin  81 mg Oral Daily    atorvastatin  20 mg Oral Daily    enoxaparin  40 mg Subcutaneous Daily    levothyroxine  50 mcg Oral Before breakfast    pantoprazole  40 mg Oral Daily    potassium chloride 10%  40 mEq Oral BID    sodium chloride 0.9%  3 mL Intravenous Q8H    traZODone  50 mg Oral Nightly     PRN Meds:naloxone, ondansetron, oxyCODONE-acetaminophen, oxyCODONE-acetaminophen, promethazine (PHENERGAN) IVPB, sodium chloride 0.9%     Review of patient's allergies indicates:  No Known Allergies  Objective:     Vital Signs (Most Recent):  Temp: 98.5 °F (36.9 °C) (12/07/18 0409)  Pulse: 72 (12/07/18 0409)  Resp: 17 (12/07/18 0409)  BP: 128/60 (12/07/18 0409)  SpO2: 97 % (12/07/18 0409) Vital Signs (24h Range):  Temp:  [97.5 °F (36.4 °C)-98.5 °F (36.9 °C)] 98.5 °F (36.9 °C)  Pulse:  [72-93] 72  Resp:  [17-20] 17  SpO2:  [93 %-97 %] 97 %  BP: (122-143)/(58-67) 128/60     Weight: 77.6 kg (171 lb)  Body mass index is 26 kg/m².    Intake/Output - Last 3 Shifts       12/05 0700 - 12/06 0659 12/06 0700 - 12/07 0659 12/07 0700 - 12/08 0659    P.O. 0 800     I.V. (mL/kg) 1981.1 (25.5) 137.5 (1.8)     IV Piggyback 450      Total Intake(mL/kg) 2431.1 (31.3) 937.5 (12.1)     Urine (mL/kg/hr) 1250 (0.7) 1800 (1)     Drains 150      Stool 0      Total Output 1400 1800     Net +1031.1 -862.5            Stool Occurrence 0 x            Physical Exam   Constitutional: He is oriented to person, place, and time. He appears well-developed and well-nourished. No distress.   Cardiovascular: Normal rate.   Pulmonary/Chest: Effort normal.   Abdominal: Soft.   Hematoma at surgical site.    Musculoskeletal: Normal range of motion. He exhibits no edema.   Neurological: He is alert and oriented to person, place, and time.   Skin: Skin is warm and dry.   Psychiatric: He has a normal mood and affect. His behavior is normal.        Significant Labs:  CBC:   Recent Labs   Lab 12/07/18  0553   WBC 13.03*   RBC 4.33*   HGB 11.6*   HCT 36.5*      MCV 84   MCH 26.8*   MCHC 31.8*     CMP:   Recent Labs   Lab 12/03/18  1751  12/07/18  0553   *   < > 101   CALCIUM 10.2   < > 8.9   ALBUMIN 4.2  --   --    PROT 8.9*  --   --    *   < > 132*   K 3.2*   < > 3.9   CO2 32*   < > 25   CL 86*   < > 97   BUN 22   < > 13   CREATININE 1.5*   < > 1.3   ALKPHOS 107  --   --    ALT 7*  --   --    AST 17  --   --    BILITOT 1.6*  --   --     < > = values in this interval not displayed.       Significant Diagnostics:  CT: I have reviewed all pertinent results/findings within the past 24 hours and my personal findings are:  SBO

## 2018-12-07 NOTE — PT/OT/SLP EVAL
Physical Therapy Evaluation    Patient Name:  Chucho Prado   MRN:  [id number]    Recommendations:     Discharge Recommendations:  home with home health(plus continued caregiver assistance)   Discharge Equipment Recommendations:     Barriers to discharge: None    Assessment:     Chucho Prado is a 85 y.o. male admitted with a medical diagnosis of Ventral hernia with obstruction.  He presents with the following impairments/functional limitations:  gait instability, impaired functional mobilty, impaired endurance, decreased safety awareness, impaired cardiopulmonary response to activity. Pt at overall SBA/min A for transfers and mobility. Pt with caregiver assistance round the clock for return home. Gait trail limited by fatigue. Pt spoke with CM about recommendations for return home with sitter care and home health PT services. Pt safe to discharge once medically stable. No DME needs.     Rehab Prognosis:  good; patient would benefit from acute skilled PT services to address these deficits and reach maximum level of function.      Recent Surgery: Procedure(s) (LRB):  REPAIR, HERNIA, INCISIONAL, INCARCERATED, WITHOUT HISTORY OF PRIOR REPAIR (N/A) 2 Days Post-Op    Plan:     During this hospitalization, patient to be seen 3 x/week to address the above listed problems via gait training, therapeutic activities, therapeutic exercises  · Plan of Care Expires:  01/06/19   Plan of Care Reviewed with: patient    Subjective     Communicated with RN prior to session.  Patient found supine in bed with HOB elevated upon PT entry to room, agreeable to evaluation.      Chief Complaint: fatigue  Patient comments/goals: get back home  Pain/Comfort:  · Pain Rating 1: 2/10  · Location - Side 1: Bilateral  · Location - Orientation 1: lower  · Location 1: abdomen  · Pain Addressed 1: Pre-medicate for activity, Reposition, Nurse notified  · Pain Rating Post-Intervention 1: 2/10    Patients cultural, spiritual, Moravian conflicts  given the current situation: none noted    Living Environment:  Pt reports that he lives alone, but has constant caregiver support. Prior to admission, patients level of function was fluctuating levels of assistance needed from caregivers.  Patient has the following equipment: walker, standard, wheelchair, bedside commode, shower chair, cane, straight, cane, quad, walker, rolling(adjustable bed).  DME owned (not currently used): none.  Upon discharge, patient will have assistance from caregivers.    Objective:     Patient found with: peripheral IV, oxygen(suprapubic cath)     General Precautions: Standard, fall   Orthopedic Precautions:N/A   Braces: N/A     Exams:  · Cognitive Exam:  Patient is oriented to Person, Place, Time and Situation  · RLE Strength: WFL  · LLE Strength: WFL    Functional Mobility:  · Bed Mobility:     · Rolling Left:  stand by assistance  · Supine to Sit: stand by assistance  · Transfers:     · Sit to Stand:  minimum assistance with standard walker  · Gait: Pt ambulated 5 steps to bedside chair with SW and CGA. No LOB noted    AM-PAC 6 CLICK MOBILITY  Total Score:16     Patient left up in chair with all lines intact, call button in reach and rn notified.    GOALS:   Multidisciplinary Problems     Physical Therapy Goals        Problem: Physical Therapy Goal    Goal Priority Disciplines Outcome Goal Variances Interventions   Physical Therapy Goal     PT, PT/OT Ongoing (interventions implemented as appropriate)     Description:  Goals to be met by: 18     Patient will increase functional independence with mobility by performin. Supine to sit with modified independence and use of handrails  2. Sit to supine with modified independence and use of handrails  3. Sit to stand transfer with Contact Guard Assistance with standard walker.   4. Bed to chair transfer with Contact Guard Assistance using Standard Walker  5. Gait  x 50 feet with Contact Guard Assistance using Standard Walker.                        History:     Past Medical History:   Diagnosis Date    Acquired hypothyroidism 7/30/2013    Arthritis     Blood transfusion     before 2005 - whe had gangrenous gall bladder    Cataract     Chronic back pain 12/4/2018    - Takes Percocet 5-325 at home - Can continue current abdominal pain control with Dilaudid 0.5 mg IV Q3H prn     CKD (chronic kidney disease) stage 3, GFR 30-59 ml/min     Compression fracture of lumbar vertebra     Depression     Dyslipidemia     Essential hypertension 7/30/2013    Gastroesophageal reflux disease without esophagitis 12/4/2018    - continue home Prilosec    General anesthetics causing adverse effect in therapeutic use     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    GERD (gastroesophageal reflux disease)     History of postoperative delirium 12/4/2018    - Patient reports 1 week history of post-op delirium 7/2018 - Monitor electrolytes, UA, and urine cx - Delirium precautions  - Can use Seroquel 25 mg QHS prn     Hypertension     Hyponatremia 10/23/2017    Impaired mobility and ADLs 6/28/2018    Neurogenic bladder 12/4/2018    Sleep disorder 12/4/2018    - continue Trazodone QHS    Thyroid disease     UTI (urinary tract infection)        Past Surgical History:   Procedure Laterality Date    BLOCK-NERVE Left 10/26/2017    Performed by Asia Surgeon at [facility] ASIA    CHOLECYSTECTOMY      CYSTOSCOPY WITH RETROGRADE PYELOGRAM Bilateral 1/27/2017    Performed by Chaitanya Taylor Jr., MD at [facility] OR 1ST FLR    EGD (ESOPHAGOGASTRODUODENOSCOPY) N/A 8/2/2013    Performed by Nagi Talbert MD at [facility] ENDO (2ND FLR)    EYE SURGERY Right     IOL    HERNIA REPAIR      INSERTION-CATHETER-SUPRAPUBIC N/A 6/23/2017    Performed by Chaitanya Taylor Jr., MD at [facility] OR 2ND FLR    INSERTION-INTRAOCULAR LENS (IOL) Left 5/28/2018    [facility] at [facility] OR    INSERTION-INTRAOCULAR LENS (IOL) Right 2/19/2018    Performed by Trinity Vazquez MD  at [facility] OR    INTRAOCULAR PROSTHESES INSERTION Left 5/28/2018    Procedure: INSERTION-INTRAOCULAR LENS (IOL);  Surgeon: Trinity Vazquez MD;  Location: [facility] OR;  Service: Ophthalmology;  Laterality: Left;    JOINT REPLACEMENT      KYPHOPLASTY L3 N/A 12/27/2016    Performed by Donavan Martinez MD at [facility] OR 2ND FLR    LAMINECTOMY  12/27/2016    L2-L4    LAMINECTOMY-MINIMALLY INVASIVE L2,3 and L3,4; globus, neuromonitoring Right 12/27/2016    Performed by Donavan Martinez MD at [facility] OR 2ND FLR    LUMBAR LAMINECTOMY  12/2016    PARATHYROIDECTOMY      PHACOEMULSIFICATION OF CATARACT Left 5/28/2018    Procedure: PHACOEMULSIFICATION-ASPIRATION-CATARACT;  Surgeon: Trinity Vazquez MD;  Location: [facility];  Service: Ophthalmology;  Laterality: Left;    PHACOEMULSIFICATION-ASPIRATION-CATARACT Left 5/28/2018    Performed by Trinity Vazquez MD at [facility] OR    PHACOEMULSIFICATION-ASPIRATION-CATARACT Right 2/19/2018    Performed by Trinity Vazquez MD at [facility] OR    REPAIR OF INCARCERATED INCISIONAL HERNIA WITHOUT HISTORY OF PRIOR REPAIR N/A 12/5/2018    Procedure: REPAIR, HERNIA, INCISIONAL, INCARCERATED, WITHOUT HISTORY OF PRIOR REPAIR;  Surgeon: Steve Valentin MD;  Location: [facility] OR [address] [place] [address];  Service: General;  Laterality: N/A;    REPAIR OF INCARCERATED VENTRAL HERNIA WITHOUT HISTORY OF PRIOR REPAIR N/A 6/20/2018    Procedure: REPAIR, HERNIA, VENTRAL, INCARCERATED, WITHOUT HISTORY OF PRIOR REPAIR;  Surgeon: Guilherme Ordoñez MD;  Location: [facility] OR [address] [place] [address];  Service: General;  Laterality: N/A;    REPAIR, HERNIA, INCISIONAL, INCARCERATED, WITHOUT HISTORY OF PRIOR REPAIR N/A 12/5/2018    Performed by Steve Valentin MD at [facility] OR [address] [place] [address]    REPAIR, HERNIA, INGUINAL, WITHOUT HISTORY OF PRIOR REPAIR, AGE 5 YEARS OR OLDER Right 10/9/2013    Performed by Daron Arthru MD at [facility] OR [address] [place] [address]    REPAIR, HERNIA, VENTRAL, INCARCERATED, WITHOUT HISTORY OF PRIOR REPAIR N/A 6/20/2018    Performed by Guilherme Or[facility]ez MD at  [facility] OR 2ND FLR    REPLACEMENT-KNEE-TOTAL Left 10/25/2017    Performed by [facility] Jr[facility] MD at [facility] OR 2ND FLR    TOTAL KNEE ARTHROPLASTY Bilateral     TOTAL KNEE ARTHROPLASTY Left 10/25/2017    TKR       Clinical Decision Making:     History  Co-morbidities and personal factors that may impact the plan of care Examination  Body Structures and Functions, activity limitations and participation restrictions that may impact the plan of care Clinical Presentation   Decision Making/ Complexity Score   Co-morbidities:   [] Time since onset of injury / illness / exacerbation  [] Status of current condition  []Patient's cognitive status and safety concerns    [] Multiple Medical Problems (see med hx)  Personal Factors:   [x] Patient's age  [x] Prior Level of function   [] Patient's home situation (environment and family support)  [] Patient's level of motivation  [] Expected progression of patient      HISTORY:(criteria)    [] 46444 - no personal factors/history    [x] 80059 - has 1-2 personal factor/comorbidity     [] 59916 - has >3 personal factor/comorbidity     Body Regions:  [] Objective examination findings  [] Head     []  Neck  [] Trunk   [] Upper Extremity  [x] Lower Extremity    Body Systems:  [] For communication ability, affect, cognition, language, and learning style: the assessment of the ability to make needs known, consciousness, orientation (person, place, and time), expected emotional /behavioral responses, and learning preferences (eg, learning barriers, education  needs)  [] For the neuromuscular system: a general assessment of gross coordinated movement (eg, balance, gait, locomotion, transfers, and transitions) and motor function  (motor control and motor learning)  [x] For the musculoskeletal system: the assessment of gross symmetry, gross range of motion, gross strength, height, and weight  [] For the integumentary system: the assessment of pliability(texture), presence of scar formation,  skin color, and skin integrity  [] For cardiovascular/pulmonary system: the assessment of heart rate, respiratory rate, blood pressure, and edema     Activity limitations:    [] Patient's cognitive status and saf ety concerns          [] Status of current condition      [] Weight bearing restriction  [] Cardiopulmunary Restriction    Participation Restrictions:   [] Goals and goal agreement with the patient     [] Rehab potential (prognosis) and probable outcome      Examination of Body System: (criteria)    [x] 72245 - addressing 1-2 elements    [] 06887 - addressing a total of 3 or more elements     [] 71414 -  Addressing a total of 4 or more elements         Clinical Presentation: (criteria)  Stable - 07416     On examination of body system using standardized tests and measures patient presents with 1-2 elements from any of the following: body structures and functions, activity limitations, and/or participation restrictions.  Leading to a clinical presentation that is considered stable and/or uncomplicated                              Clinical Decision Making  (Eval Complexity):  Low- 72640     Time Tracking:     PT Received On: 12/07/18  PT Start Time: 0900     PT Stop Time: 0925  PT Total Time (min): 25 min     Billable Minutes: Evaluation van Mccormack, PT  12/07/2018

## 2018-12-07 NOTE — PLAN OF CARE
[facility]    HOME HEALTH ORDERS  FACE TO FACE ENCOUNTER    Patient Name: Chucho Prado  YOB: 1933    PCP: Obed Mcguire MD   PCP Address: [address] / [place] [address]  PCP Phone Number: [phone]  PCP Fax: [phone]    Encounter Date: 12/07/2018    Admit to Home Health    Diagnoses:  Active Hospital Problems    Diagnosis  POA    *Ventral hernia with obstruction [K43.6]  Yes     Chronic    Neurogenic bladder [N31.9]  Yes    Incarcerated hernia [K46.0]  Yes    History of postoperative delirium [Z86.59]  Not Applicable     - Patient reports 1 week history of post-op delirium 7/2018  - Monitor electrolytes, UA, and urine cx  - Delirium precautions   - Can use Seroquel 25 mg QHS prn       Gastroesophageal reflux disease without esophagitis [K21.9]  Yes     - continue home Prilosec      Sleep disorder [G47.9]  Yes     - continue Trazodone QHS      Chronic back pain [M54.9, G89.29]  Yes     - Takes Percocet 5-325 at home  - Can continue current abdominal pain control with Dilaudid 0.5 mg IV Q3H prn       Preoperative evaluation to rule out surgical contraindication [Z01.818]  Not Applicable    Impaired mobility and ADLs [Z74.09]  Yes    Hypokalemia [E87.6]  Yes    Hyponatremia [E87.1]  Yes    CKD (chronic kidney disease) stage 3, GFR 30-59 ml/min [N18.3]  Yes    Essential hypertension [I10]  Yes    Acquired hypothyroidism [E03.9]  Yes    Dyslipidemia [E78.5]  Yes      Resolved Hospital Problems   No resolved problems to display.       Future Appointments   Date Time Provider Department Center   12/14/2018 10:30 AM Guilherme Ordoñez MD [facility]           I have seen and examined this patient face to face today. My clinical findings that support the need for the home health skilled services and home bound status are the following:  Weakness/numbness causing balance and gait disturbance due to Weakness/Debility and Surgery making it  taxing to leave home.  Requiring assistive device to leave home due to unsteady gait caused by  Weakness/Debility and Surgery.  Medical restrictions requiring assistance of another human to leave home due to  Unstable ambulation and Post surgery monitoring.    Allergies:Review of patient's allergies indicates:  No Known Allergies    Diet: regular diet    Activities: No lifting greater than 10 pounds for 6 weeks from day of surgery.  No pushing/pulling such as vacuuming or raking.  No straining, avoid constipation and take stool softeners as described and laxatives as needed.  No driving while on narcotics and until you can react quickly without pain.      Nursing:   SN to complete comprehensive assessment including routine vital signs. Instruct on disease process and s/s of complications to report to MD. Review/verify medication list sent home with the patient at time of discharge  and instruct patient/caregiver as needed. Frequency may be adjusted depending on start of care date.    Notify MD if SBP > 160 or < 90; DBP > 90 or < 50; HR > 120 or < 50; Temp > 101; Other:         CONSULTS:    Physical Therapy to evaluate and treat. Evaluate for home safety and equipment needs; Establish/upgrade home exercise program. Perform / instruct on therapeutic exercises, gait training, transfer training, and Range of Motion.  Occupational Therapy to evaluate and treat. Evaluate home environment for safety and equipment needs. Perform/Instruct on transfers, ADL training, ROM, and therapeutic exercises.   to evaluate for community resources/long-range planning.  Aide to provide assistance with personal care, ADLs, and vital signs.    MISCELLANEOUS CARE:  Midline hernia incision: bruising and swelling from hematoma stable. Skin closed, monitor and call if any opening or purulent drainage. Expect it will drain old hematoma/blood.       Medications: Review discharge medications with patient and family and provide  "education.      Current Discharge Medication List      CONTINUE these medications which have NOT CHANGED    Details   amLODIPine (NORVASC) 10 MG tablet TAKE 1 TABLET(10 MG) BY MOUTH EVERY DAY  Qty: 90 tablet, Refills: 11    Comments: **Patient requests 90 days supply**      aspirin (ECOTRIN) 81 MG EC tablet Take 81 mg by mouth once daily.      atorvastatin (LIPITOR) 20 MG tablet Take 1 tablet (20 mg total) by mouth once daily.  Qty: 30 tablet, Refills: 11    Comments: Please discontinue Simvastatin  Associated Diagnoses: Dyslipidemia      docusate sodium (COLACE) 100 MG capsule Take 1 capsule (100 mg total) by mouth 2 (two) times daily as needed for Constipation.  Qty: 60 capsule, Refills: 0      !! hyoscyamine (LEVSIN/SL) 0.125 mg Subl DISSOLVE 1 TABLET UNDER THE TONGUE EVERY 4 HOURS AS NEEDED  Qty: 540 tablet, Refills: 1    Comments: **Patient requests 90 days supply**      !! hyoscyamine (LEVSIN/SL) 0.125 mg Subl DISSOLVE 1 TABLET UNDER THE TONGUE EVERY 4 HOURS AS NEEDED  Qty: 30 tablet, Refills: 0    Associated Diagnoses: Bladder spasms; Chronic suprapubic catheter      influenza (FLUZONE HIGH-DOSE) 180 mcg/0.5 mL vaccine Inject 0.5 mLs into the muscle.  Qty: 0.5 mL, Refills: 0      lactulose (CHRONULAC) 20 gram/30 mL Soln Take 30 mLs (20 g total) by mouth 2 (two) times daily as needed.  Qty: 900 mL, Refills: 1      levothyroxine (SYNTHROID) 50 MCG tablet GIVE "PENNY" 1 TABLET BY MOUTH ONCE DAILY.  Qty: 90 tablet, Refills: 11    Associated Diagnoses: Hypothyroidism, unspecified type      omeprazole (PRILOSEC) 40 MG capsule Take 1 capsule (40 mg total) by mouth every morning. Take 30 minutes before eating on an empty stomach  Qty: 30 capsule, Refills: 1    Associated Diagnoses: Gastroesophageal reflux disease, esophagitis presence not specified      senna-docusate 8.6-50 mg (PERICOLACE) 8.6-50 mg per tablet Take 1 tablet by mouth 2 (two) times daily.      traZODone (DESYREL) 50 MG tablet Take 1 tablet (50 mg " total) by mouth nightly.  Qty: 90 tablet, Refills: 11    Comments: **Patient requests 90 days supply**      triamterene-hydrochlorothiazide 37.5-25 mg (DYAZIDE) 37.5-25 mg per capsule TAKE 1 CAPSULE BY MOUTH EVERY DAY IN THE MORNING  Qty: 90 capsule, Refills: 11       !! - Potential duplicate medications found. Please discuss with provider.      STOP taking these medications       ondansetron (ZOFRAN-ODT) 8 MG TbDL Comments:   Reason for Stopping:         oxyCODONE-acetaminophen (PERCOCET) 5-325 mg per tablet Comments:   Reason for Stopping:         pneumococcal 23-raquel ps vaccine (PNEUMOVAX 23) 25 mcg/0.5 mL Comments:   Reason for Stopping:               I certify that this patient is confined to his home and needs intermittent skilled nursing care, physical therapy and occupational therapy.

## 2018-12-07 NOTE — ASSESSMENT & PLAN NOTE
- suprapubic catheter in place  - follows with urology once a month and due to have galvan changed next week   - UA shows WBC >100 and Leukocytes 3+  - Urine culture growing [employer]   - Patient denies dysuria.   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 Will not treat at this time.    - if possible, would try to change out galvan while inpatient

## 2018-12-07 NOTE — ASSESSMENT & PLAN NOTE
- Today Cr increased to 1.3  - TERE likely pre-renal from use of dyazide and poor po intake   - 1 L of LR ordered by primary

## 2018-12-07 NOTE — PLAN OF CARE
Problem: Patient Care Overview  Goal: Plan of Care Review  Plan of care reviewed with pt/caregiver, vss, patient on full-liquid diet tolerating well with no c/o of nausea/vomiting, call-light within reach, will continue to monitor.

## 2018-12-07 NOTE — PLAN OF CARE
Problem: Physical Therapy Goal  Goal: Physical Therapy Goal  Goals to be met by: 18     Patient will increase functional independence with mobility by performin. Supine to sit with modified independence and use of handrails  2. Sit to supine with modified independence and use of handrails  3. Sit to stand transfer with Contact Guard Assistance with standard walker.   4. Bed to chair transfer with Contact Guard Assistance using Standard Walker  5. Gait  x 50 feet with Contact Guard Assistance using Standard Walker.     Outcome: Ongoing (interventions implemented as appropriate)  Evaluation complete. Please see note for details. PT recommends return home with home health PT and caregiver assistance.

## 2018-12-07 NOTE — PROGRESS NOTES
[facility]  General Surgery  Progress Note    Subjective:     History of Present Illness:  84 yo M with htn, h/o ventral incisional hernia repair (at former [facility] right subcostal incision 7/18 with mesh) p/w abdominal pain and vomiting. Reports intermittent abdominal pain for the past 3-4 months and c/o gas (both in his abdomen, and passing gas). About 1 week ago noticed bulged in mid abdomen. Yesterday had multiple episodes of emesis, vomiting all night. States still passing a little gas, last BM Saturday. NG placed in ED with ~500 ml green output.     Post-Op Info:  Procedure(s) (LRB):  REPAIR, HERNIA, INCISIONAL, INCARCERATED, WITHOUT HISTORY OF PRIOR REPAIR (N/A)   2 Days Post-Op     Interval History:     Medications:  Continuous Infusions:    Scheduled Meds:   amLODIPine  10 mg Oral Daily    aspirin  81 mg Oral Daily    atorvastatin  20 mg Oral Daily    enoxaparin  40 mg Subcutaneous Daily    levothyroxine  50 mcg Oral Before breakfast    pantoprazole  40 mg Oral Daily    potassium chloride 10%  40 mEq Oral BID    sodium chloride 0.9%  3 mL Intravenous Q8H    traZODone  50 mg Oral Nightly     PRN Meds:naloxone, ondansetron, oxyCODONE-acetaminophen, oxyCODONE-acetaminophen, promethazine (PHENERGAN) IVPB, sodium chloride 0.9%     Review of patient's allergies indicates:  No Known Allergies  Objective:     Vital Signs (Most Recent):  Temp: 98.5 °F (36.9 °C) (12/07/18 0409)  Pulse: 72 (12/07/18 0409)  Resp: 17 (12/07/18 0409)  BP: 128/60 (12/07/18 0409)  SpO2: 97 % (12/07/18 0409) Vital Signs (24h Range):  Temp:  [97.5 °F (36.4 °C)-98.5 °F (36.9 °C)] 98.5 °F (36.9 °C)  Pulse:  [72-93] 72  Resp:  [17-20] 17  SpO2:  [93 %-97 %] 97 %  BP: (122-143)/(58-67) 128/60     Weight: 77.6 kg (171 lb)  Body mass index is 26 kg/m².    Intake/Output - Last 3 Shifts       12/05 0700 - 12/06 0659 12/06 0700 - 12/07 0659 12/07 0700 - 12/08 0659    P.O. 0 800     I.V. (mL/kg) 1981.1 (25.5) 137.5  (1.8)     IV Piggyback 450      Total Intake(mL/kg) 2431.1 (31.3) 937.5 (12.1)     Urine (mL/kg/hr) 1250 (0.7) 1800 (1)     Drains 150      Stool 0      Total Output 1400 1800     Net +1031.1 -862.5            Stool Occurrence 0 x            Physical Exam   Constitutional: He is oriented to person, place, and time. He appears well-developed and well-nourished. No distress.   Cardiovascular: Normal rate.   Pulmonary/Chest: Effort normal.   Abdominal: Soft.   Hematoma at surgical site.    Musculoskeletal: Normal range of motion. He exhibits no edema.   Neurological: He is alert and oriented to person, place, and time.   Skin: Skin is warm and dry.   Psychiatric: He has a normal mood and affect. His behavior is normal.       Significant Labs:  CBC:   Recent Labs   Lab 12/07/18  0553   WBC 13.03*   RBC 4.33*   HGB 11.6*   HCT 36.5*      MCV 84   MCH 26.8*   MCHC 31.8*     CMP:   Recent Labs   Lab 12/03/18  1751  12/07/18  0553   *   < > 101   CALCIUM 10.2   < > 8.9   ALBUMIN 4.2  --   --    PROT 8.9*  --   --    *   < > 132*   K 3.2*   < > 3.9   CO2 32*   < > 25   CL 86*   < > 97   BUN 22   < > 13   CREATININE 1.5*   < > 1.3   ALKPHOS 107  --   --    ALT 7*  --   --    AST 17  --   --    BILITOT 1.6*  --   --     < > = values in this interval not displayed.       Significant Diagnostics:  CT: I have reviewed all pertinent results/findings within the past 24 hours and my personal findings are:  SBO    Assessment/Plan:     * Ventral hernia with obstruction    84 yo M with ventral hernia and small bowel obstruction s/p repair on 12/5/18    Postop hematoma. Monitor  Liquids, ADAT when not nauseated.  Patient did not get out of bed yesterday, counseled on getting out of bed today.    Jovanna Killian PGY5  [phone]           Jovanna Killian MD  General Surgery  [facility]

## 2018-12-07 NOTE — ASSESSMENT & PLAN NOTE
86 yo M with ventral hernia and small bowel obstruction s/p repair on 12/5/18    Postop hematoma. Monitor  Liquids, ADAT when not nauseated.  Patient did not get out of bed yesterday, counseled on getting out of bed today.    Jovanna Killian PGY5  [phone]

## 2018-12-07 NOTE — PLAN OF CARE
SW spoke to OT who stated Pt will be fine to resume his sitter services and home health. Per Surgery Resident, Pt is a potential weekend discharge. TAYLER requested home health orders. Pt was current with [facility] prior to this admit. SW sent all necessary referral information to [facility] via [employer] and informed them of Pt's anticipated discharge.    Florina Brock, RENU        12/07/18 1145   Post-Acute Status   Post-Acute Authorization Home Health/Hospice   Home Health/Hospice Status Referrals Sent  (Need orders)

## 2018-12-08 LAB
ANION GAP SERPL CALC-SCNC: 12 MMOL/L
BACTERIA BLD CULT: NORMAL
BACTERIA BLD CULT: NORMAL
BASOPHILS # BLD AUTO: 0.02 K/UL
BASOPHILS NFR BLD: 0.4 %
BUN SERPL-MCNC: 16 MG/DL
CALCIUM SERPL-MCNC: 8.9 MG/DL
CHLORIDE SERPL-SCNC: 98 MMOL/L
CO2 SERPL-SCNC: 25 MMOL/L
CREAT SERPL-MCNC: 1.2 MG/DL
DIFFERENTIAL METHOD: ABNORMAL
EOSINOPHIL # BLD AUTO: 0.2 K/UL
EOSINOPHIL NFR BLD: 3.8 %
ERYTHROCYTE [DISTWIDTH] IN BLOOD BY AUTOMATED COUNT: 15.7 %
EST. GFR  (AFRICAN AMERICAN): >60 ML/MIN/1.73 M^2
EST. GFR  (NON AFRICAN AMERICAN): 54.8 ML/MIN/1.73 M^2
GLUCOSE SERPL-MCNC: 92 MG/DL
HCT VFR BLD AUTO: 34.8 %
HGB BLD-MCNC: 11.2 G/DL
IMM GRANULOCYTES # BLD AUTO: 0.01 K/UL
IMM GRANULOCYTES NFR BLD AUTO: 0.2 %
LYMPHOCYTES # BLD AUTO: 1.5 K/UL
LYMPHOCYTES NFR BLD: 32.1 %
MAGNESIUM SERPL-MCNC: 2.1 MG/DL
MCH RBC QN AUTO: 27.2 PG
MCHC RBC AUTO-ENTMCNC: 32.2 G/DL
MCV RBC AUTO: 85 FL
MONOCYTES # BLD AUTO: 0.6 K/UL
MONOCYTES NFR BLD: 11.6 %
NEUTROPHILS # BLD AUTO: 2.5 K/UL
NEUTROPHILS NFR BLD: 51.9 %
NRBC BLD-RTO: 0 /100 WBC
PHOSPHATE SERPL-MCNC: 2.6 MG/DL
PLATELET # BLD AUTO: 239 K/UL
PMV BLD AUTO: 9.7 FL
POTASSIUM SERPL-SCNC: 4.6 MMOL/L
RBC # BLD AUTO: 4.12 M/UL
SODIUM SERPL-SCNC: 135 MMOL/L
WBC # BLD AUTO: 4.74 K/UL

## 2018-12-08 PROCEDURE — 25000003 PHARM REV CODE 250: Performed by: SURGERY

## 2018-12-08 PROCEDURE — 25000003 PHARM REV CODE 250: Performed by: STUDENT IN AN ORGANIZED HEALTH CARE EDUCATION/TRAINING PROGRAM

## 2018-12-08 PROCEDURE — 97116 GAIT TRAINING THERAPY: CPT

## 2018-12-08 PROCEDURE — A4216 STERILE WATER/SALINE, 10 ML: HCPCS | Performed by: SURGERY

## 2018-12-08 PROCEDURE — A4216 STERILE WATER/SALINE, 10 ML: HCPCS | Performed by: ANESTHESIOLOGY

## 2018-12-08 PROCEDURE — 63600175 PHARM REV CODE 636 W HCPCS: Performed by: SURGERY

## 2018-12-08 PROCEDURE — 99232 SBSQ HOSP IP/OBS MODERATE 35: CPT | Mod: GC,,, | Performed by: INTERNAL MEDICINE

## 2018-12-08 PROCEDURE — 36415 COLL VENOUS BLD VENIPUNCTURE: CPT

## 2018-12-08 PROCEDURE — 97530 THERAPEUTIC ACTIVITIES: CPT

## 2018-12-08 PROCEDURE — 25000003 PHARM REV CODE 250: Performed by: ANESTHESIOLOGY

## 2018-12-08 PROCEDURE — 20600001 HC STEP DOWN PRIVATE ROOM

## 2018-12-08 PROCEDURE — 63600175 PHARM REV CODE 636 W HCPCS: Performed by: STUDENT IN AN ORGANIZED HEALTH CARE EDUCATION/TRAINING PROGRAM

## 2018-12-08 PROCEDURE — 85025 COMPLETE CBC W/AUTO DIFF WBC: CPT

## 2018-12-08 PROCEDURE — 83735 ASSAY OF MAGNESIUM: CPT

## 2018-12-08 PROCEDURE — 80048 BASIC METABOLIC PNL TOTAL CA: CPT

## 2018-12-08 PROCEDURE — 84100 ASSAY OF PHOSPHORUS: CPT

## 2018-12-08 RX ORDER — POLYETHYLENE GLYCOL 3350 17 G/17G
17 POWDER, FOR SOLUTION ORAL DAILY
Status: DISCONTINUED | OUTPATIENT
Start: 2018-12-08 | End: 2018-12-08

## 2018-12-08 RX ORDER — SENNOSIDES 8.6 MG/1
8.6 TABLET ORAL DAILY PRN
Status: DISCONTINUED | OUTPATIENT
Start: 2018-12-08 | End: 2018-12-11 | Stop reason: HOSPADM

## 2018-12-08 RX ORDER — OXYCODONE HYDROCHLORIDE 10 MG/1
10 TABLET ORAL EVERY 4 HOURS PRN
Status: DISCONTINUED | OUTPATIENT
Start: 2018-12-08 | End: 2018-12-11 | Stop reason: HOSPADM

## 2018-12-08 RX ORDER — POLYETHYLENE GLYCOL 3350 17 G/17G
17 POWDER, FOR SOLUTION ORAL 2 TIMES DAILY PRN
Status: DISCONTINUED | OUTPATIENT
Start: 2018-12-08 | End: 2018-12-11

## 2018-12-08 RX ORDER — OXYCODONE HYDROCHLORIDE 5 MG/1
5 TABLET ORAL EVERY 4 HOURS PRN
Status: DISCONTINUED | OUTPATIENT
Start: 2018-12-08 | End: 2018-12-11 | Stop reason: HOSPADM

## 2018-12-08 RX ORDER — ACETAMINOPHEN 325 MG/1
650 TABLET ORAL EVERY 6 HOURS
Status: DISCONTINUED | OUTPATIENT
Start: 2018-12-08 | End: 2018-12-10

## 2018-12-08 RX ORDER — SENNOSIDES 8.6 MG/1
8.6 TABLET ORAL 2 TIMES DAILY
Status: DISCONTINUED | OUTPATIENT
Start: 2018-12-08 | End: 2018-12-08

## 2018-12-08 RX ORDER — SODIUM,POTASSIUM PHOSPHATES 280-250MG
2 POWDER IN PACKET (EA) ORAL ONCE
Status: COMPLETED | OUTPATIENT
Start: 2018-12-08 | End: 2018-12-08

## 2018-12-08 RX ADMIN — ACETAMINOPHEN 650 MG: 325 TABLET, FILM COATED ORAL at 05:12

## 2018-12-08 RX ADMIN — Medication 3 ML: at 10:12

## 2018-12-08 RX ADMIN — AMLODIPINE BESYLATE 10 MG: 10 TABLET ORAL at 08:12

## 2018-12-08 RX ADMIN — POTASSIUM CHLORIDE 40 MEQ: 20 SOLUTION ORAL at 08:12

## 2018-12-08 RX ADMIN — LEVOTHYROXINE SODIUM 50 MCG: 25 TABLET ORAL at 05:12

## 2018-12-08 RX ADMIN — ASPIRIN 81 MG: 81 TABLET, COATED ORAL at 08:12

## 2018-12-08 RX ADMIN — Medication 3 ML: at 02:12

## 2018-12-08 RX ADMIN — Medication 3 ML: at 06:12

## 2018-12-08 RX ADMIN — ONDANSETRON HYDROCHLORIDE 4 MG: 2 INJECTION, SOLUTION INTRAMUSCULAR; INTRAVENOUS at 09:12

## 2018-12-08 RX ADMIN — ONDANSETRON HYDROCHLORIDE 4 MG: 2 INJECTION, SOLUTION INTRAMUSCULAR; INTRAVENOUS at 10:12

## 2018-12-08 RX ADMIN — PANTOPRAZOLE SODIUM 40 MG: 40 TABLET, DELAYED RELEASE ORAL at 08:12

## 2018-12-08 RX ADMIN — ACETAMINOPHEN 650 MG: 325 TABLET, FILM COATED ORAL at 11:12

## 2018-12-08 RX ADMIN — ATORVASTATIN CALCIUM 20 MG: 20 TABLET, FILM COATED ORAL at 08:12

## 2018-12-08 RX ADMIN — OXYCODONE HYDROCHLORIDE AND ACETAMINOPHEN 1 TABLET: 10; 325 TABLET ORAL at 01:12

## 2018-12-08 RX ADMIN — PROMETHAZINE HYDROCHLORIDE 12.5 MG: 25 INJECTION INTRAMUSCULAR; INTRAVENOUS at 11:12

## 2018-12-08 RX ADMIN — POTASSIUM & SODIUM PHOSPHATES POWDER PACK 280-160-250 MG 2 PACKET: 280-160-250 PACK at 11:12

## 2018-12-08 RX ADMIN — ENOXAPARIN SODIUM 40 MG: 100 INJECTION SUBCUTANEOUS at 05:12

## 2018-12-08 NOTE — ASSESSMENT & PLAN NOTE
- continue home Norvasc 10 mg   - Stop Triamterene-HCTZ given history of hyponatremia and hypokalemia  - if BP remains elevated (SBP>150), would start losartan 25 mg daily. Can be titrated outpatient if needed  - BP controlled on Norvasc alone and would discharge home on Norvasc alone to treat HTN and stop Dyazide altogether as patient has chronic hyponatremia and Dyazide will exacerbate hyponatremia.

## 2018-12-08 NOTE — ASSESSMENT & PLAN NOTE
86 yo M with ventral hernia and small bowel obstruction s/p repair on 12/5/18    Postop hematoma stable.   Regular diet today.   [facility] will resume  services.     If tolerating diet may be discharged today or tomorrow.

## 2018-12-08 NOTE — PLAN OF CARE
Problem: Physical Therapy Goal  Goal: Physical Therapy Goal  Goals to be met by: 18     Patient will increase functional independence with mobility by performin. Supine to sit with modified independence and use of handrails  2. Sit to supine with modified independence and use of handrails  3. Sit to stand transfer with Contact Guard Assistance with standard walker.   4. Bed to chair transfer with Contact Guard Assistance using Standard Walker  5. Gait  x 50 feet with Contact Guard Assistance using Standard Walker.      Outcome: Ongoing (interventions implemented as appropriate)  Patient fatigues easily, but was able to initiate gait training in the hallway.

## 2018-12-08 NOTE — SUBJECTIVE & OBJECTIVE
Interval History:     NAEO, patient feeling nausea yesterday night but after having large BM feels much better. Is hungry.     Medications:  Continuous Infusions:  Scheduled Meds:   amLODIPine  10 mg Oral Daily    aspirin  81 mg Oral Daily    atorvastatin  20 mg Oral Daily    enoxaparin  40 mg Subcutaneous Daily    levothyroxine  50 mcg Oral Before breakfast    pantoprazole  40 mg Oral Daily    potassium chloride 10%  40 mEq Oral BID    sodium chloride 0.9%  3 mL Intravenous Q8H    traZODone  50 mg Oral Nightly     PRN Meds:naloxone, ondansetron, oxyCODONE-acetaminophen, oxyCODONE-acetaminophen, promethazine (PHENERGAN) IVPB, sodium chloride 0.9%     Review of patient's allergies indicates:  No Known Allergies  Objective:     Vital Signs (Most Recent):  Temp: 98.1 °F (36.7 °C) (12/08/18 0738)  Pulse: 80 (12/08/18 0738)  Resp: 18 (12/08/18 0738)  BP: (!) 144/66 (12/08/18 0738)  SpO2: 96 % (12/08/18 0738) Vital Signs (24h Range):  Temp:  [97.2 °F (36.2 °C)-98.1 °F (36.7 °C)] 98.1 °F (36.7 °C)  Pulse:  [76-97] 80  Resp:  [18-20] 18  SpO2:  [91 %-96 %] 96 %  BP: (127-152)/(61-73) 144/66     Weight: 77.6 kg (171 lb)  Body mass index is 26 kg/m².    Intake/Output - Last 3 Shifts       12/06 0700 - 12/07 0659 12/07 0700 - 12/08 0659 12/08 0700 - 12/09 0659    P.O. 800 50     I.V. (mL/kg) 137.5 (1.8)      IV Piggyback  100     Total Intake(mL/kg) 937.5 (12.1) 150 (1.9)     Urine (mL/kg/hr) 1800 (1) 1400 (0.8)     Drains       Stool  0     Total Output 1800 1400     Net -862.5 -1250            Stool Occurrence  1 x           Physical Exam     Constitutional: He is oriented to person, place, and time. He appears well-developed and well-nourished. No distress.   Cardiovascular: Normal rate.   Pulmonary/Chest: Effort normal.   Abdominal: Soft.   Hematoma at surgical site.    Musculoskeletal: Normal range of motion. He exhibits no edema.   Neurological: He is alert and oriented to person, place, and time.   Skin: Skin  is warm and dry.   Psychiatric: He has a normal mood and affect. His behavior is normal.     Significant Labs:  CBC:   Recent Labs   Lab 12/08/18  0414   WBC 4.74   RBC 4.12*   HGB 11.2*   HCT 34.8*      MCV 85   MCH 27.2   MCHC 32.2     CMP:   Recent Labs   Lab 12/03/18  1751  12/08/18  0414   *   < > 92   CALCIUM 10.2   < > 8.9   ALBUMIN 4.2  --   --    PROT 8.9*  --   --    *   < > 135*   K 3.2*   < > 4.6   CO2 32*   < > 25   CL 86*   < > 98   BUN 22   < > 16   CREATININE 1.5*   < > 1.2   ALKPHOS 107  --   --    ALT 7*  --   --    AST 17  --   --    BILITOT 1.6*  --   --     < > = values in this interval not displayed.

## 2018-12-08 NOTE — PROGRESS NOTES
[facility]  General Surgery  Progress Note    Subjective:     History of Present Illness:  84 yo M with htn, h/o ventral incisional hernia repair (at former open helen right subcostal incision 7/18 with mesh) p/w abdominal pain and vomiting. Reports intermittent abdominal pain for the past 3-4 months and c/o gas (both in his abdomen, and passing gas). About 1 week ago noticed bulged in mid abdomen. Yesterday had multiple episodes of emesis, vomiting all night. States still passing a little gas, last BM Saturday. NG placed in ED with ~500 ml green output.     Post-Op Info:  Procedure(s) (LRB):  REPAIR, HERNIA, INCISIONAL, INCARCERATED, WITHOUT HISTORY OF PRIOR REPAIR (N/A)   3 Days Post-Op     Interval History:     NAEO, patient feeling nausea yesterday night but after having large BM feels much better. Is hungry.     Medications:  Continuous Infusions:  Scheduled Meds:   amLODIPine  10 mg Oral Daily    aspirin  81 mg Oral Daily    atorvastatin  20 mg Oral Daily    enoxaparin  40 mg Subcutaneous Daily    levothyroxine  50 mcg Oral Before breakfast    pantoprazole  40 mg Oral Daily    potassium chloride 10%  40 mEq Oral BID    sodium chloride 0.9%  3 mL Intravenous Q8H    traZODone  50 mg Oral Nightly     PRN Meds:naloxone, ondansetron, oxyCODONE-acetaminophen, oxyCODONE-acetaminophen, promethazine (PHENERGAN) IVPB, sodium chloride 0.9%     Review of patient's allergies indicates:  No Known Allergies  Objective:     Vital Signs (Most Recent):  Temp: 98.1 °F (36.7 °C) (12/08/18 0738)  Pulse: 80 (12/08/18 0738)  Resp: 18 (12/08/18 0738)  BP: (!) 144/66 (12/08/18 0738)  SpO2: 96 % (12/08/18 0738) Vital Signs (24h Range):  Temp:  [97.2 °F (36.2 °C)-98.1 °F (36.7 °C)] 98.1 °F (36.7 °C)  Pulse:  [76-97] 80  Resp:  [18-20] 18  SpO2:  [91 %-96 %] 96 %  BP: (127-152)/(61-73) 144/66     Weight: 77.6 kg (171 lb)  Body mass index is 26 kg/m².    Intake/Output - Last 3 Shifts       12/06 0700 - 12/07  0659 12/07 0700 - 12/08 0659 12/08 0700 - 12/09 0659    P.O. 800 50     I.V. (mL/kg) 137.5 (1.8)      IV Piggyback  100     Total Intake(mL/kg) 937.5 (12.1) 150 (1.9)     Urine (mL/kg/hr) 1800 (1) 1400 (0.8)     Drains       Stool  0     Total Output 1800 1400     Net -862.5 -1250            Stool Occurrence  1 x           Physical Exam     Constitutional: He is oriented to person, place, and time. He appears well-developed and well-nourished. No distress.   Cardiovascular: Normal rate.   Pulmonary/Chest: Effort normal.   Abdominal: Soft.   Hematoma at surgical site.    Musculoskeletal: Normal range of motion. He exhibits no edema.   Neurological: He is alert and oriented to person, place, and time.   Skin: Skin is warm and dry.   Psychiatric: He has a normal mood and affect. His behavior is normal.     Significant Labs:  CBC:   Recent Labs   Lab 12/08/18  0414   WBC 4.74   RBC 4.12*   HGB 11.2*   HCT 34.8*      MCV 85   MCH 27.2   MCHC 32.2     CMP:   Recent Labs   Lab 12/03/18  1751  12/08/18  0414   *   < > 92   CALCIUM 10.2   < > 8.9   ALBUMIN 4.2  --   --    PROT 8.9*  --   --    *   < > 135*   K 3.2*   < > 4.6   CO2 32*   < > 25   CL 86*   < > 98   BUN 22   < > 16   CREATININE 1.5*   < > 1.2   ALKPHOS 107  --   --    ALT 7*  --   --    AST 17  --   --    BILITOT 1.6*  --   --     < > = values in this interval not displayed.           Assessment/Plan:     * Ventral hernia with obstruction    84 yo M with ventral hernia and small bowel obstruction s/p repair on 12/5/18    Postop hematoma stable.   Regular diet today.   [facility] will resume  services.     If tolerating diet may be discharged today or tomorrow.          Becky Caldwell MD  General Surgery PGY V  Beeper: [phone]

## 2018-12-08 NOTE — PLAN OF CARE
Problem: Patient Care Overview  Goal: Plan of Care Review  Outcome: Ongoing (interventions implemented as appropriate)  Plan of care reviewed, all questions and concerns addressed. Patient AAOX4, VSS on 1L nasal cannula. Patient tolerating diet well w/ intermittent complaints of nausea, relieved by ordered prn antiemetic. Patient urine output remains adequate per suprapubic catheter, w/ bm x1 and up to bedside commode with x1 assist. Patient safety/medication protocol maintained. Patient ambulated w/ PT using walker x1. Patient remains free from falls or injury, TEDs and SCDs applied to patient. Patient is resting with call light within reach, side rails raised x2, bed locked in lowest position. Will continue to monitor patient.

## 2018-12-08 NOTE — SUBJECTIVE & OBJECTIVE
Interval History: NAEON. Patient doing well this morning with no complaints. Had N/V x3 yesterday. Today, denies shortness of breath and nausea/vomiting. Now having daily BM.     Review of Systems   Constitutional: Negative for chills, fatigue and fever.   HENT: Negative for congestion.    Eyes: Negative for visual disturbance.   Respiratory: Negative for cough, choking, chest tightness, shortness of breath, wheezing and stridor.    Cardiovascular: Negative for chest pain, palpitations and leg swelling.   Gastrointestinal: Positive for abdominal pain and nausea. Negative for abdominal distention, blood in stool, constipation, diarrhea and vomiting.   Endocrine: Negative for polyuria.   Genitourinary: Negative for dysuria and hematuria.   Musculoskeletal: Positive for arthralgias and back pain.   Skin: Negative for color change, pallor, rash and wound.   Allergic/Immunologic: Negative.    Neurological: Negative for dizziness, facial asymmetry, light-headedness and headaches.   Hematological: Negative.    Psychiatric/Behavioral: Negative.      Objective:     Vital Signs (Most Recent):  Temp: 98.1 °F (36.7 °C) (12/08/18 0738)  Pulse: 80 (12/08/18 0738)  Resp: 18 (12/08/18 0738)  BP: (!) 144/66 (12/08/18 0738)  SpO2: 96 % (12/08/18 0738) Vital Signs (24h Range):  Temp:  [97.2 °F (36.2 °C)-98.1 °F (36.7 °C)] 98.1 °F (36.7 °C)  Pulse:  [76-97] 80  Resp:  [18-20] 18  SpO2:  [91 %-96 %] 96 %  BP: (127-152)/(61-73) 144/66     Weight: 77.6 kg (171 lb)  Body mass index is 26 kg/m².    Intake/Output Summary (Last 24 hours) at 12/8/2018 1103  Last data filed at 12/8/2018 0000  Gross per 24 hour   Intake 150 ml   Output 1100 ml   Net -950 ml      Physical Exam   Constitutional: He is oriented to person, place, and time. He appears well-developed and well-nourished. No distress.   HENT:   Head: Normocephalic and atraumatic.   Eyes: EOM are normal.   Neck: Normal range of motion.   Cardiovascular: Normal rate, regular rhythm,  normal heart sounds and intact distal pulses. Exam reveals no gallop and no friction rub.   No murmur heard.  Pulmonary/Chest: Effort normal and breath sounds normal. No stridor. No respiratory distress. He has no wheezes. He has no rales.   Abdominal: Soft. Bowel sounds are normal. He exhibits distension. There is tenderness in the epigastric area. There is no rigidity and no guarding. A hernia is present. Hernia confirmed positive in the ventral area.   Umbilical Incision clean/dry/intact    Musculoskeletal: Normal range of motion. He exhibits edema.   Neurological: He is alert and oriented to person, place, and time.   Skin: He is not diaphoretic.   Nursing note and vitals reviewed.      Significant Labs: All pertinent labs within the past 24 hours have been reviewed.    Significant Imaging: I have reviewed all pertinent imaging results/findings within the past 24 hours.

## 2018-12-08 NOTE — PT/OT/SLP PROGRESS
"Physical Therapy Treatment    Patient Name:  Chucho Prado   MRN:  [id number]    Recommendations:     Discharge Recommendations:  home with home health   Discharge Equipment Recommendations: none   Barriers to discharge: Inaccessible home    Assessment:     Chucho Prado is a 85 y.o. male admitted with a medical diagnosis of Ventral hernia with obstruction.  He presents with the following impairments/functional limitations:  weakness, impaired endurance, impaired self care skills, impaired functional mobilty, gait instability, impaired balance, decreased lower extremity function, impaired cardiopulmonary response to activity, decreased safety awareness . Patient initially required encouragement to participate and appeared resistive to instructions given, but as treatment progressed He was able to show improved motivation and initiative. Patient fatigued easily and appeared anxious at times.  Patient required mod assistance to transfer from sit to stand from a low surface .    Rehab Prognosis:  fair; patient would benefit from acute skilled PT services to address these deficits and reach maximum level of function.      Recent Surgery: Procedure(s) (LRB):  REPAIR, HERNIA, INCISIONAL, INCARCERATED, WITHOUT HISTORY OF PRIOR REPAIR (N/A) 3 Days Post-Op    Plan:     During this hospitalization, patient to be seen 3 x/week to address the above listed problems via therapeutic activities, gait training, therapeutic exercises, neuromuscular re-education  · Plan of Care Expires:  02/06/19   Plan of Care Reviewed with: patient    Subjective     Communicated with nsg prior to session.  Patient found in supine upon PT entry to room, agreeable to treatment.   "I thought I told you guys to comeback later"     Chief Complaint: fatigue  Patient comments/goals: to get stronger.  Pain/Comfort:  · Pain Rating 1: 0/10  · Pain Addressed 1: Pre-medicate for activity  · Pain Rating Post-Intervention 1: 0/10    Patients cultural, " spiritual, Jainism conflicts given the current situation: None stated    Objective:     Patient found with: SCD, oxygen     General Precautions: Standard, fall   Orthopedic Precautions:N/A   Braces: N/A     Functional Mobility:  · Bed Mobility:     · Rolling Left:  minimum assistance  · Scooting: minimum assistance  · Supine to Sit: stand by assistance  · Sit to Supine: minimum assistance  · Transfers:     · Sit to Stand:  moderate assistance with standard walker  · Bed to Chair: minimum assistance with  standard walker  using  Stand Pivot  · Gait: 18 ft x 2 trials with SW and min assistance with chair follow and 2L O2.      AM-PAC 6 CLICK MOBILITY  Turning over in bed (including adjusting bedclothes, sheets and blankets)?: 3  Sitting down on and standing up from a chair with arms (e.g., wheelchair, bedside commode, etc.): 2  Moving from lying on back to sitting on the side of the bed?: 3  Moving to and from a bed to a chair (including a wheelchair)?: 3  Need to walk in hospital room?: 3  Climbing 3-5 steps with a railing?: 1  Basic Mobility Total Score: 15       Therapeutic Activities and Exercises:   Donned a gown. Patient sat at EOB to prepare for treatment. Patient transferred from bed<>bedside chair using SW with min assistance. Patient then was transferred on bedside chair to the hallway to perform gait training.    Patient left with bed in chair position with all lines intact, call button in reach and sitter present..    GOALS:   Multidisciplinary Problems     Physical Therapy Goals        Problem: Physical Therapy Goal    Goal Priority Disciplines Outcome Goal Variances Interventions   Physical Therapy Goal     PT, PT/OT Ongoing (interventions implemented as appropriate)     Description:  Goals to be met by: 18     Patient will increase functional independence with mobility by performin. Supine to sit with modified independence and use of handrails  2. Sit to supine with modified independence  and use of handrails  3. Sit to stand transfer with Contact Guard Assistance with standard walker.   4. Bed to chair transfer with Contact Guard Assistance using Standard Walker  5. Gait  x 50 feet with Contact Guard Assistance using Standard Walker.                       Time Tracking:     PT Received On: 12/08/18  PT Start Time: 1035     PT Stop Time: 1103  PT Total Time (min): 28 min     Billable Minutes: Gait Training 14 and Therapeutic Activity 14    Treatment Type: Treatment  PT/PTA: PTA     PTA Visit Number: 1     Dileep Hampton, FRANCES  12/08/2018

## 2018-12-08 NOTE — PLAN OF CARE
Problem: Patient Care Overview  Goal: Plan of Care Review  Outcome: Ongoing (interventions implemented as appropriate)  POC reviewed and understood by pt. AAOx4. IV site changed, intact and patent. VSS on 1L NC. Pt complains of 3 episodes of emesis on day shift- MD Paulino notified. Abdomen distended and PRN Zofran given for nausea- MD Rivera notified. Pt states he is no longer nauseous after the Zofran. One large BM. No emesis overnight. No acute events at this time. Call bell within reach. Bed in lowest position. WCTM.

## 2018-12-08 NOTE — PROGRESS NOTES
Notified MD Paulino of pt stating they vomitted 3x today. Pt's abd is distended. MD stated she will change the Zofran frequency from 8 to every 6 hours. RN gave the zofran and pt stated they are not nauseous anymore. WCTM.

## 2018-12-08 NOTE — ASSESSMENT & PLAN NOTE
- Cr 1.2 today  - TERE likely pre-renal from use of dyazide and poor po intake   - Will monitor daily

## 2018-12-08 NOTE — PROGRESS NOTES
[facility] Hospital Medicine  Progress Note    Patient Name: Chucho Prado  MRN: [id number]  Patient Class: IP- Inpatient   Admission Date: 12/3/2018  Length of Stay: 3 days  Attending Physician: Steve Valentin MD  Primary Care Provider: Obed Mcguire MD    Hospital Medicine Team: Networked reference to record PCT  Liban Kelley MD    Subjective:     Principal Problem:Ventral hernia with obstruction    HPI:  Chucho Prado is an 85 year old male with HTN, CKD-3, HLD, GERD, neurogenic bladder with chronic suprapubic catheter , h/o ventral incisional hernia repair (at former open helen right subcostal incision 7/18 with mesh) presents abdominal pain and vomiting. Reports intermittent abdominal pain for the past 3-4 months and c/o gas (both in his abdomen, and passing gas). About 1 week ago noticed bulged in mid abdomen. Yesterday had multiple episodes of emesis, vomiting all night. States still passing a little gas, last BM Saturday. Patient denies chest pain, shortness of breath, fevers, chills, and headache.     NG placed in ED with ~500 ml green output.         Hospital Course:  No notes on file    Interval History: NAEON. Patient doing well this morning with no complaints. Had N/V x3 yesterday. Today, denies shortness of breath and nausea/vomiting. Now having daily BM.     Review of Systems   Constitutional: Negative for chills, fatigue and fever.   HENT: Negative for congestion.    Eyes: Negative for visual disturbance.   Respiratory: Negative for cough, choking, chest tightness, shortness of breath, wheezing and stridor.    Cardiovascular: Negative for chest pain, palpitations and leg swelling.   Gastrointestinal: Positive for abdominal pain and nausea. Negative for abdominal distention, blood in stool, constipation, diarrhea and vomiting.   Endocrine: Negative for polyuria.   Genitourinary: Negative for dysuria and hematuria.   Musculoskeletal: Positive for arthralgias and back pain.    Skin: Negative for color change, pallor, rash and wound.   Allergic/Immunologic: Negative.    Neurological: Negative for dizziness, facial asymmetry, light-headedness and headaches.   Hematological: Negative.    Psychiatric/Behavioral: Negative.      Objective:     Vital Signs (Most Recent):  Temp: 98.1 °F (36.7 °C) (12/08/18 0738)  Pulse: 80 (12/08/18 0738)  Resp: 18 (12/08/18 0738)  BP: (!) 144/66 (12/08/18 0738)  SpO2: 96 % (12/08/18 0738) Vital Signs (24h Range):  Temp:  [97.2 °F (36.2 °C)-98.1 °F (36.7 °C)] 98.1 °F (36.7 °C)  Pulse:  [76-97] 80  Resp:  [18-20] 18  SpO2:  [91 %-96 %] 96 %  BP: (127-152)/(61-73) 144/66     Weight: 77.6 kg (171 lb)  Body mass index is 26 kg/m².    Intake/Output Summary (Last 24 hours) at 12/8/2018 1103  Last data filed at 12/8/2018 0000  Gross per 24 hour   Intake 150 ml   Output 1100 ml   Net -950 ml      Physical Exam   Constitutional: He is oriented to person, place, and time. He appears well-developed and well-nourished. No distress.   HENT:   Head: Normocephalic and atraumatic.   Eyes: EOM are normal.   Neck: Normal range of motion.   Cardiovascular: Normal rate, regular rhythm, normal heart sounds and intact distal pulses. Exam reveals no gallop and no friction rub.   No murmur heard.  Pulmonary/Chest: Effort normal and breath sounds normal. No stridor. No respiratory distress. He has no wheezes. He has no rales.   Abdominal: Soft. Bowel sounds are normal. He exhibits distension. There is tenderness in the epigastric area. There is no rigidity and no guarding. A hernia is present. Hernia confirmed positive in the ventral area.   Umbilical Incision clean/dry/intact    Musculoskeletal: Normal range of motion. He exhibits edema.   Neurological: He is alert and oriented to person, place, and time.   Skin: He is not diaphoretic.   Nursing note and vitals reviewed.      Significant Labs: All pertinent labs within the past 24 hours have been reviewed.    Significant Imaging: I  have reviewed all pertinent imaging results/findings within the past 24 hours.    Assessment/Plan:      * Ventral hernia with obstruction    - POD3. Patient tolerating clear liquid diet  - Incentive spirometry to prevent atelectasis      History of postoperative delirium    - Patient reports 1 week history of post-op delirium 7/2018  - Delirium precautions   - Can use Seroquel 25 mg QHS prn      Gastroesophageal reflux disease without esophagitis    - continue home Prilosec       Incarcerated hernia    - POD3 for open ventral hernia repair        Neurogenic bladder    - suprapubic catheter in place  - follows with urology once a month and due to have galvan changed next week   - UA shows WBC >100 and Leukocytes 3+  - Urine culture growing [employer]   - Patient denies dysuria.   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 Will not treat at this time.    - if possible, would try to change out galvan while inpatient        Hypokalemia    - Could be related to NG tube suctioning   - Potassium wnl  - Replete and monitor daily        Hyponatremia    - several year history of hyponatremia  - Hyponatremia improving after fluid resuscitation.   - Would recommend stopping Triamterene-HCTZ at discharge given history of hyponatremia and hypokalemia         CKD (chronic kidney disease) stage 3, GFR 30-59 ml/min    - Cr 1.2 today  - TERE likely pre-renal from use of dyazide and poor po intake   - Will monitor daily       Dyslipidemia    - continue home Lipitor       Essential hypertension    - continue home Norvasc 10 mg   - Stop Triamterene-HCTZ given history of hyponatremia and hypokalemia  - if BP remains elevated (SBP>150), would start losartan 25 mg daily. Can be titrated outpatient if needed  - BP controlled on Norvasc alone and would discharge home on Norvasc alone to treat HTN and stop Dyazide altogether as patient has chronic hyponatremia and Dyazide will exacerbate hyponatremia.      Acquired hypothyroidism     - continue home synthroid         VTE Risk Mitigation (From admission, onward)        Ordered     enoxaparin injection 40 mg  Daily      12/03/18 2322     Place sequential compression device  Until discontinued      12/03/18 2322     IP VTE HIGH RISK PATIENT  Once      12/03/18 2322              Liban Kelley MD  Department of Hospital Medicine   [facility]

## 2018-12-09 LAB
ANION GAP SERPL CALC-SCNC: 10 MMOL/L
BASOPHILS # BLD AUTO: 0.03 K/UL
BASOPHILS NFR BLD: 0.8 %
BUN SERPL-MCNC: 14 MG/DL
C DIFF GDH STL QL: NEGATIVE
C DIFF TOX A+B STL QL IA: NEGATIVE
CALCIUM SERPL-MCNC: 8.5 MG/DL
CHLORIDE SERPL-SCNC: 100 MMOL/L
CO2 SERPL-SCNC: 26 MMOL/L
CREAT SERPL-MCNC: 1 MG/DL
DIFFERENTIAL METHOD: ABNORMAL
EOSINOPHIL # BLD AUTO: 0.2 K/UL
EOSINOPHIL NFR BLD: 4.3 %
ERYTHROCYTE [DISTWIDTH] IN BLOOD BY AUTOMATED COUNT: 15.6 %
EST. GFR  (AFRICAN AMERICAN): >60 ML/MIN/1.73 M^2
EST. GFR  (NON AFRICAN AMERICAN): >60 ML/MIN/1.73 M^2
GLUCOSE SERPL-MCNC: 93 MG/DL
HCT VFR BLD AUTO: 32.2 %
HGB BLD-MCNC: 10.3 G/DL
IMM GRANULOCYTES # BLD AUTO: 0.01 K/UL
IMM GRANULOCYTES NFR BLD AUTO: 0.3 %
LYMPHOCYTES # BLD AUTO: 1.2 K/UL
LYMPHOCYTES NFR BLD: 30.6 %
MAGNESIUM SERPL-MCNC: 1.9 MG/DL
MCH RBC QN AUTO: 26.3 PG
MCHC RBC AUTO-ENTMCNC: 32 G/DL
MCV RBC AUTO: 82 FL
MONOCYTES # BLD AUTO: 0.4 K/UL
MONOCYTES NFR BLD: 10.5 %
NEUTROPHILS # BLD AUTO: 2.1 K/UL
NEUTROPHILS NFR BLD: 53.5 %
NRBC BLD-RTO: 0 /100 WBC
PHOSPHATE SERPL-MCNC: 2.7 MG/DL
PLATELET # BLD AUTO: 244 K/UL
PMV BLD AUTO: 9.1 FL
POTASSIUM SERPL-SCNC: 3.9 MMOL/L
RBC # BLD AUTO: 3.92 M/UL
SODIUM SERPL-SCNC: 136 MMOL/L
WBC # BLD AUTO: 3.92 K/UL

## 2018-12-09 PROCEDURE — 63600175 PHARM REV CODE 636 W HCPCS: Performed by: STUDENT IN AN ORGANIZED HEALTH CARE EDUCATION/TRAINING PROGRAM

## 2018-12-09 PROCEDURE — 85025 COMPLETE CBC W/AUTO DIFF WBC: CPT

## 2018-12-09 PROCEDURE — 83735 ASSAY OF MAGNESIUM: CPT

## 2018-12-09 PROCEDURE — 25000003 PHARM REV CODE 250: Performed by: STUDENT IN AN ORGANIZED HEALTH CARE EDUCATION/TRAINING PROGRAM

## 2018-12-09 PROCEDURE — 80048 BASIC METABOLIC PNL TOTAL CA: CPT

## 2018-12-09 PROCEDURE — 51798 US URINE CAPACITY MEASURE: CPT

## 2018-12-09 PROCEDURE — 63600175 PHARM REV CODE 636 W HCPCS

## 2018-12-09 PROCEDURE — 25000003 PHARM REV CODE 250: Performed by: SURGERY

## 2018-12-09 PROCEDURE — 36415 COLL VENOUS BLD VENIPUNCTURE: CPT

## 2018-12-09 PROCEDURE — 87324 CLOSTRIDIUM AG IA: CPT

## 2018-12-09 PROCEDURE — 20600001 HC STEP DOWN PRIVATE ROOM

## 2018-12-09 PROCEDURE — 84100 ASSAY OF PHOSPHORUS: CPT

## 2018-12-09 PROCEDURE — 99231 SBSQ HOSP IP/OBS SF/LOW 25: CPT | Mod: GC,,, | Performed by: INTERNAL MEDICINE

## 2018-12-09 PROCEDURE — A4216 STERILE WATER/SALINE, 10 ML: HCPCS | Performed by: SURGERY

## 2018-12-09 PROCEDURE — 63600175 PHARM REV CODE 636 W HCPCS: Performed by: SURGERY

## 2018-12-09 RX ORDER — DIPHENHYDRAMINE HYDROCHLORIDE 50 MG/ML
INJECTION INTRAMUSCULAR; INTRAVENOUS
Status: COMPLETED
Start: 2018-12-09 | End: 2018-12-09

## 2018-12-09 RX ORDER — DIPHENHYDRAMINE HYDROCHLORIDE 50 MG/ML
12.5 INJECTION INTRAMUSCULAR; INTRAVENOUS ONCE
Status: COMPLETED | OUTPATIENT
Start: 2018-12-09 | End: 2018-12-09

## 2018-12-09 RX ORDER — SODIUM CHLORIDE 9 MG/ML
INJECTION, SOLUTION INTRAVENOUS CONTINUOUS
Status: DISCONTINUED | OUTPATIENT
Start: 2018-12-09 | End: 2018-12-10

## 2018-12-09 RX ADMIN — ASPIRIN 81 MG: 81 TABLET, COATED ORAL at 08:12

## 2018-12-09 RX ADMIN — Medication 3 ML: at 06:12

## 2018-12-09 RX ADMIN — ATORVASTATIN CALCIUM 20 MG: 20 TABLET, FILM COATED ORAL at 08:12

## 2018-12-09 RX ADMIN — Medication 3 ML: at 10:12

## 2018-12-09 RX ADMIN — ACETAMINOPHEN 650 MG: 325 TABLET, FILM COATED ORAL at 11:12

## 2018-12-09 RX ADMIN — ONDANSETRON HYDROCHLORIDE 4 MG: 2 INJECTION, SOLUTION INTRAMUSCULAR; INTRAVENOUS at 03:12

## 2018-12-09 RX ADMIN — ACETAMINOPHEN 650 MG: 325 TABLET, FILM COATED ORAL at 05:12

## 2018-12-09 RX ADMIN — DIPHENHYDRAMINE HYDROCHLORIDE 12.5 MG: 50 INJECTION INTRAMUSCULAR; INTRAVENOUS at 03:12

## 2018-12-09 RX ADMIN — OXYCODONE HYDROCHLORIDE 5 MG: 5 TABLET ORAL at 03:12

## 2018-12-09 RX ADMIN — SODIUM CHLORIDE: 0.9 INJECTION, SOLUTION INTRAVENOUS at 11:12

## 2018-12-09 RX ADMIN — LEVOTHYROXINE SODIUM 50 MCG: 25 TABLET ORAL at 03:12

## 2018-12-09 RX ADMIN — DIPHENHYDRAMINE HYDROCHLORIDE 12.5 MG: 50 INJECTION INTRAMUSCULAR; INTRAVENOUS at 02:12

## 2018-12-09 RX ADMIN — ENOXAPARIN SODIUM 40 MG: 100 INJECTION SUBCUTANEOUS at 05:12

## 2018-12-09 RX ADMIN — Medication 3 ML: at 02:12

## 2018-12-09 RX ADMIN — PANTOPRAZOLE SODIUM 40 MG: 40 TABLET, DELAYED RELEASE ORAL at 08:12

## 2018-12-09 RX ADMIN — TRAZODONE HYDROCHLORIDE 50 MG: 50 TABLET ORAL at 10:12

## 2018-12-09 RX ADMIN — AMLODIPINE BESYLATE 10 MG: 10 TABLET ORAL at 08:12

## 2018-12-09 NOTE — PROGRESS NOTES
[facility] Hospital Medicine  Progress Note    Patient Name: Chucho Prado  MRN: [id number]  Patient Class: IP- Inpatient   Admission Date: 12/3/2018  Length of Stay: 4 days  Attending Physician: Steve Valentin MD  Primary Care Provider: Obed Mcguire MD    Hospital Medicine Team: Networked reference to record PCT  Liban Kelley MD    Subjective:     Principal Problem:Ventral hernia with obstruction    HPI:  Chucho Prado is an 85 year old male with HTN, CKD-3, HLD, GERD, neurogenic bladder with chronic suprapubic catheter , h/o ventral incisional hernia repair (at former open helen right subcostal incision 7/18 with mesh) presents abdominal pain and vomiting. Reports intermittent abdominal pain for the past 3-4 months and c/o gas (both in his abdomen, and passing gas). About 1 week ago noticed bulged in mid abdomen. Yesterday had multiple episodes of emesis, vomiting all night. States still passing a little gas, last BM Saturday. Patient denies chest pain, shortness of breath, fevers, chills, and headache.     NG placed in ED with ~500 ml green output.         Hospital Course:  No notes on file    Interval History: Patient had one episode of vomiting last night. Was made NPO. NG tube, KUB, and bladder scan was ordered but patient refused these. Patient also having several episodes of watery diarrhea.  C. Diff sent.     Review of Systems   Constitutional: Negative for chills, fatigue and fever.   HENT: Negative for congestion.    Eyes: Negative for visual disturbance.   Respiratory: Negative for cough, choking, chest tightness, shortness of breath, wheezing and stridor.    Cardiovascular: Negative for chest pain, palpitations and leg swelling.   Gastrointestinal: Positive for abdominal pain, diarrhea and nausea. Negative for abdominal distention, blood in stool, constipation and vomiting.   Endocrine: Negative for polyuria.   Genitourinary: Negative for dysuria and hematuria.    Musculoskeletal: Positive for arthralgias and back pain.   Skin: Negative for color change, pallor, rash and wound.   Allergic/Immunologic: Negative.    Neurological: Negative for dizziness, facial asymmetry, light-headedness and headaches.   Hematological: Negative.    Psychiatric/Behavioral: Negative.      Objective:     Vital Signs (Most Recent):  Temp: 98.1 °F (36.7 °C) (12/09/18 0732)  Pulse: 79 (12/09/18 0732)  Resp: 18 (12/09/18 0732)  BP: 135/62 (12/09/18 0732)  SpO2: 99 % (12/09/18 0732) Vital Signs (24h Range):  Temp:  [97.3 °F (36.3 °C)-98.2 °F (36.8 °C)] 98.1 °F (36.7 °C)  Pulse:  [79-85] 79  Resp:  [16-18] 18  SpO2:  [93 %-99 %] 99 %  BP: (127-150)/(61-67) 135/62     Weight: 77.6 kg (171 lb)  Body mass index is 26 kg/m².    Intake/Output Summary (Last 24 hours) at 12/9/2018 1015  Last data filed at 12/9/2018 0700  Gross per 24 hour   Intake 530 ml   Output 950 ml   Net -420 ml      Physical Exam   Constitutional: He is oriented to person, place, and time. He appears well-developed and well-nourished. No distress.   HENT:   Head: Normocephalic and atraumatic.   Eyes: EOM are normal.   Neck: Normal range of motion.   Cardiovascular: Normal rate, regular rhythm, normal heart sounds and intact distal pulses. Exam reveals no gallop and no friction rub.   No murmur heard.  Pulmonary/Chest: Effort normal and breath sounds normal. No stridor. No respiratory distress. He has no wheezes. He has no rales.   Abdominal: Soft. Bowel sounds are normal. He exhibits distension. There is tenderness in the epigastric area. There is no rigidity and no guarding.   Umbilical Incision clean/dry/intact with hematoma.    Musculoskeletal: Normal range of motion. He exhibits edema.   Neurological: He is alert and oriented to person, place, and time.   Skin: He is not diaphoretic.   Nursing note and vitals reviewed.      Significant Labs: All pertinent labs within the past 24 hours have been reviewed.    Significant Imaging: I  have reviewed all pertinent imaging results/findings within the past 24 hours.    Assessment/Plan:      * Ventral hernia with obstruction    - POD3. Patient tolerating clear liquid diet  - Incentive spirometry to prevent atelectasis      History of postoperative delirium    - Patient reports 1 week history of post-op delirium 7/2018  - Delirium precautions   - Can use Seroquel 25 mg QHS prn      Gastroesophageal reflux disease without esophagitis    - continue home Prilosec       Incarcerated hernia    - POD3 for open ventral hernia repair        Neurogenic bladder    - suprapubic catheter in place  - follows with urology once a month and due to have galvan changed next week   - UA shows WBC >100 and Leukocytes 3+  - Urine culture growing [employer]   - Patient denies dysuria.   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 Will not treat at this time.    - if possible, would try to change out galvan while inpatient        Hypokalemia    - Could be related to NG tube suctioning   - Potassium wnl  - Replete and monitor daily        Hyponatremia    - several year history of hyponatremia  - Hyponatremia improving after fluid resuscitation.   - Would recommend stopping Triamterene-HCTZ at discharge given history of hyponatremia and hypokalemia         CKD (chronic kidney disease) stage 3, GFR 30-59 ml/min    - Cr back to baseline  - TERE likely pre-renal from use of dyazide and poor po intake. Dyazide stopped.   - Will monitor daily       Dyslipidemia    - continue home Lipitor       Essential hypertension    - continue home Norvasc 10 mg   - Stop Triamterene-HCTZ given history of hyponatremia and hypokalemia  - if BP remains elevated (SBP>150), would start losartan 25 mg daily. Can be titrated outpatient if needed  - BP controlled on Norvasc alone and would discharge home on Norvasc alone to treat HTN and stop Dyazide altogether as patient has chronic hyponatremia and Dyazide will exacerbate hyponatremia.       Acquired hypothyroidism    - continue home synthroid         VTE Risk Mitigation (From admission, onward)        Ordered     enoxaparin injection 40 mg  Daily      12/03/18 2322     Place sequential compression device  Until discontinued      12/03/18 2322     IP VTE HIGH RISK PATIENT  Once      12/03/18 2322              Liban Kelley MD  Department of Hospital Medicine   [facility]

## 2018-12-09 NOTE — PROGRESS NOTES
[facility]  General Surgery  Progress Note    Subjective:     History of Present Illness:  86 yo M with htn, h/o ventral incisional hernia repair (at former open helen right subcostal incision 7/18 with mesh) p/w abdominal pain and vomiting. Reports intermittent abdominal pain for the past 3-4 months and c/o gas (both in his abdomen, and passing gas). About 1 week ago noticed bulged in mid abdomen. Yesterday had multiple episodes of emesis, vomiting all night. States still passing a little gas, last BM Saturday. NG placed in ED with ~500 ml green output.     Post-Op Info:  Procedure(s) (LRB):  REPAIR, HERNIA, INCISIONAL, INCARCERATED, WITHOUT HISTORY OF PRIOR REPAIR (N/A)   4 Days Post-Op     Interval History:     One episode of emesis overnight.  Having diarrhea this AM.  VSS.  No leukocytosis.     Medications:  Continuous Infusions:  Scheduled Meds:   acetaminophen  650 mg Oral Q6H    amLODIPine  10 mg Oral Daily    aspirin  81 mg Oral Daily    atorvastatin  20 mg Oral Daily    enoxaparin  40 mg Subcutaneous Daily    levothyroxine  50 mcg Oral Before breakfast    pantoprazole  40 mg Oral Daily    sodium chloride 0.9%  3 mL Intravenous Q8H    traZODone  50 mg Oral Nightly     PRN Meds:naloxone, ondansetron, oxyCODONE, oxyCODONE, polyethylene glycol, promethazine (PHENERGAN) IVPB, senna, sodium chloride 0.9%     Review of patient's allergies indicates:  No Known Allergies  Objective:     Vital Signs (Most Recent):  Temp: 98.1 °F (36.7 °C) (12/09/18 0732)  Pulse: 79 (12/09/18 0732)  Resp: 18 (12/09/18 0732)  BP: 135/62 (12/09/18 0732)  SpO2: 99 % (12/09/18 0732) Vital Signs (24h Range):  Temp:  [97.3 °F (36.3 °C)-98.2 °F (36.8 °C)] 98.1 °F (36.7 °C)  Pulse:  [79-85] 79  Resp:  [16-18] 18  SpO2:  [93 %-99 %] 99 %  BP: (127-150)/(61-67) 135/62     Weight: 77.6 kg (171 lb)  Body mass index is 26 kg/m².    Intake/Output - Last 3 Shifts       12/07 0700 - 12/08 0659 12/08 0700 - 12/09 0659  12/09 0700 - 12/10 0659    P.O. 50 480     I.V. (mL/kg)       IV Piggyback 100 50     Total Intake(mL/kg) 150 (1.9) 530 (6.8)     Urine (mL/kg/hr) 1400 (0.8) 750 (0.4)     Emesis/NG output   200    Stool 0 0 0    Total Output 1400 750 200    Net -1250 -220 -200           Stool Occurrence 1 x 1 x 3 x    Emesis Occurrence   1 x          Physical Exam       Constitutional: He is oriented to person, place, and time. He appears well-developed and well-nourished. No distress.   Cardiovascular: Normal rate.   Pulmonary/Chest: Effort normal.   Abdominal: Soft.   Hematoma at surgical site softer than before.    Musculoskeletal: Normal range of motion. He exhibits no edema.   Neurological: He is alert and oriented to person, place, and time.   Skin: Skin is warm and dry.   Psychiatric: He has a normal mood and affect. His behavior is normal.     Significant Labs:  CBC:   Recent Labs   Lab 12/09/18  0628   WBC 3.92   RBC 3.92*   HGB 10.3*   HCT 32.2*      MCV 82   MCH 26.3*   MCHC 32.0     CMP:   Recent Labs   Lab 12/03/18  1751  12/09/18  0628   *   < > 93   CALCIUM 10.2   < > 8.5*   ALBUMIN 4.2  --   --    PROT 8.9*  --   --    *   < > 136   K 3.2*   < > 3.9   CO2 32*   < > 26   CL 86*   < > 100   BUN 22   < > 14   CREATININE 1.5*   < > 1.0   ALKPHOS 107  --   --    ALT 7*  --   --    AST 17  --   --    BILITOT 1.6*  --   --     < > = values in this interval not displayed.           Assessment/Plan:     * Ventral hernia with obstruction    84 yo M with ventral hernia and small bowel obstruction s/p repair on 12/5/18    Postop hematoma stable.   Regular diet.   [facility] will resume  services.   Hopefully discharge tomorrow           Jesús Neil MD  General Surgery  [facility]

## 2018-12-09 NOTE — PLAN OF CARE
Problem: Patient Care Overview  Goal: Plan of Care Review  Outcome: Outcome(s) achieved Date Met: 12/09/18  Plan of care reviewed, all questions and concerns addressed. Patient VSS on 1L NC with unlabored breathing. Patient urine output remains adequate per suprapubic catheter. Pt had 2 large liquid bm this shift, cdiff stool sample sent to lab and awaiting results. Patient had intermittent nausea relieved by ordered prn antiemetic. Patient sat up in chair x1 and remains free from falls or injury. Encouraged patient to use IS. Patient is resting with side rails raised x2, bed in locked and in lowest position, call light within reach and caregiver at bedside. Will continue to monitor patient.

## 2018-12-09 NOTE — ASSESSMENT & PLAN NOTE
84 yo M with ventral hernia and small bowel obstruction s/p repair on 12/5/18    Postop hematoma stable.   Regular diet.   [facility] will resume  services.   Hopefully discharge tomorrow

## 2018-12-09 NOTE — ASSESSMENT & PLAN NOTE
- Cr back to baseline  - TERE likely pre-renal from use of dyazide and poor po intake. Dyazide stopped.   - Will monitor daily

## 2018-12-09 NOTE — NURSING
Notified Dr. LUIS F Garza of pt vomiting approximately 200 mL of green stomach contents and family c/o this having occurred over the past couple of days. Said to administer prn antiemetics and if N/V continues to notify him and poss NG tube.

## 2018-12-09 NOTE — SUBJECTIVE & OBJECTIVE
Interval History:     One episode of emesis overnight.  Having diarrhea this AM.  VSS.  No leukocytosis.     Medications:  Continuous Infusions:  Scheduled Meds:   acetaminophen  650 mg Oral Q6H    amLODIPine  10 mg Oral Daily    aspirin  81 mg Oral Daily    atorvastatin  20 mg Oral Daily    enoxaparin  40 mg Subcutaneous Daily    levothyroxine  50 mcg Oral Before breakfast    pantoprazole  40 mg Oral Daily    sodium chloride 0.9%  3 mL Intravenous Q8H    traZODone  50 mg Oral Nightly     PRN Meds:naloxone, ondansetron, oxyCODONE, oxyCODONE, polyethylene glycol, promethazine (PHENERGAN) IVPB, senna, sodium chloride 0.9%     Review of patient's allergies indicates:  No Known Allergies  Objective:     Vital Signs (Most Recent):  Temp: 98.1 °F (36.7 °C) (12/09/18 0732)  Pulse: 79 (12/09/18 0732)  Resp: 18 (12/09/18 0732)  BP: 135/62 (12/09/18 0732)  SpO2: 99 % (12/09/18 0732) Vital Signs (24h Range):  Temp:  [97.3 °F (36.3 °C)-98.2 °F (36.8 °C)] 98.1 °F (36.7 °C)  Pulse:  [79-85] 79  Resp:  [16-18] 18  SpO2:  [93 %-99 %] 99 %  BP: (127-150)/(61-67) 135/62     Weight: 77.6 kg (171 lb)  Body mass index is 26 kg/m².    Intake/Output - Last 3 Shifts       12/07 0700 - 12/08 0659 12/08 0700 - 12/09 0659 12/09 0700 - 12/10 0659    P.O. 50 480     I.V. (mL/kg)       IV Piggyback 100 50     Total Intake(mL/kg) 150 (1.9) 530 (6.8)     Urine (mL/kg/hr) 1400 (0.8) 750 (0.4)     Emesis/NG output   200    Stool 0 0 0    Total Output 1400 750 200    Net -1250 -220 -200           Stool Occurrence 1 x 1 x 3 x    Emesis Occurrence   1 x          Physical Exam       Constitutional: He is oriented to person, place, and time. He appears well-developed and well-nourished. No distress.   Cardiovascular: Normal rate.   Pulmonary/Chest: Effort normal.   Abdominal: Soft.   Hematoma at surgical site softer than before.    Musculoskeletal: Normal range of motion. He exhibits no edema.   Neurological: He is alert and oriented to person,  place, and time.   Skin: Skin is warm and dry.   Psychiatric: He has a normal mood and affect. His behavior is normal.     Significant Labs:  CBC:   Recent Labs   Lab 12/09/18  0628   WBC 3.92   RBC 3.92*   HGB 10.3*   HCT 32.2*      MCV 82   MCH 26.3*   MCHC 32.0     CMP:   Recent Labs   Lab 12/03/18  1751  12/09/18  0628   *   < > 93   CALCIUM 10.2   < > 8.5*   ALBUMIN 4.2  --   --    PROT 8.9*  --   --    *   < > 136   K 3.2*   < > 3.9   CO2 32*   < > 26   CL 86*   < > 100   BUN 22   < > 14   CREATININE 1.5*   < > 1.0   ALKPHOS 107  --   --    ALT 7*  --   --    AST 17  --   --    BILITOT 1.6*  --   --     < > = values in this interval not displayed.

## 2018-12-09 NOTE — SUBJECTIVE & OBJECTIVE
Interval History: Patient had one episode of vomiting last night. Was made NPO. NG tube, KUB, and bladder scan was ordered but patient refused these. Patient also having several episodes of watery diarrhea.  C. Diff sent.     Review of Systems   Constitutional: Negative for chills, fatigue and fever.   HENT: Negative for congestion.    Eyes: Negative for visual disturbance.   Respiratory: Negative for cough, choking, chest tightness, shortness of breath, wheezing and stridor.    Cardiovascular: Negative for chest pain, palpitations and leg swelling.   Gastrointestinal: Positive for abdominal pain, diarrhea and nausea. Negative for abdominal distention, blood in stool, constipation and vomiting.   Endocrine: Negative for polyuria.   Genitourinary: Negative for dysuria and hematuria.   Musculoskeletal: Positive for arthralgias and back pain.   Skin: Negative for color change, pallor, rash and wound.   Allergic/Immunologic: Negative.    Neurological: Negative for dizziness, facial asymmetry, light-headedness and headaches.   Hematological: Negative.    Psychiatric/Behavioral: Negative.      Objective:     Vital Signs (Most Recent):  Temp: 98.1 °F (36.7 °C) (12/09/18 0732)  Pulse: 79 (12/09/18 0732)  Resp: 18 (12/09/18 0732)  BP: 135/62 (12/09/18 0732)  SpO2: 99 % (12/09/18 0732) Vital Signs (24h Range):  Temp:  [97.3 °F (36.3 °C)-98.2 °F (36.8 °C)] 98.1 °F (36.7 °C)  Pulse:  [79-85] 79  Resp:  [16-18] 18  SpO2:  [93 %-99 %] 99 %  BP: (127-150)/(61-67) 135/62     Weight: 77.6 kg (171 lb)  Body mass index is 26 kg/m².    Intake/Output Summary (Last 24 hours) at 12/9/2018 1015  Last data filed at 12/9/2018 0700  Gross per 24 hour   Intake 530 ml   Output 950 ml   Net -420 ml      Physical Exam   Constitutional: He is oriented to person, place, and time. He appears well-developed and well-nourished. No distress.   HENT:   Head: Normocephalic and atraumatic.   Eyes: EOM are normal.   Neck: Normal range of motion.    Cardiovascular: Normal rate, regular rhythm, normal heart sounds and intact distal pulses. Exam reveals no gallop and no friction rub.   No murmur heard.  Pulmonary/Chest: Effort normal and breath sounds normal. No stridor. No respiratory distress. He has no wheezes. He has no rales.   Abdominal: Soft. Bowel sounds are normal. He exhibits distension. There is tenderness in the epigastric area. There is no rigidity and no guarding.   Umbilical Incision clean/dry/intact with hematoma.    Musculoskeletal: Normal range of motion. He exhibits edema.   Neurological: He is alert and oriented to person, place, and time.   Skin: He is not diaphoretic.   Nursing note and vitals reviewed.      Significant Labs: All pertinent labs within the past 24 hours have been reviewed.    Significant Imaging: I have reviewed all pertinent imaging results/findings within the past 24 hours.

## 2018-12-10 LAB
ANION GAP SERPL CALC-SCNC: 6 MMOL/L
BASOPHILS # BLD AUTO: 0.02 K/UL
BASOPHILS NFR BLD: 0.5 %
BUN SERPL-MCNC: 11 MG/DL
CALCIUM SERPL-MCNC: 7.5 MG/DL
CHLORIDE SERPL-SCNC: 104 MMOL/L
CO2 SERPL-SCNC: 27 MMOL/L
CREAT SERPL-MCNC: 1 MG/DL
DIFFERENTIAL METHOD: ABNORMAL
EOSINOPHIL # BLD AUTO: 0.3 K/UL
EOSINOPHIL NFR BLD: 6.7 %
ERYTHROCYTE [DISTWIDTH] IN BLOOD BY AUTOMATED COUNT: 15.6 %
EST. GFR  (AFRICAN AMERICAN): >60 ML/MIN/1.73 M^2
EST. GFR  (NON AFRICAN AMERICAN): >60 ML/MIN/1.73 M^2
GLUCOSE SERPL-MCNC: 82 MG/DL
HCT VFR BLD AUTO: 31.4 %
HGB BLD-MCNC: 9.9 G/DL
IMM GRANULOCYTES # BLD AUTO: 0.02 K/UL
IMM GRANULOCYTES NFR BLD AUTO: 0.5 %
LYMPHOCYTES # BLD AUTO: 1.2 K/UL
LYMPHOCYTES NFR BLD: 27.7 %
MAGNESIUM SERPL-MCNC: 1.7 MG/DL
MCH RBC QN AUTO: 26.1 PG
MCHC RBC AUTO-ENTMCNC: 31.5 G/DL
MCV RBC AUTO: 83 FL
MONOCYTES # BLD AUTO: 0.4 K/UL
MONOCYTES NFR BLD: 9.1 %
NEUTROPHILS # BLD AUTO: 2.3 K/UL
NEUTROPHILS NFR BLD: 55.5 %
NRBC BLD-RTO: 0 /100 WBC
PHOSPHATE SERPL-MCNC: 2.1 MG/DL
PLATELET # BLD AUTO: 231 K/UL
PMV BLD AUTO: 9.5 FL
POTASSIUM SERPL-SCNC: 3.1 MMOL/L
RBC # BLD AUTO: 3.79 M/UL
SODIUM SERPL-SCNC: 137 MMOL/L
WBC # BLD AUTO: 4.19 K/UL

## 2018-12-10 PROCEDURE — 25000003 PHARM REV CODE 250: Performed by: INTERNAL MEDICINE

## 2018-12-10 PROCEDURE — 25000003 PHARM REV CODE 250: Performed by: STUDENT IN AN ORGANIZED HEALTH CARE EDUCATION/TRAINING PROGRAM

## 2018-12-10 PROCEDURE — 36415 COLL VENOUS BLD VENIPUNCTURE: CPT

## 2018-12-10 PROCEDURE — 20600001 HC STEP DOWN PRIVATE ROOM

## 2018-12-10 PROCEDURE — 80048 BASIC METABOLIC PNL TOTAL CA: CPT

## 2018-12-10 PROCEDURE — G8988 SELF CARE GOAL STATUS: HCPCS | Mod: CJ

## 2018-12-10 PROCEDURE — 63600175 PHARM REV CODE 636 W HCPCS: Performed by: SURGERY

## 2018-12-10 PROCEDURE — 25000003 PHARM REV CODE 250: Performed by: SURGERY

## 2018-12-10 PROCEDURE — G8987 SELF CARE CURRENT STATUS: HCPCS | Mod: CK

## 2018-12-10 PROCEDURE — 85025 COMPLETE CBC W/AUTO DIFF WBC: CPT

## 2018-12-10 PROCEDURE — 83735 ASSAY OF MAGNESIUM: CPT

## 2018-12-10 PROCEDURE — 97530 THERAPEUTIC ACTIVITIES: CPT

## 2018-12-10 PROCEDURE — 84100 ASSAY OF PHOSPHORUS: CPT

## 2018-12-10 PROCEDURE — A4216 STERILE WATER/SALINE, 10 ML: HCPCS | Performed by: SURGERY

## 2018-12-10 RX ORDER — SODIUM,POTASSIUM PHOSPHATES 280-250MG
2 POWDER IN PACKET (EA) ORAL
Status: COMPLETED | OUTPATIENT
Start: 2018-12-10 | End: 2018-12-10

## 2018-12-10 RX ORDER — ACETAMINOPHEN 325 MG/1
650 TABLET ORAL EVERY 6 HOURS PRN
Status: DISCONTINUED | OUTPATIENT
Start: 2018-12-10 | End: 2018-12-11 | Stop reason: HOSPADM

## 2018-12-10 RX ORDER — POTASSIUM CHLORIDE 20 MEQ/1
40 TABLET, EXTENDED RELEASE ORAL
Status: COMPLETED | OUTPATIENT
Start: 2018-12-10 | End: 2018-12-10

## 2018-12-10 RX ORDER — POTASSIUM CHLORIDE 20 MEQ/1
60 TABLET, EXTENDED RELEASE ORAL ONCE
Status: COMPLETED | OUTPATIENT
Start: 2018-12-10 | End: 2018-12-10

## 2018-12-10 RX ADMIN — ASPIRIN 81 MG: 81 TABLET, COATED ORAL at 09:12

## 2018-12-10 RX ADMIN — PANTOPRAZOLE SODIUM 40 MG: 40 TABLET, DELAYED RELEASE ORAL at 09:12

## 2018-12-10 RX ADMIN — POTASSIUM CHLORIDE 60 MEQ: 1500 TABLET, EXTENDED RELEASE ORAL at 01:12

## 2018-12-10 RX ADMIN — POTASSIUM & SODIUM PHOSPHATES POWDER PACK 280-160-250 MG 2 PACKET: 280-160-250 PACK at 05:12

## 2018-12-10 RX ADMIN — Medication 3 ML: at 01:12

## 2018-12-10 RX ADMIN — POTASSIUM CHLORIDE 40 MEQ: 1500 TABLET, EXTENDED RELEASE ORAL at 12:12

## 2018-12-10 RX ADMIN — LEVOTHYROXINE SODIUM 50 MCG: 25 TABLET ORAL at 05:12

## 2018-12-10 RX ADMIN — POTASSIUM & SODIUM PHOSPHATES POWDER PACK 280-160-250 MG 2 PACKET: 280-160-250 PACK at 07:12

## 2018-12-10 RX ADMIN — ACETAMINOPHEN 650 MG: 325 TABLET, FILM COATED ORAL at 05:12

## 2018-12-10 RX ADMIN — OXYCODONE HYDROCHLORIDE 5 MG: 5 TABLET ORAL at 07:12

## 2018-12-10 RX ADMIN — Medication 3 ML: at 05:12

## 2018-12-10 RX ADMIN — ENOXAPARIN SODIUM 40 MG: 100 INJECTION SUBCUTANEOUS at 05:12

## 2018-12-10 RX ADMIN — POTASSIUM & SODIUM PHOSPHATES POWDER PACK 280-160-250 MG 2 PACKET: 280-160-250 PACK at 09:12

## 2018-12-10 RX ADMIN — OXYCODONE HYDROCHLORIDE 10 MG: 10 TABLET ORAL at 10:12

## 2018-12-10 RX ADMIN — Medication 3 ML: at 10:12

## 2018-12-10 RX ADMIN — AMLODIPINE BESYLATE 10 MG: 10 TABLET ORAL at 09:12

## 2018-12-10 RX ADMIN — POTASSIUM CHLORIDE 40 MEQ: 1500 TABLET, EXTENDED RELEASE ORAL at 09:12

## 2018-12-10 RX ADMIN — ATORVASTATIN CALCIUM 20 MG: 20 TABLET, FILM COATED ORAL at 09:12

## 2018-12-10 RX ADMIN — OXYCODONE HYDROCHLORIDE 10 MG: 10 TABLET ORAL at 12:12

## 2018-12-10 RX ADMIN — ACETAMINOPHEN 650 MG: 325 TABLET, FILM COATED ORAL at 01:12

## 2018-12-10 RX ADMIN — POTASSIUM & SODIUM PHOSPHATES POWDER PACK 280-160-250 MG 2 PACKET: 280-160-250 PACK at 11:12

## 2018-12-10 RX ADMIN — OXYCODONE HYDROCHLORIDE 10 MG: 10 TABLET ORAL at 05:12

## 2018-12-10 RX ADMIN — TRAZODONE HYDROCHLORIDE 50 MG: 50 TABLET ORAL at 09:12

## 2018-12-10 NOTE — PT/OT/SLP PROGRESS
Occupational Therapy   Treatment    Name: Chucho Prado  MRN: [id number]  Admitting Diagnosis:  Ventral hernia with obstruction  5 Days Post-Op    Recommendations:     Discharge Recommendations: (HHOT)  Discharge Equipment Recommendations:  none  Barriers to discharge:  None    Subjective     Pain/Comfort:  · Pain Rating 1: 6/10  · Location - Side 1: Bilateral  · Location - Orientation 1: generalized  · Location 1: back(legs, and abdomen)  · Pain Addressed 1: Reposition, Distraction, Cessation of Activity  · Pain Rating Post-Intervention 1: 6/10    Objective:     Communicated with: RN prior to session.  Patient found with all lines intact and call button in reach and SCD, oxygen upon OT entry to room.    General Precautions: Standard, fall   Orthopedic Precautions:N/A   Braces: N/A     Occupational Performance:    Bed Mobility:    · Patient completed Supine to Sit with stand by assistance and with side rail     Functional Mobility/Transfers:  · Patient completed Sit <> Stand Transfer with stand by assistance  with  standard walker   · Patient completed Bed <> Chair Transfer using Step Transfer technique with contact guard assistance with standard walker  · Functional Mobility: Pt took 8 steps bed>chair with CGA using SW    Activities of Daily Living:  · Lower Body Dressing: maximal assistance to don B shoes while seated EOB      [facility] 6 Click ADL: 18    Treatment & Education:  BUE AROM exercises 15 x 1 for shoulder flex, elbow flex/ext, shoulder shrugs, and chest press  Pt educated on role of OT/POC  Pt educated on importance of ambulation/UIC  White board/communication board updated    Patient left up in chair with all lines intact, call button in reach and RN notified  Education:    Assessment:     Chucho Prado is a 85 y.o. male with a medical diagnosis of Ventral hernia with obstruction.  He presents with the following performance deficits affecting function are weakness, impaired endurance, pain, impaired  balance, gait instability, impaired self care skills, impaired functional mobilty.     Rehab Prognosis:  Fair; patient would benefit from acute skilled OT services to address these deficits and reach maximum level of function.       Plan:     Patient to be seen 3 x/week to address the above listed problems via self-care/home management, therapeutic activities, therapeutic exercises  · Plan of Care Expires: 01/06/01  · Plan of Care Reviewed with: patient    This Plan of care has been discussed with the patient who was involved in its development and understands and is in agreement with the identified goals and treatment plan    GOALS:   Multidisciplinary Problems     Occupational Therapy Goals        Problem: Occupational Therapy Goal    Goal Priority Disciplines Outcome Interventions   Occupational Therapy Goal     OT, PT/OT Ongoing (interventions implemented as appropriate)    Description:  Goals to be met by: 12/14/18    Patient will increase functional independence with ADLs by performing:    LE Dressing with Moderate Assistance and Assistive Devices as needed.  Grooming while EOB with Set-up Assistance.  Toileting from toilet with Minimal Assistance for hygiene and clothing management.   Toilet transfer to toilet with Contact Guard Assistance.                      Time Tracking:     OT Date of Treatment: 12/10/18  OT Start Time: 1421  OT Stop Time: 1432  OT Total Time (min): 11 min    Billable Minutes:Therapeutic Activity 11    Preethi Chatterjee OT  12/10/2018

## 2018-12-10 NOTE — PLAN OF CARE
Patient's nausea, vomiting, and diarrhea has resolved. C. Diff. Negative.  Patient tolerating regular diet. BP controlled on Norvasc alone and recommend on discharge to continue Norvasc 10 mg daily and stop Dyazide. Patient can follow-up with primary care physician in 2-3 weeks to recheck BP as outpatient

## 2018-12-10 NOTE — PROGRESS NOTES
[facility]  General Surgery  Progress Note    Subjective:     History of Present Illness:  86 yo M with htn, h/o ventral incisional hernia repair (at former open helen right subcostal incision 7/18 with mesh) p/w abdominal pain and vomiting. Reports intermittent abdominal pain for the past 3-4 months and c/o gas (both in his abdomen, and passing gas). About 1 week ago noticed bulged in mid abdomen. Yesterday had multiple episodes of emesis, vomiting all night. States still passing a little gas, last BM Saturday. NG placed in ED with ~500 ml green output.     Post-Op Info:  Procedure(s) (LRB):  REPAIR, HERNIA, INCISIONAL, INCARCERATED, WITHOUT HISTORY OF PRIOR REPAIR (N/A)   5 Days Post-Op     Interval History:   Patient tolerating diet, +bowel function. He continues to state he is not ready to go home because of getting around and pain. He reports having several people in the home. He has taken one percocet 5mg in the last 48hours.     Medications:  Continuous Infusions:  Scheduled Meds:   amLODIPine  10 mg Oral Daily    aspirin  81 mg Oral Daily    atorvastatin  20 mg Oral Daily    enoxaparin  40 mg Subcutaneous Daily    levothyroxine  50 mcg Oral Before breakfast    pantoprazole  40 mg Oral Daily    potassium, sodium phosphates  2 packet Oral QID (AC & HS)    sodium chloride 0.9%  3 mL Intravenous Q8H    traZODone  50 mg Oral Nightly     PRN Meds:acetaminophen, naloxone, ondansetron, oxyCODONE, oxyCODONE, polyethylene glycol, promethazine (PHENERGAN) IVPB, senna, sodium chloride 0.9%     Review of patient's allergies indicates:  No Known Allergies  Objective:     Vital Signs (Most Recent):  Temp: 97.9 °F (36.6 °C) (12/10/18 0404)  Pulse: (!) 57 (12/10/18 0404)  Resp: 16 (12/10/18 0404)  BP: (!) 127/59 (12/10/18 0404)  SpO2: 97 % (12/10/18 0404) Vital Signs (24h Range):  Temp:  [97.8 °F (36.6 °C)-98.2 °F (36.8 °C)] 97.9 °F (36.6 °C)  Pulse:  [57-79] 57  Resp:  [16-18] 16  SpO2:  [94  %-99 %] 97 %  BP: (112-141)/(58-63) 127/59     Weight: 77.6 kg (171 lb)  Body mass index is 26 kg/m².    Intake/Output - Last 3 Shifts       12/08 0700 - 12/09 0659 12/09 0700 - 12/10 0659 12/10 0700 - 12/11 0659    P.O. 480      I.V. (mL/kg)  1881.7 (24.2)     IV Piggyback 50      Total Intake(mL/kg) 530 (6.8) 1881.7 (24.2)     Urine (mL/kg/hr) 750 (0.4) 1575 (0.8)     Emesis/NG output  200     Stool 0 0     Total Output 750 1775     Net -220 +106.7            Stool Occurrence 1 x 7 x     Emesis Occurrence  1 x           Physical Exam       Constitutional: He is oriented to person, place, and time. He appears well-developed and well-nourished. No distress.   Cardiovascular: Normal rate.   Pulmonary/Chest: Effort normal.   Abdominal: Soft.   Hematoma at surgical site softer than before.    Musculoskeletal: Normal range of motion. He exhibits no edema.   Neurological: He is alert and oriented to person, place, and time.   Skin: Skin is warm and dry.   Psychiatric: He has a normal mood and affect. His behavior is normal.     Significant Labs:  CBC:   Recent Labs   Lab 12/10/18  0451   WBC 4.19   RBC 3.79*   HGB 9.9*   HCT 31.4*      MCV 83   MCH 26.1*   MCHC 31.5*     CMP:   Recent Labs   Lab 12/03/18  1751  12/10/18  0451   *   < > 82   CALCIUM 10.2   < > 7.5*   ALBUMIN 4.2  --   --    PROT 8.9*  --   --    *   < > 137   K 3.2*   < > 3.1*   CO2 32*   < > 27   CL 86*   < > 104   BUN 22   < > 11   CREATININE 1.5*   < > 1.0   ALKPHOS 107  --   --    ALT 7*  --   --    AST 17  --   --    BILITOT 1.6*  --   --     < > = values in this interval not displayed.           Assessment/Plan:     * Ventral hernia with obstruction    86 yo M with ventral hernia and small bowel obstruction s/p repair on 12/5/18    Postop hematoma stable.   Regular diet.   Will resume  services.   Discharge today.         Jovanna Killian MD  General Surgery  [facility]

## 2018-12-10 NOTE — PLAN OF CARE
Problem: Occupational Therapy Goal  Goal: Occupational Therapy Goal  Goals to be met by: 12/14/18    Patient will increase functional independence with ADLs by performing:    LE Dressing with Moderate Assistance and Assistive Devices as needed.  Grooming while EOB with Set-up Assistance.  Toileting from toilet with Minimal Assistance for hygiene and clothing management.   Toilet transfer to toilet with Contact Guard Assistance.     Outcome: Ongoing (interventions implemented as appropriate)  Pt progressing well toward established goals    Comments: Continue OT POC     Preethi Chatterjee OT  12/10/2018

## 2018-12-10 NOTE — PLAN OF CARE
Problem: Patient Care Overview  Goal: Plan of Care Review  Outcome: Ongoing (interventions implemented as appropriate)  POC reviewed with patient. Patient verbalized understanding. AAO x 4. VSS on 1 L NC, afebrile. Pt voids adequately per suprapubic catheter and is incontinent of stool. Pt denies pain. Pt tolerating regular diet with no c/o N/V. NS infusing 100 mL/hr. Patient is resting with no signs of distress noted. Bed low and locked, call light within reach. Will continue to monitor.

## 2018-12-10 NOTE — ASSESSMENT & PLAN NOTE
86 yo M with ventral hernia and small bowel obstruction s/p repair on 12/5/18    Postop hematoma stable.   Regular diet.   Will resume  services.   Discharge today.

## 2018-12-10 NOTE — PLAN OF CARE
SW was Progress Note indicating that Pt would be discharging home today. SW notified [facility] of Pt's discharge. Pt also has private sitter services for 18 hours a day to assist with his needs.     Florina Brock LCSW      12/10/18 0952   Post-Acute Status   Post-Acute Authorization Home Health/Hospice   Home Health/Hospice Status Authorization Obtained

## 2018-12-10 NOTE — SUBJECTIVE & OBJECTIVE
Interval History:   Patient tolerating diet, +bowel function. He continues to state he is not ready to go home because of getting around and pain. He reports having several people in the home. He has taken one percocet 5mg in the last 48hours.     Medications:  Continuous Infusions:  Scheduled Meds:   amLODIPine  10 mg Oral Daily    aspirin  81 mg Oral Daily    atorvastatin  20 mg Oral Daily    enoxaparin  40 mg Subcutaneous Daily    levothyroxine  50 mcg Oral Before breakfast    pantoprazole  40 mg Oral Daily    potassium, sodium phosphates  2 packet Oral QID (AC & HS)    sodium chloride 0.9%  3 mL Intravenous Q8H    traZODone  50 mg Oral Nightly     PRN Meds:acetaminophen, naloxone, ondansetron, oxyCODONE, oxyCODONE, polyethylene glycol, promethazine (PHENERGAN) IVPB, senna, sodium chloride 0.9%     Review of patient's allergies indicates:  No Known Allergies  Objective:     Vital Signs (Most Recent):  Temp: 97.9 °F (36.6 °C) (12/10/18 0404)  Pulse: (!) 57 (12/10/18 0404)  Resp: 16 (12/10/18 0404)  BP: (!) 127/59 (12/10/18 0404)  SpO2: 97 % (12/10/18 0404) Vital Signs (24h Range):  Temp:  [97.8 °F (36.6 °C)-98.2 °F (36.8 °C)] 97.9 °F (36.6 °C)  Pulse:  [57-79] 57  Resp:  [16-18] 16  SpO2:  [94 %-99 %] 97 %  BP: (112-141)/(58-63) 127/59     Weight: 77.6 kg (171 lb)  Body mass index is 26 kg/m².    Intake/Output - Last 3 Shifts       12/08 0700 - 12/09 0659 12/09 0700 - 12/10 0659 12/10 0700 - 12/11 0659    P.O. 480      I.V. (mL/kg)  1881.7 (24.2)     IV Piggyback 50      Total Intake(mL/kg) 530 (6.8) 1881.7 (24.2)     Urine (mL/kg/hr) 750 (0.4) 1575 (0.8)     Emesis/NG output  200     Stool 0 0     Total Output 750 1775     Net -220 +106.7            Stool Occurrence 1 x 7 x     Emesis Occurrence  1 x           Physical Exam       Constitutional: He is oriented to person, place, and time. He appears well-developed and well-nourished. No distress.   Cardiovascular: Normal rate.   Pulmonary/Chest: Effort  normal.   Abdominal: Soft.   Hematoma at surgical site softer than before.    Musculoskeletal: Normal range of motion. He exhibits no edema.   Neurological: He is alert and oriented to person, place, and time.   Skin: Skin is warm and dry.   Psychiatric: He has a normal mood and affect. His behavior is normal.     Significant Labs:  CBC:   Recent Labs   Lab 12/10/18  0451   WBC 4.19   RBC 3.79*   HGB 9.9*   HCT 31.4*      MCV 83   MCH 26.1*   MCHC 31.5*     CMP:   Recent Labs   Lab 12/03/18  1751  12/10/18  0451   *   < > 82   CALCIUM 10.2   < > 7.5*   ALBUMIN 4.2  --   --    PROT 8.9*  --   --    *   < > 137   K 3.2*   < > 3.1*   CO2 32*   < > 27   CL 86*   < > 104   BUN 22   < > 11   CREATININE 1.5*   < > 1.0   ALKPHOS 107  --   --    ALT 7*  --   --    AST 17  --   --    BILITOT 1.6*  --   --     < > = values in this interval not displayed.

## 2018-12-11 VITALS
DIASTOLIC BLOOD PRESSURE: 74 MMHG | HEIGHT: 68 IN | TEMPERATURE: 96 F | HEART RATE: 71 BPM | RESPIRATION RATE: 18 BRPM | SYSTOLIC BLOOD PRESSURE: 140 MMHG | BODY MASS INDEX: 25.91 KG/M2 | WEIGHT: 171 LBS | OXYGEN SATURATION: 96 %

## 2018-12-11 PROCEDURE — G8987 SELF CARE CURRENT STATUS: HCPCS | Mod: CK

## 2018-12-11 PROCEDURE — A4216 STERILE WATER/SALINE, 10 ML: HCPCS | Performed by: SURGERY

## 2018-12-11 PROCEDURE — G8988 SELF CARE GOAL STATUS: HCPCS | Mod: CK

## 2018-12-11 PROCEDURE — 25000003 PHARM REV CODE 250: Performed by: SURGERY

## 2018-12-11 PROCEDURE — 25000003 PHARM REV CODE 250: Performed by: STUDENT IN AN ORGANIZED HEALTH CARE EDUCATION/TRAINING PROGRAM

## 2018-12-11 PROCEDURE — G8989 SELF CARE D/C STATUS: HCPCS | Mod: CK

## 2018-12-11 RX ORDER — POLYETHYLENE GLYCOL 3350 17 G/17G
17 POWDER, FOR SOLUTION ORAL DAILY
Status: DISCONTINUED | OUTPATIENT
Start: 2018-12-11 | End: 2018-12-11 | Stop reason: HOSPADM

## 2018-12-11 RX ORDER — OXYCODONE AND ACETAMINOPHEN 5; 325 MG/1; MG/1
1 TABLET ORAL EVERY 4 HOURS PRN
Qty: 30 TABLET | Refills: 0 | Status: SHIPPED | OUTPATIENT
Start: 2018-12-11 | End: 2018-12-31 | Stop reason: SDUPTHER

## 2018-12-11 RX ORDER — ONDANSETRON 8 MG/1
8 TABLET, ORALLY DISINTEGRATING ORAL EVERY 8 HOURS PRN
Qty: 30 TABLET | Refills: 1 | Status: SHIPPED | OUTPATIENT
Start: 2018-12-11 | End: 2019-04-04

## 2018-12-11 RX ADMIN — Medication 3 ML: at 06:12

## 2018-12-11 RX ADMIN — OXYCODONE HYDROCHLORIDE 5 MG: 5 TABLET ORAL at 09:12

## 2018-12-11 RX ADMIN — ASPIRIN 81 MG: 81 TABLET, COATED ORAL at 08:12

## 2018-12-11 RX ADMIN — PANTOPRAZOLE SODIUM 40 MG: 40 TABLET, DELAYED RELEASE ORAL at 08:12

## 2018-12-11 RX ADMIN — ATORVASTATIN CALCIUM 20 MG: 20 TABLET, FILM COATED ORAL at 08:12

## 2018-12-11 RX ADMIN — AMLODIPINE BESYLATE 10 MG: 10 TABLET ORAL at 08:12

## 2018-12-11 RX ADMIN — LEVOTHYROXINE SODIUM 50 MCG: 25 TABLET ORAL at 06:12

## 2018-12-11 NOTE — PLAN OF CARE
Problem: Patient Care Overview  Goal: Plan of Care Review  Outcome: Ongoing (interventions implemented as appropriate)  POC reviewed w/ patient.  Pt verbalized understanding.  AAOx4, VSS.  Tolerating regular diet.  No c/o nausea or vomiting.  Pain controlled w/ prn meds.  Suprapubic catheter intact.  Clear, yellow urine.  Pt is resting w/ call light in reach.  WCTM.

## 2018-12-11 NOTE — PT/OT/SLP DISCHARGE
Occupational Therapy Discharge Summary    Chucho Prado  MRN: [id number]   Principal Problem: Ventral hernia with obstruction      Patient Discharged from acute Occupational Therapy on 12/11/18.  Please refer to prior OT note dated 12/10/18 for functional status.    Assessment:      Patient has not met goals.    Objective:     GOALS:   Multidisciplinary Problems     Occupational Therapy Goals     Not on file          Multidisciplinary Problems (Resolved)        Problem: Occupational Therapy Goal    Goal Priority Disciplines Outcome Interventions   Occupational Therapy Goal   (Resolved)     OT, PT/OT Outcome(s) achieved    Description:  Goals to be met by: 12/14/18    Patient will increase functional independence with ADLs by performing:    LE Dressing with Moderate Assistance and Assistive Devices as needed.  Grooming while EOB with Set-up Assistance.  Toileting from toilet with Minimal Assistance for hygiene and clothing management.   Toilet transfer to toilet with Contact Guard Assistance.                      Reasons for Discontinuation of Therapy Services  Transfer to alternate level of care.      Plan:     Patient Discharged to: Home with Home Health Service    Preethi Chatterjee, OT  12/11/2018

## 2018-12-11 NOTE — DISCHARGE SUMMARY
[facility]  General Surgery  Discharge Summary      Patient Name: Chucho Prado  MRN: [id number]  Admission Date: 12/3/2018  Hospital Length of Stay: 6 days  Discharge Date and Time:  12/11/2018 8:00 AM  Attending Physician: Steve Valentin MD   Discharging Provider: Jesús Neil MD  Primary Care Provider: Obed Mcguire MD    HPI:   86 yo M with htn, h/o ventral incisional hernia repair (at former open helen right subcostal incision 7/18 with mesh) p/w abdominal pain and vomiting. Reports intermittent abdominal pain for the past 3-4 months and c/o gas (both in his abdomen, and passing gas). About 1 week ago noticed bulged in mid abdomen. Yesterday had multiple episodes of emesis, vomiting all night. States still passing a little gas, last BM Saturday. NG placed in ED with ~500 ml green output.     Procedure(s) (LRB):  REPAIR, HERNIA, INCISIONAL, INCARCERATED, WITHOUT HISTORY OF PRIOR REPAIR (N/A)      Indwelling Lines/Drains at time of discharge:   Lines/Drains/Airways     Drain                 Suprapubic Catheter 06/23/17 1200 18 Fr. 535 days              Hospital Course: Patient underwent incisional hernia repair with mesh.  His post op course was relatively uncomplicated.  Stayed until 12/11/2018 for pain control.  He is now stable for discharge      Consults:   Consults (From admission, onward)        Status Ordering Provider     Inpatient consult to General surgery  Once     Provider:  (Not yet assigned)    BUCKY Carter     Inpatient consult to [facility]-General  Once     Provider:  (Not yet assigned)    CODIE Peres          Significant Diagnostic Studies: Labs:   CMP   Recent Labs   Lab 12/10/18  0451      K 3.1*      CO2 27   GLU 82   BUN 11   CREATININE 1.0   CALCIUM 7.5*   ANIONGAP 6*   ESTGFRAFRICA >60.0   EGFRNONAA >60.0       Pending Diagnostic Studies:     None        Final Active Diagnoses:    Diagnosis Date Noted POA     PRINCIPAL PROBLEM:  Ventral hernia with obstruction [K43.6] 12/03/2018 Yes     Chronic    Neurogenic bladder [N31.9] 12/04/2018 Yes    Incarcerated hernia [K46.0] 12/04/2018 Yes    History of postoperative delirium [Z86.59] 12/04/2018 Not Applicable    Gastroesophageal reflux disease without esophagitis [K21.9] 12/04/2018 Yes    Sleep disorder [G47.9] 12/04/2018 Yes    Chronic back pain [M54.9, G89.29] 12/04/2018 Yes    Preoperative evaluation to rule out surgical contraindication [Z01.818] 12/04/2018 Not Applicable    Impaired mobility and ADLs [Z74.09] 06/28/2018 Yes    Hypokalemia [E87.6] 06/25/2018 Yes    Hyponatremia [E87.1] 10/23/2017 Yes    CKD (chronic kidney disease) stage 3, GFR 30-59 ml/min [N18.3] 12/26/2016 Yes    Essential hypertension [I10] 07/30/2013 Yes    Acquired hypothyroidism [E03.9] 07/30/2013 Yes    Dyslipidemia [E78.5] 07/30/2013 Yes      Problems Resolved During this Admission:      Discharged Condition: good    Disposition: Home or Self Care    Follow Up:  Follow-up Information     [facility].    Specialty:  Home Health Services  Why:  Home Health  Contact information:  [address]  [address]  [place] [address]  [phone]             Steve Valentin MD In 2 weeks.    Specialty:  General Surgery  Why:  Post-op Appt: Office to arrange & notify you of Appt. (Appt 12/27/18 at 10:15 AM.)  Contact information:  [address]  [facility] [address]  [phone]             Obed Mcguire MD. Schedule an appointment as soon as possible for a visit in 2 weeks.    Specialty:  Internal Medicine  Why:  follow-up with primary care physician in 2-3 weeks to recheck BP as outpatient  Contact information:  [address]  [facility] [address]  [phone]                 Patient Instructions:      Diet Adult Regular     Notify your health care provider if you experience any of the following:  temperature >100.4     Notify your health care provider if  you experience any of the following:  persistent nausea and vomiting or diarrhea     Notify your health care provider if you experience any of the following:  severe uncontrolled pain     Notify your health care provider if you experience any of the following:  redness, tenderness, or signs of infection (pain, swelling, redness, odor or green/yellow discharge around incision site)     Lifting restrictions   Scheduling Instructions: Nothing heavier than 10lbs     No dressing needed     Medications:  Reconciled Home Medications:      Medication List      CHANGE how you take these medications    docusate sodium 100 MG capsule  Commonly known as:  COLACE  Take 1 capsule (100 mg total) by mouth 2 (two) times daily as needed for Constipation.  What changed:    · when to take this  · reasons to take this     ondansetron 8 MG Tbdl  Commonly known as:  ZOFRAN-ODT  Take 1 tablet (8 mg total) by mouth every 8 (eight) hours as needed (nausea).  What changed:    · when to take this  · reasons to take this     oxyCODONE-acetaminophen 5-325 mg per tablet  Commonly known as:  PERCOCET  Take 1 tablet by mouth every 4 (four) hours as needed.  What changed:  when to take this        CONTINUE taking these medications    amLODIPine 10 MG tablet  Commonly known as:  NORVASC  TAKE 1 TABLET(10 MG) BY MOUTH EVERY DAY     aspirin 81 MG EC tablet  Commonly known as:  ECOTRIN  Take 81 mg by mouth once daily.     atorvastatin 20 MG tablet  Commonly known as:  LIPITOR  Take 1 tablet (20 mg total) by mouth once daily.     * hyoscyamine 0.125 mg Subl  Commonly known as:  LEVSIN/SL  DISSOLVE 1 TABLET UNDER THE TONGUE EVERY 4 HOURS AS NEEDED     * hyoscyamine 0.125 mg Subl  Commonly known as:  LEVSIN/SL  DISSOLVE 1 TABLET UNDER THE TONGUE EVERY 4 HOURS AS NEEDED     influenza 180 mcg/0.5 mL vaccine  Commonly known as:  FLUZONE HIGH-DOSE  Inject 0.5 mLs into the muscle.     lactulose 20 gram/30 mL Soln  Commonly known as:  CHRONULAC  Take 30 mLs (20  "g total) by mouth 2 (two) times daily as needed.     levothyroxine 50 MCG tablet  Commonly known as:  SYNTHROID  GIVE "PENNY" 1 TABLET BY MOUTH ONCE DAILY.     omeprazole 40 MG capsule  Commonly known as:  PRILOSEC  Take 1 capsule (40 mg total) by mouth every morning. Take 30 minutes before eating on an empty stomach     senna-docusate 8.6-50 mg 8.6-50 mg per tablet  Commonly known as:  PERICOLACE  Take 1 tablet by mouth 2 (two) times daily.     traZODone 50 MG tablet  Commonly known as:  DESYREL  Take 1 tablet (50 mg total) by mouth nightly.         * This list has 2 medication(s) that are the same as other medications prescribed for you. Read the directions carefully, and ask your doctor or other care provider to review them with you.            STOP taking these medications    pneumococcal 23-raquel ps vaccine 25 mcg/0.5 mL  Commonly known as:  PNEUMOVAX 23     triamterene-hydrochlorothiazide 37.5-25 mg 37.5-25 mg per capsule  Commonly known as:  DYAZIDE          Time spent on the discharge of patient: 15 minutes    Jesús Neil MD  General Surgery  [facility]  "

## 2018-12-11 NOTE — PLAN OF CARE
12/11/18 1428   Final Note   Assessment Type Final Discharge Note   Anticipated Discharge Disposition Home-Health  ([facility])   What phone number can be called within the next 1-3 days to see how you are doing after discharge? ([phone])   Hospital Follow Up  Appt(s) scheduled? Yes   Discharge plans and expectations educations in teach back method with documentation complete? Yes   Right Care Referral Info   Post Acute Recommendation SNF / Sub-Acute Rehab

## 2018-12-11 NOTE — PROGRESS NOTES
Patient being discharged to home w/ caregiver. Patient discharge education completed and patient verbalized understanding. Printed copy of prescriptions given to patient. PIV removed with catheter tip intact. Patient AAOX4, unlabored breathing and remains free of falls with no distress noted. Patient has all belongings. Patient awaiting transport.

## 2018-12-11 NOTE — PLAN OF CARE
Problem: Patient Care Overview  Goal: Plan of Care Review  Plan of care reviewed with pt/caregivers, vss, AAOx4, patient on regula diet tolerating well with no c/o of nausea/vomiting, patient medicated for pain x3 this shift states incisional pain and back pain, call-light within reach, will continue to monitor.

## 2018-12-11 NOTE — HOSPITAL COURSE
Patient underwent incisional hernia repair with mesh.  His post op course was relatively uncomplicated.  Stayed until 12/11/2018 for pain control.  He is now stable for discharge

## 2018-12-11 NOTE — PLAN OF CARE
Problem: Occupational Therapy Goal  Goal: Occupational Therapy Goal  Goals to be met by: 12/14/18    Patient will increase functional independence with ADLs by performing:    LE Dressing with Moderate Assistance and Assistive Devices as needed.  Grooming while EOB with Set-up Assistance.  Toileting from toilet with Minimal Assistance for hygiene and clothing management.   Toilet transfer to toilet with Contact Guard Assistance.     Outcome: Outcome(s) achieved Date Met: 12/11/18  Goals not met; pt d/c from hospital

## 2018-12-12 NOTE — PHYSICIAN QUERY
PT Name: Chucho Prado  MR #: [id number]    Physician Query Form - Pathology Findings Clarification     CDS/: Brandy E Capley               Contact information:  Spectralink:  [phone]  This form is a permanent document in the medical record.     Query Date: December 12, 2018      By submitting this query, we are merely seeking further clarification of documentation.  Please utilize your independent clinical judgment when addressing the question(s) below.      The medical record contains the following:     Findings Supporting Clinical Information Location in Medical Record     SPECIMEN  1) Omentum.    FINAL PATHOLOGIC DIAGNOSIS  1. SPECIMEN FROM OMENTUM: FAT NECROSIS                   DATE OF PROCEDURE: 12/5/2018    POSTOPERATIVE DIAGNOSIS: Ventral hernia     PROCEDURES PERFORMED:  1. Ventral hernia repair with mesh    Surgical pathology 12/5              Op note 12/6     Please document the clinical significance of the Pathologists findings of __FAT NECROSIS_____.    [ x  ] I agree with the Pathology Findings   [   ] I do not agree with the Pathology Findings   [   ] Other/Clarification of Findings:   [   ] Clinically Insignificant   [  ] Clinically Undetermined       Please document in your progress notes daily for the duration of treatment until resolved and include in your discharge summary.

## 2018-12-13 ENCOUNTER — PATIENT OUTREACH (OUTPATIENT)
Dept: ADMINISTRATIVE | Facility: CLINIC | Age: 83
End: 2018-12-13

## 2018-12-13 ENCOUNTER — TELEPHONE (OUTPATIENT)
Dept: INTERNAL MEDICINE | Facility: CLINIC | Age: 83
End: 2018-12-13

## 2018-12-13 NOTE — PATIENT INSTRUCTIONS
Discharge Instructions for Open Hernia Repair  You had a procedure called open hernia repair. Also called a rupture, a hernia is a tear or weakness in the wall of the belly. This weakness may be present at birth. Or it can be caused by the wear and tear of daily living. Hernias may get worse with time or with physical stress. But surgery can help repair the weakness and eliminate symptoms.  Activity after surgery  Recommendations include the following:  · After surgery, take it easy for the rest of the day. If you had general anesthesia, dont use machinery or power tools, drink alcohol, or make any major decisions for at least the first 24 hours.  · Dont drive while you are still taking opioid pain medicine, and dont drive until you are able to step firmly on the brake pedal without hesitation.  · Ask others to help with chores and errands while you recover.  · Dont lift anything heavier than 10 pounds until your healthcare provider says its OK.  · Dont mow the lawn, use a vacuum , or do other strenuous activities until your healthcare provider says its OK.  · Walk as often as you feel able.  · Continue the coughing and deep breathing exercises that you learned in the hospital.  · Ask your healthcare provider when you can expect to return to work.  · Avoid constipation:  ¨ Eat fruits, vegetables, and whole grains.  ¨ Drink 6 to 8 glasses of water a day, unless otherwise instructed.  ¨ Use a laxative or a mild stool softener as instructed by your healthcare provider.  Bandage and incision care  Tips include the following:  · Do not get the bandage or wound wet for 48 hours.  · If strips of tape were used to close your incision, dont pull them off. Let them fall off on their own.  · Remove any gauze bandage in 48 hours.  · Wash your incision with mild soap and water. Pat it dry. Dont use oils, powders, or lotions on your incision. Do not soak your incision or take tub baths until cleared by your  healthcare provider.  Follow-up care  Keep follow-up appointments during your recovery. These allow your healthcare provider to check your progress and make sure youre healing well. You may also need to have your stitches, staples, or bandage removed. During office visits, tell your healthcare provider if you have any new symptoms. And be sure to ask any questions you have.     When to call your healthcare provider  Call your healthcare provider immediately if you have any of the following:  · A large amount of swelling or bruising (some testicular swelling and bruising is common)  · Bleeding  · Increasing pain  · Increased redness or drainage of the incision  · Fever of 100.4°F (38.0°C) or higher, or as directed by your healthcare provider  · Trouble urinating  · Nausea or vomiting   Date Last Reviewed: 7/1/2016  © 0865-5030 [email or web address]. [address] [place] [address], [place] [address]. All rights reserved. This information is not intended as a substitute for professional medical care. Always follow your healthcare professional's instructions.

## 2018-12-13 NOTE — TELEPHONE ENCOUNTER
Hi, please call patient and offer him a hospital followup appt with me within 2 weeks.  Thank you, Obed Mcguire

## 2018-12-20 ENCOUNTER — PATIENT MESSAGE (OUTPATIENT)
Dept: UROLOGY | Facility: CLINIC | Age: 83
End: 2018-12-20

## 2018-12-21 ENCOUNTER — TELEPHONE (OUTPATIENT)
Dept: ADMINISTRATIVE | Facility: OTHER | Age: 83
End: 2018-12-21

## 2018-12-21 NOTE — TELEPHONE ENCOUNTER
----- Message from Christine Contreras sent at 12/20/2018  1:17 PM CST -----    Pt states he need to reschedule his upcoming appt.      [phone]

## 2018-12-26 ENCOUNTER — PATIENT MESSAGE (OUTPATIENT)
Dept: UROLOGY | Facility: CLINIC | Age: 83
End: 2018-12-26

## 2018-12-26 NOTE — PHYSICIAN QUERY
PT Name: Chucho Prado  MR #: [id number]    Physician Query Form - Cause and Effect Relationship Clarification      CDS/: SUJATHA Prajapati, RN, CCDS               Contact information: [email or web address]    This form is a permanent document in the medical record.     Query Date: December 26, 2018    By submitting this query, we are merely seeking further clarification of documentation. Please utilize your independent clinical judgment when addressing the question(s) below.    The Medical record contains the following:  Supporting Clinical Findings   Location in record             POSTOPERATIVE DIAGNOSIS: Ventral hernia     PROCEDURES PERFORMED:  1. Ventral hernia repair with mesh    Hematoma at surgical site.                            Postop hematoma stable.                           12/5 Op Note filed 12/6 11:10AM            12/7 General Surgery Progress Note    12/8 General Surgery Progress Note                  Aspirin 81 mg daily   Enoxaparin 40 mg subq daily                                     12/6-12/11 MAR  12/4-12/10 MAR         Provider, please clarify if there is any correlation between Postop hematoma and aspirin and/or enoxaparin.           Are the conditions:      [  ] Due to or associated with each other   [  ] Unrelated to each other   [  ] Other (Please Specify): _________________________   [ x ] Clinically Undetermined

## 2018-12-27 ENCOUNTER — TELEPHONE (OUTPATIENT)
Dept: ADMINISTRATIVE | Facility: CLINIC | Age: 83
End: 2018-12-27

## 2018-12-28 ENCOUNTER — PES CALL (OUTPATIENT)
Dept: ADMINISTRATIVE | Facility: CLINIC | Age: 83
End: 2018-12-28

## 2018-12-31 ENCOUNTER — EXTERNAL CHRONIC CARE MANAGEMENT (OUTPATIENT)
Dept: PRIMARY CARE CLINIC | Facility: CLINIC | Age: 83
End: 2018-12-31
Payer: MEDICARE

## 2018-12-31 ENCOUNTER — OFFICE VISIT (OUTPATIENT)
Dept: SURGERY | Facility: CLINIC | Age: 83
End: 2018-12-31
Payer: MEDICARE

## 2018-12-31 VITALS
WEIGHT: 171 LBS | HEIGHT: 68 IN | HEART RATE: 77 BPM | BODY MASS INDEX: 25.91 KG/M2 | DIASTOLIC BLOOD PRESSURE: 68 MMHG | TEMPERATURE: 98 F | SYSTOLIC BLOOD PRESSURE: 135 MMHG

## 2018-12-31 DIAGNOSIS — K43.6 VENTRAL HERNIA WITH OBSTRUCTION: Primary | ICD-10-CM

## 2018-12-31 PROCEDURE — 99214 OFFICE O/P EST MOD 30 MIN: CPT | Mod: PBBFAC,25 | Performed by: SURGERY

## 2018-12-31 PROCEDURE — 99490 CHRNC CARE MGMT STAFF 1ST 20: CPT | Mod: S$PBB,,, | Performed by: INTERNAL MEDICINE

## 2018-12-31 PROCEDURE — 99490 CHRNC CARE MGMT STAFF 1ST 20: CPT | Mod: PBBFAC | Performed by: INTERNAL MEDICINE

## 2018-12-31 PROCEDURE — 99490 PR CHRONIC CARE MGMT, 1ST 20 MIN: ICD-10-PCS | Mod: S$PBB,,, | Performed by: INTERNAL MEDICINE

## 2018-12-31 PROCEDURE — 99024 POSTOP FOLLOW-UP VISIT: CPT | Mod: POP,,, | Performed by: SURGERY

## 2018-12-31 PROCEDURE — 99999 PR PBB SHADOW E&M-EST. PATIENT-LVL IV: CPT | Mod: PBBFAC,,, | Performed by: SURGERY

## 2018-12-31 RX ORDER — OXYCODONE AND ACETAMINOPHEN 5; 325 MG/1; MG/1
1 TABLET ORAL EVERY 4 HOURS PRN
Qty: 21 TABLET | Refills: 0 | Status: SHIPPED | OUTPATIENT
Start: 2018-12-31 | End: 2019-02-27

## 2018-12-31 NOTE — PROGRESS NOTES
SUBJECTIVE:  The patient is a 85 y.o. y/o male  3 weeks s/p incarcerated ventral hernia repair. He denies pain, fevers, chills, nausea, vomiting, diarrhea, or constipation. Eating well with normal appetite and bowel function. Denies redness around or drainage from incisions.    OBJECTIVE:  GEN: male in NAD  ABD: soft, non-tender, non-distended  INCISIONS: healing well without signs of infection or hernia    ASSESSMENT/PLAN:  Doing well 3 weeks s/p incarcerated ventral hernia repair. Patient is advised to avoid heavy lifting or strenuous activity for another 2 weeks. Patient may bathe and continue to take a regular diet. Will follow-up with me on an as-needed basis. All questions answered; patient is comfortable with follow-up plan.

## 2019-01-03 ENCOUNTER — TELEPHONE (OUTPATIENT)
Dept: INTERNAL MEDICINE | Facility: CLINIC | Age: 84
End: 2019-01-03

## 2019-01-03 ENCOUNTER — OFFICE VISIT (OUTPATIENT)
Dept: UROLOGY | Facility: CLINIC | Age: 84
End: 2019-01-03
Payer: MEDICARE

## 2019-01-03 ENCOUNTER — OFFICE VISIT (OUTPATIENT)
Dept: INTERNAL MEDICINE | Facility: CLINIC | Age: 84
End: 2019-01-03
Payer: MEDICARE

## 2019-01-03 VITALS
SYSTOLIC BLOOD PRESSURE: 160 MMHG | HEIGHT: 68 IN | DIASTOLIC BLOOD PRESSURE: 48 MMHG | DIASTOLIC BLOOD PRESSURE: 74 MMHG | OXYGEN SATURATION: 95 % | SYSTOLIC BLOOD PRESSURE: 122 MMHG | BODY MASS INDEX: 26 KG/M2 | HEART RATE: 92 BPM | HEART RATE: 82 BPM

## 2019-01-03 DIAGNOSIS — I10 ESSENTIAL HYPERTENSION: Primary | ICD-10-CM

## 2019-01-03 DIAGNOSIS — Z93.59 CHRONIC SUPRAPUBIC CATHETER: ICD-10-CM

## 2019-01-03 DIAGNOSIS — Z93.59 SUPRAPUBIC CATHETER: ICD-10-CM

## 2019-01-03 DIAGNOSIS — K56.609 SMALL BOWEL OBSTRUCTION: ICD-10-CM

## 2019-01-03 DIAGNOSIS — N31.9 NEUROGENIC BLADDER: Primary | ICD-10-CM

## 2019-01-03 DIAGNOSIS — Z43.5 ENCOUNTER FOR CARE OR REPLACEMENT OF SUPRAPUBIC TUBE: ICD-10-CM

## 2019-01-03 DIAGNOSIS — N18.30 CKD (CHRONIC KIDNEY DISEASE) STAGE 3, GFR 30-59 ML/MIN: ICD-10-CM

## 2019-01-03 PROCEDURE — 99214 PR OFFICE/OUTPT VISIT, EST, LEVL IV, 30-39 MIN: ICD-10-PCS | Mod: S$PBB,,, | Performed by: INTERNAL MEDICINE

## 2019-01-03 PROCEDURE — 51705 PR CHANGE OF BLADDER TUBE,SIMPLE: ICD-10-PCS | Mod: S$PBB,,, | Performed by: NURSE PRACTITIONER

## 2019-01-03 PROCEDURE — 99999 PR PBB SHADOW E&M-EST. PATIENT-LVL III: ICD-10-PCS | Mod: PBBFAC,,, | Performed by: NURSE PRACTITIONER

## 2019-01-03 PROCEDURE — 99214 OFFICE O/P EST MOD 30 MIN: CPT | Mod: S$PBB,25,, | Performed by: NURSE PRACTITIONER

## 2019-01-03 PROCEDURE — 51705 CHANGE OF BLADDER TUBE: CPT | Mod: PBBFAC | Performed by: NURSE PRACTITIONER

## 2019-01-03 PROCEDURE — 99999 PR PBB SHADOW E&M-EST. PATIENT-LVL III: CPT | Mod: PBBFAC,,, | Performed by: NURSE PRACTITIONER

## 2019-01-03 PROCEDURE — 99213 OFFICE O/P EST LOW 20 MIN: CPT | Mod: PBBFAC | Performed by: NURSE PRACTITIONER

## 2019-01-03 PROCEDURE — 99213 OFFICE O/P EST LOW 20 MIN: CPT | Mod: PBBFAC,27,25 | Performed by: INTERNAL MEDICINE

## 2019-01-03 PROCEDURE — 99999 PR PBB SHADOW E&M-EST. PATIENT-LVL III: CPT | Mod: PBBFAC,,, | Performed by: INTERNAL MEDICINE

## 2019-01-03 PROCEDURE — 99214 PR OFFICE/OUTPT VISIT, EST, LEVL IV, 30-39 MIN: ICD-10-PCS | Mod: S$PBB,25,, | Performed by: NURSE PRACTITIONER

## 2019-01-03 PROCEDURE — 99999 PR PBB SHADOW E&M-EST. PATIENT-LVL III: ICD-10-PCS | Mod: PBBFAC,,, | Performed by: INTERNAL MEDICINE

## 2019-01-03 PROCEDURE — 99214 OFFICE O/P EST MOD 30 MIN: CPT | Mod: S$PBB,,, | Performed by: INTERNAL MEDICINE

## 2019-01-03 PROCEDURE — 51705 CHANGE OF BLADDER TUBE: CPT | Mod: S$PBB,,, | Performed by: NURSE PRACTITIONER

## 2019-01-03 RX ORDER — POLYETHYLENE GLYCOL 3350 17 G/17G
17 POWDER, FOR SOLUTION ORAL 2 TIMES DAILY PRN
Refills: 0
Start: 2019-01-03 | End: 2019-01-06

## 2019-01-03 RX ORDER — LOSARTAN POTASSIUM 50 MG/1
50 TABLET ORAL DAILY
Qty: 30 TABLET | Refills: 11 | Status: SHIPPED | OUTPATIENT
Start: 2019-01-03 | End: 2019-01-04 | Stop reason: SDUPTHER

## 2019-01-03 NOTE — PATIENT INSTRUCTIONS
Stop amlodipine medicine      Exercises for Shoulder Flexibility: Internal Rotation    This stretch can help restore shoulder flexibility and relieve pain over time. When stretching, be sure to breathe deeply. And follow any special instructions from your doctor or physical therapist.  · While seated, move the arm on the side you want to stretch toward the middle of your back. The palm of your hand should face out.  · Cup your other hand under the hand thats behind your back. Gently push your cupped hand upward until you feel the stretch in the shoulder. Try to hold the stretch for 5 seconds.  · Work up to doing 3 sets of this stretch, 3 times a day. Work up to holding the stretch for 30-60 seconds.  Note: Keep your back straight. Its okay if your hand cant reach the middle of your back. Instead, start the stretch with your hand as close as you can get it to the middle of your back.     Frozen Shoulder  Frozen shoulder is another name for adhesive capsulitis, which causes restricted movement in the shoulder. If you have frozen shoulder, this stretch may cause discomfort, especially when you first get started. A few months may pass before you achieve the results you want. But once your shoulder heals, it almost never becomes frozen again. So stick to your stretching program. If you have any questions, be sure to ask your doctor.   © 5797-9302 [employer]. [address] [place] [address], [place] [address]. All rights reserved. This information is not intended as a substitute for professional medical care. Always follow your healthcare professional's instructions.        Exercises for Shoulder Flexibility: Wall Walk  Improving your flexibility can reduce pain. Stretching exercises also can help increase your range of pain-free motion. Breathe normally when you exercise. And try to use smooth, fluid movements.  Note: Follow any special instructions you are given. If you feel pain, stop the exercise. If the pain  continues after stopping, call your healthcare provider.  · Stand with your shoulder about 2 feet from the wall.  · Raise your arm to shoulder level and gently walk your fingers up the wall as high as you can.  · Hold for a few seconds. Then walk your fingers back down.  · Repeat 3 times. Move closer to the wall as you repeat.  · Build up to holding each stretch for 30 seconds.  CAUTION: Do this stretch only if your healthcare provider recommends it. Dont do it when you are first injured.          © 5117-2104 "[employer]. [address] [place] [address]. All rights reserved. This information is not intended as a substitute for professional medical care. Always follow your healthcare professional's instructions.        Exercises for Shoulder Flexibility: Pendulum Exercise   Improving your flexibility can reduce pain. Stretching exercises also can help increase your range of pain-free motion. Breathe normally when you exercise. And try to use smooth, fluid movements.  Follow any special instructions you are given. If you feel pain, stop the exercise. If the pain continues after stopping, call your health care provider.  · Lean over with your good arm supported on a table or chair.  · Relax the arm on the painful side, letting it hang straight down.  · Slowly begin to swing the relaxed arm. Move it in a small [place], gradually making it bigger if you can. Then reverse the direction. Next, move it backward and forward. Finally, move it side to side.     Note: Spend about 5 minutes doing the exercise, 3 times a day. Change direction after 1 minute of motion.   © 1854-1821 "[employer]. [address] [place] [address]. All rights reserved. This information is not intended as a substitute for professional medical care. Always follow your healthcare professional's instructions.        Exercises for Shoulder Flexibility: Broom Stretch    Improving your flexibility can reduce  pain. Stretching exercises also can help increase your range of pain-free motion. Breathe normally when you exercise. Try to use smooth, fluid movements. Never force a stretch.  · Stand up or lie on the floor. Place the palm of your hand over the end of a broomstick or cane. Grasp the stick farther down with the other hand, palm facing down.  · Push the end of the stick up on the side of your injured shoulder as high as is comfortable.  · Hold for a few seconds. Return to the starting position.  · Repeat 3-5 times. Build up to holding each stretch for 10-30  seconds.  Note: Follow any special instructions you are given. If you feel pain, stop the exercise. If the pain continues after stopping, call your healthcare provider.   © 1894-4172 [employer]. [address] [place] [address], [place] [address]. All rights reserved. This information is not intended as a substitute for professional medical care. Always follow your healthcare professional's instructions.

## 2019-01-03 NOTE — PROGRESS NOTES
Subjective:       Patient ID: Chucho Prado is a 85 y.o. male.    Chief Complaint: Neurogenic Bladder (chronic SPT)    Chucho Prado is a 85 y.o. male with neurogenic bladder.  He had 18fr SPT placed by Dr. Taylor on 06/23/2017 to manage his Neurogenic Bladder.    He is here today for exchange.  His last exchange 11/13/2018.     He reports good urine flow.  Occasional bladder spasms;  SPT draining well;     12/05/2018 s/p Ventral hernia repair with mesh;this was done due to obstruction.      10/25/2017 (R) TKR with Dr. [facility].    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  He goes to [place] for b'day dinner; had great time          Past Medical History:  No date: Arthritis  No date: Blood transfusion  No date: CKD (chronic kidney disease) stage 3, GFR 30-5*  No date: Compression fracture of lumbar vertebra  No date: Depression  No date: Dyslipidemia  No date: GERD (gastroesophageal reflux disease)  No date: Hypertension  No date: Thyroid disease  No date: UTI (urinary tract infection)    Past Surgical History:  No date: CHOLECYSTECTOMY  No date: HERNIA REPAIR  No date: JOINT REPLACEMENT  12/27/2016: LAMINECTOMY      Comment: L2-L4  12/2016: LUMBAR LAMINECTOMY  No date: PARATHYROIDECTOMY  No date: TOTAL KNEE ARTHROPLASTY      Comment: Right    Review of patient's family history indicates:    Hypertension                   Mother                    Diabetes                       Father                    Esophageal cancer              Father                      Social History    Marital status: Single              Spouse name:                       Years of education:                 Number of children:               Occupational History    None on file    Social History Main Topics    Smoking status: Former Smoker                                                                Packs/day: 0.00      Years: 20.00       Smokeless tobacco: Never Used                         Comment: quit 1999    Alcohol use: Yes                Comment: rarely/6 months    Drug use: No              Sexual activity: No                   Other Topics            Concern    None on file    Social History Narrative    Lives alone, owns house and rents part. Delaney helps. 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         Allergies:  Review of patient's allergies indicates no known allergies.    Medications:  Current Outpatient Prescriptions:   acetaminophen (TYLENOL) 500 MG tablet, Take 2 tablets (1,000 mg total) by mouth 3 (three) times daily as needed for Pain., Disp: 30 tablet, Rfl: 0  aspirin 81 MG Chew, Take 1 tablet (81 mg total) by mouth once daily. HOLD UNTIL TOLD TO RESUME BY PHYSICIAN, Disp: 30 tablet, Rfl: 0  calcitonin, salmon, (FORTICAL) 200 unit/actuation nasal spray, 1 spray by Nasal route once daily., Disp: 1 Bottle, Rfl: 0  diclofenac sodium (VOLTAREN) 1 % Gel, Apply 2 g topically 3 (three) times daily. To left knee, Disp: 100 g, Rfl: 3  gabapentin (NEURONTIN) 400 MG capsule, Take 2 capsules (800 mg total) by mouth 3 (three) times daily., Disp: 180 capsule, Rfl: 11  hyoscyamine (LEVSIN/SL) 0.125 mg Subl, Place 1 tablet (0.125 mg total) under the tongue every 4 (four) hours as needed (bladder spasms)., Disp: 30 tablet, Rfl: 1  levothyroxine (SYNTHROID) 50 MCG tablet, Take 1 tablet (50 mcg total) by mouth once daily., Disp: 100 tablet, Rfl: 11  mirtazapine (REMERON) 30 MG tablet, Take 1 tablet (30 mg total) by mouth every evening., Disp: 30 tablet, Rfl: 6  polyethylene glycol (GLYCOLAX) 17 gram PwPk, Take 17 g by mouth once daily., Disp: 30 packet, Rfl: 0  simethicone (MYLICON) 80 MG chewable tablet, Take 1 tablet (80 mg total) by mouth 3 (three) times daily after meals., Disp: , Rfl: 0  simvastatin (ZOCOR) 40 MG tablet, Take 1 tablet (40 mg total) by mouth every evening., Disp: 90 tablet, Rfl: 11  tamsulosin (FLOMAX) 0.4 mg Cp24, Take 1 capsule (0.4 mg total) by mouth once daily.,  Disp: 30 capsule, Rfl: 3  triamterene-hydrochlorothiazide 37.5-25 mg (DYAZIDE) 37.5-25 mg per capsule, Take 1 capsule by mouth every morning. HOLD UNTIL TOLD TO RESUME BY PHYSICIAN, Disp: 90 capsule, Rfl: 3                  Review of Systems   Constitutional: Negative for activity change, appetite change, chills and fever.   HENT: Negative for facial swelling and trouble swallowing.    Eyes: Negative for visual disturbance.   Respiratory: Negative for chest tightness and shortness of breath.    Cardiovascular: Negative for chest pain and palpitations.   Gastrointestinal: Negative.  Negative for abdominal pain, constipation, diarrhea, nausea and vomiting.   Genitourinary: Positive for difficulty urinating. Negative for dysuria, flank pain, hematuria, penile pain, penile swelling, scrotal swelling and testicular pain.        SPT draining well     Musculoskeletal: Positive for arthralgias and gait problem. Negative for back pain, myalgias and neck stiffness.   Skin: Negative for rash.   Neurological: Negative for dizziness and speech difficulty.   Hematological: Does not bruise/bleed easily.   Psychiatric/Behavioral: Negative for behavioral problems.       Objective:      Physical Exam   Nursing note and vitals reviewed.  Constitutional: He is oriented to person, place, and time. He appears well-developed and well-nourished.  Non-toxic appearance. He does not have a sickly appearance.   HENT:   Head: Normocephalic and atraumatic.   Right Ear: External ear normal.   Left Ear: External ear normal.   Nose: Nose normal.   Mouth/Throat: Mucous membranes are normal.   Eyes: Conjunctivae and lids are normal. No scleral icterus.   Neck: Trachea normal, normal range of motion and full passive range of motion without pain. Neck supple. No JVD present. No tracheal deviation present.   Cardiovascular: Normal rate, S1 normal and S2 normal.    Pulmonary/Chest: Effort normal. No respiratory distress. He exhibits no tenderness.    Abdominal: Soft. Normal appearance and bowel sounds are normal. There is no hepatosplenomegaly. There is no tenderness. There is no CVA tenderness.       Musculoskeletal: Normal range of motion.   Neurological: He is alert and oriented to person, place, and time. He has normal strength.   Skin: Skin is warm, dry and intact.     Psychiatric: He has a normal mood and affect. His behavior is normal. Judgment and thought content normal.       Assessment:       1. Neurogenic bladder    2. Chronic suprapubic catheter    3. Encounter for care or replacement of suprapubic tube        Plan:         I spent 25 minutes with the patient of which more than half was spent in direct consultation with the patient in regards to our treatment and plan.    Education and recommendations of today's plan of care including home remedies.  I removed his old SPT.  I then placed a new 18fr SPT without difficulty using sterile procedure.  Irrigated to verify the position.  Balloon inflated 9cc sterile water/still irrigated well.  Dressing applied.  RTC one month

## 2019-01-03 NOTE — TELEPHONE ENCOUNTER
Spoke with pharmacist at [facility] stated they didn't have a prescription for amlodipine.

## 2019-01-03 NOTE — PROGRESS NOTES
Subjective:       Patient ID: Chucho Prado is a 85 y.o. male.    Chief Complaint: Follow-up (ED)    Here for hosp f/u.    Admitted for worsening abd pains/n/v, found to have SBO. Has another umb hernia surgery. Postop c/b pain issues and mild delirium. Since hosp stay has had some continued lower leg swelling, eases when he raises legs in bed at noc but then returns during the day even despite comp stockings. Has not prev had this issue.    Has L shoulder pains and weakness as well, no known inj.    Appetite is OK, bowels normal and using mirilax daily. Good catheter UOP.      Review of Systems   Constitutional: Negative for activity change, appetite change, fatigue, fever and unexpected weight change.   Respiratory: Negative for cough, chest tightness, shortness of breath and wheezing.    Cardiovascular: Positive for leg swelling. Negative for chest pain and palpitations.   Gastrointestinal: Negative for abdominal distention, abdominal pain, nausea and vomiting.   Genitourinary: Positive for difficulty urinating. Negative for decreased urine volume, dysuria and hematuria.        Chronic SPT use   Musculoskeletal: Positive for arthralgias, back pain (only when he is helped to lay down) and neck stiffness.   Skin: Negative for rash and wound.   Neurological: Negative for tremors, syncope and weakness.   Psychiatric/Behavioral: Negative for confusion.       Objective:      Physical Exam   Constitutional: He is oriented to person, place, and time. He appears well-developed and well-nourished. No distress.   Wheelchair bound, unable to rise to exam table   HENT:   Head: Normocephalic and atraumatic.   Mouth/Throat: No oropharyngeal exudate.   Eyes: EOM are normal. Pupils are equal, round, and reactive to light. No scleral icterus.   Neck: Normal range of motion. Neck supple. No thyromegaly present.   Cardiovascular: Normal rate and regular rhythm. Exam reveals no gallop and no friction rub.   Murmur  heard.  Pulmonary/Chest: Effort normal and breath sounds normal. No respiratory distress. He has no wheezes. He has no rales. He exhibits no tenderness.   Abdominal: Soft. Bowel sounds are normal. He exhibits no distension and no mass. There is no tenderness. There is no rebound and no guarding.   No longer abd wall hernia, surg scars CDI   Genitourinary:   Genitourinary Comments: Has SPT in place   Musculoskeletal: He exhibits edema.   R knee TKA is not warm or swollen.    L knee decreased rom and significant crepitus    Mild edema bilat lower legs, not upper legs or arms. No calf tenderness to deep palpation    L shoulder painful AROM, not very painful PROM. Some weakness on open can test, some subacromial tenderness to deep palpation     Lymphadenopathy:     He has no cervical adenopathy.   Neurological: He is alert and oriented to person, place, and time.   Skin: No rash noted. He is not diaphoretic.   Psychiatric: He has a normal mood and affect. His behavior is normal.       Assessment:       1. Essential hypertension        Plan:       Chucho was seen today for follow-up.    Diagnoses and all orders for this visit:    Essential hypertension  -     losartan (COZAAR) 50 MG tablet; Take 1 tablet (50 mg total) by mouth once daily.  D/c amlodipine since likely causing edema (had edema 9/2018 as well).  Next time recheck CMP (K and alb) and cbc (had been anemic)    L shoulder pains -- ? RC mild tear, has home PT, Pt given handouts with HEP    Other orders  -     polyethylene glycol (MIRALAX) 17 gram PwPk; Take 17 g by mouth 2 (two) times daily as needed (Constipation).  Will remain on with hx recurrent SBO      Health Maintenance       Date Due Completion Date    TETANUS VACCINE 09/18/1951 ---    Zoster Vaccine 09/18/1993 ---    Lipid Panel 10/09/2019 10/9/2018          Follow-up in about 3 months (around 4/3/2019).    Future Appointments   Date Time Provider Department Center   2/5/2019  1:40 PM Neelam Hooper,  NP [facility]   4/4/2019 10:00 AM Obed Mcguire MD [facility]

## 2019-01-04 DIAGNOSIS — I10 ESSENTIAL HYPERTENSION: ICD-10-CM

## 2019-01-07 RX ORDER — LOSARTAN POTASSIUM 50 MG/1
50 TABLET ORAL DAILY
Qty: 90 TABLET | Refills: 3 | Status: SHIPPED | OUTPATIENT
Start: 2019-01-07 | End: 2019-04-04

## 2019-01-09 ENCOUNTER — PATIENT MESSAGE (OUTPATIENT)
Dept: INTERNAL MEDICINE | Facility: CLINIC | Age: 84
End: 2019-01-09

## 2019-01-09 DIAGNOSIS — H91.90 HEARING LOSS, UNSPECIFIED HEARING LOSS TYPE, UNSPECIFIED LATERALITY: Primary | ICD-10-CM

## 2019-01-10 NOTE — TELEPHONE ENCOUNTER
Hi, please contact the patient to assist in scheduling    Orders Placed This Encounter    Ambulatory Referral to ENT       Thank you, Obed Mcguire

## 2019-01-21 ENCOUNTER — PATIENT MESSAGE (OUTPATIENT)
Dept: RESEARCH | Facility: HOSPITAL | Age: 84
End: 2019-01-21

## 2019-01-24 NOTE — TELEPHONE ENCOUNTER
----- Message from Coleen Gonzalez sent at 1/24/2019 12:53 PM CST -----  Contact: [phone]  Type: Rx    Name of medication(s): oxyCODONE-acetaminophen (PERCOCET) 5-325 mg per tablet    Is this a refill? New rx? refill    Who prescribed medication? Dr. Jovanna Killian    Pharmacy Name, Phone, & Location: [facility] [address] [place] [address] [address]      Comments:  Please advise, thank you

## 2019-01-24 NOTE — TELEPHONE ENCOUNTER
Spoke with pt, pt states that he has very little pain from the hernia, however he's still having back pain and bilateral knee pain.

## 2019-01-24 NOTE — TELEPHONE ENCOUNTER
Hi, please call him back -- I do not recommend that he stay on oxycodone pain medicine that he was prescribed for him abdominal hernia surgery. Please let him know and ask how his pain is doing. I received a request to refill this postop pain medicine.  Thank you, Obed Mcguire

## 2019-01-27 RX ORDER — OXYCODONE AND ACETAMINOPHEN 5; 325 MG/1; MG/1
1 TABLET ORAL EVERY 4 HOURS PRN
Qty: 21 TABLET | Refills: 0 | OUTPATIENT
Start: 2019-01-27

## 2019-01-27 RX ORDER — TRAMADOL HYDROCHLORIDE 50 MG/1
50 TABLET ORAL EVERY 12 HOURS PRN
Qty: 30 TABLET | Refills: 1 | Status: SHIPPED | OUTPATIENT
Start: 2019-01-27 | End: 2019-04-04

## 2019-01-28 NOTE — TELEPHONE ENCOUNTER
Hi, I recommend a pain medicine that is not as strong and with less side effects called tramadol --  Orders Placed This Encounter    traMADol (ULTRAM) 50 mg tablet     Sent in, I hope that helps.    Let me know if patient has any questions.  Thank you, Obed Mcguire

## 2019-02-04 ENCOUNTER — PATIENT MESSAGE (OUTPATIENT)
Dept: UROLOGY | Facility: CLINIC | Age: 84
End: 2019-02-04

## 2019-02-10 DIAGNOSIS — K21.9 GASTROESOPHAGEAL REFLUX DISEASE, ESOPHAGITIS PRESENCE NOT SPECIFIED: ICD-10-CM

## 2019-02-10 RX ORDER — OMEPRAZOLE 40 MG/1
CAPSULE, DELAYED RELEASE ORAL
Qty: 30 CAPSULE | Refills: 11 | Status: SHIPPED | OUTPATIENT
Start: 2019-02-10 | End: 2019-04-04

## 2019-02-27 ENCOUNTER — OFFICE VISIT (OUTPATIENT)
Dept: UROLOGY | Facility: CLINIC | Age: 84
End: 2019-02-27
Payer: MEDICARE

## 2019-02-27 ENCOUNTER — TELEPHONE (OUTPATIENT)
Dept: ADMINISTRATIVE | Facility: CLINIC | Age: 84
End: 2019-02-27

## 2019-02-27 VITALS
HEIGHT: 68 IN | BODY MASS INDEX: 25.91 KG/M2 | SYSTOLIC BLOOD PRESSURE: 152 MMHG | DIASTOLIC BLOOD PRESSURE: 91 MMHG | RESPIRATION RATE: 15 BRPM | WEIGHT: 171 LBS | HEART RATE: 82 BPM

## 2019-02-27 DIAGNOSIS — N31.9 NEUROGENIC BLADDER: Primary | ICD-10-CM

## 2019-02-27 DIAGNOSIS — Z43.5 ENCOUNTER FOR CARE OR REPLACEMENT OF SUPRAPUBIC TUBE: ICD-10-CM

## 2019-02-27 DIAGNOSIS — Z93.59 CHRONIC SUPRAPUBIC CATHETER: ICD-10-CM

## 2019-02-27 PROCEDURE — 51705 PR CHANGE OF BLADDER TUBE,SIMPLE: ICD-10-PCS | Mod: S$PBB,,, | Performed by: NURSE PRACTITIONER

## 2019-02-27 PROCEDURE — 51705 CHANGE OF BLADDER TUBE: CPT | Mod: PBBFAC | Performed by: NURSE PRACTITIONER

## 2019-02-27 PROCEDURE — 99999 PR PBB SHADOW E&M-EST. PATIENT-LVL IV: ICD-10-PCS | Mod: PBBFAC,,, | Performed by: NURSE PRACTITIONER

## 2019-02-27 PROCEDURE — 99214 PR OFFICE/OUTPT VISIT, EST, LEVL IV, 30-39 MIN: ICD-10-PCS | Mod: S$PBB,25,, | Performed by: NURSE PRACTITIONER

## 2019-02-27 PROCEDURE — 51705 CHANGE OF BLADDER TUBE: CPT | Mod: S$PBB,,, | Performed by: NURSE PRACTITIONER

## 2019-02-27 PROCEDURE — 99999 PR PBB SHADOW E&M-EST. PATIENT-LVL IV: CPT | Mod: PBBFAC,,, | Performed by: NURSE PRACTITIONER

## 2019-02-27 PROCEDURE — 99214 OFFICE O/P EST MOD 30 MIN: CPT | Mod: S$PBB,25,, | Performed by: NURSE PRACTITIONER

## 2019-02-27 PROCEDURE — 99214 OFFICE O/P EST MOD 30 MIN: CPT | Mod: PBBFAC,25 | Performed by: NURSE PRACTITIONER

## 2019-02-27 NOTE — PROGRESS NOTES
Subjective:       Patient ID: Chucho Prado is a 85 y.o. male.    Chief Complaint: Neurogenic Bladder (chronic spt)    Chucho Prado is a 85 y.o. male with neurogenic bladder.  He had 18fr SPT placed by Dr. Taylor on 06/23/2017 to manage his Neurogenic Bladder.    He is here today for exchange.  His last exchange 01/03/2019.     He reports good urine flow.  Occasional bladder spasms;  SPT draining well;     12/05/2018 s/p Ventral hernia repair with mesh;this was done due to obstruction.      10/25/2017 (R) TKR with Dr. [facility].    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  He is getting ready for [employer] walking tour end of March; he given pre-tour talks at the [facility].  He goes to [place] for b'day dinner; had great time            Past Medical History:  No date: Arthritis  No date: Blood transfusion  No date: CKD (chronic kidney disease) stage 3, GFR 30-5*  No date: Compression fracture of lumbar vertebra  No date: Depression  No date: Dyslipidemia  No date: GERD (gastroesophageal reflux disease)  No date: Hypertension  No date: Thyroid disease  No date: UTI (urinary tract infection)    Past Surgical History:  No date: CHOLECYSTECTOMY  No date: HERNIA REPAIR  No date: JOINT REPLACEMENT  12/27/2016: LAMINECTOMY      Comment: L2-L4  12/2016: LUMBAR LAMINECTOMY  No date: PARATHYROIDECTOMY  No date: TOTAL KNEE ARTHROPLASTY      Comment: Right    Review of patient's family history indicates:    Hypertension                   Mother                    Diabetes                       Father                    Esophageal cancer              Father                      Social History    Marital status: Single              Spouse name:                       Years of education:                 Number of children:               Occupational History    None on file    Social History Main Topics    Smoking status: Former Smoker                                                                 Packs/day: 0.00      Years: 20.00       Smokeless tobacco: Never Used                        Comment: quit 1999    Alcohol use: Yes                Comment: rarely/6 months    Drug use: No              Sexual activity: No                   Other Topics            Concern    None on file    Social History Narrative    Lives alone, owns house and rents part. Delaney helps. 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         Allergies:  Review of patient's allergies indicates no known allergies.    Medications:  Current Outpatient Prescriptions:   acetaminophen (TYLENOL) 500 MG tablet, Take 2 tablets (1,000 mg total) by mouth 3 (three) times daily as needed for Pain., Disp: 30 tablet, Rfl: 0  aspirin 81 MG Chew, Take 1 tablet (81 mg total) by mouth once daily. HOLD UNTIL TOLD TO RESUME BY PHYSICIAN, Disp: 30 tablet, Rfl: 0  calcitonin, salmon, (FORTICAL) 200 unit/actuation nasal spray, 1 spray by Nasal route once daily., Disp: 1 Bottle, Rfl: 0  diclofenac sodium (VOLTAREN) 1 % Gel, Apply 2 g topically 3 (three) times daily. To left knee, Disp: 100 g, Rfl: 3  gabapentin (NEURONTIN) 400 MG capsule, Take 2 capsules (800 mg total) by mouth 3 (three) times daily., Disp: 180 capsule, Rfl: 11  hyoscyamine (LEVSIN/SL) 0.125 mg Subl, Place 1 tablet (0.125 mg total) under the tongue every 4 (four) hours as needed (bladder spasms)., Disp: 30 tablet, Rfl: 1  levothyroxine (SYNTHROID) 50 MCG tablet, Take 1 tablet (50 mcg total) by mouth once daily., Disp: 100 tablet, Rfl: 11  mirtazapine (REMERON) 30 MG tablet, Take 1 tablet (30 mg total) by mouth every evening., Disp: 30 tablet, Rfl: 6  polyethylene glycol (GLYCOLAX) 17 gram PwPk, Take 17 g by mouth once daily., Disp: 30 packet, Rfl: 0  simethicone (MYLICON) 80 MG chewable tablet, Take 1 tablet (80 mg total) by mouth 3 (three) times daily after meals., Disp: , Rfl: 0  simvastatin (ZOCOR) 40 MG tablet, Take 1 tablet (40 mg total) by mouth every evening., Disp:  90 tablet, Rfl: 11  tamsulosin (FLOMAX) 0.4 mg Cp24, Take 1 capsule (0.4 mg total) by mouth once daily., Disp: 30 capsule, Rfl: 3  triamterene-hydrochlorothiazide 37.5-25 mg (DYAZIDE) 37.5-25 mg per capsule, Take 1 capsule by mouth every morning. HOLD UNTIL TOLD TO RESUME BY PHYSICIAN, Disp: 90 capsule, Rfl: 3                  Review of Systems   Constitutional: Negative for activity change, appetite change, chills and fever.   HENT: Negative for facial swelling and trouble swallowing.    Eyes: Negative for visual disturbance.   Respiratory: Negative for chest tightness and shortness of breath.    Cardiovascular: Negative for chest pain and palpitations.   Gastrointestinal: Negative.  Negative for abdominal pain, constipation, diarrhea, nausea and vomiting.   Genitourinary: Positive for difficulty urinating. Negative for dysuria, flank pain, hematuria, penile pain, penile swelling, scrotal swelling and testicular pain.   Musculoskeletal: Positive for arthralgias and gait problem. Negative for back pain, myalgias and neck stiffness.   Skin: Negative for rash.   Neurological: Positive for weakness. Negative for dizziness and speech difficulty.   Hematological: Does not bruise/bleed easily.   Psychiatric/Behavioral: Negative for behavioral problems.       Objective:      Physical Exam   Nursing note and vitals reviewed.  Constitutional: He is oriented to person, place, and time. Vital signs are normal. He appears well-developed and well-nourished. He is active and cooperative.  Non-toxic appearance. He does not have a sickly appearance.   HENT:   Head: Normocephalic and atraumatic.   Right Ear: External ear normal.   Left Ear: External ear normal.   Nose: Nose normal.   Mouth/Throat: Mucous membranes are normal.   Eyes: Conjunctivae and lids are normal. No scleral icterus.   Neck: Trachea normal, normal range of motion and full passive range of motion without pain. Neck supple. No JVD present. No tracheal deviation  present.   Cardiovascular: Normal rate, S1 normal and S2 normal.    Pulmonary/Chest: Effort normal. No respiratory distress. He exhibits no tenderness.   Abdominal: Soft. Normal appearance and bowel sounds are normal. There is no hepatosplenomegaly. There is no tenderness. There is no CVA tenderness.       Musculoskeletal: Normal range of motion.   Neurological: He is alert and oriented to person, place, and time. He has normal strength.   Skin: Skin is warm, dry and intact.     Psychiatric: He has a normal mood and affect. His behavior is normal. Judgment and thought content normal.       Assessment:       1. Neurogenic bladder    2. Chronic suprapubic catheter    3. Encounter for care or replacement of suprapubic tube        Plan:         I spent 25 minutes with the patient of which more than half was spent in direct consultation with the patient in regards to our treatment and plan.    Education and recommendations of today's plan of care including home remedies.  We discussed his skin care and prevention of skin break down.  Under my direct supervision, his 16fr SPT easily exchanged by Yoav Menezes DNP using sterile technique.  Irrigated well; balloon inflated and still irrigated easily .  Spt draining well  Tolerated well.  Assisted back to his chair.  RTC one month

## 2019-02-27 NOTE — TELEPHONE ENCOUNTER
Home Health SOC 02/13/2019 - 04/13/2019 with [facility] ([place]) - Dr. Obed Mcguire.  services.

## 2019-02-28 ENCOUNTER — TELEPHONE (OUTPATIENT)
Dept: INTERNAL MEDICINE | Facility: CLINIC | Age: 84
End: 2019-02-28

## 2019-02-28 ENCOUNTER — EXTERNAL CHRONIC CARE MANAGEMENT (OUTPATIENT)
Dept: PRIMARY CARE CLINIC | Facility: CLINIC | Age: 84
End: 2019-02-28
Payer: MEDICARE

## 2019-02-28 PROCEDURE — 99490 PR CHRONIC CARE MGMT, 1ST 20 MIN: ICD-10-PCS | Mod: S$PBB,,, | Performed by: INTERNAL MEDICINE

## 2019-02-28 PROCEDURE — 99490 CHRNC CARE MGMT STAFF 1ST 20: CPT | Mod: PBBFAC | Performed by: INTERNAL MEDICINE

## 2019-02-28 PROCEDURE — 99490 CHRNC CARE MGMT STAFF 1ST 20: CPT | Mod: S$PBB,,, | Performed by: INTERNAL MEDICINE

## 2019-02-28 RX ORDER — BENZONATATE 200 MG/1
200 CAPSULE ORAL 3 TIMES DAILY PRN
Qty: 20 CAPSULE | Refills: 0 | Status: SHIPPED | OUTPATIENT
Start: 2019-02-28 | End: 2019-03-10

## 2019-02-28 NOTE — TELEPHONE ENCOUNTER
----- Message from Pj Baldwin sent at 2/28/2019  1:19 PM CST -----  Contact: H/H Nurse [facility] [phone]  H/H Nurse calling stating patient is having sinus infection and is trying to cough something up, but wont come up, wants to know what Dr can suggest for the patient? all vital signs are clear,  Is taking zyrtec but is still having same issue, nothing is helping with the cough.    Please call an advise  Thank you

## 2019-02-28 NOTE — TELEPHONE ENCOUNTER
Hi, I have sent in  Orders Placed This Encounter    benzonatate (TESSALON) 200 MG capsule     Cough suppressant capsules, I hope that helps.    Let me know if patient or nurse has any questions.  Thank you, Obed Mcguire

## 2019-03-14 ENCOUNTER — TELEPHONE (OUTPATIENT)
Dept: INTERNAL MEDICINE | Facility: CLINIC | Age: 84
End: 2019-03-14

## 2019-03-14 NOTE — TELEPHONE ENCOUNTER
----- Message from Nereida Lino MA sent at 3/13/2019  4:34 PM CDT -----  Contact: [facility] [phone]      ----- Message -----  From: Sarita Mera  Sent: 3/13/2019   4:22 PM  To: May[facility] Staff    [facility] nurse stated that patient had a medication change his blood pressure has been up and down -  107/56 and 164/84 . This was from 3/12 and today 3/13.    Please advise, thanks

## 2019-03-14 NOTE — TELEPHONE ENCOUNTER
Hi, he has an appt with me on 4/4 and we can further discuss BP then.  Please ask the nurse to fax us a list of his home BPs as well.  For now I do not recommend any medication changes.  Let me know if nurse has any questions.  Thank you, Obed Mcguire

## 2019-03-20 ENCOUNTER — PATIENT MESSAGE (OUTPATIENT)
Dept: INTERNAL MEDICINE | Facility: CLINIC | Age: 84
End: 2019-03-20

## 2019-03-21 NOTE — TELEPHONE ENCOUNTER
Checked patient's medication list, do not see Gabapentin on there. Not sure if it was discontinued.

## 2019-03-31 ENCOUNTER — EXTERNAL CHRONIC CARE MANAGEMENT (OUTPATIENT)
Dept: PRIMARY CARE CLINIC | Facility: CLINIC | Age: 84
End: 2019-03-31
Payer: MEDICARE

## 2019-03-31 PROCEDURE — 99490 PR CHRONIC CARE MGMT, 1ST 20 MIN: ICD-10-PCS | Mod: S$PBB,,, | Performed by: INTERNAL MEDICINE

## 2019-03-31 PROCEDURE — 99490 CHRNC CARE MGMT STAFF 1ST 20: CPT | Mod: PBBFAC | Performed by: INTERNAL MEDICINE

## 2019-03-31 PROCEDURE — 99490 CHRNC CARE MGMT STAFF 1ST 20: CPT | Mod: S$PBB,,, | Performed by: INTERNAL MEDICINE

## 2019-04-04 ENCOUNTER — TELEPHONE (OUTPATIENT)
Dept: INTERNAL MEDICINE | Facility: CLINIC | Age: 84
End: 2019-04-04

## 2019-04-04 ENCOUNTER — OFFICE VISIT (OUTPATIENT)
Dept: INTERNAL MEDICINE | Facility: CLINIC | Age: 84
End: 2019-04-04
Payer: MEDICARE

## 2019-04-04 ENCOUNTER — OFFICE VISIT (OUTPATIENT)
Dept: UROLOGY | Facility: CLINIC | Age: 84
End: 2019-04-04
Payer: MEDICARE

## 2019-04-04 VITALS
SYSTOLIC BLOOD PRESSURE: 136 MMHG | BODY MASS INDEX: 30.07 KG/M2 | OXYGEN SATURATION: 98 % | WEIGHT: 198.44 LBS | HEART RATE: 72 BPM | DIASTOLIC BLOOD PRESSURE: 68 MMHG | HEIGHT: 68 IN

## 2019-04-04 VITALS
SYSTOLIC BLOOD PRESSURE: 137 MMHG | WEIGHT: 198 LBS | HEIGHT: 68 IN | DIASTOLIC BLOOD PRESSURE: 68 MMHG | BODY MASS INDEX: 30.01 KG/M2

## 2019-04-04 DIAGNOSIS — K21.9 GASTROESOPHAGEAL REFLUX DISEASE, ESOPHAGITIS PRESENCE NOT SPECIFIED: ICD-10-CM

## 2019-04-04 DIAGNOSIS — Z43.5 ENCOUNTER FOR CARE OR REPLACEMENT OF SUPRAPUBIC TUBE: ICD-10-CM

## 2019-04-04 DIAGNOSIS — G47.09 OTHER INSOMNIA: ICD-10-CM

## 2019-04-04 DIAGNOSIS — R73.9 HYPERGLYCEMIA: ICD-10-CM

## 2019-04-04 DIAGNOSIS — R33.9 URINARY RETENTION: ICD-10-CM

## 2019-04-04 DIAGNOSIS — G95.9 LUMBAR MYELOPATHY: ICD-10-CM

## 2019-04-04 DIAGNOSIS — N18.30 CKD (CHRONIC KIDNEY DISEASE) STAGE 3, GFR 30-59 ML/MIN: ICD-10-CM

## 2019-04-04 DIAGNOSIS — D64.9 ANEMIA, UNSPECIFIED TYPE: ICD-10-CM

## 2019-04-04 DIAGNOSIS — K59.00 CONSTIPATION, UNSPECIFIED CONSTIPATION TYPE: ICD-10-CM

## 2019-04-04 DIAGNOSIS — Z93.59 CHRONIC SUPRAPUBIC CATHETER: ICD-10-CM

## 2019-04-04 DIAGNOSIS — E78.5 DYSLIPIDEMIA: ICD-10-CM

## 2019-04-04 DIAGNOSIS — N31.9 NEUROGENIC BLADDER: Primary | ICD-10-CM

## 2019-04-04 DIAGNOSIS — Z93.59 SUPRAPUBIC CATHETER: ICD-10-CM

## 2019-04-04 DIAGNOSIS — I10 ESSENTIAL HYPERTENSION: Primary | ICD-10-CM

## 2019-04-04 DIAGNOSIS — E03.9 HYPOTHYROIDISM, UNSPECIFIED TYPE: ICD-10-CM

## 2019-04-04 PROCEDURE — 51705 CHANGE OF BLADDER TUBE: CPT | Mod: PBBFAC | Performed by: NURSE PRACTITIONER

## 2019-04-04 PROCEDURE — 51705 CHANGE OF BLADDER TUBE: CPT | Mod: S$PBB,,, | Performed by: NURSE PRACTITIONER

## 2019-04-04 PROCEDURE — 51705 PR CHANGE OF BLADDER TUBE,SIMPLE: ICD-10-PCS | Mod: S$PBB,,, | Performed by: NURSE PRACTITIONER

## 2019-04-04 PROCEDURE — 99214 OFFICE O/P EST MOD 30 MIN: CPT | Mod: S$PBB,25,, | Performed by: NURSE PRACTITIONER

## 2019-04-04 PROCEDURE — 99214 OFFICE O/P EST MOD 30 MIN: CPT | Mod: S$PBB,,, | Performed by: INTERNAL MEDICINE

## 2019-04-04 PROCEDURE — 99213 OFFICE O/P EST LOW 20 MIN: CPT | Mod: PBBFAC,27 | Performed by: NURSE PRACTITIONER

## 2019-04-04 PROCEDURE — 99214 PR OFFICE/OUTPT VISIT, EST, LEVL IV, 30-39 MIN: ICD-10-PCS | Mod: S$PBB,,, | Performed by: INTERNAL MEDICINE

## 2019-04-04 PROCEDURE — 99999 PR PBB SHADOW E&M-EST. PATIENT-LVL III: CPT | Mod: PBBFAC,,, | Performed by: INTERNAL MEDICINE

## 2019-04-04 PROCEDURE — 99214 PR OFFICE/OUTPT VISIT, EST, LEVL IV, 30-39 MIN: ICD-10-PCS | Mod: S$PBB,25,, | Performed by: NURSE PRACTITIONER

## 2019-04-04 PROCEDURE — 99999 PR PBB SHADOW E&M-EST. PATIENT-LVL III: ICD-10-PCS | Mod: PBBFAC,,, | Performed by: INTERNAL MEDICINE

## 2019-04-04 PROCEDURE — 99213 OFFICE O/P EST LOW 20 MIN: CPT | Mod: PBBFAC,25 | Performed by: INTERNAL MEDICINE

## 2019-04-04 PROCEDURE — 99999 PR PBB SHADOW E&M-EST. PATIENT-LVL III: ICD-10-PCS | Mod: PBBFAC,,, | Performed by: NURSE PRACTITIONER

## 2019-04-04 PROCEDURE — 99999 PR PBB SHADOW E&M-EST. PATIENT-LVL III: CPT | Mod: PBBFAC,,, | Performed by: NURSE PRACTITIONER

## 2019-04-04 RX ORDER — GABAPENTIN 400 MG/1
CAPSULE ORAL
Refills: 1 | COMMUNITY
Start: 2019-01-21 | End: 2019-04-04

## 2019-04-04 RX ORDER — ATORVASTATIN CALCIUM 20 MG/1
20 TABLET, FILM COATED ORAL DAILY
Qty: 90 TABLET | Refills: 11 | Status: SHIPPED | OUTPATIENT
Start: 2019-04-04 | End: 2019-10-22

## 2019-04-04 RX ORDER — HYOSCYAMINE SULFATE 0.12 MG/1
0.12 TABLET SUBLINGUAL EVERY 6 HOURS PRN
Qty: 60 TABLET | Refills: 11 | Status: SHIPPED | OUTPATIENT
Start: 2019-04-04 | End: 2020-08-24

## 2019-04-04 RX ORDER — TRAZODONE HYDROCHLORIDE 50 MG/1
50 TABLET ORAL NIGHTLY
Qty: 90 TABLET | Refills: 11 | Status: SHIPPED | OUTPATIENT
Start: 2019-04-04 | End: 2020-04-20

## 2019-04-04 RX ORDER — LOSARTAN POTASSIUM 50 MG/1
50 TABLET ORAL DAILY
Qty: 90 TABLET | Refills: 11 | Status: SHIPPED | OUTPATIENT
Start: 2019-04-04 | End: 2019-06-10

## 2019-04-04 RX ORDER — GABAPENTIN 400 MG/1
800 CAPSULE ORAL 2 TIMES DAILY
Qty: 120 CAPSULE | Refills: 11
Start: 2019-04-04 | End: 2019-04-04

## 2019-04-04 RX ORDER — POLYETHYLENE GLYCOL 3350 17 G/17G
17 POWDER, FOR SOLUTION ORAL DAILY
Qty: 1700 G | Refills: 11 | Status: ON HOLD | OUTPATIENT
Start: 2019-04-04 | End: 2020-09-01

## 2019-04-04 RX ORDER — ASPIRIN 81 MG/1
81 TABLET ORAL DAILY
Qty: 90 TABLET | Refills: 11 | Status: SHIPPED | OUTPATIENT
Start: 2019-04-04 | End: 2020-04-20

## 2019-04-04 RX ORDER — AMLODIPINE BESYLATE 10 MG/1
10 TABLET ORAL DAILY
Qty: 30 TABLET | Refills: 11
Start: 2019-04-04 | End: 2019-04-04

## 2019-04-04 RX ORDER — OMEPRAZOLE 40 MG/1
CAPSULE, DELAYED RELEASE ORAL
Qty: 90 CAPSULE | Refills: 11 | Status: SHIPPED | OUTPATIENT
Start: 2019-04-04 | End: 2020-04-14

## 2019-04-04 RX ORDER — LEVOTHYROXINE SODIUM 50 UG/1
TABLET ORAL
Qty: 90 TABLET | Refills: 11 | Status: SHIPPED | OUTPATIENT
Start: 2019-04-04 | End: 2020-04-20

## 2019-04-04 RX ORDER — GABAPENTIN 400 MG/1
800 CAPSULE ORAL 2 TIMES DAILY
Qty: 120 CAPSULE | Refills: 11 | Status: SHIPPED | OUTPATIENT
Start: 2019-04-04 | End: 2020-03-26

## 2019-04-04 NOTE — PROGRESS NOTES
Subjective:       Patient ID: Chucho Prado is a 85 y.o. male.    Chief Complaint: Follow-up    Patient is here for followup for chronic conditions.    Gets sinus headaches and allergies. Knows he is allergic to oak pollen.    Chronic neck stiffness.    Feels leg gabapentin helps with leg spasms and leg pains.     Chronic leg swelling since off diuretic, but stockings help the swelling.    Bowels fluctuate between loose and constipation. Not taking mirilax daily.    BPs fluctuate.    Chronic low back pains when he walks and in bed. Tramadol NR. Requests pain med refill (prev percocet did help).    Review of Systems   Constitutional: Negative for activity change, appetite change, fatigue, fever and unexpected weight change.   Respiratory: Negative for cough, chest tightness, shortness of breath and wheezing.    Cardiovascular: Positive for leg swelling. Negative for chest pain and palpitations.   Gastrointestinal: Negative for abdominal distention, abdominal pain, nausea and vomiting.   Genitourinary: Positive for difficulty urinating. Negative for decreased urine volume, dysuria and hematuria.        Chronic SPT use   Musculoskeletal: Positive for arthralgias, back pain (only when he is helped to lay down) and neck stiffness.   Skin: Negative for rash and wound.   Neurological: Negative for tremors, syncope and weakness.        Legs jump and restless   Psychiatric/Behavioral: Negative for confusion.       Objective:      Physical Exam   Constitutional: He is oriented to person, place, and time. He appears well-developed and well-nourished. No distress.   Wheelchair bound, unable to rise to exam table   HENT:   Head: Normocephalic and atraumatic.   Mouth/Throat: No oropharyngeal exudate.   Eyes: Pupils are equal, round, and reactive to light. EOM are normal. No scleral icterus.   Neck: Normal range of motion. Neck supple. No thyromegaly present.   Cardiovascular: Normal rate and regular rhythm. Exam reveals no  "gallop and no friction rub.   Murmur heard.  Pulmonary/Chest: Effort normal and breath sounds normal. No respiratory distress. He has no wheezes. He has no rales. He exhibits no tenderness.   Abdominal: Soft. Bowel sounds are normal. He exhibits no distension and no mass. There is no tenderness. There is no rebound and no guarding.   No longer abd wall hernia, surg scars CDI   Genitourinary:   Genitourinary Comments: Has SPT in place   Musculoskeletal: He exhibits no edema.   R knee TKA is not warm or swollen.    L knee decreased rom and significant crepitus    No leg edema that I can tell today   Lymphadenopathy:     He has no cervical adenopathy.   Neurological: He is alert and oriented to person, place, and time.   Skin: No rash noted. He is not diaphoretic.   Psychiatric: He has a normal mood and affect. His behavior is normal.       Assessment:       1. Essential hypertension    2. CKD (chronic kidney disease) stage 3, GFR 30-59 ml/min    3. Urinary retention    4. Dyslipidemia    5. Hypothyroidism, unspecified type    6. Anemia, unspecified type    7. Lumbar myelopathy    8. Hyperglycemia    9. Suprapubic catheter    10. Gastroesophageal reflux disease, esophagitis presence not specified    11. Other insomnia        Plan:       Penny was seen today for follow-up.    Diagnoses and all orders for this visit:    Essential hypertension  -     losartan (COZAAR) 50 MG tablet; Take 1 tablet (50 mg total) by mouth once daily.  Controlled today    CKD (chronic kidney disease) stage 3, GFR 30-59 ml/min  Recheck    Urinary retention  -     SUBSEQUENT HOME HEALTH ORDERS    Dyslipidemia  -     Lipid panel; Future  -     atorvastatin (LIPITOR) 20 MG tablet; Take 1 tablet (20 mg total) by mouth once daily.  -     aspirin (ECOTRIN) 81 MG EC tablet; Take 1 tablet (81 mg total) by mouth once daily.    Hypothyroidism, unspecified type  -     TSH; Future  -     levothyroxine (SYNTHROID) 50 MCG tablet; GIVE "PENNY" 1 TABLET BY " MOUTH ONCE DAILY.    Anemia, unspecified type  -     CBC auto differential; Future  -     Comprehensive metabolic panel; Future    Lumbar myelopathy  -     gabapentin (NEURONTIN) 400 MG capsule; Take 2 capsules (800 mg total) by mouth 2 (two) times daily.    Hyperglycemia  -     Hemoglobin A1c; Future    Suprapubic catheter  -     SUBSEQUENT HOME HEALTH ORDERS    Gastroesophageal reflux disease, esophagitis presence not specified  -     omeprazole (PRILOSEC) 40 MG capsule; TAKE 1 CAPSULE(40 MG) BY MOUTH EVERY MORNING 30 MINUTES BEFORE EATING ON AN EMPTY STOMACH    Other insomnia  -     traZODone (DESYREL) 50 MG tablet; Take 1 tablet (50 mg total) by mouth nightly.    Chronic pains, low back and L knee: discussed not safe to take opiates, especially with neurotin. He understood and we agreed to hold on chronic opiates    Health Maintenance       Date Due Completion Date    TETANUS VACCINE 09/18/1951 ---    Zoster Vaccine 09/18/1993 ---    Lipid Panel 10/09/2019 10/9/2018      shingrix    Follow up in about 6 months (around 10/4/2019).    No future appointments.

## 2019-04-04 NOTE — PROGRESS NOTES
Subjective:       Patient ID: Cuhcho Prado is a 85 y.o. male.    Chief Complaint: Neurogenic Bladder (chronic spt).    HPI   Chucho Prado is a 85 y.o. male new patient to me (records of past medical, family and social history personally reviewed by me), w/ h/o neurogenic bladder. He had 18 SPT placed by Dr. Taylor on 06/23/2017 to manage his Neurogenic Bladder.    Pt returns to clinic today for SPT exchange. He denies complaints. SPT draining yellow urine.    Review of Systems    Constitutional: Negative for activity change, appetite change, chills and fever.   HENT: Negative for facial swelling and trouble swallowing.    Eyes: Negative for visual disturbance.   Respiratory: Negative for chest tightness and shortness of breath.    Cardiovascular: Negative for chest pain and palpitations.   Gastrointestinal: Negative.  Negative for abdominal pain, constipation, diarrhea, nausea and vomiting.   Genitourinary: Positive for difficulty urinating. Negative for dysuria, flank pain, hematuria, penile pain, penile swelling, scrotal swelling and testicular pain.   Musculoskeletal: Positive for arthralgias and gait problem. Negative for back pain, myalgias and neck stiffness.   Skin: Negative for rash.   Neurological: Positive for weakness. Negative for dizziness and speech difficulty.   Hematological: Does not bruise/bleed easily.   Psychiatric/Behavioral: Negative for behavioral problems    Objective:      Physical Exam    Nursing note and vitals reviewed.  Constitutional: He is oriented to person, place, and time. Vital signs are normal. He appears well-developed and well-nourished. He is active and cooperative.  Non-toxic appearance. He does not have a sickly appearance.   HENT:   Head: Normocephalic and atraumatic.   Eyes: Conjunctivae and lids are normal. No scleral icterus.   Neck: Trachea normal, normal range of motion and full passive range of motion without pain. Neck supple. No JVD present. No tracheal  deviation present.   Pulmonary/Chest: Effort normal. No respiratory distress. He exhibits no tenderness.   Abdominal: Soft. Normal appearance and bowel sounds are normal. There is no hepatosplenomegaly. There is no tenderness. There is no CVA tenderness.       Musculoskeletal: Normal range of motion.   Neurological: He is alert and oriented to person, place, and time. He has normal strength.   Skin: Skin is warm, dry and intact.     Psychiatric: He has a normal mood and affect. His behavior is normal. Judgment and thought content normal.     Assessment:       1. Neurogenic bladder    2. Chronic suprapubic catheter    3. Encounter for care or replacement of suprapubic tube        Plan:       I spent 25 minutes with the patient of which more than half was spent in direct consultation with the patient in regards to our treatment and plan.      Discussed and reviewed SPT procedure and plan.  Removed old SPT without difficulty, and properly disposed.  Stoma cleaned with betadine.  Placed a new 16 fr SPT using sterile technique.   Irrigated bladder to verify position, and removal of debris.  Balloon inflated with ~8cc sterile water.   Pt tolerated procedure well.  Site cleaned.   Discussed daily skin care and suprapubic catheter care.  Discussed and recommend importance of adequate hydration w/ water to aid in flushing out sediments in the bladder.  RTC 4 weeks for next SPT change.  Education sheets provided.  Patient expressed and verbalized understanding, and agree w/ plan.

## 2019-04-05 ENCOUNTER — TELEPHONE (OUTPATIENT)
Dept: INTERNAL MEDICINE | Facility: CLINIC | Age: 84
End: 2019-04-05

## 2019-04-05 NOTE — TELEPHONE ENCOUNTER
Hi, patient wanted [facility] so that his medicines can be delivered, please call back and let Amelia know that.  Let me know if Amelia has any questions and whether they still need meds resent to the non-mail order [facility].  Thank you, Obed Mcguire

## 2019-04-05 NOTE — TELEPHONE ENCOUNTER
Spoke with pharmacist at [facility], medications have been filled and will be delivered today.

## 2019-04-05 NOTE — TELEPHONE ENCOUNTER
"----- Message from Pj Baldwin sent at 4/5/2019  1:09 PM CDT -----  Contact: Pharmacy [facility] [phone]  Stating the refills are needing to go to the correct pharmacy which is "[facility]" not the mail order pharmacy [facility]. Please resend all Rx's that was just sent to previous location to the local [facility].    Contact:  [phone] Fax [phone]    Please call an advise  Thank you    "

## 2019-04-30 ENCOUNTER — EXTERNAL CHRONIC CARE MANAGEMENT (OUTPATIENT)
Dept: PRIMARY CARE CLINIC | Facility: CLINIC | Age: 84
End: 2019-04-30
Payer: MEDICARE

## 2019-04-30 PROCEDURE — 99490 PR CHRONIC CARE MGMT, 1ST 20 MIN: ICD-10-PCS | Mod: S$PBB,,, | Performed by: INTERNAL MEDICINE

## 2019-04-30 PROCEDURE — 99490 CHRNC CARE MGMT STAFF 1ST 20: CPT | Mod: S$PBB,,, | Performed by: INTERNAL MEDICINE

## 2019-04-30 PROCEDURE — 99490 CHRNC CARE MGMT STAFF 1ST 20: CPT | Mod: PBBFAC | Performed by: INTERNAL MEDICINE

## 2019-05-03 ENCOUNTER — TELEPHONE (OUTPATIENT)
Dept: INTERNAL MEDICINE | Facility: CLINIC | Age: 84
End: 2019-05-03

## 2019-05-03 DIAGNOSIS — Z93.59 SUPRAPUBIC CATHETER: ICD-10-CM

## 2019-05-03 DIAGNOSIS — N18.30 CKD (CHRONIC KIDNEY DISEASE) STAGE 3, GFR 30-59 ML/MIN: Primary | ICD-10-CM

## 2019-05-03 DIAGNOSIS — R33.9 URINARY RETENTION: ICD-10-CM

## 2019-05-03 DIAGNOSIS — Z78.9 IMPAIRED MOBILITY AND ADLS: ICD-10-CM

## 2019-05-03 DIAGNOSIS — Z74.09 IMPAIRED MOBILITY AND ADLS: ICD-10-CM

## 2019-05-03 NOTE — TELEPHONE ENCOUNTER
----- Message from Lizbeth Palomares sent at 5/2/2019  3:25 PM CDT -----  Contact: [facility]/ [place] [phone]  She said the patient said you were supposed to send home health orders to them for nursing, PT, and OT.    Please call and advise    Thank you

## 2019-05-08 PROCEDURE — G0180 PR HOME HEALTH MD CERTIFICATION: ICD-10-PCS | Mod: ,,, | Performed by: INTERNAL MEDICINE

## 2019-05-08 PROCEDURE — G0180 MD CERTIFICATION HHA PATIENT: HCPCS | Mod: ,,, | Performed by: INTERNAL MEDICINE

## 2019-05-22 ENCOUNTER — OFFICE VISIT (OUTPATIENT)
Dept: UROLOGY | Facility: CLINIC | Age: 84
End: 2019-05-22
Payer: MEDICARE

## 2019-05-22 VITALS
HEIGHT: 68 IN | HEART RATE: 76 BPM | WEIGHT: 198 LBS | DIASTOLIC BLOOD PRESSURE: 76 MMHG | SYSTOLIC BLOOD PRESSURE: 132 MMHG | BODY MASS INDEX: 30.01 KG/M2

## 2019-05-22 DIAGNOSIS — Z93.59 CHRONIC SUPRAPUBIC CATHETER: ICD-10-CM

## 2019-05-22 DIAGNOSIS — Z43.5 ENCOUNTER FOR CARE OR REPLACEMENT OF SUPRAPUBIC TUBE: ICD-10-CM

## 2019-05-22 DIAGNOSIS — L92.9 GRANULATION TISSUE: ICD-10-CM

## 2019-05-22 DIAGNOSIS — N31.9 NEUROGENIC BLADDER: Primary | ICD-10-CM

## 2019-05-22 PROCEDURE — 99213 OFFICE O/P EST LOW 20 MIN: CPT | Mod: PBBFAC | Performed by: NURSE PRACTITIONER

## 2019-05-22 PROCEDURE — 51705 CHANGE OF BLADDER TUBE: CPT | Mod: S$PBB,,, | Performed by: NURSE PRACTITIONER

## 2019-05-22 PROCEDURE — 99999 PR PBB SHADOW E&M-EST. PATIENT-LVL III: CPT | Mod: PBBFAC,,, | Performed by: NURSE PRACTITIONER

## 2019-05-22 PROCEDURE — 17250 CHEM CAUT OF GRANLTJ TISSUE: CPT | Mod: PBBFAC | Performed by: NURSE PRACTITIONER

## 2019-05-22 PROCEDURE — 99999 PR PBB SHADOW E&M-EST. PATIENT-LVL III: ICD-10-PCS | Mod: PBBFAC,,, | Performed by: NURSE PRACTITIONER

## 2019-05-22 PROCEDURE — 51705 PR CHANGE OF BLADDER TUBE,SIMPLE: ICD-10-PCS | Mod: S$PBB,,, | Performed by: NURSE PRACTITIONER

## 2019-05-22 PROCEDURE — 17250 CHEM CAUT OF GRANLTJ TISSUE: CPT | Mod: S$PBB,51,, | Performed by: NURSE PRACTITIONER

## 2019-05-22 PROCEDURE — 99214 OFFICE O/P EST MOD 30 MIN: CPT | Mod: S$PBB,25,, | Performed by: NURSE PRACTITIONER

## 2019-05-22 PROCEDURE — 51705 CHANGE OF BLADDER TUBE: CPT | Mod: PBBFAC | Performed by: NURSE PRACTITIONER

## 2019-05-22 PROCEDURE — 17250 PR CHEM CAUTERY GRANULATN TISSUE: ICD-10-PCS | Mod: S$PBB,51,, | Performed by: NURSE PRACTITIONER

## 2019-05-22 PROCEDURE — 99214 PR OFFICE/OUTPT VISIT, EST, LEVL IV, 30-39 MIN: ICD-10-PCS | Mod: S$PBB,25,, | Performed by: NURSE PRACTITIONER

## 2019-05-22 NOTE — PROGRESS NOTES
Subjective:       Patient ID: Chucho Prado is a 85 y.o. male.    Chief Complaint: Neurogenic bladder    Chucho Prado is a 85 y.o. male with neurogenic bladder.  He had 18fr SPT placed by Dr. Taylor on 06/23/2017 to manage his Neurogenic Bladder.    He is here today for exchange.  His last exchange 04/04/2019.     He reports good urine flow.  Occasional bladder spasms;  SPT draining well;     12/05/2018 s/p Ventral hernia repair with mesh;this was done due to obstruction.      10/25/2017 (R) TKR with Dr. [facility].    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  He is getting ready for [employer] walking tour end of March; he given pre-tour talks at the [facility] [employer].  He goes to [employer] b'day dinner; had great time            Past Medical History:  No date: Arthritis  No date: Blood transfusion  No date: CKD (chronic kidney disease) stage 3, GFR 30-5*  No date: Compression fracture of lumbar vertebra  No date: Depression  No date: Dyslipidemia  No date: GERD (gastroesophageal reflux disease)  No date: Hypertension  No date: Thyroid disease  No date: UTI (urinary tract infection)    Past Surgical History:  No date: CHOLECYSTECTOMY  No date: HERNIA REPAIR  No date: JOINT REPLACEMENT  12/27/2016: LAMINECTOMY      Comment: L2-L4  12/2016: LUMBAR LAMINECTOMY  No date: PARATHYROIDECTOMY  No date: TOTAL KNEE ARTHROPLASTY      Comment: Right    Review of patient's family history indicates:    Hypertension                   Mother                    Diabetes                       Father                    Esophageal cancer              Father                      Social History    Marital status: Single              Spouse name:                       Years of education:                 Number of children:               Occupational History    None on file    Social History Main Topics    Smoking status: Former Smoker                                                                 Packs/day: 0.00      Years: 20.00       Smokeless tobacco: Never Used                        Comment: quit 1999    Alcohol use: Yes                Comment: rarely/6 months    Drug use: No              Sexual activity: No                   Other Topics            Concern    None on file    Social History Narrative    Lives alone, owns house and rents part. Delaney helps. 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         Allergies:  Review of patient's allergies indicates no known allergies.    Medications:  Current Outpatient Prescriptions:   acetaminophen (TYLENOL) 500 MG tablet, Take 2 tablets (1,000 mg total) by mouth 3 (three) times daily as needed for Pain., Disp: 30 tablet, Rfl: 0  aspirin 81 MG Chew, Take 1 tablet (81 mg total) by mouth once daily. HOLD UNTIL TOLD TO RESUME BY PHYSICIAN, Disp: 30 tablet, Rfl: 0  calcitonin, salmon, (FORTICAL) 200 unit/actuation nasal spray, 1 spray by Nasal route once daily., Disp: 1 Bottle, Rfl: 0  diclofenac sodium (VOLTAREN) 1 % Gel, Apply 2 g topically 3 (three) times daily. To left knee, Disp: 100 g, Rfl: 3  gabapentin (NEURONTIN) 400 MG capsule, Take 2 capsules (800 mg total) by mouth 3 (three) times daily., Disp: 180 capsule, Rfl: 11  hyoscyamine (LEVSIN/SL) 0.125 mg Subl, Place 1 tablet (0.125 mg total) under the tongue every 4 (four) hours as needed (bladder spasms)., Disp: 30 tablet, Rfl: 1  levothyroxine (SYNTHROID) 50 MCG tablet, Take 1 tablet (50 mcg total) by mouth once daily., Disp: 100 tablet, Rfl: 11  mirtazapine (REMERON) 30 MG tablet, Take 1 tablet (30 mg total) by mouth every evening., Disp: 30 tablet, Rfl: 6  polyethylene glycol (GLYCOLAX) 17 gram PwPk, Take 17 g by mouth once daily., Disp: 30 packet, Rfl: 0  simethicone (MYLICON) 80 MG chewable tablet, Take 1 tablet (80 mg total) by mouth 3 (three) times daily after meals., Disp: , Rfl: 0  simvastatin (ZOCOR) 40 MG tablet, Take 1 tablet (40 mg total) by mouth every evening., Disp: 90 tablet,  Rfl: 11  tamsulosin (FLOMAX) 0.4 mg Cp24, Take 1 capsule (0.4 mg total) by mouth once daily., Disp: 30 capsule, Rfl: 3  triamterene-hydrochlorothiazide 37.5-25 mg (DYAZIDE) 37.5-25 mg per capsule, Take 1 capsule by mouth every morning. HOLD UNTIL TOLD TO RESUME BY PHYSICIAN, Disp: 90 capsule, Rfl: 3                Review of Systems   Constitutional: Negative for activity change, appetite change, chills and fever.   HENT: Negative for facial swelling and trouble swallowing.    Eyes: Negative for visual disturbance.   Respiratory: Negative for chest tightness and shortness of breath.    Cardiovascular: Negative for chest pain and palpitations.   Gastrointestinal: Negative.  Negative for abdominal pain, constipation, diarrhea, nausea and vomiting.   Genitourinary: Positive for difficulty urinating. Negative for dysuria, flank pain, hematuria, penile pain, penile swelling, scrotal swelling and testicular pain.        SPT draining well     Musculoskeletal: Positive for arthralgias and gait problem. Negative for back pain, myalgias and neck stiffness.   Skin: Negative for rash.   Neurological: Positive for weakness. Negative for dizziness and speech difficulty.   Hematological: Does not bruise/bleed easily.   Psychiatric/Behavioral: Negative for behavioral problems.       Objective:      Physical Exam   Nursing note and vitals reviewed.  Constitutional: He is oriented to person, place, and time. Vital signs are normal. He appears well-developed and well-nourished. He is active and cooperative.  Non-toxic appearance. He does not have a sickly appearance.   HENT:   Head: Normocephalic and atraumatic.   Right Ear: External ear normal.   Left Ear: External ear normal.   Nose: Nose normal.   Mouth/Throat: Mucous membranes are normal.   Eyes: Conjunctivae and lids are normal. No scleral icterus.   Neck: Trachea normal, normal range of motion and full passive range of motion without pain. Neck supple. No JVD present. No  tracheal deviation present.   Cardiovascular: Normal rate, S1 normal and S2 normal.    Pulmonary/Chest: Effort normal. No respiratory distress. He exhibits no tenderness.   Abdominal: Soft. Normal appearance and bowel sounds are normal. There is no hepatosplenomegaly. There is no tenderness. There is no CVA tenderness.       Genitourinary: Penis normal. No penile tenderness.   Musculoskeletal: Normal range of motion.   Neurological: He is alert and oriented to person, place, and time. He has normal strength.   Skin: Skin is warm, dry and intact.     Psychiatric: He has a normal mood and affect. His behavior is normal. Judgment and thought content normal.       Assessment:       1. Neurogenic bladder    2. Chronic suprapubic catheter    3. Encounter for care or replacement of suprapubic tube    4. Granulation tissue        Plan:          I spent 25 minutes with the patient of which more than half was spent in direct consultation with the patient in regards to our treatment and plan.    Education and recommendations of today's plan of care including home remedies.  I removed his old SPT.  I then placed a new 18fr SPT without difficulty using sterile procedure.  Silver nitrate sticks x 3 granulation tissue.  Irrigated to verify the position.  Balloon inflated 9cc sterile water/still irrigated well.  Dressing applied.  RTC one month

## 2019-05-31 ENCOUNTER — TELEPHONE (OUTPATIENT)
Dept: ADMINISTRATIVE | Facility: CLINIC | Age: 84
End: 2019-05-31

## 2019-05-31 ENCOUNTER — EXTERNAL CHRONIC CARE MANAGEMENT (OUTPATIENT)
Dept: PRIMARY CARE CLINIC | Facility: CLINIC | Age: 84
End: 2019-05-31
Payer: MEDICARE

## 2019-05-31 PROCEDURE — 99490 CHRNC CARE MGMT STAFF 1ST 20: CPT | Mod: PBBFAC | Performed by: INTERNAL MEDICINE

## 2019-05-31 PROCEDURE — 99490 CHRNC CARE MGMT STAFF 1ST 20: CPT | Mod: S$PBB,,, | Performed by: INTERNAL MEDICINE

## 2019-05-31 PROCEDURE — 99490 PR CHRONIC CARE MGMT, 1ST 20 MIN: ICD-10-PCS | Mod: S$PBB,,, | Performed by: INTERNAL MEDICINE

## 2019-05-31 NOTE — TELEPHONE ENCOUNTER
Home Health SOC 05/08/2019 - 07/06/2019 with [facility] - Dr. Obed Mcguire. Patient received  services.

## 2019-06-06 ENCOUNTER — EXTERNAL HOME HEALTH (OUTPATIENT)
Dept: HOME HEALTH SERVICES | Facility: HOSPITAL | Age: 84
End: 2019-06-06
Payer: MEDICARE

## 2019-06-10 ENCOUNTER — TELEPHONE (OUTPATIENT)
Dept: INTERNAL MEDICINE | Facility: CLINIC | Age: 84
End: 2019-06-10

## 2019-06-10 DIAGNOSIS — I10 ESSENTIAL HYPERTENSION: ICD-10-CM

## 2019-06-10 RX ORDER — LOSARTAN POTASSIUM 100 MG/1
100 TABLET ORAL DAILY
Qty: 90 TABLET | Refills: 11 | Status: SHIPPED | OUTPATIENT
Start: 2019-06-10 | End: 2019-06-13

## 2019-06-10 NOTE — TELEPHONE ENCOUNTER
Spoke with pt, he states that he has been having high blood pressure readings at times and doesn't think that his current medication is controlling his blood pressure . Las b/p reading at 15;50 pm today was 145/75.

## 2019-06-10 NOTE — TELEPHONE ENCOUNTER
Hi, I recommend we increase his losartan to 100mg daily instead of 50mg.  I will send in a new prescription to [facility] for 100mg tabs.    He has an appt next week at Urology and Neelam Rufus can check his pressure there as well.    Also, I recommend he watch the amount of sodium/salt in his diet.    Let me know if patient has any questions.  Thank you, Obed Mcguire

## 2019-06-10 NOTE — TELEPHONE ENCOUNTER
----- Message from Keila Peres MD sent at 6/9/2019  9:40 PM CDT -----  Recheck UA in2 wks   ----- Message from Kezia King LPN sent at 10/23/2017  4:08 PM CDT -----      ----- Message -----  From: Genna Chin RN  Sent: 10/23/2017   3:14 PM  To: Millicent Garcia MD, #    Patient had a urine cx done 10/10 that indicated MRSA.  Patient denies receiving any treatment.  Patient sx is 10/25.  Please advise.  Patient is going to have labs done and then will be [facility] until about 5 pm.     Genna Chin RN  Preop Center  [id number]

## 2019-06-10 NOTE — TELEPHONE ENCOUNTER
----- Message from Sarita Mera sent at 6/10/2019 10:03 AM CDT -----  Contact: [phone]  Patient is requesting a call from the office concerning his blood pressure being high. His blood pressure reading to day is 184/96.    Please advise, thanks.

## 2019-06-13 ENCOUNTER — NURSE TRIAGE (OUTPATIENT)
Dept: ADMINISTRATIVE | Facility: CLINIC | Age: 84
End: 2019-06-13

## 2019-06-13 ENCOUNTER — TELEPHONE (OUTPATIENT)
Dept: INTERNAL MEDICINE | Facility: CLINIC | Age: 84
End: 2019-06-13

## 2019-06-13 DIAGNOSIS — I10 ESSENTIAL HYPERTENSION: Primary | ICD-10-CM

## 2019-06-13 RX ORDER — LOSARTAN POTASSIUM 100 MG/1
100 TABLET ORAL DAILY
Qty: 90 TABLET | Refills: 11 | Status: SHIPPED | OUTPATIENT
Start: 2019-06-13 | End: 2020-08-24

## 2019-06-13 NOTE — TELEPHONE ENCOUNTER
His bp began to go up about a week ago, 30 minutes ago it was 171/92, Dr. Mcguire doubled his prescription of losartan, which he started the day before yesterday.  He is unsure what to do.  He has had an intermittent headache for the last several days, as well, but thinks it may be sinus related. Attempted to reach Dr. Mcguire's office, no answer, will send message.    Reason for Disposition   BP > 160/100    Protocols used: ST HIGH BLOOD PRESSURE-A-OH

## 2019-06-13 NOTE — TELEPHONE ENCOUNTER
Spoke with pt, he would like to start on HCTZ, please, send the medication. Appt made with DARYN Snow , mailed.

## 2019-06-13 NOTE — TELEPHONE ENCOUNTER
Hi, please call the patient --   I see that his pressures have been running high, the home nurse has contacted us.  I recommend that we add another medicine -- hydrochlorothiazide. This is similar to the Dyazide that he was on in the past.  Please ask if he agrees to start the diuretic hydrochlorothiazide and if so I will send it in.    Also, please set him up an appt to see Justa Singh NP, for a BP check and blood work in 4 weeks.    Let me know if patient has any questions.  Thank you, Obed Mcguire

## 2019-06-14 RX ORDER — HYDROCHLOROTHIAZIDE 25 MG/1
25 TABLET ORAL DAILY
Qty: 30 TABLET | Refills: 11 | Status: ON HOLD | OUTPATIENT
Start: 2019-06-14 | End: 2019-09-04 | Stop reason: HOSPADM

## 2019-06-14 NOTE — TELEPHONE ENCOUNTER
Hi, fyi prescription electronically sent in, Obed Mcguire    Future Appointments   Date Time Provider Department Center   6/19/2019  2:00 PM Neelam Hooper NP [facility]   7/9/2019  2:30 PM Justa Bangura NP [facility]  [facility]

## 2019-06-14 NOTE — TELEPHONE ENCOUNTER
----- Message from Christy Chu MA sent at 6/30/2017 12:11 PM CDT -----  Contact: [phone]  1 week post op , feels dehydrated, black stool and vomit  and dark urine in his cath bag.   Said he is ok waiting  for a call back til after lunch   Nasal mucosa clear.  Mouth with normal mucosa  Throat has no vesicles, no oropharyngeal exudates and uvula is midline.

## 2019-06-19 ENCOUNTER — OFFICE VISIT (OUTPATIENT)
Dept: UROLOGY | Facility: CLINIC | Age: 84
End: 2019-06-19
Payer: MEDICARE

## 2019-06-19 VITALS
HEIGHT: 68 IN | BODY MASS INDEX: 30.01 KG/M2 | WEIGHT: 198 LBS | DIASTOLIC BLOOD PRESSURE: 75 MMHG | SYSTOLIC BLOOD PRESSURE: 168 MMHG | HEART RATE: 66 BPM

## 2019-06-19 DIAGNOSIS — Z43.5 ENCOUNTER FOR CARE OR REPLACEMENT OF SUPRAPUBIC TUBE: ICD-10-CM

## 2019-06-19 DIAGNOSIS — Z93.59 CHRONIC SUPRAPUBIC CATHETER: ICD-10-CM

## 2019-06-19 DIAGNOSIS — N31.9 NEUROGENIC BLADDER: Primary | ICD-10-CM

## 2019-06-19 PROCEDURE — 99214 OFFICE O/P EST MOD 30 MIN: CPT | Mod: PBBFAC | Performed by: NURSE PRACTITIONER

## 2019-06-19 PROCEDURE — 99214 OFFICE O/P EST MOD 30 MIN: CPT | Mod: S$PBB,25,, | Performed by: NURSE PRACTITIONER

## 2019-06-19 PROCEDURE — 51705 CHANGE OF BLADDER TUBE: CPT | Mod: PBBFAC | Performed by: NURSE PRACTITIONER

## 2019-06-19 PROCEDURE — 99999 PR PBB SHADOW E&M-EST. PATIENT-LVL IV: CPT | Mod: PBBFAC,,, | Performed by: NURSE PRACTITIONER

## 2019-06-19 PROCEDURE — 51705 PR CHANGE OF BLADDER TUBE,SIMPLE: ICD-10-PCS | Mod: S$PBB,,, | Performed by: NURSE PRACTITIONER

## 2019-06-19 PROCEDURE — 99214 PR OFFICE/OUTPT VISIT, EST, LEVL IV, 30-39 MIN: ICD-10-PCS | Mod: S$PBB,25,, | Performed by: NURSE PRACTITIONER

## 2019-06-19 PROCEDURE — 99999 PR PBB SHADOW E&M-EST. PATIENT-LVL IV: ICD-10-PCS | Mod: PBBFAC,,, | Performed by: NURSE PRACTITIONER

## 2019-06-19 PROCEDURE — 51705 CHANGE OF BLADDER TUBE: CPT | Mod: S$PBB,,, | Performed by: NURSE PRACTITIONER

## 2019-06-19 NOTE — PROGRESS NOTES
Subjective:       Patient ID: Chucho Prado is a 85 y.o. male.    Chief Complaint: Neurogenic bladder    Chucho Prado is a 85 y.o. male with neurogenic bladder.  He had 18fr SPT placed by Dr. Taylor on 06/23/2017 to manage his Neurogenic Bladder.    He is here today for exchange.  His last exchange 05/22/2019.     He reports good urine flow.  Occasional bladder spasms;  SPT draining well;     12/05/2018 s/p Ventral hernia repair with mesh;this was done due to obstruction.      10/25/2017 (R) TKR with Dr. [facility].    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  He is getting ready for [employer] walking tour end of March; he given pre-tour talks at the [facility] [employer].  He goes to [employer] b'day dinner; had great time            Past Medical History:  No date: Arthritis  No date: Blood transfusion  No date: CKD (chronic kidney disease) stage 3, GFR 30-5*  No date: Compression fracture of lumbar vertebra  No date: Depression  No date: Dyslipidemia  No date: GERD (gastroesophageal reflux disease)  No date: Hypertension  No date: Thyroid disease  No date: UTI (urinary tract infection)    Past Surgical History:  No date: CHOLECYSTECTOMY  No date: HERNIA REPAIR  No date: JOINT REPLACEMENT  12/27/2016: LAMINECTOMY      Comment: L2-L4  12/2016: LUMBAR LAMINECTOMY  No date: PARATHYROIDECTOMY  No date: TOTAL KNEE ARTHROPLASTY      Comment: Right    Review of patient's family history indicates:    Hypertension                   Mother                    Diabetes                       Father                    Esophageal cancer              Father                      Social History    Marital status: Single              Spouse name:                       Years of education:                 Number of children:               Occupational History    None on file    Social History Main Topics    Smoking status: Former Smoker                                                                 Packs/day: 0.00      Years: 20.00       Smokeless tobacco: Never Used                        Comment: quit 1999    Alcohol use: Yes                Comment: rarely/6 months    Drug use: No              Sexual activity: No                   Other Topics            Concern    None on file    Social History Narrative    Lives alone, owns house and rents part. Delaney helps. 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         Allergies:  Review of patient's allergies indicates no known allergies.    Medications:  Current Outpatient Prescriptions:   acetaminophen (TYLENOL) 500 MG tablet, Take 2 tablets (1,000 mg total) by mouth 3 (three) times daily as needed for Pain., Disp: 30 tablet, Rfl: 0  aspirin 81 MG Chew, Take 1 tablet (81 mg total) by mouth once daily. HOLD UNTIL TOLD TO RESUME BY PHYSICIAN, Disp: 30 tablet, Rfl: 0  calcitonin, salmon, (FORTICAL) 200 unit/actuation nasal spray, 1 spray by Nasal route once daily., Disp: 1 Bottle, Rfl: 0  diclofenac sodium (VOLTAREN) 1 % Gel, Apply 2 g topically 3 (three) times daily. To left knee, Disp: 100 g, Rfl: 3  gabapentin (NEURONTIN) 400 MG capsule, Take 2 capsules (800 mg total) by mouth 3 (three) times daily., Disp: 180 capsule, Rfl: 11  hyoscyamine (LEVSIN/SL) 0.125 mg Subl, Place 1 tablet (0.125 mg total) under the tongue every 4 (four) hours as needed (bladder spasms)., Disp: 30 tablet, Rfl: 1  levothyroxine (SYNTHROID) 50 MCG tablet, Take 1 tablet (50 mcg total) by mouth once daily., Disp: 100 tablet, Rfl: 11  mirtazapine (REMERON) 30 MG tablet, Take 1 tablet (30 mg total) by mouth every evening., Disp: 30 tablet, Rfl: 6  polyethylene glycol (GLYCOLAX) 17 gram PwPk, Take 17 g by mouth once daily., Disp: 30 packet, Rfl: 0  simethicone (MYLICON) 80 MG chewable tablet, Take 1 tablet (80 mg total) by mouth 3 (three) times daily after meals., Disp: , Rfl: 0  simvastatin (ZOCOR) 40 MG tablet, Take 1 tablet (40 mg total) by mouth every evening., Disp: 90 tablet,  Rfl: 11  tamsulosin (FLOMAX) 0.4 mg Cp24, Take 1 capsule (0.4 mg total) by mouth once daily., Disp: 30 capsule, Rfl: 3  triamterene-hydrochlorothiazide 37.5-25 mg (DYAZIDE) 37.5-25 mg per capsule, Take 1 capsule by mouth every morning. HOLD UNTIL TOLD TO RESUME BY PHYSICIAN, Disp: 90 capsule, Rfl: 3                Review of Systems   Constitutional: Negative for activity change, appetite change, chills and fever.   HENT: Negative for facial swelling and trouble swallowing.    Eyes: Negative for visual disturbance.   Respiratory: Negative for chest tightness and shortness of breath.    Cardiovascular: Positive for leg swelling. Negative for chest pain and palpitations.   Gastrointestinal: Negative.  Negative for abdominal pain, constipation, diarrhea, nausea and vomiting.   Genitourinary: Positive for difficulty urinating. Negative for dysuria, flank pain, hematuria, penile pain, penile swelling, scrotal swelling and testicular pain.         SPT draining well     Musculoskeletal: Positive for arthralgias and gait problem. Negative for back pain, myalgias and neck stiffness.   Skin: Negative for rash.   Neurological: Positive for weakness. Negative for dizziness and speech difficulty.   Hematological: Does not bruise/bleed easily.   Psychiatric/Behavioral: Negative for behavioral problems.       Objective:      Physical Exam   Nursing note and vitals reviewed.  Constitutional: He is oriented to person, place, and time. Vital signs are normal. He appears well-developed and well-nourished. He is active and cooperative.  Non-toxic appearance. He does not have a sickly appearance.   HENT:   Head: Normocephalic and atraumatic.   Right Ear: External ear normal.   Left Ear: External ear normal.   Nose: Nose normal.   Mouth/Throat: Mucous membranes are normal.   Eyes: Conjunctivae and lids are normal. No scleral icterus.   Neck: Trachea normal, normal range of motion and full passive range of motion without pain. Neck  supple. No JVD present. No tracheal deviation present.   Cardiovascular: Normal rate, S1 normal and S2 normal.    Pulmonary/Chest: Effort normal. No respiratory distress. He exhibits no tenderness.   Abdominal: Soft. Normal appearance and bowel sounds are normal. There is no hepatosplenomegaly. There is no tenderness. There is no CVA tenderness.       Musculoskeletal: Normal range of motion.   Neurological: He is alert and oriented to person, place, and time. He has normal strength.   Skin: Skin is warm, dry and intact.     Psychiatric: He has a normal mood and affect. His behavior is normal. Judgment and thought content normal.       Assessment:       1. Neurogenic bladder    2. Chronic suprapubic catheter    3. Encounter for care or replacement of suprapubic tube        Plan:          I spent 25 minutes with the patient of which more than half was spent in direct consultation with the patient in regards to our treatment and plan.    Education and recommendations of today's plan of care including home remedies.  I removed his old SPT.  I then placed a new 18fr SPT without difficulty using sterile procedure.  Irrigated to verify the position.  Balloon inflated 9cc sterile water/still irrigated well.  Dressing applied.  RTC one month

## 2019-06-21 ENCOUNTER — TELEPHONE (OUTPATIENT)
Dept: INTERNAL MEDICINE | Facility: CLINIC | Age: 84
End: 2019-06-21

## 2019-06-21 NOTE — TELEPHONE ENCOUNTER
Mike Robertson, please call the patient and see if he is willing to see Dr Solano tomorrow in urgent care.  Thank you, Obed Mcguire

## 2019-06-21 NOTE — TELEPHONE ENCOUNTER
----- Message from Kirstin Mann sent at 6/21/2019 10:32 AM CDT -----  Contact: Nancy / [facility] [phone]  Pt has elevated blood pressure reading  201/93  and he is refusing to go to the ER. Caregiver called the home health nurse. .Was unable to contact your nurse. Home health nurse is going to call patient and let him know that an appt cannot be scheduled because of elevated bp and will try again to get him to go to the ER. Please call nurse to advise.

## 2019-06-21 NOTE — TELEPHONE ENCOUNTER
Spoke to pt and caregiver-- He took HCTZ 25mg and Losartan 100mg about 2 hours ago. Stated BP went down to 122/70. Scheduled UC appt for tomorrow with Dr. Solano for 1:40p.

## 2019-06-22 ENCOUNTER — OFFICE VISIT (OUTPATIENT)
Dept: INTERNAL MEDICINE | Facility: CLINIC | Age: 84
End: 2019-06-22
Payer: MEDICARE

## 2019-06-22 VITALS
WEIGHT: 197 LBS | OXYGEN SATURATION: 95 % | HEIGHT: 68 IN | HEART RATE: 66 BPM | BODY MASS INDEX: 29.86 KG/M2 | SYSTOLIC BLOOD PRESSURE: 138 MMHG | DIASTOLIC BLOOD PRESSURE: 68 MMHG | TEMPERATURE: 98 F

## 2019-06-22 DIAGNOSIS — I10 ESSENTIAL HYPERTENSION: Primary | ICD-10-CM

## 2019-06-22 PROCEDURE — 99213 OFFICE O/P EST LOW 20 MIN: CPT | Mod: S$PBB,,, | Performed by: INTERNAL MEDICINE

## 2019-06-22 PROCEDURE — 99999 PR PBB SHADOW E&M-EST. PATIENT-LVL III: ICD-10-PCS | Mod: PBBFAC,,, | Performed by: INTERNAL MEDICINE

## 2019-06-22 PROCEDURE — 99999 PR PBB SHADOW E&M-EST. PATIENT-LVL III: CPT | Mod: PBBFAC,,, | Performed by: INTERNAL MEDICINE

## 2019-06-22 PROCEDURE — 99213 OFFICE O/P EST LOW 20 MIN: CPT | Mod: PBBFAC | Performed by: INTERNAL MEDICINE

## 2019-06-22 PROCEDURE — 99213 PR OFFICE/OUTPT VISIT, EST, LEVL III, 20-29 MIN: ICD-10-PCS | Mod: S$PBB,,, | Performed by: INTERNAL MEDICINE

## 2019-06-22 RX ORDER — AMLODIPINE BESYLATE 5 MG/1
5 TABLET ORAL DAILY
Qty: 30 TABLET | Refills: 11 | Status: SHIPPED | OUTPATIENT
Start: 2019-06-22 | End: 2020-06-11

## 2019-06-22 NOTE — PROGRESS NOTES
Subjective:       Patient ID: Chucho Prado is a 85 y.o. male.    Chief Complaint: Hypertension    85 year old man reports his BP has been all over the place since changing his diuretic from dyazide to HCTZ.  Also changed from uncertain drug to Cozaar 100 mg.  Bp by home health provider intermittently as high as 210/105  Patient has had slight headaches but no other symptoms    Review of Systems   Constitutional: Negative for activity change, appetite change and fever.   HENT: Negative for congestion, postnasal drip and sore throat.    Respiratory: Negative for cough, shortness of breath and wheezing.    Cardiovascular: Negative for chest pain and palpitations.   Gastrointestinal: Negative for abdominal pain, blood in stool, constipation, diarrhea, nausea and vomiting.   Genitourinary: Negative for decreased urine volume, difficulty urinating, flank pain and frequency.   Musculoskeletal: Negative for arthralgias.   Neurological: Negative for dizziness, weakness and headaches.       Objective:      Physical Exam   Constitutional: He is oriented to person, place, and time. He appears well-developed and well-nourished. No distress.   HENT:   Head: Normocephalic and atraumatic.   Right Ear: External ear normal.   Left Ear: External ear normal.   Eyes: Pupils are equal, round, and reactive to light. Conjunctivae and EOM are normal.   Neck: Normal range of motion. Neck supple. No thyromegaly present.   Cardiovascular: Normal rate and regular rhythm.   Pulmonary/Chest: Effort normal and breath sounds normal.   Abdominal: Soft. Bowel sounds are normal. He exhibits no mass. There is no tenderness. There is no rebound and no guarding.   Musculoskeletal: Normal range of motion.   Lymphadenopathy:     He has no cervical adenopathy.   Neurological: He is alert and oriented to person, place, and time. He has normal reflexes. He displays normal reflexes. No cranial nerve deficit. He exhibits normal muscle tone. Coordination  normal.   Skin: Skin is warm and dry.       Assessment:       1. Essential hypertension        Plan:   Chucho was seen today for hypertension.    Diagnoses and all orders for this visit:    Essential hypertension    Other orders  -     amLODIPine (NORVASC) 5 MG tablet; Take 1 tablet (5 mg total) by mouth once daily.

## 2019-06-30 ENCOUNTER — EXTERNAL CHRONIC CARE MANAGEMENT (OUTPATIENT)
Dept: PRIMARY CARE CLINIC | Facility: CLINIC | Age: 84
End: 2019-06-30
Payer: MEDICARE

## 2019-06-30 PROCEDURE — 99490 CHRNC CARE MGMT STAFF 1ST 20: CPT | Mod: PBBFAC | Performed by: INTERNAL MEDICINE

## 2019-06-30 PROCEDURE — 99490 PR CHRONIC CARE MGMT, 1ST 20 MIN: ICD-10-PCS | Mod: S$PBB,,, | Performed by: INTERNAL MEDICINE

## 2019-06-30 PROCEDURE — 99490 CHRNC CARE MGMT STAFF 1ST 20: CPT | Mod: S$PBB,,, | Performed by: INTERNAL MEDICINE

## 2019-07-07 PROCEDURE — G0179 MD RECERTIFICATION HHA PT: HCPCS | Mod: ,,, | Performed by: INTERNAL MEDICINE

## 2019-07-07 PROCEDURE — G0179 PR HOME HEALTH MD RECERTIFICATION: ICD-10-PCS | Mod: ,,, | Performed by: INTERNAL MEDICINE

## 2019-07-08 ENCOUNTER — PATIENT MESSAGE (OUTPATIENT)
Dept: INTERNAL MEDICINE | Facility: CLINIC | Age: 84
End: 2019-07-08

## 2019-07-17 ENCOUNTER — OFFICE VISIT (OUTPATIENT)
Dept: INTERNAL MEDICINE | Facility: CLINIC | Age: 84
End: 2019-07-17
Payer: MEDICARE

## 2019-07-17 ENCOUNTER — OFFICE VISIT (OUTPATIENT)
Dept: UROLOGY | Facility: CLINIC | Age: 84
End: 2019-07-17
Payer: MEDICARE

## 2019-07-17 VITALS — SYSTOLIC BLOOD PRESSURE: 159 MMHG | DIASTOLIC BLOOD PRESSURE: 72 MMHG | HEART RATE: 70 BPM

## 2019-07-17 DIAGNOSIS — Z43.5 ENCOUNTER FOR CARE OR REPLACEMENT OF SUPRAPUBIC TUBE: ICD-10-CM

## 2019-07-17 DIAGNOSIS — Z93.59 CHRONIC SUPRAPUBIC CATHETER: ICD-10-CM

## 2019-07-17 DIAGNOSIS — N31.9 NEUROGENIC BLADDER: Primary | ICD-10-CM

## 2019-07-17 DIAGNOSIS — I10 ESSENTIAL HYPERTENSION: Primary | ICD-10-CM

## 2019-07-17 PROCEDURE — 99999 PR PBB SHADOW E&M-EST. PATIENT-LVL IV: CPT | Mod: PBBFAC,,, | Performed by: NURSE PRACTITIONER

## 2019-07-17 PROCEDURE — 99213 OFFICE O/P EST LOW 20 MIN: CPT | Mod: S$PBB,,, | Performed by: NURSE PRACTITIONER

## 2019-07-17 PROCEDURE — 99212 OFFICE O/P EST SF 10 MIN: CPT | Mod: PBBFAC | Performed by: NURSE PRACTITIONER

## 2019-07-17 PROCEDURE — 99214 OFFICE O/P EST MOD 30 MIN: CPT | Mod: S$PBB,25,, | Performed by: NURSE PRACTITIONER

## 2019-07-17 PROCEDURE — 99999 PR PBB SHADOW E&M-EST. PATIENT-LVL II: ICD-10-PCS | Mod: PBBFAC,,, | Performed by: NURSE PRACTITIONER

## 2019-07-17 PROCEDURE — 99999 PR PBB SHADOW E&M-EST. PATIENT-LVL II: CPT | Mod: PBBFAC,,, | Performed by: NURSE PRACTITIONER

## 2019-07-17 PROCEDURE — 99999 PR PBB SHADOW E&M-EST. PATIENT-LVL IV: ICD-10-PCS | Mod: PBBFAC,,, | Performed by: NURSE PRACTITIONER

## 2019-07-17 PROCEDURE — 99214 PR OFFICE/OUTPT VISIT, EST, LEVL IV, 30-39 MIN: ICD-10-PCS | Mod: S$PBB,25,, | Performed by: NURSE PRACTITIONER

## 2019-07-17 PROCEDURE — 99213 PR OFFICE/OUTPT VISIT, EST, LEVL III, 20-29 MIN: ICD-10-PCS | Mod: S$PBB,,, | Performed by: NURSE PRACTITIONER

## 2019-07-17 PROCEDURE — 51705 PR CHANGE OF BLADDER TUBE,SIMPLE: ICD-10-PCS | Mod: S$PBB,,, | Performed by: NURSE PRACTITIONER

## 2019-07-17 PROCEDURE — 51705 CHANGE OF BLADDER TUBE: CPT | Mod: S$PBB,,, | Performed by: NURSE PRACTITIONER

## 2019-07-17 PROCEDURE — 99214 OFFICE O/P EST MOD 30 MIN: CPT | Mod: PBBFAC,27,25 | Performed by: NURSE PRACTITIONER

## 2019-07-17 PROCEDURE — 51705 CHANGE OF BLADDER TUBE: CPT | Mod: PBBFAC | Performed by: NURSE PRACTITIONER

## 2019-07-17 RX ORDER — METOPROLOL SUCCINATE 25 MG/1
25 TABLET, EXTENDED RELEASE ORAL DAILY
Qty: 30 TABLET | Refills: 11 | Status: SHIPPED | OUTPATIENT
Start: 2019-07-17 | End: 2020-08-19 | Stop reason: SDUPTHER

## 2019-07-17 NOTE — PROGRESS NOTES
CHIEF COMPLAINT:    Mr. Prado is a 85 y.o. male presenting for SPT change.  PRESENTING ILLNESS:    Chucho Prado is a 85 y.o. male with a PMH of neurogenic bladder who presents for SPT change. His last clinic visit was 6/19/19 with GABRIELLE Hooper NP.    He had 18fr SPT placed by Dr. Taylor on 06/23/2017 to manage his Neurogenic Bladder.     He is here today for exchange.  His last exchange 6/19/19.      He reports SPT has been draining without difficulty.  Occasional bladder spasms.  Denies hematuria or flank pain. Denies fever or chills.      12/05/2018 s/p Ventral hernia repair with mesh; this was done due to obstruction.      10/25/2017 (R) TKR with Dr. [facility].      REVIEW OF SYSTEMS:    Review of Systems    Constitutional: Negative for fever and chills.   HENT: Negative for trouble swallowing or facial swelling.   Eyes: Negative for visual disturbance.   Respiratory: Negative for shortness of breath.   Cardiovascular: Negative for chest pain.   Gastrointestinal: Negative for nausea, vomiting, and constipation.   Genitourinary:  Positive for difficulty voiding. Negative hematuria, flank pain  Neurological: Positive for weakness. (baseline)   Hematological: Does not bruise/bleed easily.   Psychiatric/Behavioral: Negative for confusion.       PATIENT HISTORY:    Past Medical History:   Diagnosis Date    Acquired hypothyroidism 7/30/2013    Arthritis     Blood transfusion     before 2005 - whe had gangrenous gall bladder    Cataract     Chronic back pain 12/4/2018    - Takes Percocet 5-325 at home - Can continue current abdominal pain control with Dilaudid 0.5 mg IV Q3H prn     CKD (chronic kidney disease) stage 3, GFR 30-59 ml/min     Compression fracture of lumbar vertebra     Depression     Dyslipidemia     Essential hypertension 7/30/2013    Gastroesophageal reflux disease without esophagitis 12/4/2018    - continue home Prilosec    General anesthetics causing adverse effect in therapeutic use      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    GERD (gastroesophageal reflux disease)     History of postoperative delirium 12/4/2018    - Patient reports 1 week history of post-op delirium 7/2018 - Monitor electrolytes, UA, and urine cx - Delirium precautions  - Can use Seroquel 25 mg QHS prn     Hypertension     Hyponatremia 10/23/2017    Impaired mobility and ADLs 6/28/2018    Neurogenic bladder 12/4/2018    Sleep disorder 12/4/2018    - continue Trazodone QHS    Thyroid disease     UTI (urinary tract infection)        Past Surgical History:   Procedure Laterality Date    BLOCK-NERVE Left 10/26/2017    Performed by [place] Surgeon at [facility]    CHOLECYSTECTOMY      CYSTOSCOPY WITH RETROGRADE PYELOGRAM Bilateral 1/27/2017    Performed by Chaitanya [facility] Jr., MD at [facility] OR 1ST FLR    EGD (ESOPHAGOGASTRODUODENOSCOPY) N/A 8/2/2013    Performed by Nagi Talbert MD at [facility] (2ND FLR)    EYE SURGERY Right     IOL    HERNIA REPAIR      INSERTION-CATHETER-SUPRAPUBIC N/A 6/23/2017    Performed by [facility]aitanya [facility] Jr., [facility] at [facility] OR 2ND FLR    INSERTION-INTRAOCULAR LENS (IOL) Left 5/28/2018    [facility] OR    INSERTION-INTRAOCULAR LENS (IOL) Right 2/19/2018    Performed by [facility] at [facility] OR    JOINT REPLACEMENT      KYPHOPLASTY L3 N/A 12/27/2016    Performed by Donavan Martinez MD at [facility] OR 2ND FLR    LAMINECTOMY  12/27/2016    L2-L4    LAMINECTOMY-MINIMALLY INVASIVE L2,3 and L3,4; globus, neuromonitoring Right 12/27/2016    Performed by Donavan Martinez MD at [facility] OR 2ND FLR    LUMBAR LAMINECTOMY  12/2016    PARATHYROIDECTOMY      PHACOEMULSIFICATION-ASPIRATION-CATARACT Left 5/28/2018    [facility]formed [facility] at [facility] OR    PHACOEMULSIFICATION-ASPIRATION-CATARACT Right 2/19/2018    [facility] by [facility] at [facility] OR    REPAIR, HERNIA, INCISIONAL, INCARCERATED, WITHOUT HISTORY OF PRIOR REPAIR N/A 12/5/2018    Performed by Steve SCHUMACHER  MD [facility] at [facility] OR 2ND FLR    REPAIR, HERNIA, INGUINAL, WITHOUT HISTORY OF PRIOR REPAIR, AGE 5 YEARS OR OLDER Right 10/9/2013    Performed by Daron Arthur MD at [facility] OR 2ND FLR    REPAIR, HERNIA, VENTRAL, INCARCERATED, WITHOUT HISTORY OF PRIOR REPAIR N/A 2018    Performed by Guilherme Ordoñez MD at [facility] OR 2ND FLR    REPLACEMENT-KNEE-TOTAL Left 10/25/2017    [facility]formed [facility] at [facility]    TOTAL KNEE ARTHROPLASTY Bilateral     TOTAL KNEE ARTHROPLASTY Left 10/25/2017    TKR       Family History   Problem Relation Age of Onset    Hypertension Mother     Diabetes Father     Esophageal cancer Father        Social History     Socioeconomic History    Marital status: Single     Spouse name: Not on file    Number of children: Not on file    Years of education: Not on file    Highest education level: Not on file   Occupational History    Not on file   Social Needs    Financial resource strain: Not on file    Food insecurity:     Worry: Not on file     Inability: Not on file    Transportation needs:     Medical: Not on file     Non-medical: Not on file   Tobacco Use    Smoking status: Former Smoker     Years: 20.     Last attempt to quit:      Years since quittin.5    Smokeless tobacco: Never Used    Tobacco comment: quit    Substance and Sexual Activity    Alcohol use: No     Comment: rarely/6 months    Drug use: No    Sexual activity: Never   Lifestyle    Physical activity:     Days per week: Not on file     Minutes per session: Not on file    Stress: Not on file   Relationships    Social connections:     Talks on phone: Not on file     Gets together: Not on file     Attends Taoism service: Not on file     Active member of club or organization: Not on file     Attends meetings of clubs or organizations: Not on file     Relationship status: Not on file   Other Topics Concern    Not on file   Social History Narrative    Lives alone, owns  "house and rents part. Delaney helps. 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        Allergies:  Patient has no known allergies.    Medications:    Current Outpatient Medications:     amLODIPine (NORVASC) 5 MG tablet, Take 1 tablet (5 mg total) by mouth once daily., Disp: 30 tablet, Rfl: 11    aspirin (ECOTRIN) 81 MG EC tablet, Take 1 tablet (81 mg total) by mouth once daily., Disp: 90 tablet, Rfl: 11    atorvastatin (LIPITOR) 20 MG tablet, Take 1 tablet (20 mg total) by mouth once daily., Disp: 90 tablet, Rfl: 11    gabapentin (NEURONTIN) 400 MG capsule, Take 2 capsules (800 mg total) by mouth 2 (two) times daily., Disp: 120 capsule, Rfl: 11    hydroCHLOROthiazide (HYDRODIURIL) 25 MG tablet, Take 1 tablet (25 mg total) by mouth once daily., Disp: 30 tablet, Rfl: 11    hyoscyamine (LEVSIN/SL) 0.125 mg Subl, Take 1 tablet (0.125 mg total) by mouth every 6 (six) hours as needed (bladder spasms)., Disp: 60 tablet, Rfl: 11    levothyroxine (SYNTHROID) 50 MCG tablet, GIVE "PENNY" 1 TABLET BY MOUTH ONCE DAILY., Disp: 90 tablet, Rfl: 11    losartan (COZAAR) 100 MG tablet, Take 1 tablet (100 mg total) by mouth once daily., Disp: 90 tablet, Rfl: 11    omeprazole (PRILOSEC) 40 MG capsule, TAKE 1 CAPSULE(40 MG) BY MOUTH EVERY MORNING 30 MINUTES BEFORE EATING ON AN EMPTY STOMACH, Disp: 90 capsule, Rfl: 11    polyethylene glycol (GLYCOLAX) 17 gram/dose powder, Take 17 g by mouth once daily., Disp: 1700 g, Rfl: 11    traZODone (DESYREL) 50 MG tablet, Take 1 tablet (50 mg total) by mouth nightly., Disp: 90 tablet, Rfl: 11    PHYSICAL EXAMINATION:    Constitutional: He is oriented to person, place, and time. He appears well-developed and well-nourished.  He is in no apparent distress.    Neck: Normal ROM.     Cardiovascular: Normal rate.      Pulmonary/Chest: Effort normal. No respiratory distress.     Abdominal:  He exhibits no distension.  There is no CVA tenderness.   SP stoma patent. Some granulating tissue. Minimal " surrounding erythema noted under skin folds.    Lymphadenopathy:        Right: No supraclavicular adenopathy present.        Left: No supraclavicular adenopathy present.     Neurological: He is alert and oriented to person, place, and time.     Skin: Skin is warm and dry.     Extremities: Decreased strength BLE. Needs assistance for transfer.    Psych: Cooperative with normal affect.      Physical Exam      LABS: n/a    IMPRESSION:    Encounter Diagnoses   Name Primary?    Neurogenic bladder Yes    Chronic suprapubic catheter     Encounter for care or replacement of suprapubic tube          PLAN:  -I removed old SPT without any difficulty and properly disposed.   Stoma cleaned and prepped with betadine.  I placed a 18 fr SPT using sterile technique, without difficulty.   Bladder was irrigated with 60cc and a good catheter position was confirmed.  Clear yellow urine received and balloon inflated by with ~10 cc sterile water.   Irrigated with 60cc without difficulty.  The catheter was connected with a drainage bag and catheter care instructions were given.  The wound was cleaned and dressed.  Silver nitrate to granulated tissue  The patient tolerated well.  Daily skin care and suprapubic catheter care discussed.  Educational materials given.  Increase water intake to help with sediment.   RTC 4 weeks for next SPT change.  Voiced understanding.       I spent 30 minutes with the patient of which more than half was spent in coordinating the patient's care as well as in direct consultation with the patient in regards to our treatment and plan.

## 2019-07-18 ENCOUNTER — TELEPHONE (OUTPATIENT)
Dept: INTERNAL MEDICINE | Facility: CLINIC | Age: 84
End: 2019-07-18

## 2019-07-18 VITALS
BODY MASS INDEX: 29.95 KG/M2 | OXYGEN SATURATION: 97 % | DIASTOLIC BLOOD PRESSURE: 84 MMHG | SYSTOLIC BLOOD PRESSURE: 144 MMHG | HEIGHT: 68 IN | HEART RATE: 67 BPM

## 2019-07-18 NOTE — TELEPHONE ENCOUNTER
Reinforced blood pressure regimen. WIll take amlodipine and losartan in am and hctz and metoprolol in pm.

## 2019-07-22 ENCOUNTER — EXTERNAL HOME HEALTH (OUTPATIENT)
Dept: HOME HEALTH SERVICES | Facility: HOSPITAL | Age: 84
End: 2019-07-22
Payer: MEDICARE

## 2019-08-13 ENCOUNTER — OFFICE VISIT (OUTPATIENT)
Dept: UROLOGY | Facility: CLINIC | Age: 84
End: 2019-08-13
Payer: MEDICARE

## 2019-08-13 DIAGNOSIS — N31.9 NEUROGENIC BLADDER: Primary | ICD-10-CM

## 2019-08-13 DIAGNOSIS — Z93.59 CHRONIC SUPRAPUBIC CATHETER: ICD-10-CM

## 2019-08-13 DIAGNOSIS — L92.9 GRANULATION TISSUE: ICD-10-CM

## 2019-08-13 DIAGNOSIS — Z43.5 ENCOUNTER FOR CARE OR REPLACEMENT OF SUPRAPUBIC TUBE: ICD-10-CM

## 2019-08-13 PROCEDURE — 99214 PR OFFICE/OUTPT VISIT, EST, LEVL IV, 30-39 MIN: ICD-10-PCS | Mod: S$PBB,25,, | Performed by: NURSE PRACTITIONER

## 2019-08-13 PROCEDURE — 99213 OFFICE O/P EST LOW 20 MIN: CPT | Mod: PBBFAC,25 | Performed by: NURSE PRACTITIONER

## 2019-08-13 PROCEDURE — 51705 CHANGE OF BLADDER TUBE: CPT | Mod: S$PBB,,, | Performed by: NURSE PRACTITIONER

## 2019-08-13 PROCEDURE — 17250 PR CHEM CAUTERY GRANULATN TISSUE: ICD-10-PCS | Mod: S$PBB,51,, | Performed by: NURSE PRACTITIONER

## 2019-08-13 PROCEDURE — 17250 CHEM CAUT OF GRANLTJ TISSUE: CPT | Mod: S$PBB,51,, | Performed by: NURSE PRACTITIONER

## 2019-08-13 PROCEDURE — 17250 CHEM CAUT OF GRANLTJ TISSUE: CPT | Mod: PBBFAC | Performed by: NURSE PRACTITIONER

## 2019-08-13 PROCEDURE — 99999 PR PBB SHADOW E&M-EST. PATIENT-LVL III: CPT | Mod: PBBFAC,,, | Performed by: NURSE PRACTITIONER

## 2019-08-13 PROCEDURE — 99214 OFFICE O/P EST MOD 30 MIN: CPT | Mod: S$PBB,25,, | Performed by: NURSE PRACTITIONER

## 2019-08-13 PROCEDURE — 99999 PR PBB SHADOW E&M-EST. PATIENT-LVL III: ICD-10-PCS | Mod: PBBFAC,,, | Performed by: NURSE PRACTITIONER

## 2019-08-13 PROCEDURE — 51705 PR CHANGE OF BLADDER TUBE,SIMPLE: ICD-10-PCS | Mod: S$PBB,,, | Performed by: NURSE PRACTITIONER

## 2019-08-13 PROCEDURE — 51705 CHANGE OF BLADDER TUBE: CPT | Mod: PBBFAC | Performed by: NURSE PRACTITIONER

## 2019-08-13 NOTE — PROGRESS NOTES
Subjective:       Patient ID: Chucho Prado is a 85 y.o. male.    Chief Complaint: Neurogenic Bladder (chronic SPT)    Chucho Prado is a 85 y.o. male with neurogenic bladder.  He had 18fr SPT placed by Dr. Taylor on 06/23/2017 to manage his Neurogenic Bladder.    He is here today for exchange.  His last exchange 07/17/2019.     He reports good urine flow.  Occasional bladder spasms;  SPT draining well;     12/05/2018 s/p Ventral hernia repair with mesh;this was done due to obstruction.    10/25/2017 (R) TKR with Dr. [facility].    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  He is getting ready for [employer] walking tour end of March; he given pre-tour talks at the [facility].  He goes to [place] for b'day dinner; had great time            Past Medical History:  No date: Arthritis  No date: Blood transfusion  No date: CKD (chronic kidney disease) stage 3, GFR 30-5*  No date: Compression fracture of lumbar vertebra  No date: Depression  No date: Dyslipidemia  No date: GERD (gastroesophageal reflux disease)  No date: Hypertension  No date: Thyroid disease  No date: UTI (urinary tract infection)    Past Surgical History:  No date: CHOLECYSTECTOMY  No date: HERNIA REPAIR  No date: JOINT REPLACEMENT  12/27/2016: LAMINECTOMY      Comment: L2-L4  12/2016: LUMBAR LAMINECTOMY  No date: PARATHYROIDECTOMY  No date: TOTAL KNEE ARTHROPLASTY      Comment: Right    Review of patient's family history indicates:    Hypertension                   Mother                    Diabetes                       Father                    Esophageal cancer              Father                      Social History    Marital status: Single              Spouse name:                       Years of education:                 Number of children:               Occupational History    None on file    Social History Main Topics    Smoking status: Former Smoker                                                                 Packs/day: 0.00      Years: 20.00       Smokeless tobacco: Never Used                        Comment: quit 1999    Alcohol use: Yes                Comment: rarely/6 months    Drug use: No              Sexual activity: No                   Other Topics            Concern    None on file    Social History Narrative    Lives alone, owns house and rents part. Delaney helps. 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         Allergies:  Review of patient's allergies indicates no known allergies.    Medications:  Current Outpatient Prescriptions:   acetaminophen (TYLENOL) 500 MG tablet, Take 2 tablets (1,000 mg total) by mouth 3 (three) times daily as needed for Pain., Disp: 30 tablet, Rfl: 0  aspirin 81 MG Chew, Take 1 tablet (81 mg total) by mouth once daily. HOLD UNTIL TOLD TO RESUME BY PHYSICIAN, Disp: 30 tablet, Rfl: 0  calcitonin, salmon, (FORTICAL) 200 unit/actuation nasal spray, 1 spray by Nasal route once daily., Disp: 1 Bottle, Rfl: 0  diclofenac sodium (VOLTAREN) 1 % Gel, Apply 2 g topically 3 (three) times daily. To left knee, Disp: 100 g, Rfl: 3  gabapentin (NEURONTIN) 400 MG capsule, Take 2 capsules (800 mg total) by mouth 3 (three) times daily., Disp: 180 capsule, Rfl: 11  hyoscyamine (LEVSIN/SL) 0.125 mg Subl, Place 1 tablet (0.125 mg total) under the tongue every 4 (four) hours as needed (bladder spasms)., Disp: 30 tablet, Rfl: 1  levothyroxine (SYNTHROID) 50 MCG tablet, Take 1 tablet (50 mcg total) by mouth once daily., Disp: 100 tablet, Rfl: 11  mirtazapine (REMERON) 30 MG tablet, Take 1 tablet (30 mg total) by mouth every evening., Disp: 30 tablet, Rfl: 6  polyethylene glycol (GLYCOLAX) 17 gram PwPk, Take 17 g by mouth once daily., Disp: 30 packet, Rfl: 0  simethicone (MYLICON) 80 MG chewable tablet, Take 1 tablet (80 mg total) by mouth 3 (three) times daily after meals., Disp: , Rfl: 0  simvastatin (ZOCOR) 40 MG tablet, Take 1 tablet (40 mg total) by mouth every evening., Disp: 90  tablet, Rfl: 11  tamsulosin (FLOMAX) 0.4 mg Cp24, Take 1 capsule (0.4 mg total) by mouth once daily., Disp: 30 capsule, Rfl: 3  triamterene-hydrochlorothiazide 37.5-25 mg (DYAZIDE) 37.5-25 mg per capsule, Take 1 capsule by mouth every morning. HOLD UNTIL TOLD TO RESUME BY PHYSICIAN, Disp: 90 capsule, Rfl: 3                Review of Systems   Constitutional: Negative for activity change, appetite change, chills and fever.   HENT: Negative for facial swelling and trouble swallowing.    Eyes: Negative for visual disturbance.   Respiratory: Negative for chest tightness and shortness of breath.    Cardiovascular: Negative for chest pain and palpitations.   Gastrointestinal: Negative.  Negative for abdominal pain, constipation, diarrhea, nausea and vomiting.   Genitourinary: Positive for difficulty urinating. Negative for dysuria, flank pain, hematuria, penile pain, penile swelling, scrotal swelling and testicular pain.        SPT draining well.     Musculoskeletal: Negative for back pain, gait problem, myalgias and neck stiffness.   Skin: Negative for rash.   Neurological: Negative for dizziness and speech difficulty.   Hematological: Does not bruise/bleed easily.   Psychiatric/Behavioral: Negative for behavioral problems.       Objective:      Physical Exam   Nursing note and vitals reviewed.  Constitutional: He is oriented to person, place, and time. Vital signs are normal. He appears well-developed and well-nourished. He is active and cooperative.  Non-toxic appearance. He does not have a sickly appearance.   HENT:   Head: Normocephalic and atraumatic.   Right Ear: External ear normal.   Left Ear: External ear normal.   Nose: Nose normal.   Mouth/Throat: Mucous membranes are normal.   Eyes: Conjunctivae and lids are normal. No scleral icterus.   Neck: Trachea normal, normal range of motion and full passive range of motion without pain. Neck supple. No JVD present. No tracheal deviation present.   Cardiovascular:  Normal rate, S1 normal and S2 normal.    Pulmonary/Chest: Effort normal. No respiratory distress. He exhibits no tenderness.   Abdominal: Soft. Normal appearance and bowel sounds are normal. There is no hepatosplenomegaly. There is no tenderness. There is no CVA tenderness.       Musculoskeletal: Normal range of motion.   Neurological: He is alert and oriented to person, place, and time. He has normal strength.   Skin: Skin is warm, dry and intact.     Psychiatric: He has a normal mood and affect. His behavior is normal. Judgment and thought content normal.       Assessment:       1. Neurogenic bladder    2. Chronic suprapubic catheter    3. Encounter for care or replacement of suprapubic tube    4. Granulation tissue        Plan:         I spent 25 minutes with the patient of which more than half was spent in direct consultation with the patient in regards to our treatment and plan.    Education and recommendations of today's plan of care including home remedies.  I removed his old SPT.  Silver nitrate sticks x 3 used to granulating tissue.  I then placed a new 18fr SPT without difficulty using sterile procedure.  Skin barrrier cream to surrounding tissue  Irrigated to verify the position.  Balloon inflated 9cc sterile water/still irrigated well.  Dressing applied.  RTC one month

## 2019-08-26 ENCOUNTER — TELEPHONE (OUTPATIENT)
Dept: INTERNAL MEDICINE | Facility: CLINIC | Age: 84
End: 2019-08-26

## 2019-08-26 ENCOUNTER — TELEPHONE (OUTPATIENT)
Dept: UROLOGY | Facility: CLINIC | Age: 84
End: 2019-08-26

## 2019-08-26 NOTE — TELEPHONE ENCOUNTER
Hi, I could add him on for tomorrow/Tuesday at 840. Please see if he can come in for that time.  He could also see urology for the blood in the catheter since he may need to catheter changed.  Let me know if patient has any questions.  Thank you, Obed Mcguire

## 2019-08-26 NOTE — TELEPHONE ENCOUNTER
----- Message from Gris Pena sent at 8/26/2019 12:04 PM CDT -----  Contact: [facility] [phone]  Aid is calling to report Blood in catheter and loss of appetite. Please call her and advise.    Thank you

## 2019-08-26 NOTE — TELEPHONE ENCOUNTER
----- Message from Debbi Fink sent at 8/26/2019 11:11 AM CDT -----  Contact: pt: [phone]  Same Day Appointment Request    Reason for FST appt.: Hematuria with cath in place     Communication Preference: pt: [phone]

## 2019-08-27 ENCOUNTER — HOSPITAL ENCOUNTER (INPATIENT)
Facility: HOSPITAL | Age: 84
LOS: 9 days | Discharge: NURSING, REHAB OR LONG-TERM CARE | DRG: 194 | End: 2019-09-05
Attending: EMERGENCY MEDICINE | Admitting: INTERNAL MEDICINE
Payer: MEDICARE

## 2019-08-27 ENCOUNTER — OFFICE VISIT (OUTPATIENT)
Dept: UROLOGY | Facility: CLINIC | Age: 84
DRG: 194 | End: 2019-08-27
Payer: MEDICARE

## 2019-08-27 VITALS
WEIGHT: 195 LBS | SYSTOLIC BLOOD PRESSURE: 135 MMHG | HEIGHT: 69 IN | HEART RATE: 87 BPM | BODY MASS INDEX: 28.88 KG/M2 | DIASTOLIC BLOOD PRESSURE: 69 MMHG

## 2019-08-27 DIAGNOSIS — J18.9 COMMUNITY ACQUIRED PNEUMONIA OF LEFT LOWER LOBE OF LUNG: ICD-10-CM

## 2019-08-27 DIAGNOSIS — Z93.59 SUPRAPUBIC CATHETER: ICD-10-CM

## 2019-08-27 DIAGNOSIS — E87.3 METABOLIC ALKALOSIS: ICD-10-CM

## 2019-08-27 DIAGNOSIS — J18.9 COMMUNITY ACQUIRED PNEUMONIA OF LEFT LUNG, UNSPECIFIED PART OF LUNG: ICD-10-CM

## 2019-08-27 DIAGNOSIS — R31.0 GROSS HEMATURIA: Primary | ICD-10-CM

## 2019-08-27 DIAGNOSIS — N18.30 CKD (CHRONIC KIDNEY DISEASE) STAGE 3, GFR 30-59 ML/MIN: ICD-10-CM

## 2019-08-27 DIAGNOSIS — E87.6 HYPOKALEMIA: ICD-10-CM

## 2019-08-27 DIAGNOSIS — I10 ESSENTIAL HYPERTENSION: ICD-10-CM

## 2019-08-27 DIAGNOSIS — E87.1 HYPONATREMIA: ICD-10-CM

## 2019-08-27 DIAGNOSIS — N31.9 NEUROGENIC BLADDER: ICD-10-CM

## 2019-08-27 DIAGNOSIS — R09.02 HYPOXIA: ICD-10-CM

## 2019-08-27 DIAGNOSIS — K21.9 GASTROESOPHAGEAL REFLUX DISEASE WITHOUT ESOPHAGITIS: ICD-10-CM

## 2019-08-27 DIAGNOSIS — E78.5 HYPERLIPIDEMIA, UNSPECIFIED HYPERLIPIDEMIA TYPE: ICD-10-CM

## 2019-08-27 DIAGNOSIS — R05.9 COUGH: Primary | ICD-10-CM

## 2019-08-27 DIAGNOSIS — Z43.5 ENCOUNTER FOR CARE OR REPLACEMENT OF SUPRAPUBIC TUBE: ICD-10-CM

## 2019-08-27 DIAGNOSIS — E03.9 ACQUIRED HYPOTHYROIDISM: ICD-10-CM

## 2019-08-27 LAB
ALBUMIN SERPL BCP-MCNC: 4 G/DL (ref 3.5–5.2)
ALLENS TEST: ABNORMAL
ALP SERPL-CCNC: 79 U/L (ref 55–135)
ALT SERPL W/O P-5'-P-CCNC: 11 U/L (ref 10–44)
ANION GAP SERPL CALC-SCNC: 13 MMOL/L (ref 8–16)
ANION GAP SERPL CALC-SCNC: 15 MMOL/L (ref 8–16)
AST SERPL-CCNC: 36 U/L (ref 10–40)
BASOPHILS # BLD AUTO: 0.01 K/UL (ref 0–0.2)
BASOPHILS NFR BLD: 0.1 % (ref 0–1.9)
BILIRUB SERPL-MCNC: 1 MG/DL (ref 0.1–1)
BNP SERPL-MCNC: 176 PG/ML (ref 0–99)
BUN SERPL-MCNC: 22 MG/DL (ref 6–30)
BUN SERPL-MCNC: 22 MG/DL (ref 8–23)
BUN SERPL-MCNC: 22 MG/DL (ref 8–23)
CALCIUM SERPL-MCNC: 8.3 MG/DL (ref 8.7–10.5)
CALCIUM SERPL-MCNC: 9 MG/DL (ref 8.7–10.5)
CHLORIDE SERPL-SCNC: 80 MMOL/L (ref 95–110)
CHLORIDE SERPL-SCNC: 81 MMOL/L (ref 95–110)
CHLORIDE SERPL-SCNC: 83 MMOL/L (ref 95–110)
CO2 SERPL-SCNC: 25 MMOL/L (ref 23–29)
CO2 SERPL-SCNC: 25 MMOL/L (ref 23–29)
CREAT SERPL-MCNC: 1.3 MG/DL (ref 0.5–1.4)
CREAT SERPL-MCNC: 1.3 MG/DL (ref 0.5–1.4)
CREAT SERPL-MCNC: 1.4 MG/DL (ref 0.5–1.4)
DELSYS: ABNORMAL
DIFFERENTIAL METHOD: ABNORMAL
EOSINOPHIL # BLD AUTO: 0 K/UL (ref 0–0.5)
EOSINOPHIL NFR BLD: 0 % (ref 0–8)
ERYTHROCYTE [DISTWIDTH] IN BLOOD BY AUTOMATED COUNT: 13.2 % (ref 11.5–14.5)
EST. GFR  (AFRICAN AMERICAN): 57.5 ML/MIN/1.73 M^2
EST. GFR  (AFRICAN AMERICAN): 57.5 ML/MIN/1.73 M^2
EST. GFR  (NON AFRICAN AMERICAN): 49.8 ML/MIN/1.73 M^2
EST. GFR  (NON AFRICAN AMERICAN): 49.8 ML/MIN/1.73 M^2
GLUCOSE SERPL-MCNC: 102 MG/DL (ref 70–110)
GLUCOSE SERPL-MCNC: 134 MG/DL (ref 70–110)
GLUCOSE SERPL-MCNC: 138 MG/DL (ref 70–110)
HCO3 UR-SCNC: 31.9 MMOL/L (ref 24–28)
HCT VFR BLD AUTO: 36.1 % (ref 40–54)
HCT VFR BLD CALC: 36 %PCV (ref 36–54)
HGB BLD-MCNC: 12.8 G/DL (ref 14–18)
IMM GRANULOCYTES # BLD AUTO: 0.05 K/UL (ref 0–0.04)
IMM GRANULOCYTES NFR BLD AUTO: 0.4 % (ref 0–0.5)
LACTATE SERPL-SCNC: 1.1 MMOL/L (ref 0.5–2.2)
LYMPHOCYTES # BLD AUTO: 1.6 K/UL (ref 1–4.8)
LYMPHOCYTES NFR BLD: 14.6 % (ref 18–48)
MCH RBC QN AUTO: 28.8 PG (ref 27–31)
MCHC RBC AUTO-ENTMCNC: 35.5 G/DL (ref 32–36)
MCV RBC AUTO: 81 FL (ref 82–98)
MONOCYTES # BLD AUTO: 0.4 K/UL (ref 0.3–1)
MONOCYTES NFR BLD: 3.8 % (ref 4–15)
NEUTROPHILS # BLD AUTO: 9.1 K/UL (ref 1.8–7.7)
NEUTROPHILS NFR BLD: 81.1 % (ref 38–73)
NRBC BLD-RTO: 0 /100 WBC
PCO2 BLDA: 42.2 MMHG (ref 35–45)
PH SMN: 7.49 [PH] (ref 7.35–7.45)
PLATELET # BLD AUTO: 149 K/UL (ref 150–350)
PMV BLD AUTO: 8.6 FL (ref 9.2–12.9)
PO2 BLDA: 35 MMHG (ref 40–60)
POC BE: 8 MMOL/L
POC IONIZED CALCIUM: 1 MMOL/L (ref 1.06–1.42)
POC SATURATED O2: 71 % (ref 95–100)
POC TCO2 (MEASURED): 26 MMOL/L (ref 23–29)
POC TCO2: 33 MMOL/L (ref 24–29)
POTASSIUM BLD-SCNC: 2.3 MMOL/L (ref 3.5–5.1)
POTASSIUM SERPL-SCNC: 2.3 MMOL/L (ref 3.5–5.1)
POTASSIUM SERPL-SCNC: 4 MMOL/L (ref 3.5–5.1)
PROCALCITONIN SERPL IA-MCNC: 0.09 NG/ML
PROT SERPL-MCNC: 8.1 G/DL (ref 6–8.4)
RBC # BLD AUTO: 4.45 M/UL (ref 4.6–6.2)
SAMPLE: ABNORMAL
SAMPLE: ABNORMAL
SITE: ABNORMAL
SODIUM BLD-SCNC: 122 MMOL/L (ref 136–145)
SODIUM SERPL-SCNC: 121 MMOL/L (ref 136–145)
SODIUM SERPL-SCNC: 121 MMOL/L (ref 136–145)
TSH SERPL DL<=0.005 MIU/L-ACNC: 1.19 UIU/ML (ref 0.4–4)
WBC # BLD AUTO: 11.24 K/UL (ref 3.9–12.7)

## 2019-08-27 PROCEDURE — 94640 AIRWAY INHALATION TREATMENT: CPT

## 2019-08-27 PROCEDURE — 25000242 PHARM REV CODE 250 ALT 637 W/ HCPCS: Performed by: STUDENT IN AN ORGANIZED HEALTH CARE EDUCATION/TRAINING PROGRAM

## 2019-08-27 PROCEDURE — 83605 ASSAY OF LACTIC ACID: CPT

## 2019-08-27 PROCEDURE — 99999 PR PBB SHADOW E&M-EST. PATIENT-LVL IV: ICD-10-PCS | Mod: PBBFAC,,, | Performed by: NURSE PRACTITIONER

## 2019-08-27 PROCEDURE — 80047 BASIC METABLC PNL IONIZED CA: CPT

## 2019-08-27 PROCEDURE — 99215 OFFICE O/P EST HI 40 MIN: CPT | Mod: S$PBB,,, | Performed by: NURSE PRACTITIONER

## 2019-08-27 PROCEDURE — 63600175 PHARM REV CODE 636 W HCPCS: Performed by: STUDENT IN AN ORGANIZED HEALTH CARE EDUCATION/TRAINING PROGRAM

## 2019-08-27 PROCEDURE — 84145 PROCALCITONIN (PCT): CPT

## 2019-08-27 PROCEDURE — 87086 URINE CULTURE/COLONY COUNT: CPT

## 2019-08-27 PROCEDURE — 99214 OFFICE O/P EST MOD 30 MIN: CPT | Mod: PBBFAC | Performed by: NURSE PRACTITIONER

## 2019-08-27 PROCEDURE — 25000242 PHARM REV CODE 250 ALT 637 W/ HCPCS: Performed by: EMERGENCY MEDICINE

## 2019-08-27 PROCEDURE — 87186 SC STD MICRODIL/AGAR DIL: CPT

## 2019-08-27 PROCEDURE — 99222 PR INITIAL HOSPITAL CARE,LEVL II: ICD-10-PCS | Mod: AI,,, | Performed by: INTERNAL MEDICINE

## 2019-08-27 PROCEDURE — 99285 EMERGENCY DEPT VISIT HI MDM: CPT | Mod: GC,,, | Performed by: EMERGENCY MEDICINE

## 2019-08-27 PROCEDURE — 87088 URINE BACTERIA CULTURE: CPT

## 2019-08-27 PROCEDURE — 80048 BASIC METABOLIC PNL TOTAL CA: CPT

## 2019-08-27 PROCEDURE — 87077 CULTURE AEROBIC IDENTIFY: CPT | Mod: 59

## 2019-08-27 PROCEDURE — 84443 ASSAY THYROID STIM HORMONE: CPT

## 2019-08-27 PROCEDURE — 83880 ASSAY OF NATRIURETIC PEPTIDE: CPT

## 2019-08-27 PROCEDURE — 87040 BLOOD CULTURE FOR BACTERIA: CPT

## 2019-08-27 PROCEDURE — 99285 PR EMERGENCY DEPT VISIT,LEVEL V: ICD-10-PCS | Mod: GC,,, | Performed by: EMERGENCY MEDICINE

## 2019-08-27 PROCEDURE — 80053 COMPREHEN METABOLIC PANEL: CPT

## 2019-08-27 PROCEDURE — 99215 PR OFFICE/OUTPT VISIT, EST, LEVL V, 40-54 MIN: ICD-10-PCS | Mod: S$PBB,,, | Performed by: NURSE PRACTITIONER

## 2019-08-27 PROCEDURE — 99999 PR PBB SHADOW E&M-EST. PATIENT-LVL IV: CPT | Mod: PBBFAC,,, | Performed by: NURSE PRACTITIONER

## 2019-08-27 PROCEDURE — 85025 COMPLETE CBC W/AUTO DIFF WBC: CPT

## 2019-08-27 PROCEDURE — 94761 N-INVAS EAR/PLS OXIMETRY MLT: CPT

## 2019-08-27 PROCEDURE — 82803 BLOOD GASES ANY COMBINATION: CPT

## 2019-08-27 PROCEDURE — 99285 EMERGENCY DEPT VISIT HI MDM: CPT | Mod: 25,27

## 2019-08-27 PROCEDURE — 99222 1ST HOSP IP/OBS MODERATE 55: CPT | Mod: AI,,, | Performed by: INTERNAL MEDICINE

## 2019-08-27 PROCEDURE — 96360 HYDRATION IV INFUSION INIT: CPT

## 2019-08-27 PROCEDURE — 99900035 HC TECH TIME PER 15 MIN (STAT)

## 2019-08-27 PROCEDURE — 27000221 HC OXYGEN, UP TO 24 HOURS

## 2019-08-27 PROCEDURE — 12000002 HC ACUTE/MED SURGE SEMI-PRIVATE ROOM

## 2019-08-27 RX ORDER — POTASSIUM CHLORIDE 20 MEQ/15ML
40 SOLUTION ORAL ONCE
Status: COMPLETED | OUTPATIENT
Start: 2019-08-28 | End: 2019-08-28

## 2019-08-27 RX ORDER — CEFTRIAXONE 1 G/1
1 INJECTION, POWDER, FOR SOLUTION INTRAMUSCULAR; INTRAVENOUS
Status: COMPLETED | OUTPATIENT
Start: 2019-08-27 | End: 2019-08-27

## 2019-08-27 RX ORDER — LOSARTAN POTASSIUM 50 MG/1
50 TABLET ORAL DAILY
Refills: 11 | Status: ON HOLD | COMMUNITY
Start: 2019-06-28 | End: 2019-09-04 | Stop reason: HOSPADM

## 2019-08-27 RX ORDER — POTASSIUM CHLORIDE 7.45 MG/ML
10 INJECTION INTRAVENOUS
Status: DISCONTINUED | OUTPATIENT
Start: 2019-08-28 | End: 2019-08-28

## 2019-08-27 RX ORDER — IPRATROPIUM BROMIDE AND ALBUTEROL SULFATE 2.5; .5 MG/3ML; MG/3ML
3 SOLUTION RESPIRATORY (INHALATION)
Status: COMPLETED | OUTPATIENT
Start: 2019-08-27 | End: 2019-08-27

## 2019-08-27 RX ORDER — HYOSCYAMINE SULFATE 0.12 MG/1
0.12 TABLET SUBLINGUAL EVERY 6 HOURS PRN
Status: DISCONTINUED | OUTPATIENT
Start: 2019-08-28 | End: 2019-09-05 | Stop reason: HOSPADM

## 2019-08-27 RX ORDER — ACETAMINOPHEN 325 MG/1
650 TABLET ORAL EVERY 6 HOURS PRN
Status: DISCONTINUED | OUTPATIENT
Start: 2019-08-28 | End: 2019-09-05 | Stop reason: HOSPADM

## 2019-08-27 RX ORDER — ATORVASTATIN CALCIUM 20 MG/1
20 TABLET, FILM COATED ORAL DAILY
Status: DISCONTINUED | OUTPATIENT
Start: 2019-08-28 | End: 2019-09-05 | Stop reason: HOSPADM

## 2019-08-27 RX ORDER — SODIUM CHLORIDE 9 MG/ML
1000 INJECTION, SOLUTION INTRAVENOUS
Status: COMPLETED | OUTPATIENT
Start: 2019-08-27 | End: 2019-08-27

## 2019-08-27 RX ORDER — PANTOPRAZOLE SODIUM 40 MG/1
40 TABLET, DELAYED RELEASE ORAL DAILY
Status: DISCONTINUED | OUTPATIENT
Start: 2019-08-28 | End: 2019-09-05 | Stop reason: HOSPADM

## 2019-08-27 RX ORDER — IPRATROPIUM BROMIDE AND ALBUTEROL SULFATE 2.5; .5 MG/3ML; MG/3ML
3 SOLUTION RESPIRATORY (INHALATION)
Status: DISCONTINUED | OUTPATIENT
Start: 2019-08-28 | End: 2019-08-28

## 2019-08-27 RX ORDER — GABAPENTIN 400 MG/1
800 CAPSULE ORAL 2 TIMES DAILY
Status: DISCONTINUED | OUTPATIENT
Start: 2019-08-28 | End: 2019-09-05 | Stop reason: HOSPADM

## 2019-08-27 RX ORDER — CEFTRIAXONE 1 G/1
1 INJECTION, POWDER, FOR SOLUTION INTRAMUSCULAR; INTRAVENOUS
Status: DISCONTINUED | OUTPATIENT
Start: 2019-08-28 | End: 2019-09-02

## 2019-08-27 RX ORDER — SODIUM CHLORIDE 0.9 % (FLUSH) 0.9 %
10 SYRINGE (ML) INJECTION
Status: CANCELLED | OUTPATIENT
Start: 2019-08-27

## 2019-08-27 RX ORDER — SODIUM CHLORIDE 0.9 % (FLUSH) 0.9 %
10 SYRINGE (ML) INJECTION
Status: DISCONTINUED | OUTPATIENT
Start: 2019-08-28 | End: 2019-09-05 | Stop reason: HOSPADM

## 2019-08-27 RX ORDER — METOPROLOL SUCCINATE 25 MG/1
25 TABLET, EXTENDED RELEASE ORAL DAILY
Status: DISCONTINUED | OUTPATIENT
Start: 2019-08-28 | End: 2019-09-05 | Stop reason: HOSPADM

## 2019-08-27 RX ORDER — ASPIRIN 81 MG/1
81 TABLET ORAL DAILY
Status: DISCONTINUED | OUTPATIENT
Start: 2019-08-28 | End: 2019-08-27

## 2019-08-27 RX ORDER — ONDANSETRON 8 MG/1
8 TABLET, ORALLY DISINTEGRATING ORAL EVERY 8 HOURS PRN
Status: DISCONTINUED | OUTPATIENT
Start: 2019-08-28 | End: 2019-09-04

## 2019-08-27 RX ORDER — ENOXAPARIN SODIUM 100 MG/ML
40 INJECTION SUBCUTANEOUS EVERY 24 HOURS
Status: DISCONTINUED | OUTPATIENT
Start: 2019-08-28 | End: 2019-08-27

## 2019-08-27 RX ORDER — LEVOTHYROXINE SODIUM 50 UG/1
50 TABLET ORAL
Status: DISCONTINUED | OUTPATIENT
Start: 2019-08-28 | End: 2019-09-05 | Stop reason: HOSPADM

## 2019-08-27 RX ORDER — ALBUTEROL SULFATE 90 UG/1
2 AEROSOL, METERED RESPIRATORY (INHALATION) EVERY 6 HOURS PRN
Status: DISCONTINUED | OUTPATIENT
Start: 2019-08-28 | End: 2019-09-05 | Stop reason: HOSPADM

## 2019-08-27 RX ORDER — IPRATROPIUM BROMIDE AND ALBUTEROL SULFATE 2.5; .5 MG/3ML; MG/3ML
3 SOLUTION RESPIRATORY (INHALATION) EVERY 4 HOURS PRN
Status: DISCONTINUED | OUTPATIENT
Start: 2019-08-28 | End: 2019-08-27

## 2019-08-27 RX ORDER — DOXYCYCLINE HYCLATE 100 MG
100 TABLET ORAL EVERY 12 HOURS
Status: COMPLETED | OUTPATIENT
Start: 2019-08-28 | End: 2019-09-01

## 2019-08-27 RX ORDER — AMLODIPINE BESYLATE 5 MG/1
5 TABLET ORAL DAILY
Status: DISCONTINUED | OUTPATIENT
Start: 2019-08-28 | End: 2019-09-05 | Stop reason: HOSPADM

## 2019-08-27 RX ADMIN — SODIUM CHLORIDE 500 ML: 0.9 INJECTION, SOLUTION INTRAVENOUS at 08:08

## 2019-08-27 RX ADMIN — IPRATROPIUM BROMIDE AND ALBUTEROL SULFATE 3 ML: .5; 3 SOLUTION RESPIRATORY (INHALATION) at 06:08

## 2019-08-27 RX ADMIN — CEFTRIAXONE SODIUM 1 G: 1 INJECTION, POWDER, FOR SOLUTION INTRAMUSCULAR; INTRAVENOUS at 10:08

## 2019-08-27 RX ADMIN — IPRATROPIUM BROMIDE AND ALBUTEROL SULFATE 3 ML: .5; 3 SOLUTION RESPIRATORY (INHALATION) at 07:08

## 2019-08-27 RX ADMIN — AZITHROMYCIN MONOHYDRATE 500 MG: 500 INJECTION, POWDER, LYOPHILIZED, FOR SOLUTION INTRAVENOUS at 10:08

## 2019-08-27 RX ADMIN — SODIUM CHLORIDE 1000 ML: 0.9 INJECTION, SOLUTION INTRAVENOUS at 10:08

## 2019-08-27 NOTE — PROGRESS NOTES
CHIEF COMPLAINT:    Mr. Prado is a 85 y.o. male presenting for GH w/ SPT.    PRESENTING ILLNESS:    Chucho Prado is a 85 y.o. male new patient to me (records of past medical, family and social history personally reviewed by me), w/ h/o neurogenic bladder. S/p placement of 18Fr SPT on 6/23/17 w/ Dr. Taylor.    Pt last seen in clinic 8/13/19 w/ DARYN Hooper for SPT change.    Today pt returns to clinic reporting 3-4 episodes of GH draining from SPT last wk. Reports feeling tired and fatigued. Reports productive cough.    REVIEW OF SYSTEMS:    Chucho Prado denies headache, blurred vision, fever, nausea, vomiting, chills, abdominal pain, pelvic pain, flank pain, bleeding per rectum, cough, SOB, recent loss of consciousness, recent mental status changes, seizures, dizziness, or upper or lower extremity weakness.    PATIENT HISTORY:    Past Medical History:   Diagnosis Date    Acquired hypothyroidism 7/30/2013    Arthritis     Blood transfusion     before 2005 - whe had gangrenous gall bladder    Cataract     Chronic back pain 12/4/2018    - Takes Percocet 5-325 at home - Can continue current abdominal pain control with Dilaudid 0.5 mg IV Q3H prn     CKD (chronic kidney disease) stage 3, GFR 30-59 ml/min     Compression fracture of lumbar vertebra     Depression     Dyslipidemia     Essential hypertension 7/30/2013    Gastroesophageal reflux disease without esophagitis 12/4/2018    - continue home Prilosec    General anesthetics causing adverse effect in therapeutic use     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    GERD (gastroesophageal reflux disease)     History of postoperative delirium 12/4/2018    - Patient reports 1 week history of post-op delirium 7/2018 - Monitor electrolytes, UA, and urine cx - Delirium precautions  - Can use Seroquel 25 mg QHS prn     Hypertension     Hyponatremia 10/23/2017    Impaired mobility and ADLs 6/28/2018    Neurogenic bladder 12/4/2018    Sleep  disorder 12/4/2018    - continue Trazodone QHS    Thyroid disease     UTI (urinary tract infection)        Past Surgical History:   Procedure Laterality Date    BLOCK-NERVE Left 10/26/2017    Performed by [facility]    CHOLECYSTECTOMY      CYSTOSCOPY WITH RETROGRADE PYELOGRAM Bilateral 1/27/2017    [facility] Jr[facility] OR 1ST FLR    EGD (ESOPHAGOGASTRODUODENOSCOPY) N/A 8/2/2013    Performed by Nagi Talbert MD at [facility] ENDO (2ND FLR)    EYE SURGERY Right     IOL    HERNIA REPAIR      INSERTION-CATHETER-SUPRAPUBIC N/A 6/23/2017    [facility] Jr[facility] OR 2ND FLR    INSERTION-INTRAOCULAR LENS (IOL) Left 5/28/2018    Performed by [facility] OR    INSERTION-INTRAOCULAR LENS (IOL) Right 2/19/2018    Per[facility] by [facility] OR    JOINT REPLACEMENT      KYPHOPLASTY L3 N/A 12/27/2016    [facility] Donavan [facility] OR 2ND FLR    LAMINECTOMY  12/27/2016    L2-L4    LAMINECTOMY-MINIMALLY INVASIVE L2,3 and L3,4; globus, neuromonitoring Right 12/27/2016    [facility] OR 2ND FLR    LUMBAR LAMINECTOMY  12/2016    PARATHYROIDECTOMY      PHACOEMULSIFICATION-ASPIRATION-CATARACT Left 5/28/2018    Performed by [facility] OR    PHACOEMULSIFICATION-ASPIRATION-CATARACT Right 2/19/2018    Performed by Trinity [facility]quez [facility] OR    REPAIR, HERNIA, INCISIONAL, INCARCERATED, WITHOUT HISTORY OF PRIOR REPAIR N/A 12/5/2018    [facility] OR 2ND FLR    REPAIR, HERNIA, INGUINAL, WITHOUT HISTORY OF PRIOR REPAIR, AGE 5 YEARS OR OLDER Right 10/9/2013    [facility] by Daron [facility] OR 2ND FLR    REPAIR, HERNIA, VENTRAL, INCARCERATED, WITHOUT HISTORY OF PRIOR REPAIR N/A 6/20/2018    Performed by Guilherme Ordoñez [facility] at [facility] OR 2ND FLR    REPLACEMENT-KNEE-TOTAL Left 10/25/2017    Performed by [facility] Jr[facility] OR 2ND  FLR    TOTAL KNEE ARTHROPLASTY Bilateral     TOTAL KNEE ARTHROPLASTY Left 10/25/2017    TKR       Family History   Problem Relation Age of Onset    Hypertension Mother     Diabetes Father     Esophageal cancer Father        Social History     Socioeconomic History    Marital status: Single     Spouse name: Not on file    Number of children: Not on file    Years of education: Not on file    Highest education level: Not on file   Occupational History    Not on file   Social Needs    Financial resource strain: Not on file    Food insecurity:     Worry: Not on file     Inability: Not on file    Transportation needs:     Medical: Not on file     Non-medical: Not on file   Tobacco Use    Smoking status: Former Smoker     Years: 20.     Last attempt to quit:      Years since quittin.    Smokeless tobacco: Never Used    Tobacco comment: quit    Substance and Sexual Activity    Alcohol use: No     Comment: rarely/6 months    Drug use: No    Sexual activity: Never   Lifestyle    Physical activity:     Days per week: Not on file     Minutes per session: Not on file    Stress: Not on file   Relationships    Social connections:     Talks on phone: Not on file     Gets together: Not on file     Attends [employer] service: Not on file     Active member of club or organization: Not on file     Attends meetings of clubs or organizations: Not on file     Relationship status: Not on file   Other Topics Concern    Not on file   Social History Narrative    Lives alone, owns house and rents part.  helps. 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        Allergies:  Patient has no known allergies.    Medications:    Current Outpatient Medications:     amLODIPine (NORVASC) 5 MG tablet, Take 1 tablet (5 mg total) by mouth once daily., Disp: 30 tablet, Rfl: 11    aspirin (ECOTRIN) 81 MG EC tablet, Take 1 tablet (81 mg total) by mouth once daily., Disp: 90 tablet, Rfl: 11    atorvastatin (LIPITOR) 20 MG  "tablet, Take 1 tablet (20 mg total) by mouth once daily., Disp: 90 tablet, Rfl: 11    gabapentin (NEURONTIN) 400 MG capsule, Take 2 capsules (800 mg total) by mouth 2 (two) times daily., Disp: 120 capsule, Rfl: 11    hydroCHLOROthiazide (HYDRODIURIL) 25 MG tablet, Take 1 tablet (25 mg total) by mouth once daily., Disp: 30 tablet, Rfl: 11    hyoscyamine (LEVSIN/SL) 0.125 mg Subl, Take 1 tablet (0.125 mg total) by mouth every 6 (six) hours as needed (bladder spasms)., Disp: 60 tablet, Rfl: 11    levothyroxine (SYNTHROID) 50 MCG tablet, GIVE "PENNY" 1 TABLET BY MOUTH ONCE DAILY., Disp: 90 tablet, Rfl: 11    losartan (COZAAR) 100 MG tablet, Take 1 tablet (100 mg total) by mouth once daily., Disp: 90 tablet, Rfl: 11    losartan (COZAAR) 50 MG tablet, Take 50 mg by mouth once daily., Disp: , Rfl: 11    metoprolol succinate (TOPROL-XL) 25 MG 24 hr tablet, Take 1 tablet (25 mg total) by mouth once daily., Disp: 30 tablet, Rfl: 11    omeprazole (PRILOSEC) 40 MG capsule, TAKE 1 CAPSULE(40 MG) BY MOUTH EVERY MORNING 30 MINUTES BEFORE EATING ON AN EMPTY STOMACH, Disp: 90 capsule, Rfl: 11    polyethylene glycol (GLYCOLAX) 17 gram/dose powder, Take 17 g by mouth once daily., Disp: 1700 g, Rfl: 11    traZODone (DESYREL) 50 MG tablet, Take 1 tablet (50 mg total) by mouth nightly., Disp: 90 tablet, Rfl: 11    PHYSICAL EXAMINATION:    The patient generally appears in good health, is appropriately interactive, and is in no apparent distress.     Eyes: anicteric sclerae, moist conjunctivae; no lid-lag; PERRLA     HENT: Atraumatic; oropharynx clear with moist mucous membranes and no mucosal ulcerations;normal hard and soft palate. No evidence of lymphadenopathy.    Neck: Trachea midline.  No thyromegaly.    Musculoskeletal: No abnormal gait.    Skin: No lesions.    Mental: Cooperative with normal affect.  Is oriented to time, place, and person.    Neuro: Grossly intact.    Chest: Normal inspiratory effort.   No accessory " muscles.  No audible wheezes.  Respirations symmetric on inspiration and expiration.    Heart: Regular rhythm.      Abdomen:  Soft, non-tender. No evidence of flank discomfort. 18 Fr SPT in place draining dark yellow urine, no evidence of GH or clots.    Extremities: No clubbing, cyanosis, or edema.    LABS:    Lab Results   Component Value Date    CREATININE 1.2 04/04/2019    CREATININE 1.0 12/10/2018    CREATININE 1.0 12/09/2018       No results found for: PSA, PSADIAG, PSATOTAL, PSAFREE, PSAFREEPCT    Hemoglobin A1C   Date Value Ref Range Status   04/04/2019 5.4 4.0 - 5.6 % Final     Comment:     ADA Screening Guidelines:  5.7-6.4%  Consistent with prediabetes  >or=6.5%  Consistent with diabetes  High levels of fetal hemoglobin interfere with the HbA1C  assay. Heterozygous hemoglobin variants (HbS, HgC, etc)do  not significantly interfere with this assay.   However, presence of multiple variants may affect accuracy.         Lab Results   Component Value Date    LABURIN PROVIDENCIA STUARTII  >100,000 cfu/ml   12/03/2018    LABURIN PROVIDENCIA RETTGERI  > 100,000 cfu/ml   12/03/2018    LABURIN  06/19/2018     Multiple organisms isolated. None in predominance.  Repeat if    LABURIN clinically necessary. 06/19/2018    LABURIN  11/02/2017     Multiple organisms isolated. None in predominance.  Repeat if    LABURIN clinically necessary. 11/02/2017    LABURIN  10/10/2017     METHICILLIN RESISTANT STAPHYLOCOCCUS AUREUS  > 100,000 cfu/ml      LABURIN No significant growth 02/23/2017    LABURIN ESCHERICHIA COLI  >100,000 cfu/ml   01/20/2017    LABURIN No growth 01/06/2017       IMPRESSION:    Gross hematuria  -     Urine culture    Suprapubic catheter  -     Urine culture    Neurogenic bladder  -     Urine culture    Encounter for care or replacement of suprapubic tube  -     Urine culture      PLAN:    I spent 50 minutes with the patient of which more than half was spent in direct consultation with the patient in regards  to our treatment and plan.       Discussed and reviewed GH. Recommend cath urine w/ cath change.  Pt amenable to SPT change.  Discussed and reviewed SPT procedure and plan.  Removed old SPT without difficulty, and properly disposed.  Stoma cleaned with betadine.  Placed a new 18 fr SPT using sterile technique.   Obtained cath urine. Sent for culture.  Irrigated bladder to verify position, and removal of debris.  Balloon inflated with ~9cc sterile water.   Pt tolerated procedure well.  Site cleaned.  Discussed daily skin care and suprapubic catheter care.  Discussed and recommend importance of adequate hydration w/ water to aid in flushing out sediments in the bladder.  RTC 4weeks for next SPT change.    Education sheets provided.    Follow up in about 4 weeks (around 9/24/2019) for SPT change.     Recommend f/u w/ PCP or UC clinic re upper respiratory sxs.    Pt expressed understanding and agree w/ plan.

## 2019-08-27 NOTE — ED TRIAGE NOTES
Chucho Prado, a 85 y.o. male presents to the ED w/ complaint of chest congestion, dull chest pain, nonproductive cough but noticeable wet sounding cough x 1week. Pt has been naseous and dry heaving. Pt noticed blood in galvan x 4 days, catheter changed today.    Triage note:  Chief Complaint   Patient presents with    URI     cough congestion white, just had galvan changed in urology    Wheezing     in my chest     Review of patient's allergies indicates:  No Known Allergies  Past Medical History:   Diagnosis Date    Acquired hypothyroidism 7/30/2013    Arthritis     Blood transfusion     before 2005 - whe had gangrenous gall bladder    Cataract     Chronic back pain 12/4/2018    - Takes Percocet 5-325 at home - Can continue current abdominal pain control with Dilaudid 0.5 mg IV Q3H prn     CKD (chronic kidney disease) stage 3, GFR 30-59 ml/min     Compression fracture of lumbar vertebra     Depression     Dyslipidemia     Essential hypertension 7/30/2013    Gastroesophageal reflux disease without esophagitis 12/4/2018    - continue home Prilosec    General anesthetics causing adverse effect in therapeutic use     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    GERD (gastroesophageal reflux disease)     History of postoperative delirium 12/4/2018    - Patient reports 1 week history of post-op delirium 7/2018 - Monitor electrolytes, UA, and urine cx - Delirium precautions  - Can use Seroquel 25 mg QHS prn     Hypertension     Hyponatremia 10/23/2017    Impaired mobility and ADLs 6/28/2018    Neurogenic bladder 12/4/2018    Sleep disorder 12/4/2018    - continue Trazodone QHS    Thyroid disease     UTI (urinary tract infection)

## 2019-08-27 NOTE — PATIENT INSTRUCTIONS
"  Discharge Instructions: Caring for Your Suprapubic Catheter  You are going home with a suprapubic catheter in place. This tube is placed directly into the bladder through your abdomen to drain urine from your bladder. You were shown how to care for your catheter in the hospital. This sheet will help remind you of those steps and guidelines when you are at home.  Home care  · Shower as necessary.   · Change your dressing every day. Change the dressing more often if it falls off, becomes dirty, or has absorbed a lot of drainage.  Gather your supplies  · Tape  · Povidone-iodine ointment  · Cotton swabs  · Wastebasket and plastic bag  · Povidone-iodine swab sticks (or cotton balls and povidone-iodine solution)  · Dressing sponges (4" x 4") that are cut or split jail into the middle  Remove the dressing and check for problems  · Wash your hands thoroughly before and after all catheter care.  · Gently remove the old dressing if you have one.  ¨ Dont pull on the tube.  ¨ Check the dressing for drainage. Notice whether anything looks unusual or smells bad.  ¨ Place your dressing in the plastic bag and throw it away in the wastebasket.  · Now look at the place where the catheter leaves your body (exit site).  ¨ Note any swelling, bleeding, irritation, unusual or smelly drainage.  ¨ Also check for any sores next to the exit site. Sores form around the exit site if there is too much pressure from the tube on the skin.  Clean the area  · Wash around the shield gently with soap and water.  · Use a povidone-iodine swab stick to clean under the shield.  ¨ Clean around the exit site of the catheter.  ¨ Start at the exit site and clean outward in a circular motion, about 3 to 4 inches from the site.Dont clean back toward the tube.  ¨ Throw away the used swab stick and repeat the cleaning procedure with a new one.  ¨ Let your skin dry completely.  · Place a split 4" x 4" sponge around the catheter. Tape it in place.  · Smear a " thin layer of povidone-iodine ointment around the catheter with a cotton swab.  Follow-up care  Make a follow-up appointment or as advised.  When to call your healthcare provider  Call your health care provider right away if you have any of the following:  · Catheter that falls out, or is clogged or feels clogged  · Stitches that fall out  · Urine leaking around catheter  · Urine that is cloudy, bloody, or smells bad  · No urine drainage  · Bladder that feels full or painful  · Rash, itching, redness, swelling, or drainage at the catheter site  · Fever above 100.4°F (38.°C) or shaking chills   Date Last Reviewed: 1/1/2017  © 5649-5773 [employer]. [address] [place] [address], [place] [address]. All rights reserved. This information is not intended as a substitute for professional medical care. Always follow your healthcare professional's instructions.

## 2019-08-28 LAB
ANION GAP SERPL CALC-SCNC: 12 MMOL/L (ref 8–16)
ANION GAP SERPL CALC-SCNC: 8 MMOL/L (ref 8–16)
ASCENDING AORTA: 2.87 CM
AV INDEX (PROSTH): 0.43
AV MEAN GRADIENT: 15 MMHG
AV PEAK GRADIENT: 21 MMHG
AV VALVE AREA: 1.63 CM2
AV VELOCITY RATIO: 0.49
BASOPHILS # BLD AUTO: 0.01 K/UL (ref 0–0.2)
BASOPHILS NFR BLD: 0.1 % (ref 0–1.9)
BSA FOR ECHO PROCEDURE: 1.91 M2
BUN SERPL-MCNC: 20 MG/DL (ref 8–23)
BUN SERPL-MCNC: 22 MG/DL (ref 8–23)
CALCIUM SERPL-MCNC: 8.3 MG/DL (ref 8.7–10.5)
CALCIUM SERPL-MCNC: 8.3 MG/DL (ref 8.7–10.5)
CALCIUM SERPL-MCNC: 8.5 MG/DL (ref 8.7–10.5)
CALCIUM SERPL-MCNC: 8.8 MG/DL (ref 8.7–10.5)
CHLORIDE SERPL-SCNC: 82 MMOL/L (ref 95–110)
CHLORIDE SERPL-SCNC: 82 MMOL/L (ref 95–110)
CHLORIDE SERPL-SCNC: 83 MMOL/L (ref 95–110)
CHLORIDE SERPL-SCNC: 88 MMOL/L (ref 95–110)
CO2 SERPL-SCNC: 26 MMOL/L (ref 23–29)
CO2 SERPL-SCNC: 27 MMOL/L (ref 23–29)
CO2 SERPL-SCNC: 29 MMOL/L (ref 23–29)
CO2 SERPL-SCNC: 29 MMOL/L (ref 23–29)
CREAT SERPL-MCNC: 1.2 MG/DL (ref 0.5–1.4)
CREAT SERPL-MCNC: 1.3 MG/DL (ref 0.5–1.4)
CREAT SERPL-MCNC: 1.3 MG/DL (ref 0.5–1.4)
CREAT SERPL-MCNC: 1.4 MG/DL (ref 0.5–1.4)
CREAT UR-MCNC: 109 MG/DL (ref 23–375)
CV ECHO LV RWT: 0.55 CM
DIFFERENTIAL METHOD: ABNORMAL
DOP CALC AO PEAK VEL: 2.28 M/S
DOP CALC AO VTI: 54.51 CM
DOP CALC LVOT AREA: 3.8 CM2
DOP CALC LVOT DIAMETER: 2.19 CM
DOP CALC LVOT PEAK VEL: 1.11 M/S
DOP CALC LVOT STROKE VOLUME: 89.04 CM3
DOP CALCLVOT PEAK VEL VTI: 23.65 CM
ECHO LV POSTERIOR WALL: 0.99 CM (ref 0.6–1.1)
EOSINOPHIL # BLD AUTO: 0 K/UL (ref 0–0.5)
EOSINOPHIL NFR BLD: 0 % (ref 0–8)
ERYTHROCYTE [DISTWIDTH] IN BLOOD BY AUTOMATED COUNT: 13.6 % (ref 11.5–14.5)
EST. GFR  (AFRICAN AMERICAN): 52.6 ML/MIN/1.73 M^2
EST. GFR  (AFRICAN AMERICAN): 57.5 ML/MIN/1.73 M^2
EST. GFR  (AFRICAN AMERICAN): 57.5 ML/MIN/1.73 M^2
EST. GFR  (AFRICAN AMERICAN): >60 ML/MIN/1.73 M^2
EST. GFR  (NON AFRICAN AMERICAN): 45.5 ML/MIN/1.73 M^2
EST. GFR  (NON AFRICAN AMERICAN): 49.8 ML/MIN/1.73 M^2
EST. GFR  (NON AFRICAN AMERICAN): 49.8 ML/MIN/1.73 M^2
EST. GFR  (NON AFRICAN AMERICAN): 54.8 ML/MIN/1.73 M^2
FRACTIONAL SHORTENING: 21 % (ref 28–44)
GLUCOSE SERPL-MCNC: 106 MG/DL (ref 70–110)
GLUCOSE SERPL-MCNC: 106 MG/DL (ref 70–110)
GLUCOSE SERPL-MCNC: 114 MG/DL (ref 70–110)
GLUCOSE SERPL-MCNC: 188 MG/DL (ref 70–110)
HCT VFR BLD AUTO: 35.4 % (ref 40–54)
HGB BLD-MCNC: 11.9 G/DL (ref 14–18)
IMM GRANULOCYTES # BLD AUTO: 0.05 K/UL (ref 0–0.04)
IMM GRANULOCYTES NFR BLD AUTO: 0.4 % (ref 0–0.5)
INTERVENTRICULAR SEPTUM: 0.77 CM (ref 0.6–1.1)
LEFT ATRIUM SIZE: 4.16 CM
LEFT INTERNAL DIMENSION IN SYSTOLE: 2.86 CM (ref 2.1–4)
LEFT VENTRICLE DIASTOLIC VOLUME INDEX: 29.02 ML/M2
LEFT VENTRICLE DIASTOLIC VOLUME: 55.08 ML
LEFT VENTRICLE MASS INDEX: 48 G/M2
LEFT VENTRICLE SYSTOLIC VOLUME INDEX: 16.4 ML/M2
LEFT VENTRICLE SYSTOLIC VOLUME: 31.17 ML
LEFT VENTRICULAR INTERNAL DIMENSION IN DIASTOLE: 3.62 CM (ref 3.5–6)
LEFT VENTRICULAR MASS: 90.69 G
LYMPHOCYTES # BLD AUTO: 1.5 K/UL (ref 1–4.8)
LYMPHOCYTES NFR BLD: 12.7 % (ref 18–48)
MAGNESIUM SERPL-MCNC: 1.9 MG/DL (ref 1.6–2.6)
MCH RBC QN AUTO: 28.4 PG (ref 27–31)
MCHC RBC AUTO-ENTMCNC: 33.6 G/DL (ref 32–36)
MCV RBC AUTO: 85 FL (ref 82–98)
MONOCYTES # BLD AUTO: 0.5 K/UL (ref 0.3–1)
MONOCYTES NFR BLD: 4.4 % (ref 4–15)
NEUTROPHILS # BLD AUTO: 9.4 K/UL (ref 1.8–7.7)
NEUTROPHILS NFR BLD: 82.4 % (ref 38–73)
NRBC BLD-RTO: 0 /100 WBC
OSMOLALITY SERPL: 261 MOSM/KG (ref 280–300)
OSMOLALITY UR: 332 MOSM/KG (ref 50–1200)
PISA TR MAX VEL: 2.6 M/S
PLATELET # BLD AUTO: 150 K/UL (ref 150–350)
PMV BLD AUTO: 9.6 FL (ref 9.2–12.9)
POTASSIUM SERPL-SCNC: 2.5 MMOL/L (ref 3.5–5.1)
POTASSIUM SERPL-SCNC: 3.5 MMOL/L (ref 3.5–5.1)
POTASSIUM SERPL-SCNC: 3.8 MMOL/L (ref 3.5–5.1)
POTASSIUM SERPL-SCNC: 3.8 MMOL/L (ref 3.5–5.1)
RBC # BLD AUTO: 4.19 M/UL (ref 4.6–6.2)
SINUS: 3.11 CM
SODIUM SERPL-SCNC: 120 MMOL/L (ref 136–145)
SODIUM SERPL-SCNC: 122 MMOL/L (ref 136–145)
SODIUM SERPL-SCNC: 123 MMOL/L (ref 136–145)
SODIUM SERPL-SCNC: 125 MMOL/L (ref 136–145)
SODIUM UR-SCNC: <20 MMOL/L (ref 20–250)
STJ: 2.73 CM
TR MAX PG: 27 MMHG
WBC # BLD AUTO: 11.4 K/UL (ref 3.9–12.7)

## 2019-08-28 PROCEDURE — 80048 BASIC METABOLIC PNL TOTAL CA: CPT | Mod: 91

## 2019-08-28 PROCEDURE — 99900035 HC TECH TIME PER 15 MIN (STAT)

## 2019-08-28 PROCEDURE — 94761 N-INVAS EAR/PLS OXIMETRY MLT: CPT

## 2019-08-28 PROCEDURE — 25000003 PHARM REV CODE 250: Performed by: INTERNAL MEDICINE

## 2019-08-28 PROCEDURE — 97161 PT EVAL LOW COMPLEX 20 MIN: CPT

## 2019-08-28 PROCEDURE — 97530 THERAPEUTIC ACTIVITIES: CPT

## 2019-08-28 PROCEDURE — 36415 COLL VENOUS BLD VENIPUNCTURE: CPT

## 2019-08-28 PROCEDURE — 25000242 PHARM REV CODE 250 ALT 637 W/ HCPCS: Performed by: INTERNAL MEDICINE

## 2019-08-28 PROCEDURE — 97535 SELF CARE MNGMENT TRAINING: CPT

## 2019-08-28 PROCEDURE — 11000001 HC ACUTE MED/SURG PRIVATE ROOM

## 2019-08-28 PROCEDURE — 83930 ASSAY OF BLOOD OSMOLALITY: CPT

## 2019-08-28 PROCEDURE — 99232 PR SUBSEQUENT HOSPITAL CARE,LEVL II: ICD-10-PCS | Mod: ,,, | Performed by: INTERNAL MEDICINE

## 2019-08-28 PROCEDURE — 80048 BASIC METABOLIC PNL TOTAL CA: CPT

## 2019-08-28 PROCEDURE — 94640 AIRWAY INHALATION TREATMENT: CPT

## 2019-08-28 PROCEDURE — 84300 ASSAY OF URINE SODIUM: CPT

## 2019-08-28 PROCEDURE — 87040 BLOOD CULTURE FOR BACTERIA: CPT

## 2019-08-28 PROCEDURE — 27000221 HC OXYGEN, UP TO 24 HOURS

## 2019-08-28 PROCEDURE — 63600175 PHARM REV CODE 636 W HCPCS: Performed by: INTERNAL MEDICINE

## 2019-08-28 PROCEDURE — 83935 ASSAY OF URINE OSMOLALITY: CPT

## 2019-08-28 PROCEDURE — 99232 SBSQ HOSP IP/OBS MODERATE 35: CPT | Mod: ,,, | Performed by: INTERNAL MEDICINE

## 2019-08-28 PROCEDURE — 82570 ASSAY OF URINE CREATININE: CPT

## 2019-08-28 PROCEDURE — 97166 OT EVAL MOD COMPLEX 45 MIN: CPT

## 2019-08-28 PROCEDURE — 85025 COMPLETE CBC W/AUTO DIFF WBC: CPT

## 2019-08-28 PROCEDURE — 25000003 PHARM REV CODE 250: Performed by: PHYSICIAN ASSISTANT

## 2019-08-28 PROCEDURE — 83735 ASSAY OF MAGNESIUM: CPT

## 2019-08-28 PROCEDURE — 63600175 PHARM REV CODE 636 W HCPCS: Performed by: PHYSICIAN ASSISTANT

## 2019-08-28 RX ORDER — IPRATROPIUM BROMIDE AND ALBUTEROL SULFATE 2.5; .5 MG/3ML; MG/3ML
3 SOLUTION RESPIRATORY (INHALATION)
Status: DISCONTINUED | OUTPATIENT
Start: 2019-08-28 | End: 2019-08-28

## 2019-08-28 RX ORDER — POTASSIUM CHLORIDE 20 MEQ/1
40 TABLET, EXTENDED RELEASE ORAL EVERY 4 HOURS
Status: DISCONTINUED | OUTPATIENT
Start: 2019-08-28 | End: 2019-08-28

## 2019-08-28 RX ORDER — BENZONATATE 100 MG/1
100 CAPSULE ORAL 3 TIMES DAILY PRN
Status: DISCONTINUED | OUTPATIENT
Start: 2019-08-28 | End: 2019-09-05 | Stop reason: HOSPADM

## 2019-08-28 RX ORDER — IPRATROPIUM BROMIDE AND ALBUTEROL SULFATE 2.5; .5 MG/3ML; MG/3ML
3 SOLUTION RESPIRATORY (INHALATION)
Status: DISCONTINUED | OUTPATIENT
Start: 2019-08-28 | End: 2019-08-29

## 2019-08-28 RX ORDER — SODIUM CHLORIDE 9 MG/ML
1000 INJECTION, SOLUTION INTRAVENOUS CONTINUOUS
Status: ACTIVE | OUTPATIENT
Start: 2019-08-28 | End: 2019-08-29

## 2019-08-28 RX ORDER — GUAIFENESIN 600 MG/1
600 TABLET, EXTENDED RELEASE ORAL 2 TIMES DAILY
Status: DISCONTINUED | OUTPATIENT
Start: 2019-08-28 | End: 2019-09-05 | Stop reason: HOSPADM

## 2019-08-28 RX ORDER — RAMELTEON 8 MG/1
8 TABLET ORAL NIGHTLY PRN
Status: DISCONTINUED | OUTPATIENT
Start: 2019-08-28 | End: 2019-09-05 | Stop reason: HOSPADM

## 2019-08-28 RX ADMIN — METOPROLOL SUCCINATE 25 MG: 25 TABLET, EXTENDED RELEASE ORAL at 09:08

## 2019-08-28 RX ADMIN — DOXYCYCLINE HYCLATE 100 MG: 100 TABLET, COATED ORAL at 09:08

## 2019-08-28 RX ADMIN — SODIUM CHLORIDE 1000 ML: 0.9 INJECTION, SOLUTION INTRAVENOUS at 03:08

## 2019-08-28 RX ADMIN — PANTOPRAZOLE SODIUM 40 MG: 40 TABLET, DELAYED RELEASE ORAL at 09:08

## 2019-08-28 RX ADMIN — GABAPENTIN 800 MG: 400 CAPSULE ORAL at 09:08

## 2019-08-28 RX ADMIN — GUAIFENESIN 600 MG: 600 TABLET, EXTENDED RELEASE ORAL at 09:08

## 2019-08-28 RX ADMIN — POTASSIUM CHLORIDE 40 MEQ: 20 SOLUTION ORAL at 01:08

## 2019-08-28 RX ADMIN — POTASSIUM CHLORIDE 10 MEQ: 10 INJECTION, SOLUTION INTRAVENOUS at 01:08

## 2019-08-28 RX ADMIN — CEFTRIAXONE SODIUM 1 G: 1 INJECTION, POWDER, FOR SOLUTION INTRAMUSCULAR; INTRAVENOUS at 09:08

## 2019-08-28 RX ADMIN — GUAIFENESIN 600 MG: 600 TABLET, EXTENDED RELEASE ORAL at 03:08

## 2019-08-28 RX ADMIN — POTASSIUM CHLORIDE 40 MEQ: 20 TABLET, EXTENDED RELEASE ORAL at 09:08

## 2019-08-28 RX ADMIN — RAMELTEON 8 MG: 8 TABLET, FILM COATED ORAL at 09:08

## 2019-08-28 RX ADMIN — AMLODIPINE BESYLATE 5 MG: 5 TABLET ORAL at 09:08

## 2019-08-28 RX ADMIN — IPRATROPIUM BROMIDE AND ALBUTEROL SULFATE 3 ML: .5; 3 SOLUTION RESPIRATORY (INHALATION) at 08:08

## 2019-08-28 RX ADMIN — LEVOTHYROXINE SODIUM 50 MCG: 50 TABLET ORAL at 06:08

## 2019-08-28 RX ADMIN — POTASSIUM BICARBONATE 50 MEQ: 978 TABLET, EFFERVESCENT ORAL at 03:08

## 2019-08-28 RX ADMIN — ONDANSETRON 8 MG: 8 TABLET, ORALLY DISINTEGRATING ORAL at 09:08

## 2019-08-28 RX ADMIN — ATORVASTATIN CALCIUM 20 MG: 20 TABLET, FILM COATED ORAL at 09:08

## 2019-08-28 NOTE — PLAN OF CARE
Problem: Occupational Therapy Goal  Goal: Occupational Therapy Goal  Outcome: Ongoing (interventions implemented as appropriate)  Eval complete. Pt tolerated session well. Initiate OT POC.    Discharge Recommendation: SNF  DME rec: TBD

## 2019-08-28 NOTE — PLAN OF CARE
Problem: Adult Inpatient Plan of Care  Goal: Plan of Care Review  Outcome: Ongoing (interventions implemented as appropriate)  Pt is free from falls trauma and injuries. Bed in low position with call light in reach. Skin is warm and dry. VS are stable. No noted fever or pain. Suprapubic cath bag changed due to leakage from old bag. Patient had Echo today. Tolerated well. Resting comfortably at this time

## 2019-08-28 NOTE — PT/OT/SLP EVAL
Physical Therapy Evaluation    Patient Name:  Chucho Prado   MRN:  [id number]    Recommendations:     Discharge Recommendations:  nursing facility, skilled   Discharge Equipment Recommendations: none   Barriers to discharge: None    Assessment:     Chucho Prado is a 85 y.o. male admitted with a medical diagnosis of Community acquired pneumonia.  He presents with the following impairments/functional limitations:  weakness, gait instability, decreased upper extremity function, impaired endurance, impaired balance, decreased lower extremity function, impaired functional mobilty Patient w/ decline in functional mobility over prior. He has a caregiver 16 hours a day. Patient was max of 2 for bed mobility and for partial stand w/ RW. Patient w/ c/o nausea today, but participated fairly well w/ max encouragment.    Rehab Prognosis: Good; patient would benefit from acute skilled PT services to address these deficits and reach maximum level of function.    Recent Surgery: * No surgery found *      Plan:     During this hospitalization, patient to be seen 3 x/week to address the identified rehab impairments via gait training, therapeutic activities, therapeutic exercises and progress toward the following goals:    · Plan of Care Expires:  09/27/19    Subjective     Chief Complaint: fatigue, poor night's sleep  Patient/Family Comments/goals: Caregiver reports patient walked w/ standard walker PTA; patient goal is to rest  Pain/Comfort:  · Pain Rating 1: other (see comments)(does not rate)  · Location 1: (reports as generalized and all over)  · Pain Addressed 1: Distraction  · Pain Rating Post-Intervention 1: 0/10    Patients cultural, spiritual, Restoration conflicts given the current situation:      Living Environment:  Patient lives in  home w/ elevator to front door. Has paid caregivers 16 hours per day. PTA was ambulatory w/ SW, caregiver assists w/ ADLs, wash-up.  Prior to admission, patients level of function  was amb short distances in house w/ SW w/o assistance. Assist for care.  Equipment used at home: walker, standard, walker, rolling, bedside commode, shower chair.  DME owned (not currently used): rolling walker.  Upon discharge, patient will have assistance from paid caregivers.    Objective:     Communicated with nurse prior to session.  Patient found HOB elevated and on bedpan with telemetry, galvan catheter, oxygen  upon PT entry to room.    General Precautions: Standard, fall   Orthopedic Precautions:N/A   Braces: N/A     Exams:  · Cognitive Exam:  Patient is oriented to Person, Place, Time and Situation  · Postural Exam:  Patient presented with the following abnormalities:    · -       Rounded shoulders  · -       Forward head  · -       Posterior pelvic tilt  · -       Kyphosis  · -       Scoliosis  · Skin Integrity/Edema:      · -       Noted red area from bedpan and patient educated on pressure relief.  · RUE ROM: WFL  · RUE Strength: WFL  · LUE ROM: WFL  · LUE Strength: WFL  · RLE ROM: WFL with exception knee flexion to 80*; HF to approx 90*  · RLE Strength: WFL  · LLE ROM: WFL  with knee flexion 100*  · LLE Strength: WFL; HF approx 90*    Functional Mobility:  · Bed Mobility:     · Rolling Left:  maximal assistance with rails, for clean up  · Rolling Right: maximal assistance with rails for clean up  · Scooting: maximal assistance and of 2 persons; to HOB w/ drawsheet; to scoot to EOB w/ max of 2. Unable to scoot in sitting toward EOB w/ max assist.  · Supine to Sit: maximal assistance and of 2 persons  · Sit to Supine: maximal assistance and of 2 persons  · Transfers:     · Sit to Stand:  maximal assistance, total assistance and of 2 persons with rolling walker  · Gait: unable to step or side step      Therapeutic Activities and Exercises:   patient educated on PT POC  Standing from EOB w/ RW and max/TA of 2. Patient lets RLE slide out first attempt. Partial stand. Second attempt, partial stand, w/ BLEs  blocked. Bed in elevated position to accomodate limited knee flexion. In sitting EOB, assisted w/ scooting to HOB to left, patient unable to progress. Parial stand to weight shift w/ TA of 2, then to supine and drawsheet to complete move to HOB.  Patient's paid caregiver, Jagdeep, at bedside w/ patient  Patient on bedpan w/ BM on arrival. Patient wanted to remain on bedpan, educated on pressure, need to reposition. Patient cleaned of BM, rolling left<>right w/ assistance.    AM-PAC 6 CLICK MOBILITY  Total Score:10     Patient left HOB elevated with all lines intact, call button in reach and caregiver present.    GOALS:   Multidisciplinary Problems     Physical Therapy Goals        Problem: Physical Therapy Goal    Goal Priority Disciplines Outcome Goal Variances Interventions   Physical Therapy Goal     PT, PT/OT      Description:  Goals to be met by: 09/15/19     Patient will increase functional independence with mobility by performin. Supine to sit with Stand-by Assistance  2. Sit to supine with Stand-by Assistance  3. Sit to stand transfer with Contact Guard Assistance with RW or SW  4. Bed to chair transfer with Contact Guard Assistance using Rolling Walker or SW  5. Gait  x 50 feet with Contact Guard Assistance using Rolling Walker. Or SW  6. Lower extremity exercise program x20 reps per handout, with assistance as needed                      History:     Past Medical History:   Diagnosis Date    Acquired hypothyroidism 2013    Arthritis     Blood transfusion     before  - whe had gangrenous gall bladder    Cataract     Chronic back pain 2018    - Takes Percocet 5-325 at home - Can continue current abdominal pain control with Dilaudid 0.5 mg IV Q3H prn     CKD (chronic kidney disease) stage 3, GFR 30-59 ml/min     Compression fracture of lumbar vertebra     Depression     Dyslipidemia     Essential hypertension 2013    Gastroesophageal reflux disease without esophagitis  12/4/2018    - continue home Prilosec    General anesthetics causing adverse effect in therapeutic use     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    GERD (gastroesophageal reflux disease)     History of postoperative delirium 12/4/2018    - Patient reports 1 week history of post-op delirium 7/2018 - Monitor electrolytes, UA, and urine cx - Delirium precautions  - Can use Seroquel 25 mg QHS prn     Hypertension     Hyponatremia 10/23/2017    Impaired mobility and ADLs 6/28/2018    Neurogenic bladder 12/4/2018    Sleep disorder 12/4/2018    - continue Trazodone QHS    Thyroid disease     UTI (urinary tract infection)        Past Surgical History:   Procedure Laterality Date    BLOCK-NERVE Left 10/26/2017    Performed by Asia Surgeon at [facility] ASIA    CHOLECYSTECTOMY      CYSTOSCOPY WITH RETROGRADE PYELOGRAM Bilateral 1/27/2017    Performed by Chaitanya Taylor Jr., MD at [facility] OR 1ST FLR    EGD (ESOPHAGOGASTRODUODENOSCOPY) N/A 8/2/2013    Performed by Nagi Talbert MD at [facility] ENDO (2ND FLR)    EYE SURGERY Right     IOL    HERNIA REPAIR      INSERTION-CATHETER-SUPRAPUBIC N/A 6/23/2017    Performed by Chaitanya Taylor Jr., MD at [facility] OR 2ND FLR    INSERTION-INTRAOCULAR LENS (IOL) Left 5/28/2018    Performed by Trinity [facility]azquez MD at [facility] [place]    INSERTION-INTRAOCULAR LENS (IOL) Right 2/19/2018    Performed by Trinity [facility]quez [facility] at [facility] [place]    JOINT REPLACEMENT      KYPHOPLASTY L3 N/A 12/27/2016    Performed by Donavan Martinez MD at [facility] OR 2ND FLR    LAMINECTOMY  12/27/2016    L2-L4    LAMINECTOMY-MINIMALLY INVASIVE L2,3 and L3,4; globus, neuromonitoring Right 12/27/2016    Performed by Donavan Martinez MD at [facility] OR 2ND FLR    LUMBAR LAMINECTOMY  12/2016    PARATHYROIDECTOMY      PHACOEMULSIFICATION-ASPIRATION-CATARACT Left 5/28/2018    Performed by Trinity [facility] at [facility] [place]    PHACOEMULSIFICATION-ASPIRATION-CATARACT Right 2/19/2018    Performed by Trinity [facility]quez MD at [facility] [place]    REPAIR,  HERNIA, INCISIONAL, INCARCERATED, WITHOUT HISTORY OF PRIOR REPAIR N/A 12/5/2018    Performed by Steve Valentin MD at [facility] OR 2ND FLR    REPAIR, HERNIA, INGUINAL, WITHOUT HISTORY OF PRIOR REPAIR, AGE 5 YEARS OR OLDER Right 10/9/2013    Performed by Daron Arthur MD at [facility] OR 2ND FLR    REPAIR, HERNIA, VENTRAL, INCARCERATED, WITHOUT HISTORY OF PRIOR REPAIR N/A 6/20/2018    Performed by Guilherme Ordoñez MD at [facility] OR 2ND FLR    REPLACEMENT-KNEE-TOTAL Left 10/25/2017    [facility] at [facility] OR 2ND FLR    TOTAL KNEE ARTHROPLASTY Bilateral     TOTAL KNEE ARTHROPLASTY Left 10/25/2017    TKR       Time Tracking:     PT Received On: 08/28/19  PT Start Time: 1026     PT Stop Time: 1103  PT Total Time (min): 37 min     Billable Minutes: Evaluation 10 and Therapeutic Activity 15      Mariama Aguirre, PT  08/28/2019

## 2019-08-28 NOTE — PLAN OF CARE
Problem: Occupational Therapy Goal  Goal: Occupational Therapy Goal  Goals to be met by: 9/28/2019       Patient will increase functional independence with ADLs by performing:    UE Dressing with Set-up Assistance.  Toileting from toilet with Set-up Assistance for hygiene and clothing management.   Bathing from  sitting at sink with Set-up Assistance.  Rolling to Bilateral with Modified [place].   Supine to sit with Modified [place].  Toilet transfer to toilet with Contact Guard Assistance.    Outcome: Ongoing (interventions implemented as appropriate)  Eval complete. Pt tolerated session well. Initiate OT POC.  Discharge Recommendation: SNF  DME rec: TBD

## 2019-08-28 NOTE — PLAN OF CARE
Admit with cough has community acquired pneumonia. O2 @ /nc.Patient lives alone, he says he has the assistance of his tenant in the building he owns. Patient also has [employer] caregivers who provide care everyday from 8 am -12 mn.       [facility] [place] [facility] [place] - [address]  [address] [place] [address]  Phone: [phone] Fax: [phone]    [facility] [place] - [address]  [address]  [place] [address]  Phone: [phone] Fax: [phone]    Payor: MEDICARE / Plan: MEDICARE PART A & B / Product Type: [facility] /         08/28/19 0920   Discharge Assessment   Assessment Type Discharge Planning Assessment   Confirmed/corrected address and phone number on facesheet? Yes   Assessment information obtained from? Patient   Expected Length of Stay (days) 3   Communicated expected length of stay with patient/caregiver yes   Prior to hospitilization cognitive status: Alert/Oriented   Prior to hospitalization functional status: Assistive Equipment   Current cognitive status: Alert/Oriented   Current Functional Status: Assistive Equipment   Facility Arrived From:   (present to ED from Clinic)   Lives With alone   Able to Return to Prior Arrangements yes   Is patient able to care for self after discharge? Yes   Who are your caregiver(s) and their phone number(s)?   (friend Josh [phone])   Patient's perception of discharge disposition home or selfcare   Readmission Within the Last 30 Days no previous admission in last 30 days   Patient currently being followed by outpatient case management? No   Patient currently receives any other outside agency services? No   Equipment Currently Used at Home walker, standard;walker, rolling;bedside commode;shower chair   Do you have any problems affording any of your prescribed medications? No   Is the patient taking medications as prescribed? yes   Does the patient have transportation home? Yes    Transportation Anticipated family or friend will provide   Does the patient receive services at the [facility]? No   Discharge Plan A Home;Home Health   Discharge Plan B Skilled Nursing Facility   DME Needed Upon Discharge  none   Patient/Family in Agreement with Plan yes     Mikala Da Silva RN/BSN/CM  [facility]  [phone]  Ortho/IMK/IM

## 2019-08-28 NOTE — PLAN OF CARE
Problem: Physical Therapy Goal  Goal: Physical Therapy Goal  Goals to be met by: 09/15/19     Patient will increase functional independence with mobility by performin. Supine to sit with Stand-by Assistance  2. Sit to supine with Stand-by Assistance  3. Sit to stand transfer with Contact Guard Assistance with RW or SW  4. Bed to chair transfer with Contact Guard Assistance using Rolling Walker or SW  5. Gait  x 50 feet with Contact Guard Assistance using Rolling Walker. Or SW  6. Lower extremity exercise program x20 reps per handout, with assistance as needed    PT gadiel Aguirre, PT 2019

## 2019-08-28 NOTE — PT/OT/SLP EVAL
"Occupational Therapy   Evaluation    Name: Chucho Prado  MRN: [id number]  Admitting Diagnosis:  Community acquired pneumonia      Recommendations:     Discharge Recommendations: nursing facility, skilled  Discharge Equipment Recommendations:  none  Barriers to discharge:  None    Assessment:     Chucho Prado is a 85 y.o. male with a medical diagnosis of Community acquired pneumonia.  He presents with resistance to therapy but he will ultimately comply when pressed.  He was limited in transfers from poor coordination and mechanics.  He displayed weakness, but was mostly resistant from overall fatigue. Performance deficits affecting function: weakness, impaired endurance, impaired self care skills, impaired functional mobilty, impaired balance, gait instability, decreased upper extremity function, decreased lower extremity function, decreased safety awareness, pain, decreased ROM, impaired coordination, impaired skin, edema, impaired cardiopulmonary response to activity.      Rehab Prognosis: Fair; patient would benefit from acute skilled OT services to address these deficits and reach maximum level of function.       Plan:     Patient to be seen 3 x/week to address the above listed problems via self-care/home management, therapeutic activities, therapeutic exercises  · Plan of Care Expires: 09/28/19  · Plan of Care Reviewed with: patient, caregiver    Subjective     Chief Complaint: "Can't you just come back, I'm sitting on the commode."  Patient/Family Comments/goals: "I just need some sleep."    Occupational Profile:  Living Environment: Pt lives alone in 1st floor apt with elevator access.   Previous level of function: PTA, pt reports caregiver assistance with all ADL and IADL. Pt reports caregivers available 8A - 12A (16 hrs) daily. Caregivers assist with bathing while standing at bedside and with dressing. Pt reports ambulating in his home using SW with caregiver supervision and assist as needed. Pt has " HH therapy 2x/week   Roles and Routines: writer   Equipment Used at Home: walker, standard, wheelchair, bedside commode, shower chair, cane, straight, cane, quad, walker, rolling(adjustable bed)   Assistance upon Discharge: Caregiver assist as needed following d/c.     Pain/Comfort:  Pain Rating 1: (did not rate pain)  Pain Addressed 1: Distraction, Reposition  Pain Rating Post-Intervention 1: 0/10    Patients cultural, spiritual, Methodist conflicts given the current situation: no    Objective:     Communicated with: RN prior to session.  Patient found HOB elevated with telemetry, pulse ox (continuous), galvan catheter, oxygen upon OT entry to room.    General Precautions: Standard, fall   Orthopedic Precautions:N/A   Braces: N/A     Occupational Performance:    Bed Mobility:    · Patient completed Rolling/Turning to Left with  total assistance  · Patient completed Rolling/Turning to Right with total assistance  · Patient completed Scooting/Bridging with total assistance  · Patient completed Supine to Sit with total assistance and 2 persons  · Patient completed Sit to Supine with total assistance and 2 persons    Functional Mobility/Transfers:  · Patient completed Sit <> Stand Transfer with total assistance and of 2 persons  with  hand-held assist and rolling walker   · Functional Mobility: Couldn't     Activities of Daily Living:  · Lower Body Dressing: maximal assistance at baseline  · Toileting: dependence and of 2 persons supine in bed    Cognitive/Visual Perceptual:  Completely cognitively intact adult w/ no glasses worn or present during eval.       Physical Exam:  Balance:    -       Grossly poor  Skin integrity: Tear of skin in perineum and near rectum.   Dominant hand:    -       RH  Upper Extremity Range of Motion:     -       Right Upper Extremity: WFL except limited by scoliosis and kyphosis  -       Left Upper Extremity: WFL except limited by scoliosis and kyphosis  Upper Extremity Strength:    -        Right Upper Extremity: WFL except Grossly 3+/5  -       Left Upper Extremity: WFL except Grossly 3+/5   Strength:    -       Right Upper Extremity: WFL  -       Left Upper Extremity: WFL  Fine Motor Coordination:    -       Intact  Gross motor coordination:   WFL       AMPAC 6 Click ADL:  AMPAC Total Score: 16    Treatment & Education:  -Pt edu on OT role/POC  -Importance of OOB activity with staff assistance  -Safety during functional t/f and mobility  - White board updated  - Multiple self care tasks/functional mobility completed-- assistance level noted above  - All questions/concerns answered within OT scope of practice    Education:    Patient left HOB elevated with all lines intact, call button in reach, RN notified and sitter present    GOALS:   Multidisciplinary Problems     Occupational Therapy Goals        Problem: Occupational Therapy Goal    Goal Priority Disciplines Outcome Interventions   Occupational Therapy Goal     OT, PT/OT Ongoing (interventions implemented as appropriate)    Description:  Goals to be met by: 9/28/2019       Patient will increase functional independence with ADLs by performing:    UE Dressing with Set-up Assistance.  Toileting from toilet with Set-up Assistance for hygiene and clothing management.   Bathing from  sitting at sink with Set-up Assistance.  Rolling to Bilateral with Modified [place].   Supine to sit with Modified [place].  Toilet transfer to toilet with Contact Guard Assistance.                      History:     Past Medical History:   Diagnosis Date    Acquired hypothyroidism 7/30/2013    Arthritis     Blood transfusion     before 2005 - whe had gangrenous gall bladder    Cataract     Chronic back pain 12/4/2018    - Takes Percocet 5-325 at home - Can continue current abdominal pain control with Dilaudid 0.5 mg IV Q3H prn     CKD (chronic kidney disease) stage 3, GFR 30-59 ml/min     Compression fracture of lumbar vertebra     Depression      Dyslipidemia     Essential hypertension 7/30/2013    Gastroesophageal reflux disease without esophagitis 12/4/2018    - continue home Prilosec    General anesthetics causing adverse effect in therapeutic use     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    GERD (gastroesophageal reflux disease)     History of postoperative delirium 12/4/2018    - Patient reports 1 week history of post-op delirium 7/2018 - Monitor electrolytes, UA, and urine cx - Delirium precautions  - Can use Seroquel 25 mg QHS prn     Hypertension     Hyponatremia 10/23/2017    Impaired mobility and ADLs 6/28/2018    Neurogenic bladder 12/4/2018    Sleep disorder 12/4/2018    - continue Trazodone QHS    Thyroid disease     UTI (urinary tract infection)          Past Surgical History:   Procedure Laterality Date    BLOCK-NERVE Left 10/26/2017    Performed by Asia Surgeon at [facility] ASIA    CHOLECYSTECTOMY      CYSTOSCOPY WITH RETROGRADE PYELOGRAM Bilateral 1/27/2017    Performed by Chaitanya Taylor Jr., MD at [facility] OR 1ST FLR    EGD (ESOPHAGOGASTRODUODENOSCOPY) N/A 8/2/2013    Performed by Nagi Talbert MD at [facility] ENDO (2ND FLR)    EYE SURGERY Right     IOL    HERNIA REPAIR      INSERTION-CATHETER-SUPRAPUBIC N/A 6/23/2017    Performed by Chaitanya Taylor Jr., MD at [facility] OR 2ND FLR    INSERTION-INTRAOCULAR LENS (IOL) Left 5/28/2018    [facility] by [facility] at [facility] OR    INSERTION-INTRAOCULAR LENS (IOL) Right 2/19/2018    Performed by Trinity [facility] MD at [facility] OR    JOINT REPLACEMENT      KYPHOPLASTY L3 N/A 12/27/2016    Performed by Donavan Martinez MD at [facility] OR 2ND FLR    LAMINECTOMY  12/27/2016    L2-L4    LAMINECTOMY-MINIMALLY INVASIVE L2,3 and L3,4; globus, neuromonitoring Right 12/27/2016    Performed by Donavan Martinez MD at [facility] OR 2ND FLR    LUMBAR LAMINECTOMY  12/2016    PARATHYROIDECTOMY      PHACOEMULSIFICATION-ASPIRATION-CATARACT Left 5/28/2018    Performed by [facility] at [facility] OR     PHACOEMULSIFICATION-ASPIRATION-CATARACT Right 2/19/2018    Performed by [facility] at [facility] [place]    REPAIR, HERNIA, INCISIONAL, INCARCERATED, WITHOUT HISTORY OF PRIOR REPAIR N/A 12/5/2018    Performed by Steve Valentin MD at [facility] OR 2ND FLR    REPAIR, HERNIA, INGUINAL, WITHOUT HISTORY OF PRIOR REPAIR, AGE 5 YEARS OR OLDER Right 10/9/2013    Performed by Daron Arthur MD at [facility] OR 2ND FLR    REPAIR, HERNIA, VENTRAL, INCARCERATED, WITHOUT HISTORY OF PRIOR REPAIR N/A 6/20/2018    Performed by Guilherme Ordoñez MD at [facility] OR 2ND FLR    REPLACEMENT-KNEE-TOTAL Left 10/25/2017    [facility]LR    TOTAL KNEE ARTHROPLASTY Bilateral     TOTAL KNEE ARTHROPLASTY Left 10/25/2017    TKR       Time Tracking:     OT Date of Treatment: 08/28/19  OT Start Time: 1025  OT Stop Time: 1108  OT Total Time (min): 43 min    Billable Minutes:Evaluation 10 mins  Self Care/Home Management 33 mins    Micah Leonard, OT  8/28/2019

## 2019-08-28 NOTE — PROGRESS NOTES
"[facility] Hospital Medicine  Progress Note    Team: [facility] MED K   Attending MD: Birgit Garcia MD  Admit Date: 8/27/2019  TISHA 8/30/2019  Length of Stay:  LOS: 1 day   Code status: Full Code    Principal Problem: Community acquired pneumonia    Interval hx: remains with cough and wheezing; feels weak; caregiver at bedside and states she does not believe he has been drinking water adequately at home; + hx of hyponatremia and hypokalemia with HCTZ in the past; complains of insomnia for past 2 nights    ROS:  Constitutional: no fever or chills  Respiratory: +cough, not productive; denies shortness of breath  Cardiovascular: no chest pain or palpitations  Gastrointestinal: no nausea or vomiting, no abdominal pain; last BM:  Genitourinary: no gross hematuria or dysuria  Integument/Breast: no rash or pruritis  Musculoskeletal: no arthralgias or myalgias  Neurological: no seizures or tremors  Behavioral/Psych: no auditory or visual hallucinations      Physical Exam  Temp:  [97.6 °F (36.4 °C)-98.4 °F (36.9 °C)] 97.9 °F (36.6 °C)  Pulse:  [] 71  Resp:  [16-28] 18  SpO2:  [90 %-100 %] 95 %  BP: (122-157)/(57-74) 141/64      Intake/Output Summary (Last 24 hours) at 8/28/2019 1347  Last data filed at 8/28/2019 0900  Gross per 24 hour   Intake 1515 ml   Output 400 ml   Net 1115 ml       Wt Readings from Last 1 Encounters:   08/28/19 0157 76.3 kg (168 lb 3.4 oz)   08/27/19 1521 88.5 kg (195 lb)        Estimated body mass index is 25.58 kg/m² as calculated from the following:    Height as of this encounter: 5' 8" (1.727 m).    Weight as of this encounter: 76.3 kg (168 lb 3.4 oz).    General: well developed, well nourished, no distress  Lungs:  Expiratory wheezing bilaterally R> L and normal respiratory effort.  Cardiovascular: Heart: regular rate and rhythm, S1, S2 normal, 2/6 sytolic murmur, no click, rub or gallop. Chest Wall: no tenderness. Extremities: no cyanosis, no edema, no clubbing; " extremities tender to palpation  Abdomen/Rectal: Abdomen: soft, non-tender non-distended; bowel sounds normal; no masses; + suprapubic catheter  Skin: Skin color, texture, turgor normal. No rashes or lesions.  Neurologic: Normal strength and tone. No focal numbness or weakness.  Psych/Behavioral:  Alert and oriented, appropriate affect.      Recent Results (from the past 24 hour(s))   ISTAT PROCEDURE    Collection Time: 08/27/19  6:16 PM   Result Value Ref Range    POC PH 7.486 (H) 7.35 - 7.45    POC PCO2 42.2 35 - 45 mmHg    POC PO2 35 (LL) 40 - 60 mmHg    POC HCO3 31.9 (H) 24 - 28 mmol/L    POC BE 8 -2 to 2 mmol/L    POC SATURATED O2 71 (L) 95 - 100 %    POC TCO2 33 (H) 24 - 29 mmol/L    Sample VENOUS     Site Other     Allens Test N/A     DelSys Nasal Can    Comprehensive metabolic panel    Collection Time: 08/27/19  6:19 PM   Result Value Ref Range    Sodium 121 (L) 136 - 145 mmol/L    Potassium 4.0 3.5 - 5.1 mmol/L    Chloride 81 (L) 95 - 110 mmol/L    CO2 25 23 - 29 mmol/L    Glucose 102 70 - 110 mg/dL    BUN, Bld 22 8 - 23 mg/dL    Creatinine 1.3 0.5 - 1.4 mg/dL    Calcium 9.0 8.7 - 10.5 mg/dL    Total Protein 8.1 6.0 - 8.4 g/dL    Albumin 4.0 3.5 - 5.2 g/dL    Total Bilirubin 1.0 0.1 - 1.0 mg/dL    Alkaline Phosphatase 79 55 - 135 U/L    AST 36 10 - 40 U/L    ALT 11 10 - 44 U/L    Anion Gap 15 8 - 16 mmol/L    eGFR if African American 57.5 (A) >60 mL/min/1.73 m^2    eGFR if non  49.8 (A) >60 mL/min/1.73 m^2   Lactic acid, plasma    Collection Time: 08/27/19  6:19 PM   Result Value Ref Range    Lactate (Lactic Acid) 1.1 0.5 - 2.2 mmol/L   Procalcitonin    Collection Time: 08/27/19  6:19 PM   Result Value Ref Range    Procalcitonin 0.09 <0.25 ng/mL   Brain natriuretic peptide    Collection Time: 08/27/19  7:28 PM   Result Value Ref Range     (H) 0 - 99 pg/mL   CBC auto differential    Collection Time: 08/27/19  7:28 PM   Result Value Ref Range    WBC 11.24 3.90 - 12.70 K/uL    RBC 4.45  (L) 4.60 - 6.20 M/uL    Hemoglobin 12.8 (L) 14.0 - 18.0 g/dL    Hematocrit 36.1 (L) 40.0 - 54.0 %    Mean Corpuscular Volume 81 (L) 82 - 98 fL    Mean Corpuscular Hemoglobin 28.8 27.0 - 31.0 pg    Mean Corpuscular Hemoglobin Conc 35.5 32.0 - 36.0 g/dL    RDW 13.2 11.5 - 14.5 %    Platelets 149 (L) 150 - 350 K/uL    MPV 8.6 (L) 9.2 - 12.9 fL    Immature Granulocytes 0.4 0.0 - 0.5 %    Gran # (ANC) 9.1 (H) 1.8 - 7.7 K/uL    Immature Grans (Abs) 0.05 (H) 0.00 - 0.04 K/uL    Lymph # 1.6 1.0 - 4.8 K/uL    Mono # 0.4 0.3 - 1.0 K/uL    Eos # 0.0 0.0 - 0.5 K/uL    Baso # 0.01 0.00 - 0.20 K/uL    nRBC 0 0 /100 WBC    Gran% 81.1 (H) 38.0 - 73.0 %    Lymph% 14.6 (L) 18.0 - 48.0 %    Mono% 3.8 (L) 4.0 - 15.0 %    Eosinophil% 0.0 0.0 - 8.0 %    Basophil% 0.1 0.0 - 1.9 %    Differential Method Automated    Basic metabolic panel    Collection Time: 08/27/19  9:29 PM   Result Value Ref Range    Sodium 121 (L) 136 - 145 mmol/L    Potassium 2.3 (LL) 3.5 - 5.1 mmol/L    Chloride 83 (L) 95 - 110 mmol/L    CO2 25 23 - 29 mmol/L    Glucose 134 (H) 70 - 110 mg/dL    BUN, Bld 22 8 - 23 mg/dL    Creatinine 1.3 0.5 - 1.4 mg/dL    Calcium 8.3 (L) 8.7 - 10.5 mg/dL    Anion Gap 13 8 - 16 mmol/L    eGFR if African American 57.5 (A) >60 mL/min/1.73 m^2    eGFR if non  49.8 (A) >60 mL/min/1.73 m^2   TSH    Collection Time: 08/27/19  9:29 PM   Result Value Ref Range    TSH 1.193 0.400 - 4.000 uIU/mL   ISTAT PROCEDURE    Collection Time: 08/27/19  9:31 PM   Result Value Ref Range    POC Glucose 138 (H) 70 - 110 mg/dL    POC BUN 22 6 - 30 mg/dL    POC Creatinine 1.4 0.5 - 1.4 mg/dL    POC Sodium 122 (L) 136 - 145 mmol/L    POC Potassium 2.3 (LL) 3.5 - 5.1 mmol/L    POC Chloride 80 (L) 95 - 110 mmol/L    POC TCO2 (MEASURED) 26 23 - 29 mmol/L    POC Ionized Calcium 1.00 (L) 1.06 - 1.42 mmol/L    POC Hematocrit 36 36 - 54 %PCV    Sample DEBBIE    Blood culture    Collection Time: 08/27/19 11:50 PM   Result Value Ref Range    Blood Culture,  Routine No Growth to date    Blood culture    Collection Time: 08/28/19 12:00 AM   Result Value Ref Range    Blood Culture, Routine No Growth to date    Basic Metabolic Panel (BMP)    Collection Time: 08/28/19 12:09 AM   Result Value Ref Range    Sodium 120 (L) 136 - 145 mmol/L    Potassium 2.5 (LL) 3.5 - 5.1 mmol/L    Chloride 82 (L) 95 - 110 mmol/L    CO2 26 23 - 29 mmol/L    Glucose 188 (H) 70 - 110 mg/dL    BUN, Bld 22 8 - 23 mg/dL    Creatinine 1.3 0.5 - 1.4 mg/dL    Calcium 8.3 (L) 8.7 - 10.5 mg/dL    Anion Gap 12 8 - 16 mmol/L    eGFR if African American 57.5 (A) >60 mL/min/1.73 m^2    eGFR if non  49.8 (A) >60 mL/min/1.73 m^2   Osmolality, urine    Collection Time: 08/28/19 12:09 AM   Result Value Ref Range    Osmolality, Ur 332 50 - 1200 mOsm/kg   Osmolality, Serum    Collection Time: 08/28/19 12:09 AM   Result Value Ref Range    Osmolality 261 (L) 280 - 300 mOsm/kg   Sodium, urine, random    Collection Time: 08/28/19 12:09 AM   Result Value Ref Range    Sodium Urine Random <20 (A) 20 - 250 mmol/L   Creatinine, urine, random    Collection Time: 08/28/19 12:09 AM   Result Value Ref Range    Creatinine, Random Ur 109.0 23.0 - 375.0 mg/dL   Magnesium    Collection Time: 08/28/19  7:23 AM   Result Value Ref Range    Magnesium 1.9 1.6 - 2.6 mg/dL   Basic Metabolic Panel (BMP)    Collection Time: 08/28/19  7:24 AM   Result Value Ref Range    Sodium 123 (L) 136 - 145 mmol/L    Potassium 3.5 3.5 - 5.1 mmol/L    Chloride 82 (L) 95 - 110 mmol/L    CO2 29 23 - 29 mmol/L    Glucose 106 70 - 110 mg/dL    BUN, Bld 22 8 - 23 mg/dL    Creatinine 1.4 0.5 - 1.4 mg/dL    Calcium 8.8 8.7 - 10.5 mg/dL    Anion Gap 12 8 - 16 mmol/L    eGFR if African American 52.6 (A) >60 mL/min/1.73 m^2    eGFR if non African American 45.5 (A) >60 mL/min/1.73 m^2   CBC with Automated Differential    Collection Time: 08/28/19  7:24 AM   Result Value Ref Range    WBC 11.40 3.90 - 12.70 K/uL    RBC 4.19 (L) 4.60 - 6.20 M/uL     Hemoglobin 11.9 (L) 14.0 - 18.0 g/dL    Hematocrit 35.4 (L) 40.0 - 54.0 %    Mean Corpuscular Volume 85 82 - 98 fL    Mean Corpuscular Hemoglobin 28.4 27.0 - 31.0 pg    Mean Corpuscular Hemoglobin Conc 33.6 32.0 - 36.0 g/dL    RDW 13.6 11.5 - 14.5 %    Platelets 150 150 - 350 K/uL    MPV 9.6 9.2 - 12.9 fL    Immature Granulocytes 0.4 0.0 - 0.5 %    Gran # (ANC) 9.4 (H) 1.8 - 7.7 K/uL    Immature Grans (Abs) 0.05 (H) 0.00 - 0.04 K/uL    Lymph # 1.5 1.0 - 4.8 K/uL    Mono # 0.5 0.3 - 1.0 K/uL    Eos # 0.0 0.0 - 0.5 K/uL    Baso # 0.01 0.00 - 0.20 K/uL    nRBC 0 0 /100 WBC    Gran% 82.4 (H) 38.0 - 73.0 %    Lymph% 12.7 (L) 18.0 - 48.0 %    Mono% 4.4 4.0 - 15.0 %    Eosinophil% 0.0 0.0 - 8.0 %    Basophil% 0.1 0.0 - 1.9 %    Differential Method Automated    Basic Metabolic Panel (BMP)    Collection Time: 08/28/19 11:11 AM   Result Value Ref Range    Sodium 122 (L) 136 - 145 mmol/L    Potassium 3.8 3.5 - 5.1 mmol/L    Chloride 83 (L) 95 - 110 mmol/L    CO2 27 23 - 29 mmol/L    Glucose 114 (H) 70 - 110 mg/dL    BUN, Bld 22 8 - 23 mg/dL    Creatinine 1.3 0.5 - 1.4 mg/dL    Calcium 8.5 (L) 8.7 - 10.5 mg/dL    Anion Gap 12 8 - 16 mmol/L    eGFR if African American 57.5 (A) >60 mL/min/1.73 m^2    eGFR if non  49.8 (A) >60 mL/min/1.73 m^2       Recent Labs   Lab 08/27/19  1928 08/27/19 2131 08/28/19 0724   WBC 11.24  --  11.40   HGB 12.8*  --  11.9*   HCT 36.1* 36 35.4*   *  --  150       Recent Labs   Lab 08/28/19  0009 08/28/19  0723 08/28/19 0724 08/28/19  1111   *  --  123* 122*   K 2.5*  --  3.5 3.8   CL 82*  --  82* 83*   CO2 26  --  29 27   BUN 22  --  22 22   CREATININE 1.3  --  1.4 1.3   *  --  106 114*   CALCIUM 8.3*  --  8.8 8.5*   MG  --  1.9  --   --        Recent Labs   Lab 08/27/19  1819   ALKPHOS 79   ALT 11   AST 36   ALBUMIN 4.0   PROT 8.1   BILITOT 1.0         Hemoglobin A1C   Date Value Ref Range Status   04/04/2019 5.4 4.0 - 5.6 % Final     Comment:     ADA Screening  Guidelines:  5.7-6.4%  Consistent with prediabetes  >or=6.5%  Consistent with diabetes  High levels of fetal hemoglobin interfere with the HbA1C  assay. Heterozygous hemoglobin variants (HbS, HgC, etc)do  not significantly interfere with this assay.   However, presence of multiple variants may affect accuracy.         Scheduled Meds:   albuterol-ipratropium  3 mL Nebulization Q4H WAKE    amLODIPine  5 mg Oral Daily    atorvastatin  20 mg Oral Daily    cefTRIAXone (ROCEPHIN) IVPB  1 g Intravenous Q24H    doxycycline  100 mg Oral Q12H    gabapentin  800 mg Oral BID    guaiFENesin  600 mg Oral BID    levothyroxine  50 mcg Oral Before breakfast    metoprolol succinate  25 mg Oral Daily    pantoprazole  40 mg Oral Daily       Continuous Infusions:   sodium chloride 0.9%         As Needed: acetaminophen, albuterol, benzonatate, hyoscyamine, ondansetron, sodium chloride 0.9%    Active Hospital Problems    Diagnosis  POA    *Community acquired pneumonia [J18.9]  Yes    Gastroesophageal reflux disease without esophagitis [K21.9]  Yes     - continue home Prilosec      Neurogenic bladder [N31.9]  Yes    Hypokalemia [E87.6]  Yes    Hyponatremia [E87.1]  Yes    Suprapubic catheter [Z93.59]  Not Applicable    CKD (chronic kidney disease) stage 3, GFR 30-59 ml/min [N18.3]  Yes    Essential hypertension [I10]  Yes    Acquired hypothyroidism [E03.9]  Yes    Hyperlipidemia [E78.5]  Yes      Resolved Hospital Problems   No resolved problems to display.       Overview:    Assessment and Plan    Community acquired pneumonia  Hypoxia  Wheezing  - Treating as CAP in abundance of caution. Also on the differential is viral upper respiratory tract infection and COPD, though he does not carry a diagnosis of the latter  - chest CT with possible pneumonia in LLL  - Agree with CTX, changed azithromycin -> doxycycline given QTc, particularly in light of his electrolyte abnormalities; continue current antibiotics  -increase  neb frequency to q 4 hr WA due to persistent wheezing  -mucinex and flutter valve therapy added to treat cough  - blood cultures NGTD x 2  - IS for atelectasis  - Wean O2 as tolerated, currently on 2L  - Would benefit from PFTs upon discharge given distant but substantial smoking history     Hyponatremia  - Etiology likely hypovolemic, though volume exam is equivocal, and he has risk factors for SIADH  - patient was resumed on HCTZ in 6/2019 due to poor BP control; he is still taking HCTZ.. Also has a history of MDD but without any antidepressants listed that would place him at risk for SIADH  -Na essentially unchanged with fluid restriction and urine concentrated  - Gabi low not c/w SIADH  - suspect HCTZ and volume contraction as source of low Na currently and will treat with IVF beginning 8/28; avoid overcorrection    Hypochloremia  - Seems to favor hypovolemic etiology  - Already given NS as above  - BMP to trend     Hypokalemia  - Ordered 60 mEq for now (40 PO + 20 IV)  - improved with repeat supplementation  - will continue to monitor and replete to goal of 4 as indicated     Neurogenic bladder  Suprapubic catheter  Gross hematuria  - Hemoglobin stable and gross hematuria not evident  - Seen by urology in clinic on 8/27. Urine culture pending.  - Continue hyoscyamine PRN  - SCDs for VTE PPX. Also holding home asa  - CBC daily to monitor     CKD (chronic kidney disease) stage 3, GFR 30-59 ml/min  - Stable  - sCr at baseline on admission  - Will follow with BMP as above, also strict I/Os and daily weights     Acquired hypothyroidism  - TSH normal on 8/27  - Continue home levothyroxine     Gastroesophageal reflux disease without esophagitis  - Stable by symptoms  - Continue protonix     Hyperlipidemia  - Continue home atorvastatin     Essential hypertension  - Stable  - discontinuing HCTZ and holding losartan until his electrolytes are sorted out  - Continue metoprolol      Diet: Diet Adult Regular (IDDSI Level 7)  [facility]; Fluid - 1000mL   GI PPx:  DVT PPx:   VTE Risk Mitigation (From admission, onward)        Ordered     Place sequential compression device  Until discontinued      08/27/19 2331     IP VTE HIGH RISK PATIENT  Once      08/27/19 2306        L/D/A: PIV; suprapubic catheter  Wounds:    Goals of Care: curative; full code    Discharge plan: SNF recommended by PT; monitor progress     Birgit Garcia MD  Staff Hospitalist

## 2019-08-28 NOTE — H&P
"Hospital Medicine  History and Physical      Patient Name: Chucho Prado  MRN: [id number]  Date of Admission: 8/27/2019     Principal Problem: Community acquired pneumonia     Chief Complaint     Cough    History of Present Illness    Mr. Prado is an 85-year-old man with GERD, hypothyroidism, HTN, HLD, CKD stage III, and recurrent UTI c/b neurogenic bladder s/p SPC who presents with cough. His symptoms started ten days ago with the development of a mild cough intermittently productive of white sputum, which progressively worsened until today his prompting his evaluation. No recent sick contacts No fever, chills, or night sweats. He has some dyspnea (both at rest and exertional), but no palpitations, syncope, or edema. He does have some pleuritic chest pain after particularly long coughing episodes. He has some intermittent nausea, but no vomiting, constipation, or diarrhea. He reports a "normal" diet but is vague about the details and unable to tell me most of what he has been eating lately.    Workup in the ED significant for tachypnea peaking at 26, and SpO2 90 on RA. Improved quickly to 100% on 2L via NC. Labs show hyponatremia at 122, hypochloremia at 83, and hypokalemia at 2.3. . CXR shows streaky opacities thought to represent atelectasis, but did not visualize hilar structures and overall a poor study. It did show cardiomegaly. He was given NS, azithromycin, and ceftriaxone and medicine admission was requested for further testing and treatment.    Of note, he was also seen earlier today in the urology clinic for gross hematuria, at which time his SPC was exchanged, and a culture was obtained.    Review of Systems    Constitutional: Negative for chills, fatigue, fever.   HENT: +sore throat; Negative for trouble swallowing.    Eyes: Negative for photophobia, visual disturbance.   Respiratory: +cough, SOB    Cardiovascular: +chest pain (after coughing) Negative for palpitations, leg swelling. "   Gastrointestinal: +nausea Negative for abdominal pain, constipation, diarrhea, vomiting.   Endocrine: Negative for cold intolerance, heat intolerance.   Genitourinary: +hematuria; Negative for dysuria   Musculoskeletal: +chronic back pain; Otherwise negative for arthralgias, myalgias  Skin: Negative for rash, wound, erythema   Neurological: Negative for dizziness, syncope, weakness, light-headedness.   Psychiatric/Behavioral: Negative for confusion, hallucinations, anxiety    Past Medical History:   Diagnosis Date    Acquired hypothyroidism 7/30/2013    Arthritis     Blood transfusion     before 2005 - whe had gangrenous gall bladder    Cataract     Chronic back pain 12/4/2018    - Takes Percocet 5-325 at home - Can continue current abdominal pain control with Dilaudid 0.5 mg IV Q3H prn     CKD (chronic kidney disease) stage 3, GFR 30-59 ml/min     Compression fracture of lumbar vertebra     Depression     Dyslipidemia     Essential hypertension 7/30/2013    Gastroesophageal reflux disease without esophagitis 12/4/2018    - continue home Prilosec    General anesthetics causing adverse effect in therapeutic use     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    GERD (gastroesophageal reflux disease)     History of postoperative delirium 12/4/2018    - Patient reports 1 week history of post-op delirium 7/2018 - Monitor electrolytes, UA, and urine cx - Delirium precautions  - Can use Seroquel 25 mg QHS prn     Hypertension     Hyponatremia 10/23/2017    Impaired mobility and ADLs 6/28/2018    Neurogenic bladder 12/4/2018    Sleep disorder 12/4/2018    - continue Trazodone QHS    Thyroid disease     UTI (urinary tract infection)      Past Surgical History:   Procedure Laterality Date    BLOCK-NERVE Left 10/26/2017    Performed by Asia Surgeon at [facility] ASIA    CHOLECYSTECTOMY      CYSTOSCOPY WITH RETROGRADE PYELOGRAM Bilateral 1/27/2017    Performed by Chaitanya Taylor Jr., MD at [facility] OR [address] [place] [address]     EGD (ESOPHAGOGASTRODUODENOSCOPY) N/A 8/2/2013    Performed by Nagi Talbert MD at [facility] ENDO (2ND FLR)    EYE SURGERY Right     IOL    HERNIA REPAIR      INSERTION-CATHETER-SUPRAPUBIC N/A 6/23/2017    Performed by Chaitanya Taylor Jr., MD at [facility] OR 2ND FLR    INSERTION-INTRAOCULAR LENS (IOL) Left 5/28/2018    Performed by Trinity Vazquez MD at [facility] OR    INSERTION-INTRAOCULAR LENS (IOL) Right 2/19/2018    Performed by Trinity [facility]quez MD at [facility] OR    JOINT REPLACEMENT      KYPHOPLASTY L3 N/A 12/27/2016    Performed by Donavan Martinez MD at [facility] OR 2ND FLR    LAMINECTOMY  12/27/2016    L2-L4    LAMINECTOMY-MINIMALLY INVASIVE L2,3 and L3,4; globus, neuromonitoring Right 12/27/2016    Performed by Donavan Martinez MD at [facility] OR 2ND FLR    LUMBAR LAMINECTOMY  12/2016    PARATHYROIDECTOMY      PHACOEMULSIFICATION-ASPIRATION-CATARACT Left 5/28/2018    Performed by Trinity [facility]quez MD at [facility] OR    PHACOEMULSIFICATION-ASPIRATION-CATARACT Right 2/19/2018    Performed by Trinity Vazquez MD at [facility] OR    REPAIR, HERNIA, INCISIONAL, INCARCERATED, WITHOUT HISTORY OF PRIOR REPAIR N/A 12/5/2018    Performed by Steve Valentin MD at [facility] OR 2ND FLR    REPAIR, HERNIA, INGUINAL, WITHOUT HISTORY OF PRIOR REPAIR, AGE 5 YEARS OR OLDER Right 10/9/2013    Performed by Daron Arthur MD at [facility] OR 2ND FLR    REPAIR, HERNIA, VENTRAL, INCARCERATED, WITHOUT HISTORY OF PRIOR REPAIR N/A 6/20/2018    Performed by Guilherme Ordoñez MD at [facility] OR 2ND FLR    REPLACEMENT-KNEE-TOTAL Left 10/25/2017    [facility] OR 2ND FLR    TOTAL KNEE ARTHROPLASTY Bilateral     TOTAL KNEE ARTHROPLASTY Left 10/25/2017    TKR     Family History   Problem Relation Age of Onset    Hypertension Mother     Diabetes Father     Esophageal cancer Father      Social History     Socioeconomic History    Marital status: Single     Spouse name: Not on file    Number of children: Not on file    Years of  education: Not on file    Highest education level: Not on file   Occupational History    Not on file   Social Needs    Financial resource strain: Not on file    Food insecurity:     Worry: Not on file     Inability: Not on file    Transportation needs:     Medical: Not on file     Non-medical: Not on file   Tobacco Use    Smoking status: Former Smoker     Years: 20.00     Last attempt to quit:      Years since quittin.6    Smokeless tobacco: Never Used    Tobacco comment: quit    Substance and Sexual Activity    Alcohol use: No     Comment: rarely/6 months    Drug use: No    Sexual activity: Never   Lifestyle    Physical activity:     Days per week: Not on file     Minutes per session: Not on file    Stress: Not on file   Relationships    Social connections:     Talks on phone: Not on file     Gets together: Not on file     Attends Voodoo service: Not on file     Active member of club or organization: Not on file     Attends meetings of clubs or organizations: Not on file     Relationship status: Not on file   Other Topics Concern    Not on file   Social History Narrative    Lives alone, owns house and rents part. Delaney helps. 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      Medications  Scheduled Meds:   [START ON 2019] albuterol-ipratropium  3 mL Nebulization Q6H WAKE    [START ON 2019] amLODIPine  5 mg Oral Daily    [START ON 2019] aspirin  81 mg Oral Daily    [START ON 2019] atorvastatin  20 mg Oral Daily    azithromycin  500 mg Intravenous ED 1 Time    [START ON 2019] cefTRIAXone (ROCEPHIN) IVPB  1 g Intravenous Q24H    [START ON 2019] doxycycline  100 mg Oral Q12H    [START ON 2019] levothyroxine  50 mcg Oral Before breakfast    [START ON 2019] metoprolol succinate  25 mg Oral Daily    [START ON 2019] pantoprazole  40 mg Oral Daily     Continuous Infusions:  PRN Meds:.[START ON 2019] acetaminophen, [START ON 2019]  albuterol, [START ON 8/28/2019] ondansetron, [START ON 8/28/2019] sodium chloride 0.9%    Allergies  Patient has no known allergies.    Physical Examination    Temp:  [98.3 °F (36.8 °C)]   Pulse:  []   Resp:  [16-28]   BP: (123-153)/(63-69)   SpO2:  [90 %-100 %]     Gen: NAD, conversant  Head: NC, AT, NC in place on 2L  Eyes: PERRLA, EOMI  Throat: MMM, OP clear  CV: RRR, no M/R/G, no peripheral edema, no JVD  Resp: Prominent referred upper airway noise that partial clears with coughing, fine crackles at bases bilaterally, scattered expiratory wheezing, no increased work of breathing on 2L via NC  GI: Soft, NT, ND, +BS  : +suprapubic catheter  Ext: MAEW, no c/c/e  Neuro: AAOx3, CN grossly intact, no focal neurologic deficits  Psychiatry: Normal mood, normal affect    CBC  Recent Labs   Lab 08/27/19  1928 08/27/19  2131   WBC 11.24  --    HGB 12.8*  --    HCT 36.1* 36   *  --      CMP  Recent Labs   Lab 08/27/19  1819 08/27/19  2129   * 121*   K 4.0 2.3*   CL 81* 83*   CO2 25 25   BUN 22 22   CREATININE 1.3 1.3    134*   CALCIUM 9.0 8.3*   ALKPHOS 79  --    ALT 11  --    AST 36  --    ALBUMIN 4.0  --    PROT 8.1  --    BILITOT 1.0  --        Assessment and Plan:    Community acquired pneumonia  Hypoxia    - Treating as CAP in abundance of caution. Also on the differential is viral upper respiratory tract infection and COPD, though he does not carry a diagnosis of the latter  - PCT pending  - Agree with CTX, changed azithromycin -> doxycycline given QTc, particularly in light of his electrolyte abnormalities  - Ordered blood cultures  - IS for atelectasis  - TTE for cardiomegaly  - CT chest non-con ordered for clarification of poor CXR  - Wean O2 as tolerated, currently on 2L  - Would benefit from PFTs upon discharge given distant but substantialsmoking history    Hyponatremia    - Etiology likely hypovolemic, though volume exam is equivocal, and he has risk factors for SIADH  - He is unsure  if he is still taking HCTZ. Would benefit from discussion with pharmacy in the morning. Also has a history of MDD but without any antidepressants listed that would place him at risk for SIADH  - Gabi ordered for clarification, though may be less useful if he is on diuretics  - Serum and urine osmolality ordered for SIADH  - Stopped NS infusion in the ED and ordered Q4H BMP to avoid overcorrection. Goal is 6-8 mEq correction over 24h. Will start D5 if needed  - CT chest non-con as above, also ordering to rule-out paraneoplastic etiology    Hypochloremia    - Seems to favor hypovolemic etiology  - Already given NS as above  - BMP to trend    Hypokalemia    - Ordered 60 mEq for now (40 PO + 20 IV)  - Trending BMP as above  - Tele to monitor until improved    Neurogenic bladder  Suprapubic catheter  Gross hematuria    - Hemoglobin stable  - Seen by urology today. Urine culture pending  - Continue hyoscyamine PRN  - SCDs for VTE PPX. Also holding home asa  - CBC daily to monitor    CKD (chronic kidney disease) stage 3, GFR 30-59 ml/min    - Stable  - sCr at baseline on admission  - Will follow with BMP as above, also strict I/Os and daily weights    Acquired hypothyroidism    - TSH pending  - Continue home levothyroxine    Gastroesophageal reflux disease without esophagitis    - Stable by symptoms  - Continue protonix    Hyperlipidemia    - Continue home atorvastatin    Essential hypertension    - Stable  - Holding HCTZ and losartan until his electrolytes are sorted out  - Continue metoprolol    Diet: Cardiac  VTE PPX: SCDs given gross hematuria    Goals of care: Curative, full code (discussed the night of admission)      Jerald Parikh M.D.  Department of Hospital Medicine  [facility]  [phone] (pager)

## 2019-08-28 NOTE — PLAN OF CARE
08/28/19 1626   Post-Acute Status   Post-Acute Authorization Placement   Post-Acute Placement Status Patient List Provided     SW met with patient at bedside to provide him with a SNF list, SW will follow up with patient to obtain choices.    Vikki Hein LMSW  [facility]   [id number]

## 2019-08-28 NOTE — ED NOTES
Tele box 70407 applied to pt. Kailyn in war room states able to see pt on monitor, rhythm NSR with HR 91.

## 2019-08-29 LAB
ANION GAP SERPL CALC-SCNC: 13 MMOL/L (ref 8–16)
BASOPHILS # BLD AUTO: 0.01 K/UL (ref 0–0.2)
BASOPHILS NFR BLD: 0.1 % (ref 0–1.9)
BUN SERPL-MCNC: 18 MG/DL (ref 8–23)
CALCIUM SERPL-MCNC: 8.5 MG/DL (ref 8.7–10.5)
CHLORIDE SERPL-SCNC: 92 MMOL/L (ref 95–110)
CO2 SERPL-SCNC: 24 MMOL/L (ref 23–29)
CREAT SERPL-MCNC: 1.1 MG/DL (ref 0.5–1.4)
DIFFERENTIAL METHOD: ABNORMAL
EOSINOPHIL # BLD AUTO: 0.1 K/UL (ref 0–0.5)
EOSINOPHIL NFR BLD: 0.6 % (ref 0–8)
ERYTHROCYTE [DISTWIDTH] IN BLOOD BY AUTOMATED COUNT: 14 % (ref 11.5–14.5)
EST. GFR  (AFRICAN AMERICAN): >60 ML/MIN/1.73 M^2
EST. GFR  (NON AFRICAN AMERICAN): >60 ML/MIN/1.73 M^2
GLUCOSE SERPL-MCNC: 92 MG/DL (ref 70–110)
HCT VFR BLD AUTO: 34.2 % (ref 40–54)
HGB BLD-MCNC: 11.1 G/DL (ref 14–18)
IMM GRANULOCYTES # BLD AUTO: 0.05 K/UL (ref 0–0.04)
IMM GRANULOCYTES NFR BLD AUTO: 0.6 % (ref 0–0.5)
LYMPHOCYTES # BLD AUTO: 1.7 K/UL (ref 1–4.8)
LYMPHOCYTES NFR BLD: 19.1 % (ref 18–48)
MAGNESIUM SERPL-MCNC: 1.9 MG/DL (ref 1.6–2.6)
MCH RBC QN AUTO: 28.3 PG (ref 27–31)
MCHC RBC AUTO-ENTMCNC: 32.5 G/DL (ref 32–36)
MCV RBC AUTO: 87 FL (ref 82–98)
MONOCYTES # BLD AUTO: 0.7 K/UL (ref 0.3–1)
MONOCYTES NFR BLD: 8 % (ref 4–15)
NEUTROPHILS # BLD AUTO: 6.5 K/UL (ref 1.8–7.7)
NEUTROPHILS NFR BLD: 71.6 % (ref 38–73)
NRBC BLD-RTO: 0 /100 WBC
PHOSPHATE SERPL-MCNC: 2.2 MG/DL (ref 2.7–4.5)
PLATELET # BLD AUTO: 129 K/UL (ref 150–350)
PMV BLD AUTO: 9.9 FL (ref 9.2–12.9)
POTASSIUM SERPL-SCNC: 4.1 MMOL/L (ref 3.5–5.1)
RBC # BLD AUTO: 3.92 M/UL (ref 4.6–6.2)
SODIUM SERPL-SCNC: 129 MMOL/L (ref 136–145)
WBC # BLD AUTO: 9 K/UL (ref 3.9–12.7)

## 2019-08-29 PROCEDURE — 63600175 PHARM REV CODE 636 W HCPCS: Performed by: INTERNAL MEDICINE

## 2019-08-29 PROCEDURE — 85025 COMPLETE CBC W/AUTO DIFF WBC: CPT

## 2019-08-29 PROCEDURE — 11000001 HC ACUTE MED/SURG PRIVATE ROOM

## 2019-08-29 PROCEDURE — 80048 BASIC METABOLIC PNL TOTAL CA: CPT

## 2019-08-29 PROCEDURE — 94640 AIRWAY INHALATION TREATMENT: CPT

## 2019-08-29 PROCEDURE — 99232 PR SUBSEQUENT HOSPITAL CARE,LEVL II: ICD-10-PCS | Mod: ,,, | Performed by: INTERNAL MEDICINE

## 2019-08-29 PROCEDURE — 25000003 PHARM REV CODE 250: Performed by: INTERNAL MEDICINE

## 2019-08-29 PROCEDURE — 83735 ASSAY OF MAGNESIUM: CPT

## 2019-08-29 PROCEDURE — 94761 N-INVAS EAR/PLS OXIMETRY MLT: CPT

## 2019-08-29 PROCEDURE — 94664 DEMO&/EVAL PT USE INHALER: CPT

## 2019-08-29 PROCEDURE — 27000221 HC OXYGEN, UP TO 24 HOURS

## 2019-08-29 PROCEDURE — 25000242 PHARM REV CODE 250 ALT 637 W/ HCPCS: Performed by: INTERNAL MEDICINE

## 2019-08-29 PROCEDURE — 30200315 PPD INTRADERMAL TEST REV CODE 302: Performed by: INTERNAL MEDICINE

## 2019-08-29 PROCEDURE — 94799 UNLISTED PULMONARY SVC/PX: CPT

## 2019-08-29 PROCEDURE — 84100 ASSAY OF PHOSPHORUS: CPT

## 2019-08-29 PROCEDURE — 99232 SBSQ HOSP IP/OBS MODERATE 35: CPT | Mod: ,,, | Performed by: INTERNAL MEDICINE

## 2019-08-29 PROCEDURE — 86580 TB INTRADERMAL TEST: CPT | Performed by: INTERNAL MEDICINE

## 2019-08-29 PROCEDURE — 99900035 HC TECH TIME PER 15 MIN (STAT)

## 2019-08-29 PROCEDURE — 27000646 HC AEROBIKA DEVICE

## 2019-08-29 PROCEDURE — 36415 COLL VENOUS BLD VENIPUNCTURE: CPT

## 2019-08-29 RX ORDER — IPRATROPIUM BROMIDE 0.5 MG/2.5ML
0.5 SOLUTION RESPIRATORY (INHALATION)
Status: DISCONTINUED | OUTPATIENT
Start: 2019-08-29 | End: 2019-08-29

## 2019-08-29 RX ORDER — IPRATROPIUM BROMIDE 0.5 MG/2.5ML
0.5 SOLUTION RESPIRATORY (INHALATION)
Status: DISCONTINUED | OUTPATIENT
Start: 2019-08-29 | End: 2019-09-05 | Stop reason: HOSPADM

## 2019-08-29 RX ORDER — LEVALBUTEROL INHALATION SOLUTION 0.63 MG/3ML
0.63 SOLUTION RESPIRATORY (INHALATION)
Status: DISCONTINUED | OUTPATIENT
Start: 2019-08-29 | End: 2019-09-05 | Stop reason: HOSPADM

## 2019-08-29 RX ADMIN — LEVOTHYROXINE SODIUM 50 MCG: 50 TABLET ORAL at 09:08

## 2019-08-29 RX ADMIN — DOXYCYCLINE HYCLATE 100 MG: 100 TABLET, COATED ORAL at 09:08

## 2019-08-29 RX ADMIN — RAMELTEON 8 MG: 8 TABLET, FILM COATED ORAL at 11:08

## 2019-08-29 RX ADMIN — AMLODIPINE BESYLATE 5 MG: 5 TABLET ORAL at 09:08

## 2019-08-29 RX ADMIN — GUAIFENESIN 600 MG: 600 TABLET, EXTENDED RELEASE ORAL at 09:08

## 2019-08-29 RX ADMIN — METOPROLOL SUCCINATE 25 MG: 25 TABLET, EXTENDED RELEASE ORAL at 09:08

## 2019-08-29 RX ADMIN — GABAPENTIN 800 MG: 400 CAPSULE ORAL at 09:08

## 2019-08-29 RX ADMIN — IPRATROPIUM BROMIDE 0.5 MG: 0.5 SOLUTION RESPIRATORY (INHALATION) at 08:08

## 2019-08-29 RX ADMIN — ATORVASTATIN CALCIUM 20 MG: 20 TABLET, FILM COATED ORAL at 09:08

## 2019-08-29 RX ADMIN — CEFTRIAXONE SODIUM 1 G: 1 INJECTION, POWDER, FOR SOLUTION INTRAMUSCULAR; INTRAVENOUS at 09:08

## 2019-08-29 RX ADMIN — IPRATROPIUM BROMIDE AND ALBUTEROL SULFATE 3 ML: .5; 3 SOLUTION RESPIRATORY (INHALATION) at 08:08

## 2019-08-29 RX ADMIN — Medication 5 UNITS: at 05:08

## 2019-08-29 RX ADMIN — IPRATROPIUM BROMIDE AND ALBUTEROL SULFATE 3 ML: .5; 3 SOLUTION RESPIRATORY (INHALATION) at 11:08

## 2019-08-29 RX ADMIN — SODIUM PHOSPHATE, MONOBASIC, MONOHYDRATE 30 MMOL: 276; 142 INJECTION, SOLUTION INTRAVENOUS at 06:08

## 2019-08-29 RX ADMIN — PANTOPRAZOLE SODIUM 40 MG: 40 TABLET, DELAYED RELEASE ORAL at 09:08

## 2019-08-29 RX ADMIN — IPRATROPIUM BROMIDE AND ALBUTEROL SULFATE 3 ML: .5; 3 SOLUTION RESPIRATORY (INHALATION) at 04:08

## 2019-08-29 NOTE — PLAN OF CARE
SW left message for Aminata with [facility] to f/u on referral for SNF.    Steffany Barry, [facility] [phone]

## 2019-08-29 NOTE — CODE/ RAPID DOCUMENTATION
Rapid Response Nurse Chart Check     Chart check completed, abnormal VS noted. Bedside RN Jessica contacted, no concerns verbalized at this time, instructed to call [phone] for further concerns or assistance.

## 2019-08-29 NOTE — PLAN OF CARE
SW spoke with Aminata from [facility]-referral under review. SW attempted to meet with pt to discuss getting additional choices for SNF, pt asleep. LOCET called in, PASRR faxed, 142 received. PASRR and 142 uploaded to [facility].    Steffany Barry, LCSW [id number]

## 2019-08-29 NOTE — PROGRESS NOTES
"[facility] Hospital Medicine  Progress Note    Team: [facility] HOSP MED K   Attending MD: Birgit Garcia MD  Admit Date: 8/27/2019  TISHA 8/30/2019  Length of Stay:  LOS: 2 days   Code status: Full Code    Principal Problem: Community acquired pneumonia    Interval hx: improving cough; feels weak; caregiver at bedside and states he was weak on discharge last admit but decided to go home and had slow progress; + hx of hyponatremia and hypokalemia with HCTZ in the past; slept more overnight    ROS:  Constitutional: no fever or chills  Respiratory: +cough, not productive; denies shortness of breath  Cardiovascular: no chest pain or palpitations  Gastrointestinal: no nausea or vomiting, no abdominal pain; last BM:  Genitourinary: no gross hematuria or dysuria  Integument/Breast: no rash or pruritis  Musculoskeletal: no arthralgias or myalgias  Neurological: no seizures or tremors  Behavioral/Psych: no auditory or visual hallucinations      Physical Exam  Temp:  [96.2 °F (35.7 °C)-98 °F (36.7 °C)] 96.4 °F (35.8 °C)  Pulse:  [] 127  Resp:  [16-20] 20  SpO2:  [94 %-98 %] 94 %  BP: (120-164)/(59-72) 130/61      Intake/Output Summary (Last 24 hours) at 8/29/2019 1624  Last data filed at 8/29/2019 0000  Gross per 24 hour   Intake 140 ml   Output --   Net 140 ml       Wt Readings from Last 1 Encounters:   08/29/19 0400 76.1 kg (167 lb 12.3 oz)   08/28/19 1111 76.2 kg (168 lb)   08/28/19 0157 76.3 kg (168 lb 3.4 oz)   08/27/19 1521 88.5 kg (195 lb)        Estimated body mass index is 25.51 kg/m² as calculated from the following:    Height as of this encounter: 5' 8" (1.727 m).    Weight as of this encounter: 76.1 kg (167 lb 12.3 oz).    General: well developed, well nourished, no distress  Lungs:no wheezing bilaterally and diminished BS and normal respiratory effort.  Cardiovascular: Heart: regular rate and rhythm, S1, S2 normal, 2/6 sytolic murmur, no click, rub or gallop. Chest Wall: no tenderness. " Extremities: no cyanosis, no edema, no clubbing; extremities tender to palpation  Abdomen/Rectal: Abdomen: soft, non-tender non-distended; bowel sounds normal; no masses; + suprapubic catheter  Skin: Skin color, texture, turgor normal. No rashes or lesions.  Neurologic: Normal strength and tone. No focal numbness or weakness.  Psych/Behavioral:  Alert and oriented, appropriate affect.      Recent Results (from the past 24 hour(s))   Basic metabolic panel    Collection Time: 08/28/19  7:47 PM   Result Value Ref Range    Sodium 125 (L) 136 - 145 mmol/L    Potassium 3.8 3.5 - 5.1 mmol/L    Chloride 88 (L) 95 - 110 mmol/L    CO2 29 23 - 29 mmol/L    Glucose 106 70 - 110 mg/dL    BUN, Bld 20 8 - 23 mg/dL    Creatinine 1.2 0.5 - 1.4 mg/dL    Calcium 8.3 (L) 8.7 - 10.5 mg/dL    Anion Gap 8 8 - 16 mmol/L    eGFR if African American >60.0 >60 mL/min/1.73 m^2    eGFR if non  54.8 (A) >60 mL/min/1.73 m^2   Magnesium    Collection Time: 08/29/19  4:04 AM   Result Value Ref Range    Magnesium 1.9 1.6 - 2.6 mg/dL   CBC with Automated Differential    Collection Time: 08/29/19  4:04 AM   Result Value Ref Range    WBC 9.00 3.90 - 12.70 K/uL    RBC 3.92 (L) 4.60 - 6.20 M/uL    Hemoglobin 11.1 (L) 14.0 - 18.0 g/dL    Hematocrit 34.2 (L) 40.0 - 54.0 %    Mean Corpuscular Volume 87 82 - 98 fL    Mean Corpuscular Hemoglobin 28.3 27.0 - 31.0 pg    Mean Corpuscular Hemoglobin Conc 32.5 32.0 - 36.0 g/dL    RDW 14.0 11.5 - 14.5 %    Platelets 129 (L) 150 - 350 K/uL    MPV 9.9 9.2 - 12.9 fL    Immature Granulocytes 0.6 (H) 0.0 - 0.5 %    Gran # (ANC) 6.5 1.8 - 7.7 K/uL    Immature Grans (Abs) 0.05 (H) 0.00 - 0.04 K/uL    Lymph # 1.7 1.0 - 4.8 K/uL    Mono # 0.7 0.3 - 1.0 K/uL    Eos # 0.1 0.0 - 0.5 K/uL    Baso # 0.01 0.00 - 0.20 K/uL    nRBC 0 0 /100 WBC    Gran% 71.6 38.0 - 73.0 %    Lymph% 19.1 18.0 - 48.0 %    Mono% 8.0 4.0 - 15.0 %    Eosinophil% 0.6 0.0 - 8.0 %    Basophil% 0.1 0.0 - 1.9 %    Differential Method  Automated    Basic Metabolic Panel (BMP)    Collection Time: 08/29/19  4:04 AM   Result Value Ref Range    Sodium 129 (L) 136 - 145 mmol/L    Potassium 4.1 3.5 - 5.1 mmol/L    Chloride 92 (L) 95 - 110 mmol/L    CO2 24 23 - 29 mmol/L    Glucose 92 70 - 110 mg/dL    BUN, Bld 18 8 - 23 mg/dL    Creatinine 1.1 0.5 - 1.4 mg/dL    Calcium 8.5 (L) 8.7 - 10.5 mg/dL    Anion Gap 13 8 - 16 mmol/L    eGFR if African American >60.0 >60 mL/min/1.73 m^2    eGFR if non African American >60.0 >60 mL/min/1.73 m^2   Phosphorus    Collection Time: 08/29/19  4:04 AM   Result Value Ref Range    Phosphorus 2.2 (L) 2.7 - 4.5 mg/dL       Recent Labs   Lab 08/27/19  1928 08/27/19  2131 08/28/19  0724 08/29/19  0404   WBC 11.24  --  11.40 9.00   HGB 12.8*  --  11.9* 11.1*   HCT 36.1* 36 35.4* 34.2*   *  --  150 129*       Recent Labs   Lab 08/28/19  0723  08/28/19  1111 08/28/19  1947 08/29/19  0404   NA  --    < > 122* 125* 129*   K  --    < > 3.8 3.8 4.1   CL  --    < > 83* 88* 92*   CO2  --    < > 27 29 24   BUN  --    < > 22 20 18   CREATININE  --    < > 1.3 1.2 1.1   GLU  --    < > 114* 106 92   CALCIUM  --    < > 8.5* 8.3* 8.5*   MG 1.9  --   --   --  1.9   PHOS  --   --   --   --  2.2*    < > = values in this interval not displayed.       Recent Labs   Lab 08/27/19  1819   ALKPHOS 79   ALT 11   AST 36   ALBUMIN 4.0   PROT 8.1   BILITOT 1.0         Hemoglobin A1C   Date Value Ref Range Status   04/04/2019 5.4 4.0 - 5.6 % Final     Comment:     ADA Screening Guidelines:  5.7-6.4%  Consistent with prediabetes  >or=6.5%  Consistent with diabetes  High levels of fetal hemoglobin interfere with the HbA1C  assay. Heterozygous hemoglobin variants (HbS, HgC, etc)do  not significantly interfere with this assay.   However, presence of multiple variants may affect accuracy.         Scheduled Meds:   albuterol-ipratropium  3 mL Nebulization Q4H WAKE    amLODIPine  5 mg Oral Daily    atorvastatin  20 mg Oral Daily    cefTRIAXone  (ROCEPHIN) IVPB  1 g Intravenous Q24H    doxycycline  100 mg Oral Q12H    gabapentin  800 mg Oral BID    guaiFENesin  600 mg Oral BID    levothyroxine  50 mcg Oral Before breakfast    metoprolol succinate  25 mg Oral Daily    pantoprazole  40 mg Oral Daily    tuberculin  5 Units Intradermal Once       Continuous Infusions:      As Needed: acetaminophen, albuterol, benzonatate, hyoscyamine, ondansetron, ramelteon, sodium chloride 0.9%    Active Hospital Problems    Diagnosis  POA    *Community acquired pneumonia [J18.9]  Yes    Gastroesophageal reflux disease without esophagitis [K21.9]  Yes     - continue home Prilosec      Neurogenic bladder [N31.9]  Yes    Hypokalemia [E87.6]  Yes    Hyponatremia [E87.1]  Yes    Suprapubic catheter [Z93.59]  Not Applicable    CKD (chronic kidney disease) stage 3, GFR 30-59 ml/min [N18.3]  Yes    Essential hypertension [I10]  Yes    Acquired hypothyroidism [E03.9]  Yes    Hyperlipidemia [E78.5]  Yes      Resolved Hospital Problems   No resolved problems to display.       Overview:    Assessment and Plan    Community acquired pneumonia  Hypoxia  Wheezing  - Treating as CAP in abundance of caution. Also on the differential is viral upper respiratory tract infection and COPD, though he does not carry a diagnosis of the latter  - chest CT with possible pneumonia in LLL  - Agree with CTX, changed azithromycin -> doxycycline given QTc, particularly in light of his electrolyte abnormalities; continue current antibiotics  -increase neb frequency to q 4 hr WA due to persistent wheezing  -mucinex and flutter valve therapy added to treat cough  - blood cultures NGTD x 2  - IS for atelectasis  - Wean O2 as tolerated, currently on 2L  - Would benefit from PFTs upon discharge given distant but substantial smoking history     Hyponatremia  - Etiology likely hypovolemic, though volume exam is equivocal, and he has risk factors for SIADH  - patient was resumed on HCTZ in 6/2019 due  to poor BP control; he is still taking HCTZ.. Also has a history of MDD but without any antidepressants listed that would place him at risk for SIADH  -Na essentially unchanged with fluid restriction and urine concentrated  - Gabi low not c/w SIADH  - suspect HCTZ and volume contraction as source of low Na currently and improved with IVF beginning 8/28; avoid overcorrection    Hypochloremia  - Seems to favor hypovolemic etiology  - Already given NS as above  - BMP to trend     Hypokalemia  - Ordered 60 mEq for now (40 PO + 20 IV)  - improved with repeat supplementation  - will continue to monitor and replete to goal of 4 as indicated    Hypophosphatemia  Replete with IV supplement as oral makes patient have N/V     Neurogenic bladder  Suprapubic catheter  Gross hematuria  - Hemoglobin stable and gross hematuria not evident  - Seen by urology in clinic on 8/27. Urine culture pending.  - Continue hyoscyamine PRN  - SCDs for VTE PPX. Also holding home asa  - CBC daily to monitor     CKD (chronic kidney disease) stage 3, GFR 30-59 ml/min  - Stable  - sCr at baseline on admission  - Will follow with BMP as above, also strict I/Os and daily weights     Acquired hypothyroidism  - TSH normal on 8/27  - Continue home levothyroxine     Gastroesophageal reflux disease without esophagitis  - Stable by symptoms  - Continue protonix     Hyperlipidemia  - Continue home atorvastatin     Essential hypertension  - Stable  - discontinuing HCTZ and holding losartan until his electrolytes are sorted out  - Continue metoprolol      Diet: Diet Adult Regular (IDDSI Level 7) [facility]   GI PPx:  DVT PPx:   VTE Risk Mitigation (From admission, onward)        Ordered     Place sequential compression device  Until discontinued      08/27/19 2331     IP VTE HIGH RISK PATIENT  Once      08/27/19 2306        L/D/A: PIV; suprapubic catheter  Wounds:    Goals of Care: curative; full code    Discharge plan: SNF recommended by PT; monitor  progress     Birgit Garcia MD  Staff Hospitalist

## 2019-08-30 ENCOUNTER — TELEPHONE (OUTPATIENT)
Dept: UROLOGY | Facility: CLINIC | Age: 84
End: 2019-08-30

## 2019-08-30 PROBLEM — T83.518D: Status: ACTIVE | Noted: 2019-08-30

## 2019-08-30 LAB
ANION GAP SERPL CALC-SCNC: 10 MMOL/L (ref 8–16)
BASOPHILS # BLD AUTO: 0.02 K/UL (ref 0–0.2)
BASOPHILS NFR BLD: 0.3 % (ref 0–1.9)
BUN SERPL-MCNC: 14 MG/DL (ref 8–23)
CALCIUM SERPL-MCNC: 7.8 MG/DL (ref 8.7–10.5)
CHLORIDE SERPL-SCNC: 93 MMOL/L (ref 95–110)
CO2 SERPL-SCNC: 26 MMOL/L (ref 23–29)
CREAT SERPL-MCNC: 1 MG/DL (ref 0.5–1.4)
DIFFERENTIAL METHOD: ABNORMAL
EOSINOPHIL # BLD AUTO: 0.1 K/UL (ref 0–0.5)
EOSINOPHIL NFR BLD: 1.5 % (ref 0–8)
ERYTHROCYTE [DISTWIDTH] IN BLOOD BY AUTOMATED COUNT: 13.9 % (ref 11.5–14.5)
EST. GFR  (AFRICAN AMERICAN): >60 ML/MIN/1.73 M^2
EST. GFR  (NON AFRICAN AMERICAN): >60 ML/MIN/1.73 M^2
GLUCOSE SERPL-MCNC: 94 MG/DL (ref 70–110)
HCT VFR BLD AUTO: 31.2 % (ref 40–54)
HGB BLD-MCNC: 10.2 G/DL (ref 14–18)
IMM GRANULOCYTES # BLD AUTO: 0.06 K/UL (ref 0–0.04)
IMM GRANULOCYTES NFR BLD AUTO: 0.8 % (ref 0–0.5)
LYMPHOCYTES # BLD AUTO: 1.3 K/UL (ref 1–4.8)
LYMPHOCYTES NFR BLD: 16 % (ref 18–48)
MAGNESIUM SERPL-MCNC: 1.7 MG/DL (ref 1.6–2.6)
MCH RBC QN AUTO: 28.5 PG (ref 27–31)
MCHC RBC AUTO-ENTMCNC: 32.7 G/DL (ref 32–36)
MCV RBC AUTO: 87 FL (ref 82–98)
MONOCYTES # BLD AUTO: 0.6 K/UL (ref 0.3–1)
MONOCYTES NFR BLD: 7.8 % (ref 4–15)
NEUTROPHILS # BLD AUTO: 5.8 K/UL (ref 1.8–7.7)
NEUTROPHILS NFR BLD: 73.6 % (ref 38–73)
NRBC BLD-RTO: 0 /100 WBC
PHOSPHATE SERPL-MCNC: 4.3 MG/DL (ref 2.7–4.5)
PLATELET # BLD AUTO: 150 K/UL (ref 150–350)
PMV BLD AUTO: 9.9 FL (ref 9.2–12.9)
POTASSIUM SERPL-SCNC: 3.4 MMOL/L (ref 3.5–5.1)
RBC # BLD AUTO: 3.58 M/UL (ref 4.6–6.2)
SODIUM SERPL-SCNC: 129 MMOL/L (ref 136–145)
WBC # BLD AUTO: 7.92 K/UL (ref 3.9–12.7)

## 2019-08-30 PROCEDURE — 80048 BASIC METABOLIC PNL TOTAL CA: CPT

## 2019-08-30 PROCEDURE — 84100 ASSAY OF PHOSPHORUS: CPT

## 2019-08-30 PROCEDURE — 27000221 HC OXYGEN, UP TO 24 HOURS

## 2019-08-30 PROCEDURE — 11000001 HC ACUTE MED/SURG PRIVATE ROOM

## 2019-08-30 PROCEDURE — 63600175 PHARM REV CODE 636 W HCPCS: Performed by: INTERNAL MEDICINE

## 2019-08-30 PROCEDURE — 99900035 HC TECH TIME PER 15 MIN (STAT)

## 2019-08-30 PROCEDURE — 99232 PR SUBSEQUENT HOSPITAL CARE,LEVL II: ICD-10-PCS | Mod: ,,, | Performed by: INTERNAL MEDICINE

## 2019-08-30 PROCEDURE — 94664 DEMO&/EVAL PT USE INHALER: CPT

## 2019-08-30 PROCEDURE — 25000003 PHARM REV CODE 250: Performed by: INTERNAL MEDICINE

## 2019-08-30 PROCEDURE — 94640 AIRWAY INHALATION TREATMENT: CPT

## 2019-08-30 PROCEDURE — 94761 N-INVAS EAR/PLS OXIMETRY MLT: CPT

## 2019-08-30 PROCEDURE — 99232 SBSQ HOSP IP/OBS MODERATE 35: CPT | Mod: ,,, | Performed by: INTERNAL MEDICINE

## 2019-08-30 PROCEDURE — 27000646 HC AEROBIKA DEVICE

## 2019-08-30 PROCEDURE — 85025 COMPLETE CBC W/AUTO DIFF WBC: CPT

## 2019-08-30 PROCEDURE — 36415 COLL VENOUS BLD VENIPUNCTURE: CPT

## 2019-08-30 PROCEDURE — 25000242 PHARM REV CODE 250 ALT 637 W/ HCPCS: Performed by: INTERNAL MEDICINE

## 2019-08-30 PROCEDURE — 97535 SELF CARE MNGMENT TRAINING: CPT

## 2019-08-30 PROCEDURE — 83735 ASSAY OF MAGNESIUM: CPT

## 2019-08-30 PROCEDURE — 94799 UNLISTED PULMONARY SVC/PX: CPT

## 2019-08-30 RX ORDER — SODIUM CHLORIDE AND POTASSIUM CHLORIDE 150; 900 MG/100ML; MG/100ML
INJECTION, SOLUTION INTRAVENOUS CONTINUOUS
Status: DISPENSED | OUTPATIENT
Start: 2019-08-30 | End: 2019-08-30

## 2019-08-30 RX ORDER — POTASSIUM CHLORIDE 20 MEQ/1
40 TABLET, EXTENDED RELEASE ORAL ONCE
Status: COMPLETED | OUTPATIENT
Start: 2019-08-30 | End: 2019-08-30

## 2019-08-30 RX ADMIN — GABAPENTIN 800 MG: 400 CAPSULE ORAL at 11:08

## 2019-08-30 RX ADMIN — LEVALBUTEROL HYDROCHLORIDE 0.63 MG: 0.63 SOLUTION RESPIRATORY (INHALATION) at 04:08

## 2019-08-30 RX ADMIN — GUAIFENESIN 600 MG: 600 TABLET, EXTENDED RELEASE ORAL at 09:08

## 2019-08-30 RX ADMIN — IPRATROPIUM BROMIDE 0.5 MG: 0.5 SOLUTION RESPIRATORY (INHALATION) at 04:08

## 2019-08-30 RX ADMIN — POTASSIUM CHLORIDE 40 MEQ: 20 TABLET, EXTENDED RELEASE ORAL at 09:08

## 2019-08-30 RX ADMIN — CEFTRIAXONE SODIUM 1 G: 1 INJECTION, POWDER, FOR SOLUTION INTRAMUSCULAR; INTRAVENOUS at 10:08

## 2019-08-30 RX ADMIN — GABAPENTIN 800 MG: 400 CAPSULE ORAL at 09:08

## 2019-08-30 RX ADMIN — GUAIFENESIN 600 MG: 600 TABLET, EXTENDED RELEASE ORAL at 11:08

## 2019-08-30 RX ADMIN — LEVALBUTEROL HYDROCHLORIDE 0.63 MG: 0.63 SOLUTION RESPIRATORY (INHALATION) at 07:08

## 2019-08-30 RX ADMIN — ONDANSETRON 8 MG: 8 TABLET, ORALLY DISINTEGRATING ORAL at 06:08

## 2019-08-30 RX ADMIN — ONDANSETRON 8 MG: 8 TABLET, ORALLY DISINTEGRATING ORAL at 09:08

## 2019-08-30 RX ADMIN — PANTOPRAZOLE SODIUM 40 MG: 40 TABLET, DELAYED RELEASE ORAL at 09:08

## 2019-08-30 RX ADMIN — IPRATROPIUM BROMIDE 0.5 MG: 0.5 SOLUTION RESPIRATORY (INHALATION) at 12:08

## 2019-08-30 RX ADMIN — DOXYCYCLINE HYCLATE 100 MG: 100 TABLET, COATED ORAL at 09:08

## 2019-08-30 RX ADMIN — IPRATROPIUM BROMIDE 0.5 MG: 0.5 SOLUTION RESPIRATORY (INHALATION) at 07:08

## 2019-08-30 RX ADMIN — SODIUM CHLORIDE AND POTASSIUM CHLORIDE: .9; .15 SOLUTION INTRAVENOUS at 12:08

## 2019-08-30 RX ADMIN — LEVOTHYROXINE SODIUM 50 MCG: 50 TABLET ORAL at 06:08

## 2019-08-30 RX ADMIN — METOPROLOL SUCCINATE 25 MG: 25 TABLET, EXTENDED RELEASE ORAL at 09:08

## 2019-08-30 RX ADMIN — ATORVASTATIN CALCIUM 20 MG: 20 TABLET, FILM COATED ORAL at 09:08

## 2019-08-30 RX ADMIN — DOXYCYCLINE HYCLATE 100 MG: 100 TABLET, COATED ORAL at 11:08

## 2019-08-30 RX ADMIN — AMLODIPINE BESYLATE 5 MG: 5 TABLET ORAL at 09:08

## 2019-08-30 NOTE — PLAN OF CARE
SW met with pt to d/c SNF choices. SW explained need for a back up plan if [facility] is unable to accept. Pt said he wants to go home, that he has caregivers and a tenant that can help him. Pt is agreeable to reviewing a SNF list. SW provided Medicare SNF list to pt.    Steffany Barry LCSW [id number]

## 2019-08-30 NOTE — PT/OT/SLP PROGRESS
Occupational Therapy   Treatment    Name: Chucho Prado  MRN: [id number]  Admitting Diagnosis:  Community acquired pneumonia       Recommendations:     Discharge Recommendations: nursing facility, skilled  Discharge Equipment Recommendations:  none  Barriers to discharge:  None    Assessment:     Chucho Prado is a 85 y.o. male with a medical diagnosis of Community acquired pneumonia.  He presents with poor motivation 2* poor sleep the previous night. Performance deficits affecting function are impaired endurance, weakness, impaired self care skills, impaired functional mobilty, gait instability, impaired balance, decreased lower extremity function, decreased upper extremity function, decreased safety awareness, impaired cardiopulmonary response to activity, impaired skin, edema.     Rehab Prognosis:  Fair; patient would benefit from acute skilled OT services to address these deficits and reach maximum level of function.       Plan:     Patient to be seen 3 x/week to address the above listed problems via self-care/home management, therapeutic exercises  · Plan of Care Expires: 09/28/19  · Plan of Care Reviewed with: patient    Subjective     Pain/Comfort:  · Pain Rating 1: 0/10  · Pain Addressed 1: Distraction, Reposition  · Pain Rating Post-Intervention 1: 0/10    Objective:     Communicated with: RN prior to session.  Patient found HOB elevated with telemetry, pulse ox (continuous), galvan catheter, oxygen upon OT entry to room.    General Precautions: Standard, fall   Orthopedic Precautions:N/A   Braces: N/A     Occupational Performance:     Bed Mobility:    · Patient completed Rolling/Turning to Left with  minimum assistance  · Patient completed Rolling/Turning to Right with minimum assistance  · Patient completed Scooting/Bridging with minimum assistance  · Patient completed Supine to Sit with minimum assistance  · Patient completed Sit to Supine with moderate assistance and with leg lift     Functional  Mobility/Transfers:  · Patient completed Sit <> Stand Transfer with total assistance  with  hand-held assist   · Functional Mobility: Able to assist w/ scooting side to side, and weight bear through BLE when scooting.    Activities of Daily Living:  · NT      [facility] 6 Click ADL: 16    Treatment & Education:  -Pt edu on OT role/POC  -Importance of OOB activity with staff assistance  -Safety during functional t/f and mobility  - White board updated  - Multiple self care tasks/functional mobility completed-- assistance level noted above  - All questions/concerns answered within OT scope of practice      Patient left HOB elevated with all lines intact, call button in reach, RN notified and Sitter presentEducation:      GOALS:   Multidisciplinary Problems     Occupational Therapy Goals        Problem: Occupational Therapy Goal    Goal Priority Disciplines Outcome Interventions   Occupational Therapy Goal     OT, PT/OT Ongoing (interventions implemented as appropriate)    Description:  Goals to be met by: 9/28/2019       Patient will increase functional independence with ADLs by performing:    UE Dressing with Set-up Assistance.  Toileting from toilet with Set-up Assistance for hygiene and clothing management.   Bathing from  sitting at sink with Set-up Assistance.  Rolling to Bilateral with Modified [place].   Supine to sit with Modified [place].  Toilet transfer to toilet with Contact Guard Assistance.                      Time Tracking:     OT Date of Treatment: 08/30/19  OT Start Time: 1548  OT Stop Time: 1619  OT Total Time (min): 31 min    Billable Minutes:Self Care/Home Management 31 mins    Micah Leonard, OT  8/30/2019

## 2019-08-30 NOTE — PLAN OF CARE
Problem: Occupational Therapy Goal  Goal: Occupational Therapy Goal  Goals to be met by: 9/28/2019       Patient will increase functional independence with ADLs by performing:    UE Dressing with Set-up Assistance.  Toileting from toilet with Set-up Assistance for hygiene and clothing management.   Bathing from  sitting at sink with Set-up Assistance.  Rolling to Bilateral with Modified [place].   Supine to sit with Modified [place].  Toilet transfer to toilet with Contact Guard Assistance.     Outcome: Ongoing (interventions implemented as appropriate)  Pt goals remain appropriate, continue w/ OT POC.

## 2019-08-30 NOTE — PROGRESS NOTES
"[facility] Hospital Medicine  Progress Note    Team: [facility]   Attending MD: Birgit Garcia MD  Admit Date: 8/27/2019  TISHA 8/31/2019  Length of Stay:  LOS: 3 days   Code status: Full Code    Principal Problem: Community acquired pneumonia    Interval hx: remains with poor sleep overnight; improved cough but worsened after eating in bed; no wheezing; continues not to drink well but solid intake is improved.     ROS:  Constitutional: no fever or chills  Respiratory: +cough, not productive; denies shortness of breath  Cardiovascular: no chest pain or palpitations  Gastrointestinal: no nausea or vomiting, no abdominal pain; last BM:  Genitourinary: no gross hematuria or dysuria  Integument/Breast: no rash or pruritis  Musculoskeletal: no arthralgias or myalgias  Neurological: no seizures or tremors  Behavioral/Psych: no auditory or visual hallucinations      Physical Exam  Temp:  [98.2 °F (36.8 °C)-98.7 °F (37.1 °C)] 98.7 °F (37.1 °C)  Pulse:  [] 66  Resp:  [16-29] 29  SpO2:  [92 %-96 %] 94 %  BP: (114-138)/(60-73) 136/73      Intake/Output Summary (Last 24 hours) at 8/30/2019 1610  Last data filed at 8/30/2019 0700  Gross per 24 hour   Intake --   Output 1925 ml   Net -1925 ml       Wt Readings from Last 1 Encounters:   08/29/19 0400 76.1 kg (167 lb 12.3 oz)   08/28/19 1111 76.2 kg (168 lb)   08/28/19 0157 76.3 kg (168 lb 3.4 oz)   08/27/19 1521 88.5 kg (195 lb)        Estimated body mass index is 25.51 kg/m² as calculated from the following:    Height as of this encounter: 5' 8" (1.727 m).    Weight as of this encounter: 76.1 kg (167 lb 12.3 oz).    General: well developed, well nourished, no distress  Lungs:no wheezing bilaterally and diminished BS and normal respiratory effort.  Cardiovascular: Heart: regular rate and rhythm, S1, S2 normal, 2/6 sytolic murmur, no click, rub or gallop. Chest Wall: no tenderness. Extremities: no cyanosis, no edema, no clubbing; extremities tender " to palpation  Abdomen/Rectal: Abdomen: soft, non-tender non-distended; bowel sounds normal; no masses; + suprapubic catheter  Skin: Skin color, texture, turgor normal. No rashes or lesions.  Neurologic: Normal strength and tone. No focal numbness or weakness.  Psych/Behavioral:  Alert and oriented, appropriate affect.      Recent Results (from the past 24 hour(s))   Magnesium    Collection Time: 08/30/19  3:32 AM   Result Value Ref Range    Magnesium 1.7 1.6 - 2.6 mg/dL   CBC with Automated Differential    Collection Time: 08/30/19  3:32 AM   Result Value Ref Range    WBC 7.92 3.90 - 12.70 K/uL    RBC 3.58 (L) 4.60 - 6.20 M/uL    Hemoglobin 10.2 (L) 14.0 - 18.0 g/dL    Hematocrit 31.2 (L) 40.0 - 54.0 %    Mean Corpuscular Volume 87 82 - 98 fL    Mean Corpuscular Hemoglobin 28.5 27.0 - 31.0 pg    Mean Corpuscular Hemoglobin Conc 32.7 32.0 - 36.0 g/dL    RDW 13.9 11.5 - 14.5 %    Platelets 150 150 - 350 K/uL    MPV 9.9 9.2 - 12.9 fL    Immature Granulocytes 0.8 (H) 0.0 - 0.5 %    Gran # (ANC) 5.8 1.8 - 7.7 K/uL    Immature Grans (Abs) 0.06 (H) 0.00 - 0.04 K/uL    Lymph # 1.3 1.0 - 4.8 K/uL    Mono # 0.6 0.3 - 1.0 K/uL    Eos # 0.1 0.0 - 0.5 K/uL    Baso # 0.02 0.00 - 0.20 K/uL    nRBC 0 0 /100 WBC    Gran% 73.6 (H) 38.0 - 73.0 %    Lymph% 16.0 (L) 18.0 - 48.0 %    Mono% 7.8 4.0 - 15.0 %    Eosinophil% 1.5 0.0 - 8.0 %    Basophil% 0.3 0.0 - 1.9 %    Differential Method Automated    Basic Metabolic Panel (BMP)    Collection Time: 08/30/19  3:32 AM   Result Value Ref Range    Sodium 129 (L) 136 - 145 mmol/L    Potassium 3.4 (L) 3.5 - 5.1 mmol/L    Chloride 93 (L) 95 - 110 mmol/L    CO2 26 23 - 29 mmol/L    Glucose 94 70 - 110 mg/dL    BUN, Bld 14 8 - 23 mg/dL    Creatinine 1.0 0.5 - 1.4 mg/dL    Calcium 7.8 (L) 8.7 - 10.5 mg/dL    Anion Gap 10 8 - 16 mmol/L    eGFR if African American >60.0 >60 mL/min/1.73 m^2    eGFR if non African American >60.0 >60 mL/min/1.73 m^2   Phosphorus    Collection Time: 08/30/19  3:32 AM    Result Value Ref Range    Phosphorus 4.3 2.7 - 4.5 mg/dL       Recent Labs   Lab 08/28/19  0724 08/29/19  0404 08/30/19  0332   WBC 11.40 9.00 7.92   HGB 11.9* 11.1* 10.2*   HCT 35.4* 34.2* 31.2*    129* 150       Recent Labs   Lab 08/28/19  0723  08/28/19  1947 08/29/19  0404 08/30/19  0332   NA  --    < > 125* 129* 129*   K  --    < > 3.8 4.1 3.4*   CL  --    < > 88* 92* 93*   CO2  --    < > 29 24 26   BUN  --    < > 20 18 14   CREATININE  --    < > 1.2 1.1 1.0   GLU  --    < > 106 92 94   CALCIUM  --    < > 8.3* 8.5* 7.8*   MG 1.9  --   --  1.9 1.7   PHOS  --   --   --  2.2* 4.3    < > = values in this interval not displayed.       Recent Labs   Lab 08/27/19  1819   ALKPHOS 79   ALT 11   AST 36   ALBUMIN 4.0   PROT 8.1   BILITOT 1.0         Hemoglobin A1C   Date Value Ref Range Status   04/04/2019 5.4 4.0 - 5.6 % Final     Comment:     ADA Screening Guidelines:  5.7-6.4%  Consistent with prediabetes  >or=6.5%  Consistent with diabetes  High levels of fetal hemoglobin interfere with the HbA1C  assay. Heterozygous hemoglobin variants (HbS, HgC, etc)do  not significantly interfere with this assay.   However, presence of multiple variants may affect accuracy.         Scheduled Meds:   amLODIPine  5 mg Oral Daily    atorvastatin  20 mg Oral Daily    cefTRIAXone (ROCEPHIN) IVPB  1 g Intravenous Q24H    doxycycline  100 mg Oral Q12H    gabapentin  800 mg Oral BID    guaiFENesin  600 mg Oral BID    ipratropium  0.5 mg Nebulization Q4H WAKE    levalbuterol  0.63 mg Nebulization Q8H WA    levothyroxine  50 mcg Oral Before breakfast    metoprolol succinate  25 mg Oral Daily    pantoprazole  40 mg Oral Daily       Continuous Infusions:   0/9% NACL & POTASSIUM CHLORIDE 20 MEQ/L 75 mL/hr at 08/30/19 1233       As Needed: acetaminophen, albuterol, benzonatate, hyoscyamine, ondansetron, ramelteon, sodium chloride 0.9%    Active Hospital Problems    Diagnosis  POA    *Community acquired pneumonia [J18.9]   Yes    Hypoxia [R09.02]  Unknown    Gastroesophageal reflux disease without esophagitis [K21.9]  Yes     - continue home Prilosec      Neurogenic bladder [N31.9]  Yes    Hypokalemia [E87.6]  Yes    Hyponatremia [E87.1]  Yes    Suprapubic catheter [Z93.59]  Not Applicable    CKD (chronic kidney disease) stage 3, GFR 30-59 ml/min [N18.3]  Yes    Essential hypertension [I10]  Yes    Acquired hypothyroidism [E03.9]  Yes    Hyperlipidemia [E78.5]  Yes      Resolved Hospital Problems   No resolved problems to display.       Overview:    Assessment and Plan    Community acquired pneumonia  Hypoxia  Wheezing  - Treating as CAP in abundance of caution. Also on the differential is viral upper respiratory tract infection and COPD, though he does not carry a diagnosis of the latter  - chest CT with possible pneumonia in LLL  - Agree with CTX, changed azithromycin -> doxycycline given QTc, particularly in light of his electrolyte abnormalities; continue current antibiotics  -increase neb frequency to q 4 hr WA due to persistent wheezing  -mucinex and flutter valve therapy added to treat cough  - blood cultures NGTD x 2  - IS for atelectasis  - Wean O2 as tolerated, currently on 2L  - Would benefit from PFTs upon discharge given distant but substantial smoking history     Hyponatremia  - Etiology likely hypovolemic, though volume exam is equivocal, and he has risk factors for SIADH  - patient was resumed on HCTZ in 6/2019 due to poor BP control; he is still taking HCTZ. Also has a history of MDD but without any antidepressants listed that would place him at risk for SIADH  -Na essentially unchanged with fluid restriction and urine concentrated  - Gabi low not c/w SIADH  - suspect HCTZ and volume contraction as source of low Na currently and improved with IVF beginning 8/28; avoid overcorrection  -repeat IVF on 8/30 due to persistent N at 129 and poor po intake    Hypochloremia  - Seems to favor hypovolemic etiology  -  Already given NS as above  - BMP to trend     Hypokalemia  - Ordered 60 mEq for now (40 PO + 20 IV)  - improved with repeat supplementation  - will continue to monitor and replete to goal of 4 as indicated    Hypophosphatemia  Replete with IV supplement as oral makes patient have N/V     Neurogenic bladder  Suprapubic catheter  Gross hematuria  Infection associated with indwelling urinary catheter  - Hemoglobin stable and gross hematuria not evident  - Seen by urology in clinic on 8/27. Urine culture pending.  - Continue hyoscyamine PRN  - SCDs for VTE PPX. Also holding home asa  - CBC daily to monitor  -Urine culture with providencia and another GNR; so far sensitive to rocephin; continue to monitor culture     CKD (chronic kidney disease) stage 3, GFR 30-59 ml/min  - Stable  - sCr at baseline on admission  - Will follow with BMP as above, also strict I/Os and daily weights     Acquired hypothyroidism  - TSH normal on 8/27  - Continue home levothyroxine     Gastroesophageal reflux disease without esophagitis  - Stable by symptoms  - Continue protonix     Hyperlipidemia  - Continue home atorvastatin     Essential hypertension  - Stable  - discontinuing HCTZ and holding losartan until his electrolytes are sorted out  - Continue metoprolol      Diet: Diet Adult Regular (IDDSI Level 7) [facility]   GI PPx:  DVT PPx:   VTE Risk Mitigation (From admission, onward)        Ordered     Place sequential compression device  Until discontinued      08/27/19 2331     IP VTE HIGH RISK PATIENT  Once      08/27/19 2306        L/D/A: PIV; suprapubic catheter  Wounds:    Goals of Care: curative; full code    Discharge plan: SNF recommended by PT; monitor progress     Birgit Garcia MD  Staff Hospitalist  [phone]

## 2019-08-30 NOTE — TELEPHONE ENCOUNTER
LVM.  Informed of prelim positive urine cx results.  Pt currently inpatient for CAP.  Pt tx'd w/ ceftriaxone inpatient - covers UTI (see below).    Urine Culture, Routine Abnormal    GRAM NEGATIVE SHAYNA   10,000 - 49,999 cfu/ml   Identification and susceptibility pending   P      Urine Culture, Routine Abnormal    PROVIDENCIA STUARTII   > 100,000 cfu/ml   Identification and susceptibility pending   P      Resulting Agency [facility]   Susceptibility      Providencia stuartii     CULTURE, URINE (Preliminary)     Amikacin <=16 mcg/mL Sensitive     Amp/Sulbactam 16/8 mcg/mL Intermediate     Cefepime <=8 mcg/mL Sensitive     Ceftriaxone <=8 mcg/mL Sensitive     Ciprofloxacin <=1 mcg/mL Sensitive     Gentamicin <=4 mcg/mL Resistant     Levofloxacin <=2 mcg/mL Sensitive     Piperacillin/Tazo <=16 mcg/mL Sensitive     Tobramycin 8 mcg/mL Resistant     Trimeth/Sulfa <=2/38 mcg/mL Sensitive            Linear View         Specimen Collected: 08/27/19 15:36   Last Resulted: 08/30/19 04:53

## 2019-08-31 ENCOUNTER — EXTERNAL CHRONIC CARE MANAGEMENT (OUTPATIENT)
Dept: PRIMARY CARE CLINIC | Facility: CLINIC | Age: 84
End: 2019-08-31
Payer: MEDICARE

## 2019-08-31 PROBLEM — E83.51 HYPOCALCEMIA: Status: ACTIVE | Noted: 2019-08-31

## 2019-08-31 LAB
ANION GAP SERPL CALC-SCNC: 9 MMOL/L (ref 8–16)
BASOPHILS # BLD AUTO: 0.03 K/UL (ref 0–0.2)
BASOPHILS NFR BLD: 0.5 % (ref 0–1.9)
BNP SERPL-MCNC: 201 PG/ML (ref 0–99)
BUN SERPL-MCNC: 11 MG/DL (ref 8–23)
CA-I BLDV-SCNC: 1.05 MMOL/L (ref 1.06–1.42)
CALCIUM SERPL-MCNC: 8.5 MG/DL (ref 8.7–10.5)
CHLORIDE SERPL-SCNC: 96 MMOL/L (ref 95–110)
CO2 SERPL-SCNC: 25 MMOL/L (ref 23–29)
CREAT SERPL-MCNC: 1 MG/DL (ref 0.5–1.4)
DIFFERENTIAL METHOD: ABNORMAL
EOSINOPHIL # BLD AUTO: 0.2 K/UL (ref 0–0.5)
EOSINOPHIL NFR BLD: 3 % (ref 0–8)
ERYTHROCYTE [DISTWIDTH] IN BLOOD BY AUTOMATED COUNT: 13.8 % (ref 11.5–14.5)
EST. GFR  (AFRICAN AMERICAN): >60 ML/MIN/1.73 M^2
EST. GFR  (NON AFRICAN AMERICAN): >60 ML/MIN/1.73 M^2
GLUCOSE SERPL-MCNC: 89 MG/DL (ref 70–110)
HCT VFR BLD AUTO: 34.2 % (ref 40–54)
HGB BLD-MCNC: 11.3 G/DL (ref 14–18)
IMM GRANULOCYTES # BLD AUTO: 0.03 K/UL (ref 0–0.04)
IMM GRANULOCYTES NFR BLD AUTO: 0.5 % (ref 0–0.5)
LYMPHOCYTES # BLD AUTO: 1.3 K/UL (ref 1–4.8)
LYMPHOCYTES NFR BLD: 21.9 % (ref 18–48)
MAGNESIUM SERPL-MCNC: 1.6 MG/DL (ref 1.6–2.6)
MCH RBC QN AUTO: 28.2 PG (ref 27–31)
MCHC RBC AUTO-ENTMCNC: 33 G/DL (ref 32–36)
MCV RBC AUTO: 85 FL (ref 82–98)
MONOCYTES # BLD AUTO: 0.7 K/UL (ref 0.3–1)
MONOCYTES NFR BLD: 11.1 % (ref 4–15)
NEUTROPHILS # BLD AUTO: 3.8 K/UL (ref 1.8–7.7)
NEUTROPHILS NFR BLD: 63 % (ref 38–73)
NRBC BLD-RTO: 0 /100 WBC
PHOSPHATE SERPL-MCNC: 2.9 MG/DL (ref 2.7–4.5)
PLATELET # BLD AUTO: 191 K/UL (ref 150–350)
PMV BLD AUTO: 9.6 FL (ref 9.2–12.9)
POTASSIUM SERPL-SCNC: 4.4 MMOL/L (ref 3.5–5.1)
RBC # BLD AUTO: 4.01 M/UL (ref 4.6–6.2)
SODIUM SERPL-SCNC: 130 MMOL/L (ref 136–145)
WBC # BLD AUTO: 6.03 K/UL (ref 3.9–12.7)

## 2019-08-31 PROCEDURE — 63600175 PHARM REV CODE 636 W HCPCS: Performed by: INTERNAL MEDICINE

## 2019-08-31 PROCEDURE — 99490 CHRNC CARE MGMT STAFF 1ST 20: CPT | Mod: PBBFAC | Performed by: INTERNAL MEDICINE

## 2019-08-31 PROCEDURE — 94664 DEMO&/EVAL PT USE INHALER: CPT

## 2019-08-31 PROCEDURE — 25000242 PHARM REV CODE 250 ALT 637 W/ HCPCS: Performed by: INTERNAL MEDICINE

## 2019-08-31 PROCEDURE — 36415 COLL VENOUS BLD VENIPUNCTURE: CPT

## 2019-08-31 PROCEDURE — 87449 NOS EACH ORGANISM AG IA: CPT

## 2019-08-31 PROCEDURE — 99232 SBSQ HOSP IP/OBS MODERATE 35: CPT | Mod: ,,, | Performed by: INTERNAL MEDICINE

## 2019-08-31 PROCEDURE — 25000003 PHARM REV CODE 250: Performed by: INTERNAL MEDICINE

## 2019-08-31 PROCEDURE — 82330 ASSAY OF CALCIUM: CPT

## 2019-08-31 PROCEDURE — 83880 ASSAY OF NATRIURETIC PEPTIDE: CPT

## 2019-08-31 PROCEDURE — 99900035 HC TECH TIME PER 15 MIN (STAT)

## 2019-08-31 PROCEDURE — 27000221 HC OXYGEN, UP TO 24 HOURS

## 2019-08-31 PROCEDURE — 99490 PR CHRONIC CARE MGMT, 1ST 20 MIN: ICD-10-PCS | Mod: S$PBB,,, | Performed by: INTERNAL MEDICINE

## 2019-08-31 PROCEDURE — 94799 UNLISTED PULMONARY SVC/PX: CPT

## 2019-08-31 PROCEDURE — 99490 CHRNC CARE MGMT STAFF 1ST 20: CPT | Mod: S$PBB,,, | Performed by: INTERNAL MEDICINE

## 2019-08-31 PROCEDURE — 94761 N-INVAS EAR/PLS OXIMETRY MLT: CPT

## 2019-08-31 PROCEDURE — 11000001 HC ACUTE MED/SURG PRIVATE ROOM

## 2019-08-31 PROCEDURE — 94640 AIRWAY INHALATION TREATMENT: CPT

## 2019-08-31 PROCEDURE — 99232 PR SUBSEQUENT HOSPITAL CARE,LEVL II: ICD-10-PCS | Mod: ,,, | Performed by: INTERNAL MEDICINE

## 2019-08-31 PROCEDURE — 80048 BASIC METABOLIC PNL TOTAL CA: CPT

## 2019-08-31 PROCEDURE — 84100 ASSAY OF PHOSPHORUS: CPT

## 2019-08-31 PROCEDURE — 97530 THERAPEUTIC ACTIVITIES: CPT

## 2019-08-31 PROCEDURE — 83735 ASSAY OF MAGNESIUM: CPT

## 2019-08-31 PROCEDURE — 85025 COMPLETE CBC W/AUTO DIFF WBC: CPT

## 2019-08-31 RX ORDER — LOSARTAN POTASSIUM 50 MG/1
50 TABLET ORAL DAILY
Status: DISCONTINUED | OUTPATIENT
Start: 2019-09-01 | End: 2019-09-05 | Stop reason: HOSPADM

## 2019-08-31 RX ADMIN — GABAPENTIN 800 MG: 400 CAPSULE ORAL at 08:08

## 2019-08-31 RX ADMIN — PANTOPRAZOLE SODIUM 40 MG: 40 TABLET, DELAYED RELEASE ORAL at 08:08

## 2019-08-31 RX ADMIN — LEVALBUTEROL HYDROCHLORIDE 0.63 MG: 0.63 SOLUTION RESPIRATORY (INHALATION) at 03:08

## 2019-08-31 RX ADMIN — IPRATROPIUM BROMIDE 0.5 MG: 0.5 SOLUTION RESPIRATORY (INHALATION) at 03:08

## 2019-08-31 RX ADMIN — CEFTRIAXONE SODIUM 1 G: 1 INJECTION, POWDER, FOR SOLUTION INTRAMUSCULAR; INTRAVENOUS at 10:08

## 2019-08-31 RX ADMIN — LEVOTHYROXINE SODIUM 50 MCG: 50 TABLET ORAL at 06:08

## 2019-08-31 RX ADMIN — IPRATROPIUM BROMIDE 0.5 MG: 0.5 SOLUTION RESPIRATORY (INHALATION) at 11:08

## 2019-08-31 RX ADMIN — DOXYCYCLINE HYCLATE 100 MG: 100 TABLET, COATED ORAL at 08:08

## 2019-08-31 RX ADMIN — LEVALBUTEROL HYDROCHLORIDE 0.63 MG: 0.63 SOLUTION RESPIRATORY (INHALATION) at 07:08

## 2019-08-31 RX ADMIN — IPRATROPIUM BROMIDE 0.5 MG: 0.5 SOLUTION RESPIRATORY (INHALATION) at 07:08

## 2019-08-31 RX ADMIN — GABAPENTIN 800 MG: 400 CAPSULE ORAL at 09:08

## 2019-08-31 RX ADMIN — HYPROMELLOSE 2910 2 DROP: 5 SOLUTION OPHTHALMIC at 03:08

## 2019-08-31 RX ADMIN — GUAIFENESIN 600 MG: 600 TABLET, EXTENDED RELEASE ORAL at 09:08

## 2019-08-31 RX ADMIN — METOPROLOL SUCCINATE 25 MG: 25 TABLET, EXTENDED RELEASE ORAL at 08:08

## 2019-08-31 RX ADMIN — HYPROMELLOSE 2910 2 DROP: 5 SOLUTION OPHTHALMIC at 08:08

## 2019-08-31 RX ADMIN — AMLODIPINE BESYLATE 5 MG: 5 TABLET ORAL at 08:08

## 2019-08-31 RX ADMIN — CALCIUM GLUCONATE 1000 MG: 98 INJECTION, SOLUTION INTRAVENOUS at 03:08

## 2019-08-31 RX ADMIN — ONDANSETRON 8 MG: 8 TABLET, ORALLY DISINTEGRATING ORAL at 08:08

## 2019-08-31 RX ADMIN — GUAIFENESIN 600 MG: 600 TABLET, EXTENDED RELEASE ORAL at 08:08

## 2019-08-31 RX ADMIN — DOXYCYCLINE HYCLATE 100 MG: 100 TABLET, COATED ORAL at 09:08

## 2019-08-31 RX ADMIN — ATORVASTATIN CALCIUM 20 MG: 20 TABLET, FILM COATED ORAL at 08:08

## 2019-08-31 RX ADMIN — IPRATROPIUM BROMIDE 0.5 MG: 0.5 SOLUTION RESPIRATORY (INHALATION) at 08:08

## 2019-08-31 NOTE — PROGRESS NOTES
"[facility] Hospital Medicine  Progress Note    Team: [facility] HOSP MED K   Attending MD: Birgit Garcia MD  Admit Date: 8/27/2019  TISHA 8/31/2019  Length of Stay:  LOS: 4 days   Code status: Full Code    Principal Problem: Community acquired pneumonia    Interval hx: large BM today and yesterday with loose stool which he states occurs at home but only daily; he also has the sensation that he will get nauseated; has taken ondansetron therapy with no vomiting; refused PT due to weakness but consenting to go to SNF; cough improved; sitter not at bedside today    ROS:  Constitutional: no fever or chills  Respiratory: +cough, not productive; denies shortness of breath  Cardiovascular: no chest pain or palpitations  Gastrointestinal: no nausea or vomiting, no abdominal pain; last BM: 8/31  Genitourinary: no gross hematuria or dysuria  Integument/Breast: no rash or pruritis  Musculoskeletal: no arthralgias or myalgias  Neurological: no seizures or tremors  Behavioral/Psych: no auditory or visual hallucinations      Physical Exam  Temp:  [98.1 °F (36.7 °C)] 98.1 °F (36.7 °C)  Pulse:  [61-76] 64  Resp:  [16-22] 22  SpO2:  [93 %-97 %] 97 %  BP: (138-157)/(70-80) 152/77      Intake/Output Summary (Last 24 hours) at 8/31/2019 1706  Last data filed at 8/31/2019 0637  Gross per 24 hour   Intake --   Output 1975 ml   Net -1975 ml       Wt Readings from Last 1 Encounters:   08/29/19 0400 76.1 kg (167 lb 12.3 oz)   08/28/19 1111 76.2 kg (168 lb)   08/28/19 0157 76.3 kg (168 lb 3.4 oz)   08/27/19 1521 88.5 kg (195 lb)        Estimated body mass index is 25.51 kg/m² as calculated from the following:    Height as of this encounter: 5' 8" (1.727 m).    Weight as of this encounter: 76.1 kg (167 lb 12.3 oz).    General: well developed, well nourished, no distress  Lungs:no wheezing bilaterally and diminished BS and normal respiratory effort.  Cardiovascular: Heart: regular rate and rhythm, S1, S2 normal, 2/6 sytolic " murmur, no click, rub or gallop. Chest Wall: no tenderness. Extremities: no cyanosis, no edema, no clubbing; extremities tender to palpation  Abdomen/Rectal: Abdomen: soft, non-tender non-distended; bowel sounds normal; no masses; + suprapubic catheter  Skin: Skin color, texture, turgor normal. No rashes or lesions.  Neurologic: generalized weakness. No focal numbness or weakness.  Psych/Behavioral:  Alert and oriented, appropriate affect.      Recent Results (from the past 24 hour(s))   Magnesium    Collection Time: 08/31/19  6:49 AM   Result Value Ref Range    Magnesium 1.6 1.6 - 2.6 mg/dL   CBC with Automated Differential    Collection Time: 08/31/19  6:49 AM   Result Value Ref Range    WBC 6.03 3.90 - 12.70 K/uL    RBC 4.01 (L) 4.60 - 6.20 M/uL    Hemoglobin 11.3 (L) 14.0 - 18.0 g/dL    Hematocrit 34.2 (L) 40.0 - 54.0 %    Mean Corpuscular Volume 85 82 - 98 fL    Mean Corpuscular Hemoglobin 28.2 27.0 - 31.0 pg    Mean Corpuscular Hemoglobin Conc 33.0 32.0 - 36.0 g/dL    RDW 13.8 11.5 - 14.5 %    Platelets 191 150 - 350 K/uL    MPV 9.6 9.2 - 12.9 fL    Immature Granulocytes 0.5 0.0 - 0.5 %    Gran # (ANC) 3.8 1.8 - 7.7 K/uL    Immature Grans (Abs) 0.03 0.00 - 0.04 K/uL    Lymph # 1.3 1.0 - 4.8 K/uL    Mono # 0.7 0.3 - 1.0 K/uL    Eos # 0.2 0.0 - 0.5 K/uL    Baso # 0.03 0.00 - 0.20 K/uL    nRBC 0 0 /100 WBC    Gran% 63.0 38.0 - 73.0 %    Lymph% 21.9 18.0 - 48.0 %    Mono% 11.1 4.0 - 15.0 %    Eosinophil% 3.0 0.0 - 8.0 %    Basophil% 0.5 0.0 - 1.9 %    Differential Method Automated    Basic Metabolic Panel (BMP)    Collection Time: 08/31/19  6:49 AM   Result Value Ref Range    Sodium 130 (L) 136 - 145 mmol/L    Potassium 4.4 3.5 - 5.1 mmol/L    Chloride 96 95 - 110 mmol/L    CO2 25 23 - 29 mmol/L    Glucose 89 70 - 110 mg/dL    BUN, Bld 11 8 - 23 mg/dL    Creatinine 1.0 0.5 - 1.4 mg/dL    Calcium 8.5 (L) 8.7 - 10.5 mg/dL    Anion Gap 9 8 - 16 mmol/L    eGFR if African American >60.0 >60 mL/min/1.73 m^2    eGFR if  non African American >60.0 >60 mL/min/1.73 m^2   Phosphorus    Collection Time: 08/31/19  6:49 AM   Result Value Ref Range    Phosphorus 2.9 2.7 - 4.5 mg/dL   Calcium, ionized    Collection Time: 08/31/19  6:49 AM   Result Value Ref Range    Calcium, Ion 1.05 (L) 1.06 - 1.42 mmol/L   Brain natriuretic peptide    Collection Time: 08/31/19  6:49 AM   Result Value Ref Range     (H) 0 - 99 pg/mL       Recent Labs   Lab 08/29/19  0404 08/30/19  0332 08/31/19  0649   WBC 9.00 7.92 6.03   HGB 11.1* 10.2* 11.3*   HCT 34.2* 31.2* 34.2*   * 150 191       Recent Labs   Lab 08/29/19  0404 08/30/19  0332 08/31/19  0649   * 129* 130*   K 4.1 3.4* 4.4   CL 92* 93* 96   CO2 24 26 25   BUN 18 14 11   CREATININE 1.1 1.0 1.0   GLU 92 94 89   CALCIUM 8.5* 7.8* 8.5*   MG 1.9 1.7 1.6   PHOS 2.2* 4.3 2.9       Recent Labs   Lab 08/27/19  1819   ALKPHOS 79   ALT 11   AST 36   ALBUMIN 4.0   PROT 8.1   BILITOT 1.0         Hemoglobin A1C   Date Value Ref Range Status   04/04/2019 5.4 4.0 - 5.6 % Final     Comment:     ADA Screening Guidelines:  5.7-6.4%  Consistent with prediabetes  >or=6.5%  Consistent with diabetes  High levels of fetal hemoglobin interfere with the HbA1C  assay. Heterozygous hemoglobin variants (HbS, HgC, etc)do  not significantly interfere with this assay.   However, presence of multiple variants may affect accuracy.         Scheduled Meds:   amLODIPine  5 mg Oral Daily    artificial tears  2 drop Both Eyes TID    atorvastatin  20 mg Oral Daily    cefTRIAXone (ROCEPHIN) IVPB  1 g Intravenous Q24H    doxycycline  100 mg Oral Q12H    gabapentin  800 mg Oral BID    guaiFENesin  600 mg Oral BID    ipratropium  0.5 mg Nebulization Q4H WAKE    levalbuterol  0.63 mg Nebulization Q8H WA    levothyroxine  50 mcg Oral Before breakfast    metoprolol succinate  25 mg Oral Daily    pantoprazole  40 mg Oral Daily       Continuous Infusions:      As Needed: acetaminophen, albuterol, benzonatate,  hyoscyamine, ondansetron, ramelteon, sodium chloride 0.9%    Active Hospital Problems    Diagnosis  POA    *Community acquired pneumonia [J18.9]  Yes    Hypocalcemia [E83.51]  Yes    Infection and inflammatory reaction due to other urinary catheter, subsequent encounter [T83.518D]  Yes    Hypoxia [R09.02]  Yes    Gastroesophageal reflux disease without esophagitis [K21.9]  Yes     - continue home Prilosec      Neurogenic bladder [N31.9]  Yes    Hypokalemia [E87.6]  Yes    Hyponatremia [E87.1]  Yes    Suprapubic catheter [Z93.59]  Not Applicable    CKD (chronic kidney disease) stage 3, GFR 30-59 ml/min [N18.3]  Yes    Essential hypertension [I10]  Yes    Acquired hypothyroidism [E03.9]  Yes    Hyperlipidemia [E78.5]  Yes      Resolved Hospital Problems   No resolved problems to display.       Overview:    Assessment and Plan    Community acquired pneumonia  Hypoxia  Wheezing  - Treating as CAP in abundance of caution. Also on the differential is viral upper respiratory tract infection and COPD, though he does not carry a diagnosis of the latter  - chest CT with probable pneumonia in LLL  - Agree with CTX, changed azithromycin -> doxycycline given QTc, particularly in light of his electrolyte abnormalities; continue current antibiotics  -increase neb frequency to q 4 hr WA due to persistent wheezing with improvement  -mucinex and flutter valve therapy added to treat cough  - blood cultures NGTD x 2  - IS for atelectasis  - Wean O2 as tolerated, currently on 2L  - Would benefit from PFTs upon discharge given distant but substantial smoking history  -completed 5 days of doxy     Hyponatremia  - Etiology likely hypovolemic, though volume exam is equivocal, and he has risk factors for SIADH  - patient was resumed on HCTZ in 6/2019 due to poor BP control; he is still taking HCTZ. Also has a history of MDD but without any antidepressants listed that would place him at risk for SIADH  -Na essentially unchanged  with fluid restriction and urine concentrated  - Gabi low not c/w SIADH  - suspect HCTZ and volume contraction as source of low Na currently and improved with IVF beginning 8/28; avoid overcorrection  -repeat IVF on 8/30 due to persistent N at 129 and poor po intake with slow improvement    Hypochloremia - resolved  - Seems to favor hypovolemic etiology  - Already given NS as above  - BMP to trend     Hypokalemia  - Ordered 60 mEq for now (40 PO + 20 IV)  - improved with repeat supplementation  - will continue to monitor and replete to goal of 4 as indicated    Hypophosphatemia  Replete with IV supplement as oral makes patient have N/V     Hypocalcemia  Replete with IV Ca gluconate due to nausea with supplements for goal of 9    Neurogenic bladder  Suprapubic catheter  Gross hematuria  Infection associated with indwelling urinary catheter  - Hemoglobin stable and gross hematuria not evident  - Seen by urology in clinic on 8/27. Urine culture pending.  - Continue hyoscyamine PRN  - SCDs for VTE PPX. Also holding home asa  - CBC daily to monitor  -Urine culture with providencia X2 species; both sensitive to rocephin and levofloxacin; will need 14 d of therapy     CKD (chronic kidney disease) stage 3, GFR 30-59 ml/min  - Stable  - sCr at baseline on admission  - Will follow with BMP as above, also strict I/Os and daily weights     Acquired hypothyroidism  - TSH normal on 8/27  - Continue home levothyroxine     Gastroesophageal reflux disease without esophagitis  - Stable by symptoms  - Continue protonix  -he denies chronic nausea symptoms are indicative of reflux; continue zofran prn     Hyperlipidemia  - Continue home atorvastatin     Essential hypertension  - Stable  - discontinuing HCTZ and holding losartan until his electrolytes are sorted out  - Continue metoprolol and will resume losartan for uncontrolled BP      Diet: Diet Adult Regular (IDDSI Level 7) [facility]   GI PPx: pantoprazole  DVT PPx:   VTE Risk  Mitigation (From admission, onward)        Ordered     Place sequential compression device  Until discontinued      08/27/19 2331     IP VTE HIGH RISK PATIENT  Once      08/27/19 2306        L/D/A: PIV; suprapubic catheter  Wounds:    Goals of Care: curative; full code    Discharge plan: SNF recommended by PT; monitor progress     Birgit Garcia MD  Staff Hospitalist

## 2019-08-31 NOTE — PT/OT/SLP PROGRESS
"Physical Therapy      Patient Name:  Chucho Prado   MRN:  [id number]    Patient not seen today secondary to Patient unwilling to participate(Pt soiled in AM on attempt. On PM attempt pt with adamant refusal of services d/t "not feeling like he needs therapy and that he is too fatigued and weak". Pt refuses to acknowlege or listen to therapist attempts to educate pt on importance of participation with therapy services.). Will follow-up per POC as appropriate.    1 TA billed for education and attempt to initiate tx   3054 - 2421    Brandt Gastelum, PTA    "

## 2019-09-01 LAB
ANION GAP SERPL CALC-SCNC: 11 MMOL/L (ref 8–16)
BACTERIA UR CULT: ABNORMAL
BACTERIA UR CULT: ABNORMAL
BASOPHILS # BLD AUTO: 0.03 K/UL (ref 0–0.2)
BASOPHILS NFR BLD: 0.4 % (ref 0–1.9)
BUN SERPL-MCNC: 11 MG/DL (ref 8–23)
C DIFF GDH STL QL: NEGATIVE
C DIFF TOX A+B STL QL IA: NEGATIVE
CALCIUM SERPL-MCNC: 8.9 MG/DL (ref 8.7–10.5)
CHLORIDE SERPL-SCNC: 95 MMOL/L (ref 95–110)
CO2 SERPL-SCNC: 25 MMOL/L (ref 23–29)
CREAT SERPL-MCNC: 1 MG/DL (ref 0.5–1.4)
DIFFERENTIAL METHOD: ABNORMAL
EOSINOPHIL # BLD AUTO: 0.2 K/UL (ref 0–0.5)
EOSINOPHIL NFR BLD: 3 % (ref 0–8)
ERYTHROCYTE [DISTWIDTH] IN BLOOD BY AUTOMATED COUNT: 13.6 % (ref 11.5–14.5)
EST. GFR  (AFRICAN AMERICAN): >60 ML/MIN/1.73 M^2
EST. GFR  (NON AFRICAN AMERICAN): >60 ML/MIN/1.73 M^2
GLUCOSE SERPL-MCNC: 89 MG/DL (ref 70–110)
HCT VFR BLD AUTO: 34.1 % (ref 40–54)
HGB BLD-MCNC: 11 G/DL (ref 14–18)
IMM GRANULOCYTES # BLD AUTO: 0.05 K/UL (ref 0–0.04)
IMM GRANULOCYTES NFR BLD AUTO: 0.7 % (ref 0–0.5)
LYMPHOCYTES # BLD AUTO: 1.5 K/UL (ref 1–4.8)
LYMPHOCYTES NFR BLD: 21.6 % (ref 18–48)
MAGNESIUM SERPL-MCNC: 1.6 MG/DL (ref 1.6–2.6)
MCH RBC QN AUTO: 28 PG (ref 27–31)
MCHC RBC AUTO-ENTMCNC: 32.3 G/DL (ref 32–36)
MCV RBC AUTO: 87 FL (ref 82–98)
MONOCYTES # BLD AUTO: 0.8 K/UL (ref 0.3–1)
MONOCYTES NFR BLD: 11 % (ref 4–15)
NEUTROPHILS # BLD AUTO: 4.4 K/UL (ref 1.8–7.7)
NEUTROPHILS NFR BLD: 63.3 % (ref 38–73)
NRBC BLD-RTO: 0 /100 WBC
PHOSPHATE SERPL-MCNC: 2.7 MG/DL (ref 2.7–4.5)
PLATELET # BLD AUTO: 217 K/UL (ref 150–350)
PMV BLD AUTO: 9.4 FL (ref 9.2–12.9)
POTASSIUM SERPL-SCNC: 3.9 MMOL/L (ref 3.5–5.1)
RBC # BLD AUTO: 3.93 M/UL (ref 4.6–6.2)
SODIUM SERPL-SCNC: 131 MMOL/L (ref 136–145)
TB INDURATION 48 - 72 HR READ: 0 MM
WBC # BLD AUTO: 6.98 K/UL (ref 3.9–12.7)

## 2019-09-01 PROCEDURE — 27000221 HC OXYGEN, UP TO 24 HOURS

## 2019-09-01 PROCEDURE — 85025 COMPLETE CBC W/AUTO DIFF WBC: CPT

## 2019-09-01 PROCEDURE — 11000001 HC ACUTE MED/SURG PRIVATE ROOM

## 2019-09-01 PROCEDURE — 36415 COLL VENOUS BLD VENIPUNCTURE: CPT

## 2019-09-01 PROCEDURE — 99232 SBSQ HOSP IP/OBS MODERATE 35: CPT | Mod: ,,, | Performed by: INTERNAL MEDICINE

## 2019-09-01 PROCEDURE — 80048 BASIC METABOLIC PNL TOTAL CA: CPT

## 2019-09-01 PROCEDURE — 94640 AIRWAY INHALATION TREATMENT: CPT

## 2019-09-01 PROCEDURE — 63600175 PHARM REV CODE 636 W HCPCS: Performed by: INTERNAL MEDICINE

## 2019-09-01 PROCEDURE — 25000003 PHARM REV CODE 250: Performed by: INTERNAL MEDICINE

## 2019-09-01 PROCEDURE — 83735 ASSAY OF MAGNESIUM: CPT

## 2019-09-01 PROCEDURE — 99900035 HC TECH TIME PER 15 MIN (STAT)

## 2019-09-01 PROCEDURE — 84100 ASSAY OF PHOSPHORUS: CPT

## 2019-09-01 PROCEDURE — 25000242 PHARM REV CODE 250 ALT 637 W/ HCPCS: Performed by: INTERNAL MEDICINE

## 2019-09-01 PROCEDURE — 94761 N-INVAS EAR/PLS OXIMETRY MLT: CPT

## 2019-09-01 PROCEDURE — 94664 DEMO&/EVAL PT USE INHALER: CPT

## 2019-09-01 PROCEDURE — 94799 UNLISTED PULMONARY SVC/PX: CPT

## 2019-09-01 PROCEDURE — 99232 PR SUBSEQUENT HOSPITAL CARE,LEVL II: ICD-10-PCS | Mod: ,,, | Performed by: INTERNAL MEDICINE

## 2019-09-01 RX ORDER — LOPERAMIDE HYDROCHLORIDE 2 MG/1
2 CAPSULE ORAL 3 TIMES DAILY PRN
Status: DISCONTINUED | OUTPATIENT
Start: 2019-09-01 | End: 2019-09-05 | Stop reason: HOSPADM

## 2019-09-01 RX ORDER — SODIUM CHLORIDE 9 MG/ML
INJECTION, SOLUTION INTRAVENOUS CONTINUOUS
Status: ACTIVE | OUTPATIENT
Start: 2019-09-01 | End: 2019-09-01

## 2019-09-01 RX ADMIN — METOPROLOL SUCCINATE 25 MG: 25 TABLET, EXTENDED RELEASE ORAL at 08:09

## 2019-09-01 RX ADMIN — LEVOTHYROXINE SODIUM 50 MCG: 50 TABLET ORAL at 06:09

## 2019-09-01 RX ADMIN — SODIUM CHLORIDE: 0.9 INJECTION, SOLUTION INTRAVENOUS at 11:09

## 2019-09-01 RX ADMIN — CEFTRIAXONE SODIUM 1 G: 1 INJECTION, POWDER, FOR SOLUTION INTRAMUSCULAR; INTRAVENOUS at 10:09

## 2019-09-01 RX ADMIN — PANTOPRAZOLE SODIUM 40 MG: 40 TABLET, DELAYED RELEASE ORAL at 08:09

## 2019-09-01 RX ADMIN — IPRATROPIUM BROMIDE 0.5 MG: 0.5 SOLUTION RESPIRATORY (INHALATION) at 08:09

## 2019-09-01 RX ADMIN — GUAIFENESIN 600 MG: 600 TABLET, EXTENDED RELEASE ORAL at 10:09

## 2019-09-01 RX ADMIN — LOPERAMIDE HYDROCHLORIDE 2 MG: 2 CAPSULE ORAL at 11:09

## 2019-09-01 RX ADMIN — LEVALBUTEROL HYDROCHLORIDE 0.63 MG: 0.63 SOLUTION RESPIRATORY (INHALATION) at 03:09

## 2019-09-01 RX ADMIN — GABAPENTIN 800 MG: 400 CAPSULE ORAL at 08:09

## 2019-09-01 RX ADMIN — LEVALBUTEROL HYDROCHLORIDE 0.63 MG: 0.63 SOLUTION RESPIRATORY (INHALATION) at 09:09

## 2019-09-01 RX ADMIN — IPRATROPIUM BROMIDE 0.5 MG: 0.5 SOLUTION RESPIRATORY (INHALATION) at 03:09

## 2019-09-01 RX ADMIN — PROMETHAZINE HYDROCHLORIDE 6.25 MG: 25 INJECTION INTRAMUSCULAR; INTRAVENOUS at 11:09

## 2019-09-01 RX ADMIN — HYPROMELLOSE 2910 2 DROP: 5 SOLUTION OPHTHALMIC at 03:09

## 2019-09-01 RX ADMIN — DOXYCYCLINE HYCLATE 100 MG: 100 TABLET, COATED ORAL at 10:09

## 2019-09-01 RX ADMIN — GUAIFENESIN 600 MG: 600 TABLET, EXTENDED RELEASE ORAL at 08:09

## 2019-09-01 RX ADMIN — HYPROMELLOSE 2910 2 DROP: 5 SOLUTION OPHTHALMIC at 08:09

## 2019-09-01 RX ADMIN — GABAPENTIN 800 MG: 400 CAPSULE ORAL at 10:09

## 2019-09-01 RX ADMIN — ONDANSETRON 8 MG: 8 TABLET, ORALLY DISINTEGRATING ORAL at 06:09

## 2019-09-01 RX ADMIN — HYPROMELLOSE 2910 2 DROP: 5 SOLUTION OPHTHALMIC at 09:09

## 2019-09-01 RX ADMIN — ACETAMINOPHEN 650 MG: 325 TABLET ORAL at 07:09

## 2019-09-01 RX ADMIN — DOXYCYCLINE HYCLATE 100 MG: 100 TABLET, COATED ORAL at 08:09

## 2019-09-01 RX ADMIN — IPRATROPIUM BROMIDE 0.5 MG: 0.5 SOLUTION RESPIRATORY (INHALATION) at 12:09

## 2019-09-01 RX ADMIN — ATORVASTATIN CALCIUM 20 MG: 20 TABLET, FILM COATED ORAL at 08:09

## 2019-09-01 RX ADMIN — LOSARTAN POTASSIUM 50 MG: 50 TABLET, FILM COATED ORAL at 08:09

## 2019-09-01 RX ADMIN — AMLODIPINE BESYLATE 5 MG: 5 TABLET ORAL at 08:09

## 2019-09-01 RX ADMIN — IPRATROPIUM BROMIDE 0.5 MG: 0.5 SOLUTION RESPIRATORY (INHALATION) at 09:09

## 2019-09-01 RX ADMIN — RAMELTEON 8 MG: 8 TABLET, FILM COATED ORAL at 12:09

## 2019-09-01 NOTE — PLAN OF CARE
Problem: Adult Inpatient Plan of Care  Goal: Plan of Care Review  Outcome: Ongoing (interventions implemented as appropriate)  Patient free of injuries and falls on day of care. Vital signs stable. IV antibiotics administered per MAR. Patient repositioned self independently. Patient had nausea that was unrelieved by oral zofran and then relieved by IV phenergan. Bed locked and in lowest position. All personal items and call bell within patients reach. Will continue to monitor.

## 2019-09-02 LAB
ANION GAP SERPL CALC-SCNC: 11 MMOL/L (ref 8–16)
BACTERIA BLD CULT: NORMAL
BACTERIA BLD CULT: NORMAL
BASOPHILS # BLD AUTO: 0.05 K/UL (ref 0–0.2)
BASOPHILS NFR BLD: 0.8 % (ref 0–1.9)
BNP SERPL-MCNC: 106 PG/ML (ref 0–99)
BUN SERPL-MCNC: 13 MG/DL (ref 8–23)
CALCIUM SERPL-MCNC: 8.6 MG/DL (ref 8.7–10.5)
CHLORIDE SERPL-SCNC: 96 MMOL/L (ref 95–110)
CO2 SERPL-SCNC: 23 MMOL/L (ref 23–29)
CREAT SERPL-MCNC: 1.1 MG/DL (ref 0.5–1.4)
DIFFERENTIAL METHOD: ABNORMAL
EOSINOPHIL # BLD AUTO: 0.2 K/UL (ref 0–0.5)
EOSINOPHIL NFR BLD: 4 % (ref 0–8)
ERYTHROCYTE [DISTWIDTH] IN BLOOD BY AUTOMATED COUNT: 13.4 % (ref 11.5–14.5)
EST. GFR  (AFRICAN AMERICAN): >60 ML/MIN/1.73 M^2
EST. GFR  (NON AFRICAN AMERICAN): >60 ML/MIN/1.73 M^2
GLUCOSE SERPL-MCNC: 89 MG/DL (ref 70–110)
HCT VFR BLD AUTO: 34.7 % (ref 40–54)
HGB BLD-MCNC: 11.4 G/DL (ref 14–18)
IMM GRANULOCYTES # BLD AUTO: 0.04 K/UL (ref 0–0.04)
IMM GRANULOCYTES NFR BLD AUTO: 0.7 % (ref 0–0.5)
LYMPHOCYTES # BLD AUTO: 1.4 K/UL (ref 1–4.8)
LYMPHOCYTES NFR BLD: 23.4 % (ref 18–48)
MAGNESIUM SERPL-MCNC: 1.5 MG/DL (ref 1.6–2.6)
MCH RBC QN AUTO: 27.8 PG (ref 27–31)
MCHC RBC AUTO-ENTMCNC: 32.9 G/DL (ref 32–36)
MCV RBC AUTO: 85 FL (ref 82–98)
MONOCYTES # BLD AUTO: 0.7 K/UL (ref 0.3–1)
MONOCYTES NFR BLD: 11 % (ref 4–15)
NEUTROPHILS # BLD AUTO: 3.7 K/UL (ref 1.8–7.7)
NEUTROPHILS NFR BLD: 60.1 % (ref 38–73)
NRBC BLD-RTO: 0 /100 WBC
OSMOLALITY SERPL: 268 MOSM/KG (ref 280–300)
OSMOLALITY UR: 387 MOSM/KG (ref 50–1200)
PHOSPHATE SERPL-MCNC: 3.2 MG/DL (ref 2.7–4.5)
PLATELET # BLD AUTO: 236 K/UL (ref 150–350)
PMV BLD AUTO: 9.2 FL (ref 9.2–12.9)
POTASSIUM SERPL-SCNC: 3.8 MMOL/L (ref 3.5–5.1)
RBC # BLD AUTO: 4.1 M/UL (ref 4.6–6.2)
SODIUM SERPL-SCNC: 130 MMOL/L (ref 136–145)
SODIUM UR-SCNC: 55 MMOL/L (ref 20–250)
URATE SERPL-MCNC: 3.5 MG/DL (ref 3.4–7)
WBC # BLD AUTO: 6.07 K/UL (ref 3.9–12.7)

## 2019-09-02 PROCEDURE — 80048 BASIC METABOLIC PNL TOTAL CA: CPT

## 2019-09-02 PROCEDURE — 83735 ASSAY OF MAGNESIUM: CPT

## 2019-09-02 PROCEDURE — 99900035 HC TECH TIME PER 15 MIN (STAT)

## 2019-09-02 PROCEDURE — 84300 ASSAY OF URINE SODIUM: CPT

## 2019-09-02 PROCEDURE — 84550 ASSAY OF BLOOD/URIC ACID: CPT

## 2019-09-02 PROCEDURE — 25000003 PHARM REV CODE 250: Performed by: INTERNAL MEDICINE

## 2019-09-02 PROCEDURE — 84100 ASSAY OF PHOSPHORUS: CPT

## 2019-09-02 PROCEDURE — 83880 ASSAY OF NATRIURETIC PEPTIDE: CPT

## 2019-09-02 PROCEDURE — 25000242 PHARM REV CODE 250 ALT 637 W/ HCPCS: Performed by: INTERNAL MEDICINE

## 2019-09-02 PROCEDURE — 94761 N-INVAS EAR/PLS OXIMETRY MLT: CPT

## 2019-09-02 PROCEDURE — 11000001 HC ACUTE MED/SURG PRIVATE ROOM

## 2019-09-02 PROCEDURE — 83935 ASSAY OF URINE OSMOLALITY: CPT

## 2019-09-02 PROCEDURE — 99232 SBSQ HOSP IP/OBS MODERATE 35: CPT | Mod: ,,, | Performed by: INTERNAL MEDICINE

## 2019-09-02 PROCEDURE — 63600175 PHARM REV CODE 636 W HCPCS: Performed by: INTERNAL MEDICINE

## 2019-09-02 PROCEDURE — 27000221 HC OXYGEN, UP TO 24 HOURS

## 2019-09-02 PROCEDURE — 99232 PR SUBSEQUENT HOSPITAL CARE,LEVL II: ICD-10-PCS | Mod: ,,, | Performed by: INTERNAL MEDICINE

## 2019-09-02 PROCEDURE — 83930 ASSAY OF BLOOD OSMOLALITY: CPT

## 2019-09-02 PROCEDURE — 36415 COLL VENOUS BLD VENIPUNCTURE: CPT

## 2019-09-02 PROCEDURE — 94664 DEMO&/EVAL PT USE INHALER: CPT

## 2019-09-02 PROCEDURE — 85025 COMPLETE CBC W/AUTO DIFF WBC: CPT

## 2019-09-02 PROCEDURE — 94640 AIRWAY INHALATION TREATMENT: CPT

## 2019-09-02 RX ORDER — FLUTICASONE PROPIONATE 50 MCG
2 SPRAY, SUSPENSION (ML) NASAL DAILY
Status: DISCONTINUED | OUTPATIENT
Start: 2019-09-02 | End: 2019-09-05 | Stop reason: HOSPADM

## 2019-09-02 RX ORDER — LEVOFLOXACIN 750 MG/1
750 TABLET ORAL DAILY
Status: DISCONTINUED | OUTPATIENT
Start: 2019-09-02 | End: 2019-09-04

## 2019-09-02 RX ORDER — CETIRIZINE HYDROCHLORIDE 5 MG/1
5 TABLET ORAL NIGHTLY
Status: DISCONTINUED | OUTPATIENT
Start: 2019-09-02 | End: 2019-09-05 | Stop reason: HOSPADM

## 2019-09-02 RX ORDER — SODIUM CHLORIDE 9 MG/ML
INJECTION, SOLUTION INTRAVENOUS CONTINUOUS
Status: ACTIVE | OUTPATIENT
Start: 2019-09-02 | End: 2019-09-03

## 2019-09-02 RX ORDER — MAGNESIUM SULFATE HEPTAHYDRATE 40 MG/ML
2 INJECTION, SOLUTION INTRAVENOUS ONCE
Status: COMPLETED | OUTPATIENT
Start: 2019-09-02 | End: 2019-09-02

## 2019-09-02 RX ADMIN — CETIRIZINE HYDROCHLORIDE 5 MG: 5 TABLET ORAL at 10:09

## 2019-09-02 RX ADMIN — ONDANSETRON 8 MG: 8 TABLET, ORALLY DISINTEGRATING ORAL at 07:09

## 2019-09-02 RX ADMIN — IPRATROPIUM BROMIDE 0.5 MG: 0.5 SOLUTION RESPIRATORY (INHALATION) at 04:09

## 2019-09-02 RX ADMIN — MAGNESIUM SULFATE IN WATER 2 G: 40 INJECTION, SOLUTION INTRAVENOUS at 09:09

## 2019-09-02 RX ADMIN — METOPROLOL SUCCINATE 25 MG: 25 TABLET, EXTENDED RELEASE ORAL at 09:09

## 2019-09-02 RX ADMIN — IPRATROPIUM BROMIDE 0.5 MG: 0.5 SOLUTION RESPIRATORY (INHALATION) at 09:09

## 2019-09-02 RX ADMIN — PANTOPRAZOLE SODIUM 40 MG: 40 TABLET, DELAYED RELEASE ORAL at 09:09

## 2019-09-02 RX ADMIN — ONDANSETRON 8 MG: 8 TABLET, ORALLY DISINTEGRATING ORAL at 09:09

## 2019-09-02 RX ADMIN — FLUTICASONE PROPIONATE 100 MCG: 50 SPRAY, METERED NASAL at 03:09

## 2019-09-02 RX ADMIN — TRAZODONE HYDROCHLORIDE 25 MG: 50 TABLET ORAL at 10:09

## 2019-09-02 RX ADMIN — GABAPENTIN 800 MG: 400 CAPSULE ORAL at 10:09

## 2019-09-02 RX ADMIN — HYPROMELLOSE 2910 2 DROP: 5 SOLUTION OPHTHALMIC at 02:09

## 2019-09-02 RX ADMIN — HYPROMELLOSE 2910 2 DROP: 5 SOLUTION OPHTHALMIC at 09:09

## 2019-09-02 RX ADMIN — LOSARTAN POTASSIUM 50 MG: 50 TABLET, FILM COATED ORAL at 09:09

## 2019-09-02 RX ADMIN — ATORVASTATIN CALCIUM 20 MG: 20 TABLET, FILM COATED ORAL at 09:09

## 2019-09-02 RX ADMIN — GABAPENTIN 800 MG: 400 CAPSULE ORAL at 09:09

## 2019-09-02 RX ADMIN — AMLODIPINE BESYLATE 5 MG: 5 TABLET ORAL at 09:09

## 2019-09-02 RX ADMIN — GUAIFENESIN 600 MG: 600 TABLET, EXTENDED RELEASE ORAL at 10:09

## 2019-09-02 RX ADMIN — HYPROMELLOSE 2910 2 DROP: 5 SOLUTION OPHTHALMIC at 10:09

## 2019-09-02 RX ADMIN — LEVALBUTEROL HYDROCHLORIDE 0.63 MG: 0.63 SOLUTION RESPIRATORY (INHALATION) at 09:09

## 2019-09-02 RX ADMIN — LEVALBUTEROL HYDROCHLORIDE 0.63 MG: 0.63 SOLUTION RESPIRATORY (INHALATION) at 04:09

## 2019-09-02 RX ADMIN — GUAIFENESIN 600 MG: 600 TABLET, EXTENDED RELEASE ORAL at 09:09

## 2019-09-02 RX ADMIN — SODIUM CHLORIDE: 0.9 INJECTION, SOLUTION INTRAVENOUS at 03:09

## 2019-09-02 RX ADMIN — LEVOFLOXACIN 750 MG: 750 TABLET, FILM COATED ORAL at 09:09

## 2019-09-02 RX ADMIN — LEVOTHYROXINE SODIUM 50 MCG: 50 TABLET ORAL at 06:09

## 2019-09-02 NOTE — PROGRESS NOTES
"[facility] Hospital Medicine  Progress Note    Team: [facility] HOSP MED K   Attending MD: Birgit Garcia MD  Admit Date: 8/27/2019  TISHA 8/31/2019  Length of Stay:  LOS: 6 days   Code status: Full Code    Principal Problem: Community acquired pneumonia    Interval hx: C. Diff testing oveinight with neg result; no diarrhea today; poor sleep overnight    ROS:  Constitutional: no fever or chills  Respiratory: +cough, not productive; denies shortness of breath  Cardiovascular: no chest pain or palpitations  Gastrointestinal: no nausea or vomiting, no abdominal pain; last BM: 8/31  Genitourinary: no gross hematuria or dysuria  Integument/Breast: no rash or pruritis  Musculoskeletal: no arthralgias or myalgias  Neurological: no seizures or tremors  Behavioral/Psych: no auditory or visual hallucinations      Physical Exam  Temp:  [97.8 °F (36.6 °C)-97.9 °F (36.6 °C)] 97.9 °F (36.6 °C)  Pulse:  [55-72] 61  Resp:  [14-24] 15  SpO2:  [95 %-98 %] 97 %  BP: (115-151)/(58-77) 122/62      Intake/Output Summary (Last 24 hours) at 9/2/2019 0019  Last data filed at 9/1/2019 1900  Gross per 24 hour   Intake 120 ml   Output 2100 ml   Net -1980 ml       Wt Readings from Last 1 Encounters:   08/29/19 0400 76.1 kg (167 lb 12.3 oz)   08/28/19 1111 76.2 kg (168 lb)   08/28/19 0157 76.3 kg (168 lb 3.4 oz)   08/27/19 1521 88.5 kg (195 lb)        Estimated body mass index is 25.51 kg/m² as calculated from the following:    Height as of this encounter: 5' 8" (1.727 m).    Weight as of this encounter: 76.1 kg (167 lb 12.3 oz).    General: well developed, well nourished, no distress  Lungs:no wheezing bilaterally and diminished BS and normal respiratory effort.  Cardiovascular: Heart: regular rate and rhythm, S1, S2 normal, 2/6 sytolic murmur, no click, rub or gallop. Chest Wall: no tenderness. Extremities: no cyanosis, no edema, no clubbing; extremities tender to palpation  Abdomen/Rectal: Abdomen: soft, non-tender " non-distended; bowel sounds normal; no masses; + suprapubic catheter  Skin: Skin color, texture, turgor normal. No rashes or lesions.  Neurologic: generalized weakness. No focal numbness or weakness.  Psych/Behavioral:  Alert and oriented, appropriate affect.      Recent Results (from the past 24 hour(s))   Magnesium    Collection Time: 09/01/19  3:06 AM   Result Value Ref Range    Magnesium 1.6 1.6 - 2.6 mg/dL   CBC with Automated Differential    Collection Time: 09/01/19  3:06 AM   Result Value Ref Range    WBC 6.98 3.90 - 12.70 K/uL    RBC 3.93 (L) 4.60 - 6.20 M/uL    Hemoglobin 11.0 (L) 14.0 - 18.0 g/dL    Hematocrit 34.1 (L) 40.0 - 54.0 %    Mean Corpuscular Volume 87 82 - 98 fL    Mean Corpuscular Hemoglobin 28.0 27.0 - 31.0 pg    Mean Corpuscular Hemoglobin Conc 32.3 32.0 - 36.0 g/dL    RDW 13.6 11.5 - 14.5 %    Platelets 217 150 - 350 K/uL    MPV 9.4 9.2 - 12.9 fL    Immature Granulocytes 0.7 (H) 0.0 - 0.5 %    Gran # (ANC) 4.4 1.8 - 7.7 K/uL    Immature Grans (Abs) 0.05 (H) 0.00 - 0.04 K/uL    Lymph # 1.5 1.0 - 4.8 K/uL    Mono # 0.8 0.3 - 1.0 K/uL    Eos # 0.2 0.0 - 0.5 K/uL    Baso # 0.03 0.00 - 0.20 K/uL    nRBC 0 0 /100 WBC    Gran% 63.3 38.0 - 73.0 %    Lymph% 21.6 18.0 - 48.0 %    Mono% 11.0 4.0 - 15.0 %    Eosinophil% 3.0 0.0 - 8.0 %    Basophil% 0.4 0.0 - 1.9 %    Differential Method Automated    Basic Metabolic Panel (BMP)    Collection Time: 09/01/19  3:06 AM   Result Value Ref Range    Sodium 131 (L) 136 - 145 mmol/L    Potassium 3.9 3.5 - 5.1 mmol/L    Chloride 95 95 - 110 mmol/L    CO2 25 23 - 29 mmol/L    Glucose 89 70 - 110 mg/dL    BUN, Bld 11 8 - 23 mg/dL    Creatinine 1.0 0.5 - 1.4 mg/dL    Calcium 8.9 8.7 - 10.5 mg/dL    Anion Gap 11 8 - 16 mmol/L    eGFR if African American >60.0 >60 mL/min/1.73 m^2    eGFR if non African American >60.0 >60 mL/min/1.73 m^2   Phosphorus    Collection Time: 09/01/19  3:06 AM   Result Value Ref Range    Phosphorus 2.7 2.7 - 4.5 mg/dL   POCT TB Skin Test  Read    Collection Time: 09/01/19  6:10 AM   Result Value Ref Range    TB Induration 48 - 72 hr read 0 mm       Recent Labs   Lab 08/30/19  0332 08/31/19  0649 09/01/19  0306   WBC 7.92 6.03 6.98   HGB 10.2* 11.3* 11.0*   HCT 31.2* 34.2* 34.1*    191 217       Recent Labs   Lab 08/30/19  0332 08/31/19  0649 09/01/19  0306   * 130* 131*   K 3.4* 4.4 3.9   CL 93* 96 95   CO2 26 25 25   BUN 14 11 11   CREATININE 1.0 1.0 1.0   GLU 94 89 89   CALCIUM 7.8* 8.5* 8.9   MG 1.7 1.6 1.6   PHOS 4.3 2.9 2.7       Recent Labs   Lab 08/27/19  1819   ALKPHOS 79   ALT 11   AST 36   ALBUMIN 4.0   PROT 8.1   BILITOT 1.0         Hemoglobin A1C   Date Value Ref Range Status   04/04/2019 5.4 4.0 - 5.6 % Final     Comment:     ADA Screening Guidelines:  5.7-6.4%  Consistent with prediabetes  >or=6.5%  Consistent with diabetes  High levels of fetal hemoglobin interfere with the HbA1C  assay. Heterozygous hemoglobin variants (HbS, HgC, etc)do  not significantly interfere with this assay.   However, presence of multiple variants may affect accuracy.         Scheduled Meds:   amLODIPine  5 mg Oral Daily    artificial tears  2 drop Both Eyes TID    atorvastatin  20 mg Oral Daily    cefTRIAXone (ROCEPHIN) IVPB  1 g Intravenous Q24H    gabapentin  800 mg Oral BID    guaiFENesin  600 mg Oral BID    ipratropium  0.5 mg Nebulization Q4H WAKE    levalbuterol  0.63 mg Nebulization Q8H WA    levothyroxine  50 mcg Oral Before breakfast    losartan  50 mg Oral Daily    metoprolol succinate  25 mg Oral Daily    pantoprazole  40 mg Oral Daily       Continuous Infusions:      As Needed: acetaminophen, albuterol, benzonatate, hyoscyamine, loperamide, ondansetron, promethazine (PHENERGAN) IVPB, ramelteon, sodium chloride 0.9%    Active Hospital Problems    Diagnosis  POA    *Community acquired pneumonia [J18.9]  Yes    Hypocalcemia [E83.51]  Yes    Infection and inflammatory reaction due to other urinary catheter, subsequent  encounter [T83.518D]  Yes    Hypoxia [R09.02]  Yes    Gastroesophageal reflux disease without esophagitis [K21.9]  Yes     - continue home Prilosec      Neurogenic bladder [N31.9]  Yes    Hypokalemia [E87.6]  Yes    Hyponatremia [E87.1]  Yes    Suprapubic catheter [Z93.59]  Not Applicable    CKD (chronic kidney disease) stage 3, GFR 30-59 ml/min [N18.3]  Yes    Essential hypertension [I10]  Yes    Acquired hypothyroidism [E03.9]  Yes    Hyperlipidemia [E78.5]  Yes      Resolved Hospital Problems   No resolved problems to display.       Overview:    Assessment and Plan    Community acquired pneumonia  Hypoxia  Wheezing  - Treating as CAP in abundance of caution. Also on the differential is viral upper respiratory tract infection and COPD, though he does not carry a diagnosis of the latter  - chest CT with probable pneumonia in LLL  - Agree with CTX, changed azithromycin -> doxycycline given QTc, particularly in light of his electrolyte abnormalities; continue current antibiotics  -increase neb frequency to q 4 hr WA due to persistent wheezing with improvement  -mucinex and flutter valve therapy added to treat cough  - blood cultures NGTD x 2  - IS for atelectasis  - Wean O2 as tolerated, currently on 2L  - Would benefit from PFTs upon discharge given distant but substantial smoking history  -completed 5 days of doxy  -diarrhea with neg C. Diff; change to levofloxacin to complete therapy; imodium prn     Hyponatremia  - Etiology likely hypovolemic, though volume exam is equivocal, and he has risk factors for SIADH  - patient was resumed on HCTZ in 6/2019 due to poor BP control; he is still taking HCTZ. Also has a history of MDD but without any antidepressants listed that would place him at risk for SIADH  -Na essentially unchanged with fluid restriction and urine concentrated  - Gabi low not c/w SIADH  - suspect HCTZ and volume contraction as source of low Na currently and improved with IVF beginning 8/28;  avoid overcorrection  -repeat IVF on 8/30 due to persistent N at 129 and poor po intake with slow improvement    Hypochloremia - resolved  - Seems to favor hypovolemic etiology  - Already given NS as above  - BMP to trend     Hypokalemia  - Ordered 60 mEq for now (40 PO + 20 IV)  - improved with repeat supplementation  - will continue to monitor and replete to goal of 4 as indicated    Hypophosphatemia  Replete with IV supplement as oral makes patient have N/V     Hypocalcemia  Replete with IV Ca gluconate due to nausea with supplements for goal of 9    Neurogenic bladder  Suprapubic catheter  Gross hematuria  Infection associated with indwelling urinary catheter  - Hemoglobin stable and gross hematuria not evident  - Seen by urology in clinic on 8/27. Urine culture pending.  - Continue hyoscyamine PRN  - SCDs for VTE PPX. Also holding home asa  - CBC daily to monitor  -Urine culture with providencia X2 species; both sensitive to rocephin and levofloxacin; will need 14 d of therapy with levofloxacin     CKD (chronic kidney disease) stage 3, GFR 30-59 ml/min  Estimated Creatinine Clearance: 52.3 mL/min (based on SCr of 1 mg/dL).  - Stable  - sCr at baseline on admission  - Will follow with BMP as above, also strict I/Os and daily weights     Acquired hypothyroidism  - TSH normal on 8/27  - Continue home levothyroxine     Gastroesophageal reflux disease without esophagitis  - Stable by symptoms  - Continue protonix  -he denies chronic nausea symptoms are indicative of reflux; continue zofran prn     Hyperlipidemia  - Continue home atorvastatin     Essential hypertension  - Stable  - discontinuing HCTZ and holding losartan until his electrolytes are sorted out  - Continue metoprolol and will resume losartan for uncontrolled BP      Diet: Diet Adult Regular (IDDSI Level 7) [facility]   GI PPx: pantoprazole  DVT PPx:   VTE Risk Mitigation (From admission, onward)        Ordered     Place sequential compression  device  Until discontinued      08/27/19 2331     IP VTE HIGH RISK PATIENT  Once      08/27/19 2306        L/D/A: PIV; suprapubic catheter  Wounds:    Goals of Care: curative; full code    Discharge plan: SNF     Birgit Garcia MD  Staff Hospitalist

## 2019-09-02 NOTE — PLAN OF CARE
Problem: Adult Inpatient Plan of Care  Goal: Plan of Care Review  Outcome: Ongoing (interventions implemented as appropriate)  Patient alert and calm. Receiving IV fluids and breathing treatments. Continues with nonproductive cough. Personal sitter present at bedside. Denies any pain or discomfort. Call light placed within reach. Will cont to monitor

## 2019-09-02 NOTE — PLAN OF CARE
Problem: Adult Inpatient Plan of Care  Goal: Plan of Care Review  Patient had complained of having a stiff neck last night, tylenol was administered, Patient reported he did receive relief. No other complaints, rested well awaiting snf consult.

## 2019-09-03 LAB
ANION GAP SERPL CALC-SCNC: 9 MMOL/L (ref 8–16)
BASOPHILS # BLD AUTO: 0.03 K/UL (ref 0–0.2)
BASOPHILS NFR BLD: 0.5 % (ref 0–1.9)
BUN SERPL-MCNC: 14 MG/DL (ref 8–23)
CA-I BLDV-SCNC: 1.07 MMOL/L (ref 1.06–1.42)
CALCIUM SERPL-MCNC: 8.1 MG/DL (ref 8.7–10.5)
CHLORIDE SERPL-SCNC: 97 MMOL/L (ref 95–110)
CO2 SERPL-SCNC: 24 MMOL/L (ref 23–29)
CREAT SERPL-MCNC: 1.1 MG/DL (ref 0.5–1.4)
DIFFERENTIAL METHOD: ABNORMAL
EOSINOPHIL # BLD AUTO: 0.2 K/UL (ref 0–0.5)
EOSINOPHIL NFR BLD: 2.7 % (ref 0–8)
ERYTHROCYTE [DISTWIDTH] IN BLOOD BY AUTOMATED COUNT: 13.3 % (ref 11.5–14.5)
EST. GFR  (AFRICAN AMERICAN): >60 ML/MIN/1.73 M^2
EST. GFR  (NON AFRICAN AMERICAN): >60 ML/MIN/1.73 M^2
GLUCOSE SERPL-MCNC: 91 MG/DL (ref 70–110)
HCT VFR BLD AUTO: 32.5 % (ref 40–54)
HGB BLD-MCNC: 10.5 G/DL (ref 14–18)
IMM GRANULOCYTES # BLD AUTO: 0.03 K/UL (ref 0–0.04)
IMM GRANULOCYTES NFR BLD AUTO: 0.5 % (ref 0–0.5)
LYMPHOCYTES # BLD AUTO: 1.4 K/UL (ref 1–4.8)
LYMPHOCYTES NFR BLD: 21.4 % (ref 18–48)
MAGNESIUM SERPL-MCNC: 1.9 MG/DL (ref 1.6–2.6)
MCH RBC QN AUTO: 27.8 PG (ref 27–31)
MCHC RBC AUTO-ENTMCNC: 32.3 G/DL (ref 32–36)
MCV RBC AUTO: 86 FL (ref 82–98)
MONOCYTES # BLD AUTO: 0.6 K/UL (ref 0.3–1)
MONOCYTES NFR BLD: 9.9 % (ref 4–15)
NEUTROPHILS # BLD AUTO: 4.1 K/UL (ref 1.8–7.7)
NEUTROPHILS NFR BLD: 65 % (ref 38–73)
NRBC BLD-RTO: 0 /100 WBC
PHOSPHATE SERPL-MCNC: 3.3 MG/DL (ref 2.7–4.5)
PLATELET # BLD AUTO: 232 K/UL (ref 150–350)
PMV BLD AUTO: 9 FL (ref 9.2–12.9)
POTASSIUM SERPL-SCNC: 4 MMOL/L (ref 3.5–5.1)
RBC # BLD AUTO: 3.78 M/UL (ref 4.6–6.2)
SODIUM SERPL-SCNC: 130 MMOL/L (ref 136–145)
WBC # BLD AUTO: 6.35 K/UL (ref 3.9–12.7)

## 2019-09-03 PROCEDURE — 83735 ASSAY OF MAGNESIUM: CPT

## 2019-09-03 PROCEDURE — 25000003 PHARM REV CODE 250: Performed by: PHYSICIAN ASSISTANT

## 2019-09-03 PROCEDURE — 63600175 PHARM REV CODE 636 W HCPCS: Performed by: INTERNAL MEDICINE

## 2019-09-03 PROCEDURE — 85025 COMPLETE CBC W/AUTO DIFF WBC: CPT

## 2019-09-03 PROCEDURE — 99900035 HC TECH TIME PER 15 MIN (STAT)

## 2019-09-03 PROCEDURE — 94799 UNLISTED PULMONARY SVC/PX: CPT

## 2019-09-03 PROCEDURE — 92610 EVALUATE SWALLOWING FUNCTION: CPT

## 2019-09-03 PROCEDURE — 84100 ASSAY OF PHOSPHORUS: CPT

## 2019-09-03 PROCEDURE — 94664 DEMO&/EVAL PT USE INHALER: CPT

## 2019-09-03 PROCEDURE — 99232 SBSQ HOSP IP/OBS MODERATE 35: CPT | Mod: ,,, | Performed by: HOSPITALIST

## 2019-09-03 PROCEDURE — 25000003 PHARM REV CODE 250: Performed by: INTERNAL MEDICINE

## 2019-09-03 PROCEDURE — 82330 ASSAY OF CALCIUM: CPT

## 2019-09-03 PROCEDURE — 36415 COLL VENOUS BLD VENIPUNCTURE: CPT

## 2019-09-03 PROCEDURE — 80048 BASIC METABOLIC PNL TOTAL CA: CPT

## 2019-09-03 PROCEDURE — 94640 AIRWAY INHALATION TREATMENT: CPT

## 2019-09-03 PROCEDURE — 11000001 HC ACUTE MED/SURG PRIVATE ROOM

## 2019-09-03 PROCEDURE — 99232 PR SUBSEQUENT HOSPITAL CARE,LEVL II: ICD-10-PCS | Mod: ,,, | Performed by: HOSPITALIST

## 2019-09-03 PROCEDURE — 94761 N-INVAS EAR/PLS OXIMETRY MLT: CPT

## 2019-09-03 PROCEDURE — 25000242 PHARM REV CODE 250 ALT 637 W/ HCPCS: Performed by: INTERNAL MEDICINE

## 2019-09-03 RX ORDER — CALCIUM CARBONATE 200(500)MG
500 TABLET,CHEWABLE ORAL 2 TIMES DAILY
Status: DISCONTINUED | OUTPATIENT
Start: 2019-09-03 | End: 2019-09-05 | Stop reason: HOSPADM

## 2019-09-03 RX ADMIN — HYPROMELLOSE 2910 2 DROP: 5 SOLUTION OPHTHALMIC at 09:09

## 2019-09-03 RX ADMIN — RAMELTEON 8 MG: 8 TABLET, FILM COATED ORAL at 12:09

## 2019-09-03 RX ADMIN — CALCIUM CARBONATE (ANTACID) CHEW TAB 500 MG 500 MG: 500 CHEW TAB at 08:09

## 2019-09-03 RX ADMIN — AMLODIPINE BESYLATE 5 MG: 5 TABLET ORAL at 08:09

## 2019-09-03 RX ADMIN — LOSARTAN POTASSIUM 50 MG: 50 TABLET, FILM COATED ORAL at 08:09

## 2019-09-03 RX ADMIN — IPRATROPIUM BROMIDE 0.5 MG: 0.5 SOLUTION RESPIRATORY (INHALATION) at 09:09

## 2019-09-03 RX ADMIN — GABAPENTIN 800 MG: 400 CAPSULE ORAL at 08:09

## 2019-09-03 RX ADMIN — BENZONATATE 100 MG: 100 CAPSULE ORAL at 12:09

## 2019-09-03 RX ADMIN — METOPROLOL SUCCINATE 25 MG: 25 TABLET, EXTENDED RELEASE ORAL at 08:09

## 2019-09-03 RX ADMIN — LEVOFLOXACIN 750 MG: 750 TABLET, FILM COATED ORAL at 08:09

## 2019-09-03 RX ADMIN — LEVALBUTEROL HYDROCHLORIDE 0.63 MG: 0.63 SOLUTION RESPIRATORY (INHALATION) at 04:09

## 2019-09-03 RX ADMIN — CALCIUM CARBONATE (ANTACID) CHEW TAB 500 MG 500 MG: 500 CHEW TAB at 09:09

## 2019-09-03 RX ADMIN — LEVOTHYROXINE SODIUM 50 MCG: 50 TABLET ORAL at 06:09

## 2019-09-03 RX ADMIN — PANTOPRAZOLE SODIUM 40 MG: 40 TABLET, DELAYED RELEASE ORAL at 08:09

## 2019-09-03 RX ADMIN — CETIRIZINE HYDROCHLORIDE 5 MG: 5 TABLET ORAL at 09:09

## 2019-09-03 RX ADMIN — IPRATROPIUM BROMIDE 0.5 MG: 0.5 SOLUTION RESPIRATORY (INHALATION) at 12:09

## 2019-09-03 RX ADMIN — IPRATROPIUM BROMIDE 0.5 MG: 0.5 SOLUTION RESPIRATORY (INHALATION) at 04:09

## 2019-09-03 RX ADMIN — HYPROMELLOSE 2910 2 DROP: 5 SOLUTION OPHTHALMIC at 03:09

## 2019-09-03 RX ADMIN — ATORVASTATIN CALCIUM 20 MG: 20 TABLET, FILM COATED ORAL at 08:09

## 2019-09-03 RX ADMIN — HYPROMELLOSE 2910 2 DROP: 5 SOLUTION OPHTHALMIC at 08:09

## 2019-09-03 RX ADMIN — ACETAMINOPHEN 650 MG: 325 TABLET ORAL at 12:09

## 2019-09-03 RX ADMIN — LEVALBUTEROL HYDROCHLORIDE 0.63 MG: 0.63 SOLUTION RESPIRATORY (INHALATION) at 09:09

## 2019-09-03 RX ADMIN — GUAIFENESIN 600 MG: 600 TABLET, EXTENDED RELEASE ORAL at 08:09

## 2019-09-03 RX ADMIN — IPRATROPIUM BROMIDE 0.5 MG: 0.5 SOLUTION RESPIRATORY (INHALATION) at 08:09

## 2019-09-03 RX ADMIN — GABAPENTIN 800 MG: 400 CAPSULE ORAL at 09:09

## 2019-09-03 RX ADMIN — GUAIFENESIN 600 MG: 600 TABLET, EXTENDED RELEASE ORAL at 09:09

## 2019-09-03 RX ADMIN — FLUTICASONE PROPIONATE 100 MCG: 50 SPRAY, METERED NASAL at 08:09

## 2019-09-03 RX ADMIN — RAMELTEON 8 MG: 8 TABLET, FILM COATED ORAL at 09:09

## 2019-09-03 NOTE — PROGRESS NOTES
"[facility] Hospital Medicine  Progress Note    Team: [facility]   Attending MD: Alin Talbert MD  Admit Date: 8/27/2019  TISHA 9/4/2019  Length of Stay:  LOS: 7 days   Code status: Full Code    Principal Problem: Community acquired pneumonia    Interval hx: Pt. Reports persistent productive cough. He denies any SOB, fevers, or chills. He states that he feels much better than when he was initially admitted.    ROS:  Constitutional: no fever or chills  Respiratory: +cough, not productive; denies shortness of breath  Cardiovascular: no chest pain or palpitations  Gastrointestinal: no nausea or vomiting, no abdominal pain; last BM: 8/31  Genitourinary: no gross hematuria or dysuria  Integument/Breast: no rash or pruritis  Musculoskeletal: no arthralgias or myalgias  Neurological: no seizures or tremors  Behavioral/Psych: no auditory or visual hallucinations      Physical Exam  Temp:  [97.7 °F (36.5 °C)-98.2 °F (36.8 °C)] 97.8 °F (36.6 °C)  Pulse:  [58-74] 64  Resp:  [15-24] 18  SpO2:  [91 %-96 %] 94 %  BP: (130-156)/(67-80) 148/67      Intake/Output Summary (Last 24 hours) at 9/3/2019 1135  Last data filed at 9/3/2019 0842  Gross per 24 hour   Intake 360 ml   Output 2100 ml   Net -1740 ml       Wt Readings from Last 1 Encounters:   08/29/19 0400 76.1 kg (167 lb 12.3 oz)   08/28/19 1111 76.2 kg (168 lb)   08/28/19 0157 76.3 kg (168 lb 3.4 oz)   08/27/19 1521 88.5 kg (195 lb)        Estimated body mass index is 25.51 kg/m² as calculated from the following:    Height as of this encounter: 5' 8" (1.727 m).    Weight as of this encounter: 76.1 kg (167 lb 12.3 oz).    General: well developed, well nourished, no distress  Lungs: Coarse breath sounds appreciated B/L, no wheezes or rales  Cardiovascular: Heart: regular rate and rhythm, S1, S2 normal, 2/6 sytolic murmur, no click, rub or gallop. Chest Wall: no tenderness. Extremities: no cyanosis, no edema, no clubbing; extremities tender to " palpation  Abdomen/Rectal: Abdomen: soft, non-tender non-distended; bowel sounds normal; no masses; + suprapubic catheter  Skin: Skin color, texture, turgor normal. No rashes or lesions.  Neurologic: generalized weakness. No focal numbness or weakness.  Psych/Behavioral:  Alert and oriented, appropriate affect.      Recent Results (from the past 24 hour(s))   Sodium, urine, random    Collection Time: 09/02/19  4:34 PM   Result Value Ref Range    Sodium Urine Random 55 20 - 250 mmol/L   Osmolality, urine    Collection Time: 09/02/19  4:34 PM   Result Value Ref Range    Osmolality, Ur 387 50 - 1200 mOsm/kg   Magnesium    Collection Time: 09/03/19  3:05 AM   Result Value Ref Range    Magnesium 1.9 1.6 - 2.6 mg/dL   CBC with Automated Differential    Collection Time: 09/03/19  3:05 AM   Result Value Ref Range    WBC 6.35 3.90 - 12.70 K/uL    RBC 3.78 (L) 4.60 - 6.20 M/uL    Hemoglobin 10.5 (L) 14.0 - 18.0 g/dL    Hematocrit 32.5 (L) 40.0 - 54.0 %    Mean Corpuscular Volume 86 82 - 98 fL    Mean Corpuscular Hemoglobin 27.8 27.0 - 31.0 pg    Mean Corpuscular Hemoglobin Conc 32.3 32.0 - 36.0 g/dL    RDW 13.3 11.5 - 14.5 %    Platelets 232 150 - 350 K/uL    MPV 9.0 (L) 9.2 - 12.9 fL    Immature Granulocytes 0.5 0.0 - 0.5 %    Gran # (ANC) 4.1 1.8 - 7.7 K/uL    Immature Grans (Abs) 0.03 0.00 - 0.04 K/uL    Lymph # 1.4 1.0 - 4.8 K/uL    Mono # 0.6 0.3 - 1.0 K/uL    Eos # 0.2 0.0 - 0.5 K/uL    Baso # 0.03 0.00 - 0.20 K/uL    nRBC 0 0 /100 WBC    Gran% 65.0 38.0 - 73.0 %    Lymph% 21.4 18.0 - 48.0 %    Mono% 9.9 4.0 - 15.0 %    Eosinophil% 2.7 0.0 - 8.0 %    Basophil% 0.5 0.0 - 1.9 %    Differential Method Automated    Basic Metabolic Panel (BMP)    Collection Time: 09/03/19  3:05 AM   Result Value Ref Range    Sodium 130 (L) 136 - 145 mmol/L    Potassium 4.0 3.5 - 5.1 mmol/L    Chloride 97 95 - 110 mmol/L    CO2 24 23 - 29 mmol/L    Glucose 91 70 - 110 mg/dL    BUN, Bld 14 8 - 23 mg/dL    Creatinine 1.1 0.5 - 1.4 mg/dL     Calcium 8.1 (L) 8.7 - 10.5 mg/dL    Anion Gap 9 8 - 16 mmol/L    eGFR if African American >60.0 >60 mL/min/1.73 m^2    eGFR if non African American >60.0 >60 mL/min/1.73 m^2   Phosphorus    Collection Time: 09/03/19  3:05 AM   Result Value Ref Range    Phosphorus 3.3 2.7 - 4.5 mg/dL   Calcium, ionized    Collection Time: 09/03/19  3:05 AM   Result Value Ref Range    Calcium, Ion 1.07 1.06 - 1.42 mmol/L       Recent Labs   Lab 09/01/19  0306 09/02/19  0514 09/03/19  0305   WBC 6.98 6.07 6.35   HGB 11.0* 11.4* 10.5*   HCT 34.1* 34.7* 32.5*    236 232       Recent Labs   Lab 09/01/19 0306 09/02/19  0514 09/03/19  0305   * 130* 130*   K 3.9 3.8 4.0   CL 95 96 97   CO2 25 23 24   BUN 11 13 14   CREATININE 1.0 1.1 1.1   GLU 89 89 91   CALCIUM 8.9 8.6* 8.1*   MG 1.6 1.5* 1.9   PHOS 2.7 3.2 3.3       Recent Labs   Lab 08/27/19  1819   ALKPHOS 79   ALT 11   AST 36   ALBUMIN 4.0   PROT 8.1   BILITOT 1.0         Hemoglobin A1C   Date Value Ref Range Status   04/04/2019 5.4 4.0 - 5.6 % Final     Comment:     ADA Screening Guidelines:  5.7-6.4%  Consistent with prediabetes  >or=6.5%  Consistent with diabetes  High levels of fetal hemoglobin interfere with the HbA1C  assay. Heterozygous hemoglobin variants (HbS, HgC, etc)do  not significantly interfere with this assay.   However, presence of multiple variants may affect accuracy.         Scheduled Meds:   amLODIPine  5 mg Oral Daily    artificial tears  2 drop Both Eyes TID    atorvastatin  20 mg Oral Daily    calcium carbonate  500 mg Oral BID    cetirizine  5 mg Oral QHS    fluticasone propionate  2 spray Each Nostril Daily    gabapentin  800 mg Oral BID    guaiFENesin  600 mg Oral BID    ipratropium  0.5 mg Nebulization Q4H WAKE    levalbuterol  0.63 mg Nebulization Q8H WA    levoFLOXacin  750 mg Oral Daily    levothyroxine  50 mcg Oral Before breakfast    losartan  50 mg Oral Daily    metoprolol succinate  25 mg Oral Daily    pantoprazole  40 mg  Oral Daily       Continuous Infusions:      As Needed: acetaminophen, albuterol, benzonatate, hyoscyamine, loperamide, ondansetron, promethazine (PHENERGAN) IVPB, ramelteon, sodium chloride 0.9%, traZODone    Active Hospital Problems    Diagnosis  POA    *Community acquired pneumonia [J18.9]  Yes    Hypocalcemia [E83.51]  Yes    Infection and inflammatory reaction due to other urinary catheter, subsequent encounter [T83.518D]  Yes    Hypoxia [R09.02]  Yes    Gastroesophageal reflux disease without esophagitis [K21.9]  Yes     - continue home Prilosec      Neurogenic bladder [N31.9]  Yes    Hypokalemia [E87.6]  Yes    Hyponatremia [E87.1]  Yes    Suprapubic catheter [Z93.59]  Not Applicable    CKD (chronic kidney disease) stage 3, GFR 30-59 ml/min [N18.3]  Yes    Essential hypertension [I10]  Yes    Acquired hypothyroidism [E03.9]  Yes    Hyperlipidemia [E78.5]  Yes      Resolved Hospital Problems   No resolved problems to display.       Overview:    Assessment and Plan  Community acquired pneumonia  Hypoxia  Wheezing  - Treating as CAP in abundance of caution. Also on the differential is viral upper respiratory tract infection and COPD, though he does not carry a diagnosis of the latter  - chest CT with probable pneumonia in LLL  -  nebulizers ordered for persistent wheezing with improvement  -mucinex and flutter valve therapy also added to treat cough  - blood cultures NGTD x 2  -completed 5 days of doxy and is now on levaquin for combination treatment of pneumonia and UTI  -diarrhea with neg C. Diff; change to levofloxacin to complete therapy; imodium prn    Neurogenic bladder  Suprapubic catheter  Gross hematuria  Infection associated with indwelling urinary catheter  - Hemoglobin stable and gross hematuria not evident  - Seen by urology in clinic on 8/27. Urine culture drawn at that time  - Continue hyoscyamine PRN  -Urine culture with providencia X2 species; both sensitive to rocephin and  levofloxacin; will need 14 d of therapy to be completed with levofloxacin D #8/14    Hyponatremia  - Etiology likely hypovolemic, though volume exam is equivocal, and he has risk factors for SIADH  - patient was resumed on HCTZ in 6/2019 due to poor BP control; he is still taking HCTZ. Also has a history of MDD but without any antidepressants listed that would place him at risk for SIADH  -Na essentially unchanged with fluid restriction and urine concentrated  - Gabi low not c/w SIADH  - suspect HCTZ and volume contraction as source of low Na currently and improved with IVF beginning 8/28; avoid overcorrection  -repeat IVF on 8/30 due to persistent N at 129 and poor po intake with slow improvement  9/2: large UOP with galvan; repeat IVF  -Continue to monitor with daily BMP    Hypochloremia - resolved  - Seems to favor hypovolemic etiology  - Already given NS as above  - BMP to trend     Hypokalemia - resolved  - Ordered 60 mEq on admit  - improved with repeat supplementation    Hypophosphatemia -resolved  Replete with IV supplement as oral makes patient have N/V    Hypomagnesemia  Replete as indicated for goal Mg of 2      CKD (chronic kidney disease) stage 3, GFR 30-59 ml/min  Estimated Creatinine Clearance: 47.5 mL/min (based on SCr of 1.1 mg/dL).  - Stable  - sCr at baseline on admission  - Will follow with BMP as above, also strict I/Os and daily weights     Acquired hypothyroidism  - TSH normal on 8/27  - Continue home levothyroxine     Gastroesophageal reflux disease without esophagitis  - Stable by symptoms  - Continue protonix  -he denies chronic nausea symptoms are indicative of reflux; continue zofran prn     Hyperlipidemia  - Continue home atorvastatin     Essential hypertension  - Stable  - discontinuing HCTZ and holding losartan until his electrolytes are sorted out  - Continue metoprolol and will resume losartan for uncontrolled BP      Diet: Diet Adult Regular (IDDSI Level 7) [facility]   GI PPx:  pantoprazole  DVT PPx:   VTE Risk Mitigation (From admission, onward)        Ordered     Place sequential compression device  Until discontinued      08/27/19 2331     IP VTE HIGH RISK PATIENT  Once      08/27/19 2306        L/D/A: PIV; suprapubic catheter  Wounds: none    Goals of Care: curative; full code    Discharge plan: OSNF    Alin Talbert MD  Staff Hospitalist

## 2019-09-03 NOTE — PLAN OF CARE
Problem: SLP Goal  Goal: SLP Goal   Swallow eval completed. Pt safe for regular diet and thin liquids. Pt should be upright for all meals.     SYLVIA Blake, CCC-SLP  9/3/2019

## 2019-09-03 NOTE — PLAN OF CARE
09/03/19 1319   Discharge Reassessment   Assessment Type Discharge Planning Reassessment   Provided patient/caregiver education on the expected discharge date and the discharge plan Yes   Discharge Plan A Skilled Nursing Facility   Discharge Plan B Home Health;Home   DME Needed Upon Discharge  none   Anticipated Discharge Disposition SNF   Can the patient answer the patient profile reliably? Yes, cognitively intact   How does the patient rate their overall health at the present time? Fair     Patient agreeable to [facility] placement and consult in place.  PT/OT assigned to see patient today.  CM to continue to follow for ongoing discharge needs.

## 2019-09-03 NOTE — PLAN OF CARE
Problem: Adult Inpatient Plan of Care  Goal: Plan of Care Review  Outcome: Ongoing (interventions implemented as appropriate)  Pt awake and alert, able to make needs known.  Fair appetite for meals.  Suprapubic in place, draining yellow urine.   Soft BM's today.  Side rails up x 2, personal sitter at bedside.

## 2019-09-03 NOTE — PT/OT/SLP EVAL
Speech Language Pathology Evaluation  Bedside Swallow/Discharge Note    Patient Name:  Chucho Prado   MRN:  [id number]  Admitting Diagnosis: Community acquired pneumonia    Recommendations:                 General Recommendations:  none  Diet recommendations:  Regular, Thin   Aspiration Precautions: Avoid talking while eating, HOB to 90 degrees and Standard aspiration precautions   General Precautions: Standard, aspiration, fall  Communication strategies:  none    History:     Past Medical History:   Diagnosis Date    Acquired hypothyroidism 7/30/2013    Arthritis     Blood transfusion     before 2005 - whe had gangrenous gall bladder    Cataract     Chronic back pain 12/4/2018    - Takes Percocet 5-325 at home - Can continue current abdominal pain control with Dilaudid 0.5 mg IV Q3H prn     CKD (chronic kidney disease) stage 3, GFR 30-59 ml/min     Compression fracture of lumbar vertebra     Depression     Dyslipidemia     Essential hypertension 7/30/2013    Gastroesophageal reflux disease without esophagitis 12/4/2018    - continue home Prilosec    General anesthetics causing adverse effect in therapeutic use     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    GERD (gastroesophageal reflux disease)     History of postoperative delirium 12/4/2018    - Patient reports 1 week history of post-op delirium 7/2018 - Monitor electrolytes, UA, and urine cx - Delirium precautions  - Can use Seroquel 25 mg QHS prn     Hypertension     Hyponatremia 10/23/2017    Impaired mobility and ADLs 6/28/2018    Neurogenic bladder 12/4/2018    Sleep disorder 12/4/2018    - continue Trazodone QHS    Thyroid disease     UTI (urinary tract infection)        Past Surgical History:   Procedure Laterality Date    BLOCK-NERVE Left 10/26/2017    Performed by [facility] Surgeon at [facility]    CHOLECYSTECTOMY      CYSTOSCOPY WITH RETROGRADE PYELOGRAM Bilateral 1/27/2017    Performed by Chaitanya Taylor Jr., MD at [facility] OR [facility]  "FLR    EGD (ESOPHAGOGASTRODUODENOSCOPY) N/A 8/2/2013    Performed by Nagi Talbert MD at [facility] ENDO (2ND FLR)    EYE SURGERY Right     IOL    HERNIA REPAIR      INSERTION-CATHETER-SUPRAPUBIC N/A 6/23/2017    Performed by Chaitanya Taylor Jr[facility] MD at [facility] OR 2ND FLR    INSERTION-INTRAOCULAR LENS (IOL) Left 5/28/2018    Performed by [facility]quez [facility] at [facility] OR    INSERTION-INTRAOCULAR LENS (IOL) Right 2/19/2018    Performed by [facility]quez MD at [facility] OR    JOINT REPLACEMENT      KYPHOPLASTY L3 N/A 12/27/2016    Performed by Donavan Martinez MD at [facility] OR 2ND FLR    LAMINECTOMY  12/27/2016    L2-L4    LAMINECTOMY-MINIMALLY INVASIVE L2,3 and L3,4; globus, neuromonitoring Right 12/27/2016    Performed by Donavan Martinez MD at [facility] OR 2ND FLR    LUMBAR LAMINECTOMY  12/2016    PARATHYROIDECTOMY      PHACOEMULSIFICATION-ASPIRATION-CATARACT Left 5/28/2018    Performed by [facility]zaquez MD at [facility] OR    PHACOEMULSIFICATION-ASPIRATION-CATARACT Right 2/19/2018    Performed by Trinity Vazquez MD at [facility] OR    REPAIR, HERNIA, INCISIONAL, INCARCERATED, WITHOUT HISTORY OF PRIOR REPAIR N/A 12/5/2018    Performed by Steve Valentin MD at [facility] OR 2ND FLR    REPAIR, HERNIA, INGUINAL, WITHOUT HISTORY OF PRIOR REPAIR, AGE 5 YEARS OR OLDER Right 10/9/2013    Performed by Daron Arthur MD at [facility] OR 2ND FLR    REPAIR, HERNIA, VENTRAL, INCARCERATED, WITHOUT HISTORY OF PRIOR REPAIR N/A 6/20/2018    Performed by Guilherme Ordoñez MD at [facility] OR 2ND FLR    REPLACEMENT-KNEE-TOTAL Left 10/25/2017    Performed by [facility] at [facility] OR 2ND FLR    TOTAL KNEE ARTHROPLASTY Bilateral     TOTAL KNEE ARTHROPLASTY Left 10/25/2017    TKR       Social History: Patient lives with alone with caretakers    Prior diet:regular/thin      Subjective     " Just something Dr. Garcia made up" when asked about issues with swallowing.   Patient goals: to go home    Pain/Comfort:  · Pain Rating 1: 0/10  · Pain Rating " Post-Intervention 1: 0/10    Objective:     Oral Musculature Evaluation  · Oral Musculature: WFL  · Dentition: present and adequate  · Mucosal Quality: good  · Mandibular Strength and Mobility: WFL  · Oral Labial Strength and Mobility: WFL  · Lingual Strength and Mobility: WFL  · Volitional Cough: adequate  · Voice Prior to PO Intake: clear    Bedside Swallow Eval:   Consistencies Assessed:  · Thin liquids cup and straw sips  · Solids myles, potatoes and pancakes     Oral Phase:   · WFL    Pharyngeal Phase:   · no overt clinical signs/symptoms of pharyngeal dysphagia    Compensatory Strategies  · None    Treatment: Repositioned pt in the bed prior to session. Observed pt with breakfast. Pt denied any difficulty with swallowing. He reported occasional coughing because of his pneumonia but denied any difficulty with swallowing. No overt s/s of aspiration noted during breakfast. Observed pt taking several pills at one time and drinking thin liquids via a straw. Reviewed s/s of aspiration and need to be upright for all meals. Pt expressed understanding. No further speech tx needed. White board updated.     Assessment:     Chucho Prado is a 85 y.o. 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  He presents with no further speech tx needs.    Goals:   Multidisciplinary Problems     SLP Goals        Problem: SLP Goal    Goal Priority Disciplines Outcome   SLP Goal     SLP                    Plan:     · Patient to be seen:      · Plan of Care expires:     · Plan of Care reviewed with:  patient   · SLP Follow-Up:  No       Discharge recommendations:  (no further speech tx)       Time Tracking:     SLP Treatment Date:   09/03/19  Speech Start Time:  0842  Speech Stop Time:  0859     Speech Total Time (min):  17 min    Billable Minutes: Eval Swallow and Oral Function 17    SYLVIA Blake, CCC-SLP  09/03/2019

## 2019-09-03 NOTE — PLAN OF CARE
Problem: Adult Inpatient Plan of Care  Goal: Plan of Care Review  Outcome: Ongoing (interventions implemented as appropriate)  Patient completed his liter of iv flds this am. He had tylenol for c/o back pain. Apparent relief as patient was asleep one hour later. Suprapubic cath output this am was 1500ml. Patient requested to unplug the scd's at night because they bother him while he is trying to sleep.

## 2019-09-03 NOTE — PROGRESS NOTES
"[facility] Hospital Medicine  Progress Note    Team: [facility]   Attending MD: Birgit Garcia MD  Admit Date: 8/27/2019  TISHA 8/31/2019  Length of Stay:  LOS: 6 days   Code status: Full Code    Principal Problem: Community acquired pneumonia    Interval hx: increased cough noted after breakfast; feeling nasal congestion and flonase and zyrtec to be started; no further diarrhea; thinks he wants to go home and not SNF    ROS:  Constitutional: no fever or chills  Respiratory: +cough, not productive; denies shortness of breath  Cardiovascular: no chest pain or palpitations  Gastrointestinal: no nausea or vomiting, no abdominal pain; last BM: 8/31  Genitourinary: no gross hematuria or dysuria  Integument/Breast: no rash or pruritis  Musculoskeletal: no arthralgias or myalgias  Neurological: no seizures or tremors  Behavioral/Psych: no auditory or visual hallucinations      Physical Exam  Temp:  [97.7 °F (36.5 °C)-98.2 °F (36.8 °C)] 98.2 °F (36.8 °C)  Pulse:  [58-76] 66  Resp:  [15-24] 23  SpO2:  [91 %-97 %] 93 %  BP: (122-144)/(61-77) 143/77      Intake/Output Summary (Last 24 hours) at 9/2/2019 2158  Last data filed at 9/2/2019 1230  Gross per 24 hour   Intake 360 ml   Output 750 ml   Net -390 ml       Wt Readings from Last 1 Encounters:   08/29/19 0400 76.1 kg (167 lb 12.3 oz)   08/28/19 1111 76.2 kg (168 lb)   08/28/19 0157 76.3 kg (168 lb 3.4 oz)   08/27/19 1521 88.5 kg (195 lb)        Estimated body mass index is 25.51 kg/m² as calculated from the following:    Height as of this encounter: 5' 8" (1.727 m).    Weight as of this encounter: 76.1 kg (167 lb 12.3 oz).    General: well developed, well nourished, no distress  Lungs:no wheezing bilaterally and diminished BS and normal respiratory effort.  Cardiovascular: Heart: regular rate and rhythm, S1, S2 normal, 2/6 sytolic murmur, no click, rub or gallop. Chest Wall: no tenderness. Extremities: no cyanosis, no edema, no clubbing; " extremities tender to palpation  Abdomen/Rectal: Abdomen: soft, non-tender non-distended; bowel sounds normal; no masses; + suprapubic catheter  Skin: Skin color, texture, turgor normal. No rashes or lesions.  Neurologic: generalized weakness. No focal numbness or weakness.  Psych/Behavioral:  Alert and oriented, appropriate affect.      Recent Results (from the past 24 hour(s))   Magnesium    Collection Time: 09/02/19  5:14 AM   Result Value Ref Range    Magnesium 1.5 (L) 1.6 - 2.6 mg/dL   CBC with Automated Differential    Collection Time: 09/02/19  5:14 AM   Result Value Ref Range    WBC 6.07 3.90 - 12.70 K/uL    RBC 4.10 (L) 4.60 - 6.20 M/uL    Hemoglobin 11.4 (L) 14.0 - 18.0 g/dL    Hematocrit 34.7 (L) 40.0 - 54.0 %    Mean Corpuscular Volume 85 82 - 98 fL    Mean Corpuscular Hemoglobin 27.8 27.0 - 31.0 pg    Mean Corpuscular Hemoglobin Conc 32.9 32.0 - 36.0 g/dL    RDW 13.4 11.5 - 14.5 %    Platelets 236 150 - 350 K/uL    MPV 9.2 9.2 - 12.9 fL    Immature Granulocytes 0.7 (H) 0.0 - 0.5 %    Gran # (ANC) 3.7 1.8 - 7.7 K/uL    Immature Grans (Abs) 0.04 0.00 - 0.04 K/uL    Lymph # 1.4 1.0 - 4.8 K/uL    Mono # 0.7 0.3 - 1.0 K/uL    Eos # 0.2 0.0 - 0.5 K/uL    Baso # 0.05 0.00 - 0.20 K/uL    nRBC 0 0 /100 WBC    Gran% 60.1 38.0 - 73.0 %    Lymph% 23.4 18.0 - 48.0 %    Mono% 11.0 4.0 - 15.0 %    Eosinophil% 4.0 0.0 - 8.0 %    Basophil% 0.8 0.0 - 1.9 %    Differential Method Automated    Basic Metabolic Panel (BMP)    Collection Time: 09/02/19  5:14 AM   Result Value Ref Range    Sodium 130 (L) 136 - 145 mmol/L    Potassium 3.8 3.5 - 5.1 mmol/L    Chloride 96 95 - 110 mmol/L    CO2 23 23 - 29 mmol/L    Glucose 89 70 - 110 mg/dL    BUN, Bld 13 8 - 23 mg/dL    Creatinine 1.1 0.5 - 1.4 mg/dL    Calcium 8.6 (L) 8.7 - 10.5 mg/dL    Anion Gap 11 8 - 16 mmol/L    eGFR if African American >60.0 >60 mL/min/1.73 m^2    eGFR if non African American >60.0 >60 mL/min/1.73 m^2   Phosphorus    Collection Time: 09/02/19  5:14 AM    Result Value Ref Range    Phosphorus 3.2 2.7 - 4.5 mg/dL   Brain natriuretic peptide    Collection Time: 09/02/19  9:44 AM   Result Value Ref Range     (H) 0 - 99 pg/mL   Osmolality, Serum    Collection Time: 09/02/19  9:44 AM   Result Value Ref Range    Osmolality 268 (L) 280 - 300 mOsm/kg   Uric acid    Collection Time: 09/02/19  9:44 AM   Result Value Ref Range    Uric Acid 3.5 3.4 - 7.0 mg/dL   Sodium, urine, random    Collection Time: 09/02/19  4:34 PM   Result Value Ref Range    Sodium Urine Random 55 20 - 250 mmol/L   Osmolality, urine    Collection Time: 09/02/19  4:34 PM   Result Value Ref Range    Osmolality, Ur 387 50 - 1200 mOsm/kg       Recent Labs   Lab 08/31/19  0649 09/01/19  0306 09/02/19  0514   WBC 6.03 6.98 6.07   HGB 11.3* 11.0* 11.4*   HCT 34.2* 34.1* 34.7*    217 236       Recent Labs   Lab 08/31/19 0649 09/01/19  0306 09/02/19  0514   * 131* 130*   K 4.4 3.9 3.8   CL 96 95 96   CO2 25 25 23   BUN 11 11 13   CREATININE 1.0 1.0 1.1   GLU 89 89 89   CALCIUM 8.5* 8.9 8.6*   MG 1.6 1.6 1.5*   PHOS 2.9 2.7 3.2       Recent Labs   Lab 08/27/19  1819   ALKPHOS 79   ALT 11   AST 36   ALBUMIN 4.0   PROT 8.1   BILITOT 1.0         Hemoglobin A1C   Date Value Ref Range Status   04/04/2019 5.4 4.0 - 5.6 % Final     Comment:     ADA Screening Guidelines:  5.7-6.4%  Consistent with prediabetes  >or=6.5%  Consistent with diabetes  High levels of fetal hemoglobin interfere with the HbA1C  assay. Heterozygous hemoglobin variants (HbS, HgC, etc)do  not significantly interfere with this assay.   However, presence of multiple variants may affect accuracy.         Scheduled Meds:   amLODIPine  5 mg Oral Daily    artificial tears  2 drop Both Eyes TID    atorvastatin  20 mg Oral Daily    cetirizine  5 mg Oral QHS    fluticasone propionate  2 spray Each Nostril Daily    gabapentin  800 mg Oral BID    guaiFENesin  600 mg Oral BID    ipratropium  0.5 mg Nebulization Q4H WAKE     levalbuterol  0.63 mg Nebulization Q8H WA    levoFLOXacin  750 mg Oral Daily    levothyroxine  50 mcg Oral Before breakfast    losartan  50 mg Oral Daily    metoprolol succinate  25 mg Oral Daily    pantoprazole  40 mg Oral Daily       Continuous Infusions:   sodium chloride 0.9% 100 mL/hr at 09/02/19 1523       As Needed: acetaminophen, albuterol, benzonatate, hyoscyamine, loperamide, ondansetron, promethazine (PHENERGAN) IVPB, ramelteon, sodium chloride 0.9%, traZODone    Active Hospital Problems    Diagnosis  POA    *Community acquired pneumonia [J18.9]  Yes    Hypocalcemia [E83.51]  Yes    Infection and inflammatory reaction due to other urinary catheter, subsequent encounter [T83.518D]  Yes    Hypoxia [R09.02]  Yes    Gastroesophageal reflux disease without esophagitis [K21.9]  Yes     - continue home Prilosec      Neurogenic bladder [N31.9]  Yes    Hypokalemia [E87.6]  Yes    Hyponatremia [E87.1]  Yes    Suprapubic catheter [Z93.59]  Not Applicable    CKD (chronic kidney disease) stage 3, GFR 30-59 ml/min [N18.3]  Yes    Essential hypertension [I10]  Yes    Acquired hypothyroidism [E03.9]  Yes    Hyperlipidemia [E78.5]  Yes      Resolved Hospital Problems   No resolved problems to display.       Overview:    Assessment and Plan    Community acquired pneumonia  Hypoxia  Wheezing  - Treating as CAP in abundance of caution. Also on the differential is viral upper respiratory tract infection and COPD, though he does not carry a diagnosis of the latter  - chest CT with probable pneumonia in LLL  - Agree with CTX, changed azithromycin -> doxycycline given QTc, particularly in light of his electrolyte abnormalities; continue current antibiotics  -increase neb frequency to q 4 hr WA due to persistent wheezing with improvement  -mucinex and flutter valve therapy added to treat cough  - blood cultures NGTD x 2  - IS for atelectasis  - Wean O2 as tolerated, currently on 2L  - Would benefit from PFTs  upon discharge given distant but substantial smoking history  -completed 5 days of doxy  -diarrhea with neg C. Diff; change to levofloxacin to complete therapy; imodium prn  Have speech therapy evaluate swallow as he has had cough with eating on separate occasions     Hyponatremia  - Etiology likely hypovolemic, though volume exam is equivocal, and he has risk factors for SIADH  - patient was resumed on HCTZ in 6/2019 due to poor BP control; he is still taking HCTZ. Also has a history of MDD but without any antidepressants listed that would place him at risk for SIADH  -Na essentially unchanged with fluid restriction and urine concentrated  - Gabi low not c/w SIADH  - suspect HCTZ and volume contraction as source of low Na currently and improved with IVF beginning 8/28; avoid overcorrection  -repeat IVF on 8/30 due to persistent N at 129 and poor po intake with slow improvement  9/2: large UOP with galvan; repeat IVF    Hypochloremia - resolved  - Seems to favor hypovolemic etiology  - Already given NS as above  - BMP to trend     Hypokalemia  - Ordered 60 mEq for now (40 PO + 20 IV)  - improved with repeat supplementation  - will continue to monitor and replete to goal of 4 as indicated    Hypophosphatemia  Replete with IV supplement as oral makes patient have N/V     Hypocalcemia  Replete with IV Ca gluconate due to nausea with supplements for goal of 9    Hypomagnesemia  Replete as indicated for goal Mg of 2    Neurogenic bladder  Suprapubic catheter  Gross hematuria  Infection associated with indwelling urinary catheter  - Hemoglobin stable and gross hematuria not evident  - Seen by urology in clinic on 8/27. Urine culture pending.  - Continue hyoscyamine PRN  - SCDs for VTE PPX. Also holding home asa  - CBC daily to monitor  -Urine culture with providencia X2 species; both sensitive to rocephin and levofloxacin; will need 14 d of therapy with levofloxacin D#7/14     CKD (chronic kidney disease) stage 3, GFR 30-59  ml/min  Estimated Creatinine Clearance: 47.5 mL/min (based on SCr of 1.1 mg/dL).  - Stable  - sCr at baseline on admission  - Will follow with BMP as above, also strict I/Os and daily weights     Acquired hypothyroidism  - TSH normal on 8/27  - Continue home levothyroxine     Gastroesophageal reflux disease without esophagitis  - Stable by symptoms  - Continue protonix  -he denies chronic nausea symptoms are indicative of reflux; continue zofran prn     Hyperlipidemia  - Continue home atorvastatin     Essential hypertension  - Stable  - discontinuing HCTZ and holding losartan until his electrolytes are sorted out  - Continue metoprolol and will resume losartan for uncontrolled BP      Diet: Diet Adult Regular (IDDSI Level 7) [facility]   GI PPx: pantoprazole  DVT PPx:   VTE Risk Mitigation (From admission, onward)        Ordered     Place sequential compression device  Until discontinued      08/27/19 2331     IP VTE HIGH RISK PATIENT  Once      08/27/19 2306        L/D/A: PIV; suprapubic catheter  Wounds: none    Goals of Care: curative; full code    Discharge plan: SNF - may decide to go home with sitters when medically ready    Birgit Garcia MD  Staff Hospitalist  [phone]

## 2019-09-04 LAB
ANION GAP SERPL CALC-SCNC: 8 MMOL/L (ref 8–16)
BASOPHILS # BLD AUTO: 0.04 K/UL (ref 0–0.2)
BASOPHILS NFR BLD: 0.6 % (ref 0–1.9)
BUN SERPL-MCNC: 19 MG/DL (ref 8–23)
CALCIUM SERPL-MCNC: 8.3 MG/DL (ref 8.7–10.5)
CHLORIDE SERPL-SCNC: 98 MMOL/L (ref 95–110)
CO2 SERPL-SCNC: 25 MMOL/L (ref 23–29)
CREAT SERPL-MCNC: 1.3 MG/DL (ref 0.5–1.4)
DIFFERENTIAL METHOD: ABNORMAL
EOSINOPHIL # BLD AUTO: 0.1 K/UL (ref 0–0.5)
EOSINOPHIL NFR BLD: 1.8 % (ref 0–8)
ERYTHROCYTE [DISTWIDTH] IN BLOOD BY AUTOMATED COUNT: 13.5 % (ref 11.5–14.5)
EST. GFR  (AFRICAN AMERICAN): 57.5 ML/MIN/1.73 M^2
EST. GFR  (NON AFRICAN AMERICAN): 49.8 ML/MIN/1.73 M^2
GLUCOSE SERPL-MCNC: 88 MG/DL (ref 70–110)
HCT VFR BLD AUTO: 32.5 % (ref 40–54)
HGB BLD-MCNC: 10.4 G/DL (ref 14–18)
IMM GRANULOCYTES # BLD AUTO: 0.04 K/UL (ref 0–0.04)
IMM GRANULOCYTES NFR BLD AUTO: 0.6 % (ref 0–0.5)
LYMPHOCYTES # BLD AUTO: 1.4 K/UL (ref 1–4.8)
LYMPHOCYTES NFR BLD: 22.7 % (ref 18–48)
MAGNESIUM SERPL-MCNC: 1.8 MG/DL (ref 1.6–2.6)
MCH RBC QN AUTO: 28.3 PG (ref 27–31)
MCHC RBC AUTO-ENTMCNC: 32 G/DL (ref 32–36)
MCV RBC AUTO: 88 FL (ref 82–98)
MONOCYTES # BLD AUTO: 0.7 K/UL (ref 0.3–1)
MONOCYTES NFR BLD: 10.9 % (ref 4–15)
NEUTROPHILS # BLD AUTO: 4 K/UL (ref 1.8–7.7)
NEUTROPHILS NFR BLD: 63.4 % (ref 38–73)
NRBC BLD-RTO: 0 /100 WBC
PHOSPHATE SERPL-MCNC: 3.6 MG/DL (ref 2.7–4.5)
PLATELET # BLD AUTO: 235 K/UL (ref 150–350)
PMV BLD AUTO: 9.4 FL (ref 9.2–12.9)
POTASSIUM SERPL-SCNC: 4 MMOL/L (ref 3.5–5.1)
RBC # BLD AUTO: 3.68 M/UL (ref 4.6–6.2)
SODIUM SERPL-SCNC: 131 MMOL/L (ref 136–145)
WBC # BLD AUTO: 6.25 K/UL (ref 3.9–12.7)

## 2019-09-04 PROCEDURE — 99900035 HC TECH TIME PER 15 MIN (STAT)

## 2019-09-04 PROCEDURE — 25000003 PHARM REV CODE 250: Performed by: INTERNAL MEDICINE

## 2019-09-04 PROCEDURE — 97530 THERAPEUTIC ACTIVITIES: CPT

## 2019-09-04 PROCEDURE — 94640 AIRWAY INHALATION TREATMENT: CPT

## 2019-09-04 PROCEDURE — 25000003 PHARM REV CODE 250: Performed by: HOSPITALIST

## 2019-09-04 PROCEDURE — 11000001 HC ACUTE MED/SURG PRIVATE ROOM

## 2019-09-04 PROCEDURE — 99231 SBSQ HOSP IP/OBS SF/LOW 25: CPT | Mod: ,,, | Performed by: HOSPITALIST

## 2019-09-04 PROCEDURE — 94664 DEMO&/EVAL PT USE INHALER: CPT

## 2019-09-04 PROCEDURE — 99231 PR SUBSEQUENT HOSPITAL CARE,LEVL I: ICD-10-PCS | Mod: ,,, | Performed by: HOSPITALIST

## 2019-09-04 PROCEDURE — 83735 ASSAY OF MAGNESIUM: CPT

## 2019-09-04 PROCEDURE — 36415 COLL VENOUS BLD VENIPUNCTURE: CPT

## 2019-09-04 PROCEDURE — 97535 SELF CARE MNGMENT TRAINING: CPT

## 2019-09-04 PROCEDURE — 80048 BASIC METABOLIC PNL TOTAL CA: CPT

## 2019-09-04 PROCEDURE — 25000242 PHARM REV CODE 250 ALT 637 W/ HCPCS: Performed by: INTERNAL MEDICINE

## 2019-09-04 PROCEDURE — 25000003 PHARM REV CODE 250: Performed by: PHYSICIAN ASSISTANT

## 2019-09-04 PROCEDURE — 85025 COMPLETE CBC W/AUTO DIFF WBC: CPT

## 2019-09-04 PROCEDURE — 97116 GAIT TRAINING THERAPY: CPT

## 2019-09-04 PROCEDURE — 84100 ASSAY OF PHOSPHORUS: CPT

## 2019-09-04 PROCEDURE — 94761 N-INVAS EAR/PLS OXIMETRY MLT: CPT

## 2019-09-04 RX ORDER — LEVOFLOXACIN 500 MG/1
500 TABLET, FILM COATED ORAL DAILY
Status: DISCONTINUED | OUTPATIENT
Start: 2019-09-04 | End: 2019-09-05 | Stop reason: HOSPADM

## 2019-09-04 RX ORDER — LEVOFLOXACIN 500 MG/1
500 TABLET, FILM COATED ORAL DAILY
Start: 2019-09-05 | End: 2019-09-10

## 2019-09-04 RX ORDER — ONDANSETRON 2 MG/ML
4 INJECTION INTRAMUSCULAR; INTRAVENOUS EVERY 6 HOURS PRN
Status: DISCONTINUED | OUTPATIENT
Start: 2019-09-04 | End: 2019-09-05 | Stop reason: HOSPADM

## 2019-09-04 RX ADMIN — IPRATROPIUM BROMIDE 0.5 MG: 0.5 SOLUTION RESPIRATORY (INHALATION) at 07:09

## 2019-09-04 RX ADMIN — GABAPENTIN 800 MG: 400 CAPSULE ORAL at 09:09

## 2019-09-04 RX ADMIN — BENZONATATE 100 MG: 100 CAPSULE ORAL at 09:09

## 2019-09-04 RX ADMIN — BENZONATATE 100 MG: 100 CAPSULE ORAL at 05:09

## 2019-09-04 RX ADMIN — LEVOTHYROXINE SODIUM 50 MCG: 50 TABLET ORAL at 06:09

## 2019-09-04 RX ADMIN — PANTOPRAZOLE SODIUM 40 MG: 40 TABLET, DELAYED RELEASE ORAL at 09:09

## 2019-09-04 RX ADMIN — GUAIFENESIN 600 MG: 600 TABLET, EXTENDED RELEASE ORAL at 09:09

## 2019-09-04 RX ADMIN — ATORVASTATIN CALCIUM 20 MG: 20 TABLET, FILM COATED ORAL at 09:09

## 2019-09-04 RX ADMIN — LEVALBUTEROL HYDROCHLORIDE 0.63 MG: 0.63 SOLUTION RESPIRATORY (INHALATION) at 05:09

## 2019-09-04 RX ADMIN — METOPROLOL SUCCINATE 25 MG: 25 TABLET, EXTENDED RELEASE ORAL at 09:09

## 2019-09-04 RX ADMIN — HYPROMELLOSE 2910 2 DROP: 5 SOLUTION OPHTHALMIC at 09:09

## 2019-09-04 RX ADMIN — LEVOFLOXACIN 500 MG: 500 TABLET, FILM COATED ORAL at 09:09

## 2019-09-04 RX ADMIN — CETIRIZINE HYDROCHLORIDE 5 MG: 5 TABLET ORAL at 09:09

## 2019-09-04 RX ADMIN — IPRATROPIUM BROMIDE 0.5 MG: 0.5 SOLUTION RESPIRATORY (INHALATION) at 05:09

## 2019-09-04 RX ADMIN — IPRATROPIUM BROMIDE 0.5 MG: 0.5 SOLUTION RESPIRATORY (INHALATION) at 12:09

## 2019-09-04 RX ADMIN — CALCIUM CARBONATE (ANTACID) CHEW TAB 500 MG 500 MG: 500 CHEW TAB at 09:09

## 2019-09-04 RX ADMIN — LOSARTAN POTASSIUM 50 MG: 50 TABLET, FILM COATED ORAL at 09:09

## 2019-09-04 RX ADMIN — AMLODIPINE BESYLATE 5 MG: 5 TABLET ORAL at 09:09

## 2019-09-04 RX ADMIN — FLUTICASONE PROPIONATE 100 MCG: 50 SPRAY, METERED NASAL at 09:09

## 2019-09-04 RX ADMIN — HYPROMELLOSE 2910 2 DROP: 5 SOLUTION OPHTHALMIC at 03:09

## 2019-09-04 RX ADMIN — LEVALBUTEROL HYDROCHLORIDE 0.63 MG: 0.63 SOLUTION RESPIRATORY (INHALATION) at 07:09

## 2019-09-04 NOTE — PLAN OF CARE
Nursing home is reviewing pt referral.   SNF/NH are needed  SW will follow up with NH on 9/5/19.  SW will continue to assist with needs as indicated.       Hillary Hernandez LMSW  [facility]  [id number]

## 2019-09-04 NOTE — PT/OT/SLP PROGRESS
Occupational Therapy   Treatment    Name: Chucho Prado  MRN: [id number]  Admitting Diagnosis:  Community acquired pneumonia       Recommendations:     Discharge Recommendations: nursing facility, skilled(due to pt lives alone outside of hours sitter is there. )  Discharge Equipment Recommendations:  none  Barriers to discharge:  None    Assessment:     Chucho Prado is a 85 y.o. male with a medical diagnosis of Community acquired pneumonia.  He presents with decline in ADLs and functional mobility.   Pt would benefit from skilled OT services in order to maximize independence with ADLs and facilitate safe discharge.. Performance deficits affecting function are weakness, impaired functional mobilty, impaired balance, impaired self care skills, gait instability, decreased lower extremity function, decreased upper extremity function, pain.     Rehab Prognosis:  Good; patient would benefit from acute skilled OT services to address these deficits and reach maximum level of function.       Plan:     Patient to be seen 3 x/week to address the above listed problems via self-care/home management, therapeutic activities, therapeutic exercises  · Plan of Care Expires: 09/28/19  · Plan of Care Reviewed with: patient    Subjective     Pain/Comfort:  · Pain Rating 1: 0/10  · Location 1: back  · Pain Addressed 1: Reposition, Distraction, Cessation of Activity  · Pain Rating Post-Intervention 1: 0/10    Objective:     Communicated with: RN prior to session.  Patient found supine with galvan catheter, telemetry, pulse ox (continuous) upon OT entry to room.    General Precautions: Standard, aspiration, fall   Orthopedic Precautions:N/A   Braces: N/A     Occupational Performance:     Pt declined mobility portion of evaluation. He had just finished with physical therapy and they did EOB, sit to stands and gait training.     Activities of Daily Living:  -Pt initially resistant to 2nd therapy session for today (had PT 2 hours prior  to my entry)  -I discussed his goals on POC and d/c recommendations for SNF with him.  Pt feels he is at his baseline but does live alone outside of the hours sitter is present. He feels like he does not want to work on dressing, bathing or grooming since he has sitters present to assist with these tasks.  He explained that he is w/c bound at home but does walk short distances with HHPT.  I explained to him he needs to be able to toilet independently, get in/out bed and transfer himself from bed to chair in order to live home alone safely during the hours sitters are not present. Pt is agreeable to revised goals.      Goals revised on this date. Unable to get pt to participate with revised goals due to fatigue Will follow up.       [facility] 6 Click ADL: 15    Patient left supine with all lines intactEducation:       GOALS:   Multidisciplinary Problems     Occupational Therapy Goals        Problem: Occupational Therapy Goal    Goal Priority Disciplines Outcome Interventions   Occupational Therapy Goal     OT, PT/OT Ongoing (interventions implemented as appropriate)    Description:  Goals to be met by: 9/28/2019       Patient will increase functional independence with ADLs by performing:    UE Dressing with Set-up Assistance. D/c per pt request  Toileting from toilet with Set-up Assistance for hygiene and clothing management.   Bathing from  sitting at sink with Set-up Assistance. D/c per pt request  Rolling to Bilateral with Modified [place].   Supine to sit with Modified [place].  Toilet transfer to toilet with Contact Guard Assistance.                       Time Tracking:     OT Date of Treatment: 09/04/19  OT Start Time: 1409  OT Stop Time: 1418  OT Total Time (min): 9 min    Billable Minutes:Self Care/Home Management 9    DINO Hope  9/4/2019

## 2019-09-04 NOTE — NURSING
Pt awake and alert, remains free of falls this shift. Able to make needs known. Siderails up x 2, private sitter at bedside, fair appetite for meals. HAPI checks in place. Awaiting SNF placement. Wctm.

## 2019-09-04 NOTE — PLAN OF CARE
Problem: Occupational Therapy Goal  Goal: Occupational Therapy Goal  Goals to be met by: 9/28/2019       Patient will increase functional independence with ADLs by performing:    UE Dressing with Set-up Assistance. D/c per pt request  Toileting from toilet with Set-up Assistance for hygiene and clothing management.   Bathing from  sitting at sink with Set-up Assistance. D/c per pt request  Rolling to Bilateral with Modified [place].   Supine to sit with Modified [place].  Toilet transfer to toilet with Contact Guard Assistance.     Outcome: Ongoing (interventions implemented as appropriate)  Goals remain appropriate, continue with OT POC.    DINO Antoine  9/4/2019  Rehab Services

## 2019-09-04 NOTE — PLAN OF CARE
[facility]     Department of Hospital Medicine     [address] [place] [address]     [phone] after hours  [phone] fax       NURSING HOME ORDERS    09/04/2019    Admit to Nursing Home:  Skilled Bed                   Diagnoses:  Active Hospital Problems    Diagnosis  POA    *Community acquired pneumonia [J18.9]  Yes    Hypocalcemia [E83.51]  Yes    Infection and inflammatory reaction due to other urinary catheter, subsequent encounter [T83.518D]  Yes    Hypoxia [R09.02]  Yes    Gastroesophageal reflux disease without esophagitis [K21.9]  Yes     - continue home Prilosec      Neurogenic bladder [N31.9]  Yes    Hypokalemia [E87.6]  Yes    Hyponatremia [E87.1]  Yes    Suprapubic catheter [Z93.59]  Not Applicable    CKD (chronic kidney disease) stage 3, GFR 30-59 ml/min [N18.3]  Yes    Essential hypertension [I10]  Yes    Acquired hypothyroidism [E03.9]  Yes    Hyperlipidemia [E78.5]  Yes      Resolved Hospital Problems   No resolved problems to display.       Patient is homebound due to:  Community acquired pneumonia    Allergies:Review of patient's allergies indicates:  No Known Allergies    Vitals: Every shift    Diet: Regular    Acitivities: - Up in a chair each morning as tolerated. PT/OT to evaluate and advance as tolerated.       LABS:  None    Nursing Precautions: -  Upright 90 degrees befor during and after meals         - Fall precautions per nursing home protocol   - Seizure precaution per nursing home protocol    CONSULTS:     Physical Therapy to evaluate and treat     Occupational Therapy to evaluate and treat    MISCELLANEOUS CARE:     Mahajan Care: Empty Mahajan bag every shift.  Change Mahajan every month     Routine Skin for Bedridden Patients:  Apply moisture barrier cream to all    skin folds and wet areas in perineal area daily and after baths and                           all bowel movements.            Medications: Discontinue all  "previous medication orders, if any. See new list below.     Johan Melchor K   Home Medication Instructions ESTIVEN:[id number]    Printed on:09/04/19 7103   Medication Information                      amLODIPine (NORVASC) 5 MG tablet  Take 1 tablet (5 mg total) by mouth once daily.             aspirin (ECOTRIN) 81 MG EC tablet  Take 1 tablet (81 mg total) by mouth once daily.             atorvastatin (LIPITOR) 20 MG tablet  Take 1 tablet (20 mg total) by mouth once daily.             gabapentin (NEURONTIN) 400 MG capsule  Take 2 capsules (800 mg total) by mouth 2 (two) times daily.             hyoscyamine (LEVSIN/SL) 0.125 mg Subl  Take 1 tablet (0.125 mg total) by mouth every 6 (six) hours as needed (bladder spasms).             levoFLOXacin (LEVAQUIN) 500 MG tablet  Take 1 tablet (500 mg total) by mouth once daily for 5 days END DATE 9/9/19             levothyroxine (SYNTHROID) 50 MCG tablet  GIVE "PENNY" 1 TABLET BY MOUTH ONCE DAILY.             losartan (COZAAR) 100 MG tablet  Take 1 tablet (100 mg total) by mouth once daily.             metoprolol succinate (TOPROL-XL) 25 MG 24 hr tablet  Take 1 tablet (25 mg total) by mouth once daily.             omeprazole (PRILOSEC) 40 MG capsule  TAKE 1 CAPSULE(40 MG) BY MOUTH EVERY MORNING 30 MINUTES BEFORE EATING ON AN EMPTY STOMACH             polyethylene glycol (GLYCOLAX) 17 gram/dose powder  Take 17 g by mouth once daily.             traZODone (DESYREL) 50 MG tablet  Take 1 tablet (50 mg total) by mouth nightly.                       _________________________________  Alin Talbert MD  09/04/2019    "

## 2019-09-04 NOTE — PLAN OF CARE
[facility], [facility], and [facility]. SW will continue to assist with needs as indicated.    Hillary Hernandez LMSW  [facility]  [id number]     09/04/19 1118   Post-Acute Status   Post-Acute Authorization Placement   Post-Acute Placement Status Referrals Sent

## 2019-09-04 NOTE — PLAN OF CARE
Problem: Adult Inpatient Plan of Care  Goal: Plan of Care Review  Outcome: Ongoing (interventions implemented as appropriate)  Patient states he slept well during the night. Had sleeping. Benzonatate given this am for a dry non-productive cough.

## 2019-09-04 NOTE — PROGRESS NOTES
"[facility] Hospital Medicine  Progress Note    Team: [facility] HOSP MED O   Attending MD: Alin Talbert MD  Admit Date: 8/27/2019  TISHA 9/4/2019  Length of Stay:  LOS: 8 days   Code status: Full Code    Principal Problem: Community acquired pneumonia    Interval hx: Pt. Reports persistent productive cough. He denies any SOB, fevers, or chills. He states that he feels much better than when he was initially admitted.    ROS:  Constitutional: no fever or chills  Respiratory: +cough, not productive; denies shortness of breath  Cardiovascular: no chest pain or palpitations  Gastrointestinal: no nausea or vomiting, no abdominal pain; last BM: 8/31  Genitourinary: no gross hematuria or dysuria  Integument/Breast: no rash or pruritis  Musculoskeletal: no arthralgias or myalgias  Neurological: no seizures or tremors  Behavioral/Psych: no auditory or visual hallucinations      Physical Exam  Temp:  [97.6 °F (36.4 °C)-98.3 °F (36.8 °C)] 98 °F (36.7 °C)  Pulse:  [52-86] 69  Resp:  [14-21] 17  SpO2:  [93 %-96 %] 94 %  BP: (105-134)/(48-73) 134/73      Intake/Output Summary (Last 24 hours) at 9/4/2019 1214  Last data filed at 9/4/2019 0909  Gross per 24 hour   Intake 540 ml   Output 1600 ml   Net -1060 ml       Wt Readings from Last 1 Encounters:   08/29/19 0400 76.1 kg (167 lb 12.3 oz)   08/28/19 1111 76.2 kg (168 lb)   08/28/19 0157 76.3 kg (168 lb 3.4 oz)   08/27/19 1521 88.5 kg (195 lb)        Estimated body mass index is 25.51 kg/m² as calculated from the following:    Height as of this encounter: 5' 8" (1.727 m).    Weight as of this encounter: 76.1 kg (167 lb 12.3 oz).    General: well developed, well nourished, no distress  Lungs: Coarse breath sounds appreciated B/L, no wheezes or rales  Cardiovascular: Heart: regular rate and rhythm, S1, S2 normal, 2/6 sytolic murmur, no click, rub or gallop. Chest Wall: no tenderness. Extremities: no cyanosis, no edema, no clubbing; extremities tender to " palpation  Abdomen/Rectal: Abdomen: soft, non-tender non-distended; bowel sounds normal; no masses; + suprapubic catheter  Skin: Skin color, texture, turgor normal. No rashes or lesions.  Neurologic: generalized weakness. No focal numbness or weakness.  Psych/Behavioral:  Alert and oriented, appropriate affect.      Recent Results (from the past 24 hour(s))   Magnesium    Collection Time: 09/04/19  5:47 AM   Result Value Ref Range    Magnesium 1.8 1.6 - 2.6 mg/dL   CBC with Automated Differential    Collection Time: 09/04/19  5:47 AM   Result Value Ref Range    WBC 6.25 3.90 - 12.70 K/uL    RBC 3.68 (L) 4.60 - 6.20 M/uL    Hemoglobin 10.4 (L) 14.0 - 18.0 g/dL    Hematocrit 32.5 (L) 40.0 - 54.0 %    Mean Corpuscular Volume 88 82 - 98 fL    Mean Corpuscular Hemoglobin 28.3 27.0 - 31.0 pg    Mean Corpuscular Hemoglobin Conc 32.0 32.0 - 36.0 g/dL    RDW 13.5 11.5 - 14.5 %    Platelets 235 150 - 350 K/uL    MPV 9.4 9.2 - 12.9 fL    Immature Granulocytes 0.6 (H) 0.0 - 0.5 %    Gran # (ANC) 4.0 1.8 - 7.7 K/uL    Immature Grans (Abs) 0.04 0.00 - 0.04 K/uL    Lymph # 1.4 1.0 - 4.8 K/uL    Mono # 0.7 0.3 - 1.0 K/uL    Eos # 0.1 0.0 - 0.5 K/uL    Baso # 0.04 0.00 - 0.20 K/uL    nRBC 0 0 /100 WBC    Gran% 63.4 38.0 - 73.0 %    Lymph% 22.7 18.0 - 48.0 %    Mono% 10.9 4.0 - 15.0 %    Eosinophil% 1.8 0.0 - 8.0 %    Basophil% 0.6 0.0 - 1.9 %    Differential Method Automated    Basic Metabolic Panel (BMP)    Collection Time: 09/04/19  5:47 AM   Result Value Ref Range    Sodium 131 (L) 136 - 145 mmol/L    Potassium 4.0 3.5 - 5.1 mmol/L    Chloride 98 95 - 110 mmol/L    CO2 25 23 - 29 mmol/L    Glucose 88 70 - 110 mg/dL    BUN, Bld 19 8 - 23 mg/dL    Creatinine 1.3 0.5 - 1.4 mg/dL    Calcium 8.3 (L) 8.7 - 10.5 mg/dL    Anion Gap 8 8 - 16 mmol/L    eGFR if African American 57.5 (A) >60 mL/min/1.73 m^2    eGFR if non  49.8 (A) >60 mL/min/1.73 m^2   Phosphorus    Collection Time: 09/04/19  5:47 AM   Result Value Ref Range     Phosphorus 3.6 2.7 - 4.5 mg/dL       Recent Labs   Lab 09/02/19  0514 09/03/19  0305 09/04/19  0547   WBC 6.07 6.35 6.25   HGB 11.4* 10.5* 10.4*   HCT 34.7* 32.5* 32.5*    232 235       Recent Labs   Lab 09/02/19  0514 09/03/19  0305 09/04/19  0547   * 130* 131*   K 3.8 4.0 4.0   CL 96 97 98   CO2 23 24 25   BUN 13 14 19   CREATININE 1.1 1.1 1.3   GLU 89 91 88   CALCIUM 8.6* 8.1* 8.3*   MG 1.5* 1.9 1.8   PHOS 3.2 3.3 3.6       No results for input(s): ALKPHOS, ALT, AST, ALBUMIN, PROT, BILITOT, INR in the last 168 hours.      Hemoglobin A1C   Date Value Ref Range Status   04/04/2019 5.4 4.0 - 5.6 % Final     Comment:     ADA Screening Guidelines:  5.7-6.4%  Consistent with prediabetes  >or=6.5%  Consistent with diabetes  High levels of fetal hemoglobin interfere with the HbA1C  assay. Heterozygous hemoglobin variants (HbS, HgC, etc)do  not significantly interfere with this assay.   However, presence of multiple variants may affect accuracy.         Scheduled Meds:   amLODIPine  5 mg Oral Daily    artificial tears  2 drop Both Eyes TID    atorvastatin  20 mg Oral Daily    calcium carbonate  500 mg Oral BID    cetirizine  5 mg Oral QHS    fluticasone propionate  2 spray Each Nostril Daily    gabapentin  800 mg Oral BID    guaiFENesin  600 mg Oral BID    ipratropium  0.5 mg Nebulization Q4H WAKE    levalbuterol  0.63 mg Nebulization Q8H WA    levoFLOXacin  500 mg Oral Daily    levothyroxine  50 mcg Oral Before breakfast    losartan  50 mg Oral Daily    metoprolol succinate  25 mg Oral Daily    pantoprazole  40 mg Oral Daily       Continuous Infusions:      As Needed: acetaminophen, albuterol, benzonatate, hyoscyamine, loperamide, ondansetron, ramelteon, sodium chloride 0.9%, traZODone    Active Hospital Problems    Diagnosis  POA    *Community acquired pneumonia [J18.9]  Yes    Hypocalcemia [E83.51]  Yes    Infection and inflammatory reaction due to other urinary catheter, subsequent  encounter [T83.518D]  Yes    Hypoxia [R09.02]  Yes    Gastroesophageal reflux disease without esophagitis [K21.9]  Yes     - continue home Prilosec      Neurogenic bladder [N31.9]  Yes    Hypokalemia [E87.6]  Yes    Hyponatremia [E87.1]  Yes    Suprapubic catheter [Z93.59]  Not Applicable    CKD (chronic kidney disease) stage 3, GFR 30-59 ml/min [N18.3]  Yes    Essential hypertension [I10]  Yes    Acquired hypothyroidism [E03.9]  Yes    Hyperlipidemia [E78.5]  Yes      Resolved Hospital Problems   No resolved problems to display.       Overview:    Assessment and Plan  Community acquired pneumonia  Hypoxia  Wheezing  - Treating as CAP  - chest CT with probable pneumonia in LLL  -  nebulizers ordered for persistent wheezing with improvement  -mucinex and flutter valve therapy also added to treat cough  - blood cultures NGTD x 2  -completed 5 days of doxy and is now on levaquin for combination treatment of pneumonia and UTI    Neurogenic bladder  Suprapubic catheter  Gross hematuria  Infection associated with indwelling urinary catheter  - Hemoglobin stable and gross hematuria not evident  - Seen by urology in clinic on 8/27. Urine culture drawn at that time  - Continue hyoscyamine PRN  -Urine culture with providencia X2 species; both sensitive to rocephin and levofloxacin; will need 14 d of therapy to be completed with levofloxacin D #9/14    Hyponatremia  - Etiology likely hypovolemic, though volume exam is equivocal, and he has risk factors for SIADH  - Gabi low not c/w SIADH  - suspect HCTZ and volume contraction as source of low Na  -Continue to monitor with daily BMP, has been improving daily    CKD (chronic kidney disease) stage 3, GFR 30-59 ml/min  Estimated Creatinine Clearance: 40.2 mL/min (based on SCr of 1.3 mg/dL).  - Stable  - sCr at baseline on admission  - Will follow with BMP as above, also strict I/Os and daily weights     Acquired hypothyroidism  - TSH normal on 8/27  - Continue home  levothyroxine     Gastroesophageal reflux disease without esophagitis  - Stable by symptoms  - Continue protonix  -he denies chronic nausea symptoms are indicative of reflux; continue zofran prn     Hyperlipidemia  - Continue home atorvastatin     Essential hypertension  - Stable  - discontinuing HCTZ and holding losartan until his electrolytes are sorted out  - Continue metoprolol and will resume losartan for uncontrolled BP      Diet: Diet Adult Regular (IDDSI Level 7) [facility]   GI PPx: pantoprazole  DVT PPx:   VTE Risk Mitigation (From admission, onward)        Ordered     Place sequential compression device  Until discontinued      08/27/19 2331     IP VTE HIGH RISK PATIENT  Once      08/27/19 2306        L/D/A: PIV; suprapubic catheter  Wounds: none    Goals of Care: curative; full code    Discharge plan: OSNF    Alin Talbert MD  Staff Hospitalist  [phone]

## 2019-09-04 NOTE — PLAN OF CARE
SW completed LOCET and faxed PASRR to state. Waiting on 142.       Hillary Hernandez LMSW  [facility]  [phone]

## 2019-09-04 NOTE — PT/OT/SLP PROGRESS
Physical Therapy Treatment    Patient Name:  Chucho Prado   MRN:  [id number]  Admitting Diagnosis: Community acquired pneumonia  Recent Surgery: * No surgery found *      Recommendations:     Discharge Recommendations:  nursing facility, skilled   Discharge Equipment Recommendations: none   Barriers to discharge: None    Plan:     During this hospitalization, patient to be seen 3 x/week to address the above listed problems via gait training, therapeutic activities, therapeutic exercises, neuromuscular re-education  · Plan of Care Expires:  09/27/19   Plan of Care Reviewed with: patient    This Plan of care has been discussed with the patient who was involved in its development and understands and is in agreement with the identified goals and treatment plan    Subjective     Communicated with RN (Anitha) prior to session.     Patient comments: Pt agreeable to PT session after encouragement  Pain/Comfort:  · Pain Rating 1: 0/10  · Location - Orientation 1: generalized  · Location 1: back(with bed mob)  · Pain Addressed 1: Reposition, Distraction, Cessation of Activity  · Pain Rating Post-Intervention 1: (NO rating provided)    Objective:     Patient found with: galvan catheter, telemetry, pulse ox (continuous)    Patient found sup in bed with HOB elevated upon PT entry to room, agreeable to treatment.  Personal sitter present in the room.    General Precautions: Standard, aspiration, fall   Orthopedic Precautions:N/A   Braces: N/A       BED MOBILITY (vc's for hand placement sequencing of task):        Rolling to the R:  NT.       Rolling to the L:  Mod/max A.        Sup > sit at the EOB:  Max A for trunk elevation and LE's.       Sit > sup:  Max A for trunk and LE's.       Scooting hips to EOB with mod/max A, multiple minimal scoots                SITTING AT THE EDGE OF THE BED (15 min intermittently)   Assistance Level Required: initially with mod A to SBA for trunk control with b UE support   Postural deviations  noted: thoracic kyphosis, PPT, rounded shoulders, flexed c-spine   Encouraged: APT, B feet in contact with the floor, forward gaze        TRANSFERS  (vc's for hand placement, sequencing of task and safety)   Patient completed Sit <> Stand Transfer from EOB with max A of 2 for hip elevation, balance and AD with rolling walker x2-3 trial(s)   Patient completed Stand <> Sit Transfer to EPB with max A for of 1-2 with rolling walker      GAIT: alongside the EOB   Patient ambulated: 5-6 minima steps laterally to the L   Patient required: max A of 2 for hip ext, balance, mgt of AD, occasional assistance to move L LE   Patient used:  Rolling Walker   Gait Pattern observed: step to   Gait Deviation(s): decreased eri, increased time in double stance, decreased velocity of limb motion, decreased step length, decreased toe-to-floor clearance, decreased weight-shifting ability and post lean, flexed c-spine, wide ANTHONY    Comments: vc's and tc's for upright posture, appropriate weight shift, directional guidance, placement of AD, B step length and safety      EDUCATION  Patient provided with daily orientation and goals of this PT session. They were educated to call for assistance and to transfer with hospital staff only.  Also, pt was educated on the effects of prolonged immobility and the importance of performing OOB activity and exercises to promote healing and reduce recovery time    Whiteboard updated with correct mobility information. RN/PCT notified.  Pt requires max A of 1-2 people to sit at the EOB and for sit < > stand with RW.    Patient left sup in bed with HOB elevated, with  all lines intact, call button in reach, RN notified and personal sitter present    AM-PAC 6 CLICK MOBILITY  Turning over in bed (including adjusting bedclothes, sheets and blankets)?: 2  Sitting down on and standing up from a chair with arms (e.g., wheelchair, bedside commode, etc.): 2  Moving from lying on back to sitting on the side of the  bed?: 2  Moving to and from a bed to a chair (including a wheelchair)?: 2  Need to walk in hospital room?: 2  Climbing 3-5 steps with a railing?: 1  Basic Mobility Total Score: 11     Assessment:     Chucho Prado is a 85 y.o. male admitted with a medical diagnosis of Community acquired pneumonia.  He presents with the following impairments/functional limitations:  weakness, impaired endurance, impaired sensation, impaired self care skills, impaired functional mobilty, gait instability, impaired balance, decreased coordination, decreased upper extremity function, decreased lower extremity function, decreased safety awareness, pain, decreased ROM, impaired coordination, impaired fine motor, edema, impaired joint extensibility. requiring significant assistance and verbal cues for bed mob, scooting to/along the EOB, static sitting at the EOB, sit < > stand, standing and gait to prevent falls due to weakness, post lean, pain, fear of falling.   In light of pt's current functional level and deficits, it is anticipated that pt will need to participate in an intense rehab program consisting of PT and OT in order to achieve full rehab potential to return to previous level of function and roles.  Pt remains motivated to participate in PT session and will cont to benefit from skilled PT intervention.    Rehab Prognosis:  Good; patient would benefit from acute skilled PT services to address these deficits and reach maximum level of function.      GOALS:   Multidisciplinary Problems     Physical Therapy Goals        Problem: Physical Therapy Goal    Goal Priority Disciplines Outcome Goal Variances Interventions   Physical Therapy Goal     PT, PT/OT      Description:  Goals to be met by: 09/15/19     Patient will increase functional independence with mobility by performin. Supine to sit with Stand-by Assistance  2. Sit to supine with Stand-by Assistance  3. Sit to stand transfer with Contact Guard Assistance with RW  or SW  4. Bed to chair transfer with Contact Guard Assistance using Rolling Walker or SW  5. Gait  x 50 feet with Contact Guard Assistance using Rolling Walker. Or SW  6. Lower extremity exercise program x20 reps per handout, with assistance as needed                      Time Tracking:     PT Received On: 09/04/19  PT Start Time: 1127     PT Stop Time: 1157  PT Total Time (min): 30 min     Billable Minutes: Gait Training 10 and Therapeutic Activity 20    Treatment Type: Treatment  PT/PTA: PTA     PTA Visit Number: 1       Patria Rowe PTA.  Pager [phone]    9/4/2019    .

## 2019-09-05 VITALS
HEIGHT: 68 IN | SYSTOLIC BLOOD PRESSURE: 122 MMHG | DIASTOLIC BLOOD PRESSURE: 75 MMHG | HEART RATE: 65 BPM | RESPIRATION RATE: 19 BRPM | BODY MASS INDEX: 25.42 KG/M2 | OXYGEN SATURATION: 95 % | WEIGHT: 167.75 LBS | TEMPERATURE: 98 F

## 2019-09-05 LAB
ANION GAP SERPL CALC-SCNC: 9 MMOL/L (ref 8–16)
BASOPHILS # BLD AUTO: 0.04 K/UL (ref 0–0.2)
BASOPHILS NFR BLD: 0.7 % (ref 0–1.9)
BUN SERPL-MCNC: 18 MG/DL (ref 8–23)
CALCIUM SERPL-MCNC: 8.2 MG/DL (ref 8.7–10.5)
CHLORIDE SERPL-SCNC: 97 MMOL/L (ref 95–110)
CO2 SERPL-SCNC: 23 MMOL/L (ref 23–29)
CREAT SERPL-MCNC: 1.2 MG/DL (ref 0.5–1.4)
DIFFERENTIAL METHOD: ABNORMAL
EOSINOPHIL # BLD AUTO: 0.1 K/UL (ref 0–0.5)
EOSINOPHIL NFR BLD: 2.4 % (ref 0–8)
ERYTHROCYTE [DISTWIDTH] IN BLOOD BY AUTOMATED COUNT: 13.5 % (ref 11.5–14.5)
EST. GFR  (AFRICAN AMERICAN): >60 ML/MIN/1.73 M^2
EST. GFR  (NON AFRICAN AMERICAN): 54.8 ML/MIN/1.73 M^2
GLUCOSE SERPL-MCNC: 86 MG/DL (ref 70–110)
HCT VFR BLD AUTO: 31.4 % (ref 40–54)
HGB BLD-MCNC: 10.3 G/DL (ref 14–18)
IMM GRANULOCYTES # BLD AUTO: 0.04 K/UL (ref 0–0.04)
IMM GRANULOCYTES NFR BLD AUTO: 0.7 % (ref 0–0.5)
LYMPHOCYTES # BLD AUTO: 1.6 K/UL (ref 1–4.8)
LYMPHOCYTES NFR BLD: 27.1 % (ref 18–48)
MAGNESIUM SERPL-MCNC: 1.7 MG/DL (ref 1.6–2.6)
MCH RBC QN AUTO: 28 PG (ref 27–31)
MCHC RBC AUTO-ENTMCNC: 32.8 G/DL (ref 32–36)
MCV RBC AUTO: 85 FL (ref 82–98)
MONOCYTES # BLD AUTO: 0.7 K/UL (ref 0.3–1)
MONOCYTES NFR BLD: 11.9 % (ref 4–15)
NEUTROPHILS # BLD AUTO: 3.4 K/UL (ref 1.8–7.7)
NEUTROPHILS NFR BLD: 57.2 % (ref 38–73)
NRBC BLD-RTO: 0 /100 WBC
PHOSPHATE SERPL-MCNC: 3.7 MG/DL (ref 2.7–4.5)
PLATELET # BLD AUTO: 236 K/UL (ref 150–350)
PMV BLD AUTO: 9.6 FL (ref 9.2–12.9)
POCT GLUCOSE: 110 MG/DL (ref 70–110)
POTASSIUM SERPL-SCNC: 4.1 MMOL/L (ref 3.5–5.1)
RBC # BLD AUTO: 3.68 M/UL (ref 4.6–6.2)
SODIUM SERPL-SCNC: 129 MMOL/L (ref 136–145)
WBC # BLD AUTO: 5.9 K/UL (ref 3.9–12.7)

## 2019-09-05 PROCEDURE — 94640 AIRWAY INHALATION TREATMENT: CPT

## 2019-09-05 PROCEDURE — 85025 COMPLETE CBC W/AUTO DIFF WBC: CPT

## 2019-09-05 PROCEDURE — 25000003 PHARM REV CODE 250: Performed by: HOSPITALIST

## 2019-09-05 PROCEDURE — 94799 UNLISTED PULMONARY SVC/PX: CPT

## 2019-09-05 PROCEDURE — 36415 COLL VENOUS BLD VENIPUNCTURE: CPT

## 2019-09-05 PROCEDURE — 83735 ASSAY OF MAGNESIUM: CPT

## 2019-09-05 PROCEDURE — 99238 HOSP IP/OBS DSCHRG MGMT 30/<: CPT | Mod: ,,, | Performed by: HOSPITALIST

## 2019-09-05 PROCEDURE — 80048 BASIC METABOLIC PNL TOTAL CA: CPT

## 2019-09-05 PROCEDURE — 99900035 HC TECH TIME PER 15 MIN (STAT)

## 2019-09-05 PROCEDURE — 94664 DEMO&/EVAL PT USE INHALER: CPT

## 2019-09-05 PROCEDURE — 84100 ASSAY OF PHOSPHORUS: CPT

## 2019-09-05 PROCEDURE — 99238 PR HOSPITAL DISCHARGE DAY,<30 MIN: ICD-10-PCS | Mod: ,,, | Performed by: HOSPITALIST

## 2019-09-05 PROCEDURE — 25000003 PHARM REV CODE 250: Performed by: INTERNAL MEDICINE

## 2019-09-05 PROCEDURE — 94761 N-INVAS EAR/PLS OXIMETRY MLT: CPT

## 2019-09-05 PROCEDURE — 25000242 PHARM REV CODE 250 ALT 637 W/ HCPCS: Performed by: INTERNAL MEDICINE

## 2019-09-05 RX ADMIN — TRAZODONE HYDROCHLORIDE 25 MG: 50 TABLET ORAL at 02:09

## 2019-09-05 RX ADMIN — METOPROLOL SUCCINATE 25 MG: 25 TABLET, EXTENDED RELEASE ORAL at 10:09

## 2019-09-05 RX ADMIN — AMLODIPINE BESYLATE 5 MG: 5 TABLET ORAL at 10:09

## 2019-09-05 RX ADMIN — IPRATROPIUM BROMIDE 0.5 MG: 0.5 SOLUTION RESPIRATORY (INHALATION) at 11:09

## 2019-09-05 RX ADMIN — GABAPENTIN 800 MG: 400 CAPSULE ORAL at 10:09

## 2019-09-05 RX ADMIN — CALCIUM CARBONATE (ANTACID) CHEW TAB 500 MG 500 MG: 500 CHEW TAB at 12:09

## 2019-09-05 RX ADMIN — LEVOTHYROXINE SODIUM 50 MCG: 50 TABLET ORAL at 06:09

## 2019-09-05 RX ADMIN — FLUTICASONE PROPIONATE 100 MCG: 50 SPRAY, METERED NASAL at 10:09

## 2019-09-05 RX ADMIN — LOSARTAN POTASSIUM 50 MG: 50 TABLET, FILM COATED ORAL at 10:09

## 2019-09-05 RX ADMIN — IPRATROPIUM BROMIDE 0.5 MG: 0.5 SOLUTION RESPIRATORY (INHALATION) at 09:09

## 2019-09-05 RX ADMIN — LEVOFLOXACIN 500 MG: 500 TABLET, FILM COATED ORAL at 12:09

## 2019-09-05 RX ADMIN — LEVALBUTEROL HYDROCHLORIDE 0.63 MG: 0.63 SOLUTION RESPIRATORY (INHALATION) at 09:09

## 2019-09-05 RX ADMIN — ATORVASTATIN CALCIUM 20 MG: 20 TABLET, FILM COATED ORAL at 10:09

## 2019-09-05 RX ADMIN — HYPROMELLOSE 2910 2 DROP: 5 SOLUTION OPHTHALMIC at 10:09

## 2019-09-05 RX ADMIN — PANTOPRAZOLE SODIUM 40 MG: 40 TABLET, DELAYED RELEASE ORAL at 10:09

## 2019-09-05 RX ADMIN — GUAIFENESIN 600 MG: 600 TABLET, EXTENDED RELEASE ORAL at 10:09

## 2019-09-05 NOTE — DISCHARGE SUMMARY
"Discharge Summary  Hospital Medicine    Attending Provider on Discharge: Alin Talbert MD  Hospital Medicine Team: [facility] HOSP MED O  Date of Admission:  8/27/2019     Date of Discharge:    Code status: Full Code    Active Hospital Problems    Diagnosis  POA    *Community acquired pneumonia [J18.9]  Yes    Hypocalcemia [E83.51]  Yes    Infection and inflammatory reaction due to other urinary catheter, subsequent encounter [T83.268D]  Yes    Hypoxia [R09.02]  Yes    Gastroesophageal reflux disease without esophagitis [K21.9]  Yes     - continue home Prilosec      Neurogenic bladder [N31.9]  Yes    Hypokalemia [E87.6]  Yes    Hyponatremia [E87.1]  Yes    Suprapubic catheter [Z93.59]  Not Applicable    CKD (chronic kidney disease) stage 3, GFR 30-59 ml/min [N18.3]  Yes    Essential hypertension [I10]  Yes    Acquired hypothyroidism [E03.9]  Yes    Hyperlipidemia [E78.5]  Yes      Resolved Hospital Problems   No resolved problems to display.        HPI  Mr. Prado is an 85-year-old man with GERD, hypothyroidism, HTN, HLD, CKD stage III, and recurrent UTI c/b neurogenic bladder s/p SPC who presents with cough. His symptoms started ten days ago with the development of a mild cough intermittently productive of white sputum, which progressively worsened until today his prompting his evaluation. No recent sick contacts No fever, chills, or night sweats. He has some dyspnea (both at rest and exertional), but no palpitations, syncope, or edema. He does have some pleuritic chest pain after particularly long coughing episodes. He has some intermittent nausea, but no vomiting, constipation, or diarrhea. He reports a "normal" diet but is vague about the details and unable to tell me most of what he has been eating lately.     Workup in the ED significant for tachypnea peaking at 26, and SpO2 90 on RA. Improved quickly to 100% on 2L via NC. Labs show hyponatremia at 122, hypochloremia at 83, and hypokalemia at 2.3. BNP " 176. CXR shows streaky opacities thought to represent atelectasis, but did not visualize hilar structures and overall a poor study. It did show cardiomegaly. He was given NS, azithromycin, and ceftriaxone and medicine admission was requested for further testing and treatment.     Of note, he was also seen earlier today in the urology clinic for gross hematuria, at which time his SPC was exchanged, and a culture was obtained.        Hospital Course  Community acquired pneumonia  - chest CT with probable pneumonia in LLL, pt. Presenting with cough, hypoxia  -  nebulizers ordered for persistent wheezing with improvement  -completed 5 days of doxy and is now on levaquin for combination treatment of pneumonia and UTI  -mucinex and flutter valve therapy also added to treat cough  - blood cultures NGTD x 2, no other cultures positive  -Pt. On room air with persistent cough the only symptom at time of discharge       Neurogenic bladder  Infection associated with indwelling urinary catheter  - Seen by urology in clinic on 8/27. Urine culture drawn at that time and the patient's suprapubic catheter was replaced  - Continued hyoscyamine PRN  -Urine culture with providencia X2 species; both sensitive to rocephin and levofloxacin; will need 14 d of therapy to be completed with levofloxacin. End date 9/9     Hyponatremia  - Etiology likely hypovolemic, though volume exam is equivocal, and he has risk factors for SIADH  - Gabi low not c/w SIADH  - suspect HCTZ and volume contraction as source of low Na  -Improved daily, discontinued HCTZ at discharge     CKD (chronic kidney disease) stage 3, GFR 30-59 ml/min  Estimated Creatinine Clearance: 43.5 mL/min (based on SCr of 1.2 mg/dL).  - Stable  - sCr at baseline on admission and throughout hospital stay     Acquired hypothyroidism  - TSH normal on 8/27  - Continued home levothyroxine     Gastroesophageal reflux disease without esophagitis  - Continued protonix     Hyperlipidemia  -  Continue home atorvastatin     Essential hypertension  - Stable  - discontinued HCTZ and losartan as patient was initially hyponatremia  - Continued metoprolol and resumed losartan later in hospital stay. BP remained well-controlled. HCTZ was stopped on discharge as the patient did not require it to maintain normal BPs    Recent Labs   Lab 09/03/19 0305 09/04/19  0547 09/05/19  0521   WBC 6.35 6.25 5.90   HGB 10.5* 10.4* 10.3*   HCT 32.5* 32.5* 31.4*    235 236     Recent Labs   Lab 09/03/19 0305 09/04/19  0547 09/05/19  0521   * 131* 129*   K 4.0 4.0 4.1   CL 97 98 97   CO2 24 25 23   BUN 14 19 18   CREATININE 1.1 1.3 1.2   GLU 91 88 86   CALCIUM 8.1* 8.3* 8.2*   MG 1.9 1.8 1.7   PHOS 3.3 3.6 3.7     No results for input(s): ALKPHOS, ALT, AST, ALBUMIN, PROT, BILITOT, INR in the last 168 hours.   Recent Labs   Lab 09/05/19 0928   POCTGLUCOSE 110     No results for input(s): CPK, CPKMB, MB, TROPONINI in the last 72 hours.    Procedures: none    Consultants: none    Current Discharge Medication List      START taking these medications    Details   levoFLOXacin (LEVAQUIN) 500 MG tablet Take 1 tablet (500 mg total) by mouth once daily. for 5 days         CONTINUE these medications which have NOT CHANGED    Details   amLODIPine (NORVASC) 5 MG tablet Take 1 tablet (5 mg total) by mouth once daily.  Qty: 30 tablet, Refills: 11      aspirin (ECOTRIN) 81 MG EC tablet Take 1 tablet (81 mg total) by mouth once daily.  Qty: 90 tablet, Refills: 11    Associated Diagnoses: Dyslipidemia      gabapentin (NEURONTIN) 400 MG capsule Take 2 capsules (800 mg total) by mouth 2 (two) times daily.  Qty: 120 capsule, Refills: 11    Associated Diagnoses: Lumbar myelopathy      hyoscyamine (LEVSIN/SL) 0.125 mg Subl Take 1 tablet (0.125 mg total) by mouth every 6 (six) hours as needed (bladder spasms).  Qty: 60 tablet, Refills: 11    Comments: **Patient requests 90 days supply**  Associated Diagnoses: Suprapubic catheter     "  levothyroxine (SYNTHROID) 50 MCG tablet GIVE "PENNY" 1 TABLET BY MOUTH ONCE DAILY.  Qty: 90 tablet, Refills: 11    Associated Diagnoses: Hypothyroidism, unspecified type      losartan (COZAAR) 100 MG tablet Take 1 tablet (100 mg total) by mouth once daily.  Qty: 90 tablet, Refills: 11      metoprolol succinate (TOPROL-XL) 25 MG 24 hr tablet Take 1 tablet (25 mg total) by mouth once daily.  Qty: 30 tablet, Refills: 11    Associated Diagnoses: Essential hypertension      omeprazole (PRILOSEC) 40 MG capsule TAKE 1 CAPSULE(40 MG) BY MOUTH EVERY MORNING 30 MINUTES BEFORE EATING ON AN EMPTY STOMACH  Qty: 90 capsule, Refills: 11    Associated Diagnoses: Gastroesophageal reflux disease, esophagitis presence not specified      polyethylene glycol (GLYCOLAX) 17 gram/dose powder Take 17 g by mouth once daily.  Qty: 1700 g, Refills: 11    Associated Diagnoses: Constipation, unspecified constipation type      traZODone (DESYREL) 50 MG tablet Take 1 tablet (50 mg total) by mouth nightly.  Qty: 90 tablet, Refills: 11    Comments: **Patient requests 90 days supply**  Associated Diagnoses: Other insomnia      atorvastatin (LIPITOR) 20 MG tablet Take 1 tablet (20 mg total) by mouth once daily.  Qty: 90 tablet, Refills: 11    Comments: Please discontinue Simvastatin  Associated Diagnoses: Dyslipidemia         STOP taking these medications       hydroCHLOROthiazide (HYDRODIURIL) 25 MG tablet Comments:   Reason for Stopping:               Discharge Diet:regular diet with Normal Fluid intake of 1500 - 2000 mL per day    Activity: activity as tolerated    Discharge Condition: Stable    Disposition: Skilled Nursing Facility    Tests pending at the time of discharge: none     Time spent  on the discharge of the patient including review of hospital course with the patient. reviewing discharge medications and arranging follow-up care: 25 mins    Discharge examination Patient was seen and examined on the date of discharge and determined to " be suitable for discharge.    Alin Talbert MD  [facility] Medicine Staff  [phone] pager

## 2019-09-05 NOTE — PROGRESS NOTES
"[facility] Hospital Medicine  Progress Note    Team: [facility] HOSP MED O   Attending MD: Alin Talbert MD  Admit Date: 8/27/2019  TISHA 9/5/2019  Length of Stay:  LOS: 9 days   Code status: Full Code    Principal Problem: Community acquired pneumonia    Interval hx: Pt. States that his cough has been a little worse today than yesterday. He denies any SOB, fevers, chills, nausea or vomiting. He is otherwise feeling well.    ROS:  Constitutional: no fever or chills  Respiratory: +cough, not productive; denies shortness of breath  Cardiovascular: no chest pain or palpitations  Gastrointestinal: no nausea or vomiting, no abdominal pain; last BM: 8/31  Genitourinary: no gross hematuria or dysuria  Integument/Breast: no rash or pruritis  Musculoskeletal: no arthralgias or myalgias  Neurological: no seizures or tremors  Behavioral/Psych: no auditory or visual hallucinations      Physical Exam  Temp:  [97.2 °F (36.2 °C)-98.5 °F (36.9 °C)] 97.2 °F (36.2 °C)  Pulse:  [58-83] 73  Resp:  [16-29] 17  SpO2:  [93 %-97 %] 95 %  BP: (112-125)/(67-75) 125/75      Intake/Output Summary (Last 24 hours) at 9/5/2019 1149  Last data filed at 9/5/2019 0900  Gross per 24 hour   Intake 640 ml   Output 1000 ml   Net -360 ml       Wt Readings from Last 1 Encounters:   08/29/19 0400 76.1 kg (167 lb 12.3 oz)   08/28/19 1111 76.2 kg (168 lb)   08/28/19 0157 76.3 kg (168 lb 3.4 oz)   08/27/19 1521 88.5 kg (195 lb)        Estimated body mass index is 25.51 kg/m² as calculated from the following:    Height as of this encounter: 5' 8" (1.727 m).    Weight as of this encounter: 76.1 kg (167 lb 12.3 oz).    General: well developed, well nourished, no distress  Lungs: Coarse breath sounds appreciated B/L, no wheezes or rales  Cardiovascular: Heart: regular rate and rhythm, S1, S2 normal, 2/6 sytolic murmur, no click, rub or gallop. Chest Wall: no tenderness. Extremities: no cyanosis, no edema, no clubbing; extremities tender to " palpation  Abdomen/Rectal: Abdomen: soft, non-tender non-distended; bowel sounds normal; no masses; + suprapubic catheter  Skin: Skin color, texture, turgor normal. No rashes or lesions.  Neurologic: generalized weakness. No focal numbness or weakness.  Psych/Behavioral:  Alert and oriented, appropriate affect.      Recent Results (from the past 24 hour(s))   Magnesium    Collection Time: 09/05/19  5:21 AM   Result Value Ref Range    Magnesium 1.7 1.6 - 2.6 mg/dL   CBC with Automated Differential    Collection Time: 09/05/19  5:21 AM   Result Value Ref Range    WBC 5.90 3.90 - 12.70 K/uL    RBC 3.68 (L) 4.60 - 6.20 M/uL    Hemoglobin 10.3 (L) 14.0 - 18.0 g/dL    Hematocrit 31.4 (L) 40.0 - 54.0 %    Mean Corpuscular Volume 85 82 - 98 fL    Mean Corpuscular Hemoglobin 28.0 27.0 - 31.0 pg    Mean Corpuscular Hemoglobin Conc 32.8 32.0 - 36.0 g/dL    RDW 13.5 11.5 - 14.5 %    Platelets 236 150 - 350 K/uL    MPV 9.6 9.2 - 12.9 fL    Immature Granulocytes 0.7 (H) 0.0 - 0.5 %    Gran # (ANC) 3.4 1.8 - 7.7 K/uL    Immature Grans (Abs) 0.04 0.00 - 0.04 K/uL    Lymph # 1.6 1.0 - 4.8 K/uL    Mono # 0.7 0.3 - 1.0 K/uL    Eos # 0.1 0.0 - 0.5 K/uL    Baso # 0.04 0.00 - 0.20 K/uL    nRBC 0 0 /100 WBC    Gran% 57.2 38.0 - 73.0 %    Lymph% 27.1 18.0 - 48.0 %    Mono% 11.9 4.0 - 15.0 %    Eosinophil% 2.4 0.0 - 8.0 %    Basophil% 0.7 0.0 - 1.9 %    Differential Method Automated    Basic Metabolic Panel (BMP)    Collection Time: 09/05/19  5:21 AM   Result Value Ref Range    Sodium 129 (L) 136 - 145 mmol/L    Potassium 4.1 3.5 - 5.1 mmol/L    Chloride 97 95 - 110 mmol/L    CO2 23 23 - 29 mmol/L    Glucose 86 70 - 110 mg/dL    BUN, Bld 18 8 - 23 mg/dL    Creatinine 1.2 0.5 - 1.4 mg/dL    Calcium 8.2 (L) 8.7 - 10.5 mg/dL    Anion Gap 9 8 - 16 mmol/L    eGFR if African American >60.0 >60 mL/min/1.73 m^2    eGFR if non  54.8 (A) >60 mL/min/1.73 m^2   Phosphorus    Collection Time: 09/05/19  5:21 AM   Result Value Ref Range     Phosphorus 3.7 2.7 - 4.5 mg/dL   POCT glucose    Collection Time: 09/05/19  9:28 AM   Result Value Ref Range    POCT Glucose 110 70 - 110 mg/dL       Recent Labs   Lab 09/03/19 0305 09/04/19  0547 09/05/19  0521   WBC 6.35 6.25 5.90   HGB 10.5* 10.4* 10.3*   HCT 32.5* 32.5* 31.4*    235 236       Recent Labs   Lab 09/03/19 0305 09/04/19 0547 09/05/19 0521   * 131* 129*   K 4.0 4.0 4.1   CL 97 98 97   CO2 24 25 23   BUN 14 19 18   CREATININE 1.1 1.3 1.2   GLU 91 88 86   CALCIUM 8.1* 8.3* 8.2*   MG 1.9 1.8 1.7   PHOS 3.3 3.6 3.7       No results for input(s): ALKPHOS, ALT, AST, ALBUMIN, PROT, BILITOT, INR in the last 168 hours.      Hemoglobin A1C   Date Value Ref Range Status   04/04/2019 5.4 4.0 - 5.6 % Final     Comment:     ADA Screening Guidelines:  5.7-6.4%  Consistent with prediabetes  >or=6.5%  Consistent with diabetes  High levels of fetal hemoglobin interfere with the HbA1C  assay. Heterozygous hemoglobin variants (HbS, HgC, etc)do  not significantly interfere with this assay.   However, presence of multiple variants may affect accuracy.         Scheduled Meds:   amLODIPine  5 mg Oral Daily    artificial tears  2 drop Both Eyes TID    atorvastatin  20 mg Oral Daily    calcium carbonate  500 mg Oral BID    cetirizine  5 mg Oral QHS    fluticasone propionate  2 spray Each Nostril Daily    gabapentin  800 mg Oral BID    guaiFENesin  600 mg Oral BID    ipratropium  0.5 mg Nebulization Q4H WAKE    levalbuterol  0.63 mg Nebulization Q8H WA    levoFLOXacin  500 mg Oral Daily    levothyroxine  50 mcg Oral Before breakfast    losartan  50 mg Oral Daily    metoprolol succinate  25 mg Oral Daily    pantoprazole  40 mg Oral Daily       Continuous Infusions:      As Needed: acetaminophen, albuterol, benzonatate, hyoscyamine, loperamide, ondansetron, ramelteon, sodium chloride 0.9%, traZODone    Active Hospital Problems    Diagnosis  POA    *Community acquired pneumonia [J18.9]  Yes     Hypocalcemia [E83.51]  Yes    Infection and inflammatory reaction due to other urinary catheter, subsequent encounter [T83.518D]  Yes    Hypoxia [R09.02]  Yes    Gastroesophageal reflux disease without esophagitis [K21.9]  Yes     - continue home Prilosec      Neurogenic bladder [N31.9]  Yes    Hypokalemia [E87.6]  Yes    Hyponatremia [E87.1]  Yes    Suprapubic catheter [Z93.59]  Not Applicable    CKD (chronic kidney disease) stage 3, GFR 30-59 ml/min [N18.3]  Yes    Essential hypertension [I10]  Yes    Acquired hypothyroidism [E03.9]  Yes    Hyperlipidemia [E78.5]  Yes      Resolved Hospital Problems   No resolved problems to display.       Overview:    Assessment and Plan  Community acquired pneumonia  Hypoxia  Wheezing  - Treating as CAP  - chest CT with probable pneumonia in LLL  -  nebulizers ordered for persistent wheezing with improvement  -mucinex and flutter valve therapy also added to treat cough  - blood cultures NGTD x 2  -completed 5 days of doxy and is now on levaquin for combination treatment of pneumonia and UTI    Neurogenic bladder  Suprapubic catheter  Gross hematuria  Infection associated with indwelling urinary catheter  - Hemoglobin stable and gross hematuria not evident  - Seen by urology in clinic on 8/27. Urine culture drawn at that time  - Continue hyoscyamine PRN  -Urine culture with providencia X2 species; both sensitive to rocephin and levofloxacin; will need 14 d of therapy to be completed with levofloxacin. End date 9/9    Hyponatremia  - Etiology likely hypovolemic, though volume exam is equivocal, and he has risk factors for SIADH  - Gabi low not c/w SIADH  - suspect HCTZ and volume contraction as source of low Na  -Continue to monitor with daily BMP, has been improving daily    CKD (chronic kidney disease) stage 3, GFR 30-59 ml/min  Estimated Creatinine Clearance: 43.5 mL/min (based on SCr of 1.2 mg/dL).  - Stable  - sCr at baseline on admission  - Will follow with BMP as  above, also strict I/Os and daily weights     Acquired hypothyroidism  - TSH normal on 8/27  - Continue home levothyroxine     Gastroesophageal reflux disease without esophagitis  - Stable by symptoms  - Continue protonix  -he denies chronic nausea symptoms are indicative of reflux; continue zofran prn     Hyperlipidemia  - Continue home atorvastatin     Essential hypertension  - Stable  - discontinuing HCTZ and holding losartan until his electrolytes are sorted out  - Continue metoprolol and will resume losartan for uncontrolled BP      Diet: Diet Adult Regular (IDDSI Level 7) [facility]   GI PPx: pantoprazole  DVT PPx:   VTE Risk Mitigation (From admission, onward)        Ordered     Place sequential compression device  Until discontinued      08/27/19 2331     IP VTE HIGH RISK PATIENT  Once      08/27/19 2306        L/D/A: PIV; suprapubic catheter  Wounds: none    Goals of Care: curative; full code    Discharge plan: [facility]    Alin Talbert MD  Staff Hospitalist  [phone]

## 2019-09-05 NOTE — PLAN OF CARE
09/05/19 1336   Post-Acute Status   Post-Acute Authorization Placement   Post-Acute Placement Status Set-up Complete       SW arranged wheelchair transport via [facility]. Requested  time is 3:00PM.  Requested  time does not guarantee arrival time.      Nurse can call report to [phone] ext [phone]    Nurse has been notified of above.    Hillary Hernandez, ALEC  [facility]  [phone]

## 2019-09-19 PROCEDURE — G0180 PR HOME HEALTH MD CERTIFICATION: ICD-10-PCS | Mod: ,,, | Performed by: INTERNAL MEDICINE

## 2019-09-19 PROCEDURE — G0180 MD CERTIFICATION HHA PATIENT: HCPCS | Mod: ,,, | Performed by: INTERNAL MEDICINE

## 2019-09-30 ENCOUNTER — PATIENT OUTREACH (OUTPATIENT)
Dept: ADMINISTRATIVE | Facility: OTHER | Age: 84
End: 2019-09-30

## 2019-09-30 ENCOUNTER — EXTERNAL CHRONIC CARE MANAGEMENT (OUTPATIENT)
Dept: PRIMARY CARE CLINIC | Facility: CLINIC | Age: 84
End: 2019-09-30
Payer: MEDICARE

## 2019-09-30 PROCEDURE — 99490 CHRNC CARE MGMT STAFF 1ST 20: CPT | Mod: PBBFAC | Performed by: INTERNAL MEDICINE

## 2019-09-30 PROCEDURE — 99490 CHRNC CARE MGMT STAFF 1ST 20: CPT | Mod: S$PBB,,, | Performed by: INTERNAL MEDICINE

## 2019-09-30 PROCEDURE — 99490 PR CHRONIC CARE MGMT, 1ST 20 MIN: ICD-10-PCS | Mod: S$PBB,,, | Performed by: INTERNAL MEDICINE

## 2019-10-01 ENCOUNTER — OFFICE VISIT (OUTPATIENT)
Dept: UROLOGY | Facility: CLINIC | Age: 84
End: 2019-10-01
Payer: MEDICARE

## 2019-10-01 VITALS
WEIGHT: 167 LBS | HEART RATE: 60 BPM | BODY MASS INDEX: 25.31 KG/M2 | SYSTOLIC BLOOD PRESSURE: 120 MMHG | DIASTOLIC BLOOD PRESSURE: 64 MMHG | HEIGHT: 68 IN

## 2019-10-01 DIAGNOSIS — N31.9 NEUROGENIC BLADDER: ICD-10-CM

## 2019-10-01 DIAGNOSIS — L92.9 GRANULATION TISSUE: ICD-10-CM

## 2019-10-01 DIAGNOSIS — Z43.5 ENCOUNTER FOR CARE OR REPLACEMENT OF SUPRAPUBIC TUBE: ICD-10-CM

## 2019-10-01 DIAGNOSIS — Z93.59 CHRONIC SUPRAPUBIC CATHETER: Primary | ICD-10-CM

## 2019-10-01 PROCEDURE — 99214 OFFICE O/P EST MOD 30 MIN: CPT | Mod: S$PBB,25,, | Performed by: NURSE PRACTITIONER

## 2019-10-01 PROCEDURE — 51705 CHANGE OF BLADDER TUBE: CPT | Mod: PBBFAC | Performed by: NURSE PRACTITIONER

## 2019-10-01 PROCEDURE — 99999 PR PBB SHADOW E&M-EST. PATIENT-LVL III: ICD-10-PCS | Mod: PBBFAC,,, | Performed by: NURSE PRACTITIONER

## 2019-10-01 PROCEDURE — 51705 CHANGE OF BLADDER TUBE: CPT | Mod: S$PBB,,, | Performed by: NURSE PRACTITIONER

## 2019-10-01 PROCEDURE — 99213 OFFICE O/P EST LOW 20 MIN: CPT | Mod: PBBFAC,25 | Performed by: NURSE PRACTITIONER

## 2019-10-01 PROCEDURE — 99214 PR OFFICE/OUTPT VISIT, EST, LEVL IV, 30-39 MIN: ICD-10-PCS | Mod: S$PBB,25,, | Performed by: NURSE PRACTITIONER

## 2019-10-01 PROCEDURE — 51705 PR CHANGE OF BLADDER TUBE,SIMPLE: ICD-10-PCS | Mod: S$PBB,,, | Performed by: NURSE PRACTITIONER

## 2019-10-01 PROCEDURE — 99999 PR PBB SHADOW E&M-EST. PATIENT-LVL III: CPT | Mod: PBBFAC,,, | Performed by: NURSE PRACTITIONER

## 2019-10-01 NOTE — PROGRESS NOTES
CHIEF COMPLAINT:    Mr. Prado is a 86 y.o. male presenting for SPT change.  PRESENTING ILLNESS:    Chucho Prado is a 86 y.o. male with a PMH of neurogenic bladder who presents for SPT change. His last clinic visit was 8/27/19 with GILLES Menezes NP.    He had 18fr SPT placed by Dr. Taylor on 06/23/2017 to manage his Neurogenic Bladder.     He is here today for exchange.  His last exchange 8/27/19.     He reports SPT has been draining without difficulty. Mild sediment in catheter tubing.  Occasional bladder spasms.  Denies hematuria or flank pain. Denies fever or chills.      12/05/2018 s/p Ventral hernia repair with mesh; this was done due to obstruction.      10/25/2017 (R) TKR with Dr. [facility].      REVIEW OF SYSTEMS:    Review of Systems    Constitutional: Negative for fever and chills.   HENT: Negative for congestion.   Eyes: Negative for eye discharge.   Respiratory: Negative for shortness of breath.   Cardiovascular: Negative for chest pain.   Gastrointestinal: Negative for nausea, vomiting, and constipation.   Genitourinary:  Positive for difficulty voiding. Negative hematuria, flank pain  Neurological: Positive for weakness. (baseline)   Hematological: Does not bruise/bleed easily.   Psychiatric/Behavioral: Negative for confusion.       PATIENT HISTORY:    Past Medical History:   Diagnosis Date    Acquired hypothyroidism 7/30/2013    Arthritis     Blood transfusion     before 2005 - whe had gangrenous gall bladder    Cataract     Chronic back pain 12/4/2018    - Takes Percocet 5-325 at home - Can continue current abdominal pain control with Dilaudid 0.5 mg IV Q3H prn     CKD (chronic kidney disease) stage 3, GFR 30-59 ml/min     Compression fracture of lumbar vertebra     Depression     Dyslipidemia     Essential hypertension 7/30/2013    Gastroesophageal reflux disease without esophagitis 12/4/2018    - continue home Prilosec    General anesthetics causing adverse effect in therapeutic use      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    GERD (gastroesophageal reflux disease)     History of postoperative delirium 12/4/2018    - Patient reports 1 week history of post-op delirium 7/2018 - Monitor electrolytes, UA, and urine cx - Delirium precautions  - Can use Seroquel 25 mg QHS prn     Hypertension     Hyponatremia 10/23/2017    Impaired mobility and ADLs 6/28/2018    Neurogenic bladder 12/4/2018    Sleep disorder 12/4/2018    - continue Trazodone QHS    Thyroid disease     UTI (urinary tract infection)        Past Surgical History:   Procedure Laterality Date    CHOLECYSTECTOMY      EYE SURGERY Right     IOL    HERNIA REPAIR      INTRAOCULAR PROSTHESES INSERTION Left 5/28/2018    Procedure: INSERTION-INTRAOCULAR LENS (IOL);  Surgeon: Trinity Vazquez MD;  Location: [facility];  Service: Ophthalmology;  Laterality: Left;    JOINT REPLACEMENT      LAMINECTOMY  12/27/2016    L2-L4    LUMBAR LAMINECTOMY  12/2016    PARATHYROIDECTOMY      PHACOEMULSIFICATION OF CATARACT Left 5/28/2018    Procedure: PHACOEMULSIFICATION-ASPIRATION-CATARACT;  Surgeon: Trinity Vazquez MD;  Location: [facility];  Service: Ophthalmology;  Laterality: Left;    REPAIR OF INCARCERATED INCISIONAL HERNIA WITHOUT HISTORY OF PRIOR REPAIR N/A 12/5/2018    Procedure: REPAIR, HERNIA, INCISIONAL, INCARCERATED, WITHOUT HISTORY OF PRIOR REPAIR;  Surgeon: Steve Valentin MD;  Location: [facility] OR [address] [place] [address];  Service: General;  Laterality: N/A;    REPAIR OF INCARCERATED VENTRAL HERNIA WITHOUT HISTORY OF PRIOR REPAIR N/A 6/20/2018    Procedure: REPAIR, HERNIA, VENTRAL, INCARCERATED, WITHOUT HISTORY OF PRIOR REPAIR;  Surgeon: Guilherme Ordoñez MD;  Location: [facility] OR [address] [place] [address];  Service: General;  Laterality: N/A;    TOTAL KNEE ARTHROPLASTY Bilateral     TOTAL KNEE ARTHROPLASTY Left 10/25/2017    TKR       Family History   Problem Relation Age of Onset    Hypertension Mother     Diabetes Father     Esophageal cancer Father        Social  History     Socioeconomic History    Marital status: Single     Spouse name: Not on file    Number of children: Not on file    Years of education: Not on file    Highest education level: Not on file   Occupational History    Not on file   Social Needs    Financial resource strain: Not on file    Food insecurity:     Worry: Not on file     Inability: Not on file    Transportation needs:     Medical: Not on file     Non-medical: Not on file   Tobacco Use    Smoking status: Former Smoker     Years: 20.00     Last attempt to quit:      Years since quittin.7    Smokeless tobacco: Never Used    Tobacco comment: quit    Substance and Sexual Activity    Alcohol use: No     Comment: rarely/6 months    Drug use: No    Sexual activity: Never   Lifestyle    Physical activity:     Days per week: Not on file     Minutes per session: Not on file    Stress: Not on file   Relationships    Social connections:     Talks on phone: Not on file     Gets together: Not on file     Attends Caodaism service: Not on file     Active member of club or organization: Not on file     Attends meetings of clubs or organizations: Not on file     Relationship status: Not on file   Other Topics Concern    Not on file   Social History Narrative    Lives alone, owns house and rents part. Delaney helps. 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        Allergies:  Patient has no known allergies.    Medications:    Current Outpatient Medications:     amLODIPine (NORVASC) 5 MG tablet, Take 1 tablet (5 mg total) by mouth once daily., Disp: 30 tablet, Rfl: 11    aspirin (ECOTRIN) 81 MG EC tablet, Take 1 tablet (81 mg total) by mouth once daily., Disp: 90 tablet, Rfl: 11    atorvastatin (LIPITOR) 20 MG tablet, Take 1 tablet (20 mg total) by mouth once daily., Disp: 90 tablet, Rfl: 11    gabapentin (NEURONTIN) 400 MG capsule, Take 2 capsules (800 mg total) by mouth 2 (two) times daily., Disp: 120 capsule, Rfl: 11    hyoscyamine  "(LEVSIN/SL) 0.125 mg Subl, Take 1 tablet (0.125 mg total) by mouth every 6 (six) hours as needed (bladder spasms)., Disp: 60 tablet, Rfl: 11    levothyroxine (SYNTHROID) 50 MCG tablet, GIVE "PENNY" 1 TABLET BY MOUTH ONCE DAILY., Disp: 90 tablet, Rfl: 11    losartan (COZAAR) 100 MG tablet, Take 1 tablet (100 mg total) by mouth once daily., Disp: 90 tablet, Rfl: 11    metoprolol succinate (TOPROL-XL) 25 MG 24 hr tablet, Take 1 tablet (25 mg total) by mouth once daily., Disp: 30 tablet, Rfl: 11    omeprazole (PRILOSEC) 40 MG capsule, TAKE 1 CAPSULE(40 MG) BY MOUTH EVERY MORNING 30 MINUTES BEFORE EATING ON AN EMPTY STOMACH, Disp: 90 capsule, Rfl: 11    polyethylene glycol (GLYCOLAX) 17 gram/dose powder, Take 17 g by mouth once daily., Disp: 1700 g, Rfl: 11    traZODone (DESYREL) 50 MG tablet, Take 1 tablet (50 mg total) by mouth nightly., Disp: 90 tablet, Rfl: 11    PHYSICAL EXAMINATION:    Constitutional: He is oriented to person, place, and time. He appears well-developed and well-nourished.  He is in no apparent distress.    Neck: Normal ROM.     Cardiovascular: Normal rate.      Pulmonary/Chest: Effort normal. No respiratory distress.     Abdominal:  He exhibits no distension.  There is no CVA tenderness.   SP stoma patent. + granulating tissue. Mild surrounding erythema noted under skin folds.    Lymphadenopathy:        Right: No supraclavicular adenopathy present.        Left: No supraclavicular adenopathy present.     Neurological: He is alert and oriented to person, place, and time.     Skin: Skin is warm and dry.     Extremities: Decreased strength BLE. Needs assistance for transfer. In wheelchair.    Psych: Cooperative with normal affect.      Physical Exam      LABS: n/a    IMPRESSION:    Encounter Diagnoses   Name Primary?    Chronic suprapubic catheter Yes    Neurogenic bladder     Encounter for care or replacement of suprapubic tube     Granulation tissue          PLAN:  -I removed old SPT " without any difficulty and properly disposed.   Stoma cleaned and prepped with betadine.  I placed a 18 fr SPT using sterile technique, without difficulty.   Bladder was irrigated with 60cc and a good catheter position was confirmed.  Clear yellow urine received and balloon inflated by with ~10 cc sterile water.   Irrigated with additional 120cc without difficulty.  The catheter was connected with a drainage bag and catheter care instructions were given.  The wound was cleaned and dressed.  Silver nitrate to granulated tissue  The patient tolerated well.  Daily skin care and suprapubic catheter care discussed.  Educational materials given.  Increase water intake to help with sediment.   RTC 4 weeks for next SPT change.  Voiced understanding.       I spent 40 minutes with the patient of which more than half was spent in coordinating the patient's care as well as in direct consultation with the patient in regards to our treatment and plan.

## 2019-10-03 NOTE — ED NOTES
----- Message from Edith Padilla sent at 10/3/2019  1:15 PM CDT -----  Contact: pt  Reason:Pt watched video and is ready to schedule appt. Pt not sure which procedure just want a consult    Communication: [phone]   Telemetry box [id number]

## 2019-10-18 ENCOUNTER — TELEPHONE (OUTPATIENT)
Dept: INTERNAL MEDICINE | Facility: CLINIC | Age: 84
End: 2019-10-18

## 2019-10-18 NOTE — TELEPHONE ENCOUNTER
----- Message from Kirstin Mann sent at 10/18/2019  3:02 PM CDT -----  Contact: Albert Maxwell [phone]  Nurse called to report a missed visit for today. He will be seen next week as he is out of town today speaking at a conference.

## 2019-10-21 DIAGNOSIS — E78.5 DYSLIPIDEMIA: Chronic | ICD-10-CM

## 2019-10-22 RX ORDER — ATORVASTATIN CALCIUM 20 MG/1
TABLET, FILM COATED ORAL
Qty: 30 TABLET | Refills: 5 | Status: SHIPPED | OUTPATIENT
Start: 2019-10-22 | End: 2019-12-06

## 2019-10-24 ENCOUNTER — PATIENT OUTREACH (OUTPATIENT)
Dept: ADMINISTRATIVE | Facility: OTHER | Age: 84
End: 2019-10-24

## 2019-10-28 ENCOUNTER — PATIENT MESSAGE (OUTPATIENT)
Dept: UROLOGY | Facility: CLINIC | Age: 84
End: 2019-10-28

## 2019-10-31 ENCOUNTER — EXTERNAL HOME HEALTH (OUTPATIENT)
Dept: HOME HEALTH SERVICES | Facility: HOSPITAL | Age: 84
End: 2019-10-31
Payer: MEDICARE

## 2019-10-31 ENCOUNTER — PATIENT OUTREACH (OUTPATIENT)
Dept: ADMINISTRATIVE | Facility: OTHER | Age: 84
End: 2019-10-31

## 2019-10-31 ENCOUNTER — EXTERNAL CHRONIC CARE MANAGEMENT (OUTPATIENT)
Dept: PRIMARY CARE CLINIC | Facility: CLINIC | Age: 84
End: 2019-10-31
Payer: MEDICARE

## 2019-10-31 PROCEDURE — 99490 CHRNC CARE MGMT STAFF 1ST 20: CPT | Mod: PBBFAC | Performed by: INTERNAL MEDICINE

## 2019-10-31 PROCEDURE — 99490 PR CHRONIC CARE MGMT, 1ST 20 MIN: ICD-10-PCS | Mod: S$PBB,,, | Performed by: INTERNAL MEDICINE

## 2019-10-31 PROCEDURE — 99490 CHRNC CARE MGMT STAFF 1ST 20: CPT | Mod: S$PBB,,, | Performed by: INTERNAL MEDICINE

## 2019-11-05 ENCOUNTER — CLINICAL SUPPORT (OUTPATIENT)
Dept: INFECTIOUS DISEASES | Facility: CLINIC | Age: 84
End: 2019-11-05
Payer: MEDICARE

## 2019-11-05 ENCOUNTER — OFFICE VISIT (OUTPATIENT)
Dept: UROLOGY | Facility: CLINIC | Age: 84
End: 2019-11-05
Payer: MEDICARE

## 2019-11-05 ENCOUNTER — OFFICE VISIT (OUTPATIENT)
Dept: INTERNAL MEDICINE | Facility: CLINIC | Age: 84
End: 2019-11-05
Payer: MEDICARE

## 2019-11-05 VITALS
BODY MASS INDEX: 25.39 KG/M2 | HEART RATE: 68 BPM | RESPIRATION RATE: 18 BRPM | HEIGHT: 68 IN | DIASTOLIC BLOOD PRESSURE: 69 MMHG | SYSTOLIC BLOOD PRESSURE: 159 MMHG

## 2019-11-05 VITALS — OXYGEN SATURATION: 98 % | SYSTOLIC BLOOD PRESSURE: 118 MMHG | HEART RATE: 58 BPM | DIASTOLIC BLOOD PRESSURE: 70 MMHG

## 2019-11-05 DIAGNOSIS — R60.0 PERIPHERAL EDEMA: Primary | ICD-10-CM

## 2019-11-05 DIAGNOSIS — M25.552 LEFT HIP PAIN: ICD-10-CM

## 2019-11-05 DIAGNOSIS — Z93.59 CHRONIC SUPRAPUBIC CATHETER: ICD-10-CM

## 2019-11-05 DIAGNOSIS — M19.90 ARTHRITIS: ICD-10-CM

## 2019-11-05 DIAGNOSIS — N18.30 CKD (CHRONIC KIDNEY DISEASE) STAGE 3, GFR 30-59 ML/MIN: ICD-10-CM

## 2019-11-05 DIAGNOSIS — N31.9 NEUROGENIC BLADDER: Primary | ICD-10-CM

## 2019-11-05 DIAGNOSIS — R26.89 IMPAIRED GAIT AND MOBILITY: ICD-10-CM

## 2019-11-05 DIAGNOSIS — Z43.5 ENCOUNTER FOR CARE OR REPLACEMENT OF SUPRAPUBIC TUBE: ICD-10-CM

## 2019-11-05 DIAGNOSIS — Z23 NEED FOR INFLUENZA VACCINATION: ICD-10-CM

## 2019-11-05 DIAGNOSIS — Z23 NEED FOR TETANUS BOOSTER: ICD-10-CM

## 2019-11-05 PROCEDURE — 99999 PR PBB SHADOW E&M-EST. PATIENT-LVL IV: ICD-10-PCS | Mod: PBBFAC,,, | Performed by: NURSE PRACTITIONER

## 2019-11-05 PROCEDURE — 99999 PR PBB SHADOW E&M-EST. PATIENT-LVL III: CPT | Mod: PBBFAC,,, | Performed by: PHYSICIAN ASSISTANT

## 2019-11-05 PROCEDURE — 99999 PR PBB SHADOW E&M-EST. PATIENT-LVL IV: CPT | Mod: PBBFAC,,, | Performed by: NURSE PRACTITIONER

## 2019-11-05 PROCEDURE — 90662 IIV NO PRSV INCREASED AG IM: CPT | Mod: PBBFAC

## 2019-11-05 PROCEDURE — 99214 OFFICE O/P EST MOD 30 MIN: CPT | Mod: PBBFAC,27,25 | Performed by: NURSE PRACTITIONER

## 2019-11-05 PROCEDURE — 51705 CHANGE OF BLADDER TUBE: CPT | Mod: S$PBB,,, | Performed by: NURSE PRACTITIONER

## 2019-11-05 PROCEDURE — 99999 PR PBB SHADOW E&M-EST. PATIENT-LVL III: ICD-10-PCS | Mod: PBBFAC,,, | Performed by: PHYSICIAN ASSISTANT

## 2019-11-05 PROCEDURE — 99214 PR OFFICE/OUTPT VISIT, EST, LEVL IV, 30-39 MIN: ICD-10-PCS | Mod: S$PBB,,, | Performed by: PHYSICIAN ASSISTANT

## 2019-11-05 PROCEDURE — 99214 OFFICE O/P EST MOD 30 MIN: CPT | Mod: S$PBB,,, | Performed by: PHYSICIAN ASSISTANT

## 2019-11-05 PROCEDURE — 51705 CHANGE OF BLADDER TUBE: CPT | Mod: PBBFAC | Performed by: NURSE PRACTITIONER

## 2019-11-05 PROCEDURE — 99213 OFFICE O/P EST LOW 20 MIN: CPT | Mod: PBBFAC,25 | Performed by: PHYSICIAN ASSISTANT

## 2019-11-05 PROCEDURE — 99214 OFFICE O/P EST MOD 30 MIN: CPT | Mod: S$PBB,25,, | Performed by: NURSE PRACTITIONER

## 2019-11-05 PROCEDURE — 51705 PR CHANGE OF BLADDER TUBE,SIMPLE: ICD-10-PCS | Mod: S$PBB,,, | Performed by: NURSE PRACTITIONER

## 2019-11-05 PROCEDURE — 99214 PR OFFICE/OUTPT VISIT, EST, LEVL IV, 30-39 MIN: ICD-10-PCS | Mod: S$PBB,25,, | Performed by: NURSE PRACTITIONER

## 2019-11-05 RX ORDER — DICLOFENAC SODIUM 10 MG/G
2 GEL TOPICAL DAILY
Qty: 100 G | Refills: 0 | Status: SHIPPED | OUTPATIENT
Start: 2019-11-05 | End: 2020-08-24

## 2019-11-05 NOTE — PROGRESS NOTES
Subjective:       Patient ID: Chucho Prado is a 86 y.o. male.    Chief Complaint: Arthritis (hands) and Leg Swelling (both legs.. left is bigger)    HPI     Established pt of Obed Mcguire MD (new to me)    Here with concerns of leg swelling, chronic, increased over the past month. He noticed after his hctz was recently discontinued after recent hospitialization with hyponatremia. He elevates his legs nightly in his hospital bed and notes this improves of his swelling. He denies cp, sob, or orthopnea.     He also c/o arhtiris and joint pain, left hip and hands. Chronic He requests to extend his current HH orders specifically with Physical therapy as he notes this has improved his OA pain and mobility.     Also requests flu and tdap today.     Past Medical History:   Diagnosis Date    Acquired hypothyroidism 7/30/2013    Arthritis     Blood transfusion     before 2005 - whe had gangrenous gall bladder    Cataract     Chronic back pain 12/4/2018    - Takes Percocet 5-325 at home - Can continue current abdominal pain control with Dilaudid 0.5 mg IV Q3H prn     CKD (chronic kidney disease) stage 3, GFR 30-59 ml/min     Compression fracture of lumbar vertebra     Depression     Dyslipidemia     Essential hypertension 7/30/2013    Gastroesophageal reflux disease without esophagitis 12/4/2018    - continue home Prilosec    General anesthetics causing adverse effect in therapeutic use     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    GERD (gastroesophageal reflux disease)     History of postoperative delirium 12/4/2018    - Patient reports 1 week history of post-op delirium 7/2018 - Monitor electrolytes, UA, and urine cx - Delirium precautions  - Can use Seroquel 25 mg QHS prn     Hypertension     Hyponatremia 10/23/2017    Impaired mobility and ADLs 6/28/2018    Neurogenic bladder 12/4/2018    Sleep disorder 12/4/2018    - continue Trazodone QHS    Thyroid disease     UTI (urinary tract  infection)      Social History     Tobacco Use    Smoking status: Former Smoker     Years: 20.00     Last attempt to quit:      Years since quittin.8    Smokeless tobacco: Never Used    Tobacco comment: quit    Substance Use Topics    Alcohol use: No     Comment: rarely/6 months    Drug use: No     Review of patient's allergies indicates:  No Known Allergies          Review of Systems   Constitutional: Negative for chills, fever and unexpected weight change.   Respiratory: Negative for cough and shortness of breath.    Cardiovascular: Positive for leg swelling. Negative for chest pain.   Gastrointestinal: Negative for abdominal pain, nausea and vomiting.   Musculoskeletal: Positive for arthralgias and back pain ( s/p lumbar laminectomy and kyphoplasty  ).   Skin: Negative for rash.   Neurological: Negative for weakness and headaches.       Objective: /70   Pulse (!) 58   SpO2 98%         Physical Exam   Constitutional: He appears well-developed and well-nourished. No distress.   HENT:   Head: Normocephalic and atraumatic.   Mouth/Throat: Oropharynx is clear and moist.   Eyes: Pupils are equal, round, and reactive to light.   Cardiovascular: Normal rate and regular rhythm. Exam reveals no friction rub.   No murmur heard.  Pulmonary/Chest: Effort normal and breath sounds normal. He has no wheezes. He has no rales.   Abdominal: Soft. Bowel sounds are normal. There is no tenderness.   Musculoskeletal: He exhibits edema (No erythema or warmth). Tenderness: R>L 1+ diffuse xerosis to BLE.   In wheelchair with decreased ROM BLE/lumbar   Neurological: He is alert.   Skin: Skin is warm and dry. No rash noted.   Vitals reviewed.      Assessment:       1. Peripheral edema    2. Left hip pain    3. CKD (chronic kidney disease) stage 3, GFR 30-59 ml/min    4. Need for tetanus booster    5. Need for influenza vaccination    6. Arthritis    7. Impaired gait and mobility        Plan:         Chucho was  seen today for arthritis and leg swelling.    Diagnoses and all orders for this visit:    Peripheral edema        - Continue leg elevation       - Low salt diet      - Compression stockings prn  -     COMPRESSION STOCKINGS  Arthritis  Left hip pain  Impaired gait and mobility      - Request to extend HH for physical therapy, order placed but may need PCP approval  -     diclofenac sodium (VOLTAREN) 1 % Gel; Apply 2 g topically once daily.    CKD (chronic kidney disease) stage 3, GFR 30-59 ml/min  Lab Results   Component Value Date    CREATININE 1.2 09/05/2019   Stable      Need for tetanus booster  Need for influenza vaccination  Pt unable to wait today, late for Urology appt this after  May return to immunization station if unable to obtain at [place]          Kimberlyn Longo PA-C

## 2019-11-05 NOTE — PROGRESS NOTES
Subjective:       Patient ID: Chucho Prado is a 86 y.o. male.    Chief Complaint: Neurogenic Bladder (SPT change)    Chucho Prado is a 86 y.o. male with neurogenic bladder.  He had 18fr SPT placed by Dr. Taylor on 06/23/2017 to manage his Neurogenic Bladder.    He is here today for exchange.  His last exchange 10/01/2019.     He reports good urine flow.  Occasional bladder spasms;  SPT draining well;     12/05/2018 s/p Ventral hernia repair with mesh;this was done due to obstruction.    10/25/2017 (R) TKR with Dr. [facility].    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  He is getting ready for [employer] walking tour end of March; he given pre-tour talks at the [facility].  He goes to [employer] for b'day dinner; had great time            Past Medical History:  No date: Arthritis  No date: Blood transfusion  No date: CKD (chronic kidney disease) stage 3, GFR 30-5*  No date: Compression fracture of lumbar vertebra  No date: Depression  No date: Dyslipidemia  No date: GERD (gastroesophageal reflux disease)  No date: Hypertension  No date: Thyroid disease  No date: UTI (urinary tract infection)    Past Surgical History:  No date: CHOLECYSTECTOMY  No date: HERNIA REPAIR  No date: JOINT REPLACEMENT  12/27/2016: LAMINECTOMY      Comment: L2-L4  12/2016: LUMBAR LAMINECTOMY  No date: PARATHYROIDECTOMY  No date: TOTAL KNEE ARTHROPLASTY      Comment: Right    Review of patient's family history indicates:    Hypertension                   Mother                    Diabetes                       Father                    Esophageal cancer              Father                      Social History    Marital status: Single              Spouse name:                       Years of education:                 Number of children:               Occupational History    None on file    Social History Main Topics    Smoking status: Former Smoker                                                                 Packs/day: 0.00      Years: 20.00       Smokeless tobacco: Never Used                        Comment: quit 1999    Alcohol use: Yes                Comment: rarely/6 months    Drug use: No              Sexual activity: No                   Other Topics            Concern    None on file    Social History Narrative    Lives alone, owns house and rents part. Delaney helps. 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         Allergies:  Review of patient's allergies indicates no known allergies.    Medications:  Current Outpatient Prescriptions:   acetaminophen (TYLENOL) 500 MG tablet, Take 2 tablets (1,000 mg total) by mouth 3 (three) times daily as needed for Pain., Disp: 30 tablet, Rfl: 0  aspirin 81 MG Chew, Take 1 tablet (81 mg total) by mouth once daily. HOLD UNTIL TOLD TO RESUME BY PHYSICIAN, Disp: 30 tablet, Rfl: 0  calcitonin, salmon, (FORTICAL) 200 unit/actuation nasal spray, 1 spray by Nasal route once daily., Disp: 1 Bottle, Rfl: 0  diclofenac sodium (VOLTAREN) 1 % Gel, Apply 2 g topically 3 (three) times daily. To left knee, Disp: 100 g, Rfl: 3  gabapentin (NEURONTIN) 400 MG capsule, Take 2 capsules (800 mg total) by mouth 3 (three) times daily., Disp: 180 capsule, Rfl: 11  hyoscyamine (LEVSIN/SL) 0.125 mg Subl, Place 1 tablet (0.125 mg total) under the tongue every 4 (four) hours as needed (bladder spasms)., Disp: 30 tablet, Rfl: 1  levothyroxine (SYNTHROID) 50 MCG tablet, Take 1 tablet (50 mcg total) by mouth once daily., Disp: 100 tablet, Rfl: 11  mirtazapine (REMERON) 30 MG tablet, Take 1 tablet (30 mg total) by mouth every evening., Disp: 30 tablet, Rfl: 6  polyethylene glycol (GLYCOLAX) 17 gram PwPk, Take 17 g by mouth once daily., Disp: 30 packet, Rfl: 0  simethicone (MYLICON) 80 MG chewable tablet, Take 1 tablet (80 mg total) by mouth 3 (three) times daily after meals., Disp: , Rfl: 0  simvastatin (ZOCOR) 40 MG tablet, Take 1 tablet (40 mg total) by mouth every evening., Disp: 90  tablet, Rfl: 11  tamsulosin (FLOMAX) 0.4 mg Cp24, Take 1 capsule (0.4 mg total) by mouth once daily., Disp: 30 capsule, Rfl: 3  triamterene-hydrochlorothiazide 37.5-25 mg (DYAZIDE) 37.5-25 mg per capsule, Take 1 capsule by mouth every morning. HOLD UNTIL TOLD TO RESUME BY PHYSICIAN, Disp: 90 capsule, Rfl: 3                Review of Systems   Constitutional: Negative for activity change, appetite change, chills and fever.   HENT: Negative for facial swelling and trouble swallowing.    Eyes: Negative for visual disturbance.   Respiratory: Negative for chest tightness and shortness of breath.    Cardiovascular: Negative for chest pain and palpitations.   Gastrointestinal: Negative.  Negative for abdominal pain, constipation, diarrhea, nausea and vomiting.   Genitourinary: Positive for difficulty urinating. Negative for dysuria, flank pain, hematuria, penile pain, penile swelling, scrotal swelling and testicular pain.        SPT draining well     Musculoskeletal: Positive for arthralgias and gait problem. Negative for back pain, myalgias and neck stiffness.   Skin: Negative for rash.   Neurological: Positive for weakness. Negative for dizziness and speech difficulty.   Hematological: Does not bruise/bleed easily.   Psychiatric/Behavioral: Negative for behavioral problems.       Objective:      Physical Exam   Nursing note and vitals reviewed.  Constitutional: He is oriented to person, place, and time. Vital signs are normal. He appears well-developed and well-nourished. He is active and cooperative.  Non-toxic appearance. He does not have a sickly appearance.   HENT:   Head: Normocephalic and atraumatic.   Right Ear: External ear normal.   Left Ear: External ear normal.   Nose: Nose normal.   Mouth/Throat: Mucous membranes are normal.   Eyes: Conjunctivae and lids are normal. No scleral icterus.   Neck: Trachea normal, normal range of motion and full passive range of motion without pain. Neck supple. No JVD present.  No tracheal deviation present.   Cardiovascular: Normal rate, S1 normal and S2 normal.    Pulmonary/Chest: Effort normal. No respiratory distress. He exhibits no tenderness.   Abdominal: Soft. Normal appearance. There is no hepatosplenomegaly. There is no tenderness. There is no CVA tenderness.       Genitourinary: Penis normal.   Musculoskeletal: Normal range of motion.   Neurological: He is alert and oriented to person, place, and time. He has normal strength.   Skin: Skin is warm, dry and intact.     Psychiatric: He has a normal mood and affect. His behavior is normal. Judgment and thought content normal.       Assessment:       1. Neurogenic bladder    2. Chronic suprapubic catheter    3. Encounter for care or replacement of suprapubic tube        Plan:           I spent 25 minutes with the patient of which more than half was spent in direct consultation with the patient in regards to our treatment and plan.    Education and recommendations of today's plan of care including home remedies.  I removed his old SPT.  I then placed a new 18fr SPT without difficulty using sterile procedure.  Skin barrrier cream to surrounding tissue  Irrigated to verify the position.  Balloon inflated 9cc sterile water/still irrigated well.  Dressing applied.  RTC one month

## 2019-11-29 ENCOUNTER — PATIENT OUTREACH (OUTPATIENT)
Dept: ADMINISTRATIVE | Facility: OTHER | Age: 84
End: 2019-11-29

## 2019-11-30 ENCOUNTER — EXTERNAL CHRONIC CARE MANAGEMENT (OUTPATIENT)
Dept: PRIMARY CARE CLINIC | Facility: CLINIC | Age: 84
End: 2019-11-30
Payer: MEDICARE

## 2019-11-30 PROCEDURE — 99490 PR CHRONIC CARE MGMT, 1ST 20 MIN: ICD-10-PCS | Mod: S$PBB,,, | Performed by: INTERNAL MEDICINE

## 2019-11-30 PROCEDURE — 99490 CHRNC CARE MGMT STAFF 1ST 20: CPT | Mod: PBBFAC | Performed by: INTERNAL MEDICINE

## 2019-11-30 PROCEDURE — 99490 CHRNC CARE MGMT STAFF 1ST 20: CPT | Mod: S$PBB,,, | Performed by: INTERNAL MEDICINE

## 2019-12-02 ENCOUNTER — PATIENT MESSAGE (OUTPATIENT)
Dept: UROLOGY | Facility: CLINIC | Age: 84
End: 2019-12-02

## 2019-12-05 ENCOUNTER — PATIENT OUTREACH (OUTPATIENT)
Dept: ADMINISTRATIVE | Facility: OTHER | Age: 84
End: 2019-12-05

## 2019-12-06 DIAGNOSIS — E78.5 DYSLIPIDEMIA: Chronic | ICD-10-CM

## 2019-12-06 RX ORDER — ATORVASTATIN CALCIUM 20 MG/1
20 TABLET, FILM COATED ORAL DAILY
Qty: 90 TABLET | Refills: 11 | Status: SHIPPED | OUTPATIENT
Start: 2019-12-06 | End: 2021-01-05

## 2019-12-10 ENCOUNTER — OFFICE VISIT (OUTPATIENT)
Dept: UROLOGY | Facility: CLINIC | Age: 84
End: 2019-12-10
Payer: MEDICARE

## 2019-12-10 VITALS — HEART RATE: 71 BPM | DIASTOLIC BLOOD PRESSURE: 87 MMHG | SYSTOLIC BLOOD PRESSURE: 163 MMHG

## 2019-12-10 DIAGNOSIS — Z93.59 CHRONIC SUPRAPUBIC CATHETER: ICD-10-CM

## 2019-12-10 DIAGNOSIS — N31.9 NEUROGENIC BLADDER: Primary | ICD-10-CM

## 2019-12-10 DIAGNOSIS — Z43.5 ENCOUNTER FOR CARE OR REPLACEMENT OF SUPRAPUBIC TUBE: ICD-10-CM

## 2019-12-10 PROCEDURE — 51705 CHANGE OF BLADDER TUBE: CPT | Mod: PBBFAC | Performed by: NURSE PRACTITIONER

## 2019-12-10 PROCEDURE — 1159F MED LIST DOCD IN RCRD: CPT | Mod: ,,, | Performed by: NURSE PRACTITIONER

## 2019-12-10 PROCEDURE — 51705 CHANGE OF BLADDER TUBE: CPT | Mod: S$PBB,,, | Performed by: NURSE PRACTITIONER

## 2019-12-10 PROCEDURE — 99213 OFFICE O/P EST LOW 20 MIN: CPT | Mod: PBBFAC,25 | Performed by: NURSE PRACTITIONER

## 2019-12-10 PROCEDURE — 99999 PR PBB SHADOW E&M-EST. PATIENT-LVL III: CPT | Mod: PBBFAC,,, | Performed by: NURSE PRACTITIONER

## 2019-12-10 PROCEDURE — 51705 PR CHANGE OF BLADDER TUBE,SIMPLE: ICD-10-PCS | Mod: S$PBB,,, | Performed by: NURSE PRACTITIONER

## 2019-12-10 PROCEDURE — 1159F PR MEDICATION LIST DOCUMENTED IN MEDICAL RECORD: ICD-10-PCS | Mod: ,,, | Performed by: NURSE PRACTITIONER

## 2019-12-10 PROCEDURE — 99999 PR PBB SHADOW E&M-EST. PATIENT-LVL III: ICD-10-PCS | Mod: PBBFAC,,, | Performed by: NURSE PRACTITIONER

## 2019-12-10 NOTE — PROGRESS NOTES
Subjective:       Patient ID: Chucho Prado is a 86 y.o. male.    Chief Complaint: Neurogenic Bladder (Chronic Suprapubic Catheter)    Chucho Prado is a 86 y.o. male with neurogenic bladder.  He had 18fr SPT placed by Dr. Taylor on 06/23/2017 to manage his Neurogenic Bladder.    He is here today for exchange.  His last exchange 11/05/2019.     He reports good urine flow.  Occasional bladder spasms;  SPT draining well;     12/05/2018 s/p Ventral hernia repair with mesh;this was done due to obstruction.    10/25/2017 (R) TKR with Dr. [facility].    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  He is getting ready for [employer] walking tour end of March; he given pre-tour talks at the [facility].  He goes to [employer] for b'day dinner; had great time            Past Medical History:  No date: Arthritis  No date: Blood transfusion  No date: CKD (chronic kidney disease) stage 3, GFR 30-5*  No date: Compression fracture of lumbar vertebra  No date: Depression  No date: Dyslipidemia  No date: GERD (gastroesophageal reflux disease)  No date: Hypertension  No date: Thyroid disease  No date: UTI (urinary tract infection)    Past Surgical History:  No date: CHOLECYSTECTOMY  No date: HERNIA REPAIR  No date: JOINT REPLACEMENT  12/27/2016: LAMINECTOMY      Comment: L2-L4  12/2016: LUMBAR LAMINECTOMY  No date: PARATHYROIDECTOMY  No date: TOTAL KNEE ARTHROPLASTY      Comment: Right    Review of patient's family history indicates:    Hypertension                   Mother                    Diabetes                       Father                    Esophageal cancer              Father                      Social History    Marital status: Single              Spouse name:                       Years of education:                 Number of children:               Occupational History    None on file    Social History Main Topics    Smoking status: Former Smoker                                                                 Packs/day: 0.00      Years: 20.00       Smokeless tobacco: Never Used                        Comment: quit 1999    Alcohol use: Yes                Comment: rarely/6 months    Drug use: No              Sexual activity: No                   Other Topics            Concern    None on file    Social History Narrative    Lives alone, owns house and rents part.  helps. 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         Allergies:  Review of patient's allergies indicates no known allergies.    Medications:  Current Outpatient Prescriptions:   acetaminophen (TYLENOL) 500 MG tablet, Take 2 tablets (1,000 mg total) by mouth 3 (three) times daily as needed for Pain., Disp: 30 tablet, Rfl: 0  aspirin 81 MG Chew, Take 1 tablet (81 mg total) by mouth once daily. HOLD UNTIL TOLD TO RESUME BY PHYSICIAN, Disp: 30 tablet, Rfl: 0  calcitonin, salmon, (FORTICAL) 200 unit/actuation nasal spray, 1 spray by Nasal route once daily., Disp: 1 Bottle, Rfl: 0  diclofenac sodium (VOLTAREN) 1 % Gel, Apply 2 g topically 3 (three) times daily. To left knee, Disp: 100 g, Rfl: 3  gabapentin (NEURONTIN) 400 MG capsule, Take 2 capsules (800 mg total) by mouth 3 (three) times daily., Disp: 180 capsule, Rfl: 11  hyoscyamine (LEVSIN/SL) 0.125 mg Subl, Place 1 tablet (0.125 mg total) under the tongue every 4 (four) hours as needed (bladder spasms)., Disp: 30 tablet, Rfl: 1  levothyroxine (SYNTHROID) 50 MCG tablet, Take 1 tablet (50 mcg total) by mouth once daily., Disp: 100 tablet, Rfl: 11  mirtazapine (REMERON) 30 MG tablet, Take 1 tablet (30 mg total) by mouth every evening., Disp: 30 tablet, Rfl: 6  polyethylene glycol (GLYCOLAX) 17 gram PwPk, Take 17 g by mouth once daily., Disp: 30 packet, Rfl: 0  simethicone (MYLICON) 80 MG chewable tablet, Take 1 tablet (80 mg total) by mouth 3 (three) times daily after meals., Disp: , Rfl: 0  simvastatin (ZOCOR) 40 MG tablet, Take 1 tablet (40 mg total) by mouth every  evening., Disp: 90 tablet, Rfl: 11  tamsulosin (FLOMAX) 0.4 mg Cp24, Take 1 capsule (0.4 mg total) by mouth once daily., Disp: 30 capsule, Rfl: 3  triamterene-hydrochlorothiazide 37.5-25 mg (DYAZIDE) 37.5-25 mg per capsule, Take 1 capsule by mouth every morning. HOLD UNTIL TOLD TO RESUME BY PHYSICIAN, Disp: 90 capsule, Rfl: 3                Review of Systems   Constitutional: Negative for activity change, appetite change, chills and fever.   HENT: Negative for facial swelling and trouble swallowing.    Eyes: Negative for visual disturbance.   Respiratory: Negative for chest tightness and shortness of breath.    Cardiovascular: Negative for chest pain and palpitations.   Gastrointestinal: Negative.  Negative for abdominal pain, constipation, diarrhea, nausea and vomiting.   Genitourinary: Positive for difficulty urinating. Negative for dysuria, flank pain, hematuria, penile pain, penile swelling, scrotal swelling and testicular pain.        SPT draining well     Musculoskeletal: Positive for arthralgias and gait problem. Negative for back pain, myalgias and neck stiffness.   Skin: Negative for rash.   Neurological: Positive for weakness. Negative for dizziness and speech difficulty.   Hematological: Does not bruise/bleed easily.   Psychiatric/Behavioral: Negative for behavioral problems.       Objective:      Physical Exam   Nursing note and vitals reviewed.  Constitutional: He is oriented to person, place, and time. Vital signs are normal. He appears well-developed and well-nourished. He is active and cooperative.  Non-toxic appearance. He does not have a sickly appearance.   HENT:   Head: Normocephalic and atraumatic.   Right Ear: External ear normal.   Left Ear: External ear normal.   Nose: Nose normal.   Mouth/Throat: Mucous membranes are normal.   Eyes: Conjunctivae and lids are normal. No scleral icterus.   Neck: Trachea normal, normal range of motion and full passive range of motion without pain. Neck  supple. No JVD present. No tracheal deviation present.   Cardiovascular: Normal rate, S1 normal and S2 normal.    Pulmonary/Chest: Effort normal. No respiratory distress. He exhibits no tenderness.   Abdominal: Soft. Normal appearance. There is no hepatosplenomegaly. There is no tenderness. There is no CVA tenderness.       Musculoskeletal: Normal range of motion.   Neurological: He is alert and oriented to person, place, and time. He has normal strength.   Skin: Skin is warm, dry and intact.     Psychiatric: He has a normal mood and affect. His behavior is normal. Judgment and thought content normal.       Assessment:       1. Neurogenic bladder    2. Chronic suprapubic catheter    3. Encounter for care or replacement of suprapubic tube        Plan:      I spent 25 minutes with the patient of which more than half was spent in direct consultation with the patient in regards to our treatment and plan.    Education and recommendations of today's plan of care including home remedies.  I removed his old SPT.  I then placed a new 18fr SPT without difficulty using sterile procedure.  Skin barrrier cream to surrounding tissue  Irrigated to verify the position.  Balloon inflated 9cc sterile water/still irrigated well.  Dressing applied.  RTC one month

## 2019-12-31 ENCOUNTER — EXTERNAL CHRONIC CARE MANAGEMENT (OUTPATIENT)
Dept: PRIMARY CARE CLINIC | Facility: CLINIC | Age: 84
End: 2019-12-31
Payer: MEDICARE

## 2019-12-31 PROCEDURE — 99490 CHRNC CARE MGMT STAFF 1ST 20: CPT | Mod: S$PBB,,, | Performed by: INTERNAL MEDICINE

## 2019-12-31 PROCEDURE — 99490 CHRNC CARE MGMT STAFF 1ST 20: CPT | Mod: PBBFAC | Performed by: INTERNAL MEDICINE

## 2019-12-31 PROCEDURE — 99490 PR CHRONIC CARE MGMT, 1ST 20 MIN: ICD-10-PCS | Mod: S$PBB,,, | Performed by: INTERNAL MEDICINE

## 2020-01-06 ENCOUNTER — PATIENT OUTREACH (OUTPATIENT)
Dept: ADMINISTRATIVE | Facility: OTHER | Age: 85
End: 2020-01-06

## 2020-01-14 ENCOUNTER — PATIENT OUTREACH (OUTPATIENT)
Dept: ADMINISTRATIVE | Facility: OTHER | Age: 85
End: 2020-01-14

## 2020-01-15 ENCOUNTER — OFFICE VISIT (OUTPATIENT)
Dept: UROLOGY | Facility: CLINIC | Age: 85
End: 2020-01-15
Payer: MEDICARE

## 2020-01-15 VITALS — DIASTOLIC BLOOD PRESSURE: 66 MMHG | HEART RATE: 64 BPM | SYSTOLIC BLOOD PRESSURE: 138 MMHG

## 2020-01-15 DIAGNOSIS — Z43.5 ENCOUNTER FOR CARE OR REPLACEMENT OF SUPRAPUBIC TUBE: ICD-10-CM

## 2020-01-15 DIAGNOSIS — Z93.59 SUPRAPUBIC CATHETER: ICD-10-CM

## 2020-01-15 DIAGNOSIS — N31.9 NEUROGENIC BLADDER: ICD-10-CM

## 2020-01-15 DIAGNOSIS — R33.9 URINARY RETENTION: ICD-10-CM

## 2020-01-15 PROCEDURE — 51705 CHANGE OF BLADDER TUBE: CPT | Mod: S$PBB,,, | Performed by: NURSE PRACTITIONER

## 2020-01-15 PROCEDURE — 51705 CHANGE OF BLADDER TUBE: CPT | Mod: PBBFAC | Performed by: NURSE PRACTITIONER

## 2020-01-15 PROCEDURE — 99999 PR PBB SHADOW E&M-EST. PATIENT-LVL III: ICD-10-PCS | Mod: PBBFAC,,, | Performed by: NURSE PRACTITIONER

## 2020-01-15 PROCEDURE — 99213 OFFICE O/P EST LOW 20 MIN: CPT | Mod: PBBFAC,25 | Performed by: NURSE PRACTITIONER

## 2020-01-15 PROCEDURE — 51705 PR CHANGE OF BLADDER TUBE,SIMPLE: ICD-10-PCS | Mod: S$PBB,,, | Performed by: NURSE PRACTITIONER

## 2020-01-15 PROCEDURE — 1159F MED LIST DOCD IN RCRD: CPT | Mod: ,,, | Performed by: NURSE PRACTITIONER

## 2020-01-15 PROCEDURE — 99499 UNLISTED E&M SERVICE: CPT | Mod: S$PBB,,, | Performed by: NURSE PRACTITIONER

## 2020-01-15 PROCEDURE — 99999 PR PBB SHADOW E&M-EST. PATIENT-LVL III: CPT | Mod: PBBFAC,,, | Performed by: NURSE PRACTITIONER

## 2020-01-15 PROCEDURE — 1159F PR MEDICATION LIST DOCUMENTED IN MEDICAL RECORD: ICD-10-PCS | Mod: ,,, | Performed by: NURSE PRACTITIONER

## 2020-01-15 PROCEDURE — 99499 NO LOS: ICD-10-PCS | Mod: S$PBB,,, | Performed by: NURSE PRACTITIONER

## 2020-01-15 NOTE — PROGRESS NOTES
Subjective:       Patient ID: Chucho Prado is a 86 y.o. male.    Chief Complaint: Urinary Retention (Neurogenic Bladder)    Chucho Prado is a 86 y.o. male with neurogenic bladder.  He had 18fr SPT placed by Dr. Taylor on 06/23/2017 to manage his Neurogenic Bladder.    He is here today for exchange.  His last exchange 12/10/2019.     He reports good urine flow.  Occasional bladder spasms;  SPT draining well;     12/05/2018 s/p Ventral hernia repair with mesh;this was done due to obstruction.    10/25/2017 (R) TKR with Dr. [facility].    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  He is getting ready for [employer] walking tour end of March; he given pre-tour talks at the [facility].  He goes to [employer] for b'day dinner; had great time            Past Medical History:  No date: Arthritis  No date: Blood transfusion  No date: CKD (chronic kidney disease) stage 3, GFR 30-5*  No date: Compression fracture of lumbar vertebra  No date: Depression  No date: Dyslipidemia  No date: GERD (gastroesophageal reflux disease)  No date: Hypertension  No date: Thyroid disease  No date: UTI (urinary tract infection)    Past Surgical History:  No date: CHOLECYSTECTOMY  No date: HERNIA REPAIR  No date: JOINT REPLACEMENT  12/27/2016: LAMINECTOMY      Comment: L2-L4  12/2016: LUMBAR LAMINECTOMY  No date: PARATHYROIDECTOMY  No date: TOTAL KNEE ARTHROPLASTY      Comment: Right    Review of patient's family history indicates:    Hypertension                   Mother                    Diabetes                       Father                    Esophageal cancer              Father                      Social History    Marital status: Single              Spouse name:                       Years of education:                 Number of children:               Occupational History    None on file    Social History Main Topics    Smoking status: Former Smoker                                                                 Packs/day: 0.00      Years: 20.00       Smokeless tobacco: Never Used                        Comment: quit 1999    Alcohol use: Yes                Comment: rarely/6 months    Drug use: No              Sexual activity: No                   Other Topics            Concern    None on file    Social History Narrative    Lives alone, owns house and rents part. Delaney helps. 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         Allergies:  Review of patient's allergies indicates no known allergies.    Medications:  Current Outpatient Prescriptions:   acetaminophen (TYLENOL) 500 MG tablet, Take 2 tablets (1,000 mg total) by mouth 3 (three) times daily as needed for Pain., Disp: 30 tablet, Rfl: 0  aspirin 81 MG Chew, Take 1 tablet (81 mg total) by mouth once daily. HOLD UNTIL TOLD TO RESUME BY PHYSICIAN, Disp: 30 tablet, Rfl: 0  calcitonin, salmon, (FORTICAL) 200 unit/actuation nasal spray, 1 spray by Nasal route once daily., Disp: 1 Bottle, Rfl: 0  diclofenac sodium (VOLTAREN) 1 % Gel, Apply 2 g topically 3 (three) times daily. To left knee, Disp: 100 g, Rfl: 3  gabapentin (NEURONTIN) 400 MG capsule, Take 2 capsules (800 mg total) by mouth 3 (three) times daily., Disp: 180 capsule, Rfl: 11  hyoscyamine (LEVSIN/SL) 0.125 mg Subl, Place 1 tablet (0.125 mg total) under the tongue every 4 (four) hours as needed (bladder spasms)., Disp: 30 tablet, Rfl: 1  levothyroxine (SYNTHROID) 50 MCG tablet, Take 1 tablet (50 mcg total) by mouth once daily., Disp: 100 tablet, Rfl: 11  mirtazapine (REMERON) 30 MG tablet, Take 1 tablet (30 mg total) by mouth every evening., Disp: 30 tablet, Rfl: 6  polyethylene glycol (GLYCOLAX) 17 gram PwPk, Take 17 g by mouth once daily., Disp: 30 packet, Rfl: 0  simethicone (MYLICON) 80 MG chewable tablet, Take 1 tablet (80 mg total) by mouth 3 (three) times daily after meals., Disp: , Rfl: 0  simvastatin (ZOCOR) 40 MG tablet, Take 1 tablet (40 mg total) by mouth every evening.,  Disp: 90 tablet, Rfl: 11  tamsulosin (FLOMAX) 0.4 mg Cp24, Take 1 capsule (0.4 mg total) by mouth once daily., Disp: 30 capsule, Rfl: 3  triamterene-hydrochlorothiazide 37.5-25 mg (DYAZIDE) 37.5-25 mg per capsule, Take 1 capsule by mouth every morning. HOLD UNTIL TOLD TO RESUME BY PHYSICIAN, Disp: 90 capsule, Rfl: 3                Review of Systems   Constitutional: Negative for activity change, appetite change, chills and fever.   HENT: Negative for facial swelling and trouble swallowing.    Eyes: Negative for visual disturbance.   Respiratory: Negative for chest tightness and shortness of breath.    Cardiovascular: Negative for chest pain and palpitations.   Gastrointestinal: Negative.  Negative for abdominal pain, constipation, diarrhea, nausea and vomiting.   Genitourinary: Positive for difficulty urinating. Negative for dysuria, flank pain, hematuria, penile pain and testicular pain.   Musculoskeletal: Positive for arthralgias and gait problem. Negative for back pain, myalgias and neck stiffness.   Skin: Negative for rash.   Neurological: Negative for dizziness and speech difficulty.   Hematological: Does not bruise/bleed easily.   Psychiatric/Behavioral: Negative for behavioral problems.       Objective:      Physical Exam   Nursing note and vitals reviewed.  Constitutional: He is oriented to person, place, and time. Vital signs are normal. He appears well-developed and well-nourished. He is active and cooperative.  Non-toxic appearance. He does not have a sickly appearance.   HENT:   Head: Normocephalic and atraumatic.   Right Ear: External ear normal.   Left Ear: External ear normal.   Nose: Nose normal.   Mouth/Throat: Mucous membranes are normal.   Eyes: Conjunctivae and lids are normal. No scleral icterus.   Neck: Trachea normal, normal range of motion and full passive range of motion without pain. Neck supple. No JVD present. No tracheal deviation present.   Cardiovascular: Normal rate, S1 normal and  S2 normal.    Pulmonary/Chest: Effort normal. No respiratory distress. He exhibits no tenderness.   Abdominal: Soft. Normal appearance. There is no hepatosplenomegaly. There is no tenderness. There is no CVA tenderness.       Musculoskeletal: Normal range of motion.   Neurological: He is alert and oriented to person, place, and time. He has normal strength.   Skin: Skin is warm, dry and intact.     Psychiatric: He has a normal mood and affect. His behavior is normal. Judgment and thought content normal.       Assessment:       1. Neurogenic bladder    2. Suprapubic catheter    3. Encounter for care or replacement of suprapubic tube    4. Urinary retention        Plan:         I spent 25 minutes with the patient of which more than half was spent in direct consultation with the patient in regards to our treatment and plan.    Education and recommendations of today's plan of care including home remedies.  I removed his old SPT.  I then placed a new 18fr SPT without difficulty using sterile procedure.  Skin barrrier cream to surrounding tissue  Irrigated to verify the position.  Balloon inflated 9cc sterile water/still irrigated well.  Dressing applied.  RTC one month

## 2020-01-31 ENCOUNTER — EXTERNAL CHRONIC CARE MANAGEMENT (OUTPATIENT)
Dept: PRIMARY CARE CLINIC | Facility: CLINIC | Age: 85
End: 2020-01-31
Payer: MEDICARE

## 2020-01-31 PROCEDURE — 99490 CHRNC CARE MGMT STAFF 1ST 20: CPT | Mod: PBBFAC | Performed by: INTERNAL MEDICINE

## 2020-01-31 PROCEDURE — 99490 CHRNC CARE MGMT STAFF 1ST 20: CPT | Mod: S$PBB,,, | Performed by: INTERNAL MEDICINE

## 2020-01-31 PROCEDURE — 99490 PR CHRONIC CARE MGMT, 1ST 20 MIN: ICD-10-PCS | Mod: S$PBB,,, | Performed by: INTERNAL MEDICINE

## 2020-02-10 ENCOUNTER — PATIENT OUTREACH (OUTPATIENT)
Dept: ADMINISTRATIVE | Facility: OTHER | Age: 85
End: 2020-02-10

## 2020-02-14 ENCOUNTER — PATIENT MESSAGE (OUTPATIENT)
Dept: UROLOGY | Facility: CLINIC | Age: 85
End: 2020-02-14

## 2020-02-17 ENCOUNTER — PATIENT OUTREACH (OUTPATIENT)
Dept: ADMINISTRATIVE | Facility: OTHER | Age: 85
End: 2020-02-17

## 2020-02-18 NOTE — PROGRESS NOTES
LINKS immunization registry and Health Maintenance updated.  Chart reviewed for overdue [facility]coun[facility] health maintenance testing.

## 2020-02-19 ENCOUNTER — OFFICE VISIT (OUTPATIENT)
Dept: UROLOGY | Facility: CLINIC | Age: 85
End: 2020-02-19
Payer: MEDICARE

## 2020-02-19 VITALS — HEART RATE: 76 BPM | SYSTOLIC BLOOD PRESSURE: 181 MMHG | DIASTOLIC BLOOD PRESSURE: 88 MMHG

## 2020-02-19 DIAGNOSIS — Z43.5 ENCOUNTER FOR CARE OR REPLACEMENT OF SUPRAPUBIC TUBE: ICD-10-CM

## 2020-02-19 DIAGNOSIS — Z93.59 SUPRAPUBIC CATHETER: ICD-10-CM

## 2020-02-19 DIAGNOSIS — N31.9 NEUROGENIC BLADDER: ICD-10-CM

## 2020-02-19 PROCEDURE — 99999 PR PBB SHADOW E&M-EST. PATIENT-LVL III: CPT | Mod: PBBFAC,,, | Performed by: NURSE PRACTITIONER

## 2020-02-19 PROCEDURE — 51705 CHANGE OF BLADDER TUBE: CPT | Mod: PBBFAC | Performed by: NURSE PRACTITIONER

## 2020-02-19 PROCEDURE — 99999 PR PBB SHADOW E&M-EST. PATIENT-LVL III: ICD-10-PCS | Mod: PBBFAC,,, | Performed by: NURSE PRACTITIONER

## 2020-02-19 PROCEDURE — 51705 CHANGE OF BLADDER TUBE: CPT | Mod: S$PBB,,, | Performed by: NURSE PRACTITIONER

## 2020-02-19 PROCEDURE — 99214 OFFICE O/P EST MOD 30 MIN: CPT | Mod: S$PBB,25,, | Performed by: NURSE PRACTITIONER

## 2020-02-19 PROCEDURE — 51705 PR CHANGE OF BLADDER TUBE,SIMPLE: ICD-10-PCS | Mod: S$PBB,,, | Performed by: NURSE PRACTITIONER

## 2020-02-19 PROCEDURE — 99213 OFFICE O/P EST LOW 20 MIN: CPT | Mod: PBBFAC,25 | Performed by: NURSE PRACTITIONER

## 2020-02-19 PROCEDURE — 99214 PR OFFICE/OUTPT VISIT, EST, LEVL IV, 30-39 MIN: ICD-10-PCS | Mod: S$PBB,25,, | Performed by: NURSE PRACTITIONER

## 2020-02-19 NOTE — PROGRESS NOTES
Intensivist Progress Note


Assessment/Plan: 


Assessment/plan:








79 F s/p MV redo with Maze procedure complicated by recurrent (known) PAF and 

respiratory failure. 





* Acute Respiratory failure with hypoxia and hypercapnia likely 2/2 advanced 

age with left diaphragmatic dysfunction and multiple failed extubations. Trach 

placed 12/26 by ENT in OR. Cannot rule out contribution from PNA as well given 

elevated WBC. Started cefepime 12/26 in response to GNR in sputum culture, but 

may have been too low a dose so increased 12/28 and wbc reduced 12/29. Will 

wean again today on PS5 x 2 hrs BID followed by longer periods of weaning until 

we think she can transition to TC alone (which she failed 12/27). Check abg at 

end of 2nd wean


* FEN- DHT placed 12/27. She may be able to swallow soon, so I dont thin a PEG 

is needed at this time. 


* Hypernatremia- increase FW


* Afib- on amiodarone and metoprolol increased 12/28, but developed rapid rate 

to 140's after walking earlier today. 


* s/p MVR redo, TV annuloplasty











Subjective: 





Able to tolerate simple PS weans several times 12/28. 


Objective: 





 Vital Signs











Temp Pulse Resp BP Pulse Ox


 


 37.2 C   119 H  14   128/81 H  98 


 


 12/29/17 10:00  12/29/17 10:00  12/29/17 10:00  12/29/17 10:00  12/29/17 10:00








 Laboratory Results





 12/29/17 10:51 





 12/29/17 05:10 





 











 12/28/17 12/29/17 12/30/17





 05:59 05:59 05:59


 


Intake Total 1000 2933 


 


Output Total 830 1950 


 


Balance 170 983 








 











PT  18.6 SEC (12.0-15.0)  H  12/27/17  05:35    


 


INR  1.54  (0.83-1.16)  H  12/27/17  05:35    














Physical Exam





- Physical Exam


General Appearance: alert, no apparent distress


EENT: PERRL/EOMI


Neck: supple


Respiratory: lungs clear, normal breath sounds, No respiratory distress, No 

accessory muscle use


Cardiac/Chest: tachycardia, irregularly irregular, No edema


Abdomen: non-tender, soft, No distended


Skin: normal color, warm/dry, No cyanosis


Lymphatic: no adenopathy


Extremities: No pedal edema


Neuro/Psych: alert, normal mood/affect, oriented x 3





ICD10 Worksheet


Patient Problems: 


 Problems











Problem Status Onset


 


Acute blood loss anemia Acute  


 


Coagulopathy Acute  


 


S/P Maze operation for atrial fibrillation Acute  ~12/20/17


 


S/P mitral valve replacement with bioprosthetic valve Acute  ~12/20/17


 


S/P tricuspid valve repair Acute  ~12/20/17


 


Secondary tricuspid regurgitation Acute  


 


Severe mitral regurgitation Acute  


 


s/p placement LV epicardial lead Acute  ~12/20/17


 


Ascending aorta enlargement Chronic  


 


Atrial enlargement, bilateral Chronic  


 


Chronic anticoagulation Chronic  


 


Chronic diastolic congestive heart failure Chronic  


 


HTN (hypertension) Chronic  


 


Paroxysmal atrial fibrillation Chronic  


 


Pulmonary hypertension Chronic Subjective:       Patient ID: Chucho Prado is a 86 y.o. male.    Chief Complaint: Other (SP catheter change) and Urinary Retention (Neurogenic Bladder)    Chucho Prado is a 86 y.o. male with neurogenic bladder.  He had 18fr SPT placed by Dr. Taylor on 06/23/2017 to manage his Neurogenic Bladder.    He is here today for exchange.  His last exchange 01/15/2020     He reports good urine flow.  Occasional bladder spasms;  SPT draining well;     12/05/2018 s/p Ventral hernia repair with mesh;this was done due to obstruction.    10/25/2017 (R) TKR with Dr. [facility].    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  He is getting ready for [employer] walking tour end of March; he given pre-tour talks at the [facility] [employer].  He goes to [employer] for b'day dinner; had great time            Past Medical History:  No date: Arthritis  No date: Blood transfusion  No date: CKD (chronic kidney disease) stage 3, GFR 30-5*  No date: Compression fracture of lumbar vertebra  No date: Depression  No date: Dyslipidemia  No date: GERD (gastroesophageal reflux disease)  No date: Hypertension  No date: Thyroid disease  No date: UTI (urinary tract infection)    Past Surgical History:  No date: CHOLECYSTECTOMY  No date: HERNIA REPAIR  No date: JOINT REPLACEMENT  12/27/2016: LAMINECTOMY      Comment: L2-L4  12/2016: LUMBAR LAMINECTOMY  No date: PARATHYROIDECTOMY  No date: TOTAL KNEE ARTHROPLASTY      Comment: Right    Review of patient's family history indicates:    Hypertension                   Mother                    Diabetes                       Father                    Esophageal cancer              Father                      Social History    Marital status: Single              Spouse name:                       Years of education:                 Number of children:               Occupational History    None on file    Social History Main Topics    Smoking status: Former Smoker                                                                 Packs/day: 0.00      Years: 20.00       Smokeless tobacco: Never Used                        Comment: quit 1999    Alcohol use: Yes                Comment: rarely/6 months    Drug use: No              Sexual activity: No                   Other Topics            Concern    None on file    Social History Narrative    Lives alone, owns house and rents part. Delaney helps. 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         Allergies:  Review of patient's allergies indicates no known allergies.    Medications:  Current Outpatient Prescriptions:   acetaminophen (TYLENOL) 500 MG tablet, Take 2 tablets (1,000 mg total) by mouth 3 (three) times daily as needed for Pain., Disp: 30 tablet, Rfl: 0  aspirin 81 MG Chew, Take 1 tablet (81 mg total) by mouth once daily. HOLD UNTIL TOLD TO RESUME BY PHYSICIAN, Disp: 30 tablet, Rfl: 0  calcitonin, salmon, (FORTICAL) 200 unit/actuation nasal spray, 1 spray by Nasal route once daily., Disp: 1 Bottle, Rfl: 0  diclofenac sodium (VOLTAREN) 1 % Gel, Apply 2 g topically 3 (three) times daily. To left knee, Disp: 100 g, Rfl: 3  gabapentin (NEURONTIN) 400 MG capsule, Take 2 capsules (800 mg total) by mouth 3 (three) times daily., Disp: 180 capsule, Rfl: 11  hyoscyamine (LEVSIN/SL) 0.125 mg Subl, Place 1 tablet (0.125 mg total) under the tongue every 4 (four) hours as needed (bladder spasms)., Disp: 30 tablet, Rfl: 1  levothyroxine (SYNTHROID) 50 MCG tablet, Take 1 tablet (50 mcg total) by mouth once daily., Disp: 100 tablet, Rfl: 11  mirtazapine (REMERON) 30 MG tablet, Take 1 tablet (30 mg total) by mouth every evening., Disp: 30 tablet, Rfl: 6  polyethylene glycol (GLYCOLAX) 17 gram PwPk, Take 17 g by mouth once daily., Disp: 30 packet, Rfl: 0  simethicone (MYLICON) 80 MG chewable tablet, Take 1 tablet (80 mg total) by mouth 3 (three) times daily after meals., Disp: , Rfl: 0  simvastatin (ZOCOR) 40 MG tablet, Take 1 tablet (40 mg  total) by mouth every evening., Disp: 90 tablet, Rfl: 11  tamsulosin (FLOMAX) 0.4 mg Cp24, Take 1 capsule (0.4 mg total) by mouth once daily., Disp: 30 capsule, Rfl: 3  triamterene-hydrochlorothiazide 37.5-25 mg (DYAZIDE) 37.5-25 mg per capsule, Take 1 capsule by mouth every morning. HOLD UNTIL TOLD TO RESUME BY PHYSICIAN, Disp: 90 capsule, Rfl: 3                Review of Systems   Constitutional: Negative for activity change, appetite change, chills and fever.   HENT: Negative for facial swelling and trouble swallowing.    Eyes: Negative for visual disturbance.   Respiratory: Negative for chest tightness and shortness of breath.    Cardiovascular: Positive for leg swelling. Negative for chest pain and palpitations.   Gastrointestinal: Negative.  Negative for abdominal pain, constipation, diarrhea, nausea and vomiting.   Genitourinary: Positive for difficulty urinating. Negative for dysuria, flank pain, hematuria, penile pain, penile swelling, scrotal swelling and testicular pain.        SPT draining well.     Musculoskeletal: Positive for arthralgias and gait problem. Negative for back pain, myalgias and neck stiffness.   Skin: Negative for rash.   Neurological: Positive for weakness. Negative for dizziness and speech difficulty.   Hematological: Does not bruise/bleed easily.   Psychiatric/Behavioral: Negative for behavioral problems.       Objective:      Physical Exam   Nursing note and vitals reviewed.  Constitutional: He is oriented to person, place, and time. Vital signs are normal. He appears well-developed and well-nourished. He is active and cooperative.  Non-toxic appearance. He does not have a sickly appearance.   HENT:   Head: Normocephalic and atraumatic.   Right Ear: External ear normal.   Left Ear: External ear normal.   Nose: Nose normal.   Mouth/Throat: Mucous membranes are normal.   Eyes: Conjunctivae and lids are normal. No scleral icterus.   Neck: Trachea normal, normal range of motion and full  passive range of motion without pain. Neck supple. No JVD present. No tracheal deviation present.   Cardiovascular: Normal rate, S1 normal and S2 normal.    Pulmonary/Chest: Effort normal. No respiratory distress. He exhibits no tenderness.   Abdominal: Soft. Normal appearance. There is no hepatosplenomegaly. There is no tenderness. There is no CVA tenderness.       Musculoskeletal: Normal range of motion.   Neurological: He is alert and oriented to person, place, and time. He has normal strength.   Skin: Skin is warm, dry and intact.     Psychiatric: He has a normal mood and affect. His behavior is normal. Judgment and thought content normal.       Assessment:       No diagnosis found.    Plan:         I spent 25 minutes with the patient of which more than half was spent in direct consultation with the patient in regards to our treatment and plan.    Education and recommendations of today's plan of care including home remedies.  I removed his old SPT.  I then placed a new 18fr SPT without difficulty using sterile procedure.  Skin barrrier cream to surrounding tissue  Irrigated to verify the position.  Balloon inflated 9cc sterile water/still irrigated well.  Dressing applied.  RTC one month

## 2020-02-29 ENCOUNTER — EXTERNAL CHRONIC CARE MANAGEMENT (OUTPATIENT)
Dept: PRIMARY CARE CLINIC | Facility: CLINIC | Age: 85
End: 2020-02-29
Payer: MEDICARE

## 2020-02-29 PROCEDURE — 99490 CHRNC CARE MGMT STAFF 1ST 20: CPT | Mod: PBBFAC | Performed by: INTERNAL MEDICINE

## 2020-02-29 PROCEDURE — 99490 CHRNC CARE MGMT STAFF 1ST 20: CPT | Mod: S$PBB,,, | Performed by: INTERNAL MEDICINE

## 2020-02-29 PROCEDURE — 99490 PR CHRONIC CARE MGMT, 1ST 20 MIN: ICD-10-PCS | Mod: S$PBB,,, | Performed by: INTERNAL MEDICINE

## 2020-03-16 ENCOUNTER — TELEPHONE (OUTPATIENT)
Dept: INTERNAL MEDICINE | Facility: CLINIC | Age: 85
End: 2020-03-16

## 2020-03-16 NOTE — TELEPHONE ENCOUNTER
----- Message from Anthony Bucio sent at 3/16/2020 11:05 AM CDT -----  Contact: self   Patient is asking should he come to his appointment tomorrow. Patient is asking this because of the COVID 19. Please call and advise.

## 2020-03-17 ENCOUNTER — PATIENT OUTREACH (OUTPATIENT)
Dept: ADMINISTRATIVE | Facility: OTHER | Age: 85
End: 2020-03-17

## 2020-03-18 ENCOUNTER — PATIENT MESSAGE (OUTPATIENT)
Dept: UROLOGY | Facility: CLINIC | Age: 85
End: 2020-03-18

## 2020-03-18 ENCOUNTER — PATIENT MESSAGE (OUTPATIENT)
Dept: INTERNAL MEDICINE | Facility: CLINIC | Age: 85
End: 2020-03-18

## 2020-03-24 DIAGNOSIS — N31.2 ATONIC NEUROGENIC BLADDER: ICD-10-CM

## 2020-03-24 DIAGNOSIS — Z43.5 ENCOUNTER FOR CARE OR REPLACEMENT OF SUPRAPUBIC TUBE: Primary | ICD-10-CM

## 2020-03-24 DIAGNOSIS — Z93.59 CHRONIC SUPRAPUBIC CATHETER: ICD-10-CM

## 2020-03-24 DIAGNOSIS — Z74.09 IMMOBILITY: ICD-10-CM

## 2020-03-24 NOTE — PROGRESS NOTES
Chucho Prado is a 84 y.o. male with neurogenic bladder.  He had 18fr SPT placed by Dr. Taylor on 06/23/2017 to manage his Neurogenic Bladder.     He is here today for exchange.  His last exchange 03/14/2018.     Dr. Taylor would like for home health to change his SPT during this coronavirus restrictions.

## 2020-03-25 PROCEDURE — G0180 PR HOME HEALTH MD CERTIFICATION: ICD-10-PCS | Mod: ,,, | Performed by: UROLOGY

## 2020-03-25 PROCEDURE — G0180 MD CERTIFICATION HHA PATIENT: HCPCS | Mod: ,,, | Performed by: UROLOGY

## 2020-03-26 DIAGNOSIS — G95.9 LUMBAR MYELOPATHY: ICD-10-CM

## 2020-03-26 RX ORDER — GABAPENTIN 400 MG/1
CAPSULE ORAL
Qty: 120 CAPSULE | Refills: 2 | Status: SHIPPED | OUTPATIENT
Start: 2020-03-26 | End: 2020-06-08

## 2020-03-31 ENCOUNTER — EXTERNAL CHRONIC CARE MANAGEMENT (OUTPATIENT)
Dept: PRIMARY CARE CLINIC | Facility: CLINIC | Age: 85
End: 2020-03-31
Payer: MEDICARE

## 2020-03-31 PROCEDURE — 99490 CHRNC CARE MGMT STAFF 1ST 20: CPT | Mod: PBBFAC | Performed by: INTERNAL MEDICINE

## 2020-03-31 PROCEDURE — 99490 PR CHRONIC CARE MGMT, 1ST 20 MIN: ICD-10-PCS | Mod: S$PBB,,, | Performed by: INTERNAL MEDICINE

## 2020-03-31 PROCEDURE — 99490 CHRNC CARE MGMT STAFF 1ST 20: CPT | Mod: S$PBB,,, | Performed by: INTERNAL MEDICINE

## 2020-04-06 ENCOUNTER — TELEPHONE (OUTPATIENT)
Dept: UROLOGY | Facility: CLINIC | Age: 85
End: 2020-04-06

## 2020-04-06 DIAGNOSIS — N39.0 URINARY TRACT INFECTION WITHOUT HEMATURIA, SITE UNSPECIFIED: Primary | ICD-10-CM

## 2020-04-06 DIAGNOSIS — R31.9 URINARY TRACT INFECTION WITH HEMATURIA, SITE UNSPECIFIED: Primary | ICD-10-CM

## 2020-04-06 DIAGNOSIS — N39.0 URINARY TRACT INFECTION WITH HEMATURIA, SITE UNSPECIFIED: Primary | ICD-10-CM

## 2020-04-06 NOTE — TELEPHONE ENCOUNTER
----- Message from Lety Graham sent at 4/6/2020  9:26 AM CDT -----  Contact: Carla  w/[facility] [phone]  Calling in regards to blood in urine in catheter, increased over the weekend. Please call

## 2020-04-07 ENCOUNTER — TELEPHONE (OUTPATIENT)
Dept: HOME HEALTH SERVICES | Facility: HOSPITAL | Age: 85
End: 2020-04-07

## 2020-04-07 NOTE — TELEPHONE ENCOUNTER
----- Message from Rosemarie Hart RN sent at 4/6/2020  7:16 PM CDT -----  Contact: Miguel holden/[facility] [phone]      ----- Message -----  From: Lety Graham  Sent: 4/6/2020   4:16 PM CDT  To: Claudia CAMPOVERDE Jr Staff    Calling in regards to bloody urine in catheter. Sample has been sent to lab. Please call

## 2020-04-09 DIAGNOSIS — Z93.59 CHRONIC SUPRAPUBIC CATHETER: ICD-10-CM

## 2020-04-09 DIAGNOSIS — R82.71 BACTERIA IN URINE: Primary | ICD-10-CM

## 2020-04-09 DIAGNOSIS — R31.0 GROSS HEMATURIA: ICD-10-CM

## 2020-04-09 RX ORDER — CIPROFLOXACIN 500 MG/1
500 TABLET ORAL 2 TIMES DAILY
Qty: 20 TABLET | Refills: 0 | Status: SHIPPED | OUTPATIENT
Start: 2020-04-09 | End: 2020-04-19

## 2020-04-09 NOTE — PROGRESS NOTES
Home health called regarding gross hematuria.  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

## 2020-04-11 ENCOUNTER — TELEPHONE (OUTPATIENT)
Dept: UROLOGY | Facility: HOSPITAL | Age: 85
End: 2020-04-11

## 2020-04-11 NOTE — TELEPHONE ENCOUNTER
Patient called, he was supposed to receive Rx for Cipro for UTI symptoms but has not received this. Rx sent.

## 2020-04-13 DIAGNOSIS — K21.9 GASTROESOPHAGEAL REFLUX DISEASE, ESOPHAGITIS PRESENCE NOT SPECIFIED: ICD-10-CM

## 2020-04-14 ENCOUNTER — EXTERNAL HOME HEALTH (OUTPATIENT)
Dept: HOME HEALTH SERVICES | Facility: HOSPITAL | Age: 85
End: 2020-04-14
Payer: MEDICARE

## 2020-04-14 RX ORDER — OMEPRAZOLE 40 MG/1
CAPSULE, DELAYED RELEASE ORAL
Qty: 90 CAPSULE | Refills: 0 | Status: SHIPPED | OUTPATIENT
Start: 2020-04-14 | End: 2020-07-15

## 2020-04-20 DIAGNOSIS — E78.5 DYSLIPIDEMIA: ICD-10-CM

## 2020-04-20 DIAGNOSIS — G47.09 OTHER INSOMNIA: ICD-10-CM

## 2020-04-20 DIAGNOSIS — E03.9 HYPOTHYROIDISM, UNSPECIFIED TYPE: ICD-10-CM

## 2020-04-20 RX ORDER — TRAZODONE HYDROCHLORIDE 50 MG/1
TABLET ORAL
Qty: 90 TABLET | Refills: 11 | Status: SHIPPED | OUTPATIENT
Start: 2020-04-20 | End: 2021-05-07

## 2020-04-20 RX ORDER — ASPIRIN 81 MG/1
TABLET ORAL
Qty: 90 TABLET | Refills: 11 | Status: SHIPPED | OUTPATIENT
Start: 2020-04-20 | End: 2021-05-24

## 2020-04-20 RX ORDER — LEVOTHYROXINE SODIUM 50 UG/1
TABLET ORAL
Qty: 90 TABLET | Refills: 11 | Status: SHIPPED | OUTPATIENT
Start: 2020-04-20 | End: 2021-05-17

## 2020-04-30 ENCOUNTER — EXTERNAL CHRONIC CARE MANAGEMENT (OUTPATIENT)
Dept: PRIMARY CARE CLINIC | Facility: CLINIC | Age: 85
End: 2020-04-30
Payer: MEDICARE

## 2020-04-30 PROCEDURE — 99490 CHRNC CARE MGMT STAFF 1ST 20: CPT | Mod: PBBFAC | Performed by: INTERNAL MEDICINE

## 2020-04-30 PROCEDURE — 99490 PR CHRONIC CARE MGMT, 1ST 20 MIN: ICD-10-PCS | Mod: S$PBB,,, | Performed by: INTERNAL MEDICINE

## 2020-04-30 PROCEDURE — 99490 CHRNC CARE MGMT STAFF 1ST 20: CPT | Mod: S$PBB,,, | Performed by: INTERNAL MEDICINE

## 2020-05-14 RX ORDER — HYDROCHLOROTHIAZIDE 25 MG/1
TABLET ORAL
Qty: 30 TABLET | Refills: 1 | Status: SHIPPED | OUTPATIENT
Start: 2020-05-14 | End: 2020-07-07

## 2020-05-22 ENCOUNTER — PATIENT MESSAGE (OUTPATIENT)
Dept: INTERNAL MEDICINE | Facility: CLINIC | Age: 85
End: 2020-05-22

## 2020-05-22 ENCOUNTER — TELEPHONE (OUTPATIENT)
Dept: INTERNAL MEDICINE | Facility: CLINIC | Age: 85
End: 2020-05-22

## 2020-05-22 NOTE — TELEPHONE ENCOUNTER
----- Message from Edson Wood sent at 5/22/2020  4:20 PM CDT -----  Contact: [facility] [phone]  Patient need an new order for Home Health for his weekly catheter changing fax order to [phone], please advise.

## 2020-05-25 ENCOUNTER — TELEPHONE (OUTPATIENT)
Dept: UROLOGY | Facility: CLINIC | Age: 85
End: 2020-05-25

## 2020-05-25 DIAGNOSIS — Z43.5 ENCOUNTER FOR CARE OR REPLACEMENT OF SUPRAPUBIC TUBE: ICD-10-CM

## 2020-05-25 DIAGNOSIS — Z93.59 CHRONIC SUPRAPUBIC CATHETER: Primary | ICD-10-CM

## 2020-05-25 DIAGNOSIS — R33.9 URINARY RETENTION: ICD-10-CM

## 2020-05-25 NOTE — TELEPHONE ENCOUNTER
----- Message from Kezia King LPN sent at 5/25/2020 10:03 AM CDT -----  Contact: Katie([facility])   Need new order for home health  ----- Message -----  From: Rosemarie Hart RN  Sent: 5/22/2020   5:55 PM CDT  To: Kezia King LPN        ----- Message -----  From: Tenzin Ruff  Sent: 5/22/2020   4:24 PM CDT  To: Rufus VIERA Staff    Called to speak w/ someone regarding new home health orders for the patient's catheter changes, requesting callback    Callback: [phone]  Fax: [phone]

## 2020-05-25 NOTE — TELEPHONE ENCOUNTER
Called spike and she states she has order from urology for pt. She also states that she would give me a call if she needs anything else

## 2020-05-27 ENCOUNTER — PATIENT MESSAGE (OUTPATIENT)
Dept: UROLOGY | Facility: CLINIC | Age: 85
End: 2020-05-27

## 2020-05-28 DIAGNOSIS — Z93.59 CHRONIC SUPRAPUBIC CATHETER: ICD-10-CM

## 2020-05-28 DIAGNOSIS — N31.9 NEUROGENIC BLADDER: ICD-10-CM

## 2020-05-28 DIAGNOSIS — Z43.5 ENCOUNTER FOR CARE OR REPLACEMENT OF SUPRAPUBIC TUBE: Primary | ICD-10-CM

## 2020-05-29 PROCEDURE — G0180 MD CERTIFICATION HHA PATIENT: HCPCS | Mod: ,,, | Performed by: NURSE PRACTITIONER

## 2020-05-29 PROCEDURE — G0180 PR HOME HEALTH MD CERTIFICATION: ICD-10-PCS | Mod: ,,, | Performed by: NURSE PRACTITIONER

## 2020-05-31 ENCOUNTER — EXTERNAL CHRONIC CARE MANAGEMENT (OUTPATIENT)
Dept: PRIMARY CARE CLINIC | Facility: CLINIC | Age: 85
End: 2020-05-31
Payer: MEDICARE

## 2020-05-31 PROCEDURE — 99490 PR CHRONIC CARE MGMT, 1ST 20 MIN: ICD-10-PCS | Mod: S$PBB,,, | Performed by: INTERNAL MEDICINE

## 2020-05-31 PROCEDURE — 99490 CHRNC CARE MGMT STAFF 1ST 20: CPT | Mod: S$PBB,,, | Performed by: INTERNAL MEDICINE

## 2020-05-31 PROCEDURE — 99490 CHRNC CARE MGMT STAFF 1ST 20: CPT | Mod: PBBFAC | Performed by: INTERNAL MEDICINE

## 2020-06-05 ENCOUNTER — PATIENT OUTREACH (OUTPATIENT)
Dept: ADMINISTRATIVE | Facility: HOSPITAL | Age: 85
End: 2020-06-05

## 2020-06-05 DIAGNOSIS — E78.5 HYPERLIPIDEMIA, UNSPECIFIED HYPERLIPIDEMIA TYPE: Primary | ICD-10-CM

## 2020-06-05 NOTE — PROGRESS NOTES
Health Maintenance Due   Topic Date Due    TETANUS VACCINE  09/18/1951    Shingles Vaccine (1 of 2) 09/18/1983    Lipid Panel  04/04/2020     Chart review completed.

## 2020-06-19 ENCOUNTER — TELEPHONE (OUTPATIENT)
Dept: INTERNAL MEDICINE | Facility: CLINIC | Age: 85
End: 2020-06-19

## 2020-06-22 ENCOUNTER — EXTERNAL HOME HEALTH (OUTPATIENT)
Dept: HOME HEALTH SERVICES | Facility: HOSPITAL | Age: 85
End: 2020-06-22

## 2020-06-30 ENCOUNTER — EXTERNAL CHRONIC CARE MANAGEMENT (OUTPATIENT)
Dept: PRIMARY CARE CLINIC | Facility: CLINIC | Age: 85
End: 2020-06-30
Payer: MEDICARE

## 2020-06-30 PROCEDURE — 99490 PR CHRONIC CARE MGMT, 1ST 20 MIN: ICD-10-PCS | Mod: S$PBB,,, | Performed by: INTERNAL MEDICINE

## 2020-06-30 PROCEDURE — 99490 CHRNC CARE MGMT STAFF 1ST 20: CPT | Mod: PBBFAC | Performed by: INTERNAL MEDICINE

## 2020-06-30 PROCEDURE — 99490 CHRNC CARE MGMT STAFF 1ST 20: CPT | Mod: S$PBB,,, | Performed by: INTERNAL MEDICINE

## 2020-07-15 ENCOUNTER — TELEPHONE (OUTPATIENT)
Dept: INTERNAL MEDICINE | Facility: CLINIC | Age: 85
End: 2020-07-15

## 2020-07-15 DIAGNOSIS — K21.9 GASTROESOPHAGEAL REFLUX DISEASE, ESOPHAGITIS PRESENCE NOT SPECIFIED: ICD-10-CM

## 2020-07-15 DIAGNOSIS — Z71.89 COMPLEX CARE COORDINATION: ICD-10-CM

## 2020-07-15 RX ORDER — OMEPRAZOLE 40 MG/1
CAPSULE, DELAYED RELEASE ORAL
Qty: 90 CAPSULE | Refills: 0 | Status: SHIPPED | OUTPATIENT
Start: 2020-07-15 | End: 2020-11-29 | Stop reason: SDUPTHER

## 2020-07-16 ENCOUNTER — CARE AT HOME (OUTPATIENT)
Dept: HOME HEALTH SERVICES | Facility: CLINIC | Age: 85
End: 2020-07-16
Payer: MEDICARE

## 2020-07-16 DIAGNOSIS — Z51.5 PALLIATIVE CARE ENCOUNTER: Primary | ICD-10-CM

## 2020-07-16 DIAGNOSIS — K21.9 GASTROESOPHAGEAL REFLUX DISEASE WITHOUT ESOPHAGITIS: ICD-10-CM

## 2020-07-16 DIAGNOSIS — Z78.9 IMPAIRED MOBILITY AND ADLS: ICD-10-CM

## 2020-07-16 DIAGNOSIS — N18.30 CKD (CHRONIC KIDNEY DISEASE) STAGE 3, GFR 30-59 ML/MIN: ICD-10-CM

## 2020-07-16 DIAGNOSIS — E03.9 ACQUIRED HYPOTHYROIDISM: ICD-10-CM

## 2020-07-16 DIAGNOSIS — R53.81 DEBILITY: ICD-10-CM

## 2020-07-16 DIAGNOSIS — Z74.09 IMPAIRED MOBILITY AND ADLS: ICD-10-CM

## 2020-07-16 DIAGNOSIS — I10 ESSENTIAL HYPERTENSION: ICD-10-CM

## 2020-07-16 PROCEDURE — 99350 HOME/RES VST EST HIGH MDM 60: CPT | Mod: S$GLB,,, | Performed by: NURSE PRACTITIONER

## 2020-07-16 PROCEDURE — 99497 PR ADVNCD CARE PLAN 30 MIN: ICD-10-PCS | Mod: 25,S$GLB,, | Performed by: NURSE PRACTITIONER

## 2020-07-16 PROCEDURE — 99497 ADVNCD CARE PLAN 30 MIN: CPT | Mod: 25,S$GLB,, | Performed by: NURSE PRACTITIONER

## 2020-07-16 PROCEDURE — 99350 PR HOME VISIT,ESTAB PATIENT,LEVEL IV: ICD-10-PCS | Mod: S$GLB,,, | Performed by: NURSE PRACTITIONER

## 2020-07-16 NOTE — TELEPHONE ENCOUNTER
----- Message from Pj Baldwin sent at 7/16/2020  2:36 PM CDT -----  Regarding: Appt  Contact: Patient [phone]  Patient would like to get medical advice.    Comments: Patient calling stating the nurse that was suppose to show at 1 pm today had not,  Spoke with office, was stated that office will call patient back after looking into the missed appt.    Please call an advise  Thank you

## 2020-07-20 VITALS
RESPIRATION RATE: 18 BRPM | SYSTOLIC BLOOD PRESSURE: 132 MMHG | WEIGHT: 167 LBS | BODY MASS INDEX: 25.31 KG/M2 | TEMPERATURE: 98 F | HEART RATE: 74 BPM | HEIGHT: 68 IN | OXYGEN SATURATION: 98 % | DIASTOLIC BLOOD PRESSURE: 74 MMHG

## 2020-07-20 NOTE — PROGRESS NOTES
[facility]  Palliative Care Home Visit    Visit Date: 7/16/2020  Encounter Provider: Crystal Tejada, NP  PCP:  Obed Mcguire MD    Subjective:      Patient ID: Chucho Prado is a 86 y.o. male.    Consult Requested By:  Dr. Obed Mcguire  Reason for Consult:  Areli Rankin is being seen at home due to physical debility that presents a taxing effort to leave the home, to mitigate high risk of hospital readmission or due to the limited availability of reliable or safe options for transportation to the point of access to the provider. Prior to treatment on this visit the chart was reviewed and patient consent was obtained.  .      Chief Complaint: Establish Higinio Prado is an 86 year old male with a past medical history of CKD, GERD, HTN, Thyroid disease, UTI, Depression, HLD, and chronic Neurogenic bladder, He had 18fr SPT placed by Dr. Taylor on 06/23/2017 to manage his Neurogenic Bladder. His Past surgical history includes Ventral hernia repair with mesth 12/2018, Right TKA 10/2017. He is a retired professor at [employer]. He lives alone in a single story home in [place] with 7 steps to enter. He has a paid caregiver from 7am to 11pm daily. Today he is found in a wheelchair, AAOx3, VSS, all medications reviewed and reconciled, refilled accordingly. He reports compliance with CDC guidelines including good handwashing, social distances and wearing facial covering outside the home. He reports he has not left his home in months. He had home health working with him for therapy, it has since been discontinued, he denies recent falls. I initiated the process of advance care planning today and explained the importance of this process to the patient and family.  I introduced the concept of advance directives to the patient, as well. Then the patient received detailed information about the importance of designating a Health Care Power of  (HCPOA). She was also  instructed to communicate with this person about their wishes for future healthcare, should she/he become sick and lose decision-making capacity. The patient has not previously appointed a HCPOA. We spoke about ACP for 30 minutes.    Attestation: Screening criteria to assess the level of the patient's risk for infection with COVID-19 as recommended by the CDC at the time of the above documented home visit concluded appropriateness to proceed. Universal precautions were maintained at all times, including provider use of 60% alcohol gel hand  immediately prior to entry and upon departing the patient's home.    Review of Systems  Constitutional: Negative for activity change, appetite change, chills and fever.   HENT: Negative for facial swelling and trouble swallowing.    Eyes: Negative for visual disturbance.   Respiratory: Negative for chest tightness and shortness of breath.    Cardiovascular: Negative for chest pain and palpitations.   Gastrointestinal: Negative.  Negative for abdominal pain, constipation, diarrhea, nausea and vomiting.   Genitourinary: Positive for difficulty urinating. Negative for dysuria, flank pain, hematuria, penile pain, penile swelling, scrotal swelling and testicular pain.        SPT draining well  Musculoskeletal: Positive for arthralgias and gait problem. Negative for back pain, myalgias and neck stiffness.   Skin: Negative for rash.   Neurological: Positive for weakness. Negative for dizziness and speech difficulty.   Hematological: Does not bruise/bleed easily.   Psychiatric/Behavioral: Negative for behavioral problems.       Assessments:  · Environmental: Single story home with 7 steps to enter  · Functional Status: Min asst with ADLs and mobility, has caregiver daily  · Safety:Fall precautions  · Nutritional: Appetite good  · Home Health/DME/Supplies: wheelchair    Symptom Assessment (ESAS 0-10 scale)     ESAS 0 1 2 3 4 5 6 7 8 9 10   Pain X             Dyspnea X              Anxiety X             Nausea X             Depression     X          Anorexia X             Fatigue X             Insomnia    X          Restlessness  X             Agitation  X              Constipation    no  Bowel Management Plan (BMP): no  Diarrhea        no  Comments: LBM 7/16/2020    Performance Status: PPS Score 70  Karnofsky Score:  70  FAST: III  NYHA:  II  EGOC:  III    History:  Past Medical History:   Diagnosis Date    Acquired hypothyroidism 7/30/2013    Arthritis     Blood transfusion     before 2005 - whe had gangrenous gall bladder    Cataract     Chronic back pain 12/4/2018    - Takes Percocet 5-325 at home - Can continue current abdominal pain control with Dilaudid 0.5 mg IV Q3H prn     CKD (chronic kidney disease) stage 3, GFR 30-59 ml/min     Compression fracture of lumbar vertebra     Depression     Dyslipidemia     Essential hypertension 7/30/2013    Gastroesophageal reflux disease without esophagitis 12/4/2018    - continue home Prilosec    General anesthetics causing adverse effect in therapeutic use     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    GERD (gastroesophageal reflux disease)     History of postoperative delirium 12/4/2018    - Patient reports 1 week history of post-op delirium 7/2018 - Monitor electrolytes, UA, and urine cx - Delirium precautions  - Can use Seroquel 25 mg QHS prn     Hypertension     Hyponatremia 10/23/2017    Impaired mobility and ADLs 6/28/2018    Neurogenic bladder 12/4/2018    Sleep disorder 12/4/2018    - continue Trazodone QHS    Thyroid disease     UTI (urinary tract infection)      Family History   Problem Relation Age of Onset    Hypertension Mother     Diabetes Father     Esophageal cancer Father      Past Surgical History:   Procedure Laterality Date    CHOLECYSTECTOMY      EYE SURGERY Right     IOL    HERNIA REPAIR      INTRAOCULAR PROSTHESES INSERTION Left 5/28/2018    Procedure: INSERTION-INTRAOCULAR LENS (IOL);   Surgeon: Trinity Vazquez MD;  Location: [facility];  Service: Ophthalmology;  Laterality: Left;    JOINT REPLACEMENT      LAMINECTOMY  12/27/2016    L2-L4    LUMBAR LAMINECTOMY  12/2016    PARATHYROIDECTOMY      PHACOEMULSIFICATION OF CATARACT Left 5/28/2018    Procedure: PHACOEMULSIFICATION-ASPIRATION-CATARACT;  Surgeon: Trinity Vazquez MD;  Location: [facility];  Service: Ophthalmology;  Laterality: Left;    REPAIR OF INCARCERATED INCISIONAL HERNIA WITHOUT HISTORY OF PRIOR REPAIR N/A 12/5/2018    Procedure: REPAIR, HERNIA, INCISIONAL, INCARCERATED, WITHOUT HISTORY OF PRIOR REPAIR;  Surgeon: Steve Valentin MD;  [facility];  Service: General;  Laterality: N/A;    REPAIR OF INCARCERATED VENTRAL HERNIA WITHOUT HISTORY OF PRIOR REPAIR N/A 6/20/2018    Procedure: REPAIR, HERNIA, VENTRAL, INCARCERATED, WITHOUT HISTORY OF PRIOR REPAIR;  Surgeon: Guilherme Ordoñez MD;  [facility];  Service: General;  Laterality: N/A;    TOTAL KNEE ARTHROPLASTY Bilateral     TOTAL KNEE ARTHROPLASTY Left 10/25/2017    TKR     Review of patient's allergies indicates:  No Known Allergies    Medications:    Current Outpatient Medications:     amLODIPine (NORVASC) 5 MG tablet, TAKE 1 TABLET BY MOUTH EVERY DAY, Disp: 30 tablet, Rfl: 2    aspirin (ECOTRIN) 81 MG EC tablet, TAKE 1 TABLET BY MOUTH EVERY DAY, Disp: 90 tablet, Rfl: 11    atorvastatin (LIPITOR) 20 MG tablet, Take 1 tablet (20 mg total) by mouth once daily., Disp: 90 tablet, Rfl: 11    diclofenac sodium (VOLTAREN) 1 % Gel, Apply 2 g topically once daily. (Patient not taking: Reported on 11/5/2019), Disp: 100 g, Rfl: 0    diphth,pertus,acell,,tetanus (BOOSTRIX) 2.5-8-5 Lf-mcg-Lf/0.5mL Susp, Inject into the muscle., Disp: 0.5 mL, Rfl: 0    gabapentin (NEURONTIN) 400 MG capsule, TAKE 2 CAPSULES BY MOUTH TWICE DAILY, Disp: 120 capsule, Rfl: 2    hydroCHLOROthiazide (HYDRODIURIL) 25 MG tablet, TAKE 1 TABLET BY MOUTH EVERY DAY, Disp: 30 tablet, Rfl:  "1    hyoscyamine (LEVSIN/SL) 0.125 mg Subl, Take 1 tablet (0.125 mg total) by mouth every 6 (six) hours as needed (bladder spasms)., Disp: 60 tablet, Rfl: 11    levothyroxine (SYNTHROID) 50 MCG tablet, TAKE 1 TABLET BY MOUTH EVERY DAY, Disp: 90 tablet, Rfl: 11    losartan (COZAAR) 100 MG tablet, Take 1 tablet (100 mg total) by mouth once daily., Disp: 90 tablet, Rfl: 11    metoprolol succinate (TOPROL-XL) 25 MG 24 hr tablet, Take 1 tablet (25 mg total) by mouth once daily., Disp: 30 tablet, Rfl: 11    omeprazole (PRILOSEC) 40 MG capsule, TAKE 1 CAPSULE BY MOUTH EVERY MORNING 30 MINUTES BEFORE EATING, Disp: 90 capsule, Rfl: 0    polyethylene glycol (GLYCOLAX) 17 gram/dose powder, Take 17 g by mouth once daily., Disp: 1700 g, Rfl: 11    traZODone (DESYREL) 50 MG tablet, TAKE 1 TABLET BY MOUTH NIGHTLY, Disp: 90 tablet, Rfl: 11    24h Oral Morphine Equivalents (OME):  n/a    Objective:     Physical Exam:  Vitals:    07/16/20 1500   BP: 132/74   Pulse: 74   Resp: 18   Temp: 98 °F (36.7 °C)   TempSrc: Skin   SpO2: 98%   Weight: 75.8 kg (167 lb)   Height: 5' 8" (1.727 m)   PainSc:   3   PainLoc: Leg     Body mass index is 25.39 kg/m².    Physical Exam  Constitutional: He is oriented to person, place, and time. Vital signs are normal. He appears well-developed and well-nourished. He is active and cooperative.  Non-toxic appearance. He does not have a sickly appearance.   HENT:   Head: Normocephalic and atraumatic.   Right Ear: External ear normal.   Left Ear: External ear normal.   Nose: Nose normal.   Mouth/Throat: Mucous membranes are normal.   Eyes: Conjunctivae and lids are normal. No scleral icterus.   Neck: Trachea normal, normal range of motion and full passive range of motion without pain. Neck supple. No JVD present. No tracheal deviation present.   Cardiovascular: Normal rate, S1 normal and S2 normal.    Pulmonary/Chest: Effort normal. No respiratory distress. He exhibits no tenderness.   Abdominal: Soft. " Normal appearance. There is no hepatosplenomegaly. There is no tenderness. There is no CVA tenderness.     Musculoskeletal: Normal range of motion.   Neurological: He is alert and oriented to person, place, and time. He has normal strength.   Skin: Skin is warm, dry and intact.     Psychiatric: He has a normal mood and affect. His behavior is normal. Judgment and thought content normal.    Labs:  CBC:   WBC   Date Value Ref Range Status   09/05/2019 5.90 3.90 - 12.70 K/uL Final     Hemoglobin   Date Value Ref Range Status   09/05/2019 10.3 (L) 14.0 - 18.0 g/dL Final     POC Hematocrit   Date Value Ref Range Status   08/27/2019 36 36 - 54 %PCV Final     Hematocrit   Date Value Ref Range Status   09/05/2019 31.4 (L) 40.0 - 54.0 % Final     Mean Corpuscular Volume   Date Value Ref Range Status   09/05/2019 85 82 - 98 fL Final     Platelets   Date Value Ref Range Status   09/05/2019 236 150 - 350 K/uL Final       LFT:   Lab Results   Component Value Date    AST 36 08/27/2019    ALKPHOS 79 08/27/2019    BILITOT 1.0 08/27/2019       Albumin:   Albumin   Date Value Ref Range Status   08/27/2019 4.0 3.5 - 5.2 g/dL Final     Protein:   Total Protein   Date Value Ref Range Status   08/27/2019 8.1 6.0 - 8.4 g/dL Final     Comment:     *Result may be interfered by visible hemolysis       Radiology:  I have reviewed all pertinent imaging results/findings within the past 24 hours.    Psychosocial/Cultural/Spiritual:   · F- Jillian and Belief:  Rastafari   · I - Importance: low  · C - Community: n/a  · A - Address in Care: denies need at this time      Assessment:     1. Essential hypertension    2. Debility    3. CKD (chronic kidney disease) stage 3, GFR 30-59 ml/min    4. Acquired hypothyroidism    5. Gastroesophageal reflux disease without esophagitis    6. Impaired mobility and ADLs        Plan:     Ethical / Legal: Advance Care Planning   · Surrogate decision maker:  Name Josh Burr, Relationship: friend  · Code Status:   Full code  · [place]: left in home  · Other advance directive:  On file, Capacity to make medical decisions:  Yes, Conflict no       Chucho was seen today for establish care.    Diagnoses and all orders for this visit:    Essential hypertension  Continue home meds    Debility  Impaired mobility and ADLs  -     Ambulatory referral/consult to Palliative Care - [facility]  Home PT/OT    CKD (chronic kidney disease) stage 3, GFR 30-59 ml/min  Stable    Acquired hypothyroidism  Continued home levothyroxine    Gastroesophageal reflux disease without esophagitis  Continue home meds    Hyperlipidemia  - Continue home atorvastatin    Neurogenic bladder  Suprapubic cath  Follows Dr. Menezes for Urology     Were controlled substances prescribed?  No    > 50% of 90 min visit spent in chart review, face to face discussion of goals of care,  symptom assessment, coordination of care and emotional support.    Follow Up Appointments:   No future appointments.    Signature:  Crystal Tejada NP    Patient consent obtained prior to treatment

## 2020-07-20 NOTE — PATIENT INSTRUCTIONS
Instructions:  1. Take all medications as prescribed  2. Keep all follow-up appointments  3. Return to the hospital or call your primary care physicians if any worsening symptoms such as fever, chest pain, shortness of breath, return of symptoms, or any other concerns.  4. Home visit 8 weeks

## 2020-07-28 PROCEDURE — G0179 MD RECERTIFICATION HHA PT: HCPCS | Mod: ,,, | Performed by: UROLOGY

## 2020-07-28 PROCEDURE — G0179 PR HOME HEALTH MD RECERTIFICATION: ICD-10-PCS | Mod: ,,, | Performed by: UROLOGY

## 2020-07-31 ENCOUNTER — EXTERNAL CHRONIC CARE MANAGEMENT (OUTPATIENT)
Dept: PRIMARY CARE CLINIC | Facility: CLINIC | Age: 85
End: 2020-07-31
Payer: MEDICARE

## 2020-07-31 ENCOUNTER — TELEPHONE (OUTPATIENT)
Dept: INTERNAL MEDICINE | Facility: CLINIC | Age: 85
End: 2020-07-31

## 2020-07-31 PROCEDURE — 99490 CHRNC CARE MGMT STAFF 1ST 20: CPT | Mod: S$PBB,,, | Performed by: INTERNAL MEDICINE

## 2020-07-31 PROCEDURE — 99490 CHRNC CARE MGMT STAFF 1ST 20: CPT | Mod: PBBFAC | Performed by: INTERNAL MEDICINE

## 2020-07-31 PROCEDURE — 99490 PR CHRONIC CARE MGMT, 1ST 20 MIN: ICD-10-PCS | Mod: S$PBB,,, | Performed by: INTERNAL MEDICINE

## 2020-07-31 NOTE — TELEPHONE ENCOUNTER
Spoke to pt. He stated a Nurse practitioner came to his house about 2 weeks ago. He hasn't heard anything back from his results.     He would like to know his results are.

## 2020-07-31 NOTE — TELEPHONE ENCOUNTER
----- Message from Tiaan Acharya sent at 7/30/2020  3:09 PM CDT -----  Contact: self/[phone]  Patient called in regards needing to talk with Dr Mcguire nurse about the examination from the np about 2 weeks ago, but has not receive a results. Please call and advise. Thank you!

## 2020-07-31 NOTE — TELEPHONE ENCOUNTER
Hi, I am not sure what results he is asking about. I have sent the message to the NP -- Crystal Tejada.  Thank you, Obed Mcguire

## 2020-08-05 ENCOUNTER — PATIENT OUTREACH (OUTPATIENT)
Dept: HOME HEALTH SERVICES | Facility: HOSPITAL | Age: 85
End: 2020-08-05

## 2020-08-06 ENCOUNTER — EXTERNAL HOME HEALTH (OUTPATIENT)
Dept: HOME HEALTH SERVICES | Facility: HOSPITAL | Age: 85
End: 2020-08-06
Payer: MEDICARE

## 2020-08-06 VITALS — SYSTOLIC BLOOD PRESSURE: 132 MMHG | DIASTOLIC BLOOD PRESSURE: 74 MMHG

## 2020-08-06 RX ORDER — AMLODIPINE BESYLATE 5 MG/1
TABLET ORAL
Qty: 90 TABLET | Refills: 1 | Status: SHIPPED | OUTPATIENT
Start: 2020-08-06 | End: 2021-04-16

## 2020-08-07 ENCOUNTER — TELEPHONE (OUTPATIENT)
Dept: INTERNAL MEDICINE | Facility: CLINIC | Age: 85
End: 2020-08-07

## 2020-08-19 ENCOUNTER — OFFICE VISIT (OUTPATIENT)
Dept: INTERNAL MEDICINE | Facility: CLINIC | Age: 85
End: 2020-08-19
Payer: MEDICARE

## 2020-08-19 VITALS — SYSTOLIC BLOOD PRESSURE: 132 MMHG | DIASTOLIC BLOOD PRESSURE: 74 MMHG

## 2020-08-19 DIAGNOSIS — F33.1 MODERATE EPISODE OF RECURRENT MAJOR DEPRESSIVE DISORDER: ICD-10-CM

## 2020-08-19 PROCEDURE — 99214 PR OFFICE/OUTPT VISIT, EST, LEVL IV, 30-39 MIN: ICD-10-PCS | Mod: 95,,, | Performed by: INTERNAL MEDICINE

## 2020-08-19 PROCEDURE — 99214 OFFICE O/P EST MOD 30 MIN: CPT | Mod: 95,,, | Performed by: INTERNAL MEDICINE

## 2020-08-19 RX ORDER — MIRTAZAPINE 15 MG/1
15 TABLET, FILM COATED ORAL NIGHTLY
Qty: 30 TABLET | Refills: 5 | Status: ON HOLD | OUTPATIENT
Start: 2020-08-19 | End: 2020-09-02 | Stop reason: HOSPADM

## 2020-08-19 NOTE — Clinical Note
Hi Crystal, I did a video visit with him. Would you be able to help coordinate that he has blood work drawn by his home nurse, cbc, cmp, lipid panel, TSH, hemoglobin a1c. Thank you for the help, Obed Mcguire

## 2020-08-19 NOTE — PROGRESS NOTES
Subjective:       Patient ID: Chucho Prado is a 86 y.o. male.    Chief Complaint: No chief complaint on file.    The patient location is: home in the [facility]    The chief complaint leading to consultation is: Patient is here for followup for chronic conditions.      Visit type: audiovisual    Face to Face time with patient: 22   minutes of total time spent on the encounter, which includes face to face time and non-face to face time preparing to see the patient (eg, review of tests), Obtaining and/or reviewing separately obtained history, Documenting clinical information in the electronic or other health record, Independently interpreting results (not separately reported) and communicating results to the patient/family/caregiver, or Care coordination (not separately reported).         Each patient to whom he or she provides medical services by telemedicine is:  (1) informed of the relationship between the physician and patient and the respective role of any other health care provider with respect to management of the patient; and (2) notified that he or she may decline to receive medical services by telemedicine and may withdraw from such care at any time.    Notes:   Has been sleeping more.    Takes tylenol pm for long time, especially takes tylenol for chronic pains.    L hip bothering him    Inc depression symptoms, due to covid 19 and stay at home in particular. Previous on remeron after knee surg which helped his mood.    Home pressures /65    Chronic hands OA    Toes curl and hurt at times.    No new other symptoms.    Has 16 hrs day of aides and home health and tent Josh is close. He also has home alert monitor.    Home nurse has been changing his catheter.    Review of Systems   Constitutional: Negative for activity change and unexpected weight change.   HENT: Positive for rhinorrhea. Negative for hearing loss and trouble swallowing.    Eyes: Negative for discharge and visual  disturbance.   Respiratory: Negative for chest tightness and wheezing.    Cardiovascular: Negative for chest pain and palpitations.   Gastrointestinal: Negative for blood in stool, constipation, diarrhea and vomiting.   Endocrine: Negative for polydipsia and polyuria.   Genitourinary: Negative for difficulty urinating, hematuria and urgency.   Musculoskeletal: Positive for arthralgias. Negative for joint swelling and neck pain.   Neurological: Negative for weakness and headaches.   Psychiatric/Behavioral: Positive for dysphoric mood. Negative for confusion.           Objective:      Physical Exam  Constitutional:       General: He is not in acute distress.     Appearance: He is well-developed. He is not ill-appearing, toxic-appearing or diaphoretic.      Comments: Well appearing, breathing comfortably, speaking full sentences, no coughing during telemed encounter    Sitting in chair, difficulty with ambulating in the home       HENT:      Head: Normocephalic and atraumatic.   Pulmonary:      Effort: Pulmonary effort is normal. No respiratory distress.   Musculoskeletal:         General: Swelling present.      Right lower leg: Edema present.      Left lower leg: Edema present.      Comments: 2+ bilat lower leg edema L>R, dry skin as well   Neurological:      Mental Status: He is alert and oriented to person, place, and time.   Psychiatric:         Behavior: Behavior normal.         Thought Content: Thought content normal.         Judgment: Judgment normal.         Assessment:       1. Moderate episode of recurrent major depressive disorder        Plan:       Diagnoses and all orders for this visit:    Moderate episode of recurrent major depressive disorder  -     mirtazapine (REMERON) 15 MG tablet; Take 1 tablet (15 mg total) by mouth every evening.  D/c tylenol PM and just take tylenol, up to 1000mg tid    Chronic venous stasis -- bid moisturizer    OA pains -- tylenol is OK    He needs blood work, will check base  with Crystal Matson    Chronic galvan dep -- has been changed by home nurse    Next time go over advanced wishes    He cannot come in 2/2 debility and covid19 concerns    Health Maintenance       Date Due Completion Date    TETANUS VACCINE 09/18/1951 ---    Shingles Vaccine (1 of 2) 09/18/1983 ---    Lipid Panel 04/04/2020 4/4/2019    Influenza Vaccine (1) 09/01/2020 11/5/2019          No follow-ups on file.    Future Appointments   Date Time Provider Department Center   9/10/2020  9:00 AM Crystal Tejada, NP [address]

## 2020-08-21 DIAGNOSIS — I10 ESSENTIAL HYPERTENSION: ICD-10-CM

## 2020-08-21 RX ORDER — METOPROLOL SUCCINATE 25 MG/1
TABLET, EXTENDED RELEASE ORAL
Qty: 30 TABLET | Refills: 11 | Status: SHIPPED | OUTPATIENT
Start: 2020-08-21 | End: 2021-11-08

## 2020-08-21 RX ORDER — HYDROCHLOROTHIAZIDE 25 MG/1
25 TABLET ORAL DAILY
Qty: 30 TABLET | Refills: 11 | Status: ON HOLD | OUTPATIENT
Start: 2020-08-21 | End: 2020-09-02 | Stop reason: HOSPADM

## 2020-08-23 ENCOUNTER — PATIENT MESSAGE (OUTPATIENT)
Dept: INTERNAL MEDICINE | Facility: CLINIC | Age: 85
End: 2020-08-23

## 2020-08-24 ENCOUNTER — DOCUMENT SCAN (OUTPATIENT)
Dept: HOME HEALTH SERVICES | Facility: HOSPITAL | Age: 85
End: 2020-08-24
Payer: MEDICARE

## 2020-08-27 ENCOUNTER — TELEPHONE (OUTPATIENT)
Dept: INTERNAL MEDICINE | Facility: CLINIC | Age: 85
End: 2020-08-27

## 2020-08-27 ENCOUNTER — HOSPITAL ENCOUNTER (INPATIENT)
Facility: HOSPITAL | Age: 85
LOS: 8 days | Discharge: NURSING, REHAB OR LONG-TERM CARE | DRG: 987 | End: 2020-09-04
Attending: EMERGENCY MEDICINE | Admitting: EMERGENCY MEDICINE
Payer: MEDICARE

## 2020-08-27 ENCOUNTER — PATIENT OUTREACH (OUTPATIENT)
Dept: HOME HEALTH SERVICES | Facility: HOSPITAL | Age: 85
End: 2020-08-27

## 2020-08-27 DIAGNOSIS — E87.1 HYPONATREMIA: ICD-10-CM

## 2020-08-27 DIAGNOSIS — R31.9 URINARY TRACT INFECTION WITH HEMATURIA, SITE UNSPECIFIED: ICD-10-CM

## 2020-08-27 DIAGNOSIS — R53.1 WEAKNESS: ICD-10-CM

## 2020-08-27 DIAGNOSIS — I50.9 HEART FAILURE: ICD-10-CM

## 2020-08-27 DIAGNOSIS — R07.9 CHEST PAIN: ICD-10-CM

## 2020-08-27 DIAGNOSIS — N39.0 URINARY TRACT INFECTION WITH HEMATURIA, SITE UNSPECIFIED: ICD-10-CM

## 2020-08-27 DIAGNOSIS — R31.0 GROSS HEMATURIA: Primary | ICD-10-CM

## 2020-08-27 PROBLEM — E87.8 HYPOCHLOREMIA: Status: ACTIVE | Noted: 2020-08-27

## 2020-08-27 PROBLEM — J96.01 ACUTE HYPOXEMIC RESPIRATORY FAILURE: Status: ACTIVE | Noted: 2020-08-27

## 2020-08-27 LAB
ALBUMIN SERPL BCP-MCNC: 3.6 G/DL (ref 3.5–5.2)
ALP SERPL-CCNC: 92 U/L (ref 55–135)
ALT SERPL W/O P-5'-P-CCNC: 8 U/L (ref 10–44)
ANION GAP SERPL CALC-SCNC: 8 MMOL/L (ref 8–16)
ANION GAP SERPL CALC-SCNC: 8 MMOL/L (ref 8–16)
ANION GAP SERPL CALC-SCNC: 9 MMOL/L (ref 8–16)
AST SERPL-CCNC: 18 U/L (ref 10–40)
BACTERIA #/AREA URNS AUTO: ABNORMAL /HPF
BASOPHILS # BLD AUTO: 0.04 K/UL (ref 0–0.2)
BASOPHILS NFR BLD: 0.5 % (ref 0–1.9)
BILIRUB SERPL-MCNC: 0.7 MG/DL (ref 0.1–1)
BILIRUB UR QL STRIP: NEGATIVE
BNP SERPL-MCNC: 298 PG/ML (ref 0–99)
BUN SERPL-MCNC: 16 MG/DL (ref 8–23)
BUN SERPL-MCNC: 16 MG/DL (ref 8–23)
BUN SERPL-MCNC: 18 MG/DL (ref 8–23)
CALCIUM SERPL-MCNC: 7.7 MG/DL (ref 8.7–10.5)
CALCIUM SERPL-MCNC: 7.7 MG/DL (ref 8.7–10.5)
CALCIUM SERPL-MCNC: 8.2 MG/DL (ref 8.7–10.5)
CHLORIDE SERPL-SCNC: 73 MMOL/L (ref 95–110)
CHLORIDE SERPL-SCNC: 79 MMOL/L (ref 95–110)
CHLORIDE SERPL-SCNC: 79 MMOL/L (ref 95–110)
CHLORIDE UR-SCNC: 45 MMOL/L (ref 25–200)
CLARITY UR REFRACT.AUTO: ABNORMAL
CO2 SERPL-SCNC: 35 MMOL/L (ref 23–29)
CO2 SERPL-SCNC: 35 MMOL/L (ref 23–29)
CO2 SERPL-SCNC: 37 MMOL/L (ref 23–29)
COLOR UR AUTO: YELLOW
CREAT SERPL-MCNC: 1.1 MG/DL (ref 0.5–1.4)
CREAT SERPL-MCNC: 1.1 MG/DL (ref 0.5–1.4)
CREAT SERPL-MCNC: 1.2 MG/DL (ref 0.5–1.4)
DIFFERENTIAL METHOD: ABNORMAL
EOSINOPHIL # BLD AUTO: 0.1 K/UL (ref 0–0.5)
EOSINOPHIL NFR BLD: 1.1 % (ref 0–8)
ERYTHROCYTE [DISTWIDTH] IN BLOOD BY AUTOMATED COUNT: 13.4 % (ref 11.5–14.5)
EST. GFR  (AFRICAN AMERICAN): >60 ML/MIN/1.73 M^2
EST. GFR  (NON AFRICAN AMERICAN): 54.4 ML/MIN/1.73 M^2
EST. GFR  (NON AFRICAN AMERICAN): >60 ML/MIN/1.73 M^2
EST. GFR  (NON AFRICAN AMERICAN): >60 ML/MIN/1.73 M^2
GLUCOSE SERPL-MCNC: 93 MG/DL (ref 70–110)
GLUCOSE SERPL-MCNC: 95 MG/DL (ref 70–110)
GLUCOSE SERPL-MCNC: 95 MG/DL (ref 70–110)
GLUCOSE UR QL STRIP: NEGATIVE
HCT VFR BLD AUTO: 33.8 % (ref 40–54)
HGB BLD-MCNC: 11 G/DL (ref 14–18)
HGB UR QL STRIP: ABNORMAL
HYALINE CASTS UR QL AUTO: 0 /LPF
IMM GRANULOCYTES # BLD AUTO: 0.02 K/UL (ref 0–0.04)
IMM GRANULOCYTES NFR BLD AUTO: 0.2 % (ref 0–0.5)
KETONES UR QL STRIP: NEGATIVE
LEUKOCYTE ESTERASE UR QL STRIP: ABNORMAL
LYMPHOCYTES # BLD AUTO: 1.8 K/UL (ref 1–4.8)
LYMPHOCYTES NFR BLD: 22.4 % (ref 18–48)
MAGNESIUM SERPL-MCNC: 2.3 MG/DL (ref 1.6–2.6)
MCH RBC QN AUTO: 26.4 PG (ref 27–31)
MCHC RBC AUTO-ENTMCNC: 32.5 G/DL (ref 32–36)
MCV RBC AUTO: 81 FL (ref 82–98)
MICROSCOPIC COMMENT: ABNORMAL
MONOCYTES # BLD AUTO: 0.7 K/UL (ref 0.3–1)
MONOCYTES NFR BLD: 8 % (ref 4–15)
NEUTROPHILS # BLD AUTO: 5.6 K/UL (ref 1.8–7.7)
NEUTROPHILS NFR BLD: 67.8 % (ref 38–73)
NITRITE UR QL STRIP: POSITIVE
NRBC BLD-RTO: 0 /100 WBC
OSMOLALITY SERPL: 247 MOSM/KG (ref 280–300)
OSMOLALITY UR: 217 MOSM/KG (ref 50–1200)
PH UR STRIP: 8 [PH] (ref 5–8)
PHOSPHATE SERPL-MCNC: 3.5 MG/DL (ref 2.7–4.5)
PLATELET # BLD AUTO: 237 K/UL (ref 150–350)
PMV BLD AUTO: 9.2 FL (ref 9.2–12.9)
POTASSIUM SERPL-SCNC: 2.7 MMOL/L (ref 3.5–5.1)
POTASSIUM SERPL-SCNC: 3.1 MMOL/L (ref 3.5–5.1)
POTASSIUM SERPL-SCNC: 3.1 MMOL/L (ref 3.5–5.1)
PROCALCITONIN SERPL IA-MCNC: 0.06 NG/ML
PROT SERPL-MCNC: 7.1 G/DL (ref 6–8.4)
PROT UR QL STRIP: ABNORMAL
RBC # BLD AUTO: 4.16 M/UL (ref 4.6–6.2)
RBC #/AREA URNS AUTO: 9 /HPF (ref 0–4)
SARS-COV-2 RDRP RESP QL NAA+PROBE: NEGATIVE
SODIUM SERPL-SCNC: 119 MMOL/L (ref 136–145)
SODIUM SERPL-SCNC: 122 MMOL/L (ref 136–145)
SODIUM SERPL-SCNC: 122 MMOL/L (ref 136–145)
SODIUM UR-SCNC: 52 MMOL/L (ref 20–250)
SP GR UR STRIP: 1.01 (ref 1–1.03)
TROPONIN I SERPL DL<=0.01 NG/ML-MCNC: 0.02 NG/ML (ref 0–0.03)
TROPONIN I SERPL DL<=0.01 NG/ML-MCNC: 0.03 NG/ML (ref 0–0.03)
TSH SERPL DL<=0.005 MIU/L-ACNC: 2.43 UIU/ML (ref 0.4–4)
URN SPEC COLLECT METH UR: ABNORMAL
WBC # BLD AUTO: 8.23 K/UL (ref 3.9–12.7)
WBC #/AREA URNS AUTO: 81 /HPF (ref 0–5)

## 2020-08-27 PROCEDURE — 84484 ASSAY OF TROPONIN QUANT: CPT

## 2020-08-27 PROCEDURE — 99223 1ST HOSP IP/OBS HIGH 75: CPT | Mod: AI,GC,, | Performed by: HOSPITALIST

## 2020-08-27 PROCEDURE — 87088 URINE BACTERIA CULTURE: CPT

## 2020-08-27 PROCEDURE — 27000221 HC OXYGEN, UP TO 24 HOURS

## 2020-08-27 PROCEDURE — 83735 ASSAY OF MAGNESIUM: CPT

## 2020-08-27 PROCEDURE — 25000003 PHARM REV CODE 250: Performed by: STUDENT IN AN ORGANIZED HEALTH CARE EDUCATION/TRAINING PROGRAM

## 2020-08-27 PROCEDURE — 85025 COMPLETE CBC W/AUTO DIFF WBC: CPT

## 2020-08-27 PROCEDURE — 94761 N-INVAS EAR/PLS OXIMETRY MLT: CPT

## 2020-08-27 PROCEDURE — 84300 ASSAY OF URINE SODIUM: CPT

## 2020-08-27 PROCEDURE — 63600175 PHARM REV CODE 636 W HCPCS: Performed by: EMERGENCY MEDICINE

## 2020-08-27 PROCEDURE — 87077 CULTURE AEROBIC IDENTIFY: CPT

## 2020-08-27 PROCEDURE — 84145 PROCALCITONIN (PCT): CPT

## 2020-08-27 PROCEDURE — 87186 SC STD MICRODIL/AGAR DIL: CPT

## 2020-08-27 PROCEDURE — 63700000 PHARM REV CODE 250 ALT 637 W/O HCPCS: Performed by: EMERGENCY MEDICINE

## 2020-08-27 PROCEDURE — 93010 ELECTROCARDIOGRAM REPORT: CPT | Mod: ,,, | Performed by: INTERNAL MEDICINE

## 2020-08-27 PROCEDURE — 87040 BLOOD CULTURE FOR BACTERIA: CPT | Mod: 59

## 2020-08-27 PROCEDURE — 84100 ASSAY OF PHOSPHORUS: CPT

## 2020-08-27 PROCEDURE — U0002 COVID-19 LAB TEST NON-CDC: HCPCS

## 2020-08-27 PROCEDURE — 63600175 PHARM REV CODE 636 W HCPCS: Performed by: STUDENT IN AN ORGANIZED HEALTH CARE EDUCATION/TRAINING PROGRAM

## 2020-08-27 PROCEDURE — 99285 EMERGENCY DEPT VISIT HI MDM: CPT | Mod: 25

## 2020-08-27 PROCEDURE — 99223 PR INITIAL HOSPITAL CARE,LEVL III: ICD-10-PCS | Mod: AI,GC,, | Performed by: HOSPITALIST

## 2020-08-27 PROCEDURE — 99285 PR EMERGENCY DEPT VISIT,LEVEL V: ICD-10-PCS | Mod: ,,, | Performed by: EMERGENCY MEDICINE

## 2020-08-27 PROCEDURE — 93005 ELECTROCARDIOGRAM TRACING: CPT

## 2020-08-27 PROCEDURE — 81001 URINALYSIS AUTO W/SCOPE: CPT

## 2020-08-27 PROCEDURE — 84484 ASSAY OF TROPONIN QUANT: CPT | Mod: 91

## 2020-08-27 PROCEDURE — 83930 ASSAY OF BLOOD OSMOLALITY: CPT

## 2020-08-27 PROCEDURE — 87086 URINE CULTURE/COLONY COUNT: CPT

## 2020-08-27 PROCEDURE — 82436 ASSAY OF URINE CHLORIDE: CPT

## 2020-08-27 PROCEDURE — 83935 ASSAY OF URINE OSMOLALITY: CPT

## 2020-08-27 PROCEDURE — 80048 BASIC METABOLIC PNL TOTAL CA: CPT

## 2020-08-27 PROCEDURE — 25000003 PHARM REV CODE 250: Performed by: EMERGENCY MEDICINE

## 2020-08-27 PROCEDURE — 93010 EKG 12-LEAD: ICD-10-PCS | Mod: ,,, | Performed by: INTERNAL MEDICINE

## 2020-08-27 PROCEDURE — 83880 ASSAY OF NATRIURETIC PEPTIDE: CPT

## 2020-08-27 PROCEDURE — 11000001 HC ACUTE MED/SURG PRIVATE ROOM

## 2020-08-27 PROCEDURE — 99285 EMERGENCY DEPT VISIT HI MDM: CPT | Mod: ,,, | Performed by: EMERGENCY MEDICINE

## 2020-08-27 PROCEDURE — 84443 ASSAY THYROID STIM HORMONE: CPT

## 2020-08-27 PROCEDURE — 80053 COMPREHEN METABOLIC PANEL: CPT

## 2020-08-27 PROCEDURE — 87449 NOS EACH ORGANISM AG IA: CPT

## 2020-08-27 RX ORDER — AZITHROMYCIN 250 MG/1
250 TABLET, FILM COATED ORAL DAILY
Status: DISCONTINUED | OUTPATIENT
Start: 2020-08-29 | End: 2020-08-28

## 2020-08-27 RX ORDER — POTASSIUM CHLORIDE 7.45 MG/ML
10 INJECTION INTRAVENOUS
Status: COMPLETED | OUTPATIENT
Start: 2020-08-27 | End: 2020-08-27

## 2020-08-27 RX ORDER — POTASSIUM CHLORIDE 1.5 G/1.58G
40 POWDER, FOR SOLUTION ORAL ONCE
Status: DISCONTINUED | OUTPATIENT
Start: 2020-08-27 | End: 2020-08-27

## 2020-08-27 RX ORDER — POTASSIUM CHLORIDE 20 MEQ/1
40 TABLET, EXTENDED RELEASE ORAL
Status: DISCONTINUED | OUTPATIENT
Start: 2020-08-27 | End: 2020-08-27

## 2020-08-27 RX ORDER — POTASSIUM CHLORIDE 1.5 G/1.58G
40 POWDER, FOR SOLUTION ORAL ONCE
Status: COMPLETED | OUTPATIENT
Start: 2020-08-27 | End: 2020-08-27

## 2020-08-27 RX ORDER — POLYETHYLENE GLYCOL 3350 17 G/17G
17 POWDER, FOR SOLUTION ORAL DAILY
Status: DISCONTINUED | OUTPATIENT
Start: 2020-08-28 | End: 2020-09-04 | Stop reason: HOSPADM

## 2020-08-27 RX ORDER — GABAPENTIN 400 MG/1
800 CAPSULE ORAL 2 TIMES DAILY
Status: DISCONTINUED | OUTPATIENT
Start: 2020-08-27 | End: 2020-09-04 | Stop reason: HOSPADM

## 2020-08-27 RX ORDER — METOPROLOL SUCCINATE 25 MG/1
25 TABLET, EXTENDED RELEASE ORAL DAILY
Status: DISCONTINUED | OUTPATIENT
Start: 2020-08-28 | End: 2020-09-04 | Stop reason: HOSPADM

## 2020-08-27 RX ORDER — ATORVASTATIN CALCIUM 20 MG/1
20 TABLET, FILM COATED ORAL DAILY
Status: DISCONTINUED | OUTPATIENT
Start: 2020-08-28 | End: 2020-09-04 | Stop reason: HOSPADM

## 2020-08-27 RX ORDER — IBUPROFEN 200 MG
16 TABLET ORAL
Status: DISCONTINUED | OUTPATIENT
Start: 2020-08-27 | End: 2020-09-04 | Stop reason: HOSPADM

## 2020-08-27 RX ORDER — POTASSIUM CHLORIDE 20 MEQ/1
40 TABLET, EXTENDED RELEASE ORAL
Status: DISCONTINUED | OUTPATIENT
Start: 2020-08-28 | End: 2020-08-27

## 2020-08-27 RX ORDER — HEPARIN SODIUM 5000 [USP'U]/ML
5000 INJECTION, SOLUTION INTRAVENOUS; SUBCUTANEOUS EVERY 8 HOURS
Status: DISCONTINUED | OUTPATIENT
Start: 2020-08-27 | End: 2020-08-29

## 2020-08-27 RX ORDER — AMLODIPINE BESYLATE 5 MG/1
5 TABLET ORAL DAILY
Status: DISCONTINUED | OUTPATIENT
Start: 2020-08-27 | End: 2020-09-04 | Stop reason: HOSPADM

## 2020-08-27 RX ORDER — SODIUM CHLORIDE 0.9 % (FLUSH) 0.9 %
10 SYRINGE (ML) INJECTION
Status: DISCONTINUED | OUTPATIENT
Start: 2020-08-27 | End: 2020-09-04 | Stop reason: HOSPADM

## 2020-08-27 RX ORDER — SODIUM CHLORIDE 0.9 % (FLUSH) 0.9 %
10 SYRINGE (ML) INJECTION
Status: DISCONTINUED | OUTPATIENT
Start: 2020-08-27 | End: 2020-08-29

## 2020-08-27 RX ORDER — GLUCAGON 1 MG
1 KIT INJECTION
Status: DISCONTINUED | OUTPATIENT
Start: 2020-08-27 | End: 2020-09-04 | Stop reason: HOSPADM

## 2020-08-27 RX ORDER — PANTOPRAZOLE SODIUM 40 MG/1
40 TABLET, DELAYED RELEASE ORAL DAILY
Status: DISCONTINUED | OUTPATIENT
Start: 2020-08-28 | End: 2020-09-04 | Stop reason: HOSPADM

## 2020-08-27 RX ORDER — POTASSIUM CHLORIDE 20 MEQ/1
40 TABLET, EXTENDED RELEASE ORAL ONCE
Status: COMPLETED | OUTPATIENT
Start: 2020-08-28 | End: 2020-08-28

## 2020-08-27 RX ORDER — LEVOTHYROXINE SODIUM 50 UG/1
50 TABLET ORAL
Status: DISCONTINUED | OUTPATIENT
Start: 2020-08-28 | End: 2020-09-04 | Stop reason: HOSPADM

## 2020-08-27 RX ORDER — AZITHROMYCIN 250 MG/1
500 TABLET, FILM COATED ORAL
Status: COMPLETED | OUTPATIENT
Start: 2020-08-27 | End: 2020-08-27

## 2020-08-27 RX ORDER — ASPIRIN 81 MG/1
81 TABLET ORAL DAILY
Status: DISCONTINUED | OUTPATIENT
Start: 2020-08-28 | End: 2020-09-04 | Stop reason: HOSPADM

## 2020-08-27 RX ORDER — CEFTRIAXONE 1 G/1
1 INJECTION, POWDER, FOR SOLUTION INTRAMUSCULAR; INTRAVENOUS
Status: COMPLETED | OUTPATIENT
Start: 2020-08-27 | End: 2020-08-27

## 2020-08-27 RX ORDER — MIRTAZAPINE 15 MG/1
15 TABLET, FILM COATED ORAL NIGHTLY
Status: DISCONTINUED | OUTPATIENT
Start: 2020-08-27 | End: 2020-08-27

## 2020-08-27 RX ORDER — CEFTRIAXONE 1 G/1
1 INJECTION, POWDER, FOR SOLUTION INTRAMUSCULAR; INTRAVENOUS
Status: DISCONTINUED | OUTPATIENT
Start: 2020-08-28 | End: 2020-08-28

## 2020-08-27 RX ORDER — AZITHROMYCIN 250 MG/1
500 TABLET, FILM COATED ORAL DAILY
Status: DISCONTINUED | OUTPATIENT
Start: 2020-08-28 | End: 2020-08-27

## 2020-08-27 RX ORDER — IBUPROFEN 200 MG
24 TABLET ORAL
Status: DISCONTINUED | OUTPATIENT
Start: 2020-08-27 | End: 2020-09-04 | Stop reason: HOSPADM

## 2020-08-27 RX ORDER — TRAZODONE HYDROCHLORIDE 50 MG/1
50 TABLET ORAL NIGHTLY
Status: DISCONTINUED | OUTPATIENT
Start: 2020-08-27 | End: 2020-09-04 | Stop reason: HOSPADM

## 2020-08-27 RX ORDER — AMLODIPINE BESYLATE 5 MG/1
5 TABLET ORAL DAILY
Status: DISCONTINUED | OUTPATIENT
Start: 2020-08-28 | End: 2020-08-27

## 2020-08-27 RX ORDER — AZITHROMYCIN 250 MG/1
500 TABLET, FILM COATED ORAL ONCE
Status: DISCONTINUED | OUTPATIENT
Start: 2020-08-28 | End: 2020-08-28

## 2020-08-27 RX ADMIN — CEFTRIAXONE SODIUM 1 G: 1 INJECTION, POWDER, FOR SOLUTION INTRAMUSCULAR; INTRAVENOUS at 05:08

## 2020-08-27 RX ADMIN — MIRTAZAPINE 15 MG: 15 TABLET, FILM COATED ORAL at 09:08

## 2020-08-27 RX ADMIN — TRAZODONE HYDROCHLORIDE 50 MG: 50 TABLET ORAL at 09:08

## 2020-08-27 RX ADMIN — AMLODIPINE BESYLATE 5 MG: 5 TABLET ORAL at 07:08

## 2020-08-27 RX ADMIN — HEPARIN SODIUM 5000 UNITS: 5000 INJECTION INTRAVENOUS; SUBCUTANEOUS at 09:08

## 2020-08-27 RX ADMIN — GABAPENTIN 800 MG: 400 CAPSULE ORAL at 09:08

## 2020-08-27 RX ADMIN — POTASSIUM CHLORIDE 10 MEQ: 7.46 INJECTION, SOLUTION INTRAVENOUS at 09:08

## 2020-08-27 RX ADMIN — POTASSIUM CHLORIDE 40 MEQ: 1.5 POWDER, FOR SOLUTION ORAL at 05:08

## 2020-08-27 RX ADMIN — POTASSIUM CHLORIDE 10 MEQ: 7.46 INJECTION, SOLUTION INTRAVENOUS at 08:08

## 2020-08-27 RX ADMIN — AZITHROMYCIN 500 MG: 250 TABLET, FILM COATED ORAL at 05:08

## 2020-08-27 NOTE — TELEPHONE ENCOUNTER
Called Kiara THOMAS, left a voicemail. She called back and states pt is at the ER he was having weakness, diarrhea, nausea, leg weakness.

## 2020-08-27 NOTE — HPI
"87 yo male with history of hypothyroidism, neurogenic bladder with suprapubic catheter (exchanged annually, last exchange 3 days ago by home RN), HLD, HTN and GERD presenting to the ER with generalized weakness since 3 days ago. This is the first time the patient has experienced something like this. He describes the weakness as feeling tired and overall "fragile" and "without energy". He can move his extremities but has difficulty sitting down and getting out of bed. Reports 1 episode of diarrhea yesterday. The diarrhea was described as brown, watery and non bloody. He denies bloating and abdominal pain He also endorses SOB with exertion when he walks around the house and uses three pillows at night to sleep mainly because he likes it but also because he feels short of breath sometimes when he lies down. His SOB is chronic and has been going on "for many years" as per patient. He denies nausea, vomiting, cough, palpitations or chest discomfort. He also denies seizures, numbness in extremities, headache or confusion.    Pt does not FU with Cardiology OP but Echo performed in 2019 shows an EF of 58%, normal diastolic and systolic and mild aortic stenosis.For the neurogenic bladder the patient follows up with Urology OP since 2017.  "

## 2020-08-27 NOTE — ED NOTES
Pt. resting in bed in NAD. RR e/u. Continuous cardiac, BP, and O2 monitoring in progress. Pt. offered bathroom assistance and denies need at this time. Explanation of care/wait provided. Bed in low, locked position with rails up and call bell in reach. Will continue to monitor.

## 2020-08-27 NOTE — TELEPHONE ENCOUNTER
----- Message from Priscila Farnsworth sent at 8/27/2020 11:59 AM CDT -----  Contact: nav/ amos [facility]  ext [phone]  Nav would like to talk to the nurse in ref to symptoms the pt is having and also what she must do for the pt. Please call and advise

## 2020-08-27 NOTE — ED NOTES
Patient identifiers for Chucho Prado 86 y.o. male checked and correct.  Chief Complaint   Patient presents with    Weakness     Pt c/o generalized weakness x 3 days.  Pt also reports mild SOB intermittently the past couple days.  Pt to ED via EMS from home.       Past Medical History:   Diagnosis Date    Acquired hypothyroidism 7/30/2013    Arthritis     Blood transfusion     before 2005 - whe had gangrenous gall bladder    Cataract     Chronic back pain 12/4/2018    - Takes Percocet 5-325 at home - Can continue current abdominal pain control with Dilaudid 0.5 mg IV Q3H prn     CKD (chronic kidney disease) stage 3, GFR 30-59 ml/min     Compression fracture of lumbar vertebra     Depression     Dyslipidemia     Essential hypertension 7/30/2013    Gastroesophageal reflux disease without esophagitis 12/4/2018    - continue home Prilosec    General anesthetics causing adverse effect in therapeutic use     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    GERD (gastroesophageal reflux disease)     History of postoperative delirium 12/4/2018    - Patient reports 1 week history of post-op delirium 7/2018 - Monitor electrolytes, UA, and urine cx - Delirium precautions  - Can use Seroquel 25 mg QHS prn     Hypertension     Hyponatremia 10/23/2017    Impaired mobility and ADLs 6/28/2018    Neurogenic bladder 12/4/2018    Sleep disorder 12/4/2018    - continue Trazodone QHS    Thyroid disease     UTI (urinary tract infection)      Allergies reported: Review of patient's allergies indicates:  No Known Allergies      LOC: Patient is awake, alert, and aware of environment with an appropriate affect. Patient is oriented x 4 and speaking appropriately.  APPEARANCE: Patient resting comfortably and in no acute distress. Patient is clean and well groomed, patient's clothing is properly fastened.  HEENT: Pt presents with surgical mask on.   SKIN: The skin is warm and dry. Patient has normal skin turgor and  moist mucus membranes.   MUSKULOSKELETAL: Patient is moving all extremities well, no obvious deformities noted. Pulses intact.   RESPIRATORY: Airway is open and patent. Respirations are spontaneous and non-labored with normal effort and rate.  CARDIAC: Patient has a normal rate and rhythm. 58 on cardiac monitor. No peripheral edema noted.   ABDOMEN: No distention noted. Soft and non-tender upon palpation.  NEUROLOGICAL: pupils 3mm, PERRL. Facial expression is symmetrical. Hand grasps are equal bilaterally. Normal sensation in all extremities when touched with finger.

## 2020-08-28 ENCOUNTER — EXTERNAL HOME HEALTH (OUTPATIENT)
Dept: HOME HEALTH SERVICES | Facility: HOSPITAL | Age: 85
End: 2020-08-28

## 2020-08-28 LAB
ANION GAP SERPL CALC-SCNC: 11 MMOL/L (ref 8–16)
ANION GAP SERPL CALC-SCNC: 13 MMOL/L (ref 8–16)
ANION GAP SERPL CALC-SCNC: 8 MMOL/L (ref 8–16)
ANION GAP SERPL CALC-SCNC: 9 MMOL/L (ref 8–16)
ANION GAP SERPL CALC-SCNC: 9 MMOL/L (ref 8–16)
ASCENDING AORTA: 2.98 CM
AV INDEX (PROSTH): 0.31
AV MEAN GRADIENT: 26 MMHG
AV PEAK GRADIENT: 39 MMHG
AV VALVE AREA: 1.03 CM2
AV VELOCITY RATIO: 0.33
BASOPHILS # BLD AUTO: 0.05 K/UL (ref 0–0.2)
BASOPHILS NFR BLD: 0.4 % (ref 0–1.9)
BSA FOR ECHO PROCEDURE: 2.09 M2
BUN SERPL-MCNC: 14 MG/DL (ref 8–23)
BUN SERPL-MCNC: 15 MG/DL (ref 8–23)
BUN SERPL-MCNC: 16 MG/DL (ref 8–23)
BUN SERPL-MCNC: 17 MG/DL (ref 8–23)
BUN SERPL-MCNC: 17 MG/DL (ref 8–23)
CALCIUM SERPL-MCNC: 7.9 MG/DL (ref 8.7–10.5)
CALCIUM SERPL-MCNC: 7.9 MG/DL (ref 8.7–10.5)
CALCIUM SERPL-MCNC: 8.2 MG/DL (ref 8.7–10.5)
CALCIUM SERPL-MCNC: 8.3 MG/DL (ref 8.7–10.5)
CALCIUM SERPL-MCNC: 8.5 MG/DL (ref 8.7–10.5)
CHLORIDE SERPL-SCNC: 75 MMOL/L (ref 95–110)
CHLORIDE SERPL-SCNC: 76 MMOL/L (ref 95–110)
CHLORIDE SERPL-SCNC: 76 MMOL/L (ref 95–110)
CHLORIDE SERPL-SCNC: 78 MMOL/L (ref 95–110)
CHLORIDE SERPL-SCNC: 81 MMOL/L (ref 95–110)
CO2 SERPL-SCNC: 31 MMOL/L (ref 23–29)
CO2 SERPL-SCNC: 33 MMOL/L (ref 23–29)
CO2 SERPL-SCNC: 33 MMOL/L (ref 23–29)
CO2 SERPL-SCNC: 34 MMOL/L (ref 23–29)
CO2 SERPL-SCNC: 35 MMOL/L (ref 23–29)
CREAT SERPL-MCNC: 1 MG/DL (ref 0.5–1.4)
CREAT SERPL-MCNC: 1.1 MG/DL (ref 0.5–1.4)
CREAT SERPL-MCNC: 1.2 MG/DL (ref 0.5–1.4)
CV ECHO LV RWT: 0.44 CM
DIFFERENTIAL METHOD: ABNORMAL
DOP CALC AO PEAK VEL: 3.11 M/S
DOP CALC AO VTI: 80.32 CM
DOP CALC LVOT AREA: 3.4 CM2
DOP CALC LVOT DIAMETER: 2.07 CM
DOP CALC LVOT PEAK VEL: 1.02 M/S
DOP CALC LVOT STROKE VOLUME: 82.98 CM3
DOP CALCLVOT PEAK VEL VTI: 24.67 CM
E WAVE DECELERATION TIME: 388.86 MSEC
E/A RATIO: 0.88
E/E' RATIO: 16.73 M/S
ECHO LV POSTERIOR WALL: 0.89 CM (ref 0.6–1.1)
EOSINOPHIL # BLD AUTO: 0.1 K/UL (ref 0–0.5)
EOSINOPHIL NFR BLD: 1 % (ref 0–8)
ERYTHROCYTE [DISTWIDTH] IN BLOOD BY AUTOMATED COUNT: 13.2 % (ref 11.5–14.5)
EST. GFR  (AFRICAN AMERICAN): >60 ML/MIN/1.73 M^2
EST. GFR  (NON AFRICAN AMERICAN): 54.4 ML/MIN/1.73 M^2
EST. GFR  (NON AFRICAN AMERICAN): >60 ML/MIN/1.73 M^2
FRACTIONAL SHORTENING: 20 % (ref 28–44)
GLUCOSE SERPL-MCNC: 103 MG/DL (ref 70–110)
GLUCOSE SERPL-MCNC: 137 MG/DL (ref 70–110)
GLUCOSE SERPL-MCNC: 86 MG/DL (ref 70–110)
GLUCOSE SERPL-MCNC: 92 MG/DL (ref 70–110)
GLUCOSE SERPL-MCNC: 97 MG/DL (ref 70–110)
HCT VFR BLD AUTO: 36.3 % (ref 40–54)
HGB BLD-MCNC: 12.5 G/DL (ref 14–18)
IMM GRANULOCYTES # BLD AUTO: 0.05 K/UL (ref 0–0.04)
IMM GRANULOCYTES NFR BLD AUTO: 0.4 % (ref 0–0.5)
INTERVENTRICULAR SEPTUM: 0.88 CM (ref 0.6–1.1)
LA MAJOR: 4.04 CM
LA MINOR: 4.01 CM
LA WIDTH: 3.7 CM
LEFT ATRIUM SIZE: 3.83 CM
LEFT ATRIUM VOLUME INDEX: 23.7 ML/M2
LEFT ATRIUM VOLUME: 48.48 CM3
LEFT INTERNAL DIMENSION IN SYSTOLE: 3.2 CM (ref 2.1–4)
LEFT VENTRICLE DIASTOLIC VOLUME INDEX: 34.37 ML/M2
LEFT VENTRICLE DIASTOLIC VOLUME: 70.25 ML
LEFT VENTRICLE MASS INDEX: 53 G/M2
LEFT VENTRICLE SYSTOLIC VOLUME INDEX: 20.1 ML/M2
LEFT VENTRICLE SYSTOLIC VOLUME: 41.01 ML
LEFT VENTRICULAR INTERNAL DIMENSION IN DIASTOLE: 4.01 CM (ref 3.5–6)
LEFT VENTRICULAR MASS: 107.61 G
LV LATERAL E/E' RATIO: 15.33 M/S
LV SEPTAL E/E' RATIO: 18.4 M/S
LYMPHOCYTES # BLD AUTO: 1.9 K/UL (ref 1–4.8)
LYMPHOCYTES NFR BLD: 16.7 % (ref 18–48)
MCH RBC QN AUTO: 27.2 PG (ref 27–31)
MCHC RBC AUTO-ENTMCNC: 34.4 G/DL (ref 32–36)
MCV RBC AUTO: 79 FL (ref 82–98)
MONOCYTES # BLD AUTO: 0.9 K/UL (ref 0.3–1)
MONOCYTES NFR BLD: 7.6 % (ref 4–15)
MV PEAK A VEL: 1.05 M/S
MV PEAK E VEL: 0.92 M/S
MV STENOSIS PRESSURE HALF TIME: 112.77 MS
MV VALVE AREA P 1/2 METHOD: 1.95 CM2
NEUTROPHILS # BLD AUTO: 8.4 K/UL (ref 1.8–7.7)
NEUTROPHILS NFR BLD: 73.9 % (ref 38–73)
NRBC BLD-RTO: 0 /100 WBC
PLATELET # BLD AUTO: 190 K/UL (ref 150–350)
PLATELET BLD QL SMEAR: ABNORMAL
PMV BLD AUTO: 10.3 FL (ref 9.2–12.9)
POTASSIUM SERPL-SCNC: 3.3 MMOL/L (ref 3.5–5.1)
POTASSIUM SERPL-SCNC: 3.3 MMOL/L (ref 3.5–5.1)
POTASSIUM SERPL-SCNC: 3.4 MMOL/L (ref 3.5–5.1)
POTASSIUM SERPL-SCNC: 3.6 MMOL/L (ref 3.5–5.1)
POTASSIUM SERPL-SCNC: 3.6 MMOL/L (ref 3.5–5.1)
RA MAJOR: 3.39 CM
RA PRESSURE: 3 MMHG
RA WIDTH: 3.77 CM
RBC # BLD AUTO: 4.6 M/UL (ref 4.6–6.2)
RIGHT VENTRICULAR END-DIASTOLIC DIMENSION: 2.73 CM
SINUS: 3.04 CM
SODIUM SERPL-SCNC: 119 MMOL/L (ref 136–145)
SODIUM SERPL-SCNC: 120 MMOL/L (ref 136–145)
SODIUM SERPL-SCNC: 120 MMOL/L (ref 136–145)
SODIUM SERPL-SCNC: 121 MMOL/L (ref 136–145)
SODIUM SERPL-SCNC: 122 MMOL/L (ref 136–145)
STJ: 2.59 CM
TDI LATERAL: 0.06 M/S
TDI SEPTAL: 0.05 M/S
TDI: 0.06 M/S
TRICUSPID ANNULAR PLANE SYSTOLIC EXCURSION: 2.02 CM
WBC # BLD AUTO: 11.39 K/UL (ref 3.9–12.7)

## 2020-08-28 PROCEDURE — 36415 COLL VENOUS BLD VENIPUNCTURE: CPT

## 2020-08-28 PROCEDURE — 85025 COMPLETE CBC W/AUTO DIFF WBC: CPT

## 2020-08-28 PROCEDURE — 27000221 HC OXYGEN, UP TO 24 HOURS

## 2020-08-28 PROCEDURE — 99233 SBSQ HOSP IP/OBS HIGH 50: CPT | Mod: GC,,, | Performed by: HOSPITALIST

## 2020-08-28 PROCEDURE — 80048 BASIC METABOLIC PNL TOTAL CA: CPT | Mod: 91

## 2020-08-28 PROCEDURE — 25000003 PHARM REV CODE 250: Performed by: STUDENT IN AN ORGANIZED HEALTH CARE EDUCATION/TRAINING PROGRAM

## 2020-08-28 PROCEDURE — 11000001 HC ACUTE MED/SURG PRIVATE ROOM

## 2020-08-28 PROCEDURE — 63600175 PHARM REV CODE 636 W HCPCS: Performed by: STUDENT IN AN ORGANIZED HEALTH CARE EDUCATION/TRAINING PROGRAM

## 2020-08-28 PROCEDURE — 99233 PR SUBSEQUENT HOSPITAL CARE,LEVL III: ICD-10-PCS | Mod: GC,,, | Performed by: HOSPITALIST

## 2020-08-28 PROCEDURE — 94761 N-INVAS EAR/PLS OXIMETRY MLT: CPT

## 2020-08-28 RX ORDER — POTASSIUM CHLORIDE 20 MEQ/1
40 TABLET, EXTENDED RELEASE ORAL
Status: COMPLETED | OUTPATIENT
Start: 2020-08-28 | End: 2020-08-28

## 2020-08-28 RX ORDER — POTASSIUM CHLORIDE 20 MEQ/1
40 TABLET, EXTENDED RELEASE ORAL ONCE
Status: COMPLETED | OUTPATIENT
Start: 2020-08-28 | End: 2020-08-28

## 2020-08-28 RX ADMIN — GABAPENTIN 800 MG: 400 CAPSULE ORAL at 10:08

## 2020-08-28 RX ADMIN — LEVOTHYROXINE SODIUM 50 MCG: 50 TABLET ORAL at 05:08

## 2020-08-28 RX ADMIN — HEPARIN SODIUM 5000 UNITS: 5000 INJECTION INTRAVENOUS; SUBCUTANEOUS at 10:08

## 2020-08-28 RX ADMIN — POTASSIUM CHLORIDE 40 MEQ: 1500 TABLET, EXTENDED RELEASE ORAL at 06:08

## 2020-08-28 RX ADMIN — GABAPENTIN 800 MG: 400 CAPSULE ORAL at 09:08

## 2020-08-28 RX ADMIN — POTASSIUM CHLORIDE 40 MEQ: 1500 TABLET, EXTENDED RELEASE ORAL at 09:08

## 2020-08-28 RX ADMIN — ATORVASTATIN CALCIUM 20 MG: 20 TABLET, FILM COATED ORAL at 09:08

## 2020-08-28 RX ADMIN — AMLODIPINE BESYLATE 5 MG: 5 TABLET ORAL at 09:08

## 2020-08-28 RX ADMIN — TRAZODONE HYDROCHLORIDE 50 MG: 50 TABLET ORAL at 10:08

## 2020-08-28 RX ADMIN — PANTOPRAZOLE SODIUM 40 MG: 40 TABLET, DELAYED RELEASE ORAL at 09:08

## 2020-08-28 RX ADMIN — POTASSIUM CHLORIDE 40 MEQ: 1500 TABLET, EXTENDED RELEASE ORAL at 12:08

## 2020-08-28 RX ADMIN — ASPIRIN 81 MG: 81 TABLET, COATED ORAL at 09:08

## 2020-08-28 RX ADMIN — HEPARIN SODIUM 5000 UNITS: 5000 INJECTION INTRAVENOUS; SUBCUTANEOUS at 01:08

## 2020-08-28 RX ADMIN — HEPARIN SODIUM 5000 UNITS: 5000 INJECTION INTRAVENOUS; SUBCUTANEOUS at 05:08

## 2020-08-28 RX ADMIN — METOPROLOL SUCCINATE 25 MG: 25 TABLET, EXTENDED RELEASE ORAL at 09:08

## 2020-08-28 RX ADMIN — POTASSIUM CHLORIDE 40 MEQ: 1500 TABLET, EXTENDED RELEASE ORAL at 05:08

## 2020-08-28 NOTE — SUBJECTIVE & OBJECTIVE
Interval History:   Pt slept well, vital signs stable. Concerned about gross hematuria in urine. Refers it has happened in the past when he's had previous urinary tract infections.    Review of Systems  Objective:     Vital Signs (Most Recent):  Temp: 97.7 °F (36.5 °C) (08/28/20 1150)  Pulse: (!) 55 (08/28/20 1152)  Resp: 20 (08/28/20 0800)  BP: (!) 147/66 (08/28/20 1150)  SpO2: 99 % (08/28/20 0851) Vital Signs (24h Range):  Temp:  [96.2 °F (35.7 °C)-99.5 °F (37.5 °C)] 97.7 °F (36.5 °C)  Pulse:  [50-68] 55  Resp:  [14-20] 20  SpO2:  [96 %-100 %] 99 %  BP: (128-175)/(60-86) 147/66     Weight: 90.7 kg (200 lb)  Body mass index is 30.41 kg/m².    Intake/Output Summary (Last 24 hours) at 8/28/2020 1327  Last data filed at 8/28/2020 0900  Gross per 24 hour   Intake 440 ml   Output 1925 ml   Net -1485 ml      Physical Exam    Significant Labs:   BMP:   Recent Labs   Lab 08/27/20  1432  08/28/20  1214   GLU 93   < > 103   *   < > 120*   K 2.7*   < > 3.4*   CL 73*   < > 78*   CO2 37*   < > 33*   BUN 18   < > 16   CREATININE 1.2   < > 1.0   CALCIUM 8.2*   < > 7.9*   MG 2.3  --   --     < > = values in this interval not displayed.     CBC:   Recent Labs   Lab 08/27/20  1432 08/28/20  0532   WBC 8.23 11.39   HGB 11.0* 12.5*   HCT 33.8* 36.3*    190       Significant Imaging: I have reviewed and interpreted all pertinent imaging results/findings within the past 24 hours.

## 2020-08-28 NOTE — ASSESSMENT & PLAN NOTE
"85 yo male with history of hypothyroidism, neurogenic bladder with suprapubic catheter (exchanged annually, last exchange 3 days ago by home RN), HLD, HTN and GERD presenting to the ER with generalized weakness since 3 days ago. This is the first time the patient has experienced something like this. He describes the weakness as feeling tired and overall "fragile" and "without energy". Pt denies: seizures, headache or numbness.    Labs concerning for hyponatremia,  hypochloremia and hypokalemia.     CXR:left basilar increased density which could represent combination of effusion, atelectasis or consolidation.     BNP: 298  Echo ordered to r/o CHF:  · Normal left ventricular systolic function. The estimated ejection fraction is 60 %  · Indeterminate left ventricular diastolic function.  · Normal right ventricular systolic function.  · Moderate-to-severe aortic valve stenosis.  · Aortic valve area is 1.03 cm2; peak velocity is 3.11 m/s; mean gradient is 26 mmHg.  · Normal central venous pressure (3 mmHg).    Serum Osm: 247 (low)  Urine Osm: 217 (low)  Urine Na: 52  Urine Cl: 45    Pt currently taking Hydrochlorothiazide for hypertension. As per PCP notes he has been taking HCTZ since 2019 without any side effects. The medication was stopped but then restarted 8/21.  Dx include HCTZ induced hyponatremia vs  SIADH vs CHF.     Plan  DC HCTZ  FU urine Legionella antigen  FU Echo results  Start abx treatment with Azithromycin &Ceftriaxone  BMP q 4 hr    "

## 2020-08-28 NOTE — HOSPITAL COURSE
Patient admitted to hospital medicine on 8/27/2020 for hyponatremia and evaluation of severe hyponatremia, hypokalemia, and hypochloremia. Hospital stay complicated by gross hematuria. Urology did bedside cystoscopy on 8/31, The bladder wall was consistent with neurogenic bladder including multiple diverticula. There was old formed clot seen throughout the bladder floor and in diverticula, but no active bleeding was seen. Urology went to OR on 9/1 for clot removal. Urology removed 2 clots in the the bladder and found some diverticula. Pt doing well post-procedure. Plan to follow-up with Urology in clinic after discharge for follow-up. On cipro for 5 days while external galvan in place. Hyponatremia slightly improving after HCTZ held however declined to 127 again on 9/3 so discharge was held until normalizes. Per chart review, pt has had baseline Na of 130s. If sodium back to baseline, will plan to discharge to SNF tomorrow, 9/4. Patient's sodium back to baseline of 133. Patient stable to discharge to SNF today, 9/4.

## 2020-08-28 NOTE — ED NOTES
Pt assisted onto bedpan to have BM. Large, loose, light brown BM noted. Small area of denuded skin noted to sacrum. See flowsheets. Amber care provided and new brief applied. Pt assisted in being pulled up in bed by RNx2. Pt aware he is being admitted and will be transported to floor once report is successfully given to RN and telemetry box arrives.

## 2020-08-28 NOTE — ED NOTES
Report received from ANNE Askew. Care assumed. Pt resting comfortably and independently repositioned in stretcher with bed locked in lowest position for safety. NAD noted at this time. Respirations even and unlabored and visible chest rise noted. Pt on 2L oxygen per NC, sats 99%. Pt educated on need for sputum culture. Pt denies cough. Per ANNE Askew, suprapubic catheter collection bag changed around 1815. Pt on continuous cardiac monitor, automated BP cuff, and pulse oximeter. Call bell within reach and pt instructed to call if assistance is needed. Side rails raised x2, bed locked and low.

## 2020-08-28 NOTE — SUBJECTIVE & OBJECTIVE
Past Medical History:   Diagnosis Date    Acquired hypothyroidism 7/30/2013    Arthritis     Blood transfusion     before 2005 - whe had gangrenous gall bladder    Cataract     Chronic back pain 12/4/2018    - Takes Percocet 5-325 at home - Can continue current abdominal pain control with Dilaudid 0.5 mg IV Q3H prn     CKD (chronic kidney disease) stage 3, GFR 30-59 ml/min     Compression fracture of lumbar vertebra     Depression     Dyslipidemia     Essential hypertension 7/30/2013    Gastroesophageal reflux disease without esophagitis 12/4/2018    - continue home Prilosec    General anesthetics causing adverse effect in therapeutic use     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    GERD (gastroesophageal reflux disease)     History of postoperative delirium 12/4/2018    - Patient reports 1 week history of post-op delirium 7/2018 - Monitor electrolytes, UA, and urine cx - Delirium precautions  - Can use Seroquel 25 mg QHS prn     Hypertension     Hyponatremia 10/23/2017    Impaired mobility and ADLs 6/28/2018    Neurogenic bladder 12/4/2018    Sleep disorder 12/4/2018    - continue Trazodone QHS    Thyroid disease     UTI (urinary tract infection)        Past Surgical History:   Procedure Laterality Date    CHOLECYSTECTOMY      EYE SURGERY Right     IOL    HERNIA REPAIR      INTRAOCULAR PROSTHESES INSERTION Left 5/28/2018    Procedure: INSERTION-INTRAOCULAR LENS (IOL);  Surgeon: Trinity Vazquez MD;  Location: [facility] OR;  Service: Ophthalmology;  Laterality: Left;    JOINT REPLACEMENT      LAMINECTOMY  12/27/2016    L2-L4    LUMBAR LAMINECTOMY  12/2016    PARATHYROIDECTOMY      PHACOEMULSIFICATION OF CATARACT Left 5/28/2018    Procedure: PHACOEMULSIFICATION-ASPIRATION-CATARACT;  Surgeon: Trinity Vazquez MD;  Location: [facility] OR;  Service: Ophthalmology;  Laterality: Left;    REPAIR OF INCARCERATED INCISIONAL HERNIA WITHOUT HISTORY OF PRIOR REPAIR N/A 12/5/2018    Procedure: REPAIR,  HERNIA, INCISIONAL, INCARCERATED, WITHOUT HISTORY OF PRIOR REPAIR;  Surgeon: Steve Valentin MD;  Location: [facility] OR 2ND FLR;  Service: General;  Laterality: N/A;    REPAIR OF INCARCERATED VENTRAL HERNIA WITHOUT HISTORY OF PRIOR REPAIR N/A 2018    Procedure: REPAIR, HERNIA, VENTRAL, INCARCERATED, WITHOUT HISTORY OF PRIOR REPAIR;  Surgeon: Guilherme Ordoñez MD;  Location: [facility] OR [facility] FLR;  Service: General;  Laterality: N/A;    TOTAL KNEE ARTHROPLASTY Bilateral     TOTAL KNEE ARTHROPLASTY Left 10/25/2017    TKR       Review of patient's allergies indicates:  No Known Allergies    No current facility-administered medications on file prior to encounter.      Current Outpatient Medications on File Prior to Encounter   Medication Sig    amLODIPine (NORVASC) 5 MG tablet TAKE 1 TABLET BY MOUTH EVERY DAY    aspirin (ECOTRIN) 81 MG EC tablet TAKE 1 TABLET BY MOUTH EVERY DAY    atorvastatin (LIPITOR) 20 MG tablet Take 1 tablet (20 mg total) by mouth once daily.    gabapentin (NEURONTIN) 400 MG capsule TAKE 2 CAPSULES BY MOUTH TWICE DAILY    hydroCHLOROthiazide (HYDRODIURIL) 25 MG tablet Take 1 tablet (25 mg total) by mouth once daily.    levothyroxine (SYNTHROID) 50 MCG tablet TAKE 1 TABLET BY MOUTH EVERY DAY    metoprolol succinate (TOPROL-XL) 25 MG 24 hr tablet TAKE 1 TABLET BY MOUTH EVERY DAY    mirtazapine (REMERON) 15 MG tablet Take 1 tablet (15 mg total) by mouth every evening.    omeprazole (PRILOSEC) 40 MG capsule TAKE 1 CAPSULE BY MOUTH EVERY MORNING 30 MINUTES BEFORE EATING    polyethylene glycol (GLYCOLAX) 17 gram/dose powder Take 17 g by mouth once daily.    traZODone (DESYREL) 50 MG tablet TAKE 1 TABLET BY MOUTH NIGHTLY     Family History     Problem Relation (Age of Onset)    Diabetes Father    Esophageal cancer Father    Hypertension Mother        Tobacco Use    Smoking status: Former Smoker     Years: 20.00     Quit date:      Years since quittin.6    Smokeless tobacco: Never  Used    Tobacco comment: quit 1990   Substance and Sexual Activity    Alcohol use: No     Comment: rarely/6 months    Drug use: No    Sexual activity: Never     Review of Systems   Constitutional: Positive for fatigue. Negative for activity change, chills and fever.   HENT: Negative for sneezing and sore throat.    Respiratory: Positive for shortness of breath. Negative for cough and chest tightness.    Cardiovascular: Negative for chest pain and palpitations.   Gastrointestinal: Positive for diarrhea. Negative for abdominal distention, abdominal pain, nausea and vomiting.   Genitourinary: Negative for difficulty urinating and flank pain.   Musculoskeletal: Negative for back pain and myalgias.   Skin: Negative for color change and rash.   Neurological: Negative for dizziness and headaches.   Psychiatric/Behavioral: Negative for agitation, confusion and self-injury.     Objective:     Vital Signs (Most Recent):  Temp: 99.5 °F (37.5 °C) (08/27/20 1405)  Pulse: (!) 55 (08/27/20 1932)  Resp: 15 (08/27/20 1932)  BP: (!) 156/66 (08/27/20 1934)  SpO2: 99 % (08/27/20 1932) Vital Signs (24h Range):  Temp:  [99.5 °F (37.5 °C)] 99.5 °F (37.5 °C)  Pulse:  [50-67] 55  Resp:  [14-19] 15  SpO2:  [96 %-99 %] 99 %  BP: (134-175)/(61-86) 156/66     Weight: 88.5 kg (195 lb)  Body mass index is 29.65 kg/m².    Physical Exam  Constitutional:       Appearance: Normal appearance. He is ill-appearing.   HENT:      Head: Normocephalic and atraumatic.      Mouth/Throat:      Mouth: Mucous membranes are dry.   Cardiovascular:      Rate and Rhythm: Normal rate and regular rhythm.      Pulses: Normal pulses.      Heart sounds: Normal heart sounds.   Pulmonary:      Effort: Pulmonary effort is normal.      Breath sounds: Rhonchi present.      Comments: Rhonchi L lower lobe    Abdominal:      General: Abdomen is flat. Bowel sounds are normal.      Palpations: Abdomen is soft.      Comments: Catheter in place   Musculoskeletal:          General: No deformity.      Right lower leg: No edema.      Left lower leg: No edema.   Skin:     General: Skin is warm and dry.      Comments: No edema present   Neurological:      Mental Status: He is alert and oriented to person, place, and time.      Motor: Weakness present.             Significant Labs:   CBC:   Recent Labs   Lab 08/27/20  1432   WBC 8.23   HGB 11.0*   HCT 33.8*        CMP:   Recent Labs   Lab 08/27/20  1432 08/27/20  1934   * 122*  122*   K 2.7* 3.1*  3.1*   CL 73* 79*  79*   CO2 37* 35*  35*   GLU 93 95  95   BUN 18 16  16   CREATININE 1.2 1.1  1.1   CALCIUM 8.2* 7.7*  7.7*   PROT 7.1  --    ALBUMIN 3.6  --    BILITOT 0.7  --    ALKPHOS 92  --    AST 18  --    ALT 8*  --    ANIONGAP 9 8  8   EGFRNONAA 54.4* >60.0  >60.0       Significant Imaging: I have reviewed and interpreted all pertinent imaging results/findings within the past 24 hours.

## 2020-08-28 NOTE — ASSESSMENT & PLAN NOTE
ECG with frequent PVCs. No QT interval prolongation, no ST depression.     Plan  Replace PRN  Pt placed on telemetry

## 2020-08-28 NOTE — ASSESSMENT & PLAN NOTE
On urinalysis patient with hazy urine, protein +1, occult blood +2, leukocytes +3 and nitrites +. Clinically patient with gross hematuria on exam and suprapubic tenderness.    Plan  Pt is covered with Ceftriaxone  FU urine cultures  Continue monitoring

## 2020-08-28 NOTE — PROGRESS NOTES
"[facility] Hospital Medicine  Progress Note    Patient Name: Chucho Prado  MRN: [id number]  Patient Class: IP- Inpatient   Admission Date: 8/27/2020  Length of Stay: 1 days  Attending Physician: Staci Tyler MD  Primary Care Provider: Obed Mcguire MD    [place] [facility] Medicine Team: [facility] HOSP MED 5 Era Melendez MD    Subjective:     Principal Problem:Hyponatremia        HPI:  87 yo male with history of hypothyroidism, neurogenic bladder with suprapubic catheter (exchanged annually, last exchange 3 days ago by home RN), HLD, HTN and GERD presenting to the ER with generalized weakness since 3 days ago. This is the first time the patient has experienced something like this. He describes the weakness as feeling tired and overall "fragile" and "without energy". He can move his extremities but has difficulty sitting down and getting out of bed. Reports 1 episode of diarrhea yesterday. The diarrhea was described as brown, watery and non bloody. He denies bloating and abdominal pain He also endorses SOB with exertion when he walks around the house and uses three pillows at night to sleep mainly because he likes it but also because he feels short of breath sometimes when he lies down. His SOB is chronic and has been going on "for many years" as per patient. He denies nausea, vomiting, cough, palpitations or chest discomfort. He also denies seizures, numbness in extremities, headache or confusion.    Pt does not FU with Cardiology OP but Echo performed in 2019 shows an EF of 58%, normal diastolic and systolic and mild aortic stenosis.For the neurogenic bladder the patient follows up with Urology OP since 2017.    Overview/Hospital Course:  Echo (8/27):  · Normal left ventricular systolic function. The estimated ejection fraction is 60 %  · Indeterminate left ventricular diastolic function.  · Normal right ventricular systolic function.  · Moderate-to-severe aortic valve stenosis.  · Aortic " valve area is 1.03 cm2; peak velocity is 3.11 m/s; mean gradient is 26 mmHg.  Normal central venous pressure (3 mmHg).    Interval History:   Pt slept well, vital signs stable. Concerned about gross hematuria in urine. Refers it has happened in the past when he's had previous urinary tract infections.    Review of Systems   Constitutional: Positive for fatigue. Negative for activity change, chills and fever.   HENT: Negative for sneezing and sore throat.    Respiratory: Positive for shortness of breath. Negative for cough and chest tightness.    Cardiovascular: Negative for chest pain and palpitations.   Gastrointestinal: Positive for diarrhea. Negative for abdominal distention, abdominal pain, nausea and vomiting.   Genitourinary: Negative for difficulty urinating and flank pain.   Musculoskeletal: Negative for back pain and myalgias.   Skin: Negative for color change and rash.   Neurological: Negative for dizziness and headaches.   Psychiatric/Behavioral: Negative for agitation, confusion and self-injury.     Objective:     Vital Signs (Most Recent):  Temp: 97.7 °F (36.5 °C) (08/28/20 1150)  Pulse: (!) 55 (08/28/20 1152)  Resp: 20 (08/28/20 0800)  BP: (!) 147/66 (08/28/20 1150)  SpO2: 99 % (08/28/20 0851) Vital Signs (24h Range):  Temp:  [96.2 °F (35.7 °C)-99.5 °F (37.5 °C)] 97.7 °F (36.5 °C)  Pulse:  [50-68] 55  Resp:  [14-20] 20  SpO2:  [96 %-100 %] 99 %  BP: (128-175)/(60-86) 147/66     Weight: 90.7 kg (200 lb)  Body mass index is 30.41 kg/m².    Intake/Output Summary (Last 24 hours) at 8/28/2020 1327  Last data filed at 8/28/2020 0900  Gross per 24 hour   Intake 440 ml   Output 1925 ml   Net -1485 ml        Physical Exam  Constitutional:       Appearance: Normal appearance. He is ill-appearing.   HENT:      Head: Normocephalic and atraumatic.      Mouth/Throat:      Mouth: Mucous membranes are dry.   Cardiovascular:      Rate and Rhythm: Normal rate and regular rhythm.      Pulses: Normal pulses.      Heart  "sounds: Normal heart sounds.   Pulmonary:      Effort: Pulmonary effort is normal.      Breath sounds: Rhonchi present.      Comments: Rhonchi L lower lobe    Abdominal:      General: Abdomen is flat. Bowel sounds are normal.      Palpations: Abdomen is soft.      Comments: Catheter in place   Musculoskeletal:         General: No deformity.      Right lower leg: No edema.      Left lower leg: No edema.   Skin:     General: Skin is warm and dry.      Comments: No edema present   Neurological:      Mental Status: He is alert and oriented to person, place, and time.      Motor: Weakness present.     Significant Labs:   BMP:   Recent Labs   Lab 08/27/20  1432  08/28/20  1214   GLU 93   < > 103   *   < > 120*   K 2.7*   < > 3.4*   CL 73*   < > 78*   CO2 37*   < > 33*   BUN 18   < > 16   CREATININE 1.2   < > 1.0   CALCIUM 8.2*   < > 7.9*   MG 2.3  --   --     < > = values in this interval not displayed.     CBC:   Recent Labs   Lab 08/27/20  1432 08/28/20  0532   WBC 8.23 11.39   HGB 11.0* 12.5*   HCT 33.8* 36.3*    190       Significant Imaging: I have reviewed and interpreted all pertinent imaging results/findings within the past 24 hours.      Assessment/Plan:      * Hyponatremia  85 yo male with history of hypothyroidism, neurogenic bladder with suprapubic catheter (exchanged annually, last exchange 3 days ago by home RN), HLD, HTN and GERD presenting to the ER with generalized weakness since 3 days ago. This is the first time the patient has experienced something like this. He describes the weakness as feeling tired and overall "fragile" and "without energy". Pt denies: seizures, headache or numbness.    Labs concerning for hyponatremia,  hypochloremia and hypokalemia.     CXR:left basilar increased density which could represent combination of effusion, atelectasis or consolidation.     BNP: 298  Echo ordered to r/o CHF:  · Normal left ventricular systolic function. The estimated ejection fraction is 60 " %  · Indeterminate left ventricular diastolic function.  · Normal right ventricular systolic function.  · Moderate-to-severe aortic valve stenosis.  · Aortic valve area is 1.03 cm2; peak velocity is 3.11 m/s; mean gradient is 26 mmHg.  · Normal central venous pressure (3 mmHg).    Serum Osm: 247 (low)  Urine Osm: 217 (low)  Urine Na: 52  Urine Cl: 45    Pt currently taking Hydrochlorothiazide for hypertension. As per PCP notes he has been taking HCTZ since 2019 without any side effects. The medication was stopped but then restarted 8/21.  Dx include HCTZ induced hyponatremia vs  SIADH vs CHF.     Plan  DC HCTZ  FU urine Legionella antigen  FU Echo results  Start abx treatment with Azithromycin &Ceftriaxone  BMP q 4 hr      Acute hypoxemic respiratory failure  -Pt currently on 2L nasal cannula. No oxygen requirements at home. Continue monitoring.      Weakness  -See hyponatremia      Hypokalemia  ECG with frequent PVCs. No QT interval prolongation, no ST depression.     Plan  Replace PRN  Pt placed on telemetry      Hyperlipidemia  -Continue home Atorvastatin      Essential hypertension  -Continue home Metroprolol  -Hold home HCTZ (known cause of hyponatremia)      Acquired hypothyroidism  -Continue home Levothyroxine      UTI (urinary tract infection)  On urinalysis patient with hazy urine, protein +1, occult blood +2, leukocytes +3 and nitrites +. Clinically patient with gross hematuria on exam and suprapubic tenderness.    Plan  Pt is covered with Ceftriaxone  FU urine cultures  Continue monitoring      VTE Risk Mitigation (From admission, onward)         Ordered     heparin (porcine) injection 5,000 Units  Every 8 hours      08/27/20 1748     IP VTE HIGH RISK PATIENT  Once      08/27/20 1748     Place sequential compression device  Until discontinued      08/27/20 1734                Discharge Planning   TISHA:      Code Status: Full Code   Is the patient medically ready for discharge?:     Reason for patient still  in hospital (select all that apply): Patient unstable                     Era Melendez MD  Department of Hospital Medicine   [facility]

## 2020-08-28 NOTE — ED NOTES
Spoke to IM 5 to notify that pt is having frequent PVCs per cardiac monitor. Pt denies CP and SOB at this time. Per MD, he will place order for cardiac monitoring.

## 2020-08-28 NOTE — ED NOTES
Admitted to floor. Diagnosis, medications, & POC discussed with patient. Patient verbalized understanding. All questions and concerns answered. No needs expressed at the time. Pt is awake, alert and oriented x4 with no acute distress noted. Respirations even and unlabored. Per stretcher out of ED to floor with mask in place. Personal belongings on stretcher with patient. Pt on telemetry monitor. Pt on 2L oxygen per NC, sats 99%.

## 2020-08-28 NOTE — ASSESSMENT & PLAN NOTE
"85 yo male with history of hypothyroidism, neurogenic bladder with suprapubic catheter (exchanged annually, last exchange 3 days ago by home RN), HLD, HTN and GERD presenting to the ER with generalized weakness since 3 days ago. This is the first time the patient has experienced something like this. He describes the weakness as feeling tired and overall "fragile" and "without energy". Pt denies: seizures, headache or numbness.    Labs concerning for hyponatremia,  hypochloremia and hypokalemia.     CXR:left basilar increased density which could represent combination of effusion, atelectasis or consolidation.     BNP: 298    Pt currently taking Hydrochlorothiazide for hypertension. As per PCP notes he has been taking HCTZ since 2019 without any side effects. HCTZ induced hyponatremia vs  SIADH vs CHF.     Plan  FU urine Legionella antigen  FU Echo results  Start abx treatment with Azithromycin &Ceftriaxone  BMP q 4 hr    "

## 2020-08-28 NOTE — H&P
"[facility] Hospital Medicine  History & Physical    Patient Name: Chucho Prado  MRN: [id number]  Admission Date: 8/27/2020  Attending Physician: Staci Tyler MD   Primary Care Provider: Obed Mcguire MD    [facility] Medicine Team: Networked reference to record PCT  Era Melendez MD     Patient information was obtained from patient and ER records.     Subjective:     Principal Problem:Hyponatremia    Chief Complaint:   Chief Complaint   Patient presents with    Weakness     Pt c/o generalized weakness x 3 days.  Pt also reports mild SOB intermittently the past couple days.  Pt to ED via EMS from home.          HPI: 87 yo male with history of hypothyroidism, neurogenic bladder with suprapubic catheter (exchanged annually, last exchange 3 days ago by home RN), HLD, HTN and GERD presenting to the ER with generalized weakness since 3 days ago. This is the first time the patient has experienced something like this. He describes the weakness as feeling tired and overall "fragile" and "without energy". He can move his extremities but has difficulty sitting down and getting out of bed. He denies falling or lack of sleep.He also denies seizures, numbness in extremities, headache or confusion. He does report starting Hydrochlorothiazide 8/21. Pt has taken this medication in the past (one year ago) but it was stopped and now restarted. Reports 1 episode of diarrhea yesterday. The diarrhea was described as brown, watery and non bloody. He denies bloating and abdominal pain He also endorses SOB with exertion when he walks around the house and uses three pillows at night to sleep mainly because he likes it but also because he feels short of breath sometimes when he lies down. His SOB is chronic and has been going on "for many years" as per patient. He denies nausea, vomiting, cough, palpitations or chest discomfort. He also denies seizures, numbness in extremities, headache or " confusion.    Pt does not FU with Cardiology OP but Echo performed in 2019 shows an EF of 58%, normal diastolic and systolic and mild aortic stenosis.For the neurogenic bladder the patient follows up with Urology OP since 2017.    Past Medical History:   Diagnosis Date    Acquired hypothyroidism 7/30/2013    Arthritis     Blood transfusion     before 2005 - whe had gangrenous gall bladder    Cataract     Chronic back pain 12/4/2018    - Takes Percocet 5-325 at home - Can continue current abdominal pain control with Dilaudid 0.5 mg IV Q3H prn     CKD (chronic kidney disease) stage 3, GFR 30-59 ml/min     Compression fracture of lumbar vertebra     Depression     Dyslipidemia     Essential hypertension 7/30/2013    Gastroesophageal reflux disease without esophagitis 12/4/2018    - continue home Prilosec    General anesthetics causing adverse effect in therapeutic use     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    GERD (gastroesophageal reflux disease)     History of postoperative delirium 12/4/2018    - Patient reports 1 week history of post-op delirium 7/2018 - Monitor electrolytes, UA, and urine cx - Delirium precautions  - Can use Seroquel 25 mg QHS prn     Hypertension     Hyponatremia 10/23/2017    Impaired mobility and ADLs 6/28/2018    Neurogenic bladder 12/4/2018    Sleep disorder 12/4/2018    - continue Trazodone QHS    Thyroid disease     UTI (urinary tract infection)        Past Surgical History:   Procedure Laterality Date    CHOLECYSTECTOMY      EYE SURGERY Right     IOL    HERNIA REPAIR      INTRAOCULAR PROSTHESES INSERTION Left 5/28/2018    Procedure: INSERTION-INTRAOCULAR LENS (IOL);  Surgeon: Trinity Vazquez MD;  Location: [facility];  Service: Ophthalmology;  Laterality: Left;    JOINT REPLACEMENT      LAMINECTOMY  12/27/2016    L2-L4    LUMBAR LAMINECTOMY  12/2016    PARATHYROIDECTOMY      PHACOEMULSIFICATION OF CATARACT Left 5/28/2018    Procedure:  PHACOEMULSIFICATION-ASPIRATION-CATARACT;  Surgeon: Trinity Vazquez MD;  Location: [facility];  Service: Ophthalmology;  Laterality: Left;    REPAIR OF INCARCERATED INCISIONAL HERNIA WITHOUT HISTORY OF PRIOR REPAIR N/A 12/5/2018    Procedure: REPAIR, HERNIA, INCISIONAL, INCARCERATED, WITHOUT HISTORY OF PRIOR REPAIR;  Surgeon: Steve Valentin MD;  Location: [address];  Service: General;  Laterality: N/A;    REPAIR OF INCARCERATED VENTRAL HERNIA WITHOUT HISTORY OF PRIOR REPAIR N/A 6/20/2018    Procedure: REPAIR, HERNIA, VENTRAL, INCARCERATED, WITHOUT HISTORY OF PRIOR REPAIR;  Surgeon: Guilherme Ordoñez MD;  Location: [facility] OR [address] [place] [address];  Service: General;  Laterality: N/A;    TOTAL KNEE ARTHROPLASTY Bilateral     TOTAL KNEE ARTHROPLASTY Left 10/25/2017    TKR       Review of patient's allergies indicates:  No Known Allergies    No current facility-administered medications on file prior to encounter.      Current Outpatient Medications on File Prior to Encounter   Medication Sig    amLODIPine (NORVASC) 5 MG tablet TAKE 1 TABLET BY MOUTH EVERY DAY    aspirin (ECOTRIN) 81 MG EC tablet TAKE 1 TABLET BY MOUTH EVERY DAY    atorvastatin (LIPITOR) 20 MG tablet Take 1 tablet (20 mg total) by mouth once daily.    gabapentin (NEURONTIN) 400 MG capsule TAKE 2 CAPSULES BY MOUTH TWICE DAILY    hydroCHLOROthiazide (HYDRODIURIL) 25 MG tablet Take 1 tablet (25 mg total) by mouth once daily.    levothyroxine (SYNTHROID) 50 MCG tablet TAKE 1 TABLET BY MOUTH EVERY DAY    metoprolol succinate (TOPROL-XL) 25 MG 24 hr tablet TAKE 1 TABLET BY MOUTH EVERY DAY    mirtazapine (REMERON) 15 MG tablet Take 1 tablet (15 mg total) by mouth every evening.    omeprazole (PRILOSEC) 40 MG capsule TAKE 1 CAPSULE BY MOUTH EVERY MORNING 30 MINUTES BEFORE EATING    polyethylene glycol (GLYCOLAX) 17 gram/dose powder Take 17 g by mouth once daily.    traZODone (DESYREL) 50 MG tablet TAKE 1 TABLET BY MOUTH NIGHTLY     Family History     Problem  Relation (Age of Onset)    Diabetes Father    Esophageal cancer Father    Hypertension Mother        Tobacco Use    Smoking status: Former Smoker     Years: 20.00     Quit date:      Years since quittin.6    Smokeless tobacco: Never Used    Tobacco comment: quit    Substance and Sexual Activity    Alcohol use: No     Comment: rarely/6 months    Drug use: No    Sexual activity: Never     Review of Systems   Constitutional: Positive for fatigue. Negative for activity change, chills and fever.   HENT: Negative for sneezing and sore throat.    Respiratory: Positive for shortness of breath. Negative for cough and chest tightness.    Cardiovascular: Negative for chest pain and palpitations.   Gastrointestinal: Positive for diarrhea. Negative for abdominal distention, abdominal pain, nausea and vomiting.   Genitourinary: Negative for difficulty urinating and flank pain.   Musculoskeletal: Negative for back pain and myalgias.   Skin: Negative for color change and rash.   Neurological: Negative for dizziness and headaches.   Psychiatric/Behavioral: Negative for agitation, confusion and self-injury.     Objective:     Vital Signs (Most Recent):  Temp: 99.5 °F (37.5 °C) (20 1405)  Pulse: (!) 55 (20 193)  Resp: 15 (20)  BP: (!) 156/66 (20)  SpO2: 99 % (20) Vital Signs (24h Range):  Temp:  [99.5 °F (37.5 °C)] 99.5 °F (37.5 °C)  Pulse:  [50-67] 55  Resp:  [14-19] 15  SpO2:  [96 %-99 %] 99 %  BP: (134-175)/(61-86) 156/66     Weight: 88.5 kg (195 lb)  Body mass index is 29.65 kg/m².    Physical Exam  Constitutional:       Appearance: Normal appearance. He is ill-appearing.   HENT:      Head: Normocephalic and atraumatic.      Mouth/Throat:      Mouth: Mucous membranes are dry.   Cardiovascular:      Rate and Rhythm: Normal rate and regular rhythm.      Pulses: Normal pulses.      Heart sounds: Normal heart sounds.   Pulmonary:      Effort: Pulmonary effort is normal.  "     Breath sounds: Rhonchi present.      Comments: Rhonchi L lower lobe    Abdominal:      General: Abdomen is flat. Bowel sounds are normal.      Palpations: Abdomen is soft.      Comments: Catheter in place   Musculoskeletal:         General: No deformity.      Right lower leg: No edema.      Left lower leg: No edema.   Skin:     General: Skin is warm and dry.      Comments: No edema present   Neurological:      Mental Status: He is alert and oriented to person, place, and time.      Motor: Weakness present.             Significant Labs:   CBC:   Recent Labs   Lab 08/27/20  1432   WBC 8.23   HGB 11.0*   HCT 33.8*        CMP:   Recent Labs   Lab 08/27/20  1432 08/27/20  1934   * 122*  122*   K 2.7* 3.1*  3.1*   CL 73* 79*  79*   CO2 37* 35*  35*   GLU 93 95  95   BUN 18 16  16   CREATININE 1.2 1.1  1.1   CALCIUM 8.2* 7.7*  7.7*   PROT 7.1  --    ALBUMIN 3.6  --    BILITOT 0.7  --    ALKPHOS 92  --    AST 18  --    ALT 8*  --    ANIONGAP 9 8  8   EGFRNONAA 54.4* >60.0  >60.0       Significant Imaging: I have reviewed and interpreted all pertinent imaging results/findings within the past 24 hours.    Assessment/Plan:     * Hyponatremia  85 yo male with history of hypothyroidism, neurogenic bladder with suprapubic catheter (exchanged annually, last exchange 3 days ago by home RN), HLD, HTN and GERD presenting to the ER with generalized weakness since 3 days ago. This is the first time the patient has experienced something like this. He describes the weakness as feeling tired and overall "fragile" and "without energy". Pt denies: seizures, headache or numbness.    Labs concerning for hyponatremia,  hypochloremia and hypokalemia.     CXR:left basilar increased density which could represent combination of effusion, atelectasis or consolidation.     BNP: 298    Pt currently taking Hydrochlorothiazide for hypertension. As per PCP notes he has been taking HCTZ since 2019 without any side effects. " HCTZ induced hyponatremia vs  SIADH vs CHF.     Plan  FU urine Legionella antigen  FU Echo results  Start abx treatment with Azithromycin &Ceftriaxone  BMP q 4 hr      Acute hypoxemic respiratory failure  -Pt currently on 2L nasal cannula. No oxygen requirements at home. Continue monitoring.      Weakness  -See hyponatremia      Hypokalemia  ECG with frequent PVCs. No QT interval prolongation, no ST depression.     Plan  Replace PRN  Pt placed on telemetry      Hyperlipidemia  -Continue home Atorvastatin      Essential hypertension  -Continue home Metroprolol  -Hold home HCTZ (known cause of hyponatremia)      Acquired hypothyroidism  · Continue home Levothyroxine      UTI (urinary tract infection)  On urinalysis patient with hazy urine, protein +1, occult blood +2, leukocytes +3 and nitrites +. Clinically patient with gross hematuria on exam and suprapubic tenderness.    Plan  Pt is covered with Ceftriaxone  Consult Urology      VTE Risk Mitigation (From admission, onward)         Ordered     heparin (porcine) injection 5,000 Units  Every 8 hours      08/27/20 1748     IP VTE HIGH RISK PATIENT  Once      08/27/20 1748     Place sequential compression device  Until discontinued      08/27/20 1734                   Era Melendez MD  Department of Hospital Medicine   [facility]

## 2020-08-28 NOTE — PLAN OF CARE
CM at bedside to discuss discharge planning assessment.   Patient lives alone in a raised home with an elevator to enter.  Independent with ADL and used a walker prior to admit.   He has used [facility] in the past.  Explained CM services during patient's stay in hospital.        08/28/20 0915   Discharge Assessment   Assessment Type Discharge Planning Assessment   Confirmed/corrected address and phone number on facesheet? Yes   Assessment information obtained from? Patient   Prior to hospitilization cognitive status: Alert/Oriented   Prior to hospitalization functional status: Assistive Equipment   Current cognitive status: Alert/Oriented   Current Functional Status: Assistive Equipment   Facility Arrived From: home   Lives With alone   Able to Return to Prior Arrangements yes   Is patient able to care for self after discharge? Unable to determine at this time (comments)   Who are your caregiver(s) and their phone number(s)? self   Patient's perception of discharge disposition home or selfcare   Readmission Within the Last 30 Days no previous admission in last 30 days   Patient currently being followed by outpatient case management? No   Patient currently receives any other outside agency services? No   Equipment Currently Used at Home walker, standard   Do you have any problems affording any of your prescribed medications? No   Is the patient taking medications as prescribed? yes   Does the patient have transportation home? Yes   Transportation Anticipated family or friend will provide   Does the patient receive services at the [facility]? No   Discharge Plan A Home   Discharge Plan B Home Health   DME Needed Upon Discharge    (TBD)   Patient/Family in Agreement with Plan yes     Obed Mcguire MD  [facility] [facility] [place] [address]    [facility] [place] [address]  [address]  [place] [address]  Phone: [phone] Fax:  [phone]    [facility]  [place] [address]  [address]  [place] [address]  Phone: [phone] Fax: [phone]    [id number] - [place], [place] - [address]  [address]  [facility] [address]  Phone: [phone] Fax: [phone]  Future Appointments   Date Time Provider Department Center   9/10/2020  9:00 AM Crystal Tejada, NP [address]   10/28/2020  3:40 PM Obed Mcguire MD [facility]

## 2020-08-28 NOTE — ED NOTES
Telemetry Verification   Patient placed on Telemetry Box  Verified with War Room  Box # [id number]   Monitor Tech    Rate 54   Rhythm Sinus Bradycardia

## 2020-08-29 PROBLEM — I35.0 NONRHEUMATIC AORTIC VALVE STENOSIS: Status: ACTIVE | Noted: 2020-08-29

## 2020-08-29 LAB
ALLENS TEST: ABNORMAL
ANION GAP SERPL CALC-SCNC: 7 MMOL/L (ref 8–16)
BASOPHILS # BLD AUTO: 0.03 K/UL (ref 0–0.2)
BASOPHILS NFR BLD: 0.4 % (ref 0–1.9)
BUN SERPL-MCNC: 16 MG/DL (ref 8–23)
BUN SERPL-MCNC: 16 MG/DL (ref 8–23)
BUN SERPL-MCNC: 18 MG/DL (ref 8–23)
CALCIUM SERPL-MCNC: 7.6 MG/DL (ref 8.7–10.5)
CALCIUM SERPL-MCNC: 8.1 MG/DL (ref 8.7–10.5)
CALCIUM SERPL-MCNC: 8.2 MG/DL (ref 8.7–10.5)
CHLORIDE SERPL-SCNC: 80 MMOL/L (ref 95–110)
CHLORIDE SERPL-SCNC: 83 MMOL/L (ref 95–110)
CHLORIDE SERPL-SCNC: 83 MMOL/L (ref 95–110)
CO2 SERPL-SCNC: 33 MMOL/L (ref 23–29)
CO2 SERPL-SCNC: 33 MMOL/L (ref 23–29)
CO2 SERPL-SCNC: 34 MMOL/L (ref 23–29)
CREAT SERPL-MCNC: 1 MG/DL (ref 0.5–1.4)
CREAT SERPL-MCNC: 1.1 MG/DL (ref 0.5–1.4)
CREAT SERPL-MCNC: 1.2 MG/DL (ref 0.5–1.4)
DELSYS: ABNORMAL
DIFFERENTIAL METHOD: ABNORMAL
EOSINOPHIL # BLD AUTO: 0.3 K/UL (ref 0–0.5)
EOSINOPHIL NFR BLD: 4.1 % (ref 0–8)
ERYTHROCYTE [DISTWIDTH] IN BLOOD BY AUTOMATED COUNT: 13.4 % (ref 11.5–14.5)
EST. GFR  (AFRICAN AMERICAN): >60 ML/MIN/1.73 M^2
EST. GFR  (NON AFRICAN AMERICAN): 54.4 ML/MIN/1.73 M^2
EST. GFR  (NON AFRICAN AMERICAN): >60 ML/MIN/1.73 M^2
EST. GFR  (NON AFRICAN AMERICAN): >60 ML/MIN/1.73 M^2
FLOW: 3
GLUCOSE SERPL-MCNC: 118 MG/DL (ref 70–110)
GLUCOSE SERPL-MCNC: 86 MG/DL (ref 70–110)
GLUCOSE SERPL-MCNC: 87 MG/DL (ref 70–110)
HCO3 UR-SCNC: 35.3 MMOL/L (ref 24–28)
HCT VFR BLD AUTO: 32.6 % (ref 40–54)
HGB BLD-MCNC: 10.1 G/DL (ref 14–18)
IMM GRANULOCYTES # BLD AUTO: 0.02 K/UL (ref 0–0.04)
IMM GRANULOCYTES NFR BLD AUTO: 0.3 % (ref 0–0.5)
LYMPHOCYTES # BLD AUTO: 1.3 K/UL (ref 1–4.8)
LYMPHOCYTES NFR BLD: 18.6 % (ref 18–48)
MCH RBC QN AUTO: 26 PG (ref 27–31)
MCHC RBC AUTO-ENTMCNC: 31 G/DL (ref 32–36)
MCV RBC AUTO: 84 FL (ref 82–98)
MODE: ABNORMAL
MONOCYTES # BLD AUTO: 0.6 K/UL (ref 0.3–1)
MONOCYTES NFR BLD: 8 % (ref 4–15)
NEUTROPHILS # BLD AUTO: 4.7 K/UL (ref 1.8–7.7)
NEUTROPHILS NFR BLD: 68.6 % (ref 38–73)
NRBC BLD-RTO: 0 /100 WBC
OSMOLALITY UR: 160 MOSM/KG (ref 50–1200)
PCO2 BLDA: 49.9 MMHG (ref 35–45)
PH SMN: 7.46 [PH] (ref 7.35–7.45)
PLATELET # BLD AUTO: 198 K/UL (ref 150–350)
PMV BLD AUTO: 9.7 FL (ref 9.2–12.9)
PO2 BLDA: 124 MMHG (ref 80–100)
POC BE: 11 MMOL/L
POC SATURATED O2: 99 % (ref 95–100)
POC TCO2: 37 MMOL/L (ref 23–27)
POTASSIUM SERPL-SCNC: 3.7 MMOL/L (ref 3.5–5.1)
POTASSIUM SERPL-SCNC: 3.8 MMOL/L (ref 3.5–5.1)
POTASSIUM SERPL-SCNC: 3.8 MMOL/L (ref 3.5–5.1)
RBC # BLD AUTO: 3.88 M/UL (ref 4.6–6.2)
SAMPLE: ABNORMAL
SITE: ABNORMAL
SODIUM SERPL-SCNC: 120 MMOL/L (ref 136–145)
SODIUM SERPL-SCNC: 123 MMOL/L (ref 136–145)
SODIUM SERPL-SCNC: 124 MMOL/L (ref 136–145)
SODIUM UR-SCNC: <20 MMOL/L (ref 20–250)
SP02: 99
WBC # BLD AUTO: 6.88 K/UL (ref 3.9–12.7)

## 2020-08-29 PROCEDURE — 36415 COLL VENOUS BLD VENIPUNCTURE: CPT

## 2020-08-29 PROCEDURE — 94761 N-INVAS EAR/PLS OXIMETRY MLT: CPT

## 2020-08-29 PROCEDURE — 99223 PR INITIAL HOSPITAL CARE,LEVL III: ICD-10-PCS | Mod: ,,, | Performed by: NURSE PRACTITIONER

## 2020-08-29 PROCEDURE — 25000003 PHARM REV CODE 250: Performed by: STUDENT IN AN ORGANIZED HEALTH CARE EDUCATION/TRAINING PROGRAM

## 2020-08-29 PROCEDURE — 84300 ASSAY OF URINE SODIUM: CPT

## 2020-08-29 PROCEDURE — 99233 PR SUBSEQUENT HOSPITAL CARE,LEVL III: ICD-10-PCS | Mod: GC,,, | Performed by: STUDENT IN AN ORGANIZED HEALTH CARE EDUCATION/TRAINING PROGRAM

## 2020-08-29 PROCEDURE — 36600 WITHDRAWAL OF ARTERIAL BLOOD: CPT

## 2020-08-29 PROCEDURE — 99900035 HC TECH TIME PER 15 MIN (STAT)

## 2020-08-29 PROCEDURE — 99233 SBSQ HOSP IP/OBS HIGH 50: CPT | Mod: GC,,, | Performed by: STUDENT IN AN ORGANIZED HEALTH CARE EDUCATION/TRAINING PROGRAM

## 2020-08-29 PROCEDURE — 83935 ASSAY OF URINE OSMOLALITY: CPT

## 2020-08-29 PROCEDURE — 85025 COMPLETE CBC W/AUTO DIFF WBC: CPT

## 2020-08-29 PROCEDURE — 99222 1ST HOSP IP/OBS MODERATE 55: CPT | Mod: ,,, | Performed by: UROLOGY

## 2020-08-29 PROCEDURE — 82803 BLOOD GASES ANY COMBINATION: CPT

## 2020-08-29 PROCEDURE — 63600175 PHARM REV CODE 636 W HCPCS: Performed by: STUDENT IN AN ORGANIZED HEALTH CARE EDUCATION/TRAINING PROGRAM

## 2020-08-29 PROCEDURE — 80048 BASIC METABOLIC PNL TOTAL CA: CPT

## 2020-08-29 PROCEDURE — 11000001 HC ACUTE MED/SURG PRIVATE ROOM

## 2020-08-29 PROCEDURE — 80048 BASIC METABOLIC PNL TOTAL CA: CPT | Mod: 91

## 2020-08-29 PROCEDURE — 99223 1ST HOSP IP/OBS HIGH 75: CPT | Mod: ,,, | Performed by: NURSE PRACTITIONER

## 2020-08-29 PROCEDURE — 99222 PR INITIAL HOSPITAL CARE,LEVL II: ICD-10-PCS | Mod: ,,, | Performed by: UROLOGY

## 2020-08-29 RX ORDER — POTASSIUM CHLORIDE 20 MEQ/1
40 TABLET, EXTENDED RELEASE ORAL ONCE
Status: COMPLETED | OUTPATIENT
Start: 2020-08-29 | End: 2020-08-29

## 2020-08-29 RX ADMIN — PANTOPRAZOLE SODIUM 40 MG: 40 TABLET, DELAYED RELEASE ORAL at 11:08

## 2020-08-29 RX ADMIN — SODIUM CHLORIDE 1000 ML: 0.9 INJECTION, SOLUTION INTRAVENOUS at 09:08

## 2020-08-29 RX ADMIN — LEVOTHYROXINE SODIUM 50 MCG: 50 TABLET ORAL at 06:08

## 2020-08-29 RX ADMIN — METOPROLOL SUCCINATE 25 MG: 25 TABLET, EXTENDED RELEASE ORAL at 11:08

## 2020-08-29 RX ADMIN — HEPARIN SODIUM 5000 UNITS: 5000 INJECTION INTRAVENOUS; SUBCUTANEOUS at 06:08

## 2020-08-29 RX ADMIN — TRAZODONE HYDROCHLORIDE 50 MG: 50 TABLET ORAL at 09:08

## 2020-08-29 RX ADMIN — AMLODIPINE BESYLATE 5 MG: 5 TABLET ORAL at 11:08

## 2020-08-29 RX ADMIN — GABAPENTIN 800 MG: 400 CAPSULE ORAL at 09:08

## 2020-08-29 RX ADMIN — ATORVASTATIN CALCIUM 20 MG: 20 TABLET, FILM COATED ORAL at 11:08

## 2020-08-29 RX ADMIN — POTASSIUM CHLORIDE 40 MEQ: 1500 TABLET, EXTENDED RELEASE ORAL at 09:08

## 2020-08-29 RX ADMIN — GABAPENTIN 800 MG: 400 CAPSULE ORAL at 11:08

## 2020-08-29 RX ADMIN — ASPIRIN 81 MG: 81 TABLET, COATED ORAL at 11:08

## 2020-08-29 NOTE — HPI
Mr. Prado is an 85 yo male with neurogenic bladder, AS, CKD 3a, HTN, and hypothyroidism who presented to the hospital with chief complaint of new onset weakness x 3 days. Also had episode of diarrhea and generally poor PO intake. On 8/19 he saw his PCP who initiated Remeron for depression. Unclear whether he was supposed to be taking HCTZ 25 mg qd, but the patient was not taking this and began taking it a few days prior to admission when his PCP told him that he no longer needs to take his losartan. Initial labs on presentation notable for hyponatremia of 119, hypokalemia of 2.7, hypochloremia and metabolic alkalosis with bicarb of 37. HCTZ and Remeron were both held. He was found to be hypo-osmolar (247) and UOSM of 217 and Gabi of 52 on initial studies.  ECHO obtained w/o evidence of HF (ECHO 60% with CVP 3).  His potassium was replaced with both oral and IV with improvement.  UOP has been adequate making 1.9L in the past 24 hours and sodium has improved to 123 at an acceptable rate.  Nephrology has been consulted for hyponatremia.  He voices no complaints on exam other than occasional bladder spasms, mental status at baseline.

## 2020-08-29 NOTE — ASSESSMENT & PLAN NOTE
86 yo male presenting with 3 day history of weakness found to have significant electrolyte abnormalities consisting of hyponatremia (119), hypokalemia (2.9) with hypochloremia and metabolic alkalosis.  His home medications of HCTZ and Remeron were discontinued and potassium was repleted with improvement in both potassium and sodium.  Nephrology has been consulted for evaluation of hyponatremia.    Hypo-osmolar hyponatremia  Euvolemic.  He does have a hypochloremic alkalosis which could be evidence of contraction and may have a component of intravascular volume depletion.    Acute on chronic hyponatremia appears to be 2/2 HCTZ use and SSRI.    Urine lytes and UOP not consistent with SIADH    Plan/Recommendations:  -Agree with discontinuing HCTZ/Remeron  -continue strict I/O's  -will recheck UOSM, Gabi  -restrict free water to 1L per day  -check ABG, if alkalemic, recommend 1L NS bolus.

## 2020-08-29 NOTE — PLAN OF CARE
POC discussed with patient and caregiver at bedside; both verbalized understanding. Hematuria present, suprapubic cath in place; MD aware.Weight shift assistance provided. Caregiver at bedside to help with patient needs. Cardiac monitor in place. Patient is free of fall/trauma/injury. Denies CP, SOB, or pain/discomfort. All questions addressed. Will continue to monitor

## 2020-08-29 NOTE — SUBJECTIVE & OBJECTIVE
Past Medical History:   Diagnosis Date    Acquired hypothyroidism 7/30/2013    Arthritis     Blood transfusion     before 2005 - whe had gangrenous gall bladder    Cataract     Chronic back pain 12/4/2018    - Takes Percocet 5-325 at home - Can continue current abdominal pain control with Dilaudid 0.5 mg IV Q3H prn     CKD (chronic kidney disease) stage 3, GFR 30-59 ml/min     Compression fracture of lumbar vertebra     Depression     Dyslipidemia     Essential hypertension 7/30/2013    Gastroesophageal reflux disease without esophagitis 12/4/2018    - continue home Prilosec    General anesthetics causing adverse effect in therapeutic use     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    GERD (gastroesophageal reflux disease)     History of postoperative delirium 12/4/2018    - Patient reports 1 week history of post-op delirium 7/2018 - Monitor electrolytes, UA, and urine cx - Delirium precautions  - Can use Seroquel 25 mg QHS prn     Hypertension     Hyponatremia 10/23/2017    Impaired mobility and ADLs 6/28/2018    Neurogenic bladder 12/4/2018    Sleep disorder 12/4/2018    - continue Trazodone QHS    Thyroid disease     UTI (urinary tract infection)        Past Surgical History:   Procedure Laterality Date    CHOLECYSTECTOMY      EYE SURGERY Right     IOL    HERNIA REPAIR      INTRAOCULAR PROSTHESES INSERTION Left 5/28/2018    Procedure: INSERTION-INTRAOCULAR LENS (IOL);  Surgeon: Trinity Vazquez MD;  [place] [facility] [place];  Service: Ophthalmology;  Laterality: Left;    JOINT REPLACEMENT      LAMINECTOMY  12/27/2016    L2-L4    LUMBAR LAMINECTOMY  12/2016    PARATHYROIDECTOMY      PHACOEMULSIFICATION OF CATARACT Left 5/28/2018    Procedure: PHACOEMULSIFICATION-ASPIRATION-CATARACT;  Surgeon: Trinity Vazquez MD;  [place] [facility] [place];  Service: Ophthalmology;  Laterality: Left;    REPAIR OF INCARCERATED INCISIONAL HERNIA WITHOUT HISTORY OF PRIOR REPAIR N/A 12/5/2018    Procedure: REPAIR,  HERNIA, INCISIONAL, INCARCERATED, WITHOUT HISTORY OF PRIOR REPAIR;  Surgeon: Steve Valentin MD;  [facility];  Service: General;  Laterality: N/A;    REPAIR OF INCARCERATED VENTRAL HERNIA WITHOUT HISTORY OF PRIOR REPAIR N/A 2018    Procedure: REPAIR, HERNIA, VENTRAL, INCARCERATED, WITHOUT HISTORY OF PRIOR REPAIR;  Surgeon: Guilherme Ordoñez MD;  [facility];  Service: General;  Laterality: N/A;    TOTAL KNEE ARTHROPLASTY Bilateral     TOTAL KNEE ARTHROPLASTY Left 10/25/2017    TKR       Review of patient's allergies indicates:  No Known Allergies  Current Facility-Administered Medications   Medication Frequency    amLODIPine tablet 5 mg Daily    aspirin EC tablet 81 mg Daily    atorvastatin tablet 20 mg Daily    dextrose 50% injection 12.5 g PRN    dextrose 50% injection 25 g PRN    gabapentin capsule 800 mg BID    glucagon (human recombinant) injection 1 mg PRN    glucose chewable tablet 16 g PRN    glucose chewable tablet 24 g PRN    levothyroxine tablet 50 mcg Before breakfast    metoprolol succinate (TOPROL-XL) 24 hr tablet 25 mg Daily    pantoprazole EC tablet 40 mg Daily    polyethylene glycol packet 17 g Daily    sodium chloride 0.9% flush 10 mL PRN    sodium chloride 0.9% flush 10 mL PRN    traZODone tablet 50 mg Nightly     Family History     Problem Relation (Age of Onset)    Diabetes Father    Esophageal cancer Father    Hypertension Mother        Tobacco Use    Smoking status: Former Smoker     Years: 20.00     Quit date:      Years since quittin.6    Smokeless tobacco: Never Used    Tobacco comment: quit    Substance and Sexual Activity    Alcohol use: No     Comment: rarely/6 months    Drug use: No    Sexual activity: Never     Review of Systems   Constitutional: Positive for fatigue. Negative for activity change, appetite change and fever.   HENT: Negative for congestion, facial swelling, postnasal drip, rhinorrhea, sinus  pressure and sinus pain.    Respiratory: Negative for cough, chest tightness and shortness of breath.    Cardiovascular: Negative for chest pain, palpitations and leg swelling.   Gastrointestinal: Negative for abdominal distention, constipation, diarrhea, nausea and vomiting.   Genitourinary: Positive for difficulty urinating (neurogenic bladder) and hematuria.   Skin: Negative for color change, rash and wound.   Neurological: Negative for dizziness, light-headedness and headaches.   Psychiatric/Behavioral: Negative for agitation, behavioral problems and confusion.     Objective:     Vital Signs (Most Recent):  Temp: 98.4 °F (36.9 °C) (08/29/20 1157)  Pulse: 68 (08/29/20 1157)  Resp: 18 (08/29/20 1157)  BP: (!) 142/65 (08/29/20 1157)  SpO2: 97 % (08/29/20 1552)  O2 Device (Oxygen Therapy): nasal cannula (08/28/20 2100) Vital Signs (24h Range):  Temp:  [97.5 °F (36.4 °C)-98.5 °F (36.9 °C)] 98.4 °F (36.9 °C)  Pulse:  [45-70] 68  Resp:  [18-20] 18  SpO2:  [97 %-99 %] 97 %  BP: (119-142)/(55-65) 142/65     Weight: 90.7 kg (200 lb) (08/28/20 0559)  Body mass index is 30.41 kg/m².  Body surface area is 2.09 meters squared.    I/O last 3 completed shifts:  In: 560 [P.O.:460; IV Piggyback:100]  Out: 3175 [Urine:3175]    Physical Exam  Constitutional:       General: He is not in acute distress.     Appearance: He is not ill-appearing.   HENT:      Head: Normocephalic and atraumatic.      Nose: No congestion or rhinorrhea.      Mouth/Throat:      Mouth: Mucous membranes are moist.      Pharynx: Oropharynx is clear. No oropharyngeal exudate or posterior oropharyngeal erythema.   Eyes:      General: No scleral icterus.        Right eye: No discharge.         Left eye: No discharge.      Extraocular Movements: Extraocular movements intact.      Conjunctiva/sclera: Conjunctivae normal.   Neck:      Musculoskeletal: Normal range of motion and neck supple.   Cardiovascular:      Rate and Rhythm: Normal rate and regular rhythm.       Heart sounds: Murmur present.   Pulmonary:      Effort: Pulmonary effort is normal. No respiratory distress.      Breath sounds: No wheezing or rales.   Abdominal:      General: Abdomen is flat. There is no distension.      Tenderness: There is no abdominal tenderness.   Genitourinary:     Comments: Mahajan cath in place with gross hematuria  Musculoskeletal:      Right lower leg: No edema.      Left lower leg: No edema.   Skin:     General: Skin is warm and dry.   Neurological:      General: No focal deficit present.      Mental Status: He is alert and oriented to person, place, and time.   Psychiatric:         Mood and Affect: Mood normal.         Behavior: Behavior normal.         Significant Labs:  CBC:   Recent Labs   Lab 08/29/20  0453   WBC 6.88   RBC 3.88*   HGB 10.1*   HCT 32.6*      MCV 84   MCH 26.0*   MCHC 31.0*     CMP:   Recent Labs   Lab 08/27/20  1432  08/29/20  1522   GLU 93   < > 118*   CALCIUM 8.2*   < > 8.1*   ALBUMIN 3.6  --   --    PROT 7.1  --   --    *   < > 124*   K 2.7*   < > 3.7   CO2 37*   < > 34*   CL 73*   < > 83*   BUN 18   < > 16   CREATININE 1.2   < > 1.1   ALKPHOS 92  --   --    ALT 8*  --   --    AST 18  --   --    BILITOT 0.7  --   --     < > = values in this interval not displayed.

## 2020-08-29 NOTE — SUBJECTIVE & OBJECTIVE
Interval History: No acute events overnight, patient still with rona hematuria in galvan bag. Urology consulted, galvan was irrigated to clear urine yesterday but returned to rona blood. Nephrology consulted for hypochloremic hyponatremia. ADH independent hyponatremia given low urine osms and low serum osms.    Review of Systems   Constitutional: Positive for fatigue. Negative for activity change, chills, fever and unexpected weight change.   HENT: Negative for sneezing and sore throat.    Eyes: Negative for visual disturbance.   Respiratory: Positive for shortness of breath. Negative for cough and chest tightness.    Cardiovascular: Negative for chest pain and palpitations.   Gastrointestinal: Negative for abdominal distention, abdominal pain, constipation, diarrhea, nausea and vomiting.   Genitourinary: Positive for difficulty urinating ( Chronic neurogenic bladder). Negative for flank pain.   Musculoskeletal: Negative for arthralgias, back pain and myalgias.   Skin: Negative for color change and rash.   Neurological: Negative for dizziness, weakness and headaches.   Hematological: Negative for adenopathy.   Psychiatric/Behavioral: Negative for agitation, confusion and self-injury.     Objective:     Vital Signs (Most Recent):  Temp: 98.4 °F (36.9 °C) (08/29/20 1157)  Pulse: 68 (08/29/20 1706)  Resp: 18 (08/29/20 1706)  BP: (!) 123/57 (08/29/20 1706)  SpO2: 95 % (08/29/20 1706) Vital Signs (24h Range):  Temp:  [97.5 °F (36.4 °C)-98.5 °F (36.9 °C)] 98.4 °F (36.9 °C)  Pulse:  [45-70] 68  Resp:  [18-20] 18  SpO2:  [95 %-99 %] 95 %  BP: (119-142)/(55-65) 123/57     Weight: 90.7 kg (200 lb)  Body mass index is 30.41 kg/m².    Intake/Output Summary (Last 24 hours) at 8/29/2020 1851  Last data filed at 8/29/2020 1741  Gross per 24 hour   Intake 840 ml   Output 2750 ml   Net -1910 ml      Physical Exam  Constitutional:       General: He is not in acute distress.     Appearance: Normal appearance. He is well-developed. He  is ill-appearing.   HENT:      Head: Normocephalic and atraumatic.      Mouth/Throat:      Mouth: Mucous membranes are dry.   Eyes:      Conjunctiva/sclera: Conjunctivae normal.      Pupils: Pupils are equal, round, and reactive to light.   Neck:      Musculoskeletal: Normal range of motion and neck supple.   Cardiovascular:      Rate and Rhythm: Normal rate and regular rhythm.      Pulses: Normal pulses.      Heart sounds: Normal heart sounds. No murmur. No friction rub. No gallop.    Pulmonary:      Effort: Pulmonary effort is normal.      Breath sounds: Rhonchi present. No wheezing or rales.      Comments:     Abdominal:      General: Abdomen is flat. Bowel sounds are normal.      Palpations: Abdomen is soft. There is no mass.      Tenderness: There is no abdominal tenderness. There is no guarding.      Comments: Catheter in place   Genitourinary:     Comments: Kenny blood in galvan bag  Musculoskeletal: Normal range of motion.         General: No deformity.      Right lower leg: No edema.      Left lower leg: No edema.   Lymphadenopathy:      Cervical: No cervical adenopathy.   Skin:     General: Skin is warm and dry.      Comments: No edema present   Neurological:      Mental Status: He is alert and oriented to person, place, and time.      Cranial Nerves: No cranial nerve deficit.      Motor: No weakness.         Significant Labs:   CBC:   Recent Labs   Lab 08/28/20  0532 08/29/20  0453   WBC 11.39 6.88   HGB 12.5* 10.1*   HCT 36.3* 32.6*    198     CMP:   Recent Labs   Lab 08/29/20  0008 08/29/20  0825 08/29/20  1522   * 123* 124*   K 3.8 3.8 3.7   CL 80* 83* 83*   CO2 33* 33* 34*   GLU 87 86 118*   BUN 18 16 16   CREATININE 1.2 1.0 1.1   CALCIUM 7.6* 8.2* 8.1*   ANIONGAP 7* 7* 7*   EGFRNONAA 54.4* >60.0 >60.0     Urine Studies: No results for input(s): COLORU, APPEARANCEUA, PHUR, SPECGRAV, PROTEINUA, GLUCUA, KETONESU, BILIRUBINUA, OCCULTUA, NITRITE, UROBILINOGEN, LEUKOCYTESUR, RBCUA, WBCUA,  BACTERIA, SQUAMEPITHEL, HYALINECASTS in the last 48 hours.    Invalid input(s): HEENA  All pertinent labs within the past 24 hours have been reviewed.    Significant Imaging: I have reviewed all pertinent imaging results/findings within the past 24 hours.

## 2020-08-29 NOTE — CONSULTS
[facility]  Nephrology  Consult Note    Patient Name: Chcuho Prado  MRN: [id number]  Admission Date: 8/27/2020  Hospital Length of Stay: 2 days  Attending Provider: Chiara Martinez MD   Primary Care Physician: Obed Mcguire MD  Principal Problem:Hyponatremia    Inpatient consult to Nephrology  Consult performed by: Mikal Batista NP  Consult ordered by: Brandon Rojas MD  Reason for consult: hyponatremia        Subjective:     HPI: Mr. Prado is an 85 yo male with neurogenic bladder, AS, CKD 3a, HTN, and hypothyroidism who presented to the hospital with chief complaint of new onset weakness x 3 days.  Initial labs on presentation notable for hyponatremia of 119, hypokalemia of 2.7, hypochloremia and metabolic alkalosis with bicarb of 37.  Home medications consisted of HCTZ and Remeron of which both were discontinued.  He was found to be hypo-osmolar (247) and UOSM of 217 and Gabi of 52 on initial studies.  ECHO obtained w/o evidence of HF (ECHO 60% with CVP 3).  His potassium was replaced with both oral and IV with improvement.  UOP has been adequate making 1.9L in the past 24 hours and sodium has improved to 123 at an acceptable rate.  Nephrology has been consulted for hyponatremia.  He voices no complaints on exam other than occasional bladder spasms, mental status at baseline.     Past Medical History:   Diagnosis Date    Acquired hypothyroidism 7/30/2013    Arthritis     Blood transfusion     before 2005 - whe had gangrenous gall bladder    Cataract     Chronic back pain 12/4/2018    - Takes Percocet 5-325 at home - Can continue current abdominal pain control with Dilaudid 0.5 mg IV Q3H prn     CKD (chronic kidney disease) stage 3, GFR 30-59 ml/min     Compression fracture of lumbar vertebra     Depression     Dyslipidemia     Essential hypertension 7/30/2013    Gastroesophageal reflux disease without esophagitis 12/4/2018    - continue home Prilosec    General  anesthetics causing adverse effect in therapeutic use     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    GERD (gastroesophageal reflux disease)     History of postoperative delirium 12/4/2018    - Patient reports 1 week history of post-op delirium 7/2018 - Monitor electrolytes, UA, and urine cx - Delirium precautions  - Can use Seroquel 25 mg QHS prn     Hypertension     Hyponatremia 10/23/2017    Impaired mobility and ADLs 6/28/2018    Neurogenic bladder 12/4/2018    Sleep disorder 12/4/2018    - continue Trazodone QHS    Thyroid disease     UTI (urinary tract infection)        Past Surgical History:   Procedure Laterality Date    CHOLECYSTECTOMY      EYE SURGERY Right     IOL    HERNIA REPAIR      INTRAOCULAR PROSTHESES INSERTION Left 5/28/2018    Procedure: INSERTION-INTRAOCULAR LENS (IOL);  Surgeon: Trinity Vazquez MD;  Location: [facility];  Service: Ophthalmology;  Laterality: Left;    JOINT REPLACEMENT      LAMINECTOMY  12/27/2016    L2-L4    LUMBAR LAMINECTOMY  12/2016    PARATHYROIDECTOMY      PHACOEMULSIFICATION OF CATARACT Left 5/28/2018    Procedure: PHACOEMULSIFICATION-ASPIRATION-CATARACT;  Surgeon: Trinity Vazquez MD;  Location: [facility];  Service: Ophthalmology;  Laterality: Left;    REPAIR OF INCARCERATED INCISIONAL HERNIA WITHOUT HISTORY OF PRIOR REPAIR N/A 12/5/2018    Procedure: REPAIR, HERNIA, INCISIONAL, INCARCERATED, WITHOUT HISTORY OF PRIOR REPAIR;  Surgeon: Steve Valentin MD;  Location: [facility] OR [address] [place] [address];  Service: General;  Laterality: N/A;    REPAIR OF INCARCERATED VENTRAL HERNIA WITHOUT HISTORY OF PRIOR REPAIR N/A 6/20/2018    Procedure: REPAIR, HERNIA, VENTRAL, INCARCERATED, WITHOUT HISTORY OF PRIOR REPAIR;  Surgeon: Guilherme Ordoñez MD;  Location: [facility] OR [address] [place] [address];  Service: General;  Laterality: N/A;    TOTAL KNEE ARTHROPLASTY Bilateral     TOTAL KNEE ARTHROPLASTY Left 10/25/2017    TKR       Review of patient's allergies indicates:  No Known Allergies  Current  Facility-Administered Medications   Medication Frequency    amLODIPine tablet 5 mg Daily    aspirin EC tablet 81 mg Daily    atorvastatin tablet 20 mg Daily    dextrose 50% injection 12.5 g PRN    dextrose 50% injection 25 g PRN    gabapentin capsule 800 mg BID    glucagon (human recombinant) injection 1 mg PRN    glucose chewable tablet 16 g PRN    glucose chewable tablet 24 g PRN    levothyroxine tablet 50 mcg Before breakfast    metoprolol succinate (TOPROL-XL) 24 hr tablet 25 mg Daily    pantoprazole EC tablet 40 mg Daily    polyethylene glycol packet 17 g Daily    sodium chloride 0.9% flush 10 mL PRN    sodium chloride 0.9% flush 10 mL PRN    traZODone tablet 50 mg Nightly     Family History     Problem Relation (Age of Onset)    Diabetes Father    Esophageal cancer Father    Hypertension Mother        Tobacco Use    Smoking status: Former Smoker     Years: .00     Quit date:      Years since quittin.6    Smokeless tobacco: Never Used    Tobacco comment: quit    Substance and Sexual Activity    Alcohol use: No     Comment: rarely/6 months    Drug use: No    Sexual activity: Never     Review of Systems   Constitutional: Positive for fatigue. Negative for activity change, appetite change and fever.   HENT: Negative for congestion, facial swelling, postnasal drip, rhinorrhea, sinus pressure and sinus pain.    Respiratory: Negative for cough, chest tightness and shortness of breath.    Cardiovascular: Negative for chest pain, palpitations and leg swelling.   Gastrointestinal: Negative for abdominal distention, constipation, diarrhea, nausea and vomiting.   Genitourinary: Positive for difficulty urinating (neurogenic bladder) and hematuria.   Skin: Negative for color change, rash and wound.   Neurological: Negative for dizziness, light-headedness and headaches.   Psychiatric/Behavioral: Negative for agitation, behavioral problems and confusion.     Objective:     Vital Signs  (Most Recent):  Temp: 98.4 °F (36.9 °C) (08/29/20 1157)  Pulse: 68 (08/29/20 1157)  Resp: 18 (08/29/20 1157)  BP: (!) 142/65 (08/29/20 1157)  SpO2: 97 % (08/29/20 1552)  O2 Device (Oxygen Therapy): nasal cannula (08/28/20 2100) Vital Signs (24h Range):  Temp:  [97.5 °F (36.4 °C)-98.5 °F (36.9 °C)] 98.4 °F (36.9 °C)  Pulse:  [45-70] 68  Resp:  [18-20] 18  SpO2:  [97 %-99 %] 97 %  BP: (119-142)/(55-65) 142/65     Weight: 90.7 kg (200 lb) (08/28/20 0559)  Body mass index is 30.41 kg/m².  Body surface area is 2.09 meters squared.    I/O last 3 completed shifts:  In: 560 [P.O.:460; IV Piggyback:100]  Out: 3175 [Urine:3175]    Physical Exam  Constitutional:       General: He is not in acute distress.     Appearance: He is not ill-appearing.   HENT:      Head: Normocephalic and atraumatic.      Nose: No congestion or rhinorrhea.      Mouth/Throat:      Mouth: Mucous membranes are moist.      Pharynx: Oropharynx is clear. No oropharyngeal exudate or posterior oropharyngeal erythema.   Eyes:      General: No scleral icterus.        Right eye: No discharge.         Left eye: No discharge.      Extraocular Movements: Extraocular movements intact.      Conjunctiva/sclera: Conjunctivae normal.   Neck:      Musculoskeletal: Normal range of motion and neck supple.   Cardiovascular:      Rate and Rhythm: Normal rate and regular rhythm.      Heart sounds: Murmur present.   Pulmonary:      Effort: Pulmonary effort is normal. No respiratory distress.      Breath sounds: No wheezing or rales.   Abdominal:      General: Abdomen is flat. There is no distension.      Tenderness: There is no abdominal tenderness.   Genitourinary:     Comments: Mahajan cath in place with gross hematuria  Musculoskeletal:      Right lower leg: No edema.      Left lower leg: No edema.   Skin:     General: Skin is warm and dry.   Neurological:      General: No focal deficit present.      Mental Status: He is alert and oriented to person, place, and time.    Psychiatric:         Mood and Affect: Mood normal.         Behavior: Behavior normal.         Significant Labs:  CBC:   Recent Labs   Lab 08/29/20  0453   WBC 6.88   RBC 3.88*   HGB 10.1*   HCT 32.6*      MCV 84   MCH 26.0*   MCHC 31.0*     CMP:   Recent Labs   Lab 08/27/20  1432  08/29/20  1522   GLU 93   < > 118*   CALCIUM 8.2*   < > 8.1*   ALBUMIN 3.6  --   --    PROT 7.1  --   --    *   < > 124*   K 2.7*   < > 3.7   CO2 37*   < > 34*   CL 73*   < > 83*   BUN 18   < > 16   CREATININE 1.2   < > 1.1   ALKPHOS 92  --   --    ALT 8*  --   --    AST 18  --   --    BILITOT 0.7  --   --     < > = values in this interval not displayed.           Assessment/Plan:     * Hyponatremia  86 yo male presenting with 3 day history of weakness found to have significant electrolyte abnormalities consisting of hyponatremia (119), hypokalemia (2.9) with hypochloremia and metabolic alkalosis.  His home medications of HCTZ and Remeron were discontinued and potassium was repleted with improvement in both potassium and sodium.  Nephrology has been consulted for evaluation of hyponatremia.    Hypo-osmolar hyponatremia  Euvolemic.  He does have a hypochloremic alkalosis which could be evidence of contraction and may have a component of intravascular volume depletion.    Acute on chronic hyponatremia appears to be 2/2 HCTZ use and SSRI.    Urine lytes and UOP not consistent with SIADH    Plan/Recommendations:  -Agree with discontinuing HCTZ/Remeron  -continue strict I/O's  -will recheck UOSM, Gabi  -restrict free water to 1L per day  -check ABG, if alkalemic, recommend 1L NS bolus.            Mikal River NP  Nephrology  [facility]

## 2020-08-30 LAB
ANION GAP SERPL CALC-SCNC: 5 MMOL/L (ref 8–16)
ANION GAP SERPL CALC-SCNC: 6 MMOL/L (ref 8–16)
ANION GAP SERPL CALC-SCNC: 9 MMOL/L (ref 8–16)
BACTERIA UR CULT: ABNORMAL
BASOPHILS # BLD AUTO: 0.05 K/UL (ref 0–0.2)
BASOPHILS NFR BLD: 0.8 % (ref 0–1.9)
BUN SERPL-MCNC: 13 MG/DL (ref 8–23)
BUN SERPL-MCNC: 14 MG/DL (ref 8–23)
BUN SERPL-MCNC: 16 MG/DL (ref 8–23)
CALCIUM SERPL-MCNC: 7.9 MG/DL (ref 8.7–10.5)
CALCIUM SERPL-MCNC: 8.1 MG/DL (ref 8.7–10.5)
CALCIUM SERPL-MCNC: 8.1 MG/DL (ref 8.7–10.5)
CHLORIDE SERPL-SCNC: 85 MMOL/L (ref 95–110)
CHLORIDE SERPL-SCNC: 88 MMOL/L (ref 95–110)
CHLORIDE SERPL-SCNC: 89 MMOL/L (ref 95–110)
CO2 SERPL-SCNC: 28 MMOL/L (ref 23–29)
CO2 SERPL-SCNC: 34 MMOL/L (ref 23–29)
CO2 SERPL-SCNC: 35 MMOL/L (ref 23–29)
CREAT SERPL-MCNC: 1 MG/DL (ref 0.5–1.4)
CREAT SERPL-MCNC: 1.1 MG/DL (ref 0.5–1.4)
CREAT SERPL-MCNC: 1.3 MG/DL (ref 0.5–1.4)
DIFFERENTIAL METHOD: ABNORMAL
EOSINOPHIL # BLD AUTO: 0.3 K/UL (ref 0–0.5)
EOSINOPHIL NFR BLD: 4.7 % (ref 0–8)
ERYTHROCYTE [DISTWIDTH] IN BLOOD BY AUTOMATED COUNT: 13.4 % (ref 11.5–14.5)
EST. GFR  (AFRICAN AMERICAN): 57.1 ML/MIN/1.73 M^2
EST. GFR  (AFRICAN AMERICAN): >60 ML/MIN/1.73 M^2
EST. GFR  (AFRICAN AMERICAN): >60 ML/MIN/1.73 M^2
EST. GFR  (NON AFRICAN AMERICAN): 49.4 ML/MIN/1.73 M^2
EST. GFR  (NON AFRICAN AMERICAN): >60 ML/MIN/1.73 M^2
EST. GFR  (NON AFRICAN AMERICAN): >60 ML/MIN/1.73 M^2
GLUCOSE SERPL-MCNC: 114 MG/DL (ref 70–110)
GLUCOSE SERPL-MCNC: 118 MG/DL (ref 70–110)
GLUCOSE SERPL-MCNC: 91 MG/DL (ref 70–110)
HCT VFR BLD AUTO: 31.3 % (ref 40–54)
HGB BLD-MCNC: 9.8 G/DL (ref 14–18)
IMM GRANULOCYTES # BLD AUTO: 0.02 K/UL (ref 0–0.04)
IMM GRANULOCYTES NFR BLD AUTO: 0.3 % (ref 0–0.5)
LYMPHOCYTES # BLD AUTO: 1.6 K/UL (ref 1–4.8)
LYMPHOCYTES NFR BLD: 25.6 % (ref 18–48)
MCH RBC QN AUTO: 26.5 PG (ref 27–31)
MCHC RBC AUTO-ENTMCNC: 31.3 G/DL (ref 32–36)
MCV RBC AUTO: 85 FL (ref 82–98)
MONOCYTES # BLD AUTO: 0.6 K/UL (ref 0.3–1)
MONOCYTES NFR BLD: 9.8 % (ref 4–15)
NEUTROPHILS # BLD AUTO: 3.8 K/UL (ref 1.8–7.7)
NEUTROPHILS NFR BLD: 58.8 % (ref 38–73)
NRBC BLD-RTO: 0 /100 WBC
PLATELET # BLD AUTO: 196 K/UL (ref 150–350)
PMV BLD AUTO: 9.3 FL (ref 9.2–12.9)
POTASSIUM SERPL-SCNC: 4.2 MMOL/L (ref 3.5–5.1)
POTASSIUM SERPL-SCNC: 4.3 MMOL/L (ref 3.5–5.1)
POTASSIUM SERPL-SCNC: 4.5 MMOL/L (ref 3.5–5.1)
RBC # BLD AUTO: 3.7 M/UL (ref 4.6–6.2)
SODIUM SERPL-SCNC: 125 MMOL/L (ref 136–145)
SODIUM SERPL-SCNC: 126 MMOL/L (ref 136–145)
SODIUM SERPL-SCNC: 128 MMOL/L (ref 136–145)
WBC # BLD AUTO: 6.41 K/UL (ref 3.9–12.7)

## 2020-08-30 PROCEDURE — 85025 COMPLETE CBC W/AUTO DIFF WBC: CPT

## 2020-08-30 PROCEDURE — 99233 SBSQ HOSP IP/OBS HIGH 50: CPT | Mod: GC,,, | Performed by: STUDENT IN AN ORGANIZED HEALTH CARE EDUCATION/TRAINING PROGRAM

## 2020-08-30 PROCEDURE — 36415 COLL VENOUS BLD VENIPUNCTURE: CPT

## 2020-08-30 PROCEDURE — 11000001 HC ACUTE MED/SURG PRIVATE ROOM

## 2020-08-30 PROCEDURE — 99233 PR SUBSEQUENT HOSPITAL CARE,LEVL III: ICD-10-PCS | Mod: GC,,, | Performed by: STUDENT IN AN ORGANIZED HEALTH CARE EDUCATION/TRAINING PROGRAM

## 2020-08-30 PROCEDURE — 25000003 PHARM REV CODE 250: Performed by: STUDENT IN AN ORGANIZED HEALTH CARE EDUCATION/TRAINING PROGRAM

## 2020-08-30 PROCEDURE — 80048 BASIC METABOLIC PNL TOTAL CA: CPT

## 2020-08-30 RX ADMIN — AMLODIPINE BESYLATE 5 MG: 5 TABLET ORAL at 09:08

## 2020-08-30 RX ADMIN — METOPROLOL SUCCINATE 25 MG: 25 TABLET, EXTENDED RELEASE ORAL at 09:08

## 2020-08-30 RX ADMIN — GABAPENTIN 800 MG: 400 CAPSULE ORAL at 09:08

## 2020-08-30 RX ADMIN — PANTOPRAZOLE SODIUM 40 MG: 40 TABLET, DELAYED RELEASE ORAL at 09:08

## 2020-08-30 RX ADMIN — GABAPENTIN 800 MG: 400 CAPSULE ORAL at 08:08

## 2020-08-30 RX ADMIN — TRAZODONE HYDROCHLORIDE 50 MG: 50 TABLET ORAL at 08:08

## 2020-08-30 RX ADMIN — LEVOTHYROXINE SODIUM 50 MCG: 50 TABLET ORAL at 07:08

## 2020-08-30 RX ADMIN — SODIUM CHLORIDE 1000 ML: 0.9 INJECTION, SOLUTION INTRAVENOUS at 01:08

## 2020-08-30 RX ADMIN — ATORVASTATIN CALCIUM 20 MG: 20 TABLET, FILM COATED ORAL at 09:08

## 2020-08-30 RX ADMIN — ASPIRIN 81 MG: 81 TABLET, COATED ORAL at 09:08

## 2020-08-30 NOTE — ASSESSMENT & PLAN NOTE
TSH on admit borderline low (0.8). Could potentially be contributing to patient's hyponatremia    -Continue home Levothyroxine at this time

## 2020-08-30 NOTE — SUBJECTIVE & OBJECTIVE
Interval History: NAEON. AFVSS.  SPT continues to drain well with medium pink urine.  Irrigated at bedside to light pink, no clots.    Objective:     Temp:  [96.4 °F (35.8 °C)-98.4 °F (36.9 °C)] 96.4 °F (35.8 °C)  Pulse:  [51-82] 67  Resp:  [18] 18  SpO2:  [95 %-100 %] 98 %  BP: (123-161)/(57-67) 130/61     Body mass index is 32.45 kg/m².           Drains     Drain                 Suprapubic Catheter -- days         Suprapubic Catheter 06/23/17 1200 18 Fr. 1163 days                Physical Exam   Constitutional: He is oriented to person, place, and time. He appears well-developed. No distress.   Cardiovascular: Normal rate.    Pulmonary/Chest: Effort normal. No respiratory distress.   Abdominal: Soft. He exhibits no distension. There is no abdominal tenderness.   18 Fr suprapubic tube with medium pink urine in tubing, no clots.   Genitourinary:    Genitourinary Comments: Tight phimotic band with buried penis. Penis and meatus unable to be evaluated.Testes palpated bilaterally, no masses. Small right hydrocele, nontender. Normal cord structures. Scrotum normal without lesions.     Musculoskeletal: Normal range of motion. No tenderness.   Neurological: He is alert and oriented to person, place, and time.   Skin: Skin is warm and dry. No rash noted.     Psychiatric: Judgment normal.       Significant Labs:    BMP:  Recent Labs   Lab 08/29/20  0825 08/29/20  1522 08/29/20  2350   * 124* 125*   K 3.8 3.7 4.2   CL 83* 83* 85*   CO2 33* 34* 35*   BUN 16 16 16   CREATININE 1.0 1.1 1.1   CALCIUM 8.2* 8.1* 8.1*       CBC:   Recent Labs   Lab 08/28/20  0532 08/29/20  0453 08/30/20  0434   WBC 11.39 6.88 6.41   HGB 12.5* 10.1* 9.8*   HCT 36.3* 32.6* 31.3*    198 196     Significant Imaging:  All pertinent imaging results/findings from the past 24 hours have been reviewed.

## 2020-08-30 NOTE — ASSESSMENT & PLAN NOTE
Patient with moderate to severe AS on echo performed during this hospitalization. LUIS: 1.03 cm2; peak velocity 3.11 m/s; mean gradient 26 mmHg. Could contribute to patients symptoms of dyspnea. Further given low CVP and pre-load dependent nature of AS, could potentially improve with some volume resuscitation.    - Follow up with cardiology as an outpatient for closer monitoring / potential intervention as an outpatient

## 2020-08-30 NOTE — PROGRESS NOTES
IV NS bolus given as ordered, Pt continues to put out cranberry colored urine,  Hi PO intake is good, He has been AAO X4 reminded to call for assistance with ADLS. Care plan reviewed with pt he states his understanding, Pt has been kept free from falls and injury Suprapubic cath in place and secured.Pt has denied pain or discomfort.

## 2020-08-30 NOTE — PROGRESS NOTES
[facility]  Urology  Progress Note    Patient Name: Chucho Prado  MRN: [id number]  Admission Date: 8/27/2020  Hospital Length of Stay: 3 days  Code Status: Full Code   Attending Provider: Chiara Martinez MD   Primary Care Physician: Obed Mcguire MD    Subjective:     HPI:  85 yo male with history of phimosis and neurogenic bladder admitted to [facility] for hyponatremia and hypokalemia following restarting HCTZ for HTN. He is a patient of Dr. Taylor who underwent placement of suprapubic tube in 2017 for neurogenic bladder. The patient elected for suprapubic tube due to a tight phimosis that made self catheterization difficult. He has been seeing Neelam Hooper for monthly SPT exchanges but has since switched to monthly exchanges with home health nursing since the COVID-19 outbreak. His last catheter was exchanged 5 days ago; the patient endorses pain with this exchange and noticed blood in his urine shortly thereafter. He states that suprapubic tube exchanges are normally painless. He is not on anticoagulation. Urine culture obtained from SPT shows >100k GNR, although the patient does not have complaints of suprapubic pain, urgency, and spasms as he normally does with UTIs. He was seen by urology yesterday with irrigation of his 18 Fr SPT to clear with a minimal clot burden. For reasons unknown to urology, the consult that was placed on 8/28 was removed and was not replaced until midday today.    Patient seen at the bedside at 1400. Vitals and labs stable including hemoglobin and WBC count. He has no complaints of pain or discomfort.    Interval History: NAEON. AFVSS.  SPT continues to drain well with medium pink urine.  Irrigated at bedside to light pink, no clots.    Objective:     Temp:  [96.4 °F (35.8 °C)-98.4 °F (36.9 °C)] 96.4 °F (35.8 °C)  Pulse:  [51-82] 67  Resp:  [18] 18  SpO2:  [95 %-100 %] 98 %  BP: (123-161)/(57-67) 130/61     Body mass index is 32.45 kg/m².           Drains     Drain                  Suprapubic Catheter -- days         Suprapubic Catheter 06/23/17 1200 18 Fr. 1163 days                Physical Exam   Constitutional: He is oriented to person, place, and time. He appears well-developed. No distress.   Cardiovascular: Normal rate.    Pulmonary/Chest: Effort normal. No respiratory distress.   Abdominal: Soft. He exhibits no distension. There is no abdominal tenderness.   18 Fr suprapubic tube with medium pink urine in tubing, no clots.   Genitourinary:    Genitourinary Comments: Tight phimotic band with buried penis. Penis and meatus unable to be evaluated.Testes palpated bilaterally, no masses. Small right hydrocele, nontender. Normal cord structures. Scrotum normal without lesions.     Musculoskeletal: Normal range of motion. No tenderness.   Neurological: He is alert and oriented to person, place, and time.   Skin: Skin is warm and dry. No rash noted.     Psychiatric: Judgment normal.       Significant Labs:    BMP:  Recent Labs   Lab 08/29/20  0825 08/29/20  1522 08/29/20  2350   * 124* 125*   K 3.8 3.7 4.2   CL 83* 83* 85*   CO2 33* 34* 35*   BUN 16 16 16   CREATININE 1.0 1.1 1.1   CALCIUM 8.2* 8.1* 8.1*       CBC:   Recent Labs   Lab 08/28/20  0532 08/29/20  0453 08/30/20  0434   WBC 11.39 6.88 6.41   HGB 12.5* 10.1* 9.8*   HCT 36.3* 32.6* 31.3*    198 196     Significant Imaging:  All pertinent imaging results/findings from the past 24 hours have been reviewed.                  Assessment/Plan:     Gross hematuria  87 yo male with gross hematuria per suprapubic tube after replacement with  nursing 6 days ago. Vitals and labs stable.    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

## 2020-08-30 NOTE — ASSESSMENT & PLAN NOTE
"87 yo male with history of hypothyroidism, neurogenic bladder with suprapubic catheter (exchanged annually, last exchange 3 days ago by home RN), HLD, HTN and GERD presenting to the ER with generalized weakness since 3 days ago. This is the first time the patient has experienced something like this. He describes the weakness as feeling tired and overall "fragile" and "without energy". Pt denies: seizures, headache or numbness.    Labs concerning for hyponatremia,  hypochloremia and hypokalemia.   CXR:left basilar increased density which could represent combination of effusion, atelectasis or consolidation.     BNP: 298  Echo ordered to r/o CHF:    Serum Osm: 247 (low)  Urine Osm: 217 (low)  Urine Na: 52  Urine Cl: 45    Pt currently taking Hydrochlorothiazide for hypertension. As per PCP notes he has been taking HCTZ since 2019 without any side effects. The medication was stopped but then restarted 8/21.    Given low serum AND urine osms, this appears to be an ADH independent hyponatremia. Volume status is borderline low on exam, CVP on Echo was 3.    Plan  - Nephrology consulted, appreciate their recommendations  - Given contraction alkalosis, will give 1L NS bolus per nephro recommendations  - BMP's q4h to monitor for overcorrection  - FU urine Legionella antigen  - Continuing to hold HCTZ and mirtazapine  "

## 2020-08-30 NOTE — ASSESSMENT & PLAN NOTE
On urinalysis patient with hazy urine, protein +1, occult blood +2, leukocytes +3 and nitrites +. Clinically patient with gross hematuria on exam and suprapubic tenderness. Likely UA secondary to colonization and dirty specimen from suprapubic catheter.    Plan  - Pt initially covered with ceftriaxone in the ED, low likelihood of UTI, will hold further antibiotics at this time

## 2020-08-30 NOTE — PROGRESS NOTES
"[facility] Hospital Medicine  Progress Note    Patient Name: Chucho Prado  MRN: [id number]  Patient Class: IP- Inpatient   Admission Date: 8/27/2020  Length of Stay: 2 days  Attending Physician: Chiara Martinez MD  Primary Care Provider: Obed Mcguire MD    Hospital Medicine Team: [facility] HOSP MED 5 Brandon Rojas MD    Subjective:     Principal Problem:Hyponatremia        HPI:  85 yo male with history of hypothyroidism, neurogenic bladder with suprapubic catheter (exchanged annually, last exchange 3 days ago by home RN), HLD, HTN and GERD presenting to the ER with generalized weakness since 3 days ago. This is the first time the patient has experienced something like this. He describes the weakness as feeling tired and overall "fragile" and "without energy". He can move his extremities but has difficulty sitting down and getting out of bed. Reports 1 episode of diarrhea yesterday. The diarrhea was described as brown, watery and non bloody. He denies bloating and abdominal pain He also endorses SOB with exertion when he walks around the house and uses three pillows at night to sleep mainly because he likes it but also because he feels short of breath sometimes when he lies down. His SOB is chronic and has been going on "for many years" as per patient. He denies nausea, vomiting, cough, palpitations or chest discomfort. He also denies seizures, numbness in extremities, headache or confusion.    Pt does not FU with Cardiology OP but Echo performed in 2019 shows an EF of 58%, normal diastolic and systolic and mild aortic stenosis.For the neurogenic bladder the patient follows up with Urology OP since 2017.    Overview/Hospital Course:  Patient admitted to hospital medicine on 8/27/2020 for hyponatremia and evaluation of severe hyponatremia, hypokalemia, and hypochloremia.    Interval History: No acute events overnight, patient still with rona hematuria in galvan bag. Urology consulted, galvan was " irrigated to clear urine yesterday but returned to rona blood. Nephrology consulted for hypochloremic hyponatremia. ADH independent hyponatremia given low urine osms and low serum osms.    Review of Systems   Constitutional: Positive for fatigue. Negative for activity change, chills, fever and unexpected weight change.   HENT: Negative for sneezing and sore throat.    Eyes: Negative for visual disturbance.   Respiratory: Positive for shortness of breath. Negative for cough and chest tightness.    Cardiovascular: Negative for chest pain and palpitations.   Gastrointestinal: Negative for abdominal distention, abdominal pain, constipation, diarrhea, nausea and vomiting.   Genitourinary: Positive for difficulty urinating ( Chronic neurogenic bladder). Negative for flank pain.   Musculoskeletal: Negative for arthralgias, back pain and myalgias.   Skin: Negative for color change and rash.   Neurological: Negative for dizziness, weakness and headaches.   Hematological: Negative for adenopathy.   Psychiatric/Behavioral: Negative for agitation, confusion and self-injury.     Objective:     Vital Signs (Most Recent):  Temp: 98.4 °F (36.9 °C) (08/29/20 1157)  Pulse: 68 (08/29/20 1706)  Resp: 18 (08/29/20 1706)  BP: (!) 123/57 (08/29/20 1706)  SpO2: 95 % (08/29/20 1706) Vital Signs (24h Range):  Temp:  [97.5 °F (36.4 °C)-98.5 °F (36.9 °C)] 98.4 °F (36.9 °C)  Pulse:  [45-70] 68  Resp:  [18-20] 18  SpO2:  [95 %-99 %] 95 %  BP: (119-142)/(55-65) 123/57     Weight: 90.7 kg (200 lb)  Body mass index is 30.41 kg/m².    Intake/Output Summary (Last 24 hours) at 8/29/2020 1851  Last data filed at 8/29/2020 1741  Gross per 24 hour   Intake 840 ml   Output 2750 ml   Net -1910 ml      Physical Exam  Constitutional:       General: He is not in acute distress.     Appearance: Normal appearance. He is well-developed. He is ill-appearing.   HENT:      Head: Normocephalic and atraumatic.      Mouth/Throat:      Mouth: Mucous membranes are dry.    Eyes:      Conjunctiva/sclera: Conjunctivae normal.      Pupils: Pupils are equal, round, and reactive to light.   Neck:      Musculoskeletal: Normal range of motion and neck supple.   Cardiovascular:      Rate and Rhythm: Normal rate and regular rhythm.      Pulses: Normal pulses.      Heart sounds: Normal heart sounds. No murmur. No friction rub. No gallop.    Pulmonary:      Effort: Pulmonary effort is normal.      Breath sounds: Rhonchi present. No wheezing or rales.      Comments:     Abdominal:      General: Abdomen is flat. Bowel sounds are normal.      Palpations: Abdomen is soft. There is no mass.      Tenderness: There is no abdominal tenderness. There is no guarding.      Comments: Catheter in place   Genitourinary:     Comments: Kenny blood in galvan bag  Musculoskeletal: Normal range of motion.         General: No deformity.      Right lower leg: No edema.      Left lower leg: No edema.   Lymphadenopathy:      Cervical: No cervical adenopathy.   Skin:     General: Skin is warm and dry.      Comments: No edema present   Neurological:      Mental Status: He is alert and oriented to person, place, and time.      Cranial Nerves: No cranial nerve deficit.      Motor: No weakness.         Significant Labs:   CBC:   Recent Labs   Lab 08/28/20  0532 08/29/20  0453   WBC 11.39 6.88   HGB 12.5* 10.1*   HCT 36.3* 32.6*    198     CMP:   Recent Labs   Lab 08/29/20  0008 08/29/20  0825 08/29/20  1522   * 123* 124*   K 3.8 3.8 3.7   CL 80* 83* 83*   CO2 33* 33* 34*   GLU 87 86 118*   BUN 18 16 16   CREATININE 1.2 1.0 1.1   CALCIUM 7.6* 8.2* 8.1*   ANIONGAP 7* 7* 7*   EGFRNONAA 54.4* >60.0 >60.0     Urine Studies: No results for input(s): COLORU, APPEARANCEUA, PHUR, SPECGRAV, PROTEINUA, GLUCUA, KETONESU, BILIRUBINUA, OCCULTUA, NITRITE, UROBILINOGEN, LEUKOCYTESUR, RBCUA, WBCUA, BACTERIA, SQUAMEPITHEL, HYALINECASTS in the last 48 hours.    Invalid input(s): WRIGHTSUR  All pertinent labs within the past 24  "hours have been reviewed.    Significant Imaging: I have reviewed all pertinent imaging results/findings within the past 24 hours.      Assessment/Plan:      * Hyponatremia  85 yo male with history of hypothyroidism, neurogenic bladder with suprapubic catheter (exchanged annually, last exchange 3 days ago by home RN), HLD, HTN and GERD presenting to the ER with generalized weakness since 3 days ago. This is the first time the patient has experienced something like this. He describes the weakness as feeling tired and overall "fragile" and "without energy". Pt denies: seizures, headache or numbness.    Labs concerning for hyponatremia,  hypochloremia and hypokalemia.   CXR:left basilar increased density which could represent combination of effusion, atelectasis or consolidation.     BNP: 298  Echo ordered to r/o CHF:    Serum Osm: 247 (low)  Urine Osm: 217 (low)  Urine Na: 52  Urine Cl: 45    Pt currently taking Hydrochlorothiazide for hypertension. As per PCP notes he has been taking HCTZ since 2019 without any side effects. The medication was stopped but then restarted 8/21.    Given low serum AND urine osms, this appears to be an ADH independent hyponatremia. Volume status is borderline low on exam, CVP on Echo was 3.    Plan  - Nephrology consulted, appreciate their recommendations  - Given contraction alkalosis, will give 1L NS bolus per nephro recommendations  - BMP's q4h to monitor for overcorrection  - FU urine Legionella antigen  - Continuing to hold HCTZ and mirtazapine    Nonrheumatic aortic valve stenosis  Patient with moderate to severe AS on echo performed during this hospitalization. LUIS: 1.03 cm2; peak velocity 3.11 m/s; mean gradient 26 mmHg. Could contribute to patients symptoms of dyspnea. Further given low CVP and pre-load dependent nature of AS, could potentially improve with some volume resuscitation.    - Follow up with cardiology as an outpatient for closer monitoring / potential intervention " as an outpatient      Acute hypoxemic respiratory failure  Pt currently on 2L nasal cannula but does not appear to need as much as he is satting %. Not on oxygen at home, patient does report 1 year history of dyspnea. CXR with chronic atelectatic changes in left lung base.     - Titrate down O2 as needed to goal O2 sat > 92%    Weakness  -See hyponatremia      Hypokalemia  ECG with frequent PVCs. No QT interval prolongation, no ST depression.     Plan  Replace PRN  Pt placed on telemetry      Gross hematuria  Suprapubic catheter changed home home health nurse day prior to presentation to ED. Presented with clear yellow urine in galvan bag on day of admit. Hospital day 1 patient's galvan bag with gross hematuria. Irrigated by urology to clear but hematuria returned.     - Urology consulted, appreciate their help with this case  - Avoid irrigation with sterile water as this could worsen patient's hyponatremia      Hyperlipidemia  -Continue home Atorvastatin      Essential hypertension  -Continue home Metroprolol  -Hold home HCTZ (known cause of hyponatremia)       Acquired hypothyroidism  TSH on admit borderline low (0.8). Could potentially be contributing to patient's hyponatremia    -Continue home Levothyroxine at this time      UTI (urinary tract infection)  On urinalysis patient with hazy urine, protein +1, occult blood +2, leukocytes +3 and nitrites +. Clinically patient with gross hematuria on exam and suprapubic tenderness. Likely UA secondary to colonization and dirty specimen from suprapubic catheter.    Plan  - Pt initially covered with ceftriaxone in the ED, low likelihood of UTI, will hold further antibiotics at this time          VTE Risk Mitigation (From admission, onward)         Ordered     IP VTE HIGH RISK PATIENT  Once      08/27/20 1748     Place sequential compression device  Until discontinued      08/27/20 1734                Discharge Planning   TISHA: 8/31/2020     Code Status: Full Code   Is  the patient medically ready for discharge?:     Reason for patient still in hospital (select all that apply): Treatment and Consult recommendations  Discharge Plan A: Home                  Brandon Rojas MD  Department of Hospital Medicine   [facility]

## 2020-08-30 NOTE — ASSESSMENT & PLAN NOTE
87 yo male with gross hematuria per suprapubic tube after replacement with  nursing 5 days ago. Vitals and labs stable.    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 Will perform this in outpatient setting.  - Trend H/H and creatinine

## 2020-08-30 NOTE — CONSULTS
[facility]  Urology  Consult Note    Patient Name: Chucho Prado  MRN: [id number]  Admission Date: 8/27/2020  Hospital Length of Stay: 2   Code Status: Full Code   Attending Provider: Chiara Martinez MD   Consulting Provider: Randy Vergara MD  Primary Care Physician: Obed Mcguire MD  Principal Problem:Hyponatremia    Inpatient consult to Urology  Consult performed by: Randy Vergara MD  Consult ordered by: Brandon Rojas MD  Reason for consult: gross hematuria          Subjective:     HPI:  87 yo male with history of phimosis and neurogenic bladder [place] [facility] [place] and hypokalemia following restarting HCTZ for HTN. He is a patient of Dr. Taylor who underwent placement of suprapubic tube in 2017 for neurogenic bladder. The patient elected for suprapubic tube due to a tight phimosis that made self catheterization difficult. He has been seeing Neelam Hooper for monthly SPT exchanges but has since switched to monthly exchanges with home health nursing since the COVID-19 outbreak. His last catheter was exchanged 5 days ago; the patient endorses pain with this exchange and noticed blood in his urine shortly thereafter. He states that suprapubic tube exchanges are normally painless. He is not on anticoagulation. Urine culture obtained from SPT shows >100k GNR, although the patient does not have complaints of suprapubic pain, urgency, and spasms as he normally does with UTIs. He was seen by urology yesterday with irrigation of his 18 Fr SPT to clear with a minimal clot burden. For reasons unknown to urology, the consult that was placed on 8/28 was removed and was not replaced until midday today.    Patient seen at the bedside at 1400. Vitals and labs stable including hemoglobin and WBC count. He has no complaints of pain or discomfort.    Past Medical History:   Diagnosis Date    Acquired hypothyroidism 7/30/2013    Arthritis     Blood transfusion     before 2005 - whe had  gangrenous gall bladder    Cataract     Chronic back pain 12/4/2018    - Takes Percocet 5-325 at home - Can continue current abdominal pain control with Dilaudid 0.5 mg IV Q3H prn     CKD (chronic kidney disease) stage 3, GFR 30-59 ml/min     Compression fracture of lumbar vertebra     Depression     Dyslipidemia     Essential hypertension 7/30/2013    Gastroesophageal reflux disease without esophagitis 12/4/2018    - continue home Prilosec    General anesthetics causing adverse effect in therapeutic use     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    GERD (gastroesophageal reflux disease)     History of postoperative delirium 12/4/2018    - Patient reports 1 week history of post-op delirium 7/2018 - Monitor electrolytes, UA, and urine cx - Delirium precautions  - Can use Seroquel 25 mg QHS prn     Hypertension     Hyponatremia 10/23/2017    Impaired mobility and ADLs 6/28/2018    Neurogenic bladder 12/4/2018    Sleep disorder 12/4/2018    - continue Trazodone QHS    Thyroid disease     UTI (urinary tract infection)        Past Surgical History:   Procedure Laterality Date    CHOLECYSTECTOMY      EYE SURGERY Right     IOL    HERNIA REPAIR      INTRAOCULAR PROSTHESES INSERTION Left 5/28/2018    Procedure: INSERTION-INTRAOCULAR LENS (IOL);  Surgeon: Trinity Vazquez MD;  Location: [facility];  Service: Ophthalmology;  Laterality: Left;    JOINT REPLACEMENT      LAMINECTOMY  12/27/2016    L2-L4    LUMBAR LAMINECTOMY  12/2016    PARATHYROIDECTOMY      PHACOEMULSIFICATION OF CATARACT Left 5/28/2018    Procedure: PHACOEMULSIFICATION-ASPIRATION-CATARACT;  Surgeon: Trinity Vazquez MD;  Location: [facility];  Service: Ophthalmology;  Laterality: Left;    REPAIR OF INCARCERATED INCISIONAL HERNIA WITHOUT HISTORY OF PRIOR REPAIR N/A 12/5/2018    Procedure: REPAIR, HERNIA, INCISIONAL, INCARCERATED, WITHOUT HISTORY OF PRIOR REPAIR;  Surgeon: Steve Valentin MD;  Location: [address];  Service:  General;  Laterality: N/A;    REPAIR OF INCARCERATED VENTRAL HERNIA WITHOUT HISTORY OF PRIOR REPAIR N/A 2018    Procedure: REPAIR, HERNIA, VENTRAL, INCARCERATED, WITHOUT HISTORY OF PRIOR REPAIR;  Surgeon: Guilherme Ordoñez MD;  Location: [facility] OR [address] [place] [address];  Service: General;  Laterality: N/A;    TOTAL KNEE ARTHROPLASTY Bilateral     TOTAL KNEE ARTHROPLASTY Left 10/25/2017    TKR       Review of patient's allergies indicates:  No Known Allergies    Family History     Problem Relation (Age of Onset)    Diabetes Father    Esophageal cancer Father    Hypertension Mother          Tobacco Use    Smoking status: Former Smoker     Years: 20.00     Quit date:      Years since quittin.6    Smokeless tobacco: Never Used    Tobacco comment: quit    Substance and Sexual Activity    Alcohol use: No     Comment: rarely/6 months    Drug use: No    Sexual activity: Never       Review of Systems   Constitutional: Negative for appetite change, chills, fatigue, fever and unexpected weight change.   HENT: Negative.    Eyes: Negative.    Respiratory: Negative for cough, chest tightness and shortness of breath.    Cardiovascular: Negative for chest pain, palpitations and leg swelling.   Gastrointestinal: Negative for abdominal pain, constipation, diarrhea, nausea and vomiting.   Genitourinary: Positive for hematuria. Negative for discharge, dysuria, flank pain and penile pain.   Musculoskeletal: Negative for back pain.   Skin: Negative for rash.   Neurological: Negative for dizziness, syncope, numbness and headaches.   Hematological: Does not bruise/bleed easily.   Psychiatric/Behavioral: Negative.        Objective:     Temp:  [97.5 °F (36.4 °C)-98.5 °F (36.9 °C)] 98 °F (36.7 °C)  Pulse:  [45-82] 64  Resp:  [18-20] 18  SpO2:  [95 %-99 %] 98 %  BP: (119-161)/(55-67) 161/67     Body mass index is 30.41 kg/m².    Date 20 0700 - 20 0659   Shift 0472-6153 2527-2359 1754-8006 24 Hour Total   INTAKE   P.O.  480 240  720   Shift Total(mL/kg) 480(5.3) 240(2.6)  720(7.9)   OUTPUT   Urine(mL/kg/hr)  1500  1500   Shift Total(mL/kg)  1500(16.5)  1500(16.5)   Weight (kg) 90.7 90.7 90.7 90.7          Drains     Drain                 Suprapubic Catheter -- days         Suprapubic Catheter 06/23/17 1200 18 Fr. 1163 days                Physical Exam   Constitutional: He is oriented to person, place, and time. He appears well-developed. No distress.   Cardiovascular: Normal rate.    Pulmonary/Chest: Effort normal. No respiratory distress.   Abdominal: Soft. He exhibits no distension. There is no abdominal tenderness.   18 Fr suprapubic tube with medium pink urine in tubing, no clots.   Genitourinary:    Genitourinary Comments: Tight phimotic band with buried penis. Penis and meatus unable to be evaluated.Testes palpated bilaterally, no masses. Small right hydrocele, nontender. Normal cord structures. Scrotum normal without lesions.     Musculoskeletal: Normal range of motion. No tenderness.   Neurological: He is alert and oriented to person, place, and time.   Skin: Skin is warm and dry. No rash noted.     Psychiatric: Judgment normal.       Significant Labs:    BMP:  Recent Labs   Lab 08/29/20  0008 08/29/20  0825 08/29/20  1522   * 123* 124*   K 3.8 3.8 3.7   CL 80* 83* 83*   CO2 33* 33* 34*   BUN 18 16 16   CREATININE 1.2 1.0 1.1   CALCIUM 7.6* 8.2* 8.1*       CBC:  Recent Labs   Lab 08/27/20  1432 08/28/20  0532 08/29/20  0453   WBC 8.23 11.39 6.88   HGB 11.0* 12.5* 10.1*   HCT 33.8* 36.3* 32.6*    190 198       Blood Culture:   Recent Labs   Lab 08/27/20  1718 08/27/20  1735   LABBLOO No Growth to date  No Growth to date  No Growth to date No Growth to date  No Growth to date  No Growth to date     Urine Culture:   Recent Labs   Lab 08/27/20  1528   LABURIN GRAM NEGATIVE SHAYNA  > 100,000 cfu/ml  Identification and susceptibility pending  *     Urine Studies:   Recent Labs   Lab 08/27/20  1528   COLORU Yellow    APPEARANCEUA Hazy*   PHUR 8.0   SPECGRAV 1.010   PROTEINUA 1+*   GLUCUA Negative   KETONESU Negative   BILIRUBINUA Negative   OCCULTUA 2+*   NITRITE Positive*   LEUKOCYTESUR 3+*   RBCUA 9*   WBCUA 81*   BACTERIA Occasional   HYALINECASTS 0       Significant Imaging:  None pertinent to CC.                    Assessment and Plan:     Gross hematuria  85 yo male with gross hematuria per suprapubic tube after replacement with  nursing 5 days ago. Vitals and labs stable.    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 Will perform this in outpatient setting.  - Trend H/H and creatinine        VTE Risk Mitigation (From admission, onward)         Ordered     IP VTE HIGH RISK PATIENT  Once      08/27/20 1748     Place sequential compression device  Until discontinued      08/27/20 1734                Thank you for your consult. I will follow-up with patient. Please contact us if you have any additional questions.    Randy Vergara MD  Urology  [facility]    I have reviewed the notes, assessments, and/or procedures performed by Dr. Vergara, I concur with her/his documentation of Chucho Prado.  Patient is reluctant still to come to the office to have a cystoscopy due to covid 19.  We will possibly perform a bedside cystoscopy tomorrow if the patient is still admitted.  No need to hold up hospital discharge for this.

## 2020-08-30 NOTE — PLAN OF CARE
Problem: Fall Injury Risk  Goal: Absence of Fall and Fall-Related Injury  Outcome: Ongoing, Progressing     Problem: Adult Inpatient Plan of Care  Goal: Plan of Care Review  Outcome: Ongoing, Progressing  Goal: Patient-Specific Goal (Individualization)  Outcome: Ongoing, Progressing  Goal: Absence of Hospital-Acquired Illness or Injury  Outcome: Ongoing, Progressing  Goal: Optimal Comfort and Wellbeing  Outcome: Ongoing, Progressing  Goal: Readiness for Transition of Care  Outcome: Ongoing, Progressing  Goal: Rounds/Family Conference  Outcome: Ongoing, Progressing     Problem: Infection (Pneumonia)  Goal: Resolution of Infection Signs/Symptoms  Outcome: Ongoing, Progressing     Problem: Respiratory Compromise (Pneumonia)  Goal: Effective Oxygenation and Ventilation  Outcome: Ongoing, Progressing     Problem: Skin Injury Risk Increased  Goal: Skin Health and Integrity  Outcome: Ongoing, Progressing    Pt is aaox4, vss. Denies pain or SOB. Hematuria noted in catheter bag, 900 mL retrieved. Pt repositioned for comfort and skin integrity. Bed in  lowest position, call light within reach.

## 2020-08-30 NOTE — ASSESSMENT & PLAN NOTE
87 yo male with gross hematuria per suprapubic tube after replacement with  nursing 6 days ago. Vitals and labs stable.    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

## 2020-08-30 NOTE — HPI
85 yo male with history of phimosis and neurogenic bladder [place] [facility] [place] and hypokalemia following restarting HCTZ for HTN. He is a patient of Dr. Taylor who underwent placement of suprapubic tube in 2017 for neurogenic bladder. The patient elected for suprapubic tube due to a tight phimosis that made self catheterization difficult. He has been seeing Neelam Hooper for monthly SPT exchanges but has since switched to monthly exchanges with home health nursing since the COVID-19 outbreak. His last catheter was exchanged 5 days ago; the patient endorses pain with this exchange and noticed blood in his urine shortly thereafter. He states that suprapubic tube exchanges are normally painless. He is not on anticoagulation. Urine culture obtained from SPT shows >100k GNR, although the patient does not have complaints of suprapubic pain, urgency, and spasms as he normally does with UTIs. He was seen by urology yesterday with irrigation of his 18 Fr SPT to clear with a minimal clot burden. For reasons unknown to urology, the consult that was placed on 8/28 was removed and was not replaced until midday today.    Patient seen at the bedside at 1400. Vitals and labs stable including hemoglobin and WBC count. He has no complaints of pain or discomfort.

## 2020-08-30 NOTE — ASSESSMENT & PLAN NOTE
Pt currently on 2L nasal cannula but does not appear to need as much as he is satting %. Not on oxygen at home, patient does report 1 year history of dyspnea. CXR with chronic atelectatic changes in left lung base.     - Titrate down O2 as needed to goal O2 sat > 92%

## 2020-08-30 NOTE — SUBJECTIVE & OBJECTIVE
Past Medical History:   Diagnosis Date    Acquired hypothyroidism 7/30/2013    Arthritis     Blood transfusion     before 2005 - whe had gangrenous gall bladder    Cataract     Chronic back pain 12/4/2018    - Takes Percocet 5-325 at home - Can continue current abdominal pain control with Dilaudid 0.5 mg IV Q3H prn     CKD (chronic kidney disease) stage 3, GFR 30-59 ml/min     Compression fracture of lumbar vertebra     Depression     Dyslipidemia     Essential hypertension 7/30/2013    Gastroesophageal reflux disease without esophagitis 12/4/2018    - continue home Prilosec    General anesthetics causing adverse effect in therapeutic use     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    GERD (gastroesophageal reflux disease)     History of postoperative delirium 12/4/2018    - Patient reports 1 week history of post-op delirium 7/2018 - Monitor electrolytes, UA, and urine cx - Delirium precautions  - Can use Seroquel 25 mg QHS prn     Hypertension     Hyponatremia 10/23/2017    Impaired mobility and ADLs 6/28/2018    Neurogenic bladder 12/4/2018    Sleep disorder 12/4/2018    - continue Trazodone QHS    Thyroid disease     UTI (urinary tract infection)        Past Surgical History:   Procedure Laterality Date    CHOLECYSTECTOMY      EYE SURGERY Right     IOL    HERNIA REPAIR      INTRAOCULAR PROSTHESES INSERTION Left 5/28/2018    Procedure: INSERTION-INTRAOCULAR LENS (IOL);  Surgeon: Trinity Vazquez MD;  Location: [facility] OR;  Service: Ophthalmology;  Laterality: Left;    JOINT REPLACEMENT      LAMINECTOMY  12/27/2016    L2-L4    LUMBAR LAMINECTOMY  12/2016    PARATHYROIDECTOMY      PHACOEMULSIFICATION OF CATARACT Left 5/28/2018    Procedure: PHACOEMULSIFICATION-ASPIRATION-CATARACT;  Surgeon: Trinity Vazquez MD;  Location: [facility] OR;  Service: Ophthalmology;  Laterality: Left;    REPAIR OF INCARCERATED INCISIONAL HERNIA WITHOUT HISTORY OF PRIOR REPAIR N/A 12/5/2018    Procedure: REPAIR,  HERNIA, INCISIONAL, INCARCERATED, WITHOUT HISTORY OF PRIOR REPAIR;  Surgeon: Steve Valentin MD;  Location: [facility] OR 2ND FLR;  Service: General;  Laterality: N/A;    REPAIR OF INCARCERATED VENTRAL HERNIA WITHOUT HISTORY OF PRIOR REPAIR N/A 2018    Procedure: REPAIR, HERNIA, VENTRAL, INCARCERATED, WITHOUT HISTORY OF PRIOR REPAIR;  Surgeon: Guilherme Ordoñez MD;  Location: [facility] OR 2ND FLR;  Service: General;  Laterality: N/A;    TOTAL KNEE ARTHROPLASTY Bilateral     TOTAL KNEE ARTHROPLASTY Left 10/25/2017    TKR       Review of patient's allergies indicates:  No Known Allergies    Family History     Problem Relation (Age of Onset)    Diabetes Father    Esophageal cancer Father    Hypertension Mother          Tobacco Use    Smoking status: Former Smoker     Years: 20.00     Quit date:      Years since quittin.    Smokeless tobacco: Never Used    Tobacco comment: quit    Substance and Sexual Activity    Alcohol use: No     Comment: rarely/6 months    Drug use: No    Sexual activity: Never       Review of Systems   Constitutional: Negative for appetite change, chills, fatigue, fever and unexpected weight change.   HENT: Negative.    Eyes: Negative.    Respiratory: Negative for cough, chest tightness and shortness of breath.    Cardiovascular: Negative for chest pain, palpitations and leg swelling.   Gastrointestinal: Negative for abdominal pain, constipation, diarrhea, nausea and vomiting.   Genitourinary: Positive for hematuria. Negative for discharge, dysuria, flank pain and penile pain.   Musculoskeletal: Negative for back pain.   Skin: Negative for rash.   Neurological: Negative for dizziness, syncope, numbness and headaches.   Hematological: Does not bruise/bleed easily.   Psychiatric/Behavioral: Negative.        Objective:     Temp:  [97.5 °F (36.4 °C)-98.5 °F (36.9 °C)] 98 °F (36.7 °C)  Pulse:  [45-82] 64  Resp:  [18-20] 18  SpO2:  [95 %-99 %] 98 %  BP: (119-161)/(55-67) 161/67      Body mass index is 30.41 kg/m².    Date 08/29/20 0700 - 08/30/20 0659   Shift 8843-7053 3695-2808 8162-8780 24 Hour Total   INTAKE   P.O. 480 240  720   Shift Total(mL/kg) 480(5.3) 240(2.6)  720(7.9)   OUTPUT   Urine(mL/kg/hr)  1500  1500   Shift Total(mL/kg)  1500(16.5)  1500(16.5)   Weight (kg) 90.7 90.7 90.7 90.7          Drains     Drain                 Suprapubic Catheter -- days         Suprapubic Catheter 06/23/17 1200 18 Fr. 1163 days                Physical Exam   Constitutional: He is oriented to person, place, and time. He appears well-developed. No distress.   Cardiovascular: Normal rate.    Pulmonary/Chest: Effort normal. No respiratory distress.   Abdominal: Soft. He exhibits no distension. There is no abdominal tenderness.   18 Fr suprapubic tube with medium pink urine in tubing, no clots.   Genitourinary:    Genitourinary Comments: Tight phimotic band with buried penis. Penis and meatus unable to be evaluated.Testes palpated bilaterally, no masses. Small right hydrocele, nontender. Normal cord structures. Scrotum normal without lesions.     Musculoskeletal: Normal range of motion. No tenderness.   Neurological: He is alert and oriented to person, place, and time.   Skin: Skin is warm and dry. No rash noted.     Psychiatric: Judgment normal.       Significant Labs:    BMP:  Recent Labs   Lab 08/29/20  0008 08/29/20  0825 08/29/20  1522   * 123* 124*   K 3.8 3.8 3.7   CL 80* 83* 83*   CO2 33* 33* 34*   BUN 18 16 16   CREATININE 1.2 1.0 1.1   CALCIUM 7.6* 8.2* 8.1*       CBC:  Recent Labs   Lab 08/27/20  1432 08/28/20  0532 08/29/20  0453   WBC 8.23 11.39 6.88   HGB 11.0* 12.5* 10.1*   HCT 33.8* 36.3* 32.6*    190 198       Blood Culture:   Recent Labs   Lab 08/27/20  1718 08/27/20  1735   LABBLOO No Growth to date  No Growth to date  No Growth to date No Growth to date  No Growth to date  No Growth to date     Urine Culture:   Recent Labs   Lab 08/27/20  1528   LABURIN GRAM NEGATIVE  SHAYNA  > 100,000 cfu/ml  Identification and susceptibility pending  *     Urine Studies:   Recent Labs   Lab 08/27/20  1528   COLORU Yellow   APPEARANCEUA Hazy*   PHUR 8.0   SPECGRAV 1.010   PROTEINUA 1+*   GLUCUA Negative   KETONESU Negative   BILIRUBINUA Negative   OCCULTUA 2+*   NITRITE Positive*   LEUKOCYTESUR 3+*   RBCUA 9*   WBCUA 81*   BACTERIA Occasional   HYALINECASTS 0       Significant Imaging:  None pertinent to CC.

## 2020-08-31 ENCOUNTER — EXTERNAL CHRONIC CARE MANAGEMENT (OUTPATIENT)
Dept: PRIMARY CARE CLINIC | Facility: CLINIC | Age: 85
DRG: 987 | End: 2020-08-31
Payer: MEDICARE

## 2020-08-31 ENCOUNTER — ANESTHESIA EVENT (OUTPATIENT)
Dept: SURGERY | Facility: HOSPITAL | Age: 85
DRG: 987 | End: 2020-08-31
Payer: MEDICARE

## 2020-08-31 ENCOUNTER — EXTERNAL HOME HEALTH (OUTPATIENT)
Dept: HOME HEALTH SERVICES | Facility: HOSPITAL | Age: 85
End: 2020-08-31
Payer: MEDICARE

## 2020-08-31 LAB
ANION GAP SERPL CALC-SCNC: 6 MMOL/L (ref 8–16)
ANION GAP SERPL CALC-SCNC: 6 MMOL/L (ref 8–16)
ANION GAP SERPL CALC-SCNC: 7 MMOL/L (ref 8–16)
ANION GAP SERPL CALC-SCNC: 8 MMOL/L (ref 8–16)
BASOPHILS # BLD AUTO: 0.06 K/UL (ref 0–0.2)
BASOPHILS NFR BLD: 0.8 % (ref 0–1.9)
BUN SERPL-MCNC: 16 MG/DL (ref 8–23)
BUN SERPL-MCNC: 16 MG/DL (ref 8–23)
BUN SERPL-MCNC: 17 MG/DL (ref 8–23)
BUN SERPL-MCNC: 17 MG/DL (ref 8–23)
CALCIUM SERPL-MCNC: 7.9 MG/DL (ref 8.7–10.5)
CALCIUM SERPL-MCNC: 8 MG/DL (ref 8.7–10.5)
CALCIUM SERPL-MCNC: 8.1 MG/DL (ref 8.7–10.5)
CALCIUM SERPL-MCNC: 8.2 MG/DL (ref 8.7–10.5)
CHLORIDE SERPL-SCNC: 90 MMOL/L (ref 95–110)
CHLORIDE SERPL-SCNC: 90 MMOL/L (ref 95–110)
CHLORIDE SERPL-SCNC: 92 MMOL/L (ref 95–110)
CHLORIDE SERPL-SCNC: 92 MMOL/L (ref 95–110)
CO2 SERPL-SCNC: 30 MMOL/L (ref 23–29)
CO2 SERPL-SCNC: 30 MMOL/L (ref 23–29)
CO2 SERPL-SCNC: 31 MMOL/L (ref 23–29)
CO2 SERPL-SCNC: 31 MMOL/L (ref 23–29)
CREAT SERPL-MCNC: 1.3 MG/DL (ref 0.5–1.4)
CREAT SERPL-MCNC: 1.5 MG/DL (ref 0.5–1.4)
DIFFERENTIAL METHOD: ABNORMAL
EOSINOPHIL # BLD AUTO: 0.3 K/UL (ref 0–0.5)
EOSINOPHIL NFR BLD: 4 % (ref 0–8)
ERYTHROCYTE [DISTWIDTH] IN BLOOD BY AUTOMATED COUNT: 13.7 % (ref 11.5–14.5)
EST. GFR  (AFRICAN AMERICAN): 48 ML/MIN/1.73 M^2
EST. GFR  (AFRICAN AMERICAN): 57.1 ML/MIN/1.73 M^2
EST. GFR  (NON AFRICAN AMERICAN): 41.6 ML/MIN/1.73 M^2
EST. GFR  (NON AFRICAN AMERICAN): 49.4 ML/MIN/1.73 M^2
GLUCOSE SERPL-MCNC: 103 MG/DL (ref 70–110)
GLUCOSE SERPL-MCNC: 106 MG/DL (ref 70–110)
GLUCOSE SERPL-MCNC: 89 MG/DL (ref 70–110)
GLUCOSE SERPL-MCNC: 89 MG/DL (ref 70–110)
HCT VFR BLD AUTO: 30.4 % (ref 40–54)
HGB BLD-MCNC: 9.3 G/DL (ref 14–18)
IMM GRANULOCYTES # BLD AUTO: 0.02 K/UL (ref 0–0.04)
IMM GRANULOCYTES NFR BLD AUTO: 0.3 % (ref 0–0.5)
L PNEUMO AG UR QL IA: NEGATIVE
LYMPHOCYTES # BLD AUTO: 1.5 K/UL (ref 1–4.8)
LYMPHOCYTES NFR BLD: 19 % (ref 18–48)
MCH RBC QN AUTO: 26.5 PG (ref 27–31)
MCHC RBC AUTO-ENTMCNC: 30.6 G/DL (ref 32–36)
MCV RBC AUTO: 87 FL (ref 82–98)
MONOCYTES # BLD AUTO: 0.6 K/UL (ref 0.3–1)
MONOCYTES NFR BLD: 7.8 % (ref 4–15)
NEUTROPHILS # BLD AUTO: 5.2 K/UL (ref 1.8–7.7)
NEUTROPHILS NFR BLD: 68.1 % (ref 38–73)
NRBC BLD-RTO: 0 /100 WBC
PLATELET # BLD AUTO: 201 K/UL (ref 150–350)
PMV BLD AUTO: 9.5 FL (ref 9.2–12.9)
POTASSIUM SERPL-SCNC: 4.3 MMOL/L (ref 3.5–5.1)
POTASSIUM SERPL-SCNC: 4.4 MMOL/L (ref 3.5–5.1)
POTASSIUM SERPL-SCNC: 4.5 MMOL/L (ref 3.5–5.1)
POTASSIUM SERPL-SCNC: 4.6 MMOL/L (ref 3.5–5.1)
RBC # BLD AUTO: 3.51 M/UL (ref 4.6–6.2)
SODIUM SERPL-SCNC: 127 MMOL/L (ref 136–145)
SODIUM SERPL-SCNC: 128 MMOL/L (ref 136–145)
SODIUM SERPL-SCNC: 129 MMOL/L (ref 136–145)
SODIUM SERPL-SCNC: 129 MMOL/L (ref 136–145)
WBC # BLD AUTO: 7.68 K/UL (ref 3.9–12.7)

## 2020-08-31 PROCEDURE — 36415 COLL VENOUS BLD VENIPUNCTURE: CPT

## 2020-08-31 PROCEDURE — 97530 THERAPEUTIC ACTIVITIES: CPT

## 2020-08-31 PROCEDURE — 11000001 HC ACUTE MED/SURG PRIVATE ROOM

## 2020-08-31 PROCEDURE — 82088 ASSAY OF ALDOSTERONE: CPT

## 2020-08-31 PROCEDURE — 99232 PR SUBSEQUENT HOSPITAL CARE,LEVL II: ICD-10-PCS | Mod: GC,,, | Performed by: INTERNAL MEDICINE

## 2020-08-31 PROCEDURE — 52000 PR CYSTOURETHROSCOPY: ICD-10-PCS | Mod: ,,, | Performed by: UROLOGY

## 2020-08-31 PROCEDURE — 99490 CHRNC CARE MGMT STAFF 1ST 20: CPT | Mod: PBBFAC | Performed by: INTERNAL MEDICINE

## 2020-08-31 PROCEDURE — 27000221 HC OXYGEN, UP TO 24 HOURS

## 2020-08-31 PROCEDURE — 99490 CHRNC CARE MGMT STAFF 1ST 20: CPT | Mod: S$PBB,,, | Performed by: INTERNAL MEDICINE

## 2020-08-31 PROCEDURE — 80048 BASIC METABOLIC PNL TOTAL CA: CPT

## 2020-08-31 PROCEDURE — 99232 SBSQ HOSP IP/OBS MODERATE 35: CPT | Mod: GC,,, | Performed by: INTERNAL MEDICINE

## 2020-08-31 PROCEDURE — 25000003 PHARM REV CODE 250: Performed by: STUDENT IN AN ORGANIZED HEALTH CARE EDUCATION/TRAINING PROGRAM

## 2020-08-31 PROCEDURE — 99490 PR CHRONIC CARE MGMT, 1ST 20 MIN: ICD-10-PCS | Mod: S$PBB,,, | Performed by: INTERNAL MEDICINE

## 2020-08-31 PROCEDURE — 97162 PT EVAL MOD COMPLEX 30 MIN: CPT

## 2020-08-31 PROCEDURE — 99233 PR SUBSEQUENT HOSPITAL CARE,LEVL III: ICD-10-PCS | Mod: GC,,, | Performed by: STUDENT IN AN ORGANIZED HEALTH CARE EDUCATION/TRAINING PROGRAM

## 2020-08-31 PROCEDURE — 80048 BASIC METABOLIC PNL TOTAL CA: CPT | Mod: 91

## 2020-08-31 PROCEDURE — 99233 SBSQ HOSP IP/OBS HIGH 50: CPT | Mod: GC,,, | Performed by: STUDENT IN AN ORGANIZED HEALTH CARE EDUCATION/TRAINING PROGRAM

## 2020-08-31 PROCEDURE — 94761 N-INVAS EAR/PLS OXIMETRY MLT: CPT

## 2020-08-31 PROCEDURE — 85025 COMPLETE CBC W/AUTO DIFF WBC: CPT

## 2020-08-31 PROCEDURE — 97112 NEUROMUSCULAR REEDUCATION: CPT

## 2020-08-31 PROCEDURE — 52000 CYSTOURETHROSCOPY: CPT | Mod: ,,, | Performed by: UROLOGY

## 2020-08-31 PROCEDURE — 97165 OT EVAL LOW COMPLEX 30 MIN: CPT

## 2020-08-31 RX ADMIN — PANTOPRAZOLE SODIUM 40 MG: 40 TABLET, DELAYED RELEASE ORAL at 09:08

## 2020-08-31 RX ADMIN — ASPIRIN 81 MG: 81 TABLET, COATED ORAL at 09:08

## 2020-08-31 RX ADMIN — TRAZODONE HYDROCHLORIDE 50 MG: 50 TABLET ORAL at 09:08

## 2020-08-31 RX ADMIN — ATORVASTATIN CALCIUM 20 MG: 20 TABLET, FILM COATED ORAL at 09:08

## 2020-08-31 RX ADMIN — LEVOTHYROXINE SODIUM 50 MCG: 50 TABLET ORAL at 06:08

## 2020-08-31 RX ADMIN — METOPROLOL SUCCINATE 25 MG: 25 TABLET, EXTENDED RELEASE ORAL at 09:08

## 2020-08-31 RX ADMIN — POLYETHYLENE GLYCOL 3350 17 G: 17 POWDER, FOR SOLUTION ORAL at 09:08

## 2020-08-31 RX ADMIN — GABAPENTIN 800 MG: 400 CAPSULE ORAL at 09:08

## 2020-08-31 RX ADMIN — AMLODIPINE BESYLATE 5 MG: 5 TABLET ORAL at 09:08

## 2020-08-31 NOTE — PLAN OF CARE
Reviewed care plan with Pt questions answered Supra pubic cath in place and continues to drain cranberry colored urine to gravity. Pt is planning to attend rehab after discharge from [facility].

## 2020-08-31 NOTE — ASSESSMENT & PLAN NOTE
"87 yo male with history of hypothyroidism, neurogenic bladder with suprapubic catheter (exchanged annually, last exchange 3 days ago by home RN), HLD, HTN and GERD presenting to the ER with generalized weakness since 3 days ago. This is the first time the patient has experienced something like this. He describes the weakness as feeling tired and overall "fragile" and "without energy". Pt denies: seizures, headache or numbness.    Labs concerning for hyponatremia,  hypochloremia and hypokalemia.   CXR:left basilar increased density which could represent combination of effusion, atelectasis or consolidation.     BNP: 298  Echo ordered to r/o CHF:    Serum Osm: 247 (low)  Urine Osm: 217 (low)  Urine Na: 52  Urine Cl: 45    Pt currently taking Hydrochlorothiazide for hypertension. As per PCP notes he has been taking HCTZ since 2019 without any side effects. The medication was stopped but then restarted 8/21.    Given low serum AND urine osms, this appears to be an ADH independent hyponatremia. Volume status is borderline low on exam, CVP on Echo was 3.    Plan  - Nephrology consulted, recommended giving more volume  - BMP's q4h to monitor for overcorrection  - Continuing to hold HCTZ and mirtazapine  "

## 2020-08-31 NOTE — ASSESSMENT & PLAN NOTE
ECG with frequent PVCs. No QT interval prolongation, no ST depression.     Resolved K of 4.6 on 8/31

## 2020-08-31 NOTE — SUBJECTIVE & OBJECTIVE
Interval History:   Pt slept well, continues with hematuria. Reports back pain (chronic). VS stable.    Review of Systems  Objective:     Vital Signs (Most Recent):  Temp: 97.5 °F (36.4 °C) (08/30/20 1939)  Pulse: 62 (08/30/20 1939)  Resp: 18 (08/30/20 1939)  BP: (!) 167/70 (08/30/20 1939)  SpO2: 97 % (08/30/20 1939) Vital Signs (24h Range):  Temp:  [96.4 °F (35.8 °C)-98.5 °F (36.9 °C)] 97.5 °F (36.4 °C)  Pulse:  [51-68] 62  Resp:  [16-18] 18  SpO2:  [96 %-100 %] 97 %  BP: (123-167)/(61-71) 167/70     Weight: 96.8 kg (213 lb 6.5 oz)  Body mass index is 32.45 kg/m².    Intake/Output Summary (Last 24 hours) at 8/30/2020 2102  Last data filed at 8/30/2020 2000  Gross per 24 hour   Intake 840 ml   Output 5550 ml   Net -4710 ml      Physical Exam    Significant Labs:   BMP:   Recent Labs   Lab 08/30/20  1540   *   *   K 4.5   CL 89*   CO2 28   BUN 13   CREATININE 1.3   CALCIUM 7.9*     CBC:   Recent Labs   Lab 08/29/20  0453 08/30/20  0434   WBC 6.88 6.41   HGB 10.1* 9.8*   HCT 32.6* 31.3*    196       Significant Imaging: I have reviewed and interpreted all pertinent imaging results/findings within the past 24 hours.

## 2020-08-31 NOTE — ASSESSMENT & PLAN NOTE
85 yo male with gross hematuria per suprapubic tube after replacement with  nursing 6 days ago. Vitals and labs stable.    - 18 Fr suprapubic tube continues to drain well, light pink in color. No clots.  - Nursing to irrigate SPT PRN  - Plan for bedside cystoscopy today to evaluate hematuria  - Trend H/H and creatinine

## 2020-08-31 NOTE — SUBJECTIVE & OBJECTIVE
Interval History: Patient slept well, still c/o chronic back pain. Mahajan bag still with gross hematuria. Currently on 2L O2.     Review of Systems   Constitutional: Positive for fatigue. Negative for chills and fever.   HENT: Negative for hearing loss, rhinorrhea and sinus pain.    Eyes: Negative for photophobia and visual disturbance.   Respiratory: Positive for shortness of breath. Negative for choking, chest tightness and wheezing.    Cardiovascular: Negative for chest pain and palpitations.   Gastrointestinal: Negative for abdominal distention, abdominal pain, diarrhea and nausea.   Genitourinary: Positive for difficulty urinating and hematuria. Negative for dysuria and flank pain.   Musculoskeletal: Positive for back pain. Negative for joint swelling and myalgias.   Skin: Negative for color change and pallor.   Neurological: Negative for dizziness, facial asymmetry, weakness and headaches.   Psychiatric/Behavioral: Negative for agitation and confusion.     Objective:     Vital Signs (Most Recent):  Temp: 97.8 °F (36.6 °C) (08/31/20 1242)  Pulse: 75 (08/31/20 1242)  Resp: 20 (08/31/20 1242)  BP: (!) 149/67 (08/31/20 1242)  SpO2: (!) 92 % (08/31/20 1242) Vital Signs (24h Range):  Temp:  [96.4 °F (35.8 °C)-98.5 °F (36.9 °C)] 97.8 °F (36.6 °C)  Pulse:  [61-78] 75  Resp:  [16-20] 20  SpO2:  [92 %-100 %] 92 %  BP: (134-167)/(65-71) 149/67     Weight: 97.4 kg (214 lb 11.7 oz)  Body mass index is 32.65 kg/m².    Intake/Output Summary (Last 24 hours) at 8/31/2020 1304  Last data filed at 8/31/2020 0643  Gross per 24 hour   Intake 360 ml   Output 4200 ml   Net -3840 ml      Physical Exam  Vitals signs reviewed.   Constitutional:       General: He is not in acute distress.     Appearance: Normal appearance. He is ill-appearing.   HENT:      Head: Normocephalic and atraumatic.      Right Ear: External ear normal.      Left Ear: External ear normal.      Nose: Nose normal.      Mouth/Throat:      Mouth: Mucous membranes are  dry.      Pharynx: Oropharynx is clear.   Eyes:      Extraocular Movements: Extraocular movements intact.      Conjunctiva/sclera: Conjunctivae normal.      Pupils: Pupils are equal, round, and reactive to light.   Neck:      Musculoskeletal: Normal range of motion. No neck rigidity or muscular tenderness.   Cardiovascular:      Rate and Rhythm: Normal rate and regular rhythm.      Heart sounds: No murmur. No gallop.    Pulmonary:      Effort: Pulmonary effort is normal. No respiratory distress.      Breath sounds: No wheezing or rhonchi.   Abdominal:      General: There is no distension.      Palpations: Abdomen is soft.      Tenderness: There is no abdominal tenderness. There is no guarding.   Genitourinary:     Comments: Mahajan in place, gross hematuria in bag, no observable clots  Musculoskeletal:         General: No swelling, tenderness or deformity.      Right lower leg: No edema.      Left lower leg: No edema.   Skin:     General: Skin is warm and dry.      Coloration: Skin is not jaundiced.   Neurological:      General: No focal deficit present.      Mental Status: He is alert and oriented to person, place, and time.         Significant Labs:   BMP:   Recent Labs   Lab 08/31/20  0821   GLU 89  89   *  128*   K 4.4  4.5   CL 90*  92*   CO2 31*  30*   BUN 17  17   CREATININE 1.3  1.3   CALCIUM 7.9*  8.0*     CBC:   Recent Labs   Lab 08/30/20  0434 08/31/20  0520   WBC 6.41 7.68   HGB 9.8* 9.3*   HCT 31.3* 30.4*    201     CMP:   Recent Labs   Lab 08/30/20  1540 08/30/20  2359 08/31/20  0821   * 129* 127*  128*   K 4.5 4.6 4.4  4.5   CL 89* 90* 90*  92*   CO2 28 31* 31*  30*   * 103 89  89   BUN 13 16 17  17   CREATININE 1.3 1.5* 1.3  1.3   CALCIUM 7.9* 8.2* 7.9*  8.0*   ANIONGAP 9 8 6*  6*   EGFRNONAA 49.4* 41.6* 49.4*  49.4*       Significant Imaging: I have reviewed all pertinent imaging results/findings within the past 24 hours.

## 2020-08-31 NOTE — ASSESSMENT & PLAN NOTE
Suprapubic catheter changed home home health nurse day prior to presentation to ED. Presented with clear yellow urine in galvan bag on day of admit. Hospital day 1 patient's galvan bag with gross hematuria. Irrigated by urology to clear but hematuria returned.     - Urology consulted, appreciate their help with this case  - Avoid irrigation with sterile water as this could worsen patient's hyponatremia

## 2020-08-31 NOTE — PROGRESS NOTES
Titrating oxygen down, Pt was 97% on 2 LPM, he was 95% on 1 LPM he is currently on room air with a saturation of 93%. Will continue to monitor

## 2020-08-31 NOTE — PROGRESS NOTES
"[facility] Hospital Medicine  Progress Note    Patient Name: Chucho Prado  MRN: [id number]  Patient Class: IP- Inpatient   Admission Date: 8/27/2020  Length of Stay: 4 days  Attending Physician: Chiara Martinez MD  Primary Care Provider: Obed Mcguire MD    Hospital Medicine Team: [facility] HOSP MED 5 Nahun Baez MD    Subjective:     Principal Problem:Hyponatremia        HPI:  87 yo male with history of hypothyroidism, neurogenic bladder with suprapubic catheter (exchanged annually, last exchange 3 days ago by home RN), HLD, HTN and GERD presenting to the ER with generalized weakness since 3 days ago. This is the first time the patient has experienced something like this. He describes the weakness as feeling tired and overall "fragile" and "without energy". He can move his extremities but has difficulty sitting down and getting out of bed. Reports 1 episode of diarrhea yesterday. The diarrhea was described as brown, watery and non bloody. He denies bloating and abdominal pain He also endorses SOB with exertion when he walks around the house and uses three pillows at night to sleep mainly because he likes it but also because he feels short of breath sometimes when he lies down. His SOB is chronic and has been going on "for many years" as per patient. He denies nausea, vomiting, cough, palpitations or chest discomfort. He also denies seizures, numbness in extremities, headache or confusion.    Pt does not FU with Cardiology OP but Echo performed in 2019 shows an EF of 58%, normal diastolic and systolic and mild aortic stenosis.For the neurogenic bladder the patient follows up with Urology OP since 2017.    Overview/Hospital Course:  Patient admitted to hospital medicine on 8/27/2020 for hyponatremia and evaluation of severe hyponatremia, hypokalemia, and hypochloremia. Hospital stay complicated by gross hematuria. Urology did bedside cystoscopy on 8/31, The bladder wall was consistent with " neurogenic bladder including multiple diverticula. There was old formed clot seen throughout the bladder floor and in diverticula, but no active bleeding was seen. Hyponatremia slightly improving after HCTZ held.  Urology plan for OR on 9/1 for clot removal.     Interval History: Patient slept well, still c/o chronic back pain. Mahajan bag still with gross hematuria. Currently on 2L O2.     Review of Systems   Constitutional: Positive for fatigue. Negative for chills and fever.   HENT: Negative for hearing loss, rhinorrhea and sinus pain.    Eyes: Negative for photophobia and visual disturbance.   Respiratory: Positive for shortness of breath. Negative for choking, chest tightness and wheezing.    Cardiovascular: Negative for chest pain and palpitations.   Gastrointestinal: Negative for abdominal distention, abdominal pain, diarrhea and nausea.   Genitourinary: Positive for difficulty urinating and hematuria. Negative for dysuria and flank pain.   Musculoskeletal: Positive for back pain. Negative for joint swelling and myalgias.   Skin: Negative for color change and pallor.   Neurological: Negative for dizziness, facial asymmetry, weakness and headaches.   Psychiatric/Behavioral: Negative for agitation and confusion.     Objective:     Vital Signs (Most Recent):  Temp: 97.8 °F (36.6 °C) (08/31/20 1242)  Pulse: 75 (08/31/20 1242)  Resp: 20 (08/31/20 1242)  BP: (!) 149/67 (08/31/20 1242)  SpO2: (!) 92 % (08/31/20 1242) Vital Signs (24h Range):  Temp:  [96.4 °F (35.8 °C)-98.5 °F (36.9 °C)] 97.8 °F (36.6 °C)  Pulse:  [61-78] 75  Resp:  [16-20] 20  SpO2:  [92 %-100 %] 92 %  BP: (134-167)/(65-71) 149/67     Weight: 97.4 kg (214 lb 11.7 oz)  Body mass index is 32.65 kg/m².    Intake/Output Summary (Last 24 hours) at 8/31/2020 1304  Last data filed at 8/31/2020 0643  Gross per 24 hour   Intake 360 ml   Output 4200 ml   Net -3840 ml      Physical Exam  Vitals signs reviewed.   Constitutional:       General: He is not in acute  distress.     Appearance: Normal appearance. He is ill-appearing.   HENT:      Head: Normocephalic and atraumatic.      Right Ear: External ear normal.      Left Ear: External ear normal.      Nose: Nose normal.      Mouth/Throat:      Mouth: Mucous membranes are dry.      Pharynx: Oropharynx is clear.   Eyes:      Extraocular Movements: Extraocular movements intact.      Conjunctiva/sclera: Conjunctivae normal.      Pupils: Pupils are equal, round, and reactive to light.   Neck:      Musculoskeletal: Normal range of motion. No neck rigidity or muscular tenderness.   Cardiovascular:      Rate and Rhythm: Normal rate and regular rhythm.      Heart sounds: No murmur. No gallop.    Pulmonary:      Effort: Pulmonary effort is normal. No respiratory distress.      Breath sounds: No wheezing or rhonchi.   Abdominal:      General: There is no distension.      Palpations: Abdomen is soft.      Tenderness: There is no abdominal tenderness. There is no guarding.   Genitourinary:     Comments: Mahajan in place, gross hematuria in bag, no observable clots  Musculoskeletal:         General: No swelling, tenderness or deformity.      Right lower leg: No edema.      Left lower leg: No edema.   Skin:     General: Skin is warm and dry.      Coloration: Skin is not jaundiced.   Neurological:      General: No focal deficit present.      Mental Status: He is alert and oriented to person, place, and time.         Significant Labs:   BMP:   Recent Labs   Lab 08/31/20  0821   GLU 89  89   *  128*   K 4.4  4.5   CL 90*  92*   CO2 31*  30*   BUN 17  17   CREATININE 1.3  1.3   CALCIUM 7.9*  8.0*     CBC:   Recent Labs   Lab 08/30/20  0434 08/31/20  0520   WBC 6.41 7.68   HGB 9.8* 9.3*   HCT 31.3* 30.4*    201     CMP:   Recent Labs   Lab 08/30/20  1540 08/30/20  2359 08/31/20  0821   * 129* 127*  128*   K 4.5 4.6 4.4  4.5   CL 89* 90* 90*  92*   CO2 28 31* 31*  30*   * 103 89  89   BUN 13 16 17  17  "  CREATININE 1.3 1.5* 1.3  1.3   CALCIUM 7.9* 8.2* 7.9*  8.0*   ANIONGAP 9 8 6*  6*   EGFRNONAA 49.4* 41.6* 49.4*  49.4*       Significant Imaging: I have reviewed all pertinent imaging results/findings within the past 24 hours.      Assessment/Plan:      * Hyponatremia  87 yo male with history of hypothyroidism, neurogenic bladder with suprapubic catheter (exchanged annually, last exchange 3 days ago by home RN), HLD, HTN and GERD presenting to the ER with generalized weakness since 3 days ago. This is the first time the patient has experienced something like this. He describes the weakness as feeling tired and overall "fragile" and "without energy". Pt denies: seizures, headache or numbness.    Labs concerning for hyponatremia,  hypochloremia and hypokalemia.   CXR:left basilar increased density which could represent combination of effusion, atelectasis or consolidation.     BNP: 298  Echo ordered to r/o CHF:    Serum Osm: 247 (low)  Urine Osm: 217 (low)  Urine Na: 52  Urine Cl: 45    Pt currently taking Hydrochlorothiazide for hypertension. As per PCP notes he has been taking HCTZ since 2019 without any side effects. The medication was stopped but then restarted 8/21.    Given low serum AND urine osms, this appears to be an ADH independent hyponatremia. Volume status is borderline low on exam, CVP on Echo was 3.    Sodium in 126-127 since 8/30    Plan  - Follow up renin/aldosterone ratio per nephrology  - BMP's q4h to monitor for overcorrection  - Continuing to hold HCTZ and mirtazapine    Nonrheumatic aortic valve stenosis  Patient with moderate to severe AS on echo performed during this hospitalization. LUIS: 1.03 cm2; peak velocity 3.11 m/s; mean gradient 26 mmHg. Could contribute to patients symptoms of dyspnea. Further given low CVP and pre-load dependent nature of AS, could potentially improve with some volume resuscitation.    - Follow up with cardiology as an outpatient for closer monitoring / " potential intervention as an outpatient      Acute hypoxemic respiratory failure  Pt currently on 2L nasal cannula but does not appear to need as much as he is satting %. Not on oxygen at home, patient does report 1 year history of dyspnea. CXR with chronic atelectatic changes in left lung base.      - Titrate down O2 as needed to goal O2 sat > 92%  - 8/31, taken off O2. Became hypoxic on RA  - Patient currently on 0.5 L O2 with sats of 97%  - Incentive spirometry q6hrs    Weakness  -See hyponatremia      Hypokalemia  ECG with frequent PVCs. No QT interval prolongation, no ST depression.     Resolved K of 4.6 on 8/31          Gross hematuria  Suprapubic catheter changed home home health nurse day prior to presentation to ED. Presented with clear yellow urine in galvan bag on day of admit. Hospital day 1 patient's galvan bag with gross hematuria. Irrigated by urology to clear but hematuria returned.     - Urology consulted, appreciate their help with this case  - Avoid irrigation with sterile water as this could worsen patient's hyponatremia  - Urology did bedside cystoscopy showed evidence of neurogenic bladder and large clots that could not be removed  - Plan for OR tomorrow with urology for clot removal  - NPO at midnight      Hyperlipidemia  -Continue home Atorvastatin      Essential hypertension  -Continue home Metroprolol  -Hold home HCTZ (known cause of hyponatremia)       Acquired hypothyroidism  TSH on admit borderline low (0.8). Could potentially be contributing to patient's hyponatremia    -Continue home Levothyroxine at this time      UTI (urinary tract infection)  On urinalysis patient with hazy urine, protein +1, occult blood +2, leukocytes +3 and nitrites +. Clinically patient with gross hematuria on exam and suprapubic tenderness. Likely UA secondary to colonization and dirty specimen from suprapubic catheter.    Plan  - Pt initially covered with ceftriaxone in the ED, low likelihood of UTI, will  hold further antibiotics at this time        VTE Risk Mitigation (From admission, onward)         Ordered     IP VTE HIGH RISK PATIENT  Once      08/27/20 1748     Place sequential compression device  Until discontinued      08/27/20 1734                Discharge Planning   TISHA: 8/31/2020     Code Status: Full Code   Is the patient medically ready for discharge?:     Reason for patient still in hospital (select all that apply): Patient unstable  Discharge Plan A: Home                  Nahun Baez MD  Department of Hospital Medicine   [facility]

## 2020-08-31 NOTE — PLAN OF CARE
Problem: Occupational Therapy Goal  Goal: Occupational Therapy Goal  Description: Goals to be met by: 9/14/2020     Patient will increase functional independence with ADLs by performing:    UE Dressing with Set-up Assistance.  Grooming while seated with Set-up Assistance.  Toileting from bedside commode with Stand-by Assistance for hygiene and clothing management.   Stand pivot transfers with Contact Guard Assistance.  Toilet transfer to bedside commode with Contact Guard Assistance.    Outcome: Ongoing, Progressing   Patient's goals are set.   DINO Sanders  8/31/2020

## 2020-08-31 NOTE — PROGRESS NOTES
[facility]  Nephrology  Progress Note    Patient Name: Chucho Prado  MRN: [id number]  Admission Date: 8/27/2020  Hospital Length of Stay: 4 days  Attending Provider: Chiara Martinez MD   Primary Care Physician: Obed Mcguire MD  Principal Problem:Hyponatremia    Subjective:     HPI: Mr. Prado is an 87 yo male with neurogenic bladder, AS, CKD 3a, HTN, and hypothyroidism who presented to the hospital with chief complaint of new onset weakness x 3 days. Also had episode of diarrhea and generally poor PO intake. On 8/19 he saw his PCP who initiated Remeron for depression. Unclear whether he was supposed to be taking HCTZ 25 mg qd, but the patient was not taking this and began taking it a few days prior to admission when his PCP told him that he no longer needs to take his losartan. Initial labs on presentation notable for hyponatremia of 119, hypokalemia of 2.7, hypochloremia and metabolic alkalosis with bicarb of 37. HCTZ and Remeron were both held. He was found to be hypo-osmolar (247) and UOSM of 217 and Gabi of 52 on initial studies.  ECHO obtained w/o evidence of HF (ECHO 60% with CVP 3).  His potassium was replaced with both oral and IV with improvement.  UOP has been adequate making 1.9L in the past 24 hours and sodium has improved to 123 at an acceptable rate.  Nephrology has been consulted for hyponatremia.  He voices no complaints on exam other than occasional bladder spasms, mental status at baseline.     Interval History: No acute events overnight. Received another 1L IV NS yesterday. Had 5.2L UOP documented yesterday but unclear how much of this was irrigation solution. Had bedside cystoscopy with urology today which visualized an old clot but no new source of bleeding, and has been scheduled for clot evacuation in cystoscopy suite for tomorrow.    Review of patient's allergies indicates:  No Known Allergies  Current Facility-Administered Medications   Medication Frequency     amLODIPine tablet 5 mg Daily    aspirin EC tablet 81 mg Daily    atorvastatin tablet 20 mg Daily    dextrose 50% injection 12.5 g PRN    dextrose 50% injection 25 g PRN    gabapentin capsule 800 mg BID    glucagon (human recombinant) injection 1 mg PRN    glucose chewable tablet 16 g PRN    glucose chewable tablet 24 g PRN    levothyroxine tablet 50 mcg Before breakfast    metoprolol succinate (TOPROL-XL) 24 hr tablet 25 mg Daily    pantoprazole EC tablet 40 mg Daily    polyethylene glycol packet 17 g Daily    sodium chloride 0.9% flush 10 mL PRN    traZODone tablet 50 mg Nightly       Objective:     Vital Signs (Most Recent):  Temp: 97.8 °F (36.6 °C) (08/31/20 1242)  Pulse: 75 (08/31/20 1242)  Resp: 20 (08/31/20 1242)  BP: (!) 149/67 (08/31/20 1242)  SpO2: (!) 92 % (08/31/20 1242)  O2 Device (Oxygen Therapy): nasal cannula (08/31/20 1030) Vital Signs (24h Range):  Temp:  [96.4 °F (35.8 °C)-98.5 °F (36.9 °C)] 97.8 °F (36.6 °C)  Pulse:  [61-78] 75  Resp:  [16-20] 20  SpO2:  [92 %-100 %] 92 %  BP: (134-167)/(65-71) 149/67     Weight: 97.4 kg (214 lb 11.7 oz) (08/31/20 0400)  Body mass index is 32.65 kg/m².  Body surface area is 2.16 meters squared.    I/O last 3 completed shifts:  In: 840 [P.O.:840]  Out: 6100 [Urine:6100]    Physical Exam  Constitutional:       General: He is not in acute distress.     Appearance: He is not ill-appearing.   HENT:      Head: Normocephalic and atraumatic.      Nose: No congestion or rhinorrhea.      Mouth/Throat:      Mouth: Mucous membranes are moist.      Pharynx: Oropharynx is clear. No oropharyngeal exudate or posterior oropharyngeal erythema.   Eyes:      General: No scleral icterus.        Right eye: No discharge.         Left eye: No discharge.      Extraocular Movements: Extraocular movements intact.      Conjunctiva/sclera: Conjunctivae normal.   Neck:      Musculoskeletal: Normal range of motion and neck supple.      Vascular: No JVD.   Cardiovascular:      Rate  and Rhythm: Normal rate and regular rhythm.      Heart sounds: Murmur present.   Pulmonary:      Effort: Pulmonary effort is normal. No respiratory distress.      Breath sounds: No wheezing or rales.   Abdominal:      General: Abdomen is flat. There is no distension.      Tenderness: There is no abdominal tenderness.   Genitourinary:     Comments: Mahajan cath in place with gross hematuria  Musculoskeletal:      Right lower leg: No edema.      Left lower leg: No edema.   Skin:     General: Skin is warm and dry.   Neurological:      General: No focal deficit present.      Mental Status: He is alert and oriented to person, place, and time.   Psychiatric:         Mood and Affect: Mood normal.         Behavior: Behavior normal.         Significant Labs:  CBC:   Recent Labs   Lab 08/31/20  0520   WBC 7.68   RBC 3.51*   HGB 9.3*   HCT 30.4*      MCV 87   MCH 26.5*   MCHC 30.6*     CMP:   Recent Labs   Lab 08/27/20  1432  08/31/20  0821   GLU 93   < > 89  89   CALCIUM 8.2*   < > 7.9*  8.0*   ALBUMIN 3.6  --   --    PROT 7.1  --   --    *   < > 127*  128*   K 2.7*   < > 4.4  4.5   CO2 37*   < > 31*  30*   CL 73*   < > 90*  92*   BUN 18   < > 17  17   CREATININE 1.2   < > 1.3  1.3   ALKPHOS 92  --   --    ALT 8*  --   --    AST 18  --   --    BILITOT 0.7  --   --     < > = values in this interval not displayed.     Recent Labs   Lab 08/27/20  1528   COLORU Yellow   SPECGRAV 1.010   PHUR 8.0   PROTEINUA 1+*   BACTERIA Occasional   NITRITE Positive*   LEUKOCYTESUR 3+*   HYALINECASTS 0     All labs within the past 24 hours have been reviewed.     Significant Imaging:  Labs: Reviewed  X-Ray: Reviewed    Assessment/Plan:     * Hyponatremia  Hypokalemia  86 yo male presenting with 3 day history of weakness found to have significant electrolyte abnormalities consisting of hyponatremia (119), hypokalemia (2.9) with hypochloremia and metabolic alkalosis.  His home medications of HCTZ and Remeron were discontinued  and potassium was repleted with improvement in both potassium and sodium.  Nephrology has been consulted for evaluation of hyponatremia. Hypo-osmolar hyponatremia and euvolemic on initial eval. Hypochloremic alkalosis which could be evidence of contraction and may have a component of intravascular volume depletion. Labs not c/w SIADH.    Recommendations:  - Agree with discontinuing HCTZ/Remeron  - Continue strict I/O's  - Na appears to have improved with fluid resuscitation, c/w volume depletion  - Given patient's hypertension, hypokalemia, and metabolic alkalosis on presentation, will check plasma aldosterone to renin ratio to evaluate for hyperaldosteronism    TERE (acute kidney injury)  On admission Cr 1.1 (baseline). Increased to 1.5 after IVF boluses although hyponatremia improved with this. UOP has been measured but also includes unknown amount of bladder irrigation. May have a component of post-renal TERE considering newly found clots on cystoscopy on 8/31.    Recommendations:  - May be a post-renal component. Cr improved today from 1.5 to 1.3 after 5.2L UOP yesterday. Order renal U/S to rule out hydronephrosis.  - Continue to trend BMPs after clot evaluation        Thank you for your consult. I will follow-up with patient. Please contact us if you have any additional questions.    Toby Borrero DO  Nephrology  [facility]    ATTENDING PHYSICIAN ATTESTATION  I have personally verified the history and examined the patient. I thoroughly reviewed the demographic, clinical, laboratorial and imaging information available in medical records. I agree with the assessment and recommendations provided by the subspecialty resident who was under my supervision.

## 2020-08-31 NOTE — SUBJECTIVE & OBJECTIVE
Interval History: NAEON. AFVSS.  SPT draining with clear light pink urine. Some dried clot in galvan tubing.  Sleeping comfortably in bed.  Will perform bedside cysto later today to evaluate hematuria.    Objective:     Temp:  [96.4 °F (35.8 °C)-98.5 °F (36.9 °C)] 98 °F (36.7 °C)  Pulse:  [55-68] 63  Resp:  [16-20] 20  SpO2:  [96 %-100 %] 99 %  BP: (130-167)/(61-71) 152/66     Body mass index is 32.65 kg/m².         Drains     Drain                 Suprapubic Catheter -- days         Suprapubic Catheter 06/23/17 1200 18 Fr. 1163 days                Physical Exam   Constitutional: He is oriented to person, place, and time. He appears well-developed. No distress.   Cardiovascular: Normal rate.    Pulmonary/Chest: Effort normal. No respiratory distress.   Abdominal: Soft. He exhibits no distension. There is no abdominal tenderness.   18 Fr suprapubic tube with medium pink urine in tubing, no clots.   Genitourinary:    Genitourinary Comments: Tight phimotic band with buried penis. Penis and meatus unable to be evaluated.Testes palpated bilaterally, no masses. Small right hydrocele, nontender. Normal cord structures. Scrotum normal without lesions.     Musculoskeletal: Normal range of motion. No tenderness.   Neurological: He is alert and oriented to person, place, and time.   Skin: Skin is warm and dry. No rash noted.     Psychiatric: Judgment normal.       Significant Labs:    BMP:  Recent Labs   Lab 08/30/20  0758 08/30/20  1540 08/30/20  2359   * 126* 129*   K 4.3 4.5 4.6   CL 88* 89* 90*   CO2 34* 28 31*   BUN 14 13 16   CREATININE 1.0 1.3 1.5*   CALCIUM 8.1* 7.9* 8.2*       CBC:   Recent Labs   Lab 08/28/20  0532 08/29/20  0453 08/30/20  0434   WBC 11.39 6.88 6.41   HGB 12.5* 10.1* 9.8*   HCT 36.3* 32.6* 31.3*    198 196     Significant Imaging:  All pertinent imaging results/findings from the past 24 hours have been reviewed.

## 2020-08-31 NOTE — PROGRESS NOTES
"[facility] Hospital Medicine  Progress Note    Patient Name: Chucho Prado  MRN: [id number]  Patient Class: IP- Inpatient   Admission Date: 8/27/2020  Length of Stay: 3 days  Attending Physician: Chiara Martinez MD  Primary Care Provider: Obed Mcguire MD    Hospital Medicine Team: [facility] HOSP MED 5 Era Melendez MD    Subjective:     Principal Problem:Hyponatremia        HPI:  85 yo male with history of hypothyroidism, neurogenic bladder with suprapubic catheter (exchanged annually, last exchange 3 days ago by home RN), HLD, HTN and GERD presenting to the ER with generalized weakness since 3 days ago. This is the first time the patient has experienced something like this. He describes the weakness as feeling tired and overall "fragile" and "without energy". He can move his extremities but has difficulty sitting down and getting out of bed. Reports 1 episode of diarrhea yesterday. The diarrhea was described as brown, watery and non bloody. He denies bloating and abdominal pain He also endorses SOB with exertion when he walks around the house and uses three pillows at night to sleep mainly because he likes it but also because he feels short of breath sometimes when he lies down. His SOB is chronic and has been going on "for many years" as per patient. He denies nausea, vomiting, cough, palpitations or chest discomfort. He also denies seizures, numbness in extremities, headache or confusion.    Pt does not FU with Cardiology OP but Echo performed in 2019 shows an EF of 58%, normal diastolic and systolic and mild aortic stenosis.For the neurogenic bladder the patient follows up with Urology OP since 2017.    Overview/Hospital Course:  Patient admitted to hospital medicine on 8/27/2020 for hyponatremia and evaluation of severe hyponatremia, hypokalemia, and hypochloremia.    Interval History:   Pt slept well, continues with hematuria. Reports back pain (chronic). VS stable.    Review of " Systems   Constitutional: Positive for fatigue. Negative for activity change, chills, fever and unexpected weight change.   HENT: Negative for sneezing and sore throat.    Eyes: Negative for visual disturbance.   Respiratory: Positive for shortness of breath. Negative for cough and chest tightness.    Cardiovascular: Negative for chest pain and palpitations.   Gastrointestinal: Negative for abdominal distention, abdominal pain, constipation, diarrhea, nausea and vomiting.   Genitourinary: Positive for difficulty urinating ( Chronic neurogenic bladder). Negative for flank pain.   Musculoskeletal: Negative for arthralgias, back pain and myalgias.   Skin: Negative for color change and rash.   Neurological: Negative for dizziness, weakness and headaches.   Hematological: Negative for adenopathy.   Psychiatric/Behavioral: Negative for agitation, confusion and self-injury.     Objective:     Vital Signs (Most Recent):  Temp: 97.5 °F (36.4 °C) (08/30/20 1939)  Pulse: 62 (08/30/20 1939)  Resp: 18 (08/30/20 1939)  BP: (!) 167/70 (08/30/20 1939)  SpO2: 97 % (08/30/20 1939) Vital Signs (24h Range):  Temp:  [96.4 °F (35.8 °C)-98.5 °F (36.9 °C)] 97.5 °F (36.4 °C)  Pulse:  [51-68] 62  Resp:  [16-18] 18  SpO2:  [96 %-100 %] 97 %  BP: (123-167)/(61-71) 167/70     Weight: 96.8 kg (213 lb 6.5 oz)  Body mass index is 32.45 kg/m².    Intake/Output Summary (Last 24 hours) at 8/30/2020 2102  Last data filed at 8/30/2020 2000  Gross per 24 hour   Intake 840 ml   Output 5550 ml   Net -4710 ml        Physical Exam  Constitutional:       General: He is not in acute distress.     Appearance: Normal appearance. He is well-developed. He is ill-appearing.   HENT:      Head: Normocephalic and atraumatic.      Mouth/Throat:      Mouth: Mucous membranes are dry.   Eyes:      Conjunctiva/sclera: Conjunctivae normal.      Pupils: Pupils are equal, round, and reactive to light.   Neck:      Musculoskeletal: Normal range of motion and neck supple.  "  Cardiovascular:      Rate and Rhythm: Normal rate and regular rhythm.      Pulses: Normal pulses.      Heart sounds: Normal heart sounds. No murmur. No friction rub. No gallop.    Pulmonary:      Effort: Pulmonary effort is normal.      Breath sounds: Rhonchi present. No wheezing or rales.      Comments:     Abdominal:      General: Abdomen is flat. Bowel sounds are normal.      Palpations: Abdomen is soft. There is no mass.      Tenderness: There is no abdominal tenderness. There is no guarding.      Comments: Catheter in place   Genitourinary:     Comments: Kenny blood in galvan bag  Musculoskeletal: Normal range of motion.         General: No deformity.      Right lower leg: No edema.      Left lower leg: No edema.   Lymphadenopathy:      Cervical: No cervical adenopathy.   Skin:     General: Skin is warm and dry.      Comments: No edema present   Neurological:      Mental Status: He is alert and oriented to person, place, and time.      Cranial Nerves: No cranial nerve deficit.      Motor: No weakness.        Significant Labs:   BMP:   Recent Labs   Lab 08/30/20  1540   *   *   K 4.5   CL 89*   CO2 28   BUN 13   CREATININE 1.3   CALCIUM 7.9*     CBC:   Recent Labs   Lab 08/29/20  0453 08/30/20  0434   WBC 6.88 6.41   HGB 10.1* 9.8*   HCT 32.6* 31.3*    196       Significant Imaging: I have reviewed and interpreted all pertinent imaging results/findings within the past 24 hours.      Assessment/Plan:      * Hyponatremia  87 yo male with history of hypothyroidism, neurogenic bladder with suprapubic catheter (exchanged annually, last exchange 3 days ago by home RN), HLD, HTN and GERD presenting to the ER with generalized weakness since 3 days ago. This is the first time the patient has experienced something like this. He describes the weakness as feeling tired and overall "fragile" and "without energy". Pt denies: seizures, headache or numbness.    Labs concerning for hyponatremia,  " hypochloremia and hypokalemia.   CXR:left basilar increased density which could represent combination of effusion, atelectasis or consolidation.     BNP: 298  Echo ordered to r/o CHF:    Serum Osm: 247 (low)  Urine Osm: 217 (low)  Urine Na: 52  Urine Cl: 45    Pt currently taking Hydrochlorothiazide for hypertension. As per PCP notes he has been taking HCTZ since 2019 without any side effects. The medication was stopped but then restarted 8/21.    Given low serum AND urine osms, this appears to be an ADH independent hyponatremia. Volume status is borderline low on exam, CVP on Echo was 3.    Plan  - Nephrology consulted, recommended giving more volume  - BMP's q4h to monitor for overcorrection  - Continuing to hold HCTZ and mirtazapine    Nonrheumatic aortic valve stenosis  Patient with moderate to severe AS on echo performed during this hospitalization. LUIS: 1.03 cm2; peak velocity 3.11 m/s; mean gradient 26 mmHg. Could contribute to patients symptoms of dyspnea. Further given low CVP and pre-load dependent nature of AS, could potentially improve with some volume resuscitation.    - Follow up with cardiology as an outpatient for closer monitoring / potential intervention as an outpatient      Acute hypoxemic respiratory failure  Pt currently on 2L nasal cannula but does not appear to need as much as he is satting %. Not on oxygen at home, patient does report 1 year history of dyspnea. CXR with chronic atelectatic changes in left lung base.     - Titrate down O2 as needed to goal O2 sat > 92%    Weakness  -See hyponatremia      Hypokalemia  ECG with frequent PVCs. No QT interval prolongation, no ST depression.     Plan  Replace PRN  Pt placed on telemetry      Gross hematuria  Suprapubic catheter changed home home health nurse day prior to presentation to ED. Presented with clear yellow urine in galvan bag on day of admit. Hospital day 1 patient's galvan bag with gross hematuria. Irrigated by urology to clear  but hematuria returned.     - Urology consulted, appreciate their help with this case  - Avoid irrigation with sterile water as this could worsen patient's hyponatremia      Hyperlipidemia  -Continue home Atorvastatin      Essential hypertension  -Continue home Metroprolol  -Hold home HCTZ (known cause of hyponatremia)       Acquired hypothyroidism  TSH on admit borderline low (0.8). Could potentially be contributing to patient's hyponatremia    -Continue home Levothyroxine at this time      UTI (urinary tract infection)  On urinalysis patient with hazy urine, protein +1, occult blood +2, leukocytes +3 and nitrites +. Clinically patient with gross hematuria on exam and suprapubic tenderness. Likely UA secondary to colonization and dirty specimen from suprapubic catheter.    Plan  - Pt initially covered with ceftriaxone in the ED, low likelihood of UTI, will hold further antibiotics at this time          VTE Risk Mitigation (From admission, onward)         Ordered     IP VTE HIGH RISK PATIENT  Once      08/27/20 1748     Place sequential compression device  Until discontinued      08/27/20 1734                Discharge Planning   TISHA: 8/31/2020     Code Status: Full Code   Is the patient medically ready for discharge?:     Reason for patient still in hospital (select all that apply): Patient trending condition  Discharge Plan A: Home                  Era Melendez MD  Department of Hospital Medicine   [facility]

## 2020-08-31 NOTE — PT/OT/SLP EVAL
Physical Therapy Evaluation    Patient Name:  Chucho Prado   MRN:  [id number]    Recommendations:     Discharge Recommendations:  nursing facility, skilled   Discharge Equipment Recommendations: lift device   Barriers to discharge: decreased functional mobility requiring increased assist      Assessment:     Chucho Prado is a 86 y.o. male admitted with a medical diagnosis of Hyponatremia.  He presents with the following impairments/functional limitations:  weakness, impaired endurance, impaired self care skills, impaired functional mobilty, decreased lower extremity function, gait instability, impaired balance, pain, impaired cardiopulmonary response to activity. Pt evaluated on this date demonstrating impairments in functional mobility. Pt required Max A x2 for all bed mobility and Total A x2 to achieve approx. 60% complete standing. At baseline pt reports living alone but has paid caregiver support x7 day/wk from 8am-12am. Pt reports being mostly w/c bound but performs short transfers w/Rollator and assist. Pt requires assist for all dressing and sponge baths. D/t recent debility pt has been mostly bed bound for the last 1-3 weeks. Pt would benefit from continued skilled acute PT 3x/wk to improve functional mobility.  Recommending pt receive PT services in SNF setting following discharge from hospital once medically cleared.     Rehab Prognosis: Poor; patient would benefit from acute skilled PT services to address these deficits and reach maximum level of function.    Recent Surgery: * No surgery found *      Plan:     During this hospitalization, patient to be seen 3 x/week to address the identified rehab impairments via gait training, therapeutic activities, therapeutic exercises, neuromuscular re-education, wheelchair management/training and progress toward the following goals:    · Plan of Care Expires:  09/30/20    Subjective     Chief Complaint: Generalized back pain  Patient/Family Comments/goals:  pt voiced occasional back d/t previous fractures   Pain/Comfort:  ·      Patients cultural, spiritual, Mandaeism conflicts given the current situation: no    Living Environment:  Pt lives alone in a H w/elevator access. Pt has paid caregiver support x7 day/wk from 8am-12am. Pt reports being mostly w/c bound but performs short transfers w/Rollator and assist. Pt requires assist for all dressing and sponge baths. D/t recent debility pt has been mostly bed bound for the last 1-3 weeks.  Equipment used at home: walker, rolling, walker, standard, rollator, cane, straight, bedside commode, shower chair, wheelchair.  DME owned (not currently used): none.  Upon discharge, patient will have assistance from caregivers.    Objective:     Communicated with NSG prior to session.  Patient found HOB elevated with telemetry, oxygen  upon PT entry to room.    General Precautions: Standard, fall   Orthopedic Precautions:N/A   Braces: N/A     Exams:  · Cognitive Exam:  Patient is oriented to Person, Place, Time and Situation  · Postural Exam:  Patient presented with the following abnormalities:    · -       Rounded shoulders  · -       Forward head  · -       Kyphosis  · RLE ROM: WFL  · RLE Strength: 3+/5  · LLE ROM: WFL  · LLE Strength: 3+/5    Functional Mobility:  · Bed Mobility:     · Rolling Left:  total assistance  · Scooting: maximal assistance  · Supine to Sit: maximal assistance and of 2 persons  · Sit to Supine: maximal assistance and of 2 persons  · Transfers:     · Sit to Stand:  total assistance and of 2 persons with no AD and achieving approx. 60% complete standing   · Gait: unable to perform   · Balance: Sitting: Initially Max A w/forceful posterior lean progressing to CGA    Therapeutic Activities and Exercises:   - EOB Sitting 5mins Initially Max A w/forceful posterior lean progressing to CGA  - Sit-to-Stand x2 trials total assistance x2 persons with no AD and achieving approx. 60% complete standing   - Pt educated  on:   -PT roles, expectations, and POC    -Safety with mobility   -Benefits of OOB activities to increase strength and functional mobility    -Performing ther ex for increasing LE ROM and strength   -Discharge recommendations     AM-PAC 6 CLICK MOBILITY  Total Score:8     Patient left HOB elevated with all lines intact and call button in reach.    GOALS:   Multidisciplinary Problems     Physical Therapy Goals        Problem: Physical Therapy Goal    Goal Priority Disciplines Outcome Goal Variances Interventions   Physical Therapy Goal     PT, PT/OT Ongoing, Progressing     Description: Goals to be met by: 2020    Patient will increase functional independence with mobility by performin. Supine to sit with MInimal Assistance  2. Sit to supine with MInimal Assistance  3. Sit to stand transfer with Minimal Assistance  4. Bed to chair transfer with Minimal Assistance   5. Lower extremity exercise program x15 reps, with independence                      History:     Past Medical History:   Diagnosis Date    Acquired hypothyroidism 2013    Arthritis     Blood transfusion     before  - whe had gangrenous gall bladder    Cataract     Chronic back pain 2018    - Takes Percocet 5-325 at home - Can continue current abdominal pain control with Dilaudid 0.5 mg IV Q3H prn     CKD (chronic kidney disease) stage 3, GFR 30-59 ml/min     Compression fracture of lumbar vertebra     Depression     Dyslipidemia     Essential hypertension 2013    Gastroesophageal reflux disease without esophagitis 2018    - continue home Prilosec    General anesthetics causing adverse effect in therapeutic use     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    GERD (gastroesophageal reflux disease)     History of postoperative delirium 2018    - Patient reports 1 week history of post-op delirium 2018 - Monitor electrolytes, UA, and urine cx - Delirium precautions  - Can use Seroquel 25 mg QHS prn      Hypertension     Hyponatremia 10/23/2017    Impaired mobility and ADLs 6/28/2018    Neurogenic bladder 12/4/2018    Sleep disorder 12/4/2018    - continue Trazodone QHS    Thyroid disease     UTI (urinary tract infection)        Past Surgical History:   Procedure Laterality Date    CHOLECYSTECTOMY      EYE SURGERY Right     IOL    HERNIA REPAIR      INTRAOCULAR PROSTHESES INSERTION Left 5/28/2018    Procedure: INSERTION-INTRAOCULAR LENS (IOL);  Surgeon: Trinity Vazquez MD;  [facility];  Service: Ophthalmology;  Laterality: Left;    JOINT REPLACEMENT      LAMINECTOMY  12/27/2016    L2-L4    LUMBAR LAMINECTOMY  12/2016    PARATHYROIDECTOMY      PHACOEMULSIFICATION OF CATARACT Left 5/28/2018    Procedure: PHACOEMULSIFICATION-ASPIRATION-CATARACT;  Surgeon: Trinity Vazquez MD;  Location: [facility] OR;  Service: Ophthalmology;  Laterality: Left;    REPAIR OF INCARCERATED INCISIONAL HERNIA WITHOUT HISTORY OF PRIOR REPAIR N/A 12/5/2018    Procedure: REPAIR, HERNIA, INCISIONAL, INCARCERATED, WITHOUT HISTORY OF PRIOR REPAIR;  Surgeon: Steve Valentin MD;  Location: [facility] OR [address] [place] [address];  Service: General;  Laterality: N/A;    REPAIR OF INCARCERATED VENTRAL HERNIA WITHOUT HISTORY OF PRIOR REPAIR N/A 6/20/2018    Procedure: REPAIR, HERNIA, VENTRAL, INCARCERATED, WITHOUT HISTORY OF PRIOR REPAIR;  Surgeon: Guilherme Ordoñez MD;  [facility];  Service: General;  Laterality: N/A;    TOTAL KNEE ARTHROPLASTY Bilateral     TOTAL KNEE ARTHROPLASTY Left 10/25/2017    TKR       Time Tracking:     PT Received On: 08/31/20  PT Start Time: 0838     PT Stop Time: 0900  PT Total Time (min): 22 min     Billable Minutes: Evaluation 10 and Therapeutic Activity 12      Emery Ramos, PT  08/31/2020

## 2020-08-31 NOTE — PROGRESS NOTES
Pt is aa0x4, vital signs and assessments complete, see flow sheet. No significant changes to report on shift. Bed in lowest position, call light within reach. Will monitor.

## 2020-08-31 NOTE — PROCEDURES
[facility]  Procedure Note    Date: 08/31/2020    Pre-Procedure Diagnosis: gross hematuria    Post-Procedure Diagnosis: same    Procedure(s) Performed:   1. Flexible cystoscopy  2. Exchange of suprapubic catheter performed by MD    Specimen(s): none    Staff Surgeon: Daron Manjarrez MD    Assistant Surgeon: Randy Vergara MD; Kevin Alston MD    Indications: Chucho Prado is a 85 yo male with history of phimosis and neurogenic bladder. He had a suprapubic tube placed in 2017 with monthly SPT changes with home health. He has gross hematuria that has been present for 5 days. Currently has a 18 Fr suprapubic tube.    Findings:   Bladder findings consistent with neurogenic bladder.  Multiple diverticula with old, formed clot    Estimated Blood Loss: none    Drains: 18 Fr suprapubic tube    Procedure in Detail:  Procedure was performed at the bedside in the supine position. After informed consent was obtained the patient was prepped and draped in a sterile manner. A 16 Uzbek flexible cystoscope was inserted into the suprapubic tract and advanced into the urinary bladder. The entire bladder was visualized in a thorough manner. The bladder wall was consistent with neurogenic bladder including multiple diverticula. There was old formed clot seen throughout the bladder floor and in diverticula, but no active bleeding was seen. The cystoscope was withdrawn in its entirety through the SP tract and out of the body. A new 18 Fr catheter was placed into the suprapubic tract with 10 cc filled into the balloon. The patient tolerated the procedure well.    Disposition: Patient will need clot evacuation for removal of old clot. Will need to be performed per urethra in cystoscopy suite. Tentative plan to perform tomorrow on 9/1/20.    Randy Vergara MD     I have reviewed the notes, assessments, and/or procedures performed by Dr. Vergara, I concur with her/his documentation of Chucho Prado.  Large blood  clot---needs clot evacuation in OR.

## 2020-08-31 NOTE — ASSESSMENT & PLAN NOTE
Suprapubic catheter changed home home health nurse day prior to presentation to ED. Presented with clear yellow urine in galvan bag on day of admit. Hospital day 1 patient's galvan bag with gross hematuria. Irrigated by urology to clear but hematuria returned.     - Urology consulted, appreciate their help with this case  - Avoid irrigation with sterile water as this could worsen patient's hyponatremia  - Urology did bedside cystoscopy showed evidence of neurogenic bladder and large clots that could not be removed  - Plan for OR tomorrow with urology for clot removal  - NPO at midnight

## 2020-08-31 NOTE — SUBJECTIVE & OBJECTIVE
Interval History: No acute events overnight. Received another 1L IV NS yesterday. Had 5.2L UOP documented yesterday but unclear how much of this was irrigation solution. Had bedside cystoscopy with urology today which visualized an old clot but no new source of bleeding, and has been scheduled for clot evacuation in cystoscopy suite for tomorrow.    Review of patient's allergies indicates:  No Known Allergies  Current Facility-Administered Medications   Medication Frequency    amLODIPine tablet 5 mg Daily    aspirin EC tablet 81 mg Daily    atorvastatin tablet 20 mg Daily    dextrose 50% injection 12.5 g PRN    dextrose 50% injection 25 g PRN    gabapentin capsule 800 mg BID    glucagon (human recombinant) injection 1 mg PRN    glucose chewable tablet 16 g PRN    glucose chewable tablet 24 g PRN    levothyroxine tablet 50 mcg Before breakfast    metoprolol succinate (TOPROL-XL) 24 hr tablet 25 mg Daily    pantoprazole EC tablet 40 mg Daily    polyethylene glycol packet 17 g Daily    sodium chloride 0.9% flush 10 mL PRN    traZODone tablet 50 mg Nightly       Objective:     Vital Signs (Most Recent):  Temp: 97.8 °F (36.6 °C) (08/31/20 1242)  Pulse: 75 (08/31/20 1242)  Resp: 20 (08/31/20 1242)  BP: (!) 149/67 (08/31/20 1242)  SpO2: (!) 92 % (08/31/20 1242)  O2 Device (Oxygen Therapy): nasal cannula (08/31/20 1030) Vital Signs (24h Range):  Temp:  [96.4 °F (35.8 °C)-98.5 °F (36.9 °C)] 97.8 °F (36.6 °C)  Pulse:  [61-78] 75  Resp:  [16-20] 20  SpO2:  [92 %-100 %] 92 %  BP: (134-167)/(65-71) 149/67     Weight: 97.4 kg (214 lb 11.7 oz) (08/31/20 0400)  Body mass index is 32.65 kg/m².  Body surface area is 2.16 meters squared.    I/O last 3 completed shifts:  In: 840 [P.O.:840]  Out: 6100 [Urine:6100]    Physical Exam  Constitutional:       General: He is not in acute distress.     Appearance: He is not ill-appearing.   HENT:      Head: Normocephalic and atraumatic.      Nose: No congestion or rhinorrhea.       Mouth/Throat:      Mouth: Mucous membranes are moist.      Pharynx: Oropharynx is clear. No oropharyngeal exudate or posterior oropharyngeal erythema.   Eyes:      General: No scleral icterus.        Right eye: No discharge.         Left eye: No discharge.      Extraocular Movements: Extraocular movements intact.      Conjunctiva/sclera: Conjunctivae normal.   Neck:      Musculoskeletal: Normal range of motion and neck supple.      Vascular: No JVD.   Cardiovascular:      Rate and Rhythm: Normal rate and regular rhythm.      Heart sounds: Murmur present.   Pulmonary:      Effort: Pulmonary effort is normal. No respiratory distress.      Breath sounds: No wheezing or rales.   Abdominal:      General: Abdomen is flat. There is no distension.      Tenderness: There is no abdominal tenderness.   Genitourinary:     Comments: Mahajan cath in place with gross hematuria  Musculoskeletal:      Right lower leg: No edema.      Left lower leg: No edema.   Skin:     General: Skin is warm and dry.   Neurological:      General: No focal deficit present.      Mental Status: He is alert and oriented to person, place, and time.   Psychiatric:         Mood and Affect: Mood normal.         Behavior: Behavior normal.         Significant Labs:  CBC:   Recent Labs   Lab 08/31/20  0520   WBC 7.68   RBC 3.51*   HGB 9.3*   HCT 30.4*      MCV 87   MCH 26.5*   MCHC 30.6*     CMP:   Recent Labs   Lab 08/27/20  1432  08/31/20  0821   GLU 93   < > 89  89   CALCIUM 8.2*   < > 7.9*  8.0*   ALBUMIN 3.6  --   --    PROT 7.1  --   --    *   < > 127*  128*   K 2.7*   < > 4.4  4.5   CO2 37*   < > 31*  30*   CL 73*   < > 90*  92*   BUN 18   < > 17  17   CREATININE 1.2   < > 1.3  1.3   ALKPHOS 92  --   --    ALT 8*  --   --    AST 18  --   --    BILITOT 0.7  --   --     < > = values in this interval not displayed.     Recent Labs   Lab 08/27/20  1528   COLORU Yellow   SPECGRAV 1.010   PHUR 8.0   PROTEINUA 1+*   BACTERIA Occasional    NITRITE Positive*   LEUKOCYTESUR 3+*   HYALINECASTS 0     All labs within the past 24 hours have been reviewed.     Significant Imaging:  Labs: Reviewed  X-Ray: Reviewed

## 2020-08-31 NOTE — ASSESSMENT & PLAN NOTE
On admission Cr 1.1 (baseline). Increased to 1.5 after IVF boluses although hyponatremia improved with this. UOP has been measured but also includes unknown amount of bladder irrigation. May have a component of post-renal TERE considering newly found clots on cystoscopy on 8/31.    Recommendations:  - May be a post-renal component. Cr improved today from 1.5 to 1.3 after 5.2L UOP yesterday. Order renal U/S to rule out hydronephrosis.  - Continue to trend BMPs after clot evaluation

## 2020-08-31 NOTE — PROGRESS NOTES
[facility]  Urology  Progress Note    Patient Name: Chucho Prado  MRN: [id number]  Admission Date: 8/27/2020  Hospital Length of Stay: 4 days  Code Status: Full Code   Attending Provider: Chiara Martinez MD   Primary Care Physician: Obed Mcguire MD    Subjective:     HPI:  85 yo male with history of phimosis and neurogenic bladder admitted to [facility] for hyponatremia and hypokalemia following restarting HCTZ for HTN. He is a patient of Dr. Taylor who underwent placement of suprapubic tube in 2017 for neurogenic bladder. The patient elected for suprapubic tube due to a tight phimosis that made self catheterization difficult. He has been seeing Neelam Hooper for monthly SPT exchanges but has since switched to monthly exchanges with home health nursing since the COVID-19 outbreak. His last catheter was exchanged 5 days ago; the patient endorses pain with this exchange and noticed blood in his urine shortly thereafter. He states that suprapubic tube exchanges are normally painless. He is not on anticoagulation. Urine culture obtained from SPT shows >100k GNR, although the patient does not have complaints of suprapubic pain, urgency, and spasms as he normally does with UTIs. He was seen by urology yesterday with irrigation of his 18 Fr SPT to clear with a minimal clot burden. For reasons unknown to urology, the consult that was placed on 8/28 was removed and was not replaced until midday today.    Patient seen at the bedside at 1400. Vitals and labs stable including hemoglobin and WBC count. He has no complaints of pain or discomfort.    Interval History: NAEON. AFVSS.  SPT draining with clear light pink urine. Some dried clot in galvan tubing.  Sleeping comfortably in bed.  Will perform bedside cysto later today to evaluate hematuria.    Objective:     Temp:  [96.4 °F (35.8 °C)-98.5 °F (36.9 °C)] 98 °F (36.7 °C)  Pulse:  [55-68] 63  Resp:  [16-20] 20  SpO2:  [96 %-100 %] 99 %  BP: (130-167)/(61-71)  152/66     Body mass index is 32.65 kg/m².         Drains     Drain                 Suprapubic Catheter -- days         Suprapubic Catheter 06/23/17 1200 18 Fr. 1163 days                Physical Exam   Constitutional: He is oriented to person, place, and time. He appears well-developed. No distress.   Cardiovascular: Normal rate.    Pulmonary/Chest: Effort normal. No respiratory distress.   Abdominal: Soft. He exhibits no distension. There is no abdominal tenderness.   18 Fr suprapubic tube with medium pink urine in tubing, no clots.   Genitourinary:    Genitourinary Comments: Tight phimotic band with buried penis. Penis and meatus unable to be evaluated.Testes palpated bilaterally, no masses. Small right hydrocele, nontender. Normal cord structures. Scrotum normal without lesions.     Musculoskeletal: Normal range of motion. No tenderness.   Neurological: He is alert and oriented to person, place, and time.   Skin: Skin is warm and dry. No rash noted.     Psychiatric: Judgment normal.       Significant Labs:    BMP:  Recent Labs   Lab 08/30/20  0758 08/30/20  1540 08/30/20  2359   * 126* 129*   K 4.3 4.5 4.6   CL 88* 89* 90*   CO2 34* 28 31*   BUN 14 13 16   CREATININE 1.0 1.3 1.5*   CALCIUM 8.1* 7.9* 8.2*       CBC:   Recent Labs   Lab 08/28/20  0532 08/29/20  0453 08/30/20  0434   WBC 11.39 6.88 6.41   HGB 12.5* 10.1* 9.8*   HCT 36.3* 32.6* 31.3*    198 196     Significant Imaging:  All pertinent imaging results/findings from the past 24 hours have been reviewed.          Assessment/Plan:     Gross hematuria  87 yo male with gross hematuria per suprapubic tube after replacement with  nursing 6 days ago. Vitals and labs stable.    - 18 Fr suprapubic tube continues to drain well, light pink in color. No clots.  - Nursing to irrigate SPT PRN  - Plan for bedside cystoscopy today to evaluate hematuria  - Trend H/H and creatinine        VTE Risk Mitigation (From admission, onward)         Ordered     IP  VTE HIGH RISK PATIENT  Once      08/27/20 1748     Place sequential compression device  Until discontinued      08/27/20 1736                Randy Vergara MD  Urology  [facility]

## 2020-08-31 NOTE — ANESTHESIA PREPROCEDURE EVALUATION
08/31/2020  [facility]  Anesthesia Pre-Operative Evaluation         Patient Name: Chucho Prado  YOB: 1933  MRN: [id number]    SUBJECTIVE:     Pre-operative evaluation for Procedure(s) (LRB):  CYSTOSCOPY, WITH BLADDER BIOPSY, WITH FULGURATION IF INDICATED (N/A)     08/31/2020    Chucho Prado is a 86 y.o. male w/ a significant PMHx of hypothyroidism, neurogenic bladder with suprapubic catheter, HLD, HTN and GERD who presented with weakness and found to have gross hematuria, hyponatremia, hypochloremia, hypokalemia and CXR with left basilar increased density which could represent combination of effusion, atelectasis or consolidation.       Patient now presents for the above procedure(s).      LDA:        Peripheral IV - Single Lumen 08/27/20 1438 20 G Left Forearm (Active)   Site Assessment Clean;Dry;Intact 08/31/20 1200   Line Status Flushed;Saline locked 08/31/20 0800   Dressing Status Clean;Dry;Intact 08/31/20 0800   Dressing Intervention Integrity maintained 08/31/20 0800   Dressing Change Due 08/31/20 08/31/20 0800   Site Change Due 08/31/20 08/31/20 0800   Reason Not Rotated Not due 08/31/20 0800   Number of days: 4            Suprapubic Catheter 06/23/17 1200 18 Fr. (Active)   Clamp Status unclamped 08/31/20 0800   Dressing moist 08/30/20 0800   Characteristics no redness;no warmth;no drainage;no tenderness;no swelling 08/30/20 0800   Drainage yellow drainage 08/30/20 0800   Urine Color Red 08/31/20 0800   Collection Container Standard drainage bag 08/31/20 0800   Securement secured to upper leg w/ leg strap 08/31/20 0800   Output (mL) 1800 mL 08/30/20 1800   Number of days: 1165            Suprapubic Catheter (Active)   Number of days:        Prev airway:     Placement Time: 0810; Method of Intubation: Video Laryngoscopy; Inserted by: Other (CHRISSIE Nix);  Airway Device: Endotracheal Tube; Mask Ventilation: Not Attempted; Intubated: Postinduction; Blade: Dede #3; Airway Device Size: 7.5; Style: Cuffed; Cuff Inflation: Minimal occlusive pressure; Placement Verified By: Auscultation, Capnometry, ETT Condensation; Grade: Grade II; Complicating Factors: Anterior larynx; Intubation Findings: Positive EtCO2, Bilateral breath sounds, Atraumatic/Condition of teeth unchanged;  Depth of Insertion: 22; Securment: Lips; Complications: None; Breath Sounds: Equal Bilateral; Insertion Attempts: 1    Drips: None documented.      Patient Active Problem List   Diagnosis    UTI (urinary tract infection)    Acquired hypothyroidism    Essential hypertension    Inguinal hernia, right    Hyperlipidemia    Recurrent major depressive disorder, in full remission    Iron deficiency anemia    Sciatica neuralgia    CKD (chronic kidney disease) stage 3, GFR 30-59 ml/min    S/P lumbar laminectomy    S/P kyphoplasty- L3 kyphoplasty     TERE (acute kidney injury)    Gross hematuria    Suprapubic catheter    Hyponatremia    Nuclear sclerosis, bilateral    Nuclear sclerotic cataract of left eye    Small bowel obstruction    Hypokalemia    Impaired mobility and ADLs    Ventral hernia with obstruction    Neurogenic bladder    Incarcerated hernia    History of postoperative delirium    Gastroesophageal reflux disease without esophagitis    Sleep disorder    Chronic back pain    Preoperative evaluation to rule out surgical contraindication    Cough    Community acquired pneumonia    Hypoxia    Infection and inflammatory reaction due to other urinary catheter, subsequent encounter    Hypocalcemia    Weakness    Hypochloremia    Acute hypoxemic respiratory failure    Nonrheumatic aortic valve stenosis       Review of patient's allergies indicates:  No Known Allergies    Current Inpatient Medications:   amLODIPine  5 mg Oral Daily    aspirin  81 mg Oral Daily     atorvastatin  20 mg Oral Daily    gabapentin  800 mg Oral BID    levothyroxine  50 mcg Oral Before breakfast    metoprolol succinate  25 mg Oral Daily    pantoprazole  40 mg Oral Daily    polyethylene glycol  17 g Oral Daily    traZODone  50 mg Oral Nightly       No current facility-administered medications on file prior to encounter.      Current Outpatient Medications on File Prior to Encounter   Medication Sig Dispense Refill    amLODIPine (NORVASC) 5 MG tablet TAKE 1 TABLET BY MOUTH EVERY DAY 90 tablet 1    aspirin (ECOTRIN) 81 MG EC tablet TAKE 1 TABLET BY MOUTH EVERY DAY 90 tablet 11    atorvastatin (LIPITOR) 20 MG tablet Take 1 tablet (20 mg total) by mouth once daily. 90 tablet 11    gabapentin (NEURONTIN) 400 MG capsule TAKE 2 CAPSULES BY MOUTH TWICE DAILY 120 capsule 2    hydroCHLOROthiazide (HYDRODIURIL) 25 MG tablet Take 1 tablet (25 mg total) by mouth once daily. 30 tablet 11    levothyroxine (SYNTHROID) 50 MCG tablet TAKE 1 TABLET BY MOUTH EVERY DAY 90 tablet 11    metoprolol succinate (TOPROL-XL) 25 MG 24 hr tablet TAKE 1 TABLET BY MOUTH EVERY DAY 30 tablet 11    mirtazapine (REMERON) 15 MG tablet Take 1 tablet (15 mg total) by mouth every evening. 30 tablet 5    omeprazole (PRILOSEC) 40 MG capsule TAKE 1 CAPSULE BY MOUTH EVERY MORNING 30 MINUTES BEFORE EATING 90 capsule 0    polyethylene glycol (GLYCOLAX) 17 gram/dose powder Take 17 g by mouth once daily. 1700 g 11    traZODone (DESYREL) 50 MG tablet TAKE 1 TABLET BY MOUTH NIGHTLY 90 tablet 11       Past Surgical History:   Procedure Laterality Date    CHOLECYSTECTOMY      EYE SURGERY Right     IOL    HERNIA REPAIR      INTRAOCULAR PROSTHESES INSERTION Left 5/28/2018    Procedure: INSERTION-INTRAOCULAR LENS (IOL);  Surgeon: Trinity Vazquez MD;  Location: [facility];  Service: Ophthalmology;  Laterality: Left;    JOINT REPLACEMENT      LAMINECTOMY  12/27/2016    L2-L4    LUMBAR LAMINECTOMY  12/2016    PARATHYROIDECTOMY       PHACOEMULSIFICATION OF CATARACT Left 2018    Procedure: PHACOEMULSIFICATION-ASPIRATION-CATARACT;  Surgeon: Trinity Vazquez MD;  Location: [facility];  Service: Ophthalmology;  Laterality: Left;    REPAIR OF INCARCERATED INCISIONAL HERNIA WITHOUT HISTORY OF PRIOR REPAIR N/A 2018    Procedure: REPAIR, HERNIA, INCISIONAL, INCARCERATED, WITHOUT HISTORY OF PRIOR REPAIR;  Surgeon: Steve Valentin MD;  Location: [address];  Service: General;  Laterality: N/A;    REPAIR OF INCARCERATED VENTRAL HERNIA WITHOUT HISTORY OF PRIOR REPAIR N/A 2018    Procedure: REPAIR, HERNIA, VENTRAL, INCARCERATED, WITHOUT HISTORY OF PRIOR REPAIR;  Surgeon: Guilherme Ordoñez MD;  Location: [facility] OR [address] [place] [address];  Service: General;  Laterality: N/A;    TOTAL KNEE ARTHROPLASTY Bilateral     TOTAL KNEE ARTHROPLASTY Left 10/25/2017    TKR       Social History     Socioeconomic History    Marital status: Single     Spouse name: Not on file    Number of children: Not on file    Years of education: Not on file    Highest education level: Not on file   Occupational History    Not on file   Social Needs    Financial resource strain: Not on file    Food insecurity     Worry: Not on file     Inability: Not on file    Transportation needs     Medical: Not on file     Non-medical: Not on file   Tobacco Use    Smoking status: Former Smoker     Years: 20.00     Quit date:      Years since quittin.6    Smokeless tobacco: Never Used    Tobacco comment: quit    Substance and Sexual Activity    Alcohol use: No     Comment: rarely/6 months    Drug use: No    Sexual activity: Never   Lifestyle    Physical activity     Days per week: Not on file     Minutes per session: Not on file    Stress: Not on file   Relationships    Social connections     Talks on phone: Not on file     Gets together: Not on file     Attends [employer] service: Not on file     Active member of club or organization: Not on file     Attends meetings  of clubs or organizations: Not on file     Relationship status: Not on file   Other Topics Concern    Not on file   Social History Narrative    Lives alone, owns house and rents part.  helps. 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        OBJECTIVE:     Vital Signs Range (Last 24H):  Temp:  [35.8 °C (96.4 °F)-36.7 °C (98.1 °F)]   Pulse:  [61-78]   Resp:  [16-20]   BP: (141-167)/(65-70)   SpO2:  [92 %-100 %]       Significant Labs:  Lab Results   Component Value Date    WBC 7.68 08/31/2020    HGB 9.3 (L) 08/31/2020    HCT 30.4 (L) 08/31/2020     08/31/2020    CHOL 200 (H) 04/04/2019    TRIG 147 04/04/2019    HDL 38 (L) 04/04/2019    ALT 8 (L) 08/27/2020    AST 18 08/27/2020     (L) 08/31/2020    K 4.3 08/31/2020    CL 92 (L) 08/31/2020    CREATININE 1.3 08/31/2020    BUN 16 08/31/2020    CO2 30 (H) 08/31/2020    TSH 2.426 08/27/2020    INR 0.9 10/23/2017    HGBA1C 5.4 04/04/2019       Diagnostic Studies: No relevant studies.    EKG:   Results for orders placed or performed during the hospital encounter of 08/27/20   EKG 12-lead    Collection Time: 08/27/20  2:31 PM    Narrative    Test Reason : R53.1,    Vent. Rate : 061 BPM     Atrial Rate : 061 BPM     P-R Int : 236 ms          QRS Dur : 104 ms      QT Int : 450 ms       P-R-T Axes : 064 -07 158 degrees     QTc Int : 453 ms    Baseline Artifact  Sinus rhythm with 1st degree A-V block with Premature atrial complexes with   Aberrant conduction  Moderate voltage criteria for LVH, may be normal variant  Nonspecific T wave abnormality  Abnormal ECG  When compared with ECG of 27-AUG-2019 16:41,  Premature ventricular complexes are no longer Present  Premature atrial complexes are now Present  AZ interval has increased  Minimal criteria for Septal infarct are no longer Present  Confirmed by Alexander Woods MD ([id number]) on 8/28/2020 10:07:26 PM    Referred By: [email or web address]   SELF           Confirmed By:Alexander Woods MD       2D ECHO:  TTE:  Results for orders placed or  performed during the hospital encounter of 08/27/20   Echo Color Flow Doppler? Yes   Result Value Ref Range    BSA 2.09 m2    TDI SEPTAL 0.05 m/s    LV LATERAL E/E' RATIO 15.33 m/s    LV SEPTAL E/E' RATIO 18.40 m/s    LA WIDTH 3.70 cm    TDI LATERAL 0.06 m/s    LVIDD 4.01 3.5 - 6.0 cm    IVS 0.88 0.6 - 1.1 cm    PW 0.89 0.6 - 1.1 cm    LVIDS 3.20 2.1 - 4.0 cm    FS 20 28 - 44 %    LA volume 48.48 cm3    Sinus 3.04 cm    STJ 2.59 cm    Ascending aorta 2.98 cm    LV mass 107.61 g    LA size 3.83 cm    RVDD 2.73 cm    TAPSE 2.02 cm    Left Ventricle Relative Wall Thickness 0.44 cm    AV mean gradient 26 mmHg    AV valve area 1.03 cm2    AV Velocity Ratio 0.33     AV index (prosthetic) 0.31     MV valve area p 1/2 method 1.95 cm2    E/A ratio 0.88     Mean e' 0.06 m/s    E wave decelartion time 388.86 msec    LVOT diameter 2.07 cm    LVOT area 3.4 cm2    LVOT peak angel 1.02 m/s    LVOT peak VTI 24.67 cm    Ao peak angel 3.11 m/s    Ao VTI 80.32 cm    LVOT stroke volume 82.98 cm3    AV peak gradient 39 mmHg    E/E' ratio 16.73 m/s    MV Peak E Angel 0.92 m/s    MV stenosis pressure 1/2 time 112.77 ms    MV Peak A Angel 1.05 m/s    LV Systolic Volume 41.01 mL    LV Systolic Volume Index 20.1 mL/m2    LV Diastolic Volume 70.25 mL    LV Diastolic Volume Index 34.37 mL/m2    LA Volume Index 23.7 mL/m2    LV Mass Index 53 g/m2    RA Major Axis 3.39 cm    Left Atrium Minor Axis 4.01 cm    Left Atrium Major Axis 4.04 cm    RA Width 3.77 cm    Right Atrial Pressure (from IVC) 3 mmHg    Narrative    · Normal left ventricular systolic function. The estimated ejection   fraction is 60%.  · Concentric left ventricular remodeling.  · Septal wall has abnormal motion.  · Indeterminate left ventricular diastolic function.  · Normal right ventricular systolic function.  · Moderate-to-severe aortic valve stenosis.  · Aortic valve area is 1.03 cm2; peak velocity is 3.11 m/s; mean gradient   is 26 mmHg.  · Normal central venous pressure (3  mmHg).          THOMAS:  No results found for this or any previous visit.    ASSESSMENT/PLAN:         Anesthesia Evaluation    I have reviewed the Patient Summary Reports.    I have reviewed the Nursing Notes. I have reviewed the NPO Status.   I have reviewed the Medications.     Review of Systems  Anesthesia Hx:  Hx of Anesthetic complications  History of prior surgery of interest to airway management or planning: Denies Family Hx of Anesthesia complications.  Personal Hx of Anesthesia complications Slow To Awaken/Delayed Emergence and mild, somewhat sensitive to sedation / narcotics   Social:  Non-Smoker, Former Smoker, No Alcohol Use    Hematology/Oncology:         -- Anemia:   Cardiovascular:   Hypertension hyperlipidemia    Renal/:   Chronic Renal Disease, CRI    Hepatic/GI:   GERD    Endocrine:   Hypothyroidism    Psych:   depression          Physical Exam  General:  Well nourished    Airway/Jaw/Neck:  Airway Findings: Mouth Opening: Normal Tongue: Normal  General Airway Assessment: Adult  Mallampati: II  TM Distance: Normal, at least 6 cm  Jaw/Neck Findings:     Neck ROM: Normal ROM     Eyes/Ears/Nose:  EYES/EARS/NOSE FINDINGS: Normal   Dental:  Dental Findings: In tact   Chest/Lungs:  Chest/Lungs Findings: Clear to auscultation     Heart/Vascular:  Heart Findings: Rate: Normal  Rhythm: Regular Rhythm        Mental Status:  Mental Status Findings:  Cooperative, Alert and Oriented         Anesthesia Plan  Type of Anesthesia, risks & benefits discussed:  Anesthesia Type:  general, MAC  Patient's Preference:   Intra-op Monitoring Plan: standard ASA monitors  Intra-op Monitoring Plan Comments:   Post Op Pain Control Plan: per primary service following discharge from PACU, IV/PO Opioids PRN and multimodal analgesia  Post Op Pain Control Plan Comments:   Induction:   IV  Beta Blocker:  Patient is not currently on a Beta-Blocker (No further documentation required).       Informed Consent: Patient understands risks  and agrees with Anesthesia plan.  Questions answered. Anesthesia consent signed with patient.  ASA Score: 3     Day of Surgery Review of History & Physical:    H&P update referred to the provider.         Ready For Surgery From Anesthesia Perspective.

## 2020-08-31 NOTE — ASSESSMENT & PLAN NOTE
Pt currently on 2L nasal cannula but does not appear to need as much as he is satting %. Not on oxygen at home, patient does report 1 year history of dyspnea. CXR with chronic atelectatic changes in left lung base.      - Titrate down O2 as needed to goal O2 sat > 92%  - 8/31, taken off O2. Became hypoxic on RA  - Patient currently on 0.5 L O2 with sats of 97%  - Incentive spirometry q6hrs

## 2020-08-31 NOTE — ASSESSMENT & PLAN NOTE
Hypokalemia  86 yo male presenting with 3 day history of weakness found to have significant electrolyte abnormalities consisting of hyponatremia (119), hypokalemia (2.9) with hypochloremia and metabolic alkalosis.  His home medications of HCTZ and Remeron were discontinued and potassium was repleted with improvement in both potassium and sodium.  Nephrology has been consulted for evaluation of hyponatremia. Hypo-osmolar hyponatremia and euvolemic on initial eval. Hypochloremic alkalosis which could be evidence of contraction and may have a component of intravascular volume depletion. Labs not c/w SIADH.    Recommendations:  - Agree with discontinuing HCTZ/Remeron  - Continue strict I/O's  - Na appears to have improved with fluid resuscitation, c/w volume depletion  - Given patient's hypertension, hypokalemia, and metabolic alkalosis on presentation, will check plasma aldosterone to renin ratio to evaluate for hyperaldosteronism

## 2020-08-31 NOTE — PLAN OF CARE
Eval completed and POC established     Ollie Ramos, PT, DPT  2020     Problem: Physical Therapy Goal  Goal: Physical Therapy Goal  Description: Goals to be met by: 2020    Patient will increase functional independence with mobility by performin. Supine to sit with MInimal Assistance  2. Sit to supine with MInimal Assistance  3. Sit to stand transfer with Minimal Assistance  4. Bed to chair transfer with Minimal Assistance   5. Lower extremity exercise program x15 reps, with independence     Outcome: Ongoing, Progressing

## 2020-08-31 NOTE — PT/OT/SLP EVAL
Occupational Therapy   Evaluation/Treatment    Name: Chucho Prado  MRN: [id number]  Admitting Diagnosis:  Hyponatremia      Recommendations:     Discharge Recommendations: nursing facility, skilled  Discharge Equipment Recommendations:  lift device  Barriers to discharge:  (Decreased functional mobility with increased level of assistance)    Assessment:     Chucho Prado is a 86 y.o. male with a medical diagnosis of Hyponatremia.  Performance deficits affecting function: weakness, impaired endurance, impaired self care skills, impaired functional mobilty, gait instability, impaired balance, impaired cardiopulmonary response to activity, pain, decreased lower extremity function, decreased upper extremity function. Patient would benefit from continued skilled acute OT 3x/wk to improve functional mobility, increase independence with ADLs, and address established goals. Recommending SNF once medically appropriate for discharge to increase maximal independence, reduce burden of care, and ensure safety.     Rehab Prognosis: Poor; patient would benefit from acute skilled OT services to address these deficits and reach maximum level of function.       Plan:     Patient to be seen 3 x/week to address the above listed problems via self-care/home management, therapeutic activities, therapeutic exercises  · Plan of Care Expires: 09/01/20  · Plan of Care Reviewed with: patient    Subjective     Chief Complaint: pain and lack of sleep    Occupational Profile:  Living Environment: Patient lives alone in a 2  with elevator access to porch (patient rents out 2 floor and patient stays on the first floor). Patient reports that he was mostly w/c bound but ambulates short distances with rollator and assistance. Patient does not get in the shower and receives spongebaths. Patient did need assistance with all ADLs. Patient has sitters from 8 am until midnight everyday of the week.   Equipment Used at Home:  walker, rolling,  walker, standard, rollator, cane, straight, bedside commode, shower chair, wheelchair    Pain/Comfort:  · Pain Rating 1: (patient did not rate)  · Location - Side 1: Bilateral  · Location - Orientation 1: generalized  · Location 1: back  · Pain Addressed 1: Reposition, Distraction  · Pain Rating Post-Intervention 1: (patient did not rate)    Patients cultural, spiritual, [employer]ec[employer]alism conflicts given the current situation: no    Objective:     Communicated with: NSG prior to session.  Patient found HOB elevated with telemetry, oxygen upon OT entry to room.    General Precautions: Standard, fall   Orthopedic Precautions:N/A   Braces: N/A     Occupational Performance:    Bed Mobility:    · Patient completed Rolling/Turning to Left with  total assistance  · Patient completed Rolling/Turning to Right with total assistance  · Patient completed Scooting/Bridging with maximal assistance  · Patient completed Supine to Sit with maximal assistance and 2 persons  · Patient completed Sit to Supine with maximal assistance and 2 persons    Functional Mobility/Transfers:  · Patient completed Sit <> Stand Transfer with total assistance and of 2 persons  with  hand-held assist. Patient was unable to come to a complete stand.    Activities of Daily Living:  · Lower Body Dressing: total assistance      Cognitive/Visual Perceptual:  Cognitive/Psychosocial Skills:     -       Oriented to: Person, Place, Time and Situation   -       Follows Commands/attention:Follows multistep  commands  -       Communication: clear/fluent  -       Memory: No Deficits noted  -       Safety awareness/insight to disability: intact   -       Mood/Affect/Coping skills/emotional control: Appropriate to situation  Visual/Perceptual:      -Intact      Physical Exam:  Upper Extremity Range of Motion:     -       Right Upper Extremity: WFL  -       Left Upper Extremity: WFL  Upper Extremity Strength:    -       Right Upper Extremity: 3+/5  -       Left Upper  Extremity: 3+/5   Strength:    -       Right Upper Extremity: WFL  -       Left Upper Extremity: WFL  Fine Motor Coordination: -       Intact  Gross motor coordination:   WFL    AMPAC 6 Click ADL:  AMPAC Total Score: 12    Treatment & Education:  Role of OT and POC  Safety  ADL retraining  Functional mobility training  Discharge planning  Importance of EOB/OOB activity  Education:    Patient left HOB elevated with call button in reach and all needs met.     GOALS:   Multidisciplinary Problems     Occupational Therapy Goals        Problem: Occupational Therapy Goal    Goal Priority Disciplines Outcome Interventions   Occupational Therapy Goal     OT, PT/OT Ongoing, Progressing    Description: Goals to be met by: 9/14/2020     Patient will increase functional independence with ADLs by performing:    UE Dressing with Set-up Assistance.  Grooming while seated with Set-up Assistance.  Toileting from bedside commode with Stand-by Assistance for hygiene and clothing management.   Stand pivot transfers with Contact Guard Assistance.  Toilet transfer to bedside commode with Contact Guard Assistance.                     History:     Past Medical History:   Diagnosis Date    Acquired hypothyroidism 7/30/2013    Arthritis     Blood transfusion     before 2005 - whe had gangrenous gall bladder    Cataract     Chronic back pain 12/4/2018    - Takes Percocet 5-325 at home - Can continue current abdominal pain control with Dilaudid 0.5 mg IV Q3H prn     CKD (chronic kidney disease) stage 3, GFR 30-59 ml/min     Compression fracture of lumbar vertebra     Depression     Dyslipidemia     Essential hypertension 7/30/2013    Gastroesophageal reflux disease without esophagitis 12/4/2018    - continue home Prilosec    General anesthetics causing adverse effect in therapeutic use     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    GERD (gastroesophageal reflux disease)     History of postoperative delirium 12/4/2018    -  Patient reports 1 week history of post-op delirium 7/2018 - Monitor electrolytes, UA, and urine cx - Delirium precautions  - Can use Seroquel 25 mg QHS prn     Hypertension     Hyponatremia 10/23/2017    Impaired mobility and ADLs 6/28/2018    Neurogenic bladder 12/4/2018    Sleep disorder 12/4/2018    - continue Trazodone QHS    Thyroid disease     UTI (urinary tract infection)        Past Surgical History:   Procedure Laterality Date    CHOLECYSTECTOMY      EYE SURGERY Right     IOL    HERNIA REPAIR      INTRAOCULAR PROSTHESES INSERTION Left 5/28/2018    Procedure: INSERTION-INTRAOCULAR LENS (IOL);  Surgeon: Trinity Vazquez MD;  Location: [facility];  Service: Ophthalmology;  Laterality: Left;    JOINT REPLACEMENT      LAMINECTOMY  12/27/2016    L2-L4    LUMBAR LAMINECTOMY  12/2016    PARATHYROIDECTOMY      PHACOEMULSIFICATION OF CATARACT Left 5/28/2018    Procedure: PHACOEMULSIFICATION-ASPIRATION-CATARACT;  Surgeon: Trinity Vazquez MD;  Location: [facility];  Service: Ophthalmology;  Laterality: Left;    REPAIR OF INCARCERATED INCISIONAL HERNIA WITHOUT HISTORY OF PRIOR REPAIR N/A 12/5/2018    Procedure: REPAIR, HERNIA, INCISIONAL, INCARCERATED, WITHOUT HISTORY OF PRIOR REPAIR;  Surgeon: Steve Valentin MD;  Location: [facility] OR [address] [place] [address];  Service: General;  Laterality: N/A;    REPAIR OF INCARCERATED VENTRAL HERNIA WITHOUT HISTORY OF PRIOR REPAIR N/A 6/20/2018    Procedure: REPAIR, HERNIA, VENTRAL, INCARCERATED, WITHOUT HISTORY OF PRIOR REPAIR;  Surgeon: Guilherme Ordoñez MD;  Location: [facility] OR [address] [place] [address];  Service: General;  Laterality: N/A;    TOTAL KNEE ARTHROPLASTY Bilateral     TOTAL KNEE ARTHROPLASTY Left 10/25/2017    TKR       Time Tracking:     OT Date of Treatment: 08/31/20  OT Start Time: 0837  OT Stop Time: 0900  OT Total Time (min): 23 min    Billable Minutes:Evaluation 15  Neuromuscular Re-education 8    DINO Sanders  8/31/2020

## 2020-08-31 NOTE — ASSESSMENT & PLAN NOTE
"87 yo male with history of hypothyroidism, neurogenic bladder with suprapubic catheter (exchanged annually, last exchange 3 days ago by home RN), HLD, HTN and GERD presenting to the ER with generalized weakness since 3 days ago. This is the first time the patient has experienced something like this. He describes the weakness as feeling tired and overall "fragile" and "without energy". Pt denies: seizures, headache or numbness.    Labs concerning for hyponatremia,  hypochloremia and hypokalemia.   CXR:left basilar increased density which could represent combination of effusion, atelectasis or consolidation.     BNP: 298  Echo ordered to r/o CHF:    Serum Osm: 247 (low)  Urine Osm: 217 (low)  Urine Na: 52  Urine Cl: 45    Pt currently taking Hydrochlorothiazide for hypertension. As per PCP notes he has been taking HCTZ since 2019 without any side effects. The medication was stopped but then restarted 8/21.    Given low serum AND urine osms, this appears to be an ADH independent hyponatremia. Volume status is borderline low on exam, CVP on Echo was 3.    Sodium in 126-127 since 8/30    Plan  - Follow up renin/aldosterone ratio per nephrology  - Touch base with nephrology in the AM regarding further management  - BMP's q4h to monitor for overcorrection  - Continuing to hold HCTZ and mirtazapine  "

## 2020-09-01 ENCOUNTER — ANESTHESIA (OUTPATIENT)
Dept: SURGERY | Facility: HOSPITAL | Age: 85
DRG: 987 | End: 2020-09-01
Payer: MEDICARE

## 2020-09-01 PROBLEM — R06.09 EXERTIONAL DYSPNEA: Status: ACTIVE | Noted: 2020-09-01

## 2020-09-01 LAB
ANION GAP SERPL CALC-SCNC: 6 MMOL/L (ref 8–16)
ANION GAP SERPL CALC-SCNC: 9 MMOL/L (ref 8–16)
ANION GAP SERPL CALC-SCNC: 9 MMOL/L (ref 8–16)
BACTERIA BLD CULT: NORMAL
BACTERIA BLD CULT: NORMAL
BASOPHILS # BLD AUTO: 0.06 K/UL (ref 0–0.2)
BASOPHILS NFR BLD: 0.8 % (ref 0–1.9)
BUN SERPL-MCNC: 15 MG/DL (ref 8–23)
BUN SERPL-MCNC: 15 MG/DL (ref 8–23)
BUN SERPL-MCNC: 16 MG/DL (ref 8–23)
CALCIUM SERPL-MCNC: 7.8 MG/DL (ref 8.7–10.5)
CALCIUM SERPL-MCNC: 8 MG/DL (ref 8.7–10.5)
CALCIUM SERPL-MCNC: 8.8 MG/DL (ref 8.7–10.5)
CHLORIDE SERPL-SCNC: 92 MMOL/L (ref 95–110)
CHLORIDE SERPL-SCNC: 92 MMOL/L (ref 95–110)
CHLORIDE SERPL-SCNC: 95 MMOL/L (ref 95–110)
CO2 SERPL-SCNC: 28 MMOL/L (ref 23–29)
CO2 SERPL-SCNC: 29 MMOL/L (ref 23–29)
CO2 SERPL-SCNC: 30 MMOL/L (ref 23–29)
CREAT SERPL-MCNC: 1.1 MG/DL (ref 0.5–1.4)
DIFFERENTIAL METHOD: ABNORMAL
EOSINOPHIL # BLD AUTO: 0.3 K/UL (ref 0–0.5)
EOSINOPHIL NFR BLD: 4.2 % (ref 0–8)
ERYTHROCYTE [DISTWIDTH] IN BLOOD BY AUTOMATED COUNT: 13.6 % (ref 11.5–14.5)
EST. GFR  (AFRICAN AMERICAN): >60 ML/MIN/1.73 M^2
EST. GFR  (NON AFRICAN AMERICAN): >60 ML/MIN/1.73 M^2
GLUCOSE SERPL-MCNC: 117 MG/DL (ref 70–110)
GLUCOSE SERPL-MCNC: 141 MG/DL (ref 70–110)
GLUCOSE SERPL-MCNC: 89 MG/DL (ref 70–110)
HCT VFR BLD AUTO: 29 % (ref 40–54)
HGB BLD-MCNC: 9.2 G/DL (ref 14–18)
IMM GRANULOCYTES # BLD AUTO: 0.03 K/UL (ref 0–0.04)
IMM GRANULOCYTES NFR BLD AUTO: 0.4 % (ref 0–0.5)
LYMPHOCYTES # BLD AUTO: 1.6 K/UL (ref 1–4.8)
LYMPHOCYTES NFR BLD: 22.8 % (ref 18–48)
MCH RBC QN AUTO: 27 PG (ref 27–31)
MCHC RBC AUTO-ENTMCNC: 31.7 G/DL (ref 32–36)
MCV RBC AUTO: 85 FL (ref 82–98)
MONOCYTES # BLD AUTO: 0.7 K/UL (ref 0.3–1)
MONOCYTES NFR BLD: 9.2 % (ref 4–15)
NEUTROPHILS # BLD AUTO: 4.5 K/UL (ref 1.8–7.7)
NEUTROPHILS NFR BLD: 62.6 % (ref 38–73)
NRBC BLD-RTO: 0 /100 WBC
OSMOLALITY SERPL: 274 MOSM/KG (ref 280–300)
OSMOLALITY UR: 216 MOSM/KG (ref 50–1200)
PLATELET # BLD AUTO: 198 K/UL (ref 150–350)
PMV BLD AUTO: 9.5 FL (ref 9.2–12.9)
POTASSIUM SERPL-SCNC: 3.8 MMOL/L (ref 3.5–5.1)
POTASSIUM SERPL-SCNC: 4.2 MMOL/L (ref 3.5–5.1)
POTASSIUM SERPL-SCNC: 4.2 MMOL/L (ref 3.5–5.1)
RBC # BLD AUTO: 3.41 M/UL (ref 4.6–6.2)
SARS-COV-2 RDRP RESP QL NAA+PROBE: NEGATIVE
SODIUM SERPL-SCNC: 128 MMOL/L (ref 136–145)
SODIUM SERPL-SCNC: 129 MMOL/L (ref 136–145)
SODIUM SERPL-SCNC: 133 MMOL/L (ref 136–145)
SODIUM UR-SCNC: 32 MMOL/L (ref 20–250)
WBC # BLD AUTO: 7.18 K/UL (ref 3.9–12.7)

## 2020-09-01 PROCEDURE — 52204 PR CYSTOURETHROSCOPY,BIOPSY: ICD-10-PCS | Mod: ,,, | Performed by: UROLOGY

## 2020-09-01 PROCEDURE — 11000001 HC ACUTE MED/SURG PRIVATE ROOM

## 2020-09-01 PROCEDURE — 99900035 HC TECH TIME PER 15 MIN (STAT)

## 2020-09-01 PROCEDURE — 25000003 PHARM REV CODE 250: Performed by: NURSE ANESTHETIST, CERTIFIED REGISTERED

## 2020-09-01 PROCEDURE — 88112 PR  CYTOPATH, CELL ENHANCE TECH: ICD-10-PCS | Mod: 26,,, | Performed by: PATHOLOGY

## 2020-09-01 PROCEDURE — 52204 CYSTOSCOPY W/BIOPSY(S): CPT | Mod: ,,, | Performed by: UROLOGY

## 2020-09-01 PROCEDURE — D9220A PRA ANESTHESIA: Mod: CRNA,,, | Performed by: NURSE ANESTHETIST, CERTIFIED REGISTERED

## 2020-09-01 PROCEDURE — 88342 IMHCHEM/IMCYTCHM 1ST ANTB: CPT | Mod: 26,,, | Performed by: PATHOLOGY

## 2020-09-01 PROCEDURE — 25000003 PHARM REV CODE 250: Performed by: STUDENT IN AN ORGANIZED HEALTH CARE EDUCATION/TRAINING PROGRAM

## 2020-09-01 PROCEDURE — 84300 ASSAY OF URINE SODIUM: CPT

## 2020-09-01 PROCEDURE — C1769 GUIDE WIRE: HCPCS | Performed by: UROLOGY

## 2020-09-01 PROCEDURE — 88342 IMHCHEM/IMCYTCHM 1ST ANTB: CPT | Performed by: PATHOLOGY

## 2020-09-01 PROCEDURE — 88305 TISSUE EXAM BY PATHOLOGIST: CPT | Mod: 26,,, | Performed by: PATHOLOGY

## 2020-09-01 PROCEDURE — 99233 SBSQ HOSP IP/OBS HIGH 50: CPT | Mod: GC,,, | Performed by: STUDENT IN AN ORGANIZED HEALTH CARE EDUCATION/TRAINING PROGRAM

## 2020-09-01 PROCEDURE — 63600175 PHARM REV CODE 636 W HCPCS: Performed by: NURSE ANESTHETIST, CERTIFIED REGISTERED

## 2020-09-01 PROCEDURE — 37000008 HC ANESTHESIA 1ST 15 MINUTES: Performed by: UROLOGY

## 2020-09-01 PROCEDURE — 37000009 HC ANESTHESIA EA ADD 15 MINS: Performed by: UROLOGY

## 2020-09-01 PROCEDURE — D9220A PRA ANESTHESIA: Mod: ANES,,, | Performed by: ANESTHESIOLOGY

## 2020-09-01 PROCEDURE — 80048 BASIC METABOLIC PNL TOTAL CA: CPT | Mod: 91

## 2020-09-01 PROCEDURE — 71000015 HC POSTOP RECOV 1ST HR: Performed by: UROLOGY

## 2020-09-01 PROCEDURE — D9220A PRA ANESTHESIA: ICD-10-PCS | Mod: ANES,,, | Performed by: ANESTHESIOLOGY

## 2020-09-01 PROCEDURE — 83930 ASSAY OF BLOOD OSMOLALITY: CPT

## 2020-09-01 PROCEDURE — 99232 PR SUBSEQUENT HOSPITAL CARE,LEVL II: ICD-10-PCS | Mod: GC,,, | Performed by: INTERNAL MEDICINE

## 2020-09-01 PROCEDURE — C2627 CATH, SUPRAPUBIC/CYSTOSCOPIC: HCPCS | Performed by: UROLOGY

## 2020-09-01 PROCEDURE — 27000221 HC OXYGEN, UP TO 24 HOURS

## 2020-09-01 PROCEDURE — 74430 CONTRAST X-RAY BLADDER: CPT | Mod: 26,,, | Performed by: UROLOGY

## 2020-09-01 PROCEDURE — 36000707: Performed by: UROLOGY

## 2020-09-01 PROCEDURE — 74430 PR  X-RAY CYSTOGRAM, MIN 3 VIEW: ICD-10-PCS | Mod: 26,,, | Performed by: UROLOGY

## 2020-09-01 PROCEDURE — 88112 CYTOPATH CELL ENHANCE TECH: CPT | Performed by: PATHOLOGY

## 2020-09-01 PROCEDURE — 80048 BASIC METABOLIC PNL TOTAL CA: CPT

## 2020-09-01 PROCEDURE — D9220A PRA ANESTHESIA: ICD-10-PCS | Mod: CRNA,,, | Performed by: NURSE ANESTHETIST, CERTIFIED REGISTERED

## 2020-09-01 PROCEDURE — 88305 TISSUE EXAM BY PATHOLOGIST: ICD-10-PCS | Mod: 26,,, | Performed by: PATHOLOGY

## 2020-09-01 PROCEDURE — 94799 UNLISTED PULMONARY SVC/PX: CPT

## 2020-09-01 PROCEDURE — U0002 COVID-19 LAB TEST NON-CDC: HCPCS

## 2020-09-01 PROCEDURE — 83935 ASSAY OF URINE OSMOLALITY: CPT

## 2020-09-01 PROCEDURE — 85025 COMPLETE CBC W/AUTO DIFF WBC: CPT

## 2020-09-01 PROCEDURE — 99232 SBSQ HOSP IP/OBS MODERATE 35: CPT | Mod: GC,,, | Performed by: INTERNAL MEDICINE

## 2020-09-01 PROCEDURE — 36000706: Performed by: UROLOGY

## 2020-09-01 PROCEDURE — 63600175 PHARM REV CODE 636 W HCPCS: Performed by: ANESTHESIOLOGY

## 2020-09-01 PROCEDURE — 51600 PR INJECTION FOR BLADDER X-RAY: ICD-10-PCS | Mod: 51,,, | Performed by: UROLOGY

## 2020-09-01 PROCEDURE — 71000044 HC DOSC ROUTINE RECOVERY FIRST HOUR: Performed by: UROLOGY

## 2020-09-01 PROCEDURE — 94761 N-INVAS EAR/PLS OXIMETRY MLT: CPT

## 2020-09-01 PROCEDURE — 36415 COLL VENOUS BLD VENIPUNCTURE: CPT

## 2020-09-01 PROCEDURE — 88112 CYTOPATH CELL ENHANCE TECH: CPT | Mod: 26,,, | Performed by: PATHOLOGY

## 2020-09-01 PROCEDURE — 51600 INJECTION FOR BLADDER X-RAY: CPT | Mod: 51,,, | Performed by: UROLOGY

## 2020-09-01 PROCEDURE — 88342 CHG IMMUNOCYTOCHEMISTRY: ICD-10-PCS | Mod: 26,,, | Performed by: PATHOLOGY

## 2020-09-01 PROCEDURE — 88305 TISSUE EXAM BY PATHOLOGIST: CPT | Performed by: PATHOLOGY

## 2020-09-01 PROCEDURE — 99233 PR SUBSEQUENT HOSPITAL CARE,LEVL III: ICD-10-PCS | Mod: GC,,, | Performed by: STUDENT IN AN ORGANIZED HEALTH CARE EDUCATION/TRAINING PROGRAM

## 2020-09-01 RX ORDER — HYDROMORPHONE HYDROCHLORIDE 1 MG/ML
0.2 INJECTION, SOLUTION INTRAMUSCULAR; INTRAVENOUS; SUBCUTANEOUS EVERY 5 MIN PRN
Status: DISCONTINUED | OUTPATIENT
Start: 2020-09-01 | End: 2020-09-03

## 2020-09-01 RX ORDER — OXYBUTYNIN CHLORIDE 5 MG/1
5 TABLET ORAL ONCE AS NEEDED
Status: COMPLETED | OUTPATIENT
Start: 2020-09-01 | End: 2020-09-01

## 2020-09-01 RX ORDER — DEXAMETHASONE SODIUM PHOSPHATE 4 MG/ML
INJECTION, SOLUTION INTRA-ARTICULAR; INTRALESIONAL; INTRAMUSCULAR; INTRAVENOUS; SOFT TISSUE
Status: DISCONTINUED | OUTPATIENT
Start: 2020-09-01 | End: 2020-09-01

## 2020-09-01 RX ORDER — EPHEDRINE SULFATE 50 MG/ML
INJECTION, SOLUTION INTRAVENOUS
Status: DISCONTINUED | OUTPATIENT
Start: 2020-09-01 | End: 2020-09-01

## 2020-09-01 RX ORDER — CETIRIZINE HYDROCHLORIDE 10 MG/1
10 TABLET ORAL DAILY PRN
COMMUNITY

## 2020-09-01 RX ORDER — CEFEPIME HYDROCHLORIDE 1 G/50ML
1 INJECTION, SOLUTION INTRAVENOUS
Status: DISCONTINUED | OUTPATIENT
Start: 2020-09-01 | End: 2020-09-01

## 2020-09-01 RX ORDER — ROCURONIUM BROMIDE 10 MG/ML
INJECTION, SOLUTION INTRAVENOUS
Status: DISCONTINUED | OUTPATIENT
Start: 2020-09-01 | End: 2020-09-01

## 2020-09-01 RX ORDER — PROPOFOL 10 MG/ML
VIAL (ML) INTRAVENOUS
Status: DISCONTINUED | OUTPATIENT
Start: 2020-09-01 | End: 2020-09-01

## 2020-09-01 RX ORDER — FENTANYL CITRATE 50 UG/ML
INJECTION, SOLUTION INTRAMUSCULAR; INTRAVENOUS
Status: DISCONTINUED | OUTPATIENT
Start: 2020-09-01 | End: 2020-09-01

## 2020-09-01 RX ORDER — CIPROFLOXACIN 500 MG/1
500 TABLET ORAL EVERY 24 HOURS
Status: DISCONTINUED | OUTPATIENT
Start: 2020-09-02 | End: 2020-09-02

## 2020-09-01 RX ORDER — ONDANSETRON 2 MG/ML
INJECTION INTRAMUSCULAR; INTRAVENOUS
Status: DISCONTINUED | OUTPATIENT
Start: 2020-09-01 | End: 2020-09-01

## 2020-09-01 RX ORDER — SODIUM CHLORIDE 0.9 % (FLUSH) 0.9 %
10 SYRINGE (ML) INJECTION
Status: DISCONTINUED | OUTPATIENT
Start: 2020-09-01 | End: 2020-09-04 | Stop reason: HOSPADM

## 2020-09-01 RX ORDER — LIDOCAINE HCL/PF 100 MG/5ML
SYRINGE (ML) INTRAVENOUS
Status: DISCONTINUED | OUTPATIENT
Start: 2020-09-01 | End: 2020-09-01

## 2020-09-01 RX ORDER — DEXMEDETOMIDINE HYDROCHLORIDE 100 UG/ML
INJECTION, SOLUTION INTRAVENOUS
Status: DISCONTINUED | OUTPATIENT
Start: 2020-09-01 | End: 2020-09-01

## 2020-09-01 RX ORDER — SODIUM CHLORIDE 9 MG/ML
INJECTION, SOLUTION INTRAVENOUS CONTINUOUS PRN
Status: DISCONTINUED | OUTPATIENT
Start: 2020-09-01 | End: 2020-09-01

## 2020-09-01 RX ORDER — LORAZEPAM 2 MG/ML
0.25 INJECTION INTRAMUSCULAR
Status: DISCONTINUED | OUTPATIENT
Start: 2020-09-01 | End: 2020-09-03

## 2020-09-01 RX ADMIN — SODIUM CHLORIDE, SODIUM GLUCONATE, SODIUM ACETATE, POTASSIUM CHLORIDE, MAGNESIUM CHLORIDE, SODIUM PHOSPHATE, DIBASIC, AND POTASSIUM PHOSPHATE: .53; .5; .37; .037; .03; .012; .00082 INJECTION, SOLUTION INTRAVENOUS at 04:09

## 2020-09-01 RX ADMIN — ONDANSETRON 4 MG: 2 INJECTION, SOLUTION INTRAMUSCULAR; INTRAVENOUS at 03:09

## 2020-09-01 RX ADMIN — LORAZEPAM 0.25 MG: 2 INJECTION, SOLUTION INTRAMUSCULAR; INTRAVENOUS at 02:09

## 2020-09-01 RX ADMIN — ROCURONIUM BROMIDE 50 MG: 10 INJECTION, SOLUTION INTRAVENOUS at 03:09

## 2020-09-01 RX ADMIN — DEXMEDETOMIDINE HYDROCHLORIDE 8 MCG: 100 INJECTION, SOLUTION, CONCENTRATE INTRAVENOUS at 04:09

## 2020-09-01 RX ADMIN — EPHEDRINE SULFATE 5 MG: 50 INJECTION INTRAVENOUS at 03:09

## 2020-09-01 RX ADMIN — GABAPENTIN 800 MG: 400 CAPSULE ORAL at 09:09

## 2020-09-01 RX ADMIN — AMLODIPINE BESYLATE 5 MG: 5 TABLET ORAL at 09:09

## 2020-09-01 RX ADMIN — LIDOCAINE HYDROCHLORIDE 100 MG: 20 INJECTION, SOLUTION INTRAVENOUS at 03:09

## 2020-09-01 RX ADMIN — METOPROLOL SUCCINATE 25 MG: 25 TABLET, EXTENDED RELEASE ORAL at 09:09

## 2020-09-01 RX ADMIN — DEXAMETHASONE SODIUM PHOSPHATE 8 MG: 4 INJECTION, SOLUTION INTRAMUSCULAR; INTRAVENOUS at 03:09

## 2020-09-01 RX ADMIN — ROCURONIUM BROMIDE 20 MG: 10 INJECTION, SOLUTION INTRAVENOUS at 04:09

## 2020-09-01 RX ADMIN — DEXMEDETOMIDINE HYDROCHLORIDE 4 MCG: 100 INJECTION, SOLUTION, CONCENTRATE INTRAVENOUS at 04:09

## 2020-09-01 RX ADMIN — FENTANYL CITRATE 100 MCG: 50 INJECTION, SOLUTION INTRAMUSCULAR; INTRAVENOUS at 03:09

## 2020-09-01 RX ADMIN — EPHEDRINE SULFATE 10 MG: 50 INJECTION INTRAVENOUS at 04:09

## 2020-09-01 RX ADMIN — PANTOPRAZOLE SODIUM 40 MG: 40 TABLET, DELAYED RELEASE ORAL at 09:09

## 2020-09-01 RX ADMIN — EPHEDRINE SULFATE 10 MG: 50 INJECTION INTRAVENOUS at 03:09

## 2020-09-01 RX ADMIN — DEXMEDETOMIDINE HYDROCHLORIDE 4 MCG: 100 INJECTION, SOLUTION, CONCENTRATE INTRAVENOUS at 03:09

## 2020-09-01 RX ADMIN — OXYBUTYNIN CHLORIDE 5 MG: 5 TABLET ORAL at 09:09

## 2020-09-01 RX ADMIN — SUGAMMADEX 200 MG: 100 INJECTION, SOLUTION INTRAVENOUS at 04:09

## 2020-09-01 RX ADMIN — TRAZODONE HYDROCHLORIDE 50 MG: 50 TABLET ORAL at 09:09

## 2020-09-01 RX ADMIN — PROPOFOL 150 MG: 10 INJECTION, EMULSION INTRAVENOUS at 03:09

## 2020-09-01 RX ADMIN — PROPOFOL 50 MG: 10 INJECTION, EMULSION INTRAVENOUS at 03:09

## 2020-09-01 RX ADMIN — SODIUM CHLORIDE: 0.9 INJECTION, SOLUTION INTRAVENOUS at 03:09

## 2020-09-01 NOTE — ASSESSMENT & PLAN NOTE
Pt currently on 2L nasal cannula but does not appear to need as much as he is satting %. Not on oxygen at home, patient does report 1 year history of dyspnea. CXR with chronic atelectatic changes in left lung base possible consolidation.     Unclear of cause. Possible that chronic anemia, with clots in bladder as cause of SOB and oxygen requirements. However, ddx includes CHF or infection. Although ECHO showed EF of 60%, possible that patient's severe Aortic Stenosis can be causing his symptoms.      - Titrate down O2 as needed to goal O2 sat > 92%  - 8/31, taken off O2. Became hypoxic on RA  - Patient currently on 0.5 L O2 with sats of 97%  - Incentive spirometry q6hrs  - f/u repeat CXR

## 2020-09-01 NOTE — SUBJECTIVE & OBJECTIVE
Interval History: NAEON. AFVSS.  SPT draining with clear urine.  Sleeping comfortably in bed.  Bedside cystoscopy yesterday with formed clot in bladder.  To OR today for cystoscopy, clot evacuation.    Objective:     Temp:  [97.8 °F (36.6 °C)-98.8 °F (37.1 °C)] 98.2 °F (36.8 °C)  Pulse:  [60-81] 60  Resp:  [16-24] 17  SpO2:  [91 %-98 %] 95 %  BP: (121-162)/(57-74) 121/57     Body mass index is 32.68 kg/m².         Drains     Drain                 Suprapubic Catheter -- days         Suprapubic Catheter 06/23/17 1200 18 Fr. 1163 days                Physical Exam   Constitutional: He is oriented to person, place, and time. He appears well-developed. No distress.   Cardiovascular: Normal rate.    Pulmonary/Chest: Effort normal. No respiratory distress.   Abdominal: Soft. He exhibits no distension. There is no abdominal tenderness.   18 Fr suprapubic tube with medium pink urine in tubing, no clots.   Genitourinary:    Genitourinary Comments: Tight phimotic band with buried penis. Penis and meatus unable to be evaluated.Testes palpated bilaterally, no masses. Small right hydrocele, nontender. Normal cord structures. Scrotum normal without lesions.     Musculoskeletal: Normal range of motion. No tenderness.   Neurological: He is alert and oriented to person, place, and time.   Skin: Skin is warm and dry. No rash noted.     Psychiatric: Judgment normal.       Significant Labs:    BMP:  Recent Labs   Lab 08/31/20  0821 08/31/20  1541 08/31/20  2325   *  128* 129* 128*   K 4.4  4.5 4.3 4.2   CL 90*  92* 92* 92*   CO2 31*  30* 30* 30*   BUN 17  17 16 16   CREATININE 1.3  1.3 1.3 1.1   CALCIUM 7.9*  8.0* 8.1* 7.8*       CBC:   Recent Labs   Lab 08/30/20  0434 08/31/20  0520 09/01/20  0254   WBC 6.41 7.68 7.18   HGB 9.8* 9.3* 9.2*   HCT 31.3* 30.4* 29.0*    201 198     Significant Imaging:  All pertinent imaging results/findings from the past 24 hours have been reviewed.

## 2020-09-01 NOTE — PROGRESS NOTES
[facility]  Urology  Progress Note    Patient Name: Chucho Prado  MRN: [id number]  Admission Date: 8/27/2020  Hospital Length of Stay: 5 days  Code Status: Full Code   Attending Provider: Chiara Martinez MD   Primary Care Physician: Obed Mcguire MD    Subjective:     HPI:  87 yo male with history of phimosis and neurogenic bladder admitted to [facility] for hyponatremia and hypokalemia following restarting HCTZ for HTN. He is a patient of Dr. Taylor who underwent placement of suprapubic tube in 2017 for neurogenic bladder. The patient elected for suprapubic tube due to a tight phimosis that made self catheterization difficult. He has been seeing Neelam Hooper for monthly SPT exchanges but has since switched to monthly exchanges with home health nursing since the COVID-19 outbreak. His last catheter was exchanged 5 days ago; the patient endorses pain with this exchange and noticed blood in his urine shortly thereafter. He states that suprapubic tube exchanges are normally painless. He is not on anticoagulation. Urine culture obtained from SPT shows >100k GNR, although the patient does not have complaints of suprapubic pain, urgency, and spasms as he normally does with UTIs. He was seen by urology yesterday with irrigation of his 18 Fr SPT to clear with a minimal clot burden. For reasons unknown to urology, the consult that was placed on 8/28 was removed and was not replaced until midday today.    Patient seen at the bedside at 1400. Vitals and labs stable including hemoglobin and WBC count. He has no complaints of pain or discomfort.    Interval History: NAEON. AFVSS.  SPT draining with clear urine.  Sleeping comfortably in bed.  Bedside cystoscopy yesterday with formed clot in bladder.  To OR today for cystoscopy, clot evacuation.    Objective:     Temp:  [97.8 °F (36.6 °C)-98.8 °F (37.1 °C)] 98.2 °F (36.8 °C)  Pulse:  [60-81] 60  Resp:  [16-24] 17  SpO2:  [91 %-98 %] 95 %  BP: (121-162)/(57-74)  121/57     Body mass index is 32.68 kg/m².         Drains     Drain                 Suprapubic Catheter -- days         Suprapubic Catheter 06/23/17 1200 18 Fr. 1163 days                Physical Exam   Constitutional: He is oriented to person, place, and time. He appears well-developed. No distress.   Cardiovascular: Normal rate.    Pulmonary/Chest: Effort normal. No respiratory distress.   Abdominal: Soft. He exhibits no distension. There is no abdominal tenderness.   18 Fr suprapubic tube with medium pink urine in tubing, no clots.   Genitourinary:    Genitourinary Comments: Tight phimotic band with buried penis. Penis and meatus unable to be evaluated.Testes palpated bilaterally, no masses. Small right hydrocele, nontender. Normal cord structures. Scrotum normal without lesions.     Musculoskeletal: Normal range of motion. No tenderness.   Neurological: He is alert and oriented to person, place, and time.   Skin: Skin is warm and dry. No rash noted.     Psychiatric: Judgment normal.       Significant Labs:    BMP:  Recent Labs   Lab 08/31/20  0821 08/31/20  1541 08/31/20  2325   *  128* 129* 128*   K 4.4  4.5 4.3 4.2   CL 90*  92* 92* 92*   CO2 31*  30* 30* 30*   BUN 17  17 16 16   CREATININE 1.3  1.3 1.3 1.1   CALCIUM 7.9*  8.0* 8.1* 7.8*       CBC:   Recent Labs   Lab 08/30/20  0434 08/31/20  0520 09/01/20  0254   WBC 6.41 7.68 7.18   HGB 9.8* 9.3* 9.2*   HCT 31.3* 30.4* 29.0*    201 198     Significant Imaging:  All pertinent imaging results/findings from the past 24 hours have been reviewed.        Assessment/Plan:     Gross hematuria  87 yo male with gross hematuria per suprapubic tube after replacement with  nursing 6 days ago. Vitals and labs stable.    - 18 Fr suprapubic tube continues to drain well, Clear urine. No clots.  - Nursing to irrigate SPT PRN  - Trend H/H and creatinine  - To OR today for cystoscopy, clot evacuation        VTE Risk Mitigation (From admission, onward)          Ordered     IP VTE HIGH RISK PATIENT  Once      08/27/20 1748     Place sequential compression device  Until discontinued      08/27/20 1734                Randy Vergara MD  Urology  [facility]

## 2020-09-01 NOTE — PLAN OF CARE
"Sw did meet with Pt, informed of SNF level of care, Pt states "I guess," Sw to follow with choices and family preference for SNF. Sw sent 6 SNF referrals to facilities.   "

## 2020-09-01 NOTE — ASSESSMENT & PLAN NOTE
On admission Cr 1.1 (baseline). Increased to 1.5 after IVF boluses although hyponatremia improved with this. UOP has been measured but also includes unknown amount of bladder irrigation. Bedside cystoscopy on 8/31 showed multiple old clots which were irrigated, and 1 large clot which is scheduled to be evacuated in the cystoscopy suite on 9/1. Repeat U/S on 8/31 confirmed no hydronephrosis.    - Cr continues to improve, now back to baseline after bedside cystoscopy and irrigation. Considering the large amount of smaller clots present in the bladder on cystoscopy on 8/31, there was likely an obstructive process that led to a transient TERE which has now resolved.

## 2020-09-01 NOTE — PROGRESS NOTES
[facility]  Nephrology  Progress Note    Patient Name: Chucho Prado  MRN: [id number]  Admission Date: 8/27/2020  Hospital Length of Stay: 5 days  Attending Provider: Chiara Martinez MD   Primary Care Physician: Obed Mcguire MD  Principal Problem:Hyponatremia    Subjective:     HPI: Mr. Prado is an 87 yo male with neurogenic bladder, AS, CKD 3a, HTN, and hypothyroidism who presented to the hospital with chief complaint of new onset weakness x 3 days. Also had episode of diarrhea and generally poor PO intake. On 8/19 he saw his PCP who initiated Remeron for depression. Unclear whether he was supposed to be taking HCTZ 25 mg qd, but the patient was not taking this and began taking it a few days prior to admission when his PCP told him that he no longer needs to take his losartan. Initial labs on presentation notable for hyponatremia of 119, hypokalemia of 2.7, hypochloremia and metabolic alkalosis with bicarb of 37. HCTZ and Remeron were both held. He was found to be hypo-osmolar (247) and UOSM of 217 and Gabi of 52 on initial studies.  ECHO obtained w/o evidence of HF (ECHO 60% with CVP 3).  His potassium was replaced with both oral and IV with improvement.  UOP has been adequate making 1.9L in the past 24 hours and sodium has improved to 123 at an acceptable rate.  Nephrology has been consulted for hyponatremia.  He voices no complaints on exam other than occasional bladder spasms, mental status at baseline.     Interval History: No acute events overnight. Na remains stable in high 120s after holding the HCTZ and Cr continues to improve, now at baseline. Continues to have good urine output > 3L yesterday, again unclear how much irrigation fluid is contributing. Bedside cystoscopy yesterday showed multiple old clots which were irrigated, and particularly one large clot on the bladder floor. Scheduled in the cystoscopy suite today for clot evacuation.      Review of patient's allergies  indicates:  No Known Allergies  Current Facility-Administered Medications   Medication Frequency    amLODIPine tablet 5 mg Daily    aspirin EC tablet 81 mg Daily    atorvastatin tablet 20 mg Daily    cefepime in dextrose 5 % 1 gram/50 mL IVPB 1 g Q12H    dextrose 50% injection 12.5 g PRN    dextrose 50% injection 25 g PRN    gabapentin capsule 800 mg BID    glucagon (human recombinant) injection 1 mg PRN    glucose chewable tablet 16 g PRN    glucose chewable tablet 24 g PRN    levothyroxine tablet 50 mcg Before breakfast    metoprolol succinate (TOPROL-XL) 24 hr tablet 25 mg Daily    pantoprazole EC tablet 40 mg Daily    polyethylene glycol packet 17 g Daily    sodium chloride 0.9% flush 10 mL PRN    traZODone tablet 50 mg Nightly    tuberculin injection 5 Units Once       Objective:     Vital Signs (Most Recent):  Temp: 97.6 °F (36.4 °C) (09/01/20 1228)  Pulse: 61 (09/01/20 1228)  Resp: 20 (09/01/20 1228)  BP: (!) 168/72 (09/01/20 1228)  SpO2: 98 % (09/01/20 1228)  O2 Device (Oxygen Therapy): nasal cannula (09/01/20 0958) Vital Signs (24h Range):  Temp:  [97.6 °F (36.4 °C)-98.8 °F (37.1 °C)] 97.6 °F (36.4 °C)  Pulse:  [60-81] 61  Resp:  [17-24] 20  SpO2:  [91 %-98 %] 98 %  BP: (121-168)/(57-74) 168/72     Weight: 93.7 kg (206 lb 9.1 oz) (09/01/20 0400)  Body mass index is 31.41 kg/m².  Body surface area is 2.12 meters squared.    I/O last 3 completed shifts:  In: 700 [P.O.:700]  Out: 4750 [Urine:4750]    Physical Exam  Constitutional:       General: He is not in acute distress.     Appearance: He is not ill-appearing.   HENT:      Head: Normocephalic and atraumatic.      Nose: No congestion or rhinorrhea.      Mouth/Throat:      Mouth: Mucous membranes are moist.      Pharynx: Oropharynx is clear. No oropharyngeal exudate or posterior oropharyngeal erythema.   Eyes:      General: No scleral icterus.        Right eye: No discharge.         Left eye: No discharge.      Extraocular Movements:  Extraocular movements intact.      Conjunctiva/sclera: Conjunctivae normal.   Neck:      Musculoskeletal: Normal range of motion and neck supple.      Vascular: No JVD.   Cardiovascular:      Rate and Rhythm: Normal rate and regular rhythm.      Heart sounds: Murmur present.   Pulmonary:      Effort: Pulmonary effort is normal. No respiratory distress.      Breath sounds: No wheezing or rales.   Abdominal:      General: Abdomen is flat. There is no distension.      Tenderness: There is no abdominal tenderness.   Genitourinary:     Comments: Suprapubic catheter in place draining pink urine, less red than yesterday.  Musculoskeletal:      Right lower leg: No edema.      Left lower leg: No edema.   Skin:     General: Skin is warm and dry.   Neurological:      General: No focal deficit present.      Mental Status: He is alert and oriented to person, place, and time.   Psychiatric:         Mood and Affect: Mood normal.         Behavior: Behavior normal.         Significant Labs:  CBC:   Recent Labs   Lab 09/01/20  0254   WBC 7.18   RBC 3.41*   HGB 9.2*   HCT 29.0*      MCV 85   MCH 27.0   MCHC 31.7*     CMP:   Recent Labs   Lab 08/27/20  1432  09/01/20  0759   GLU 93   < > 89   CALCIUM 8.2*   < > 8.0*   ALBUMIN 3.6  --   --    PROT 7.1  --   --    *   < > 129*   K 2.7*   < > 4.2   CO2 37*   < > 28   CL 73*   < > 92*   BUN 18   < > 15   CREATININE 1.2   < > 1.1   ALKPHOS 92  --   --    ALT 8*  --   --    AST 18  --   --    BILITOT 0.7  --   --     < > = values in this interval not displayed.     Recent Labs   Lab 08/27/20  1528   COLORU Yellow   SPECGRAV 1.010   PHUR 8.0   PROTEINUA 1+*   BACTERIA Occasional   NITRITE Positive*   LEUKOCYTESUR 3+*   HYALINECASTS 0     All labs within the past 24 hours have been reviewed.     Significant Imaging:  Labs: Reviewed  US: Reviewed    Assessment/Plan:     * Hyponatremia  Hypokalemia  86 yo male presenting with 3 day history of weakness found to have significant  electrolyte abnormalities consisting of hyponatremia (119), hypokalemia (2.9) with hypochloremia and metabolic alkalosis.  His home medications of HCTZ and Remeron were discontinued and potassium was repleted with improvement in both potassium and sodium.  Nephrology has been consulted for evaluation of hyponatremia. Hypo-osmolar hyponatremia and euvolemic on initial eval. Hypochloremic alkalosis which could be evidence of contraction and may have a component of intravascular volume depletion. Labs not c/w SIADH.    Recommendations:  - Agree with discontinuing HCTZ/Remeron  - Continue strict I/O's  - Na appears to have improved with fluid resuscitation, c/w volume depletion  - Given patient's hypertension, hypokalemia, and metabolic alkalosis on presentation, ordered plasma aldosterone to renin ratio to evaluate for hyperaldosteronism. This is still pending.   - Rechecking urine Na and osmolality now that TERE has resolved    TERE (acute kidney injury)  On admission Cr 1.1 (baseline). Increased to 1.5 after IVF boluses although hyponatremia improved with this. UOP has been measured but also includes unknown amount of bladder irrigation. Bedside cystoscopy on 8/31 showed multiple old clots which were irrigated, and 1 large clot which is scheduled to be evacuated in the cystoscopy suite on 9/1. Repeat U/S on 8/31 confirmed no hydronephrosis.    - Cr continues to improve, now back to baseline after bedside cystoscopy and irrigation. Considering the large amount of smaller clots present in the bladder on cystoscopy on 8/31, there was likely an obstructive process that led to a transient TERE which has now resolved.      Thank you for your consult. I will follow-up with patient. Please contact us if you have any additional questions.    Toby Borrero DO  Nephrology  [facility]    ATTENDING PHYSICIAN ATTESTATION  I have personally verified the history and examined the patient. I thoroughly reviewed the  demographic, clinical, laboratorial and imaging information available in medical records. I agree with the assessment and recommendations provided by the subspecialty resident who was under my supervision.

## 2020-09-01 NOTE — SUBJECTIVE & OBJECTIVE
Interval History: Patient has no new complaints. Still very tired and fatigued at baseline. He denies fevers or chills.     Review of Systems   Constitutional: Positive for fatigue. Negative for chills and fever.   HENT: Negative for hearing loss, rhinorrhea and sinus pain.    Eyes: Negative for photophobia and visual disturbance.   Respiratory: Negative for choking, chest tightness, shortness of breath and wheezing.    Cardiovascular: Negative for chest pain and palpitations.   Gastrointestinal: Negative for abdominal distention, abdominal pain, diarrhea and nausea.   Genitourinary: Positive for difficulty urinating and hematuria. Negative for dysuria and flank pain.   Musculoskeletal: Positive for back pain. Negative for joint swelling and myalgias.   Skin: Negative for color change and pallor.   Neurological: Negative for dizziness, facial asymmetry, weakness and headaches.   Psychiatric/Behavioral: Negative for agitation and confusion.     Objective:     Vital Signs (Most Recent):  Temp: 97.6 °F (36.4 °C) (09/01/20 0803)  Pulse: 61 (09/01/20 0808)  Resp: 18 (09/01/20 0803)  BP: (!) 164/70 (09/01/20 0803)  SpO2: 96 % (09/01/20 0803) Vital Signs (24h Range):  Temp:  [97.6 °F (36.4 °C)-98.8 °F (37.1 °C)] 97.6 °F (36.4 °C)  Pulse:  [60-81] 61  Resp:  [17-24] 18  SpO2:  [91 %-96 %] 96 %  BP: (121-164)/(57-74) 164/70     Weight: 93.7 kg (206 lb 9.1 oz)  Body mass index is 31.41 kg/m².    Intake/Output Summary (Last 24 hours) at 9/1/2020 0858  Last data filed at 9/1/2020 0700  Gross per 24 hour   Intake 400 ml   Output 3150 ml   Net -2750 ml      Physical Exam  Vitals signs reviewed.   Constitutional:       General: He is not in acute distress.     Appearance: Normal appearance. He is ill-appearing. He is not toxic-appearing.   HENT:      Head: Normocephalic and atraumatic.      Right Ear: External ear normal.      Left Ear: External ear normal.      Nose: Nose normal.      Mouth/Throat:      Mouth: Mucous membranes  are dry.      Pharynx: Oropharynx is clear.   Eyes:      Conjunctiva/sclera: Conjunctivae normal.   Neck:      Musculoskeletal: Normal range of motion. No neck rigidity or muscular tenderness.   Cardiovascular:      Rate and Rhythm: Normal rate and regular rhythm.      Heart sounds: No murmur. No gallop.    Pulmonary:      Effort: Pulmonary effort is normal. No respiratory distress.      Breath sounds: No wheezing.      Comments: Bibasilar crackles appreciated  Abdominal:      General: There is no distension.      Palpations: Abdomen is soft.      Tenderness: There is no abdominal tenderness. There is no guarding.   Genitourinary:     Comments: Mahajan in place, gross hematuria in bag, some clotting of the tubing.   Musculoskeletal:         General: No swelling, tenderness or deformity.      Right lower leg: No edema.      Left lower leg: No edema.   Skin:     General: Skin is warm and dry.      Coloration: Skin is not jaundiced.   Neurological:      General: No focal deficit present.      Mental Status: He is alert and oriented to person, place, and time.         Significant Labs:   BMP:   Recent Labs   Lab 09/01/20  0759   GLU 89   *   K 4.2   CL 92*   CO2 28   BUN 15   CREATININE 1.1   CALCIUM 8.0*     CBC:   Recent Labs   Lab 08/31/20  0520 09/01/20  0254   WBC 7.68 7.18   HGB 9.3* 9.2*   HCT 30.4* 29.0*    198     CMP:   Recent Labs   Lab 08/31/20  1541 08/31/20  2325 09/01/20  0759   * 128* 129*   K 4.3 4.2 4.2   CL 92* 92* 92*   CO2 30* 30* 28    117* 89   BUN 16 16 15   CREATININE 1.3 1.1 1.1   CALCIUM 8.1* 7.8* 8.0*   ANIONGAP 7* 6* 9   EGFRNONAA 49.4* >60.0 >60.0       Significant Imaging: I have reviewed all pertinent imaging results/findings within the past 24 hours.

## 2020-09-01 NOTE — ASSESSMENT & PLAN NOTE
Suprapubic catheter changed home home health nurse day prior to presentation to ED. Presented with clear yellow urine in galvan bag on day of admit. Hospital day 1 patient's galvan bag with gross hematuria. Irrigated by urology to clear but hematuria returned.     - Avoid irrigation with sterile water as this could worsen patient's hyponatremia  - Urology did bedside cystoscopy showed evidence of neurogenic bladder and large clots that could not be removed  - Plan for OR today with urology for clot removal  - Given gross hematuria and planned cystoscopy, DVT prophylaxis was discontinued on 8/31 for risk of bleed  - Will continue DVT ppx in 1-2 days post op per urology

## 2020-09-01 NOTE — PLAN OF CARE
Pt when medically stable will transfer to SNF.  Referrals placed in , will cont to follow.     09/01/20 1512   Discharge Reassessment   Assessment Type Discharge Planning Reassessment   Provided patient/caregiver education on the expected discharge date and the discharge plan Yes   Do you have any problems affording any of your prescribed medications? Yes   Discharge Plan A Skilled Nursing Facility   Discharge Plan B Skilled Nursing Facility   DME Needed Upon Discharge  none   Anticipated Discharge Disposition SNF   Can the patient/caregiver answer the patient profile reliably? Yes, cognitively intact   Post-Acute Status   Post-Acute Authorization Placement   Post-Acute Placement Status Awaiting Internal Medical Clearance   Discharge Delays None known at this time   Sarah Lewis, MSN  Case Management  Ext [phone]

## 2020-09-01 NOTE — ASSESSMENT & PLAN NOTE
"87 yo male with history of hypothyroidism, neurogenic bladder with suprapubic catheter (exchanged annually, last exchange 3 days ago by home RN), HLD, HTN and GERD presenting to the ER with generalized weakness since 3 days ago. This is the first time the patient has experienced something like this. He describes the weakness as feeling tired and overall "fragile" and "without energy". Pt denies: seizures, headache or numbness.    Labs concerning for hyponatremia,  hypochloremia and hypokalemia.   CXR:left basilar increased density which could represent combination of effusion, atelectasis or consolidation.     BNP: 298  Echo ordered to r/o CHF:    Serum Osm: 247 (low)  Urine Osm: 217 (low)  Urine Na: 52  Urine Cl: 45    Pt currently taking Hydrochlorothiazide for hypertension. As per PCP notes he has been taking HCTZ since 2019 without any side effects. The medication was stopped but then restarted 8/21.    Given low serum AND urine osms, this appears to be an ADH independent hyponatremia. Volume status is borderline low on exam, CVP on Echo was 3.    Sodium in 127-129 since 8/30 8/31 Renal U/S no evidence of hydronephrosis but decreased renal perfusion    Plan  - Follow up renin/aldosterone ratio per nephrology  - Touch base with nephrology in the AM regarding further management  - Consider trial small bolus fluids if sodium doesn't normalize  - BMP's q4h to monitor for overcorrection  - Continuing to hold HCTZ and mirtazapine  "

## 2020-09-01 NOTE — ANESTHESIA PROCEDURE NOTES
Intubation  Performed by: Maryam Moore CRNA  Authorized by: Naomi Kilgore MD     Intubation:     Induction:  Intravenous    Intubated:  Postinduction    Mask Ventilation:  Easy with oral airway    Attempts:  1    Attempted By:  CRNA    Method of Intubation:  Direct    Blade:  Deleon 2    Laryngeal View Grade: Grade IIA - cords partially seen      Difficult Airway Encountered?: No      Complications:  None    Airway Device:  Oral endotracheal tube    Airway Device Size:  7.0    Style/Cuff Inflation:  Cuffed    Placement Verified By:  Capnometry    Complicating Factors:  Anterior larynx    Findings Post-Intubation:  BS equal bilateral

## 2020-09-01 NOTE — SUBJECTIVE & OBJECTIVE
Interval History: No acute events overnight. Na remains stable in high 120s after holding the HCTZ and Cr continues to improve, now at baseline. Continues to have good urine output > 3L yesterday, again unclear how much irrigation fluid is contributing. Bedside cystoscopy yesterday showed multiple old clots which were irrigated, and particularly one large clot on the bladder floor. Scheduled in the cystoscopy suite today for clot evacuation.      Review of patient's allergies indicates:  No Known Allergies  Current Facility-Administered Medications   Medication Frequency    amLODIPine tablet 5 mg Daily    aspirin EC tablet 81 mg Daily    atorvastatin tablet 20 mg Daily    cefepime in dextrose 5 % 1 gram/50 mL IVPB 1 g Q12H    dextrose 50% injection 12.5 g PRN    dextrose 50% injection 25 g PRN    gabapentin capsule 800 mg BID    glucagon (human recombinant) injection 1 mg PRN    glucose chewable tablet 16 g PRN    glucose chewable tablet 24 g PRN    levothyroxine tablet 50 mcg Before breakfast    metoprolol succinate (TOPROL-XL) 24 hr tablet 25 mg Daily    pantoprazole EC tablet 40 mg Daily    polyethylene glycol packet 17 g Daily    sodium chloride 0.9% flush 10 mL PRN    traZODone tablet 50 mg Nightly    tuberculin injection 5 Units Once       Objective:     Vital Signs (Most Recent):  Temp: 97.6 °F (36.4 °C) (09/01/20 1228)  Pulse: 61 (09/01/20 1228)  Resp: 20 (09/01/20 1228)  BP: (!) 168/72 (09/01/20 1228)  SpO2: 98 % (09/01/20 1228)  O2 Device (Oxygen Therapy): nasal cannula (09/01/20 0958) Vital Signs (24h Range):  Temp:  [97.6 °F (36.4 °C)-98.8 °F (37.1 °C)] 97.6 °F (36.4 °C)  Pulse:  [60-81] 61  Resp:  [17-24] 20  SpO2:  [91 %-98 %] 98 %  BP: (121-168)/(57-74) 168/72     Weight: 93.7 kg (206 lb 9.1 oz) (09/01/20 0400)  Body mass index is 31.41 kg/m².  Body surface area is 2.12 meters squared.    I/O last 3 completed shifts:  In: 700 [P.O.:700]  Out: 4750 [Urine:4750]    Physical  Exam  Constitutional:       General: He is not in acute distress.     Appearance: He is not ill-appearing.   HENT:      Head: Normocephalic and atraumatic.      Nose: No congestion or rhinorrhea.      Mouth/Throat:      Mouth: Mucous membranes are moist.      Pharynx: Oropharynx is clear. No oropharyngeal exudate or posterior oropharyngeal erythema.   Eyes:      General: No scleral icterus.        Right eye: No discharge.         Left eye: No discharge.      Extraocular Movements: Extraocular movements intact.      Conjunctiva/sclera: Conjunctivae normal.   Neck:      Musculoskeletal: Normal range of motion and neck supple.      Vascular: No JVD.   Cardiovascular:      Rate and Rhythm: Normal rate and regular rhythm.      Heart sounds: Murmur present.   Pulmonary:      Effort: Pulmonary effort is normal. No respiratory distress.      Breath sounds: No wheezing or rales.   Abdominal:      General: Abdomen is flat. There is no distension.      Tenderness: There is no abdominal tenderness.   Genitourinary:     Comments: Suprapubic catheter in place draining pink urine, less red than yesterday.  Musculoskeletal:      Right lower leg: No edema.      Left lower leg: No edema.   Skin:     General: Skin is warm and dry.   Neurological:      General: No focal deficit present.      Mental Status: He is alert and oriented to person, place, and time.   Psychiatric:         Mood and Affect: Mood normal.         Behavior: Behavior normal.         Significant Labs:  CBC:   Recent Labs   Lab 09/01/20  0254   WBC 7.18   RBC 3.41*   HGB 9.2*   HCT 29.0*      MCV 85   MCH 27.0   MCHC 31.7*     CMP:   Recent Labs   Lab 08/27/20  1432  09/01/20  0759   GLU 93   < > 89   CALCIUM 8.2*   < > 8.0*   ALBUMIN 3.6  --   --    PROT 7.1  --   --    *   < > 129*   K 2.7*   < > 4.2   CO2 37*   < > 28   CL 73*   < > 92*   BUN 18   < > 15   CREATININE 1.2   < > 1.1   ALKPHOS 92  --   --    ALT 8*  --   --    AST 18  --   --    BILITOT  0.7  --   --     < > = values in this interval not displayed.     Recent Labs   Lab 08/27/20  1528   COLORU Yellow   SPECGRAV 1.010   PHUR 8.0   PROTEINUA 1+*   BACTERIA Occasional   NITRITE Positive*   LEUKOCYTESUR 3+*   HYALINECASTS 0     All labs within the past 24 hours have been reviewed.     Significant Imaging:  Labs: Reviewed  US: Reviewed

## 2020-09-01 NOTE — OP NOTE
[facility]  Operative Note    Date: 09/01/2020    Pre-Op Diagnosis: Neurogenic bladder, adult buried penis, gross hematuria    Post-Op Diagnosis: same    Procedure(s) Performed:   1.  Cystoscopy   2.  Urethral dilation  3.  Cystogram  4.  Bladder biopsy and fulguration    Specimen(s):   Bladder wash cytology  Bladder lesion biopsy    Staff Surgeon: Daron Manjarrez MD    Assistant Surgeon: Kevin Alston MD, Randy Vergara MD    Anesthesia: General endotracheal anesthesia    Indications: Chucho Prado is a 86 y.o. male with history of neurogenic bladder managed with a suprapubic catheter who presents with consolidated clot in his bladder unable to be irrigated manually.    Findings:   Completely buried penis, unable to visualize glans, some tight phimotic skin opened using hemostat.  Ureteral dilator used locate meatus and wire passed into bladder followed by the small ureteral dilator over the wire;  cystogram performed and dilator appeared to be in bladder lumen  Urethra dilated sequentially from 6 Fr to 24 Fr using serial ureteral dilators and Heymans  Cystoscope passed over wire and wire found to be entering retroperitoneal fat through prostatic urethra, no rectal injury present on LARRY  True urethral lumen lumen identified and wire placed into bladder  Bladder with multiple diverticula, large right sided diverticulum with consolidated clot and suspicious tissue at base of clot in diverticulum. Area biopsed and fulgurated  Left ureteral orifice in normal anatomic position, right ureteral orifice unable to be identified  20 Fr Morongo tip Mahajan placed over motion wire    Estimated Blood Loss: min    Drains:   18 Fr suprapubic catheter  20 Fr urethral catheter    Procedure in Detail:  After risks, benefits and possible complications of cystoscopy were explained, the patient elected to undergo the procedure and informed consent was obtained. All questions were answered in the maverick-operative area.  The patient was transferred to the cystoscopy suite and placed in the supine position.  SCDs were applied and working.  Anesthesia was administered.  Once the patient was adequately sedated, he was placed in the dorsal lithotomy position and prepped and draped in the usual sterile fashion.  Time out was performed, maverick-procedural antibiotics were confirmed. His suprapubic catheter was clamped.    A rigid cystoscope in a 22 Fr sheath was unable to be introduced into the bladder per urethra as his glans was not visible. Some of the tight phimotic skin near his glans was opened using a hemostat.  Using an 8 Fr ureteral dilator to locate the meatus, the dilator was passed through the urethra into the bladder. A super stiff wire was passed through the dilator and did not coil well, so the wire was removed and a glide wire was passed through the dilator. This passed easily and appeared to be coiling well in the bladder. The 8 Fr dilator was reinserted over the wire and a cystogram was performed. The outline of the bladder appeared to fill with contrast. The super stiff wire was replaced and the urethra was serially dilated from 8 Fr to 24 Fr using ureteral dilators and Mario dilators.    The rigid cystoscope was then passed over the wire this passed easily until the prostatic urethra was encountered. The wire was found to be perforating the prostatic urethra above the veru and a large false passage was present into retroperitoneal fat. A digital rectal exam was performed and no rectal injury was noted. The super stiff wire was removed and the scope was advanced through the true urethral lumen into the bladder.    Formal cystoscopy was performed, this revealed multiple diverticula and a significantly large diverticulum in the right side of the bladder with a large amount of consolidated clot present. The left ureteral orifice was identified in the normal anatomic position, the right ureteral orifice was unable to be  identified. A motion wire was then passed through the cystoscope and coiled in the bladder.    The clot in the diverticulum was evacuated using [employer] evacuators. There was some consolidated clot versus tumor in the base of the diverticulum this was removed from the bladder wall using cold cup biopsy forceps. Bladder wash cytology was collected. Several biopsies of the base of this lesion were sent for pathology. The fluid was then exchanged for sterile water and entire area was then fulgurated using a Bugbee.    A 20 Fr Afognak tip catheter was then placed over the wire and 10ml was instilled in the balloon. And the patient was placed on CBI.    The patient tolerated the procedure well and was transferred to recovery in stable condition.    Disposition:  The patient will be transferred back to the medicine service, the urethral catheter will remain in place for 1 week, he will be continued on antibiotics. His CBI will be clamped in the morning.    Kevin Alston MD

## 2020-09-01 NOTE — ASSESSMENT & PLAN NOTE
On urinalysis patient with hazy urine, protein +1, occult blood +2, leukocytes +3 and nitrites +. Clinically patient with gross hematuria on exam and suprapubic tenderness. Likely UA secondary to colonization and dirty specimen from suprapubic catheter.    Plan  - Pt initially covered with ceftriaxone in the ED, low likelihood of UTI  - Urology recommended to continue on cirpofloxacin 500 mg daily.

## 2020-09-01 NOTE — ASSESSMENT & PLAN NOTE
On admission Cr 1.1 (baseline). Increased to 1.5 after IVF boluses although hyponatremia improved with this. Has since resolved. Renal U/S indicative of decreased renal perfusion but no hydronephrosis.      Recommendations:  - Rosolved

## 2020-09-01 NOTE — TRANSFER OF CARE
"Anesthesia Transfer of Care Note    Patient: Chucho Prado    Procedure(s) Performed: Procedure(s) (LRB):  CYSTOSCOPY (N/A)  REMOVAL, BLOOD CLOT  FULGURATION, BLADDER  DILATION, URETHRA  BIOPSY, BLADDER  CYSTOGRAM    Patient location: [facility]    Anesthesia Type: general    Transport from OR: Transported from OR on 6-10 L/min O2 by face mask with adequate spontaneous ventilation    Post pain: adequate analgesia    Post assessment: no apparent anesthetic complications and tolerated procedure well    Post vital signs: stable    Level of consciousness: sedated and responds to stimulation    Nausea/Vomiting: no nausea/vomiting    Complications: none    Transfer of care protocol was followed      Last vitals:   Visit Vitals  BP (!) 145/67 (BP Location: Left arm, Patient Position: Lying)   Pulse 81   Temp 36.3 °C (97.3 °F) (Temporal)   Resp 16   Ht 5' 8" (1.727 m)   Wt 93.7 kg (206 lb 9.1 oz)   SpO2 98%   BMI 31.41 kg/m²     "

## 2020-09-01 NOTE — ASSESSMENT & PLAN NOTE
Hypokalemia  88 yo male presenting with 3 day history of weakness found to have significant electrolyte abnormalities consisting of hyponatremia (119), hypokalemia (2.9) with hypochloremia and metabolic alkalosis.  His home medications of HCTZ and Remeron were discontinued and potassium was repleted with improvement in both potassium and sodium.  Nephrology has been consulted for evaluation of hyponatremia. Hypo-osmolar hyponatremia and euvolemic on initial eval. Hypochloremic alkalosis which could be evidence of contraction and may have a component of intravascular volume depletion. Labs not c/w SIADH.    Recommendations:  - Agree with discontinuing HCTZ/Remeron  - Continue strict I/O's  - Na appears to have improved with fluid resuscitation, c/w volume depletion  - Given patient's hypertension, hypokalemia, and metabolic alkalosis on presentation, ordered plasma aldosterone to renin ratio to evaluate for hyperaldosteronism. This is still pending.   - Rechecking urine Na and osmolality now that TERE has resolved

## 2020-09-01 NOTE — ASSESSMENT & PLAN NOTE
85 yo male with gross hematuria per suprapubic tube after replacement with  nursing 6 days ago. Vitals and labs stable.    - 18 Fr suprapubic tube continues to drain well, Clear urine. No clots.  - Nursing to irrigate SPT PRN  - Trend H/H and creatinine  - To OR today for cystoscopy, clot evacuation

## 2020-09-01 NOTE — PLAN OF CARE
09/01/20 0852   Post-Acute Status   Post-Acute Authorization Placement   Post-Acute Placement Status Patient List Provided     Pt asleep when Sw arrived, put list by bedside. Sw to return as Pt was not arousal to verbal stimuli.

## 2020-09-01 NOTE — ASSESSMENT & PLAN NOTE
Hypokalemia  88 yo male presenting with 3 day history of weakness found to have significant electrolyte abnormalities consisting of hyponatremia (119), hypokalemia (2.9) with hypochloremia and metabolic alkalosis.  His home medications of HCTZ and Remeron were discontinued and potassium was repleted with improvement in both potassium and sodium.  Nephrology has been consulted for evaluation of hyponatremia. Hypo-osmolar hyponatremia and euvolemic on initial eval. Hypochloremic alkalosis which could be evidence of contraction and may have a component of intravascular volume depletion. Labs not c/w SIADH.    Recommendations:  - Agree with discontinuing HCTZ/Remeron  - Continue strict I/O's  - Na appears to have improved with fluid resuscitation, c/w volume depletion  - Given patient's hypertension, hypokalemia, and metabolic alkalosis on presentation, ordered plasma aldosterone to renin ratio to evaluate for hyperaldosteronism. This is still pending.

## 2020-09-02 LAB
ANION GAP SERPL CALC-SCNC: 8 MMOL/L (ref 8–16)
ANION GAP SERPL CALC-SCNC: 9 MMOL/L (ref 8–16)
BASOPHILS # BLD AUTO: 0.01 K/UL (ref 0–0.2)
BASOPHILS NFR BLD: 0.2 % (ref 0–1.9)
BUN SERPL-MCNC: 19 MG/DL (ref 8–23)
BUN SERPL-MCNC: 22 MG/DL (ref 8–23)
CALCIUM SERPL-MCNC: 8.5 MG/DL (ref 8.7–10.5)
CALCIUM SERPL-MCNC: 8.7 MG/DL (ref 8.7–10.5)
CHLORIDE SERPL-SCNC: 94 MMOL/L (ref 95–110)
CHLORIDE SERPL-SCNC: 95 MMOL/L (ref 95–110)
CO2 SERPL-SCNC: 26 MMOL/L (ref 23–29)
CO2 SERPL-SCNC: 28 MMOL/L (ref 23–29)
CREAT SERPL-MCNC: 1.1 MG/DL (ref 0.5–1.4)
CREAT SERPL-MCNC: 1.2 MG/DL (ref 0.5–1.4)
DIFFERENTIAL METHOD: ABNORMAL
EOSINOPHIL # BLD AUTO: 0 K/UL (ref 0–0.5)
EOSINOPHIL NFR BLD: 0 % (ref 0–8)
ERYTHROCYTE [DISTWIDTH] IN BLOOD BY AUTOMATED COUNT: 13.5 % (ref 11.5–14.5)
EST. GFR  (AFRICAN AMERICAN): >60 ML/MIN/1.73 M^2
EST. GFR  (AFRICAN AMERICAN): >60 ML/MIN/1.73 M^2
EST. GFR  (NON AFRICAN AMERICAN): 54.4 ML/MIN/1.73 M^2
EST. GFR  (NON AFRICAN AMERICAN): >60 ML/MIN/1.73 M^2
FINAL PATHOLOGIC DIAGNOSIS: NORMAL
GLUCOSE SERPL-MCNC: 142 MG/DL (ref 70–110)
GLUCOSE SERPL-MCNC: 95 MG/DL (ref 70–110)
HCT VFR BLD AUTO: 33.2 % (ref 40–54)
HGB BLD-MCNC: 10.5 G/DL (ref 14–18)
IMM GRANULOCYTES # BLD AUTO: 0.02 K/UL (ref 0–0.04)
IMM GRANULOCYTES NFR BLD AUTO: 0.3 % (ref 0–0.5)
LYMPHOCYTES # BLD AUTO: 0.5 K/UL (ref 1–4.8)
LYMPHOCYTES NFR BLD: 8 % (ref 18–48)
MCH RBC QN AUTO: 27 PG (ref 27–31)
MCHC RBC AUTO-ENTMCNC: 31.6 G/DL (ref 32–36)
MCV RBC AUTO: 85 FL (ref 82–98)
MONOCYTES # BLD AUTO: 0.1 K/UL (ref 0.3–1)
MONOCYTES NFR BLD: 1.1 % (ref 4–15)
NEUTROPHILS # BLD AUTO: 6 K/UL (ref 1.8–7.7)
NEUTROPHILS NFR BLD: 90.4 % (ref 38–73)
NRBC BLD-RTO: 0 /100 WBC
PLATELET # BLD AUTO: 222 K/UL (ref 150–350)
PMV BLD AUTO: 9.2 FL (ref 9.2–12.9)
POTASSIUM SERPL-SCNC: 4.4 MMOL/L (ref 3.5–5.1)
POTASSIUM SERPL-SCNC: 4.9 MMOL/L (ref 3.5–5.1)
RBC # BLD AUTO: 3.89 M/UL (ref 4.6–6.2)
SODIUM SERPL-SCNC: 129 MMOL/L (ref 136–145)
SODIUM SERPL-SCNC: 131 MMOL/L (ref 136–145)
WBC # BLD AUTO: 6.59 K/UL (ref 3.9–12.7)

## 2020-09-02 PROCEDURE — 99233 SBSQ HOSP IP/OBS HIGH 50: CPT | Mod: GC,,, | Performed by: STUDENT IN AN ORGANIZED HEALTH CARE EDUCATION/TRAINING PROGRAM

## 2020-09-02 PROCEDURE — 94799 UNLISTED PULMONARY SVC/PX: CPT

## 2020-09-02 PROCEDURE — 30200315 PPD INTRADERMAL TEST REV CODE 302: Performed by: STUDENT IN AN ORGANIZED HEALTH CARE EDUCATION/TRAINING PROGRAM

## 2020-09-02 PROCEDURE — 25000003 PHARM REV CODE 250: Performed by: STUDENT IN AN ORGANIZED HEALTH CARE EDUCATION/TRAINING PROGRAM

## 2020-09-02 PROCEDURE — 94761 N-INVAS EAR/PLS OXIMETRY MLT: CPT

## 2020-09-02 PROCEDURE — 36415 COLL VENOUS BLD VENIPUNCTURE: CPT

## 2020-09-02 PROCEDURE — 97535 SELF CARE MNGMENT TRAINING: CPT

## 2020-09-02 PROCEDURE — 11000001 HC ACUTE MED/SURG PRIVATE ROOM

## 2020-09-02 PROCEDURE — 97530 THERAPEUTIC ACTIVITIES: CPT

## 2020-09-02 PROCEDURE — 86580 TB INTRADERMAL TEST: CPT | Performed by: STUDENT IN AN ORGANIZED HEALTH CARE EDUCATION/TRAINING PROGRAM

## 2020-09-02 PROCEDURE — 99233 PR SUBSEQUENT HOSPITAL CARE,LEVL III: ICD-10-PCS | Mod: GC,,, | Performed by: STUDENT IN AN ORGANIZED HEALTH CARE EDUCATION/TRAINING PROGRAM

## 2020-09-02 PROCEDURE — 80048 BASIC METABOLIC PNL TOTAL CA: CPT | Mod: 91

## 2020-09-02 PROCEDURE — 97530 THERAPEUTIC ACTIVITIES: CPT | Mod: CQ

## 2020-09-02 PROCEDURE — 80048 BASIC METABOLIC PNL TOTAL CA: CPT

## 2020-09-02 PROCEDURE — 85025 COMPLETE CBC W/AUTO DIFF WBC: CPT

## 2020-09-02 PROCEDURE — 97116 GAIT TRAINING THERAPY: CPT | Mod: CQ

## 2020-09-02 RX ORDER — CIPROFLOXACIN 500 MG/1
500 TABLET ORAL EVERY 12 HOURS
Status: DISCONTINUED | OUTPATIENT
Start: 2020-09-02 | End: 2020-09-04 | Stop reason: HOSPADM

## 2020-09-02 RX ORDER — OXYBUTYNIN CHLORIDE 5 MG/1
5 TABLET ORAL 3 TIMES DAILY PRN
Status: DISCONTINUED | OUTPATIENT
Start: 2020-09-02 | End: 2020-09-04 | Stop reason: HOSPADM

## 2020-09-02 RX ORDER — POTASSIUM CHLORIDE 20 MEQ/1
20 TABLET, EXTENDED RELEASE ORAL ONCE
Status: DISCONTINUED | OUTPATIENT
Start: 2020-09-02 | End: 2020-09-02

## 2020-09-02 RX ORDER — CIPROFLOXACIN 500 MG/1
500 TABLET ORAL EVERY 12 HOURS
Qty: 10 TABLET | Refills: 0
Start: 2020-09-02 | End: 2020-09-04

## 2020-09-02 RX ADMIN — OXYBUTYNIN CHLORIDE 5 MG: 5 TABLET ORAL at 04:09

## 2020-09-02 RX ADMIN — ATORVASTATIN CALCIUM 20 MG: 20 TABLET, FILM COATED ORAL at 10:09

## 2020-09-02 RX ADMIN — PANTOPRAZOLE SODIUM 40 MG: 40 TABLET, DELAYED RELEASE ORAL at 10:09

## 2020-09-02 RX ADMIN — CIPROFLOXACIN 500 MG: 500 TABLET, FILM COATED ORAL at 09:09

## 2020-09-02 RX ADMIN — GABAPENTIN 800 MG: 400 CAPSULE ORAL at 09:09

## 2020-09-02 RX ADMIN — GABAPENTIN 800 MG: 400 CAPSULE ORAL at 10:09

## 2020-09-02 RX ADMIN — METOPROLOL SUCCINATE 25 MG: 25 TABLET, EXTENDED RELEASE ORAL at 10:09

## 2020-09-02 RX ADMIN — AMLODIPINE BESYLATE 5 MG: 5 TABLET ORAL at 10:09

## 2020-09-02 RX ADMIN — POLYETHYLENE GLYCOL 3350 17 G: 17 POWDER, FOR SOLUTION ORAL at 10:09

## 2020-09-02 RX ADMIN — CIPROFLOXACIN 500 MG: 500 TABLET, FILM COATED ORAL at 10:09

## 2020-09-02 RX ADMIN — TUBERCULIN PURIFIED PROTEIN DERIVATIVE 5 UNITS: 5 INJECTION, SOLUTION INTRADERMAL at 11:09

## 2020-09-02 RX ADMIN — TRAZODONE HYDROCHLORIDE 50 MG: 50 TABLET ORAL at 09:09

## 2020-09-02 RX ADMIN — LEVOTHYROXINE SODIUM 50 MCG: 50 TABLET ORAL at 06:09

## 2020-09-02 RX ADMIN — ASPIRIN 81 MG: 81 TABLET, COATED ORAL at 10:09

## 2020-09-02 NOTE — PLAN OF CARE
09/02/20 1433   Post-Acute Status   Post-Acute Authorization Placement   Post-Acute Placement Status Referrals Sent     SW faxed additional referrals to [facility], [facility], [facility], [facility], and [facility] via  for review. SW will continue to follow pt's post-acute referral needs.    Bettie Lazar, ALEC  Case Management   [facility]   Ext. [phone]

## 2020-09-02 NOTE — SUBJECTIVE & OBJECTIVE
Interval History: NAEON. AFVSS.  Urine clear on CBI. CBI clamped on AM rounds.  Cystoscopy yesterday with successful clot evacuation.  Currently has 18 Fr SPT and 20 Fr councill in urethra.     Objective:     Temp:  [96 °F (35.6 °C)-98.2 °F (36.8 °C)] 96 °F (35.6 °C)  Pulse:  [61-85] 69  Resp:  [16-21] 16  SpO2:  [96 %-99 %] 99 %  BP: (140-178)/(63-76) 140/63     Body mass index is 31.41 kg/m².         Drains     Drain                 Suprapubic Catheter -- days         Suprapubic Catheter 06/23/17 1200 18 Fr. 1163 days                Physical Exam   Constitutional: He is oriented to person, place, and time. He appears well-developed. No distress.   Cardiovascular: Normal rate.    Pulmonary/Chest: Effort normal. No respiratory distress.   Abdominal: Soft. He exhibits no distension. There is no abdominal tenderness.   18 Fr suprapubic tube hooked up to CBI.   Genitourinary:    Genitourinary Comments: Tight phimotic band with buried penis. 20 Fr councill catheter with clear urine. Testes palpated bilaterally, no masses. Small right hydrocele, nontender.      Musculoskeletal: Normal range of motion. No tenderness.   Neurological: He is alert and oriented to person, place, and time.   Skin: Skin is warm and dry. No rash noted.     Psychiatric: Judgment normal.       Significant Labs:    BMP:  Recent Labs   Lab 08/31/20  2325 09/01/20  0759 09/01/20 2006   * 129* 133*   K 4.2 4.2 3.8   CL 92* 92* 95   CO2 30* 28 29   BUN 16 15 15   CREATININE 1.1 1.1 1.1   CALCIUM 7.8* 8.0* 8.8       CBC:   Recent Labs   Lab 08/31/20  0520 09/01/20  0254 09/02/20  0357   WBC 7.68 7.18 6.59   HGB 9.3* 9.2* 10.5*   HCT 30.4* 29.0* 33.2*    198 222     Significant Imaging:  All pertinent imaging results/findings from the past 24 hours have been reviewed.

## 2020-09-02 NOTE — PLAN OF CARE
TAYLER made follow up's to the following facilities:  [facility]- Under Review   [facility] - Accepted   [facility]- Accepted   [facility]    [facility]- Under [facility]     TAYLER completed LOCET and faxed PASRR to state. Waiting on 142.

## 2020-09-02 NOTE — ASSESSMENT & PLAN NOTE
Suprapubic catheter changed home home health nurse day prior to presentation to ED. Presented with clear yellow urine in galvan bag on day of admit. Hospital day 1 patient's galvan bag with gross hematuria. Irrigated by urology to clear but hematuria returned. Urology did cystoscopy and removed 2 large clots and found several bladder diverticula.     - Appreciate urology recs:   - Will d/c with galvan for 5 days but needs to remain on antibiotics (cirpofloxacin) when galvan is in   - Follow up pathology from bladder biopsies    - Oxybutynin PRN for bladder spasms   - Okay to discharge with follow up in urology clinic in 1-2 weeks   - Possible CT urogram outpatient   - Avoid irrigation with sterile water as this could worsen patient's hyponatremia  - Given gross hematuria and planned cystoscopy, DVT prophylaxis was discontinued on 8/31 for risk of bleed  - Will continue DVT ppx in 1-2 days post op per urology

## 2020-09-02 NOTE — ASSESSMENT & PLAN NOTE
-Continue home Metroprolol  -Hold home HCTZ (known cause of hyponatremia)   -Do not continue with HCTZ given hyponatremia  -If hypertension is persistent, will need different medication per PCP

## 2020-09-02 NOTE — PLAN OF CARE
[facility]     Department of Hospital Medicine     [address] [place] [address]     [phone] after hours  [phone] fax       NURSING HOME ORDERS    09/02/2020    Admit to Nursing Home:      Skilled Bed                                     Diagnoses:  Active Hospital Problems    Diagnosis  POA    *Hyponatremia [E87.1]  Yes    Exertional dyspnea [R06.09]  Yes    Moderate to severe aortic stenosis [I35.0]  Yes    Weakness [R53.1]  Yes    Hypochloremia [E87.8]  Unknown    Acute hypoxemic respiratory failure [J96.01]  Yes    Hypokalemia [E87.6]  Yes    Gross hematuria [R31.0]  Yes    TERE (acute kidney injury) [N17.9]  No    Acquired hypothyroidism [E03.9]  Yes    Essential hypertension [I10]  Yes    Hyperlipidemia [E78.5]  Yes    UTI (urinary tract infection) [N39.0]  Yes      Resolved Hospital Problems   No resolved problems to display.       Patient is homebound due to:  Hyponatremia    Allergies:Review of patient's allergies indicates:  No Known Allergies    Vitals:      Every shift (Skilled Nursing patients)    Diet: Regular     Acitivities:      - Up in a chair each morning as tolerated   - Ambulate with assistance to bathroom   - Scheduled walks once each shift (every 8 hours)   - May ambulate independently   - May use walker, cane, or self-propelled wheelchair   - Weight bearing: as tolerated     LABS:  Per facility protocol  BMP weekly    Nursing Precautions:                - Aspiration precautions:             - Total assistance with meals            -  Upright 90 degrees befor during and after meals             -  Suction at bedside          - Fall precautions per nursing home protocol   - Seizure precaution per [facility] protocol   - Decubitus precautions:        -  for positioning   - Pressure reducing foam mattress   - Turn patient every two hours. Use wedge pillows to anchor patient    CONSULTS:     Physical Therapy to  evaluate and treat     Occupational Therapy to evaluate and treat     Nutrition to evaluate and recommend diet     Psychiatry to evaluate and follow patients for delirium    MISCELLANEOUS CARE:                 Galvan Care: Empty Galvan bag every shift. Maintain galvan per urethra for total of 5 days (start date 9/1/20).      Routine Skin for Bedridden Patients:  Apply moisture barrier cream to all    skin folds and wet areas in perineal area daily and after baths and                           all bowel movements.         Medications: Discontinue all previous medication orders, if any. See new list below.   Melchor Prado   Home Medication Instructions ESTIVEN:[id number]    Printed on:09/04/20 0272   Medication Information                      amLODIPine (NORVASC) 5 MG tablet  TAKE 1 TABLET BY MOUTH EVERY DAY             aspirin (ECOTRIN) 81 MG EC tablet  TAKE 1 TABLET BY MOUTH EVERY DAY             atorvastatin (LIPITOR) 20 MG tablet  Take 1 tablet (20 mg total) by mouth once daily.             cetirizine (ZYRTEC) 10 MG tablet  Take 10 mg by mouth daily as needed for Allergies.             ciprofloxacin HCl (CIPRO) 500 MG tablet  Take 1 tablet (500 mg total) by mouth every 12 (twelve) hours. END DATE: 9/7/20 for 2 days             gabapentin (NEURONTIN) 400 MG capsule  TAKE 2 CAPSULES BY MOUTH TWICE DAILY             levothyroxine (SYNTHROID) 50 MCG tablet  TAKE 1 TABLET BY MOUTH EVERY DAY             metoprolol succinate (TOPROL-XL) 25 MG 24 hr tablet  TAKE 1 TABLET BY MOUTH EVERY DAY             omeprazole (PRILOSEC) 40 MG capsule  TAKE 1 CAPSULE BY MOUTH EVERY MORNING 30 MINUTES BEFORE EATING             oxybutynin (DITROPAN) 5 MG Tab  Take 1 tablet (5 mg total) by mouth 3 (three) times daily as needed (bladder spasms).             traZODone (DESYREL) 50 MG tablet  TAKE 1 TABLET BY MOUTH NIGHTLY                     _________________________________  Esther Marmolejo MD  09/02/2020

## 2020-09-02 NOTE — PROGRESS NOTES
Urology Progress Note    Patient seen and examined.    - Diet OK with urology, no additional procedures or interventions planned  - CBI removed.  bag placed to SPT  - Trend H/H and creatinine  - f/u pathology from bladder biopsies  - Maintain galvan per urethra for total of 5 days. Should remain on antibiotics (cipro) while galvan remains)  - Oxybutynin prn for bladder spasms  - Will need CT urogram in outpatient setting to complete hematuria workup    Urology will now sign off. Please call with further questions or concerns.    Randy Vergara MD  Department of Urology  PGY-2  Pager: [phone]

## 2020-09-02 NOTE — SUBJECTIVE & OBJECTIVE
Interval History: Patient still has some shortness of breath. Denies fevers, chills. Said procedure went well yesterday.     Review of Systems   Constitutional: Positive for fatigue. Negative for chills and fever.   HENT: Negative for hearing loss, rhinorrhea and sinus pain.    Eyes: Negative for photophobia and visual disturbance.   Respiratory: Positive for shortness of breath. Negative for choking, chest tightness and wheezing.    Cardiovascular: Negative for chest pain and palpitations.   Gastrointestinal: Negative for abdominal distention, abdominal pain, diarrhea and nausea.   Genitourinary: Positive for difficulty urinating and hematuria. Negative for dysuria and flank pain.   Musculoskeletal: Negative for back pain, joint swelling and myalgias.   Skin: Negative for color change and pallor.   Neurological: Negative for dizziness, facial asymmetry, weakness and headaches.   Psychiatric/Behavioral: Negative for agitation and confusion.     Objective:     Vital Signs (Most Recent):  Temp: 97.7 °F (36.5 °C) (09/02/20 1144)  Pulse: 70 (09/02/20 1144)  Resp: 15 (09/02/20 1144)  BP: (!) 124/56 (09/02/20 1144)  SpO2: 95 % (09/02/20 1144) Vital Signs (24h Range):  Temp:  [96 °F (35.6 °C)-97.7 °F (36.5 °C)] 97.7 °F (36.5 °C)  Pulse:  [67-85] 70  Resp:  [15-21] 15  SpO2:  [94 %-99 %] 95 %  BP: (124-166)/(56-79) 124/56     Weight: 93.7 kg (206 lb 9.1 oz)  Body mass index is 31.41 kg/m².    Intake/Output Summary (Last 24 hours) at 9/2/2020 1510  Last data filed at 9/2/2020 0600  Gross per 24 hour   Intake 1000 ml   Output 2050 ml   Net -1050 ml      Physical Exam  Vitals signs reviewed.   Constitutional:       General: He is not in acute distress.     Appearance: Normal appearance. He is ill-appearing.   HENT:      Head: Normocephalic and atraumatic.      Right Ear: External ear normal.      Left Ear: External ear normal.      Nose: Nose normal.      Mouth/Throat:      Mouth: Mucous membranes are moist.      Pharynx:  Oropharynx is clear.   Eyes:      Extraocular Movements: Extraocular movements intact.      Conjunctiva/sclera: Conjunctivae normal.      Pupils: Pupils are equal, round, and reactive to light.   Neck:      Musculoskeletal: Normal range of motion. No neck rigidity or muscular tenderness.   Cardiovascular:      Rate and Rhythm: Normal rate and regular rhythm.      Heart sounds: No murmur. No gallop.    Pulmonary:      Effort: Pulmonary effort is normal. No respiratory distress.      Breath sounds: No wheezing or rhonchi.   Abdominal:      General: There is no distension.      Palpations: Abdomen is soft.      Tenderness: There is no abdominal tenderness. There is no guarding.   Musculoskeletal:         General: No swelling, tenderness or deformity.      Right lower leg: No edema.      Left lower leg: No edema.      Comments: Mahajan bag draining light red serosanguinous fluid, no observable clots   Skin:     General: Skin is warm and dry.      Coloration: Skin is not jaundiced.   Neurological:      General: No focal deficit present.      Mental Status: He is alert and oriented to person, place, and time.         Significant Labs:   CBC:   Recent Labs   Lab 09/01/20  0254 09/02/20  0357   WBC 7.18 6.59   HGB 9.2* 10.5*   HCT 29.0* 33.2*    222     CMP:   Recent Labs   Lab 09/01/20  0759 09/01/20 2006 09/02/20  0827   * 133* 131*   K 4.2 3.8 4.9   CL 92* 95 95   CO2 28 29 28   GLU 89 141* 142*   BUN 15 15 19   CREATININE 1.1 1.1 1.1   CALCIUM 8.0* 8.8 8.7   ANIONGAP 9 9 8   EGFRNONAA >60.0 >60.0 >60.0       Significant Imaging: I have reviewed all pertinent imaging results/findings within the past 24 hours.

## 2020-09-02 NOTE — NURSING
Received from pacu via stretcher s/p cystoscope with removal of blood clot. Aaox4, no distress noted. No complaints  at present. CBI intact with yellow urine noted. Will continue to monitor.

## 2020-09-02 NOTE — PT/OT/SLP PROGRESS
"Physical Therapy Treatment    Patient Name:  Chucho Prado   MRN:  [id number]    Recommendations:     Discharge Recommendations:  nursing facility, skilled   Discharge Equipment Recommendations: lift device   Barriers to discharge: None    Assessment:     Chucho Prado is a 86 y.o. male admitted with a medical diagnosis of Hyponatremia.  He presents with the following impairments/functional limitations:  weakness, impaired endurance, impaired sensation, impaired self care skills, impaired functional mobilty, gait instability, decreased lower extremity function, decreased upper extremity function, decreased ROM, pain, decreased safety awareness, impaired balance. Improved mobility today as evidenced by ability to stand and take some steps to chair. Decreased balance with post lean noted, max cues to right self in standing and sitting.     Rehab Prognosis: Fair; patient would benefit from acute skilled PT services to address these deficits and reach maximum level of function.    Recent Surgery: Procedure(s) (LRB):  CYSTOSCOPY (N/A)  REMOVAL, BLOOD CLOT  FULGURATION, BLADDER  DILATION, URETHRA  BIOPSY, BLADDER  CYSTOGRAM 1 Day Post-Op    Plan:     During this hospitalization, patient to be seen 3 x/week to address the identified rehab impairments via gait training, therapeutic activities, therapeutic exercises, therapeutic groups, neuromuscular re-education, wheelchair management/training and progress toward the following goals:    · Plan of Care Expires:  09/30/20    Subjective     Chief Complaint: pain in abdomen  Patient/Family Comments/goals: "I keep having spasms in my bladder"  Pain/Comfort:  · Pain Rating 1: 5/10  · Location 1: abdomen  · Pain Addressed 1: Pre-medicate for activity      Objective:     Communicated with nurse prior to session.  Patient found supine with telemetry, oxygen upon PT entry to room.     General Precautions: Standard, fall   Orthopedic Precautions:N/A   Braces: N/A     Functional " Mobility:  · Bed Mobility:     · Scooting: maximal assistance  · Supine to Sit: moderate assistance and maximal assistance  · Transfers:     · Sit to Stand:  moderate assistance, maximal assistance and of 2 persons with rolling walker  · Gait: 8 ft with RW and Mod A with Max tcs to right self due to posterior lean.       AM-PAC 6 CLICK MOBILITY  Turning over in bed (including adjusting bedclothes, sheets and blankets)?: 2  Sitting down on and standing up from a chair with arms (e.g., wheelchair, bedside commode, etc.): 2  Moving from lying on back to sitting on the side of the bed?: 2  Moving to and from a bed to a chair (including a wheelchair)?: 2  Need to walk in hospital room?: 2  Climbing 3-5 steps with a railing?: 1  Basic Mobility Total Score: 11       Therapeutic Activities and Exercises:   white board updated  Co tx performed with this visit due to patient need for 2 skilled therapists to ensure patient and staff safety and to accommodate for patients activity tolerance.   Prior to ambulation worked on patient static standing balance and tolerance, 3 total trial of sit<>stand    Patient left up in chair with all lines intact, call button in reach, nurse notified and sitter present..    GOALS:   Multidisciplinary Problems     Physical Therapy Goals        Problem: Physical Therapy Goal    Goal Priority Disciplines Outcome Goal Variances Interventions   Physical Therapy Goal     PT, PT/OT Ongoing, Progressing     Description: Goals to be met by: 2020    Patient will increase functional independence with mobility by performin. Supine to sit with MInimal Assistance  2. Sit to supine with MInimal Assistance  3. Sit to stand transfer with Minimal Assistance  4. Bed to chair transfer with Minimal Assistance   5. Lower extremity exercise program x15 reps, with independence                      Time Tracking:     PT Received On: 20  PT Start Time: 1055     PT Stop Time: 1123  PT Total Time (min):  28 min     Billable Minutes: Gait Training 14 and Therapeutic Activity 14    Treatment Type: Treatment  PT/PTA: PTA     PTA Visit Number: 1     Amber Porter PTA  09/02/2020

## 2020-09-02 NOTE — PROGRESS NOTES
"[facility] Hospital Medicine  Progress Note    Patient Name: Chucho Prado  MRN: [id number]  Patient Class: IP- Inpatient   Admission Date: 8/27/2020  Length of Stay: 5 days  Attending Physician: Chiara Martinez MD  Primary Care Provider: Obed Mcguire MD    Hospital Medicine Team: [facility] 5 Nahun Baez MD    Subjective:     Principal Problem:Hyponatremia        HPI:  85 yo male with history of hypothyroidism, neurogenic bladder with suprapubic catheter (exchanged annually, last exchange 3 days ago by home RN), HLD, HTN and GERD presenting to the ER with generalized weakness since 3 days ago. This is the first time the patient has experienced something like this. He describes the weakness as feeling tired and overall "fragile" and "without energy". He can move his extremities but has difficulty sitting down and getting out of bed. Reports 1 episode of diarrhea yesterday. The diarrhea was described as brown, watery and non bloody. He denies bloating and abdominal pain He also endorses SOB with exertion when he walks around the house and uses three pillows at night to sleep mainly because he likes it but also because he feels short of breath sometimes when he lies down. His SOB is chronic and has been going on "for many years" as per patient. He denies nausea, vomiting, cough, palpitations or chest discomfort. He also denies seizures, numbness in extremities, headache or confusion.    Pt does not FU with Cardiology OP but Echo performed in 2019 shows an EF of 58%, normal diastolic and systolic and mild aortic stenosis.For the neurogenic bladder the patient follows up with Urology OP since 2017.    Overview/Hospital Course:  Patient admitted to hospital medicine on 8/27/2020 for hyponatremia and evaluation of severe hyponatremia, hypokalemia, and hypochloremia. Hospital stay complicated by gross hematuria. Urology did bedside cystoscopy on 8/31, The bladder wall was consistent with " neurogenic bladder including multiple diverticula. There was old formed clot seen throughout the bladder floor and in diverticula, but no active bleeding was seen. Hyponatremia slightly improving after HCTZ held.  Urology plan for OR on 9/1 for clot removal.     Interval History: Patient has no new complaints. Still very tired and fatigued at baseline. He denies fevers or chills.     Review of Systems   Constitutional: Positive for fatigue. Negative for chills and fever.   HENT: Negative for hearing loss, rhinorrhea and sinus pain.    Eyes: Negative for photophobia and visual disturbance.   Respiratory: Negative for choking, chest tightness, shortness of breath and wheezing.    Cardiovascular: Negative for chest pain and palpitations.   Gastrointestinal: Negative for abdominal distention, abdominal pain, diarrhea and nausea.   Genitourinary: Positive for difficulty urinating and hematuria. Negative for dysuria and flank pain.   Musculoskeletal: Positive for back pain. Negative for joint swelling and myalgias.   Skin: Negative for color change and pallor.   Neurological: Negative for dizziness, facial asymmetry, weakness and headaches.   Psychiatric/Behavioral: Negative for agitation and confusion.     Objective:     Vital Signs (Most Recent):  Temp: 97.6 °F (36.4 °C) (09/01/20 0803)  Pulse: 61 (09/01/20 0808)  Resp: 18 (09/01/20 0803)  BP: (!) 164/70 (09/01/20 0803)  SpO2: 96 % (09/01/20 0803) Vital Signs (24h Range):  Temp:  [97.6 °F (36.4 °C)-98.8 °F (37.1 °C)] 97.6 °F (36.4 °C)  Pulse:  [60-81] 61  Resp:  [17-24] 18  SpO2:  [91 %-96 %] 96 %  BP: (121-164)/(57-74) 164/70     Weight: 93.7 kg (206 lb 9.1 oz)  Body mass index is 31.41 kg/m².    Intake/Output Summary (Last 24 hours) at 9/1/2020 0858  Last data filed at 9/1/2020 0700  Gross per 24 hour   Intake 400 ml   Output 3150 ml   Net -2750 ml      Physical Exam  Vitals signs reviewed.   Constitutional:       General: He is not in acute distress.     Appearance:  Normal appearance. He is ill-appearing. He is not toxic-appearing.   HENT:      Head: Normocephalic and atraumatic.      Right Ear: External ear normal.      Left Ear: External ear normal.      Nose: Nose normal.      Mouth/Throat:      Mouth: Mucous membranes are dry.      Pharynx: Oropharynx is clear.   Eyes:      Conjunctiva/sclera: Conjunctivae normal.   Neck:      Musculoskeletal: Normal range of motion. No neck rigidity or muscular tenderness.   Cardiovascular:      Rate and Rhythm: Normal rate and regular rhythm.      Heart sounds: No murmur. No gallop.    Pulmonary:      Effort: Pulmonary effort is normal. No respiratory distress.      Breath sounds: No wheezing.      Comments: Bibasilar crackles appreciated  Abdominal:      General: There is no distension.      Palpations: Abdomen is soft.      Tenderness: There is no abdominal tenderness. There is no guarding.   Genitourinary:     Comments: Mahajan in place, gross hematuria in bag, some clotting of the tubing.   Musculoskeletal:         General: No swelling, tenderness or deformity.      Right lower leg: No edema.      Left lower leg: No edema.   Skin:     General: Skin is warm and dry.      Coloration: Skin is not jaundiced.   Neurological:      General: No focal deficit present.      Mental Status: He is alert and oriented to person, place, and time.         Significant Labs:   BMP:   Recent Labs   Lab 09/01/20  0759   GLU 89   *   K 4.2   CL 92*   CO2 28   BUN 15   CREATININE 1.1   CALCIUM 8.0*     CBC:   Recent Labs   Lab 08/31/20  0520 09/01/20  0254   WBC 7.68 7.18   HGB 9.3* 9.2*   HCT 30.4* 29.0*    198     CMP:   Recent Labs   Lab 08/31/20  1541 08/31/20  2325 09/01/20  0759   * 128* 129*   K 4.3 4.2 4.2   CL 92* 92* 92*   CO2 30* 30* 28    117* 89   BUN 16 16 15   CREATININE 1.3 1.1 1.1   CALCIUM 8.1* 7.8* 8.0*   ANIONGAP 7* 6* 9   EGFRNONAA 49.4* >60.0 >60.0       Significant Imaging: I have reviewed all pertinent imaging  "results/findings within the past 24 hours.      Assessment/Plan:      * Hyponatremia  87 yo male with history of hypothyroidism, neurogenic bladder with suprapubic catheter (exchanged annually, last exchange 3 days ago by home RN), HLD, HTN and GERD presenting to the ER with generalized weakness since 3 days ago. This is the first time the patient has experienced something like this. He describes the weakness as feeling tired and overall "fragile" and "without energy". Pt denies: seizures, headache or numbness.    Labs concerning for hyponatremia,  hypochloremia and hypokalemia.   CXR:left basilar increased density which could represent combination of effusion, atelectasis or consolidation.     BNP: 298  Echo ordered to r/o CHF:    Serum Osm: 247 (low)  Urine Osm: 217 (low)  Urine Na: 52  Urine Cl: 45    Pt currently taking Hydrochlorothiazide for hypertension. As per PCP notes he has been taking HCTZ since 2019 without any side effects. The medication was stopped but then restarted 8/21.    Given low serum AND urine osms, this appears to be an ADH independent hyponatremia. Volume status is borderline low on exam, CVP on Echo was 3.    Sodium in 127-129 since 8/30 8/31 Renal U/S no evidence of hydronephrosis but decreased renal perfusion    Plan  - Follow up renin/aldosterone ratio per nephrology  - Touch base with nephrology in the AM regarding further management  - Consider trial small bolus fluids if sodium doesn't normalize  - BMP's q4h to monitor for overcorrection  - Continuing to hold HCTZ and mirtazapine    Moderate to severe aortic stenosis  Patient with moderate to severe AS on echo performed during this hospitalization. LUIS: 1.03 cm2; peak velocity 3.11 m/s; mean gradient 26 mmHg. Could contribute to patients symptoms of dyspnea. Further given low CVP and pre-load dependent nature of AS, could potentially improve with some volume resuscitation.    - Follow up with cardiology as an outpatient for closer " monitoring / potential intervention as an outpatient      Acute hypoxemic respiratory failure  Pt currently on 2L nasal cannula but does not appear to need as much as he is satting %. Not on oxygen at home, patient does report 1 year history of dyspnea. CXR with chronic atelectatic changes in left lung base possible consolidation.     Unclear of cause. Possible that chronic anemia, with clots in bladder as cause of SOB and oxygen requirements. However, ddx includes CHF or infection. Although ECHO showed EF of 60%, possible that patient's severe Aortic Stenosis can be causing his symptoms.      - Titrate down O2 as needed to goal O2 sat > 92%  - 8/31, taken off O2. Became hypoxic on RA  - Patient currently on 0.5 L O2 with sats of 97%  - Incentive spirometry q6hrs  - f/u repeat CXR    Weakness  -See hyponatremia      Hypokalemia  ECG with frequent PVCs. No QT interval prolongation, no ST depression.     Resolved K of 4.6 on 8/31          Gross hematuria  Suprapubic catheter changed home home health nurse day prior to presentation to ED. Presented with clear yellow urine in galvan bag on day of admit. Hospital day 1 patient's galvan bag with gross hematuria. Irrigated by urology to clear but hematuria returned.     - Avoid irrigation with sterile water as this could worsen patient's hyponatremia  - Urology did bedside cystoscopy showed evidence of neurogenic bladder and large clots that could not be removed  - Plan for OR today with urology for clot removal  - Given gross hematuria and planned cystoscopy, DVT prophylaxis was discontinued on 8/31 for risk of bleed  - Will continue DVT ppx in 1-2 days post op per urology        TERE (acute kidney injury)  On admission Cr 1.1 (baseline). Increased to 1.5 after IVF boluses although hyponatremia improved with this. Has since resolved. Renal U/S indicative of decreased renal perfusion but no hydronephrosis.      Recommendations:  - Rosolved      Hyperlipidemia  -Continue  home Atorvastatin      Essential hypertension  -Continue home Metroprolol  -Hold home HCTZ (known cause of hyponatremia)       Acquired hypothyroidism  TSH on admit borderline low (0.8). Could potentially be contributing to patient's hyponatremia    -Continue home Levothyroxine at this time      UTI (urinary tract infection)  On urinalysis patient with hazy urine, protein +1, occult blood +2, leukocytes +3 and nitrites +. Clinically patient with gross hematuria on exam and suprapubic tenderness. Likely UA secondary to colonization and dirty specimen from suprapubic catheter.    Plan  - Pt initially covered with ceftriaxone in the ED, low likelihood of UTI  - Urology recommended to continue on cirpofloxacin 500 mg daily.           VTE Risk Mitigation (From admission, onward)         Ordered     Place sequential compression device  Until discontinued      08/27/20 4451                Discharge Planning   TISHA: 9/3/2020     Code Status: Full Code   Is the patient medically ready for discharge?:     Reason for patient still in hospital (select all that apply): Treatment  Discharge Plan A: Skilled Nursing Facility   Discharge Delays: None known at this time              Nahun Baez MD  Department of Hospital Medicine   [facility]

## 2020-09-02 NOTE — PROGRESS NOTES
[facility]  Nephrology  Short Progress Note    Mr. 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 Also had episode of diarrhea and generally poor PO intake. On 8/19 he saw his PCP who initiated Remeron for depression. Unclear whether he was supposed to be taking HCTZ 25 mg qd, but the patient was not taking this and began taking it a few days prior to admission when his PCP told him that he no longer needs to take his losartan. Initial labs on presentation notable for hyponatremia of 119, hypokalemia of 2.7, hypochloremia and metabolic alkalosis with bicarb of 37. HCTZ and Remeron were both held. He was found to be hypo-osmolar (247) and UOSM of 217 and Gabi of 52 on initial studies.  ECHO obtained w/o evidence of HF (EF 60% with CVP 3). UOP had been adequate. Nephrology was consulted for hyponatremia.    Since holding HCTZ, hyponatremia has slowly improved and is now stable in the low-130s. TERE (Cr up to 1.5) has resolved and Cr now at baseline after bedside clot irrigation on 8/31 and large clot evacuation on 9/1 done by urology.     Final recommendations:  - Patient should no longer receive thiazides regardless of his improvement in Na.  - Maintain adequate oral Na intake.   - Restrict daily free water intake to 1.5L.  - Follow up aldosterone/renin activity ratio, this usually takes several days to result.    Thank you for involving us in the care of this patient. We will sign off. Please contact the team if there are any further questions.      Toby Borrero,   Nephrology  Internal Medicine, PGY-II

## 2020-09-02 NOTE — ASSESSMENT & PLAN NOTE
Pt currently on 2L nasal cannula but does not appear to need as much as he is satting %. Not on oxygen at home, patient does report 1 year history of dyspnea. CXR with chronic atelectatic changes in left lung base possible consolidation.     Unclear of cause. Possible that chronic anemia, with clots in bladder as cause of SOB and oxygen requirements. However, ddx includes CHF or infection. Although ECHO showed EF of 60%, possible that patient's severe Aortic Stenosis can be causing his symptoms.      - Patient currently comfortable on room air with good O2 sats  - Stable for discharge  - Incentive spirometry q6hrs

## 2020-09-02 NOTE — PLAN OF CARE
Problem: Occupational Therapy Goal  Goal: Occupational Therapy Goal  Description: Goals to be met by: 9/14/2020     Patient will increase functional independence with ADLs by performing:    UE Dressing with Set-up Assistance.  Grooming while seated with Set-up Assistance.  Toileting from bedside commode with Stand-by Assistance for hygiene and clothing management.   Stand pivot transfers with Contact Guard Assistance.  Toilet transfer to bedside commode with Contact Guard Assistance.    Outcome: Ongoing, Progressing

## 2020-09-02 NOTE — PROGRESS NOTES
[facility]  Urology  Progress Note    Patient Name: Chucho Prado  MRN: [id number]  Admission Date: 8/27/2020  Hospital Length of Stay: 6 days  Code Status: Full Code   Attending Provider: Chiara Martinez MD   Primary Care Physician: Obed Mcguire MD    Subjective:     HPI:  85 yo male with history of phimosis and neurogenic bladder admitted to [facility] for hyponatremia and hypokalemia following restarting HCTZ for HTN. He is a patient of Dr. Taylor who underwent placement of suprapubic tube in 2017 for neurogenic bladder. The patient elected for suprapubic tube due to a tight phimosis that made self catheterization difficult. He has been seeing Neelam Hooper for monthly SPT exchanges but has since switched to monthly exchanges with home health nursing since the COVID-19 outbreak. His last catheter was exchanged 5 days ago; the patient endorses pain with this exchange and noticed blood in his urine shortly thereafter. He states that suprapubic tube exchanges are normally painless. He is not on anticoagulation. Urine culture obtained from SPT shows >100k GNR, although the patient does not have complaints of suprapubic pain, urgency, and spasms as he normally does with UTIs. He was seen by urology yesterday with irrigation of his 18 Fr SPT to clear with a minimal clot burden. For reasons unknown to urology, the consult that was placed on 8/28 was removed and was not replaced until midday today.    Patient seen at the bedside at 1400. Vitals and labs stable including hemoglobin and WBC count. He has no complaints of pain or discomfort.    Interval History: NAEON. AFVSS.  Urine clear on CBI. CBI clamped on AM rounds.  Cystoscopy yesterday with successful clot evacuation.  Currently has 18 Fr SPT and 20 Fr councill in urethra.     Objective:     Temp:  [96 °F (35.6 °C)-98.2 °F (36.8 °C)] 96 °F (35.6 °C)  Pulse:  [61-85] 69  Resp:  [16-21] 16  SpO2:  [96 %-99 %] 99 %  BP: (140-178)/(63-76) 140/63     Body  mass index is 31.41 kg/m².         Drains     Drain                 Suprapubic Catheter -- days         Suprapubic Catheter 06/23/17 1200 18 Fr. 1163 days                Physical Exam   Constitutional: He is oriented to person, place, and time. He appears well-developed. No distress.   Cardiovascular: Normal rate.    Pulmonary/Chest: Effort normal. No respiratory distress.   Abdominal: Soft. He exhibits no distension. There is no abdominal tenderness.   18 Fr suprapubic tube hooked up to CBI.   Genitourinary:    Genitourinary Comments: Tight phimotic band with buried penis. 20 Fr councill catheter with clear urine. Testes palpated bilaterally, no masses. Small right hydrocele, nontender.      Musculoskeletal: Normal range of motion. No tenderness.   Neurological: He is alert and oriented to person, place, and time.   Skin: Skin is warm and dry. No rash noted.     Psychiatric: Judgment normal.       Significant Labs:    BMP:  Recent Labs   Lab 08/31/20  2325 09/01/20  0759 09/01/20 2006   * 129* 133*   K 4.2 4.2 3.8   CL 92* 92* 95   CO2 30* 28 29   BUN 16 15 15   CREATININE 1.1 1.1 1.1   CALCIUM 7.8* 8.0* 8.8       CBC:   Recent Labs   Lab 08/31/20  0520 09/01/20  0254 09/02/20  0357   WBC 7.68 7.18 6.59   HGB 9.3* 9.2* 10.5*   HCT 30.4* 29.0* 33.2*    198 222     Significant Imaging:  All pertinent imaging results/findings from the past 24 hours have been reviewed.      Assessment/Plan:     Gross hematuria  87 yo male with gross hematuria per suprapubic tube after replacement with  nursing. Vitals and labs stable.    - Diet OK with urology, no additional procedures or interventions planned  - CBI clamped. Urine clear.  - OK to connect 18 Fr SPT to  bag.  - Trend H/H and creatinine  - f/u pathology from bladder biopsies  - Maintain galvan per urethra for total of 5 days. Should remain on antibiotics (cipro) while galvan remains)  - Oxybutynin prn for bladder spasms  - Will consider obtaining CT  urogram to complete hematuria workup while inpatient        VTE Risk Mitigation (From admission, onward)         Ordered     Place sequential compression device  Until discontinued      08/27/20 5794                Randy Vergara MD  Urology  [facility]

## 2020-09-02 NOTE — PROGRESS NOTES
"[facility] Hospital Medicine  Progress Note    Patient Name: Chucho Prado  MRN: [id number]  Patient Class: IP- Inpatient   Admission Date: 8/27/2020  Length of Stay: 6 days  Attending Physician: Chiara Martinez MD  Primary Care Provider: Obed Mcguire MD    Hospital Medicine Team: [facility] [place] HOSP MED 5 Nahun Baez MD    Subjective:     Principal Problem:Hyponatremia        HPI:  85 yo male with history of hypothyroidism, neurogenic bladder with suprapubic catheter (exchanged annually, last exchange 3 days ago by home RN), HLD, HTN and GERD presenting to the ER with generalized weakness since 3 days ago. This is the first time the patient has experienced something like this. He describes the weakness as feeling tired and overall "fragile" and "without energy". He can move his extremities but has difficulty sitting down and getting out of bed. Reports 1 episode of diarrhea yesterday. The diarrhea was described as brown, watery and non bloody. He denies bloating and abdominal pain He also endorses SOB with exertion when he walks around the house and uses three pillows at night to sleep mainly because he likes it but also because he feels short of breath sometimes when he lies down. His SOB is chronic and has been going on "for many years" as per patient. He denies nausea, vomiting, cough, palpitations or chest discomfort. He also denies seizures, numbness in extremities, headache or confusion.    Pt does not FU with Cardiology OP but Echo performed in 2019 shows an EF of 58%, normal diastolic and systolic and mild aortic stenosis.For the neurogenic bladder the patient follows up with Urology OP since 2017.    Overview/Hospital Course:  Patient admitted to hospital medicine on 8/27/2020 for hyponatremia and evaluation of severe hyponatremia, hypokalemia, and hypochloremia. Hospital stay complicated by gross hematuria. Urology did bedside cystoscopy on 8/31, The bladder wall was consistent with " neurogenic bladder including multiple diverticula. There was old formed clot seen throughout the bladder floor and in diverticula, but no active bleeding was seen. Hyponatremia slightly improving after HCTZ held.  Urology plan for OR on 9/1 for clot removal. Urology removed 2 clots in the the bladder and found some diverticula, and is stable for discharge with follow up in clinic. Patient's sodium improved to 131 on 9/1.     Interval History: Patient still has some shortness of breath. Denies fevers, chills. Said procedure went well yesterday.     Review of Systems   Constitutional: Positive for fatigue. Negative for chills and fever.   HENT: Negative for hearing loss, rhinorrhea and sinus pain.    Eyes: Negative for photophobia and visual disturbance.   Respiratory: Positive for shortness of breath. Negative for choking, chest tightness and wheezing.    Cardiovascular: Negative for chest pain and palpitations.   Gastrointestinal: Negative for abdominal distention, abdominal pain, diarrhea and nausea.   Genitourinary: Positive for difficulty urinating and hematuria. Negative for dysuria and flank pain.   Musculoskeletal: Negative for back pain, joint swelling and myalgias.   Skin: Negative for color change and pallor.   Neurological: Negative for dizziness, facial asymmetry, weakness and headaches.   Psychiatric/Behavioral: Negative for agitation and confusion.     Objective:     Vital Signs (Most Recent):  Temp: 97.7 °F (36.5 °C) (09/02/20 1144)  Pulse: 70 (09/02/20 1144)  Resp: 15 (09/02/20 1144)  BP: (!) 124/56 (09/02/20 1144)  SpO2: 95 % (09/02/20 1144) Vital Signs (24h Range):  Temp:  [96 °F (35.6 °C)-97.7 °F (36.5 °C)] 97.7 °F (36.5 °C)  Pulse:  [67-85] 70  Resp:  [15-21] 15  SpO2:  [94 %-99 %] 95 %  BP: (124-166)/(56-79) 124/56     Weight: 93.7 kg (206 lb 9.1 oz)  Body mass index is 31.41 kg/m².    Intake/Output Summary (Last 24 hours) at 9/2/2020 1510  Last data filed at 9/2/2020 0600  Gross per 24 hour    Intake 1000 ml   Output 2050 ml   Net -1050 ml      Physical Exam  Vitals signs reviewed.   Constitutional:       General: He is not in acute distress.     Appearance: Normal appearance. He is ill-appearing.   HENT:      Head: Normocephalic and atraumatic.      Right Ear: External ear normal.      Left Ear: External ear normal.      Nose: Nose normal.      Mouth/Throat:      Mouth: Mucous membranes are moist.      Pharynx: Oropharynx is clear.   Eyes:      Extraocular Movements: Extraocular movements intact.      Conjunctiva/sclera: Conjunctivae normal.      Pupils: Pupils are equal, round, and reactive to light.   Neck:      Musculoskeletal: Normal range of motion. No neck rigidity or muscular tenderness.   Cardiovascular:      Rate and Rhythm: Normal rate and regular rhythm.      Heart sounds: No murmur. No gallop.    Pulmonary:      Effort: Pulmonary effort is normal. No respiratory distress.      Breath sounds: No wheezing or rhonchi.   Abdominal:      General: There is no distension.      Palpations: Abdomen is soft.      Tenderness: There is no abdominal tenderness. There is no guarding.   Musculoskeletal:         General: No swelling, tenderness or deformity.      Right lower leg: No edema.      Left lower leg: No edema.      Comments: Mahajan bag draining light red serosanguinous fluid, no observable clots   Skin:     General: Skin is warm and dry.      Coloration: Skin is not jaundiced.   Neurological:      General: No focal deficit present.      Mental Status: He is alert and oriented to person, place, and time.         Significant Labs:   CBC:   Recent Labs   Lab 09/01/20  0254 09/02/20  0357   WBC 7.18 6.59   HGB 9.2* 10.5*   HCT 29.0* 33.2*    222     CMP:   Recent Labs   Lab 09/01/20  0759 09/01/20 2006 09/02/20  0827   * 133* 131*   K 4.2 3.8 4.9   CL 92* 95 95   CO2 28 29 28   GLU 89 141* 142*   BUN 15 15 19   CREATININE 1.1 1.1 1.1   CALCIUM 8.0* 8.8 8.7   ANIONGAP 9 9 8   EGFRNONAA  ">60.0 >60.0 >60.0       Significant Imaging: I have reviewed all pertinent imaging results/findings within the past 24 hours.      Assessment/Plan:      * Hyponatremia  85 yo male with history of hypothyroidism, neurogenic bladder with suprapubic catheter (exchanged annually, last exchange 3 days ago by home RN), HLD, HTN and GERD presenting to the ER with generalized weakness since 3 days ago. This is the first time the patient has experienced something like this. He describes the weakness as feeling tired and overall "fragile" and "without energy". Pt denies: seizures, headache or numbness.    Labs concerning for hyponatremia,  hypochloremia and hypokalemia.   CXR:left basilar increased density which could represent combination of effusion, atelectasis or consolidation.     BNP: 298  Echo ordered to r/o CHF:    Serum Osm: 247 (low)  Urine Osm: 217 (low)  Urine Na: 52  Urine Cl: 45    Pt currently taking Hydrochlorothiazide for hypertension. As per PCP notes he has been taking HCTZ since 2019 without any side effects. The medication was stopped but then restarted 8/21.    Given low serum AND urine osms, this appears to be an ADH independent hyponatremia. Volume status is borderline low on exam, CVP on Echo was 3.    Sodium improving to 131 on 9/2 8/31 Renal U/S no evidence of hydronephrosis but decreased renal perfusion    Plan  - Will discharge to SNF once repeat BMP is done  - Advise 1500mL fluid restriction so sodium levels can normalize  - SNF can draw labs   - Advise patient to not continue on HCTZ again upon discharge as likely cause of hyponatremia  - Follow up renin/aldosterone ratio per nephrology    Moderate to severe aortic stenosis  Patient with moderate to severe AS on echo performed during this hospitalization. LUIS: 1.03 cm2; peak velocity 3.11 m/s; mean gradient 26 mmHg. Could contribute to patients symptoms of dyspnea. Further given low CVP and pre-load dependent nature of AS, could potentially " improve with some volume resuscitation.    - Follow up with cardiology as an outpatient for closer monitoring / potential intervention as an outpatient      Acute hypoxemic respiratory failure  Pt currently on 2L nasal cannula but does not appear to need as much as he is satting %. Not on oxygen at home, patient does report 1 year history of dyspnea. CXR with chronic atelectatic changes in left lung base possible consolidation.     Unclear of cause. Possible that chronic anemia, with clots in bladder as cause of SOB and oxygen requirements. However, ddx includes CHF or infection. Although ECHO showed EF of 60%, possible that patient's severe Aortic Stenosis can be causing his symptoms.      - Patient currently comfortable on room air with good O2 sats  - Stable for discharge  - Incentive spirometry q6hrs      Weakness  -See hyponatremia      Hypokalemia  ECG with frequent PVCs. No QT interval prolongation, no ST depression.     Resolved K of 4.6 on 8/31          Gross hematuria  Suprapubic catheter changed home home health nurse day prior to presentation to ED. Presented with clear yellow urine in galvan bag on day of admit. Hospital day 1 patient's galvan bag with gross hematuria. Irrigated by urology to clear but hematuria returned. Urology did cystoscopy and removed 2 large clots and found several bladder diverticula.     - Appreciate urology recs:   - Will d/c with galvan for 5 days but needs to remain on antibiotics (cirpofloxacin) when galvan is in   - Follow up pathology from bladder biopsies    - Oxybutynin PRN for bladder spasms   - Okay to discharge with follow up in urology clinic in 1-2 weeks   - Possible CT urogram outpatient   - Avoid irrigation with sterile water as this could worsen patient's hyponatremia  - Given gross hematuria and planned cystoscopy, DVT prophylaxis was discontinued on 8/31 for risk of bleed  - Will continue DVT ppx in 1-2 days post op per urology                TERE (acute  kidney injury)  On admission Cr 1.1 (baseline). Increased to 1.5 after IVF boluses although hyponatremia improved with this. Has since resolved. Renal U/S indicative of decreased renal perfusion but no hydronephrosis.      Recommendations:  - Rosolved      Hyperlipidemia  -Continue home Atorvastatin      Essential hypertension  -Continue home Metroprolol  -Hold home HCTZ (known cause of hyponatremia)   -Do not continue with HCTZ given hyponatremia  -If hypertension is persistent, will need different medication per PCP      Acquired hypothyroidism  TSH on admit borderline low (0.8). Could potentially be contributing to patient's hyponatremia    -Continue home Levothyroxine at this time      UTI (urinary tract infection)  On urinalysis patient with hazy urine, protein +1, occult blood +2, leukocytes +3 and nitrites +. Clinically patient with gross hematuria on exam and suprapubic tenderness. Likely UA secondary to colonization and dirty specimen from suprapubic catheter.    Plan  - Pt initially covered with ceftriaxone in the ED, low likelihood of UTI  - Urology recommended to continue on cirpofloxacin 500 mg until galvan remains per urology  - Urology wanted the galvan in for 5 days after discharge           VTE Risk Mitigation (From admission, onward)         Ordered     Place sequential compression device  Until discontinued      08/27/20 9407                Discharge Planning   TISHA: 9/4/2020     Code Status: Full Code   Is the patient medically ready for discharge?:     Reason for patient still in hospital (select all that apply): Laboratory test  Discharge Plan A: Skilled Nursing Facility   Discharge Delays: None known at this time              Nahun Baez MD  Department of Hospital Medicine   [facility]

## 2020-09-02 NOTE — ASSESSMENT & PLAN NOTE
85 yo male with gross hematuria per suprapubic tube after replacement with  nursing. Vitals and labs stable.    - Diet OK with urology, no additional procedures or interventions planned  - CBI clamped. Urine clear.  - OK to connect 18 Fr SPT to  bag.  - Trend H/H and creatinine  - f/u pathology from bladder biopsies  - Maintain galvan per urethra for total of 5 days. Should remain on antibiotics (cipro) while galvan remains)  - Oxybutynin prn for bladder spasms  - Will consider obtaining CT urogram to complete hematuria workup while inpatient

## 2020-09-02 NOTE — ASSESSMENT & PLAN NOTE
"87 yo male with history of hypothyroidism, neurogenic bladder with suprapubic catheter (exchanged annually, last exchange 3 days ago by home RN), HLD, HTN and GERD presenting to the ER with generalized weakness since 3 days ago. This is the first time the patient has experienced something like this. He describes the weakness as feeling tired and overall "fragile" and "without energy". Pt denies: seizures, headache or numbness.    Labs concerning for hyponatremia,  hypochloremia and hypokalemia.   CXR:left basilar increased density which could represent combination of effusion, atelectasis or consolidation.     BNP: 298  Echo ordered to r/o CHF:    Serum Osm: 247 (low)  Urine Osm: 217 (low)  Urine Na: 52  Urine Cl: 45    Pt currently taking Hydrochlorothiazide for hypertension. As per PCP notes he has been taking HCTZ since 2019 without any side effects. The medication was stopped but then restarted 8/21.    Given low serum AND urine osms, this appears to be an ADH independent hyponatremia. Volume status is borderline low on exam, CVP on Echo was 3.    Sodium improving to 131 on 9/2 8/31 Renal U/S no evidence of hydronephrosis but decreased renal perfusion    Plan  - Will discharge to SNF once repeat BMP is done  - Advise 1500mL fluid restriction so sodium levels can normalize  - SNF can draw labs   - Advise patient to not continue on HCTZ again upon discharge as likely cause of hyponatremia  - Follow up renin/aldosterone ratio per nephrology  "

## 2020-09-02 NOTE — ANESTHESIA POSTPROCEDURE EVALUATION
Anesthesia Post Evaluation    Patient: Chucho Prado    Procedure(s) Performed: Procedure(s) (LRB):  CYSTOSCOPY (N/A)  REMOVAL, BLOOD CLOT  FULGURATION, BLADDER  DILATION, URETHRA  BIOPSY, BLADDER  CYSTOGRAM    Final Anesthesia Type: general    Patient location during evaluation: PACU  Patient participation: Yes- Able to Participate  Level of consciousness: awake and alert  Post-procedure vital signs: reviewed and stable  Pain management: adequate  Airway patency: patent    PONV status at discharge: No PONV  Anesthetic complications: no      Cardiovascular status: stable  Respiratory status: unassisted and spontaneous ventilation  Hydration status: euvolemic  Follow-up not needed.          Vitals Value Taken Time   /68 09/01/20 1749   Temp 36.3 °C (97.3 °F) 09/01/20 1718   Pulse 79 09/01/20 1759   Resp 24 09/01/20 1759   SpO2 96 % 09/01/20 1759   Vitals shown include unvalidated device data.      No case tracking events are documented in the log.      Pain/Nathaniel Score: Nathaniel Score: 9 (9/1/2020  5:45 PM)

## 2020-09-02 NOTE — ASSESSMENT & PLAN NOTE
On urinalysis patient with hazy urine, protein +1, occult blood +2, leukocytes +3 and nitrites +. Clinically patient with gross hematuria on exam and suprapubic tenderness. Likely UA secondary to colonization and dirty specimen from suprapubic catheter.    Plan  - Pt initially covered with ceftriaxone in the ED, low likelihood of UTI  - Urology recommended to continue on cirpofloxacin 500 mg until galvan remains per urology  - Urology wanted the galvan in for 5 days after discharge

## 2020-09-02 NOTE — PT/OT/SLP PROGRESS
Occupational Therapy   Treatment    Name: Chucho Prado  MRN: [id number]  Admitting Diagnosis:  Hyponatremia  1 Day Post-Op    Recommendations:     Discharge Recommendations: nursing facility, skilled  Discharge Equipment Recommendations:  lift device  Barriers to discharge:  None    Assessment:     Chucho Prado is a 86 y.o. male with a medical diagnosis of Hyponatremia.  He was able to perform supine/sit, sit/stand, and bed/chair T/F c mod A and RW.  Able to perform UB/LB dressing c min-mod A and set-up.  Performed grooming task c max A.  Pt has fair static/dynamic standing balance.  Pt is progressing well. Performance deficits affecting function are weakness, impaired endurance, impaired self care skills, impaired functional mobilty, impaired balance, decreased upper extremity function.     Rehab Prognosis:  Good; patient would benefit from acute skilled OT services to address these deficits and reach maximum level of function.       Plan:     Patient to be seen 3 x/week to address the above listed problems via self-care/home management, therapeutic activities, therapeutic exercises  · Plan of Care Expires: 09/01/20  · Plan of Care Reviewed with: patient, caregiver    Subjective     Pain/Comfort:  · Pain Rating 1: 5/10    Objective:     Communicated with: RN prior to session.  Patient found supine with oxygen, telemetry upon OT entry to room.    General Precautions: Standard, fall   Orthopedic Precautions:N/A   Braces: N/A     Occupational Performance:     Bed Mobility:    · Patient completed Supine to Sit with moderate assistance and 2 persons     Functional Mobility/Transfers:  · Patient completed Sit <> Stand Transfer with moderate assistance and of 2 persons  with  rolling walker   · Patient completed Bed <> Chair Transfer using Stand Pivot technique with moderate assistance and of 2 persons with rolling walker  · Functional Mobility: Pt was able to take 3-4 steps c min A for static/dynamic standing  balance.    Activities of Daily Living:  · Grooming: maximal assistance to wash hair and place rubber band for ponytail.  · Upper Body Dressing: minimum assistance to don/doff hospital gown.  · Lower Body Dressing: minimum assistance to don slip on shoes.      [facility] 6 Click ADL: 14    Patient left up in chair with all lines intact, call button in reach, RN notified and caregiver presentEducation:      GOALS:   Multidisciplinary Problems     Occupational Therapy Goals        Problem: Occupational Therapy Goal    Goal Priority Disciplines Outcome Interventions   Occupational Therapy Goal     OT, PT/OT Ongoing, Progressing    Description: Goals to be met by: 9/14/2020     Patient will increase functional independence with ADLs by performing:    UE Dressing with Set-up Assistance.  Grooming while seated with Set-up Assistance.  Toileting from bedside commode with Stand-by Assistance for hygiene and clothing management.   Stand pivot transfers with Contact Guard Assistance.  Toilet transfer to bedside commode with Contact Guard Assistance.                     Time Tracking:     OT Date of Treatment: 09/02/20  OT Start Time: 1057  OT Stop Time: 1120  OT Total Time (min): 23 min    Billable Minutes:Self Care/Home Management 12  Therapeutic Activity 11    DINO Mendiola  9/2/2020

## 2020-09-02 NOTE — PLAN OF CARE
Quiet hours. Med once with ordered pill for bladder spasms. Mahajan and sp caths intact to CBI urine very light pink tinged with good output. CBI was clamped at 4am as ordered. Vss. Safety maintained.

## 2020-09-03 LAB
ANION GAP SERPL CALC-SCNC: 8 MMOL/L (ref 8–16)
BASOPHILS # BLD AUTO: 0.03 K/UL (ref 0–0.2)
BASOPHILS NFR BLD: 0.3 % (ref 0–1.9)
BUN SERPL-MCNC: 24 MG/DL (ref 8–23)
CALCIUM SERPL-MCNC: 8.3 MG/DL (ref 8.7–10.5)
CHLORIDE SERPL-SCNC: 94 MMOL/L (ref 95–110)
CO2 SERPL-SCNC: 25 MMOL/L (ref 23–29)
CREAT SERPL-MCNC: 1.1 MG/DL (ref 0.5–1.4)
DIFFERENTIAL METHOD: ABNORMAL
EOSINOPHIL # BLD AUTO: 0 K/UL (ref 0–0.5)
EOSINOPHIL NFR BLD: 0.3 % (ref 0–8)
ERYTHROCYTE [DISTWIDTH] IN BLOOD BY AUTOMATED COUNT: 13.9 % (ref 11.5–14.5)
EST. GFR  (AFRICAN AMERICAN): >60 ML/MIN/1.73 M^2
EST. GFR  (NON AFRICAN AMERICAN): >60 ML/MIN/1.73 M^2
GLUCOSE SERPL-MCNC: 104 MG/DL (ref 70–110)
HCT VFR BLD AUTO: 30.4 % (ref 40–54)
HGB BLD-MCNC: 9.5 G/DL (ref 14–18)
IMM GRANULOCYTES # BLD AUTO: 0.12 K/UL (ref 0–0.04)
IMM GRANULOCYTES NFR BLD AUTO: 1.3 % (ref 0–0.5)
LYMPHOCYTES # BLD AUTO: 1.5 K/UL (ref 1–4.8)
LYMPHOCYTES NFR BLD: 16.8 % (ref 18–48)
MCH RBC QN AUTO: 26.6 PG (ref 27–31)
MCHC RBC AUTO-ENTMCNC: 31.3 G/DL (ref 32–36)
MCV RBC AUTO: 85 FL (ref 82–98)
MONOCYTES # BLD AUTO: 0.6 K/UL (ref 0.3–1)
MONOCYTES NFR BLD: 6.2 % (ref 4–15)
NEUTROPHILS # BLD AUTO: 6.7 K/UL (ref 1.8–7.7)
NEUTROPHILS NFR BLD: 75.1 % (ref 38–73)
NRBC BLD-RTO: 0 /100 WBC
PLATELET # BLD AUTO: 234 K/UL (ref 150–350)
PMV BLD AUTO: 9.5 FL (ref 9.2–12.9)
POTASSIUM SERPL-SCNC: 4.8 MMOL/L (ref 3.5–5.1)
RBC # BLD AUTO: 3.57 M/UL (ref 4.6–6.2)
SODIUM SERPL-SCNC: 127 MMOL/L (ref 136–145)
WBC # BLD AUTO: 8.91 K/UL (ref 3.9–12.7)

## 2020-09-03 PROCEDURE — 80048 BASIC METABOLIC PNL TOTAL CA: CPT

## 2020-09-03 PROCEDURE — 99233 PR SUBSEQUENT HOSPITAL CARE,LEVL III: ICD-10-PCS | Mod: GC,,, | Performed by: STUDENT IN AN ORGANIZED HEALTH CARE EDUCATION/TRAINING PROGRAM

## 2020-09-03 PROCEDURE — 11000001 HC ACUTE MED/SURG PRIVATE ROOM

## 2020-09-03 PROCEDURE — 85025 COMPLETE CBC W/AUTO DIFF WBC: CPT

## 2020-09-03 PROCEDURE — 36415 COLL VENOUS BLD VENIPUNCTURE: CPT

## 2020-09-03 PROCEDURE — 25000003 PHARM REV CODE 250: Performed by: STUDENT IN AN ORGANIZED HEALTH CARE EDUCATION/TRAINING PROGRAM

## 2020-09-03 PROCEDURE — 99233 SBSQ HOSP IP/OBS HIGH 50: CPT | Mod: GC,,, | Performed by: STUDENT IN AN ORGANIZED HEALTH CARE EDUCATION/TRAINING PROGRAM

## 2020-09-03 RX ADMIN — PANTOPRAZOLE SODIUM 40 MG: 40 TABLET, DELAYED RELEASE ORAL at 09:09

## 2020-09-03 RX ADMIN — GABAPENTIN 800 MG: 400 CAPSULE ORAL at 09:09

## 2020-09-03 RX ADMIN — OXYBUTYNIN CHLORIDE 5 MG: 5 TABLET ORAL at 09:09

## 2020-09-03 RX ADMIN — CIPROFLOXACIN 500 MG: 500 TABLET, FILM COATED ORAL at 09:09

## 2020-09-03 RX ADMIN — SODIUM CHLORIDE 500 ML: 0.9 INJECTION, SOLUTION INTRAVENOUS at 10:09

## 2020-09-03 RX ADMIN — HYPROMELLOSE 2910 1 DROP: 5 SOLUTION OPHTHALMIC at 03:09

## 2020-09-03 RX ADMIN — LEVOTHYROXINE SODIUM 50 MCG: 50 TABLET ORAL at 06:09

## 2020-09-03 RX ADMIN — METOPROLOL SUCCINATE 25 MG: 25 TABLET, EXTENDED RELEASE ORAL at 09:09

## 2020-09-03 RX ADMIN — POLYETHYLENE GLYCOL 3350 17 G: 17 POWDER, FOR SOLUTION ORAL at 09:09

## 2020-09-03 RX ADMIN — ATORVASTATIN CALCIUM 20 MG: 20 TABLET, FILM COATED ORAL at 09:09

## 2020-09-03 RX ADMIN — ASPIRIN 81 MG: 81 TABLET, COATED ORAL at 09:09

## 2020-09-03 RX ADMIN — TRAZODONE HYDROCHLORIDE 50 MG: 50 TABLET ORAL at 09:09

## 2020-09-03 RX ADMIN — AMLODIPINE BESYLATE 5 MG: 5 TABLET ORAL at 09:09

## 2020-09-03 NOTE — PLAN OF CARE
"SW followed up on the pt's referral status to the following facilities:  [facility]- Contacted admission office twice, no answerer. (Under Review on RC).  [facility]- Under Review.  [facility]- Accepted.  [facility].  [facility]- Declined.  [facility]-Accepted via phone call.  [facility]-Accepted     SW met with the pt to provide updates on the above accepting facilities. Pt agreed to attending [facility]. SW informed this facility of the above. SW received pt's intake packet via e-mail. SW met with the pt to provide the intake packet, pt's 24-hour sitter reports she will assist pt in competing the needed paperwork. SW will continue to follow up as needed.    1:06 PM  SW emailed and faxed via  the pt's intake packet.    1:34 PM  SW received  message from [facility] which stated " Mike Alexandre- Im sorry about this but weve dealing with a covid issue and our  has said we have to hold off on admissions for now".    SW contacted [facility] to determine if a spot for the pt is still available. SW was informed pt is still accepted. TAYLER will follow up with the needed paperwork.       1:51 PM  SW met with pt at bedside, he aggred an attending [facility].     2:00 PM  Per secure chat,Esther Marmolejo MD- reports pt is expected to be medically ready tomorrow.       Bettie Lazar, ALEC  Case Management   [facility]   Ext. [phone]         "

## 2020-09-04 VITALS
TEMPERATURE: 98 F | DIASTOLIC BLOOD PRESSURE: 65 MMHG | WEIGHT: 206.56 LBS | HEART RATE: 58 BPM | HEIGHT: 68 IN | RESPIRATION RATE: 15 BRPM | OXYGEN SATURATION: 93 % | SYSTOLIC BLOOD PRESSURE: 137 MMHG | BODY MASS INDEX: 31.3 KG/M2

## 2020-09-04 LAB
ANION GAP SERPL CALC-SCNC: 7 MMOL/L (ref 8–16)
BASOPHILS # BLD AUTO: 0.03 K/UL (ref 0–0.2)
BASOPHILS NFR BLD: 0.4 % (ref 0–1.9)
BUN SERPL-MCNC: 24 MG/DL (ref 8–23)
CALCIUM SERPL-MCNC: 8.4 MG/DL (ref 8.7–10.5)
CHLORIDE SERPL-SCNC: 99 MMOL/L (ref 95–110)
CO2 SERPL-SCNC: 27 MMOL/L (ref 23–29)
CREAT SERPL-MCNC: 1.2 MG/DL (ref 0.5–1.4)
DIFFERENTIAL METHOD: ABNORMAL
EOSINOPHIL # BLD AUTO: 0.3 K/UL (ref 0–0.5)
EOSINOPHIL NFR BLD: 3.4 % (ref 0–8)
ERYTHROCYTE [DISTWIDTH] IN BLOOD BY AUTOMATED COUNT: 14.4 % (ref 11.5–14.5)
EST. GFR  (AFRICAN AMERICAN): >60 ML/MIN/1.73 M^2
EST. GFR  (NON AFRICAN AMERICAN): 54.4 ML/MIN/1.73 M^2
GLUCOSE SERPL-MCNC: 81 MG/DL (ref 70–110)
HCT VFR BLD AUTO: 29.8 % (ref 40–54)
HGB BLD-MCNC: 9.1 G/DL (ref 14–18)
IMM GRANULOCYTES # BLD AUTO: 0.03 K/UL (ref 0–0.04)
IMM GRANULOCYTES NFR BLD AUTO: 0.4 % (ref 0–0.5)
LYMPHOCYTES # BLD AUTO: 1.8 K/UL (ref 1–4.8)
LYMPHOCYTES NFR BLD: 21.7 % (ref 18–48)
MCH RBC QN AUTO: 26.6 PG (ref 27–31)
MCHC RBC AUTO-ENTMCNC: 30.5 G/DL (ref 32–36)
MCV RBC AUTO: 87 FL (ref 82–98)
MONOCYTES # BLD AUTO: 0.7 K/UL (ref 0.3–1)
MONOCYTES NFR BLD: 8.2 % (ref 4–15)
NEUTROPHILS # BLD AUTO: 5.6 K/UL (ref 1.8–7.7)
NEUTROPHILS NFR BLD: 65.9 % (ref 38–73)
NRBC BLD-RTO: 0 /100 WBC
PLATELET # BLD AUTO: 245 K/UL (ref 150–350)
PMV BLD AUTO: 9.2 FL (ref 9.2–12.9)
POTASSIUM SERPL-SCNC: 4.3 MMOL/L (ref 3.5–5.1)
RBC # BLD AUTO: 3.42 M/UL (ref 4.6–6.2)
SODIUM SERPL-SCNC: 133 MMOL/L (ref 136–145)
TB INDURATION 48 - 72 HR READ: 0 MM
WBC # BLD AUTO: 8.46 K/UL (ref 3.9–12.7)

## 2020-09-04 PROCEDURE — 99239 HOSP IP/OBS DSCHRG MGMT >30: CPT | Mod: GC,,, | Performed by: STUDENT IN AN ORGANIZED HEALTH CARE EDUCATION/TRAINING PROGRAM

## 2020-09-04 PROCEDURE — 25000003 PHARM REV CODE 250: Performed by: STUDENT IN AN ORGANIZED HEALTH CARE EDUCATION/TRAINING PROGRAM

## 2020-09-04 PROCEDURE — 36415 COLL VENOUS BLD VENIPUNCTURE: CPT

## 2020-09-04 PROCEDURE — 80048 BASIC METABOLIC PNL TOTAL CA: CPT

## 2020-09-04 PROCEDURE — 85025 COMPLETE CBC W/AUTO DIFF WBC: CPT

## 2020-09-04 PROCEDURE — 99239 PR HOSPITAL DISCHARGE DAY,>30 MIN: ICD-10-PCS | Mod: GC,,, | Performed by: STUDENT IN AN ORGANIZED HEALTH CARE EDUCATION/TRAINING PROGRAM

## 2020-09-04 RX ORDER — OXYBUTYNIN CHLORIDE 5 MG/1
5 TABLET ORAL 3 TIMES DAILY PRN
Qty: 21 TABLET | Refills: 0
Start: 2020-09-04 | End: 2022-08-20 | Stop reason: SDUPTHER

## 2020-09-04 RX ORDER — CIPROFLOXACIN 500 MG/1
500 TABLET ORAL EVERY 12 HOURS
Qty: 4 TABLET | Refills: 0
Start: 2020-09-04 | End: 2020-09-06

## 2020-09-04 RX ADMIN — ASPIRIN 81 MG: 81 TABLET, COATED ORAL at 09:09

## 2020-09-04 RX ADMIN — METOPROLOL SUCCINATE 25 MG: 25 TABLET, EXTENDED RELEASE ORAL at 09:09

## 2020-09-04 RX ADMIN — CIPROFLOXACIN 500 MG: 500 TABLET, FILM COATED ORAL at 09:09

## 2020-09-04 RX ADMIN — POLYETHYLENE GLYCOL 3350 17 G: 17 POWDER, FOR SOLUTION ORAL at 09:09

## 2020-09-04 RX ADMIN — ATORVASTATIN CALCIUM 20 MG: 20 TABLET, FILM COATED ORAL at 09:09

## 2020-09-04 RX ADMIN — LEVOTHYROXINE SODIUM 50 MCG: 50 TABLET ORAL at 06:09

## 2020-09-04 RX ADMIN — GABAPENTIN 800 MG: 400 CAPSULE ORAL at 09:09

## 2020-09-04 RX ADMIN — PANTOPRAZOLE SODIUM 40 MG: 40 TABLET, DELAYED RELEASE ORAL at 09:09

## 2020-09-04 RX ADMIN — AMLODIPINE BESYLATE 5 MG: 5 TABLET ORAL at 09:09

## 2020-09-04 NOTE — ASSESSMENT & PLAN NOTE
"87 yo male with history of hypothyroidism, neurogenic bladder with suprapubic catheter (exchanged annually, last exchange 3 days ago by home RN), HLD, HTN and GERD presenting to the ER with generalized weakness since 3 days ago. This is the first time the patient has experienced something like this. He describes the weakness as feeling tired and overall "fragile" and "without energy". Pt denies: seizures, headache or numbness.    Labs concerning for hyponatremia,  hypochloremia and hypokalemia.   CXR:left basilar increased density which could represent combination of effusion, atelectasis or consolidation.     BNP: 298  Echo ordered to r/o CHF.     Serum Osm: 247 (low)  Urine Osm: 217 (low)  Urine Na: 52  Urine Cl: 45    Pt was taking Hydrochlorothiazide for hypertension. As per PCP notes he has been taking HCTZ since 2019 without any side effects. The medication was stopped but then restarted 8/21.    Given low serum AND urine osms, this appears to be an ADH independent hyponatremia. Volume status is borderline low on exam, CVP on Echo was 3.    Sodium improving to 131 on 9/2 but down to 127 on 9/3. Sodium back to baseline of 133 on 9/4 8/31 Renal U/S no evidence of hydronephrosis but decreased renal perfusion    Plan  - Plan to discharge to SNF today given back to baseline sodium   - Advise 1500mL fluid restriction so sodium levels can normalize  - Will d/c HCTZ upon discharge as likely cause of hyponatremia  - Follow up renin/aldosterone ratio   "

## 2020-09-04 NOTE — PLAN OF CARE
SW spoke to the patient at bedside to discuss accepting facilitates. Pt expressed that he would like to attend [facility]. SW will continue to follow.    Bettie Lazar LMSW  Case Management   [facility]   Ext. [phone]

## 2020-09-04 NOTE — PROGRESS NOTES
"[facility] Hospital Medicine  Progress Note    Patient Name: Chucho Prado  MRN: [id number]  Patient Class: IP- Inpatient   Admission Date: 8/27/2020  Length of Stay: 7 days  Attending Physician: Chiara Martinez MD  Primary Care Provider: Obed Mcguire MD    Hospital Medicine Team: [facility] 5 Esther Marmolejo MD    Subjective:     Principal Problem:Hyponatremia        HPI:  85 yo male with history of hypothyroidism, neurogenic bladder with suprapubic catheter (exchanged annually, last exchange 3 days ago by home RN), HLD, HTN and GERD presenting to the ER with generalized weakness since 3 days ago. This is the first time the patient has experienced something like this. He describes the weakness as feeling tired and overall "fragile" and "without energy". He can move his extremities but has difficulty sitting down and getting out of bed. Reports 1 episode of diarrhea yesterday. The diarrhea was described as brown, watery and non bloody. He denies bloating and abdominal pain He also endorses SOB with exertion when he walks around the house and uses three pillows at night to sleep mainly because he likes it but also because he feels short of breath sometimes when he lies down. His SOB is chronic and has been going on "for many years" as per patient. He denies nausea, vomiting, cough, palpitations or chest discomfort. He also denies seizures, numbness in extremities, headache or confusion.    Pt does not FU with Cardiology OP but Echo performed in 2019 shows an EF of 58%, normal diastolic and systolic and mild aortic stenosis.For the neurogenic bladder the patient follows up with Urology OP since 2017.    Overview/Hospital Course:  Patient admitted to hospital medicine on 8/27/2020 for hyponatremia and evaluation of severe hyponatremia, hypokalemia, and hypochloremia. Hospital stay complicated by gross hematuria. Urology did bedside cystoscopy on 8/31, The bladder wall was consistent with neurogenic " bladder including multiple diverticula. There was old formed clot seen throughout the bladder floor and in diverticula, but no active bleeding was seen. Urology went to OR on 9/1 for clot removal. Urology removed 2 clots in the the bladder and found some diverticula. Pt doing well post-procedure. Plan to follow-up with Urology in clinic after discharge for follow-up. On cipro for 5 days while external galvan in place. Hyponatremia slightly improving after HCTZ held however declined to 127 again on 9/3 so discharge was held until normalizes. Per chart review, pt has had baseline Na of 130s. If sodium back to baseline, will plan to discharge to SNF tomorrow, 9/4.    Interval History: Unhappy about breakfast tray. Otherwise feeling OK. On room air. Na lower today.     Review of Systems   Constitutional: Negative for chills and fever.   HENT: Negative for hearing loss, rhinorrhea and sinus pain.    Eyes: Negative for photophobia and visual disturbance.   Respiratory: Negative for choking, chest tightness and wheezing.    Cardiovascular: Negative for chest pain and palpitations.   Gastrointestinal: Negative for abdominal distention, abdominal pain, diarrhea and nausea.   Genitourinary: Negative for dysuria and flank pain.   Musculoskeletal: Negative for back pain, joint swelling and myalgias.   Skin: Negative for color change and pallor.   Neurological: Negative for dizziness, facial asymmetry, weakness and headaches.   Psychiatric/Behavioral: Negative for agitation and confusion.     Objective:     Vital Signs (Most Recent):  Temp: 98 °F (36.7 °C) (09/03/20 1546)  Pulse: 91 (09/03/20 1858)  Resp: 18 (09/03/20 1104)  BP: (!) 140/60 (09/03/20 1546)  SpO2: (!) 93 % (09/03/20 1546) Vital Signs (24h Range):  Temp:  [96.4 °F (35.8 °C)-98 °F (36.7 °C)] 98 °F (36.7 °C)  Pulse:  [57-91] 91  Resp:  [17-18] 18  SpO2:  [93 %-95 %] 93 %  BP: (132-157)/(60-70) 140/60     Weight: 93.7 kg (206 lb 9.1 oz)  Body mass index is 31.41  kg/m².    Intake/Output Summary (Last 24 hours) at 9/3/2020 1940  Last data filed at 9/3/2020 1400  Gross per 24 hour   Intake 1220 ml   Output 3100 ml   Net -1880 ml      Physical Exam  Vitals signs reviewed.   Constitutional:       General: He is not in acute distress.     Appearance: Normal appearance. He is not ill-appearing.   HENT:      Head: Normocephalic and atraumatic.   Eyes:      Extraocular Movements: Extraocular movements intact.      Conjunctiva/sclera: Conjunctivae normal.   Neck:      Musculoskeletal: Normal range of motion. No neck rigidity or muscular tenderness.   Cardiovascular:      Rate and Rhythm: Normal rate and regular rhythm.      Heart sounds: No murmur. No gallop.    Pulmonary:      Effort: Pulmonary effort is normal. No respiratory distress.   Abdominal:      Palpations: Abdomen is soft.   Musculoskeletal:      Right lower leg: No edema.      Left lower leg: No edema.      Comments: Mahajan bag draining light red serosanguinous fluid, no observable clots   Skin:     General: Skin is warm and dry.      Coloration: Skin is not jaundiced.   Neurological:      General: No focal deficit present.      Mental Status: He is alert and oriented to person, place, and time. Mental status is at baseline.         Significant Labs:   Blood Culture: No results for input(s): LABBLOO in the last 48 hours.  CBC:   Recent Labs   Lab 09/02/20  0357 09/03/20  0429   WBC 6.59 8.91   HGB 10.5* 9.5*   HCT 33.2* 30.4*    234     CMP:   Recent Labs   Lab 09/02/20  0827 09/02/20  1508 09/03/20  0429   * 129* 127*   K 4.9 4.4 4.8   CL 95 94* 94*   CO2 28 26 25   * 95 104   BUN 19 22 24*   CREATININE 1.1 1.2 1.1   CALCIUM 8.7 8.5* 8.3*   ANIONGAP 8 9 8   EGFRNONAA >60.0 54.4* >60.0     POCT Glucose: No results for input(s): POCTGLUCOSE in the last 48 hours.  Urine Culture: No results for input(s): LABURIN in the last 48 hours.  Urine Studies: No results for input(s): COLORU, APPEARANCEUA, PHUR,  "SPECGRAV, PROTEINUA, GLUCUA, KETONESU, BILIRUBINUA, OCCULTUA, NITRITE, UROBILINOGEN, LEUKOCYTESUR, RBCUA, WBCUA, BACTERIA, SQUAMEPITHEL, HYALINECASTS in the last 48 hours.    Invalid input(s): HEENA    Significant Imaging: I have reviewed all pertinent imaging results/findings within the past 24 hours.      Assessment/Plan:      * Hyponatremia  87 yo male with history of hypothyroidism, neurogenic bladder with suprapubic catheter (exchanged annually, last exchange 3 days ago by home RN), HLD, HTN and GERD presenting to the ER with generalized weakness since 3 days ago. This is the first time the patient has experienced something like this. He describes the weakness as feeling tired and overall "fragile" and "without energy". Pt denies: seizures, headache or numbness.    Labs concerning for hyponatremia,  hypochloremia and hypokalemia.   CXR:left basilar increased density which could represent combination of effusion, atelectasis or consolidation.     BNP: 298  Echo ordered to r/o CHF.     Serum Osm: 247 (low)  Urine Osm: 217 (low)  Urine Na: 52  Urine Cl: 45    Pt was taking Hydrochlorothiazide for hypertension. As per PCP notes he has been taking HCTZ since 2019 without any side effects. The medication was stopped but then restarted 8/21.    Given low serum AND urine osms, this appears to be an ADH independent hyponatremia. Volume status is borderline low on exam, CVP on Echo was 3.    Sodium improving to 131 on 9/2 but down to 127 on 9/4 8/31 Renal U/S no evidence of hydronephrosis but decreased renal perfusion    Plan  - Will discharge to SNF tomorrow if Na improves  - Advise 1500mL fluid restriction so sodium levels can normalize  - Will d/c HCTZ upon discharge as likely cause of hyponatremia  - Follow up renin/aldosterone ratio per nephrology    Moderate to severe aortic stenosis  Patient with moderate to severe AS on echo performed during this hospitalization. LUIS: 1.03 cm2; peak velocity 3.11 m/s; mean " gradient 26 mmHg. Could contribute to patients symptoms of dyspnea. Further given low CVP and pre-load dependent nature of AS, could potentially improve with some volume resuscitation.    - Follow up with cardiology as an outpatient for closer monitoring / potential intervention as an outpatient      Acute hypoxemic respiratory failure  Pt was on 2L nasal cannula but but now in room air. Not on oxygen at home, patient does report 1 year history of dyspnea. CXR with chronic atelectatic changes in left lung base possible consolidation.     Unclear of cause. Possible that chronic anemia, with clots in bladder as cause of SOB and oxygen requirements. However, ddx includes CHF or infection. Although ECHO showed EF of 60%, possible that patient's severe Aortic Stenosis can be causing his symptoms.      - Patient currently comfortable on room air with good O2 sats  - Stable for discharge from respiratory perspective   - Incentive spirometry q6hrs      Weakness  -See hyponatremia      Hypokalemia  ECG with frequent PVCs. No QT interval prolongation, no ST depression.     -Resolved, K of 4.8 on 9/4          Gross hematuria  Suprapubic catheter changed home home health nurse day prior to presentation to ED. Presented with clear yellow urine in galvan bag on day of admit. Hospital day 1 patient's galvan bag with gross hematuria. Irrigated by urology to clear but hematuria returned. Urology did cystoscopy and removed 2 large clots and found several bladder diverticula.     - Appreciate urology recs:   - Will d/c with galvan for 5 days but needs to remain on antibiotics (cirpofloxacin) when galvan is in   - Follow up pathology from bladder biopsies    - Oxybutynin PRN for bladder spasms   - Okay to discharge with follow up in urology clinic in 1-2 weeks   - Possible CT urogram outpatient   - Avoid irrigation with sterile water as this could worsen patient's hyponatremia  - Given gross hematuria and cystoscopy, DVT prophylaxis was  discontinued on 8/31 for risk of bleed  - Will re-start DVT ppx in 1-2 days post-op per Urology reccs, if patient does not discharge tomorrow, will start.                TERE (acute kidney injury)  On admission Cr 1.1 (baseline). Increased to 1.5 after IVF boluses although hyponatremia improved with this. TERE  Has since resolved. Renal U/S indicative of decreased renal perfusion but no hydronephrosis.      -Now resolved, monitoring with daily labs      Hyperlipidemia  -Continue home Atorvastatin      Essential hypertension  Pt with history of HTN    -Continue home Metroprolol  -Hold home HCTZ (known cause of hyponatremia)   -Do not continue with HCTZ on discharge given hyponatremia  -If hypertension is persistent, will need different medication, ford f/u with PCP for this after discharge if needed      Acquired hypothyroidism  TSH on admit borderline low (0.8).     -Continue home Levothyroxine 50 mcg at this time      UTI (urinary tract infection)  On urinalysis patient with hazy urine, protein +1, occult blood +2, leukocytes +3 and nitrites +. Clinically patient with gross hematuria on exam and suprapubic tenderness. Likely UA secondary to colonization and dirty specimen from suprapubic catheter.    Plan  - Pt initially covered with ceftriaxone in the ED however that was discontinued due to low likelihood of UTI on presentation   - On 9/2, Urology recommended starting cirpofloxacin 500 mg until galvan remains (total 5 days)  - Urology wanted the galvan in for 5 days after discharge with plans to follow-up with patient in clinic with further imaging as outpatient            VTE Risk Mitigation (From admission, onward)         Ordered     Place sequential compression device  Until discontinued      08/27/20 2842                Discharge Planning   TISHA: 9/4/2020     Code Status: Full Code   Is the patient medically ready for discharge?:     Reason for patient still in hospital (select all that apply): Laboratory  test  Discharge Plan A: Skilled Nursing Facility   Discharge Delays: None known at this time              Esther Marmolejo MD  Department of Hospital Medicine   [facility]

## 2020-09-04 NOTE — PLAN OF CARE
09/04/20 1500   Final Note   Assessment Type Final Discharge Note   Anticipated Discharge Disposition SNF  ([facility])   What phone number can be called within the next 1-3 days to see how you are doing after discharge? [phone]   Hospital Follow Up  Appt(s) scheduled? Yes   Discharge plans and expectations educations in teach back method with documentation complete? Yes     Future Appointments   Date Time Provider Department Center   9/10/2020  9:00 AM Crystal Tejada, DARYN [address]   9/10/2020 11:15 AM [facility] OI-CT1 500 LB LIMIT [facility]   9/10/2020  1:00 PM Neelam Hooper NP [facility] UROLOGC [facility]   10/28/2020  3:40 PM Obed Mcguire MD [facility] IM [facility]

## 2020-09-04 NOTE — ASSESSMENT & PLAN NOTE
On urinalysis patient with hazy urine, protein +1, occult blood +2, leukocytes +3 and nitrites +. Clinically patient with gross hematuria on exam and suprapubic tenderness. Likely UA secondary to colonization and dirty specimen from suprapubic catheter.    Plan  - Pt initially covered with ceftriaxone in the ED however that was discontinued due to low likelihood of UTI on presentation   - On 9/2, Urology recommended starting cirpofloxacin 500 mg until gavlan remains (total 5 days)  - Urology wanted the galvan in for 5 days after discharge with plans to follow-up with patient in clinic with further imaging as outpatient

## 2020-09-04 NOTE — ASSESSMENT & PLAN NOTE
Suprapubic catheter changed home home health nurse day prior to presentation to ED. Presented with clear yellow urine in galvan bag on day of admit. Hospital day 1 patient's galvan bag with gross hematuria. Irrigated by urology to clear but hematuria returned. Urology did cystoscopy and removed 2 large clots and found several bladder diverticula.     - Appreciate urology recs:   - Will d/c with galvan for 5 days but needs to remain on antibiotics (cirpofloxacin) when galvan is in   - Follow up pathology from bladder biopsies    - Oxybutynin PRN for bladder spasms   - Okay to discharge with follow up in urology clinic in 1-2 weeks   - Possible CT urogram outpatient   - Avoid irrigation with sterile water as this could worsen patient's hyponatremia  - Given gross hematuria and cystoscopy, DVT prophylaxis was discontinued on 8/31 for risk of bleed  - Will re-start DVT ppx

## 2020-09-04 NOTE — ASSESSMENT & PLAN NOTE
Pt with history of HTN    -Continue home Metroprolol  -Hold home HCTZ (known cause of hyponatremia)   -Do not continue with HCTZ on discharge given hyponatremia  -If hypertension is persistent, will need different medication, ford f/u with PCP for this after discharge if needed

## 2020-09-04 NOTE — SUBJECTIVE & OBJECTIVE
Interval History: Unhappy about breakfast tray. Otherwise feeling OK. On room air. Na lower today.     Review of Systems   Constitutional: Negative for chills and fever.   HENT: Negative for hearing loss, rhinorrhea and sinus pain.    Eyes: Negative for photophobia and visual disturbance.   Respiratory: Negative for choking, chest tightness and wheezing.    Cardiovascular: Negative for chest pain and palpitations.   Gastrointestinal: Negative for abdominal distention, abdominal pain, diarrhea and nausea.   Genitourinary: Negative for dysuria and flank pain.   Musculoskeletal: Negative for back pain, joint swelling and myalgias.   Skin: Negative for color change and pallor.   Neurological: Negative for dizziness, facial asymmetry, weakness and headaches.   Psychiatric/Behavioral: Negative for agitation and confusion.     Objective:     Vital Signs (Most Recent):  Temp: 98 °F (36.7 °C) (09/03/20 1546)  Pulse: 91 (09/03/20 1858)  Resp: 18 (09/03/20 1104)  BP: (!) 140/60 (09/03/20 1546)  SpO2: (!) 93 % (09/03/20 1546) Vital Signs (24h Range):  Temp:  [96.4 °F (35.8 °C)-98 °F (36.7 °C)] 98 °F (36.7 °C)  Pulse:  [57-91] 91  Resp:  [17-18] 18  SpO2:  [93 %-95 %] 93 %  BP: (132-157)/(60-70) 140/60     Weight: 93.7 kg (206 lb 9.1 oz)  Body mass index is 31.41 kg/m².    Intake/Output Summary (Last 24 hours) at 9/3/2020 1940  Last data filed at 9/3/2020 1400  Gross per 24 hour   Intake 1220 ml   Output 3100 ml   Net -1880 ml      Physical Exam  Vitals signs reviewed.   Constitutional:       General: He is not in acute distress.     Appearance: Normal appearance. He is not ill-appearing.   HENT:      Head: Normocephalic and atraumatic.   Eyes:      Extraocular Movements: Extraocular movements intact.      Conjunctiva/sclera: Conjunctivae normal.   Neck:      Musculoskeletal: Normal range of motion. No neck rigidity or muscular tenderness.   Cardiovascular:      Rate and Rhythm: Normal rate and regular rhythm.      Heart sounds:  No murmur. No gallop.    Pulmonary:      Effort: Pulmonary effort is normal. No respiratory distress.   Abdominal:      Palpations: Abdomen is soft.   Musculoskeletal:      Right lower leg: No edema.      Left lower leg: No edema.      Comments: Mahajan bag draining light red serosanguinous fluid, no observable clots   Skin:     General: Skin is warm and dry.      Coloration: Skin is not jaundiced.   Neurological:      General: No focal deficit present.      Mental Status: He is alert and oriented to person, place, and time. Mental status is at baseline.         Significant Labs:   Blood Culture: No results for input(s): LABBLOO in the last 48 hours.  CBC:   Recent Labs   Lab 09/02/20  0357 09/03/20  0429   WBC 6.59 8.91   HGB 10.5* 9.5*   HCT 33.2* 30.4*    234     CMP:   Recent Labs   Lab 09/02/20  0827 09/02/20  1508 09/03/20  0429   * 129* 127*   K 4.9 4.4 4.8   CL 95 94* 94*   CO2 28 26 25   * 95 104   BUN 19 22 24*   CREATININE 1.1 1.2 1.1   CALCIUM 8.7 8.5* 8.3*   ANIONGAP 8 9 8   EGFRNONAA >60.0 54.4* >60.0     POCT Glucose: No results for input(s): POCTGLUCOSE in the last 48 hours.  Urine Culture: No results for input(s): LABURIN in the last 48 hours.  Urine Studies: No results for input(s): COLORU, APPEARANCEUA, PHUR, SPECGRAV, PROTEINUA, GLUCUA, KETONESU, BILIRUBINUA, OCCULTUA, NITRITE, UROBILINOGEN, LEUKOCYTESUR, RBCUA, WBCUA, BACTERIA, SQUAMEPITHEL, HYALINECASTS in the last 48 hours.    Invalid input(s): WRIGHTSUR    Significant Imaging: I have reviewed all pertinent imaging results/findings within the past 24 hours.

## 2020-09-04 NOTE — ASSESSMENT & PLAN NOTE
On admission Cr 1.1 (baseline). Increased to 1.5 after IVF boluses although hyponatremia improved with this. TERE  Has since resolved. Renal U/S indicative of decreased renal perfusion but no hydronephrosis.      -Now resolved, monitoring with daily labs

## 2020-09-04 NOTE — ASSESSMENT & PLAN NOTE
Suprapubic catheter changed home home health nurse day prior to presentation to ED. Presented with clear yellow urine in galvan bag on day of admit. Hospital day 1 patient's galvan bag with gross hematuria. Irrigated by urology to clear but hematuria returned. Urology did cystoscopy and removed 2 large clots and found several bladder diverticula.     - Appreciate urology recs:   - Will d/c with galvan for 5 days but needs to remain on antibiotics (cirpofloxacin) when galvan is in   - Follow up pathology from bladder biopsies    - Oxybutynin PRN for bladder spasms   - Okay to discharge with follow up in urology clinic in 1-2 weeks   - Possible CT urogram outpatient   - Avoid irrigation with sterile water as this could worsen patient's hyponatremia  - Given gross hematuria and cystoscopy, DVT prophylaxis was discontinued on 8/31 for risk of bleed  - Will re-start DVT ppx in 1-2 days post-op per Urology reccs, if patient does not discharge tomorrow, will start.

## 2020-09-04 NOTE — ASSESSMENT & PLAN NOTE
Pt was on 2L nasal cannula but but now in room air. Not on oxygen at home, patient does report 1 year history of dyspnea. CXR with chronic atelectatic changes in left lung base possible consolidation.     Unclear of cause. Possible that chronic anemia, with clots in bladder as cause of SOB and oxygen requirements. However, ddx includes CHF or infection. Although ECHO showed EF of 60%, possible that patient's severe Aortic Stenosis can be causing his symptoms.      - Patient currently comfortable on room air with good O2 sats  - Stable for discharge from respiratory perspective   - Incentive spirometry q6hrs

## 2020-09-04 NOTE — PLAN OF CARE
09/04/20 1321   Post-Acute Status   Post-Acute Authorization Placement   Post-Acute Placement Status Set-up Complete     Patient's set-up has been completed. TAYLER scheduled d/c transportation to [facility] through [facility]. Patient is scheduled to be picked up at 2:30 pm. TAYLER provided patient's nurse with report number #[phone]; ask for the nurse for room [address], #[phone]. SW is in communication with patient's CM and patient's Care Team. Requested  time does not guarantee arrival time. No other SW needs has been identified at this time.    SW met with the patient at bedside to discuss the above, (& pt's 24 hours sitter), pt an agreement.     Bettie Lazar, ALEC  Case Management   [facility]   Ext. [phone]

## 2020-09-04 NOTE — ASSESSMENT & PLAN NOTE
"87 yo male with history of hypothyroidism, neurogenic bladder with suprapubic catheter (exchanged annually, last exchange 3 days ago by home RN), HLD, HTN and GERD presenting to the ER with generalized weakness since 3 days ago. This is the first time the patient has experienced something like this. He describes the weakness as feeling tired and overall "fragile" and "without energy". Pt denies: seizures, headache or numbness.    Labs concerning for hyponatremia,  hypochloremia and hypokalemia.   CXR:left basilar increased density which could represent combination of effusion, atelectasis or consolidation.     BNP: 298  Echo ordered to r/o CHF.     Serum Osm: 247 (low)  Urine Osm: 217 (low)  Urine Na: 52  Urine Cl: 45    Pt was taking Hydrochlorothiazide for hypertension. As per PCP notes he has been taking HCTZ since 2019 without any side effects. The medication was stopped but then restarted 8/21.    Given low serum AND urine osms, this appears to be an ADH independent hyponatremia. Volume status is borderline low on exam, CVP on Echo was 3.    Sodium improving to 131 on 9/2 but down to 127 on 9/4 8/31 Renal U/S no evidence of hydronephrosis but decreased renal perfusion    Plan  - Will discharge to SNF tomorrow if Na improves  - Advise 1500mL fluid restriction so sodium levels can normalize  - Will d/c HCTZ upon discharge as likely cause of hyponatremia  - Follow up renin/aldosterone ratio per nephrology  "

## 2020-09-04 NOTE — ASSESSMENT & PLAN NOTE
Pt with history of HTN    -Continue home Metroprolol 25 mg daily  -Hold home HCTZ (known cause of hyponatremia)   -Do not continue with HCTZ on discharge given hyponatremia  -If hypertension is persistent, will need different medication, can f/u with PCP for this after discharge if needed

## 2020-09-04 NOTE — ASSESSMENT & PLAN NOTE
ECG with frequent PVCs. No QT interval prolongation, no ST depression.     -Resolved, K of 4.8 on 9/4

## 2020-09-05 NOTE — DISCHARGE SUMMARY
"[facility] Hospital Medicine  Discharge Summary      Patient Name: Chucho Prado  MRN: [id number]  Admission Date: 8/27/2020  Hospital Length of Stay: 8 days  Discharge Date and Time:  09/04/2020 7:38 PM  Attending Physician: No att. providers found   Discharging Provider: Nahun Baez MD  Primary Care Provider: Obed Mcguire MD  [facility] Medicine Team: [facility]OSP MED 5 Nahun Baez MD    HPI:   87 yo male with history of hypothyroidism, neurogenic bladder with suprapubic catheter (exchanged annually, last exchange 3 days ago by home RN), HLD, HTN and GERD presenting to the ER with generalized weakness since 3 days ago. This is the first time the patient has experienced something like this. He describes the weakness as feeling tired and overall "fragile" and "without energy". He can move his extremities but has difficulty sitting down and getting out of bed. Reports 1 episode of diarrhea yesterday. The diarrhea was described as brown, watery and non bloody. He denies bloating and abdominal pain He also endorses SOB with exertion when he walks around the house and uses three pillows at night to sleep mainly because he likes it but also because he feels short of breath sometimes when he lies down. His SOB is chronic and has been going on "for many years" as per patient. He denies nausea, vomiting, cough, palpitations or chest discomfort. He also denies seizures, numbness in extremities, headache or confusion.    Pt does not FU with Cardiology OP but Echo performed in 2019 shows an EF of 58%, normal diastolic and systolic and mild aortic stenosis.For the neurogenic bladder the patient follows up with Urology OP since 2017.    Procedure(s) (LRB):  CYSTOSCOPY (N/A)  REMOVAL, BLOOD CLOT  FULGURATION, BLADDER  DILATION, URETHRA  BIOPSY, BLADDER  CYSTOGRAM      Hospital Course:   Patient admitted to hospital medicine on 8/27/2020 for hyponatremia and evaluation of severe hyponatremia, hypokalemia, " and hypochloremia. Hospital stay complicated by gross hematuria. Urology did bedside cystoscopy on 8/31, The bladder wall was consistent with neurogenic bladder including multiple diverticula. There was old formed clot seen throughout the bladder floor and in diverticula, but no active bleeding was seen. Urology went to OR on 9/1 for clot removal. Urology removed 2 clots in the the bladder and found some diverticula. Pt doing well post-procedure. Plan to follow-up with Urology in clinic after discharge for follow-up. On cipro for 5 days while external galvan in place. Hyponatremia slightly improving after HCTZ held however declined to 127 again on 9/3 so discharge was held until normalizes. Per chart review, pt has had baseline Na of 130s. If sodium back to baseline, will plan to discharge to SNF tomorrow, 9/4. Patient's sodium back to baseline of 133. Patient stable to discharge to SNF today, 9/4.      Consults:   Consults (From admission, onward)        Status Ordering Provider     Inpatient consult to Nephrology  Once     Provider:  (Not yet assigned)    Completed WILFREDO GREGORIO     Inpatient consult to Urology  Once     Provider:  (Not yet assigned)    Completed WILFREDO GREGORIO        Physical Exam  Vitals signs reviewed.   Constitutional:       General: He is not in acute distress.     Appearance: Normal appearance. He is not ill-appearing.   HENT:      Head: Normocephalic and atraumatic.   Eyes:      Extraocular Movements: Extraocular movements intact.      Conjunctiva/sclera: Conjunctivae normal.   Neck:      Musculoskeletal: Normal range of motion. No neck rigidity or muscular tenderness.   Cardiovascular:      Rate and Rhythm: Normal rate and regular rhythm.      Heart sounds: No murmur. No gallop.    Pulmonary:      Effort: Pulmonary effort is normal. No respiratory distress.   Abdominal:      Palpations: Abdomen is soft.   Musculoskeletal:      Right lower leg: No edema.      Left lower leg: No  "edema.      Comments: Mahajan bag draining pink tinged urine, no observable clots   Skin:     General: Skin is warm and dry.      Coloration: Skin is not jaundiced.   Neurological:      General: No focal deficit present.      Mental Status: He is alert and oriented to person, place, and time. Mental status is at baseline.       * Hyponatremia  87 yo male with history of hypothyroidism, neurogenic bladder with suprapubic catheter (exchanged annually, last exchange 3 days ago by home RN), HLD, HTN and GERD presenting to the ER with generalized weakness since 3 days ago. This is the first time the patient has experienced something like this. He describes the weakness as feeling tired and overall "fragile" and "without energy". Pt denies: seizures, headache or numbness.    Labs concerning for hyponatremia,  hypochloremia and hypokalemia.   CXR:left basilar increased density which could represent combination of effusion, atelectasis or consolidation.     BNP: 298  Echo ordered to r/o CHF.     Serum Osm: 247 (low)  Urine Osm: 217 (low)  Urine Na: 52  Urine Cl: 45    Pt was taking Hydrochlorothiazide for hypertension. As per PCP notes he has been taking HCTZ since 2019 without any side effects. The medication was stopped but then restarted 8/21.    Given low serum AND urine osms, this appears to be an ADH independent hyponatremia. Volume status is borderline low on exam, CVP on Echo was 3.    Sodium improving to 131 on 9/2 but down to 127 on 9/3. Sodium back to baseline of 133 on 9/4 8/31 Renal U/S no evidence of hydronephrosis but decreased renal perfusion    Plan  - Plan to discharge to SNF today given back to baseline sodium   - Advise 1500mL fluid restriction so sodium levels can normalize  - Will d/c HCTZ upon discharge as likely cause of hyponatremia  - Follow up renin/aldosterone ratio     Gross hematuria  Suprapubic catheter changed home home health nurse day prior to presentation to ED. Presented with clear yellow " urine in galvan bag on day of admit. Hospital day 1 patient's galvan bag with gross hematuria. Irrigated by urology to clear but hematuria returned. Urology did cystoscopy and removed 2 large clots and found several bladder diverticula.     - Appreciate urology recs:   - Will d/c with galvan for 5 days but needs to remain on antibiotics (cirpofloxacin) when galvan is in   - Follow up pathology from bladder biopsies    - Oxybutynin PRN for bladder spasms   - Okay to discharge with follow up in urology clinic in 1-2 weeks   - Possible CT urogram outpatient   - Avoid irrigation with sterile water as this could worsen patient's hyponatremia  - Given gross hematuria and cystoscopy, DVT prophylaxis was discontinued on 8/31 for risk of bleed  - Will re-start DVT ppx                Hyperlipidemia  -Continue home Atorvastatin 20 mg daily      Essential hypertension  Pt with history of HTN    -Continue home Metroprolol 25 mg daily  -Hold home HCTZ (known cause of hyponatremia)   -Do not continue with HCTZ on discharge given hyponatremia  -If hypertension is persistent, will need different medication, can f/u with PCP for this after discharge if needed        Final Active Diagnoses:    Diagnosis Date Noted POA    PRINCIPAL PROBLEM:  Hyponatremia [E87.1] 10/23/2017 Yes    Exertional dyspnea [R06.09] 09/01/2020 Yes    Moderate to severe aortic stenosis [I35.0] 08/29/2020 Yes    Weakness [R53.1] 08/27/2020 Yes    Hypochloremia [E87.8] 08/27/2020 Unknown    Acute hypoxemic respiratory failure [J96.01] 08/27/2020 Yes    Hypokalemia [E87.6] 06/25/2018 Yes    Gross hematuria [R31.0] 01/27/2017 Yes    TERE (acute kidney injury) [N17.9] 01/07/2017 No    Acquired hypothyroidism [E03.9] 07/30/2013 Yes    Essential hypertension [I10] 07/30/2013 Yes    Hyperlipidemia [E78.5] 07/30/2013 Yes    UTI (urinary tract infection) [N39.0] 07/30/2013 Yes      Problems Resolved During this Admission:       Discharged Condition:  stable    Disposition: Home or Self Care    Follow Up:  Follow-up Information     Neelam Hooper NP On 9/7/2020.    Specialties: Urology, Family Medicine  Why: urethral catheter removal  Contact information:  [address]  [facility] [address] [phone]                 Patient Instructions:      CT Urogram Abd Pelvis W WO   Standing Status: Future Standing Exp. Date: 09/02/21     Order Specific Question Answer Comments   Is the patient allergic to iodine or contrast? No    Is the patient on ANY Metformin drug such as Glucophage/Glucovance?           Should be off drug 48 hours after contrast. Check renal function before restart. No    History of Kidney Disease - including: decreased kidney function, dialysis, kidney transplay, single kidney, kidney cancer, kidney surgery? None    Does the patient have high blood pressure requiring medical treatment? No    Diabetes? No    May the Radiologist modify the order per protocol to meet the clinical needs of the patient? Yes      Diet Adult Regular     Notify your health care provider if you experience any of the following:  temperature >100.4     Notify your health care provider if you experience any of the following:  severe uncontrolled pain     Notify your health care provider if you experience any of the following:  redness, tenderness, or signs of infection (pain, swelling, redness, odor or green/yellow discharge around incision site)     Notify your health care provider if you experience any of the following:  increased confusion or weakness     Activity as tolerated       Significant Diagnostic Studies: Labs:   CMP   Recent Labs   Lab 09/03/20 0429 09/04/20  0533   * 133*   K 4.8 4.3   CL 94* 99   CO2 25 27    81   BUN 24* 24*   CREATININE 1.1 1.2   CALCIUM 8.3* 8.4*   ANIONGAP 8 7*   ESTGFRAFRICA >60.0 >60.0   EGFRNONAA >60.0 54.4*    and CBC   Recent Labs   Lab 09/03/20 0429 09/04/20  0533   WBC 8.91 8.46   HGB 9.5* 9.1*   HCT 30.4* 29.8*     245       Pending Diagnostic Studies:     Procedure Component Value Units Date/Time    Aldosterone/Renin Activity Ratio [[id number]] Collected: 08/31/20 1318    Order Status: Sent Lab Status: In process Updated: 08/31/20 1337    Specimen: Blood     Specimen to Pathology, Surgery Urology [[id number]] Collected: 09/01/20 1653    Order Status: Sent Lab Status: In process Updated: 09/02/20 1240         Medications:  Reconciled Home Medications:      Medication List      START taking these medications    ciprofloxacin HCl 500 MG tablet  Commonly known as: CIPRO  Take 1 tablet (500 mg total) by mouth every 12 (twelve) hours. END DATE: 9/7/20 for 2 days     oxybutynin 5 MG Tab  Commonly known as: DITROPAN  Take 1 tablet (5 mg total) by mouth 3 (three) times daily as needed (bladder spasms).        CONTINUE taking these medications    amLODIPine 5 MG tablet  Commonly known as: NORVASC  TAKE 1 TABLET BY MOUTH EVERY DAY     aspirin 81 MG EC tablet  Commonly known as: ECOTRIN  TAKE 1 TABLET BY MOUTH EVERY DAY     atorvastatin 20 MG tablet  Commonly known as: LIPITOR  Take 1 tablet (20 mg total) by mouth once daily.     cetirizine 10 MG tablet  Commonly known as: ZYRTEC  Take 10 mg by mouth daily as needed for Allergies.     gabapentin 400 MG capsule  Commonly known as: NEURONTIN  TAKE 2 CAPSULES BY MOUTH TWICE DAILY     levothyroxine 50 MCG tablet  Commonly known as: SYNTHROID  TAKE 1 TABLET BY MOUTH EVERY DAY     metoprolol succinate 25 MG 24 hr tablet  Commonly known as: TOPROL-XL  TAKE 1 TABLET BY MOUTH EVERY DAY     omeprazole 40 MG capsule  Commonly known as: PRILOSEC  TAKE 1 CAPSULE BY MOUTH EVERY MORNING 30 MINUTES BEFORE EATING     traZODone 50 MG tablet  Commonly known as: DESYREL  TAKE 1 TABLET BY MOUTH NIGHTLY        STOP taking these medications    hydroCHLOROthiazide 25 MG tablet  Commonly known as: HYDRODIURIL     mirtazapine 15 MG tablet  Commonly known as: REMERON     NON FORMULARY MEDICATION      TURMERIC ORAL     UNABLE TO FIND            Indwelling Lines/Drains at time of discharge:   Lines/Drains/Airways     Drain                 Suprapubic Catheter 09/01/20 latex 18 Fr. 3 days         Urethral Catheter 09/01/20 1615 Double-lumen 20 Fr. 3 days          Airway                 Airway - Non-Surgical 09/01/20 1800 3 days                Time spent on the discharge of patient: 30 minutes  Patient was seen and examined on the date of discharge and determined to be suitable for discharge.         Nahun Baez MD  Department of Hospital Medicine  [facility]

## 2020-09-07 ENCOUNTER — LAB VISIT (OUTPATIENT)
Dept: LAB | Facility: OTHER | Age: 85
End: 2020-09-07
Payer: MEDICARE

## 2020-09-07 DIAGNOSIS — Z03.818 ENCOUNTER FOR OBSERVATION FOR SUSPECTED EXPOSURE TO OTHER BIOLOGICAL AGENTS RULED OUT: ICD-10-CM

## 2020-09-07 PROCEDURE — U0003 INFECTIOUS AGENT DETECTION BY NUCLEIC ACID (DNA OR RNA); SEVERE ACUTE RESPIRATORY SYNDROME CORONAVIRUS 2 (SARS-COV-2) (CORONAVIRUS DISEASE [COVID-19]), AMPLIFIED PROBE TECHNIQUE, MAKING USE OF HIGH THROUGHPUT TECHNOLOGIES AS DESCRIBED BY CMS-2020-01-R: HCPCS

## 2020-09-08 LAB
ALDOST SERPL-MCNC: 13.4 NG/DL
ALDOST/RENIN PLAS-RTO: 16.8 RATIO
FINAL PATHOLOGIC DIAGNOSIS: NORMAL
GROSS: NORMAL
MICROSCOPIC EXAM: NORMAL
RENIN PLAS-CCNC: 0.8 NG/ML/HR
SARS-COV-2 RNA RESP QL NAA+PROBE: NOT DETECTED

## 2020-09-09 ENCOUNTER — PATIENT OUTREACH (OUTPATIENT)
Dept: ADMINISTRATIVE | Facility: OTHER | Age: 85
End: 2020-09-09

## 2020-09-09 NOTE — PROGRESS NOTES
LINKS immunization registry updated  Care Everywhere updated  Health Maintenance updated  Chart reviewed for overdue [facility]active [facility]) health maintenance testing (CRS, Breast Ca, Diabetic Eye Exam)   Orders entered:N/A

## 2020-09-10 ENCOUNTER — LAB VISIT (OUTPATIENT)
Dept: LAB | Facility: OTHER | Age: 85
End: 2020-09-10
Payer: MEDICARE

## 2020-09-10 DIAGNOSIS — Z03.818 ENCOUNTER FOR OBSERVATION FOR SUSPECTED EXPOSURE TO OTHER BIOLOGICAL AGENTS RULED OUT: ICD-10-CM

## 2020-09-10 PROCEDURE — U0003 INFECTIOUS AGENT DETECTION BY NUCLEIC ACID (DNA OR RNA); SEVERE ACUTE RESPIRATORY SYNDROME CORONAVIRUS 2 (SARS-COV-2) (CORONAVIRUS DISEASE [COVID-19]), AMPLIFIED PROBE TECHNIQUE, MAKING USE OF HIGH THROUGHPUT TECHNOLOGIES AS DESCRIBED BY CMS-2020-01-R: HCPCS

## 2020-09-11 LAB — SARS-COV-2 RNA RESP QL NAA+PROBE: NOT DETECTED

## 2020-09-13 ENCOUNTER — HOSPITAL ENCOUNTER (INPATIENT)
Facility: HOSPITAL | Age: 85
LOS: 4 days | Discharge: NURSING, REHAB OR LONG-TERM CARE | DRG: 394 | End: 2020-09-18
Attending: EMERGENCY MEDICINE | Admitting: SURGERY
Payer: MEDICARE

## 2020-09-13 DIAGNOSIS — Z46.59 ENCOUNTER FOR NASOGASTRIC (NG) TUBE PLACEMENT: ICD-10-CM

## 2020-09-13 DIAGNOSIS — K56.609 SMALL BOWEL OBSTRUCTION: Primary | ICD-10-CM

## 2020-09-13 DIAGNOSIS — I35.0 MODERATE TO SEVERE AORTIC STENOSIS: ICD-10-CM

## 2020-09-13 PROCEDURE — 63600175 PHARM REV CODE 636 W HCPCS: Performed by: EMERGENCY MEDICINE

## 2020-09-13 PROCEDURE — 83735 ASSAY OF MAGNESIUM: CPT

## 2020-09-13 PROCEDURE — 85025 COMPLETE CBC W/AUTO DIFF WBC: CPT

## 2020-09-13 PROCEDURE — 93010 ELECTROCARDIOGRAM REPORT: CPT | Mod: ,,, | Performed by: INTERNAL MEDICINE

## 2020-09-13 PROCEDURE — 96374 THER/PROPH/DIAG INJ IV PUSH: CPT

## 2020-09-13 PROCEDURE — 99285 EMERGENCY DEPT VISIT HI MDM: CPT | Mod: 25

## 2020-09-13 PROCEDURE — 93010 EKG 12-LEAD: ICD-10-PCS | Mod: ,,, | Performed by: INTERNAL MEDICINE

## 2020-09-13 PROCEDURE — 80053 COMPREHEN METABOLIC PANEL: CPT

## 2020-09-13 PROCEDURE — 83690 ASSAY OF LIPASE: CPT

## 2020-09-13 PROCEDURE — 99285 PR EMERGENCY DEPT VISIT,LEVEL V: ICD-10-PCS | Mod: ,,, | Performed by: EMERGENCY MEDICINE

## 2020-09-13 PROCEDURE — 84484 ASSAY OF TROPONIN QUANT: CPT

## 2020-09-13 PROCEDURE — 99285 EMERGENCY DEPT VISIT HI MDM: CPT | Mod: ,,, | Performed by: EMERGENCY MEDICINE

## 2020-09-13 PROCEDURE — 93005 ELECTROCARDIOGRAM TRACING: CPT

## 2020-09-13 PROCEDURE — 83880 ASSAY OF NATRIURETIC PEPTIDE: CPT

## 2020-09-13 RX ORDER — ONDANSETRON 2 MG/ML
4 INJECTION INTRAMUSCULAR; INTRAVENOUS
Status: COMPLETED | OUTPATIENT
Start: 2020-09-13 | End: 2020-09-13

## 2020-09-13 RX ADMIN — ONDANSETRON 4 MG: 2 INJECTION INTRAMUSCULAR; INTRAVENOUS at 11:09

## 2020-09-14 LAB
ALBUMIN SERPL BCP-MCNC: 3.8 G/DL (ref 3.5–5.2)
ALP SERPL-CCNC: 106 U/L (ref 55–135)
ALT SERPL W/O P-5'-P-CCNC: 8 U/L (ref 10–44)
ANION GAP SERPL CALC-SCNC: 11 MMOL/L (ref 8–16)
AST SERPL-CCNC: 14 U/L (ref 10–40)
BACTERIA #/AREA URNS AUTO: ABNORMAL /HPF
BASOPHILS # BLD AUTO: 0.04 K/UL (ref 0–0.2)
BASOPHILS NFR BLD: 0.3 % (ref 0–1.9)
BILIRUB SERPL-MCNC: 0.7 MG/DL (ref 0.1–1)
BILIRUB UR QL STRIP: NEGATIVE
BNP SERPL-MCNC: 217 PG/ML (ref 0–99)
BUN SERPL-MCNC: 16 MG/DL (ref 8–23)
CALCIUM SERPL-MCNC: 8.5 MG/DL (ref 8.7–10.5)
CHLORIDE SERPL-SCNC: 91 MMOL/L (ref 95–110)
CLARITY UR REFRACT.AUTO: ABNORMAL
CO2 SERPL-SCNC: 26 MMOL/L (ref 23–29)
COLOR UR AUTO: YELLOW
CREAT SERPL-MCNC: 1.3 MG/DL (ref 0.5–1.4)
DIFFERENTIAL METHOD: ABNORMAL
EOSINOPHIL # BLD AUTO: 0.2 K/UL (ref 0–0.5)
EOSINOPHIL NFR BLD: 1.4 % (ref 0–8)
ERYTHROCYTE [DISTWIDTH] IN BLOOD BY AUTOMATED COUNT: 14.1 % (ref 11.5–14.5)
EST. GFR  (AFRICAN AMERICAN): 57.1 ML/MIN/1.73 M^2
EST. GFR  (NON AFRICAN AMERICAN): 49.4 ML/MIN/1.73 M^2
GLUCOSE SERPL-MCNC: 107 MG/DL (ref 70–110)
GLUCOSE UR QL STRIP: NEGATIVE
HCT VFR BLD AUTO: 35.1 % (ref 40–54)
HGB BLD-MCNC: 10.9 G/DL (ref 14–18)
HGB UR QL STRIP: ABNORMAL
HYALINE CASTS UR QL AUTO: 0 /LPF
IMM GRANULOCYTES # BLD AUTO: 0.06 K/UL (ref 0–0.04)
IMM GRANULOCYTES NFR BLD AUTO: 0.4 % (ref 0–0.5)
KETONES UR QL STRIP: NEGATIVE
LACTATE SERPL-SCNC: 0.7 MMOL/L (ref 0.5–2.2)
LEUKOCYTE ESTERASE UR QL STRIP: ABNORMAL
LIPASE SERPL-CCNC: 3 U/L (ref 4–60)
LYMPHOCYTES # BLD AUTO: 1.4 K/UL (ref 1–4.8)
LYMPHOCYTES NFR BLD: 9.2 % (ref 18–48)
MAGNESIUM SERPL-MCNC: 2.1 MG/DL (ref 1.6–2.6)
MCH RBC QN AUTO: 26.5 PG (ref 27–31)
MCHC RBC AUTO-ENTMCNC: 31.1 G/DL (ref 32–36)
MCV RBC AUTO: 85 FL (ref 82–98)
MICROSCOPIC COMMENT: ABNORMAL
MONOCYTES # BLD AUTO: 0.8 K/UL (ref 0.3–1)
MONOCYTES NFR BLD: 5.4 % (ref 4–15)
NEUTROPHILS # BLD AUTO: 12.6 K/UL (ref 1.8–7.7)
NEUTROPHILS NFR BLD: 83.3 % (ref 38–73)
NITRITE UR QL STRIP: POSITIVE
NON-SQ EPI CELLS #/AREA URNS AUTO: 1 /HPF
NRBC BLD-RTO: 0 /100 WBC
PH UR STRIP: 6 [PH] (ref 5–8)
PLATELET # BLD AUTO: 343 K/UL (ref 150–350)
PMV BLD AUTO: 9.5 FL (ref 9.2–12.9)
POTASSIUM SERPL-SCNC: 3.9 MMOL/L (ref 3.5–5.1)
PROT SERPL-MCNC: 7.1 G/DL (ref 6–8.4)
PROT UR QL STRIP: ABNORMAL
RBC # BLD AUTO: 4.11 M/UL (ref 4.6–6.2)
RBC #/AREA URNS AUTO: 75 /HPF (ref 0–4)
SARS-COV-2 RDRP RESP QL NAA+PROBE: NEGATIVE
SODIUM SERPL-SCNC: 128 MMOL/L (ref 136–145)
SP GR UR STRIP: >=1.03 (ref 1–1.03)
TROPONIN I SERPL DL<=0.01 NG/ML-MCNC: 0.01 NG/ML (ref 0–0.03)
URN SPEC COLLECT METH UR: ABNORMAL
WBC # BLD AUTO: 15.09 K/UL (ref 3.9–12.7)
WBC #/AREA URNS AUTO: >100 /HPF (ref 0–5)

## 2020-09-14 PROCEDURE — 25500020 PHARM REV CODE 255: Performed by: STUDENT IN AN ORGANIZED HEALTH CARE EDUCATION/TRAINING PROGRAM

## 2020-09-14 PROCEDURE — 25000003 PHARM REV CODE 250: Performed by: STUDENT IN AN ORGANIZED HEALTH CARE EDUCATION/TRAINING PROGRAM

## 2020-09-14 PROCEDURE — 99222 1ST HOSP IP/OBS MODERATE 55: CPT | Mod: AI,,, | Performed by: SURGERY

## 2020-09-14 PROCEDURE — 83605 ASSAY OF LACTIC ACID: CPT

## 2020-09-14 PROCEDURE — 94761 N-INVAS EAR/PLS OXIMETRY MLT: CPT

## 2020-09-14 PROCEDURE — 63600175 PHARM REV CODE 636 W HCPCS: Performed by: STUDENT IN AN ORGANIZED HEALTH CARE EDUCATION/TRAINING PROGRAM

## 2020-09-14 PROCEDURE — 63600175 PHARM REV CODE 636 W HCPCS: Performed by: EMERGENCY MEDICINE

## 2020-09-14 PROCEDURE — 99222 PR INITIAL HOSPITAL CARE,LEVL II: ICD-10-PCS | Mod: AI,,, | Performed by: SURGERY

## 2020-09-14 PROCEDURE — 20600001 HC STEP DOWN PRIVATE ROOM

## 2020-09-14 PROCEDURE — 25500020 PHARM REV CODE 255: Performed by: EMERGENCY MEDICINE

## 2020-09-14 PROCEDURE — 81001 URINALYSIS AUTO W/SCOPE: CPT

## 2020-09-14 PROCEDURE — 87086 URINE CULTURE/COLONY COUNT: CPT

## 2020-09-14 PROCEDURE — U0002 COVID-19 LAB TEST NON-CDC: HCPCS

## 2020-09-14 PROCEDURE — 96375 TX/PRO/DX INJ NEW DRUG ADDON: CPT

## 2020-09-14 PROCEDURE — 87186 SC STD MICRODIL/AGAR DIL: CPT

## 2020-09-14 PROCEDURE — 87077 CULTURE AEROBIC IDENTIFY: CPT

## 2020-09-14 PROCEDURE — 87088 URINE BACTERIA CULTURE: CPT

## 2020-09-14 RX ORDER — LORAZEPAM 2 MG/ML
0.5 INJECTION INTRAMUSCULAR
Status: COMPLETED | OUTPATIENT
Start: 2020-09-14 | End: 2020-09-14

## 2020-09-14 RX ORDER — METOPROLOL TARTRATE 1 MG/ML
5 INJECTION, SOLUTION INTRAVENOUS EVERY 6 HOURS
Status: DISCONTINUED | OUTPATIENT
Start: 2020-09-14 | End: 2020-09-16

## 2020-09-14 RX ORDER — ONDANSETRON 2 MG/ML
4 INJECTION INTRAMUSCULAR; INTRAVENOUS EVERY 6 HOURS PRN
Status: DISCONTINUED | OUTPATIENT
Start: 2020-09-14 | End: 2020-09-18 | Stop reason: HOSPADM

## 2020-09-14 RX ORDER — IPRATROPIUM BROMIDE AND ALBUTEROL SULFATE 2.5; .5 MG/3ML; MG/3ML
3 SOLUTION RESPIRATORY (INHALATION) EVERY 4 HOURS PRN
Status: DISCONTINUED | OUTPATIENT
Start: 2020-09-14 | End: 2020-09-18 | Stop reason: HOSPADM

## 2020-09-14 RX ORDER — SODIUM CHLORIDE 0.9 % (FLUSH) 0.9 %
10 SYRINGE (ML) INJECTION
Status: DISCONTINUED | OUTPATIENT
Start: 2020-09-14 | End: 2020-09-18 | Stop reason: HOSPADM

## 2020-09-14 RX ORDER — SODIUM CHLORIDE 9 MG/ML
INJECTION, SOLUTION INTRAVENOUS CONTINUOUS
Status: DISCONTINUED | OUTPATIENT
Start: 2020-09-14 | End: 2020-09-17

## 2020-09-14 RX ORDER — MORPHINE SULFATE 2 MG/ML
2 INJECTION, SOLUTION INTRAMUSCULAR; INTRAVENOUS EVERY 4 HOURS PRN
Status: DISCONTINUED | OUTPATIENT
Start: 2020-09-14 | End: 2020-09-18 | Stop reason: HOSPADM

## 2020-09-14 RX ORDER — ACETAMINOPHEN 325 MG/1
650 TABLET ORAL EVERY 8 HOURS PRN
Status: DISCONTINUED | OUTPATIENT
Start: 2020-09-14 | End: 2020-09-18 | Stop reason: HOSPADM

## 2020-09-14 RX ADMIN — METOROPROLOL TARTRATE 5 MG: 5 INJECTION, SOLUTION INTRAVENOUS at 06:09

## 2020-09-14 RX ADMIN — MORPHINE SULFATE 2 MG: 2 INJECTION, SOLUTION INTRAMUSCULAR; INTRAVENOUS at 10:09

## 2020-09-14 RX ADMIN — METOROPROLOL TARTRATE 5 MG: 5 INJECTION, SOLUTION INTRAVENOUS at 07:09

## 2020-09-14 RX ADMIN — DIATRIZOATE MEGLUMINE AND DIATRIZOATE SODIUM 50 ML: 660; 100 LIQUID ORAL; RECTAL at 12:09

## 2020-09-14 RX ADMIN — MORPHINE SULFATE 2 MG: 2 INJECTION, SOLUTION INTRAMUSCULAR; INTRAVENOUS at 06:09

## 2020-09-14 RX ADMIN — SODIUM CHLORIDE: 0.9 INJECTION, SOLUTION INTRAVENOUS at 07:09

## 2020-09-14 RX ADMIN — LORAZEPAM 0.5 MG: 2 INJECTION INTRAMUSCULAR; INTRAVENOUS at 01:09

## 2020-09-14 RX ADMIN — IOHEXOL 100 ML: 350 INJECTION, SOLUTION INTRAVENOUS at 01:09

## 2020-09-14 RX ADMIN — METOROPROLOL TARTRATE 5 MG: 5 INJECTION, SOLUTION INTRAVENOUS at 02:09

## 2020-09-14 NOTE — CONSULTS
[facility]  General Surgery  Consult Note    Inpatient consult to General surgery  Consult performed by: Zelalem Frazier MD  Consult ordered by: Zelalem Frazier MD        Subjective:     Chief Complaint/Reason for Admission: SBO    History of Present Illness: 87 yo male with history of hypothyroidism, neurogenic bladder with indwelling suprapubic catheter, HLD, HTN and GERD presents to the ED with 1 day of abdominal pain, nausea, and vomiting. No flatus and he doesn't remember his last BM. Workup in the ED consistent with SBO with possible transition point in the right mid abdomen. Mild leukocytosis on labs (15k), normal lactate. An NGT was placed with 500cc of bilious output.   Of note was recently admitted for hyponatremia thought secondary to thiazides.    PSH includes open helen, VHR x2 with mesh (06/18, 12/18)     No current facility-administered medications on file prior to encounter.      Current Outpatient Medications on File Prior to Encounter   Medication Sig    amLODIPine (NORVASC) 5 MG tablet TAKE 1 TABLET BY MOUTH EVERY DAY    aspirin (ECOTRIN) 81 MG EC tablet TAKE 1 TABLET BY MOUTH EVERY DAY    atorvastatin (LIPITOR) 20 MG tablet Take 1 tablet (20 mg total) by mouth once daily.    cetirizine (ZYRTEC) 10 MG tablet Take 10 mg by mouth daily as needed for Allergies.    gabapentin (NEURONTIN) 400 MG capsule TAKE 2 CAPSULES BY MOUTH TWICE DAILY    levothyroxine (SYNTHROID) 50 MCG tablet TAKE 1 TABLET BY MOUTH EVERY DAY    metoprolol succinate (TOPROL-XL) 25 MG 24 hr tablet TAKE 1 TABLET BY MOUTH EVERY DAY    omeprazole (PRILOSEC) 40 MG capsule TAKE 1 CAPSULE BY MOUTH EVERY MORNING 30 MINUTES BEFORE EATING    oxybutynin (DITROPAN) 5 MG Tab Take 1 tablet (5 mg total) by mouth 3 (three) times daily as needed (bladder spasms).    traZODone (DESYREL) 50 MG tablet TAKE 1 TABLET BY MOUTH NIGHTLY       Review of patient's allergies indicates:   Allergen Reactions    Thiazides Other (See Comments)      Multiple episodes of thiazide-induced hyponatremia       Past Medical History:   Diagnosis Date    Acquired hypothyroidism 7/30/2013    Arthritis     Blood transfusion     before 2005 - whe had gangrenous gall bladder    Cataract     Chronic back pain 12/4/2018    - Takes Percocet 5-325 at home - Can continue current abdominal pain control with Dilaudid 0.5 mg IV Q3H prn     CKD (chronic kidney disease) stage 3, GFR 30-59 ml/min     Compression fracture of lumbar vertebra     Depression     Dyslipidemia     Essential hypertension 7/30/2013    Gastroesophageal reflux disease without esophagitis 12/4/2018    - continue home Prilosec    General anesthetics causing adverse effect in therapeutic use     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    GERD (gastroesophageal reflux disease)     History of postoperative delirium 12/4/2018    - Patient reports 1 week history of post-op delirium 7/2018 - Monitor electrolytes, UA, and urine cx - Delirium precautions  - Can use Seroquel 25 mg QHS prn     Hypertension     Hyponatremia 10/23/2017    Impaired mobility and ADLs 6/28/2018    Neurogenic bladder 12/4/2018    Sleep disorder 12/4/2018    - continue Trazodone QHS    Thyroid disease     UTI (urinary tract infection)      Past Surgical History:   Procedure Laterality Date    BIOPSY OF BLADDER  9/1/2020    Procedure: BIOPSY, BLADDER;  Surgeon: Daron Manjarrez MD;  Location: [facility] OR [address] [place] [address];  Service: Urology;;    BLADDER FULGURATION  9/1/2020    Procedure: FULGURATION, BLADDER;  Surgeon: Daron Manjarrez MD;  Location: [address];  Service: Urology;;    CHOLECYSTECTOMY      CYSTOGRAM  9/1/2020    Procedure: CYSTOGRAM;  Surgeon: Daron Manjarrez MD;  Location: [address];  Service: Urology;;    CYSTOSCOPY N/A 9/1/2020    Procedure: CYSTOSCOPY;  Surgeon: Daron Manjarrez MD;  Location: [address];  Service: Urology;  Laterality: N/A;    DILATION OF URETHRA  9/1/2020     Procedure: DILATION, URETHRA;  Surgeon: Daron Manjarrez MD;  [address];  Service: Urology;;    EYE SURGERY Right     IOL    HERNIA REPAIR      INTRAOCULAR PROSTHESES INSERTION Left 2018    Procedure: INSERTION-INTRAOCULAR LENS (IOL);  Surgeon: Trinity Vazquez MD;  Location: [facility];  Service: Ophthalmology;  Laterality: Left;    JOINT REPLACEMENT      LAMINECTOMY  2016    L2-L4    LUMBAR LAMINECTOMY  2016    PARATHYROIDECTOMY      PHACOEMULSIFICATION OF CATARACT Left 2018    Procedure: PHACOEMULSIFICATION-ASPIRATION-CATARACT;  Surgeon: Trinity Vazquez MD;  Location: [facility];  Service: Ophthalmology;  Laterality: Left;    REMOVAL OF BLOOD CLOT  2020    Procedure: REMOVAL, BLOOD CLOT;  Surgeon: Daron Manjarrez MD;  Location: [facility] OR [address] [place] [address];  Service: Urology;;    REPAIR OF INCARCERATED INCISIONAL HERNIA WITHOUT HISTORY OF PRIOR REPAIR N/A 2018    Procedure: REPAIR, HERNIA, INCISIONAL, INCARCERATED, WITHOUT HISTORY OF PRIOR REPAIR;  Surgeon: Steve Valentin MD;  Location: [facility] OR [address] [place] [address];  Service: General;  Laterality: N/A;    REPAIR OF INCARCERATED VENTRAL HERNIA WITHOUT HISTORY OF PRIOR REPAIR N/A 2018    Procedure: REPAIR, HERNIA, VENTRAL, INCARCERATED, WITHOUT HISTORY OF PRIOR REPAIR;  Surgeon: Guilherme Ordoñez MD;  Location: [facility] OR [address] [place] [address];  Service: General;  Laterality: N/A;    TOTAL KNEE ARTHROPLASTY Bilateral     TOTAL KNEE ARTHROPLASTY Left 10/25/2017    TKR     Family History     Problem Relation (Age of Onset)    Diabetes Father    Esophageal cancer Father    Hypertension Mother        Tobacco Use    Smoking status: Former Smoker     Years: 20.00     Quit date:      Years since quittin.7    Smokeless tobacco: Never Used    Tobacco comment: quit    Substance and Sexual Activity    Alcohol use: No     Comment: rarely/6 months    Drug use: No    Sexual activity: Never     Review of Systems   Constitutional: Positive for  activity change and fatigue. Negative for chills and fever.   HENT: Negative.    Eyes: Negative.    Respiratory: Negative.    Cardiovascular: Negative.    Gastrointestinal: Positive for abdominal distention, abdominal pain, constipation, nausea and vomiting.   Endocrine: Negative.    Genitourinary: Negative.    Musculoskeletal: Negative.    Skin: Negative.    Neurological: Negative.    Hematological: Negative.    Psychiatric/Behavioral: Negative.      Objective:     Vital Signs (Most Recent):  Temp: 98.1 °F (36.7 °C) (09/13/20 2310)  Pulse: (!) 59 (09/14/20 0440)  Resp: 20 (09/14/20 0440)  BP: 135/63 (09/14/20 0440)  SpO2: 95 % (09/14/20 0440) Vital Signs (24h Range):  Temp:  [98.1 °F (36.7 °C)] 98.1 °F (36.7 °C)  Pulse:  [59-78] 59  Resp:  [20-24] 20  SpO2:  [95 %-99 %] 95 %  BP: (112-138)/() 135/63     Weight: 93.7 kg (206 lb 9.1 oz)  Body mass index is 31.41 kg/m².      Intake/Output Summary (Last 24 hours) at 9/14/2020 0454  Last data filed at 9/14/2020 0350  Gross per 24 hour   Intake --   Output 500 ml   Net -500 ml       Physical Exam  Vitals signs and nursing note reviewed.   Constitutional:       Appearance: Normal appearance.   HENT:      Head: Normocephalic.      Mouth/Throat:      Mouth: Mucous membranes are dry.   Eyes:      Extraocular Movements: Extraocular movements intact.      Pupils: Pupils are equal, round, and reactive to light.   Neck:      Musculoskeletal: Normal range of motion.   Cardiovascular:      Rate and Rhythm: Normal rate and regular rhythm.   Pulmonary:      Effort: Pulmonary effort is normal.   Abdominal:      General: There is distension.      Palpations: Abdomen is soft. There is no mass.      Tenderness: There is no abdominal tenderness.      Hernia: No hernia is present.      Comments: Well healed midline and RUQ incisions   Skin:     General: Skin is warm and dry.   Neurological:      General: No focal deficit present.      Mental Status: He is alert and oriented to  person, place, and time.   Psychiatric:         Mood and Affect: Mood normal.         Behavior: Behavior normal.         Significant Labs:  ABGs: No results for input(s): PH, PCO2, PO2, HCO3, POCSATURATED, BE in the last 48 hours.  Amylase: No results for input(s): AMYLASE in the last 48 hours.  BMP:   Recent Labs   Lab 09/13/20  2347      *   K 3.9   CL 91*   CO2 26   BUN 16   CREATININE 1.3   CALCIUM 8.5*   MG 2.1     Cardiac markers:   Recent Labs   Lab 09/13/20  2347   TROPONINI 0.014     CBC:   Recent Labs   Lab 09/13/20  2347   WBC 15.09*   RBC 4.11*   HGB 10.9*   HCT 35.1*      MCV 85   MCH 26.5*   MCHC 31.1*     CMP:   Recent Labs   Lab 09/13/20  2347      CALCIUM 8.5*   ALBUMIN 3.8   PROT 7.1   *   K 3.9   CO2 26   CL 91*   BUN 16   CREATININE 1.3   ALKPHOS 106   ALT 8*   AST 14   BILITOT 0.7       Significant Diagnostics:  I have reviewed all pertinent imaging results/findings within the past 24 hours.    Assessment/Plan:     87 yo male with history of hypothyroidism, neurogenic bladder with indwelling suprapubic catheter, HLD, HTN and GERD presents with SBO    Admit to surgery   NPO  NGT decompression   Telemetry  IV Lopressor      Thank you for your consult. I will follow-up with patient. Please contact us if you have any additional questions.    Zelalem Frazier MD  General Surgery  [facility]

## 2020-09-14 NOTE — NURSING
Rounding on patient and find NG tube on floor next to bed. Pt states he does not want another one placed. ON call gen surg MD notified, ok with holding off on replacing tube at this time, continue NPO measures.

## 2020-09-14 NOTE — ED TRIAGE NOTES
Chucho Prado, an 86 y.o. male presents to the ED via [facility] (unit 376) from [facility] c/o ...       Chief Complaint   Patient presents with    Emesis     Pt c/o bile like emesis. Hx of SBO. Per EMS pt's HR drops to 30's and goes back to 70s     Review of patient's allergies indicates:   Allergen Reactions    Thiazides Other (See Comments)     Multiple episodes of thiazide-induced hyponatremia     Past Medical History:   Diagnosis Date    Acquired hypothyroidism 7/30/2013    Arthritis     Blood transfusion     before 2005 - whe had gangrenous gall bladder    Cataract     Chronic back pain 12/4/2018    - Takes Percocet 5-325 at home - Can continue current abdominal pain control with Dilaudid 0.5 mg IV Q3H prn     CKD (chronic kidney disease) stage 3, GFR 30-59 ml/min     Compression fracture of lumbar vertebra     Depression     Dyslipidemia     Essential hypertension 7/30/2013    Gastroesophageal reflux disease without esophagitis 12/4/2018    - continue home Prilosec    General anesthetics causing adverse effect in therapeutic use     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    GERD (gastroesophageal reflux disease)     History of postoperative delirium 12/4/2018    - Patient reports 1 week history of post-op delirium 7/2018 - Monitor electrolytes, UA, and urine cx - Delirium precautions  - Can use Seroquel 25 mg QHS prn     Hypertension     Hyponatremia 10/23/2017    Impaired mobility and ADLs 6/28/2018    Neurogenic bladder 12/4/2018    Sleep disorder 12/4/2018    - continue Trazodone QHS    Thyroid disease     UTI (urinary tract infection)

## 2020-09-14 NOTE — ED PROVIDER NOTES
Source of History:  Patient    Chief complaint:  Emesis (Pt c/o bile like emesis. Hx of SBO. Per EMS pt's HR drops to 30's and goes back to 70s)    HPI:  Chucho Prado is a 86 y.o. male with history of hypothyroidism, neurogenic bladder with suprapubic catheter (exchanged annually, last exchange 3 days ago by home RN), HLD, HTN and GERD presenting to emergency department with complaint of vomiting.  Patient states that he developed abdominal distention, discomfort, and multiple episodes of vomiting over the past several hours.  He actually feels that the abdominal distention and pain are better, however continues to feel nauseous.  No fevers or chills.  No cough.  He had 2 normal bowel movements earlier today, however he notes that he has not passed gas in several hours.    Surgical history:  - 6/20/2018 repair of flank hernia with mesh for incarcerated flank hernia  - 12/05/2018 Ventral hernia repair(SBO)    Chart review shows that the patient had a 60 admission starting on 12/03/2018 for an incarcerated incisional hernia.  He had a history of a previous ventral incisional hernia repair who, and presented to hospital with abdominal pain and bloating.  An NG was placed in the ED with 500 mL of green output.  He was seen by general surgery and underwent incisional hernia repair with mesh.  He had an uncomplicated postoperative course and was discharged home.    Review of his medications shows that he is on metoprolol succinate.    Recent hospitalization for generalized weakness, found to have hyponatremia.  Echo performed during that stay showed severe aortic stenosis.  Per review of notes, Patient with moderate to severe AS on echo performed during this hospitalization. LUIS: 1.03 cm2; peak velocity 3.11 m/s; mean gradient 26 mmHg. Could contribute to patients symptoms of dyspnea. Further given low CVP and pre-load dependent nature of AS, could potentially improve with some volume resuscitation.     - Follow up  "with cardiology as an outpatient for closer monitoring / potential intervention as an outpatient"      ROS: As per HPI and below:  ROS    Review of patient's allergies indicates:   Allergen Reactions    Thiazides Other (See Comments)     Multiple episodes of thiazide-induced hyponatremia       No current facility-administered medications on file prior to encounter.      Current Outpatient Medications on File Prior to Encounter   Medication Sig Dispense Refill    amLODIPine (NORVASC) 5 MG tablet TAKE 1 TABLET BY MOUTH EVERY DAY 90 tablet 1    aspirin (ECOTRIN) 81 MG EC tablet TAKE 1 TABLET BY MOUTH EVERY DAY 90 tablet 11    atorvastatin (LIPITOR) 20 MG tablet Take 1 tablet (20 mg total) by mouth once daily. 90 tablet 11    cetirizine (ZYRTEC) 10 MG tablet Take 10 mg by mouth daily as needed for Allergies.      gabapentin (NEURONTIN) 400 MG capsule TAKE 2 CAPSULES BY MOUTH TWICE DAILY 120 capsule 2    levothyroxine (SYNTHROID) 50 MCG tablet TAKE 1 TABLET BY MOUTH EVERY DAY 90 tablet 11    metoprolol succinate (TOPROL-XL) 25 MG 24 hr tablet TAKE 1 TABLET BY MOUTH EVERY DAY 30 tablet 11    omeprazole (PRILOSEC) 40 MG capsule TAKE 1 CAPSULE BY MOUTH EVERY MORNING 30 MINUTES BEFORE EATING 90 capsule 0    oxybutynin (DITROPAN) 5 MG Tab Take 1 tablet (5 mg total) by mouth 3 (three) times daily as needed (bladder spasms). 21 tablet 0    traZODone (DESYREL) 50 MG tablet TAKE 1 TABLET BY MOUTH NIGHTLY 90 tablet 11       PMH:  As per HPI and below:  Past Medical History:   Diagnosis Date    Acquired hypothyroidism 7/30/2013    Arthritis     Blood transfusion     before 2005 - whe had gangrenous gall bladder    Cataract     Chronic back pain 12/4/2018    - Takes Percocet 5-325 at home - Can continue current abdominal pain control with Dilaudid 0.5 mg IV Q3H prn     CKD (chronic kidney disease) stage 3, GFR 30-59 ml/min     Compression fracture of lumbar vertebra     Depression     Dyslipidemia     Essential " hypertension 7/30/2013    Gastroesophageal reflux disease without esophagitis 12/4/2018    - continue home Prilosec    General anesthetics causing adverse effect in therapeutic use     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    GERD (gastroesophageal reflux disease)     History of postoperative delirium 12/4/2018    - Patient reports 1 week history of post-op delirium 7/2018 - Monitor electrolytes, UA, and urine cx - Delirium precautions  - Can use Seroquel 25 mg QHS prn     Hypertension     Hyponatremia 10/23/2017    Impaired mobility and ADLs 6/28/2018    Neurogenic bladder 12/4/2018    Sleep disorder 12/4/2018    - continue Trazodone QHS    Thyroid disease     UTI (urinary tract infection)      Past Surgical History:   Procedure Laterality Date    BIOPSY OF BLADDER  9/1/2020    Procedure: BIOPSY, BLADDER;  Surgeon: Daron Manjarrez MD;  Location: [address];  Service: Urology;;    BLADDER FULGURATION  9/1/2020    Procedure: FULGURATION, BLADDER;  Surgeon: Daron Manjarrez MD;  Location: [address];  Service: Urology;;    CHOLECYSTECTOMY      CYSTOGRAM  9/1/2020    Procedure: CYSTOGRAM;  Surgeon: Daron Manjarrez MD;  Location: [address];  Service: Urology;;    CYSTOSCOPY N/A 9/1/2020    Procedure: CYSTOSCOPY;  Surgeon: Daron Manjarrez MD;  Location: [address];  Service: Urology;  Laterality: N/A;    DILATION OF URETHRA  9/1/2020    Procedure: DILATION, URETHRA;  Surgeon: Daron Manjarrez MD;  Location: [address];  Service: Urology;;    EYE SURGERY Right     IOL    HERNIA REPAIR      INTRAOCULAR PROSTHESES INSERTION Left 5/28/2018    Procedure: INSERTION-INTRAOCULAR LENS (IOL);  Surgeon: Trinity aVzquez MD;  Location: [facility];  Service: Ophthalmology;  Laterality: Left;    JOINT REPLACEMENT      LAMINECTOMY  12/27/2016    L2-L4    LUMBAR LAMINECTOMY  12/2016    PARATHYROIDECTOMY      PHACOEMULSIFICATION OF CATARACT Left 5/28/2018    Procedure:  PHACOEMULSIFICATION-ASPIRATION-CATARACT;  Surgeon: Trinity Vazquez MD;  Location: [facility];  Service: Ophthalmology;  Laterality: Left;    REMOVAL OF BLOOD CLOT  2020    Procedure: REMOVAL, BLOOD CLOT;  Surgeon: Daron Manjarrez MD;  Location: [facility] OR [address] [place] [address];  Service: Urology;;    REPAIR OF INCARCERATED INCISIONAL HERNIA WITHOUT HISTORY OF PRIOR REPAIR N/A 2018    Procedure: REPAIR, HERNIA, INCISIONAL, INCARCERATED, WITHOUT HISTORY OF PRIOR REPAIR;  Surgeon: Steve Valentin MD;  Location: [facility] OR [address] [place] [address];  Service: General;  Laterality: N/A;    REPAIR OF INCARCERATED VENTRAL HERNIA WITHOUT HISTORY OF PRIOR REPAIR N/A 2018    Procedure: REPAIR, HERNIA, VENTRAL, INCARCERATED, WITHOUT HISTORY OF PRIOR REPAIR;  Surgeon: Guilherme Ordoñez MD;  Location: [facility] OR [address] [place] [address];  Service: General;  Laterality: N/A;    TOTAL KNEE ARTHROPLASTY Bilateral     TOTAL KNEE ARTHROPLASTY Left 10/25/2017    TKR       Social History     Socioeconomic History    Marital status: Single     Spouse name: Not on file    Number of children: Not on file    Years of education: Not on file    Highest education level: Not on file   Occupational History    Not on file   Social Needs    Financial resource strain: Not on file    Food insecurity     Worry: Not on file     Inability: Not on file    Transportation needs     Medical: Not on file     Non-medical: Not on file   Tobacco Use    Smoking status: Former Smoker     Years: 20.     Quit date:      Years since quittin.7    Smokeless tobacco: Never Used    Tobacco comment: quit    Substance and Sexual Activity    Alcohol use: No     Comment: rarely/6 months    Drug use: No    Sexual activity: Never   Lifestyle    Physical activity     Days per week: Not on file     Minutes per session: Not on file    Stress: Not on file   Relationships    Social connections     Talks on phone: Not on file     Gets together: Not on file     Attends [employer]  service: Not on file     Active member of club or organization: Not on file     Attends meetings of clubs or organizations: Not on file     Relationship status: Not on file   Other Topics Concern    Not on file   Social History Narrative    Lives alone, owns house and rents part. Delaney helps. 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        Family History   Problem Relation Age of Onset    Hypertension Mother     Diabetes Father     Esophageal cancer Father        Physical Exam:      Vitals:    09/13/20 2310   BP: 112/86   Pulse: 70   Resp: (!) 24   Temp: 98.1 °F (36.7 °C)     Gen: No acute distress.  Mental Status:  Alert and oriented x 3.  Appropriate, conversant.  Skin: Warm, dry. No rashes seen.  Eyes: No conjunctival injection.  Pulm: No increased work of breathing.  No significant tachypnea.  No audible stridor or wheezing.  No conversational dyspnea.  Auscultation limited secondary to PPE.  CV: Regular rate. Regular rhythm. Normal peripheral perfusion. No lower extremity edema.  Abd: Soft.  Distended.  Diffuse tenderness to palpation without rebound tenderness or guarding.  MSK: Good range of motion all joints.  No deformities.    Neuro: Awake. Speech normal. No focal neuro deficit observed.    Laboratory Studies:  Labs Reviewed   CBC W/ AUTO DIFFERENTIAL - Abnormal; Notable for the following components:       Result Value    WBC 15.09 (*)     RBC 4.11 (*)     Hemoglobin 10.9 (*)     Hematocrit 35.1 (*)     Mean Corpuscular Hemoglobin 26.5 (*)     Mean Corpuscular Hemoglobin Conc 31.1 (*)     Gran # (ANC) 12.6 (*)     Immature Grans (Abs) 0.06 (*)     Gran% 83.3 (*)     Lymph% 9.2 (*)     All other components within normal limits   COMPREHENSIVE METABOLIC PANEL - Abnormal; Notable for the following components:    Sodium 128 (*)     Chloride 91 (*)     Calcium 8.5 (*)     ALT 8 (*)     eGFR if  57.1 (*)     eGFR if non  49.4 (*)     All other components within normal limits    LIPASE - Abnormal; Notable for the following components:    Lipase 3 (*)     All other components within normal limits   B-TYPE NATRIURETIC PEPTIDE - Abnormal; Notable for the following components:     (*)     All other components within normal limits   URINALYSIS, REFLEX TO URINE CULTURE - Abnormal; Notable for the following components:    Appearance, UA Cloudy (*)     Specific Gravity, UA >=1.030 (*)     Protein, UA 1+ (*)     Occult Blood UA 3+ (*)     Nitrite, UA Positive (*)     Leukocytes, UA 3+ (*)     All other components within normal limits    Narrative:     Specimen Source->Urine   URINALYSIS MICROSCOPIC - Abnormal; Notable for the following components:    RBC, UA 75 (*)     WBC, UA >100 (*)     Non-Squam Epith 1 (*)     All other components within normal limits    Narrative:     Specimen Source->Urine   CULTURE, URINE   LACTIC ACID, PLASMA   MAGNESIUM   TROPONIN I   SARS-COV-2 RNA AMPLIFICATION, QUAL       EKG (independently interpreted by me):  Normal sinus rhythm, rate 76.  No ST elevation or depression, no T-wave inversion.  Frequent ectopy.  QRS 94 milliseconds,  milliseconds.    Chart reviewed.  See summary in HPI    Imaging Results           CT Abdomen Pelvis With Contrast (Final result)  Result time 09/14/20 02:25:28    Final result by Salo Rodriguez MD (09/14/20 02:25:28)                 Impression:      Findings consistent with a small-bowel obstruction with transition point identified within the right mid abdomen (axial series 2, image 57), as detailed above.  No pneumatosis, portal venous gas, or pneumoperitoneum.    Small fluid-filled hiatal hernia with fluid extending into the distal esophagus.    Bilateral kidneys demonstrate cortical thinning and left renal scarring.    1.2 cm left renal hypodensity measuring higher attenuation than simple fluid, stable from prior CT 2018, favored to represent a proteinaceous/hemorrhagic cyst.  Further evaluation can be obtained with a  dedicated renal ultrasound to confirm benign nature on a nonemergent basis.    Suprapubic catheter appears to be within good position.    Diverticulosis coli without evidence of acute diverticulitis.    Ectasia of bilateral common iliac arteries.    Additional stable findings, as above.    This report was flagged in Epic as abnormal..    Electronically signed by resident: Carmina Dalton  Date:    09/14/2020  Time:    01:20    Electronically signed by: Salo Rodriguez MD  Date:    09/14/2020  Time:    02:25             Narrative:    EXAMINATION:  CT ABDOMEN PELVIS WITH CONTRAST    CLINICAL HISTORY:  Abdominal distension;Bowel obstruction high-grade suspected;    TECHNIQUE:  Low dose axial images were obtained from the lung bases to the pubic symphysis following the intravenous administration of 100 cc of Omnipaque 350.  Sagittal and coronal reformats were provided.    COMPARISON:  Ultrasound kidney 08/31/2020, CT abdomen pelvis 12/03/2018    FINDINGS:  Heart: Normal in size.  No pericardial effusion.    Lung bases: Elevation left hemidiaphragm.  Mild bandlike atelectasis in the lingula and compressive atelectasis at the left lung base related to elevation of left hemidiaphragm.  No consolidation, pleural effusion, mass, or pneumothorax.    Liver: Normal in size and attenuation without focal hepatic abnormality.    Gallbladder: Status post cholecystectomy.    Bile Ducts: No intra or extrahepatic biliary ductal dilation.    Pancreas: No pancreatic mass lesion or peripancreatic inflammatory change.    Spleen: Unremarkable.    Adrenals: Unremarkable.    Kidneys/ Ureters: Kidneys enhance normally but demonstrate cortical thinning bilaterally and prominent cortical scarring on the left.  1.2 cm left renal hypodensity within the superior pole measuring higher attenuation than simple fluid, stable dating back to at least 2018 possibly reflecting a proteinaceous/hemorrhagic cyst.  Few subcentimeter renal hypodensities  bilaterally, too small to characterize but favored to represent cysts.  No nephrolithiasis.  No hydroureteronephrosis.    Bladder: Suprapubic catheter in place with bulb located in the bladder lumen.  Bladder is nondistended.  No significant bladder wall thickening.  Previously noted bilateral bladder diverticula are nondistended, as well, on today's exam.    Reproductive organs: Prostate is not visualized, possibly surgically absent.    GI Tract/Mesentery: Visualized distal esophagus is filled with fluid.  Small fluid-filled hiatal hernia.  Stomach is mildly distended with air-fluid levels.  There are several loops of mildly dilated proximal small bowel, measuring up to 4.3 cm, associated with a transition point within the right lower quadrant (axial series 2, image 57) noting decompressed small bowel distally.  No pneumatosis or portal venous gas.  Colon is normal in caliber and contains a moderate volume of stool.  Large volume of stool also within the rectum.  Multiple diverticula scattered primarily throughout the sigmoid colon without evidence of acute diverticulitis.  Appendix is visualized and is unremarkable.    Peritoneal Space: No abdominopelvic ascites, intraperitoneal free air, or significant adenopathy.    Retroperitoneum: No significant adenopathy.    Abdominal wall/extraperitoneal soft tissues: Small fat containing ventral hernia within the mid upper abdomen.  Surgical material noted in the proximal aspects of bilateral inguinal canals, favored to represent prior inguinal hernia repairs.    Vasculature: Abdominal aorta is normal in caliber, contour, and course with severe calcific atherosclerosis throughout its course as well as at the origins of the celiac artery, SMA, and bilateral renal arteries.  Ectasia of bilateral common iliac arteries, measuring 2.1 cm on the left and 1.9 cm on the right.    Bones: Mild levoscoliosis of the lumbar spine.  Stable, remote severe compression fractures of L1 and  "L3 vertebral bodies status post L3 vertebroplasty.  No acute fracture or osseous destructive lesion identified.                              Medications Given:  Medications   ondansetron injection 4 mg (4 mg Intravenous Given 9/13/20 1274)   iohexoL (OMNIPAQUE 350) injection 100 mL (100 mLs Intravenous Given 9/14/20 0112)   lorazepam injection 0.5 mg (0.5 mg Intravenous Given 9/14/20 0124)     Discussed with:  General surgery    MDM:    86 y.o. male with complaint of abdominal distention, pain, and vomiting.  Here he is afebrile, stable, nontoxic.  He was noted to be "bradycardic by EMS, however appears to have frequent ectopy/PVCs which likely contributed to this.  While he is on telemetry here in the emergency department he is not noted to be bradycardic.    Differential diagnosis including but not limited to:  Bowel obstruction, ileus, constipation.  No evidence of bradycardia or symptomatic bradycardia in the emergency department.    His labs are remarkable for leukocytosis, sodium of 128.  Creatinine 1.3, 1.2 previously.  , troponin negative, lactate negative, COVID negative.    CT of the abdomen with small bowel obstruction.  Will place NG tube.  Case discussed with General surgery.  Anticipate admission to their service    Diagnostic Impression:    1. Small bowel obstruction    2. Bradycardia         ED Disposition Condition    Admit              Patient and/or family understands the plan and is in agreement, verbalized understanding, questions answered    Liset Madrid MD  Emergency Medicine       Liset Madrid MD  09/14/20 5109    "

## 2020-09-14 NOTE — NURSING TRANSFER
Nursing Transfer Note      9/14/2020     Transfer from ED to [facility] 1059    Transfer via stretcher.    Transfer with     Transported by transport services    Medicines sent: none    Chart send with patient: yes    Notified: ANNE Degroot    Patient reassessed at: 0615    Upon arrival to floor: NG tube not secure to nose. Called MD to notify; MD advised advance tube in where taped off measurement; KUB ordered to verify correct placement.  Call light within reach  WCTM

## 2020-09-14 NOTE — PLAN OF CARE
Admit Date:  9/13/2020 11:12 PM      Admit Diagnosis:  Small bowel obstruction [K56.609]  Encounter for nasogastric (NG) tube placement [Z46.59]  Moderate to severe aortic stenosis [I35.0]      CM met with patient in room for Discharge Planning Assessment. Per patient, he lives alone in a house with an elevator.   Per patient, he somewhat was dependent with ADLS and used DME for ambulation.  Patient will have assistance from family/friend upon discharge. Patient was at [facility] for SNF, but could return home with home health if appropriate. Patient had [facility] is the recent past. Discharge Planning Booklet given to patient and discussed.  All questions addressed.  CM will follow for needs.

## 2020-09-14 NOTE — PLAN OF CARE
Admit Date:  9/13/2020 11:12 PM      Admit Diagnosis:  Small bowel obstruction [K56.609]  Encounter for nasogastric (NG) tube placement [Z46.59]  Moderate to severe aortic stenosis [I35.0]         CM met with patient in room for Discharge Planning Assessment. Per patient, he lives alone in a house with an elevator.   Per patient, he somewhat was dependent with ADLS and used DME for ambulation.  Patient will have assistance from family/friend upon discharge. Patient was at [facility] for SNF, but could return home with home health if appropriate. Patient had [facility] is the recent past. Discharge Planning Booklet given to patient and discussed.  All questions addressed.  CM will follow for needs.       09/14/20 0365   Discharge Assessment   Assessment Type Discharge Planning Assessment   Confirmed/corrected address and phone number on facesheet? Yes   Assessment information obtained from? Patient   Expected Length of Stay (days) 6   Communicated expected length of stay with patient/caregiver yes   Prior to hospitilization cognitive status: Alert/Oriented   Prior to hospitalization functional status: Assistive Equipment   Current cognitive status: Alert/Oriented   Current Functional Status: Assistive Equipment   Facility Arrived From: [facility]   Lives With alone   Able to Return to Prior Arrangements yes   Is patient able to care for self after discharge? Unable to determine at this time (comments)  (TBD)   Patient's perception of discharge disposition home health;skilled nursing facility   Readmission Within the Last 30 Days current reason for admission unrelated to previous admission   If yes, most recent facility name: [facility]   Patient currently being followed by outpatient case management? No   Patient currently receives any other outside agency services? No   Equipment Currently Used at Home bedside commode;walker, standard;walker, rolling;cane, straight    Do you have any problems affording any of your prescribed medications? No   Is the patient taking medications as prescribed? yes   Does the patient have transportation home? Yes   Transportation Anticipated family or friend will provide   Does the patient receive services at the [facility]? No   Discharge Plan A Skilled Nursing Facility   Discharge Plan B Home Health   DME Needed Upon Discharge  none   Patient/Family in Agreement with Plan yes   Readmission Questionnaire   At the time of your discharge, did someone talk to you about what your health problems were? Yes   At the time of discharge, did someone talk to you about what to watch out for regarding worsening of your health problem? Yes   At the time of discharge, did someone talk to you about what to do if you experienced worsening of your health problem? Yes   At the time of discharge, did someone talk to you about which medication to take when you left the hospital and which ones to stop taking? Yes   At the time of discharge, did someone talk to you about when and where to follow up with a doctor after you left the hospital? Yes   What do you believe caused you to be sick enough to be re-admitted? SBO   Does the patient have transportation to healthcare appointments? Yes   Living Arrangements house          PCP:  Obed Mcguire MD  [phone]        Pharmacy:    [facility] [place] [address]  [address]  [place] [address]  Phone: [phone] Fax: [phone]    [facility]  [place] [address]  [address]  [place] [address]  Phone: [phone] Fax: [phone]    [id number] - [place], [place] - [address]  [address]  [facility] [address]  Phone: [phone] Fax: [phone]        Emergency Contacts:  Extended Emergency Contact Information  Primary Emergency  Contact: Josh Burr   [facility]  Home Phone: [phone]  Mobile Phone: [phone]  Relation: Friend  Secondary Emergency Contact: Carolina Pak   [facility]  Home Phone: [phone]  Mobile Phone: [phone]  Relation: None      Insurance:    Payor: MEDICARE / Plan: MEDICARE PART A & B / Product Type: [facility] / 09/14/2020  12:51 PM    Prerna Duran RN, CM   Ext: [phone]

## 2020-09-15 LAB
ANION GAP SERPL CALC-SCNC: 8 MMOL/L (ref 8–16)
BASOPHILS # BLD AUTO: 0.03 K/UL (ref 0–0.2)
BASOPHILS NFR BLD: 0.4 % (ref 0–1.9)
BUN SERPL-MCNC: 16 MG/DL (ref 8–23)
CALCIUM SERPL-MCNC: 7.8 MG/DL (ref 8.7–10.5)
CHLORIDE SERPL-SCNC: 97 MMOL/L (ref 95–110)
CO2 SERPL-SCNC: 28 MMOL/L (ref 23–29)
CREAT SERPL-MCNC: 1.1 MG/DL (ref 0.5–1.4)
DIFFERENTIAL METHOD: ABNORMAL
EOSINOPHIL # BLD AUTO: 0.1 K/UL (ref 0–0.5)
EOSINOPHIL NFR BLD: 1.2 % (ref 0–8)
ERYTHROCYTE [DISTWIDTH] IN BLOOD BY AUTOMATED COUNT: 14.2 % (ref 11.5–14.5)
EST. GFR  (AFRICAN AMERICAN): >60 ML/MIN/1.73 M^2
EST. GFR  (NON AFRICAN AMERICAN): >60 ML/MIN/1.73 M^2
GLUCOSE SERPL-MCNC: 76 MG/DL (ref 70–110)
HCT VFR BLD AUTO: 32.6 % (ref 40–54)
HGB BLD-MCNC: 10 G/DL (ref 14–18)
IMM GRANULOCYTES # BLD AUTO: 0.02 K/UL (ref 0–0.04)
IMM GRANULOCYTES NFR BLD AUTO: 0.3 % (ref 0–0.5)
LYMPHOCYTES # BLD AUTO: 1 K/UL (ref 1–4.8)
LYMPHOCYTES NFR BLD: 14.6 % (ref 18–48)
MAGNESIUM SERPL-MCNC: 2.1 MG/DL (ref 1.6–2.6)
MCH RBC QN AUTO: 26.2 PG (ref 27–31)
MCHC RBC AUTO-ENTMCNC: 30.7 G/DL (ref 32–36)
MCV RBC AUTO: 85 FL (ref 82–98)
MONOCYTES # BLD AUTO: 0.5 K/UL (ref 0.3–1)
MONOCYTES NFR BLD: 7.3 % (ref 4–15)
NEUTROPHILS # BLD AUTO: 5.1 K/UL (ref 1.8–7.7)
NEUTROPHILS NFR BLD: 76.2 % (ref 38–73)
NRBC BLD-RTO: 0 /100 WBC
PHOSPHATE SERPL-MCNC: 3.6 MG/DL (ref 2.7–4.5)
PLATELET # BLD AUTO: 301 K/UL (ref 150–350)
PMV BLD AUTO: 9.8 FL (ref 9.2–12.9)
POTASSIUM SERPL-SCNC: 3.9 MMOL/L (ref 3.5–5.1)
RBC # BLD AUTO: 3.82 M/UL (ref 4.6–6.2)
SODIUM SERPL-SCNC: 133 MMOL/L (ref 136–145)
WBC # BLD AUTO: 6.71 K/UL (ref 3.9–12.7)

## 2020-09-15 PROCEDURE — 25000003 PHARM REV CODE 250: Performed by: STUDENT IN AN ORGANIZED HEALTH CARE EDUCATION/TRAINING PROGRAM

## 2020-09-15 PROCEDURE — 36415 COLL VENOUS BLD VENIPUNCTURE: CPT

## 2020-09-15 PROCEDURE — 80048 BASIC METABOLIC PNL TOTAL CA: CPT

## 2020-09-15 PROCEDURE — 63600175 PHARM REV CODE 636 W HCPCS: Performed by: STUDENT IN AN ORGANIZED HEALTH CARE EDUCATION/TRAINING PROGRAM

## 2020-09-15 PROCEDURE — 85025 COMPLETE CBC W/AUTO DIFF WBC: CPT

## 2020-09-15 PROCEDURE — 84100 ASSAY OF PHOSPHORUS: CPT

## 2020-09-15 PROCEDURE — 20600001 HC STEP DOWN PRIVATE ROOM

## 2020-09-15 PROCEDURE — 99233 SBSQ HOSP IP/OBS HIGH 50: CPT | Mod: ,,, | Performed by: SURGERY

## 2020-09-15 PROCEDURE — 99233 PR SUBSEQUENT HOSPITAL CARE,LEVL III: ICD-10-PCS | Mod: ,,, | Performed by: SURGERY

## 2020-09-15 PROCEDURE — 83735 ASSAY OF MAGNESIUM: CPT

## 2020-09-15 RX ORDER — TALC
6 POWDER (GRAM) TOPICAL NIGHTLY PRN
Status: DISCONTINUED | OUTPATIENT
Start: 2020-09-15 | End: 2020-09-18 | Stop reason: HOSPADM

## 2020-09-15 RX ADMIN — ONDANSETRON 4 MG: 2 INJECTION INTRAMUSCULAR; INTRAVENOUS at 01:09

## 2020-09-15 RX ADMIN — METOROPROLOL TARTRATE 5 MG: 5 INJECTION, SOLUTION INTRAVENOUS at 07:09

## 2020-09-15 RX ADMIN — SODIUM CHLORIDE: 0.9 INJECTION, SOLUTION INTRAVENOUS at 06:09

## 2020-09-15 RX ADMIN — METOROPROLOL TARTRATE 5 MG: 5 INJECTION, SOLUTION INTRAVENOUS at 01:09

## 2020-09-15 RX ADMIN — METOROPROLOL TARTRATE 5 MG: 5 INJECTION, SOLUTION INTRAVENOUS at 03:09

## 2020-09-15 RX ADMIN — MELATONIN TAB 3 MG 6 MG: 3 TAB at 08:09

## 2020-09-15 RX ADMIN — METOROPROLOL TARTRATE 5 MG: 5 INJECTION, SOLUTION INTRAVENOUS at 06:09

## 2020-09-15 RX ADMIN — ONDANSETRON 4 MG: 2 INJECTION INTRAMUSCULAR; INTRAVENOUS at 11:09

## 2020-09-15 NOTE — PROGRESS NOTES
[facility]  General Surgery  Progress Note    Subjective:     History of Present Illness:  No notes on file    Post-Op Info:  * No surgery found *         Interval History: Patient removed NGT overnight and refused replacement. No BM/Flatus. Abdomen non tender. KUB this AM shows contrast in the colon. Required supplemental O2 for episodes of desaturations. Some confusion ON, resolved once O2 placed    Medications:  Continuous Infusions:   sodium chloride 0.9% 100 mL/hr at 09/15/20 0612     Scheduled Meds:   metoprolol  5 mg Intravenous Q6H     PRN Meds:acetaminophen, albuterol-ipratropium, morphine, ondansetron, sodium chloride 0.9%     Review of patient's allergies indicates:   Allergen Reactions    Thiazides Other (See Comments)     Multiple episodes of thiazide-induced hyponatremia     Objective:     Vital Signs (Most Recent):  Temp: 98.9 °F (37.2 °C) (09/14/20 2015)  Pulse: 72 (09/15/20 0737)  Resp: 16 (09/15/20 0737)  BP: (!) 159/67 (09/15/20 0737)  SpO2: 98 % (09/15/20 0737) Vital Signs (24h Range):  Temp:  [97.1 °F (36.2 °C)-98.9 °F (37.2 °C)] 98.9 °F (37.2 °C)  Pulse:  [68-80] 72  Resp:  [16-20] 16  SpO2:  [80 %-98 %] 98 %  BP: (110-159)/(57-91) 159/67     Weight: 93.7 kg (206 lb 9.1 oz)  Body mass index is 31.41 kg/m².    Intake/Output - Last 3 Shifts       09/13 0700 - 09/14 0659 09/14 0700 - 09/15 0659 09/15 0700 - 09/16 0659    Urine (mL/kg/hr)  500 (0.2)     Emesis/NG output  200     Drains 500      Other  0     Stool  0     Total Output 500 700     Net -500 -700            Urine Occurrence  1 x     Stool Occurrence  1 x           Physical Exam  Vitals signs and nursing note reviewed.   Constitutional:       Appearance: Normal appearance.   HENT:      Head: Normocephalic and atraumatic.   Cardiovascular:      Rate and Rhythm: Normal rate and regular rhythm.   Pulmonary:      Effort: Pulmonary effort is normal. No respiratory distress.      Comments: 2L NC  Abdominal:      General:  There is no distension.      Palpations: Abdomen is soft.      Tenderness: There is no abdominal tenderness.   Skin:     General: Skin is warm and dry.   Neurological:      General: No focal deficit present.      Mental Status: He is alert.   Psychiatric:         Mood and Affect: Mood normal.         Behavior: Behavior normal.         Significant Labs:  CBC:   Recent Labs   Lab 09/15/20  0540   WBC 6.71   RBC 3.82*   HGB 10.0*   HCT 32.6*      MCV 85   MCH 26.2*   MCHC 30.7*     CMP:   Recent Labs   Lab 09/13/20  2347 09/15/20  0540    76   CALCIUM 8.5* 7.8*   ALBUMIN 3.8  --    PROT 7.1  --    * 133*   K 3.9 3.9   CO2 26 28   CL 91* 97   BUN 16 16   CREATININE 1.3 1.1   ALKPHOS 106  --    ALT 8*  --    AST 14  --    BILITOT 0.7  --        Significant Diagnostics:  I have reviewed all pertinent imaging results/findings within the past 24 hours.    Assessment/Plan:     * Small bowel obstruction  86 y.o. male with history of hypothyroidism, neurogenic bladder with indwelling suprapubic catheter, HLD, HTN and GERD presents with SBO     NPO  NGT removed by patient, refuses replacement  Will continue to monitor for return of bowel function. May need diag lap if no improvement  Telemetry  IV Lopressor        Shellie Willams MD  General Surgery  [facility]

## 2020-09-15 NOTE — ASSESSMENT & PLAN NOTE
86 y.o. male with history of hypothyroidism, neurogenic bladder with indwelling suprapubic catheter, HLD, HTN and GERD presents with SBO     NPO  NGT removed by patient, refuses replacement  Will continue to monitor for return of bowel function. May need diag lap if no improvement  Telemetry  IV Lopressor

## 2020-09-15 NOTE — SUBJECTIVE & OBJECTIVE
Interval History: Patient removed NGT overnight and refused replacement. No BM/Flatus. Abdomen non tender. KUB this AM shows contrast in the colon. Required supplemental O2 for episodes of desaturations. Some confusion ON, resolved once O2 placed    Medications:  Continuous Infusions:   sodium chloride 0.9% 100 mL/hr at 09/15/20 0612     Scheduled Meds:   metoprolol  5 mg Intravenous Q6H     PRN Meds:acetaminophen, albuterol-ipratropium, morphine, ondansetron, sodium chloride 0.9%     Review of patient's allergies indicates:   Allergen Reactions    Thiazides Other (See Comments)     Multiple episodes of thiazide-induced hyponatremia     Objective:     Vital Signs (Most Recent):  Temp: 98.9 °F (37.2 °C) (09/14/20 2015)  Pulse: 72 (09/15/20 0737)  Resp: 16 (09/15/20 0737)  BP: (!) 159/67 (09/15/20 0737)  SpO2: 98 % (09/15/20 0737) Vital Signs (24h Range):  Temp:  [97.1 °F (36.2 °C)-98.9 °F (37.2 °C)] 98.9 °F (37.2 °C)  Pulse:  [68-80] 72  Resp:  [16-20] 16  SpO2:  [80 %-98 %] 98 %  BP: (110-159)/(57-91) 159/67     Weight: 93.7 kg (206 lb 9.1 oz)  Body mass index is 31.41 kg/m².    Intake/Output - Last 3 Shifts       09/13 0700 - 09/14 0659 09/14 0700 - 09/15 0659 09/15 0700 - 09/16 0659    Urine (mL/kg/hr)  500 (0.2)     Emesis/NG output  200     Drains 500      Other  0     Stool  0     Total Output 500 700     Net -500 -700            Urine Occurrence  1 x     Stool Occurrence  1 x           Physical Exam  Vitals signs and nursing note reviewed.   Constitutional:       Appearance: Normal appearance.   HENT:      Head: Normocephalic and atraumatic.   Cardiovascular:      Rate and Rhythm: Normal rate and regular rhythm.   Pulmonary:      Effort: Pulmonary effort is normal. No respiratory distress.      Comments: 2L NC  Abdominal:      General: There is no distension.      Palpations: Abdomen is soft.      Tenderness: There is no abdominal tenderness.   Skin:     General: Skin is warm and dry.   Neurological:       General: No focal deficit present.      Mental Status: He is alert.   Psychiatric:         Mood and Affect: Mood normal.         Behavior: Behavior normal.         Significant Labs:  CBC:   Recent Labs   Lab 09/15/20  0540   WBC 6.71   RBC 3.82*   HGB 10.0*   HCT 32.6*      MCV 85   MCH 26.2*   MCHC 30.7*     CMP:   Recent Labs   Lab 09/13/20  2347 09/15/20  0540    76   CALCIUM 8.5* 7.8*   ALBUMIN 3.8  --    PROT 7.1  --    * 133*   K 3.9 3.9   CO2 26 28   CL 91* 97   BUN 16 16   CREATININE 1.3 1.1   ALKPHOS 106  --    ALT 8*  --    AST 14  --    BILITOT 0.7  --        Significant Diagnostics:  I have reviewed all pertinent imaging results/findings within the past 24 hours.

## 2020-09-15 NOTE — NURSING
POC reviewed with patient. AAO, VSS ex. BP on RA. O2 sats low 90%, parameters >89%, NG tube removed by patient, did not want replaced, MD aware. NPO, tolerating well. NS @ 100ml/hr via PIV, c/d/i. Suprapubic catheter draining to dravity, clear yellow urine. WCTM.

## 2020-09-15 NOTE — PLAN OF CARE
POC reviewed. Disorientation at start of shift refusing any care. Convinced patient to keep 2L O2 NC on resulting in some improvement in orientation. Some amount of disorientation persisted throughout much of the evening shift. VSS on 2L O2 NC with O2 sats in the low 90's, otherwise in the low 80's. No complaints of SOB, headaches, or dizziness. Urine output per superpubic catheter adequate. Patient removed NGT prior to start of shift, refused placing new NGT. Tolerating being NPO with intermittent N/V green emesis, relieved with antiemetics per MAR. One moderate size BM to adult brief in bed. Intermittent complaints of nausea. Patient denies pain while in bed. No prn pain meds requested or given. Patient sat up on edge of bed once with assistance while changing bedding. Complained of fatigue, asking to lay back in bed. Telemetry monitored with consistent bigeminy rhythm.  Lopressor Metoprolol given as scheduled. Patient is resting quietly with side rails up and call light with in reach. Will continue to monitor patient status.

## 2020-09-16 LAB
ANION GAP SERPL CALC-SCNC: 14 MMOL/L (ref 8–16)
BASOPHILS # BLD AUTO: 0.04 K/UL (ref 0–0.2)
BASOPHILS NFR BLD: 0.5 % (ref 0–1.9)
BUN SERPL-MCNC: 9 MG/DL (ref 8–23)
CALCIUM SERPL-MCNC: 7.9 MG/DL (ref 8.7–10.5)
CHLORIDE SERPL-SCNC: 95 MMOL/L (ref 95–110)
CO2 SERPL-SCNC: 24 MMOL/L (ref 23–29)
CREAT SERPL-MCNC: 0.8 MG/DL (ref 0.5–1.4)
DIFFERENTIAL METHOD: ABNORMAL
EOSINOPHIL # BLD AUTO: 0.2 K/UL (ref 0–0.5)
EOSINOPHIL NFR BLD: 2.8 % (ref 0–8)
ERYTHROCYTE [DISTWIDTH] IN BLOOD BY AUTOMATED COUNT: 14.1 % (ref 11.5–14.5)
EST. GFR  (AFRICAN AMERICAN): >60 ML/MIN/1.73 M^2
EST. GFR  (NON AFRICAN AMERICAN): >60 ML/MIN/1.73 M^2
GLUCOSE SERPL-MCNC: 72 MG/DL (ref 70–110)
HCT VFR BLD AUTO: 34.8 % (ref 40–54)
HGB BLD-MCNC: 10.5 G/DL (ref 14–18)
IMM GRANULOCYTES # BLD AUTO: 0.03 K/UL (ref 0–0.04)
IMM GRANULOCYTES NFR BLD AUTO: 0.4 % (ref 0–0.5)
LYMPHOCYTES # BLD AUTO: 1.3 K/UL (ref 1–4.8)
LYMPHOCYTES NFR BLD: 17.7 % (ref 18–48)
MAGNESIUM SERPL-MCNC: 1.7 MG/DL (ref 1.6–2.6)
MCH RBC QN AUTO: 25.8 PG (ref 27–31)
MCHC RBC AUTO-ENTMCNC: 30.2 G/DL (ref 32–36)
MCV RBC AUTO: 86 FL (ref 82–98)
MONOCYTES # BLD AUTO: 0.5 K/UL (ref 0.3–1)
MONOCYTES NFR BLD: 6.5 % (ref 4–15)
NEUTROPHILS # BLD AUTO: 5.3 K/UL (ref 1.8–7.7)
NEUTROPHILS NFR BLD: 72.1 % (ref 38–73)
NRBC BLD-RTO: 0 /100 WBC
PHOSPHATE SERPL-MCNC: 2.8 MG/DL (ref 2.7–4.5)
PLATELET # BLD AUTO: 329 K/UL (ref 150–350)
PMV BLD AUTO: 9.7 FL (ref 9.2–12.9)
POTASSIUM SERPL-SCNC: 3.4 MMOL/L (ref 3.5–5.1)
RBC # BLD AUTO: 4.07 M/UL (ref 4.6–6.2)
SODIUM SERPL-SCNC: 133 MMOL/L (ref 136–145)
WBC # BLD AUTO: 7.39 K/UL (ref 3.9–12.7)

## 2020-09-16 PROCEDURE — 80048 BASIC METABOLIC PNL TOTAL CA: CPT

## 2020-09-16 PROCEDURE — 36415 COLL VENOUS BLD VENIPUNCTURE: CPT

## 2020-09-16 PROCEDURE — 20600001 HC STEP DOWN PRIVATE ROOM

## 2020-09-16 PROCEDURE — 85025 COMPLETE CBC W/AUTO DIFF WBC: CPT

## 2020-09-16 PROCEDURE — S0028 INJECTION, FAMOTIDINE, 20 MG: HCPCS | Performed by: STUDENT IN AN ORGANIZED HEALTH CARE EDUCATION/TRAINING PROGRAM

## 2020-09-16 PROCEDURE — 25000003 PHARM REV CODE 250: Performed by: STUDENT IN AN ORGANIZED HEALTH CARE EDUCATION/TRAINING PROGRAM

## 2020-09-16 PROCEDURE — 83735 ASSAY OF MAGNESIUM: CPT

## 2020-09-16 PROCEDURE — 84100 ASSAY OF PHOSPHORUS: CPT

## 2020-09-16 RX ORDER — ASPIRIN 81 MG/1
81 TABLET ORAL DAILY
Status: DISCONTINUED | OUTPATIENT
Start: 2020-09-16 | End: 2020-09-18 | Stop reason: HOSPADM

## 2020-09-16 RX ORDER — LEVOTHYROXINE SODIUM 50 UG/1
50 TABLET ORAL
Status: DISCONTINUED | OUTPATIENT
Start: 2020-09-17 | End: 2020-09-18 | Stop reason: HOSPADM

## 2020-09-16 RX ORDER — METOPROLOL SUCCINATE 25 MG/1
25 TABLET, EXTENDED RELEASE ORAL DAILY
Status: DISCONTINUED | OUTPATIENT
Start: 2020-09-16 | End: 2020-09-18 | Stop reason: HOSPADM

## 2020-09-16 RX ORDER — GABAPENTIN 400 MG/1
800 CAPSULE ORAL 2 TIMES DAILY
Status: DISCONTINUED | OUTPATIENT
Start: 2020-09-16 | End: 2020-09-18 | Stop reason: HOSPADM

## 2020-09-16 RX ORDER — FAMOTIDINE 10 MG/ML
20 INJECTION INTRAVENOUS DAILY
Status: DISCONTINUED | OUTPATIENT
Start: 2020-09-16 | End: 2020-09-18 | Stop reason: HOSPADM

## 2020-09-16 RX ORDER — ATORVASTATIN CALCIUM 20 MG/1
20 TABLET, FILM COATED ORAL DAILY
Status: DISCONTINUED | OUTPATIENT
Start: 2020-09-16 | End: 2020-09-18 | Stop reason: HOSPADM

## 2020-09-16 RX ADMIN — METOROPROLOL TARTRATE 5 MG: 5 INJECTION, SOLUTION INTRAVENOUS at 12:09

## 2020-09-16 RX ADMIN — ASPIRIN 81 MG: 81 TABLET, COATED ORAL at 10:09

## 2020-09-16 RX ADMIN — SODIUM CHLORIDE: 0.9 INJECTION, SOLUTION INTRAVENOUS at 03:09

## 2020-09-16 RX ADMIN — FAMOTIDINE 20 MG: 10 INJECTION INTRAVENOUS at 08:09

## 2020-09-16 RX ADMIN — SODIUM CHLORIDE: 0.9 INJECTION, SOLUTION INTRAVENOUS at 01:09

## 2020-09-16 RX ADMIN — METOROPROLOL TARTRATE 5 MG: 5 INJECTION, SOLUTION INTRAVENOUS at 06:09

## 2020-09-16 RX ADMIN — ATORVASTATIN CALCIUM 20 MG: 20 TABLET, FILM COATED ORAL at 10:09

## 2020-09-16 RX ADMIN — GABAPENTIN 800 MG: 400 CAPSULE ORAL at 08:09

## 2020-09-16 RX ADMIN — METOPROLOL SUCCINATE 25 MG: 25 TABLET, EXTENDED RELEASE ORAL at 10:09

## 2020-09-16 RX ADMIN — FAMOTIDINE 20 MG: 10 INJECTION INTRAVENOUS at 12:09

## 2020-09-16 RX ADMIN — GABAPENTIN 800 MG: 400 CAPSULE ORAL at 10:09

## 2020-09-16 NOTE — PLAN OF CARE
Patient A/O x4 with periods of confusion. Proper orientation as needed. Tolerating clear liquid diet so far. Had a total of 5 soft large BM. Suprapubic catheter in place draining clear yellow output. IVF @100 cc. Patient denies N/V. Minor abdominal discomfort. Personal sitter at bedside. Call light placed in reach. Frequent rounding done.

## 2020-09-16 NOTE — PLAN OF CARE
POC reviewed. Some confusion and disorientation from time-to-time, easily reoriented. Requires 2L O2 NC to maintain oxygen stats in the low 90's. Otherwise, VSS on 2L O2 NC with no complaints of SOB, headaches, or dizziness. Urine output per superpubic catheter adequate. Tolerating clear liquid diet but no intake other than water during shift. Intermittent nausea controlled with prn antemetic meds and a one time dose of omeprazole (Pepcid). No BM, enema ordered for early morning. Patient denies pain while in bed. No prn pain meds requested or given. Patient sat up on edge of bed once with assistance while changing bedding. Telemetry monitored, bigeminy rhythm.  Lopressor Metoprolol given as scheduled. Patient is resting quietly with side rails up and call light with in reach. Will continue to monitor patient status.

## 2020-09-17 PROBLEM — K56.609 SMALL BOWEL OBSTRUCTION: Status: RESOLVED | Noted: 2018-06-20 | Resolved: 2020-09-17

## 2020-09-17 LAB
ANION GAP SERPL CALC-SCNC: 9 MMOL/L (ref 8–16)
BACTERIA UR CULT: ABNORMAL
BASOPHILS # BLD AUTO: 0.04 K/UL (ref 0–0.2)
BASOPHILS NFR BLD: 0.6 % (ref 0–1.9)
BUN SERPL-MCNC: 6 MG/DL (ref 8–23)
CALCIUM SERPL-MCNC: 7.6 MG/DL (ref 8.7–10.5)
CHLORIDE SERPL-SCNC: 95 MMOL/L (ref 95–110)
CO2 SERPL-SCNC: 29 MMOL/L (ref 23–29)
CREAT SERPL-MCNC: 0.9 MG/DL (ref 0.5–1.4)
DIFFERENTIAL METHOD: ABNORMAL
EOSINOPHIL # BLD AUTO: 0.3 K/UL (ref 0–0.5)
EOSINOPHIL NFR BLD: 4.7 % (ref 0–8)
ERYTHROCYTE [DISTWIDTH] IN BLOOD BY AUTOMATED COUNT: 13.9 % (ref 11.5–14.5)
EST. GFR  (AFRICAN AMERICAN): >60 ML/MIN/1.73 M^2
EST. GFR  (NON AFRICAN AMERICAN): >60 ML/MIN/1.73 M^2
GLUCOSE SERPL-MCNC: 84 MG/DL (ref 70–110)
HCT VFR BLD AUTO: 32.7 % (ref 40–54)
HGB BLD-MCNC: 10.1 G/DL (ref 14–18)
IMM GRANULOCYTES # BLD AUTO: 0.04 K/UL (ref 0–0.04)
IMM GRANULOCYTES NFR BLD AUTO: 0.6 % (ref 0–0.5)
LYMPHOCYTES # BLD AUTO: 1.5 K/UL (ref 1–4.8)
LYMPHOCYTES NFR BLD: 21.5 % (ref 18–48)
MAGNESIUM SERPL-MCNC: 1.6 MG/DL (ref 1.6–2.6)
MCH RBC QN AUTO: 26 PG (ref 27–31)
MCHC RBC AUTO-ENTMCNC: 30.9 G/DL (ref 32–36)
MCV RBC AUTO: 84 FL (ref 82–98)
MONOCYTES # BLD AUTO: 0.6 K/UL (ref 0.3–1)
MONOCYTES NFR BLD: 8 % (ref 4–15)
NEUTROPHILS # BLD AUTO: 4.5 K/UL (ref 1.8–7.7)
NEUTROPHILS NFR BLD: 64.6 % (ref 38–73)
NRBC BLD-RTO: 0 /100 WBC
PHOSPHATE SERPL-MCNC: 2.3 MG/DL (ref 2.7–4.5)
PLATELET # BLD AUTO: 306 K/UL (ref 150–350)
PMV BLD AUTO: 9.1 FL (ref 9.2–12.9)
POTASSIUM SERPL-SCNC: 2.9 MMOL/L (ref 3.5–5.1)
RBC # BLD AUTO: 3.88 M/UL (ref 4.6–6.2)
SARS-COV-2 RDRP RESP QL NAA+PROBE: NEGATIVE
SODIUM SERPL-SCNC: 133 MMOL/L (ref 136–145)
WBC # BLD AUTO: 6.97 K/UL (ref 3.9–12.7)

## 2020-09-17 PROCEDURE — 27000221 HC OXYGEN, UP TO 24 HOURS

## 2020-09-17 PROCEDURE — 85025 COMPLETE CBC W/AUTO DIFF WBC: CPT

## 2020-09-17 PROCEDURE — U0002 COVID-19 LAB TEST NON-CDC: HCPCS

## 2020-09-17 PROCEDURE — 63600175 PHARM REV CODE 636 W HCPCS: Performed by: STUDENT IN AN ORGANIZED HEALTH CARE EDUCATION/TRAINING PROGRAM

## 2020-09-17 PROCEDURE — 80048 BASIC METABOLIC PNL TOTAL CA: CPT

## 2020-09-17 PROCEDURE — S0028 INJECTION, FAMOTIDINE, 20 MG: HCPCS | Performed by: STUDENT IN AN ORGANIZED HEALTH CARE EDUCATION/TRAINING PROGRAM

## 2020-09-17 PROCEDURE — 20600001 HC STEP DOWN PRIVATE ROOM

## 2020-09-17 PROCEDURE — 36415 COLL VENOUS BLD VENIPUNCTURE: CPT

## 2020-09-17 PROCEDURE — 25000242 PHARM REV CODE 250 ALT 637 W/ HCPCS: Performed by: STUDENT IN AN ORGANIZED HEALTH CARE EDUCATION/TRAINING PROGRAM

## 2020-09-17 PROCEDURE — 94640 AIRWAY INHALATION TREATMENT: CPT

## 2020-09-17 PROCEDURE — 25000003 PHARM REV CODE 250: Performed by: STUDENT IN AN ORGANIZED HEALTH CARE EDUCATION/TRAINING PROGRAM

## 2020-09-17 PROCEDURE — 94761 N-INVAS EAR/PLS OXIMETRY MLT: CPT

## 2020-09-17 PROCEDURE — 84100 ASSAY OF PHOSPHORUS: CPT

## 2020-09-17 PROCEDURE — 83735 ASSAY OF MAGNESIUM: CPT

## 2020-09-17 RX ORDER — POTASSIUM CHLORIDE 7.45 MG/ML
10 INJECTION INTRAVENOUS
Status: COMPLETED | OUTPATIENT
Start: 2020-09-17 | End: 2020-09-17

## 2020-09-17 RX ADMIN — MELATONIN TAB 3 MG 6 MG: 3 TAB at 11:09

## 2020-09-17 RX ADMIN — LEVOTHYROXINE SODIUM 50 MCG: 50 TABLET ORAL at 05:09

## 2020-09-17 RX ADMIN — IPRATROPIUM BROMIDE AND ALBUTEROL SULFATE 3 ML: .5; 2.5 SOLUTION RESPIRATORY (INHALATION) at 04:09

## 2020-09-17 RX ADMIN — ASPIRIN 81 MG: 81 TABLET, COATED ORAL at 08:09

## 2020-09-17 RX ADMIN — FAMOTIDINE 20 MG: 10 INJECTION INTRAVENOUS at 08:09

## 2020-09-17 RX ADMIN — POTASSIUM CHLORIDE 10 MEQ: 7.46 INJECTION, SOLUTION INTRAVENOUS at 02:09

## 2020-09-17 RX ADMIN — METOPROLOL SUCCINATE 25 MG: 25 TABLET, EXTENDED RELEASE ORAL at 08:09

## 2020-09-17 RX ADMIN — POTASSIUM CHLORIDE 10 MEQ: 7.46 INJECTION, SOLUTION INTRAVENOUS at 12:09

## 2020-09-17 RX ADMIN — GABAPENTIN 800 MG: 400 CAPSULE ORAL at 08:09

## 2020-09-17 RX ADMIN — SODIUM CHLORIDE: 0.9 INJECTION, SOLUTION INTRAVENOUS at 01:09

## 2020-09-17 RX ADMIN — POTASSIUM CHLORIDE 10 MEQ: 7.46 INJECTION, SOLUTION INTRAVENOUS at 10:09

## 2020-09-17 RX ADMIN — ATORVASTATIN CALCIUM 20 MG: 20 TABLET, FILM COATED ORAL at 08:09

## 2020-09-17 RX ADMIN — POTASSIUM CHLORIDE 10 MEQ: 7.46 INJECTION, SOLUTION INTRAVENOUS at 03:09

## 2020-09-17 RX ADMIN — POTASSIUM CHLORIDE 10 MEQ: 7.46 INJECTION, SOLUTION INTRAVENOUS at 01:09

## 2020-09-17 NOTE — SUBJECTIVE & OBJECTIVE
Interval History: No acute events overnight. Tolerated clear liquid diet.    Medications:  Continuous Infusions:  Scheduled Meds:   aspirin  81 mg Oral Daily    atorvastatin  20 mg Oral Daily    famotidine (PF)  20 mg Intravenous Daily    gabapentin  800 mg Oral BID    levothyroxine  50 mcg Oral Before breakfast    metoprolol succinate  25 mg Oral Daily    sodium phosphates  1 enema Rectal Once     PRN Meds:acetaminophen, albuterol-ipratropium, melatonin, morphine, ondansetron, sodium chloride 0.9%     Review of patient's allergies indicates:   Allergen Reactions    Thiazides Other (See Comments)     Multiple episodes of thiazide-induced hyponatremia     Objective:     Vital Signs (Most Recent):  Temp: 97.4 °F (36.3 °C) (09/17/20 0814)  Pulse: 77 (09/17/20 0814)  Resp: 16 (09/17/20 0814)  BP: (!) 147/70 (09/17/20 0814)  SpO2: 98 % (09/17/20 0814) Vital Signs (24h Range):  Temp:  [96.5 °F (35.8 °C)-97.7 °F (36.5 °C)] 97.4 °F (36.3 °C)  Pulse:  [67-82] 77  Resp:  [16-20] 16  SpO2:  [94 %-100 %] 98 %  BP: (121-180)/(53-79) 147/70     Weight: 93.7 kg (206 lb 9.1 oz)  Body mass index is 31.41 kg/m².    Intake/Output - Last 3 Shifts       09/15 0700 - 09/16 0659 09/16 0700 - 09/17 0659 09/17 0700 - 09/18 0659    P.O.  400     I.V. (mL/kg) 800 (8.5) 3576.7 (38.2)     Total Intake(mL/kg) 800 (8.5) 3976.7 (42.4)     Urine (mL/kg/hr) 3325 (1.5) 3220 (1.4)     Emesis/NG output 0 0     Other  0     Stool 0 0     Blood 0      Total Output 3325 3220     Net -2525 +756.7            Urine Occurrence  0 x     Stool Occurrence 1 x 4 x 0 x    Emesis Occurrence  1 x           Physical Exam  Vitals signs and nursing note reviewed.   Constitutional:       Appearance: Normal appearance.   HENT:      Head: Normocephalic and atraumatic.   Cardiovascular:      Rate and Rhythm: Normal rate and regular rhythm.   Pulmonary:      Effort: Pulmonary effort is normal. No respiratory distress.      Comments: Room air  Abdominal:       General: There is no distension.      Palpations: Abdomen is soft.      Tenderness: There is no abdominal tenderness.   Skin:     General: Skin is warm and dry.   Neurological:      General: No focal deficit present.      Mental Status: He is alert.   Psychiatric:         Mood and Affect: Mood normal.         Behavior: Behavior normal.         Significant Labs:  CBC:   Recent Labs   Lab 09/17/20  0406   WBC 6.97   RBC 3.88*   HGB 10.1*   HCT 32.7*      MCV 84   MCH 26.0*   MCHC 30.9*     BMP:   Recent Labs   Lab 09/17/20  0406   GLU 84   *   K 2.9*   CL 95   CO2 29   BUN 6*   CREATININE 0.9   CALCIUM 7.6*   MG 1.6       Significant Diagnostics:  I have reviewed all pertinent imaging results/findings within the past 24 hours.

## 2020-09-17 NOTE — PROGRESS NOTES
[facility]  General Surgery  Progress Note    Subjective:     History of Present Illness:  No notes on file    Post-Op Info:  * No surgery found *         Interval History: No acute events overnight. Tolerated clear liquid diet.    Medications:  Continuous Infusions:  Scheduled Meds:   aspirin  81 mg Oral Daily    atorvastatin  20 mg Oral Daily    famotidine (PF)  20 mg Intravenous Daily    gabapentin  800 mg Oral BID    levothyroxine  50 mcg Oral Before breakfast    metoprolol succinate  25 mg Oral Daily    sodium phosphates  1 enema Rectal Once     PRN Meds:acetaminophen, albuterol-ipratropium, melatonin, morphine, ondansetron, sodium chloride 0.9%     Review of patient's allergies indicates:   Allergen Reactions    Thiazides Other (See Comments)     Multiple episodes of thiazide-induced hyponatremia     Objective:     Vital Signs (Most Recent):  Temp: 97.4 °F (36.3 °C) (09/17/20 0814)  Pulse: 77 (09/17/20 0814)  Resp: 16 (09/17/20 0814)  BP: (!) 147/70 (09/17/20 0814)  SpO2: 98 % (09/17/20 0814) Vital Signs (24h Range):  Temp:  [96.5 °F (35.8 °C)-97.7 °F (36.5 °C)] 97.4 °F (36.3 °C)  Pulse:  [67-82] 77  Resp:  [16-20] 16  SpO2:  [94 %-100 %] 98 %  BP: (121-180)/(53-79) 147/70     Weight: 93.7 kg (206 lb 9.1 oz)  Body mass index is 31.41 kg/m².    Intake/Output - Last 3 Shifts       09/15 0700 - 09/16 0659 09/16 0700 - 09/17 0659 09/17 0700 - 09/18 0659    P.O.  400     I.V. (mL/kg) 800 (8.5) 3576.7 (38.2)     Total Intake(mL/kg) 800 (8.5) 3976.7 (42.4)     Urine (mL/kg/hr) 3325 (1.5) 3220 (1.4)     Emesis/NG output 0 0     Other  0     Stool 0 0     Blood 0      Total Output 3325 3220     Net -2525 +756.7            Urine Occurrence  0 x     Stool Occurrence 1 x 4 x 0 x    Emesis Occurrence  1 x           Physical Exam  Vitals signs and nursing note reviewed.   Constitutional:       Appearance: Normal appearance.   HENT:      Head: Normocephalic and atraumatic.   Cardiovascular:       Rate and Rhythm: Normal rate and regular rhythm.   Pulmonary:      Effort: Pulmonary effort is normal. No respiratory distress.      Comments: Room air  Abdominal:      General: There is no distension.      Palpations: Abdomen is soft.      Tenderness: There is no abdominal tenderness.   Skin:     General: Skin is warm and dry.   Neurological:      General: No focal deficit present.      Mental Status: He is alert.   Psychiatric:         Mood and Affect: Mood normal.         Behavior: Behavior normal.         Significant Labs:  CBC:   Recent Labs   Lab 09/17/20  0406   WBC 6.97   RBC 3.88*   HGB 10.1*   HCT 32.7*      MCV 84   MCH 26.0*   MCHC 30.9*     BMP:   Recent Labs   Lab 09/17/20  0406   GLU 84   *   K 2.9*   CL 95   CO2 29   BUN 6*   CREATININE 0.9   CALCIUM 7.6*   MG 1.6       Significant Diagnostics:  I have reviewed all pertinent imaging results/findings within the past 24 hours.    Assessment/Plan:     Ventral hernia with obstruction  Conservative management of partial small bowel obstruction. Passed gastrograffin challenge.    Advance diet  Prn nausea control  Bowel regimen  oob/ambulate        Katie Schwartz MD  General Surgery  [facility]

## 2020-09-17 NOTE — PLAN OF CARE
09/17/20 1012   Post-Acute Status   Post-Acute Authorization Placement   Post-Acute Placement Status Referrals Sent   Discharge Delays None known at this time   Discharge Plan   Discharge Plan A Skilled Nursing Facility     Pt is ready for dc today. Pt transferred from [facility]. SW sent SNF ref to [facility].    Steffany Barry LCSW [id number]

## 2020-09-17 NOTE — PLAN OF CARE
Pt is A&O injury free. Pt was on oxygen during the night and was able to keep sat's > 93%. Super pubic had good urine output during the night. The pt did have one small loose b/m. Pt's coccyx is blanchable and intact but barrier cream was applied after b/m. IV fluids infused during night shift. SCD's on bilaterally.

## 2020-09-17 NOTE — CARE UPDATE
CM talked to patient's POA, Carolina Pak and the patient regarding the d/c plan. Per POA, she would like to see the patient go to SNF. The pateint came from [facility] and does not wish to go back to finish his skilled services. CM mentioned the idea of [facility] to him and he is open to that idea. CM asked the team to order PT/OT to have recommendations. SW to send referrals.    Prerna Duran RN, CM   Ext: [phone]

## 2020-09-17 NOTE — PLAN OF CARE
Patient A/o x3 with periods of confusion. Turned q2. Suprapubic catheter in place with clear yellow urine output. Contact precaution initiated due to MRSA in the urine. Patient tolerating regular diet. Denies N/V. X3 small soft BM today. Patient has pink around the perineal area due to BM incontinence. Moisture barrier lotion applied every BM. Call light placed in reach. Frequent rounding done.

## 2020-09-17 NOTE — PLAN OF CARE
[facility]     Department of Hospital Medicine     [address] [place] [address]     [phone] after hours  [phone] fax       NURSING HOME ORDERS    09/17/2020    Admit to Nursing Home:   Skilled Bed                                                  Diagnoses:  Active Hospital Problems   No active problems to display.      Resolved Hospital Problems    Diagnosis Date Resolved POA    *Small bowel obstruction [K56.609] 09/17/2020 Yes       Patient is homebound due to:  Small bowel obstruction    Allergies:  Review of patient's allergies indicates:   Allergen Reactions    Thiazides Other (See Comments)     Multiple episodes of thiazide-induced hyponatremia       Vitals:      Every shift (Skilled Nursing patients)    Diet: Regular adult   Supplement:  1 can every three times a day with meals                         Type:  House         Acitivities:      - Up in a chair each morning as tolerated   - Ambulate with assistance to bathroom   - Scheduled walks once each shift (every 8 hours)   - May ambulate independently   - May use walker, cane, or self-propelled wheelchair      LABS:  Per facility protocol       Nursing Precautions:     - Aspiration precautions:             - Total assistance with meals            -  Upright 90 degrees befor during and after meals             -  Suction at bedside          - Fall precautions per nursing home protocol   - Seizure precaution per long-term protocol   - Decubitus precautions:        -  for positioning   - Pressure reducing foam mattress   - Turn patient every two hours. Use wedge pillows to anchor patient    CONSULTS:    Physical Therapy to evaluate and treat     Occupational Therapy to evaluate and treat     Speech Therapy  to evaluate and treat     Nutrition to evaluate and recommend diet     Psychiatry to evaluate and follow patients for delirium    MISCELLANEOUS CARE:             Mahajan Care: Empty  Mahajan bag every shift.       Routine Skin for Bedridden Patients:  Apply moisture barrier cream to all    skin folds and wet areas in perineal area daily and after baths and                           all bowel movements.        Medications: Discontinue all previous medication orders, if any. See new list below.      Melchor Prado   Home Medication Instructions ESTIVEN:[id number]    Printed on:09/17/20 1012   Medication Information                      amLODIPine (NORVASC) 5 MG tablet  TAKE 1 TABLET BY MOUTH EVERY DAY             aspirin (ECOTRIN) 81 MG EC tablet  TAKE 1 TABLET BY MOUTH EVERY DAY             atorvastatin (LIPITOR) 20 MG tablet  Take 1 tablet (20 mg total) by mouth once daily.             cetirizine (ZYRTEC) 10 MG tablet  Take 10 mg by mouth daily as needed for Allergies.             gabapentin (NEURONTIN) 400 MG capsule  TAKE 2 CAPSULES BY MOUTH TWICE DAILY             levothyroxine (SYNTHROID) 50 MCG tablet  TAKE 1 TABLET BY MOUTH EVERY DAY             metoprolol succinate (TOPROL-XL) 25 MG 24 hr tablet  TAKE 1 TABLET BY MOUTH EVERY DAY             omeprazole (PRILOSEC) 40 MG capsule  TAKE 1 CAPSULE BY MOUTH EVERY MORNING 30 MINUTES BEFORE EATING             oxybutynin (DITROPAN) 5 MG Tab  Take 1 tablet (5 mg total) by mouth 3 (three) times daily as needed (bladder spasms).             traZODone (DESYREL) 50 MG tablet  TAKE 1 TABLET BY MOUTH NIGHTLY                       _________________________________  Shellie Willams MD  09/17/2020

## 2020-09-17 NOTE — ASSESSMENT & PLAN NOTE
Conservative management of partial small bowel obstruction. Passed gastrograffin challenge.    Advance diet  Prn nausea control  Bowel regimen  oob/ambulate

## 2020-09-17 NOTE — PROGRESS NOTES
"Notified Patient about the possibility of being transferred back to [facility]. Patient said he didn't want to go back there stating "Please don't make me go back there. Let them know that I can try to rearrange something for home health if possible. That place should be sued for being considered a "skilled" facility ". Prerna  From case management made aware. Will wait for updates.  "

## 2020-09-17 NOTE — PLAN OF CARE
CM informed sw pt ray snot want to return to [facility]. CM requested ref be sent to [facility]. Ref sent and SNF consult order placed.    Steffany Barry LCSW [id number]

## 2020-09-18 VITALS
OXYGEN SATURATION: 99 % | DIASTOLIC BLOOD PRESSURE: 70 MMHG | WEIGHT: 206.56 LBS | SYSTOLIC BLOOD PRESSURE: 151 MMHG | TEMPERATURE: 98 F | HEART RATE: 70 BPM | BODY MASS INDEX: 31.41 KG/M2 | RESPIRATION RATE: 18 BRPM

## 2020-09-18 LAB
ANION GAP SERPL CALC-SCNC: 9 MMOL/L (ref 8–16)
BASOPHILS # BLD AUTO: 0.04 K/UL (ref 0–0.2)
BASOPHILS NFR BLD: 0.7 % (ref 0–1.9)
BUN SERPL-MCNC: 6 MG/DL (ref 8–23)
CALCIUM SERPL-MCNC: 7.6 MG/DL (ref 8.7–10.5)
CHLORIDE SERPL-SCNC: 93 MMOL/L (ref 95–110)
CO2 SERPL-SCNC: 27 MMOL/L (ref 23–29)
CREAT SERPL-MCNC: 1 MG/DL (ref 0.5–1.4)
DIFFERENTIAL METHOD: ABNORMAL
EOSINOPHIL # BLD AUTO: 0.3 K/UL (ref 0–0.5)
EOSINOPHIL NFR BLD: 4.6 % (ref 0–8)
ERYTHROCYTE [DISTWIDTH] IN BLOOD BY AUTOMATED COUNT: 13.8 % (ref 11.5–14.5)
EST. GFR  (AFRICAN AMERICAN): >60 ML/MIN/1.73 M^2
EST. GFR  (NON AFRICAN AMERICAN): >60 ML/MIN/1.73 M^2
GLUCOSE SERPL-MCNC: 94 MG/DL (ref 70–110)
HCT VFR BLD AUTO: 32 % (ref 40–54)
HGB BLD-MCNC: 10 G/DL (ref 14–18)
IMM GRANULOCYTES # BLD AUTO: 0.02 K/UL (ref 0–0.04)
IMM GRANULOCYTES NFR BLD AUTO: 0.3 % (ref 0–0.5)
LYMPHOCYTES # BLD AUTO: 1.5 K/UL (ref 1–4.8)
LYMPHOCYTES NFR BLD: 25.1 % (ref 18–48)
MAGNESIUM SERPL-MCNC: 1.6 MG/DL (ref 1.6–2.6)
MCH RBC QN AUTO: 26.1 PG (ref 27–31)
MCHC RBC AUTO-ENTMCNC: 31.3 G/DL (ref 32–36)
MCV RBC AUTO: 84 FL (ref 82–98)
MONOCYTES # BLD AUTO: 0.6 K/UL (ref 0.3–1)
MONOCYTES NFR BLD: 10.1 % (ref 4–15)
NEUTROPHILS # BLD AUTO: 3.4 K/UL (ref 1.8–7.7)
NEUTROPHILS NFR BLD: 59.2 % (ref 38–73)
NRBC BLD-RTO: 0 /100 WBC
PHOSPHATE SERPL-MCNC: 2.8 MG/DL (ref 2.7–4.5)
PLATELET # BLD AUTO: 287 K/UL (ref 150–350)
PMV BLD AUTO: 9.9 FL (ref 9.2–12.9)
POTASSIUM SERPL-SCNC: 3.2 MMOL/L (ref 3.5–5.1)
RBC # BLD AUTO: 3.83 M/UL (ref 4.6–6.2)
SODIUM SERPL-SCNC: 129 MMOL/L (ref 136–145)
WBC # BLD AUTO: 5.82 K/UL (ref 3.9–12.7)

## 2020-09-18 PROCEDURE — 97162 PT EVAL MOD COMPLEX 30 MIN: CPT

## 2020-09-18 PROCEDURE — 63600175 PHARM REV CODE 636 W HCPCS: Performed by: SURGERY

## 2020-09-18 PROCEDURE — 80048 BASIC METABOLIC PNL TOTAL CA: CPT

## 2020-09-18 PROCEDURE — 97165 OT EVAL LOW COMPLEX 30 MIN: CPT

## 2020-09-18 PROCEDURE — 25000003 PHARM REV CODE 250: Performed by: STUDENT IN AN ORGANIZED HEALTH CARE EDUCATION/TRAINING PROGRAM

## 2020-09-18 PROCEDURE — 84100 ASSAY OF PHOSPHORUS: CPT

## 2020-09-18 PROCEDURE — 85025 COMPLETE CBC W/AUTO DIFF WBC: CPT

## 2020-09-18 PROCEDURE — 83735 ASSAY OF MAGNESIUM: CPT

## 2020-09-18 PROCEDURE — S0028 INJECTION, FAMOTIDINE, 20 MG: HCPCS | Performed by: STUDENT IN AN ORGANIZED HEALTH CARE EDUCATION/TRAINING PROGRAM

## 2020-09-18 PROCEDURE — 90694 VACC AIIV4 NO PRSRV 0.5ML IM: CPT | Performed by: SURGERY

## 2020-09-18 PROCEDURE — 97530 THERAPEUTIC ACTIVITIES: CPT

## 2020-09-18 PROCEDURE — 97535 SELF CARE MNGMENT TRAINING: CPT

## 2020-09-18 PROCEDURE — 36415 COLL VENOUS BLD VENIPUNCTURE: CPT

## 2020-09-18 PROCEDURE — 90471 IMMUNIZATION ADMIN: CPT | Performed by: SURGERY

## 2020-09-18 RX ADMIN — ATORVASTATIN CALCIUM 20 MG: 20 TABLET, FILM COATED ORAL at 09:09

## 2020-09-18 RX ADMIN — ASPIRIN 81 MG: 81 TABLET, COATED ORAL at 09:09

## 2020-09-18 RX ADMIN — LEVOTHYROXINE SODIUM 50 MCG: 50 TABLET ORAL at 05:09

## 2020-09-18 RX ADMIN — FAMOTIDINE 20 MG: 10 INJECTION INTRAVENOUS at 09:09

## 2020-09-18 RX ADMIN — METOPROLOL SUCCINATE 25 MG: 25 TABLET, EXTENDED RELEASE ORAL at 09:09

## 2020-09-18 RX ADMIN — A/SINGAPORE/GP1908/2015 IVR-180 (AN A/MICHIGAN/45/2015 (H1N1)PDM09-LIKE VIRUS, A/HONG KONG/4801/2014, NYMC X-263B (H3N2) (AN A/HONG KONG/4801/2014-LIKE VIRUS), AND B/BRISBANE/60/2008, WILD TYPE (A B/BRISBANE/60/2008-LIKE VIRUS) 0.5 ML: 15; 15; 15 INJECTION, SUSPENSION INTRAMUSCULAR at 05:09

## 2020-09-18 RX ADMIN — GABAPENTIN 800 MG: 400 CAPSULE ORAL at 09:09

## 2020-09-18 NOTE — PLAN OF CARE
09/18/20 1338   Medicare Message   Important Message from Medicare regarding Discharge Appeal Rights Given to patient/caregiver;Explained to patient/caregiver;Signed/date by patient/caregiver   Date IMM was signed 09/18/20   Time IMM was signed 1100     Prerna Duran RN, CM   Ext: [phone]

## 2020-09-18 NOTE — PLAN OF CARE
[facility]     Department of Hospital Medicine     [address] [place] [address]     [phone] after hours  [phone] fax       NURSING HOME ORDERS    09/18/2020    Admit to Nursing Home: Skilled Bed                                              Diagnoses:  Active Hospital Problems   No active problems to display.      Resolved Hospital Problems    Diagnosis Date Resolved POA    *Small bowel obstruction [K56.609] 09/17/2020 Yes       Patient is homebound due to:  Small bowel obstruction    Allergies:  Review of patient's allergies indicates:   Allergen Reactions    Thiazides Other (See Comments)     Multiple episodes of thiazide-induced hyponatremia       Vitals:       Every shift (Skilled Nursing patients)    Diet: Regular   Supplement:  1 can every three times a day with meals                         Type:  House      Acitivities:    - Up in a chair each morning as tolerated   - Ambulate with assistance to bathroom   - Scheduled walks once each shift (every 8 hours)   - May ambulate independently   - May use walker, cane, or self-propelled wheelchair    LABS:  Per facility protocol     CMP, CBC each month for 3 months   PT-INR each week for 1 month then monthly   Pre-albumin each month for 3 months   Digoxin level in 1 month and every 6 months   TSH every year   Dilantin level in 1 month and every 3 months   Tegretol level in 1 month and every 3 months    Phenobarbital level in 1 month and every 3 months    Nursing Precautions:     - Aspiration precautions:             - Total assistance with meals            -  Upright 90 degrees befor during and after meals             -  Suction at bedside          - Fall precautions per nursing home protocol   - Seizure precaution per [facility] protocol   - Decubitus precautions:        -  for positioning   - Pressure reducing foam mattress   - Turn patient every two hours. Use wedge pillows to anchor  patient    CONSULTS:     Physical Therapy to evaluate and treat     Occupational Therapy to evaluate and treat     Speech Therapy  to evaluate and treat     Nutrition to evaluate and recommend diet     Psychiatry to evaluate and follow patients for delirium          Medications: Discontinue all previous medication orders, if any. See new list below.     Melchor Prado   Home Medication Instructions ESTIVEN:[id number]    Printed on:09/18/20 7987   Medication Information                      amLODIPine (NORVASC) 5 MG tablet  TAKE 1 TABLET BY MOUTH EVERY DAY             aspirin (ECOTRIN) 81 MG EC tablet  TAKE 1 TABLET BY MOUTH EVERY DAY             atorvastatin (LIPITOR) 20 MG tablet  Take 1 tablet (20 mg total) by mouth once daily.             cetirizine (ZYRTEC) 10 MG tablet  Take 10 mg by mouth daily as needed for Allergies.             gabapentin (NEURONTIN) 400 MG capsule  TAKE 2 CAPSULES BY MOUTH TWICE DAILY             levothyroxine (SYNTHROID) 50 MCG tablet  TAKE 1 TABLET BY MOUTH EVERY DAY             metoprolol succinate (TOPROL-XL) 25 MG 24 hr tablet  TAKE 1 TABLET BY MOUTH EVERY DAY             omeprazole (PRILOSEC) 40 MG capsule  TAKE 1 CAPSULE BY MOUTH EVERY MORNING 30 MINUTES BEFORE EATING             oxybutynin (DITROPAN) 5 MG Tab  Take 1 tablet (5 mg total) by mouth 3 (three) times daily as needed (bladder spasms).             traZODone (DESYREL) 50 MG tablet  TAKE 1 TABLET BY MOUTH NIGHTLY                       _________________________________  Shellie Willams MD  09/18/2020

## 2020-09-18 NOTE — PLAN OF CARE
[facility] . Discharge instructions verbally given and hard copy provided to patient and caregiver. Removed 20 g IV with cath tip in place. Patient tolerated IV removal well with bleeding controlled. Patient remains free of falls with no acute pain or distress noted. Patient left floor suprapubic catheter in place via wheelchair.

## 2020-09-18 NOTE — PROGRESS NOTES
General Surgery Progress Note    Patient seen and examined this AM. Having bowel movements and tolerating diet. At baseline neuro status. Refusing to be discharged to the SNF he was previously staying with.     Awaiting placement.    Reyna Chan MD, PGY-5  General Surgery  [phone]

## 2020-09-18 NOTE — SUBJECTIVE & OBJECTIVE
Interval History: No acute events overnight. Patient discharged on yesterday but refusing to go back to the SNF where he was residing prior to admission. Referrals sent out by CM to other facilities. Pending approving and PT/OT eval. Having bowel function. Tolerating CLD    Medications:  Continuous Infusions:  Scheduled Meds:   aspirin  81 mg Oral Daily    atorvastatin  20 mg Oral Daily    famotidine (PF)  20 mg Intravenous Daily    gabapentin  800 mg Oral BID    levothyroxine  50 mcg Oral Before breakfast    metoprolol succinate  25 mg Oral Daily    sodium phosphates  1 enema Rectal Once     PRN Meds:acetaminophen, albuterol-ipratropium, melatonin, morphine, ondansetron, sodium chloride 0.9%     Review of patient's allergies indicates:   Allergen Reactions    Thiazides Other (See Comments)     Multiple episodes of thiazide-induced hyponatremia     Objective:     Vital Signs (Most Recent):  Temp: 97.4 °F (36.3 °C) (09/18/20 0756)  Pulse: 73 (09/18/20 0756)  Resp: 16 (09/18/20 0756)  BP: (!) 161/70 (09/18/20 0756)  SpO2: 100 % (09/18/20 0756) Vital Signs (24h Range):  Temp:  [97.4 °F (36.3 °C)-99.1 °F (37.3 °C)] 97.4 °F (36.3 °C)  Pulse:  [66-88] 73  Resp:  [16-19] 16  SpO2:  [95 %-100 %] 100 %  BP: (134-174)/(65-87) 161/70     Weight: 93.7 kg (206 lb 9.1 oz)  Body mass index is 31.41 kg/m².    Intake/Output - Last 3 Shifts       09/16 0700 - 09/17 0659 09/17 0700 - 09/18 0659 09/18 0700 - 09/19 0659    P.O. 400 160     I.V. (mL/kg) 3576.7 (38.2)      Total Intake(mL/kg) 3976.7 (42.4) 160 (1.7)     Urine (mL/kg/hr) 3220 (1.4) 700 (0.3)     Emesis/NG output 0      Other 0      Stool 0 0     Blood       Total Output 3220 700     Net +756.7 -540            Urine Occurrence 0 x      Stool Occurrence 4 x 0 x     Emesis Occurrence 1 x            Physical Exam  Vitals signs and nursing note reviewed.   Constitutional:       Appearance: Normal appearance.   HENT:      Head: Normocephalic and atraumatic.    Cardiovascular:      Rate and Rhythm: Normal rate and regular rhythm.   Pulmonary:      Effort: Pulmonary effort is normal. No respiratory distress.      Comments: Room air  Abdominal:      General: There is no distension.      Palpations: Abdomen is soft.      Tenderness: There is no abdominal tenderness.   Skin:     General: Skin is warm and dry.   Neurological:      General: No focal deficit present.      Mental Status: He is alert.   Psychiatric:         Mood and Affect: Mood normal.         Behavior: Behavior normal.         Significant Labs:  CBC:   Recent Labs   Lab 09/18/20  0437   WBC 5.82   RBC 3.83*   HGB 10.0*   HCT 32.0*      MCV 84   MCH 26.1*   MCHC 31.3*     BMP:   Recent Labs   Lab 09/18/20 0437   GLU 94   *   K 3.2*   CL 93*   CO2 27   BUN 6*   CREATININE 1.0   CALCIUM 7.6*   MG 1.6       Significant Diagnostics:  I have reviewed all pertinent imaging results/findings within the past 24 hours.

## 2020-09-18 NOTE — PLAN OF CARE
SW received call from [facility]. Pt can be accepted back today. Rn can call report to the RN for [address] [place] [address] at [phone]. Pt will return to room 424. Wheelchair transport with oxygen requested for pickup at 5:30 PM    RN aware.     Diya Hart LMSW  [facility]

## 2020-09-18 NOTE — PT/OT/SLP EVAL
"Physical Therapy Evaluation    Patient Name:  Chucho Prado   MRN:  [id number]    Recommendations:     Discharge Recommendations:  nursing facility, skilled   Discharge Equipment Recommendations: none   Barriers to discharge: decreased functional mobility    Assessment:     Chucho Prado is a 87 y.o. male admitted with a medical diagnosis of Small bowel obstruction.  He presents with the following impairments/functional limitations:  weakness, impaired self care skills, impaired endurance, impaired functional mobilty, impaired balance, gait instability, decreased lower extremity function, decreased upper extremity function, decreased safety awareness, impaired cardiopulmonary response to activity.  Tolerated session with c/o fatigue and LE weakness.  Performed mobility with mod-max A.  Pt able to take few lateral steps with HHAx2 and demo shuffled gait pattern, posterior lean, FFP and unsteadiness.  Pt safe to transfer to/from bedside chair with assist of 2x person (stand pivot).  Pt would benefit from continued skilled acute PT 3x/wk to improve functional mobility.  Recommending pt receive PT services in SNF setting following d/c from hospital once medically cleared.      Rehab Prognosis: Good; patient would benefit from acute skilled PT services to address these deficits and reach maximum level of function.    Recent Surgery: * No surgery found *      Plan:     During this hospitalization, patient to be seen 3 x/week to address the identified rehab impairments via gait training, therapeutic exercises, neuromuscular re-education, therapeutic activities, wheelchair management/training and progress toward the following goals:    · Plan of Care Expires:  10/13/20    Subjective     Chief Complaint: fatigue  Patient/Family Comments/goals: "You didn't bring me any cake?"  Pain/Comfort:  · Pain Rating 1: 0/10    Patients cultural, spiritual, Caodaism conflicts given the current situation: no    Living " Environment:  Pt lives alone in [facility] c elevator.  PTA pt has paid caregiver 7day/wk from 8am - 12am.  PTA no falls  Prior to admission, patients level of function was requiring assistance for ADLs and mainly using WC for mobility.  Equipment used at home: bedside commode, walker, rolling, walker, standard, rollator, cane, straight, wheelchair.  DME owned (not currently used): none.  Upon discharge, patient will have assistance from caregiver.    Objective:     Communicated with RN and OT prior to session.  Patient found HOB elevated with telemetry, peripheral IV, oxygen(suprapubic cath)  upon PT entry to room.    General Precautions: Standard, fall   Orthopedic Precautions:N/A   Braces: N/A     Exams:  · Cognitive Exam:  Patient is oriented to Person, Place, Time and Situation  · RLE ROM: WFL  · RLE Strength: grossly 4/5  · LLE ROM: WFL  · LLE Strength: grossly 4/5    Functional Mobility:  · Bed Mobility:     · Rolling Left:  moderate assistance  · Scooting: moderate assistance  · Supine to Sit: moderate assistance  · Sit to Supine: moderate assistance  · Transfers:     · Sit to Stand:  maximal assistance with hand-held assist  · Gait: 4 lateral steps with HHAx2 max A   · demo shuffled gait pattern, posterior lean, FFP and unsteadiness  · Balance: sitting (CGA); standing (max A)    Therapeutic Activities and Exercises:  Pt educated on: PT role/POC; safety c mobility; benefits of OOB activities; performing therex; d/c recs - v/u  -sat EOB x4mins  -therex (LAQ, hip flex, AP, hip abd/add)  -repositioned in bed for comfort    AM-PAC 6 CLICK MOBILITY  Total Score:10     Patient left HOB elevated with all lines intact, call button in reach and RN notified.    GOALS:   Multidisciplinary Problems     Physical Therapy Goals        Problem: Physical Therapy Goal    Goal Priority Disciplines Outcome Goal Variances Interventions   Physical Therapy Goal     PT, PT/OT Ongoing, Progressing     Description: Goals to be met by:  10/9/2020     Patient will increase functional independence with mobility by performin. Supine to sit with Contact Guard Assistance  2. Sit to supine with Contact Guard Assistance  3. Sit to stand transfer with Moderate Assistance  4. Bed to chair transfer with Moderate Assistance using Rolling Walker  5. Gait  x 10 feet with Moderate Assistance using Rolling Walker.   6. Wheelchair propulsion x50 feet with Contact Guard Assistance using bilateral uppper extremities                     History:     Past Medical History:   Diagnosis Date    Acquired hypothyroidism 2013    Arthritis     Blood transfusion     before  - whe had gangrenous gall bladder    Cataract     Chronic back pain 2018    - Takes Percocet 5-325 at home - Can continue current abdominal pain control with Dilaudid 0.5 mg IV Q3H prn     CKD (chronic kidney disease) stage 3, GFR 30-59 ml/min     Compression fracture of lumbar vertebra     Depression     Dyslipidemia     Essential hypertension 2013    Gastroesophageal reflux disease without esophagitis 2018    - continue home Prilosec    General anesthetics causing adverse effect in therapeutic use     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    GERD (gastroesophageal reflux disease)     History of postoperative delirium 2018    - Patient reports 1 week history of post-op delirium 2018 - Monitor electrolytes, UA, and urine cx - Delirium precautions  - Can use Seroquel 25 mg QHS prn     Hypertension     Hyponatremia 10/23/2017    Impaired mobility and ADLs 2018    Neurogenic bladder 2018    Sleep disorder 2018    - continue Trazodone QHS    Thyroid disease     UTI (urinary tract infection)        Past Surgical History:   Procedure Laterality Date    BIOPSY OF BLADDER  2020    Procedure: BIOPSY, BLADDER;  Surgeon: Daron Manjarrez MD;  Location: [facility] OR [address] [place] [address];  Service: Urology;;    BLADDER FULGURATION  2020     Procedure: FULGURATION, BLADDER;  Surgeon: Daron Manjarrez MD;  [facility];  Service: Urology;;    CHOLECYSTECTOMY      CYSTOGRAM  9/1/2020    Procedure: CYSTOGRAM;  Surgeon: Daron Manjarrez MD;  [facility];  Service: Urology;;    CYSTOSCOPY N/A 9/1/2020    Procedure: CYSTOSCOPY;  Surgeon: Daron Manjarrez MD;  Location: [facility] OR [address] [place] [address];  Service: Urology;  Laterality: N/A;    DILATION OF URETHRA  9/1/2020    Procedure: DILATION, URETHRA;  Surgeon: Daron Manjarrez MD;  Location: [facility] OR [address] [place] [address];  Service: Urology;;    EYE SURGERY Right     IOL    HERNIA REPAIR      INTRAOCULAR PROSTHESES INSERTION Left 5/28/2018    Procedure: INSERTION-INTRAOCULAR LENS (IOL);  Surgeon: Trinity Vazquez MD;  Location: [facility];  Service: Ophthalmology;  Laterality: Left;    JOINT REPLACEMENT      LAMINECTOMY  12/27/2016    L2-L4    LUMBAR LAMINECTOMY  12/2016    PARATHYROIDECTOMY      PHACOEMULSIFICATION OF CATARACT Left 5/28/2018    Procedure: PHACOEMULSIFICATION-ASPIRATION-CATARACT;  Surgeon: Trinity Vazquez MD;  Location: [facility];  Service: Ophthalmology;  Laterality: Left;    REMOVAL OF BLOOD CLOT  9/1/2020    Procedure: REMOVAL, BLOOD CLOT;  Surgeon: Daron Manjarrez MD;  Location: [facility] OR [address] [place] [address];  Service: Urology;;    REPAIR OF INCARCERATED INCISIONAL HERNIA WITHOUT HISTORY OF PRIOR REPAIR N/A 12/5/2018    Procedure: REPAIR, HERNIA, INCISIONAL, INCARCERATED, WITHOUT HISTORY OF PRIOR REPAIR;  Surgeon: Steve Valentin MD;  Location: [facility] OR [address] [place] [address];  Service: General;  Laterality: N/A;    REPAIR OF INCARCERATED VENTRAL HERNIA WITHOUT HISTORY OF PRIOR REPAIR N/A 6/20/2018    Procedure: REPAIR, HERNIA, VENTRAL, INCARCERATED, WITHOUT HISTORY OF PRIOR REPAIR;  Surgeon: Guilherme Ordoñez MD;  Location: [facility] OR [address] [place] [address];  Service: General;  Laterality: N/A;    TOTAL KNEE ARTHROPLASTY Bilateral     TOTAL KNEE ARTHROPLASTY Left 10/25/2017    TKR       Time Tracking:     PT  Received On: 09/18/20  PT Start Time: 0839     PT Stop Time: 0855  PT Total Time (min): 16 min     Billable Minutes: Evaluation 8 min and Therapeutic Activity 8 min      Donavan Hill, PT  09/18/2020

## 2020-09-18 NOTE — PLAN OF CARE
Problem: Occupational Therapy Goal  Goal: Occupational Therapy Goal  Description: Goals to be met by: 10/2/20     Patient will increase functional independence with ADLs by performing:    Feeding with [place].  UE Dressing with Stand-by Assistance.  LE Dressing with Moderate Assistance.  Grooming while EOB with Stand-by Assistance.  Toileting from bedside commode with Moderate Assistance for hygiene and clothing management.   Toilet transfer to bedside commode with Moderate Assistance.    Outcome: Ongoing, Progressing     Evaluation complete-see note for details. Goals and POC established.    DINO Egan  9/18/2020   Pager #: [phone]

## 2020-09-18 NOTE — CARE UPDATE
CM and TAYLER went to bedside to discuss the patient's options for discharge. [facility] was unable to take the patient and he is otherwise medically stable.  The patient was informed of his options: signing a [facility] letter, appealing his discharge, or going back to [facility] this evening. Per patient, he would like to go back to [facility]. TAYLER spoke with Deb at [facility] in regards to admitting patient today. Per Deb, it may be too late in the day to admit patient; they don't admit on the weekend. Deb is asking her DON if they can make an exception.     Prerna Duran RN, CM   Ext: [phone]

## 2020-09-18 NOTE — PLAN OF CARE
Problem: Physical Therapy Goal  Goal: Physical Therapy Goal  Description: Goals to be met by: 10/9/2020     Patient will increase functional independence with mobility by performin. Supine to sit with Contact Guard Assistance  2. Sit to supine with Contact Guard Assistance  3. Sit to stand transfer with Moderate Assistance  4. Bed to chair transfer with Moderate Assistance using Rolling Walker  5. Gait  x 10 feet with Moderate Assistance using Rolling Walker.   6. Wheelchair propulsion x50 feet with Contact Guard Assistance using bilateral uppper extremities    Outcome: Ongoing, Progressing   Eval completed and POC established    Donavan Hill PT,DPT  2020

## 2020-09-18 NOTE — CARE UPDATE
Deb from [facility] called TAYLER/JIMBO. They are now considering the patient a new admit and cannot admit him today. Per Deb, they can admit the patient on Monday.     Prerna Duran RN, CM   Ext: [phone]

## 2020-09-18 NOTE — PLAN OF CARE
09/18/20 1049   Post-Acute Status   Post-Acute Authorization Placement   Post-Acute Placement Status Awaiting Therapy Documentation   Pt wanting to go to [facility]. Therapy recs pending.    Liset Corcoran LMSW  [facility]  [phone]

## 2020-09-18 NOTE — PT/OT/SLP EVAL
"Occupational Therapy   Evaluation and Treatment    Name: Chucho Prado  MRN: [id number]  Admitting Diagnosis:  Small bowel obstruction    *Co-evaluation with PT  Recommendations:     Discharge Recommendations: nursing facility, skilled  Discharge Equipment Recommendations:  (TBD)  Barriers to discharge:  None    Assessment:     Chucho Prado is a 87 y.o. male with a medical diagnosis of Small bowel obstruction.  He presents with performance deficits affecting function: weakness, impaired endurance, impaired self care skills, impaired functional mobilty, impaired balance, gait instability, decreased lower extremity function, decreased upper extremity function.  Pt found with HOB elevated, requiring mod A for bed mobility, and max A for transfers and functional mobility. Pt would benefit from skilled OT services in order to maximize independence with ADLs and facilitate safe discharge. Pt would benefit from SNF upon discharge to return to [facility] and decrease burden of care.     Rehab Prognosis: Good; patient would benefit from acute skilled OT services to address these deficits and reach maximum level of function.       Plan:     Patient to be seen 4 x/week to address the above listed problems via self-care/home management, therapeutic activities, therapeutic exercises  · Plan of Care Expires: 10/18/20  · Plan of Care Reviewed with: patient    Subjective     Chief Complaint: "Why didn't you bring me some cake for my birthday?!"  Patient/Family Comments/goals: to get stronger and return home    Occupational Profile:  Living Environment: Patient lives alone in a 2 SH with elevator access to porch (patient rents out 2 floor and patient stays on the first floor). Patient reports that he was mostly w/c bound but ambulates short distances with rollator and assistance. Patient does not get in the shower and receives spongebaths. Patient did need assistance with all ADLs. Patient has sitters from 8 am until midnight " everyday of the week.   Equipment Used at Home:  walker, rolling, walker, standard, rollator, cane, straight, bedside commode, shower chair, wheelchair  Upon discharge, pt will have assistance from sitters    Pain/Comfort:  · Pain Rating 1: 0/10    Patients cultural, spiritual, Restorationist conflicts given the current situation: no    Objective:     Communicated with: RN and PT prior to session.  Patient found HOB elevated with peripheral IV, oxygen upon OT entry to room.    General Precautions: Standard, fall   Orthopedic Precautions:N/A   Braces: N/A     Occupational Performance:    Bed Mobility:    · Patient completed Rolling/Turning to Right with moderate assistance  · Patient completed Scooting/Bridging with moderate assistance  · Patient completed Supine to Sit with moderate assistance  · Patient completed Sit to Supine with moderate assistance    Functional Mobility/Transfers:  · Patient completed Sit <> Stand Transfer with maximal assistance  with  hand-held assist   · Functional Mobility: Pt ambulated ~4 steps towards HOB with max A in order to maximize functional activity tolerance and standing balance required for engagement in occupations of choice.    · Pt with posterior lean sitting EOB and in standing    Activities of Daily Living:  · Grooming: set-up A to wash face with HOB elevated  · Lower Body Dressing: maximal assistance to don B socks    Cognitive/Visual Perceptual:  Cognitive/Psychosocial Skills:     -       Oriented to: Person, Place, Time and Situation   -       Follows Commands/attention:Follows one-step commands  -       Communication: clear/fluent  -       Memory: No Deficits noted  -       Safety awareness/insight to disability: intact   -       Mood/Affect/Coping skills/emotional control: Appropriate to situation    Physical Exam:  Upper Extremity Range of Motion:     -       Right Upper Extremity: WFL  -       Left Upper Extremity: WFL  Upper Extremity Strength:    -       Right Upper  Extremity: 3/5  -       Left Upper Extremity: 3/5   Strength:    -       Right Upper Extremity: WFL  -       Left Upper Extremity: WFL  Fine Motor Coordination:    -       Intact    AMPAC 6 Click ADL:  AMPAC Total Score: 13    Treatment & Education:  -Therapist provided facilitation and instruction of proper body mechanics, energy conservation, and fall prevention strategies during tasks listed above.  -Pt educated on role of OT, POC and goals for therapy  -Pt educated on importance of OOB activities with staff member assistance and sitting OOB majority of the day.   -Pt verbalized understanding. Pt expressed no further concerns/questions  -Whiteboard updated  Education:    Patient left HOB elevated with all lines intact, call button in reach and RN notified    GOALS:   Multidisciplinary Problems     Occupational Therapy Goals        Problem: Occupational Therapy Goal    Goal Priority Disciplines Outcome Interventions   Occupational Therapy Goal     OT, PT/OT Ongoing, Progressing    Description: Goals to be met by: 10/2/20     Patient will increase functional independence with ADLs by performing:    Feeding with [place].  UE Dressing with Stand-by Assistance.  LE Dressing with Moderate Assistance.  Grooming while EOB with Stand-by Assistance.  Toileting from bedside commode with Moderate Assistance for hygiene and clothing management.   Toilet transfer to bedside commode with Moderate Assistance.                     History:     Past Medical History:   Diagnosis Date    Acquired hypothyroidism 7/30/2013    Arthritis     Blood transfusion     before 2005 - whe had gangrenous gall bladder    Cataract     Chronic back pain 12/4/2018    - Takes Percocet 5-325 at home - Can continue current abdominal pain control with Dilaudid 0.5 mg IV Q3H prn     CKD (chronic kidney disease) stage 3, GFR 30-59 ml/min     Compression fracture of lumbar vertebra     Depression     Dyslipidemia     Essential  hypertension 7/30/2013    Gastroesophageal reflux disease without esophagitis 12/4/2018    - continue home Prilosec    General anesthetics causing adverse effect in therapeutic use     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    GERD (gastroesophageal reflux disease)     History of postoperative delirium 12/4/2018    - Patient reports 1 week history of post-op delirium 7/2018 - Monitor electrolytes, UA, and urine cx - Delirium precautions  - Can use Seroquel 25 mg QHS prn     Hypertension     Hyponatremia 10/23/2017    Impaired mobility and ADLs 6/28/2018    Neurogenic bladder 12/4/2018    Sleep disorder 12/4/2018    - continue Trazodone QHS    Thyroid disease     UTI (urinary tract infection)        Past Surgical History:   Procedure Laterality Date    BIOPSY OF BLADDER  9/1/2020    Procedure: BIOPSY, BLADDER;  Surgeon: Daron Manjarrez MD;  Location: [address];  Service: Urology;;    BLADDER FULGURATION  9/1/2020    Procedure: FULGURATION, BLADDER;  Surgeon: Daron Manjarrez MD;  Location: [address];  Service: Urology;;    CHOLECYSTECTOMY      CYSTOGRAM  9/1/2020    Procedure: CYSTOGRAM;  Surgeon: Daron Manjarrez MD;  Location: [address];  Service: Urology;;    CYSTOSCOPY N/A 9/1/2020    Procedure: CYSTOSCOPY;  Surgeon: Daron Manjarrez MD;  Location: [address];  Service: Urology;  Laterality: N/A;    DILATION OF URETHRA  9/1/2020    Procedure: DILATION, URETHRA;  Surgeon: Daron Manjarrez MD;  Location: [address];  Service: Urology;;    EYE SURGERY Right     IOL    HERNIA REPAIR      INTRAOCULAR PROSTHESES INSERTION Left 5/28/2018    Procedure: INSERTION-INTRAOCULAR LENS (IOL);  Surgeon: Trinity Vazquez MD;  Location: [facility];  Service: Ophthalmology;  Laterality: Left;    JOINT REPLACEMENT      LAMINECTOMY  12/27/2016    L2-L4    LUMBAR LAMINECTOMY  12/2016    PARATHYROIDECTOMY      PHACOEMULSIFICATION OF CATARACT Left 5/28/2018    Procedure:  PHACOEMULSIFICATION-ASPIRATION-CATARACT;  Surgeon: Trinity Vazquez MD;  Location: [facility];  Service: Ophthalmology;  Laterality: Left;    REMOVAL OF BLOOD CLOT  9/1/2020    Procedure: REMOVAL, BLOOD CLOT;  Surgeon: Daron Manjarrez MD;  [facility];  Service: Urology;;    REPAIR OF INCARCERATED INCISIONAL HERNIA WITHOUT HISTORY OF PRIOR REPAIR N/A 12/5/2018    Procedure: REPAIR, HERNIA, INCISIONAL, INCARCERATED, WITHOUT HISTORY OF PRIOR REPAIR;  Surgeon: Steve Valentin MD;  Location: [facility] OR [address] [place] [address];  Service: General;  Laterality: N/A;    REPAIR OF INCARCERATED VENTRAL HERNIA WITHOUT HISTORY OF PRIOR REPAIR N/A 6/20/2018    Procedure: REPAIR, HERNIA, VENTRAL, INCARCERATED, WITHOUT HISTORY OF PRIOR REPAIR;  Surgeon: Guilherme Ordoñez MD;  Location: [address];  Service: General;  Laterality: N/A;    TOTAL KNEE ARTHROPLASTY Bilateral     TOTAL KNEE ARTHROPLASTY Left 10/25/2017    TKR       Time Tracking:     OT Date of Treatment: 09/18/20  OT Start Time: 0840  OT Stop Time: 0856  OT Total Time (min): 16 min    Billable Minutes:Evaluation 8  Self Care/Home Management 8    Maryam Desai, OT  9/18/2020

## 2020-09-21 NOTE — DISCHARGE SUMMARY
[facility]  Discharge Summary      Patient Name: Chucho Prado  MRN: [id number]  Admission Date: 9/13/2020  Hospital Length of Stay: 4 days  Discharge Date and Time: 9/18/2020  5:55 PM  Attending Physician: No att. providers found   Discharging Provider: Shellie Willams MD  Primary Care Provider: Obed Mcguire MD    HPI:   87 yo male with history of hypothyroidism, neurogenic bladder with indwelling suprapubic catheter, HLD, HTN and GERD presents to the ED with 1 day of abdominal pain, nausea, and vomiting. No flatus and he doesn't remember his last BM. Workup in the ED consistent with SBO with possible transition point in the right mid abdomen. Mild leukocytosis on labs (15k), normal lactate. An NGT was placed with 500cc of bilious output.   Of note was recently admitted for hyponatremia thought secondary to thiazides.     PSH includes open helen, VHR x2 with mesh (06/18, 12/18)     * No surgery found *      Indwelling Lines/Drains at time of discharge:   Lines/Drains/Airways     Drain                 Suprapubic Catheter 09/01/20 latex 18 Fr. 20 days              Hospital Course: Patient admitted for management of a small bowel obstruction. He underwent NG small bowel decompression and gastrograffin challenge. Contrast passed into the colon. NG tube was removed (by the patient) and his diet was conservatively advanced in the typical surgical fashion. He is tolerating a regular diet, having bowel movements, and does not have any further abdominal pain. He is clear for discharge. He will follow up in clinic in one week.    Consults:   Consults (From admission, onward)        Status Ordering Provider     Inpatient consult to General surgery  Once     Provider:  (Not yet assigned)    Completed VU, TUNG          Significant Diagnostic Studies: see HPI and hospital course    Pending Diagnostic Studies:     None        Final Active Diagnoses:      Problems Resolved During this  Admission:    Diagnosis Date Noted Date Resolved POA    PRINCIPAL PROBLEM:  Small bowel obstruction [K56.609] 06/20/2018 09/17/2020 Yes      Discharged Condition: good    Disposition: Home or Self Care    Follow Up:  Follow-up Information     Steve Valentin MD In 2 weeks.    Specialty: General Surgery  Why: hospital follow up for conservative management of SBO  Contact information:  [address]  [facility] [address]  [phone]                 Patient Instructions:      Diet Adult Regular     No driving until:   Order Comments: While taking narcotic pain medications.     Notify your health care provider if you experience any of the following:  temperature >100.4     Notify your health care provider if you experience any of the following:  persistent nausea and vomiting or diarrhea     Notify your health care provider if you experience any of the following:  severe uncontrolled pain     Activity as tolerated     Medications:  Reconciled Home Medications:      Medication List      CONTINUE taking these medications    amLODIPine 5 MG tablet  Commonly known as: NORVASC  TAKE 1 TABLET BY MOUTH EVERY DAY     aspirin 81 MG EC tablet  Commonly known as: ECOTRIN  TAKE 1 TABLET BY MOUTH EVERY DAY     atorvastatin 20 MG tablet  Commonly known as: LIPITOR  Take 1 tablet (20 mg total) by mouth once daily.     cetirizine 10 MG tablet  Commonly known as: ZYRTEC  Take 10 mg by mouth daily as needed for Allergies.     gabapentin 400 MG capsule  Commonly known as: NEURONTIN  TAKE 2 CAPSULES BY MOUTH TWICE DAILY     levothyroxine 50 MCG tablet  Commonly known as: SYNTHROID  TAKE 1 TABLET BY MOUTH EVERY DAY     metoprolol succinate 25 MG 24 hr tablet  Commonly known as: TOPROL-XL  TAKE 1 TABLET BY MOUTH EVERY DAY     omeprazole 40 MG capsule  Commonly known as: PRILOSEC  TAKE 1 CAPSULE BY MOUTH EVERY MORNING 30 MINUTES BEFORE EATING     oxybutynin 5 MG Tab  Commonly known as: DITROPAN  Take 1 tablet (5 mg total) by mouth 3  (three) times daily as needed (bladder spasms).     traZODone 50 MG tablet  Commonly known as: DESYREL  TAKE 1 TABLET BY MOUTH NIGHTLY          Time spent on the discharge of patient: 15 minutes    Shellie Willams MD  General Surgery  [facility]

## 2020-09-21 NOTE — HPI
85 yo male with history of hypothyroidism, neurogenic bladder with indwelling suprapubic catheter, HLD, HTN and GERD presents to the ED with 1 day of abdominal pain, nausea, and vomiting. No flatus and he doesn't remember his last BM. Workup in the ED consistent with SBO with possible transition point in the right mid abdomen. Mild leukocytosis on labs (15k), normal lactate. An NGT was placed with 500cc of bilious output.   Of note was recently admitted for hyponatremia thought secondary to thiazides.     PSH includes open helen, VHR x2 with mesh (06/18, 12/18)

## 2020-09-21 NOTE — PLAN OF CARE
Patient was discharged to [facility] over the weekend.       09/21/20 1156   Final Note   Assessment Type Final Discharge Note   Anticipated Discharge Disposition Home   Discharge plans and expectations educations in teach back method with documentation complete? Yes   Right Care Referral Info   Post Acute Recommendation SNF / Sub-Acute Rehab   Facility Name [facility]   Post-Acute Status   Post-Acute Authorization Placement   Post-Acute Placement Status Set-up Complete     Future Appointments   Date Time Provider Department Center   9/24/2020  7:15 AM [facility] SPEC LAB [facility] SPECLAB [facility]   10/28/2020  3:40 PM [facility]     Prerna Duran RN, CM   Ext: [phone]

## 2020-09-21 NOTE — HOSPITAL COURSE
Patient admitted for management of a small bowel obstruction. He underwent NG small bowel decompression and gastrograffin challenge. Contrast passed into the colon. NG tube was removed (by the patient) and his diet was conservatively advanced in the typical surgical fashion. He is tolerating a regular diet, having bowel movements, and does not have any further abdominal pain. He is clear for discharge. He will follow up in clinic in one week.

## 2020-09-24 ENCOUNTER — LAB VISIT (OUTPATIENT)
Dept: LAB | Facility: OTHER | Age: 85
End: 2020-09-24
Payer: MEDICARE

## 2020-09-24 DIAGNOSIS — Z03.818 ENCOUNTER FOR OBSERVATION FOR SUSPECTED EXPOSURE TO OTHER BIOLOGICAL AGENTS RULED OUT: ICD-10-CM

## 2020-09-24 PROCEDURE — U0003 INFECTIOUS AGENT DETECTION BY NUCLEIC ACID (DNA OR RNA); SEVERE ACUTE RESPIRATORY SYNDROME CORONAVIRUS 2 (SARS-COV-2) (CORONAVIRUS DISEASE [COVID-19]), AMPLIFIED PROBE TECHNIQUE, MAKING USE OF HIGH THROUGHPUT TECHNOLOGIES AS DESCRIBED BY CMS-2020-01-R: HCPCS

## 2020-09-25 LAB — SARS-COV-2 RNA RESP QL NAA+PROBE: NOT DETECTED

## 2020-09-28 ENCOUNTER — PATIENT MESSAGE (OUTPATIENT)
Dept: UROLOGY | Facility: CLINIC | Age: 85
End: 2020-09-28

## 2020-09-29 ENCOUNTER — PATIENT MESSAGE (OUTPATIENT)
Dept: OTHER | Facility: OTHER | Age: 85
End: 2020-09-29

## 2020-09-29 ENCOUNTER — TELEPHONE (OUTPATIENT)
Dept: UROLOGY | Facility: CLINIC | Age: 85
End: 2020-09-29

## 2020-09-29 NOTE — TELEPHONE ENCOUNTER
----- Message from Lety Graham sent at 9/29/2020  3:36 PM CDT -----  Regarding: Plan of care  Contact: [phone]  Calling in regards to speaking with nurse regarding plan of care. Please call and advise.

## 2020-09-30 ENCOUNTER — EXTERNAL CHRONIC CARE MANAGEMENT (OUTPATIENT)
Dept: PRIMARY CARE CLINIC | Facility: CLINIC | Age: 85
End: 2020-09-30
Payer: MEDICARE

## 2020-09-30 PROCEDURE — 99490 CHRNC CARE MGMT STAFF 1ST 20: CPT | Mod: S$PBB,,, | Performed by: INTERNAL MEDICINE

## 2020-09-30 PROCEDURE — 99490 CHRNC CARE MGMT STAFF 1ST 20: CPT | Mod: PBBFAC | Performed by: INTERNAL MEDICINE

## 2020-09-30 PROCEDURE — 99490 PR CHRONIC CARE MGMT, 1ST 20 MIN: ICD-10-PCS | Mod: S$PBB,,, | Performed by: INTERNAL MEDICINE

## 2020-10-01 ENCOUNTER — LAB VISIT (OUTPATIENT)
Dept: LAB | Facility: OTHER | Age: 85
End: 2020-10-01
Payer: MEDICARE

## 2020-10-01 ENCOUNTER — PATIENT OUTREACH (OUTPATIENT)
Dept: HOME HEALTH SERVICES | Facility: HOSPITAL | Age: 85
End: 2020-10-01

## 2020-10-01 ENCOUNTER — EXTERNAL HOME HEALTH (OUTPATIENT)
Dept: HOME HEALTH SERVICES | Facility: HOSPITAL | Age: 85
End: 2020-10-01
Payer: MEDICARE

## 2020-10-01 DIAGNOSIS — Z03.818 ENCOUNTER FOR OBSERVATION FOR SUSPECTED EXPOSURE TO OTHER BIOLOGICAL AGENTS RULED OUT: ICD-10-CM

## 2020-10-01 PROCEDURE — U0003 INFECTIOUS AGENT DETECTION BY NUCLEIC ACID (DNA OR RNA); SEVERE ACUTE RESPIRATORY SYNDROME CORONAVIRUS 2 (SARS-COV-2) (CORONAVIRUS DISEASE [COVID-19]), AMPLIFIED PROBE TECHNIQUE, MAKING USE OF HIGH THROUGHPUT TECHNOLOGIES AS DESCRIBED BY CMS-2020-01-R: HCPCS

## 2020-10-02 LAB — SARS-COV-2 RNA RESP QL NAA+PROBE: NOT DETECTED

## 2020-10-16 ENCOUNTER — TELEPHONE (OUTPATIENT)
Dept: INTERNAL MEDICINE | Facility: CLINIC | Age: 85
End: 2020-10-16

## 2020-10-16 NOTE — TELEPHONE ENCOUNTER
----- Message from Sarahi Vaca sent at 10/16/2020 11:53 AM CDT -----  Regarding: order  Contact: [facility] [phone]  Patient has been discharged from [facility].  Will Dr. Mcguire follow up with Home Health Agency     [facility]

## 2020-10-16 NOTE — TELEPHONE ENCOUNTER
Hi, please call back -- yet I will  Thank you, Obed Mcguire  Future Appointments   Date Time Provider Department Center   10/22/2020  7:00 AM [facility] NURSING [place] SPEC LAB [facility]CL[facility]   10/28/2020  3:40 PM Obed Mcguire MD [facility]

## 2020-10-19 ENCOUNTER — PATIENT MESSAGE (OUTPATIENT)
Dept: INTERNAL MEDICINE | Facility: CLINIC | Age: 85
End: 2020-10-19

## 2020-10-20 NOTE — TELEPHONE ENCOUNTER
Future Appointments   Date Time Provider Department Center   10/22/2020  7:00 AM [facility] NURSING [facility], [facility] SPEC LAB [facility]   10/28/2020  3:40 PM Obed Mcguire MD [facility]

## 2020-10-31 ENCOUNTER — EXTERNAL CHRONIC CARE MANAGEMENT (OUTPATIENT)
Dept: PRIMARY CARE CLINIC | Facility: CLINIC | Age: 85
End: 2020-10-31
Payer: MEDICARE

## 2020-10-31 PROCEDURE — 99490 PR CHRONIC CARE MGMT, 1ST 20 MIN: ICD-10-PCS | Mod: S$PBB,,, | Performed by: INTERNAL MEDICINE

## 2020-10-31 PROCEDURE — 99490 CHRNC CARE MGMT STAFF 1ST 20: CPT | Mod: PBBFAC | Performed by: INTERNAL MEDICINE

## 2020-10-31 PROCEDURE — 99490 CHRNC CARE MGMT STAFF 1ST 20: CPT | Mod: S$PBB,,, | Performed by: INTERNAL MEDICINE

## 2020-11-04 ENCOUNTER — TELEPHONE (OUTPATIENT)
Dept: INTERNAL MEDICINE | Facility: CLINIC | Age: 85
End: 2020-11-04

## 2020-11-04 NOTE — TELEPHONE ENCOUNTER
----- Message from Mindi Colvin sent at 11/4/2020 12:15 PM CST -----  Regarding: Medical Advice  Contact: Marley @ [phone] ext [phone]  Would like to get medical advice.  Symptoms (please be specific):  congestion  How long has patient had these symptoms:  2 weeks  Pharmacy name and phone # [facility] [place] - [phone]  Comments:  Patient is asking for Rx AZITHROMYCIN

## 2020-11-04 NOTE — TELEPHONE ENCOUNTER
Hi, please call this person back.  I would need to see him in person to determine whether he needs an antibiotic, I do not recommend azithromycin at this time.  I am seeing him 11/16, please ask if he needs an appt prior to then  Thank you, Obed Mcguire

## 2020-11-23 DIAGNOSIS — G95.9 LUMBAR MYELOPATHY: ICD-10-CM

## 2020-11-25 RX ORDER — GABAPENTIN 400 MG/1
CAPSULE ORAL
Qty: 120 CAPSULE | Refills: 2 | OUTPATIENT
Start: 2020-11-25

## 2020-11-30 ENCOUNTER — PATIENT MESSAGE (OUTPATIENT)
Dept: OPHTHALMOLOGY | Facility: CLINIC | Age: 85
End: 2020-11-30

## 2020-11-30 ENCOUNTER — EXTERNAL CHRONIC CARE MANAGEMENT (OUTPATIENT)
Dept: PRIMARY CARE CLINIC | Facility: CLINIC | Age: 85
End: 2020-11-30
Payer: MEDICARE

## 2020-11-30 PROCEDURE — G2058 PR CHRON CARE MGMT, EA ADDTL 20 MINS: ICD-10-PCS | Mod: S$PBB,,, | Performed by: INTERNAL MEDICINE

## 2020-11-30 PROCEDURE — G2058 CCM ADD 20MIN: HCPCS | Mod: PBBFAC | Performed by: INTERNAL MEDICINE

## 2020-11-30 PROCEDURE — 99490 PR CHRONIC CARE MGMT, 1ST 20 MIN: ICD-10-PCS | Mod: S$PBB,,, | Performed by: INTERNAL MEDICINE

## 2020-11-30 PROCEDURE — G2058 CCM ADD 20MIN: HCPCS | Mod: S$PBB,,, | Performed by: INTERNAL MEDICINE

## 2020-11-30 PROCEDURE — 99490 CHRNC CARE MGMT STAFF 1ST 20: CPT | Mod: PBBFAC | Performed by: INTERNAL MEDICINE

## 2020-11-30 PROCEDURE — 99490 CHRNC CARE MGMT STAFF 1ST 20: CPT | Mod: S$PBB,,, | Performed by: INTERNAL MEDICINE

## 2020-11-30 RX ORDER — GABAPENTIN 400 MG/1
800 CAPSULE ORAL 2 TIMES DAILY
Qty: 120 CAPSULE | Refills: 11 | Status: SHIPPED | OUTPATIENT
Start: 2020-11-30 | End: 2021-12-06

## 2020-12-07 PROCEDURE — G0179 MD RECERTIFICATION HHA PT: HCPCS | Mod: ,,, | Performed by: INTERNAL MEDICINE

## 2020-12-07 PROCEDURE — G0179 PR HOME HEALTH MD RECERTIFICATION: ICD-10-PCS | Mod: ,,, | Performed by: INTERNAL MEDICINE

## 2020-12-11 ENCOUNTER — PATIENT MESSAGE (OUTPATIENT)
Dept: OTHER | Facility: OTHER | Age: 85
End: 2020-12-11

## 2020-12-16 ENCOUNTER — PATIENT OUTREACH (OUTPATIENT)
Dept: HOME HEALTH SERVICES | Facility: HOSPITAL | Age: 85
End: 2020-12-16

## 2020-12-18 ENCOUNTER — EXTERNAL HOME HEALTH (OUTPATIENT)
Dept: HOME HEALTH SERVICES | Facility: HOSPITAL | Age: 85
End: 2020-12-18
Payer: MEDICARE

## 2020-12-31 ENCOUNTER — EXTERNAL CHRONIC CARE MANAGEMENT (OUTPATIENT)
Dept: PRIMARY CARE CLINIC | Facility: CLINIC | Age: 85
End: 2020-12-31
Payer: MEDICARE

## 2020-12-31 PROCEDURE — G2058 CCM ADD 20MIN: HCPCS | Mod: PBBFAC | Performed by: INTERNAL MEDICINE

## 2020-12-31 PROCEDURE — G2058 CCM ADD 20MIN: HCPCS | Mod: S$PBB,,, | Performed by: INTERNAL MEDICINE

## 2020-12-31 PROCEDURE — G2058 PR CHRON CARE MGMT, EA ADDTL 20 MINS: ICD-10-PCS | Mod: S$PBB,,, | Performed by: INTERNAL MEDICINE

## 2020-12-31 PROCEDURE — 99490 CHRNC CARE MGMT STAFF 1ST 20: CPT | Mod: S$PBB,,, | Performed by: INTERNAL MEDICINE

## 2020-12-31 PROCEDURE — 99490 PR CHRONIC CARE MGMT, 1ST 20 MIN: ICD-10-PCS | Mod: S$PBB,,, | Performed by: INTERNAL MEDICINE

## 2020-12-31 PROCEDURE — 99490 CHRNC CARE MGMT STAFF 1ST 20: CPT | Mod: PBBFAC | Performed by: INTERNAL MEDICINE

## 2021-01-01 NOTE — PROGRESS NOTES
LINKS immunization registry and Health Maintenance updated.  Chart reviewed for overdue [facility] health maintenance testing.   34.5

## 2021-01-06 ENCOUNTER — OUTPATIENT CASE MANAGEMENT (OUTPATIENT)
Dept: ADMINISTRATIVE | Facility: OTHER | Age: 86
End: 2021-01-06

## 2021-02-05 PROCEDURE — G0179 MD RECERTIFICATION HHA PT: HCPCS | Mod: ,,, | Performed by: INTERNAL MEDICINE

## 2021-02-05 PROCEDURE — G0179 PR HOME HEALTH MD RECERTIFICATION: ICD-10-PCS | Mod: ,,, | Performed by: INTERNAL MEDICINE

## 2021-02-08 ENCOUNTER — PATIENT MESSAGE (OUTPATIENT)
Dept: INTERNAL MEDICINE | Facility: CLINIC | Age: 86
End: 2021-02-08

## 2021-02-25 ENCOUNTER — TELEPHONE (OUTPATIENT)
Dept: INTERNAL MEDICINE | Facility: CLINIC | Age: 86
End: 2021-02-25

## 2021-02-25 ENCOUNTER — PATIENT OUTREACH (OUTPATIENT)
Dept: HOME HEALTH SERVICES | Facility: HOSPITAL | Age: 86
End: 2021-02-25

## 2021-02-26 ENCOUNTER — EXTERNAL HOME HEALTH (OUTPATIENT)
Dept: HOME HEALTH SERVICES | Facility: HOSPITAL | Age: 86
End: 2021-02-26
Payer: MEDICARE

## 2021-02-26 ENCOUNTER — TELEPHONE (OUTPATIENT)
Dept: INTERNAL MEDICINE | Facility: CLINIC | Age: 86
End: 2021-02-26

## 2021-02-26 ENCOUNTER — OFFICE VISIT (OUTPATIENT)
Dept: INTERNAL MEDICINE | Facility: CLINIC | Age: 86
End: 2021-02-26
Payer: MEDICARE

## 2021-02-26 DIAGNOSIS — B37.2 CANDIDAL INTERTRIGO: Primary | ICD-10-CM

## 2021-02-26 DIAGNOSIS — Z93.59 SUPRAPUBIC CATHETER: ICD-10-CM

## 2021-02-26 DIAGNOSIS — N39.0 URINARY TRACT INFECTION WITHOUT HEMATURIA, SITE UNSPECIFIED: ICD-10-CM

## 2021-02-26 PROCEDURE — 99442 PR PHYSICIAN TELEPHONE EVALUATION 11-20 MIN: ICD-10-PCS | Mod: 95,,, | Performed by: INTERNAL MEDICINE

## 2021-02-26 PROCEDURE — 99442 PR PHYSICIAN TELEPHONE EVALUATION 11-20 MIN: CPT | Mod: 95,,, | Performed by: INTERNAL MEDICINE

## 2021-02-26 RX ORDER — CLOTRIMAZOLE 1 %
CREAM (GRAM) TOPICAL 2 TIMES DAILY
Qty: 60 G | Refills: 0 | Status: SHIPPED | OUTPATIENT
Start: 2021-02-26

## 2021-02-28 ENCOUNTER — PATIENT MESSAGE (OUTPATIENT)
Dept: SURGERY | Facility: CLINIC | Age: 86
End: 2021-02-28

## 2021-03-03 ENCOUNTER — DOCUMENT SCAN (OUTPATIENT)
Dept: HOME HEALTH SERVICES | Facility: HOSPITAL | Age: 86
End: 2021-03-03
Payer: MEDICARE

## 2021-04-06 PROCEDURE — G0179 MD RECERTIFICATION HHA PT: HCPCS | Mod: ,,, | Performed by: INTERNAL MEDICINE

## 2021-04-06 PROCEDURE — G0179 PR HOME HEALTH MD RECERTIFICATION: ICD-10-PCS | Mod: ,,, | Performed by: INTERNAL MEDICINE

## 2021-04-30 ENCOUNTER — EXTERNAL CHRONIC CARE MANAGEMENT (OUTPATIENT)
Dept: PRIMARY CARE CLINIC | Facility: CLINIC | Age: 86
End: 2021-04-30
Payer: MEDICARE

## 2021-04-30 PROCEDURE — 99490 PR CHRONIC CARE MGMT, 1ST 20 MIN: ICD-10-PCS | Mod: S$PBB,,, | Performed by: INTERNAL MEDICINE

## 2021-04-30 PROCEDURE — 99490 CHRNC CARE MGMT STAFF 1ST 20: CPT | Mod: S$PBB,,, | Performed by: INTERNAL MEDICINE

## 2021-04-30 PROCEDURE — 99490 CHRNC CARE MGMT STAFF 1ST 20: CPT | Mod: PBBFAC | Performed by: INTERNAL MEDICINE

## 2021-05-07 ENCOUNTER — PATIENT OUTREACH (OUTPATIENT)
Dept: HOME HEALTH SERVICES | Facility: HOSPITAL | Age: 86
End: 2021-05-07

## 2021-05-10 ENCOUNTER — EXTERNAL HOME HEALTH (OUTPATIENT)
Dept: HOME HEALTH SERVICES | Facility: HOSPITAL | Age: 86
End: 2021-05-10
Payer: MEDICARE

## 2021-05-12 ENCOUNTER — PATIENT OUTREACH (OUTPATIENT)
Dept: HOME HEALTH SERVICES | Facility: HOSPITAL | Age: 86
End: 2021-05-12

## 2021-05-13 ENCOUNTER — EXTERNAL HOME HEALTH (OUTPATIENT)
Dept: HOME HEALTH SERVICES | Facility: HOSPITAL | Age: 86
End: 2021-05-13
Payer: MEDICARE

## 2021-05-21 ENCOUNTER — OFFICE VISIT (OUTPATIENT)
Dept: INTERNAL MEDICINE | Facility: CLINIC | Age: 86
End: 2021-05-21
Payer: MEDICARE

## 2021-05-21 ENCOUNTER — TELEPHONE (OUTPATIENT)
Dept: INTERNAL MEDICINE | Facility: CLINIC | Age: 86
End: 2021-05-21

## 2021-05-21 VITALS — DIASTOLIC BLOOD PRESSURE: 73 MMHG | SYSTOLIC BLOOD PRESSURE: 118 MMHG

## 2021-05-21 DIAGNOSIS — N18.30 STAGE 3 CHRONIC KIDNEY DISEASE, UNSPECIFIED WHETHER STAGE 3A OR 3B CKD: ICD-10-CM

## 2021-05-21 DIAGNOSIS — R60.0 PERIPHERAL EDEMA: Primary | ICD-10-CM

## 2021-05-21 DIAGNOSIS — F33.42 RECURRENT MAJOR DEPRESSIVE DISORDER, IN FULL REMISSION: ICD-10-CM

## 2021-05-21 DIAGNOSIS — R40.0 DAYTIME SOMNOLENCE: ICD-10-CM

## 2021-05-21 DIAGNOSIS — F51.11 PRIMARY HYPERSOMNIA: ICD-10-CM

## 2021-05-21 DIAGNOSIS — I10 ESSENTIAL HYPERTENSION: ICD-10-CM

## 2021-05-21 DIAGNOSIS — E03.9 ACQUIRED HYPOTHYROIDISM: ICD-10-CM

## 2021-05-21 PROCEDURE — 99214 PR OFFICE/OUTPT VISIT, EST, LEVL IV, 30-39 MIN: ICD-10-PCS | Mod: 95,,, | Performed by: INTERNAL MEDICINE

## 2021-05-21 PROCEDURE — 99214 OFFICE O/P EST MOD 30 MIN: CPT | Mod: 95,,, | Performed by: INTERNAL MEDICINE

## 2021-05-21 RX ORDER — FUROSEMIDE 20 MG/1
20 TABLET ORAL
Status: ON HOLD | COMMUNITY
End: 2022-10-13 | Stop reason: SDUPTHER

## 2021-05-21 RX ORDER — POTASSIUM CHLORIDE 750 MG/1
10 TABLET, EXTENDED RELEASE ORAL
Status: ON HOLD | COMMUNITY
End: 2022-10-13 | Stop reason: HOSPADM

## 2021-05-31 ENCOUNTER — EXTERNAL CHRONIC CARE MANAGEMENT (OUTPATIENT)
Dept: PRIMARY CARE CLINIC | Facility: CLINIC | Age: 86
End: 2021-05-31
Payer: MEDICARE

## 2021-05-31 PROCEDURE — 99490 CHRNC CARE MGMT STAFF 1ST 20: CPT | Mod: S$PBB,,, | Performed by: INTERNAL MEDICINE

## 2021-05-31 PROCEDURE — 99490 PR CHRONIC CARE MGMT, 1ST 20 MIN: ICD-10-PCS | Mod: S$PBB,,, | Performed by: INTERNAL MEDICINE

## 2021-05-31 PROCEDURE — 99490 CHRNC CARE MGMT STAFF 1ST 20: CPT | Mod: PBBFAC | Performed by: INTERNAL MEDICINE

## 2021-06-01 ENCOUNTER — TELEPHONE (OUTPATIENT)
Dept: SLEEP MEDICINE | Facility: OTHER | Age: 86
End: 2021-06-01

## 2021-06-28 ENCOUNTER — TELEPHONE (OUTPATIENT)
Dept: SLEEP MEDICINE | Facility: OTHER | Age: 86
End: 2021-06-28

## 2021-06-30 ENCOUNTER — EXTERNAL CHRONIC CARE MANAGEMENT (OUTPATIENT)
Dept: PRIMARY CARE CLINIC | Facility: CLINIC | Age: 86
End: 2021-06-30
Payer: MEDICARE

## 2021-06-30 PROCEDURE — 99490 PR CHRONIC CARE MGMT, 1ST 20 MIN: ICD-10-PCS | Mod: S$PBB,,, | Performed by: INTERNAL MEDICINE

## 2021-06-30 PROCEDURE — 99490 CHRNC CARE MGMT STAFF 1ST 20: CPT | Mod: S$PBB,,, | Performed by: INTERNAL MEDICINE

## 2021-06-30 PROCEDURE — 99490 CHRNC CARE MGMT STAFF 1ST 20: CPT | Mod: PBBFAC | Performed by: INTERNAL MEDICINE

## 2021-07-31 ENCOUNTER — EXTERNAL CHRONIC CARE MANAGEMENT (OUTPATIENT)
Dept: PRIMARY CARE CLINIC | Facility: CLINIC | Age: 86
End: 2021-07-31
Payer: MEDICARE

## 2021-07-31 PROCEDURE — 99490 PR CHRONIC CARE MGMT, 1ST 20 MIN: ICD-10-PCS | Mod: S$PBB,,, | Performed by: INTERNAL MEDICINE

## 2021-07-31 PROCEDURE — 99490 CHRNC CARE MGMT STAFF 1ST 20: CPT | Mod: PBBFAC | Performed by: INTERNAL MEDICINE

## 2021-07-31 PROCEDURE — 99490 CHRNC CARE MGMT STAFF 1ST 20: CPT | Mod: S$PBB,,, | Performed by: INTERNAL MEDICINE

## 2021-08-31 ENCOUNTER — EXTERNAL CHRONIC CARE MANAGEMENT (OUTPATIENT)
Dept: PRIMARY CARE CLINIC | Facility: CLINIC | Age: 86
End: 2021-08-31
Payer: MEDICARE

## 2021-08-31 PROCEDURE — 99490 PR CHRONIC CARE MGMT, 1ST 20 MIN: ICD-10-PCS | Mod: S$PBB,,, | Performed by: INTERNAL MEDICINE

## 2021-08-31 PROCEDURE — 99490 CHRNC CARE MGMT STAFF 1ST 20: CPT | Mod: S$PBB,,, | Performed by: INTERNAL MEDICINE

## 2021-08-31 PROCEDURE — 99439 PR CHRONIC CARE MGMT, EA ADDTL 20 MIN: ICD-10-PCS | Mod: S$PBB,,, | Performed by: INTERNAL MEDICINE

## 2021-08-31 PROCEDURE — 99439 CHRNC CARE MGMT STAF EA ADDL: CPT | Mod: PBBFAC,25,27 | Performed by: INTERNAL MEDICINE

## 2021-08-31 PROCEDURE — 99490 CHRNC CARE MGMT STAFF 1ST 20: CPT | Mod: PBBFAC,25 | Performed by: INTERNAL MEDICINE

## 2021-08-31 PROCEDURE — 99439 CHRNC CARE MGMT STAF EA ADDL: CPT | Mod: S$PBB,,, | Performed by: INTERNAL MEDICINE

## 2021-09-17 ENCOUNTER — OFFICE VISIT (OUTPATIENT)
Dept: INTERNAL MEDICINE | Facility: CLINIC | Age: 86
End: 2021-09-17
Payer: MEDICARE

## 2021-09-17 DIAGNOSIS — L30.4 INTERTRIGO: Primary | ICD-10-CM

## 2021-09-17 DIAGNOSIS — F33.1 MODERATE EPISODE OF RECURRENT MAJOR DEPRESSIVE DISORDER: ICD-10-CM

## 2021-09-17 DIAGNOSIS — R40.0 DAYTIME SOMNOLENCE: ICD-10-CM

## 2021-09-17 DIAGNOSIS — Z93.59 SUPRAPUBIC CATHETER: ICD-10-CM

## 2021-09-17 DIAGNOSIS — I10 ESSENTIAL HYPERTENSION: ICD-10-CM

## 2021-09-17 PROCEDURE — 99213 OFFICE O/P EST LOW 20 MIN: CPT | Mod: 95,,, | Performed by: INTERNAL MEDICINE

## 2021-09-17 PROCEDURE — 99213 PR OFFICE/OUTPT VISIT, EST, LEVL III, 20-29 MIN: ICD-10-PCS | Mod: 95,,, | Performed by: INTERNAL MEDICINE

## 2021-09-21 ENCOUNTER — PES CALL (OUTPATIENT)
Dept: ADMINISTRATIVE | Facility: CLINIC | Age: 86
End: 2021-09-21

## 2021-09-30 ENCOUNTER — EXTERNAL CHRONIC CARE MANAGEMENT (OUTPATIENT)
Dept: PRIMARY CARE CLINIC | Facility: CLINIC | Age: 86
End: 2021-09-30
Payer: MEDICARE

## 2021-09-30 PROCEDURE — 99490 CHRNC CARE MGMT STAFF 1ST 20: CPT | Mod: PBBFAC | Performed by: INTERNAL MEDICINE

## 2021-09-30 PROCEDURE — 99490 CHRNC CARE MGMT STAFF 1ST 20: CPT | Mod: S$PBB,,, | Performed by: INTERNAL MEDICINE

## 2021-09-30 PROCEDURE — 99490 PR CHRONIC CARE MGMT, 1ST 20 MIN: ICD-10-PCS | Mod: S$PBB,,, | Performed by: INTERNAL MEDICINE

## 2021-10-31 ENCOUNTER — EXTERNAL CHRONIC CARE MANAGEMENT (OUTPATIENT)
Dept: PRIMARY CARE CLINIC | Facility: CLINIC | Age: 86
End: 2021-10-31
Payer: MEDICARE

## 2021-10-31 PROCEDURE — 99490 CHRNC CARE MGMT STAFF 1ST 20: CPT | Mod: PBBFAC | Performed by: INTERNAL MEDICINE

## 2021-10-31 PROCEDURE — 99490 PR CHRONIC CARE MGMT, 1ST 20 MIN: ICD-10-PCS | Mod: S$PBB,,, | Performed by: INTERNAL MEDICINE

## 2021-10-31 PROCEDURE — 99490 CHRNC CARE MGMT STAFF 1ST 20: CPT | Mod: S$PBB,,, | Performed by: INTERNAL MEDICINE

## 2021-11-06 DIAGNOSIS — I10 ESSENTIAL HYPERTENSION: ICD-10-CM

## 2021-11-08 PROBLEM — J96.01 ACUTE HYPOXEMIC RESPIRATORY FAILURE: Status: RESOLVED | Noted: 2020-08-27 | Resolved: 2021-11-08

## 2021-11-08 PROBLEM — N17.9 AKI (ACUTE KIDNEY INJURY): Status: RESOLVED | Noted: 2017-01-07 | Resolved: 2021-11-08

## 2021-11-08 RX ORDER — METOPROLOL SUCCINATE 25 MG/1
TABLET, EXTENDED RELEASE ORAL
Qty: 30 TABLET | Refills: 5 | Status: SHIPPED | OUTPATIENT
Start: 2021-11-08 | End: 2022-05-23

## 2021-11-30 ENCOUNTER — EXTERNAL CHRONIC CARE MANAGEMENT (OUTPATIENT)
Dept: PRIMARY CARE CLINIC | Facility: CLINIC | Age: 86
End: 2021-11-30
Payer: MEDICARE

## 2021-11-30 PROCEDURE — 99490 CHRNC CARE MGMT STAFF 1ST 20: CPT | Mod: PBBFAC | Performed by: INTERNAL MEDICINE

## 2021-11-30 PROCEDURE — 99490 PR CHRONIC CARE MGMT, 1ST 20 MIN: ICD-10-PCS | Mod: S$PBB,,, | Performed by: INTERNAL MEDICINE

## 2021-11-30 PROCEDURE — 99490 CHRNC CARE MGMT STAFF 1ST 20: CPT | Mod: S$PBB,,, | Performed by: INTERNAL MEDICINE

## 2021-12-03 DIAGNOSIS — G95.9 LUMBAR MYELOPATHY: ICD-10-CM

## 2021-12-06 RX ORDER — GABAPENTIN 400 MG/1
CAPSULE ORAL
Qty: 120 CAPSULE | Refills: 11 | Status: SHIPPED | OUTPATIENT
Start: 2021-12-06 | End: 2022-08-31

## 2021-12-23 ENCOUNTER — PES CALL (OUTPATIENT)
Dept: HOME HEALTH SERVICES | Facility: CLINIC | Age: 86
End: 2021-12-23
Payer: MEDICARE

## 2021-12-31 ENCOUNTER — EXTERNAL CHRONIC CARE MANAGEMENT (OUTPATIENT)
Dept: PRIMARY CARE CLINIC | Facility: CLINIC | Age: 86
End: 2021-12-31
Payer: MEDICARE

## 2021-12-31 PROCEDURE — 99490 PR CHRONIC CARE MGMT, 1ST 20 MIN: ICD-10-PCS | Mod: S$PBB,,, | Performed by: INTERNAL MEDICINE

## 2021-12-31 PROCEDURE — 99490 CHRNC CARE MGMT STAFF 1ST 20: CPT | Mod: S$PBB,,, | Performed by: INTERNAL MEDICINE

## 2021-12-31 PROCEDURE — 99490 CHRNC CARE MGMT STAFF 1ST 20: CPT | Mod: PBBFAC | Performed by: INTERNAL MEDICINE

## 2022-01-04 ENCOUNTER — IMMUNIZATION (OUTPATIENT)
Dept: INTERNAL MEDICINE | Facility: CLINIC | Age: 87
End: 2022-01-04
Payer: MEDICARE

## 2022-01-04 ENCOUNTER — CARE AT HOME (OUTPATIENT)
Dept: HOME HEALTH SERVICES | Facility: CLINIC | Age: 87
End: 2022-01-04
Payer: MEDICARE

## 2022-01-04 DIAGNOSIS — L97.319 ANKLE ULCER, RIGHT, WITH UNSPECIFIED SEVERITY: ICD-10-CM

## 2022-01-04 DIAGNOSIS — Z78.9 IMPAIRED MOBILITY AND ADLS: ICD-10-CM

## 2022-01-04 DIAGNOSIS — Z93.59 SUPRAPUBIC CATHETER: ICD-10-CM

## 2022-01-04 DIAGNOSIS — Z51.5 PALLIATIVE CARE ENCOUNTER: Primary | ICD-10-CM

## 2022-01-04 DIAGNOSIS — G89.29 CHRONIC MIDLINE LOW BACK PAIN, UNSPECIFIED WHETHER SCIATICA PRESENT: ICD-10-CM

## 2022-01-04 DIAGNOSIS — R06.02 SOB (SHORTNESS OF BREATH) ON EXERTION: ICD-10-CM

## 2022-01-04 DIAGNOSIS — Z74.09 IMPAIRED MOBILITY AND ADLS: ICD-10-CM

## 2022-01-04 DIAGNOSIS — R53.81 DEBILITY: ICD-10-CM

## 2022-01-04 DIAGNOSIS — Z71.89 COUNSELING REGARDING ADVANCE CARE PLANNING AND GOALS OF CARE: ICD-10-CM

## 2022-01-04 DIAGNOSIS — M54.50 CHRONIC MIDLINE LOW BACK PAIN, UNSPECIFIED WHETHER SCIATICA PRESENT: ICD-10-CM

## 2022-01-04 DIAGNOSIS — B37.2 CANDIDIASIS OF SKIN: ICD-10-CM

## 2022-01-04 DIAGNOSIS — R13.10 DYSPHAGIA, UNSPECIFIED TYPE: ICD-10-CM

## 2022-01-04 DIAGNOSIS — Z23 FLU VACCINE NEED: ICD-10-CM

## 2022-01-04 PROCEDURE — 99497 PR ADVNCD CARE PLAN 30 MIN: ICD-10-PCS | Mod: S$GLB,,, | Performed by: NURSE PRACTITIONER

## 2022-01-04 PROCEDURE — 99497 ADVNCD CARE PLAN 30 MIN: CPT | Mod: S$GLB,,, | Performed by: NURSE PRACTITIONER

## 2022-01-04 PROCEDURE — 99350 HOME/RES VST EST HIGH MDM 60: CPT | Mod: S$GLB,,, | Performed by: NURSE PRACTITIONER

## 2022-01-04 PROCEDURE — G0008 ADMIN INFLUENZA VIRUS VAC: HCPCS | Mod: PBBFAC

## 2022-01-04 PROCEDURE — 99350 PR HOME VISIT,ESTAB PATIENT,LEVEL IV: ICD-10-PCS | Mod: S$GLB,,, | Performed by: NURSE PRACTITIONER

## 2022-01-04 PROCEDURE — 90694 VACC AIIV4 NO PRSRV 0.5ML IM: CPT | Mod: PBBFAC

## 2022-01-04 NOTE — PATIENT INSTRUCTIONS
FOLLOW-UP INSTRUCTIONS:    1. Follow-up with palliative care NP in 4-6 weeks on 2/15/2022@home visit  2. Continue all medications, treatments, and therapies as ordered  3. Fall precautions at all times  4. Maintain Safety precautions at all times  5. Attend all medical appointments as scheduled  6. F/u with PCP as needed  7. Patient to have 6 feet between each other  8. In an Emergency, call 911 or go to ED; notify PCP's office  9. Limit Risks of environmental exposure to coronavirus as discussed including: social distancing, hand hygiene, and limiting departures from the home for necessities only.   10. Patient not to be left alone  11. Patient received flu vaccine. If pain to arm or fever of 100F or greater, take Tylenol as needed  12. Referral to [facility] for Covid Vaccine Booster  13. Rx for Diflucan 150mg po daily x 2 days  14. Rx Nystatin powder apply to buttock and below buttock  15. Left [place] form for patient to complete  16. Referral to Dr. Vázquez, podiatrist for feet and right ankle ulcer

## 2022-01-04 NOTE — PROGRESS NOTES
"[facility]  Palliative Care Home Visit    Visit Date: 1/4/2022  Encounter Provider: Sybil Adams DNP, FNP-c  PCP:  Obed Mcguire MD    Subjective:      Patient ID: Chucho Prado is a 88 y.o. male.    Consult Requested By:  Obed Mcguire M.D.  Reason for Consult:  Palliative Care     Chief Complaint: Establish Palliative Care    Outpatient Palliative Care Encounter:    Patient is being seen at home due to physical debility that presents a taxing effort to leave the home, to mitigate high risk of hospital readmission and/or due to the limited availability of reliable or safe options for transportation to the point of access to the provider. Prior to treatment on this visit the chart was reviewed and patient/family verbal consent was obtained.        PMHx:  Mr. 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 Hx of smoker, Quit 1/1/1990.      PSHx:  Past surgical history includes cystoscopy (9/1/2020), Removal of blood clot (9/1/2020), bladder fulguration, dilation of urethra, biopsy of bladder, cystogram, repair of incarcerated incisional hernia (12/5/2018), repair of incarcerated ventral hernia (6/20/2018), phacoemulsification of left cataract (5/28/2018), intraocular prostheses insertion left (5/28/2018), total knee arthroplasty on left (10/25/2017), laminectomy (12/27/2016) lumbar laminectomy (12/2016), cholecystectomy, right eye surgery, joint replacement, parathyroidectomy, bilateral total knee " arthroplasty.         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 Patient has no children. He was the only child in an intact family. Patient has lots of cousins. Mr. Prado did not serve in the [place] . 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 He is currently retired.     He likes to read, watch television, and visit with friends. He also likes to travel when able.       Impression:    Dr. 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 He is sitting in his wheelchair in his study. He is AA&Ox2-3, forgetful at times. He has a private sitter present. No c/o pain at present. Patient states he has an NP Nel Naranjo coming to him home for treatment>>and she is employed with HH company. He he wheelchair bound;however, he can ambulate a few steps using he metal walker. Requests PT for strengthening>>order placed. Patient has yeast and flaking skin/rash to below buttock posterior left side near thigh.   --wound to right ankle with dressing dry and intact. Requested  nurse take photo and send to me >>>nurse has been applying AG and Triamcinolone cream to wound    Denies Fever, Chills, sore throat  +cough, np>>worse at night  +runny nose  +SOB with exertion  Appetite fluctuates.        Patient states he has [place] >>>placed call to nurse to find out name of HH company    [phone] Eleni RIVERA  Left 2nd message 9:25 am on 1/6    Note:    Patient consents to receiving the flu vaccine.   Patient denies having fever in last 24-48 hours  No allergies to flu vaccine or eggs  Patient is not a transplant patient    Patient given handout on the flu vaccine  Instructed patient if she has low grade fever over next 24 hours to take Tylenol as needed    Given in left deltoid  Time: 2:20pm  LOT# [id number]  Exp Date: 6/30/2022    NP stayed with patient for 15 minutes after vaccine given>>>no reaction to  "vaccine      Goals of Care:    I initiated the process of advance care planning today and explained the importance of this process to the patient and family.  I introduced the concept of advance directives to the patient, as well. Then the patient received detailed information about the importance of designating a Health Care Power of  (HCPOA). He was also instructed to communicate with this person about his wishes for future healthcare, should he become sick and lose decision-making capacity.    I Introduced LaPOST form with patient/family, explaining this is the patient's wishes, and this form will be uploaded into the patient's [facility] and the [place] Registry.     1/4 Discussed goals of care with Dr. Prado and he would like "an 18 year old body attached to my head and to live forever." He expresses he would like to be pain free, comfortable, and to stay in his house.       PLAN:  1. Follow-up with Palliative Care NP in 4-6 weeks on 2/15/2022@home visit  2. Continue all medications  3. F/u with PCP as needed  4. In Emergency, call 911 or go to ED; notify PCP or Palliative Care NP  5. Rx for Diflucan 150mg po daily x 2 days>>Rx sent to pharmacy of choice  6. Rx for Nystatin powder apply 3-4 times daily to affected area  7. Referral for HH sent to [place]>>for PT  8. Patient received flu vaccine. If pain to arm or fever of 100F or greater, take Tylenol as needed  9. Referral to ready responders for Covid 19 Booster  10. Referral to podiatry for right ankle ulcer>>Dr Vázquez   11. Patient to complete LaPOST form     Review of Systems   Constitutional: Positive for activity change (wheelchair bound), appetite change (fluctuates some) and fatigue. Negative for chills and fever.   HENT: Positive for congestion, rhinorrhea (clear) and trouble swallowing (occasionally ). Negative for postnasal drip and sore throat.    Eyes: Positive for visual disturbance (reading glasses).   Respiratory: Positive " for cough and shortness of breath (getting in bed).    Cardiovascular: Positive for leg swelling. Negative for chest pain and palpitations.   Gastrointestinal: Negative for abdominal distention and abdominal pain.   Genitourinary:        +suprapubic catheter   Musculoskeletal: Positive for arthralgias, back pain (lower back), gait problem, myalgias, neck pain and neck stiffness.        Wheelchair bound   Skin: Positive for rash (bilateral groins/private area) and wound (2 wounds on right ankle).        Itching and rash below buttock   Neurological: Positive for weakness. Negative for dizziness, tremors, seizures, light-headedness and numbness.   Psychiatric/Behavioral: Negative for agitation, confusion, hallucinations and sleep disturbance. The patient is not nervous/anxious.        Assessments:  · Environmental: two story house, cluttered, good lighting  · Functional Status: needs assistance dressing, bathing; feeds self  · Safety: Fall and Covid 19 Precautions   · Nutritional: 2-3 meals a day; snacks during the day; doesn't drink ensure  · Home Health/DME/Supplies: metal walker, wheelchair, hospital bed, cane, bedside commode, toilet extender  · Home Health company is [facility]>>order placed for PT    History:  Past Medical History:   Diagnosis Date    Acquired hypothyroidism 7/30/2013    Arthritis     Blood transfusion     before 2005 - whe had gangrenous gall bladder    Cataract     Chronic back pain 12/4/2018    - Takes Percocet 5-325 at home - Can continue current abdominal pain control with Dilaudid 0.5 mg IV Q3H prn     CKD (chronic kidney disease) stage 3, GFR 30-59 ml/min     Compression fracture of lumbar vertebra     Depression     Dyslipidemia     Essential hypertension 7/30/2013    Gastroesophageal reflux disease without esophagitis 12/4/2018    - continue home Prilosec    General anesthetics causing adverse effect in therapeutic use     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     GERD (gastroesophageal reflux disease)     History of postoperative delirium 12/4/2018    - Patient reports 1 week history of post-op delirium 7/2018 - Monitor electrolytes, UA, and urine cx - Delirium precautions  - Can use Seroquel 25 mg QHS prn     Hypertension     Hyponatremia 10/23/2017    Impaired mobility and ADLs 6/28/2018    Neurogenic bladder 12/4/2018    Sleep disorder 12/4/2018    - continue Trazodone QHS    Thyroid disease     UTI (urinary tract infection)      Family History   Problem Relation Age of Onset    Hypertension Mother     Diabetes Father     Esophageal cancer Father      Past Surgical History:   Procedure Laterality Date    BIOPSY OF BLADDER  9/1/2020    Procedure: BIOPSY, BLADDER;  Surgeon: Daron Manjarrez MD;  Location: [facility] OR [address] [place] [address];  Service: Urology;;    BLADDER FULGURATION  9/1/2020    Procedure: FULGURATION, BLADDER;  Surgeon: Daron Manjarrez MD;  Location: [address];  Service: Urology;;    CHOLECYSTECTOMY      CYSTOGRAM  9/1/2020    Procedure: CYSTOGRAM;  Surgeon: Daron Manjarrez MD;  Location: [address];  Service: Urology;;    CYSTOSCOPY N/A 9/1/2020    Procedure: CYSTOSCOPY;  Surgeon: Daron Manjarrez MD;  Location: [address];  Service: Urology;  Laterality: N/A;    DILATION OF URETHRA  9/1/2020    Procedure: DILATION, URETHRA;  Surgeon: Daron Manjarrez MD;  Location: [address];  Service: Urology;;    EYE SURGERY Right     IOL    HERNIA REPAIR      INTRAOCULAR PROSTHESES INSERTION Left 5/28/2018    Procedure: INSERTION-INTRAOCULAR LENS (IOL);  Surgeon: Trinity Vazquez MD;  Location: [facility];  Service: Ophthalmology;  Laterality: Left;    JOINT REPLACEMENT      LAMINECTOMY  12/27/2016    L2-L4    LUMBAR LAMINECTOMY  12/2016    PARATHYROIDECTOMY      PHACOEMULSIFICATION OF CATARACT Left 5/28/2018    Procedure: PHACOEMULSIFICATION-ASPIRATION-CATARACT;  Surgeon: Trinity Vazquez MD;  Location: [facility];  Service:  Ophthalmology;  Laterality: Left;    REMOVAL OF BLOOD CLOT  9/1/2020    Procedure: REMOVAL, BLOOD CLOT;  Surgeon: Daron Manjarrez MD;  [facility] OR [facility];  Service: Urology;;    REPAIR OF INCARCERATED INCISIONAL HERNIA WITHOUT HISTORY OF PRIOR REPAIR N/A 12/5/2018    Procedure: REPAIR, HERNIA, INCISIONAL, INCARCERATED, WITHOUT HISTORY OF PRIOR REPAIR;  Surgeon: Steve Valentin MD;  Location: [facility] OR 2ND FLR;  Service: General;  Laterality: N/A;    REPAIR OF INCARCERATED VENTRAL HERNIA WITHOUT HISTORY OF PRIOR REPAIR N/A 6/20/2018    Procedure: REPAIR, HERNIA, VENTRAL, INCARCERATED, WITHOUT HISTORY OF PRIOR REPAIR;  Surgeon: Guilherme Ordoñez MD;  Location: [facility] OR 2ND FLR;  Service: General;  Laterality: N/A;    TOTAL KNEE ARTHROPLASTY Bilateral     TOTAL KNEE ARTHROPLASTY Left 10/25/2017    TKR     Review of patient's allergies indicates:   Allergen Reactions    Thiazides Other (See Comments)     Multiple episodes of thiazide-induced hyponatremia       Medications:    Current Outpatient Medications:     amLODIPine (NORVASC) 5 MG tablet, TAKE 1 TABLET BY MOUTH EVERY DAY, Disp: 90 tablet, Rfl: 1    aspirin (ECOTRIN) 81 MG EC tablet, TAKE 1 TABLET BY MOUTH EVERY DAY, Disp: 90 tablet, Rfl: 11    atorvastatin (LIPITOR) 20 MG tablet, TAKE 1 TABLET BY MOUTH EVERY DAY, Disp: 90 tablet, Rfl: 11    furosemide (LASIX) 20 MG tablet, Take 20 mg by mouth. Monday, wed, friday, Disp: , Rfl:     gabapentin (NEURONTIN) 400 MG capsule, TAKE 2 CAPSULES BY MOUTH TWICE DAILY, Disp: 120 capsule, Rfl: 11    levothyroxine (SYNTHROID) 50 MCG tablet, TAKE 1 TABLET BY MOUTH EVERY DAY, Disp: 90 tablet, Rfl: 11    metoprolol succinate (TOPROL-XL) 25 MG 24 hr tablet, TAKE 1 TABLET BY MOUTH EVERY DAY, Disp: 30 tablet, Rfl: 5    mirtazapine (REMERON) 15 MG tablet, TAKE 1 TABLET BY MOUTH EVERY EVENING, Disp: 30 tablet, Rfl: 5    omeprazole (PRILOSEC) 40 MG capsule, Take 1 capsule (40 mg total) by mouth every morning.,  Disp: 90 capsule, Rfl: 11    oxybutynin (DITROPAN) 5 MG Tab, Take 1 tablet (5 mg total) by mouth 3 (three) times daily as needed (bladder spasms)., Disp: 21 tablet, Rfl: 0    potassium chloride (KLOR-CON) 10 MEQ TbSR, Take 10 mEq by mouth. Mon, Wed, Friday, Disp: , Rfl:     traZODone (DESYREL) 50 MG tablet, TAKE 1 TABLET BY MOUTH NIGHTLY, Disp: 90 tablet, Rfl: 1    cetirizine (ZYRTEC) 10 MG tablet, Take 10 mg by mouth daily as needed for Allergies., Disp: , Rfl:     clotrimazole (LOTRIMIN) 1 % cream, Apply topically 2 (two) times daily., Disp: 60 g, Rfl: 0    nystatin (MYCOSTATIN) powder, Apply topically 4 (four) times daily. Please send largest bottle, Disp: 1 each, Rfl: 3    24h Oral Morphine Equivalents (OME):  n/a    Objective:     Physical Exam:  Vitals:    01/04/22 1400   BP: (!) 140/70   Pulse: 64   Resp: 20   Temp: 96.9 °F (36.1 °C)   TempSrc: Temporal   SpO2: (!) 94%     There is no height or weight on file to calculate BMI.    Physical Exam  Vitals and nursing note reviewed. Exam conducted with a chaperone present.   Constitutional:       Appearance: Normal appearance.   HENT:      Head: Normocephalic and atraumatic.      Nose: Rhinorrhea present.      Mouth/Throat:      Mouth: Mucous membranes are moist.      Pharynx: Oropharynx is clear.   Eyes:      Extraocular Movements: Extraocular movements intact.      Conjunctiva/sclera: Conjunctivae normal.   Cardiovascular:      Rate and Rhythm: Normal rate and regular rhythm.      Pulses: Normal pulses.      Heart sounds: Murmur heard.       Pulmonary:      Effort: Pulmonary effort is normal.      Breath sounds: Normal breath sounds.   Abdominal:      General: Bowel sounds are normal.      Palpations: Abdomen is soft.   Musculoskeletal:         General: Normal range of motion.      Cervical back: Normal range of motion and neck supple. No rigidity.      Right lower leg: Edema present.      Left lower leg: Edema present.   Skin:     General: Skin is warm  and dry.      Capillary Refill: Capillary refill takes 2 to 3 seconds.      Coloration: Skin is pale.      Findings: Rash present.      Comments: Flaking skin below buttock appears yeast; c/o itching   Neurological:      Mental Status: He is alert.      Motor: Weakness present.      Gait: Gait abnormal.      Comments: AA&Ox 2-3; forgetful   Psychiatric:         Mood and Affect: Mood normal.         Behavior: Behavior normal.         Thought Content: Thought content normal.         Review of Symptoms    Symptom Assessment (ESAS 0-10 Scale)  Pain:  2  Dyspnea:  5  Anxiety:  0  Nausea:  0  Depression:  3  Anorexia:  0  Fatigue:  2  Insomnia:  0  Restlessness:  6  Agitation:  0     CAM / Delirium:  Negative  Constipation:  Positive  Diarrhea:  Negative (occasionally)    Bowel Management Plan (BMP):  Yes      Comments:  LBM 1/3 normal    Pain Assessment:  Location(s): none      Modified Nicol Scale:  0    Living Arrangements:  Lives with friend    Spiritual:  F - Jillian and Belief:  Shinto extensentiallist  I - Importance:  Highly  C - Community:  No Restoration  A - Address in Care:  No spiritual needs identified     Time-Based Charting:  No        Advance Care Planning   Advance Directives:   Living Will: Yes        Copy on chart: No        Oral Declaration: No    LaPOST: No (left form at house)    Do Not Resuscitate Status: No    Medical Power of : Yes        Oral Declaration: No    Agent's Name:  Carolina Pak (friend)   Agent's Contact Number:  [phone]    Decision Making:  Patient answered questions         Labs:  CBC:   WBC   Date Value Ref Range Status   09/18/2020 5.82 3.90 - 12.70 K/uL Final       Hemoglobin   Date Value Ref Range Status   09/18/2020 10.0 (L) 14.0 - 18.0 g/dL Final       POC Hematocrit   Date Value Ref Range Status   08/27/2019 36 36 - 54 %PCV Final     Hematocrit   Date Value Ref Range Status   09/18/2020 32.0 (L) 40.0 - 54.0 % Final       MCV   Date Value Ref Range Status   09/18/2020  "84 82 - 98 fL Final       Platelets   Date Value Ref Range Status   09/18/2020 287 150 - 350 K/uL Final       LFT:   Lab Results   Component Value Date    AST 14 09/13/2020    ALKPHOS 106 09/13/2020    BILITOT 0.7 09/13/2020       Albumin:   Albumin   Date Value Ref Range Status   09/13/2020 3.8 3.5 - 5.2 g/dL Final     Protein:   Total Protein   Date Value Ref Range Status   09/13/2020 7.1 6.0 - 8.4 g/dL Final       Radiology:  I have reviewed all pertinent imaging results/findings within the past 24 hours.      Assessment:     1. Palliative care encounter    2. Impaired mobility and ADLs    3. Debility    4. Counseling regarding advance care planning and goals of care    5. Chronic midline low back pain, unspecified whether sciatica present    6. Dysphagia, unspecified type    7. Candidiasis of skin    8. Flu vaccine need    9. Ankle ulcer, right, with unspecified severity    10. SOB (shortness of breath) on exertion    11. Suprapubic catheter        Plan:   Chucho was seen today for establish care.    Assessment and Plan:    Palliative care encounter  -palliative care referral placed  -initiate palliative care services today  -Reviewed LaPOST Form & left form at patient's home   -LaPOST Form completed on   -CODE STATUS>>>Full Code Status  -Discussed Palliative care and Hospice  -Patient/family want to continue with Palliative care services  -1/4 Reviewed LaPOST form and left for patient to complete    Counseling regarding advance care planning and goals of care  1/4 Discussed goals of care with Dr. Prado and he would like "a new body for his head." would like to be pain free and comfortable    Candidiasis of skin    -     fluconazole (DIFLUCAN) 150 MG Tab; Take 1 tablet (150 mg total) by mouth once daily. for 2 days  -     nystatin (MYCOSTATIN) powder; Apply topically 4 (four) times daily. Please send largest bottle          Ankle ulcer, right, with unspecified severity  -1/4 referral to podiatrist Dr. Vázquez " for home visit; spoke to Dr. Vázquez and he will f/u with patient  -1/4 [facility] to continue wound care      Were controlled substances prescribed?  No    Total clinical care time was 60min. Reviewed Chart and PCP's notes thenThe following issues were discussed: Reviewed PMHx, PSHx, Social history, medications, diet, flu vaccine and symptoms of vaccine, covid vaccine, wound to right ankle, candida of skin, lower back pain, suprapubic catheter, impaired mobility, debility, SOB, dysphagia, physical therapy, home health.     An additional 30 minutes of the visit was spent in advanced care planning.  Explained to patient what palliative care is and palliative care vs home health and hospice. Reviewed LaPOST and AD with patient. Discussed goals of care       Follow Up Appointments:   Future Appointments   Date Time Provider Department Center   2/15/2022  8:00 AM Sybil Adams [id number]       Patient and family agreed via verbal consent to access medical records and for assessment and treatment during this visit.    Attestation: Screening criteria to assess the level of the patient's risk for infection with COVID-19 as recommended by the CDC at the time of the above documented home visit concluded appropriateness to proceed.     Universal precautions were maintained at all times, including provider use of >60% alcohol gel hand  immediately prior to entry and upon departing the patient's home as well as cleaning of equipment used in home visit with antibacterial/germicidal disposable wipes.    Patient has not been exposed to anyone with the virus (to the patient's knowledge)  Patient has not been out of the country in the last 14 days.    NP Nurse wore face mask entire length of visit    Sybil Adams DNP, FNP-C  [facility]  [phone]

## 2022-01-05 RX ORDER — FLUCONAZOLE 150 MG/1
150 TABLET ORAL DAILY
Qty: 2 TABLET | Refills: 0 | Status: SHIPPED | OUTPATIENT
Start: 2022-01-05 | End: 2022-01-07

## 2022-01-05 RX ORDER — NYSTATIN 100000 [USP'U]/G
POWDER TOPICAL 4 TIMES DAILY
Qty: 1 EACH | Refills: 3 | Status: SHIPPED | OUTPATIENT
Start: 2022-01-05 | End: 2022-05-05

## 2022-01-06 VITALS
HEART RATE: 64 BPM | DIASTOLIC BLOOD PRESSURE: 70 MMHG | RESPIRATION RATE: 20 BRPM | OXYGEN SATURATION: 94 % | TEMPERATURE: 97 F | SYSTOLIC BLOOD PRESSURE: 140 MMHG

## 2022-01-07 ENCOUNTER — TELEPHONE (OUTPATIENT)
Dept: PRIMARY CARE CLINIC | Facility: CLINIC | Age: 87
End: 2022-01-07
Payer: MEDICARE

## 2022-01-20 ENCOUNTER — IMMUNIZATION (OUTPATIENT)
Dept: PRIMARY CARE CLINIC | Facility: CLINIC | Age: 87
End: 2022-01-20
Payer: MEDICARE

## 2022-01-20 DIAGNOSIS — Z23 NEED FOR VACCINATION: Primary | ICD-10-CM

## 2022-01-20 DIAGNOSIS — Z98.890 S/P LUMBAR LAMINECTOMY: ICD-10-CM

## 2022-01-20 PROCEDURE — 91306 COVID-19, MRNA, LNP-S, PF, 100 MCG/0.25 ML DOSE VACCINE (MODERNA BOOSTER): CPT | Mod: PBBFAC | Performed by: INTERNAL MEDICINE

## 2022-01-20 PROCEDURE — 0064A COVID-19, MRNA, LNP-S, PF, 100 MCG/0.25 ML DOSE VACCINE (MODERNA BOOSTER): CPT | Mod: PBBFAC | Performed by: INTERNAL MEDICINE

## 2022-01-31 ENCOUNTER — EXTERNAL CHRONIC CARE MANAGEMENT (OUTPATIENT)
Dept: PRIMARY CARE CLINIC | Facility: CLINIC | Age: 87
End: 2022-01-31
Payer: MEDICARE

## 2022-01-31 PROCEDURE — 99490 CHRNC CARE MGMT STAFF 1ST 20: CPT | Mod: S$PBB,,, | Performed by: INTERNAL MEDICINE

## 2022-01-31 PROCEDURE — 99490 PR CHRONIC CARE MGMT, 1ST 20 MIN: ICD-10-PCS | Mod: S$PBB,,, | Performed by: INTERNAL MEDICINE

## 2022-01-31 PROCEDURE — 99490 CHRNC CARE MGMT STAFF 1ST 20: CPT | Mod: PBBFAC | Performed by: INTERNAL MEDICINE

## 2022-02-15 ENCOUNTER — CARE AT HOME (OUTPATIENT)
Dept: HOME HEALTH SERVICES | Facility: CLINIC | Age: 87
End: 2022-02-15
Payer: MEDICARE

## 2022-02-15 VITALS
SYSTOLIC BLOOD PRESSURE: 138 MMHG | TEMPERATURE: 98 F | HEART RATE: 54 BPM | DIASTOLIC BLOOD PRESSURE: 84 MMHG | RESPIRATION RATE: 20 BRPM | OXYGEN SATURATION: 96 %

## 2022-02-15 DIAGNOSIS — Z74.09 IMPAIRED MOBILITY AND ADLS: ICD-10-CM

## 2022-02-15 DIAGNOSIS — R06.02 SOB (SHORTNESS OF BREATH) ON EXERTION: ICD-10-CM

## 2022-02-15 DIAGNOSIS — R53.1 WEAKNESS: ICD-10-CM

## 2022-02-15 DIAGNOSIS — G89.29 CHRONIC MIDLINE LOW BACK PAIN, UNSPECIFIED WHETHER SCIATICA PRESENT: ICD-10-CM

## 2022-02-15 DIAGNOSIS — Z71.89 GOALS OF CARE, COUNSELING/DISCUSSION: ICD-10-CM

## 2022-02-15 DIAGNOSIS — Z51.5 PALLIATIVE CARE ENCOUNTER: Primary | ICD-10-CM

## 2022-02-15 DIAGNOSIS — Z78.9 IMPAIRED MOBILITY AND ADLS: ICD-10-CM

## 2022-02-15 DIAGNOSIS — M54.50 CHRONIC MIDLINE LOW BACK PAIN, UNSPECIFIED WHETHER SCIATICA PRESENT: ICD-10-CM

## 2022-02-15 DIAGNOSIS — L97.319 ANKLE ULCER, RIGHT, WITH UNSPECIFIED SEVERITY: ICD-10-CM

## 2022-02-15 PROCEDURE — 99350 PR HOME VISIT,ESTAB PATIENT,LEVEL IV: ICD-10-PCS | Mod: S$GLB,,, | Performed by: NURSE PRACTITIONER

## 2022-02-15 PROCEDURE — 99350 HOME/RES VST EST HIGH MDM 60: CPT | Mod: S$GLB,,, | Performed by: NURSE PRACTITIONER

## 2022-02-15 RX ORDER — AMOXICILLIN AND CLAVULANATE POTASSIUM 500; 125 MG/1; MG/1
1 TABLET, FILM COATED ORAL 2 TIMES DAILY
Qty: 20 TABLET | Refills: 0 | Status: SHIPPED | OUTPATIENT
Start: 2022-02-15 | End: 2022-02-25

## 2022-02-15 NOTE — PROGRESS NOTES
"[facility]  Palliative Care Home Visit    Visit Date: 2/15/2022  Encounter Provider: Sybil Adams DNP, FNP-c  PCP:  Obed Mcguire MD    Subjective:      Patient ID: Chucho Prado is a 88 y.o. male.    Consult Requested By:  Obed Mcguire M.D.  Reason for Consult:  Palliative Care     Chief Complaint: Palliative Care Follow-up Visit    Outpatient Palliative Care Encounter:    Patient is being seen at home due to physical debility that presents a taxing effort to leave the home, to mitigate high risk of hospital readmission and/or due to the limited availability of reliable or safe options for transportation to the point of access to the provider. Prior to treatment on this visit the chart was reviewed and patient/family verbal consent was obtained.        PMHx:  Mr. 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 Hx of smoker, Quit 1/1/1990.      PSHx:  Past surgical history includes cystoscopy (9/1/2020), Removal of blood clot (9/1/2020), bladder fulguration, dilation of urethra, biopsy of bladder, cystogram, repair of incarcerated incisional hernia (12/5/2018), repair of incarcerated ventral hernia (6/20/2018), phacoemulsification of left cataract (5/28/2018), intraocular prostheses insertion left (5/28/2018), total knee arthroplasty on left (10/25/2017), laminectomy (12/27/2016) lumbar laminectomy (12/2016), cholecystectomy, right eye surgery, joint replacement, parathyroidectomy, bilateral " total knee arthroplasty.         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 Patient has no children. He was the only child in an intact family. Patient has lots of cousins. Mr. Prado did not serve in the [place] . 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 He is currently retired.     He likes to read, watch television, and visit with friends. He also likes to travel when able.       Impression:    Dr. 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 He is sitting in his wheelchair in his study. He is AA&Ox2-3, forgetful at times. He has a private sitter present. Patient states he has an NP Nel Naranjo and RN Eleni coming to him home for treatment>>and she is employed with HH company.     He is wheelchair bound;however, he can ambulate a few steps using he metal walker. Patient has back pain, especially when going to bed and has to take tylenol. He feels better after doing his exercises in the morning. PT previously came out and he found it helpful. He is interested in reordering PT.    Patient previously had yeast and flaking skin/rash to below buttock posterior left side near thigh. Patient states he currently has itchy, mildly painful rash in groin. He states the rash on his buttocks has improved since the last visit and says using nystatin powder has helped the rash. The rash in his groin is uncomfortable for the patient, but per patient's caretaker, the rash on buttocks is gone.    Wound on medial aspect of right ankle, dressing is dry intact but patient's caretaker says wound drains. Nurse has been applying AG and Triamcinolone cream to wound.  nurse Eleni redresses wound once a week and most recently came yesterday. will order [facility] for wound care.    Patient complaining of worsening vision. He is having trouble reading. He had cataract surgery 3 years ago and it went well. He  is also complaining of worsening hearing especially with distinguishing what people are saying on TV even though the volume is very loud. He does not want to go to audiologist due to risk of COVID exposure. He also complains of having short-term memory problems recently. His distant memory is intact. He is not interested in taking any medications for his memory problems.    Denies Fever, Chills, sore throat  +cough, np>>worse at night  +runny nose  +SOB with exertion  Appetite is good.    PHMB gauze on right medial lower ext with ulcer>>>wound looks infected>>+odor. Started augmentin x 10 days    Called Nurse Eleni with [facility] to notify her requesting PT and to increase nurse visits per week for wound care>>called on 2/18 @9:32 am and left message for Eleni    Goals of Care:    I initiated the process of advance care planning today and explained the importance of this process to the patient and family.  I introduced the concept of advance directives to the patient, as well. Then the patient received detailed information about the importance of designating a Health Care Power of  (HCPOA). He was also instructed to communicate with this person about his wishes for future healthcare, should he become sick and lose decision-making capacity.    I Introduced LaPOST form with patient/family, explaining this is the patient's wishes, and this form will be uploaded into the patient's [facility] and the [place] Registry.     2/15 Discussed goals of care with Dr Prado and he expresses he would like to be pain free and comfortable at is home      PLAN:  1. Follow-up with Palliative Care NP in 4-6 weeks on 4/26/2022@home visit  2. Continue all medications  3. F/u with PCP as needed  4. Left LaPOST at patient's home for him to complete  5. Referral to [facility] for PT  6. Rx sent to pharmacy of choice for Augmentin x 10 days   7. Continue wound care via  nurse   8 Referral placed to [facility]  for wound care    Review of Systems   Constitutional: Positive for activity change (wheelchair bound), appetite change (fluctuates some) and fatigue. Negative for chills and fever.   HENT: Positive for congestion, rhinorrhea (clear) and trouble swallowing (occasionally ). Negative for postnasal drip and sore throat.    Eyes: Positive for visual disturbance (reading glasses).   Respiratory: Positive for cough and shortness of breath (getting in bed).    Cardiovascular: Positive for leg swelling. Negative for chest pain and palpitations.   Gastrointestinal: Negative for abdominal distention and abdominal pain.   Genitourinary:        +suprapubic catheter   Musculoskeletal: Positive for arthralgias, back pain (lower back), gait problem, myalgias, neck pain and neck stiffness.        Wheelchair bound   Skin: Positive for rash (bilateral groins/private area) and wound (2 wounds on right ankle).        Itching and rash below buttock   Neurological: Positive for weakness. Negative for dizziness, tremors, seizures, light-headedness and numbness.   Psychiatric/Behavioral: Negative for agitation, confusion, hallucinations and sleep disturbance. The patient is not nervous/anxious.        Assessments:  · Environmental: two story house, cluttered, good lighting  · Functional Status: needs assistance dressing, bathing; feeds self  · Safety: Fall and Covid 19 Precautions   · Nutritional: 2-3 meals a day; snacks during the day; doesn't drink ensure  · Home Health/DME/Supplies: metal walker, wheelchair, hospital bed, cane, bedside commode, toilet extender  · Home Health company is [place]>>order placed for PT    History:  Past Medical History:   Diagnosis Date    Acquired hypothyroidism 7/30/2013    Arthritis     Blood transfusion     before 2005 - whe had gangrenous gall bladder    Cataract     Chronic back pain 12/4/2018    - Takes Percocet 5-325 at home - Can continue current abdominal pain control with Dilaudid 0.5 mg  IV Q3H prn     CKD (chronic kidney disease) stage 3, GFR 30-59 ml/min     Compression fracture of lumbar vertebra     Depression     Dyslipidemia     Essential hypertension 7/30/2013    Gastroesophageal reflux disease without esophagitis 12/4/2018    - continue home Prilosec    General anesthetics causing adverse effect in therapeutic use     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    GERD (gastroesophageal reflux disease)     History of postoperative delirium 12/4/2018    - Patient reports 1 week history of post-op delirium 7/2018 - Monitor electrolytes, UA, and urine cx - Delirium precautions  - Can use Seroquel 25 mg QHS prn     Hypertension     Hyponatremia 10/23/2017    Impaired mobility and ADLs 6/28/2018    Neurogenic bladder 12/4/2018    Sleep disorder 12/4/2018    - continue Trazodone QHS    Thyroid disease     UTI (urinary tract infection)      Family History   Problem Relation Age of Onset    Hypertension Mother     Diabetes Father     Esophageal cancer Father      Past Surgical History:   Procedure Laterality Date    BIOPSY OF BLADDER  9/1/2020    Procedure: BIOPSY, BLADDER;  Surgeon: Daron Manjarrez MD;  Location: [address];  Service: Urology;;    BLADDER FULGURATION  9/1/2020    Procedure: FULGURATION, BLADDER;  Surgeon: Daron Manjarrez MD;  Location: [address];  Service: Urology;;    CHOLECYSTECTOMY      CYSTOGRAM  9/1/2020    Procedure: CYSTOGRAM;  Surgeon: Daron Manjarrez MD;  Location: [address];  Service: Urology;;    CYSTOSCOPY N/A 9/1/2020    Procedure: CYSTOSCOPY;  Surgeon: Daron Manjarrez MD;  Location: [address];  Service: Urology;  Laterality: N/A;    DILATION OF URETHRA  9/1/2020    Procedure: DILATION, URETHRA;  Surgeon: Daron Manjarrez MD;  Location: [address];  Service: Urology;;    EYE SURGERY Right     IOL    HERNIA REPAIR      INTRAOCULAR PROSTHESES INSERTION Left 5/28/2018    Procedure: INSERTION-INTRAOCULAR  LENS (IOL);  Surgeon: Trinity Vazquez MD;  Location: [facility];  Service: Ophthalmology;  Laterality: Left;    JOINT REPLACEMENT      LAMINECTOMY  12/27/2016    L2-L4    LUMBAR LAMINECTOMY  12/2016    PARATHYROIDECTOMY      PHACOEMULSIFICATION OF CATARACT Left 5/28/2018    Procedure: PHACOEMULSIFICATION-ASPIRATION-CATARACT;  Surgeon: Trinity Vazquez MD;  Location: [facility];  Service: Ophthalmology;  Laterality: Left;    REMOVAL OF BLOOD CLOT  9/1/2020    Procedure: REMOVAL, BLOOD CLOT;  Surgeon: Daron Manjarrez MD;  Location: [facility] OR 1ST FLR;  Service: Urology;;    REPAIR OF INCARCERATED INCISIONAL HERNIA WITHOUT HISTORY OF PRIOR REPAIR N/A 12/5/2018    Procedure: REPAIR, HERNIA, INCISIONAL, INCARCERATED, WITHOUT HISTORY OF PRIOR REPAIR;  Surgeon: Steve Vlaentin MD;  Location: [facility] OR 2ND FLR;  Service: General;  Laterality: N/A;    REPAIR OF INCARCERATED VENTRAL HERNIA WITHOUT HISTORY OF PRIOR REPAIR N/A 6/20/2018    Procedure: REPAIR, HERNIA, VENTRAL, INCARCERATED, WITHOUT HISTORY OF PRIOR REPAIR;  Surgeon: Guilherme Ordoñez MD;  Location: [facility] OR 2ND FLR;  Service: General;  Laterality: N/A;    TOTAL KNEE ARTHROPLASTY Bilateral     TOTAL KNEE ARTHROPLASTY Left 10/25/2017    TKR     Review of patient's allergies indicates:   Allergen Reactions    Thiazides Other (See Comments)     Multiple episodes of thiazide-induced hyponatremia       Medications:    Current Outpatient Medications:     amLODIPine (NORVASC) 5 MG tablet, TAKE 1 TABLET BY MOUTH EVERY DAY, Disp: 90 tablet, Rfl: 11    ammonium lactate (LAC-HYDRIN) 12 % lotion, APPLY TO LOWER EXTREMITIES TWICE DAILY AS NEEDED FOR DRY SKIN, Disp: 225 g, Rfl: 4    aspirin (ECOTRIN) 81 MG EC tablet, TAKE 1 TABLET BY MOUTH EVERY DAY, Disp: 90 tablet, Rfl: 11    atorvastatin (LIPITOR) 20 MG tablet, TAKE 1 TABLET BY MOUTH EVERY DAY, Disp: 90 tablet, Rfl: 1    cetirizine (ZYRTEC) 10 MG tablet, Take 10 mg by mouth daily as needed for Allergies., Disp: , Rfl:      clotrimazole (LOTRIMIN) 1 % cream, Apply topically 2 (two) times daily., Disp: 60 g, Rfl: 0    furosemide (LASIX) 20 MG tablet, Take 20 mg by mouth. Monday, wed, friday, Disp: , Rfl:     gabapentin (NEURONTIN) 400 MG capsule, TAKE 2 CAPSULES BY MOUTH TWICE DAILY, Disp: 120 capsule, Rfl: 11    levothyroxine (SYNTHROID) 50 MCG tablet, TAKE 1 TABLET BY MOUTH EVERY DAY, Disp: 90 tablet, Rfl: 11    metoprolol succinate (TOPROL-XL) 25 MG 24 hr tablet, TAKE 1 TABLET BY MOUTH EVERY DAY, Disp: 90 tablet, Rfl: 0    mirtazapine (REMERON) 15 MG tablet, TAKE 1 TABLET BY MOUTH EVERY EVENING, Disp: 30 tablet, Rfl: 2    nystatin (MYCOSTATIN) powder, APPLY TOPICALLY FOUR TIMES DAILY, Disp: 60 g, Rfl: 3    omeprazole (PRILOSEC) 40 MG capsule, TAKE 1 CAPSULE BY MOUTH EVERY MORNING, Disp: 90 capsule, Rfl: 2    oxybutynin (DITROPAN) 5 MG Tab, Take 1 tablet (5 mg total) by mouth 3 (three) times daily as needed (bladder spasms)., Disp: 21 tablet, Rfl: 0    potassium chloride (KLOR-CON) 10 MEQ TbSR, Take 10 mEq by mouth. Mon, Wed, Friday, Disp: , Rfl:     traZODone (DESYREL) 50 MG tablet, TAKE 1 TABLET BY MOUTH NIGHTLY, Disp: 90 tablet, Rfl: 1    24h Oral Morphine Equivalents (OME):  n/a    Objective:     Physical Exam:    There is no height or weight on file to calculate BMI.    Physical Exam  Vitals and nursing note reviewed. Exam conducted with a chaperone present.   Constitutional:       Appearance: Normal appearance.   HENT:      Head: Normocephalic and atraumatic.      Nose: Rhinorrhea present.      Mouth/Throat:      Mouth: Mucous membranes are moist.      Pharynx: Oropharynx is clear.   Eyes:      Extraocular Movements: Extraocular movements intact.      Conjunctiva/sclera: Conjunctivae normal.   Cardiovascular:      Rate and Rhythm: Normal rate and regular rhythm.      Pulses: Normal pulses.      Heart sounds: Murmur heard.   Pulmonary:      Effort: Pulmonary effort is normal.      Breath sounds: Normal breath  sounds.   Abdominal:      General: Bowel sounds are normal.      Palpations: Abdomen is soft.   Musculoskeletal:         General: Normal range of motion.      Cervical back: Normal range of motion and neck supple. No rigidity.      Right lower leg: Edema present.      Left lower leg: Edema present.   Skin:     General: Skin is warm and dry.      Capillary Refill: Capillary refill takes 2 to 3 seconds.      Coloration: Skin is pale.      Findings: Rash present.      Comments: Flaking skin below buttock appears yeast; c/o itching; wound to  Medial right ankle    Neurological:      Mental Status: He is alert.      Motor: Weakness present.      Gait: Gait abnormal.      Comments: AA&Ox 2-3; forgetful   Psychiatric:         Mood and Affect: Mood normal.         Behavior: Behavior normal.         Thought Content: Thought content normal.         Review of Symptoms    Symptom Assessment (ESAS 0-10 Scale)  Pain:  2  Dyspnea:  5  Anxiety:  4  Nausea:  0  Depression:  3  Anorexia:  0  Fatigue:  2  Insomnia:  0  Restlessness:  6  Agitation:  0     CAM / Delirium:  Negative  Constipation:  Positive  Diarrhea:  Negative (occasionally)    Bowel Management Plan (BMP):  Yes      Comments:  LBM 2/14 normal    Pain Assessment:  Location(s): none      Modified Nicol Scale:  0    Living Arrangements:  Lives with friend    Spiritual:  F - Jillian and Belief:  Spiritism extensentiallist  I - Importance:  Highly  C - Community:  No Oriental orthodox  A - Address in Care:  No spiritual needs identified     Time-Based Charting:  No        Advance Care Planning   Advance Directives:   Living Will: Yes        Copy on chart: No        Oral Declaration: No    LaPOST: No (left form at house)    Do Not Resuscitate Status: No    Medical Power of : Yes        Oral Declaration: No    Agent's Name:  Carolina Pak (friend)   Agent's Contact Number:  [phone]    Decision Making:  Patient answered questions         Labs:  CBC:   WBC   Date Value Ref Range  "Status   09/18/2020 5.82 3.90 - 12.70 K/uL Final     Albumin   Date Value Ref Range Status   09/13/2020 3.8 3.5 - 5.2 g/dL Final     Protein:   Total Protein   Date Value Ref Range Status   09/13/2020 7.1 6.0 - 8.4 g/dL Final       Radiology:  I have reviewed all pertinent imaging results/findings within the past 24 hours.      Assessment:     1. Palliative care encounter  2. Impaired mobility and ADLs  3. SOB  4. Weakness  5. Chronic midline low back pain, unspecified  6. Ankle ulcer, right with unspecified severity  7. Goals of care discussion      Plan:       Assessment and Plan:    Palliative care encounter  -palliative care referral placed  -initiate palliative care services today  -Reviewed LaPOST Form & left form at patient's home   -LaPOST Form completed on   -CODE STATUS>>>Full Code Status  -Discussed Palliative care and Hospice  -Patient/family want to continue with Palliative care services  -1/4 Reviewed LaPOST form and left for patient to complete    Counseling regarding advance care planning and goals of care  1/4 Discussed goals of care with Dr. Prado and he would like "a new body for his head." would like to be pain free and comfortable  -2/15 Dicussed goals of care with Myles Debclaudia and he would like the wounds to heal; would like to be pain free and comfortable      Ankle ulcer, right, with unspecified severity  -1/4 referral to podiatrist Dr. Vázquez for home visit; spoke to Dr. Vázquez and he will f/u with patient  -1/4 [facility] to continue wound care  -2/15 Referral placed to [facility] for wound care      Were controlled substances prescribed?  No    Total clinical care time was 60min. Reviewed Chart and PCP's notes thenThe following issues were discussed: medications, diet, impaired mobility and ADLs. SOB with exertion, weakness, chronic midline lower back pain, right ankle ulcer, pain issues, falls, sleeping; discussed goals of care      Follow Up Appointments:     f/u appt with PC NP " Sybil Adams, on 4/26/2022@home visit    Patient and family agreed via verbal consent to access medical records and for assessment and treatment during this visit.    Attestation: Screening criteria to assess the level of the patient's risk for infection with COVID-19 as recommended by the CDC at the time of the above documented home visit concluded appropriateness to proceed.     Universal precautions were maintained at all times, including provider use of >60% alcohol gel hand  immediately prior to entry and upon departing the patient's home as well as cleaning of equipment used in home visit with antibacterial/germicidal disposable wipes.    Patient has not been exposed to anyone with the virus (to the patient's knowledge)  Patient has not been out of the country in the last 14 days.    NP Nurse wore face mask entire length of visit    Sybil Adams DNP, FNP-C  [facility]  [phone]

## 2022-02-20 ENCOUNTER — TELEPHONE (OUTPATIENT)
Dept: HOME HEALTH SERVICES | Facility: CLINIC | Age: 87
End: 2022-02-20

## 2022-02-22 ENCOUNTER — TELEPHONE (OUTPATIENT)
Dept: HOME HEALTH SERVICES | Facility: CLINIC | Age: 87
End: 2022-02-22

## 2022-02-22 DIAGNOSIS — L89.899: Primary | ICD-10-CM

## 2022-02-27 ENCOUNTER — TELEPHONE (OUTPATIENT)
Dept: HOME HEALTH SERVICES | Facility: CLINIC | Age: 87
End: 2022-02-27
Payer: MEDICARE

## 2022-02-27 DIAGNOSIS — M54.50 CHRONIC LOW BACK PAIN WITHOUT SCIATICA, UNSPECIFIED BACK PAIN LATERALITY: ICD-10-CM

## 2022-02-27 DIAGNOSIS — Z74.09 IMPAIRED MOBILITY AND ADLS: Primary | ICD-10-CM

## 2022-02-27 DIAGNOSIS — Z78.9 IMPAIRED MOBILITY AND ADLS: Primary | ICD-10-CM

## 2022-02-27 DIAGNOSIS — G89.29 CHRONIC LOW BACK PAIN WITHOUT SCIATICA, UNSPECIFIED BACK PAIN LATERALITY: ICD-10-CM

## 2022-02-27 NOTE — TELEPHONE ENCOUNTER
2/22/2022    Called and spoke to Triny @[facility] . Verbal order given to continue PT. Requested to increase nurse HH visits to 2 x week for wound care    Notified Triny order placed for [facility]. Orders faxed to them and will give orders to [facility].     Currently, nurse is cleaning RLE wound with saline and patting dry. Then applying Triamcinolone cream to be done daily. Patient has friend doing wound care every other day.

## 2022-02-28 ENCOUNTER — EXTERNAL CHRONIC CARE MANAGEMENT (OUTPATIENT)
Dept: PRIMARY CARE CLINIC | Facility: CLINIC | Age: 87
End: 2022-02-28
Payer: MEDICARE

## 2022-02-28 PROCEDURE — 99490 PR CHRONIC CARE MGMT, 1ST 20 MIN: ICD-10-PCS | Mod: S$PBB,,, | Performed by: INTERNAL MEDICINE

## 2022-02-28 PROCEDURE — 99490 CHRNC CARE MGMT STAFF 1ST 20: CPT | Mod: PBBFAC | Performed by: INTERNAL MEDICINE

## 2022-02-28 PROCEDURE — 99490 CHRNC CARE MGMT STAFF 1ST 20: CPT | Mod: S$PBB,,, | Performed by: INTERNAL MEDICINE

## 2022-03-31 ENCOUNTER — EXTERNAL CHRONIC CARE MANAGEMENT (OUTPATIENT)
Dept: PRIMARY CARE CLINIC | Facility: CLINIC | Age: 87
End: 2022-03-31
Payer: MEDICARE

## 2022-03-31 PROCEDURE — 99490 CHRNC CARE MGMT STAFF 1ST 20: CPT | Mod: S$PBB,,, | Performed by: INTERNAL MEDICINE

## 2022-03-31 PROCEDURE — 99490 CHRNC CARE MGMT STAFF 1ST 20: CPT | Mod: PBBFAC | Performed by: INTERNAL MEDICINE

## 2022-03-31 PROCEDURE — 99490 PR CHRONIC CARE MGMT, 1ST 20 MIN: ICD-10-PCS | Mod: S$PBB,,, | Performed by: INTERNAL MEDICINE

## 2022-04-22 ENCOUNTER — TELEPHONE (OUTPATIENT)
Dept: INTERNAL MEDICINE | Facility: CLINIC | Age: 87
End: 2022-04-22
Payer: MEDICARE

## 2022-04-30 ENCOUNTER — EXTERNAL CHRONIC CARE MANAGEMENT (OUTPATIENT)
Dept: PRIMARY CARE CLINIC | Facility: CLINIC | Age: 87
End: 2022-04-30
Payer: MEDICARE

## 2022-04-30 PROCEDURE — 99439 PR CHRONIC CARE MGMT, EA ADDTL 20 MIN: ICD-10-PCS | Mod: S$PBB,,, | Performed by: INTERNAL MEDICINE

## 2022-04-30 PROCEDURE — 99439 CHRNC CARE MGMT STAF EA ADDL: CPT | Mod: S$PBB,,, | Performed by: INTERNAL MEDICINE

## 2022-04-30 PROCEDURE — 99490 CHRNC CARE MGMT STAFF 1ST 20: CPT | Mod: S$PBB,,, | Performed by: INTERNAL MEDICINE

## 2022-04-30 PROCEDURE — 99490 CHRNC CARE MGMT STAFF 1ST 20: CPT | Mod: PBBFAC,25 | Performed by: INTERNAL MEDICINE

## 2022-04-30 PROCEDURE — 99439 CHRNC CARE MGMT STAF EA ADDL: CPT | Mod: PBBFAC | Performed by: INTERNAL MEDICINE

## 2022-04-30 PROCEDURE — 99490 PR CHRONIC CARE MGMT, 1ST 20 MIN: ICD-10-PCS | Mod: S$PBB,,, | Performed by: INTERNAL MEDICINE

## 2022-05-03 DIAGNOSIS — E78.5 DYSLIPIDEMIA: Chronic | ICD-10-CM

## 2022-05-04 RX ORDER — ATORVASTATIN CALCIUM 20 MG/1
TABLET, FILM COATED ORAL
Qty: 90 TABLET | Refills: 0 | Status: SHIPPED | OUTPATIENT
Start: 2022-05-04 | End: 2022-05-25

## 2022-05-04 NOTE — TELEPHONE ENCOUNTER
Refill Routing Note   Medication(s) are not appropriate for processing by [facility] for the following reason(s):      - Required laboratory values are outdated    [facility] action(s):  Defer Medication-related problems identified: Requires labs        Medication reconciliation completed: No     Appointments  past 12m or future 3m with PCP    Date Provider   Last Visit   9/17/2021 Obed Mcguire MD   Next Visit   Visit date not found Obed Mcguire MD   ED visits in past 90 days: 0        Note composed:11:34 AM 05/04/2022

## 2022-05-04 NOTE — TELEPHONE ENCOUNTER
No new care gaps identified.  [facility] Embedded Care Gaps. Reference number: [id number]. 5/04/2022   10:07:01 AM JASPREET

## 2022-05-24 DIAGNOSIS — E78.5 DYSLIPIDEMIA: Chronic | ICD-10-CM

## 2022-05-24 NOTE — TELEPHONE ENCOUNTER
Refill Routing Note   Medication(s) are not appropriate for processing by [facility] for the following reason(s):      - Required laboratory values are outdated    [facility] action(s):  Defer          Medication reconciliation completed: No     Appointments  past 12m or future 3m with PCP    Date Provider   Last Visit   9/17/2021 Obed Mcguire MD   Next Visit   Visit date not found Obed Mcguire MD   ED visits in past 90 days: 0        Note composed:5:45 PM 05/24/2022

## 2022-05-24 NOTE — TELEPHONE ENCOUNTER
No new care gaps identified.  [facility] Embedded Care Gaps. Reference number: [id number]. 5/24/2022   8:03:30 AM CDT

## 2022-05-25 RX ORDER — ATORVASTATIN CALCIUM 20 MG/1
TABLET, FILM COATED ORAL
Qty: 90 TABLET | Refills: 1 | Status: SHIPPED | OUTPATIENT
Start: 2022-05-25

## 2022-05-31 ENCOUNTER — EXTERNAL CHRONIC CARE MANAGEMENT (OUTPATIENT)
Dept: PRIMARY CARE CLINIC | Facility: CLINIC | Age: 87
End: 2022-05-31
Payer: MEDICARE

## 2022-05-31 PROCEDURE — 99490 CHRNC CARE MGMT STAFF 1ST 20: CPT | Mod: PBBFAC | Performed by: INTERNAL MEDICINE

## 2022-05-31 PROCEDURE — 99490 PR CHRONIC CARE MGMT, 1ST 20 MIN: ICD-10-PCS | Mod: S$PBB,,, | Performed by: INTERNAL MEDICINE

## 2022-05-31 PROCEDURE — 99490 CHRNC CARE MGMT STAFF 1ST 20: CPT | Mod: S$PBB,,, | Performed by: INTERNAL MEDICINE

## 2022-06-12 NOTE — PATIENT INSTRUCTIONS
FOLLOW-UP INSTRUCTIONS:    Follow-up with palliative care NP in 4-6 weeks on 4/26/2022@home visit  Continue all medications, treatments, and therapies as ordered  Fall precautions at all times  Maintain Safety precautions at all times  Attend all medical appointments as scheduled  F/u with PCP as needed  Patient to have 6 feet between each other  In an Emergency, call 911 or go to ED; notify PCP's office  9. Limit Risks of environmental exposure to coronavirus as discussed including: social distancing, hand hygiene, and limiting departures from the home for necessities only.   10. Patient not to be left alone  11. Rx sent to pharmacy of choice for Augmentin x 10 days; take with food   12. Referral to [facility] for wound care to right ankle

## 2022-06-14 ENCOUNTER — CARE AT HOME (OUTPATIENT)
Dept: HOME HEALTH SERVICES | Facility: CLINIC | Age: 87
End: 2022-06-14
Payer: MEDICARE

## 2022-06-14 DIAGNOSIS — Z74.09 IMPAIRED MOBILITY AND ADLS: ICD-10-CM

## 2022-06-14 DIAGNOSIS — Z93.59 SUPRAPUBIC CATHETER: ICD-10-CM

## 2022-06-14 DIAGNOSIS — G89.29 CHRONIC LOW BACK PAIN WITHOUT SCIATICA, UNSPECIFIED BACK PAIN LATERALITY: ICD-10-CM

## 2022-06-14 DIAGNOSIS — Z78.9 IMPAIRED MOBILITY AND ADLS: ICD-10-CM

## 2022-06-14 DIAGNOSIS — Z98.890 S/P LUMBAR LAMINECTOMY: ICD-10-CM

## 2022-06-14 DIAGNOSIS — Z71.89 GOALS OF CARE, COUNSELING/DISCUSSION: ICD-10-CM

## 2022-06-14 DIAGNOSIS — R06.02 SOB (SHORTNESS OF BREATH) ON EXERTION: ICD-10-CM

## 2022-06-14 DIAGNOSIS — M54.50 CHRONIC LOW BACK PAIN WITHOUT SCIATICA, UNSPECIFIED BACK PAIN LATERALITY: ICD-10-CM

## 2022-06-14 DIAGNOSIS — Z51.5 PALLIATIVE CARE ENCOUNTER: Primary | ICD-10-CM

## 2022-06-14 DIAGNOSIS — L97.319 ANKLE ULCER, RIGHT, WITH UNSPECIFIED SEVERITY: ICD-10-CM

## 2022-06-14 PROCEDURE — 99350 PR HOME VISIT,ESTAB PATIENT,LEVEL IV: ICD-10-PCS | Mod: S$GLB,,, | Performed by: NURSE PRACTITIONER

## 2022-06-14 PROCEDURE — 99350 HOME/RES VST EST HIGH MDM 60: CPT | Mod: S$GLB,,, | Performed by: NURSE PRACTITIONER

## 2022-06-14 NOTE — PATIENT INSTRUCTIONS
FOLLOW-UP INSTRUCTIONS:    Follow-up with palliative care NP in 4-6 weeks on 7/19/2022@home visit  Continue all medications, treatments, and therapies as ordered  Fall precautions at all times  Maintain Safety precautions at all times  Attend all medical appointments as scheduled  F/u with PCP as needed  Patient to have 6 feet between each other  In an Emergency, call 911 or go to ED; notify PCP's office  9. Limit Risks of environmental exposure to coronavirus as discussed including: social distancing, hand hygiene, and limiting departures from the home for necessities only.   10. Patient not to be left alone  11. Referral placed to [facility] for PT  12. Referral to [facility] for Covid 19 Booster  13. Wound care nurse to continue wound care twice a week and prn

## 2022-06-14 NOTE — PROGRESS NOTES
"[facility]  Palliative Care Home Visit    Visit Date: 6/14/2022  Encounter Provider: Sybil Adams DNP, FNP-c  PCP:  Obed Mcguire MD    Subjective:      Patient ID: Chucho Prado is a 88 y.o. male.    Consult Requested By:  Obed Mcguire M.D.  Reason for Consult:  Palliative Care     Chief Complaint: Palliative Care Follow-up Visit    Outpatient Palliative Care Encounter:    Patient is being seen at home due to physical debility that presents a taxing effort to leave the home, to mitigate high risk of hospital readmission and/or due to the limited availability of reliable or safe options for transportation to the point of access to the provider. Prior to treatment on this visit the chart was reviewed and patient/family verbal consent was obtained.        PMHx:  Mr. 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 Hx of smoker, Quit 1/1/1990.      PSHx:  Past surgical history includes cystoscopy (9/1/2020), Removal of blood clot (9/1/2020), bladder fulguration, dilation of urethra, biopsy of bladder, cystogram, repair of incarcerated incisional hernia (12/5/2018), repair of incarcerated ventral hernia (6/20/2018), phacoemulsification of left cataract (5/28/2018), intraocular prostheses insertion left (5/28/2018), total knee arthroplasty on left (10/25/2017), laminectomy (12/27/2016) lumbar laminectomy (12/2016), cholecystectomy, right eye surgery, joint replacement, parathyroidectomy, bilateral " total knee arthroplasty.         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 Patient has no children. He was the only child in an intact family. Patient has lots of cousins. Mr. Prado did not serve in the [place] . 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 He is currently retired. He is from [place], [place].     He likes to read, watch television, and visit with friends. He also likes to travel when able.       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 He is sitting in his wheelchair in his study. He is AA&Ox2-3, forgetful at times. He has a private sitter Keshon present. No c/o pain at present.     Lately, he reports increased pain in left hip and lower back pain. Reports pain is worse when getting in and out of bed. Expresses he recently went to [place], [place] for a Life Time Achievement Award he was presented with. He expresses he saw some of his family members when he went to [place] (he invited them to the presentation of the award). He stayed the weekend and then returned back home.       Appetite has been good; eats a good BF with fruit with bagel/waffle then eats a late lunch.   Doesn't really eat dinner.     Sleeping good at night. No problems      Denies fever, chills, sore throat  +SOB with exertion and getting in bed>>SOB lasts only few seconds    Supra pubic changed by nurse [facility] with [facility]    Wound care nurse Hillary is changing dressings on M and Thurs >>>asked sitter to have Nurse send photos of wound to NP    Wound to right LE<>>>wrapped with coban; c/d/i    Note:    He sees podiatrist every 2 months; no recent falls        Goals of Care:    I the prcess of advance care planning today and explained the importance of this process to the patient and family.  I introduced the concept of advance directives to the patient, as well.  Then the patient received detailed information about the importance of designating a Health Care Power of  (HCPOA). He was also instructed to communicate with this person about his wishes for future healthcare, should he become sick and lose decision-making capacity.    I Introduced LaPOST form with patient/family, explaining this is the patient's wishes, and this form will be uploaded into the patient's [facility] and the [place] [facility].     6/14 Discussed goals of care and Mr. Roche would like to get stronger and would like his wound to heal      PLAN:  1. Follow-up with Palliative Care NP in 4-6 weeks on 7/19/2022@home visit  2. Continue all medications  3. F/u with PCP as needed  4. In Emergency, call 911 or go to ED; notify PCP or Palliative Care NP  5. Complete LaPOST form left at house  6. Referral for [employer] for Covid Booster to be given at home  7. Referral to [facility] for PT  8. Elevate BLE to reduce edema  9. Wound care nurse to send pictures of wound to NP      Review of Systems   Constitutional: Positive for activity change (wheelchair bound), appetite change (fluctuates some) and fatigue. Negative for chills and fever.   HENT: Positive for trouble swallowing (occasionally ). Negative for congestion, postnasal drip, rhinorrhea and sore throat.    Eyes: Positive for visual disturbance (reading glasses).   Respiratory: Positive for cough and shortness of breath (getting in bed).    Cardiovascular: Positive for leg swelling. Negative for chest pain and palpitations.   Gastrointestinal: Negative for abdominal distention and abdominal pain.   Genitourinary:        +suprapubic catheter   Musculoskeletal: Positive for arthralgias, back pain (lower back), gait problem, myalgias, neck pain and neck stiffness.        Wheelchair bound   Skin: Positive for wound (2 wounds on right ankle). Negative for rash.   Allergic/Immunologic: Positive for environmental allergies.    Neurological: Positive for weakness. Negative for dizziness, tremors, seizures, light-headedness and numbness.   Psychiatric/Behavioral: Negative for agitation, confusion, hallucinations and sleep disturbance. The patient is not nervous/anxious.        Assessments:  · Environmental: two story house, cluttered, good lighting  · Functional Status: needs assistance dressing, bathing; feeds self  · Safety: Fall and Covid 19 Precautions   · Nutritional: 2-3 meals a day; snacks during the day; doesn't drink ensure  · Home Health/DME/Supplies: metal walker, wheelchair, hospital bed, cane, bedside commode, toilet extender  · Home Health company is [email or web address]>>order placed for PT    History:  Past Medical History:   Diagnosis Date    Acquired hypothyroidism 7/30/2013    Arthritis     Blood transfusion     before 2005 - whe had gangrenous gall bladder    Cataract     Chronic back pain 12/4/2018    - Takes Percocet 5-325 at home - Can continue current abdominal pain control with Dilaudid 0.5 mg IV Q3H prn     CKD (chronic kidney disease) stage 3, GFR 30-59 ml/min     Compression fracture of lumbar vertebra     Depression     Dyslipidemia     Essential hypertension 7/30/2013    Gastroesophageal reflux disease without esophagitis 12/4/2018    - continue home Prilosec    General anesthetics causing adverse effect in therapeutic use     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    GERD (gastroesophageal reflux disease)     History of postoperative delirium 12/4/2018    - Patient reports 1 week history of post-op delirium 7/2018 - Monitor electrolytes, UA, and urine cx - Delirium precautions  - Can use Seroquel 25 mg QHS prn     Hypertension     Hyponatremia 10/23/2017    Impaired mobility and ADLs 6/28/2018    Neurogenic bladder 12/4/2018    Sleep disorder 12/4/2018    - continue Trazodone QHS    Thyroid disease     UTI (urinary tract infection)      Family History   Problem Relation Age of Onset     Hypertension Mother     Diabetes Father     Esophageal cancer Father      Past Surgical History:   Procedure Laterality Date    BIOPSY OF BLADDER  9/1/2020    Procedure: BIOPSY, BLADDER;  Surgeon: Daron Manjarrez MD;  Location: [facility] OR [address] [place] [address];  Service: Urology;;    BLADDER FULGURATION  9/1/2020    Procedure: FULGURATION, BLADDER;  Surgeon: Daron Manjarrez MD;  Location: [facility] OR [address] [place] [address];  Service: Urology;;    CHOLECYSTECTOMY      CYSTOGRAM  9/1/2020    Procedure: CYSTOGRAM;  Surgeon: Daron Manjarrez MD;  Location: [facility] OR [address] [place] [address];  Service: Urology;;    CYSTOSCOPY N/A 9/1/2020    Procedure: CYSTOSCOPY;  Surgeon: Draon Manjarrez MD;  Location: [facility] OR [address] [place] [address];  Service: Urology;  Laterality: N/A;    DILATION OF URETHRA  9/1/2020    Procedure: DILATION, URETHRA;  Surgeon: Daron Manjarrez MD;  Location: [facility] OR [address] [place] [address];  Service: Urology;;    EYE SURGERY Right     IOL    HERNIA REPAIR      INTRAOCULAR PROSTHESES INSERTION Left 5/28/2018    Procedure: INSERTION-INTRAOCULAR LENS (IOL);  Surgeon: Trinity Vazquez MD;  Location: [facility];  Service: Ophthalmology;  Laterality: Left;    JOINT REPLACEMENT      LAMINECTOMY  12/27/2016    L2-L4    LUMBAR LAMINECTOMY  12/2016    PARATHYROIDECTOMY      PHACOEMULSIFICATION OF CATARACT Left 5/28/2018    Procedure: PHACOEMULSIFICATION-ASPIRATION-CATARACT;  Surgeon: Trinity Vazquez MD;  Location: [facility];  Service: Ophthalmology;  Laterality: Left;    REMOVAL OF BLOOD CLOT  9/1/2020    Procedure: REMOVAL, BLOOD CLOT;  Surgeon: Daron Manjarrez MD;  Location: [address];  Service: Urology;;    REPAIR OF INCARCERATED INCISIONAL HERNIA WITHOUT HISTORY OF PRIOR REPAIR N/A 12/5/2018    Procedure: REPAIR, HERNIA, INCISIONAL, INCARCERATED, WITHOUT HISTORY OF PRIOR REPAIR;  Surgeon: Steve Valentin MD;  Location: [facility] OR [address] [place] [address];  Service: General;  Laterality: N/A;    REPAIR OF INCARCERATED VENTRAL HERNIA WITHOUT HISTORY OF PRIOR REPAIR N/A 6/20/2018     Procedure: REPAIR, HERNIA, VENTRAL, INCARCERATED, WITHOUT HISTORY OF PRIOR REPAIR;  Surgeon: Guilherme Ordoñez MD;  Location: [facility] OR [address] [place] [address];  Service: General;  Laterality: N/A;    TOTAL KNEE ARTHROPLASTY Bilateral     TOTAL KNEE ARTHROPLASTY Left 10/25/2017    TKR     Review of patient's allergies indicates:   Allergen Reactions    Thiazides Other (See Comments)     Multiple episodes of thiazide-induced hyponatremia       Medications:    Current Outpatient Medications:     amLODIPine (NORVASC) 5 MG tablet, TAKE 1 TABLET BY MOUTH EVERY DAY, Disp: 90 tablet, Rfl: 11    ammonium lactate (LAC-HYDRIN) 12 % lotion, APPLY TO LOWER EXTREMITIES TWICE DAILY AS NEEDED FOR DRY SKIN, Disp: 225 g, Rfl: 4    aspirin (ECOTRIN) 81 MG EC tablet, TAKE 1 TABLET BY MOUTH EVERY DAY, Disp: 90 tablet, Rfl: 11    atorvastatin (LIPITOR) 20 MG tablet, TAKE 1 TABLET BY MOUTH EVERY DAY, Disp: 90 tablet, Rfl: 1    benzonatate (TESSALON) 100 MG capsule, Take 1 capsule (100 mg total) by mouth 3 (three) times daily as needed for Cough., Disp: 30 capsule, Rfl: 1    cetirizine (ZYRTEC) 10 MG tablet, Take 10 mg by mouth daily as needed for Allergies., Disp: , Rfl:     clotrimazole (LOTRIMIN) 1 % cream, Apply topically 2 (two) times daily., Disp: 60 g, Rfl: 0    furosemide (LASIX) 20 MG tablet, Take 20 mg by mouth. Monday, wed, friday, Disp: , Rfl:     gabapentin (NEURONTIN) 400 MG capsule, TAKE 2 CAPSULES BY MOUTH TWICE DAILY, Disp: 120 capsule, Rfl: 11    levothyroxine (SYNTHROID) 50 MCG tablet, TAKE 1 TABLET BY MOUTH EVERY DAY, Disp: 90 tablet, Rfl: 11    metoprolol succinate (TOPROL-XL) 25 MG 24 hr tablet, TAKE 1 TABLET BY MOUTH EVERY DAY, Disp: 90 tablet, Rfl: 0    mirtazapine (REMERON) 15 MG tablet, TAKE 1 TABLET BY MOUTH EVERY EVENING, Disp: 30 tablet, Rfl: 2    nystatin (MYCOSTATIN) powder, APPLY TOPICALLY FOUR TIMES DAILY, Disp: 60 g, Rfl: 3    omeprazole (PRILOSEC) 40 MG capsule, TAKE 1 CAPSULE BY MOUTH EVERY MORNING,  Disp: 90 capsule, Rfl: 2    oxybutynin (DITROPAN) 5 MG Tab, Take 1 tablet (5 mg total) by mouth 3 (three) times daily as needed (bladder spasms)., Disp: 21 tablet, Rfl: 0    potassium chloride (KLOR-CON) 10 MEQ TbSR, Take 10 mEq by mouth. Mon, Wed, Friday, Disp: , Rfl:     traZODone (DESYREL) 50 MG tablet, TAKE 1 TABLET BY MOUTH NIGHTLY, Disp: 90 tablet, Rfl: 1    24h Oral Morphine Equivalents (OME):  n/a    Objective:     Physical Exam:    There is no height or weight on file to calculate BMI.    Physical Exam  Vitals and nursing note reviewed. Exam conducted with a chaperone present.   Constitutional:       Appearance: Normal appearance. He is obese.   HENT:      Head: Normocephalic and atraumatic.      Nose: Rhinorrhea present.      Mouth/Throat:      Mouth: Mucous membranes are moist.      Pharynx: Oropharynx is clear.   Eyes:      Extraocular Movements: Extraocular movements intact.      Conjunctiva/sclera: Conjunctivae normal.   Cardiovascular:      Rate and Rhythm: Normal rate and regular rhythm.      Pulses: Normal pulses.      Heart sounds: Murmur (grade III/VI) heard.   Pulmonary:      Effort: Pulmonary effort is normal.      Breath sounds: Normal breath sounds.      Comments: BBS CTA  Abdominal:      General: Bowel sounds are normal.      Palpations: Abdomen is soft.   Genitourinary:     Comments: Supra pubic catheter intact   Musculoskeletal:         General: Swelling present. Normal range of motion.      Cervical back: Normal range of motion and neck supple. No rigidity.      Right lower leg: Edema (1-2+ edema to BLE) present.      Left lower leg: Edema (1-2+ edema to BLE) present.   Skin:     General: Skin is warm and dry.      Capillary Refill: Capillary refill takes 2 to 3 seconds.      Coloration: Skin is pale.      Findings: No rash.      Comments: RLE is wrapped with Coban   Neurological:      Mental Status: He is alert and oriented to person, place, and time.      Motor: Weakness present.       Gait: Gait abnormal.      Comments: AA&Ox 2-3; forgetful   Psychiatric:         Mood and Affect: Mood normal.         Behavior: Behavior normal.         Thought Content: Thought content normal.         Judgment: Judgment normal.         Review of Symptoms    Symptom Assessment (ESAS 0-10 Scale)  Pain:  0  Dyspnea:  3  Anxiety:  0  Nausea:  0  Depression:  3  Anorexia:  0  Fatigue:  2  Insomnia:  0  Restlessness:  6  Agitation:  0     CAM / Delirium:  Negative  Constipation:  Positive  Diarrhea:  Negative (occasionally)    Bowel Management Plan (BMP):  Yes      Comments:  LBM 6/14 normal    Pain Assessment:  Location(s): none      Modified Nicol Scale:  1    Living Arrangements:  Lives with friend    Spiritual:  F - Jillian and Belief:  Muslim extensentiallist  I - Importance:  Highly  C - Community:  No Holiness  A - Address in Care:  No spiritual needs identified     Time-Based Charting:  No        Advance Care Planning   Advance Directives:   Living Will: Yes        Copy on chart: No        Oral Declaration: No    LaPOST: No (left form at house)    Do Not Resuscitate Status: No    Medical Power of : Yes        Oral Declaration: No    Agent's Name:  Carolina Pak (friend)   Agent's Contact Number:  [phone]    Decision Making:  Patient answered questions         Labs:  CBC:   WBC   Date Value Ref Range Status   09/18/2020 5.82 3.90 - 12.70 K/uL Final     Albumin   Date Value Ref Range Status   09/13/2020 3.8 3.5 - 5.2 g/dL Final     Protein:   Total Protein   Date Value Ref Range Status   09/13/2020 7.1 6.0 - 8.4 g/dL Final       Radiology:  I have reviewed all pertinent imaging results/findings within the past 24 hours.      Assessment:     1. Palliative care encounter    2. S/P lumbar laminectomy    3. Impaired mobility and ADLs    4. SOB (shortness of breath) on exertion    5. Goals of care, counseling/discussion    6. Chronic low back pain without sciatica, unspecified back pain laterality    7. Ankle  "ulcer, right, with unspecified severity    8. Suprapubic catheter        Plan:       Assessment and Plan:    Palliative care encounter  -palliative care referral placed  -initiate palliative care services today  -Reviewed LaPOST Form & left form at patient's home   -LaPOST Form completed on   -CODE STATUS>>>Full Code Status  -Discussed Palliative care and Hospice  -Patient/family want to continue with Palliative care services  -1/4 Reviewed LaPOST form and left for patient to complete  -6/14 Reviewed LaPOST; continue palliative care    Counseling regarding advance care planning and goals of care  1/4 Discussed goals of care with Dr. Prado and he would like "a new body for his head." would like to be pain free and comfortable  -6/14 Discussed goals of care with Dr. Roche and he would like to get stronger and would like wound to heal      Were controlled substances prescribed?  No    Total clinical care time was 60min. Reviewed Chart and PCP's notes thenThe following issues were discussed: diet, medications, pain issues, s/p lumbar laminectomy, impaired mobility and ADLs, SOB on exertion, chronic low back pain without sciatic, right ankle ulcer, suprapubic catheter, and goals of care discussion      Follow Up Appointments:   Future Appointments   Date Time Provider Department Center   7/19/2022  8:00 AM Sybil Adams NP [address]       Patient and family agreed via verbal consent to access medical records and for assessment and treatment during this visit.    Attestation: Screening criteria to assess the level of the patient's risk for infection with COVID-19 as recommended by the CDC at the time of the above documented home visit concluded appropriateness to proceed.     Universal precautions were maintained at all times, including provider use of >60% alcohol gel hand  immediately prior to entry and upon departing the patient's home as well as cleaning of equipment used in home visit with " antibacterial/germicidal disposable wipes.    Patient has not been exposed to anyone with the virus (to the patient's knowledge)  Patient has not been out of the country in the last 14 days.    NP Nurse wore face mask entire length of visit    Sybil Adams DNP, FNP-C  [email or web address]  [phone]

## 2022-06-17 ENCOUNTER — TELEPHONE (OUTPATIENT)
Dept: HOME HEALTH SERVICES | Facility: CLINIC | Age: 87
End: 2022-06-17
Payer: MEDICARE

## 2022-06-17 RX ORDER — BENZONATATE 100 MG/1
100 CAPSULE ORAL 3 TIMES DAILY PRN
Qty: 30 CAPSULE | Refills: 1 | Status: SHIPPED | OUTPATIENT
Start: 2022-06-17

## 2022-06-23 ENCOUNTER — IMMUNIZATION (OUTPATIENT)
Dept: PRIMARY CARE CLINIC | Facility: CLINIC | Age: 87
End: 2022-06-23
Payer: MEDICARE

## 2022-06-23 DIAGNOSIS — Z23 NEED FOR VACCINATION: Primary | ICD-10-CM

## 2022-06-23 PROCEDURE — 91306 COVID-19, MRNA, LNP-S, PF, 100 MCG/0.25 ML DOSE VACCINE (MODERNA BOOSTER): CPT | Mod: PBBFAC | Performed by: INTERNAL MEDICINE

## 2022-06-23 PROCEDURE — 0064A COVID-19, MRNA, LNP-S, PF, 100 MCG/0.25 ML DOSE VACCINE (MODERNA BOOSTER): CPT | Mod: PBBFAC | Performed by: INTERNAL MEDICINE

## 2022-06-24 VITALS
DIASTOLIC BLOOD PRESSURE: 80 MMHG | OXYGEN SATURATION: 93 % | TEMPERATURE: 98 F | SYSTOLIC BLOOD PRESSURE: 150 MMHG | HEART RATE: 60 BPM | RESPIRATION RATE: 18 BRPM

## 2022-06-26 ENCOUNTER — TELEPHONE (OUTPATIENT)
Dept: HOME HEALTH SERVICES | Facility: CLINIC | Age: 87
End: 2022-06-26
Payer: MEDICARE

## 2022-06-26 NOTE — TELEPHONE ENCOUNTER
"6/15/2022    Received call from patient's sitter Tawana stating pt was having "bad congestion and allergies."    Called sitter back and she reports pt is sneezing, has np cough and nasal stuffiness. Denies fever, chills, sore throat.    Recommend to sitter to get home Covid test and test him for covid.    Recommend Claritin or Zyrtec daily, Flonase,     She requested Tessalon Perles for cough. >>sent to pharmacy of choice Rx Tessalon perles.     If symptoms worsen, go to ED or call 911.    Sybil Adams, SOPHIE, FNP-C  [facility]  [phone]    "

## 2022-06-30 ENCOUNTER — EXTERNAL CHRONIC CARE MANAGEMENT (OUTPATIENT)
Dept: PRIMARY CARE CLINIC | Facility: CLINIC | Age: 87
End: 2022-06-30
Payer: MEDICARE

## 2022-06-30 PROCEDURE — 99490 CHRNC CARE MGMT STAFF 1ST 20: CPT | Mod: PBBFAC | Performed by: INTERNAL MEDICINE

## 2022-06-30 PROCEDURE — 99439 CHRNC CARE MGMT STAF EA ADDL: CPT | Mod: PBBFAC | Performed by: INTERNAL MEDICINE

## 2022-06-30 PROCEDURE — 99439 PR CHRONIC CARE MGMT, EA ADDTL 20 MIN: ICD-10-PCS | Mod: S$PBB,,, | Performed by: INTERNAL MEDICINE

## 2022-06-30 PROCEDURE — 99439 CHRNC CARE MGMT STAF EA ADDL: CPT | Mod: S$PBB,,, | Performed by: INTERNAL MEDICINE

## 2022-06-30 PROCEDURE — 99490 PR CHRONIC CARE MGMT, 1ST 20 MIN: ICD-10-PCS | Mod: S$PBB,,, | Performed by: INTERNAL MEDICINE

## 2022-06-30 PROCEDURE — 99490 CHRNC CARE MGMT STAFF 1ST 20: CPT | Mod: S$PBB,,, | Performed by: INTERNAL MEDICINE

## 2022-07-02 NOTE — TELEPHONE ENCOUNTER
6/17/2022    Received text from sitter requesting Tessalon Perles for cough. Pt has allergies. Takes Zyrtec    NP cough at present.     Rx sent to pharmacy of choice for Irwin Adams, SOPHIE, FNP-C  [email or web address]  [phone]

## 2022-07-18 NOTE — PROGRESS NOTES
"[facility]  Palliative Care Home Visit    Visit Date: 7/19/2022  Encounter Provider: Sybil Adams DNP, FNP-c  PCP:  Obed Mcguire MD    Subjective:      Patient ID: Chucho Prado is a 88 y.o. male.    Consult Requested By:  Obed Mcguire M.D.  Reason for Consult:  Palliative Care     Chief Complaint: Palliative Care Follow-up Visit    Outpatient Palliative Care Encounter:    Patient is being seen at home due to physical debility that presents a taxing effort to leave the home, to mitigate high risk of hospital readmission and/or due to the limited availability of reliable or safe options for transportation to the point of access to the provider. Prior to treatment on this visit the chart was reviewed and patient/family verbal consent was obtained.        PMHx:  Mr. 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 Hx of smoker, Quit 1/1/1990.      PSHx:  Past surgical history includes cystoscopy (9/1/2020), Removal of blood clot (9/1/2020), bladder fulguration, dilation of urethra, biopsy of bladder, cystogram, repair of incarcerated incisional hernia (12/5/2018), repair of incarcerated ventral hernia (6/20/2018), phacoemulsification of left cataract (5/28/2018), intraocular prostheses insertion left (5/28/2018), total knee arthroplasty on left (10/25/2017), laminectomy (12/27/2016) lumbar laminectomy (12/2016), cholecystectomy, right eye surgery, joint replacement, parathyroidectomy, bilateral " total knee arthroplasty.         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 Patient has no children. He was the only child in an intact family. Patient has lots of cousins. Mr. Prado did not serve in the [place] . 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 He is currently retired. He is from [place], [place].     He likes to read, watch television, and visit with friends. He also likes to travel when able.       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 He is sitting in his wheelchair in his study. He is AA&Ox2-3, forgetful at times. He has a private sitter Charles who is present. No c/o pain at present. No recent falls but he transfers mostly via wheelchair. Appetite is good and eating well. States his best meal of the day is breakfast. C/o pain to right ankle 4/10; and pain to lower back when getting in bed at night      Denies fever, chills; +occasional SOB. +cough is improving>>dry but hacking at times. +runny nose,   +allergies    +appetite is good     Sleeping well at night    wound to left leg>>has healed>>still wrapping to protect leg  +wound to right leg>>nurse comes from [facility]>>>nurse coming out on Wednesday and . Eleni comes once a month to change the suprapubic catheter. Having pain to site at times        Received photo on  from pato; this is Right LE      Note:  States he is currently reading a manuscript for a friend    Thinking about going to [employer] for His BD in September>>expresses several of his friends have  so many won't be going.     Goals of Care:    I the prcess of advance care planning today and explained the importance of this process to the patient and family.  I introduced the concept of advance directives to the patient, as well. Then the patient received detailed information about the importance of  designating a Health Care Power of  (HCPOA). He was also instructed to communicate with this person about his wishes for future healthcare, should he become sick and lose decision-making capacity.    I Introduced LaPOST form with patient/family, explaining this is the patient's wishes, and this form will be uploaded into the patient's [facility] and the [place] [facility].     7/19 Discussed goals of care and Mr. Roche would like to get stronger and would like his wound to heal      PLAN:  1. Follow-up with Palliative Care NP in 4-6 weeks on 8/4/2022@home visit  2. Continue all medications  3. F/u with PCP as needed  4. In Emergency, call 911 or go to ED; notify PCP or Palliative Care NP  5. Complete LaPOST form left at house  6. Elevate BLE to reduce edema  7. Continue wound care via  nurse  8. Recommend Claritin/zyrtec for allergies as needed       Review of Systems   Constitutional: Positive for activity change (wheelchair bound), appetite change (fluctuates some) and fatigue. Negative for chills and fever.   HENT:. Negative for congestion, postnasal drip, sore throat.  +runny nose  Eyes: Positive for visual disturbance (reading glasses).   Respiratory: Positive for np cough and shortness of breath (getting in bed).    Cardiovascular: Positive for leg swelling. Negative for chest pain and palpitations.   Gastrointestinal: Negative for abdominal distention and abdominal pain.   Genitourinary:   +suprapubic catheter >>pain at site  Musculoskeletal: Positive for arthralgias, back pain (lower back), gait problem, myalgias, neck pain and neck stiffness. Wheelchair bound   Skin: Positive for wound (on right ankle). Negative for rash.   Allergic/Immunologic: Positive for environmental allergies.   Neurological: Positive for weakness. Negative for dizziness, tremors, seizures, light-headedness and numbness.   Psychiatric/Behavioral: Negative for agitation, confusion, hallucinations and sleep disturbance.  The patient is not nervous/anxious.        Assessments:  · Environmental: two story house, cluttered, good lighting  · Functional Status: needs assistance dressing, bathing; feeds self  · Safety: Fall and Covid 19 Precautions   · Nutritional: 2-3 meals a day; snacks during the day; doesn't drink ensure; BF is best meal of the day  · Home Health/DME/Supplies: metal walker, wheelchair, hospital bed, cane, bedside commode, toilet extender  · Home Health company is [place]>>order placed for PT    History:  Past Medical History:   Diagnosis Date    Acquired hypothyroidism 7/30/2013    Arthritis     Blood transfusion     before 2005 - whe had gangrenous gall bladder    Cataract     Chronic back pain 12/4/2018    - Takes Percocet 5-325 at home - Can continue current abdominal pain control with Dilaudid 0.5 mg IV Q3H prn     CKD (chronic kidney disease) stage 3, GFR 30-59 ml/min     Compression fracture of lumbar vertebra     Depression     Dyslipidemia     Essential hypertension 7/30/2013    Gastroesophageal reflux disease without esophagitis 12/4/2018    - continue home Prilosec    General anesthetics causing adverse effect in therapeutic use     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    GERD (gastroesophageal reflux disease)     History of postoperative delirium 12/4/2018    - Patient reports 1 week history of post-op delirium 7/2018 - Monitor electrolytes, UA, and urine cx - Delirium precautions  - Can use Seroquel 25 mg QHS prn     Hypertension     Hyponatremia 10/23/2017    Impaired mobility and ADLs 6/28/2018    Neurogenic bladder 12/4/2018    Sleep disorder 12/4/2018    - continue Trazodone QHS    Thyroid disease     UTI (urinary tract infection)      Family History   Problem Relation Age of Onset    Hypertension Mother     Diabetes Father     Esophageal cancer Father      Past Surgical History:   Procedure Laterality Date    BIOPSY OF BLADDER  9/1/2020    Procedure: BIOPSY,  BLADDER;  Surgeon: Daron Manjarrez MD;  [facility];  Service: Urology;;    BLADDER FULGURATION  9/1/2020    Procedure: FULGURATION, BLADDER;  Surgeon: Daron Manjarrez MD;  Location: [facility] OR 1ST FLR;  Service: Urology;;    CHOLECYSTECTOMY      CYSTOGRAM  9/1/2020    Procedure: CYSTOGRAM;  Surgeon: Daron Manjarrez MD;  [facility];  Service: Urology;;    CYSTOSCOPY N/A 9/1/2020    Procedure: CYSTOSCOPY;  Surgeon: Daron Manjarrez MD;  [facility];  Service: Urology;  Laterality: N/A;    DILATION OF URETHRA  9/1/2020    Procedure: DILATION, URETHRA;  Surgeon: Daron Manjarrez MD;  Location: [facility] OR [address] [place] [address];  Service: Urology;;    EYE SURGERY Right     IOL    HERNIA REPAIR      INTRAOCULAR PROSTHESES INSERTION Left 5/28/2018    Procedure: INSERTION-INTRAOCULAR LENS (IOL);  Surgeon: Trinity Vazquez MD;  Location: [facility];  Service: Ophthalmology;  Laterality: Left;    JOINT REPLACEMENT      LAMINECTOMY  12/27/2016    L2-L4    LUMBAR LAMINECTOMY  12/2016    PARATHYROIDECTOMY      PHACOEMULSIFICATION OF CATARACT Left 5/28/2018    Procedure: PHACOEMULSIFICATION-ASPIRATION-CATARACT;  Surgeon: Trinity Vazquez MD;  Location: [facility];  Service: Ophthalmology;  Laterality: Left;    REMOVAL OF BLOOD CLOT  9/1/2020    Procedure: REMOVAL, BLOOD CLOT;  Surgeon: Daron Manjarrez MD;  Location: [facility] OR [address] [place] [address];  Service: Urology;;    REPAIR OF INCARCERATED INCISIONAL HERNIA WITHOUT HISTORY OF PRIOR REPAIR N/A 12/5/2018    Procedure: REPAIR, HERNIA, INCISIONAL, INCARCERATED, WITHOUT HISTORY OF PRIOR REPAIR;  Surgeon: Steve Valentin MD;  Location: [facility] OR 2ND FLR;  Service: General;  Laterality: N/A;    REPAIR OF INCARCERATED VENTRAL HERNIA WITHOUT HISTORY OF PRIOR REPAIR N/A 6/20/2018    Procedure: REPAIR, HERNIA, VENTRAL, INCARCERATED, WITHOUT HISTORY OF PRIOR REPAIR;  Surgeon: Guilherme Ordoñez MD;  Location: [facility] OR 2ND FLR;  Service: General;  Laterality:  N/A;    TOTAL KNEE ARTHROPLASTY Bilateral     TOTAL KNEE ARTHROPLASTY Left 10/25/2017    TKR     Review of patient's allergies indicates:   Allergen Reactions    Thiazides Other (See Comments)     Multiple episodes of thiazide-induced hyponatremia       Medications:    Current Outpatient Medications:     amLODIPine (NORVASC) 5 MG tablet, TAKE 1 TABLET BY MOUTH EVERY DAY, Disp: 90 tablet, Rfl: 11    ammonium lactate (LAC-HYDRIN) 12 % lotion, APPLY TO LOWER EXTREMITIES TWICE DAILY AS NEEDED FOR DRY SKIN, Disp: 225 g, Rfl: 4    aspirin (ECOTRIN) 81 MG EC tablet, TAKE 1 TABLET BY MOUTH EVERY DAY, Disp: 90 tablet, Rfl: 11    atorvastatin (LIPITOR) 20 MG tablet, TAKE 1 TABLET BY MOUTH EVERY DAY, Disp: 90 tablet, Rfl: 1    benzonatate (TESSALON) 100 MG capsule, Take 1 capsule (100 mg total) by mouth 3 (three) times daily as needed for Cough., Disp: 30 capsule, Rfl: 1    cetirizine (ZYRTEC) 10 MG tablet, Take 10 mg by mouth daily as needed for Allergies., Disp: , Rfl:     clotrimazole (LOTRIMIN) 1 % cream, Apply topically 2 (two) times daily., Disp: 60 g, Rfl: 0    diclofenac sodium (VOLTAREN) 1 % Gel, Apply 2 g topically 3 (three) times daily. Apply to bilateral knees 3 times a day, Disp: 1 each, Rfl: 2    furosemide (LASIX) 20 MG tablet, Take 20 mg by mouth. Monday, wed, friday, Disp: , Rfl:     gabapentin (NEURONTIN) 400 MG capsule, TAKE 2 CAPSULES BY MOUTH TWICE DAILY, Disp: 120 capsule, Rfl: 11    levothyroxine (SYNTHROID) 50 MCG tablet, TAKE 1 TABLET BY MOUTH EVERY DAY, Disp: 90 tablet, Rfl: 11    metoprolol succinate (TOPROL-XL) 25 MG 24 hr tablet, TAKE 1 TABLET BY MOUTH EVERY DAY, Disp: 90 tablet, Rfl: 0    mirtazapine (REMERON) 15 MG tablet, TAKE 1 TABLET BY MOUTH EVERY EVENING, Disp: 30 tablet, Rfl: 2    nystatin (MYCOSTATIN) powder, APPLY TOPICALLY FOUR TIMES DAILY, Disp: 60 g, Rfl: 3    omeprazole (PRILOSEC) 40 MG capsule, TAKE 1 CAPSULE BY MOUTH EVERY MORNING, Disp: 90 capsule, Rfl: 2     oxybutynin (DITROPAN) 5 MG Tab, Take 1 tablet (5 mg total) by mouth 3 (three) times daily as needed (bladder spasms)., Disp: 21 tablet, Rfl: 0    potassium chloride (KLOR-CON) 10 MEQ TbSR, Take 10 mEq by mouth. Mon, Wed, Friday, Disp: , Rfl:     traZODone (DESYREL) 50 MG tablet, TAKE 1 TABLET BY MOUTH NIGHTLY, Disp: 90 tablet, Rfl: 1    24h Oral Morphine Equivalents (OME):  n/a    Objective:     Physical Exam:    There is no height or weight on file to calculate BMI.    Physical Exam  Vitals and nursing note reviewed. Exam conducted with sitter present  Constitutional:       Appearance: Normal appearance. He is obese. Siting in wheelchair  HENT:      Head: Normocephalic and atraumatic.      Nose: Rhinorrhea present.      Mouth/Throat:      Mouth: Mucous membranes are moist.   Eyes:      Extraocular Movements: Extraocular movements intact.      Conjunctiva/sclera: Conjunctivae normal.   Cardiovascular:      Rate and Rhythm: Normal rate and regular rhythm.      Pulses: Normal pulses.      Heart sounds: Murmur (grade III/VI) heard.   Pulmonary:      Effort: Pulmonary effort is normal.      Breath sounds: Normal breath sounds.      Comments: BBS CTA; SOB with exertion  Abdominal:      General: Bowel sounds are normal.      Palpations: Abdomen is soft.   Genitourinary:     Comments: Supra pubic catheter intact   Musculoskeletal:         General: Swelling present. Normal range of motion.      Cervical back: Normal range of motion and neck supple. No rigidity.      Right lower leg: Edema (1+ edema to BLE) present.      Left lower leg: Edema (1+ edema to BLE) present.   Skin:     General: Skin is warm and dry.      Capillary Refill: Capillary refill takes 2 to 3 seconds.      Coloration: Skin is pale.      Findings: No rash.      Comments: RLE is wrapped with Coban; left lower extremity is wrapped with coban  Neurological:      Mental Status: He is alert and oriented to person, place, and time.      Motor: Weakness  present.      Gait: Gait abnormal.      Comments: AA&Ox 2-3; forgetful   Psychiatric:         Mood and Affect: Mood normal.         Behavior: Behavior normal.         Thought Content: Thought content normal.         Judgment: Judgment normal.         Review of Symptoms    Symptom Assessment (ESAS 0-10 Scale)  Pain:  4  Dyspnea:  3  Anxiety:  0  Nausea:  0  Depression:  3  Anorexia:  0  Fatigue:  2  Insomnia:  0  Restlessness:  6  Agitation:  0     CAM / Delirium:  Negative  Constipation:  Positive  Diarrhea:  Negative (occasionally)    Bowel Management Plan (BMP):  Yes      Comments:  LBM 7/18 normal    Pain Assessment:  Location(s): none      Modified Nicol Scale:  1    Living Arrangements:  Lives with friend    Spiritual:  F - Jillian and Belief:  Voodoo extensentiallist  I - Importance:  Highly  C - Community:  No Caodaism  A - Address in Care:  No spiritual needs identified     Time-Based Charting:  No        Advance Care Planning   Advance Directives:   Living Will: Yes        Copy on chart: No        Oral Declaration: No    LaPOST: No (left form at house)    Do Not Resuscitate Status: No    Medical Power of : Yes        Oral Declaration: No    Agent's Name:  Carolina Pak (friend)   Agent's Contact Number:  [phone]    Decision Making:  Patient answered questions         Labs:  CBC:   WBC   Date Value Ref Range Status   09/18/2020 5.82 3.90 - 12.70 K/uL Final     Albumin   Date Value Ref Range Status   09/13/2020 3.8 3.5 - 5.2 g/dL Final     Protein:   Total Protein   Date Value Ref Range Status   09/13/2020 7.1 6.0 - 8.4 g/dL Final       Radiology:  I have reviewed all pertinent imaging results/findings within the past 24 hours.      Assessment:     1. Palliative care encounter    2. Impaired mobility and ADLs    3. Chronic low back pain without sciatica, unspecified back pain laterality    4. Suprapubic catheter    5. Ankle ulcer, right, with unspecified severity    6. Bilateral lower extremity  "edema    7. Goals of care, counseling/discussion    8. Wheelchair bound        Plan:       Assessment and Plan:    Palliative care encounter  -palliative care referral placed  -initiate palliative care services today  -Reviewed LaPOST Form & left form at patient's home   -LaPOST Form completed on   -CODE STATUS>>>Full Code Status  -Discussed Palliative care and Hospice  -Patient/family want to continue with Palliative care services  -1/4 Reviewed LaPOST form and left for patient to complete  -6/14 Reviewed LaPOST; continue palliative care    Counseling regarding advance care planning and goals of care  1/4 Discussed goals of care with Dr. Prado and he would like "a new body for his head." would like to be pain free and comfortable  -6/14 Discussed goals of care with Dr. Roche and he would like to get stronger and would like wound to heal  -7/19 Discussed goals of care and he would like to be pain free      Were controlled substances prescribed?  No    Total clinical care time was 60min. Reviewed Chart and PCP's notes thenThe following issues were discussed: diet, medications, pain issues, impaired mobility,  and ADLs, SOB on exertion, chronic low back pain without sciatic, right ankle ulcer, suprapubic catheter, bilateral lower extremity edema, wheelchair bound, and goals of care discussion      Follow Up Appointments:   F/u with Palliative care NP on 8/4/2022@home visit    Patient and family agreed via verbal consent to access medical records and for assessment and treatment during this visit.    Attestation: Screening criteria to assess the level of the patient's risk for infection with COVID-19 as recommended by the CDC at the time of the above documented home visit concluded appropriateness to proceed.     Universal precautions were maintained at all times, including provider use of >60% alcohol gel hand  immediately prior to entry and upon departing the patient's home as well as cleaning of " equipment used in home visit with antibacterial/germicidal disposable wipes.    Patient has not been exposed to anyone with the virus (to the patient's knowledge)  Patient has not been out of the country in the last 14 days.    NP Nurse wore face mask entire length of visit    Sybil Adams DNP, FNP-C  [facility]  [phone]

## 2022-07-19 ENCOUNTER — CARE AT HOME (OUTPATIENT)
Dept: HOME HEALTH SERVICES | Facility: CLINIC | Age: 87
End: 2022-07-19
Payer: MEDICARE

## 2022-07-19 DIAGNOSIS — Z93.59 SUPRAPUBIC CATHETER: ICD-10-CM

## 2022-07-19 DIAGNOSIS — L97.319 ANKLE ULCER, RIGHT, WITH UNSPECIFIED SEVERITY: ICD-10-CM

## 2022-07-19 DIAGNOSIS — M54.50 CHRONIC LOW BACK PAIN WITHOUT SCIATICA, UNSPECIFIED BACK PAIN LATERALITY: ICD-10-CM

## 2022-07-19 DIAGNOSIS — Z78.9 IMPAIRED MOBILITY AND ADLS: ICD-10-CM

## 2022-07-19 DIAGNOSIS — Z99.3 WHEELCHAIR BOUND: ICD-10-CM

## 2022-07-19 DIAGNOSIS — Z71.89 GOALS OF CARE, COUNSELING/DISCUSSION: ICD-10-CM

## 2022-07-19 DIAGNOSIS — Z51.5 PALLIATIVE CARE ENCOUNTER: Primary | ICD-10-CM

## 2022-07-19 DIAGNOSIS — R60.0 BILATERAL LOWER EXTREMITY EDEMA: ICD-10-CM

## 2022-07-19 DIAGNOSIS — Z74.09 IMPAIRED MOBILITY AND ADLS: ICD-10-CM

## 2022-07-19 DIAGNOSIS — G89.29 CHRONIC LOW BACK PAIN WITHOUT SCIATICA, UNSPECIFIED BACK PAIN LATERALITY: ICD-10-CM

## 2022-07-19 PROCEDURE — 99350 HOME/RES VST EST HIGH MDM 60: CPT | Mod: S$GLB,,, | Performed by: NURSE PRACTITIONER

## 2022-07-19 PROCEDURE — 99350 PR HOME VISIT,ESTAB PATIENT,LEVEL IV: ICD-10-PCS | Mod: S$GLB,,, | Performed by: NURSE PRACTITIONER

## 2022-07-19 NOTE — PATIENT INSTRUCTIONS
FOLLOW-UP INSTRUCTIONS:    Follow-up with palliative care NP in 4-6 weeks on 8/4/2022@home visit  Continue all medications, treatments, and therapies as ordered  Fall precautions at all times  Maintain Safety precautions at all times  Attend all medical appointments as scheduled  F/u with PCP as needed  Patient to have 6 feet between each other  In an Emergency, call 911 or go to ED; notify PCP's office  9. Limit Risks of environmental exposure to coronavirus as discussed including: social distancing, hand hygiene, and limiting departures from the home for necessities only.   10. Patient not to be left alone  11. Rx sent to pharmacy of choice(if Rx was ordered during visit)

## 2022-07-31 ENCOUNTER — EXTERNAL CHRONIC CARE MANAGEMENT (OUTPATIENT)
Dept: PRIMARY CARE CLINIC | Facility: CLINIC | Age: 87
End: 2022-07-31
Payer: MEDICARE

## 2022-07-31 PROCEDURE — 99490 PR CHRONIC CARE MGMT, 1ST 20 MIN: ICD-10-PCS | Mod: S$PBB,,, | Performed by: INTERNAL MEDICINE

## 2022-07-31 PROCEDURE — 99490 CHRNC CARE MGMT STAFF 1ST 20: CPT | Mod: S$PBB,,, | Performed by: INTERNAL MEDICINE

## 2022-07-31 PROCEDURE — 99490 CHRNC CARE MGMT STAFF 1ST 20: CPT | Mod: PBBFAC | Performed by: INTERNAL MEDICINE

## 2022-08-04 ENCOUNTER — CARE AT HOME (OUTPATIENT)
Dept: HOME HEALTH SERVICES | Facility: CLINIC | Age: 87
End: 2022-08-04
Payer: MEDICARE

## 2022-08-04 DIAGNOSIS — Z74.09 IMPAIRED MOBILITY AND ADLS: ICD-10-CM

## 2022-08-04 DIAGNOSIS — Z51.5 PALLIATIVE CARE ENCOUNTER: Primary | ICD-10-CM

## 2022-08-04 DIAGNOSIS — Z71.89 GOALS OF CARE, COUNSELING/DISCUSSION: ICD-10-CM

## 2022-08-04 DIAGNOSIS — L97.319 ANKLE ULCER, RIGHT, WITH UNSPECIFIED SEVERITY: ICD-10-CM

## 2022-08-04 DIAGNOSIS — R09.89 CHEST CONGESTION: ICD-10-CM

## 2022-08-04 DIAGNOSIS — Z93.59 SUPRAPUBIC CATHETER: ICD-10-CM

## 2022-08-04 DIAGNOSIS — Z78.9 IMPAIRED MOBILITY AND ADLS: ICD-10-CM

## 2022-08-04 PROCEDURE — 99497 PR ADVNCD CARE PLAN 30 MIN: ICD-10-PCS | Mod: S$GLB,,, | Performed by: NURSE PRACTITIONER

## 2022-08-04 PROCEDURE — 99350 PR HOME VISIT,ESTAB PATIENT,LEVEL IV: ICD-10-PCS | Mod: S$GLB,,, | Performed by: NURSE PRACTITIONER

## 2022-08-04 PROCEDURE — 99350 HOME/RES VST EST HIGH MDM 60: CPT | Mod: S$GLB,,, | Performed by: NURSE PRACTITIONER

## 2022-08-04 PROCEDURE — 99497 ADVNCD CARE PLAN 30 MIN: CPT | Mod: S$GLB,,, | Performed by: NURSE PRACTITIONER

## 2022-08-04 RX ORDER — DICLOFENAC SODIUM 10 MG/G
2 GEL TOPICAL 3 TIMES DAILY
Qty: 1 EACH | Refills: 2 | Status: ON HOLD | OUTPATIENT
Start: 2022-08-04 | End: 2022-10-13 | Stop reason: HOSPADM

## 2022-08-04 NOTE — PROGRESS NOTES
"[facility]  Palliative Care Home Visit    Visit Date: 8/4/2022  Encounter Provider: Sybil Adams DNP, FNP-c  PCP:  Obed Mcguire MD    Subjective:      Patient ID: Chucho Prado is a 89 y.o. male.    Consult Requested By:  Obed Mcguire M.D.  Reason for Consult:  Palliative Care     Chief Complaint: Palliative Care Follow-up Visit    Outpatient Palliative Care Encounter:    Patient is being seen at home due to physical debility that presents a taxing effort to leave the home, to mitigate high risk of hospital readmission and/or due to the limited availability of reliable or safe options for transportation to the point of access to the provider. Prior to treatment on this visit the chart was reviewed and patient/family verbal consent was obtained.        PMHx:  Mr. 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 Hx of smoker, Quit 1/1/1990.      PSHx:  Past surgical history includes cystoscopy (9/1/2020), Removal of blood clot (9/1/2020), bladder fulguration, dilation of urethra, biopsy of bladder, cystogram, repair of incarcerated incisional hernia (12/5/2018), repair of incarcerated ventral hernia (6/20/2018), phacoemulsification of left cataract (5/28/2018), intraocular prostheses insertion left (5/28/2018), total knee arthroplasty on left (10/25/2017), laminectomy (12/27/2016) lumbar laminectomy (12/2016), cholecystectomy, right eye surgery, joint replacement, parathyroidectomy, bilateral " total knee arthroplasty.         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 Patient has no children. He was the only child in an intact family. Patient has lots of cousins. Mr. Prado did not serve in the [place] . 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 He is currently retired. He is from [place], [place].     He likes to read, watch television, and visit with friends. He also likes to travel when able.       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 He is sitting in his wheelchair in his study. He is AA&Ox2-3, forgetful at times. He has a private sitter Keshon present. No c/o pain at present.    Reports last dose of Cipro is tonight    Chest congestion >>order CXR  PND x 4 days cleared up today. Had run out of zyrtec but started on zyrtec yesterday    No runny nose now but was before. Using flonase  Coughing up clear yellow mucus.  Denies fever, chills.     Felt really bad 2 days ago. covid test was done on Monday 8/1 was negative       Feeling better today    Appetite is good    Left leg healed   Right leg     Denies SOB; SOB with exertion  Ordered CXR                Goals of Care:    I the prcess of advance care planning today and explained the importance of this process to the patient and family.  I introduced the concept of advance directives to the patient, as well. Then the patient received detailed information about the importance of designating a Health Care Power of  (HCPOA). He was also instructed to communicate with this person about his wishes for future healthcare, should he become sick and lose decision-making capacity.    I Introduced LaPOST form with patient/family, explaining this is the patient's wishes, and this form will be uploaded into the patient's [facility] and the [place] [facility].     8/4 Discussed goals of care and   Melchor would like to get stronger and would like his wound to heal and would like to feel better      PLAN:  1. Follow-up with Palliative Care NP in 4-6 weeks @home visit  2. Continue all medications  3. F/u with PCP as needed  4. In Emergency, call 911 or go to ED; notify PCP or Palliative Care NP  5. Complete LaPOST form left at house  6. Referral for [facility] [employer] for Covid Booster to be given at home  7. Ordered CXR of chest r/o pneumonia  8. Elevate BLE to reduce edema  9. Wound care nurse to send pictures of wound to NP      Review of Systems   Constitutional: Positive for activity change (wheelchair bound), appetite change (fluctuates some) and fatigue. Negative for chills and fever.   HENT:. Negative for congestion, postnasal drip, rhinorrhea and sore throat.    Eyes: Positive for visual disturbance (reading glasses).   Respiratory: Positive for cough and shortness of breath (getting in bed).    Cardiovascular: Positive for leg swelling. Negative for chest pain and palpitations.   Gastrointestinal: Negative for abdominal distention and abdominal pain.   Genitourinary:        +suprapubic catheter   Musculoskeletal: Positive for arthralgias, back pain (lower back), gait problem, myalgias, neck pain and neck stiffness.        Wheelchair bound   Skin: Positive for wound (2 wounds on right ankle). Negative for rash.   Allergic/Immunologic: Positive for environmental allergies.   Neurological: Positive for weakness. Negative for dizziness, tremors, seizures, light-headedness and numbness.   Psychiatric/Behavioral: Negative for agitation, confusion, hallucinations and sleep disturbance. The patient is not nervous/anxious.        Assessments:  Environmental: two story house, cluttered, good lighting  Functional Status: needs assistance dressing, bathing; feeds self  Safety: Fall and Covid 19 Precautions   Nutritional: 2-3 meals a day; snacks during the day; doesn't drink ensure  Home Health/DME/Supplies: metal  walker, wheelchair, hospital bed, cane, bedside commode, toilet extender  Home Health company is [facility]>>order placed for PT    History:  Past Medical History:   Diagnosis Date    Acquired hypothyroidism 7/30/2013    Arthritis     Blood transfusion     before 2005 - whe had gangrenous gall bladder    Cataract     Chronic back pain 12/4/2018    - Takes Percocet 5-325 at home - Can continue current abdominal pain control with Dilaudid 0.5 mg IV Q3H prn     CKD (chronic kidney disease) stage 3, GFR 30-59 ml/min     Compression fracture of lumbar vertebra     Depression     Dyslipidemia     Essential hypertension 7/30/2013    Gastroesophageal reflux disease without esophagitis 12/4/2018    - continue home Prilosec    General anesthetics causing adverse effect in therapeutic use     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    GERD (gastroesophageal reflux disease)     History of postoperative delirium 12/4/2018    - Patient reports 1 week history of post-op delirium 7/2018 - Monitor electrolytes, UA, and urine cx - Delirium precautions  - Can use Seroquel 25 mg QHS prn     Hypertension     Hyponatremia 10/23/2017    Impaired mobility and ADLs 6/28/2018    Neurogenic bladder 12/4/2018    Sleep disorder 12/4/2018    - continue Trazodone QHS    Thyroid disease     UTI (urinary tract infection)      Family History   Problem Relation Age of Onset    Hypertension Mother     Diabetes Father     Esophageal cancer Father      Past Surgical History:   Procedure Laterality Date    BIOPSY OF BLADDER  9/1/2020    Procedure: BIOPSY, BLADDER;  Surgeon: Daron Manjarrez MD;  Location: [address];  Service: Urology;;    BLADDER FULGURATION  9/1/2020    Procedure: FULGURATION, BLADDER;  Surgeon: Daron Manjarrez MD;  Location: [facility] OR [address] [place] [address];  Service: Urology;;    CHOLECYSTECTOMY      CYSTOGRAM  9/1/2020    Procedure: CYSTOGRAM;  Surgeon: Daron Manjarrez MD;  Location: [facility] OR [address] [place] [address];  Service: Urology;;     CYSTOSCOPY N/A 9/1/2020    Procedure: CYSTOSCOPY;  Surgeon: Daron Manjarrez MD;  [facility];  Service: Urology;  Laterality: N/A;    DILATION OF URETHRA  9/1/2020    Procedure: DILATION, URETHRA;  Surgeon: Daron Manjarrez MD;  [facility];  Service: Urology;;    EYE SURGERY Right     IOL    HERNIA REPAIR      INTRAOCULAR PROSTHESES INSERTION Left 5/28/2018    Procedure: INSERTION-INTRAOCULAR LENS (IOL);  Surgeon: Trinity Vazquez MD;  Location: [facility];  Service: Ophthalmology;  Laterality: Left;    JOINT REPLACEMENT      LAMINECTOMY  12/27/2016    L2-L4    LUMBAR LAMINECTOMY  12/2016    PARATHYROIDECTOMY      PHACOEMULSIFICATION OF CATARACT Left 5/28/2018    Procedure: PHACOEMULSIFICATION-ASPIRATION-CATARACT;  Surgeon: Trinity Vazquez MD;  Location: [facility];  Service: Ophthalmology;  Laterality: Left;    REMOVAL OF BLOOD CLOT  9/1/2020    Procedure: REMOVAL, BLOOD CLOT;  Surgeon: Daron Manjarrez MD;  [facility];  Service: Urology;;    REPAIR OF INCARCERATED INCISIONAL HERNIA WITHOUT HISTORY OF PRIOR REPAIR N/A 12/5/2018    Procedure: REPAIR, HERNIA, INCISIONAL, INCARCERATED, WITHOUT HISTORY OF PRIOR REPAIR;  Surgeon: Steve Valentin MD;  Location: [facility] OR 2ND FLR;  Service: General;  Laterality: N/A;    REPAIR OF INCARCERATED VENTRAL HERNIA WITHOUT HISTORY OF PRIOR REPAIR N/A 6/20/2018    Procedure: REPAIR, HERNIA, VENTRAL, INCARCERATED, WITHOUT HISTORY OF PRIOR REPAIR;  Surgeon: Guilherme Ordoñez MD;  Location: [facility] OR [facility]ND F[facility];  Service: General;  Laterality: N/A;    TOTAL KNEE ARTHROPLASTY Bilateral     TOTAL KNEE ARTHROPLASTY Left 10/25/2017    TKR     Review of patient's allergies indicates:   Allergen Reactions    Thiazides Other (See Comments)     Multiple episodes of thiazide-induced hyponatremia       Medications:    Current Outpatient Medications:     albuterol (PROVENTIL/VENTOLIN HFA) 90 mcg/actuation inhaler, Inhale 1-2 puffs into the lungs every 6  (six) hours as needed for Wheezing. Rescue, Disp: 18 g, Rfl: 0    albuterol-ipratropium (DUO-NEB) 2.5 mg-0.5 mg/3 mL nebulizer solution, Take 3 mLs by nebulization every 6 (six) hours as needed for Wheezing. Rescue, Disp: 75 mL, Rfl: 0    ammonium lactate (LAC-HYDRIN) 12 % lotion, APPLY TO LOWER EXTREMITIES TWICE DAILY AS NEEDED FOR DRY SKIN, Disp: 225 g, Rfl: 4    aspirin (ECOTRIN) 81 MG EC tablet, TAKE 1 TABLET BY MOUTH EVERY DAY, Disp: 90 tablet, Rfl: 11    atorvastatin (LIPITOR) 20 MG tablet, TAKE 1 TABLET BY MOUTH EVERY DAY, Disp: 90 tablet, Rfl: 1    benzonatate (TESSALON) 100 MG capsule, Take 1 capsule (100 mg total) by mouth 3 (three) times daily as needed for Cough., Disp: 30 capsule, Rfl: 1    cetirizine (ZYRTEC) 10 MG tablet, Take 10 mg by mouth daily as needed for Allergies., Disp: , Rfl:     clotrimazole (LOTRIMIN) 1 % cream, Apply topically 2 (two) times daily., Disp: 60 g, Rfl: 0    furosemide (LASIX) 20 MG tablet, Take 1 tablet (20 mg total) by mouth every Monday. Take 1 tablet by mouth every Monday. Take 1 tablet as needed for weight gain >5lbs or dyspnea, Disp: , Rfl:     gabapentin (NEURONTIN) 400 MG capsule, Take 1 capsule (400 mg total) by mouth 2 (two) times daily., Disp: 60 capsule, Rfl: 11    levothyroxine (SYNTHROID) 50 MCG tablet, TAKE 1 TABLET BY MOUTH EVERY DAY, Disp: 90 tablet, Rfl: 3    metoprolol succinate (TOPROL-XL) 25 MG 24 hr tablet, TAKE 1 TABLET BY MOUTH EVERY DAY, Disp: 90 tablet, Rfl: 3    mirtazapine (REMERON) 15 MG tablet, Take 1 tablet (15 mg total) by mouth every evening., Disp: 90 tablet, Rfl: 3    oxybutynin (DITROPAN) 5 MG Tab, Take 1 tablet (5 mg total) by mouth 3 (three) times daily as needed (bladder spasms)., Disp: 21 tablet, Rfl: 0    spironolactone (ALDACTONE) 25 MG tablet, Take 0.5 tablets (12.5 mg total) by mouth every other day., Disp: 8 tablet, Rfl: 11    traZODone (DESYREL) 50 MG tablet, TAKE 1 TABLET BY MOUTH NIGHTLY, Disp: 90 tablet, Rfl: 1    24h Oral  Morphine Equivalents (OME):  n/a    Objective:     Physical Exam:    There is no height or weight on file to calculate BMI.    Physical Exam  Vitals and nursing note reviewed. Exam conducted with a chaperone present.   Constitutional:       Appearance: Normal appearance. He is obese.   HENT:      Head: Normocephalic and atraumatic.      Nose: Rhinorrhea present.      Mouth/Throat:      Mouth: Mucous membranes are moist.  Eyes:      Extraocular Movements: Extraocular movements intact.      Conjunctiva/sclera: Conjunctivae normal.   Cardiovascular:      Rate and Rhythm: Normal rate and regular rhythm.      Pulses: Normal pulses.      Heart sounds: Murmur (grade III/VI) heard.   Pulmonary:      Effort: Pulmonary effort is normal.      Breath sounds: Normal breath sounds.      Comments: BBS CTA; coughing thick yellow mucus   Abdominal:      General: Bowel sounds are normal.      Palpations: Abdomen is soft.   Genitourinary:     Comments: Supra pubic catheter intact   Musculoskeletal:         General: Swelling present. Normal range of motion.      Cervical back: Normal range of motion and neck supple. No rigidity.      Right lower leg: Edema (1-2+ edema to BLE) present.      Left lower leg: Edema (1-2+ edema to BLE) present.   Skin:     General: Skin is warm and dry.      Capillary Refill: Capillary refill takes 2 to 3 seconds.      Coloration: Skin is pale.      Findings: No rash.      Comments: RLE is wrapped with Coban   Neurological:      Mental Status: He is alert and oriented to person, place, and time.      Motor: Weakness present.      Gait: Gait abnormal.      Comments: AA&Ox 2-3; forgetful   Psychiatric:         Mood and Affect: Mood normal.         Behavior: Behavior normal.         Thought Content: Thought content normal.         Judgment: Judgment normal.         Review of Symptoms    Symptom Assessment (ESAS 0-10 Scale)  Pain:  0  Dyspnea:  3  Anxiety:  0  Nausea:  0  Depression:  3  Anorexia:  0  Fatigue:   2  Insomnia:  0  Restlessness:  6  Agitation:  0     CAM / Delirium:  Negative  Constipation:  Positive  Diarrhea:  Negative (occasionally)    Bowel Management Plan (BMP):  Yes      Comments:  LBM 8/4 normal    Pain Assessment:  Location(s): none      Modified Nicol Scale:  1    Living Arrangements:  Lives with friend    Spiritual:  F - Jillian and Belief:  Pentecostalism extensentiallist  I - Importance:  Highly  C - Community:  No Anabaptism  A - Address in Care:  No spiritual needs identified     Time-Based Charting:  No        Advance Care Planning   Advance Directives:   Living Will: Yes        Copy on chart: No        Oral Declaration: No    LaPOST: No (left form at house)    Do Not Resuscitate Status: No    Medical Power of : Yes        Oral Declaration: No    Agent's Name:  Carolina Pak (friend)   Agent's Contact Number:  [phone]    Decision Making:  Patient answered questions         Labs:  CBC:   WBC   Date Value Ref Range Status   10/05/2022 10.52 3.90 - 12.70 K/uL Final       Albumin   Date Value Ref Range Status   10/03/2022 3.7 3.5 - 5.2 g/dL Final     Protein:   Total Protein   Date Value Ref Range Status   10/03/2022 7.9 6.0 - 8.4 g/dL Final       Radiology:  I have reviewed all pertinent imaging results/findings within the past 24 hours.      Assessment:     1. Palliative care encounter    2. Chest congestion    3. Suprapubic catheter    4. Impaired mobility and ADLs    5. Ankle ulcer, right, with unspecified severity    6. Goals of care, counseling/discussion        Plan:       Assessment and Plan:    Palliative care encounter  -palliative care referral placed  -initiate palliative care services today  -Reviewed LaPOST Form & left form at patient's home   -LaPOST Form completed on   -CODE STATUS>>>Full Code Status  -Discussed Palliative care and Hospice  -Patient/family want to continue with Palliative care services  -1/4 Reviewed LaPOST form and left for patient to complete  -6/14 Reviewed  "LaPOST; continue palliative care  -8/4/2022 recommend hospice evaluation    Counseling regarding advance care planning and goals of care  1/4 Discussed goals of care with Dr. Prado and he would like "a new body for his head." would like to be pain free and comfortable  -6/14 Discussed goals of care with Dr. Roche and he would like to get stronger and would like wound to heal  -8/4/2022 would like to get stronger and feel better      Were controlled substances prescribed?  No    Total clinical care time was 60min. Reviewed Chart and PCP's notes thenThe following issues were discussed: diet, medications, pain issues, s/p lumbar laminectomy, impaired mobility and ADLs, SOB on exertion, chronic low back pain without sciatic, right ankle ulcer, suprapubic catheter, Chest congestion, cough and goals of care discussion    An additional 30 minutes of the visit was spent in advanced care planning. Extensively discussed hospice and benefits to hospice    Follow Up Appointments:   F/u in 4-6 weeks as needed     Patient and family agreed via verbal consent to access medical records and for assessment and treatment during this visit.    Attestation: Screening criteria to assess the level of the patient's risk for infection with COVID-19 as recommended by the CDC at the time of the above documented home visit concluded appropriateness to proceed.     Universal precautions were maintained at all times, including provider use of >60% alcohol gel hand  immediately prior to entry and upon departing the patient's home as well as cleaning of equipment used in home visit with antibacterial/germicidal disposable wipes.    Patient has not been exposed to anyone with the virus (to the patient's knowledge)  Patient has not been out of the country in the last 14 days.    NP Nurse wore face mask entire length of visit    Sybil Adams DNP, FNP-C  [facility]  [phone]            "

## 2022-08-06 ENCOUNTER — TELEPHONE (OUTPATIENT)
Dept: HOME HEALTH SERVICES | Facility: CLINIC | Age: 87
End: 2022-08-06

## 2022-08-07 VITALS
SYSTOLIC BLOOD PRESSURE: 138 MMHG | TEMPERATURE: 98 F | OXYGEN SATURATION: 93 % | RESPIRATION RATE: 20 BRPM | DIASTOLIC BLOOD PRESSURE: 64 MMHG | HEART RATE: 64 BPM

## 2022-08-08 ENCOUNTER — HOSPITAL ENCOUNTER (EMERGENCY)
Facility: OTHER | Age: 87
Discharge: HOME | End: 2022-08-08
Attending: EMERGENCY MEDICINE
Payer: MEDICARE

## 2022-08-08 VITALS
HEART RATE: 79 BPM | TEMPERATURE: 98 F | DIASTOLIC BLOOD PRESSURE: 58 MMHG | RESPIRATION RATE: 18 BRPM | OXYGEN SATURATION: 96 % | SYSTOLIC BLOOD PRESSURE: 127 MMHG

## 2022-08-08 DIAGNOSIS — J98.6 ELEVATED DIAPHRAGM: ICD-10-CM

## 2022-08-08 DIAGNOSIS — E87.1 CHRONIC HYPONATREMIA: ICD-10-CM

## 2022-08-08 DIAGNOSIS — R06.02 SOB (SHORTNESS OF BREATH): ICD-10-CM

## 2022-08-08 DIAGNOSIS — J20.9 BRONCHITIS, ACUTE, WITH BRONCHOSPASM: Primary | ICD-10-CM

## 2022-08-08 DIAGNOSIS — R60.0 PERIPHERAL EDEMA: ICD-10-CM

## 2022-08-08 LAB
ALBUMIN SERPL BCP-MCNC: 3.7 G/DL (ref 3.5–5.2)
ALP SERPL-CCNC: 104 U/L (ref 55–135)
ALT SERPL W/O P-5'-P-CCNC: 10 U/L (ref 10–44)
ANION GAP SERPL CALC-SCNC: 13 MMOL/L (ref 8–16)
AST SERPL-CCNC: 15 U/L (ref 10–40)
BASOPHILS # BLD AUTO: 0.06 K/UL (ref 0–0.2)
BASOPHILS NFR BLD: 0.6 % (ref 0–1.9)
BILIRUB SERPL-MCNC: 0.5 MG/DL (ref 0.1–1)
BNP SERPL-MCNC: 106 PG/ML (ref 0–99)
BUN SERPL-MCNC: 14 MG/DL (ref 8–23)
CALCIUM SERPL-MCNC: 8.1 MG/DL (ref 8.7–10.5)
CHLORIDE SERPL-SCNC: 89 MMOL/L (ref 95–110)
CO2 SERPL-SCNC: 30 MMOL/L (ref 23–29)
CREAT SERPL-MCNC: 1.3 MG/DL (ref 0.5–1.4)
CTP QC/QA: YES
DIFFERENTIAL METHOD: ABNORMAL
EOSINOPHIL # BLD AUTO: 0.4 K/UL (ref 0–0.5)
EOSINOPHIL NFR BLD: 3.7 % (ref 0–8)
ERYTHROCYTE [DISTWIDTH] IN BLOOD BY AUTOMATED COUNT: 14.7 % (ref 11.5–14.5)
EST. GFR  (NO RACE VARIABLE): 53 ML/MIN/1.73 M^2
GLUCOSE SERPL-MCNC: 114 MG/DL (ref 70–110)
HCT VFR BLD AUTO: 38 % (ref 40–54)
HGB BLD-MCNC: 12.5 G/DL (ref 14–18)
IMM GRANULOCYTES # BLD AUTO: 0.03 K/UL (ref 0–0.04)
IMM GRANULOCYTES NFR BLD AUTO: 0.3 % (ref 0–0.5)
LYMPHOCYTES # BLD AUTO: 2.1 K/UL (ref 1–4.8)
LYMPHOCYTES NFR BLD: 22.5 % (ref 18–48)
MCH RBC QN AUTO: 28.2 PG (ref 27–31)
MCHC RBC AUTO-ENTMCNC: 32.9 G/DL (ref 32–36)
MCV RBC AUTO: 86 FL (ref 82–98)
MONOCYTES # BLD AUTO: 0.7 K/UL (ref 0.3–1)
MONOCYTES NFR BLD: 7.8 % (ref 4–15)
NEUTROPHILS # BLD AUTO: 6.1 K/UL (ref 1.8–7.7)
NEUTROPHILS NFR BLD: 65.1 % (ref 38–73)
NRBC BLD-RTO: 0 /100 WBC
PLATELET # BLD AUTO: 195 K/UL (ref 150–450)
PMV BLD AUTO: 8.5 FL (ref 9.2–12.9)
POTASSIUM SERPL-SCNC: 3.4 MMOL/L (ref 3.5–5.1)
PROT SERPL-MCNC: 7 G/DL (ref 6–8.4)
RBC # BLD AUTO: 4.43 M/UL (ref 4.6–6.2)
SARS-COV-2 RDRP RESP QL NAA+PROBE: NEGATIVE
SODIUM SERPL-SCNC: 132 MMOL/L (ref 136–145)
WBC # BLD AUTO: 9.43 K/UL (ref 3.9–12.7)

## 2022-08-08 PROCEDURE — 25000003 PHARM REV CODE 250: Performed by: EMERGENCY MEDICINE

## 2022-08-08 PROCEDURE — 27100171 HC OXYGEN HIGH FLOW UP TO 24 HOURS

## 2022-08-08 PROCEDURE — 99285 EMERGENCY DEPT VISIT HI MDM: CPT | Mod: 25

## 2022-08-08 PROCEDURE — 25000242 PHARM REV CODE 250 ALT 637 W/ HCPCS: Performed by: EMERGENCY MEDICINE

## 2022-08-08 PROCEDURE — 94640 AIRWAY INHALATION TREATMENT: CPT

## 2022-08-08 PROCEDURE — U0002 COVID-19 LAB TEST NON-CDC: HCPCS | Performed by: EMERGENCY MEDICINE

## 2022-08-08 PROCEDURE — 83880 ASSAY OF NATRIURETIC PEPTIDE: CPT | Performed by: EMERGENCY MEDICINE

## 2022-08-08 PROCEDURE — 80053 COMPREHEN METABOLIC PANEL: CPT | Performed by: EMERGENCY MEDICINE

## 2022-08-08 PROCEDURE — 85025 COMPLETE CBC W/AUTO DIFF WBC: CPT | Performed by: EMERGENCY MEDICINE

## 2022-08-08 RX ORDER — DOXYCYCLINE 100 MG/1
100 CAPSULE ORAL 2 TIMES DAILY
Qty: 20 CAPSULE | Refills: 0 | Status: SHIPPED | OUTPATIENT
Start: 2022-08-08 | End: 2022-08-18

## 2022-08-08 RX ORDER — IPRATROPIUM BROMIDE AND ALBUTEROL SULFATE 2.5; .5 MG/3ML; MG/3ML
3 SOLUTION RESPIRATORY (INHALATION)
Status: COMPLETED | OUTPATIENT
Start: 2022-08-08 | End: 2022-08-08

## 2022-08-08 RX ORDER — ALBUTEROL SULFATE 90 UG/1
1-2 AEROSOL, METERED RESPIRATORY (INHALATION) EVERY 6 HOURS PRN
Qty: 18 G | Refills: 0 | Status: SHIPPED | OUTPATIENT
Start: 2022-08-08

## 2022-08-08 RX ORDER — DOXYCYCLINE HYCLATE 100 MG
100 TABLET ORAL
Status: COMPLETED | OUTPATIENT
Start: 2022-08-08 | End: 2022-08-08

## 2022-08-08 RX ADMIN — SODIUM CHLORIDE 500 ML: 0.9 INJECTION, SOLUTION INTRAVENOUS at 09:08

## 2022-08-08 RX ADMIN — DOXYCYCLINE HYCLATE 100 MG: 100 TABLET, COATED ORAL at 07:08

## 2022-08-08 RX ADMIN — IPRATROPIUM BROMIDE AND ALBUTEROL SULFATE 3 ML: 2.5; .5 SOLUTION RESPIRATORY (INHALATION) at 09:08

## 2022-08-08 RX ADMIN — IPRATROPIUM BROMIDE AND ALBUTEROL SULFATE 3 ML: 2.5; .5 SOLUTION RESPIRATORY (INHALATION) at 07:08

## 2022-08-08 NOTE — ED PROVIDER NOTES
"Encounter Date: 8/8/2022    SCRIBE #1 NOTE: I, Letty Lazo, am scribing for, and in the presence of, Taqeuria Wilson II, MD .       History     Chief Complaint   Patient presents with    Abnormal Xray     PCP told pt to report to ED for abnormal CXR. Pt has no acute complaints.      This is a 88 y.o. male who presents with complaint of abnormal chest x-ray. Pt states that his PCP called him and told him to visit the ED because of an abnormality found on a chest x-ray that was done 3 days ago. Pt states that he believes he his PCP told him that he has "an obstruction in the lower left lobe of the lung." Pt reports vomiting once 4 days ago. He also reports cough. He denies fever or diarrhea. He also denies any history of lung disease. Pt admits to being a former smoker. He states that he is COVID-19 vaccinated. This is the extent of the patient's complaints at this time.    The history is provided by the patient.     Review of patient's allergies indicates:   Allergen Reactions    Thiazides Other (See Comments)     Multiple episodes of thiazide-induced hyponatremia     Past Medical History:   Diagnosis Date    Acquired hypothyroidism 7/30/2013    Arthritis     Blood transfusion     before 2005 - whe had gangrenous gall bladder    Cataract     Chronic back pain 12/4/2018    - Takes Percocet 5-325 at home - Can continue current abdominal pain control with Dilaudid 0.5 mg IV Q3H prn     CKD (chronic kidney disease) stage 3, GFR 30-59 ml/min     Compression fracture of lumbar vertebra     Depression     Dyslipidemia     Essential hypertension 7/30/2013    Gastroesophageal reflux disease without esophagitis 12/4/2018    - continue home Prilosec    General anesthetics causing adverse effect in therapeutic use     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    GERD (gastroesophageal reflux disease)     History of postoperative delirium 12/4/2018    - Patient reports 1 week history of post-op delirium " 7/2018 - Monitor electrolytes, UA, and urine cx - Delirium precautions  - Can use Seroquel 25 mg QHS prn     Hypertension     Hyponatremia 10/23/2017    Impaired mobility and ADLs 6/28/2018    Neurogenic bladder 12/4/2018    Sleep disorder 12/4/2018    - continue Trazodone QHS    Thyroid disease     UTI (urinary tract infection)      Past Surgical History:   Procedure Laterality Date    BIOPSY OF BLADDER  9/1/2020    Procedure: BIOPSY, BLADDER;  Surgeon: Daron Manjarrez MD;  Location: [facility] OR [address] [place] [address];  Service: Urology;;    BLADDER FULGURATION  9/1/2020    Procedure: FULGURATION, BLADDER;  Surgeon: Daron Manjarrez MD;  Location: [facility] OR [address] [place] [address];  Service: Urology;;    CHOLECYSTECTOMY      CYSTOGRAM  9/1/2020    Procedure: CYSTOGRAM;  Surgeon: Daron Manjarrez MD;  Location: [address];  Service: Urology;;    CYSTOSCOPY N/A 9/1/2020    Procedure: CYSTOSCOPY;  Surgeon: Daron Manjarrez MD;  Location: [address];  Service: Urology;  Laterality: N/A;    DILATION OF URETHRA  9/1/2020    Procedure: DILATION, URETHRA;  Surgeon: Daron Manjarrez MD;  Location: [facility] OR [address] [place] [address];  Service: Urology;;    EYE SURGERY Right     IOL    HERNIA REPAIR      INTRAOCULAR PROSTHESES INSERTION Left 5/28/2018    Procedure: INSERTION-INTRAOCULAR LENS (IOL);  Surgeon: Trinity Vazquez MD;  [facility];  Service: Ophthalmology;  Laterality: Left;    JOINT REPLACEMENT      LAMINECTOMY  12/27/2016    L2-L4    LUMBAR LAMINECTOMY  12/2016    PARATHYROIDECTOMY      PHACOEMULSIFICATION OF CATARACT Left 5/28/2018    Procedure: PHACOEMULSIFICATION-ASPIRATION-CATARACT;  Surgeon: Trinity Vazquez MD;  [facility];  Service: Ophthalmology;  Laterality: Left;    REMOVAL OF BLOOD CLOT  9/1/2020    Procedure: REMOVAL, BLOOD CLOT;  Surgeon: Daron Manjarrez MD;  Location: [address];  Service: Urology;;    REPAIR OF INCARCERATED INCISIONAL HERNIA WITHOUT HISTORY OF PRIOR REPAIR N/A 12/5/2018     Procedure: REPAIR, HERNIA, INCISIONAL, INCARCERATED, WITHOUT HISTORY OF PRIOR REPAIR;  Surgeon: Steve Valentin MD;  Location: [facility] OR [address] [place] [address];  Service: General;  Laterality: N/A;    REPAIR OF INCARCERATED VENTRAL HERNIA WITHOUT HISTORY OF PRIOR REPAIR N/A 2018    Procedure: REPAIR, HERNIA, VENTRAL, INCARCERATED, WITHOUT HISTORY OF PRIOR REPAIR;  Surgeon: Guilherme Ordoñez MD;  Location: [facility] OR [address] [place] [address];  Service: General;  Laterality: N/A;    TOTAL KNEE ARTHROPLASTY Bilateral     TOTAL KNEE ARTHROPLASTY Left 10/25/2017    TKR     Family History   Problem Relation Age of Onset    Hypertension Mother     Diabetes Father     Esophageal cancer Father      Social History     Tobacco Use    Smoking status: Former Smoker     Years: 20.00     Quit date:      Years since quittin.6    Smokeless tobacco: Never Used    Tobacco comment: quit    Substance Use Topics    Alcohol use: No     Comment: rarely/6 months    Drug use: No     Review of Systems   Constitutional: Negative for fever.   HENT: Negative for sore throat.    Respiratory: Positive for cough. Negative for shortness of breath.    Cardiovascular: Negative for chest pain.   Gastrointestinal: Positive for vomiting. Negative for diarrhea and nausea.   Genitourinary: Negative for dysuria.   Musculoskeletal: Negative for back pain.   Skin: Negative for rash.   Neurological: Negative for weakness.   Hematological: Does not bruise/bleed easily.       Physical Exam     Initial Vitals [22 1721]   BP Pulse Resp Temp SpO2   139/65 74 17 98.3 °F (36.8 °C) 96 %      MAP       --         Physical Exam    Nursing note and vitals reviewed.  Constitutional: No distress.   Speaks in full sentences.   HENT:   Head: Normocephalic and atraumatic.   Eyes: Conjunctivae and EOM are normal. Pupils are equal, round, and reactive to light.   Neck: Neck supple.   Normal range of motion.  Cardiovascular: Normal rate and regular rhythm.   2/6 sys M.    Pulmonary/Chest:   Faint expiratory wheezes throughout. Good air exchange. No basilar rales.   Musculoskeletal:      Cervical back: Normal range of motion and neck supple.      Comments: Chronic edema to bilateral lower extremities. Right leg in Unna boot or similar wrap.     Neurological: He is alert and oriented to person, place, and time.   Skin: Skin is warm and dry.   Psychiatric: He has a normal mood and affect. His behavior is normal. Judgment and thought content normal.         ED Course   Procedures  Labs Reviewed   CBC W/ AUTO DIFFERENTIAL - Abnormal; Notable for the following components:       Result Value    RBC 4.43 (*)     Hemoglobin 12.5 (*)     Hematocrit 38.0 (*)     RDW 14.7 (*)     MPV 8.5 (*)     All other components within normal limits   COMPREHENSIVE METABOLIC PANEL - Abnormal; Notable for the following components:    Sodium 132 (*)     Potassium 3.4 (*)     Chloride 89 (*)     CO2 30 (*)     Glucose 114 (*)     Calcium 8.1 (*)     eGFR 53 (*)     All other components within normal limits   B-TYPE NATRIURETIC PEPTIDE - Abnormal; Notable for the following components:     (*)     All other components within normal limits   SARS-COV-2 RDRP GENE    Narrative:     This test utilizes isothermal nucleic acid amplification   technology to detect the SARS-CoV-2 RdRp nucleic acid segment.   The analytical sensitivity (limit of detection) is 125 genome   equivalents/mL.   A POSITIVE result implies infection with the SARS-CoV-2 virus;   the patient is presumed to be contagious.     A NEGATIVE result means that SARS-CoV-2 nucleic acids are not   present above the limit of detection. A NEGATIVE result should be   treated as presumptive. It does not rule out the possibility of   COVID-19 and should not be the sole basis for treatment decisions.   If COVID-19 is strongly suspected based on clinical and exposure   history, re-testing using an alternate molecular assay should be   considered.   This  test is only for use under the Food and Drug   Administration s Emergency Use Authorization (EUA).   Commercial kits are provided by [employer].   Performance characteristics of the EUA have been independently   verified by [facility] Department of   Pathology and Laboratory Medicine.   _________________________________________________________________   The authorized Fact Sheet for Healthcare Providers and the authorized Fact   Sheet for Patients of the ID NOW COVID-19 are available on the FDA   website:     https://www.fda.gov/media/518817/download  https://www.fda.gov/media/329938/download             EKG Readings: (Independently Interpreted)       Imaging Results          X-Ray Chest AP Portable (Final result)  Result time 08/08/22 17:52:42    Final result by Williams Andrew MD (08/08/22 17:52:42)                 Impression:      As above.      Electronically signed by: Williams Andrew MD  Date:    08/08/2022  Time:    17:52             Narrative:    EXAMINATION:  XR CHEST AP PORTABLE    CLINICAL HISTORY:  Shortness of breath    TECHNIQUE:  Single frontal view of the chest was performed.    COMPARISON:  September 2020.    FINDINGS:  Heart is enlarged but stable in size.  Lungs are hypoinflated with chronic elevation of the left hemidiaphragm.  There is mild left basilar atelectasis/consolidation.  Difficult to exclude potential small left pleural effusion.  No significant pleural effusion is seen on the right.  No pneumothorax.  Overall no significant change in the appearance of the chest compared to multiple previous radiographs.                              X-Rays:   Independently Interpreted Readings:   Chest X-Ray: Elevated left diaphragm. No focal infiltrate. No pneumothorax.     Medications   sodium chloride 0.9% bolus 500 mL (500 mLs Intravenous New Bag 8/8/22 2102)   doxycycline tablet 100 mg (100 mg Oral Given 8/8/22 1927)   albuterol-ipratropium 2.5 mg-0.5 mg/3 mL nebulizer  "solution 3 mL (3 mLs Nebulization Given 8/8/22 1943)   albuterol-ipratropium 2.5 mg-0.5 mg/3 mL nebulizer solution 3 mL (3 mLs Nebulization Given 8/8/22 2111)     Medical Decision Making:   History:   Old Medical Records: I decided to obtain old medical records.  Independently Interpreted Test(s):   I have ordered and independently interpreted X-rays - see prior notes.  Clinical Tests:   Lab Tests: Ordered and Reviewed  Radiological Study: Ordered and Reviewed  Patient presents due to CXR performed at home a few d ago which showed  "obstruction in left bottom" per pt.  Chart shows this report was concerned for effusion/ infiltrate on the L however repeat CXR today shows chronic elevation of L diaphragm, does not suggest large effusion or infiltrate.  Upon reevaluation, pt now has diffuse exp wheeze and pulse ox 88 on RA.  I was also contacted at this point by his home health RN w/ concern for similar pulse ox at home, recenetly started on lasix PO due to leg swelling.  Therefore, obtained labs, and gave neb tx.  After nebs, now w/ improved AE, decreased wheeze, and pulse ox 95% or greater on RA.  Pt reports he "still feels fine".  Hadn't felt SOB before.  Labs - nl wbc.  Chronic hypoNa nd Cl  For which will be given 500 cc NS, especially as labs show nl BNP and chart review shows most recent echo w/ nl EF, no evidence of CHF.  Home health RN updatted w/ finding and plan of care to assist at f/u.          Scribe Attestation:   Scribe #1: I performed the above scribed service and the documentation accurately describes the services I performed. I attest to the accuracy of the note.                 Physician Attestation for Scribe: I, [facility], reviewed documentation as scribed in my presence, which is both accurate and complete.    Clinical Impression:   Final diagnoses:  [R06.02] SOB (shortness of breath)  [J98.6] Elevated diaphragm  [E87.1] Chronic hyponatremia  [R60.9] Peripheral edema  [J20.9] Bronchitis, acute, with " bronchospasm (Primary)                 Taqueria Wilson II, MD  08/08/22 [id number]

## 2022-08-18 ENCOUNTER — TELEPHONE (OUTPATIENT)
Dept: HOME HEALTH SERVICES | Facility: CLINIC | Age: 87
End: 2022-08-18
Payer: MEDICARE

## 2022-08-18 RX ORDER — ONDANSETRON HYDROCHLORIDE 8 MG/1
8 TABLET, FILM COATED ORAL EVERY 8 HOURS PRN
Qty: 20 TABLET | Refills: 1 | Status: ON HOLD | OUTPATIENT
Start: 2022-08-18 | End: 2022-10-13 | Stop reason: HOSPADM

## 2022-08-18 NOTE — TELEPHONE ENCOUNTER
8/18/2022    Received Text message from patient's HH nurse Hillary (with [facility]). States patient is having nausea and requesting Zofran.     Send to patio    Will send Zofran but if unrelieved with Zofran to f/u with PCP or NP    Sybil Adams DNP, FNP-C  [facility]  [phone]

## 2022-08-19 ENCOUNTER — TELEPHONE (OUTPATIENT)
Dept: INTERNAL MEDICINE | Facility: CLINIC | Age: 87
End: 2022-08-19
Payer: MEDICARE

## 2022-08-20 ENCOUNTER — TELEPHONE (OUTPATIENT)
Dept: HOME HEALTH SERVICES | Facility: CLINIC | Age: 87
End: 2022-08-20
Payer: MEDICARE

## 2022-08-20 DIAGNOSIS — Z93.59 SUPRAPUBIC CATHETER: Primary | ICD-10-CM

## 2022-08-20 DIAGNOSIS — R39.9 UTI SYMPTOMS: ICD-10-CM

## 2022-08-20 RX ORDER — OXYBUTYNIN CHLORIDE 5 MG/1
5 TABLET ORAL 3 TIMES DAILY PRN
Qty: 21 TABLET | Refills: 0
Start: 2022-08-20 | End: 2022-08-20 | Stop reason: SDUPTHER

## 2022-08-20 RX ORDER — OXYBUTYNIN CHLORIDE 5 MG/1
5 TABLET ORAL 3 TIMES DAILY PRN
Qty: 21 TABLET | Refills: 0
Start: 2022-08-20

## 2022-08-20 RX ORDER — CIPROFLOXACIN 250 MG/1
250 TABLET, FILM COATED ORAL EVERY 12 HOURS
Qty: 10 TABLET | Refills: 0 | Status: SHIPPED | OUTPATIENT
Start: 2022-08-20 | End: 2022-08-22

## 2022-08-20 NOTE — TELEPHONE ENCOUNTER
"8/20/2022    Received text message today from pato Hidalgo for Dr. Prado, stating to call her due to leaking catheter. Catheter leaking "heavily" and when she came arrived to Mr. Prado's house, he was still in bed saturated with urine. States she has notified [facility]. Instructed pato to call  company again and ask them to change the catheter or to come out and assess the catheter. Received call from Carmelina (medical director) stating "it is a liability to go out and change the catheter and he needs to go to the ED to be evaluated by a urologist or you need to order another other medication for spasms." Recommend she call the PCP and speak to oncall physician. States Dr. Mcguire is off but Dr. Mindy Burger is on. Discussed patient and explained to her the nurse at least needs to go out and assess the catheter and possibly change it. Patient is refusing to go to the ED. Discussed with Dr. Valero and will order cipro and Ditropan but pt will need to f/u with urology next week. Called and spoke to Dr. Prado. He expresses he does not want to go to ED and agrees to try Cipro and Oxybutynin for spasms (stop pyridium) and instructed him to f/u with urology. Ordered medications Cipro and Ditropan sent to pharmacy of choice.      8/19/2022 Received text message from pato [facility] catheter was leaking all over and patient pants were wet all over, and pato reports Pyridium is "no longer working for spasms." Called pato and she notified np Nurse Knowles with [facility] went to the house on 8/17 and changed the catheter; however, the same day the catheter started leaking so the nurse Eleni went back out and found the bag was pulled up so she manipulated the catheter and the leaking had stopped until today. Pato reports pt c/o pain when the catheter is changed. NP called [facility] and spoke to charles (ANNE) in the office and stated she would try to get a nurse to go out " "and change and assess the catheter. NP received call from ANNE Thornton (with [facility]) and she notified me nurse was not going to go change the catheter (per medical director) since "this would be a liability" and the medical director is instructing pt to go to the ED to have catheter evaluated "by urology."   NP received message from sitter stating the catheter was no longer leaking. Texted sitter to continue to monitor catheter.     Sybil Adams DNP, FNP-C  [facility]  [phone]    "

## 2022-08-22 ENCOUNTER — TELEPHONE (OUTPATIENT)
Dept: INTERNAL MEDICINE | Facility: CLINIC | Age: 87
End: 2022-08-22
Payer: MEDICARE

## 2022-08-22 DIAGNOSIS — N39.0 URINARY TRACT INFECTION WITHOUT HEMATURIA, SITE UNSPECIFIED: ICD-10-CM

## 2022-08-22 DIAGNOSIS — Z93.59 SUPRAPUBIC CATHETER: Primary | ICD-10-CM

## 2022-08-22 RX ORDER — NITROFURANTOIN 25; 75 MG/1; MG/1
100 CAPSULE ORAL 2 TIMES DAILY
Qty: 20 CAPSULE | Refills: 0 | Status: SHIPPED | OUTPATIENT
Start: 2022-08-22 | End: 2022-09-01

## 2022-08-22 NOTE — TELEPHONE ENCOUNTER
I spoke to Yazmin, the home health nurse, she reports that the pt is having bladder and penile spasms, and at the cath site. She reports that the [facility] nurse assigned to the pt (Sybil KAILA) is not communicating well or professionally with their staff. She states that Sybil is only going to the residence when she wants and not as scheduled, and stating orders and instructions but no orders are being put in for the actions to take place. Ms. Arce admits that this has been an ongoing dilemma since the end of August 2021, and would like to know what can be done about it. She reports that the pt is sitting in urine soaked briefs and pads because the cath leaks and the pt is having urine from the penis as well, which is causing concern for skin break down and infection. Pt refuses to get up to clean the urin off until her feels like getting up. Calls were placed over this past weekend with these issues, but states they did not get a call back from on call staff. Pt had a UA at [facility] on 08/20/2022, she will fax or upload results when she gets them in. Pt now taking abx and anti spasmatic.     Contact: Carmelina CAMPOVERDE @ [phone] [facility]

## 2022-08-22 NOTE — TELEPHONE ENCOUNTER
Hi,  Please call the patient and the nurse.    please set him up to see me for in person appt for Friday if possible.  If not then audio visit.  Also, I would like to send in a different antibiotic -- for this urine infection:  Orders Placed This Encounter    nitrofurantoin, macrocrystal-monohydrate, (MACROBID) 100 MG capsule     Since the bacteria is resistant to cipro.  He can stop:  Medications Discontinued During This Encounter   Medication Reason    ciprofloxacin HCl (CIPRO) 250 MG tablet      Let me know if patient has any more questions.  Thank you, Obed Mcguire

## 2022-08-22 NOTE — TELEPHONE ENCOUNTER
----- Message from Mindi Colvin sent at 8/20/2022 11:08 AM CDT -----  Contact: [facility] @ [facility] [phone]  Good Afternoon   Office need to talk to a Dr today due to patient's care.    Please call and advise

## 2022-08-22 NOTE — TELEPHONE ENCOUNTER
I spoke to Yazmin,  nurse, informed her that pt should be seen for an office visit. I scheduled geoff in person for 08/26/2022 at 11:00am. Nurse notified pt and care staff about geoff, to arrange transportation. I informed them about medication change and that it was sent to [employer]. She verbalized understanding.

## 2022-08-22 NOTE — TELEPHONE ENCOUNTER
I spoke to Triny, at [facility] ([phone]), she confirmed that lab results from pt's UA on 08/20/2022 were faxed. Results were given to Dr. Mcguire for review.

## 2022-08-22 NOTE — TELEPHONE ENCOUNTER
----- Message from Amaris Cavanaugh MA sent at 8/22/2022  2:54 PM CDT -----  Regarding: Home Health calling again for you-C  Contact: [phone]    ----- Message -----  From: Funmilayo Alcaraz  Sent: 8/22/2022   2:37 PM CDT  To: Maynor Clay Staff    Triny with home health is calling she states she has the lab results and she states she can not upload from her end she is calling [place] please give return call

## 2022-08-24 ENCOUNTER — TELEPHONE (OUTPATIENT)
Dept: INTERNAL MEDICINE | Facility: CLINIC | Age: 87
End: 2022-08-24
Payer: MEDICARE

## 2022-08-24 NOTE — TELEPHONE ENCOUNTER
----- Message from Amaris Cavanaugh MA sent at 8/24/2022 11:51 AM CDT -----  Regarding: [facility] is calling for you-C  Contact: [phone]    ----- Message -----  From: Funmilayo Alcaraz  Sent: 8/24/2022  11:36 AM CDT  To: Maynor Arce with home health is calling for Mily she states she needs to speak to you in regards to the pts appt please give return call

## 2022-08-24 NOTE — TELEPHONE ENCOUNTER
"I returned call to  Nurse, Yazmin. She states that pt refused appointment scheduled 08/26/2022 at 1100am, due to "he doesn't get up and moving until after 12" Pt rescheduled for 08/31/2022 at 0200pm.  "

## 2022-08-31 ENCOUNTER — LAB VISIT (OUTPATIENT)
Dept: LAB | Facility: HOSPITAL | Age: 87
End: 2022-08-31
Attending: INTERNAL MEDICINE
Payer: MEDICARE

## 2022-08-31 ENCOUNTER — OFFICE VISIT (OUTPATIENT)
Dept: INTERNAL MEDICINE | Facility: CLINIC | Age: 87
End: 2022-08-31
Payer: MEDICARE

## 2022-08-31 ENCOUNTER — EXTERNAL CHRONIC CARE MANAGEMENT (OUTPATIENT)
Dept: PRIMARY CARE CLINIC | Facility: CLINIC | Age: 87
End: 2022-08-31
Payer: MEDICARE

## 2022-08-31 VITALS
OXYGEN SATURATION: 90 % | BODY MASS INDEX: 31.41 KG/M2 | RESPIRATION RATE: 14 BRPM | SYSTOLIC BLOOD PRESSURE: 130 MMHG | DIASTOLIC BLOOD PRESSURE: 60 MMHG | HEART RATE: 74 BPM | HEIGHT: 68 IN

## 2022-08-31 DIAGNOSIS — E03.9 ACQUIRED HYPOTHYROIDISM: ICD-10-CM

## 2022-08-31 DIAGNOSIS — E83.51 HYPOCALCEMIA: ICD-10-CM

## 2022-08-31 DIAGNOSIS — E78.2 MIXED HYPERLIPIDEMIA: ICD-10-CM

## 2022-08-31 DIAGNOSIS — I10 ESSENTIAL HYPERTENSION: Primary | ICD-10-CM

## 2022-08-31 DIAGNOSIS — G95.9 LUMBAR MYELOPATHY: ICD-10-CM

## 2022-08-31 DIAGNOSIS — F33.1 MODERATE EPISODE OF RECURRENT MAJOR DEPRESSIVE DISORDER: ICD-10-CM

## 2022-08-31 DIAGNOSIS — L97.321 ANKLE ULCER, LEFT, LIMITED TO BREAKDOWN OF SKIN: ICD-10-CM

## 2022-08-31 DIAGNOSIS — Z93.59 SUPRAPUBIC CATHETER: ICD-10-CM

## 2022-08-31 LAB
25(OH)D3+25(OH)D2 SERPL-MCNC: 19 NG/ML (ref 30–96)
CHOLEST SERPL-MCNC: 234 MG/DL (ref 120–199)
CHOLEST/HDLC SERPL: 6.2 {RATIO} (ref 2–5)
HDLC SERPL-MCNC: 38 MG/DL (ref 40–75)
HDLC SERPL: 16.2 % (ref 20–50)
LDLC SERPL CALC-MCNC: 166.6 MG/DL (ref 63–159)
NONHDLC SERPL-MCNC: 196 MG/DL
PTH-INTACT SERPL-MCNC: 129.1 PG/ML (ref 9–77)
TRIGL SERPL-MCNC: 147 MG/DL (ref 30–150)
TSH SERPL DL<=0.005 MIU/L-ACNC: 2.42 UIU/ML (ref 0.4–4)

## 2022-08-31 PROCEDURE — 80061 LIPID PANEL: CPT | Performed by: INTERNAL MEDICINE

## 2022-08-31 PROCEDURE — 99215 PR OFFICE/OUTPT VISIT, EST, LEVL V, 40-54 MIN: ICD-10-PCS | Mod: S$PBB,,, | Performed by: INTERNAL MEDICINE

## 2022-08-31 PROCEDURE — 82306 VITAMIN D 25 HYDROXY: CPT | Performed by: INTERNAL MEDICINE

## 2022-08-31 PROCEDURE — 83970 ASSAY OF PARATHORMONE: CPT | Performed by: INTERNAL MEDICINE

## 2022-08-31 PROCEDURE — 99490 CHRNC CARE MGMT STAFF 1ST 20: CPT | Mod: S$PBB,,, | Performed by: INTERNAL MEDICINE

## 2022-08-31 PROCEDURE — 99490 PR CHRONIC CARE MGMT, 1ST 20 MIN: ICD-10-PCS | Mod: S$PBB,,, | Performed by: INTERNAL MEDICINE

## 2022-08-31 PROCEDURE — 99490 CHRNC CARE MGMT STAFF 1ST 20: CPT | Mod: PBBFAC | Performed by: INTERNAL MEDICINE

## 2022-08-31 PROCEDURE — 99215 OFFICE O/P EST HI 40 MIN: CPT | Mod: S$PBB,,, | Performed by: INTERNAL MEDICINE

## 2022-08-31 PROCEDURE — 99999 PR PBB SHADOW E&M-EST. PATIENT-LVL V: CPT | Mod: PBBFAC,,, | Performed by: INTERNAL MEDICINE

## 2022-08-31 PROCEDURE — 84443 ASSAY THYROID STIM HORMONE: CPT | Performed by: INTERNAL MEDICINE

## 2022-08-31 PROCEDURE — 99215 OFFICE O/P EST HI 40 MIN: CPT | Mod: PBBFAC,25 | Performed by: INTERNAL MEDICINE

## 2022-08-31 PROCEDURE — 99999 PR PBB SHADOW E&M-EST. PATIENT-LVL V: ICD-10-PCS | Mod: PBBFAC,,, | Performed by: INTERNAL MEDICINE

## 2022-08-31 PROCEDURE — 36415 COLL VENOUS BLD VENIPUNCTURE: CPT | Performed by: INTERNAL MEDICINE

## 2022-08-31 RX ORDER — GABAPENTIN 400 MG/1
400 CAPSULE ORAL 2 TIMES DAILY
Qty: 60 CAPSULE | Refills: 11 | Status: SHIPPED | OUTPATIENT
Start: 2022-08-31

## 2022-08-31 NOTE — PROGRESS NOTES
.edassSubjective:       Patient ID: Chucho Prado is a 88 y.o. male.    Chief Complaint: Annual Exam    Patient is here for followup for chronic conditions.      Wonders whether he needs vitamins.    Has wounds on both his ankles. Cleared L ankle sore, still R ankle sore, not drainage    Has chronic leg edema, wrapping the legs helps.    Chronic dry eyes symptoms, has not been using ATs.    Would like home PT. Last cycle lasted 4 wks. Home PT with [facility].        Review of Systems   Constitutional:  Positive for fatigue. Negative for activity change and unexpected weight change.   HENT:  Negative for hearing loss, rhinorrhea and trouble swallowing.    Eyes:  Negative for discharge and visual disturbance.   Respiratory:  Negative for chest tightness and wheezing.    Cardiovascular:  Negative for chest pain and palpitations.   Gastrointestinal:  Negative for blood in stool, constipation, diarrhea and vomiting.   Endocrine: Negative for polydipsia and polyuria.   Genitourinary:  Positive for difficulty urinating. Negative for hematuria and urgency.   Musculoskeletal:  Negative for arthralgias, joint swelling and neck pain.   Skin:  Positive for rash and wound (L medial ankle).   Neurological:  Negative for weakness and headaches.   Psychiatric/Behavioral:  Negative for confusion and dysphoric mood.          Objective:      Physical Exam  Vitals reviewed.   Constitutional:       General: He is not in acute distress.     Appearance: Normal appearance. He is well-developed. He is not ill-appearing, toxic-appearing or diaphoretic.      Comments: In wheelchair, interactive   HENT:      Head: Normocephalic and atraumatic.   Eyes:      Pupils: Pupils are equal, round, and reactive to light.   Cardiovascular:      Rate and Rhythm: Normal rate and regular rhythm.      Heart sounds: Murmur heard.     No friction rub.   Pulmonary:      Effort: Pulmonary effort is normal.      Breath sounds: Normal breath sounds. No  wheezing or rales.   Abdominal:      General: Bowel sounds are normal.      Palpations: Abdomen is soft.      Tenderness: There is no abdominal tenderness.   Musculoskeletal:         General: Tenderness: R>L 1+ diffuse xerosis to BLE.      Right lower leg: Edema present.      Left lower leg: Edema present.      Comments: Bilat legs wrapped, no edema above the wrap in knee or thighs   Skin:     General: Skin is warm and dry.      Findings: No rash.   Neurological:      Mental Status: He is alert.   Psychiatric:         Mood and Affect: Mood normal.         Behavior: Behavior normal.       Assessment:       1. Essential hypertension    2. Moderate episode of recurrent major depressive disorder    3. Suprapubic catheter    4. Acquired hypothyroidism    5. Mixed hyperlipidemia    6. Hypocalcemia    7. Lumbar myelopathy        Plan:       Chucho was seen today for annual exam.    Diagnoses and all orders for this visit:    Essential hypertension  controlled    Moderate episode of recurrent major depressive disorder  stable    Suprapubic catheter  Gets monthly changes, nio symptoms from recent pyuria, no need to repeat cx    Acquired hypothyroidism  -     TSH; Future    Mixed hyperlipidemia  -     Lipid Panel; Future    Hypocalcemia  -     PTH, intact; Future  -     Vitamin D; Future    Lumbar myelopathy  -     gabapentin (NEURONTIN) 400 MG capsule; Take 1 capsule (400 mg total) by mouth 2 (two) times daily.  Decrease from 800mg bid to 400  Could certainly be affect his fatigue  Consider stopping in future  No worsened myelopathy symptoms, does have chronic weakness.  Will order home PT    Ankle ulcer -- not seen today, but home health has been wrapping, call if worsening symptoms    Over 40 minutes spent with patient and majority in counseling and patient education.        Health Maintenance         Date Due Completion Date    COVID-19 Vaccine (1) 03/18/1934 6/23/2022    TETANUS VACCINE Never done ---    Shingles  Vaccine (1 of 2) Never done ---    Influenza Vaccine (1) 09/01/2022 1/4/2022    Lipid Panel 08/31/2023 8/31/2022            Follow up in about 6 months (around 2/28/2023).    Future Appointments   Date Time Provider Department Center   9/20/2022  8:00 AM Sybil Adams NP [facility] [place]   11/2/2022  2:40 PM Neelam Hooper NP [facility] UROLOG [facility]   3/1/2023  2:20 PM Obed Mcguire MD [facility]

## 2022-08-31 NOTE — PATIENT INSTRUCTIONS
I recommend daily vitamin D supplementation 1000 units daily    Try artificial tears for your eye discomfort

## 2022-09-01 PROBLEM — L97.321: Status: ACTIVE | Noted: 2022-09-01

## 2022-09-28 RX ORDER — MIRTAZAPINE 15 MG/1
15 TABLET, FILM COATED ORAL NIGHTLY
Qty: 90 TABLET | Refills: 3 | Status: SHIPPED | OUTPATIENT
Start: 2022-09-28

## 2022-09-28 NOTE — TELEPHONE ENCOUNTER
No new care gaps identified.  [employer] Embedded Care Gaps. Reference number: [id number]. 9/28/2022   8:02:32 AM WILLT

## 2022-09-28 NOTE — TELEPHONE ENCOUNTER
Refill Decision Note   Chucho Prado  is requesting a refill authorization.  Brief Assessment and Rationale for Refill:  Approve     Medication Therapy Plan:       Medication Reconciliation Completed: No   Comments:     No Care Gaps recommended.     Note composed:10:23 AM 09/28/2022

## 2022-09-29 ENCOUNTER — TELEPHONE (OUTPATIENT)
Dept: HOME HEALTH SERVICES | Facility: CLINIC | Age: 87
End: 2022-09-29
Payer: MEDICARE

## 2022-09-29 DIAGNOSIS — R06.02 SOB (SHORTNESS OF BREATH): Primary | ICD-10-CM

## 2022-09-29 NOTE — TELEPHONE ENCOUNTER
"9/27/2022    Received text from patient's HH nurse Hillary concerning patient reporting having SOB.    Called patient on 9/28/2022 @1346pm    Spoke with Dr. Prado who reports he has been having SOB when he wakes up in the morning and this has been going on for the last 10 days. States once the sitter arrives and "I get up. I feel better, but I may have SOB later in the evening before bed but mostly when I wake in the morning and sometimes it wakes me up." Reports SOB can occur at 4 or 5 am and there is no specific time but only when he wakes up.    States he has a np dry cough. Uses Albuterol inhaler with relief but it is only a temporary relief. Takes Zyrtec daily. Reports having allergies daily with itchy watery eyes and runny nose.    Will order CXR to be done by [facility]. Order placed    Sybil Adams, SOPHIE, FNP-C  [facility]  [phone]    "

## 2022-10-03 ENCOUNTER — HOSPITAL ENCOUNTER (INPATIENT)
Facility: OTHER | Age: 87
LOS: 10 days | Discharge: HOSPICE | DRG: 291 | End: 2022-10-13
Attending: EMERGENCY MEDICINE | Admitting: STUDENT IN AN ORGANIZED HEALTH CARE EDUCATION/TRAINING PROGRAM
Payer: MEDICARE

## 2022-10-03 ENCOUNTER — TELEPHONE (OUTPATIENT)
Dept: INTERNAL MEDICINE | Facility: CLINIC | Age: 87
End: 2022-10-03
Payer: MEDICARE

## 2022-10-03 DIAGNOSIS — Z71.89 ADVANCED CARE PLANNING/COUNSELING DISCUSSION: ICD-10-CM

## 2022-10-03 DIAGNOSIS — J96.21 ACUTE ON CHRONIC RESPIRATORY FAILURE WITH HYPOXIA AND HYPERCAPNIA: ICD-10-CM

## 2022-10-03 DIAGNOSIS — Z78.9 IMPAIRED MOBILITY AND ADLS: ICD-10-CM

## 2022-10-03 DIAGNOSIS — J96.22 ACUTE ON CHRONIC RESPIRATORY FAILURE WITH HYPOXIA AND HYPERCAPNIA: ICD-10-CM

## 2022-10-03 DIAGNOSIS — R06.00 DYSPNEA: ICD-10-CM

## 2022-10-03 DIAGNOSIS — Z74.09 IMPAIRED MOBILITY AND ADLS: ICD-10-CM

## 2022-10-03 DIAGNOSIS — I50.9 CONGESTIVE HEART FAILURE, UNSPECIFIED HF CHRONICITY, UNSPECIFIED HEART FAILURE TYPE: Primary | ICD-10-CM

## 2022-10-03 DIAGNOSIS — G70.9 NEUROMUSCULAR RESPIRATORY WEAKNESS: ICD-10-CM

## 2022-10-03 DIAGNOSIS — I50.9 CHF EXACERBATION: ICD-10-CM

## 2022-10-03 DIAGNOSIS — J99 NEUROMUSCULAR RESPIRATORY WEAKNESS: ICD-10-CM

## 2022-10-03 LAB
ALBUMIN SERPL BCP-MCNC: 3.7 G/DL (ref 3.5–5.2)
ALP SERPL-CCNC: 100 U/L (ref 55–135)
ALT SERPL W/O P-5'-P-CCNC: 12 U/L (ref 10–44)
ANION GAP SERPL CALC-SCNC: 11 MMOL/L (ref 8–16)
AST SERPL-CCNC: 20 U/L (ref 10–40)
BASOPHILS # BLD AUTO: 0.03 K/UL (ref 0–0.2)
BASOPHILS NFR BLD: 0.2 % (ref 0–1.9)
BILIRUB SERPL-MCNC: 0.9 MG/DL (ref 0.1–1)
BNP SERPL-MCNC: 212 PG/ML (ref 0–99)
BUN SERPL-MCNC: 16 MG/DL (ref 8–23)
CALCIUM SERPL-MCNC: 9 MG/DL (ref 8.7–10.5)
CHLORIDE SERPL-SCNC: 83 MMOL/L (ref 95–110)
CO2 SERPL-SCNC: 28 MMOL/L (ref 23–29)
CREAT SERPL-MCNC: 1 MG/DL (ref 0.5–1.4)
CTP QC/QA: YES
DIFFERENTIAL METHOD: ABNORMAL
EOSINOPHIL # BLD AUTO: 0.1 K/UL (ref 0–0.5)
EOSINOPHIL NFR BLD: 0.5 % (ref 0–8)
ERYTHROCYTE [DISTWIDTH] IN BLOOD BY AUTOMATED COUNT: 14.2 % (ref 11.5–14.5)
EST. GFR  (NO RACE VARIABLE): >60 ML/MIN/1.73 M^2
GLUCOSE SERPL-MCNC: 115 MG/DL (ref 70–110)
HCT VFR BLD AUTO: 36.9 % (ref 40–54)
HGB BLD-MCNC: 12.3 G/DL (ref 14–18)
IMM GRANULOCYTES # BLD AUTO: 0.11 K/UL (ref 0–0.04)
IMM GRANULOCYTES NFR BLD AUTO: 0.9 % (ref 0–0.5)
LYMPHOCYTES # BLD AUTO: 0.7 K/UL (ref 1–4.8)
LYMPHOCYTES NFR BLD: 5.5 % (ref 18–48)
MCH RBC QN AUTO: 27.6 PG (ref 27–31)
MCHC RBC AUTO-ENTMCNC: 33.3 G/DL (ref 32–36)
MCV RBC AUTO: 83 FL (ref 82–98)
MONOCYTES # BLD AUTO: 1.1 K/UL (ref 0.3–1)
MONOCYTES NFR BLD: 8.5 % (ref 4–15)
NEUTROPHILS # BLD AUTO: 10.5 K/UL (ref 1.8–7.7)
NEUTROPHILS NFR BLD: 84.4 % (ref 38–73)
NRBC BLD-RTO: 0 /100 WBC
PLATELET # BLD AUTO: 313 K/UL (ref 150–450)
PMV BLD AUTO: 8.7 FL (ref 9.2–12.9)
POTASSIUM SERPL-SCNC: 4.5 MMOL/L (ref 3.5–5.1)
PROT SERPL-MCNC: 7.9 G/DL (ref 6–8.4)
RBC # BLD AUTO: 4.45 M/UL (ref 4.6–6.2)
SARS-COV-2 RDRP RESP QL NAA+PROBE: NEGATIVE
SODIUM SERPL-SCNC: 122 MMOL/L (ref 136–145)
TROPONIN I SERPL DL<=0.01 NG/ML-MCNC: <0.006 NG/ML (ref 0–0.03)
WBC # BLD AUTO: 12.42 K/UL (ref 3.9–12.7)

## 2022-10-03 PROCEDURE — 21400001 HC TELEMETRY ROOM

## 2022-10-03 PROCEDURE — 99223 PR INITIAL HOSPITAL CARE,LEVL III: ICD-10-PCS | Mod: ,,, | Performed by: PHYSICIAN ASSISTANT

## 2022-10-03 PROCEDURE — 83880 ASSAY OF NATRIURETIC PEPTIDE: CPT | Performed by: EMERGENCY MEDICINE

## 2022-10-03 PROCEDURE — 96374 THER/PROPH/DIAG INJ IV PUSH: CPT

## 2022-10-03 PROCEDURE — 99285 EMERGENCY DEPT VISIT HI MDM: CPT | Mod: 25

## 2022-10-03 PROCEDURE — 63600175 PHARM REV CODE 636 W HCPCS: Performed by: EMERGENCY MEDICINE

## 2022-10-03 PROCEDURE — 94761 N-INVAS EAR/PLS OXIMETRY MLT: CPT

## 2022-10-03 PROCEDURE — 84484 ASSAY OF TROPONIN QUANT: CPT | Performed by: EMERGENCY MEDICINE

## 2022-10-03 PROCEDURE — 80053 COMPREHEN METABOLIC PANEL: CPT | Performed by: EMERGENCY MEDICINE

## 2022-10-03 PROCEDURE — 87635 SARS-COV-2 COVID-19 AMP PRB: CPT | Performed by: EMERGENCY MEDICINE

## 2022-10-03 PROCEDURE — 25000003 PHARM REV CODE 250: Performed by: NURSE PRACTITIONER

## 2022-10-03 PROCEDURE — 85025 COMPLETE CBC W/AUTO DIFF WBC: CPT | Performed by: EMERGENCY MEDICINE

## 2022-10-03 PROCEDURE — 27000221 HC OXYGEN, UP TO 24 HOURS

## 2022-10-03 PROCEDURE — 99223 1ST HOSP IP/OBS HIGH 75: CPT | Mod: ,,, | Performed by: PHYSICIAN ASSISTANT

## 2022-10-03 PROCEDURE — 63600175 PHARM REV CODE 636 W HCPCS: Performed by: PHYSICIAN ASSISTANT

## 2022-10-03 RX ORDER — AMLODIPINE BESYLATE 5 MG/1
5 TABLET ORAL DAILY
Status: DISCONTINUED | OUTPATIENT
Start: 2022-10-04 | End: 2022-10-06

## 2022-10-03 RX ORDER — FUROSEMIDE 10 MG/ML
40 INJECTION INTRAMUSCULAR; INTRAVENOUS
Status: COMPLETED | OUTPATIENT
Start: 2022-10-03 | End: 2022-10-03

## 2022-10-03 RX ORDER — HEPARIN SODIUM 5000 [USP'U]/ML
5000 INJECTION, SOLUTION INTRAVENOUS; SUBCUTANEOUS EVERY 8 HOURS
Status: DISCONTINUED | OUTPATIENT
Start: 2022-10-03 | End: 2022-10-13 | Stop reason: HOSPADM

## 2022-10-03 RX ORDER — ONDANSETRON 4 MG/1
4 TABLET, ORALLY DISINTEGRATING ORAL EVERY 6 HOURS PRN
Status: DISCONTINUED | OUTPATIENT
Start: 2022-10-03 | End: 2022-10-13 | Stop reason: HOSPADM

## 2022-10-03 RX ORDER — LEVOTHYROXINE SODIUM 50 UG/1
50 TABLET ORAL
Status: DISCONTINUED | OUTPATIENT
Start: 2022-10-04 | End: 2022-10-13 | Stop reason: HOSPADM

## 2022-10-03 RX ORDER — ASPIRIN 81 MG/1
81 TABLET ORAL DAILY
Status: DISCONTINUED | OUTPATIENT
Start: 2022-10-04 | End: 2022-10-13 | Stop reason: HOSPADM

## 2022-10-03 RX ORDER — ONDANSETRON 2 MG/ML
4 INJECTION INTRAMUSCULAR; INTRAVENOUS EVERY 6 HOURS PRN
Status: DISCONTINUED | OUTPATIENT
Start: 2022-10-03 | End: 2022-10-13 | Stop reason: HOSPADM

## 2022-10-03 RX ORDER — SODIUM CHLORIDE 0.9 % (FLUSH) 0.9 %
10 SYRINGE (ML) INJECTION
Status: DISCONTINUED | OUTPATIENT
Start: 2022-10-03 | End: 2022-10-13 | Stop reason: HOSPADM

## 2022-10-03 RX ORDER — METOPROLOL SUCCINATE 25 MG/1
25 TABLET, EXTENDED RELEASE ORAL DAILY
Status: DISCONTINUED | OUTPATIENT
Start: 2022-10-04 | End: 2022-10-13 | Stop reason: HOSPADM

## 2022-10-03 RX ORDER — GABAPENTIN 400 MG/1
400 CAPSULE ORAL 2 TIMES DAILY
Status: DISCONTINUED | OUTPATIENT
Start: 2022-10-04 | End: 2022-10-13 | Stop reason: HOSPADM

## 2022-10-03 RX ORDER — TRAZODONE HYDROCHLORIDE 50 MG/1
50 TABLET ORAL NIGHTLY
Status: DISCONTINUED | OUTPATIENT
Start: 2022-10-04 | End: 2022-10-13 | Stop reason: HOSPADM

## 2022-10-03 RX ORDER — FUROSEMIDE 10 MG/ML
40 INJECTION INTRAMUSCULAR; INTRAVENOUS 2 TIMES DAILY
Status: DISCONTINUED | OUTPATIENT
Start: 2022-10-04 | End: 2022-10-04

## 2022-10-03 RX ORDER — MIRTAZAPINE 15 MG/1
15 TABLET, FILM COATED ORAL NIGHTLY
Status: DISCONTINUED | OUTPATIENT
Start: 2022-10-04 | End: 2022-10-13 | Stop reason: HOSPADM

## 2022-10-03 RX ORDER — ATORVASTATIN CALCIUM 20 MG/1
20 TABLET, FILM COATED ORAL DAILY
Status: DISCONTINUED | OUTPATIENT
Start: 2022-10-04 | End: 2022-10-13 | Stop reason: HOSPADM

## 2022-10-03 RX ADMIN — FUROSEMIDE 40 MG: 10 INJECTION, SOLUTION INTRAMUSCULAR; INTRAVENOUS at 06:10

## 2022-10-03 RX ADMIN — ONDANSETRON 4 MG: 4 TABLET, ORALLY DISINTEGRATING ORAL at 10:10

## 2022-10-03 RX ADMIN — HEPARIN SODIUM 5000 UNITS: 5000 INJECTION INTRAVENOUS; SUBCUTANEOUS at 10:10

## 2022-10-03 NOTE — ED NOTES
06/18/19 0900   C-SSRS (Frequent Screen)   2. Have you actually had any thoughts of killing yourself? No   6. Have you done anything, started to do anything, or prepared to do anything to end your life? No   Suicide Evaluation Negative screen= no ideation, behaviors or history   Patient denies suicidal ideation since last assessed, maintain current plan of care.     Pt rounding complete.  Pain 0/10. Pt reports shortness of breath with exertion. His O2 sat is 98% on 4L nasal canula. Restroom and comfort needs addressed.  Pt updated on plan of care.  Call light within reach.  Will continue to monitor.

## 2022-10-03 NOTE — ED PROVIDER NOTES
Encounter Date: 10/3/2022       History     Chief Complaint   Patient presents with    Shortness of Breath     Pt c/o SOB x 3 days. Hx CHF. Pt also has galvan catheter in place.     89-year-old male with history of HTN, hypothyroidism, lumbar myelopathy and wheelchair-bound, suprapubic catheter due to neurogenic bladder, presents for evaluation of persistent difficulty breathing for the past 3 days.  Patient reports he feels out of breath when laying flat or with any activity.  Patient is bedbound and feels out of breath with being changed or transferring to wheelchair.  He occasionally feels mild chest tightness with difficulty breathing, and has heard some wheezing which is new for him.  He has occasional coughing fit but this has been ongoing for weeks, no worsening now, no associated fevers.  He woke up this morning and noticed slightly worse bilateral leg swelling, but states this is because he had to sit up to sleep last night, he usually lays flat with his legs elevated but this caused worsening shortness of breath.    Review of patient's allergies indicates:   Allergen Reactions    Thiazides Other (See Comments)     Multiple episodes of thiazide-induced hyponatremia     Past Medical History:   Diagnosis Date    Acquired hypothyroidism 7/30/2013    Arthritis     Blood transfusion     before 2005 - whe had gangrenous gall bladder    Cataract     Chronic back pain 12/4/2018    - Takes Percocet 5-325 at home - Can continue current abdominal pain control with Dilaudid 0.5 mg IV Q3H prn     CKD (chronic kidney disease) stage 3, GFR 30-59 ml/min     Compression fracture of lumbar vertebra     Depression     Dyslipidemia     Essential hypertension 7/30/2013    Gastroesophageal reflux disease without esophagitis 12/4/2018    - continue home Prilosec    General anesthetics causing adverse effect in therapeutic use     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    GERD (gastroesophageal reflux disease)     History of  postoperative delirium 12/4/2018    - Patient reports 1 week history of post-op delirium 7/2018 - Monitor electrolytes, UA, and urine cx - Delirium precautions  - Can use Seroquel 25 mg QHS prn     Hypertension     Hyponatremia 10/23/2017    Impaired mobility and ADLs 6/28/2018    Neurogenic bladder 12/4/2018    Sleep disorder 12/4/2018    - continue Trazodone QHS    Thyroid disease     UTI (urinary tract infection)      Past Surgical History:   Procedure Laterality Date    BIOPSY OF BLADDER  9/1/2020    Procedure: BIOPSY, BLADDER;  Surgeon: Daron Manjarrez MD;  Location: [address];  Service: Urology;;    BLADDER FULGURATION  9/1/2020    Procedure: FULGURATION, BLADDER;  Surgeon: Daron Manjarrez MD;  Location: [address];  Service: Urology;;    CHOLECYSTECTOMY      CYSTOGRAM  9/1/2020    Procedure: CYSTOGRAM;  Surgeon: Daron Manjarrez MD;  Location: [address];  Service: Urology;;    CYSTOSCOPY N/A 9/1/2020    Procedure: CYSTOSCOPY;  Surgeon: Daron Manjarrez MD;  Location: [address];  Service: Urology;  Laterality: N/A;    DILATION OF URETHRA  9/1/2020    Procedure: DILATION, URETHRA;  Surgeon: Daron Manjarrez MD;  Location: [address];  Service: Urology;;    EYE SURGERY Right     IOL    HERNIA REPAIR      INTRAOCULAR PROSTHESES INSERTION Left 5/28/2018    Procedure: INSERTION-INTRAOCULAR LENS (IOL);  Surgeon: Trinity Vazquez MD;  Location: [facility];  Service: Ophthalmology;  Laterality: Left;    JOINT REPLACEMENT      LAMINECTOMY  12/27/2016    L2-L4    LUMBAR LAMINECTOMY  12/2016    PARATHYROIDECTOMY      PHACOEMULSIFICATION OF CATARACT Left 5/28/2018    Procedure: PHACOEMULSIFICATION-ASPIRATION-CATARACT;  Surgeon: Trinity Vazquez MD;  Location: [facility];  Service: Ophthalmology;  Laterality: Left;    REMOVAL OF BLOOD CLOT  9/1/2020    Procedure: REMOVAL, BLOOD CLOT;  Surgeon: Daron Manjarrez MD;  Location: [address];  Service: Urology;;    REPAIR OF INCARCERATED  INCISIONAL HERNIA WITHOUT HISTORY OF PRIOR REPAIR N/A 2018    Procedure: REPAIR, HERNIA, INCISIONAL, INCARCERATED, WITHOUT HISTORY OF PRIOR REPAIR;  Surgeon: Steve Valentin MD;  Location: [address];  Service: General;  Laterality: N/A;    REPAIR OF INCARCERATED VENTRAL HERNIA WITHOUT HISTORY OF PRIOR REPAIR N/A 2018    Procedure: REPAIR, HERNIA, VENTRAL, INCARCERATED, WITHOUT HISTORY OF PRIOR REPAIR;  Surgeon: Guilherme Ordoñez MD;  Location: [address];  Service: General;  Laterality: N/A;    TOTAL KNEE ARTHROPLASTY Bilateral     TOTAL KNEE ARTHROPLASTY Left 10/25/2017    TKR     Family History   Problem Relation Age of Onset    Hypertension Mother     Diabetes Father     Esophageal cancer Father      Social History     Tobacco Use    Smoking status: Former     Years: 20.00     Types: Cigarettes     Quit date:      Years since quittin.7    Smokeless tobacco: Never    Tobacco comments:     quit    Substance Use Topics    Alcohol use: No     Comment: rarely/6 months    Drug use: No     Review of Systems   Constitutional:  Negative for fever.   HENT:  Negative for congestion.    Eyes:  Negative for redness.   Respiratory:  Positive for cough, chest tightness, shortness of breath and wheezing.    Cardiovascular:  Negative for chest pain.   Gastrointestinal:  Negative for abdominal pain.   Genitourinary:  Negative for dysuria.   Skin:  Negative for rash.   Neurological:  Negative for headaches.   Psychiatric/Behavioral:  Negative for confusion.      Physical Exam     Initial Vitals   BP Pulse Resp Temp SpO2   10/03/22 1547 10/03/22 1547 10/03/22 1547 10/03/22 1544 10/03/22 1542   (!) 165/77 68 (!) 24 97.9 °F (36.6 °C) 97 %      MAP       --                Physical Exam    Nursing note and vitals reviewed.  Constitutional: He is not diaphoretic. No distress.   Chronically ill-appearing   HENT:   Head: Normocephalic and atraumatic.   Eyes: Conjunctivae are normal. No scleral icterus.    Neck: Neck supple.   Cardiovascular:  Normal rate, regular rhythm and intact distal pulses.           Murmur (Faint systolic murmur) heard.  Pulmonary/Chest: No respiratory distress. He has wheezes. He has rales.   Bibasilar rales with faint expiratory wheezing in both lung bases   Abdominal: Abdomen is soft. There is no abdominal tenderness.   Suprapubic catheter in place There is no rebound and no guarding.   Musculoskeletal:         General: Edema present.      Cervical back: Neck supple.      Comments: Chronic 1+ BLE edema, both legs with compression dressings in place     Neurological: He is alert and oriented to person, place, and time.   Skin: Skin is warm and dry.   Psychiatric: He has a normal mood and affect.       ED Course   Procedures  Labs Reviewed   CBC W/ AUTO DIFFERENTIAL - Abnormal; Notable for the following components:       Result Value    RBC 4.45 (*)     Hemoglobin 12.3 (*)     Hematocrit 36.9 (*)     MPV 8.7 (*)     Immature Granulocytes 0.9 (*)     Gran # (ANC) 10.5 (*)     Immature Grans (Abs) 0.11 (*)     Lymph # 0.7 (*)     Mono # 1.1 (*)     Gran % 84.4 (*)     Lymph % 5.5 (*)     All other components within normal limits   COMPREHENSIVE METABOLIC PANEL - Abnormal; Notable for the following components:    Sodium 122 (*)     Chloride 83 (*)     Glucose 115 (*)     All other components within normal limits   B-TYPE NATRIURETIC PEPTIDE - Abnormal; Notable for the following components:     (*)     All other components within normal limits   TROPONIN I   SARS-COV-2 RDRP GENE     EKG Readings: (Independently Interpreted)   Normal sinus rhythm at 72, LVH pattern, inverted T-waves laterally, no acute ischemia or significant change from previous.     Imaging Results              X-Ray Chest AP Portable (Final result)  Result time 10/03/22 17:13:03      Final result by South Waters MD (10/03/22 17:13:03)                   Impression:      1.  Worsening pulmonary vascular congestion,  suggestive of worsening fluid overload state.    2.  Persistent airspace opacity in the left lung base with associated suspected moderate parapneumonic effusion.      Electronically signed by: South Waters MD  Date:    10/03/2022  Time:    17:13               Narrative:    EXAMINATION:  XR CHEST AP PORTABLE    CLINICAL HISTORY:  Dyspnea, unspecified    TECHNIQUE:  Single frontal view of the chest was performed.    COMPARISON:  08/08/2022.    FINDINGS:  The trachea is unremarkable.  There is stable enlargement of the cardiomediastinal silhouette.  There is no evidence of free air beneath the hemidiaphragms.  There is no pleural effusion right.  There is probable moderate left-sided pleural effusion.  There is no evidence of a pneumothorax.    There is no evidence of pneumomediastinum.  There is worsening pulmonary vascular congestion.  There is a stable airspace opacity in the left lung base.  There are degenerative changes in the osseous structures.                                       Medications   furosemide injection 40 mg (40 mg Intravenous Given 10/3/22 1821)     Medical Decision Making:   Initial Assessment:       89-year-old male with history of HTN, hypothyroidism, lumbar myelopathy and wheelchair-bound, suprapubic catheter due to neurogenic bladder, presents for evaluation of persistent difficulty breathing for the past 3 days.  Patient is wheelchair and bed bound, but feels difficulty breathing when he lays flat or does any limited activities such as bed transfer or changing.  He has mild occasional coughing fits, unchanged now, but has had some associated chest tightness with difficulty breathing as well as new onset wheezing.  He has chronic 1+ BLE edema and healing chronic ulcers, slightly worse this morning because he was unable to lay flat and elevate his legs due to difficulty breathing.  He denies any history of similar previous episodes, no known cardiac history or CHF.  On arrival patient with  slightly elevated BP, mild tachypnea but able to speak in full sentences.  Lung exam concerning for bibasilar rales and expiratory wheezing, and he has chronic 1+ BLE edema.    Based on presentation, concern for new onset CHF.  Per chart review, he was seen here about a month ago for abnormal chest x-ray that was found to be chronic left hemidiaphragm elevation, but also had expiratory wheezing at that time so was treated with albuterol with improvement.  BNP at that time mildly elevated similar to previous baseline.  His last echo was in 2020 and showed moderate to severe aortic stenosis but normal EF, he could have worsening of aortic stenosis causing new onset CHF, less likely worsening EF or ACS.  Lower suspicion for infectious etiology such as pneumonia or COVID-19.    Initial labs with mildly elevated BNP at 212, similar to previous levels, with normal troponin.  COVID-19 negative.  CMP significant for hyponatremia at 122.  Chest x-ray concerning for worsening signs of pulmonary edema, with persistent left base airspace opacity concerning for effusion.  Based on clinical picture, concern for new onset CHF, patient treated with IV Lasix in ED.  His O2 sat on room air is 82%, but stable with 3 L nasal cannula at 95%; he did not previously required any supplemental O2.  Based on new hypoxia and difficulty breathing, and hyponatremia likely from fluid overload, and need for CHF workup including repeat echo determine if aortic stenosis or EF has worsened, will admit to hospitalist for further diuresis and workup.                              Clinical Impression:   Final diagnoses:  [R06.00] Dyspnea  [I50.9] Congestive heart failure, unspecified HF chronicity, unspecified heart failure type (Primary)        ED Disposition Condition    Admit Stable                Alex Ryan MD  10/03/22 1929       Alex Ryan MD  10/03/22 1955

## 2022-10-03 NOTE — ED NOTES
Bed: Incoming ED Transfer 1  Expected date:   Expected time:   Means of arrival:   Comments:  89 M SOB

## 2022-10-03 NOTE — TELEPHONE ENCOUNTER
----- Message from Jeannette Giron sent at 10/3/2022  4:27 PM CDT -----  Contact: [facility]  [phone]  Pt was picked up my EMS 10/3/22. Pt at [facility] .Pt had a low o2 stat 79%

## 2022-10-03 NOTE — ED TRIAGE NOTES
Patient reports to ED with shortness of breath for the last 3 days. He reports the shortness of breath is worse when laying down and on exertion. Hx of CHF. Denies chest pain. He is aaox4.

## 2022-10-04 ENCOUNTER — PATIENT OUTREACH (OUTPATIENT)
Dept: ADMINISTRATIVE | Facility: OTHER | Age: 87
End: 2022-10-04
Payer: MEDICARE

## 2022-10-04 PROBLEM — N30.00 ACUTE CYSTITIS: Status: ACTIVE | Noted: 2022-10-04

## 2022-10-04 PROBLEM — R82.71 ASYMPTOMATIC BACTERIURIA: Status: ACTIVE | Noted: 2022-10-04

## 2022-10-04 LAB
ALLENS TEST: ABNORMAL
ALLENS TEST: ABNORMAL
ANION GAP SERPL CALC-SCNC: 14 MMOL/L (ref 8–16)
AV INDEX (PROSTH): 0.27
AV MEAN GRADIENT: 44 MMHG
AV PEAK GRADIENT: 67 MMHG
AV VALVE AREA: 0.89 CM2
AV VELOCITY RATIO: 0.24
BSA FOR ECHO PROCEDURE: 2.09 M2
BUN SERPL-MCNC: 14 MG/DL (ref 8–23)
CALCIUM SERPL-MCNC: 8.6 MG/DL (ref 8.7–10.5)
CHLORIDE SERPL-SCNC: 82 MMOL/L (ref 95–110)
CO2 SERPL-SCNC: 26 MMOL/L (ref 23–29)
CREAT SERPL-MCNC: 0.9 MG/DL (ref 0.5–1.4)
CREAT UR-MCNC: 11.5 MG/DL (ref 23–375)
CREAT UR-MCNC: 11.5 MG/DL (ref 23–375)
CV ECHO LV RWT: 0.33 CM
DELSYS: ABNORMAL
DOP CALC AO PEAK VEL: 4.09 M/S
DOP CALC AO VTI: 100.1 CM
DOP CALC LVOT AREA: 3.2 CM2
DOP CALC LVOT DIAMETER: 2.03 CM
DOP CALC LVOT PEAK VEL: 0.98 M/S
DOP CALC LVOT STROKE VOLUME: 88.64 CM3
DOP CALCLVOT PEAK VEL VTI: 27.4 CM
E WAVE DECELERATION TIME: 241.04 MSEC
E/A RATIO: 0.87
ECHO LV POSTERIOR WALL: 0.94 CM (ref 0.6–1.1)
EJECTION FRACTION: 65 %
EP: 7
ERYTHROCYTE [SEDIMENTATION RATE] IN BLOOD BY WESTERGREN METHOD: 16 MM/H
EST. GFR  (NO RACE VARIABLE): >60 ML/MIN/1.73 M^2
ESTIMATED AVG GLUCOSE: 105 MG/DL (ref 68–131)
FIO2: 50
FRACTIONAL SHORTENING: 42 % (ref 28–44)
GLUCOSE SERPL-MCNC: 99 MG/DL (ref 70–110)
HBA1C MFR BLD: 5.3 % (ref 4–5.6)
HCO3 UR-SCNC: 35.8 MMOL/L (ref 24–28)
HCO3 UR-SCNC: 36.6 MMOL/L (ref 24–28)
HCT VFR BLD CALC: 36 %PCV (ref 36–54)
HGB BLD-MCNC: 12 G/DL
INTERVENTRICULAR SEPTUM: 0.92 CM (ref 0.6–1.1)
IP: 16
IVRT: 83.73 MSEC
LA MAJOR: 5.7 CM
LA MINOR: 6.04 CM
LA WIDTH: 4.1 CM
LEFT ATRIUM SIZE: 3.46 CM
LEFT ATRIUM VOLUME INDEX MOD: 52.5 ML/M2
LEFT ATRIUM VOLUME INDEX: 34.7 ML/M2
LEFT ATRIUM VOLUME MOD: 107 CM3
LEFT ATRIUM VOLUME: 70.72 CM3
LEFT INTERNAL DIMENSION IN SYSTOLE: 3.36 CM (ref 2.1–4)
LEFT VENTRICLE DIASTOLIC VOLUME INDEX: 79.94 ML/M2
LEFT VENTRICLE DIASTOLIC VOLUME: 163.07 ML
LEFT VENTRICLE MASS INDEX: 103 G/M2
LEFT VENTRICLE SYSTOLIC VOLUME INDEX: 22.6 ML/M2
LEFT VENTRICLE SYSTOLIC VOLUME: 46.05 ML
LEFT VENTRICULAR INTERNAL DIMENSION IN DIASTOLE: 5.75 CM (ref 3.5–6)
LEFT VENTRICULAR MASS: 209.1 G
LVOT MG: 2.22 MMHG
LVOT MV: 0.72 CM/S
MAGNESIUM SERPL-MCNC: 1.7 MG/DL (ref 1.6–2.6)
MODE: ABNORMAL
MV PEAK A VEL: 1.23 M/S
MV PEAK E VEL: 1.07 M/S
MV STENOSIS PRESSURE HALF TIME: 63.47 MS
MV VALVE AREA P 1/2 METHOD: 3.47 CM2
OSMOLALITY UR: 267 MOSM/KG (ref 50–1200)
PCO2 BLDA: 59.2 MMHG (ref 35–45)
PCO2 BLDA: 79.8 MMHG (ref 35–45)
PH SMN: 7.27 [PH] (ref 7.35–7.45)
PH SMN: 7.39 [PH] (ref 7.35–7.45)
PISA MRMAX VEL: 5.5 M/S
PISA TR MAX VEL: 3.73 M/S
PO2 BLDA: 50 MMHG (ref 80–100)
PO2 BLDA: 79 MMHG (ref 80–100)
POC BE: 10 MMOL/L
POC BE: 11 MMOL/L
POC IONIZED CALCIUM: 1 MMOL/L (ref 1.06–1.42)
POC SATURATED O2: 83 % (ref 95–100)
POC SATURATED O2: 93 % (ref 95–100)
POC TCO2: 38 MMOL/L (ref 23–27)
POC TCO2: 39 MMOL/L (ref 23–27)
POTASSIUM BLD-SCNC: 3.6 MMOL/L (ref 3.5–5.1)
POTASSIUM SERPL-SCNC: 4.3 MMOL/L (ref 3.5–5.1)
PROT UR-MCNC: 14 MG/DL (ref 0–15)
PROT/CREAT UR: 1.22 MG/G{CREAT} (ref 0–0.2)
RA MAJOR: 4.8 CM
RA PRESSURE: 3 MMHG
RA WIDTH: 3.2 CM
SAMPLE: ABNORMAL
SAMPLE: ABNORMAL
SITE: ABNORMAL
SITE: ABNORMAL
SODIUM BLD-SCNC: 116 MMOL/L (ref 136–145)
SODIUM SERPL-SCNC: 120 MMOL/L (ref 136–145)
SODIUM SERPL-SCNC: 120 MMOL/L (ref 136–145)
SODIUM SERPL-SCNC: 122 MMOL/L (ref 136–145)
SODIUM SERPL-SCNC: 122 MMOL/L (ref 136–145)
SODIUM UR-SCNC: 75 MMOL/L (ref 20–250)
TR MAX PG: 56 MMHG
TSH SERPL DL<=0.005 MIU/L-ACNC: 2.86 UIU/ML (ref 0.4–4)
TV REST PULMONARY ARTERY PRESSURE: 59 MMHG
URATE SERPL-MCNC: 5.1 MG/DL (ref 3.4–7)

## 2022-10-04 PROCEDURE — 99223 1ST HOSP IP/OBS HIGH 75: CPT | Mod: ,,, | Performed by: INTERNAL MEDICINE

## 2022-10-04 PROCEDURE — 99233 SBSQ HOSP IP/OBS HIGH 50: CPT | Mod: ,,, | Performed by: HOSPITALIST

## 2022-10-04 PROCEDURE — 83735 ASSAY OF MAGNESIUM: CPT | Performed by: PHYSICIAN ASSISTANT

## 2022-10-04 PROCEDURE — 27000221 HC OXYGEN, UP TO 24 HOURS

## 2022-10-04 PROCEDURE — 94660 CPAP INITIATION&MGMT: CPT

## 2022-10-04 PROCEDURE — 83036 HEMOGLOBIN GLYCOSYLATED A1C: CPT | Performed by: PHYSICIAN ASSISTANT

## 2022-10-04 PROCEDURE — 84300 ASSAY OF URINE SODIUM: CPT | Performed by: PHYSICIAN ASSISTANT

## 2022-10-04 PROCEDURE — 36415 COLL VENOUS BLD VENIPUNCTURE: CPT | Performed by: PHYSICIAN ASSISTANT

## 2022-10-04 PROCEDURE — 20000000 HC ICU ROOM

## 2022-10-04 PROCEDURE — 84443 ASSAY THYROID STIM HORMONE: CPT | Performed by: NURSE PRACTITIONER

## 2022-10-04 PROCEDURE — 63600175 PHARM REV CODE 636 W HCPCS

## 2022-10-04 PROCEDURE — 25000003 PHARM REV CODE 250: Performed by: PHYSICIAN ASSISTANT

## 2022-10-04 PROCEDURE — 63600175 PHARM REV CODE 636 W HCPCS: Performed by: PHYSICIAN ASSISTANT

## 2022-10-04 PROCEDURE — 99900035 HC TECH TIME PER 15 MIN (STAT)

## 2022-10-04 PROCEDURE — 25000003 PHARM REV CODE 250: Performed by: NURSE PRACTITIONER

## 2022-10-04 PROCEDURE — 99223 PR INITIAL HOSPITAL CARE,LEVL III: ICD-10-PCS | Mod: ,,, | Performed by: INTERNAL MEDICINE

## 2022-10-04 PROCEDURE — 94761 N-INVAS EAR/PLS OXIMETRY MLT: CPT

## 2022-10-04 PROCEDURE — 84295 ASSAY OF SERUM SODIUM: CPT | Mod: 91 | Performed by: NURSE PRACTITIONER

## 2022-10-04 PROCEDURE — 25000003 PHARM REV CODE 250: Performed by: STUDENT IN AN ORGANIZED HEALTH CARE EDUCATION/TRAINING PROGRAM

## 2022-10-04 PROCEDURE — 36415 COLL VENOUS BLD VENIPUNCTURE: CPT | Performed by: NURSE PRACTITIONER

## 2022-10-04 PROCEDURE — 99233 PR SUBSEQUENT HOSPITAL CARE,LEVL III: ICD-10-PCS | Mod: ,,, | Performed by: HOSPITALIST

## 2022-10-04 PROCEDURE — 25000003 PHARM REV CODE 250

## 2022-10-04 PROCEDURE — 83935 ASSAY OF URINE OSMOLALITY: CPT | Performed by: PHYSICIAN ASSISTANT

## 2022-10-04 PROCEDURE — 84550 ASSAY OF BLOOD/URIC ACID: CPT | Performed by: PHYSICIAN ASSISTANT

## 2022-10-04 PROCEDURE — 36600 WITHDRAWAL OF ARTERIAL BLOOD: CPT

## 2022-10-04 PROCEDURE — 82803 BLOOD GASES ANY COMBINATION: CPT

## 2022-10-04 PROCEDURE — 84156 ASSAY OF PROTEIN URINE: CPT | Performed by: PHYSICIAN ASSISTANT

## 2022-10-04 PROCEDURE — 80048 BASIC METABOLIC PNL TOTAL CA: CPT | Performed by: PHYSICIAN ASSISTANT

## 2022-10-04 PROCEDURE — 27000190 HC CPAP FULL FACE MASK W/VALVE

## 2022-10-04 RX ORDER — TOLVAPTAN 15 MG/1
15 TABLET ORAL ONCE
Status: COMPLETED | OUTPATIENT
Start: 2022-10-04 | End: 2022-10-04

## 2022-10-04 RX ORDER — FUROSEMIDE 10 MG/ML
40 INJECTION INTRAMUSCULAR; INTRAVENOUS 2 TIMES DAILY
Status: DISCONTINUED | OUTPATIENT
Start: 2022-10-04 | End: 2022-10-07

## 2022-10-04 RX ORDER — MUPIROCIN 20 MG/G
OINTMENT TOPICAL 2 TIMES DAILY
Status: DISPENSED | OUTPATIENT
Start: 2022-10-04 | End: 2022-10-09

## 2022-10-04 RX ADMIN — VANCOMYCIN HYDROCHLORIDE 2000 MG: 500 INJECTION, POWDER, LYOPHILIZED, FOR SOLUTION INTRAVENOUS at 05:10

## 2022-10-04 RX ADMIN — GABAPENTIN 400 MG: 400 CAPSULE ORAL at 11:10

## 2022-10-04 RX ADMIN — ATORVASTATIN CALCIUM 20 MG: 20 TABLET, FILM COATED ORAL at 08:10

## 2022-10-04 RX ADMIN — HEPARIN SODIUM 5000 UNITS: 5000 INJECTION INTRAVENOUS; SUBCUTANEOUS at 11:10

## 2022-10-04 RX ADMIN — FUROSEMIDE 40 MG: 10 INJECTION, SOLUTION INTRAMUSCULAR; INTRAVENOUS at 05:10

## 2022-10-04 RX ADMIN — AMLODIPINE BESYLATE 5 MG: 5 TABLET ORAL at 08:10

## 2022-10-04 RX ADMIN — TOLVAPTAN 15 MG: 15 TABLET ORAL at 11:10

## 2022-10-04 RX ADMIN — METOPROLOL SUCCINATE 25 MG: 25 TABLET, EXTENDED RELEASE ORAL at 08:10

## 2022-10-04 RX ADMIN — HEPARIN SODIUM 5000 UNITS: 5000 INJECTION INTRAVENOUS; SUBCUTANEOUS at 02:10

## 2022-10-04 RX ADMIN — MIRTAZAPINE 15 MG: 15 TABLET, FILM COATED ORAL at 11:10

## 2022-10-04 RX ADMIN — TRAZODONE HYDROCHLORIDE 50 MG: 50 TABLET ORAL at 11:10

## 2022-10-04 RX ADMIN — ONDANSETRON 4 MG: 4 TABLET, ORALLY DISINTEGRATING ORAL at 10:10

## 2022-10-04 RX ADMIN — HEPARIN SODIUM 5000 UNITS: 5000 INJECTION INTRAVENOUS; SUBCUTANEOUS at 05:10

## 2022-10-04 RX ADMIN — ASPIRIN 81 MG: 81 TABLET, COATED ORAL at 08:10

## 2022-10-04 RX ADMIN — LEVOTHYROXINE SODIUM 50 MCG: 50 TABLET ORAL at 05:10

## 2022-10-04 RX ADMIN — FUROSEMIDE 40 MG: 10 INJECTION, SOLUTION INTRAMUSCULAR; INTRAVENOUS at 11:10

## 2022-10-04 RX ADMIN — GABAPENTIN 400 MG: 400 CAPSULE ORAL at 08:10

## 2022-10-04 RX ADMIN — MUPIROCIN: 20 OINTMENT TOPICAL at 11:10

## 2022-10-04 RX ADMIN — MUPIROCIN: 20 OINTMENT TOPICAL at 08:10

## 2022-10-04 NOTE — ASSESSMENT & PLAN NOTE
- hypertensive on presentation  - home meds: amlodipine 5 mg QD, metoprolol succinate 25 mg QD  - monitor.  Improving with diuresis.

## 2022-10-04 NOTE — CONSULTS
Consult Note  Nephrology    Consult Requested By: Xin Mason MD  Reason for Consult: <NA    SUBJECTIVE:     History of Present Illness:  Patient is a retired  at [employer] 89 y.o. male presents with worsening SOB x 3 days and found to have pulm edema/CHF/hypoxia.  Labs revealing acute on chronic hyponatremia of 122.  Placed on fluid restriction, lasix, and admitted to hospital for monitoring.  Na remains 122 this am and consulted for evaluation.  Extensive medical history as outlined below including suprapubic catheter for neurogenic bladder, lower extremity edema currently with Ace wraps, mobility impairment, combined CHF and aortic stenosis, and chronic hyponatremia with sodium ranging from 119-137 since 2004.  Patient seen and examined with medical power of  and registered nurse at bedside.  Patient using accessory muscles and work of breathing with signs and symptoms of volume overload.  Admits to drinking 8-10 bottles of water per day.  Discussed treatment plan and agrees with Samsca.  Updated team.    Epic reviewed    Currently no chest pain, nausea, vomiting, diarrhea, fever, chills.  Positive shortness of breath and dyspnea on exertion      Past Medical History:   Diagnosis Date    Acquired hypothyroidism 7/30/2013    Arthritis     Blood transfusion     before 2005 - whe had gangrenous gall bladder    Cataract     Chronic back pain 12/4/2018    - Takes Percocet 5-325 at home - Can continue current abdominal pain control with Dilaudid 0.5 mg IV Q3H prn     CKD (chronic kidney disease) stage 3, GFR 30-59 ml/min     Compression fracture of lumbar vertebra     Depression     Dyslipidemia     Essential hypertension 7/30/2013    Gastroesophageal reflux disease without esophagitis 12/4/2018    - continue home Prilosec    General anesthetics causing adverse effect in therapeutic use     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    GERD (gastroesophageal reflux disease)     History of  postoperative delirium 12/4/2018    - Patient reports 1 week history of post-op delirium 7/2018 - Monitor electrolytes, UA, and urine cx - Delirium precautions  - Can use Seroquel 25 mg QHS prn     Hypertension     Hyponatremia 10/23/2017    Impaired mobility and ADLs 6/28/2018    Neurogenic bladder 12/4/2018    Sleep disorder 12/4/2018    - continue Trazodone QHS    Thyroid disease     UTI (urinary tract infection)      Past Surgical History:   Procedure Laterality Date    BIOPSY OF BLADDER  9/1/2020    Procedure: BIOPSY, BLADDER;  Surgeon: Daron Manjarrez MD;  Location: [address];  Service: Urology;;    BLADDER FULGURATION  9/1/2020    Procedure: FULGURATION, BLADDER;  Surgeon: Daron Manjarrez MD;  Location: [address];  Service: Urology;;    CHOLECYSTECTOMY      CYSTOGRAM  9/1/2020    Procedure: CYSTOGRAM;  Surgeon: Daron Manjarrez MD;  Location: [address];  Service: Urology;;    CYSTOSCOPY N/A 9/1/2020    Procedure: CYSTOSCOPY;  Surgeon: Daron Manjarrez MD;  Location: [address];  Service: Urology;  Laterality: N/A;    DILATION OF URETHRA  9/1/2020    Procedure: DILATION, URETHRA;  Surgeon: Daron Manjarrez MD;  Location: [address];  Service: Urology;;    EYE SURGERY Right     IOL    HERNIA REPAIR      INTRAOCULAR PROSTHESES INSERTION Left 5/28/2018    Procedure: INSERTION-INTRAOCULAR LENS (IOL);  Surgeon: Trinity Vazquez MD;  Location: [facility];  Service: Ophthalmology;  Laterality: Left;    JOINT REPLACEMENT      LAMINECTOMY  12/27/2016    L2-L4    LUMBAR LAMINECTOMY  12/2016    PARATHYROIDECTOMY      PHACOEMULSIFICATION OF CATARACT Left 5/28/2018    Procedure: PHACOEMULSIFICATION-ASPIRATION-CATARACT;  Surgeon: Trinity Vazquez MD;  Location: [facility];  Service: Ophthalmology;  Laterality: Left;    REMOVAL OF BLOOD CLOT  9/1/2020    Procedure: REMOVAL, BLOOD CLOT;  Surgeon: Daron Manjarrez MD;  Location: [address];  Service: Urology;;    REPAIR OF INCARCERATED  INCISIONAL HERNIA WITHOUT HISTORY OF PRIOR REPAIR N/A 2018    Procedure: REPAIR, HERNIA, INCISIONAL, INCARCERATED, WITHOUT HISTORY OF PRIOR REPAIR;  Surgeon: Steve Valentin MD;  Location: [facility] OR [address] [place] [address];  Service: General;  Laterality: N/A;    REPAIR OF INCARCERATED VENTRAL HERNIA WITHOUT HISTORY OF PRIOR REPAIR N/A 2018    Procedure: REPAIR, HERNIA, VENTRAL, INCARCERATED, WITHOUT HISTORY OF PRIOR REPAIR;  Surgeon: Guilherme Ordoñez MD;  Location: [facility] OR [address] [place] [address];  Service: General;  Laterality: N/A;    TOTAL KNEE ARTHROPLASTY Bilateral     TOTAL KNEE ARTHROPLASTY Left 10/25/2017    TKR     Family History   Problem Relation Age of Onset    Hypertension Mother     Diabetes Father     Esophageal cancer Father      Social History     Tobacco Use    Smoking status: Former     Years: 20.00     Types: Cigarettes     Quit date:      Years since quittin.7    Smokeless tobacco: Never    Tobacco comments:     quit    Substance Use Topics    Alcohol use: No     Comment: rarely/6 months    Drug use: No       Review of patient's allergies indicates:   Allergen Reactions    Thiazides Other (See Comments)     Multiple episodes of thiazide-induced hyponatremia        Review of Systems:  Constitutional: No fever or chills  Respiratory: shortness of breath  Cardiovascular: No chest pain or palpitations  Gastrointestinal: No nausea or vomiting  Neurological: No confusion or weakness    OBJECTIVE:     Vital Signs (Most Recent)  Temp: 99 °F (37.2 °C) (10/04/22 08)  Pulse: 68 (10/04/22 1053)  Resp: 12 (10/04/22 0413)  BP: (!) 152/71 (10/04/22 08)  SpO2: (!) 90 % (10/04/22 08)    Vital Signs Range (Last 24H):  Temp:  [97.5 °F (36.4 °C)-99 °F (37.2 °C)]   Pulse:  [63-83]   Resp:  [12-24]   BP: (144-184)/(67-98)   SpO2:  [90 %-98 %]       Intake/Output Summary (Last 24 hours) at 10/4/2022 1100  Last data filed at 10/4/2022 0642  Gross per 24 hour   Intake --   Output 2350 ml   Net -2350 ml       Physical  Exam:  General appearance:  Elderly, frail, increased work of breathing  Eyes:  Conjunctivae/corneas clear. PERRL.  Lungs:  Increased work of breathing with scattered rales   Heart: Regular rate and rhythm, S1, S2 normal, AS murmur  Abdomen: Soft, non-tender non-distended; bowel sounds normal; no masses,  no organomegaly, obese, suprapubic catheter  Extremities: No cyanosis or clubbing.  Compression bands on  Skin: Skin color, texture, turgor normal. No rashes or lesions  Neurologic:  Generally weak       Laboratory:  Recent Labs   Lab 10/03/22  1716   WBC 12.42   RBC 4.45*   HGB 12.3*   HCT 36.9*      MCV 83   MCH 27.6   MCHC 33.3     BMP:   Recent Labs   Lab 10/04/22  0431   GLU 99   *   K 4.3   CL 82*   CO2 26   BUN 14   CREATININE 0.9   CALCIUM 8.6*   MG 1.7     Lab Results   Component Value Date    CALCIUM 8.6 (L) 10/04/2022    PHOS 2.8 09/18/2020     BNP  Recent Labs   Lab 10/03/22  1716   *     Lab Results   Component Value Date    URICACID 5.1 10/04/2022     Lab Results   Component Value Date    IRON 25 (L) 10/20/2016    TIBC 300 10/20/2016    FERRITIN 209 10/20/2016     Lab Results   Component Value Date    .1 (H) 08/31/2022    CALCIUM 8.6 (L) 10/04/2022    CAION 1.07 09/03/2019    PHOS 2.8 09/18/2020       Diagnostic Results:  X-Ray Chest AP Portable   Final Result      1.  Worsening pulmonary vascular congestion, suggestive of worsening fluid overload state.      2.  Persistent airspace opacity in the left lung base with associated suspected moderate parapneumonic effusion.         Electronically signed by: South Waters MD   Date:    10/03/2022   Time:    17:13          ASSESSMENT/PLAN:     Acute on chronic hypervolemic hyponatremia secondary to CHF, excessive water intake, etc:  -urine studies hard to interpret with lasix on board.  -admit  and same following lasix/fluid restriction.  -dose samsca now and trend Q6.  Allow to drink today.  -long standing hx of hyponatremia  with variable ranges (119-137).  -check TSH.  -updated team.  -Renally dose meds, avoid nephrotoxins, and monitor I/O's closely.      Acute on chronic combined CHF/AS:  -Dr. Rdz following and discussed.      Neurogenic bladder:  -cath exchanged.  -defer.      Dispo:  Consult palliative care for goals of care  He has 24 hr sitters and a MPOA.       Thanks for consult  See above  Will follow along.

## 2022-10-04 NOTE — ASSESSMENT & PLAN NOTE
- hypertensive at present   - home meds: amlodipine 5 mg QD, metoprolol succinate 25 mg QD  - monitor

## 2022-10-04 NOTE — NURSING
"Pt in room 341 for Echo.    Pt states, "I can't feel the oxygen."  Pt receiving 4L O2 via NC.  SpO2 85%  RR 22.      Pt O2 increased to 15 L.  SpO2 87%    Pt placed on nonrebreather SpO2 100%    Returned pt to room.  Will attempt to wean O2.  "

## 2022-10-04 NOTE — PROGRESS NOTES
"[facility] Medicine  Progress Note    Patient Name: Chucho Prado  MRN: [id number]  Patient Class: IP- Inpatient   Admission Date: 10/3/2022  Length of Stay: 1 days  Attending Physician: Xin Mason MD  Primary Care Provider: Obed Mcguire MD        Subjective:     Principal Problem:Acute heart failure        HPI:  From H&P by Kerrie Torres PA:  ". Chucho Prado is a 89 y.o. male, with PMH of CHF, HTN, hypothyroidism, HLD, MDD, CARLINE, CKD-3, neurogenic bladder s/p suprapubic catheter insertion, GERD, wheelchair bound, who presented to [facility] ED on 10/3/22 due to shortness of breath x 3 days. He states he feels out of breath when laying flat or with activity (ie. Being changed or transferring to the wheelchair). Additionally, he states he feels occasional chest tightness, has been having wheezing, and coughing (present for weeks). Upon awakening this morning he noted b/l lower extremity swelling, which he attributes to sitting up while sleeping last night and not elevating his legs. He denies fever. He was evaluated in the ED with labs showing hyponatremia with sodium of 122, chloride of 83. A BNP was elevated at 212, and troponin was negative. A CXR showed worsening pulmonary vascular congestion, and persistence of a left lung base airspace opacity. He was treated in the ED with 40 mg IV lasix."      Overview/Hospital Course:  Patient was admitted under a heart failure protocol on IV Lasix and Cardiology was consulted to follow.  His 2D echo was repeated.  Nephrology was consulted for the hyponatremia.  He was given a dose of tolvaptan and his sodium level was monitored closely.      Interval History:  Patient is new to me.  He reports being short of breath and legs more swollen than usual.  Has tubigrips on.  Oxygen saturation on the low side on supplemental oxygen.  He is not oxygen dependent at home.    Review of Systems   Constitutional:  Negative for chills and fever. "   Respiratory:  Positive for chest tightness and shortness of breath. Negative for cough.    Cardiovascular:  Positive for leg swelling. Negative for chest pain and palpitations.   Objective:     Vital Signs (Most Recent):  Temp: 98.4 °F (36.9 °C) (10/04/22 1122)  Pulse: 75 (10/04/22 1526)  Resp: (!) 22 (10/04/22 1425)  BP: (!) 150/66 (10/04/22 1425)  SpO2: (!) 93 % (10/04/22 1526)   Vital Signs (24h Range):  Temp:  [97.5 °F (36.4 °C)-99 °F (37.2 °C)] 98.4 °F (36.9 °C)  Pulse:  [63-83] 75  Resp:  [12-22] 22  SpO2:  [85 %-98 %] 93 %  BP: (132-184)/(63-98) 150/66     Weight: 90.7 kg (200 lb)  Body mass index is 30.41 kg/m².    Intake/Output Summary (Last 24 hours) at 10/4/2022 1619  Last data filed at 10/4/2022 1158  Gross per 24 hour   Intake 420 ml   Output 2825 ml   Net -2405 ml      Physical Exam  Cardiovascular:      Rate and Rhythm: Normal rate and regular rhythm.      Pulses: Normal pulses.      Heart sounds: Murmur heard.     No gallop.   Pulmonary:      Breath sounds: Rales present. No wheezing.      Comments: Poor effort.  Abdominal:      General: Bowel sounds are normal.      Palpations: Abdomen is soft.   Skin:     General: Skin is warm and dry.   Neurological:      General: No focal deficit present.      Mental Status: He is alert and oriented to person, place, and time.       Significant Labs: All pertinent labs within the past 24 hours have been reviewed.    Significant Imaging: I have reviewed all pertinent imaging results/findings within the past 24 hours.      Assessment/Plan:      * Acute heart failure  - Patient presented with shortness of breath worse when supine   - BNP in ED was 212   - CXR with evidence of pulmonary edema   - s/p lasix 40 mg IV in ED   - monitor I&Os   - Heart Failure pathways initiated   - Echo ordered for AM   - last Echo 8/27/20:    Summary   Normal left ventricular systolic function. The estimated ejection fraction is 60%.   Concentric left ventricular  remodeling.   Septal wall has abnormal motion.   Indeterminate left ventricular diastolic function.   Normal right ventricular systolic function.   Moderate-to-severe aortic valve stenosis.   Aortic valve area is 1.03 cm2; peak velocity is 3.11 m/s; mean gradient is 26 mmHg.   Normal central venous pressure (3 mmHg).  - Cardiology consulted       Asymptomatic bacteriuria  - Patient likely colonized with multiple organisms as noted due to suprapubic catheter.  - Vancomycin given in ED due to previous culture with <50K MRSA (2 years ago)  - As he does not have fever or leukocytosis will hold off on antibiotics.    Neurogenic bladder  - s/p suprapubic catheter insertion   - monitor UOP       Hyponatremia  - suspect hypervolemic hyponatremia   - s/p 40 mg IV lasix in ED   - repeat metabolic panel   - plan for administration of next dose of lasix for around 6 am with subsequent repeat labs   - Nephrology consulted       Suprapubic catheter  - chronic   - Urine likely colonized with multiple organisms.        CKD (chronic kidney disease) stage 3, GFR 30-59 ml/min  - Cr is 1.0  - ranges from 0.8  - 1.0   - avoid nephrotoxins   - renally dose meds     Iron deficiency anemia  - H&H are stable at present   - monitor       Hyperlipidemia  - continue atorvastatin 20 mg QD       Essential hypertension  - hypertensive on presentation  - home meds: amlodipine 5 mg QD, metoprolol succinate 25 mg QD  - monitor.  Improving with diuresis.    Acquired hypothyroidism  - continue levothyroxine 50 mcg QD         VTE Risk Mitigation (From admission, onward)         Ordered     heparin (porcine) injection 5,000 Units  Every 8 hours         10/03/22 2007     IP VTE HIGH RISK PATIENT  Once         10/03/22 2007     Place sequential compression device  Until discontinued         10/03/22 2007                      Xin Sampson MD  Department of Hospital Medicine   [facility] - Med Surg ([place])

## 2022-10-04 NOTE — CONSULTS
"Advance Care Planning  Advance Care Planning     Date: 10/04/2022    Today a meeting took place: bedside    Patient Participation: Patient is able to participate     Attendees (Name and  Relationship to patient):  Josh, his tenant and friend    Staff attendees (Name and  Role): Jessika Alegre      ACP Conversation (General): Understanding of advance care planning and role of health care agent defined Kirstin Pak is his HCPOA, combined living will and HCPOA noted in chart  Understanding of current condition : he receives home health services, and palliative care NP visits. Pt reports he has noticed a decline x 4 months ago. He reports SOB x4 days ago. He verbalizes understanding that his lungs are weak. He verbalizes "don't give up on me yet" but also states he does not want to be kept alive on machines. Support given.    Code Status: Full Code     ACP Documents: Confirmed existing forms and scanned into chart    Goals of care: The patient endorses that what is most important right now is to focus on improvement in condition but with limits to invasive therapies and comfort and QOL     Accordingly, we have decided that the best plan to meet the patient's goals includes continuing with treatment      Recommendations/  Follow-up tasks: Other (specify below) will continue to follow.       Length of ACP   conversation in minutes: 35 minutes              "

## 2022-10-04 NOTE — SUBJECTIVE & OBJECTIVE
Interval History:  Patient is new to me.  He reports being short of breath and legs more swollen than usual.  Has tubigrips on.  Oxygen saturation on the low side on supplemental oxygen.  He is not oxygen dependent at home.    Review of Systems   Constitutional:  Negative for chills and fever.   Respiratory:  Positive for chest tightness and shortness of breath. Negative for cough.    Cardiovascular:  Positive for leg swelling. Negative for chest pain and palpitations.   Objective:     Vital Signs (Most Recent):  Temp: 98.4 °F (36.9 °C) (10/04/22 1122)  Pulse: 75 (10/04/22 1526)  Resp: (!) 22 (10/04/22 1425)  BP: (!) 150/66 (10/04/22 1425)  SpO2: (!) 93 % (10/04/22 1526)   Vital Signs (24h Range):  Temp:  [97.5 °F (36.4 °C)-99 °F (37.2 °C)] 98.4 °F (36.9 °C)  Pulse:  [63-83] 75  Resp:  [12-22] 22  SpO2:  [85 %-98 %] 93 %  BP: (132-184)/(63-98) 150/66     Weight: 90.7 kg (200 lb)  Body mass index is 30.41 kg/m².    Intake/Output Summary (Last 24 hours) at 10/4/2022 1619  Last data filed at 10/4/2022 1158  Gross per 24 hour   Intake 420 ml   Output 2825 ml   Net -2405 ml      Physical Exam  Cardiovascular:      Rate and Rhythm: Normal rate and regular rhythm.      Pulses: Normal pulses.      Heart sounds: Murmur heard.     No gallop.   Pulmonary:      Breath sounds: Rales present. No wheezing.      Comments: Poor effort.  Abdominal:      General: Bowel sounds are normal.      Palpations: Abdomen is soft.   Skin:     General: Skin is warm and dry.   Neurological:      General: No focal deficit present.      Mental Status: He is alert and oriented to person, place, and time.       Significant Labs: All pertinent labs within the past 24 hours have been reviewed.    Significant Imaging: I have reviewed all pertinent imaging results/findings within the past 24 hours.

## 2022-10-04 NOTE — HOSPITAL COURSE
Patient was admitted under a heart failure protocol on IV Lasix and Cardiology was consulted to follow.  His 2D echo was repeated.  Nephrology was consulted for the hyponatremia.  He was given a dose of tolvaptan and his sodium level was monitored closely.  Patient's oxygen level became difficult to manage on the floor and he was transferred to ICU overnight 10/4 when he required placement of a NRB and pulmonary/critical care was consulted.  Sodium level decreased to 120 upon transfer before stabilizing the following morning.  Diuresis was continued. 2D echo showed severe aortic stenosis, pulmonary HTN and grade I diastolic dysfunction.      His CXR showed volume loss in the right lung and CT was done showing a left pleural effusion with much of the left lung collapsed.  There was a small effusion on the right and a small pericardial effusion.  Critical care evaluated and felt the volume loss was due to atelectasis due to neuromuscular failure, and patient would require significant mobilization and NIV for volume ventilation.  PT/OT consulted to work with him and patient was moved back to the floor.  He continued to have episodic desaturation with minimal activity and frequently had to hold off on therapy and be placed back on BiPAP. Luna brought to bedside, but later decided to use BPAP through home hospice services. Palliative care services discussed home hospice options with the patient, who is amenable to home hospice at this time. He will discharge home with DME via the home hospice company. Aldactone dose decreased. Amlodipine was discontinued. Lasix frequency decreased to weekly and as needed.    Physical Exam:  General:     Conversant  Cardiovascular:      Rate and Rhythm: Normal rate and regular rhythm.      Pulses: Normal pulses.      Heart sounds: Murmur heard.     No gallop.      Comments: Harsh holosystolic murmur at the RSB.  Pulmonary:      Breath sounds: No wheezing.      Comments: Poor effort,  breath sounds nearly inaudible on the left.  Abdominal:      General: Bowel sounds are normal.      Palpations: Abdomen is soft.   Genitourinary:     Comments: Suprapubic catheter draining clear yellow urine.  Musculoskeletal:      Comments: Both legs with protective boots in place.

## 2022-10-04 NOTE — CONSULTS
[facility] ([place]  Wound Care    Patient Name:  Chucho Prado   MRN:  [id number]  Date: 10/4/2022  Diagnosis: Acute heart failure    History:     Past Medical History:   Diagnosis Date    Acquired hypothyroidism 2013    Arthritis     Blood transfusion     before  - whe had gangrenous gall bladder    Cataract     Chronic back pain 2018    - Takes Percocet 5-325 at home - Can continue current abdominal pain control with Dilaudid 0.5 mg IV Q3H prn     CKD (chronic kidney disease) stage 3, GFR 30-59 ml/min     Compression fracture of lumbar vertebra     Depression     Dyslipidemia     Essential hypertension 2013    Gastroesophageal reflux disease without esophagitis 2018    - continue home Prilosec    General anesthetics causing adverse effect in therapeutic use     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    GERD (gastroesophageal reflux disease)     History of postoperative delirium 2018    - Patient reports 1 week history of post-op delirium 2018 - Monitor electrolytes, UA, and urine cx - Delirium precautions  - Can use Seroquel 25 mg QHS prn     Hypertension     Hyponatremia 10/23/2017    Impaired mobility and ADLs 2018    Neurogenic bladder 2018    Sleep disorder 2018    - continue Trazodone QHS    Thyroid disease     UTI (urinary tract infection)        Social History     Socioeconomic History    Marital status: Single   Tobacco Use    Smoking status: Former     Years: 20.00     Types: Cigarettes     Quit date:      Years since quittin.7    Smokeless tobacco: Never    Tobacco comments:     quit    Substance and Sexual Activity    Alcohol use: No     Comment: rarely/6 months    Drug use: No    Sexual activity: Never   Social History Narrative    Lives alone, owns house and rents part.  helps. 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      Social Determinants of Health     Financial Resource Strain: Low Risk     Difficulty of Paying Living  Expenses: Not hard at all   Food Insecurity: No Food Insecurity    Worried About Running Out of Food in the Last Year: Never true    Ran Out of Food in the Last Year: Never true   Transportation Needs: No Transportation Needs    Lack of Transportation (Medical): No    Lack of Transportation (Non-Medical): No   Physical Activity: Inactive    Days of Exercise per Week: 0 days    Minutes of Exercise per Session: 0 min   Stress: Stress Concern Present    Feeling of Stress : Rather much   Social Connections: Socially Isolated    Frequency of Communication with Friends and Family: More than three times a week    Frequency of Social Gatherings with Friends and Family: More than three times a week    Attends Congregation Services: Never    Active Member of Clubs or Organizations: No    Attends Club or Organization Meetings: Never    Marital Status: Never    Housing Stability: Low Risk     Unable to Pay for Housing in the Last Year: No    Number of Places Lived in the Last Year: 1    Unstable Housing in the Last Year: No       Precautions:     Allergies as of 10/03/2022 - Reviewed 10/03/2022   Allergen Reaction Noted    Thiazides Other (See Comments) 09/04/2020       [facility] Assessment Details/Treatment     Wound care consult received for assessments of gluteal folds and left leg. Patient is a 89 year old male with medical history of CHF, HTN, hypothyroidism, MDD, CKD-3,   neurogenic bladder s/p suprapubic catheter insertion, GERD, wheelchair bound, who presented to [facility] ED on 10/3/22 due to shortness of breath and bilateral lower extremity swelling.     Upon assessment to patient's sacrum and gluteal folds noted resolving moisture related skin loss to gluteal cleft and left lower gluteal fold and posterior upper thigh.  Left abdominal pannus and groin area with intertrigo. Will order interdry to manage moisture/yeast and pressure injury prevention interventions to include moisture management and silicone sacral foam  dressing.       Bilateral lower extremities with compression wraps in place. Removed compressions wraps and bilateral lower extremities skin is intact with dry crusted healed wounds. Recommend ABIs for bilateral lower legs to determine level of compressibility. At this time,applied tubi  stockings for light compression until ABIs results.      Nursing and MD team notified Orders placed. Nursing to maintain pressure injury prevention interventions. Wound care to continue to assist with pt prn. Will follow up with patient tomorrow.      10/04/22 1241        Altered Skin Integrity 10/03/22 2230 Left posterior Greater trochanter Moisture associated dermatitis   Date First Assessed/Time First Assessed: 10/03/22 2230   Side: Left  Orientation: posterior  Location: Greater trochanter  Is this injury device related?: No  Primary Wound Type: (c) Moisture associated dermatitis   Dressing Appearance Open to air   Drainage Amount None   Appearance Intact;Pink;Dry   Tissue loss description Not applicable   Periwound Area Intact;Dry   Care Moisturizing agent        Altered Skin Integrity 10/04/22 Left transverse Abdomen Intertrigo   Date First Assessed: 10/04/22   Altered Skin Integrity Present on Admission: yes  Side: Left  Orientation: transverse  Location: Abdomen  Is this injury device related?: No  Primary Wound Type: Intertrigo   Wound Image      Dressing Appearance Open to air   Drainage Amount None   Appearance Red;Moist   Tissue loss description Not applicable   Dressing   (Interdry cloth)     Sacrum/gluteal cleft/buttocks- intact- resolved MASD       Right medial leg- crusted, dry scab lifted off during assessment- skin is healed underneath      Left lateral leg- dry, crusted scab      Left lateal leg- dry, crusted scab                 10/04/2022

## 2022-10-04 NOTE — CONSULTS
Food & Nutrition  Education    Diet Education:  Low Na, 1500FR  Time Spent:RD Remote  Learners:Patient       Nutrition Education provided with handouts:   + Clinical Reference attachments to d/c documents      Comments:  Patient with a 2 gm na, 1500FR diet.  LBM:10/3.  Education handouts provided with discharge paperwork.  RD unable to contact patient on the phone.  RD will follow up.  Labs reviewed.    BMP  Lab Results   Component Value Date     (L) 10/04/2022    K 4.3 10/04/2022    CL 82 (L) 10/04/2022    CO2 26 10/04/2022    BUN 14 10/04/2022    CREATININE 0.9 10/04/2022    CALCIUM 8.6 (L) 10/04/2022    ANIONGAP 14 10/04/2022    ESTGFRAFRICA >60.0 09/18/2020    EGFRNONAA >60.0 09/18/2020     Lab Results   Component Value Date    HGBA1C 5.4 04/04/2019           All questions and concerns answered. Dietitian's contact information provided.       Follow-Up: yes     Please Re-consult as needed        Thanks!  Jovanna Mace, MS, RDN, LDN

## 2022-10-04 NOTE — ASSESSMENT & PLAN NOTE
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  - Vancomycin given in ED due to previous culture with <50K MRSA (2 years ago)  - As he does not have fever or leukocytosis will hold off on antibiotics.

## 2022-10-04 NOTE — PROGRESS NOTES
Pharmacokinetic Initial Assessment: IV Vancomycin    Assessment/Plan:    Initiate intravenous vancomycin with loading dose of 2000 mg once followed by a maintenance dose of vancomycin 2000mg IV every 24 hours  Desired empiric serum trough concentration is 10 to 20 mcg/mL  Draw vancomycin trough level 30 min prior to third dose on 10/6/22 at approximately 0430  Pharmacy will continue to follow and monitor vancomycin.      Please contact pharmacy at extension [phone] with any questions regarding this assessment.     Thank you for the consult,   Teddy Pool       Patient brief summary:  Chucho Prado is a 89 y.o. male initiated on antimicrobial therapy with IV Vancomycin for treatment of suspected urinary tract infection    Drug Allergies:   Review of patient's allergies indicates:   Allergen Reactions    Thiazides Other (See Comments)     Multiple episodes of thiazide-induced hyponatremia       Actual Body Weight:   93.5 kg    Renal Function:   Estimated Creatinine Clearance: 55.5 mL/min (based on SCr of 1 mg/dL).,     Dialysis Method (if applicable):  N/A    CBC (last 72 hours):  Recent Labs   Lab Result Units 10/03/22  1716   WBC K/uL 12.42   Hemoglobin g/dL 12.3*   Hematocrit % 36.9*   Platelets K/uL 313   Gran % % 84.4*   Lymph % % 5.5*   Mono % % 8.5   Eosinophil % % 0.5   Basophil % % 0.2   Differential Method  Automated       Metabolic Panel (last 72 hours):  Recent Labs   Lab Result Units 10/03/22  1716   Sodium mmol/L 122*   Potassium mmol/L 4.5   Chloride mmol/L 83*   CO2 mmol/L 28   Glucose mg/dL 115*   BUN mg/dL 16   Creatinine mg/dL 1.0   Albumin g/dL 3.7   Total Bilirubin mg/dL 0.9   Alkaline Phosphatase U/L 100   AST U/L 20   ALT U/L 12       Drug levels (last 3 results):  No results for input(s): VANCOMYCINRA, VANCORANDOM, VANCOMYCINPE, VANCOPEAK, VANCOMYCINTR, VANCOTROUGH in the last 72 hours.    Microbiologic Results:  Microbiology Results (last 7 days)       ** No results found for the last  168 hours. **

## 2022-10-04 NOTE — ASSESSMENT & PLAN NOTE
- Patient presented with shortness of breath worse when supine   - BNP in ED was 212   - CXR with evidence of pulmonary edema   - s/p lasix 40 mg IV in ED   - monitor I&Os   - Heart Failure pathways initiated   - Echo ordered for AM   - last Echo 8/27/20:    Summary   Normal left ventricular systolic function. The estimated ejection fraction is 60%.   Concentric left ventricular remodeling.   Septal wall has abnormal motion.   Indeterminate left ventricular diastolic function.   Normal right ventricular systolic function.   Moderate-to-severe aortic valve stenosis.   Aortic valve area is 1.03 cm2; peak velocity is 3.11 m/s; mean gradient is 26 mmHg.   Normal central venous pressure (3 mmHg).  - Cardiology consulted

## 2022-10-04 NOTE — PLAN OF CARE
During my shift, pt AAOx4, NAD. VS stable. Pt remains afebrile. SPO2 wnl on 4L of O2 via NC. Pt reports SOB with movement. Pt reported nausea that has been managed with Zofran PO ODT. No emesis noted. Suprapubic catheter to left lower pelvis with standard drainage bag noted collecting clear yellow urine. IV abx therapy in progress. No adverse reaction noted. Wound care order placed for evaluation of pressure injury to bilateral gluteal fold. Pt refused to allow me to remove dressing and compression stocking to BLE. Sinus rhythm noted on cardiac monitoring. Pt remains free from falls or injury. Bed is lowered and locked. Call light is within reach. Bed alarm is set. Pt has no known needs at this time.

## 2022-10-04 NOTE — ED NOTES
Pt rounding complete.  Pain 0/10. Pt reports no pain but shortness of breath at rest- 95% on 4L nasal canula.  He is aaox4. Restroom and comfort needs addressed.  Pt updated on plan of care.  Call light within reach.  Will continue to monitor.

## 2022-10-04 NOTE — ASSESSMENT & PLAN NOTE
- Mr. Chucho Prado presents with shortness of breath, worse with laying flat   - BNP in ED was 212   - CXR with evidence of pulmonary edema   - s/p lasix 40 mg IV in ED   - monitor I&Os   - Heart Failure pathways initiated   - Echo ordered for AM   - last Echo 8/27/20:    Summary   Normal left ventricular systolic function. The estimated ejection fraction is 60%.   Concentric left ventricular remodeling.   Septal wall has abnormal motion.   Indeterminate left ventricular diastolic function.   Normal right ventricular systolic function.   Moderate-to-severe aortic valve stenosis.   Aortic valve area is 1.03 cm2; peak velocity is 3.11 m/s; mean gradient is 26 mmHg.   Normal central venous pressure (3 mmHg).  - Cardiology consulted

## 2022-10-04 NOTE — PROGRESS NOTES
Therapy with IV vancomycin complete and/or consult discontinued by provider.  Pharmacy will sign off, please re-consult as needed.

## 2022-10-04 NOTE — H&P
[facility] Medicine  History & Physical    Patient Name: Chucho Prado  MRN: [id number]  Patient Class: IP- Inpatient  Admission Date: 10/3/2022  Attending Physician: Xin Mason MD   Primary Care Provider: Obed Mcguire MD         Patient information was obtained from patient, past medical records and ER records.     Subjective:     Principal Problem:Acute heart failure    Chief Complaint:   Chief Complaint   Patient presents with    Shortness of Breath     Pt c/o SOB x 3 days. Hx CHF. Pt also has galvan catheter in place.        HPI: Mr. Chucho Prado is a 89 y.o. male, with PMH of CHF, HTN, hypothyroidism, HLD, MDD, CARLINE, CKD-3, neurogenic bladder s/p suprapubic catheter insertion, GERD, wheelchair bound, who presented to [facility] ED on 10/3/22 due to shortness of breath x 3 days. He states he feels out of breath when laying flat or with activity (ie. Being changed or transferring to the wheelchair). Additionally, he states he feels occasional chest tightness, has been having wheezing, and coughing (present for weeks). Upon awakening this morning he noted b/l lower extremity swelling, which he attributes to sitting up while sleeping last night and not elevating his legs. He denies fever. He was evaluated in the ED with labs showing hyponatremia with sodium of 122, chloride of 83. A BNP was elevated at 212, and troponin was negative. A CXR showed worsening pulmonary vascular congestion, and persistence of a left lung base airspace opacity. He was treated in the ED with 40 mg IV lasix. He was admitted to inpatient status.       Past Medical History:   Diagnosis Date    Acquired hypothyroidism 7/30/2013    Arthritis     Blood transfusion     before 2005 - whe had gangrenous gall bladder    Cataract     Chronic back pain 12/4/2018    - Takes Percocet 5-325 at home - Can continue current abdominal pain control with Dilaudid 0.5 mg IV Q3H prn     CKD (chronic kidney disease) stage  3, GFR 30-59 ml/min     Compression fracture of lumbar vertebra     Depression     Dyslipidemia     Essential hypertension 7/30/2013    Gastroesophageal reflux disease without esophagitis 12/4/2018    - continue home Prilosec    General anesthetics causing adverse effect in therapeutic use     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    GERD (gastroesophageal reflux disease)     History of postoperative delirium 12/4/2018    - Patient reports 1 week history of post-op delirium 7/2018 - Monitor electrolytes, UA, and urine cx - Delirium precautions  - Can use Seroquel 25 mg QHS prn     Hypertension     Hyponatremia 10/23/2017    Impaired mobility and ADLs 6/28/2018    Neurogenic bladder 12/4/2018    Sleep disorder 12/4/2018    - continue Trazodone QHS    Thyroid disease     UTI (urinary tract infection)        Past Surgical History:   Procedure Laterality Date    BIOPSY OF BLADDER  9/1/2020    Procedure: BIOPSY, BLADDER;  Surgeon: Daron Manjarrez MD;  Location: [address];  Service: Urology;;    BLADDER FULGURATION  9/1/2020    Procedure: FULGURATION, BLADDER;  Surgeon: Daron Manjarrez MD;  Location: [address];  Service: Urology;;    CHOLECYSTECTOMY      CYSTOGRAM  9/1/2020    Procedure: CYSTOGRAM;  Surgeon: Daron Manjarrez MD;  Location: [address];  Service: Urology;;    CYSTOSCOPY N/A 9/1/2020    Procedure: CYSTOSCOPY;  Surgeon: Daron Manjarrez MD;  Location: [address];  Service: Urology;  Laterality: N/A;    DILATION OF URETHRA  9/1/2020    Procedure: DILATION, URETHRA;  Surgeon: Daron Manjarrez MD;  Location: [address];  Service: Urology;;    EYE SURGERY Right     IOL    HERNIA REPAIR      INTRAOCULAR PROSTHESES INSERTION Left 5/28/2018    Procedure: INSERTION-INTRAOCULAR LENS (IOL);  Surgeon: Trinity Vazquez MD;  Location: [facility] OR;  Service: Ophthalmology;  Laterality: Left;    JOINT REPLACEMENT      LAMINECTOMY  12/27/2016    L2-L4     LUMBAR LAMINECTOMY  12/2016    PARATHYROIDECTOMY      PHACOEMULSIFICATION OF CATARACT Left 5/28/2018    Procedure: PHACOEMULSIFICATION-ASPIRATION-CATARACT;  Surgeon: Trinity Vazquez MD;  Location: [facility];  Service: Ophthalmology;  Laterality: Left;    REMOVAL OF BLOOD CLOT  9/1/2020    Procedure: REMOVAL, BLOOD CLOT;  Surgeon: Daron Manjarrez MD;  Location: [facility] OR 1ST FLR;  Service: Urology;;    REPAIR OF INCARCERATED INCISIONAL HERNIA WITHOUT HISTORY OF PRIOR REPAIR N/A 12/5/2018    Procedure: REPAIR, HERNIA, INCISIONAL, INCARCERATED, WITHOUT HISTORY OF PRIOR REPAIR;  Surgeon: Steve Valentin MD;  Location: [facility] OR 2ND FLR;  Service: General;  Laterality: N/A;    REPAIR OF INCARCERATED VENTRAL HERNIA WITHOUT HISTORY OF PRIOR REPAIR N/A 6/20/2018    Procedure: REPAIR, HERNIA, VENTRAL, INCARCERATED, WITHOUT HISTORY OF PRIOR REPAIR;  Surgeon: Guilherme Ordoñez MD;  Location: [facility] OR 2ND FLR;  Service: General;  Laterality: N/A;    TOTAL KNEE ARTHROPLASTY Bilateral     TOTAL KNEE ARTHROPLASTY Left 10/25/2017    TKR       Review of patient's allergies indicates:   Allergen Reactions    Thiazides Other (See Comments)     Multiple episodes of thiazide-induced hyponatremia       No current facility-administered medications on file prior to encounter.     Current Outpatient Medications on File Prior to Encounter   Medication Sig    albuterol (PROVENTIL/VENTOLIN HFA) 90 mcg/actuation inhaler Inhale 1-2 puffs into the lungs every 6 (six) hours as needed for Wheezing. Rescue    amLODIPine (NORVASC) 5 MG tablet TAKE 1 TABLET BY MOUTH EVERY DAY    ammonium lactate (LAC-HYDRIN) 12 % lotion APPLY TO LOWER EXTREMITIES TWICE DAILY AS NEEDED FOR DRY SKIN    aspirin (ECOTRIN) 81 MG EC tablet TAKE 1 TABLET BY MOUTH EVERY DAY    atorvastatin (LIPITOR) 20 MG tablet TAKE 1 TABLET BY MOUTH EVERY DAY    benzonatate (TESSALON) 100 MG capsule Take 1 capsule (100 mg total) by mouth 3 (three) times daily as needed for  Cough.    cetirizine (ZYRTEC) 10 MG tablet Take 10 mg by mouth daily as needed for Allergies.    clotrimazole (LOTRIMIN) 1 % cream Apply topically 2 (two) times daily.    diclofenac sodium (VOLTAREN) 1 % Gel Apply 2 g topically 3 (three) times daily. Apply to bilateral knees 3 times a day    ergocalciferol (ERGOCALCIFEROL) 50,000 unit Cap Take 1 capsule (50,000 Units total) by mouth once a week.    furosemide (LASIX) 20 MG tablet Take 20 mg by mouth. Monday, wed, friday    gabapentin (NEURONTIN) 400 MG capsule Take 1 capsule (400 mg total) by mouth 2 (two) times daily.    levothyroxine (SYNTHROID) 50 MCG tablet TAKE 1 TABLET BY MOUTH EVERY DAY    metoprolol succinate (TOPROL-XL) 25 MG 24 hr tablet TAKE 1 TABLET BY MOUTH EVERY DAY    mirtazapine (REMERON) 15 MG tablet Take 1 tablet (15 mg total) by mouth every evening.    nystatin (MYCOSTATIN) powder APPLY TOPICALLY FOUR TIMES DAILY    omeprazole (PRILOSEC) 40 MG capsule TAKE 1 CAPSULE BY MOUTH EVERY MORNING    ondansetron (ZOFRAN) 8 MG tablet Take 1 tablet (8 mg total) by mouth every 8 (eight) hours as needed for Nausea.    oxybutynin (DITROPAN) 5 MG Tab Take 1 tablet (5 mg total) by mouth 3 (three) times daily as needed (bladder spasms).    potassium chloride (KLOR-CON) 10 MEQ TbSR Take 10 mEq by mouth. Mon, Wed, Friday    traZODone (DESYREL) 50 MG tablet TAKE 1 TABLET BY MOUTH NIGHTLY     Family History       Problem Relation (Age of Onset)    Diabetes Father    Esophageal cancer Father    Hypertension Mother          Tobacco Use    Smoking status: Former     Years: 20.00     Types: Cigarettes     Quit date:      Years since quittin.7    Smokeless tobacco: Never    Tobacco comments:     quit    Substance and Sexual Activity    Alcohol use: No     Comment: rarely/6 months    Drug use: No    Sexual activity: Never     Review of Systems   Constitutional:  Negative for activity change, appetite change, chills and fever.   HENT:   Negative for ear pain and sore throat.    Respiratory:  Positive for cough, chest tightness, shortness of breath and wheezing.    Cardiovascular:  Negative for chest pain, palpitations and leg swelling.   Gastrointestinal:  Negative for abdominal pain, constipation, diarrhea, nausea and vomiting.   Genitourinary:  Negative for decreased urine volume, difficulty urinating, dysuria, frequency, hematuria and urgency.   Musculoskeletal:  Negative for back pain, joint swelling, myalgias, neck pain and neck stiffness.   Skin:  Negative for color change and wound.   Neurological:  Negative for dizziness, syncope, light-headedness and headaches.   Hematological:  Does not bruise/bleed easily.   Psychiatric/Behavioral:  Negative for agitation, confusion and decreased concentration.    Objective:     Vital Signs (Most Recent):  Temp: 97.5 °F (36.4 °C) (10/04/22 0413)  Pulse: 77 (10/04/22 0417)  Resp: 12 (10/04/22 0413)  BP: (!) 144/67 (10/04/22 0413)  SpO2: (!) 93 % (10/04/22 0413)   Vital Signs (24h Range):  Temp:  [97.5 °F (36.4 °C)-97.9 °F (36.6 °C)] 97.5 °F (36.4 °C)  Pulse:  [63-83] 77  Resp:  [12-24] 12  SpO2:  [93 %-98 %] 93 %  BP: (144-184)/(67-98) 144/67     Weight: 93.5 kg (206 lb 2.1 oz)  Body mass index is 31.34 kg/m².    Physical Exam  Vitals and nursing note reviewed.   Constitutional:       General: He is not in acute distress.     Appearance: He is well-developed and normal weight. He is not ill-appearing, toxic-appearing or diaphoretic.   HENT:      Head: Normocephalic and atraumatic.      Right Ear: External ear normal.      Left Ear: External ear normal.   Eyes:      General: No scleral icterus.        Right eye: No discharge.         Left eye: No discharge.      Conjunctiva/sclera: Conjunctivae normal.   Neck:      Vascular: No JVD.      Trachea: No tracheal deviation.   Cardiovascular:      Rate and Rhythm: Normal rate and regular rhythm.      Heart sounds: Murmur heard.     No gallop.      Comments: 1+  b/l LE edema   Pulmonary:      Effort: Respiratory distress (mild) present.      Breath sounds: No stridor. Wheezing and rales present.   Abdominal:      General: Bowel sounds are normal. There is no distension.      Palpations: Abdomen is soft. There is no mass.      Tenderness: There is no abdominal tenderness. There is no guarding.   Musculoskeletal:         General: No deformity. Normal range of motion.      Cervical back: Normal range of motion and neck supple.   Skin:     General: Skin is warm and dry.   Neurological:      General: No focal deficit present.      Mental Status: He is alert and oriented to person, place, and time.      Cranial Nerves: No cranial nerve deficit.      Motor: No abnormal muscle tone.      Coordination: Coordination normal.   Psychiatric:         Mood and Affect: Mood normal.         Behavior: Behavior normal.         Thought Content: Thought content normal.         Judgment: Judgment normal.           Significant Labs: All pertinent labs within the past 24 hours have been reviewed.  BMP:   Recent Labs   Lab 10/03/22  1716   *   *   K 4.5   CL 83*   CO2 28   BUN 16   CREATININE 1.0   CALCIUM 9.0     CBC:   Recent Labs   Lab 10/03/22  1716   WBC 12.42   HGB 12.3*   HCT 36.9*        CMP:   Recent Labs   Lab 10/03/22  1716   *   K 4.5   CL 83*   CO2 28   *   BUN 16   CREATININE 1.0   CALCIUM 9.0   PROT 7.9   ALBUMIN 3.7   BILITOT 0.9   ALKPHOS 100   AST 20   ALT 12   ANIONGAP 11     Urine Culture: No results for input(s): LABURIN in the last 48 hours.  Urine Studies: No results for input(s): COLORU, APPEARANCEUA, PHUR, SPECGRAV, PROTEINUA, GLUCUA, KETONESU, BILIRUBINUA, OCCULTUA, NITRITE, UROBILINOGEN, LEUKOCYTESUR, RBCUA, WBCUA, BACTERIA, SQUAMEPITHEL, HYALINECASTS in the last 48 hours.    Invalid input(s): WRIGHTSUR    Significant Imaging: I have reviewed all pertinent imaging results/findings within the past 24 hours.  Imaging Results               X-Ray Chest AP Portable (Final result)  Result time 10/03/22 17:13:03      Final result by South Waters MD (10/03/22 17:13:03)                   Impression:      1.  Worsening pulmonary vascular congestion, suggestive of worsening fluid overload state.    2.  Persistent airspace opacity in the left lung base with associated suspected moderate parapneumonic effusion.      Electronically signed by: South Waters MD  Date:    10/03/2022  Time:    17:13               Narrative:    EXAMINATION:  XR CHEST AP PORTABLE    CLINICAL HISTORY:  Dyspnea, unspecified    TECHNIQUE:  Single frontal view of the chest was performed.    COMPARISON:  08/08/2022.    FINDINGS:  The trachea is unremarkable.  There is stable enlargement of the cardiomediastinal silhouette.  There is no evidence of free air beneath the hemidiaphragms.  There is no pleural effusion right.  There is probable moderate left-sided pleural effusion.  There is no evidence of a pneumothorax.    There is no evidence of pneumomediastinum.  There is worsening pulmonary vascular congestion.  There is a stable airspace opacity in the left lung base.  There are degenerative changes in the osseous structures.                                       Assessment/Plan:     * Acute heart failure  - Mr. Chucho Prado presents with shortness of breath, worse with laying flat   - BNP in ED was 212   - CXR with evidence of pulmonary edema   - s/p lasix 40 mg IV in ED   - monitor I&Os   - Heart Failure pathways initiated   - Echo ordered for AM   - last Echo 8/27/20:    Summary   Normal left ventricular systolic function. The estimated ejection fraction is 60%.   Concentric left ventricular remodeling.   Septal wall has abnormal motion.   Indeterminate left ventricular diastolic function.   Normal right ventricular systolic function.   Moderate-to-severe aortic valve stenosis.   Aortic valve area is 1.03 cm2; peak velocity is 3.11 m/s; mean gradient is 26 mmHg.   Normal  central venous pressure (3 mmHg).  - Cardiology consulted       Acute cystitis  - possibly colonization  - started on vanc due to most recent susceptibilities for MRSA UTI        Hyponatremia  - suspect hypervolemic hyponatremia   - s/p 40 mg IV lasix in ED   - repeat metabolic panel   - plan for administration of next dose of lasix for around 6 am with subsequent repeat labs   - Nephrology consulted       Essential hypertension  - hypertensive at present   - home meds: amlodipine 5 mg QD, metoprolol succinate 25 mg QD  - monitor     CKD (chronic kidney disease) stage 3, GFR 30-59 ml/min  - Cr is 1.0  - ranges from 0.8  - 1.0   - avoid nephrotoxins   - renally dose meds     Acquired hypothyroidism  - continue levothyroxine 50 mcg QD       Iron deficiency anemia  - H&H are stable at present   - monitor       Hyperlipidemia  - continue atorvastatin 20 mg QD       Neurogenic bladder  - s/p suprapubic catheter insertion   - monitor UOP       Suprapubic catheter  - chronic       VTE Risk Mitigation (From admission, onward)         Ordered     heparin (porcine) injection 5,000 Units  Every 8 hours         10/03/22 2007     IP VTE HIGH RISK PATIENT  Once         10/03/22 2007     Place sequential compression device  Until discontinued         10/03/22 2007                   Kerrie Torres PA-C  Department of Hospital Medicine   [facility] - Emergency Dept

## 2022-10-04 NOTE — PLAN OF CARE
PCP Obed Mcguire   Pharmacy [facility]    Lives alone but has sitters 7 days/week 16 hours/day & active with [facility] for RN visits - friend Carolina will provide transportation home - if she's not available will need transport       10/04/22 0814   Discharge Assessment   Assessment Type Discharge Planning Assessment   Confirmed/corrected address, phone number and insurance Yes   Confirmed Demographics Correct on Facesheet   Source of Information patient   Lives With alone   Do you expect to return to your current living situation? Yes   Do you have help at home or someone to help you manage your care at home? Yes   Prior to hospitilization cognitive status: Alert/Oriented   Current cognitive status: Alert/Oriented   Walking or Climbing Stairs Difficulty ambulation difficulty, dependent;transferring difficulty, dependent   Dressing/Bathing Difficulty bathing difficulty, dependent;dressing difficulty, dependent   Equipment Currently Used at Home hospital bed;wheelchair;walker, rolling;bedside commode   Do you currently have service(s) that help you manage your care at home? Yes   Name and Contact number of agency [facility]   Is the pt/caregiver preference to resume services with current agency Yes   Do you take prescription medications? Yes   Do you have prescription coverage? Yes   Do you have any problems affording any of your prescribed medications? No   Is the patient taking medications as prescribed? yes   Who is going to help you get home at discharge? friend Carolina   Are you on dialysis? No   Discharge Plan A Home Health   Discharge Plan discussed with: Patient   Discharge Barriers Identified None   Physical Activity   On average, how many days per week do you engage in moderate to strenuous exercise (like a brisk walk)? 0 days   On average, how many minutes do you engage in exercise at this level? 0 min   Financial Resource Strain   How hard is it for you to pay for the very  basics like food, housing, medical care, and heating? Not hard   Housing Stability   In the last 12 months, was there a time when you were not able to pay the mortgage or rent on time? N   In the last 12 months, how many places have you lived? 1   In the last 12 months, was there a time when you did not have a steady place to sleep or slept in a shelter (including now)? N   Transportation Needs   In the past 12 months, has lack of transportation kept you from medical appointments or from getting medications? no   In the past 12 months, has lack of transportation kept you from meetings, work, or from getting things needed for daily living? No   Food Insecurity   Within the past 12 months, you worried that your food would run out before you got the money to buy more. Never true   Within the past 12 months, the food you bought just didn't last and you didn't have money to get more. Never true   Stress   Do you feel stress - tense, restless, nervous, or anxious, or unable to sleep at night because your mind is troubled all the time - these days? Rather much   Social Connections   In a typical week, how many times do you talk on the phone with family, friends, or neighbors? More than 3   How often do you get together with friends or relatives? More than 3   How often do you attend Religious or Evangelical services? Never   Do you belong to any clubs or organizations such as Religious groups, unions, fraternal or athletic groups, or school groups? No   How often do you attend meetings of the clubs or organizations you belong to? Never   Are you , , , , never , or living with a partner? Never marrie   Alcohol Use   Q1: How often do you have a drink containing alcohol? Monthly or l   Q2: How many drinks containing alcohol do you have on a typical day when you are drinking? 1 or 2   Q3: How often do you have six or more drinks on one occasion? Never

## 2022-10-04 NOTE — HPI
"From H&P by Kerrie MALLORY:  "Mr. Chucho Prado is a 89 y.o. male, with PMH of CHF, HTN, hypothyroidism, HLD, MDD, CARLINE, CKD-3, neurogenic bladder s/p suprapubic catheter insertion, GERD, wheelchair bound, who presented to [facility] ED on 10/3/22 due to shortness of breath x 3 days. He states he feels out of breath when laying flat or with activity (ie. Being changed or transferring to the wheelchair). Additionally, he states he feels occasional chest tightness, has been having wheezing, and coughing (present for weeks). Upon awakening this morning he noted b/l lower extremity swelling, which he attributes to sitting up while sleeping last night and not elevating his legs. He denies fever. He was evaluated in the ED with labs showing hyponatremia with sodium of 122, chloride of 83. A BNP was elevated at 212, and troponin was negative. A CXR showed worsening pulmonary vascular congestion, and persistence of a left lung base airspace opacity. He was treated in the ED with 40 mg IV lasix."  "

## 2022-10-04 NOTE — ASSESSMENT & PLAN NOTE
- suspect hypervolemic hyponatremia   - s/p 40 mg IV lasix in ED   - repeat metabolic panel   - plan for administration of next dose of lasix for around 6 am with subsequent repeat labs   - Nephrology consulted

## 2022-10-04 NOTE — PROGRESS NOTES
IP Liaison - Initial Visit Note    Patient: Chucho Prado  MRN:  [id number]  Date of Service:  10/4/2022  Completed by:  DANY Ruiz    Reason for Visit   Patient presents with    IP Liaison Initial Visit       RSW met with patient at bedside in order to complete SDOH questionnaire and liaison assessment.  Pt has identified no barriers to care.    The following were addressed during this visit:  - Review SDOH Questions   - Complete patient assessment   - Review and discuss options for social groups or events   - Review and discuss options to become more physically active   - Review and discuss options for reducing daily stress   - Complete initial visit with patient        Patient Summary     IP Liaison Patient Assessment    General  Level of Caregiver support: Caregiver currently provides assistance  Have you had to make a decision between paying for any of the following in the last 2 months?: None  Transportation means: Family, Friend  Employment status: Retired and not working  Current symptoms: Sleep disturbances, Memory problems  Assessments  Was the PHQ Depression Screening completed this visit?: Yes  Was the ASIYA-7 Screening completed this visit?: No       DANY Ruiz

## 2022-10-04 NOTE — CONSULTS
[facility] ([place])  Cardiology  Consult Note    Patient Name: Chucho Prado  MRN: [id number]  Admission Date: 10/3/2022  Hospital Length of Stay: 1 days  Code Status: Full Code   Attending Provider: Xin Mason MD   Consulting Provider: Tomasa Rdz MD  Primary Care Physician: Obed Mcguire MD  Principal Problem:Acute heart failure    Patient information was obtained from patient, past medical records, and ER records.     Inpatient consult to Cardiology  Consult performed by: Tomasa Rdz MD  Consult ordered by: Kerrie Torres PA-C  Reason for consult: Heart failure      Subjective:     Chief Complaint:  Shortness of breath.     HPI: Chucho Prado is a 89 y.o.male with hypertension and hypercholesterolemia. He has been unable to walk after a back injury in about 2015. He has chronic kidney disease, stage 3. He has a suprapubic catheter due to a neurogenic bladder. Beginning in mid 9/2022 he noted that he was becoming short of breath from lying down. The shortness of breath got worse. His legs became edematous. He presented to the emergency room on 10/3/2022 being fluid overloaded. He was noted to be hyponatremic. He says he has been drinking a lot of water. He was admitted.        Past Medical History:   Diagnosis Date    Acquired hypothyroidism 7/30/2013    Arthritis     Blood transfusion     before 2005 - whe had gangrenous gall bladder    Cataract     Chronic back pain 12/4/2018    - Takes Percocet 5-325 at home - Can continue current abdominal pain control with Dilaudid 0.5 mg IV Q3H prn     CKD (chronic kidney disease) stage 3, GFR 30-59 ml/min     Compression fracture of lumbar vertebra     Depression     Dyslipidemia     Essential hypertension 7/30/2013    Gastroesophageal reflux disease without esophagitis 12/4/2018    - continue home Prilosec    General anesthetics causing adverse effect in therapeutic use     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    GERD  (gastroesophageal reflux disease)     History of postoperative delirium 12/4/2018    - Patient reports 1 week history of post-op delirium 7/2018 - Monitor electrolytes, UA, and urine cx - Delirium precautions  - Can use Seroquel 25 mg QHS prn     Hypertension     Hyponatremia 10/23/2017    Impaired mobility and ADLs 6/28/2018    Neurogenic bladder 12/4/2018    Sleep disorder 12/4/2018    - continue Trazodone QHS    Thyroid disease     UTI (urinary tract infection)        Past Surgical History:   Procedure Laterality Date    BIOPSY OF BLADDER  9/1/2020    Procedure: BIOPSY, BLADDER;  Surgeon: Daron Manjarrez MD;  Location: [address];  Service: Urology;;    BLADDER FULGURATION  9/1/2020    Procedure: FULGURATION, BLADDER;  Surgeon: Daron Manjarrez MD;  Location: [address];  Service: Urology;;    CHOLECYSTECTOMY      CYSTOGRAM  9/1/2020    Procedure: CYSTOGRAM;  Surgeon: Daron Manjarrez MD;  Location: [address];  Service: Urology;;    CYSTOSCOPY N/A 9/1/2020    Procedure: CYSTOSCOPY;  Surgeon: Daron Manjarrez MD;  Location: [address];  Service: Urology;  Laterality: N/A;    DILATION OF URETHRA  9/1/2020    Procedure: DILATION, URETHRA;  Surgeon: Daron Manjarrez MD;  Location: [address];  Service: Urology;;    EYE SURGERY Right     IOL    HERNIA REPAIR      INTRAOCULAR PROSTHESES INSERTION Left 5/28/2018    Procedure: INSERTION-INTRAOCULAR LENS (IOL);  Surgeon: Trinity Vazquez MD;  Location: [facility];  Service: Ophthalmology;  Laterality: Left;    JOINT REPLACEMENT      LAMINECTOMY  12/27/2016    L2-L4    LUMBAR LAMINECTOMY  12/2016    PARATHYROIDECTOMY      PHACOEMULSIFICATION OF CATARACT Left 5/28/2018    Procedure: PHACOEMULSIFICATION-ASPIRATION-CATARACT;  Surgeon: Trinity Vazquez MD;  Location: [facility];  Service: Ophthalmology;  Laterality: Left;    REMOVAL OF BLOOD CLOT  9/1/2020    Procedure: REMOVAL, BLOOD CLOT;  Surgeon: Daron Manjarrez MD;  Location: [address];   Service: Urology;;    REPAIR OF INCARCERATED INCISIONAL HERNIA WITHOUT HISTORY OF PRIOR REPAIR N/A 12/5/2018    Procedure: REPAIR, HERNIA, INCISIONAL, INCARCERATED, WITHOUT HISTORY OF PRIOR REPAIR;  Surgeon: Steve Valentin MD;  Location: [address];  Service: General;  Laterality: N/A;    REPAIR OF INCARCERATED VENTRAL HERNIA WITHOUT HISTORY OF PRIOR REPAIR N/A 6/20/2018    Procedure: REPAIR, HERNIA, VENTRAL, INCARCERATED, WITHOUT HISTORY OF PRIOR REPAIR;  Surgeon: Guilherme Ordoñez MD;  Location: [facility] OR [address] [place] [address];  Service: General;  Laterality: N/A;    TOTAL KNEE ARTHROPLASTY Bilateral     TOTAL KNEE ARTHROPLASTY Left 10/25/2017    TKR       Review of patient's allergies indicates:   Allergen Reactions    Thiazides Other (See Comments)     Multiple episodes of thiazide-induced hyponatremia       No current facility-administered medications on file prior to encounter.     Current Outpatient Medications on File Prior to Encounter   Medication Sig    albuterol (PROVENTIL/VENTOLIN HFA) 90 mcg/actuation inhaler Inhale 1-2 puffs into the lungs every 6 (six) hours as needed for Wheezing. Rescue    amLODIPine (NORVASC) 5 MG tablet TAKE 1 TABLET BY MOUTH EVERY DAY    ammonium lactate (LAC-HYDRIN) 12 % lotion APPLY TO LOWER EXTREMITIES TWICE DAILY AS NEEDED FOR DRY SKIN    aspirin (ECOTRIN) 81 MG EC tablet TAKE 1 TABLET BY MOUTH EVERY DAY    atorvastatin (LIPITOR) 20 MG tablet TAKE 1 TABLET BY MOUTH EVERY DAY    benzonatate (TESSALON) 100 MG capsule Take 1 capsule (100 mg total) by mouth 3 (three) times daily as needed for Cough.    cetirizine (ZYRTEC) 10 MG tablet Take 10 mg by mouth daily as needed for Allergies.    clotrimazole (LOTRIMIN) 1 % cream Apply topically 2 (two) times daily.    diclofenac sodium (VOLTAREN) 1 % Gel Apply 2 g topically 3 (three) times daily. Apply to bilateral knees 3 times a day    ergocalciferol (ERGOCALCIFEROL) 50,000 unit Cap Take 1 capsule (50,000 Units total) by mouth once a week.     furosemide (LASIX) 20 MG tablet Take 20 mg by mouth. Monday, wed, friday    gabapentin (NEURONTIN) 400 MG capsule Take 1 capsule (400 mg total) by mouth 2 (two) times daily.    levothyroxine (SYNTHROID) 50 MCG tablet TAKE 1 TABLET BY MOUTH EVERY DAY    metoprolol succinate (TOPROL-XL) 25 MG 24 hr tablet TAKE 1 TABLET BY MOUTH EVERY DAY    mirtazapine (REMERON) 15 MG tablet Take 1 tablet (15 mg total) by mouth every evening.    nystatin (MYCOSTATIN) powder APPLY TOPICALLY FOUR TIMES DAILY    omeprazole (PRILOSEC) 40 MG capsule TAKE 1 CAPSULE BY MOUTH EVERY MORNING    ondansetron (ZOFRAN) 8 MG tablet Take 1 tablet (8 mg total) by mouth every 8 (eight) hours as needed for Nausea.    oxybutynin (DITROPAN) 5 MG Tab Take 1 tablet (5 mg total) by mouth 3 (three) times daily as needed (bladder spasms).    potassium chloride (KLOR-CON) 10 MEQ TbSR Take 10 mEq by mouth. Mon, Wed, Friday    traZODone (DESYREL) 50 MG tablet TAKE 1 TABLET BY MOUTH NIGHTLY     Family History       Problem Relation (Age of Onset)    Diabetes Father    Esophageal cancer Father    Hypertension Mother          Tobacco Use    Smoking status: Former     Years: 20.00     Types: Cigarettes     Quit date:      Years since quittin.7    Smokeless tobacco: Never    Tobacco comments:     quit    Substance and Sexual Activity    Alcohol use: No     Comment: rarely/6 months    Drug use: No    Sexual activity: Never     Review of Systems   Constitutional: Positive for malaise/fatigue. Negative for chills and fever.   HENT:  Negative for nosebleeds and tinnitus.    Eyes:  Negative for double vision, vision loss in left eye and vision loss in right eye.   Cardiovascular:  Positive for leg swelling, orthopnea and paroxysmal nocturnal dyspnea. Negative for chest pain, claudication, irregular heartbeat, near-syncope, palpitations and syncope.   Respiratory:  Positive for shortness of breath. Negative for cough, hemoptysis and wheezing.    Endocrine:  Negative for cold intolerance and heat intolerance.   Hematologic/Lymphatic: Negative for bleeding problem. Does not bruise/bleed easily.   Skin:  Negative for color change and rash.   Musculoskeletal:  Positive for muscle weakness (legs). Negative for back pain, falls and myalgias.   Gastrointestinal:  Negative for abdominal pain, diarrhea, dysphagia, heartburn, hematemesis, hematochezia, hemorrhoids, jaundice, melena, nausea and vomiting.   Genitourinary:         Suprapubic catheter   Neurological:  Positive for focal weakness (legs) and weakness. Negative for dizziness, headaches, light-headedness, loss of balance, numbness, tremors and vertigo.   Psychiatric/Behavioral:  Negative for altered mental status, depression and memory loss. The patient is not nervous/anxious.    Allergic/Immunologic: Negative for hives and persistent infections.   Objective:     Vital Signs (Most Recent):  Temp: 99 °F (37.2 °C) (10/04/22 0805)  Pulse: 75 (10/04/22 0805)  Resp: 12 (10/04/22 0413)  BP: (!) 152/71 (10/04/22 0805)  SpO2: (!) 90 % (10/04/22 0805)   Vital Signs (24h Range):  Temp:  [97.5 °F (36.4 °C)-99 °F (37.2 °C)] 99 °F (37.2 °C)  Pulse:  [63-83] 75  Resp:  [12-24] 12  SpO2:  [90 %-98 %] 90 %  BP: (144-184)/(67-98) 152/71     Weight: 91 kg (200 lb 9.9 oz)  Body mass index is 30.5 kg/m².    SpO2: (!) 90 %  O2 Device (Oxygen Therapy): nasal cannula w/ humidification      Intake/Output Summary (Last 24 hours) at 10/4/2022 0844  Last data filed at 10/4/2022 0642  Gross per 24 hour   Intake --   Output 2350 ml   Net -2350 ml       Lines/Drains/Airways       Drain  Duration                  Suprapubic Catheter 09/01/20 latex 18 Fr. 763 days              Peripheral Intravenous Line  Duration                  Peripheral IV - Single Lumen 10/03/22 1717 20 G Right Forearm <1 day                    Physical Exam  Constitutional:       General: He is not in acute distress.     Appearance: Normal appearance. He is well-developed. He  is ill-appearing. He is not toxic-appearing or diaphoretic.   HENT:      Head: Normocephalic and atraumatic.      Nose: Nose normal.   Eyes:      General:         Right eye: No discharge.         Left eye: No discharge.      Conjunctiva/sclera:      Right eye: Right conjunctiva is not injected.      Left eye: Left conjunctiva is not injected.      Pupils: Pupils are equal.      Right eye: Pupil is round.      Left eye: Pupil is round.   Neck:      Thyroid: No thyromegaly.      Vascular: No carotid bruit or JVD.   Cardiovascular:      Rate and Rhythm: Normal rate and regular rhythm. No extrasystoles are present.     Chest Wall: PMI is not displaced.      Pulses:           Radial pulses are 2+ on the right side and 2+ on the left side.        Femoral pulses are 2+ on the right side and 2+ on the left side.       Dorsalis pedis pulses are 2+ on the right side and 2+ on the left side.        Posterior tibial pulses are 2+ on the right side and 2+ on the left side.      Heart sounds: S1 normal and S2 normal. Murmur heard.   Harsh midsystolic murmur is present with a grade of 2/6 at the upper right sternal border radiating to the neck.     Gallop present.   Pulmonary:      Effort: Pulmonary effort is normal.      Breath sounds: Examination of the right-lower field reveals rales. Examination of the left-lower field reveals rales. Rales present.   Abdominal:      Palpations: Abdomen is soft.      Tenderness: There is no abdominal tenderness.   Genitourinary:     Comments: Suprapubic catheter  Musculoskeletal:      Cervical back: Neck supple.      Right lower leg: Normal. No swelling. No edema.      Left lower leg: Normal. No swelling. No edema.   Lymphadenopathy:      Head:      Right side of head: No submandibular adenopathy.      Left side of head: No submandibular adenopathy.      Cervical: No cervical adenopathy.   Skin:     General: Skin is warm and dry.      Findings: No rash.      Nails: There is no clubbing.    Neurological:      Mental Status: He is alert and oriented to person, place, and time. He is not disoriented.      Cranial Nerves: No cranial nerve deficit.      Motor: Weakness (legs) present.   Psychiatric:         Attention and Perception: Attention normal.         Mood and Affect: Mood and affect normal.         Speech: Speech normal.         Behavior: Behavior normal.         Thought Content: Thought content normal.         Cognition and Memory: Cognition and memory normal.         Judgment: Judgment normal.       Current Medications:     amLODIPine  5 mg Oral Daily    aspirin  81 mg Oral Daily    atorvastatin  20 mg Oral Daily    furosemide (LASIX) injection  40 mg Intravenous BID    gabapentin  400 mg Oral BID    heparin (porcine)  5,000 Units Subcutaneous Q8H    levothyroxine  50 mcg Oral Before breakfast    metoprolol succinate  25 mg Oral Daily    mirtazapine  15 mg Oral QHS    mupirocin   Nasal BID    traZODone  50 mg Oral QHS     Current Laboratory Results:    Recent Results (from the past 24 hour(s))   CBC auto differential    Collection Time: 10/03/22  5:16 PM   Result Value Ref Range    WBC 12.42 3.90 - 12.70 K/uL    RBC 4.45 (L) 4.60 - 6.20 M/uL    Hemoglobin 12.3 (L) 14.0 - 18.0 g/dL    Hematocrit 36.9 (L) 40.0 - 54.0 %    MCV 83 82 - 98 fL    MCH 27.6 27.0 - 31.0 pg    MCHC 33.3 32.0 - 36.0 g/dL    RDW 14.2 11.5 - 14.5 %    Platelets 313 150 - 450 K/uL    MPV 8.7 (L) 9.2 - 12.9 fL    Immature Granulocytes 0.9 (H) 0.0 - 0.5 %    Gran # (ANC) 10.5 (H) 1.8 - 7.7 K/uL    Immature Grans (Abs) 0.11 (H) 0.00 - 0.04 K/uL    Lymph # 0.7 (L) 1.0 - 4.8 K/uL    Mono # 1.1 (H) 0.3 - 1.0 K/uL    Eos # 0.1 0.0 - 0.5 K/uL    Baso # 0.03 0.00 - 0.20 K/uL    nRBC 0 0 /100 WBC    Gran % 84.4 (H) 38.0 - 73.0 %    Lymph % 5.5 (L) 18.0 - 48.0 %    Mono % 8.5 4.0 - 15.0 %    Eosinophil % 0.5 0.0 - 8.0 %    Basophil % 0.2 0.0 - 1.9 %    Differential Method Automated    Comprehensive metabolic panel    Collection Time:  10/03/22  5:16 PM   Result Value Ref Range    Sodium 122 (L) 136 - 145 mmol/L    Potassium 4.5 3.5 - 5.1 mmol/L    Chloride 83 (L) 95 - 110 mmol/L    CO2 28 23 - 29 mmol/L    Glucose 115 (H) 70 - 110 mg/dL    BUN 16 8 - 23 mg/dL    Creatinine 1.0 0.5 - 1.4 mg/dL    Calcium 9.0 8.7 - 10.5 mg/dL    Total Protein 7.9 6.0 - 8.4 g/dL    Albumin 3.7 3.5 - 5.2 g/dL    Total Bilirubin 0.9 0.1 - 1.0 mg/dL    Alkaline Phosphatase 100 55 - 135 U/L    AST 20 10 - 40 U/L    ALT 12 10 - 44 U/L    Anion Gap 11 8 - 16 mmol/L    eGFR >60 >60 mL/min/1.73 m^2   Brain natriuretic peptide    Collection Time: 10/03/22  5:16 PM   Result Value Ref Range     (H) 0 - 99 pg/mL   Troponin I    Collection Time: 10/03/22  5:16 PM   Result Value Ref Range    Troponin I <0.006 0.000 - 0.026 ng/mL   POCT COVID-19 Rapid Screening    Collection Time: 10/03/22  5:36 PM   Result Value Ref Range    POC Rapid COVID Negative Negative     Acceptable Yes    Magnesium    Collection Time: 10/04/22  4:31 AM   Result Value Ref Range    Magnesium 1.7 1.6 - 2.6 mg/dL   Basic metabolic panel    Collection Time: 10/04/22  4:31 AM   Result Value Ref Range    Sodium 122 (L) 136 - 145 mmol/L    Potassium 4.3 3.5 - 5.1 mmol/L    Chloride 82 (L) 95 - 110 mmol/L    CO2 26 23 - 29 mmol/L    Glucose 99 70 - 110 mg/dL    BUN 14 8 - 23 mg/dL    Creatinine 0.9 0.5 - 1.4 mg/dL    Calcium 8.6 (L) 8.7 - 10.5 mg/dL    Anion Gap 14 8 - 16 mmol/L    eGFR >60 >60 mL/min/1.73 m^2   Uric Acid    Collection Time: 10/04/22  4:31 AM   Result Value Ref Range    Uric Acid 5.1 3.4 - 7.0 mg/dL   Protein / creatinine ratio, urine    Collection Time: 10/04/22  6:39 AM   Result Value Ref Range    Protein, Urine Random 14 0 - 15 mg/dL    Creatinine, Urine 11.5 (L) 23.0 - 375.0 mg/dL    Prot/Creat Ratio, Urine 1.22 (H) 0.00 - 0.20   Osmolality, urine    Collection Time: 10/04/22  6:39 AM   Result Value Ref Range    Osmolality, Urine 267 50 - 1200 mOsm/kg   Sodium, urine,  random    Collection Time: 10/04/22  6:39 AM   Result Value Ref Range    Sodium, Urine 75 20 - 250 mmol/L   Creatinine, urine, random    Collection Time: 10/04/22  6:39 AM   Result Value Ref Range    Creatinine, Urine 11.5 (L) 23.0 - 375.0 mg/dL     Current Imaging Results:    X-Ray Chest AP Portable   Final Result      1.  Worsening pulmonary vascular congestion, suggestive of worsening fluid overload state.      2.  Persistent airspace opacity in the left lung base with associated suspected moderate parapneumonic effusion.         Electronically signed by: [place] MD   Date:    10/03/2022   Time:    17:13          8/28/2020: Echo:    Normal left ventricular systolic function. The estimated ejection fraction is 60%.  Concentric left ventricular remodeling.  Septal wall has abnormal motion.  Indeterminate left ventricular diastolic function.  Normal right ventricular systolic function.  Moderate-to-severe aortic valve stenosis.  Aortic valve area is 1.03 cm2; peak velocity is 3.11 m/s; mean gradient is 26 mmHg.  Normal central venous pressure (3 mmHg).      Assessment and Plan:     Active Diagnoses:    Diagnosis Date Noted POA    PRINCIPAL PROBLEM:  Acute heart failure [I50.9] 10/03/2022 Yes    Asymptomatic bacteriuria [R82.71] 10/04/2022 Yes    Neurogenic bladder [N31.9] 12/04/2018 Yes    Suprapubic catheter [Z93.59] 10/23/2017 Not Applicable    Hyponatremia [E87.1] 10/23/2017 Yes    CKD (chronic kidney disease) stage 3, GFR 30-59 ml/min [N18.30] 12/26/2016 Yes    Iron deficiency anemia [D50.9] 07/31/2013 Yes    Acquired hypothyroidism [E03.9] 07/30/2013 Yes    Essential hypertension [I10] 07/30/2013 Yes    Hyperlipidemia [E78.5] 07/30/2013 Yes      Problems Resolved During this Admission:     Assessment and Plan:    Heart Failure, Diastolic, Acute on Chronic   10/3/2022: Presents in HF with pulmonary edema and edema of legs.   8/28/2020: Echo: Normal left ventricular size and systolic function. EF 60%. Septal  WMA. Moderate to severe AS - 3.1 m/s - MG 26 mmHg - 1.0 cm2.   At home was on  furosemide 20 mg M,W & F. Has been taken additional furosemide but unclear on dosing.   On furosemide 40- mg iv Q12.   Fluid restriction 800 ml/day with Na of 122.   Renal to see.   Echo.    2. Aortic Stenosis   8/28/2020: Echo: Moderate to severe AS - 3.1 m/s - MG 26 mmHg - 1.0 cm2.    3. Hypertension   At home been on metoprolol 25 mg Q24, amlodipine 5 mg Q24, furosemide 20 mg 3/7 & KCl 10 meq 3/7.    4. Hypercholesterolemia  At home been on atorvastatin 20 mg Q24.     5. Chronic Kidney Disease, Stage 3   10/4/2022: BUN/crea 14/0.9. CrCl >60.    6. Hyponatremia   10/4/2022: Na 122.   Fluid restriction.   Renal to see.   Defer decision regarding tolvaptan to renal.      VTE Risk Mitigation (From admission, onward)           Ordered     heparin (porcine) injection 5,000 Units  Every 8 hours         10/03/22 2007     IP VTE HIGH RISK PATIENT  Once         10/03/22 2007     Place sequential compression device  Until discontinued         10/03/22 2007                    Thank you for your consult.    I will follow-up with patient. Please contact us if you have any additional questions.    Tomasa Rdz MD  Cardiology   [facility] ([place])

## 2022-10-04 NOTE — PLAN OF CARE
POC reviewed. Purposeful rounding completed. No significant events this shift. Cardiac monitor maintained. Scheduled meds administered. Bed low and locked, side rails up x3, call light within reach.  Problem: Adult Inpatient Plan of Care  Goal: Plan of Care Review  Outcome: Ongoing, Progressing  Goal: Patient-Specific Goal (Individualized)  Outcome: Ongoing, Progressing  Goal: Absence of Hospital-Acquired Illness or Injury  Outcome: Ongoing, Progressing  Goal: Optimal Comfort and Wellbeing  Outcome: Ongoing, Progressing     Problem: Infection (Pneumonia)  Goal: Resolution of Infection Signs and Symptoms  Outcome: Ongoing, Progressing     Problem: Impaired Wound Healing  Goal: Optimal Wound Healing  Outcome: Ongoing, Progressing     Problem: Fall Injury Risk  Goal: Absence of Fall and Fall-Related Injury  Outcome: Ongoing, Progressing

## 2022-10-04 NOTE — SUBJECTIVE & OBJECTIVE
Past Medical History:   Diagnosis Date    Acquired hypothyroidism 7/30/2013    Arthritis     Blood transfusion     before 2005 - whe had gangrenous gall bladder    Cataract     Chronic back pain 12/4/2018    - Takes Percocet 5-325 at home - Can continue current abdominal pain control with Dilaudid 0.5 mg IV Q3H prn     CKD (chronic kidney disease) stage 3, GFR 30-59 ml/min     Compression fracture of lumbar vertebra     Depression     Dyslipidemia     Essential hypertension 7/30/2013    Gastroesophageal reflux disease without esophagitis 12/4/2018    - continue home Prilosec    General anesthetics causing adverse effect in therapeutic use     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    GERD (gastroesophageal reflux disease)     History of postoperative delirium 12/4/2018    - Patient reports 1 week history of post-op delirium 7/2018 - Monitor electrolytes, UA, and urine cx - Delirium precautions  - Can use Seroquel 25 mg QHS prn     Hypertension     Hyponatremia 10/23/2017    Impaired mobility and ADLs 6/28/2018    Neurogenic bladder 12/4/2018    Sleep disorder 12/4/2018    - continue Trazodone QHS    Thyroid disease     UTI (urinary tract infection)        Past Surgical History:   Procedure Laterality Date    BIOPSY OF BLADDER  9/1/2020    Procedure: BIOPSY, BLADDER;  Surgeon: Daron Manjarrez MD;  Location: [address];  Service: Urology;;    BLADDER FULGURATION  9/1/2020    Procedure: FULGURATION, BLADDER;  Surgeon: Daron Manjarrez MD;  Location: [address];  Service: Urology;;    CHOLECYSTECTOMY      CYSTOGRAM  9/1/2020    Procedure: CYSTOGRAM;  Surgeon: Daron Manjarrez MD;  Location: [address];  Service: Urology;;    CYSTOSCOPY N/A 9/1/2020    Procedure: CYSTOSCOPY;  Surgeon: Daron Manjarrez MD;  Location: [address];  Service: Urology;  Laterality: N/A;    DILATION OF URETHRA  9/1/2020    Procedure: DILATION, URETHRA;  Surgeon: Daron Manjarrez MD;  Location: [address]  FLR;  Service: Urology;;    EYE SURGERY Right     IOL    HERNIA REPAIR      INTRAOCULAR PROSTHESES INSERTION Left 5/28/2018    Procedure: INSERTION-INTRAOCULAR LENS (IOL);  Surgeon: Trinity Vazquez MD;  Location: [facility];  Service: Ophthalmology;  Laterality: Left;    JOINT REPLACEMENT      LAMINECTOMY  12/27/2016    L2-L4    LUMBAR LAMINECTOMY  12/2016    PARATHYROIDECTOMY      PHACOEMULSIFICATION OF CATARACT Left 5/28/2018    Procedure: PHACOEMULSIFICATION-ASPIRATION-CATARACT;  Surgeon: Trinity Vazquez MD;  Location: [facility] OR;  Service: Ophthalmology;  Laterality: Left;    REMOVAL OF BLOOD CLOT  9/1/2020    Procedure: REMOVAL, BLOOD CLOT;  Surgeon: Daron Manjarrez MD;  Location: [facility] OR 1ST FLR;  Service: Urology;;    REPAIR OF INCARCERATED INCISIONAL HERNIA WITHOUT HISTORY OF PRIOR REPAIR N/A 12/5/2018    Procedure: REPAIR, HERNIA, INCISIONAL, INCARCERATED, WITHOUT HISTORY OF PRIOR REPAIR;  Surgeon: Steve Valentin MD;  Location: [facility] OR 2ND FLR;  Service: General;  Laterality: N/A;    REPAIR OF INCARCERATED VENTRAL HERNIA WITHOUT HISTORY OF PRIOR REPAIR N/A 6/20/2018    Procedure: REPAIR, HERNIA, VENTRAL, INCARCERATED, WITHOUT HISTORY OF PRIOR REPAIR;  Surgeon: Guilherme Ordoñez MD;  Location: [facility] OR 2ND FLR;  Service: General;  Laterality: N/A;    TOTAL KNEE ARTHROPLASTY Bilateral     TOTAL KNEE ARTHROPLASTY Left 10/25/2017    TKR       Review of patient's allergies indicates:   Allergen Reactions    Thiazides Other (See Comments)     Multiple episodes of thiazide-induced hyponatremia       No current facility-administered medications on file prior to encounter.     Current Outpatient Medications on File Prior to Encounter   Medication Sig    albuterol (PROVENTIL/VENTOLIN HFA) 90 mcg/actuation inhaler Inhale 1-2 puffs into the lungs every 6 (six) hours as needed for Wheezing. Rescue    amLODIPine (NORVASC) 5 MG tablet TAKE 1 TABLET BY MOUTH EVERY DAY    ammonium lactate (LAC-HYDRIN) 12 % lotion APPLY TO  LOWER EXTREMITIES TWICE DAILY AS NEEDED FOR DRY SKIN    aspirin (ECOTRIN) 81 MG EC tablet TAKE 1 TABLET BY MOUTH EVERY DAY    atorvastatin (LIPITOR) 20 MG tablet TAKE 1 TABLET BY MOUTH EVERY DAY    benzonatate (TESSALON) 100 MG capsule Take 1 capsule (100 mg total) by mouth 3 (three) times daily as needed for Cough.    cetirizine (ZYRTEC) 10 MG tablet Take 10 mg by mouth daily as needed for Allergies.    clotrimazole (LOTRIMIN) 1 % cream Apply topically 2 (two) times daily.    diclofenac sodium (VOLTAREN) 1 % Gel Apply 2 g topically 3 (three) times daily. Apply to bilateral knees 3 times a day    ergocalciferol (ERGOCALCIFEROL) 50,000 unit Cap Take 1 capsule (50,000 Units total) by mouth once a week.    furosemide (LASIX) 20 MG tablet Take 20 mg by mouth. Monday, wed, friday    gabapentin (NEURONTIN) 400 MG capsule Take 1 capsule (400 mg total) by mouth 2 (two) times daily.    levothyroxine (SYNTHROID) 50 MCG tablet TAKE 1 TABLET BY MOUTH EVERY DAY    metoprolol succinate (TOPROL-XL) 25 MG 24 hr tablet TAKE 1 TABLET BY MOUTH EVERY DAY    mirtazapine (REMERON) 15 MG tablet Take 1 tablet (15 mg total) by mouth every evening.    nystatin (MYCOSTATIN) powder APPLY TOPICALLY FOUR TIMES DAILY    omeprazole (PRILOSEC) 40 MG capsule TAKE 1 CAPSULE BY MOUTH EVERY MORNING    ondansetron (ZOFRAN) 8 MG tablet Take 1 tablet (8 mg total) by mouth every 8 (eight) hours as needed for Nausea.    oxybutynin (DITROPAN) 5 MG Tab Take 1 tablet (5 mg total) by mouth 3 (three) times daily as needed (bladder spasms).    potassium chloride (KLOR-CON) 10 MEQ TbSR Take 10 mEq by mouth. Mon, Wed, Friday    traZODone (DESYREL) 50 MG tablet TAKE 1 TABLET BY MOUTH NIGHTLY     Family History       Problem Relation (Age of Onset)    Diabetes Father    Esophageal cancer Father    Hypertension Mother          Tobacco Use    Smoking status: Former     Years: 20.00     Types: Cigarettes     Quit date:      Years since quittin.7    Smokeless  tobacco: Never    Tobacco comments:     quit 1990   Substance and Sexual Activity    Alcohol use: No     Comment: rarely/6 months    Drug use: No    Sexual activity: Never     Review of Systems   Constitutional:  Negative for activity change, appetite change, chills and fever.   HENT:  Negative for ear pain and sore throat.    Respiratory:  Positive for cough, chest tightness, shortness of breath and wheezing.    Cardiovascular:  Negative for chest pain, palpitations and leg swelling.   Gastrointestinal:  Negative for abdominal pain, constipation, diarrhea, nausea and vomiting.   Genitourinary:  Negative for decreased urine volume, difficulty urinating, dysuria, frequency, hematuria and urgency.   Musculoskeletal:  Negative for back pain, joint swelling, myalgias, neck pain and neck stiffness.   Skin:  Negative for color change and wound.   Neurological:  Negative for dizziness, syncope, light-headedness and headaches.   Hematological:  Does not bruise/bleed easily.   Psychiatric/Behavioral:  Negative for agitation, confusion and decreased concentration.    Objective:     Vital Signs (Most Recent):  Temp: 97.5 °F (36.4 °C) (10/04/22 0413)  Pulse: 77 (10/04/22 0417)  Resp: 12 (10/04/22 0413)  BP: (!) 144/67 (10/04/22 0413)  SpO2: (!) 93 % (10/04/22 0413)   Vital Signs (24h Range):  Temp:  [97.5 °F (36.4 °C)-97.9 °F (36.6 °C)] 97.5 °F (36.4 °C)  Pulse:  [63-83] 77  Resp:  [12-24] 12  SpO2:  [93 %-98 %] 93 %  BP: (144-184)/(67-98) 144/67     Weight: 93.5 kg (206 lb 2.1 oz)  Body mass index is 31.34 kg/m².    Physical Exam  Vitals and nursing note reviewed.   Constitutional:       General: He is not in acute distress.     Appearance: He is well-developed and normal weight. He is not ill-appearing, toxic-appearing or diaphoretic.   HENT:      Head: Normocephalic and atraumatic.      Right Ear: External ear normal.      Left Ear: External ear normal.   Eyes:      General: No scleral icterus.        Right eye: No  discharge.         Left eye: No discharge.      Conjunctiva/sclera: Conjunctivae normal.   Neck:      Vascular: No JVD.      Trachea: No tracheal deviation.   Cardiovascular:      Rate and Rhythm: Normal rate and regular rhythm.      Heart sounds: Murmur heard.     No gallop.      Comments: 1+ b/l LE edema   Pulmonary:      Effort: Respiratory distress (mild) present.      Breath sounds: No stridor. Wheezing and rales present.   Abdominal:      General: Bowel sounds are normal. There is no distension.      Palpations: Abdomen is soft. There is no mass.      Tenderness: There is no abdominal tenderness. There is no guarding.   Musculoskeletal:         General: No deformity. Normal range of motion.      Cervical back: Normal range of motion and neck supple.   Skin:     General: Skin is warm and dry.   Neurological:      General: No focal deficit present.      Mental Status: He is alert and oriented to person, place, and time.      Cranial Nerves: No cranial nerve deficit.      Motor: No abnormal muscle tone.      Coordination: Coordination normal.   Psychiatric:         Mood and Affect: Mood normal.         Behavior: Behavior normal.         Thought Content: Thought content normal.         Judgment: Judgment normal.           Significant Labs: All pertinent labs within the past 24 hours have been reviewed.  BMP:   Recent Labs   Lab 10/03/22  1716   *   *   K 4.5   CL 83*   CO2 28   BUN 16   CREATININE 1.0   CALCIUM 9.0     CBC:   Recent Labs   Lab 10/03/22  1716   WBC 12.42   HGB 12.3*   HCT 36.9*        CMP:   Recent Labs   Lab 10/03/22  1716   *   K 4.5   CL 83*   CO2 28   *   BUN 16   CREATININE 1.0   CALCIUM 9.0   PROT 7.9   ALBUMIN 3.7   BILITOT 0.9   ALKPHOS 100   AST 20   ALT 12   ANIONGAP 11     Urine Culture: No results for input(s): LABURIN in the last 48 hours.  Urine Studies: No results for input(s): COLORU, APPEARANCEUA, PHUR, SPECGRAV, PROTEINUA, GLUCUA, KETONESU,  BILIRUBINUA, OCCULTUA, NITRITE, UROBILINOGEN, LEUKOCYTESUR, RBCUA, WBCUA, BACTERIA, SQUAMEPITHEL, HYALINECASTS in the last 48 hours.    Invalid input(s): WRIGHTSUR    Significant Imaging: I have reviewed all pertinent imaging results/findings within the past 24 hours.  Imaging Results              X-Ray Chest AP Portable (Final result)  Result time 10/03/22 17:13:03      Final result by South Waters MD (10/03/22 17:13:03)                   Impression:      1.  Worsening pulmonary vascular congestion, suggestive of worsening fluid overload state.    2.  Persistent airspace opacity in the left lung base with associated suspected moderate parapneumonic effusion.      Electronically signed by: South Waters MD  Date:    10/03/2022  Time:    17:13               Narrative:    EXAMINATION:  XR CHEST AP PORTABLE    CLINICAL HISTORY:  Dyspnea, unspecified    TECHNIQUE:  Single frontal view of the chest was performed.    COMPARISON:  08/08/2022.    FINDINGS:  The trachea is unremarkable.  There is stable enlargement of the cardiomediastinal silhouette.  There is no evidence of free air beneath the hemidiaphragms.  There is no pleural effusion right.  There is probable moderate left-sided pleural effusion.  There is no evidence of a pneumothorax.    There is no evidence of pneumomediastinum.  There is worsening pulmonary vascular congestion.  There is a stable airspace opacity in the left lung base.  There are degenerative changes in the osseous structures.

## 2022-10-05 PROBLEM — J90 PLEURAL EFFUSION, LEFT: Status: ACTIVE | Noted: 2022-10-05

## 2022-10-05 PROBLEM — Z71.89 ADVANCED CARE PLANNING/COUNSELING DISCUSSION: Status: ACTIVE | Noted: 2022-10-05

## 2022-10-05 LAB
ALLENS TEST: ABNORMAL
ANION GAP SERPL CALC-SCNC: 11 MMOL/L (ref 8–16)
ANION GAP SERPL CALC-SCNC: 12 MMOL/L (ref 8–16)
BASOPHILS # BLD AUTO: 0.02 K/UL (ref 0–0.2)
BASOPHILS NFR BLD: 0.2 % (ref 0–1.9)
BUN SERPL-MCNC: 12 MG/DL (ref 8–23)
BUN SERPL-MCNC: 12 MG/DL (ref 8–23)
CALCIUM SERPL-MCNC: 8.7 MG/DL (ref 8.7–10.5)
CALCIUM SERPL-MCNC: 8.7 MG/DL (ref 8.7–10.5)
CHLORIDE SERPL-SCNC: 81 MMOL/L (ref 95–110)
CHLORIDE SERPL-SCNC: 81 MMOL/L (ref 95–110)
CO2 SERPL-SCNC: 33 MMOL/L (ref 23–29)
CO2 SERPL-SCNC: 37 MMOL/L (ref 23–29)
CREAT SERPL-MCNC: 0.9 MG/DL (ref 0.5–1.4)
CREAT SERPL-MCNC: 1 MG/DL (ref 0.5–1.4)
DELSYS: ABNORMAL
DIFFERENTIAL METHOD: ABNORMAL
EOSINOPHIL # BLD AUTO: 0.1 K/UL (ref 0–0.5)
EOSINOPHIL NFR BLD: 0.6 % (ref 0–8)
ERYTHROCYTE [DISTWIDTH] IN BLOOD BY AUTOMATED COUNT: 14.1 % (ref 11.5–14.5)
EST. GFR  (NO RACE VARIABLE): >60 ML/MIN/1.73 M^2
EST. GFR  (NO RACE VARIABLE): >60 ML/MIN/1.73 M^2
GLUCOSE SERPL-MCNC: 86 MG/DL (ref 70–110)
GLUCOSE SERPL-MCNC: 88 MG/DL (ref 70–110)
HCO3 UR-SCNC: 42 MMOL/L (ref 24–28)
HCT VFR BLD AUTO: 34.8 % (ref 40–54)
HGB BLD-MCNC: 11.7 G/DL (ref 14–18)
IMM GRANULOCYTES # BLD AUTO: 0.26 K/UL (ref 0–0.04)
IMM GRANULOCYTES NFR BLD AUTO: 0.9 % (ref 0–0.5)
LYMPHOCYTES # BLD AUTO: 1.1 K/UL (ref 1–4.8)
LYMPHOCYTES NFR BLD: 10.3 % (ref 18–48)
MCH RBC QN AUTO: 28 PG (ref 27–31)
MCHC RBC AUTO-ENTMCNC: 33.6 G/DL (ref 32–36)
MCV RBC AUTO: 83 FL (ref 82–98)
MODE: ABNORMAL
MONOCYTES # BLD AUTO: 1.6 K/UL (ref 0.3–1)
MONOCYTES NFR BLD: 14.6 % (ref 4–15)
NEUTROPHILS # BLD AUTO: 7.6 K/UL (ref 1.8–7.7)
NEUTROPHILS NFR BLD: 71.8 % (ref 38–73)
NRBC BLD-RTO: 0 /100 WBC
PCO2 BLDA: 80.6 MMHG (ref 35–45)
PH SMN: 7.33 [PH] (ref 7.35–7.45)
PLATELET # BLD AUTO: 312 K/UL (ref 150–450)
PMV BLD AUTO: 9.4 FL (ref 9.2–12.9)
PO2 BLDA: 106 MMHG (ref 80–100)
POC BE: 16 MMOL/L
POC SATURATED O2: 97 % (ref 95–100)
POC TCO2: 44 MMOL/L (ref 23–27)
POTASSIUM SERPL-SCNC: 3.2 MMOL/L (ref 3.5–5.1)
POTASSIUM SERPL-SCNC: 4 MMOL/L (ref 3.5–5.1)
PROCALCITONIN SERPL IA-MCNC: 0.07 NG/ML
RBC # BLD AUTO: 4.18 M/UL (ref 4.6–6.2)
SAMPLE: ABNORMAL
SET RATE: 16
SITE: ABNORMAL
SODIUM SERPL-SCNC: 120 MMOL/L (ref 136–145)
SODIUM SERPL-SCNC: 125 MMOL/L (ref 136–145)
SODIUM SERPL-SCNC: 127 MMOL/L (ref 136–145)
SODIUM SERPL-SCNC: 130 MMOL/L (ref 136–145)
SPONT RATE: 17
VT: 564
WBC # BLD AUTO: 10.52 K/UL (ref 3.9–12.7)

## 2022-10-05 PROCEDURE — 82803 BLOOD GASES ANY COMBINATION: CPT

## 2022-10-05 PROCEDURE — 63600175 PHARM REV CODE 636 W HCPCS: Performed by: PHYSICIAN ASSISTANT

## 2022-10-05 PROCEDURE — 84295 ASSAY OF SERUM SODIUM: CPT | Performed by: HOSPITALIST

## 2022-10-05 PROCEDURE — 84295 ASSAY OF SERUM SODIUM: CPT | Mod: 91 | Performed by: NURSE PRACTITIONER

## 2022-10-05 PROCEDURE — 80048 BASIC METABOLIC PNL TOTAL CA: CPT | Mod: 91 | Performed by: INTERNAL MEDICINE

## 2022-10-05 PROCEDURE — 99223 PR INITIAL HOSPITAL CARE,LEVL III: ICD-10-PCS | Mod: ,,, | Performed by: NURSE PRACTITIONER

## 2022-10-05 PROCEDURE — 99497 PR ADVNCD CARE PLAN 30 MIN: ICD-10-PCS | Mod: 25,,, | Performed by: NURSE PRACTITIONER

## 2022-10-05 PROCEDURE — 36600 WITHDRAWAL OF ARTERIAL BLOOD: CPT

## 2022-10-05 PROCEDURE — 99233 SBSQ HOSP IP/OBS HIGH 50: CPT | Mod: ,,, | Performed by: INTERNAL MEDICINE

## 2022-10-05 PROCEDURE — 94799 UNLISTED PULMONARY SVC/PX: CPT

## 2022-10-05 PROCEDURE — 80048 BASIC METABOLIC PNL TOTAL CA: CPT | Performed by: PHYSICIAN ASSISTANT

## 2022-10-05 PROCEDURE — 99233 PR SUBSEQUENT HOSPITAL CARE,LEVL III: ICD-10-PCS | Mod: ,,, | Performed by: HOSPITALIST

## 2022-10-05 PROCEDURE — 25000003 PHARM REV CODE 250: Performed by: HOSPITALIST

## 2022-10-05 PROCEDURE — 84145 PROCALCITONIN (PCT): CPT | Performed by: INTERNAL MEDICINE

## 2022-10-05 PROCEDURE — 85025 COMPLETE CBC W/AUTO DIFF WBC: CPT | Performed by: HOSPITALIST

## 2022-10-05 PROCEDURE — 99223 1ST HOSP IP/OBS HIGH 75: CPT | Mod: ,,, | Performed by: NURSE PRACTITIONER

## 2022-10-05 PROCEDURE — 99233 PR SUBSEQUENT HOSPITAL CARE,LEVL III: ICD-10-PCS | Mod: ,,, | Performed by: INTERNAL MEDICINE

## 2022-10-05 PROCEDURE — 99233 SBSQ HOSP IP/OBS HIGH 50: CPT | Mod: ,,, | Performed by: HOSPITALIST

## 2022-10-05 PROCEDURE — 36415 COLL VENOUS BLD VENIPUNCTURE: CPT | Performed by: HOSPITALIST

## 2022-10-05 PROCEDURE — 99497 ADVNCD CARE PLAN 30 MIN: CPT | Mod: 25,,, | Performed by: NURSE PRACTITIONER

## 2022-10-05 PROCEDURE — 99900035 HC TECH TIME PER 15 MIN (STAT)

## 2022-10-05 PROCEDURE — 25000003 PHARM REV CODE 250: Performed by: PHYSICIAN ASSISTANT

## 2022-10-05 PROCEDURE — 94761 N-INVAS EAR/PLS OXIMETRY MLT: CPT

## 2022-10-05 PROCEDURE — 36415 COLL VENOUS BLD VENIPUNCTURE: CPT | Performed by: INTERNAL MEDICINE

## 2022-10-05 PROCEDURE — 27100171 HC OXYGEN HIGH FLOW UP TO 24 HOURS

## 2022-10-05 PROCEDURE — 27000221 HC OXYGEN, UP TO 24 HOURS

## 2022-10-05 PROCEDURE — 20000000 HC ICU ROOM

## 2022-10-05 RX ORDER — ACETAMINOPHEN 500 MG
1000 TABLET ORAL EVERY 6 HOURS PRN
Status: DISCONTINUED | OUTPATIENT
Start: 2022-10-05 | End: 2022-10-13 | Stop reason: HOSPADM

## 2022-10-05 RX ORDER — OXYCODONE HYDROCHLORIDE 5 MG/1
5 TABLET ORAL EVERY 6 HOURS PRN
Status: DISCONTINUED | OUTPATIENT
Start: 2022-10-05 | End: 2022-10-13 | Stop reason: HOSPADM

## 2022-10-05 RX ADMIN — TRAZODONE HYDROCHLORIDE 50 MG: 50 TABLET ORAL at 10:10

## 2022-10-05 RX ADMIN — ATORVASTATIN CALCIUM 20 MG: 20 TABLET, FILM COATED ORAL at 08:10

## 2022-10-05 RX ADMIN — HEPARIN SODIUM 5000 UNITS: 5000 INJECTION INTRAVENOUS; SUBCUTANEOUS at 07:10

## 2022-10-05 RX ADMIN — GABAPENTIN 400 MG: 400 CAPSULE ORAL at 10:10

## 2022-10-05 RX ADMIN — AMLODIPINE BESYLATE 5 MG: 5 TABLET ORAL at 08:10

## 2022-10-05 RX ADMIN — MUPIROCIN: 20 OINTMENT TOPICAL at 08:10

## 2022-10-05 RX ADMIN — HEPARIN SODIUM 5000 UNITS: 5000 INJECTION INTRAVENOUS; SUBCUTANEOUS at 02:10

## 2022-10-05 RX ADMIN — LEVOTHYROXINE SODIUM 50 MCG: 50 TABLET ORAL at 07:10

## 2022-10-05 RX ADMIN — GABAPENTIN 400 MG: 400 CAPSULE ORAL at 08:10

## 2022-10-05 RX ADMIN — METOPROLOL SUCCINATE 25 MG: 25 TABLET, EXTENDED RELEASE ORAL at 08:10

## 2022-10-05 RX ADMIN — MIRTAZAPINE 15 MG: 15 TABLET, FILM COATED ORAL at 10:10

## 2022-10-05 RX ADMIN — FUROSEMIDE 40 MG: 10 INJECTION, SOLUTION INTRAMUSCULAR; INTRAVENOUS at 10:10

## 2022-10-05 RX ADMIN — MUPIROCIN: 20 OINTMENT TOPICAL at 10:10

## 2022-10-05 RX ADMIN — HEPARIN SODIUM 5000 UNITS: 5000 INJECTION INTRAVENOUS; SUBCUTANEOUS at 10:10

## 2022-10-05 RX ADMIN — OXYCODONE 5 MG: 5 TABLET ORAL at 11:10

## 2022-10-05 RX ADMIN — ASPIRIN 81 MG: 81 TABLET, COATED ORAL at 08:10

## 2022-10-05 RX ADMIN — FUROSEMIDE 40 MG: 10 INJECTION, SOLUTION INTRAMUSCULAR; INTRAVENOUS at 08:10

## 2022-10-05 RX ADMIN — ACETAMINOPHEN 1000 MG: 500 TABLET, FILM COATED ORAL at 03:10

## 2022-10-05 NOTE — CONSULTS
Palliative visit with myself, patient, Jessika Toth, ANNE to continue GOC discussion.  Per GOC discussion:     -DNR status  -continue current care as per primary team for as much improvement as possible  -Palliative will continue to follow for ongoing GOC discussions and pt support    Full palliative note to follow

## 2022-10-05 NOTE — CONSULTS
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 Patient states that he lives alone with some people who help him out, he uses a wheelchair at baseline but over the past few days has noted worsening LE edema and SOB. He was found to have a BNP of 212 and hyponatremia attributed to hypervolemia. Patient was found to have a L sided pleural effusion on CXR that has increased in size from previous CXR. Was not present on CT in 2020.    He is a former smoker x 20 year, quit 30 years ago.       Past Medical History:   Diagnosis Date    Acquired hypothyroidism 7/30/2013    Arthritis     Blood transfusion     before 2005 - whe had gangrenous gall bladder    Cataract     Chronic back pain 12/4/2018    - Takes Percocet 5-325 at home - Can continue current abdominal pain control with Dilaudid 0.5 mg IV Q3H prn     CKD (chronic kidney disease) stage 3, GFR 30-59 ml/min     Compression fracture of lumbar vertebra     Depression     Dyslipidemia     Essential hypertension 7/30/2013    Gastroesophageal reflux disease without esophagitis 12/4/2018    - continue home Prilosec    General anesthetics causing adverse effect in therapeutic use     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    GERD (gastroesophageal reflux disease)     History of  postoperative delirium 12/4/2018    - Patient reports 1 week history of post-op delirium 7/2018 - Monitor electrolytes, UA, and urine cx - Delirium precautions  - Can use Seroquel 25 mg QHS prn     Hypertension     Hyponatremia 10/23/2017    Impaired mobility and ADLs 6/28/2018    Neurogenic bladder 12/4/2018    Sleep disorder 12/4/2018    - continue Trazodone QHS    Thyroid disease     UTI (urinary tract infection)        Past Surgical History:   Procedure Laterality Date    BIOPSY OF BLADDER  9/1/2020    Procedure: BIOPSY, BLADDER;  Surgeon: Daron Manjarrez MD;  Location: [address];  Service: Urology;;    BLADDER FULGURATION  9/1/2020    Procedure: FULGURATION, BLADDER;  Surgeon: Daron Manjarrez MD;  Location: [address];  Service: Urology;;    CHOLECYSTECTOMY      CYSTOGRAM  9/1/2020    Procedure: CYSTOGRAM;  Surgeon: Daron Manjarrez MD;  Location: [address];  Service: Urology;;    CYSTOSCOPY N/A 9/1/2020    Procedure: CYSTOSCOPY;  Surgeon: Daron Manjarrez MD;  Location: [address];  Service: Urology;  Laterality: N/A;    DILATION OF URETHRA  9/1/2020    Procedure: DILATION, URETHRA;  Surgeon: Daron Manjarrez MD;  Location: [address];  Service: Urology;;    EYE SURGERY Right     IOL    HERNIA REPAIR      INTRAOCULAR PROSTHESES INSERTION Left 5/28/2018    Procedure: INSERTION-INTRAOCULAR LENS (IOL);  Surgeon: Trinity Vazquez MD;  Location: [facility];  Service: Ophthalmology;  Laterality: Left;    JOINT REPLACEMENT      LAMINECTOMY  12/27/2016    L2-L4    LUMBAR LAMINECTOMY  12/2016    PARATHYROIDECTOMY      PHACOEMULSIFICATION OF CATARACT Left 5/28/2018    Procedure: PHACOEMULSIFICATION-ASPIRATION-CATARACT;  Surgeon: Trinity Vazquez MD;  Location: [facility];  Service: Ophthalmology;  Laterality: Left;    REMOVAL OF BLOOD CLOT  9/1/2020    Procedure: REMOVAL, BLOOD CLOT;  Surgeon: Daron Manjarrez MD;  Location: [address];  Service: Urology;;    REPAIR OF INCARCERATED  INCISIONAL HERNIA WITHOUT HISTORY OF PRIOR REPAIR N/A 2018    Procedure: REPAIR, HERNIA, INCISIONAL, INCARCERATED, WITHOUT HISTORY OF PRIOR REPAIR;  Surgeon: Steve Valentin MD;  Location: [address];  Service: General;  Laterality: N/A;    REPAIR OF INCARCERATED VENTRAL HERNIA WITHOUT HISTORY OF PRIOR REPAIR N/A 2018    Procedure: REPAIR, HERNIA, VENTRAL, INCARCERATED, WITHOUT HISTORY OF PRIOR REPAIR;  Surgeon: Guilherme Ordoñez MD;  Location: [facility] OR [address] [place] [address];  Service: General;  Laterality: N/A;    TOTAL KNEE ARTHROPLASTY Bilateral     TOTAL KNEE ARTHROPLASTY Left 10/25/2017    TKR       Review of patient's allergies indicates:   Allergen Reactions    Thiazides Other (See Comments)     Multiple episodes of thiazide-induced hyponatremia       Family History       Problem Relation (Age of Onset)    Diabetes Father    Esophageal cancer Father    Hypertension Mother          Tobacco Use    Smoking status: Former     Years: .00     Types: Cigarettes     Quit date:      Years since quittin.    Smokeless tobacco: Never    Tobacco comments:     quit    Substance and Sexual Activity    Alcohol use: No     Comment: rarely/6 months    Drug use: No    Sexual activity: Never         Review of Systems   Constitutional:  Positive for fatigue. Negative for fever and unexpected weight change.   HENT:  Negative for nosebleeds and sinus pain.    Respiratory:  Positive for shortness of breath. Negative for cough and chest tightness.    Cardiovascular:  Positive for leg swelling. Negative for chest pain.   Gastrointestinal:  Negative for abdominal distention and diarrhea.   Musculoskeletal:  Negative for arthralgias and myalgias.   Skin:  Negative for rash and wound.   Neurological:  Negative for seizures and syncope.   Psychiatric/Behavioral:  Negative for agitation and confusion.    All other systems reviewed and are negative.  Objective:     Vital Signs (Most Recent):  Temp: 98.7 °F (37.1 °C)  (10/05/22 0500)  Pulse: 87 (10/05/22 0941)  Resp: 18 (10/05/22 0615)  BP: (!) 126/56 (10/05/22 0615)  SpO2: 97 % (10/05/22 0941)   Vital Signs (24h Range):  Temp:  [98.4 °F (36.9 °C)-98.9 °F (37.2 °C)] 98.7 °F (37.1 °C)  Pulse:  [62-87] 87  Resp:  [13-48] 18  SpO2:  [85 %-99 %] 97 %  BP: (104-150)/(52-77) 126/56     Weight: 90.7 kg (200 lb)  Body mass index is 30.41 kg/m².      Intake/Output Summary (Last 24 hours) at 10/5/2022 0958  Last data filed at 10/5/2022 0600  Gross per 24 hour   Intake 420 ml   Output 3025 ml   Net -2605 ml       Physical Exam  Vitals and nursing note reviewed.   Constitutional:       General: He is not in acute distress.     Appearance: He is ill-appearing.   HENT:      Head: Normocephalic and atraumatic.      Mouth/Throat:      Mouth: Mucous membranes are dry.      Pharynx: Oropharynx is clear.      Comments: Poor dentition  Eyes:      Extraocular Movements: Extraocular movements intact.      Conjunctiva/sclera: Conjunctivae normal.   Cardiovascular:      Rate and Rhythm: Normal rate and regular rhythm.      Heart sounds: No murmur heard.  Pulmonary:      Breath sounds: No wheezing or rales.      Comments: Tachypneic, no wheezing, decr breath sounds on the L  Abdominal:      General: There is no distension.      Palpations: Abdomen is soft.   Genitourinary:     Comments: Suprapubic catheter  Musculoskeletal:         General: No swelling or tenderness.      Cervical back: Normal range of motion and neck supple.      Right lower leg: Edema present.      Left lower leg: Edema present.   Skin:     General: Skin is warm and dry.   Neurological:      General: No focal deficit present.      Mental Status: He is alert and oriented to person, place, and time. Mental status is at baseline.      Cranial Nerves: No cranial nerve deficit.   Psychiatric:         Mood and Affect: Mood normal.         Thought Content: Thought content normal.       Vents:  Oxygen Concentration (%): 60 (10/05/22  0402)    Lines/Drains/Airways       Drain  Duration                  Suprapubic Catheter 09/01/20 latex 18 Fr. 764 days              Peripheral Intravenous Line  Duration                  Peripheral IV - Single Lumen 10/03/22 1717 20 G Right Forearm 1 day                    Significant Labs:    CBC/Anemia Profile:  Recent Labs   Lab 10/03/22  1716 10/04/22  2346 10/05/22  0756   WBC 12.42  --  10.52   HGB 12.3*  --  11.7*   HCT 36.9* 36 34.8*     --  312   MCV 83  --  83   RDW 14.2  --  14.1        Chemistries:  Recent Labs   Lab 10/03/22  1716 10/04/22  0431 10/04/22  1017 10/04/22  2134 10/05/22  0020 10/05/22  0543   * 122*   < > 120* 120* 125*  127*   K 4.5 4.3  --   --   --  4.0   CL 83* 82*  --   --   --  81*   CO2 28 26  --   --   --  33*   BUN 16 14  --   --   --  12   CREATININE 1.0 0.9  --   --   --  1.0   CALCIUM 9.0 8.6*  --   --   --  8.7   ALBUMIN 3.7  --   --   --   --   --    PROT 7.9  --   --   --   --   --    BILITOT 0.9  --   --   --   --   --    ALKPHOS 100  --   --   --   --   --    ALT 12  --   --   --   --   --    AST 20  --   --   --   --   --    MG  --  1.7  --   --   --   --     < > = values in this interval not displayed.       All pertinent labs within the past 24 hours have been reviewed.    Significant Imaging:   I have reviewed all pertinent imaging results/findings within the past 24 hours.        CXR with appearance of L pleural effusion which has significantly increased in size since previous XR on 8/8/22    Assessment/Plan:     Pleural effusion, left  89 year old patient with PMH of HFpEF/PH and CKD here with incr SOB found to have L pleural effusion that is increased in size from 8/8/22 and new since 2020. Patient with an elevated BNP on admission and LE edema, undergoing diuresis and is neg 5L. Has chronic hypercapnic respiratory failure at baseline. POCUS without a good window slightly limited to patient's habitus and deconditioning.    - Unclear if this is truly  effusion v bowel given appearance of previous CT abd with elevated L hemidiaphragm    While this effusion certainly may be attributable to volume overload, in the setting of a new unilateral effusion I am worried about potential for malignancy. Worried that he may have some chronic immobility or paralysis of his L hemidiaphragm, perhaps 2/2 abdominal surgeries?  - Recommend CT chest without to help further characterize the effusion v elevated hemidiaphragm and look for any suspicious mass.   - Ordered Sniff test under fluoroscopy to check for hemidiaphragm paralysis  - Agreed with continued diuresis in the meantime  - Wean O2 to maintain SpO2 > 88%  - Continue BiPAP with naps and qHS  - Likely will benefit from home NIV for baseline pCO2 > 59 due to chronic hypercapnia        Thank you for your consult. I will follow-up with patient. Please contact us if you have any additional questions.     Izabel Godwin MD  Pulmonary and Critical Care Fellow  10/05/2022  10:12 AM

## 2022-10-05 NOTE — HPI
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 Patient states that he lives alone with some people who help him out, he uses a wheelchair at baseline but over the past few days has noted worsening LE edema and SOB. He was found to have a BNP of 212 and hyponatremia attributed to hypervolemia. Patient was found to have a L sided pleural effusion on CXR that has increased in size from previous CXR. Was not present on CT in 2020.    He is a former smoker x 20 year, quit 30 years ago.

## 2022-10-05 NOTE — ASSESSMENT & PLAN NOTE
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  - Vancomycin given in ED due to previous culture with <50K MRSA (2 years ago)  - As he does not have fever or leukocytosis will hold off on further antibiotics.  - Repeat CBC.

## 2022-10-05 NOTE — ASSESSMENT & PLAN NOTE
"Patient seen by palliative care and has elected DNR status.  He does state that he doesn't want us to "give up" on him and has been assured that only the most aggressive measures (CPR, intubation, shocks) have been omitted from the care plan.  We will continue oxygen and NIV as needed, antibiotics, diuresis, etc.    "

## 2022-10-05 NOTE — EICU
Eicu brief admission review note.  Pt was examined on video, notes, images, labs  reviewed.  89 y/o with h/o HTN, HLD,  hypothyroidism, HFpEF 60% admitted on 10/3 with acute heart falure exacerbation and hypoNa presumed hypervolemic. He was started on lasix IV  with good UO, neprhology also added tolvaptan. This evening pt was noted to become more hypoxemic and confused after removing his oxygen,  he was placed on 6 l n/c, abg showed respiratory acidosis and hypoxemia, he was transferred to ICU for bipap therapy.  Acute on chronic respiratory failure- presumed to pulmonary edema, on bipap now  16/7/50% RR back up 16 actual 24-26, pulling -480 ccs, repeat ABG, CXR, consider additional etiologies for his deterioration. He has sizable pleural effusions before, may need thoracenthesis, possibly ab, check procal.  CHF exacerbation- on lasix 40 mg iv bid, continue, has suprapubic cath, monitor UO  HypoNa- presume hypervolemic on lasix 40 mg iv bid,  tolvaptan, monitor closely  DVT proph-heparin  D/w RN

## 2022-10-05 NOTE — NURSING
"Entered pts room and prongs to NC not in pts nose.  SpO2 85%.  O2 replaced SpO2 now 93%.  Pt answers questions appropriately, however, appears confused.  Pt made a phone call stating  "I'm ready for somebody to come pick me up now."  Explained to the pt he was in the hospital.  Pt states "oh right, but I have to get organized."  Notified hospital medicine of change in pt LOC.  Nephrology paged as they are trending pt sodium levels.    New orders noted.  Respiratory at bedside obtaining ABGs.  "

## 2022-10-05 NOTE — ASSESSMENT & PLAN NOTE
Patient transferred to ICU overnight 10/4 with worsening hypoxemia and hyponatremia.  CXR showed worsening pulmonary vascular congestion and a persistent airspace opacity in the left base with an associated effusion, possibly parapneumonic.  Note the 2D echo showed a normal EF and only grade I diastolic dysfunction, but severe aortic stenosis and pulmonary HTN seen previously.  Unclear whether this is due to chronic lung disease, he is a former smoker with a 20 pack year history (quit 1990) but is not oxygen dependent at home and does not use respiratory medications chronically.  Cardiology consulted, will also have PCC see him as well as IR if he needs to have effusion drained.

## 2022-10-05 NOTE — PROGRESS NOTES
[facility]  Cardiology  Progress Note    Patient Name: Chucho Prado  MRN: [id number]  Admission Date: 10/3/2022  Hospital Length of Stay: 2 days  Code Status: DNR   Attending Physician: Xin Mason MD   Primary Care Physician: Obed Mcguire MD  Expected Discharge Date:   Principal Problem:Acute respiratory failure with hypoxia    Subjective:     Brief HPI:    Chucho Prado is a 89 y.o.male with hypertension and hypercholesterolemia. He has been unable to walk after a back injury in about 2015. He has chronic kidney disease, stage 3. He has a suprapubic catheter due to a neurogenic bladder. Beginning in mid 9/2022 he noted that he was becoming short of breath from lying down. The shortness of breath got worse. His legs became edematous. He presented to the emergency room on 10/3/2022 being fluid overloaded. He was noted to be hyponatremic. He says he has been drinking a lot of water. He was admitted.     Hospital Course:    10/4/2022: Received tolvaptan.    10/4/2022: Echo: Normal left ventricular size and systolic function. EF 65%. Mild diastolic dysfunction. Mildly enlarged LA. Severe AS - 4.1 m/s - MG 44 mmHg - 0.9 cm2. SPAP 59 mmHg.    10/4/2022: Transferred to ICU due to respiratory issues.    Interval History:    Says he is comfortable.    Review of Systems   Constitutional: Positive for malaise/fatigue. Negative for chills and fever.   HENT:  Negative for nosebleeds.    Eyes:  Negative for vision loss in left eye and vision loss in right eye.   Cardiovascular:  Positive for leg swelling, orthopnea and palpitations. Negative for chest pain and paroxysmal nocturnal dyspnea.   Respiratory:  Positive for cough and shortness of breath. Negative for hemoptysis, sputum production and wheezing.    Hematologic/Lymphatic: Negative for bleeding problem. Does not bruise/bleed easily.   Skin:  Negative for color change and rash.   Musculoskeletal:  Negative for muscle weakness and  myalgias.   Gastrointestinal:  Negative for abdominal pain, heartburn, hematemesis, hematochezia, melena, nausea and vomiting.   Genitourinary:  Negative for hematuria.   Neurological:  Negative for dizziness, focal weakness, headaches, light-headedness, vertigo and weakness.   Psychiatric/Behavioral:  Negative for altered mental status. The patient is not nervous/anxious.    Allergic/Immunologic: Negative for persistent infections.     Objective:     Vital Signs (Most Recent):  Temp: 98.3 °F (36.8 °C) (10/05/22 1501)  Pulse: 80 (10/05/22 1801)  Resp: (!) 25 (10/05/22 1801)  BP: (!) 123/57 (10/05/22 1801)  SpO2: (!) 90 % (10/05/22 1801)   Vital Signs (24h Range):  Temp:  [98.2 °F (36.8 °C)-98.8 °F (37.1 °C)] 98.3 °F (36.8 °C)  Pulse:  [62-87] 80  Resp:  [13-48] 25  SpO2:  [86 %-99 %] 90 %  BP: (104-148)/(52-77) 123/57     Weight: 90.7 kg (200 lb)  Body mass index is 30.41 kg/m².    SpO2: (!) 90 %  O2 Device (Oxygen Therapy): nasal cannula w/ humidification      Intake/Output Summary (Last 24 hours) at 10/5/2022 1843  Last data filed at 10/5/2022 1843  Gross per 24 hour   Intake --   Output 4800 ml   Net -4800 ml       Lines/Drains/Airways       Drain  Duration                  Suprapubic Catheter 09/01/20 latex 18 Fr. 764 days              Peripheral Intravenous Line  Duration                  Peripheral IV - Single Lumen 10/03/22 1717 20 G Right Forearm 2 days                    Physical Exam  Constitutional:       General: He is not in acute distress.     Appearance: Normal appearance. He is well-developed. He is not ill-appearing.   Eyes:      Conjunctiva/sclera:      Right eye: Right conjunctiva is not injected. No hemorrhage.     Left eye: Left conjunctiva is not injected. No hemorrhage.     Pupils:      Right eye: Pupil is round.      Left eye: Pupil is round.   Neck:      Vascular: No JVD.   Cardiovascular:      Rate and Rhythm: Normal rate and regular rhythm.      Heart sounds: S1 normal and S2 normal.  Murmur heard.   Midsystolic murmur is present with a grade of 3/6 at the upper right sternal border.     Gallop present. S4 sounds present. No S3 sounds.   Pulmonary:      Effort: Pulmonary effort is normal.      Breath sounds: Examination of the right-lower field reveals decreased breath sounds and rales. Examination of the left-lower field reveals decreased breath sounds and rales. Decreased breath sounds, rhonchi and rales present.   Chest:      Chest wall: No swelling or tenderness.   Abdominal:      General: There is no distension.      Palpations: Abdomen is soft.      Tenderness: There is no abdominal tenderness.   Musculoskeletal:      Cervical back: Neck supple.      Right lower leg: Swelling present. 2+ Pitting Edema present.      Left lower leg: Swelling present. 2+ Pitting Edema present.   Skin:     General: Skin is warm and dry.      Findings: No rash.   Neurological:      Mental Status: He is alert and oriented to person, place, and time. He is not disoriented.   Psychiatric:         Attention and Perception: Attention normal.         Mood and Affect: Mood normal.         Speech: Speech normal.         Behavior: Behavior normal.         Thought Content: Thought content normal.         Cognition and Memory: Cognition and memory normal.         Judgment: Judgment normal.       Current Medications:     amLODIPine  5 mg Oral Daily    aspirin  81 mg Oral Daily    atorvastatin  20 mg Oral Daily    furosemide (LASIX) injection  40 mg Intravenous BID    gabapentin  400 mg Oral BID    heparin (porcine)  5,000 Units Subcutaneous Q8H    levothyroxine  50 mcg Oral Before breakfast    metoprolol succinate  25 mg Oral Daily    mirtazapine  15 mg Oral QHS    mupirocin   Nasal BID    traZODone  50 mg Oral QHS     Current Laboratory Results:    Recent Results (from the past 24 hour(s))   ISTAT PROCEDURE    Collection Time: 10/04/22  8:07 PM   Result Value Ref Range    POC PH 7.269 (LL) 7.35 - 7.45    POC PCO2 79.8 (HH)  35 - 45 mmHg    POC PO2 79 (L) 80 - 100 mmHg    POC HCO3 36.6 (H) 24 - 28 mmol/L    POC BE 10 -2 to 2 mmol/L    POC SATURATED O2 93 (L) 95 - 100 %    POC TCO2 39 (H) 23 - 27 mmol/L    Sample ARTERIAL     Site RR     Allens Test Pass    Sodium (Serum)    Collection Time: 10/04/22  9:34 PM   Result Value Ref Range    Sodium 120 (L) 136 - 145 mmol/L   ISTAT PROCEDURE    Collection Time: 10/04/22 11:46 PM   Result Value Ref Range    POC PH 7.389 7.35 - 7.45    POC PCO2 59.2 (HH) 35 - 45 mmHg    POC PO2 50 (LL) 80 - 100 mmHg    POC HCO3 35.8 (H) 24 - 28 mmol/L    POC BE 11 -2 to 2 mmol/L    POC SATURATED O2 83 (L) 95 - 100 %    POC Sodium 116 (LL) 136 - 145 mmol/L    POC Potassium 3.6 3.5 - 5.1 mmol/L    POC TCO2 38 (H) 23 - 27 mmol/L    POC Ionized Calcium 1.00 (L) 1.06 - 1.42 mmol/L    POC Hematocrit 36 36 - 54 %PCV    POC HEMOGLOBIN 12 g/dL    Rate 16     Sample ARTERIAL     Site LR     Allens Test Pass     DelSys CPAP/BiPAP     Mode BiPAP     FiO2 50     IP 16     EP 7    Sodium    Collection Time: 10/05/22 12:20 AM   Result Value Ref Range    Sodium 120 (L) 136 - 145 mmol/L   Basic metabolic panel    Collection Time: 10/05/22  5:43 AM   Result Value Ref Range    Sodium 125 (L) 136 - 145 mmol/L    Potassium 4.0 3.5 - 5.1 mmol/L    Chloride 81 (L) 95 - 110 mmol/L    CO2 33 (H) 23 - 29 mmol/L    Glucose 86 70 - 110 mg/dL    BUN 12 8 - 23 mg/dL    Creatinine 1.0 0.5 - 1.4 mg/dL    Calcium 8.7 8.7 - 10.5 mg/dL    Anion Gap 11 8 - 16 mmol/L    eGFR >60 >60 mL/min/1.73 m^2   Sodium (Serum)    Collection Time: 10/05/22  5:43 AM   Result Value Ref Range    Sodium 127 (L) 136 - 145 mmol/L   Procalcitonin    Collection Time: 10/05/22  5:43 AM   Result Value Ref Range    Procalcitonin 0.07 <0.25 ng/mL   ISTAT PROCEDURE    Collection Time: 10/05/22  7:22 AM   Result Value Ref Range    POC PH 7.325 (L) 7.35 - 7.45    POC PCO2 80.6 (HH) 35 - 45 mmHg    POC PO2 106 (H) 80 - 100 mmHg    POC HCO3 42.0 (H) 24 - 28 mmol/L    POC BE 16  -2 to 2 mmol/L    POC SATURATED O2 97 95 - 100 %    POC TCO2 44 (H) 23 - 27 mmol/L    Sample ARTERIAL     Site LR     Allens Test Pass     DelSys CPAP/BiPAP     Mode BiLevel     Vt 564     Spont Rate 17     Set Rate 16    CBC auto differential    Collection Time: 10/05/22  7:56 AM   Result Value Ref Range    WBC 10.52 3.90 - 12.70 K/uL    RBC 4.18 (L) 4.60 - 6.20 M/uL    Hemoglobin 11.7 (L) 14.0 - 18.0 g/dL    Hematocrit 34.8 (L) 40.0 - 54.0 %    MCV 83 82 - 98 fL    MCH 28.0 27.0 - 31.0 pg    MCHC 33.6 32.0 - 36.0 g/dL    RDW 14.1 11.5 - 14.5 %    Platelets 312 150 - 450 K/uL    MPV 9.4 9.2 - 12.9 fL    Immature Granulocytes 0.9 (H) 0.0 - 0.5 %    Gran # (ANC) 7.6 1.8 - 7.7 K/uL    Immature Grans (Abs) 0.26 (H) 0.00 - 0.04 K/uL    Lymph # 1.1 1.0 - 4.8 K/uL    Mono # 1.6 (H) 0.3 - 1.0 K/uL    Eos # 0.1 0.0 - 0.5 K/uL    Baso # 0.02 0.00 - 0.20 K/uL    nRBC 0 0 /100 WBC    Gran % 71.8 38.0 - 73.0 %    Lymph % 10.3 (L) 18.0 - 48.0 %    Mono % 14.6 4.0 - 15.0 %    Eosinophil % 0.6 0.0 - 8.0 %    Basophil % 0.2 0.0 - 1.9 %    Differential Method Automated      Current Imaging Results:    X-Ray Chest AP Portable   Final Result      See above         Electronically signed by: Daron Burr MD   Date:    10/05/2022   Time:    07:23      US Lower Extremity Arteries Bilateral   Final Result      Diffuse atherosclerosis.  Based on reduced velocities and abnormal monophasic waveforms in the left posterior tibial artery, I suspect underlying hemodynamically significant stenosis.  No evidence for hemodynamically significant stenosis elsewhere.         Electronically signed by: Karen Teran   Date:    10/05/2022   Time:    08:27      X-Ray Chest AP Portable   Final Result      1.  Worsening pulmonary vascular congestion, suggestive of worsening fluid overload state.      2.  Persistent airspace opacity in the left lung base with associated suspected moderate parapneumonic effusion.         Electronically signed by: South  MD Jt   Date:    10/03/2022   Time:    17:13      FL Fluoro of Diaphragm    (Results Pending)   CT Chest Without Contrast    (Results Pending)       10/4/2022: Echo:    The left ventricle is normal in size with normal systolic function.  The estimated ejection fraction is 65%.  Grade I left ventricular diastolic dysfunction.  Mild left atrial enlargement.  Normal right ventricular size with normal right ventricular systolic function.  There is severe aortic valve stenosis.  Aortic valve area is 0.89 cm2; peak velocity is 4.09 m/s; mean gradient is 44 mmHg.  Mild tricuspid regurgitation.  There is moderate pulmonary hypertension.  The estimated PA systolic pressure is 59 mmHg.  Normal central venous pressure (3 mmHg).      Assessment and Plan:     Problem List:    Active Diagnoses:    Diagnosis Date Noted POA    PRINCIPAL PROBLEM:  Acute respiratory failure with hypoxia [J96.01] 08/27/2020 Yes    Pleural effusion, left [J90] 10/05/2022 Yes    Advanced care planning/counseling discussion [Z71.89] 10/05/2022 Not Applicable    Asymptomatic bacteriuria [R82.71] 10/04/2022 Yes    Acute heart failure [I50.9] 10/03/2022 Yes    Neurogenic bladder [N31.9] 12/04/2018 Yes    Suprapubic catheter [Z93.59] 10/23/2017 Not Applicable    Hyponatremia [E87.1] 10/23/2017 Yes    CKD (chronic kidney disease) stage 3, GFR 30-59 ml/min [N18.30] 12/26/2016 Yes    Iron deficiency anemia [D50.9] 07/31/2013 Yes    Acquired hypothyroidism [E03.9] 07/30/2013 Yes    Essential hypertension [I10] 07/30/2013 Yes    Hyperlipidemia [E78.5] 07/30/2013 Yes      Problems Resolved During this Admission:       Assessment and Plan:     Heart Failure, Diastolic, Acute on Chronic              10/3/2022: Presents in HF with pulmonary edema and edema of legs.              8/28/2020: Echo: Normal left ventricular size and systolic function. EF 60%. Septal WMA. Moderate to severe AS - 3.1 m/s - MG 26 mmHg - 1.0 cm2.   10/4/2022: Echo: Normal left ventricular  size and systolic function. EF 65%. Mild diastolic dysfunction. Mildly enlarged LA. Severe AS - 4.1 m/s - MG 44 mmHg - 0.9 cm2. SPAP 59 mmHg.              At home was on  furosemide 20 mg M,W & F. Has been taken additional furosemide but unclear on dosing.   10/4/2022: Received tolvaptan.              On furosemide 40 mg iv Q12.              Fluid restriction 800 ml/day with Na of 122.              Continue diuresis.                2. Aortic Stenosis              8/28/2020: Echo: Moderate to severe AS - 3.1 m/s - MG 26 mmHg - 1.0 cm2.   10/4/2022: Echo: Severe AS - 4.1 m/s - MG 44 mmHg - 0.9 cm2.      3. Hypertension              At home been on metoprolol 25 mg Q24, amlodipine 5 mg Q24, furosemide 20 mg 3/7 & KCl 10 meq 3/7.     4. Hypercholesterolemia  At home been on atorvastatin 20 mg Q24.      5. Chronic Kidney Disease, Stage 3              10/4/2022: BUN/crea 14/0.9. CrCl >60.     6. Hyponatremia              10/4/2022: Na 122.   10/4/2022: Received tolvaptan.              Fluid restriction.    7. Respiratory Failure   10/5/2022: PCO2 81. PO2 106.                 VTE Risk Mitigation (From admission, onward)           Ordered     heparin (porcine) injection 5,000 Units  Every 8 hours         10/03/22 2007     IP VTE HIGH RISK PATIENT  Once         10/03/22 2007     Place sequential compression device  Until discontinued         10/03/22 2007                    Tomasa Rdz MD  Cardiology  [facility]

## 2022-10-05 NOTE — PROGRESS NOTES
"Nephrology  Progress Note    Admit Date: 10/3/2022   LOS: 2 days     SUBJECTIVE:     Follow-up For:  Acute respiratory failure with hypoxia    History of Present Illness:  Patient is a retired  at [employer] 89 y.o. male presents with worsening SOB x 3 days and found to have pulm edema/CHF/hypoxia.  Labs revealing acute on chronic hyponatremia of 122.  Placed on fluid restriction, lasix, and admitted to hospital for monitoring.  Na remains 122 this am and consulted for evaluation.  Extensive medical history as outlined below including suprapubic catheter for neurogenic bladder, lower extremity edema currently with Ace wraps, mobility impairment, combined CHF and aortic stenosis, and chronic hyponatremia with sodium ranging from 119-137 since 2004.  Patient seen and examined with medical power of  and registered nurse at bedside.  Patient using accessory muscles and work of breathing with signs and symptoms of volume overload.  Admits to drinking 8-10 bottles of water per day.  Discussed treatment plan and agrees with Samsca.  Updated team.     Epic reviewed     Currently no chest pain, nausea, vomiting, diarrhea, fever, chills.  Positive shortness of breath and dyspnea on exertion          Interval History:     Events noted.  Developed hypoxia and hypercapnia yesterday prompting ICU transfer with Bipap.  Na trended and [phone].  Feels better today.  Discussed with CCT.      Review of Systems:  Constitutional: No fever or chills  Respiratory: shortness of breath  Cardiovascular: No chest pain or palpitations  Gastrointestinal: No nausea or vomiting  Neurological: No confusion or weakness    OBJECTIVE:     Vital Signs Range (Last 24H):  BP (!) 126/56   Pulse 70   Temp 98.7 °F (37.1 °C) (Axillary)   Resp 18   Ht 5' 8" (1.727 m)   Wt 90.7 kg (200 lb)   SpO2 99%   BMI 30.41 kg/m²     Temp:  [98.4 °F (36.9 °C)-99 °F (37.2 °C)]   Pulse:  [62-84]   Resp:  [13-48]   BP: (104-152)/(52-77) "   SpO2:  [85 %-99 %]     I & O (Last 24H):  Intake/Output Summary (Last 24 hours) at 10/5/2022 0752  Last data filed at 10/5/2022 0600  Gross per 24 hour   Intake 420 ml   Output 3025 ml   Net -2605 ml       Physical Exam:  General appearance:  Elderly, frail, bipap  Eyes:  Conjunctivae/corneas clear. PERRL.  Lungs:  Increased work of breathing is improved with bipap.  Still with few rales.   Heart: Regular rate and rhythm, S1, S2 normal, AS murmur  Abdomen: Soft, non-tender non-distended; bowel sounds normal; no masses,  no organomegaly, obese, suprapubic catheter  Extremities: No cyanosis or clubbing.  Compression bands on  Skin: Skin color, texture, turgor normal. No rashes or lesions  Neurologic:  Generally weak       Laboratory Data:  Recent Labs   Lab 10/04/22  2346   HCT 36       BMP:   Recent Labs   Lab 10/04/22  0431 10/04/22  1017 10/05/22  0543   GLU 99  --  86   *   < > 125*  127*   K 4.3  --  4.0   CL 82*  --  81*   CO2 26  --  33*   BUN 14  --  12   CREATININE 0.9  --  1.0   CALCIUM 8.6*  --  8.7   MG 1.7  --   --     < > = values in this interval not displayed.     Lab Results   Component Value Date    CALCIUM 8.7 10/05/2022    PHOS 2.8 09/18/2020       Lab Results   Component Value Date    .1 (H) 08/31/2022    CALCIUM 8.7 10/05/2022    CAION 1.07 09/03/2019    PHOS 2.8 09/18/2020       Lab Results   Component Value Date    URICACID 5.1 10/04/2022       BNP  Recent Labs   Lab 10/03/22  1716   *       Medications:  Medication list was reviewed and changes noted under Assessment/Plan.    Diagnostic Results:    X-Ray Chest AP Portable   Final Result      See above         Electronically signed by: Draon Burr MD   Date:    10/05/2022   Time:    07:23      X-Ray Chest AP Portable   Final Result      1.  Worsening pulmonary vascular congestion, suggestive of worsening fluid overload state.      2.  Persistent airspace opacity in the left lung base with associated suspected  moderate parapneumonic effusion.         Electronically signed by: South Waters MD   Date:    10/03/2022   Time:    17:13      US Lower Extremity Arteries Bilateral    (Results Pending)       ASSESSMENT/PLAN:     Acute on chronic hypervolemic hyponatremia secondary to CHF, excessive water intake, etc:  -urine studies hard to interpret with lasix on board.  -admit  and same following lasix/fluid restriction.  -dosed samsca and trending Q6.    -[phone]  -long standing hx of hyponatremia with variable ranges (119-137).  -TSH wnl  -continue lasix today.    -goal 130.   -updated team.  -Renally dose meds, avoid nephrotoxins, and monitor I/O's closely.        Acute on chronic combined CHF/AS:  -Dr. Rdz following and discussed.      Hypoxia/Hypercapnia:  -better with Bipap  -CCT following  -diurese.         Neurogenic bladder:  -cath exchanged.  -defer.        Dispo:  Consulted palliative care for goals of care  He has 24 hr sitters and a MPOA.        Total critical care time (exclusive of procedural time) : 35 minutes  Critical care was necessary to treat or prevent imminent or life-threatening deterioration of the following conditions: <NA/Resp fail/Hypercapnia     Critical care was time spent personally by me on the following activities: development of treatment plan with patient or surrogate, discussions with consultants, interpretation of cardiac output measurements, examination of patient, ordering and performing treatments and interventions, evaluation of patient's response to treatment, obtaining history from patient or surrogate, ordering and review of laboratory studies, ordering and review of radiographic studies, re-evaluation of patient's condition, pulse oximetry and blood draw for specimens                      Discharged

## 2022-10-05 NOTE — SUBJECTIVE & OBJECTIVE
Interval History:  Events of last noted, patient became increasingly hypoxemic until he was transferred to ICU on BiPAP.  BiPAP now removed and he is conversant on the nasal cannula.  Says he is short of breath but able to speak in full sentences.    Review of Systems   Constitutional:  Negative for chills and fever.   Respiratory:  Positive for chest tightness and shortness of breath. Negative for cough.    Cardiovascular:  Positive for leg swelling. Negative for chest pain and palpitations.   Objective:     Vital Signs (Most Recent):  Temp: 98.3 °F (36.8 °C) (10/05/22 1501)  Pulse: 79 (10/05/22 1711)  Resp: (!) 26 (10/05/22 1501)  BP: (!) 125/59 (10/05/22 1501)  SpO2: (!) 91 % (10/05/22 1711)   Vital Signs (24h Range):  Temp:  [98.2 °F (36.8 °C)-98.8 °F (37.1 °C)] 98.3 °F (36.8 °C)  Pulse:  [62-87] 79  Resp:  [13-48] 26  SpO2:  [86 %-99 %] 91 %  BP: (104-148)/(52-77) 125/59     Weight: 90.7 kg (200 lb)  Body mass index is 30.41 kg/m².    Intake/Output Summary (Last 24 hours) at 10/5/2022 1758  Last data filed at 10/5/2022 1505  Gross per 24 hour   Intake --   Output 4500 ml   Net -4500 ml      Physical Exam  Cardiovascular:      Rate and Rhythm: Normal rate and regular rhythm.      Pulses: Normal pulses.      Heart sounds: Murmur heard.     No gallop.   Pulmonary:      Breath sounds: Rales present. No wheezing.      Comments: Poor effort.  Abdominal:      General: Bowel sounds are normal.      Palpations: Abdomen is soft.   Skin:     General: Skin is warm and dry.   Neurological:      General: No focal deficit present.      Mental Status: He is alert and oriented to person, place, and time.       Significant Labs: All pertinent labs within the past 24 hours have been reviewed.    Significant Imaging: I have reviewed all pertinent imaging results/findings within the past 24 hours.

## 2022-10-05 NOTE — PROGRESS NOTES
"[facility]ensive [facility] Medicine  Progress Note    Patient Name: Chucho Prado  MRN: [id number]  Patient Class: IP- Inpatient   Admission Date: 10/3/2022  Length of Stay: 2 days  Attending Physician: Xin Mason MD  Primary Care Provider: Obed Mcguire MD        Subjective:     Principal Problem:Acute respiratory failure with hypoxia        HPI:  From H&P by Kerrie Torres PA:  "Mr. Chucho Prado is a 89 y.o. male, with PMH of CHF, HTN, hypothyroidism, HLD, MDD, CARLINE, CKD-3, neurogenic bladder s/p suprapubic catheter insertion, GERD, wheelchair bound, who presented to [facility] ED on 10/3/22 due to shortness of breath x 3 days. He states he feels out of breath when laying flat or with activity (ie. Being changed or transferring to the wheelchair). Additionally, he states he feels occasional chest tightness, has been having wheezing, and coughing (present for weeks). Upon awakening this morning he noted b/l lower extremity swelling, which he attributes to sitting up while sleeping last night and not elevating his legs. He denies fever. He was evaluated in the ED with labs showing hyponatremia with sodium of 122, chloride of 83. A BNP was elevated at 212, and troponin was negative. A CXR showed worsening pulmonary vascular congestion, and persistence of a left lung base airspace opacity. He was treated in the ED with 40 mg IV lasix."      Overview/Hospital Course:  Patient was admitted under a heart failure protocol on IV Lasix and Cardiology was consulted to follow.  His 2D echo was repeated.  Nephrology was consulted for the hyponatremia.  He was given a dose of tolvaptan and his sodium level was monitored closely.  Patient's oxygen level became difficult to manage on the floor and he was transferred to ICU overnight 10/4 when he required placement of a NRB.  Sodium level decreased to 120 upon transfer before stabilizing the following morning at 125.      Interval History:  Events of " last noted, patient became increasingly hypoxemic until he was transferred to ICU on BiPAP.  BiPAP now removed and he is conversant on the nasal cannula.  Says he is short of breath but able to speak in full sentences.    Review of Systems   Constitutional:  Negative for chills and fever.   Respiratory:  Positive for chest tightness and shortness of breath. Negative for cough.    Cardiovascular:  Positive for leg swelling. Negative for chest pain and palpitations.   Objective:     Vital Signs (Most Recent):  Temp: 98.3 °F (36.8 °C) (10/05/22 1501)  Pulse: 79 (10/05/22 1711)  Resp: (!) 26 (10/05/22 1501)  BP: (!) 125/59 (10/05/22 1501)  SpO2: (!) 91 % (10/05/22 1711)   Vital Signs (24h Range):  Temp:  [98.2 °F (36.8 °C)-98.8 °F (37.1 °C)] 98.3 °F (36.8 °C)  Pulse:  [62-87] 79  Resp:  [13-48] 26  SpO2:  [86 %-99 %] 91 %  BP: (104-148)/(52-77) 125/59     Weight: 90.7 kg (200 lb)  Body mass index is 30.41 kg/m².    Intake/Output Summary (Last 24 hours) at 10/5/2022 1758  Last data filed at 10/5/2022 1505  Gross per 24 hour   Intake --   Output 4500 ml   Net -4500 ml      Physical Exam  Cardiovascular:      Rate and Rhythm: Normal rate and regular rhythm.      Pulses: Normal pulses.      Heart sounds: Murmur heard.     No gallop.   Pulmonary:      Breath sounds: Rales present. No wheezing.      Comments: Poor effort.  Abdominal:      General: Bowel sounds are normal.      Palpations: Abdomen is soft.   Skin:     General: Skin is warm and dry.   Neurological:      General: No focal deficit present.      Mental Status: He is alert and oriented to person, place, and time.       Significant Labs: All pertinent labs within the past 24 hours have been reviewed.    Significant Imaging: I have reviewed all pertinent imaging results/findings within the past 24 hours.      Assessment/Plan:      * Acute respiratory failure with hypoxia  Patient transferred to ICU overnight 10/4 with worsening hypoxemia and hyponatremia.  CXR showed  "worsening pulmonary vascular congestion and a persistent airspace opacity in the left base with an associated effusion, possibly parapneumonic.  Note the 2D echo showed a normal EF and only grade I diastolic dysfunction, but severe aortic stenosis and pulmonary HTN seen previously.  Unclear whether this is due to chronic lung disease, he is a former smoker with a 20 pack year history (quit 1990) but is not oxygen dependent at home and does not use respiratory medications chronically.  Cardiology consulted, will also have PCC see him as well as IR if he needs to have effusion drained.      Hyponatremia  - suspect hypervolemic hyponatremia   - s/p 40 mg IV lasix in ED, now on twice daily Lasix  - Nephrology consulted, tolvaptan given and fluid intake liberalized.    - After an initial further decrease hyponatremia improved to 125 this morning.      Acute heart failure  - Patient presented with shortness of breath worse when supine   - BNP in ED was 212   - CXR with evidence of pulmonary edema and left pleural effusion associated with an airspace opacity on that side that was previously read as atelectasis.  He does not have pneumonia symptoms.  - s/p lasix 40 mg IV in ED   - monitor I&Os   - Heart Failure pathways initiated   - Echo showed normal EF, grade I diastolic dysfunction, severe AS, pulmonary HTN.    Advanced care planning/counseling discussion  Patient seen by palliative care and has elected DNR status.  He does state that he doesn't want us to "give up" on him and has been assured that only the most aggressive measures (CPR, intubation, shocks) have been omitted from the care plan.  We will continue oxygen and NIV as needed, antibiotics, diuresis, etc.      Pleural effusion, left        Asymptomatic bacteriuria  - Patient likely colonized with multiple organisms as noted due to suprapubic catheter.  - Vancomycin given in ED due to previous culture with <50K MRSA (2 years ago)  - As he does not have fever or " leukocytosis will hold off on further antibiotics.  - Repeat CBC.    Neurogenic bladder  - s/p suprapubic catheter insertion   - monitor UOP       Suprapubic catheter  - chronic   - Urine likely colonized with multiple organisms.        CKD (chronic kidney disease) stage 3, GFR 30-59 ml/min  - Cr is 1.0  - ranges from 0.8  - 1.0   - avoid nephrotoxins   - renally dose meds     Iron deficiency anemia  - H&H are stable at present   - monitor       Hyperlipidemia  - continue atorvastatin 20 mg QD       Essential hypertension  - hypertensive on presentation  - home meds: amlodipine 5 mg QD, metoprolol succinate 25 mg QD  - monitor.  Improving with diuresis.    Acquired hypothyroidism  - continue levothyroxine 50 mcg QD         VTE Risk Mitigation (From admission, onward)         Ordered     heparin (porcine) injection 5,000 Units  Every 8 hours         10/03/22 2007     IP VTE HIGH RISK PATIENT  Once         10/03/22 2007     Place sequential compression device  Until discontinued         10/03/22 2007                          Xin Sampson MD  Department of Hospital Medicine   [facility] - Intensive Care ([place])

## 2022-10-05 NOTE — PROGRESS NOTES
Palliative follow up visit by myself and Jessika Toth RN to continue goals of care discussion. Patient unavailable- off floor for echocardiogram. Palliative will continue to follow.     Addendum:  Per chart review, patient has been seen by home-based palliative care. Most recent visit was on 8/4/22, at which time pt confirmed full code status and was encouraged to communicate his wishes with HCPOA (on file).

## 2022-10-05 NOTE — NURSING
Pt transferred from floor to ICU via bed on 4L NC. Transferred to bed with max assist from staff. Aox3, forgetful to why he came to hospital, but when reminded agrees. Pt placed on 6L NC with SpO2 93%. Placed on BiPAP by Respiratory. VSS, B/P-130/58, MAP-86, SpO2-96%, P-68. Abdominal breathing noted.

## 2022-10-05 NOTE — ASSESSMENT & PLAN NOTE
- suspect hypervolemic hyponatremia   - s/p 40 mg IV lasix in ED, now on twice daily Lasix  - Nephrology consulted, tolvaptan given and fluid intake liberalized.    - After an initial further decrease hyponatremia improved to 125 this morning.

## 2022-10-05 NOTE — SUBJECTIVE & OBJECTIVE
Past Medical History:   Diagnosis Date    Acquired hypothyroidism 7/30/2013    Arthritis     Blood transfusion     before 2005 - whe had gangrenous gall bladder    Cataract     Chronic back pain 12/4/2018    - Takes Percocet 5-325 at home - Can continue current abdominal pain control with Dilaudid 0.5 mg IV Q3H prn     CKD (chronic kidney disease) stage 3, GFR 30-59 ml/min     Compression fracture of lumbar vertebra     Depression     Dyslipidemia     Essential hypertension 7/30/2013    Gastroesophageal reflux disease without esophagitis 12/4/2018    - continue home Prilosec    General anesthetics causing adverse effect in therapeutic use     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    GERD (gastroesophageal reflux disease)     History of postoperative delirium 12/4/2018    - Patient reports 1 week history of post-op delirium 7/2018 - Monitor electrolytes, UA, and urine cx - Delirium precautions  - Can use Seroquel 25 mg QHS prn     Hypertension     Hyponatremia 10/23/2017    Impaired mobility and ADLs 6/28/2018    Neurogenic bladder 12/4/2018    Sleep disorder 12/4/2018    - continue Trazodone QHS    Thyroid disease     UTI (urinary tract infection)        Past Surgical History:   Procedure Laterality Date    BIOPSY OF BLADDER  9/1/2020    Procedure: BIOPSY, BLADDER;  Surgeon: Daron Manjarrez MD;  Location: [address];  Service: Urology;;    BLADDER FULGURATION  9/1/2020    Procedure: FULGURATION, BLADDER;  Surgeon: Daron Manjarrez MD;  Location: [address];  Service: Urology;;    CHOLECYSTECTOMY      CYSTOGRAM  9/1/2020    Procedure: CYSTOGRAM;  Surgeon: Daron Manjarrez MD;  Location: [address];  Service: Urology;;    CYSTOSCOPY N/A 9/1/2020    Procedure: CYSTOSCOPY;  Surgeon: Daron Manjarrez MD;  Location: [address];  Service: Urology;  Laterality: N/A;    DILATION OF URETHRA  9/1/2020    Procedure: DILATION, URETHRA;  Surgeon: Daron Manjarrez MD;  Location: [address]  FLR;  Service: Urology;;    EYE SURGERY Right     IOL    HERNIA REPAIR      INTRAOCULAR PROSTHESES INSERTION Left 2018    Procedure: INSERTION-INTRAOCULAR LENS (IOL);  Surgeon: Trinity Vazquez MD;  Location: [facility];  Service: Ophthalmology;  Laterality: Left;    JOINT REPLACEMENT      LAMINECTOMY  2016    L2-L4    LUMBAR LAMINECTOMY  2016    PARATHYROIDECTOMY      PHACOEMULSIFICATION OF CATARACT Left 2018    Procedure: PHACOEMULSIFICATION-ASPIRATION-CATARACT;  Surgeon: Trinity Vazquez MD;  Location: [facility];  Service: Ophthalmology;  Laterality: Left;    REMOVAL OF BLOOD CLOT  2020    Procedure: REMOVAL, BLOOD CLOT;  Surgeon: Daron Manjarrez MD;  Location: [facility] OR 1ST FLR;  Service: Urology;;    REPAIR OF INCARCERATED INCISIONAL HERNIA WITHOUT HISTORY OF PRIOR REPAIR N/A 2018    Procedure: REPAIR, HERNIA, INCISIONAL, INCARCERATED, WITHOUT HISTORY OF PRIOR REPAIR;  Surgeon: Steve Valentin MD;  Location: [facility] OR 2ND FLR;  Service: General;  Laterality: N/A;    REPAIR OF INCARCERATED VENTRAL HERNIA WITHOUT HISTORY OF PRIOR REPAIR N/A 2018    Procedure: REPAIR, HERNIA, VENTRAL, INCARCERATED, WITHOUT HISTORY OF PRIOR REPAIR;  Surgeon: Guilherme Ordoñez MD;  Location: [facility] OR 2ND FLR;  Service: General;  Laterality: N/A;    TOTAL KNEE ARTHROPLASTY Bilateral     TOTAL KNEE ARTHROPLASTY Left 10/25/2017    TKR       Review of patient's allergies indicates:   Allergen Reactions    Thiazides Other (See Comments)     Multiple episodes of thiazide-induced hyponatremia       Family History       Problem Relation (Age of Onset)    Diabetes Father    Esophageal cancer Father    Hypertension Mother          Tobacco Use    Smoking status: Former     Years: 20.00     Types: Cigarettes     Quit date:      Years since quittin.7    Smokeless tobacco: Never    Tobacco comments:     quit    Substance and Sexual Activity    Alcohol use: No     Comment: rarely/6 months    Drug use: No     Sexual activity: Never         Review of Systems   Constitutional:  Positive for fatigue. Negative for fever and unexpected weight change.   HENT:  Negative for nosebleeds and sinus pain.    Respiratory:  Positive for shortness of breath. Negative for cough and chest tightness.    Cardiovascular:  Positive for leg swelling. Negative for chest pain.   Gastrointestinal:  Negative for abdominal distention and diarrhea.   Musculoskeletal:  Negative for arthralgias and myalgias.   Skin:  Negative for rash and wound.   Neurological:  Negative for seizures and syncope.   Psychiatric/Behavioral:  Negative for agitation and confusion.    All other systems reviewed and are negative.  Objective:     Vital Signs (Most Recent):  Temp: 98.7 °F (37.1 °C) (10/05/22 0500)  Pulse: 87 (10/05/22 0941)  Resp: 18 (10/05/22 0615)  BP: (!) 126/56 (10/05/22 0615)  SpO2: 97 % (10/05/22 0941)   Vital Signs (24h Range):  Temp:  [98.4 °F (36.9 °C)-98.9 °F (37.2 °C)] 98.7 °F (37.1 °C)  Pulse:  [62-87] 87  Resp:  [13-48] 18  SpO2:  [85 %-99 %] 97 %  BP: (104-150)/(52-77) 126/56     Weight: 90.7 kg (200 lb)  Body mass index is 30.41 kg/m².      Intake/Output Summary (Last 24 hours) at 10/5/2022 0958  Last data filed at 10/5/2022 0600  Gross per 24 hour   Intake 420 ml   Output 3025 ml   Net -2605 ml       Physical Exam  Vitals and nursing note reviewed.   Constitutional:       General: He is not in acute distress.     Appearance: He is ill-appearing.   HENT:      Head: Normocephalic and atraumatic.      Mouth/Throat:      Mouth: Mucous membranes are dry.      Pharynx: Oropharynx is clear.      Comments: Poor dentition  Eyes:      Extraocular Movements: Extraocular movements intact.      Conjunctiva/sclera: Conjunctivae normal.   Cardiovascular:      Rate and Rhythm: Normal rate and regular rhythm.      Heart sounds: No murmur heard.  Pulmonary:      Breath sounds: No wheezing or rales.      Comments: Tachypneic, no wheezing, decr breath sounds on  the L  Abdominal:      General: There is no distension.      Palpations: Abdomen is soft.   Genitourinary:     Comments: Suprapubic catheter  Musculoskeletal:         General: No swelling or tenderness.      Cervical back: Normal range of motion and neck supple.      Right lower leg: Edema present.      Left lower leg: Edema present.   Skin:     General: Skin is warm and dry.   Neurological:      General: No focal deficit present.      Mental Status: He is alert and oriented to person, place, and time. Mental status is at baseline.      Cranial Nerves: No cranial nerve deficit.   Psychiatric:         Mood and Affect: Mood normal.         Thought Content: Thought content normal.       Vents:  Oxygen Concentration (%): 60 (10/05/22 0402)    Lines/Drains/Airways       Drain  Duration                  Suprapubic Catheter 09/01/20 latex 18 Fr. 764 days              Peripheral Intravenous Line  Duration                  Peripheral IV - Single Lumen 10/03/22 1717 20 G Right Forearm 1 day                    Significant Labs:    CBC/Anemia Profile:  Recent Labs   Lab 10/03/22  1716 10/04/22  2346 10/05/22  0756   WBC 12.42  --  10.52   HGB 12.3*  --  11.7*   HCT 36.9* 36 34.8*     --  312   MCV 83  --  83   RDW 14.2  --  14.1        Chemistries:  Recent Labs   Lab 10/03/22  1716 10/04/22  0431 10/04/22  1017 10/04/22  2134 10/05/22  0020 10/05/22  0543   * 122*   < > 120* 120* 125*  127*   K 4.5 4.3  --   --   --  4.0   CL 83* 82*  --   --   --  81*   CO2 28 26  --   --   --  33*   BUN 16 14  --   --   --  12   CREATININE 1.0 0.9  --   --   --  1.0   CALCIUM 9.0 8.6*  --   --   --  8.7   ALBUMIN 3.7  --   --   --   --   --    PROT 7.9  --   --   --   --   --    BILITOT 0.9  --   --   --   --   --    ALKPHOS 100  --   --   --   --   --    ALT 12  --   --   --   --   --    AST 20  --   --   --   --   --    MG  --  1.7  --   --   --   --     < > = values in this interval not displayed.       All pertinent labs  within the past 24 hours have been reviewed.    Significant Imaging:   I have reviewed all pertinent imaging results/findings within the past 24 hours.        CXR with appearance of L pleural effusion which has significantly increased in size since previous XR on 8/8/22

## 2022-10-05 NOTE — PLAN OF CARE
Plan of care reviewed with pt. Pt voiced understanding. NSR on monitor. Pt on 4L nasal cannula with humidification. O2 sat >93%. Goal for O2 sat is >88%. No acute distress noted. Pt complained of back pain, 10/10. MD notified, orders placed. Tylenol given, full relief obtained.     Side rails X2, bed in lowest position, call bell within reach, pt advised to call for assistance. Maintain bed alarm for pt safety.

## 2022-10-05 NOTE — ASSESSMENT & PLAN NOTE
- Patient presented with shortness of breath worse when supine   - BNP in ED was 212   - CXR with evidence of pulmonary edema and left pleural effusion associated with an airspace opacity on that side that was previously read as atelectasis.  He does not have pneumonia symptoms.  - s/p lasix 40 mg IV in ED   - monitor I&Os   - Heart Failure pathways initiated   - Echo showed normal EF, grade I diastolic dysfunction, severe AS, pulmonary HTN.

## 2022-10-05 NOTE — ASSESSMENT & PLAN NOTE
89 year old patient with PMH of HFpEF/PH and CKD here with incr SOB found to have L pleural effusion that is increased in size from 8/8/22 and new since 2020. Patient with an elevated BNP on admission and LE edema, undergoing diuresis and is neg 5L. Has chronic hypercapnic respiratory failure at baseline. POCUS without a good window slightly limited to patient's habitus and deconditioning.    - Unclear if this is truly effusion v bowel given appearance of previous CT abd with elevated L hemidiaphragm    While this effusion certainly may be attributable to volume overload, in the setting of a new unilateral effusion I am worried about potential for malignancy. Worried that he may have some chronic immobility or paralysis of his L hemidiaphragm, perhaps 2/2 abdominal surgeries?  - Recommend CT chest without to help further characterize the effusion v elevated hemidiaphragm and look for any suspicious mass.   - Ordered Sniff test under fluoroscopy to check for hemidiaphragm paralysis  - Agreed with continued diuresis in the meantime  - Wean O2 to maintain SpO2 > 88%  - Continue BiPAP with naps and qHS  - Likely will benefit from home NIV for baseline pCO2 > 59 due to chronic hypercapnia

## 2022-10-05 NOTE — SUBJECTIVE & OBJECTIVE
Past Medical History:   Diagnosis Date    Acquired hypothyroidism 7/30/2013    Arthritis     Blood transfusion     before 2005 - whe had gangrenous gall bladder    Cataract     Chronic back pain 12/4/2018    - Takes Percocet 5-325 at home - Can continue current abdominal pain control with Dilaudid 0.5 mg IV Q3H prn     CKD (chronic kidney disease) stage 3, GFR 30-59 ml/min     Compression fracture of lumbar vertebra     Depression     Dyslipidemia     Essential hypertension 7/30/2013    Gastroesophageal reflux disease without esophagitis 12/4/2018    - continue home Prilosec    General anesthetics causing adverse effect in therapeutic use     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    GERD (gastroesophageal reflux disease)     History of postoperative delirium 12/4/2018    - Patient reports 1 week history of post-op delirium 7/2018 - Monitor electrolytes, UA, and urine cx - Delirium precautions  - Can use Seroquel 25 mg QHS prn     Hypertension     Hyponatremia 10/23/2017    Impaired mobility and ADLs 6/28/2018    Neurogenic bladder 12/4/2018    Sleep disorder 12/4/2018    - continue Trazodone QHS    Thyroid disease     UTI (urinary tract infection)        Past Surgical History:   Procedure Laterality Date    BIOPSY OF BLADDER  9/1/2020    Procedure: BIOPSY, BLADDER;  Surgeon: Daron Manjarrez MD;  Location: [address];  Service: Urology;;    BLADDER FULGURATION  9/1/2020    Procedure: FULGURATION, BLADDER;  Surgeon: Daron Manjarrez MD;  Location: [address];  Service: Urology;;    CHOLECYSTECTOMY      CYSTOGRAM  9/1/2020    Procedure: CYSTOGRAM;  Surgeon: Daron Manjarrez MD;  Location: [address];  Service: Urology;;    CYSTOSCOPY N/A 9/1/2020    Procedure: CYSTOSCOPY;  Surgeon: Daron Manjarrez MD;  Location: [address];  Service: Urology;  Laterality: N/A;    DILATION OF URETHRA  9/1/2020    Procedure: DILATION, URETHRA;  Surgeon: Daron Manjarrez MD;  Location: [address]  [facility];  Service: Urology;;    EYE SURGERY Right     IOL    HERNIA REPAIR      INTRAOCULAR PROSTHESES INSERTION Left 2018    Procedure: INSERTION-INTRAOCULAR LENS (IOL);  Surgeon: Trinity Vazquez MD;  Location: [facility];  Service: Ophthalmology;  Laterality: Left;    JOINT REPLACEMENT      LAMINECTOMY  2016    L2-L4    LUMBAR LAMINECTOMY  2016    PARATHYROIDECTOMY      PHACOEMULSIFICATION OF CATARACT Left 2018    Procedure: PHACOEMULSIFICATION-ASPIRATION-CATARACT;  Surgeon: Trinity Vazquez MD;  Location: [facility];  Service: Ophthalmology;  Laterality: Left;    REMOVAL OF BLOOD CLOT  2020    Procedure: REMOVAL, BLOOD CLOT;  Surgeon: Daron Manjarrez MD;  Location: [facility] OR 1ST FLR;  Service: Urology;;    REPAIR OF INCARCERATED INCISIONAL HERNIA WITHOUT HISTORY OF PRIOR REPAIR N/A 2018    Procedure: REPAIR, HERNIA, INCISIONAL, INCARCERATED, WITHOUT HISTORY OF PRIOR REPAIR;  Surgeon: Steve Valentin MD;  Location: [facility] OR 2ND FLR;  Service: General;  Laterality: N/A;    REPAIR OF INCARCERATED VENTRAL HERNIA WITHOUT HISTORY OF PRIOR REPAIR N/A 2018    Procedure: REPAIR, HERNIA, VENTRAL, INCARCERATED, WITHOUT HISTORY OF PRIOR REPAIR;  Surgeon: Guilherme Ordoñez MD;  Location: [facility] OR 2ND FLR;  Service: General;  Laterality: N/A;    TOTAL KNEE ARTHROPLASTY Bilateral     TOTAL KNEE ARTHROPLASTY Left 10/25/2017    TKR       Review of patient's allergies indicates:   Allergen Reactions    Thiazides Other (See Comments)     Multiple episodes of thiazide-induced hyponatremia       Family History       Problem Relation (Age of Onset)    Diabetes Father    Esophageal cancer Father    Hypertension Mother          Tobacco Use    Smoking status: Former     Years: 20.00     Types: Cigarettes     Quit date:      Years since quittin.7    Smokeless tobacco: Never    Tobacco comments:     quit    Substance and Sexual Activity    Alcohol use: No     Comment: rarely/6 months    Drug use: No     Sexual activity: Never         Review of Systems   Constitutional:  Negative for fatigue and fever.   HENT:  Negative for nosebleeds and sinus pain.    Respiratory:  Positive for shortness of breath. Negative for chest tightness.    Cardiovascular:  Negative for chest pain and leg swelling.   Gastrointestinal:  Negative for abdominal distention and diarrhea.   Musculoskeletal:  Negative for arthralgias and myalgias.   Skin:  Negative for rash and wound.   Neurological:  Negative for seizures and syncope.   Psychiatric/Behavioral:  Negative for agitation and confusion.    All other systems reviewed and are negative.  Objective:     Vital Signs (Most Recent):  Temp: 98.7 °F (37.1 °C) (10/05/22 0500)  Pulse: 70 (10/05/22 0615)  Resp: 18 (10/05/22 0615)  BP: (!) 126/56 (10/05/22 0615)  SpO2: 99 % (10/05/22 0615)   Vital Signs (24h Range):  Temp:  [98.4 °F (36.9 °C)-98.9 °F (37.2 °C)] 98.7 °F (37.1 °C)  Pulse:  [62-84] 70  Resp:  [13-48] 18  SpO2:  [85 %-99 %] 99 %  BP: (104-150)/(52-77) 126/56     Weight: 90.7 kg (200 lb)  Body mass index is 30.41 kg/m².      Intake/Output Summary (Last 24 hours) at 10/5/2022 0936  Last data filed at 10/5/2022 0600  Gross per 24 hour   Intake 420 ml   Output 3025 ml   Net -2605 ml       Physical Exam    Vents:  Oxygen Concentration (%): 60 (10/05/22 0402)    Lines/Drains/Airways       Drain  Duration                  Suprapubic Catheter 09/01/20 latex 18 Fr. 764 days              Peripheral Intravenous Line  Duration                  Peripheral IV - Single Lumen 10/03/22 1717 20 G Right Forearm 1 day                    Significant Labs:    CBC/Anemia Profile:  Recent Labs   Lab 10/03/22  1716 10/04/22  2346 10/05/22  0756   WBC 12.42  --  10.52   HGB 12.3*  --  11.7*   HCT 36.9* 36 34.8*     --  312   MCV 83  --  83   RDW 14.2  --  14.1        Chemistries:  Recent Labs   Lab 10/03/22  1716 10/04/22  0431 10/04/22  1017 10/04/22  2134 10/05/22  0020 10/05/22  0543   * 122*    "< > 120* 120* 125*  127*   K 4.5 4.3  --   --   --  4.0   CL 83* 82*  --   --   --  81*   CO2 28 26  --   --   --  33*   BUN 16 14  --   --   --  12   CREATININE 1.0 0.9  --   --   --  1.0   CALCIUM 9.0 8.6*  --   --   --  8.7   ALBUMIN 3.7  --   --   --   --   --    PROT 7.9  --   --   --   --   --    BILITOT 0.9  --   --   --   --   --    ALKPHOS 100  --   --   --   --   --    ALT 12  --   --   --   --   --    AST 20  --   --   --   --   --    MG  --  1.7  --   --   --   --     < > = values in this interval not displayed.       {Results:50614}    Significant Imaging:   {Imaging Review:40000::"I have reviewed all pertinent imaging results/findings within the past 24 hours."}  "

## 2022-10-06 PROBLEM — Z51.5 PALLIATIVE CARE ENCOUNTER: Status: ACTIVE | Noted: 2022-10-06

## 2022-10-06 LAB
ANION GAP SERPL CALC-SCNC: 12 MMOL/L (ref 8–16)
BUN SERPL-MCNC: 14 MG/DL (ref 8–23)
CALCIUM SERPL-MCNC: 8.6 MG/DL (ref 8.7–10.5)
CHLORIDE SERPL-SCNC: 83 MMOL/L (ref 95–110)
CO2 SERPL-SCNC: 32 MMOL/L (ref 23–29)
CREAT SERPL-MCNC: 1 MG/DL (ref 0.5–1.4)
EST. GFR  (NO RACE VARIABLE): >60 ML/MIN/1.73 M^2
GLUCOSE SERPL-MCNC: 86 MG/DL (ref 70–110)
POTASSIUM SERPL-SCNC: 4.2 MMOL/L (ref 3.5–5.1)
SODIUM SERPL-SCNC: 127 MMOL/L (ref 136–145)
SODIUM SERPL-SCNC: 131 MMOL/L (ref 136–145)
SODIUM SERPL-SCNC: 132 MMOL/L (ref 136–145)
SODIUM SERPL-SCNC: 133 MMOL/L (ref 136–145)

## 2022-10-06 PROCEDURE — 99233 SBSQ HOSP IP/OBS HIGH 50: CPT | Mod: ,,, | Performed by: INTERNAL MEDICINE

## 2022-10-06 PROCEDURE — 84295 ASSAY OF SERUM SODIUM: CPT | Mod: 91 | Performed by: NURSE PRACTITIONER

## 2022-10-06 PROCEDURE — 94660 CPAP INITIATION&MGMT: CPT

## 2022-10-06 PROCEDURE — 20000000 HC ICU ROOM

## 2022-10-06 PROCEDURE — 27000221 HC OXYGEN, UP TO 24 HOURS

## 2022-10-06 PROCEDURE — 94761 N-INVAS EAR/PLS OXIMETRY MLT: CPT

## 2022-10-06 PROCEDURE — 99233 SBSQ HOSP IP/OBS HIGH 50: CPT | Mod: ,,, | Performed by: HOSPITALIST

## 2022-10-06 PROCEDURE — 63600175 PHARM REV CODE 636 W HCPCS: Performed by: PHYSICIAN ASSISTANT

## 2022-10-06 PROCEDURE — 99233 PR SUBSEQUENT HOSPITAL CARE,LEVL III: ICD-10-PCS | Mod: ,,, | Performed by: INTERNAL MEDICINE

## 2022-10-06 PROCEDURE — 25000003 PHARM REV CODE 250: Performed by: NURSE PRACTITIONER

## 2022-10-06 PROCEDURE — 63600175 PHARM REV CODE 636 W HCPCS: Performed by: NURSE PRACTITIONER

## 2022-10-06 PROCEDURE — 36415 COLL VENOUS BLD VENIPUNCTURE: CPT | Performed by: PHYSICIAN ASSISTANT

## 2022-10-06 PROCEDURE — 99900035 HC TECH TIME PER 15 MIN (STAT)

## 2022-10-06 PROCEDURE — 25000003 PHARM REV CODE 250: Performed by: PHYSICIAN ASSISTANT

## 2022-10-06 PROCEDURE — 99233 PR SUBSEQUENT HOSPITAL CARE,LEVL III: ICD-10-PCS | Mod: ,,, | Performed by: HOSPITALIST

## 2022-10-06 PROCEDURE — 80048 BASIC METABOLIC PNL TOTAL CA: CPT | Performed by: PHYSICIAN ASSISTANT

## 2022-10-06 PROCEDURE — 36415 COLL VENOUS BLD VENIPUNCTURE: CPT | Performed by: NURSE PRACTITIONER

## 2022-10-06 RX ORDER — POTASSIUM CHLORIDE 7.45 MG/ML
10 INJECTION INTRAVENOUS
Status: COMPLETED | OUTPATIENT
Start: 2022-10-06 | End: 2022-10-06

## 2022-10-06 RX ORDER — AMLODIPINE BESYLATE 2.5 MG/1
2.5 TABLET ORAL DAILY
Status: DISCONTINUED | OUTPATIENT
Start: 2022-10-06 | End: 2022-10-12

## 2022-10-06 RX ORDER — TOLVAPTAN 15 MG/1
15 TABLET ORAL ONCE
Status: COMPLETED | OUTPATIENT
Start: 2022-10-06 | End: 2022-10-06

## 2022-10-06 RX ADMIN — MIRTAZAPINE 15 MG: 15 TABLET, FILM COATED ORAL at 09:10

## 2022-10-06 RX ADMIN — METOPROLOL SUCCINATE 25 MG: 25 TABLET, EXTENDED RELEASE ORAL at 10:10

## 2022-10-06 RX ADMIN — GABAPENTIN 400 MG: 400 CAPSULE ORAL at 09:10

## 2022-10-06 RX ADMIN — HEPARIN SODIUM 5000 UNITS: 5000 INJECTION INTRAVENOUS; SUBCUTANEOUS at 06:10

## 2022-10-06 RX ADMIN — LEVOTHYROXINE SODIUM 50 MCG: 50 TABLET ORAL at 06:10

## 2022-10-06 RX ADMIN — ASPIRIN 81 MG: 81 TABLET, COATED ORAL at 10:10

## 2022-10-06 RX ADMIN — POTASSIUM CHLORIDE 10 MEQ: 7.46 INJECTION, SOLUTION INTRAVENOUS at 01:10

## 2022-10-06 RX ADMIN — FUROSEMIDE 40 MG: 10 INJECTION, SOLUTION INTRAMUSCULAR; INTRAVENOUS at 09:10

## 2022-10-06 RX ADMIN — TOLVAPTAN 15 MG: 15 TABLET ORAL at 10:10

## 2022-10-06 RX ADMIN — FUROSEMIDE 40 MG: 10 INJECTION, SOLUTION INTRAMUSCULAR; INTRAVENOUS at 10:10

## 2022-10-06 RX ADMIN — MUPIROCIN: 20 OINTMENT TOPICAL at 10:10

## 2022-10-06 RX ADMIN — HEPARIN SODIUM 5000 UNITS: 5000 INJECTION INTRAVENOUS; SUBCUTANEOUS at 03:10

## 2022-10-06 RX ADMIN — ATORVASTATIN CALCIUM 20 MG: 20 TABLET, FILM COATED ORAL at 10:10

## 2022-10-06 RX ADMIN — HEPARIN SODIUM 5000 UNITS: 5000 INJECTION INTRAVENOUS; SUBCUTANEOUS at 09:10

## 2022-10-06 RX ADMIN — TRAZODONE HYDROCHLORIDE 50 MG: 50 TABLET ORAL at 09:10

## 2022-10-06 RX ADMIN — GABAPENTIN 400 MG: 400 CAPSULE ORAL at 10:10

## 2022-10-06 RX ADMIN — AMLODIPINE BESYLATE 2.5 MG: 2.5 TABLET ORAL at 10:10

## 2022-10-06 RX ADMIN — POTASSIUM CHLORIDE 10 MEQ: 7.46 INJECTION, SOLUTION INTRAVENOUS at 03:10

## 2022-10-06 RX ADMIN — ONDANSETRON 4 MG: 2 INJECTION INTRAMUSCULAR; INTRAVENOUS at 02:10

## 2022-10-06 NOTE — ASSESSMENT & PLAN NOTE
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  - Vancomycin given in ED due to previous culture with <50K MRSA (2 years ago)  - As he does not have fever or leukocytosis will hold off on further antibiotics.  - Repeated CBC, still no leukocytosis

## 2022-10-06 NOTE — ASSESSMENT & PLAN NOTE
89 year old patient with PMH of HFpEF/PH and CKD here with incr SOB found to have L pleural effusion that is increased in size from 8/8/22 and new since 2020. Patient with an elevated BNP on admission and LE edema, undergoing diuresis and is neg 5L. Has chronic hypercapnic respiratory failure at baseline. POCUS without a good window slightly limited to patient's habitus and deconditioning.    CT chest with profound atelectasis of the L lung, atelectasis in the RLL, L>R pleural effusions, significant kyphosis  - Do not recommend pleural sampling  - Patient is profoundly deconditioned with an elevated L hemidiaphragm and impressive atelectasis. Only intervention here will be out of bed and increased mobility as much as possible   - Patient may need SNF on discharge - recommend PT OT consult  - Needs good CPT including cough assist, aerobika, hypertonic saline nebs, and OOB as much as possible  - Patient needs a volume ventilatory (trilogy) at home given chronic hypercapnia (pCO2 59) and neuromuscular weakness

## 2022-10-06 NOTE — HPI
HPI per chart review: Mr. Prado is a 89 y.o. male, with PMH of CHF, HTN, hypothyroidism, HLD, MDD, CARLINE, CKD-3, neurogenic bladder s/p suprapubic catheter insertion, GERD, wheelchair bound, who presented to ED on 10/3/22 with c/o shortness of breath x 3 days. He stated he felt out of breath when laying flat or with activity (ie. Being changed or transferring to the wheelchair). Additionally, he states he feels occasional chest tightness, has been having wheezing, and coughing (present for weeks). Upon awakening this morning he noted b/l lower extremity swelling, which he attributes to sitting up while sleeping last night and not elevating his legs. He denies fever. He was evaluated in the ED with labs showing hyponatremia with sodium of 122, chloride of 83. A BNP was elevated at 212, and troponin was negative. A CXR showed worsening pulmonary vascular congestion, and persistence of a left lung base airspace opacity. He was treated in the ED with 40 mg IV lasix. He was admitted with heart failure and started on IV lasix. Cardiology following.  Nephrology was consulted for hyponatremia.  Transferred from floor to ICU on 10/4 when oxygen requirements increased.      Palliative medicine was consulted for goals of care/advance care planning.

## 2022-10-06 NOTE — PROGRESS NOTES
[facility]ensive [facility]  Pulmonology  Progress Note    Patient Name: Chucho Prado  MRN: [id number]  Admission Date: 10/3/2022  Hospital Length of Stay: 3 days  Code Status: DNR  Attending Provider: Xin Mason MD  Primary Care Provider: Obed Mcguire MD   Principal Problem: Acute respiratory failure with hypoxia    Subjective:     Interval History: Patients breathing is slightly improved today, weaned to 2L NC. Still feels very dyspneic. CT Chest with significant L sided pleural effusion and atelectasis, small R pleural effusion.     Objective:     Vital Signs (Most Recent):  Temp: 98.4 °F (36.9 °C) (10/06/22 0330)  Pulse: 82 (10/06/22 0753)  Resp: (!) 21 (10/06/22 0753)  BP: (!) 113/57 (10/06/22 0400)  SpO2: (!) 90 % (10/06/22 0753)   Vital Signs (24h Range):  Temp:  [98.2 °F (36.8 °C)-98.7 °F (37.1 °C)] 98.4 °F (36.9 °C)  Pulse:  [59-92] 82  Resp:  [13-36] 21  SpO2:  [89 %-97 %] 90 %  BP: (103-137)/(51-64) 113/57     Weight: 90.7 kg (200 lb)  Body mass index is 30.41 kg/m².      Intake/Output Summary (Last 24 hours) at 10/6/2022 0823  Last data filed at 10/6/2022 0642  Gross per 24 hour   Intake 180.76 ml   Output 2500 ml   Net -2319.24 ml       Physical Exam  Vitals and nursing note reviewed.   Constitutional:       General: He is not in acute distress.     Appearance: He is ill-appearing.   HENT:      Head: Normocephalic and atraumatic.      Mouth/Throat:      Mouth: Mucous membranes are dry.      Pharynx: Oropharynx is clear.      Comments: Poor dentition  Eyes:      Extraocular Movements: Extraocular movements intact.      Conjunctiva/sclera: Conjunctivae normal.   Cardiovascular:      Rate and Rhythm: Normal rate and regular rhythm.      Heart sounds: No murmur heard.  Pulmonary:      Breath sounds: No wheezing or rales.      Comments: Tachypneic, no wheezing, decr breath sounds on the L  Abdominal:      General: There is no distension.      Palpations: Abdomen is soft.    Genitourinary:     Comments: Suprapubic catheter  Musculoskeletal:         General: No swelling or tenderness.      Cervical back: Normal range of motion and neck supple.      Right lower leg: Edema present.      Left lower leg: Edema present.   Skin:     General: Skin is warm and dry.   Neurological:      General: No focal deficit present.      Mental Status: He is alert and oriented to person, place, and time. Mental status is at baseline.      Cranial Nerves: No cranial nerve deficit.   Psychiatric:         Mood and Affect: Mood normal.         Thought Content: Thought content normal.       Vents:  Oxygen Concentration (%): 28 (10/06/22 0753)    Lines/Drains/Airways       Drain  Duration                  Suprapubic Catheter 09/01/20 latex 18 Fr. 765 days              Peripheral Intravenous Line  Duration                  Peripheral IV - Single Lumen 10/03/22 1717 20 G Right Forearm 2 days                    Significant Labs:    CBC/Anemia Profile:  Recent Labs   Lab 10/04/22  2346 10/05/22  0756   WBC  --  10.52   HGB  --  11.7*   HCT 36 34.8*   PLT  --  312   MCV  --  83   RDW  --  14.1        Chemistries:  Recent Labs   Lab 10/05/22  0543 10/05/22  1847 10/06/22  0410   *  127* 130* 127*   K 4.0 3.2* 4.2   CL 81* 81* 83*   CO2 33* 37* 32*   BUN 12 12 14   CREATININE 1.0 0.9 1.0   CALCIUM 8.7 8.7 8.6*       All pertinent labs within the past 24 hours have been reviewed.    Significant Imaging:  I have reviewed all pertinent imaging results/findings within the past 24 hours.    CT chest without with small R pl effusion, moderate to large L pleural effusion with atelectasis, some airspace dz with air bronchograms, elevated L hemidiaphgram    Assessment/Plan:     Pleural effusion, left  89 year old patient with PMH of HFpEF/PH and CKD here with incr SOB found to have L pleural effusion that is increased in size from 8/8/22 and new since 2020. Patient with an elevated BNP on admission and LE edema,  undergoing diuresis and is neg 5L. Has chronic hypercapnic respiratory failure at baseline. POCUS without a good window slightly limited to patient's habitus and deconditioning.    CT chest with profound atelectasis of the L lung, atelectasis in the RLL, L>R pleural effusions, significant kyphosis  - Do not recommend pleural sampling  - Patient is profoundly deconditioned with an elevated L hemidiaphragm and impressive atelectasis. Only intervention here will be out of bed and increased mobility as much as possible   - Patient may need SNF on discharge - recommend PT OT consult  - Needs good CPT including cough assist, aerobika, hypertonic saline nebs, and OOB as much as possible  - Patient needs a volume ventilatory (trilogy) at home given chronic hypercapnia (pCO2 59) and neuromuscular weakness      Thank you for the consult, pulmonology will follow peripherally. Please call with any questions.        Izabel Godwin MD  Pulmonary and Critical Care Fellow  10/06/2022  1:29 PM

## 2022-10-06 NOTE — PLAN OF CARE
Problem: Adult Inpatient Plan of Care  Goal: Plan of Care Review  Outcome: Ongoing, Progressing  Goal: Patient-Specific Goal (Individualized)  Outcome: Ongoing, Progressing  Goal: Absence of Hospital-Acquired Illness or Injury  Outcome: Ongoing, Progressing  Goal: Optimal Comfort and Wellbeing  Outcome: Ongoing, Progressing  Goal: Readiness for Transition of Care  Outcome: Ongoing, Progressing     Problem: Fluid Imbalance (Pneumonia)  Goal: Fluid Balance  Outcome: Ongoing, Progressing     Problem: Infection (Pneumonia)  Goal: Resolution of Infection Signs and Symptoms  Outcome: Ongoing, Progressing     Problem: Respiratory Compromise (Pneumonia)  Goal: Effective Oxygenation and Ventilation  Outcome: Ongoing, Progressing     Problem: Impaired Wound Healing  Goal: Optimal Wound Healing  Outcome: Ongoing, Progressing     Problem: Fall Injury Risk  Goal: Absence of Fall and Fall-Related Injury  Outcome: Ongoing, Progressing     Problem: Skin Injury Risk Increased  Goal: Skin Health and Integrity  Outcome: Ongoing, Progressing     Problem: Coping Ineffective  Goal: Effective Coping  Outcome: Ongoing, Progressing

## 2022-10-06 NOTE — SUBJECTIVE & OBJECTIVE
Interval History: Patients breathing is slightly improved today, weaned to 2L NC. Still feels very dyspneic. CT Chest with significant L sided pleural effusion and atelectasis, small R pleural effusion.     Objective:     Vital Signs (Most Recent):  Temp: 98.4 °F (36.9 °C) (10/06/22 0330)  Pulse: 82 (10/06/22 0753)  Resp: (!) 21 (10/06/22 0753)  BP: (!) 113/57 (10/06/22 0400)  SpO2: (!) 90 % (10/06/22 0753)   Vital Signs (24h Range):  Temp:  [98.2 °F (36.8 °C)-98.7 °F (37.1 °C)] 98.4 °F (36.9 °C)  Pulse:  [59-92] 82  Resp:  [13-36] 21  SpO2:  [89 %-97 %] 90 %  BP: (103-137)/(51-64) 113/57     Weight: 90.7 kg (200 lb)  Body mass index is 30.41 kg/m².      Intake/Output Summary (Last 24 hours) at 10/6/2022 0823  Last data filed at 10/6/2022 0642  Gross per 24 hour   Intake 180.76 ml   Output 2500 ml   Net -2319.24 ml       Physical Exam  Vitals and nursing note reviewed.   Constitutional:       General: He is not in acute distress.     Appearance: He is ill-appearing.   HENT:      Head: Normocephalic and atraumatic.      Mouth/Throat:      Mouth: Mucous membranes are dry.      Pharynx: Oropharynx is clear.      Comments: Poor dentition  Eyes:      Extraocular Movements: Extraocular movements intact.      Conjunctiva/sclera: Conjunctivae normal.   Cardiovascular:      Rate and Rhythm: Normal rate and regular rhythm.      Heart sounds: No murmur heard.  Pulmonary:      Breath sounds: No wheezing or rales.      Comments: Tachypneic, no wheezing, decr breath sounds on the L  Abdominal:      General: There is no distension.      Palpations: Abdomen is soft.   Genitourinary:     Comments: Suprapubic catheter  Musculoskeletal:         General: No swelling or tenderness.      Cervical back: Normal range of motion and neck supple.      Right lower leg: Edema present.      Left lower leg: Edema present.   Skin:     General: Skin is warm and dry.   Neurological:      General: No focal deficit present.      Mental Status: He is  alert and oriented to person, place, and time. Mental status is at baseline.      Cranial Nerves: No cranial nerve deficit.   Psychiatric:         Mood and Affect: Mood normal.         Thought Content: Thought content normal.       Vents:  Oxygen Concentration (%): 28 (10/06/22 0753)    Lines/Drains/Airways       Drain  Duration                  Suprapubic Catheter 09/01/20 latex 18 Fr. 765 days              Peripheral Intravenous Line  Duration                  Peripheral IV - Single Lumen 10/03/22 1717 20 G Right Forearm 2 days                    Significant Labs:    CBC/Anemia Profile:  Recent Labs   Lab 10/04/22  2346 10/05/22  0756   WBC  --  10.52   HGB  --  11.7*   HCT 36 34.8*   PLT  --  312   MCV  --  83   RDW  --  14.1        Chemistries:  Recent Labs   Lab 10/05/22  0543 10/05/22  1847 10/06/22  0410   *  127* 130* 127*   K 4.0 3.2* 4.2   CL 81* 81* 83*   CO2 33* 37* 32*   BUN 12 12 14   CREATININE 1.0 0.9 1.0   CALCIUM 8.7 8.7 8.6*       All pertinent labs within the past 24 hours have been reviewed.    Significant Imaging:  I have reviewed all pertinent imaging results/findings within the past 24 hours.    CT chest without with small R pl effusion, moderate to large L pleural effusion with atelectasis, some airspace dz with air bronchograms, elevated L hemidiaphgram

## 2022-10-06 NOTE — CONSULTS
[facility] Intensive Care ([place])  Palliative Medicine  Consult Note    Patient Name: Chucho Prado  MRN: [id number]  Admission Date: 10/3/2022  Hospital Length of Stay: 3 days  Code Status: DNR   Attending Provider: Xin Mason MD  Consulting Provider: Aniya Haynes NP  Primary Care Physician: Obed Mcguire MD  Principal Problem:Acute respiratory failure with hypoxia    Patient information was obtained from patient, past medical records, ER records and primary team.      Consults  Assessment/Plan:     Palliative care encounter  -Pt elects DNR status  -Palliative will continue to follow for ongoing GOC discussions and pt support    Palliative medicine consult received for goals of care/advance care planning. Met with patient, along with Jessika Toth RN to discuss goals of care.   Per GO discussion:   -discussed patient as an individual and his love of literature, history of [place], history of being a , love of travel and all of the places he has been, general life experiences and relationships  -confirmed HCPOA, (on file)     Advance Care Planning     Date: 10/06/2022    Code Status  In light of the patients advanced and life limiting illness,we engaged the the patient in a conversation about the patient's preferences for care  at the very end of life. The patient wishes to have a natural, peaceful death.  Along those lines, the patient does not wish to have CPR or other invasive treatments performed when his heart and/or breathing stops. He confirmed that in the event of cardiopulmonary arrest, he would want to be made comfortable without life-prolonging measures.  I communicated to the patient that a DNR order would be placed in his medical record to reflect this preference. LaPost to be completed prior to discharge.   I spent a total of 20 minutes engaging the patient in this advance care planning discussion.               Thank you for your consult. I will follow-up with  patient. Please contact us if you have any additional questions.    Subjective:     HPI:   HPI per chart review: Mr. Prado is a 89 y.o. male, with PMH of CHF, HTN, hypothyroidism, HLD, MDD, CARLINE, CKD-3, neurogenic bladder s/p suprapubic catheter insertion, GERD, wheelchair bound, who presented to ED on 10/3/22 with c/o shortness of breath x 3 days. He stated he felt out of breath when laying flat or with activity (ie. Being changed or transferring to the wheelchair). Additionally, he states he feels occasional chest tightness, has been having wheezing, and coughing (present for weeks). Upon awakening this morning he noted b/l lower extremity swelling, which he attributes to sitting up while sleeping last night and not elevating his legs. He denies fever. He was evaluated in the ED with labs showing hyponatremia with sodium of 122, chloride of 83. A BNP was elevated at 212, and troponin was negative. A CXR showed worsening pulmonary vascular congestion, and persistence of a left lung base airspace opacity. He was treated in the ED with 40 mg IV lasix. He was admitted with heart failure and started on IV lasix. Cardiology following.  Nephrology was consulted for hyponatremia.  Transferred from floor to ICU on 10/4 when oxygen requirements increased.      Palliative medicine was consulted for goals of care/advance care planning.                   Hospital problems/plan of care as per primary team:    Acute respiratory failure with hypoxia  Patient transferred to ICU overnight 10/4 with worsening hypoxemia and hyponatremia.  CXR showed worsening pulmonary vascular congestion and a persistent airspace opacity in the left base with an associated effusion, possibly parapneumonic.  Note the 2D echo showed a normal EF and only grade I diastolic dysfunction, but severe aortic stenosis and pulmonary HTN seen previously.  Unclear whether this is due to chronic lung disease, he is a former smoker with a 20 pack year history  "(quit 1990) but is not oxygen dependent at home and does not use respiratory medications chronically.  Cardiology consulted, will also have PCC see him as well as IR if he needs to have effusion drained.        Hyponatremia  - suspect hypervolemic hyponatremia   - s/p 40 mg IV lasix in ED, now on twice daily Lasix  - Nephrology consulted, tolvaptan given and fluid intake liberalized.    - After an initial further decrease hyponatremia improved to 125 this morning.        Acute heart failure  - Patient presented with shortness of breath worse when supine   - BNP in ED was 212   - CXR with evidence of pulmonary edema and left pleural effusion associated with an airspace opacity on that side that was previously read as atelectasis.  He does not have pneumonia symptoms.  - s/p lasix 40 mg IV in ED   - monitor I&Os   - Heart Failure pathways initiated   - Echo showed normal EF, grade I diastolic dysfunction, severe AS, pulmonary HTN.     Advanced care planning/counseling discussion  Patient seen by palliative care and has elected DNR status.  He does state that he doesn't want us to "give up" on him and has been assured that only the most aggressive measures (CPR, intubation, shocks) have been omitted from the care plan.  We will continue oxygen and NIV as needed, antibiotics, diuresis, etc.        Pleural effusion, left           Asymptomatic bacteriuria  - Patient likely colonized with multiple organisms as noted due to suprapubic catheter.  - Vancomycin given in ED due to previous culture with <50K MRSA (2 years ago)  - As he does not have fever or leukocytosis will hold off on further antibiotics.  - Repeat CBC.     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          CKD (chronic kidney disease) stage 3, GFR 30-59 ml/min  - Cr is 1.0  - ranges from 0.8  - 1.0   - avoid nephrotoxins   - renally dose " meds      Iron deficiency anemia  - H&H are stable at present   - monitor         Hyperlipidemia  - continue atorvastatin 20 mg QD         Essential hypertension  - hypertensive on presentation  - home meds: amlodipine 5 mg QD, metoprolol succinate 25 mg QD  - monitor.  Improving with diuresis.     Acquired hypothyroidism  - continue levothyroxine 50 mcg QD           Past Medical History:   Diagnosis Date    Acquired hypothyroidism 7/30/2013    Arthritis     Blood transfusion     before 2005 - whe had gangrenous gall bladder    Cataract     Chronic back pain 12/4/2018    - Takes Percocet 5-325 at home - Can continue current abdominal pain control with Dilaudid 0.5 mg IV Q3H prn     CKD (chronic kidney disease) stage 3, GFR 30-59 ml/min     Compression fracture of lumbar vertebra     Depression     Dyslipidemia     Essential hypertension 7/30/2013    Gastroesophageal reflux disease without esophagitis 12/4/2018    - continue home Prilosec    General anesthetics causing adverse effect in therapeutic use     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    GERD (gastroesophageal reflux disease)     History of postoperative delirium 12/4/2018    - Patient reports 1 week history of post-op delirium 7/2018 - Monitor electrolytes, UA, and urine cx - Delirium precautions  - Can use Seroquel 25 mg QHS prn     Hypertension     Hyponatremia 10/23/2017    Impaired mobility and ADLs 6/28/2018    Neurogenic bladder 12/4/2018    Sleep disorder 12/4/2018    - continue Trazodone QHS    Thyroid disease     UTI (urinary tract infection)        Past Surgical History:   Procedure Laterality Date    BIOPSY OF BLADDER  9/1/2020    Procedure: BIOPSY, BLADDER;  Surgeon: Daron Manjarrez MD;  Location: [facility] OR [address] [place] [address];  Service: Urology;;    BLADDER FULGURATION  9/1/2020    Procedure: FULGURATION, BLADDER;  Surgeon: Daron Manjarrez MD;  Location: [facility] OR [address] [place] [address];  Service: Urology;;    CHOLECYSTECTOMY       CYSTOGRAM  9/1/2020    Procedure: CYSTOGRAM;  Surgeon: Daron Manjarrez MD;  [facility];  Service: Urology;;    CYSTOSCOPY N/A 9/1/2020    Procedure: CYSTOSCOPY;  Surgeon: Daron Manjarrez MD;  Location: [facility] OR 1ST FLR;  Service: Urology;  Laterality: N/A;    DILATION OF URETHRA  9/1/2020    Procedure: DILATION, URETHRA;  Surgeon: Daron Manjarrez MD;  [facility];  Service: Urology;;    EYE SURGERY Right     IOL    HERNIA REPAIR      INTRAOCULAR PROSTHESES INSERTION Left 5/28/2018    Procedure: INSERTION-INTRAOCULAR LENS (IOL);  Surgeon: Trinity Vazquez MD;  [facility];  Service: Ophthalmology;  Laterality: Left;    JOINT REPLACEMENT      LAMINECTOMY  12/27/2016    L2-L4    LUMBAR LAMINECTOMY  12/2016    PARATHYROIDECTOMY      PHACOEMULSIFICATION OF CATARACT Left 5/28/2018    Procedure: PHACOEMULSIFICATION-ASPIRATION-CATARACT;  Surgeon: Trinity Vazquez MD;  Location: [facility];  Service: Ophthalmology;  Laterality: Left;    REMOVAL OF BLOOD CLOT  9/1/2020    Procedure: REMOVAL, BLOOD CLOT;  Surgeon: Daron Manjarrez MD;  [facility];  Service: Urology;;    REPAIR OF INCARCERATED INCISIONAL HERNIA WITHOUT HISTORY OF PRIOR REPAIR N/A 12/5/2018    Procedure: REPAIR, HERNIA, INCISIONAL, INCARCERATED, WITHOUT HISTORY OF PRIOR REPAIR;  Surgeon: Steve Valentin MD;  Location: [facility] OR 2ND FLR;  Service: General;  Laterality: N/A;    REPAIR OF INCARCERATED VENTRAL HERNIA WITHOUT HISTORY OF PRIOR REPAIR N/A 6/20/2018    Procedure: REPAIR, HERNIA, VENTRAL, INCARCERATED, WITHOUT HISTORY OF PRIOR REPAIR;  Surgeon: Guilherme Ordoñez MD;  Location: [facility] OR 2ND FLR;  Service: General;  Laterality: N/A;    TOTAL KNEE ARTHROPLASTY Bilateral     TOTAL KNEE ARTHROPLASTY Left 10/25/2017    TKR       Review of patient's allergies indicates:   Allergen Reactions    Thiazides Other (See Comments)     Multiple episodes of thiazide-induced hyponatremia        Medications:  Continuous Infusions:  Scheduled Meds:   amLODIPine  5 mg Oral Daily    aspirin  81 mg Oral Daily    atorvastatin  20 mg Oral Daily    furosemide (LASIX) injection  40 mg Intravenous BID    gabapentin  400 mg Oral BID    heparin (porcine)  5,000 Units Subcutaneous Q8H    levothyroxine  50 mcg Oral Before breakfast    metoprolol succinate  25 mg Oral Daily    mirtazapine  15 mg Oral QHS    mupirocin   Nasal BID    traZODone  50 mg Oral QHS     PRN Meds:acetaminophen, ondansetron, ondansetron, oxyCODONE, sodium chloride 0.9%    Family History       Problem Relation (Age of Onset)    Diabetes Father    Esophageal cancer Father    Hypertension Mother          Tobacco Use    Smoking status: Former     Years: 20.00     Types: Cigarettes     Quit date:      Years since quittin.    Smokeless tobacco: Never    Tobacco comments:     quit    Substance and Sexual Activity    Alcohol use: No     Comment: rarely/6 months    Drug use: No    Sexual activity: Never       Review of Systems  Objective:     Vital Signs (Most Recent):  Temp: 98.4 °F (36.9 °C) (10/06/22 0330)  Pulse: 64 (10/06/22 0415)  Resp: 13 (10/06/22 0415)  BP: (!) 113/57 (10/06/22 0400)  SpO2: (!) 93 % (10/06/22 041)   Vital Signs (24h Range):  Temp:  [98.2 °F (36.8 °C)-98.7 °F (37.1 °C)] 98.4 °F (36.9 °C)  Pulse:  [59-92] 64  Resp:  [13-36] 13  SpO2:  [86 %-97 %] 93 %  BP: (103-137)/(51-64) 113/57     Weight: 90.7 kg (200 lb)  Body mass index is 30.41 kg/m².    Physical Exam  Vitals and nursing note reviewed.   Constitutional:       General: He is awake. He is not in acute distress.     Appearance: He is ill-appearing.      Interventions: Nasal cannula in place.   HENT:      Head: Normocephalic and atraumatic.      Right Ear: External ear normal.      Left Ear: External ear normal.      Nose: Nose normal. No congestion or rhinorrhea.   Cardiovascular:      Rate and Rhythm: Normal rate.   Pulmonary:      Effort:  No respiratory distress.   Abdominal:      General: Abdomen is flat.   Musculoskeletal:         General: Signs of injury present.      Cervical back: Neck supple.   Skin:     General: Skin is warm and dry.   Neurological:      Mental Status: He is alert and oriented to person, place, and time.   Psychiatric:         Mood and Affect: Mood normal.         Behavior: Behavior normal. Behavior is cooperative.         Thought Content: Thought content normal.         Judgment: Judgment normal.       Review of Symptoms      Symptom Assessment (ESAS 0-10 Scale)  Pain:  0  Dyspnea:  5  Anxiety:  0  Nausea:  0  Depression:  0  Anorexia:  0  Fatigue:  0  Insomnia:  0  Restlessness:  0  Agitation:  0         Performance Status:  40    Psychosocial/Cultural: Retired , has spent a lot of his time renewing interest in the history of [place] in literature      Advance Care Planning   Advance Directives:   Living Will: Yes        Copy on chart: Yes    Medical Power of : Yes      Decision Making:  Patient answered questions  Goals of Care: What is most important right now is to focus on improvement in condition but with limits to invasive therapies, comfort and QOL . Accordingly, we have decided that the best plan to meet the patient's goals includes continuing with treatment.       Significant Labs: All pertinent labs within the past 24 hours have been reviewed.  CBC:   Recent Labs   Lab 10/05/22  0756   WBC 10.52   HGB 11.7*   HCT 34.8*   MCV 83        BMP:  Recent Labs   Lab 10/06/22  0410   GLU 86   *   K 4.2   CL 83*   CO2 32*   BUN 14   CREATININE 1.0   CALCIUM 8.6*     LFT:  Lab Results   Component Value Date    AST 20 10/03/2022    ALKPHOS 100 10/03/2022    BILITOT 0.9 10/03/2022     Albumin:   Albumin   Date Value Ref Range Status   10/03/2022 3.7 3.5 - 5.2 g/dL Final     Protein:   Total Protein   Date Value Ref Range Status   10/03/2022 7.9 6.0 - 8.4 g/dL Final     Lactic acid:    Lab Results   Component Value Date    LACTATE 0.7 09/14/2020    LACTATE 1.1 08/27/2019       Significant Imaging: I have reviewed all pertinent imaging results/findings within the past 24 hours.    US Lower Extremity Arteries Bilateral  Narrative: EXAMINATION:  US LOWER EXTREMITY ARTERIES BILATERAL    CLINICAL HISTORY:  PAD;    TECHNIQUE:  Grayscale, color Doppler and duplex sonographic interrogation was performed through the bilateral lower extremity arterial system.    COMPARISON:  None    FINDINGS:  The peak systolic velocities on the right are as follows, in centimeters/second:    Common femoral artery: 64    Superficial femoral artery, proximal: 56    Superficial femoral artery, mid portion: 96    Superficial femoral artery, distal: 58    Popliteal artery: 110    Posterior tibial artery: 27    Anterior tibial artery: 54    Peroneal artery: 78    Dorsalis pedis artery: 18    The peak systolic velocities on the left are as follows, in centimeters/second:    Common femoral artery: 76    Superficial femoral artery, proximal: 78    Superficial femoral artery, mid portion: 111    Superficial femoral artery, distal: 66    Popliteal artery: 60    Posterior tibial artery: 13    Anterior tibial artery: 39    Peroneal artery: 63    Dorsalis pedis artery: 54    Waveforms in the femoral and popliteal arteries are triphasic and biphasic.  Many of the waveforms in the calf are monophasic.  Impression: Diffuse atherosclerosis.  Based on reduced velocities and abnormal monophasic waveforms in the left posterior tibial artery, I suspect underlying hemodynamically significant stenosis.  No evidence for hemodynamically significant stenosis elsewhere.    Electronically signed by: Karen Teran  Date:    10/05/2022  Time:    08:27  X-Ray Chest AP Portable  Narrative: EXAMINATION:  XR CHEST AP PORTABLE    CLINICAL HISTORY:  follow up CHF;    TECHNIQUE:  Single frontal view of the chest was  performed.    COMPARISON:  10/03/2022    FINDINGS:  Cardiac size remains enlarged aorta is tortuous.  Chest is similar to recent examination some patchy increased markings seen at the right lung base and there is loss of volume and pleural fluid at the left lung base.  Impression: See above    Electronically signed by: Daron Burr MD  Date:    10/05/2022  Time:    07:23    Cardiology: Echo report 10/4/22 reviewed      MDM: high risk for complications, mortality, and morbidity in relation to heart failure, hypoxia, debility, respiratory failure.     Update provided to Dr. Mason.     Aniya Haynes NP  Palliative Medicine  [facility] - Intensive Care ([place])

## 2022-10-06 NOTE — ASSESSMENT & PLAN NOTE
-Pt elects DNR status  -Palliative will continue to follow for ongoing GOC discussions and pt support    Palliative medicine consult received for goals of care/advance care planning. Met with patient, along with Jessika Toth RN to discuss goals of care.   Per GO discussion:   -discussed patient as an individual and his love of literature, history of [place], history of being a , love of travel and all of the places he has been, general life experiences and relationships  -confirmed HCPOA, (on file)     Advance Care Planning     Date: 10/06/2022    Code Status  In light of the patients advanced and life limiting illness,we engaged the the patient in a conversation about the patient's preferences for care  at the very end of life. The patient wishes to have a natural, peaceful death.  Along those lines, the patient does not wish to have CPR or other invasive treatments performed when his heart and/or breathing stops. He confirmed that in the event of cardiopulmonary arrest, he would want to be made comfortable without life-prolonging measures.  I communicated to the patient that a DNR order would be placed in his medical record to reflect this preference. LaPost to be completed prior to discharge.   I spent a total of 20 minutes engaging the patient in this advance care planning discussion.

## 2022-10-06 NOTE — PROGRESS NOTES
"Nephrology  Progress Note    Admit Date: 10/3/2022   LOS: 3 days     SUBJECTIVE:     Follow-up For:  Acute respiratory failure with hypoxia    History of Present Illness:  Patient is a retired  at [employer] 89 y.o. male presents with worsening SOB x 3 days and found to have pulm edema/CHF/hypoxia.  Labs revealing acute on chronic hyponatremia of 122.  Placed on fluid restriction, lasix, and admitted to hospital for monitoring.  Na remains 122 this am and consulted for evaluation.  Extensive medical history as outlined below including suprapubic catheter for neurogenic bladder, lower extremity edema currently with Ace wraps, mobility impairment, combined CHF and aortic stenosis, and chronic hyponatremia with sodium ranging from 119-137 since 2004.  Patient seen and examined with medical power of  and registered nurse at bedside.  Patient using accessory muscles and work of breathing with signs and symptoms of volume overload.  Admits to drinking 8-10 bottles of water per day.  Discussed treatment plan and agrees with Samsca.  Updated team.     Epic reviewed     Currently no chest pain, nausea, vomiting, diarrhea, fever, chills.  Positive shortness of breath and dyspnea on exertion          Interval History:     slight improvement overall but still critically ill.  CT reviewed with team revealing severe effusion left.  Sodium dropping again.  Met with palliative care and now DNR.  Discussed with team.     Review of Systems:  Constitutional: No fever or chills  Respiratory: shortness of breath  Cardiovascular: No chest pain or palpitations  Gastrointestinal: No nausea or vomiting  Neurological: No confusion or weakness    OBJECTIVE:     Vital Signs Range (Last 24H):  BP (!) 113/57   Pulse 82   Temp 98.4 °F (36.9 °C)   Resp (!) 21   Ht 5' 8" (1.727 m)   Wt 90.7 kg (200 lb)   SpO2 (!) 90%   BMI 30.41 kg/m²     Temp:  [98.2 °F (36.8 °C)-98.7 °F (37.1 °C)]   Pulse:  [59-92]   Resp:  [13-36]   BP: " (103-137)/(51-64)   SpO2:  [89 %-97 %]     I & O (Last 24H):  Intake/Output Summary (Last 24 hours) at 10/6/2022 0810  Last data filed at 10/6/2022 0642  Gross per 24 hour   Intake 180.76 ml   Output 2500 ml   Net -2319.24 ml       Physical Exam:  General appearance:  Elderly, frail  Eyes:  Conjunctivae/corneas clear. PERRL.  Lungs:  Increased work of breathing is improved but still with abd breathing.   Still with few rales.   Heart: Regular rate and rhythm, S1, S2 normal, AS murmur  Abdomen: Soft, non-tender non-distended; bowel sounds normal; no masses,  no organomegaly, obese, suprapubic catheter  Extremities: No cyanosis or clubbing.  Compression bands on  Skin: Skin color, texture, turgor normal. No rashes or lesions  Neurologic:  Generally weak       Laboratory Data:  No results for input(s): WBC, RBC, HGB, HCT, PLT, MCV, MCH, MCHC in the last 24 hours.      BMP:   Recent Labs   Lab 10/04/22  0431 10/04/22  1017 10/06/22  0410   GLU 99   < > 86   *   < > 127*   K 4.3   < > 4.2   CL 82*   < > 83*   CO2 26   < > 32*   BUN 14   < > 14   CREATININE 0.9   < > 1.0   CALCIUM 8.6*   < > 8.6*   MG 1.7  --   --     < > = values in this interval not displayed.     Lab Results   Component Value Date    CALCIUM 8.6 (L) 10/06/2022    PHOS 2.8 09/18/2020       Lab Results   Component Value Date    .1 (H) 08/31/2022    CALCIUM 8.6 (L) 10/06/2022    CAION 1.07 09/03/2019    PHOS 2.8 09/18/2020       Lab Results   Component Value Date    URICACID 5.1 10/04/2022       BNP  Recent Labs   Lab 10/03/22  1716   *       Medications:  Medication list was reviewed and changes noted under Assessment/Plan.    Diagnostic Results:    X-Ray Chest AP Portable   Final Result      See above         Electronically signed by: Daron Burr MD   Date:    10/05/2022   Time:    07:23      US Lower Extremity Arteries Bilateral   Final Result      Diffuse atherosclerosis.  Based on reduced velocities and abnormal monophasic  waveforms in the left posterior tibial artery, I suspect underlying hemodynamically significant stenosis.  No evidence for hemodynamically significant stenosis elsewhere.         Electronically signed by: Karen Teran   Date:    10/05/2022   Time:    08:27      X-Ray Chest AP Portable   Final Result      1.  Worsening pulmonary vascular congestion, suggestive of worsening fluid overload state.      2.  Persistent airspace opacity in the left lung base with associated suspected moderate parapneumonic effusion.         Electronically signed by: South Waters MD   Date:    10/03/2022   Time:    17:13      FL Fluoro of Diaphragm    (Results Pending)   CT Chest Without Contrast    (Results Pending)       ASSESSMENT/PLAN:     Acute on chronic hypervolemic hyponatremia secondary to CHF, excessive water intake, etc:  -urine studies hard to interpret with lasix on board.  -admit  and same following lasix/fluid restriction.  -dosed samsca and trending Q6.  Redose today.     -[phone]  -long standing hx of hyponatremia with variable ranges (119-137).  -TSH wnl  -continue lasix today.    -updated team.  -Renally dose meds, avoid nephrotoxins, and monitor I/O's closely.        Acute on chronic combined CHF/AS:  -Dr. Rdz following and discussed.      Hypoxia/Hypercapnia:  -better with Bipap  -CCT following  -diurese.         Neurogenic bladder:  -cath exchanged.  -defer.    Severe Left pleural effusion:  -CCT/IR to tap today.        Dispo:  Consulted palliative care for goals of care  He has 24 hr sitters and a MPOA.   DNR       Total critical care time (exclusive of procedural time) : 35 minutes  Critical care was necessary to treat or prevent imminent or life-threatening deterioration of the following conditions: <NA/Resp fail/Hypercapnia     Critical care was time spent personally by me on the following activities: development of treatment plan with patient or surrogate, discussions with consultants,  interpretation of cardiac output measurements, examination of patient, ordering and performing treatments and interventions, evaluation of patient's response to treatment, obtaining history from patient or surrogate, ordering and review of laboratory studies, ordering and review of radiographic studies, re-evaluation of patient's condition, pulse oximetry and blood draw for specimens

## 2022-10-06 NOTE — SUBJECTIVE & OBJECTIVE
"Hospital problems/plan of care as per primary team:    Acute respiratory failure with hypoxia  Patient transferred to ICU overnight 10/4 with worsening hypoxemia and hyponatremia.  CXR showed worsening pulmonary vascular congestion and a persistent airspace opacity in the left base with an associated effusion, possibly parapneumonic.  Note the 2D echo showed a normal EF and only grade I diastolic dysfunction, but severe aortic stenosis and pulmonary HTN seen previously.  Unclear whether this is due to chronic lung disease, he is a former smoker with a 20 pack year history (quit 1990) but is not oxygen dependent at home and does not use respiratory medications chronically.  Cardiology consulted, will also have PCC see him as well as IR if he needs to have effusion drained.        Hyponatremia  - suspect hypervolemic hyponatremia   - s/p 40 mg IV lasix in ED, now on twice daily Lasix  - Nephrology consulted, tolvaptan given and fluid intake liberalized.    - After an initial further decrease hyponatremia improved to 125 this morning.        Acute heart failure  - Patient presented with shortness of breath worse when supine   - BNP in ED was 212   - CXR with evidence of pulmonary edema and left pleural effusion associated with an airspace opacity on that side that was previously read as atelectasis.  He does not have pneumonia symptoms.  - s/p lasix 40 mg IV in ED   - monitor I&Os   - Heart Failure pathways initiated   - Echo showed normal EF, grade I diastolic dysfunction, severe AS, pulmonary HTN.     Advanced care planning/counseling discussion  Patient seen by palliative care and has elected DNR status.  He does state that he doesn't want us to "give up" on him and has been assured that only the most aggressive measures (CPR, intubation, shocks) have been omitted from the care plan.  We will continue oxygen and NIV as needed, antibiotics, diuresis, etc.        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  - Vancomycin given in ED due to previous culture with <50K MRSA (2 years ago)  - As he does not have fever or leukocytosis will hold off on further antibiotics.  - Repeat CBC.     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          CKD (chronic kidney disease) stage 3, GFR 30-59 ml/min  - Cr is 1.0  - ranges from 0.8  - 1.0   - avoid nephrotoxins   - renally dose meds      Iron deficiency anemia  - H&H are stable at present   - monitor         Hyperlipidemia  - continue atorvastatin 20 mg QD         Essential hypertension  - hypertensive on presentation  - home meds: amlodipine 5 mg QD, metoprolol succinate 25 mg QD  - monitor.  Improving with diuresis.     Acquired hypothyroidism  - continue levothyroxine 50 mcg QD           Past Medical History:   Diagnosis Date    Acquired hypothyroidism 7/30/2013    Arthritis     Blood transfusion     before 2005 - whe had gangrenous gall bladder    Cataract     Chronic back pain 12/4/2018    - Takes Percocet 5-325 at home - Can continue current abdominal pain control with Dilaudid 0.5 mg IV Q3H prn     CKD (chronic kidney disease) stage 3, GFR 30-59 ml/min     Compression fracture of lumbar vertebra     Depression     Dyslipidemia     Essential hypertension 7/30/2013    Gastroesophageal reflux disease without esophagitis 12/4/2018    - continue home Prilosec    General anesthetics causing adverse effect in therapeutic use     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    GERD (gastroesophageal reflux disease)     History of postoperative delirium 12/4/2018    - Patient reports 1 week history of post-op delirium 7/2018 - Monitor electrolytes, UA, and urine cx - Delirium precautions  - Can use Seroquel 25 mg QHS prn     Hypertension     Hyponatremia 10/23/2017     Impaired mobility and ADLs 6/28/2018    Neurogenic bladder 12/4/2018    Sleep disorder 12/4/2018    - continue Trazodone QHS    Thyroid disease     UTI (urinary tract infection)        Past Surgical History:   Procedure Laterality Date    BIOPSY OF BLADDER  9/1/2020    Procedure: BIOPSY, BLADDER;  Surgeon: Daron Manjarrez MD;  [facility];  Service: Urology;;    BLADDER FULGURATION  9/1/2020    Procedure: FULGURATION, BLADDER;  Surgeon: Daron Manjarrez MD;  [facility];  Service: Urology;;    CHOLECYSTECTOMY      CYSTOGRAM  9/1/2020    Procedure: CYSTOGRAM;  Surgeon: Daron Manjarrez MD;  Location: [facility] OR [address] [place] [address];  Service: Urology;;    CYSTOSCOPY N/A 9/1/2020    Procedure: CYSTOSCOPY;  Surgeon: Daron Manjarrez MD;  Location: [facility] OR [address] [place] [address];  Service: Urology;  Laterality: N/A;    DILATION OF URETHRA  9/1/2020    Procedure: DILATION, URETHRA;  Surgeon: Daron Manjarrez MD;  Location: [facility] OR [address] [place] [address];  Service: Urology;;    EYE SURGERY Right     IOL    HERNIA REPAIR      INTRAOCULAR PROSTHESES INSERTION Left 5/28/2018    Procedure: INSERTION-INTRAOCULAR LENS (IOL);  Surgeon: Trinity Vazquez MD;  Location: [facility];  Service: Ophthalmology;  Laterality: Left;    JOINT REPLACEMENT      LAMINECTOMY  12/27/2016    L2-L4    LUMBAR LAMINECTOMY  12/2016    PARATHYROIDECTOMY      PHACOEMULSIFICATION OF CATARACT Left 5/28/2018    Procedure: PHACOEMULSIFICATION-ASPIRATION-CATARACT;  Surgeon: Trinity Vazquez MD;  Location: [facility];  Service: Ophthalmology;  Laterality: Left;    REMOVAL OF BLOOD CLOT  9/1/2020    Procedure: REMOVAL, BLOOD CLOT;  Surgeon: Daron Manjarrez MD;  Location: [facility] OR [address] [place] [address];  Service: Urology;;    REPAIR OF INCARCERATED INCISIONAL HERNIA WITHOUT HISTORY OF PRIOR REPAIR N/A 12/5/2018    Procedure: REPAIR, HERNIA, INCISIONAL, INCARCERATED, WITHOUT HISTORY OF PRIOR REPAIR;  Surgeon: Steve Valentin MD;  [facility];  Service: General;   Laterality: N/A;    REPAIR OF INCARCERATED VENTRAL HERNIA WITHOUT HISTORY OF PRIOR REPAIR N/A 2018    Procedure: REPAIR, HERNIA, VENTRAL, INCARCERATED, WITHOUT HISTORY OF PRIOR REPAIR;  Surgeon: Guilherme Ordoñez MD;  Location: [facility] OR [address] [place] [address];  Service: General;  Laterality: N/A;    TOTAL KNEE ARTHROPLASTY Bilateral     TOTAL KNEE ARTHROPLASTY Left 10/25/2017    TKR       Review of patient's allergies indicates:   Allergen Reactions    Thiazides Other (See Comments)     Multiple episodes of thiazide-induced hyponatremia       Medications:  Continuous Infusions:  Scheduled Meds:   amLODIPine  5 mg Oral Daily    aspirin  81 mg Oral Daily    atorvastatin  20 mg Oral Daily    furosemide (LASIX) injection  40 mg Intravenous BID    gabapentin  400 mg Oral BID    heparin (porcine)  5,000 Units Subcutaneous Q8H    levothyroxine  50 mcg Oral Before breakfast    metoprolol succinate  25 mg Oral Daily    mirtazapine  15 mg Oral QHS    mupirocin   Nasal BID    traZODone  50 mg Oral QHS     PRN Meds:acetaminophen, ondansetron, ondansetron, oxyCODONE, sodium chloride 0.9%    Family History       Problem Relation (Age of Onset)    Diabetes Father    Esophageal cancer Father    Hypertension Mother          Tobacco Use    Smoking status: Former     Years: 20.00     Types: Cigarettes     Quit date:      Years since quittin.    Smokeless tobacco: Never    Tobacco comments:     quit    Substance and Sexual Activity    Alcohol use: No     Comment: rarely/6 months    Drug use: No    Sexual activity: Never       Review of Systems  Objective:     Vital Signs (Most Recent):  Temp: 98.4 °F (36.9 °C) (10/06/22 0330)  Pulse: 64 (10/06/22 0415)  Resp: 13 (10/06/22 041)  BP: (!) 113/57 (10/06/22 0400)  SpO2: (!) 93 % (10/06/22 041)   Vital Signs (24h Range):  Temp:  [98.2 °F (36.8 °C)-98.7 °F (37.1 °C)] 98.4 °F (36.9 °C)  Pulse:  [59-92] 64  Resp:  [13-36] 13  SpO2:  [86 %-97 %] 93 %  BP: (103-137)/(51-64) 113/57      Weight: 90.7 kg (200 lb)  Body mass index is 30.41 kg/m².    Physical Exam  Vitals and nursing note reviewed.   Constitutional:       General: He is awake. He is not in acute distress.     Appearance: He is ill-appearing.      Interventions: Nasal cannula in place.   HENT:      Head: Normocephalic and atraumatic.      Right Ear: External ear normal.      Left Ear: External ear normal.      Nose: Nose normal. No congestion or rhinorrhea.   Cardiovascular:      Rate and Rhythm: Normal rate.   Pulmonary:      Effort: No respiratory distress.   Abdominal:      General: Abdomen is flat.   Musculoskeletal:         General: Signs of injury present.      Cervical back: Neck supple.   Skin:     General: Skin is warm and dry.   Neurological:      Mental Status: He is alert and oriented to person, place, and time.   Psychiatric:         Mood and Affect: Mood normal.         Behavior: Behavior normal. Behavior is cooperative.         Thought Content: Thought content normal.         Judgment: Judgment normal.       Review of Symptoms      Symptom Assessment (ESAS 0-10 Scale)  Pain:  0  Dyspnea:  5  Anxiety:  0  Nausea:  0  Depression:  0  Anorexia:  0  Fatigue:  0  Insomnia:  0  Restlessness:  0  Agitation:  0         Performance Status:  40    Psychosocial/Cultural: Retired , has spent a lot of his time renewing interest in the history of [place] in literature      Advance Care Planning   Advance Directives:   Living Will: Yes        Copy on chart: Yes    Medical Power of : Yes      Decision Making:  Patient answered questions  Goals of Care: What is most important right now is to focus on improvement in condition but with limits to invasive therapies, comfort and QOL . Accordingly, we have decided that the best plan to meet the patient's goals includes continuing with treatment.       Significant Labs: All pertinent labs within the past 24 hours have been reviewed.  CBC:   Recent Labs   Lab  10/05/22  0756   WBC 10.52   HGB 11.7*   HCT 34.8*   MCV 83        BMP:  Recent Labs   Lab 10/06/22  0410   GLU 86   *   K 4.2   CL 83*   CO2 32*   BUN 14   CREATININE 1.0   CALCIUM 8.6*     LFT:  Lab Results   Component Value Date    AST 20 10/03/2022    ALKPHOS 100 10/03/2022    BILITOT 0.9 10/03/2022     Albumin:   Albumin   Date Value Ref Range Status   10/03/2022 3.7 3.5 - 5.2 g/dL Final     Protein:   Total Protein   Date Value Ref Range Status   10/03/2022 7.9 6.0 - 8.4 g/dL Final     Lactic acid:   Lab Results   Component Value Date    LACTATE 0.7 09/14/2020    LACTATE 1.1 08/27/2019       Significant Imaging: I have reviewed all pertinent imaging results/findings within the past 24 hours.    US Lower Extremity Arteries Bilateral  Narrative: EXAMINATION:  US LOWER EXTREMITY ARTERIES BILATERAL    CLINICAL HISTORY:  PAD;    TECHNIQUE:  Grayscale, color Doppler and duplex sonographic interrogation was performed through the bilateral lower extremity arterial system.    COMPARISON:  None    FINDINGS:  The peak systolic velocities on the right are as follows, in centimeters/second:    Common femoral artery: 64    Superficial femoral artery, proximal: 56    Superficial femoral artery, mid portion: 96    Superficial femoral artery, distal: 58    Popliteal artery: 110    Posterior tibial artery: 27    Anterior tibial artery: 54    Peroneal artery: 78    Dorsalis pedis artery: 18    The peak systolic velocities on the left are as follows, in centimeters/second:    Common femoral artery: 76    Superficial femoral artery, proximal: 78    Superficial femoral artery, mid portion: 111    Superficial femoral artery, distal: 66    Popliteal artery: 60    Posterior tibial artery: 13    Anterior tibial artery: 39    Peroneal artery: 63    Dorsalis pedis artery: 54    Waveforms in the femoral and popliteal arteries are triphasic and biphasic.  Many of the waveforms in the calf are monophasic.  Impression: Diffuse  atherosclerosis.  Based on reduced velocities and abnormal monophasic waveforms in the left posterior tibial artery, I suspect underlying hemodynamically significant stenosis.  No evidence for hemodynamically significant stenosis elsewhere.    Electronically signed by: Karen Teran  Date:    10/05/2022  Time:    08:27  X-Ray Chest AP Portable  Narrative: EXAMINATION:  XR CHEST AP PORTABLE    CLINICAL HISTORY:  follow up CHF;    TECHNIQUE:  Single frontal view of the chest was performed.    COMPARISON:  10/03/2022    FINDINGS:  Cardiac size remains enlarged aorta is tortuous.  Chest is similar to recent examination some patchy increased markings seen at the right lung base and there is loss of volume and pleural fluid at the left lung base.  Impression: See above    Electronically signed by: Daron Burr MD  Date:    10/05/2022  Time:    07:23    Cardiology: Echo report 10/4/22 reviewed

## 2022-10-06 NOTE — ASSESSMENT & PLAN NOTE
"Patient seen by palliative care and has elected DNR status.  He does state that he doesn't want us to "give up" on him and has been assured that only the most aggressive measures (CPR, intubation, shocks) have been omitted from the care plan.  We will continue oxygen and NIV as needed, antibiotics if needed, diuresis, etc.    "

## 2022-10-06 NOTE — PROGRESS NOTES
[facility] Intensive Care [facility]  Cardiology  Progress Note    Patient Name: Chucho Prado  MRN: [id number]  Admission Date: 10/3/2022  Hospital Length of Stay: 3 days  Code Status: DNR   Attending Physician: Xin Mason MD   Primary Care Physician: Obed Mcguire MD  Expected Discharge Date:   Principal Problem:Acute respiratory failure with hypoxia    Subjective:     Brief HPI:    Chucho Prado is a 89 y.o.male with hypertension and hypercholesterolemia. He has been unable to walk after a back injury in about 2015. He has chronic kidney disease, stage 3. He has a suprapubic catheter due to a neurogenic bladder. Beginning in mid 9/2022 he noted that he was becoming short of breath from lying down. The shortness of breath got worse. His legs became edematous. He presented to the emergency room on 10/3/2022 being fluid overloaded. He was noted to be hyponatremic. He says he has been drinking a lot of water. He was admitted.     Hospital Course:    10/4/2022: Received tolvaptan.    10/4/2022: Echo: Normal left ventricular size and systolic function. EF 65%. Mild diastolic dysfunction. Mildly enlarged LA. Severe AS - 4.1 m/s - MG 44 mmHg - 0.9 cm2. SPAP 59 mmHg.    10/4/2022: Transferred to ICU due to respiratory issues.    Interval History:    Says he is comfortable.    Breathing easier. Less edema.    Review of Systems   Constitutional: Positive for malaise/fatigue. Negative for chills and fever.   HENT:  Negative for nosebleeds.    Eyes:  Negative for vision loss in left eye and vision loss in right eye.   Cardiovascular:  Positive for leg swelling. Negative for chest pain, orthopnea, palpitations and paroxysmal nocturnal dyspnea.   Respiratory:  Positive for cough and shortness of breath. Negative for hemoptysis, sputum production and wheezing.    Hematologic/Lymphatic: Negative for bleeding problem. Does not bruise/bleed easily.   Skin:  Negative for color change and rash.   Musculoskeletal:   Negative for muscle weakness and myalgias.   Gastrointestinal:  Negative for abdominal pain, heartburn, hematemesis, hematochezia, melena, nausea and vomiting.   Genitourinary:  Negative for hematuria.   Neurological:  Negative for dizziness, focal weakness, headaches, light-headedness, vertigo and weakness.   Psychiatric/Behavioral:  Negative for altered mental status. The patient is not nervous/anxious.    Allergic/Immunologic: Negative for persistent infections.     Objective:     Vital Signs (Most Recent):  Temp: 98.4 °F (36.9 °C) (10/06/22 0330)  Pulse: 82 (10/06/22 0753)  Resp: (!) 21 (10/06/22 0753)  BP: (!) 113/57 (10/06/22 0400)  SpO2: (!) 90 % (10/06/22 0753)   Vital Signs (24h Range):  Temp:  [98.2 °F (36.8 °C)-98.7 °F (37.1 °C)] 98.4 °F (36.9 °C)  Pulse:  [59-92] 82  Resp:  [13-36] 21  SpO2:  [89 %-97 %] 90 %  BP: (103-137)/(51-64) 113/57     Weight: 90.7 kg (200 lb)  Body mass index is 30.41 kg/m².    SpO2: (!) 90 %  O2 Device (Oxygen Therapy): nasal cannula w/ humidification      Intake/Output Summary (Last 24 hours) at 10/6/2022 0827  Last data filed at 10/6/2022 0642  Gross per 24 hour   Intake 180.76 ml   Output 2500 ml   Net -2319.24 ml         Lines/Drains/Airways       Drain  Duration                  Suprapubic Catheter 09/01/20 latex 18 Fr. 765 days              Peripheral Intravenous Line  Duration                  Peripheral IV - Single Lumen 10/03/22 1717 20 G Right Forearm 2 days                    Physical Exam  Constitutional:       General: He is not in acute distress.     Appearance: Normal appearance. He is well-developed. He is not ill-appearing.   Eyes:      Conjunctiva/sclera:      Right eye: Right conjunctiva is not injected. No hemorrhage.     Left eye: Left conjunctiva is not injected. No hemorrhage.     Pupils:      Right eye: Pupil is round.      Left eye: Pupil is round.   Neck:      Vascular: No JVD.   Cardiovascular:      Rate and Rhythm: Normal rate and regular rhythm.       Heart sounds: S1 normal and S2 normal. Murmur heard.   Midsystolic murmur is present with a grade of 3/6 at the upper right sternal border.     Gallop present. S4 sounds present. No S3 sounds.   Pulmonary:      Effort: Pulmonary effort is normal.      Breath sounds: Examination of the right-lower field reveals decreased breath sounds and rales. Examination of the left-lower field reveals decreased breath sounds and rales. Decreased breath sounds, rhonchi and rales present.   Chest:      Chest wall: No swelling or tenderness.   Abdominal:      General: There is no distension.      Palpations: Abdomen is soft.      Tenderness: There is no abdominal tenderness.   Musculoskeletal:      Cervical back: Neck supple.      Right lower leg: Swelling present. 1+ Pitting Edema present.      Left lower leg: Swelling present. 1+ Pitting Edema present.   Skin:     General: Skin is warm and dry.      Findings: No rash.   Neurological:      Mental Status: He is alert and oriented to person, place, and time. He is not disoriented.   Psychiatric:         Attention and Perception: Attention normal.         Mood and Affect: Mood normal.         Speech: Speech normal.         Behavior: Behavior normal.         Thought Content: Thought content normal.         Cognition and Memory: Cognition and memory normal.         Judgment: Judgment normal.       Current Medications:     amLODIPine  2.5 mg Oral Daily    aspirin  81 mg Oral Daily    atorvastatin  20 mg Oral Daily    furosemide (LASIX) injection  40 mg Intravenous BID    gabapentin  400 mg Oral BID    heparin (porcine)  5,000 Units Subcutaneous Q8H    levothyroxine  50 mcg Oral Before breakfast    metoprolol succinate  25 mg Oral Daily    mirtazapine  15 mg Oral QHS    mupirocin   Nasal BID    tolvaptan  15 mg Oral Once    traZODone  50 mg Oral QHS     Current Laboratory Results:    Recent Results (from the past 24 hour(s))   Basic metabolic panel    Collection Time: 10/05/22  6:47 PM    Result Value Ref Range    Sodium 130 (L) 136 - 145 mmol/L    Potassium 3.2 (L) 3.5 - 5.1 mmol/L    Chloride 81 (L) 95 - 110 mmol/L    CO2 37 (H) 23 - 29 mmol/L    Glucose 88 70 - 110 mg/dL    BUN 12 8 - 23 mg/dL    Creatinine 0.9 0.5 - 1.4 mg/dL    Calcium 8.7 8.7 - 10.5 mg/dL    Anion Gap 12 8 - 16 mmol/L    eGFR >60 >60 mL/min/1.73 m^2   Basic metabolic panel    Collection Time: 10/06/22  4:10 AM   Result Value Ref Range    Sodium 127 (L) 136 - 145 mmol/L    Potassium 4.2 3.5 - 5.1 mmol/L    Chloride 83 (L) 95 - 110 mmol/L    CO2 32 (H) 23 - 29 mmol/L    Glucose 86 70 - 110 mg/dL    BUN 14 8 - 23 mg/dL    Creatinine 1.0 0.5 - 1.4 mg/dL    Calcium 8.6 (L) 8.7 - 10.5 mg/dL    Anion Gap 12 8 - 16 mmol/L    eGFR >60 >60 mL/min/1.73 m^2     Current Imaging Results:    CT Chest Without Contrast   Final Result      Moderate left pleural effusion, small right pleural effusion, small pericardial effusion.  Much of the left lung is collapsed.  It would be difficult to exclude underlying malignancy, though I see no obvious mass.      Additional stable chronic findings as above.         Electronically signed by: Karen Teran   Date:    10/06/2022   Time:    08:15      X-Ray Chest AP Portable   Final Result      See above         Electronically signed by: Daron Burr MD   Date:    10/05/2022   Time:    07:23      US Lower Extremity Arteries Bilateral   Final Result      Diffuse atherosclerosis.  Based on reduced velocities and abnormal monophasic waveforms in the left posterior tibial artery, I suspect underlying hemodynamically significant stenosis.  No evidence for hemodynamically significant stenosis elsewhere.         Electronically signed by: Karen Teran   Date:    10/05/2022   Time:    08:27      X-Ray Chest AP Portable   Final Result      1.  Worsening pulmonary vascular congestion, suggestive of worsening fluid overload state.      2.  Persistent airspace opacity in the left lung base with associated  suspected moderate parapneumonic effusion.         Electronically signed by: South Waters MD   Date:    10/03/2022   Time:    17:13      FL Fluoro of Diaphragm    (Results Pending)       10/4/2022: Echo:    The left ventricle is normal in size with normal systolic function.  The estimated ejection fraction is 65%.  Grade I left ventricular diastolic dysfunction.  Mild left atrial enlargement.  Normal right ventricular size with normal right ventricular systolic function.  There is severe aortic valve stenosis.  Aortic valve area is 0.89 cm2; peak velocity is 4.09 m/s; mean gradient is 44 mmHg.  Mild tricuspid regurgitation.  There is moderate pulmonary hypertension.  The estimated PA systolic pressure is 59 mmHg.  Normal central venous pressure (3 mmHg).      Assessment and Plan:     Problem List:    Active Diagnoses:    Diagnosis Date Noted POA    PRINCIPAL PROBLEM:  Acute respiratory failure with hypoxia [J96.01] 08/27/2020 Yes    Palliative care encounter [Z51.5] 10/06/2022 Not Applicable    Pleural effusion, left [J90] 10/05/2022 Yes    Advanced care planning/counseling discussion [Z71.89] 10/05/2022 Not Applicable    Asymptomatic bacteriuria [R82.71] 10/04/2022 Yes    Acute heart failure [I50.9] 10/03/2022 Yes    Neurogenic bladder [N31.9] 12/04/2018 Yes    Suprapubic catheter [Z93.59] 10/23/2017 Not Applicable    Hyponatremia [E87.1] 10/23/2017 Yes    CKD (chronic kidney disease) stage 3, GFR 30-59 ml/min [N18.30] 12/26/2016 Yes    Iron deficiency anemia [D50.9] 07/31/2013 Yes    Acquired hypothyroidism [E03.9] 07/30/2013 Yes    Essential hypertension [I10] 07/30/2013 Yes    Hyperlipidemia [E78.5] 07/30/2013 Yes      Problems Resolved During this Admission:       Assessment and Plan:     Heart Failure, Diastolic, Acute on Chronic              10/3/2022: Presents in HF with pulmonary edema and edema of legs.              8/28/2020: Echo: Normal left ventricular size and systolic function. EF 60%. Septal WMA.  Moderate to severe AS - 3.1 m/s - MG 26 mmHg - 1.0 cm2.   10/4/2022: Echo: Normal left ventricular size and systolic function. EF 65%. Mild diastolic dysfunction. Mildly enlarged LA. Severe AS - 4.1 m/s - MG 44 mmHg - 0.9 cm2. SPAP 59 mmHg.              At home was on  furosemide 20 mg M,W & F. Has been taken additional furosemide but unclear on dosing.   10/4/2022: Received tolvaptan.              On furosemide 40 mg iv Q12.              Fluid restriction 1,500 ml/day.   To receive tolvaptan 15 mg today.              Continue diuresis.                2. Aortic Stenosis              8/28/2020: Echo: Moderate to severe AS - 3.1 m/s - MG 26 mmHg - 1.0 cm2.   10/4/2022: Echo: Severe AS - 4.1 m/s - MG 44 mmHg - 0.9 cm2.      3. Hypertension              At home been on metoprolol 25 mg Q24, amlodipine 5 mg Q24, furosemide 20 mg 3/7 & KCl 10 meq 3/7.     4. Hypercholesterolemia  At home been on atorvastatin 20 mg Q24.      5. Chronic Kidney Disease, Stage 3              10/4/2022: BUN/crea 14/0.9. CrCl >60.     6. Hyponatremia              10/4/2022: Na 122.   10/4/2022: Received tolvaptan.              Fluid restriction.    7. Respiratory Failure   10/5/2022: PCO2 81. PO2 106.                 VTE Risk Mitigation (From admission, onward)           Ordered     heparin (porcine) injection 5,000 Units  Every 8 hours         10/03/22 2007     IP VTE HIGH RISK PATIENT  Once         10/03/22 2007     Place sequential compression device  Until discontinued         10/03/22 2007                    Tomasa Rdz MD  Cardiology  [facility] - Intensive Care ([place])

## 2022-10-06 NOTE — ASSESSMENT & PLAN NOTE
Patient transferred to ICU overnight 10/4 with worsening hypoxemia and hyponatremia.  CXR showed worsening pulmonary vascular congestion and a persistent airspace opacity in the left base with an associated effusion, possibly parapneumonic.  Note the 2D echo showed a normal EF and only grade I diastolic dysfunction, but severe aortic stenosis and pulmonary HTN seen previously.  Unclear whether this is due to chronic lung disease, he is a former smoker with a 20 pack year history (quit 1990) but is not oxygen dependent at home and does not use respiratory medications chronically.  Cardiology consulted, PCC now following.  CT showed severe volume loss of left lung and associated effusion.      - PCC considering thoracentesis.    - Continue oxygen supplementation, nightly BiPAP, diuretics.  - Not a candidate for intervention on aortic valve given age, frailty and comorbidities

## 2022-10-06 NOTE — PROGRESS NOTES
"[facility] Intensive Care [facility] Medicine  Progress Note    Patient Name: Chucho Prado  MRN: [id number]  Patient Class: IP- Inpatient   Admission Date: 10/3/2022  Length of Stay: 3 days  Attending Physician: Xin Mason MD  Primary Care Provider: Obed Mcguire MD        Subjective:     Principal Problem:Acute respiratory failure with hypoxia        HPI:  From H&P by Kerrie Torres PA:  "Mr. Chucho Prado is a 89 y.o. male, with PMH of CHF, HTN, hypothyroidism, HLD, MDD, CARLINE, CKD-3, neurogenic bladder s/p suprapubic catheter insertion, GERD, wheelchair bound, who presented to [facility] on 10/3/22 due to shortness of breath x 3 days. He states he feels out of breath when laying flat or with activity (ie. Being changed or transferring to the wheelchair). Additionally, he states he feels occasional chest tightness, has been having wheezing, and coughing (present for weeks). Upon awakening this morning he noted b/l lower extremity swelling, which he attributes to sitting up while sleeping last night and not elevating his legs. He denies fever. He was evaluated in the ED with labs showing hyponatremia with sodium of 122, chloride of 83. A BNP was elevated at 212, and troponin was negative. A CXR showed worsening pulmonary vascular congestion, and persistence of a left lung base airspace opacity. He was treated in the ED with 40 mg IV lasix."      Overview/Hospital Course:  Patient was admitted under a heart failure protocol on IV Lasix and Cardiology was consulted to follow.  His 2D echo was repeated.  Nephrology was consulted for the hyponatremia.  He was given a dose of tolvaptan and his sodium level was monitored closely.  Patient's oxygen level became difficult to manage on the floor and he was transferred to ICU overnight 10/4 when he required placement of a NRB and pulmonary/critical care was consulted.  Sodium level decreased to 120 upon transfer before stabilizing the following morning. "  Diuresis was continued. 2D echo showed severe aortic stenosis, pulmonary HTN and grade I diastolic dysfunction.  His CXR showed volume loss in the right lung and CT was done showing a left pleural effusion with much of the left lung collapsed.  There was a small effusion on the right and a small pericardial effusion.      Interval History:  Very dyspneic this morning, was not placed on BiPAP last night for unclear reasons.  Visibly short of breath although says he is hungry and wants to eat.  Has pain in left 5th toe, says it is due to a corn.  Leg swelling resolved.    Review of Systems   Constitutional:  Negative for chills and fever.   Respiratory:  Positive for chest tightness and shortness of breath. Negative for cough.    Cardiovascular:  Negative for chest pain, palpitations and leg swelling.   Objective:     Vital Signs (Most Recent):  Temp: 98.4 °F (36.9 °C) (10/06/22 0330)  Pulse: 82 (10/06/22 0753)  Resp: (!) 21 (10/06/22 0753)  BP: (!) 113/57 (10/06/22 0400)  SpO2: (!) 90 % (10/06/22 0753)   Vital Signs (24h Range):  Temp:  [98.2 °F (36.8 °C)-98.7 °F (37.1 °C)] 98.4 °F (36.9 °C)  Pulse:  [59-92] 82  Resp:  [13-36] 21  SpO2:  [89 %-97 %] 90 %  BP: (103-137)/(51-64) 113/57     Weight: 90.7 kg (200 lb)  Body mass index is 30.41 kg/m².    Intake/Output Summary (Last 24 hours) at 10/6/2022 0900  Last data filed at 10/6/2022 0642  Gross per 24 hour   Intake 180.76 ml   Output 2500 ml   Net -2319.24 ml      Physical Exam  Cardiovascular:      Rate and Rhythm: Normal rate and regular rhythm.      Pulses: Normal pulses.      Heart sounds: Murmur heard.     No gallop.   Pulmonary:      Breath sounds: Rales present. No wheezing.      Comments: Poor effort, breath sounds nearly inaudible.  Abdominal:      General: Bowel sounds are normal.      Palpations: Abdomen is soft.   Musculoskeletal:      Comments: Both legs with protective boots in place.  Right fifth toe with a small lateral white bump, not red.   Skin:      General: Skin is warm and dry.   Neurological:      General: No focal deficit present.      Mental Status: He is alert and oriented to person, place, and time.       Significant Labs: All pertinent labs within the past 24 hours have been reviewed.    Significant Imaging: I have reviewed all pertinent imaging results/findings within the past 24 hours.      Assessment/Plan:      * Acute respiratory failure with hypoxia  Patient transferred to ICU overnight 10/4 with worsening hypoxemia and hyponatremia.  CXR showed worsening pulmonary vascular congestion and a persistent airspace opacity in the left base with an associated effusion, possibly parapneumonic.  Note the 2D echo showed a normal EF and only grade I diastolic dysfunction, but severe aortic stenosis and pulmonary HTN seen previously.  Unclear whether this is due to chronic lung disease, he is a former smoker with a 20 pack year history (quit 1990) but is not oxygen dependent at home and does not use respiratory medications chronically.  Cardiology consulted, PCC now following.  CT showed severe volume loss of left lung and associated effusion.      - PCC considering thoracentesis.    - Continue oxygen supplementation, nightly BiPAP, diuretics.  - Not a candidate for intervention on aortic valve given age, frailty and comorbidities      Hyponatremia  - suspect hypervolemic hyponatremia   - s/p 40 mg IV lasix in ED, now on twice daily Lasix  - Nephrology consulted, tolvaptan given and fluid intake liberalized.    - After an initial further decrease hyponatremia stabilized.      Acute heart failure  - Patient presented with shortness of breath worse when supine   - BNP in ED was 212   - CXR with evidence of pulmonary edema and left pleural effusion associated with an airspace opacity on that side that was previously read as atelectasis.  He does not have pneumonia symptoms.  - s/p lasix 40 mg IV in ED   - monitor I&Os   - Heart Failure pathways initiated   -  "Echo showed normal EF, grade I diastolic dysfunction, severe AS, pulmonary HTN.    Palliative care encounter        Advanced care planning/counseling discussion  Patient seen by palliative care and has elected DNR status.  He does state that he doesn't want us to "give up" on him and has been assured that only the most aggressive measures (CPR, intubation, shocks) have been omitted from the care plan.  We will continue oxygen and NIV as needed, antibiotics if needed, diuresis, etc.      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  - Vancomycin given in ED due to previous culture with <50K MRSA (2 years ago)  - As he does not have fever or leukocytosis will hold off on further antibiotics.  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        CKD (chronic kidney disease) stage 3, GFR 30-59 ml/min  - Cr is 1.0  - ranges from 0.8  - 1.0   - avoid nephrotoxins   - renally dose meds     Iron deficiency anemia  - H&H are stable at present   - monitor       Hyperlipidemia  - continue atorvastatin 20 mg QD       Essential hypertension  - hypertensive on presentation  - home meds: amlodipine 5 mg QD, metoprolol succinate 25 mg QD  - monitor.  Improving with diuresis.    Acquired hypothyroidism  - continue levothyroxine 50 mcg QD         VTE Risk Mitigation (From admission, onward)         Ordered     heparin (porcine) injection 5,000 Units  Every 8 hours         10/03/22 2007     IP VTE HIGH RISK PATIENT  Once         10/03/22 2007     Place sequential compression device  Until discontinued         10/03/22 2007                Discharge Planning   TISHA:      Code Status: DNR   Is the patient medically ready for discharge?:     Reason for " patient still in hospital (select all that apply): Patient unstable  Discharge Plan A: Home Health                  Xin Sampson MD  Department of Hospital Medicine   [facility] - Intensive Care ([place])

## 2022-10-06 NOTE — ASSESSMENT & PLAN NOTE
- suspect hypervolemic hyponatremia   - s/p 40 mg IV lasix in ED, now on twice daily Lasix  - Nephrology consulted, tolvaptan given and fluid intake liberalized.    - After an initial further decrease hyponatremia stabilized.

## 2022-10-06 NOTE — SUBJECTIVE & OBJECTIVE
Interval History:  Very dyspneic this morning, was not placed on BiPAP last night for unclear reasons.  Visibly short of breath although says he is hungry and wants to eat.  Has pain in left 5th toe, says it is due to a corn.  Leg swelling resolved.    Review of Systems   Constitutional:  Negative for chills and fever.   Respiratory:  Positive for chest tightness and shortness of breath. Negative for cough.    Cardiovascular:  Negative for chest pain, palpitations and leg swelling.   Objective:     Vital Signs (Most Recent):  Temp: 98.4 °F (36.9 °C) (10/06/22 0330)  Pulse: 82 (10/06/22 0753)  Resp: (!) 21 (10/06/22 0753)  BP: (!) 113/57 (10/06/22 0400)  SpO2: (!) 90 % (10/06/22 0753)   Vital Signs (24h Range):  Temp:  [98.2 °F (36.8 °C)-98.7 °F (37.1 °C)] 98.4 °F (36.9 °C)  Pulse:  [59-92] 82  Resp:  [13-36] 21  SpO2:  [89 %-97 %] 90 %  BP: (103-137)/(51-64) 113/57     Weight: 90.7 kg (200 lb)  Body mass index is 30.41 kg/m².    Intake/Output Summary (Last 24 hours) at 10/6/2022 0900  Last data filed at 10/6/2022 0642  Gross per 24 hour   Intake 180.76 ml   Output 2500 ml   Net -2319.24 ml      Physical Exam  Cardiovascular:      Rate and Rhythm: Normal rate and regular rhythm.      Pulses: Normal pulses.      Heart sounds: Murmur heard.     No gallop.   Pulmonary:      Breath sounds: Rales present. No wheezing.      Comments: Poor effort, breath sounds nearly inaudible.  Abdominal:      General: Bowel sounds are normal.      Palpations: Abdomen is soft.   Musculoskeletal:      Comments: Both legs with protective boots in place.  Right fifth toe with a small lateral white bump, not red.   Skin:     General: Skin is warm and dry.   Neurological:      General: No focal deficit present.      Mental Status: He is alert and oriented to person, place, and time.       Significant Labs: All pertinent labs within the past 24 hours have been reviewed.    Significant Imaging: I have reviewed all pertinent imaging  results/findings within the past 24 hours.

## 2022-10-07 PROBLEM — G70.9 NEUROMUSCULAR RESPIRATORY WEAKNESS: Status: ACTIVE | Noted: 2022-10-07

## 2022-10-07 PROBLEM — J96.22 ACUTE ON CHRONIC RESPIRATORY FAILURE WITH HYPOXIA AND HYPERCAPNIA: Status: ACTIVE | Noted: 2020-08-27

## 2022-10-07 PROBLEM — J96.21 ACUTE ON CHRONIC RESPIRATORY FAILURE WITH HYPOXIA AND HYPERCAPNIA: Status: ACTIVE | Noted: 2020-08-27

## 2022-10-07 PROBLEM — J99 NEUROMUSCULAR RESPIRATORY WEAKNESS: Status: ACTIVE | Noted: 2022-10-07

## 2022-10-07 LAB
ANION GAP SERPL CALC-SCNC: 16 MMOL/L (ref 8–16)
BUN SERPL-MCNC: 16 MG/DL (ref 8–23)
CALCIUM SERPL-MCNC: 8.8 MG/DL (ref 8.7–10.5)
CHLORIDE SERPL-SCNC: 79 MMOL/L (ref 95–110)
CO2 SERPL-SCNC: 40 MMOL/L (ref 23–29)
CREAT SERPL-MCNC: 0.9 MG/DL (ref 0.5–1.4)
EST. GFR  (NO RACE VARIABLE): >60 ML/MIN/1.73 M^2
GLUCOSE SERPL-MCNC: 83 MG/DL (ref 70–110)
MAGNESIUM SERPL-MCNC: 1.6 MG/DL (ref 1.6–2.6)
POTASSIUM SERPL-SCNC: 3.1 MMOL/L (ref 3.5–5.1)
SODIUM SERPL-SCNC: 130 MMOL/L (ref 136–145)
SODIUM SERPL-SCNC: 135 MMOL/L (ref 136–145)

## 2022-10-07 PROCEDURE — 25000003 PHARM REV CODE 250: Performed by: STUDENT IN AN ORGANIZED HEALTH CARE EDUCATION/TRAINING PROGRAM

## 2022-10-07 PROCEDURE — 25000003 PHARM REV CODE 250: Performed by: PHYSICIAN ASSISTANT

## 2022-10-07 PROCEDURE — 94761 N-INVAS EAR/PLS OXIMETRY MLT: CPT

## 2022-10-07 PROCEDURE — 94668 MNPJ CHEST WALL SBSQ: CPT

## 2022-10-07 PROCEDURE — 25000003 PHARM REV CODE 250: Performed by: INTERNAL MEDICINE

## 2022-10-07 PROCEDURE — 99233 SBSQ HOSP IP/OBS HIGH 50: CPT | Mod: ,,, | Performed by: INTERNAL MEDICINE

## 2022-10-07 PROCEDURE — 27100171 HC OXYGEN HIGH FLOW UP TO 24 HOURS

## 2022-10-07 PROCEDURE — 94640 AIRWAY INHALATION TREATMENT: CPT

## 2022-10-07 PROCEDURE — 25000003 PHARM REV CODE 250: Performed by: NURSE PRACTITIONER

## 2022-10-07 PROCEDURE — 99900035 HC TECH TIME PER 15 MIN (STAT)

## 2022-10-07 PROCEDURE — 99233 PR SUBSEQUENT HOSPITAL CARE,LEVL III: ICD-10-PCS | Mod: ,,, | Performed by: INTERNAL MEDICINE

## 2022-10-07 PROCEDURE — 25000003 PHARM REV CODE 250: Performed by: HOSPITALIST

## 2022-10-07 PROCEDURE — 36415 COLL VENOUS BLD VENIPUNCTURE: CPT | Performed by: PHYSICIAN ASSISTANT

## 2022-10-07 PROCEDURE — 94667 MNPJ CHEST WALL 1ST: CPT

## 2022-10-07 PROCEDURE — 99233 SBSQ HOSP IP/OBS HIGH 50: CPT | Mod: ,,, | Performed by: HOSPITALIST

## 2022-10-07 PROCEDURE — 11000001 HC ACUTE MED/SURG PRIVATE ROOM

## 2022-10-07 PROCEDURE — 83735 ASSAY OF MAGNESIUM: CPT | Performed by: PHYSICIAN ASSISTANT

## 2022-10-07 PROCEDURE — 63600175 PHARM REV CODE 636 W HCPCS: Performed by: NURSE PRACTITIONER

## 2022-10-07 PROCEDURE — 99233 PR SUBSEQUENT HOSPITAL CARE,LEVL III: ICD-10-PCS | Mod: ,,, | Performed by: HOSPITALIST

## 2022-10-07 PROCEDURE — 63600175 PHARM REV CODE 636 W HCPCS: Performed by: PHYSICIAN ASSISTANT

## 2022-10-07 PROCEDURE — 84295 ASSAY OF SERUM SODIUM: CPT | Performed by: NURSE PRACTITIONER

## 2022-10-07 PROCEDURE — 97530 THERAPEUTIC ACTIVITIES: CPT

## 2022-10-07 PROCEDURE — 27000221 HC OXYGEN, UP TO 24 HOURS

## 2022-10-07 PROCEDURE — 25000242 PHARM REV CODE 250 ALT 637 W/ HCPCS: Performed by: HOSPITALIST

## 2022-10-07 PROCEDURE — 80048 BASIC METABOLIC PNL TOTAL CA: CPT | Performed by: PHYSICIAN ASSISTANT

## 2022-10-07 PROCEDURE — 36415 COLL VENOUS BLD VENIPUNCTURE: CPT | Performed by: NURSE PRACTITIONER

## 2022-10-07 PROCEDURE — 97166 OT EVAL MOD COMPLEX 45 MIN: CPT

## 2022-10-07 PROCEDURE — 97162 PT EVAL MOD COMPLEX 30 MIN: CPT

## 2022-10-07 PROCEDURE — 97116 GAIT TRAINING THERAPY: CPT

## 2022-10-07 RX ORDER — SPIRONOLACTONE 25 MG/1
25 TABLET ORAL DAILY
Status: DISCONTINUED | OUTPATIENT
Start: 2022-10-07 | End: 2022-10-11

## 2022-10-07 RX ORDER — POTASSIUM CHLORIDE 20 MEQ/1
40 TABLET, EXTENDED RELEASE ORAL ONCE
Status: COMPLETED | OUTPATIENT
Start: 2022-10-07 | End: 2022-10-07

## 2022-10-07 RX ORDER — FUROSEMIDE 10 MG/ML
40 INJECTION INTRAMUSCULAR; INTRAVENOUS DAILY
Status: DISCONTINUED | OUTPATIENT
Start: 2022-10-07 | End: 2022-10-09

## 2022-10-07 RX ORDER — IPRATROPIUM BROMIDE AND ALBUTEROL SULFATE 2.5; .5 MG/3ML; MG/3ML
3 SOLUTION RESPIRATORY (INHALATION)
Status: DISCONTINUED | OUTPATIENT
Start: 2022-10-07 | End: 2022-10-13 | Stop reason: HOSPADM

## 2022-10-07 RX ADMIN — MUPIROCIN: 20 OINTMENT TOPICAL at 09:10

## 2022-10-07 RX ADMIN — GABAPENTIN 400 MG: 400 CAPSULE ORAL at 09:10

## 2022-10-07 RX ADMIN — TRAZODONE HYDROCHLORIDE 50 MG: 50 TABLET ORAL at 09:10

## 2022-10-07 RX ADMIN — LEVOTHYROXINE SODIUM 50 MCG: 50 TABLET ORAL at 06:10

## 2022-10-07 RX ADMIN — SPIRONOLACTONE 25 MG: 25 TABLET, FILM COATED ORAL at 08:10

## 2022-10-07 RX ADMIN — ACETAMINOPHEN 1000 MG: 500 TABLET, FILM COATED ORAL at 08:10

## 2022-10-07 RX ADMIN — IPRATROPIUM BROMIDE AND ALBUTEROL SULFATE 3 ML: 2.5; .5 SOLUTION RESPIRATORY (INHALATION) at 09:10

## 2022-10-07 RX ADMIN — MIRTAZAPINE 15 MG: 15 TABLET, FILM COATED ORAL at 09:10

## 2022-10-07 RX ADMIN — IPRATROPIUM BROMIDE AND ALBUTEROL SULFATE 3 ML: 2.5; .5 SOLUTION RESPIRATORY (INHALATION) at 04:10

## 2022-10-07 RX ADMIN — HEPARIN SODIUM 5000 UNITS: 5000 INJECTION INTRAVENOUS; SUBCUTANEOUS at 06:10

## 2022-10-07 RX ADMIN — AMLODIPINE BESYLATE 2.5 MG: 2.5 TABLET ORAL at 08:10

## 2022-10-07 RX ADMIN — IPRATROPIUM BROMIDE AND ALBUTEROL SULFATE 3 ML: 2.5; .5 SOLUTION RESPIRATORY (INHALATION) at 07:10

## 2022-10-07 RX ADMIN — IPRATROPIUM BROMIDE AND ALBUTEROL SULFATE 3 ML: 2.5; .5 SOLUTION RESPIRATORY (INHALATION) at 12:10

## 2022-10-07 RX ADMIN — POTASSIUM CHLORIDE 40 MEQ: 1500 TABLET, EXTENDED RELEASE ORAL at 08:10

## 2022-10-07 RX ADMIN — METOPROLOL SUCCINATE 25 MG: 25 TABLET, EXTENDED RELEASE ORAL at 08:10

## 2022-10-07 RX ADMIN — FUROSEMIDE 40 MG: 10 INJECTION, SOLUTION INTRAMUSCULAR; INTRAVENOUS at 08:10

## 2022-10-07 RX ADMIN — ATORVASTATIN CALCIUM 20 MG: 20 TABLET, FILM COATED ORAL at 08:10

## 2022-10-07 RX ADMIN — ASPIRIN 81 MG: 81 TABLET, COATED ORAL at 08:10

## 2022-10-07 RX ADMIN — HEPARIN SODIUM 5000 UNITS: 5000 INJECTION INTRAVENOUS; SUBCUTANEOUS at 05:10

## 2022-10-07 RX ADMIN — HEPARIN SODIUM 5000 UNITS: 5000 INJECTION INTRAVENOUS; SUBCUTANEOUS at 09:10

## 2022-10-07 RX ADMIN — GABAPENTIN 400 MG: 400 CAPSULE ORAL at 08:10

## 2022-10-07 NOTE — PT/OT/SLP EVAL
Occupational Therapy   Evaluation and Discharge Note    Name: Chucho Prado  MRN: [id number]  Admitting Diagnosis:  Acute on chronic respiratory failure with hypoxia and hypercapnia   Recent Surgery: * No surgery found *      Recommendations:     Discharge Recommendations:  (Home with 24/7 assist and Home Health OT or PT for custom seating and wheelchair evaluation with a Seating and .)  Discharge Equipment Recommendations:   (Pt is needing a custom wheelchair and seating system.)  Barriers to discharge:   (Defer to MD for medical needs.)    Assessment:     Chucho Prado is a 89 y.o. male with a medical diagnosis of Acute on chronic respiratory failure with hypoxia and hypercapnia. At this time, patient is functioning at their prior level of function and does not require further acute OT services.     Plan:     During this hospitalization, patient does not require further acute OT services.  Please re-consult if situation changes.    Plan of Care Reviewed with: patient    Subjective     Chief Complaint: Left toe pain  Patient/Family Comments/goals: Pt reporting that he has private caregivers and has needed Total Care for several years.     Occupational Profile:  Living Environment: Lives in home alone with private caregivers daily  Previous level of function: Max A <>Total A with ADL and ADL mobility.  Roles and Routines: Pt is a retired  and playwright.  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  Equipment Used at home:  bedside commode, hospital bed, walker, rolling, wheelchair  Assistance upon Discharge: Private caregivers    Pain/Comfort:  Pain Rating 1:  (Left foot/toe but did not rate pain.)    Patients cultural, spiritual, Yarsanism conflicts given the current situation: no    Objective:     Communicated with: nurses Brittany and Juan, prior to session.  Patient found HOB elevated in ICU with blood pressure cuff, oxygen, peripheral  IV, pulse ox (continuous), telemetry (Suprapubic catheter) upon OT entry to room.    General Precautions: Standard, fall, respiratory   Orthopedic Precautions:N/A   Braces: N/A  Respiratory Status: Nasal cannula, flow 2 L/min     Occupational Performance:    Bed Mobility:    Supine to Sit: Total A      Functional Mobility/Transfers:  Bed to cardiac chair transfer: Total A squat pivot; Recommend only Vicky lift or slide over transfer bed<>cardiac chair    Activities of Daily Living:  Feeding: Min A  Grooming: Mod A; pt has long hair and needs assist to manage/groom hair  UB Dressing: Max A  LB Dressing: Total A  Toileting: Suprapubic catheter; Total A    Cognitive/Visual Perceptual:  Intact    Physical Exam:  Sitting Balance: Poor static sitting EOB  Standing Balance: Unable to stand/bear weight through feet for standing  UE AROM: Right UE: dominant; Shoulder 0-80 degrees flexion; Elbow<>Hand is WFL          Left UE: Shoulder 0- 60 degrees; Elbow <>Hand is WFL  UE Coordination:  Fair-  Sensation: Light touch is grossly intact  Skin: Visible skin intact; pt reporting how many wrinkles he now has in his toes now that edema has been greatly reduced.  Tubigrip on bilateral feet to knees.  Posture: Severe kyphosis, forward head and rounded shoulders    AMPAC 6 Click ADL:  AMPAC Total Score: 10    Treatment & Education:  Role of OT, POC, Safety with ADL mobility, and need for custom wheelchair and seating system.    Patient left  in cardiac chair  with all lines intact, call button in reach, and nurse, Brittany notified    GOALS:   Multidisciplinary Problems       Occupational Therapy Goals       Not on file              Multidisciplinary Problems (Resolved)          Problem: Occupational Therapy    Goal Priority Disciplines Outcome Interventions   Occupational Therapy Goal   (Resolved)     OT, PT/OT Met    Description: Orders received and evaluation complete.  Pt has private caregivers daily for years and has required assist  with all ADL and ADL mobility.  Pt has has been wheelchair bound for many years though reports having only a basic wheelchair.  Recommend discharge to home with continued daily private caregivers.  Recommend Home Health PT or OT for custom wheelchair seating evaluation with a Seating and .                         History:     Past Medical History:   Diagnosis Date    Acquired hypothyroidism 7/30/2013    Arthritis     Blood transfusion     before 2005 - whe had gangrenous gall bladder    Cataract     Chronic back pain 12/4/2018    - Takes Percocet 5-325 at home - Can continue current abdominal pain control with Dilaudid 0.5 mg IV Q3H prn     CKD (chronic kidney disease) stage 3, GFR 30-59 ml/min     Compression fracture of lumbar vertebra     Depression     Dyslipidemia     Essential hypertension 7/30/2013    Gastroesophageal reflux disease without esophagitis 12/4/2018    - continue home Prilosec    General anesthetics causing adverse effect in therapeutic use     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    GERD (gastroesophageal reflux disease)     History of postoperative delirium 12/4/2018    - Patient reports 1 week history of post-op delirium 7/2018 - Monitor electrolytes, UA, and urine cx - Delirium precautions  - Can use Seroquel 25 mg QHS prn     Hypertension     Hyponatremia 10/23/2017    Impaired mobility and ADLs 6/28/2018    Neurogenic bladder 12/4/2018    Sleep disorder 12/4/2018    - continue Trazodone QHS    Thyroid disease     UTI (urinary tract infection)          Past Surgical History:   Procedure Laterality Date    BIOPSY OF BLADDER  9/1/2020    Procedure: BIOPSY, BLADDER;  Surgeon: Daron Manjarrez MD;  Location: [address];  Service: Urology;;    BLADDER FULGURATION  9/1/2020    Procedure: FULGURATION, BLADDER;  Surgeon: Daron Manjarrez MD;  Location: [facility] OR [address] [place] [address];  Service: Urology;;    CHOLECYSTECTOMY      CYSTOGRAM  9/1/2020    Procedure: CYSTOGRAM;   Surgeon: Daron Manjarrez MD;  [facility];  Service: Urology;;    CYSTOSCOPY N/A 9/1/2020    Procedure: CYSTOSCOPY;  Surgeon: Daron Manjarrez MD;  Location: [facility] OR [facility];  Service: Urology;  Laterality: N/A;    DILATION OF URETHRA  9/1/2020    Procedure: DILATION, URETHRA;  Surgeon: Daron Manjarrez MD;  [facility];  Service: Urology;;    EYE SURGERY Right     IOL    HERNIA REPAIR      INTRAOCULAR PROSTHESES INSERTION Left 5/28/2018    Procedure: INSERTION-INTRAOCULAR LENS (IOL);  Surgeon: Trinity Vazquez MD;  Location: [facility];  Service: Ophthalmology;  Laterality: Left;    JOINT REPLACEMENT      LAMINECTOMY  12/27/2016    L2-L4    LUMBAR LAMINECTOMY  12/2016    PARATHYROIDECTOMY      PHACOEMULSIFICATION OF CATARACT Left 5/28/2018    Procedure: PHACOEMULSIFICATION-ASPIRATION-CATARACT;  Surgeon: Trinity Vazquez MD;  Location: [facility];  Service: Ophthalmology;  Laterality: Left;    REMOVAL OF BLOOD CLOT  9/1/2020    Procedure: REMOVAL, BLOOD CLOT;  Surgeon: Daron Manjarrez MD;  [facility];  Service: Urology;;    REPAIR OF INCARCERATED INCISIONAL HERNIA WITHOUT HISTORY OF PRIOR REPAIR N/A 12/5/2018    Procedure: REPAIR, HERNIA, INCISIONAL, INCARCERATED, WITHOUT HISTORY OF PRIOR REPAIR;  Surgeon: Steve Valentin MD;  Location: [facility] OR [facility];  Service: General;  Laterality: N/A;    REPAIR OF INCARCERATED VENTRAL HERNIA WITHOUT HISTORY OF PRIOR REPAIR N/A 6/20/2018    Procedure: REPAIR, HERNIA, VENTRAL, INCARCERATED, WITHOUT HISTORY OF PRIOR REPAIR;  Surgeon: Guilherme Ordoñez MD;  Location: [facility] OR [facility];  Service: General;  Laterality: N/A;    TOTAL KNEE ARTHROPLASTY Bilateral     TOTAL KNEE ARTHROPLASTY Left 10/25/2017    TKR       Time Tracking:     OT Date of Treatment: 10/07/22  OT Start Time: 1042  OT Stop Time: 1120  OT Total Time (min): 38 min    Billable Minutes:Evaluation 38    10/7/2022

## 2022-10-07 NOTE — ASSESSMENT & PLAN NOTE
- Patient presented with shortness of breath worse when supine   - BNP in ED was 212   - CXR with evidence of pulmonary edema and left pleural effusion associated with an airspace opacity on that side that is consistent with severe atelectasis.  He does not have pneumonia symptoms.  - s/p lasix 40 mg IV in ED   - monitor I&Os   - Heart Failure pathways initiated   - Echo showed normal EF, grade I diastolic dysfunction, severe AS, pulmonary HTN.

## 2022-10-07 NOTE — ASSESSMENT & PLAN NOTE
Patient transferred to ICU overnight 10/4 with worsening hypoxemia and hyponatremia.  CXR showed worsening pulmonary vascular congestion and a persistent airspace opacity in the left base with an associated effusion.  Note the 2D echo showed a normal EF and only grade I diastolic dysfunction, but severe aortic stenosis and pulmonary HTN seen previously.  Unclear whether this is due to chronic lung disease, he is a former smoker with a 20 pack year history (quit 1990) but is not oxygen dependent at home and does not use respiratory medications chronically.  Cardiology consulted, PCC now following.  CT showed severe volume loss of left lung and associated effusion.      - PCC feels left lung findings due to severe atelectasis and thoracentesis not indicated.  - Needs aggressive mobilization, respiratory therapy with nebulized treatments, chest PT, cough assist.  - Start PT/OT, being with getting up to chair.    - Continue oxygen supplementation, nightly BiPAP, diuretics.  - Not a candidate for intervention on aortic valve given age, frailty and comorbidities  - He will need volume ventilator given respiratory failure due to neuromuscular condition.

## 2022-10-07 NOTE — PT/OT/SLP EVAL
Physical Therapy Evaluation and Discharge Note    Patient Name:  Chucho Prado   MRN:  [id number]    Recommendations:     Discharge Recommendations:  other (see comments) (Home with 24/7 assist and Home Health OT or PT for custom seating and wheelchair evaluation with a Seating and )   Discharge Equipment Recommendations: power chair, lift device   Barriers to discharge:  Increased mob and transfer needs, O2 desat    Assessment:     Chucho Prado is a 89 y.o. male admitted with a medical diagnosis of Acute on chronic respiratory failure with hypoxia and hypercapnia. .  At this time, patient is functioning at their prior level of function and does not require further acute PT services. Upon arrival, pt was up in a cardiac chair on 4L/min O2, SpO2 94%. Pt was sit<>stand 2x w/ RW maxAx2, desat to low 80s. Pt amb 4steps, increased fatigue, decreased step length, slow velocity. Has LE weakness, increased posterior leaning during sit<>stand w/ RW. PLB performed during session. Eval/DCPT    Recent Surgery: * No surgery found *      Plan:     During this hospitalization, patient does not require further acute PT services.  Please re-consult if situation changes.      Subjective     Chief Complaint: none stated  Patient/Family Comments/goals: None stated  Pain/Comfort:  Pain Rating 1: 0/10    Patients cultural, spiritual, Jain conflicts given the current situation: no    Living Environment:  Pt lives in an Apt building on the 1st floor, has 4steps uses lift elevator to enter. Has 3 caregivers that assist him from 8am -12am. Tenants will assist him prn.  Prior to admission, patients level of function was Max Assistance.  Equipment used at home: bedside commode, hospital bed, walker, rolling, wheelchair.  DME owned (not currently used): none.  Upon discharge, patient will have assistance from Caregivers and tenants.    Objective:     Communicated with Aiyana Soto RN prior to session.  Patient  found up in cardiac chair with telemetry, blood pressure cuff, peripheral IV, oxygen, pulse ox (continuous) (suprapubic catherer) upon PT entry to room.    General Precautions: Standard, fall, respiratory   Orthopedic Precautions:N/A   Braces: N/A   Respiratory Status: Nasal cannula, flow 4 L/min    Exams:  Cognitive Exam:  Patient is oriented to Person, Place, Time, and Situation  Gross Motor Coordination:  WFL  Postural Exam:  Patient presented with the following abnormalities:    -       Kyphosis  RLE ROM: WFL  RLE Strength: Deficits: 3/5   LLE ROM: WFL  LLE Strength: Deficits: 3/5    Functional Mobility:  Transfers:     Sit to Stand:  maximal assistance and of 2 persons with rolling walker  Gait: 4steps w/ RW maxAx2  Balance: Fair static and Fair- dynamic    AM-PAC 6 CLICK MOBILITY  Total Score:11       Therapeutic Activities and Exercises:   Safety awareness, Eval/DCPT, PLB performed    AM-PAC 6 CLICK MOBILITY  Total Score:11     Patient left up in chair with all lines intact, call button in reach, RN notified, and MD present.    GOALS:   Multidisciplinary Problems       Physical Therapy Goals          Problem: Physical Therapy    Goal Priority Disciplines Outcome Goal Variances Interventions   Physical Therapy Goal     PT, PT/OT                          History:     Past Medical History:   Diagnosis Date    Acquired hypothyroidism 7/30/2013    Arthritis     Blood transfusion     before 2005 - whe had gangrenous gall bladder    Cataract     Chronic back pain 12/4/2018    - Takes Percocet 5-325 at home - Can continue current abdominal pain control with Dilaudid 0.5 mg IV Q3H prn     CKD (chronic kidney disease) stage 3, GFR 30-59 ml/min     Compression fracture of lumbar vertebra     Depression     Dyslipidemia     Essential hypertension 7/30/2013    Gastroesophageal reflux disease without esophagitis 12/4/2018    - continue home Prilosec    General anesthetics causing adverse effect in therapeutic use      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    GERD (gastroesophageal reflux disease)     History of postoperative delirium 12/4/2018    - Patient reports 1 week history of post-op delirium 7/2018 - Monitor electrolytes, UA, and urine cx - Delirium precautions  - Can use Seroquel 25 mg QHS prn     Hypertension     Hyponatremia 10/23/2017    Impaired mobility and ADLs 6/28/2018    Neurogenic bladder 12/4/2018    Sleep disorder 12/4/2018    - continue Trazodone QHS    Thyroid disease     UTI (urinary tract infection)        Past Surgical History:   Procedure Laterality Date    BIOPSY OF BLADDER  9/1/2020    Procedure: BIOPSY, BLADDER;  Surgeon: Daron Manjarrez MD;  Location: [address];  Service: Urology;;    BLADDER FULGURATION  9/1/2020    Procedure: FULGURATION, BLADDER;  Surgeon: Daron Manjarrez MD;  Location: [address];  Service: Urology;;    CHOLECYSTECTOMY      CYSTOGRAM  9/1/2020    Procedure: CYSTOGRAM;  Surgeon: Daron Manjarrez MD;  Location: [address];  Service: Urology;;    CYSTOSCOPY N/A 9/1/2020    Procedure: CYSTOSCOPY;  Surgeon: Daron Manjarrez MD;  Location: [address];  Service: Urology;  Laterality: N/A;    DILATION OF URETHRA  9/1/2020    Procedure: DILATION, URETHRA;  Surgeon: Daron Manjarrez MD;  Location: [address];  Service: Urology;;    EYE SURGERY Right     IOL    HERNIA REPAIR      INTRAOCULAR PROSTHESES INSERTION Left 5/28/2018    Procedure: INSERTION-INTRAOCULAR LENS (IOL);  Surgeon: Trinity Vazquez MD;  Location: [facility];  Service: Ophthalmology;  Laterality: Left;    JOINT REPLACEMENT      LAMINECTOMY  12/27/2016    L2-L4    LUMBAR LAMINECTOMY  12/2016    PARATHYROIDECTOMY      PHACOEMULSIFICATION OF CATARACT Left 5/28/2018    Procedure: PHACOEMULSIFICATION-ASPIRATION-CATARACT;  Surgeon: Trinity Vazquez MD;  Location: [facility];  Service: Ophthalmology;  Laterality: Left;    REMOVAL OF BLOOD CLOT  9/1/2020    Procedure: REMOVAL, BLOOD CLOT;   Surgeon: Daron Manjarrez MD;  [facility];  Service: Urology;;    REPAIR OF INCARCERATED INCISIONAL HERNIA WITHOUT HISTORY OF PRIOR REPAIR N/A 12/5/2018    Procedure: REPAIR, HERNIA, INCISIONAL, INCARCERATED, WITHOUT HISTORY OF PRIOR REPAIR;  Surgeon: Steve Valentin MD;  Location: [facility] OR [facility];  Service: General;  Laterality: N/A;    REPAIR OF INCARCERATED VENTRAL HERNIA WITHOUT HISTORY OF PRIOR REPAIR N/A 6/20/2018    Procedure: REPAIR, HERNIA, VENTRAL, INCARCERATED, WITHOUT HISTORY OF PRIOR REPAIR;  Surgeon: Guilherme Ordoñez MD;  [facility];  Service: General;  Laterality: N/A;    TOTAL KNEE ARTHROPLASTY Bilateral     TOTAL KNEE ARTHROPLASTY Left 10/25/2017    TKR       Time Tracking:     PT Received On: 10/07/22  PT Start Time: 1440     PT Stop Time: 1514  PT Total Time (min): 34 min     Billable Minutes: Evaluation 10, Gait Training 12, and Therapeutic Activity 12      10/07/2022

## 2022-10-07 NOTE — PLAN OF CARE
Pt is not medically clear for discharge.  He is still being diuresed and being treated for hyponatremia.  He will need volume ventilator for home.         10/07/22 1526   Discharge Reassessment   Assessment Type Discharge Planning Reassessment   Did the patient's condition or plan change since previous assessment? No   Discharge Plan discussed with: Patient   Discharge Plan A Home Health   Discharge Plan B Home   DME Needed Upon Discharge  other (see comments)  (TBD)   Discharge Barriers Identified None   Why the patient remains in the hospital Requires continued medical care   Post-Acute Status   Discharge Delays None known at this time

## 2022-10-07 NOTE — SUBJECTIVE & OBJECTIVE
Interval History:  He is breathing more comfortably this morning and maintaining his oxygen level on supplementation.  Tolerated BiPAP last night. Has back pain from being in bed so much.    Review of Systems   Constitutional:  Negative for chills and fever.   Respiratory:  Positive for chest tightness and shortness of breath. Negative for cough.    Cardiovascular:  Negative for chest pain, palpitations and leg swelling.   Musculoskeletal:  Positive for back pain.   Objective:     Vital Signs (Most Recent):  Temp: 98.1 °F (36.7 °C) (10/07/22 0701)  Pulse: 91 (10/07/22 0900)  Resp: (!) 26 (10/07/22 0900)  BP: (!) 116/55 (10/07/22 0900)  SpO2: (!) 92 % (10/07/22 0900)   Vital Signs (24h Range):  Temp:  [97.7 °F (36.5 °C)-98.1 °F (36.7 °C)] 98.1 °F (36.7 °C)  Pulse:  [70-91] 91  Resp:  [14-28] 26  SpO2:  [89 %-97 %] 92 %  BP: (108-150)/(53-74) 116/55     Weight: 90.7 kg (200 lb)  Body mass index is 30.41 kg/m².    Intake/Output Summary (Last 24 hours) at 10/7/2022 0906  Last data filed at 10/7/2022 0629  Gross per 24 hour   Intake 720 ml   Output 2800 ml   Net -2080 ml      Physical Exam  Cardiovascular:      Rate and Rhythm: Normal rate and regular rhythm.      Pulses: Normal pulses.      Heart sounds: Murmur heard.     No gallop.   Pulmonary:      Breath sounds: No wheezing.      Comments: Poor effort, breath sounds nearly inaudible.  Abdominal:      General: Bowel sounds are normal.      Palpations: Abdomen is soft.   Musculoskeletal:      Comments: Both legs with protective boots in place.  Right fifth toe with a small lateral white bump, not red.   Skin:     General: Skin is warm and dry.   Neurological:      General: No focal deficit present.      Mental Status: He is alert and oriented to person, place, and time.       Significant Labs: All pertinent labs within the past 24 hours have been reviewed.    Significant Imaging: I have reviewed all pertinent imaging results/findings within the past 24 hours.

## 2022-10-07 NOTE — PROGRESS NOTES
"Nephrology  Progress Note    Admit Date: 10/3/2022   LOS: 4 days     SUBJECTIVE:     Follow-up For:  Acute respiratory failure with hypoxia    History of Present Illness:  Patient is a retired  at [employer] 89 y.o. male presents with worsening SOB x 3 days and found to have pulm edema/CHF/hypoxia.  Labs revealing acute on chronic hyponatremia of 122.  Placed on fluid restriction, lasix, and admitted to hospital for monitoring.  Na remains 122 this am and consulted for evaluation.  Extensive medical history as outlined below including suprapubic catheter for neurogenic bladder, lower extremity edema currently with Ace wraps, mobility impairment, combined CHF and aortic stenosis, and chronic hyponatremia with sodium ranging from 119-137 since 2004.  Patient seen and examined with medical power of  and registered nurse at bedside.  Patient using accessory muscles and work of breathing with signs and symptoms of volume overload.  Admits to drinking 8-10 bottles of water per day.  Discussed treatment plan and agrees with Samsca.  Updated team.     Epic reviewed     Currently no chest pain, nausea, vomiting, diarrhea, fever, chills.  Positive shortness of breath and dyspnea on exertion          Interval History:     diuresing well and NA stable after samsca.  Looks comfortable on Bipap.  Discussed with team.       Review of Systems:  Constitutional: No fever or chills  Respiratory: shortness of breath better.   Cardiovascular: No chest pain or palpitations  Gastrointestinal: No nausea or vomiting  Neurological: No confusion or weakness    OBJECTIVE:     Vital Signs Range (Last 24H):  BP (!) 108/54   Pulse 79   Temp 98 °F (36.7 °C) (Oral)   Resp (!) 24   Ht 5' 8" (1.727 m)   Wt 90.7 kg (200 lb)   SpO2 95%   BMI 30.41 kg/m²     Temp:  [97.7 °F (36.5 °C)-98 °F (36.7 °C)]   Pulse:  []   Resp:  [14-28]   BP: (108-150)/(53-74)   SpO2:  [89 %-97 %]     I & O (Last 24H):  Intake/Output Summary " (Last 24 hours) at 10/7/2022 0742  Last data filed at 10/7/2022 0629  Gross per 24 hour   Intake 960 ml   Output 2800 ml   Net -1840 ml       Physical Exam:  General appearance:  Elderly, frail  Eyes:  Conjunctivae/corneas clear. PERRL.  Lungs:  Increased work of breathing is improved but still with abd breathing at times.   Diminished.   Heart: Regular rate and rhythm, S1, S2 normal, AS murmur  Abdomen: Soft, non-tender non-distended; bowel sounds normal; no masses,  no organomegaly, obese, suprapubic catheter  Extremities: No cyanosis or clubbing.  Compression bands on  Skin: Skin color, texture, turgor normal. No rashes or lesions  Neurologic:  Generally weak       Laboratory Data:  No results for input(s): WBC, RBC, HGB, HCT, PLT, MCV, MCH, MCHC in the last 24 hours.      BMP:   Recent Labs   Lab 10/07/22  0347   GLU 83   *   K 3.1*   CL 79*   CO2 40*   BUN 16   CREATININE 0.9   CALCIUM 8.8   MG 1.6     Lab Results   Component Value Date    CALCIUM 8.8 10/07/2022    PHOS 2.8 09/18/2020       Lab Results   Component Value Date    .1 (H) 08/31/2022    CALCIUM 8.8 10/07/2022    CAION 1.07 09/03/2019    PHOS 2.8 09/18/2020       Lab Results   Component Value Date    URICACID 5.1 10/04/2022       BNP  Recent Labs   Lab 10/03/22  1716   *       Medications:  Medication list was reviewed and changes noted under Assessment/Plan.    Diagnostic Results:    CT Chest Without Contrast   Final Result      Moderate left pleural effusion, small right pleural effusion, small pericardial effusion.  Much of the left lung is collapsed.  It would be difficult to exclude underlying malignancy, though I see no obvious mass.      Additional stable chronic findings as above.         Electronically signed by: Karen Teran   Date:    10/06/2022   Time:    08:15      X-Ray Chest AP Portable   Final Result      See above         Electronically signed by: Daron Burr MD   Date:    10/05/2022   Time:    07:23       US Lower Extremity Arteries Bilateral   Final Result      Diffuse atherosclerosis.  Based on reduced velocities and abnormal monophasic waveforms in the left posterior tibial artery, I suspect underlying hemodynamically significant stenosis.  No evidence for hemodynamically significant stenosis elsewhere.         Electronically signed by: Karen Teran   Date:    10/05/2022   Time:    08:27      X-Ray Chest AP Portable   Final Result      1.  Worsening pulmonary vascular congestion, suggestive of worsening fluid overload state.      2.  Persistent airspace opacity in the left lung base with associated suspected moderate parapneumonic effusion.         Electronically signed by: South Waters MD   Date:    10/03/2022   Time:    17:13      FL Fluoro of Diaphragm    (Results Pending)       ASSESSMENT/PLAN:     Acute on chronic hypervolemic hyponatremia secondary to CHF, excessive water intake, etc:  -urine studies hard to interpret with lasix on board.  -admit  and same following lasix/fluid restriction.  -dosed samsca and trending Q6.  Redosed 10/6.     -[phone]  -long standing hx of hyponatremia with variable ranges (119-137).  -TSH wnl  -continue lasix but decrease to daily for contraction alkalosis.   -updated team.  -Renally dose meds, avoid nephrotoxins, and monitor I/O's closely.        Acute on chronic combined CHF/AS:  -Dr. Rdz following and discussed.        Hypokalemia:  -secondary to diuresis  -replace orally.  -mag stable        Hypoxia/Hypercapnia:  -better with Bipap  -CCT following  -diurese.           Neurogenic bladder:  -cath exchanged.  -defer.        Severe Left pleural effusion:  -CCT following.          Dispo:  Consulted palliative care for goals of care  He has 24 hr sitters and a MPOA.   DNR       Total critical care time (exclusive of procedural time) : 35 minutes  Critical care was necessary to treat or prevent imminent or life-threatening deterioration of  the following conditions: <NA/Resp fail/Hypercapnia     Critical care was time spent personally by me on the following activities: development of treatment plan with patient or surrogate, discussions with consultants, interpretation of cardiac output measurements, examination of patient, ordering and performing treatments and interventions, evaluation of patient's response to treatment, obtaining history from patient or surrogate, ordering and review of laboratory studies, ordering and review of radiographic studies, re-evaluation of patient's condition, pulse oximetry and blood draw for specimens

## 2022-10-07 NOTE — PROGRESS NOTES
"[facility]ensive [facility]  Progress Note    Patient Name: Chucho Prado  MRN: [id number]  Patient Class: IP- Inpatient   Admission Date: 10/3/2022  Length of Stay: 4 days  Attending Physician: Xin Mason MD  Primary Care Provider: Obed Mcguire MD        Subjective:     Principal Problem:Acute on chronic respiratory failure with hypoxia and hypercapnia        HPI:  From H&P by Kerrie Torres PA:  ". Chucho Prado is a 89 y.o. male, with PMH of CHF, HTN, hypothyroidism, HLD, MDD, CARLINE, CKD-3, neurogenic bladder s/p suprapubic catheter insertion, GERD, wheelchair bound, who presented to [facility] ED on 10/3/22 due to shortness of breath x 3 days. He states he feels out of breath when laying flat or with activity (ie. Being changed or transferring to the wheelchair). Additionally, he states he feels occasional chest tightness, has been having wheezing, and coughing (present for weeks). Upon awakening this morning he noted b/l lower extremity swelling, which he attributes to sitting up while sleeping last night and not elevating his legs. He denies fever. He was evaluated in the ED with labs showing hyponatremia with sodium of 122, chloride of 83. A BNP was elevated at 212, and troponin was negative. A CXR showed worsening pulmonary vascular congestion, and persistence of a left lung base airspace opacity. He was treated in the ED with 40 mg IV lasix."      Overview/Hospital Course:  Patient was admitted under a heart failure protocol on IV Lasix and Cardiology was consulted to follow.  His 2D echo was repeated.  Nephrology was consulted for the hyponatremia.  He was given a dose of tolvaptan and his sodium level was monitored closely.  Patient's oxygen level became difficult to manage on the floor and he was transferred to ICU overnight 10/4 when he required placement of a NRB and pulmonary/critical care was consulted.  Sodium level decreased to 120 upon transfer before " stabilizing the following morning.  Diuresis was continued. 2D echo showed severe aortic stenosis, pulmonary HTN and grade I diastolic dysfunction.      His CXR showed volume loss in the right lung and CT was done showing a left pleural effusion with much of the left lung collapsed.  There was a small effusion on the right and a small pericardial effusion.  Critical care evaluated and felt the volume loss was due to atelectasis due to neuromuscular failure, and patient would require significant mobilization and NIV for volume ventilation.  PT/OT consulted to work with him.      Interval History:  He is breathing more comfortably this morning and maintaining his oxygen level on supplementation.  Tolerated BiPAP last night. Has back pain from being in bed so much.    Review of Systems   Constitutional:  Negative for chills and fever.   Respiratory:  Positive for chest tightness and shortness of breath. Negative for cough.    Cardiovascular:  Negative for chest pain, palpitations and leg swelling.   Musculoskeletal:  Positive for back pain.   Objective:     Vital Signs (Most Recent):  Temp: 98.1 °F (36.7 °C) (10/07/22 0701)  Pulse: 91 (10/07/22 0900)  Resp: (!) 26 (10/07/22 0900)  BP: (!) 116/55 (10/07/22 0900)  SpO2: (!) 92 % (10/07/22 0900)   Vital Signs (24h Range):  Temp:  [97.7 °F (36.5 °C)-98.1 °F (36.7 °C)] 98.1 °F (36.7 °C)  Pulse:  [70-91] 91  Resp:  [14-28] 26  SpO2:  [89 %-97 %] 92 %  BP: (108-150)/(53-74) 116/55     Weight: 90.7 kg (200 lb)  Body mass index is 30.41 kg/m².    Intake/Output Summary (Last 24 hours) at 10/7/2022 0906  Last data filed at 10/7/2022 0629  Gross per 24 hour   Intake 720 ml   Output 2800 ml   Net -2080 ml      Physical Exam  Cardiovascular:      Rate and Rhythm: Normal rate and regular rhythm.      Pulses: Normal pulses.      Heart sounds: Murmur heard.     No gallop.   Pulmonary:      Breath sounds: No wheezing.      Comments: Poor effort, breath sounds nearly  inaudible.  Abdominal:      General: Bowel sounds are normal.      Palpations: Abdomen is soft.   Musculoskeletal:      Comments: Both legs with protective boots in place.  Right fifth toe with a small lateral white bump, not red.   Skin:     General: Skin is warm and dry.   Neurological:      General: No focal deficit present.      Mental Status: He is alert and oriented to person, place, and time.       Significant Labs: All pertinent labs within the past 24 hours have been reviewed.    Significant Imaging: I have reviewed all pertinent imaging results/findings within the past 24 hours.      Assessment/Plan:      * Acute on chronic respiratory failure with hypoxia and hypercapnia  Patient transferred to ICU overnight 10/4 with worsening hypoxemia and hyponatremia.  CXR showed worsening pulmonary vascular congestion and a persistent airspace opacity in the left base with an associated effusion.  Note the 2D echo showed a normal EF and only grade I diastolic dysfunction, but severe aortic stenosis and pulmonary HTN seen previously.  Unclear whether this is due to chronic lung disease, he is a former smoker with a 20 pack year history (quit 1990) but is not oxygen dependent at home and does not use respiratory medications chronically.  Cardiology consulted, PCC now following.  CT showed severe volume loss of left lung and associated effusion.      - PCC feels left lung findings due to severe atelectasis and thoracentesis not indicated.  - Needs aggressive mobilization, respiratory therapy with nebulized treatments, chest PT, cough assist.  - Start PT/OT, being with getting up to chair.    - Continue oxygen supplementation, nightly BiPAP, diuretics.  - Not a candidate for intervention on aortic valve given age, frailty and comorbidities  - He will need volume ventilator given respiratory failure due to neuromuscular condition.      Hyponatremia  - suspect hypervolemic hyponatremia   - s/p 40 mg IV lasix in ED, now on  "twice daily Lasix  - Nephrology consulted, tolvaptan given and fluid intake liberalized.    - After an initial further decrease hyponatremia stabilized.      Acute heart failure  - Patient presented with shortness of breath worse when supine   - BNP in ED was 212   - CXR with evidence of pulmonary edema and left pleural effusion associated with an airspace opacity on that side that is consistent with severe atelectasis.  He does not have pneumonia symptoms.  - s/p lasix 40 mg IV in ED   - monitor I&Os   - Heart Failure pathways initiated   - Echo showed normal EF, grade I diastolic dysfunction, severe AS, pulmonary HTN.    Advanced care planning/counseling discussion  Patient seen by palliative care and has elected DNR status.  He does state that he doesn't want us to "give up" on him and has been assured that only the most aggressive measures (CPR, intubation, shocks) have been omitted from the care plan.  We will continue oxygen and NIV as needed, antibiotics if needed, diuresis, etc.      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  - Vancomycin given in ED due to previous culture with <50K MRSA (2 years ago)  - As he does not have fever or leukocytosis will hold off on further antibiotics.  - Repeated CBC, still no leukocytosis    Suprapubic catheter  - chronic   - Urine likely colonized with multiple organisms.        CKD (chronic kidney disease) stage 3, GFR 30-59 ml/min  - Cr is 1.0  - ranges from 0.8  - 1.0   - avoid nephrotoxins   - renally dose meds     Iron deficiency anemia  - H&H are stable at present   - monitor       Essential hypertension  - hypertensive on presentation  - home meds: amlodipine 5 mg QD, metoprolol succinate 25 mg QD  - monitor.  Improving with diuresis.      VTE Risk Mitigation (From admission, onward)         Ordered     heparin (porcine) injection 5,000 " Units  Every 8 hours         10/03/22 2007     IP VTE HIGH RISK PATIENT  Once         10/03/22 2007     Place sequential compression device  Until discontinued         10/03/22 2007                             Xin Sampson MD  Department of Hospital Medicine   [facility] ([facility]

## 2022-10-07 NOTE — PLAN OF CARE
Problem: Occupational Therapy  Goal: Occupational Therapy Goal  Description: Orders received and evaluation complete.  Pt has private caregivers daily for years and has required assist with all ADL and ADL mobility.  Pt has has been wheelchair bound for many years though reports having only a basic wheelchair.  Recommend discharge to home with continued daily private caregivers.  Recommend Home Health PT or OT for custom wheelchair seating evaluation with a Seating and .    Outcome: Met

## 2022-10-07 NOTE — PROGRESS NOTES
[facility]  Cardiology  Progress Note    Patient Name: Chucho Prado  MRN: [id number]  Admission Date: 10/3/2022  Hospital Length of Stay: 4 days  Code Status: DNR   Attending Physician: Xin Mason MD   Primary Care Physician: Obed Mcguire MD  Expected Discharge Date:   Principal Problem:Acute respiratory failure with hypoxia    Subjective:     Brief HPI:    Chucho Prado is a 89 y.o.male with hypertension and hypercholesterolemia. He has been unable to walk after a back injury in about 2015. He has chronic kidney disease, stage 3. He has a suprapubic catheter due to a neurogenic bladder. Beginning in mid 9/2022 he noted that he was becoming short of breath from lying down. The shortness of breath got worse. His legs became edematous. He presented to the emergency room on 10/3/2022 being fluid overloaded. He was noted to be hyponatremic. He says he has been drinking a lot of water. He was admitted.     Hospital Course:    10/4/2022: Received tolvaptan.    10/4/2022: Echo: Normal left ventricular size and systolic function. EF 65%. Mild diastolic dysfunction. Mildly enlarged LA. Severe AS - 4.1 m/s - MG 44 mmHg - 0.9 cm2. SPAP 59 mmHg.    10/4/2022: Transferred to ICU due to respiratory issues.    Interval History:    Says he is comfortable but weak.    Slowly been breathing easier.     Review of Systems   Constitutional: Positive for malaise/fatigue. Negative for chills and fever.   HENT:  Negative for nosebleeds.    Eyes:  Negative for vision loss in left eye and vision loss in right eye.   Cardiovascular:  Negative for chest pain, leg swelling, orthopnea, palpitations and paroxysmal nocturnal dyspnea.   Respiratory:  Positive for cough and shortness of breath. Negative for hemoptysis, sputum production and wheezing.    Hematologic/Lymphatic: Negative for bleeding problem. Does not bruise/bleed easily.   Skin:  Negative for color change and rash.   Musculoskeletal:  Negative  for muscle weakness and myalgias.   Gastrointestinal:  Negative for abdominal pain, heartburn, hematemesis, hematochezia, melena, nausea and vomiting.   Genitourinary:  Negative for hematuria.   Neurological:  Negative for dizziness, focal weakness, headaches, light-headedness, vertigo and weakness.   Psychiatric/Behavioral:  Negative for altered mental status. The patient is not nervous/anxious.    Allergic/Immunologic: Negative for persistent infections.     Objective:     Vital Signs (Most Recent):  Temp: 98.1 °F (36.7 °C) (10/07/22 0701)  Pulse: 75 (10/07/22 0701)  Resp: 19 (10/07/22 0701)  BP: (!) 119/55 (10/07/22 0701)  SpO2: 96 % (10/07/22 0701)   Vital Signs (24h Range):  Temp:  [97.7 °F (36.5 °C)-98.1 °F (36.7 °C)] 98.1 °F (36.7 °C)  Pulse:  [] 75  Resp:  [14-28] 19  SpO2:  [89 %-97 %] 96 %  BP: (108-150)/(53-74) 119/55     Weight: 90.7 kg (200 lb)  Body mass index is 30.41 kg/m².    SpO2: 96 %  O2 Device (Oxygen Therapy): BiPAP      Intake/Output Summary (Last 24 hours) at 10/7/2022 0831  Last data filed at 10/7/2022 0629  Gross per 24 hour   Intake 720 ml   Output 2800 ml   Net -2080 ml         Lines/Drains/Airways       Drain  Duration                  Suprapubic Catheter 09/01/20 latex 18 Fr. 766 days              Peripheral Intravenous Line  Duration                  Peripheral IV - Single Lumen 10/03/22 1717 20 G Right Forearm 3 days                    Physical Exam  Constitutional:       General: He is not in acute distress.     Appearance: Normal appearance. He is well-developed. He is not ill-appearing.   Eyes:      Conjunctiva/sclera:      Right eye: Right conjunctiva is not injected. No hemorrhage.     Left eye: Left conjunctiva is not injected. No hemorrhage.     Pupils:      Right eye: Pupil is round.      Left eye: Pupil is round.   Neck:      Vascular: No JVD.   Cardiovascular:      Rate and Rhythm: Normal rate and regular rhythm.      Heart sounds: S1 normal and S2 normal. Murmur  heard.   Midsystolic murmur is present with a grade of 3/6 at the upper right sternal border.     Gallop present. S4 sounds present. No S3 sounds.   Pulmonary:      Effort: Pulmonary effort is normal.      Breath sounds: Examination of the right-lower field reveals decreased breath sounds and rales. Examination of the left-lower field reveals decreased breath sounds and rales. Decreased breath sounds, rhonchi and rales present.   Chest:      Chest wall: No swelling or tenderness.   Abdominal:      General: There is no distension.      Palpations: Abdomen is soft.      Tenderness: There is no abdominal tenderness.   Musculoskeletal:      Cervical back: Neck supple.      Right lower leg: Swelling present. 1+ Pitting Edema present.      Left lower leg: Swelling present. 1+ Pitting Edema present.   Skin:     General: Skin is warm and dry.      Findings: No rash.   Neurological:      Mental Status: He is alert and oriented to person, place, and time. He is not disoriented.   Psychiatric:         Attention and Perception: Attention normal.         Mood and Affect: Mood normal.         Speech: Speech normal.         Behavior: Behavior normal.         Thought Content: Thought content normal.         Cognition and Memory: Cognition and memory normal.         Judgment: Judgment normal.       Current Medications:     albuterol-ipratropium  3 mL Nebulization Q4H WAKE    amLODIPine  2.5 mg Oral Daily    aspirin  81 mg Oral Daily    atorvastatin  20 mg Oral Daily    furosemide (LASIX) injection  40 mg Intravenous Daily    gabapentin  400 mg Oral BID    heparin (porcine)  5,000 Units Subcutaneous Q8H    levothyroxine  50 mcg Oral Before breakfast    metoprolol succinate  25 mg Oral Daily    mirtazapine  15 mg Oral QHS    mupirocin   Nasal BID    potassium chloride  40 mEq Oral Once    traZODone  50 mg Oral QHS     Current Laboratory Results:    Recent Results (from the past 24 hour(s))   Sodium (Serum)    Collection Time: 10/06/22   1:11 PM   Result Value Ref Range    Sodium 133 (L) 136 - 145 mmol/L   Sodium (Serum)    Collection Time: 10/06/22  7:01 PM   Result Value Ref Range    Sodium 131 (L) 136 - 145 mmol/L   Sodium (Serum)    Collection Time: 10/07/22  2:30 AM   Result Value Ref Range    Sodium 130 (L) 136 - 145 mmol/L   Basic metabolic panel    Collection Time: 10/07/22  3:47 AM   Result Value Ref Range    Sodium 135 (L) 136 - 145 mmol/L    Potassium 3.1 (L) 3.5 - 5.1 mmol/L    Chloride 79 (L) 95 - 110 mmol/L    CO2 40 (H) 23 - 29 mmol/L    Glucose 83 70 - 110 mg/dL    BUN 16 8 - 23 mg/dL    Creatinine 0.9 0.5 - 1.4 mg/dL    Calcium 8.8 8.7 - 10.5 mg/dL    Anion Gap 16 8 - 16 mmol/L    eGFR >60 >60 mL/min/1.73 m^2   Magnesium    Collection Time: 10/07/22  3:47 AM   Result Value Ref Range    Magnesium 1.6 1.6 - 2.6 mg/dL     Current Imaging Results:    CT Chest Without Contrast   Final Result      Moderate left pleural effusion, small right pleural effusion, small pericardial effusion.  Much of the left lung is collapsed.  It would be difficult to exclude underlying malignancy, though I see no obvious mass.      Additional stable chronic findings as above.         Electronically signed by: Karen Teran   Date:    10/06/2022   Time:    08:15      X-Ray Chest AP Portable   Final Result      See above         Electronically signed by: Daron Burr MD   Date:    10/05/2022   Time:    07:23      US Lower Extremity Arteries Bilateral   Final Result      Diffuse atherosclerosis.  Based on reduced velocities and abnormal monophasic waveforms in the left posterior tibial artery, I suspect underlying hemodynamically significant stenosis.  No evidence for hemodynamically significant stenosis elsewhere.         Electronically signed by: Karen Teran   Date:    10/05/2022   Time:    08:27      X-Ray Chest AP Portable   Final Result      1.  Worsening pulmonary vascular congestion, suggestive of worsening fluid overload state.      2.   Persistent airspace opacity in the left lung base with associated suspected moderate parapneumonic effusion.         Electronically signed by: South Waters MD   Date:    10/03/2022   Time:    17:13      FL Fluoro of Diaphragm    (Results Pending)       10/4/2022: Echo:    The left ventricle is normal in size with normal systolic function.  The estimated ejection fraction is 65%.  Grade I left ventricular diastolic dysfunction.  Mild left atrial enlargement.  Normal right ventricular size with normal right ventricular systolic function.  There is severe aortic valve stenosis.  Aortic valve area is 0.89 cm2; peak velocity is 4.09 m/s; mean gradient is 44 mmHg.  Mild tricuspid regurgitation.  There is moderate pulmonary hypertension.  The estimated PA systolic pressure is 59 mmHg.  Normal central venous pressure (3 mmHg).      Assessment and Plan:     Problem List:    Active Diagnoses:    Diagnosis Date Noted POA    PRINCIPAL PROBLEM:  Acute respiratory failure with hypoxia [J96.01] 08/27/2020 Yes    Palliative care encounter [Z51.5] 10/06/2022 Not Applicable    Pleural effusion, left [J90] 10/05/2022 Yes    Advanced care planning/counseling discussion [Z71.89] 10/05/2022 Not Applicable    Asymptomatic bacteriuria [R82.71] 10/04/2022 Yes    Acute heart failure [I50.9] 10/03/2022 Yes    Neurogenic bladder [N31.9] 12/04/2018 Yes    Suprapubic catheter [Z93.59] 10/23/2017 Not Applicable    Hyponatremia [E87.1] 10/23/2017 Yes    CKD (chronic kidney disease) stage 3, GFR 30-59 ml/min [N18.30] 12/26/2016 Yes    Iron deficiency anemia [D50.9] 07/31/2013 Yes    Acquired hypothyroidism [E03.9] 07/30/2013 Yes    Essential hypertension [I10] 07/30/2013 Yes    Hyperlipidemia [E78.5] 07/30/2013 Yes      Problems Resolved During this Admission:       Assessment and Plan:     Heart Failure, Diastolic, Acute on Chronic              10/3/2022: Presents in HF with pulmonary edema and edema of legs.              8/28/2020: Echo: Normal  left ventricular size and systolic function. EF 60%. Septal WMA. Moderate to severe AS - 3.1 m/s - MG 26 mmHg - 1.0 cm2.   10/4/2022: Echo: Normal left ventricular size and systolic function. EF 65%. Mild diastolic dysfunction. Mildly enlarged LA. Severe AS - 4.1 m/s - MG 44 mmHg - 0.9 cm2. SPAP 59 mmHg.              At home was on  furosemide 20 mg M,W & F. Has been taken additional furosemide but unclear on dosing.   10/4/2022: Received tolvaptan.              On furosemide 40 mg iv Q24.              Fluid restriction 1,500 ml/day.   K 3.1 - receiving KCl - will add spironolactone 25 mg Q24.              Continue diuresis.                2. Aortic Stenosis              8/28/2020: Echo: Moderate to severe AS - 3.1 m/s - MG 26 mmHg - 1.0 cm2.   10/4/2022: Echo: Severe AS - 4.1 m/s - MG 44 mmHg - 0.9 cm2.      3. Hypertension              At home been on metoprolol 25 mg Q24, amlodipine 5 mg Q24, furosemide 20 mg 3/7 & KCl 10 meq 3/7.     4. Hypercholesterolemia  At home been on atorvastatin 20 mg Q24.      5. Chronic Kidney Disease, Stage 3              10/4/2022: BUN/crea 14/0.9. CrCl >60.     6. Hyponatremia              10/4/2022: Na 122.   10/4/2022: Received tolvaptan.              Fluid restriction.    7. Respiratory Failure   10/5/2022: PCO2 81. PO2 106.                 VTE Risk Mitigation (From admission, onward)           Ordered     heparin (porcine) injection 5,000 Units  Every 8 hours         10/03/22 2007     IP VTE HIGH RISK PATIENT  Once         10/03/22 2007     Place sequential compression device  Until discontinued         10/03/22 2007                    Tomasa Rdz MD  Cardiology  [facility] ([place])

## 2022-10-07 NOTE — PLAN OF CARE
Problem: Fluid Imbalance (Pneumonia)  Goal: Fluid Balance  Outcome: Ongoing, Not Progressing     Problem: Fall Injury Risk  Goal: Absence of Fall and Fall-Related Injury  Outcome: Ongoing, Progressing     Problem: Skin Injury Risk Increased  Goal: Skin Health and Integrity  Outcome: Ongoing, Progressing     Problem: Coping Ineffective  Goal: Effective Coping  Outcome: Ongoing, Progressing

## 2022-10-08 ENCOUNTER — DOCUMENT SCAN (OUTPATIENT)
Dept: HOME HEALTH SERVICES | Facility: HOSPITAL | Age: 87
End: 2022-10-08
Payer: MEDICARE

## 2022-10-08 LAB
ANION GAP SERPL CALC-SCNC: 9 MMOL/L (ref 8–16)
BUN SERPL-MCNC: 19 MG/DL (ref 8–23)
CALCIUM SERPL-MCNC: 9 MG/DL (ref 8.7–10.5)
CHLORIDE SERPL-SCNC: 81 MMOL/L (ref 95–110)
CO2 SERPL-SCNC: 42 MMOL/L (ref 23–29)
CREAT SERPL-MCNC: 1 MG/DL (ref 0.5–1.4)
EST. GFR  (NO RACE VARIABLE): >60 ML/MIN/1.73 M^2
GLUCOSE SERPL-MCNC: 84 MG/DL (ref 70–110)
POTASSIUM SERPL-SCNC: 3.8 MMOL/L (ref 3.5–5.1)
SODIUM SERPL-SCNC: 132 MMOL/L (ref 136–145)

## 2022-10-08 PROCEDURE — 99233 PR SUBSEQUENT HOSPITAL CARE,LEVL III: ICD-10-PCS | Mod: ,,, | Performed by: HOSPITALIST

## 2022-10-08 PROCEDURE — 25000242 PHARM REV CODE 250 ALT 637 W/ HCPCS: Performed by: HOSPITALIST

## 2022-10-08 PROCEDURE — 94761 N-INVAS EAR/PLS OXIMETRY MLT: CPT

## 2022-10-08 PROCEDURE — 99232 SBSQ HOSP IP/OBS MODERATE 35: CPT | Mod: ,,, | Performed by: INTERNAL MEDICINE

## 2022-10-08 PROCEDURE — 94640 AIRWAY INHALATION TREATMENT: CPT

## 2022-10-08 PROCEDURE — 25000003 PHARM REV CODE 250: Performed by: PHYSICIAN ASSISTANT

## 2022-10-08 PROCEDURE — 11000001 HC ACUTE MED/SURG PRIVATE ROOM

## 2022-10-08 PROCEDURE — 94660 CPAP INITIATION&MGMT: CPT

## 2022-10-08 PROCEDURE — 80048 BASIC METABOLIC PNL TOTAL CA: CPT | Performed by: PHYSICIAN ASSISTANT

## 2022-10-08 PROCEDURE — 36415 COLL VENOUS BLD VENIPUNCTURE: CPT | Performed by: PHYSICIAN ASSISTANT

## 2022-10-08 PROCEDURE — 63600175 PHARM REV CODE 636 W HCPCS: Performed by: NURSE PRACTITIONER

## 2022-10-08 PROCEDURE — 94668 MNPJ CHEST WALL SBSQ: CPT

## 2022-10-08 PROCEDURE — 25000003 PHARM REV CODE 250: Performed by: INTERNAL MEDICINE

## 2022-10-08 PROCEDURE — 99233 SBSQ HOSP IP/OBS HIGH 50: CPT | Mod: ,,, | Performed by: HOSPITALIST

## 2022-10-08 PROCEDURE — 63600175 PHARM REV CODE 636 W HCPCS: Performed by: PHYSICIAN ASSISTANT

## 2022-10-08 PROCEDURE — 25000003 PHARM REV CODE 250: Performed by: HOSPITALIST

## 2022-10-08 PROCEDURE — 99900035 HC TECH TIME PER 15 MIN (STAT)

## 2022-10-08 PROCEDURE — 27000221 HC OXYGEN, UP TO 24 HOURS

## 2022-10-08 PROCEDURE — 99232 PR SUBSEQUENT HOSPITAL CARE,LEVL II: ICD-10-PCS | Mod: ,,, | Performed by: INTERNAL MEDICINE

## 2022-10-08 RX ADMIN — LEVOTHYROXINE SODIUM 50 MCG: 50 TABLET ORAL at 06:10

## 2022-10-08 RX ADMIN — GABAPENTIN 400 MG: 400 CAPSULE ORAL at 10:10

## 2022-10-08 RX ADMIN — OXYCODONE 5 MG: 5 TABLET ORAL at 06:10

## 2022-10-08 RX ADMIN — HEPARIN SODIUM 5000 UNITS: 5000 INJECTION INTRAVENOUS; SUBCUTANEOUS at 09:10

## 2022-10-08 RX ADMIN — HEPARIN SODIUM 5000 UNITS: 5000 INJECTION INTRAVENOUS; SUBCUTANEOUS at 06:10

## 2022-10-08 RX ADMIN — HEPARIN SODIUM 5000 UNITS: 5000 INJECTION INTRAVENOUS; SUBCUTANEOUS at 02:10

## 2022-10-08 RX ADMIN — IPRATROPIUM BROMIDE AND ALBUTEROL SULFATE 3 ML: 2.5; .5 SOLUTION RESPIRATORY (INHALATION) at 07:10

## 2022-10-08 RX ADMIN — GABAPENTIN 400 MG: 400 CAPSULE ORAL at 09:10

## 2022-10-08 RX ADMIN — TRAZODONE HYDROCHLORIDE 50 MG: 50 TABLET ORAL at 09:10

## 2022-10-08 RX ADMIN — IPRATROPIUM BROMIDE AND ALBUTEROL SULFATE 3 ML: 2.5; .5 SOLUTION RESPIRATORY (INHALATION) at 12:10

## 2022-10-08 RX ADMIN — METOPROLOL SUCCINATE 25 MG: 25 TABLET, EXTENDED RELEASE ORAL at 10:10

## 2022-10-08 RX ADMIN — MIRTAZAPINE 15 MG: 15 TABLET, FILM COATED ORAL at 09:10

## 2022-10-08 RX ADMIN — IPRATROPIUM BROMIDE AND ALBUTEROL SULFATE 3 ML: 2.5; .5 SOLUTION RESPIRATORY (INHALATION) at 03:10

## 2022-10-08 RX ADMIN — MUPIROCIN: 20 OINTMENT TOPICAL at 10:10

## 2022-10-08 RX ADMIN — SPIRONOLACTONE 25 MG: 25 TABLET, FILM COATED ORAL at 10:10

## 2022-10-08 RX ADMIN — ATORVASTATIN CALCIUM 20 MG: 20 TABLET, FILM COATED ORAL at 10:10

## 2022-10-08 RX ADMIN — FUROSEMIDE 40 MG: 10 INJECTION, SOLUTION INTRAMUSCULAR; INTRAVENOUS at 10:10

## 2022-10-08 RX ADMIN — ASPIRIN 81 MG: 81 TABLET, COATED ORAL at 10:10

## 2022-10-08 NOTE — PLAN OF CARE
Problem: Fall Injury Risk  Goal: Absence of Fall and Fall-Related Injury  Outcome: Ongoing, Progressing     Problem: Impaired Wound Healing  Goal: Optimal Wound Healing  Outcome: Ongoing, Progressing     Problem: Skin Injury Risk Increased  Goal: Skin Health and Integrity  Outcome: Ongoing, Progressing     Problem: Coping Ineffective  Goal: Effective Coping  Outcome: Ongoing, Progressing     Problem: Adult Inpatient Plan of Care  Goal: Plan of Care Review  Outcome: Ongoing, Progressing  Goal: Patient-Specific Goal (Individualized)  Outcome: Ongoing, Progressing  Goal: Absence of Hospital-Acquired Illness or Injury  Outcome: Ongoing, Progressing  Goal: Optimal Comfort and Wellbeing  Outcome: Ongoing, Progressing  Goal: Readiness for Transition of Care  Outcome: Ongoing, Progressing

## 2022-10-08 NOTE — NURSING
2020  Pt arrived to floor at this time.  VSS.  RRT at bedside. Yana Hein    2115  Assessment completed. Pt medicated per MAR. Yana Hein    2140  Care transferred to ANNE Laughlin at this time. Pt is lying in bed with eyes closed. Pt arouses easily to verbal stimuli. Pt remains on 4L per NC.  Yana Hein

## 2022-10-08 NOTE — SUBJECTIVE & OBJECTIVE
Interval History:  He was on BiPAP last night but does not recall being on it.  Bicarb level this AM 42.  It's cold in his room and he is shaking, has not been able to manipulate his utensils to eat.    Review of Systems   Constitutional:  Negative for chills and fever.   Respiratory:  Positive for chest tightness and shortness of breath. Negative for cough.    Cardiovascular:  Negative for chest pain, palpitations and leg swelling.   Musculoskeletal:  Positive for back pain.   Neurological:  Positive for tremors.   Objective:     Vital Signs (Most Recent):  Temp: 98.7 °F (37.1 °C) (10/08/22 1656)  Pulse: 86 (10/08/22 1656)  Resp: 16 (10/08/22 1656)  BP: 125/60 (10/08/22 1656)  SpO2: (!) 92 % (10/08/22 1656)   Vital Signs (24h Range):  Temp:  [97.4 °F (36.3 °C)-98.9 °F (37.2 °C)] 98.7 °F (37.1 °C)  Pulse:  [65-90] 86  Resp:  [14-20] 16  SpO2:  [92 %-98 %] 92 %  BP: (121-144)/(59-63) 125/60     Weight: 90.7 kg (200 lb)  Body mass index is 30.41 kg/m².    Intake/Output Summary (Last 24 hours) at 10/8/2022 1719  Last data filed at 10/8/2022 0200  Gross per 24 hour   Intake --   Output 1175 ml   Net -1175 ml      Physical Exam  Cardiovascular:      Rate and Rhythm: Normal rate and regular rhythm.      Pulses: Normal pulses.      Heart sounds: Murmur heard.     No gallop.   Pulmonary:      Breath sounds: No wheezing.      Comments: Poor effort, breath sounds nearly inaudible.  Abdominal:      General: Bowel sounds are normal.      Palpations: Abdomen is soft.   Musculoskeletal:      Comments: Both legs with protective boots in place.     Skin:     General: Skin is warm and dry.   Neurological:      General: No focal deficit present.      Mental Status: He is alert.      Comments: Memory poor.       Significant Labs: All pertinent labs within the past 24 hours have been reviewed.    Significant Imaging: I have reviewed all pertinent imaging results/findings within the past 24 hours.

## 2022-10-08 NOTE — ASSESSMENT & PLAN NOTE
Patient transferred to ICU overnight 10/4 with worsening hypoxemia and hyponatremia.  CXR showed worsening pulmonary vascular congestion and a persistent airspace opacity in the left base with an associated effusion.  Note the 2D echo showed a normal EF and only grade I diastolic dysfunction, but severe aortic stenosis and pulmonary HTN seen previously.  Unclear whether this is due to chronic lung disease, he is a former smoker with a 20 pack year history (quit 1990) but is not oxygen dependent at home and does not use respiratory medications chronically.  Cardiology consulted, PCC now following.  CT showed severe volume loss of left lung and associated effusion.      - PCC feels left lung findings due to severe atelectasis and thoracentesis not indicated.  - Needs aggressive mobilization, respiratory therapy with nebulized treatments, chest PT, cough assist.  - Started PT/OT, being with getting up to chair.    - Continue oxygen supplementation, nightly BiPAP, diuretics.  - Not a candidate for intervention on aortic valve given age, frailty and comorbidities  - He will need volume ventilator given respiratory failure due to neuromuscular condition.

## 2022-10-08 NOTE — PLAN OF CARE
POC reviewed with pt he voiced understanding pt noted oriented x4  but needs reminded of situation often. Pt is currently on 2L of oxygen via NC, no shortness of breath reported. PT had Bm  this shift ,new sacral dressing applied pads and draw sheet changed. PT requires repositioning often  with pillows for support. Pt has heels elevated off bed , elevated on pillows. Insta dry intact to  abd fold. Suprapubic catheter intact yellow urine noted. PT medicated x1 for pain see MAR. CB in reach, bed low with Sr up x2.         Problem: Adult Inpatient Plan of Care  Goal: Plan of Care Review  Outcome: Ongoing, Progressing  Goal: Patient-Specific Goal (Individualized)  Outcome: Ongoing, Progressing  Goal: Absence of Hospital-Acquired Illness or Injury  Outcome: Ongoing, Progressing  Goal: Optimal Comfort and Wellbeing  Outcome: Ongoing, Progressing  Goal: Readiness for Transition of Care  Outcome: Ongoing, Progressing     Problem: Fluid Imbalance (Pneumonia)  Goal: Fluid Balance  Outcome: Ongoing, Progressing     Problem: Infection (Pneumonia)  Goal: Resolution of Infection Signs and Symptoms  Outcome: Ongoing, Progressing     Problem: Respiratory Compromise (Pneumonia)  Goal: Effective Oxygenation and Ventilation  Outcome: Ongoing, Progressing     Problem: Impaired Wound Healing  Goal: Optimal Wound Healing  Outcome: Ongoing, Progressing     Problem: Fall Injury Risk  Goal: Absence of Fall and Fall-Related Injury  Outcome: Ongoing, Progressing

## 2022-10-08 NOTE — PROGRESS NOTES
"[facility], [place], operated by [facility]  Progress Note    Patient Name: Chucho Prado  MRN: [id number]  Patient Class: IP- Inpatient   Admission Date: 10/3/2022  Length of Stay: 5 days  Attending Physician: Xin Mason MD  Primary Care Provider: Obed Mcguire MD        Subjective:     Principal Problem:Acute on chronic respiratory failure with hypoxia and hypercapnia        HPI:  From H&P by Kerrie Torres PA:  "Mr. Chucho Prado is a 89 y.o. male, with PMH of CHF, HTN, hypothyroidism, HLD, MDD, CARLINE, CKD-3, neurogenic bladder s/p suprapubic catheter insertion, GERD, wheelchair bound, who presented to [facility] ED on 10/3/22 due to shortness of breath x 3 days. He states he feels out of breath when laying flat or with activity (ie. Being changed or transferring to the wheelchair). Additionally, he states he feels occasional chest tightness, has been having wheezing, and coughing (present for weeks). Upon awakening this morning he noted b/l lower extremity swelling, which he attributes to sitting up while sleeping last night and not elevating his legs. He denies fever. He was evaluated in the ED with labs showing hyponatremia with sodium of 122, chloride of 83. A BNP was elevated at 212, and troponin was negative. A CXR showed worsening pulmonary vascular congestion, and persistence of a left lung base airspace opacity. He was treated in the ED with 40 mg IV lasix."      Overview/Hospital Course:  Patient was admitted under a heart failure protocol on IV Lasix and Cardiology was consulted to follow.  His 2D echo was repeated.  Nephrology was consulted for the hyponatremia.  He was given a dose of tolvaptan and his sodium level was monitored closely.  Patient's oxygen level became difficult to manage on the floor and he was transferred to ICU overnight 10/4 when he required placement of a NRB and pulmonary/critical care was consulted.  Sodium level decreased to 120 upon transfer before stabilizing the " following morning.  Diuresis was continued. 2D echo showed severe aortic stenosis, pulmonary HTN and grade I diastolic dysfunction.      His CXR showed volume loss in the right lung and CT was done showing a left pleural effusion with much of the left lung collapsed.  There was a small effusion on the right and a small pericardial effusion.  Critical care evaluated and felt the volume loss was due to atelectasis due to neuromuscular failure, and patient would require significant mobilization and NIV for volume ventilation.  PT/OT consulted to work with him.      Interval History:  He was on BiPAP last night but does not recall being on it.  Bicarb level this AM 42.  It's cold in his room and he is shaking, has not been able to manipulate his utensils to eat.    Review of Systems   Constitutional:  Negative for chills and fever.   Respiratory:  Positive for chest tightness and shortness of breath. Negative for cough.    Cardiovascular:  Negative for chest pain, palpitations and leg swelling.   Musculoskeletal:  Positive for back pain.   Neurological:  Positive for tremors.   Objective:     Vital Signs (Most Recent):  Temp: 98.7 °F (37.1 °C) (10/08/22 1656)  Pulse: 86 (10/08/22 1656)  Resp: 16 (10/08/22 1656)  BP: 125/60 (10/08/22 1656)  SpO2: (!) 92 % (10/08/22 1656)   Vital Signs (24h Range):  Temp:  [97.4 °F (36.3 °C)-98.9 °F (37.2 °C)] 98.7 °F (37.1 °C)  Pulse:  [65-90] 86  Resp:  [14-20] 16  SpO2:  [92 %-98 %] 92 %  BP: (121-144)/(59-63) 125/60     Weight: 90.7 kg (200 lb)  Body mass index is 30.41 kg/m².    Intake/Output Summary (Last 24 hours) at 10/8/2022 1719  Last data filed at 10/8/2022 0200  Gross per 24 hour   Intake --   Output 1175 ml   Net -1175 ml      Physical Exam  Cardiovascular:      Rate and Rhythm: Normal rate and regular rhythm.      Pulses: Normal pulses.      Heart sounds: Murmur heard.     No gallop.   Pulmonary:      Breath sounds: No wheezing.      Comments: Poor effort, breath sounds  nearly inaudible.  Abdominal:      General: Bowel sounds are normal.      Palpations: Abdomen is soft.   Musculoskeletal:      Comments: Both legs with protective boots in place.     Skin:     General: Skin is warm and dry.   Neurological:      General: No focal deficit present.      Mental Status: He is alert.      Comments: Memory poor.       Significant Labs: All pertinent labs within the past 24 hours have been reviewed.    Significant Imaging: I have reviewed all pertinent imaging results/findings within the past 24 hours.      Assessment/Plan:      * Acute on chronic respiratory failure with hypoxia and hypercapnia  Patient transferred to ICU overnight 10/4 with worsening hypoxemia and hyponatremia.  CXR showed worsening pulmonary vascular congestion and a persistent airspace opacity in the left base with an associated effusion.  Note the 2D echo showed a normal EF and only grade I diastolic dysfunction, but severe aortic stenosis and pulmonary HTN seen previously.  Unclear whether this is due to chronic lung disease, he is a former smoker with a 20 pack year history (quit 1990) but is not oxygen dependent at home and does not use respiratory medications chronically.  Cardiology consulted, PCC now following.  CT showed severe volume loss of left lung and associated effusion.      - PCC feels left lung findings due to severe atelectasis and thoracentesis not indicated.  - Needs aggressive mobilization, respiratory therapy with nebulized treatments, chest PT, cough assist.  - Started PT/OT, being with getting up to chair.    - Continue oxygen supplementation, nightly BiPAP, diuretics.  - Not a candidate for intervention on aortic valve given age, frailty and comorbidities  - He will need volume ventilator given respiratory failure due to neuromuscular condition.      Hyponatremia  - suspect hypervolemic hyponatremia   - s/p 40 mg IV lasix in ED, now on twice daily Lasix  - Nephrology consulted, tolvaptan given  "and fluid intake liberalized.    - After an initial further decrease hyponatremia stabilized.      Acute heart failure  - Patient presented with shortness of breath worse when supine   - BNP in ED was 212   - CXR with evidence of pulmonary edema and left pleural effusion associated with an airspace opacity on that side that is consistent with severe atelectasis.  He does not have pneumonia symptoms.  - s/p lasix 40 mg IV in ED   - monitor I&Os   - Heart Failure pathways initiated   - Echo showed normal EF, grade I diastolic dysfunction, severe AS, pulmonary HTN.    Neuromuscular respiratory weakness  See "acute on chronic respiratory failure"      Advanced care planning/counseling discussion  Patient seen by palliative care and has elected DNR status.  He does state that he doesn't want us to "give up" on him and has been assured that only the most aggressive measures (CPR, intubation, shocks) have been omitted from the care plan.  We will continue oxygen and NIV as needed, antibiotics if needed, diuresis, etc.      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  - Vancomycin given in ED due to previous culture with <50K MRSA (2 years ago)  - As he does not have fever or leukocytosis will hold off on further antibiotics.  - Repeated CBC, still no leukocytosis    Suprapubic catheter  - chronic   - Urine likely colonized with multiple organisms.        CKD (chronic kidney disease) stage 3, GFR 30-59 ml/min  - Cr is 1.0  - ranges from 0.8  - 1.0   - avoid nephrotoxins   - renally dose meds     Iron deficiency anemia  - H&H are stable at present   - monitor       Essential hypertension  - hypertensive on presentation  - home meds: amlodipine 5 mg QD, metoprolol succinate 25 mg QD  - monitor.  Improving with diuresis.      VTE Risk Mitigation (From admission, onward)         Ordered     heparin " (porcine) injection 5,000 Units  Every 8 hours         10/03/22 2007     IP VTE HIGH RISK PATIENT  Once         10/03/22 2007     Place sequential compression device  Until discontinued         10/03/22 2007                      Xin Sampson MD  Department of Hospital Medicine   [facility] ([place])

## 2022-10-08 NOTE — PROGRESS NOTES
"    Cardiology Progress Note    10/8/2022  2:22 PM    Subjective/Interim:      No complaints.     Objective:   24-hour Vitals:  Temp:  [97.4 °F (36.3 °C)-98.9 °F (37.2 °C)] 98 °F (36.7 °C)  Pulse:  [65-90] 90  Resp:  [14-25] 18  SpO2:  [93 %-98 %] 97 %  BP: (118-144)/(56-63) 130/60    Physical Examination:  Vitals: /60 (BP Location: Right arm, Patient Position: Lying)   Pulse 90   Temp 98 °F (36.7 °C) (Oral)   Resp 18   Ht 5' 8" (1.727 m)   Wt 90.7 kg (200 lb)   SpO2 97%   BMI 30.41 kg/m²     Physical Exam  Constitutional:       General: He is not in acute distress.     Appearance: Normal appearance. He is well-developed. He is not ill-appearing.   Eyes:      Conjunctiva/sclera:      Right eye: Right conjunctiva is not injected. No hemorrhage.     Left eye: Left conjunctiva is not injected. No hemorrhage.     Pupils:      Right eye: Pupil is round.      Left eye: Pupil is round.   Neck:      Vascular: No JVD.   Cardiovascular:      Rate and Rhythm: Normal rate and regular rhythm.      Heart sounds: S1 normal and S2 normal. Murmur heard.   Midsystolic murmur is present with a grade of 3/6 at the upper right sternal border.     Gallop present. S4 sounds present. No S3 sounds.   Pulmonary:      Effort: Pulmonary effort is normal.      Breath sounds: Examination of the right-lower field reveals decreased breath sounds and rales. Examination of the left-lower field reveals decreased breath sounds and rales. Decreased breath sounds, rhonchi and rales present.   Chest:      Chest wall: No swelling or tenderness.   Abdominal:      General: There is no distension.      Palpations: Abdomen is soft.      Tenderness: There is no abdominal tenderness.   Musculoskeletal:      Cervical back: Neck supple.      Right lower leg: Swelling present. 1+ Pitting Edema present.      Left lower leg: Swelling present. 1+ Pitting Edema present.   Skin:     General: Skin is warm and dry.      Findings: No rash.   Neurological:     "  Mental Status: He is alert and oriented to person, place, and time. He is not disoriented.   Psychiatric:         Attention and Perception: Attention normal.         Mood and Affect: Mood normal.         Speech: Speech normal.         Behavior: Behavior normal.         Thought Content: Thought content normal.         Cognition and Memory: Cognition and memory normal.         Judgment: Judgment normal.         Intake/Output Summary (Last 24 hours) at 10/8/2022 1422  Last data filed at 10/8/2022 0200  Gross per 24 hour   Intake --   Output 1175 ml   Net -1175 ml       Laboratory:  Trended Lab Data:  Recent Labs   Lab 10/03/22  1716 10/04/22  2346 10/05/22  0756   WBC 12.42  --  10.52   HGB 12.3*  --  11.7*   HCT 36.9* 36 34.8*     --  312       Recent Labs   Lab 10/04/22  0431 10/04/22  1017 10/06/22  0410 10/06/22  0817 10/07/22  0230 10/07/22  0347 10/08/22  0512   *   < > 127*   < > 130* 135* 132*   K 4.3   < > 4.2  --   --  3.1* 3.8   CL 82*   < > 83*  --   --  79* 81*   CO2 26   < > 32*  --   --  40* 42*   BUN 14   < > 14  --   --  16 19   GLU 99   < > 86  --   --  83 84   CALCIUM 8.6*   < > 8.6*  --   --  8.8 9.0   MG 1.7  --   --   --   --  1.6  --     < > = values in this interval not displayed.       Recent Labs   Lab 10/03/22  1716   PROT 7.9   ALBUMIN 3.7   BILITOT 0.9   AST 20   ALT 12   ALKPHOS 100       No results for input(s): PROTIME, PTT, INR in the last 168 hours.    Cardiac:   Recent Labs   Lab 10/03/22  1716   TROPONINI <0.006   *       FLP:   Lab Results   Component Value Date    CHOL 234 (H) 08/31/2022    HDL 38 (L) 08/31/2022    LDLCALC 166.6 (H) 08/31/2022    TRIG 147 08/31/2022    CHOLHDL 16.2 (L) 08/31/2022     DM:   Lab Results   Component Value Date    HGBA1C 5.3 10/04/2022    HGBA1C 5.4 04/04/2019    LDLCALC 166.6 (H) 08/31/2022    CREATININE 1.0 10/08/2022     Thyroid:   Lab Results   Component Value Date    TSH 2.864 10/04/2022     Anemia:   Lab Results   Component  Value Date    IRON 25 (L) 10/20/2016    TIBC 300 10/20/2016    FERRITIN 209 10/20/2016    FOLATE 6.0 10/20/2016     Urinalysis:   Lab Results   Component Value Date    LABURIN (A) 09/14/2020     METHICILLIN RESISTANT STAPHYLOCOCCUS AUREUS  50,000 - 99,999 cfu/ml      COLORU Yellow 09/14/2020    SPECGRAV >=1.030 (A) 09/14/2020    NITRITE Positive (A) 09/14/2020    KETONESU Negative 09/14/2020    UROBILINOGEN Negative 06/19/2018     @      Other Results:  EKG (my interpretation):    TELEMETRY:  N/A    Echo:   Results for orders placed or performed during the hospital encounter of 10/03/22   Echo   Result Value Ref Range    BSA 2.09 m2    RA Width 3.20 cm    LA WIDTH 4.10 cm    Left Ventricular Outflow Tract Mean Velocity 0.72 cm/s    Left Ventricular Outflow Tract Mean Gradient 2.22 mmHg    LVIDd 5.75 3.5 - 6.0 cm    IVS 0.92 0.6 - 1.1 cm    Posterior Wall 0.94 0.6 - 1.1 cm    LVIDs 3.36 2.1 - 4.0 cm    FS 42 28 - 44 %    LA volume 70.72 cm3    LV mass 209.10 g    LA size 3.46 cm    Left Ventricle Relative Wall Thickness 0.33 cm    AV mean gradient 44 mmHg    AV valve area 0.89 cm2    AV Velocity Ratio 0.24     AV index (prosthetic) 0.27     MV valve area p 1/2 method 3.47 cm2    E/A ratio 0.87     E wave deceleration time 241.04 msec    IVRT 83.73 msec    LVOT diameter 2.03 cm    LVOT area 3.2 cm2    LVOT peak angel 0.98 m/s    LVOT peak VTI 27.40 cm    Ao peak angel 4.09 m/s    Ao .1 cm    Mr max angel 5.50 m/s    LVOT stroke volume 88.64 cm3    AV peak gradient 67 mmHg    MV Peak E Angel 1.07 m/s    TR Max Angel 3.73 m/s    MV stenosis pressure 1/2 time 63.47 ms    MV Peak A Angel 1.23 m/s    LV Systolic Volume 46.05 mL    LV Systolic Volume Index 22.6 mL/m2    LV Diastolic Volume 163.07 mL    LV Diastolic Volume Index 79.94 mL/m2    LA Volume Index 34.7 mL/m2    LV Mass Index 103 g/m2    RA Major Axis 4.80 cm    Left Atrium Minor Axis 6.04 cm    Left Atrium Major Axis 5.70 cm    Triscuspid Valve Regurgitation Peak  Gradient 56 mmHg    LA Volume Index (Mod) 52.5 mL/m2    LA volume (mod) 107.00 cm3    Right Atrial Pressure (from IVC) 3 mmHg    EF 65 %    TV rest pulmonary artery pressure 59 mmHg    Narrative    · The left ventricle is normal in size with normal systolic function.  · The estimated ejection fraction is 65%.  · Grade I left ventricular diastolic dysfunction.  · Mild left atrial enlargement.  · Normal right ventricular size with normal right ventricular systolic   function.  · There is severe aortic valve stenosis.  · Aortic valve area is 0.89 cm2; peak velocity is 4.09 m/s; mean gradient   is 44 mmHg.  · Mild tricuspid regurgitation.  · There is moderate pulmonary hypertension.  · The estimated PA systolic pressure is 59 mmHg.  · Normal central venous pressure (3 mmHg).          Radiology:  CT Chest Without Contrast    Result Date: 10/6/2022  EXAMINATION: CT CHEST WITHOUT CONTRAST CLINICAL HISTORY: Pleural effusion, malignancy suspected; TECHNIQUE: Using low dose technique the chest was surveyed from above the pulmonary apices through the posterior costophrenic angles without the use of intravenous contrast.  Coronal and sagittal reformats were obtained COMPARISON: 08/28/2019 FINDINGS: Base of Neck: No significant abnormality. Aorta: Left-sided aortic arch. The aorta maintains normal caliber, contour and course. Calcified plaque lines the aorta and coronary arteries. Heart/pericardium: Normal size.  Small pericardial effusion. Isabella/Mediastinum: No pathologic mediastinal mario enlargement. Airways: Patent. Lungs/pleura: Moderate left and small right pleural effusions.  Left lung is nearly completely collapsed with aeration of a small component of the left upper lobe.  Subsegmental right lower lobe atelectasis. Upper Abdomen: Atrophic kidneys.  Low-density focus upper pole left kidney measuring 1.6 cm, likely a cyst.  Extensive atherosclerosis. Bones: Severe compression deformity of approximately L1, stable compared  to CT September 2020.     Moderate left pleural effusion, small right pleural effusion, small pericardial effusion.  Much of the left lung is collapsed.  It would be difficult to exclude underlying malignancy, though I see no obvious mass. Additional stable chronic findings as above. Electronically signed by: Karen Teran Date:    10/06/2022 Time:    08:15    X-Ray Chest AP Portable    Result Date: 10/5/2022  EXAMINATION: XR CHEST AP PORTABLE CLINICAL HISTORY: follow up CHF; TECHNIQUE: Single frontal view of the chest was performed. COMPARISON: 10/03/2022 FINDINGS: Cardiac size remains enlarged aorta is tortuous.  Chest is similar to recent examination some patchy increased markings seen at the right lung base and there is loss of volume and pleural fluid at the left lung base.     See above Electronically signed by: Daron Burr MD Date:    10/05/2022 Time:    07:23    X-Ray Chest AP Portable    Result Date: 10/3/2022  EXAMINATION: XR CHEST AP PORTABLE CLINICAL HISTORY: Dyspnea, unspecified TECHNIQUE: Single frontal view of the chest was performed. COMPARISON: 08/08/2022. FINDINGS: The trachea is unremarkable.  There is stable enlargement of the cardiomediastinal silhouette.  There is no evidence of free air beneath the hemidiaphragms.  There is no pleural effusion right.  There is probable moderate left-sided pleural effusion.  There is no evidence of a pneumothorax.    There is no evidence of pneumomediastinum.  There is worsening pulmonary vascular congestion.  There is a stable airspace opacity in the left lung base.  There are degenerative changes in the osseous structures.     1.  Worsening pulmonary vascular congestion, suggestive of worsening fluid overload state. 2.  Persistent airspace opacity in the left lung base with associated suspected moderate parapneumonic effusion. Electronically signed by: South Waters MD Date:    10/03/2022 Time:    17:13    US Lower Extremity Arteries Bilateral    Result  Date: 10/5/2022  EXAMINATION: US LOWER EXTREMITY ARTERIES BILATERAL CLINICAL HISTORY: PAD; TECHNIQUE: Grayscale, color Doppler and duplex sonographic interrogation was performed through the bilateral lower extremity arterial system. COMPARISON: None FINDINGS: The peak systolic velocities on the right are as follows, in centimeters/second: Common femoral artery: 64 Superficial femoral artery, proximal: 56 Superficial femoral artery, mid portion: 96 Superficial femoral artery, distal: 58 Popliteal artery: 110 Posterior tibial artery: 27 Anterior tibial artery: 54 Peroneal artery: 78 Dorsalis pedis artery: 18 The peak systolic velocities on the left are as follows, in centimeters/second: Common femoral artery: 76 Superficial femoral artery, proximal: 78 Superficial femoral artery, mid portion: 111 Superficial femoral artery, distal: 66 Popliteal artery: 60 Posterior tibial artery: 13 Anterior tibial artery: 39 Peroneal artery: 63 Dorsalis pedis artery: 54 Waveforms in the femoral and popliteal arteries are triphasic and biphasic.  Many of the waveforms in the calf are monophasic.     Diffuse atherosclerosis.  Based on reduced velocities and abnormal monophasic waveforms in the left posterior tibial artery, I suspect underlying hemodynamically significant stenosis.  No evidence for hemodynamically significant stenosis elsewhere. Electronically signed by: Karen Teran Date:    10/05/2022 Time:    08:27    Echo    Result Date: 10/4/2022  · The left ventricle is normal in size with normal systolic function. · The estimated ejection fraction is 65%. · Grade I left ventricular diastolic dysfunction. · Mild left atrial enlargement. · Normal right ventricular size with normal right ventricular systolic function. · There is severe aortic valve stenosis. · Aortic valve area is 0.89 cm2; peak velocity is 4.09 m/s; mean gradient is 44 mmHg. · Mild tricuspid regurgitation. · There is moderate pulmonary hypertension. · The  estimated PA systolic pressure is 59 mmHg. · Normal central venous pressure (3 mmHg).          Current Medications:     Infusions:       Scheduled:   albuterol-ipratropium  3 mL Nebulization Q4H WAKE    amLODIPine  2.5 mg Oral Daily    aspirin  81 mg Oral Daily    atorvastatin  20 mg Oral Daily    furosemide (LASIX) injection  40 mg Intravenous Daily    gabapentin  400 mg Oral BID    heparin (porcine)  5,000 Units Subcutaneous Q8H    levothyroxine  50 mcg Oral Before breakfast    metoprolol succinate  25 mg Oral Daily    mirtazapine  15 mg Oral QHS    mupirocin   Nasal BID    spironolactone  25 mg Oral Daily    traZODone  50 mg Oral QHS        PRN:  acetaminophen, ondansetron, ondansetron, oxyCODONE, sodium chloride 0.9%     Assessment and Plan:     Chucho Prado is a 89 y.o.male with  Acute on chronic diastolic heart failure  Continue diuresis with furosemide 40mg IV QD    Severe aortic stenosis    Hypertension, stable    HLP    CKD         Khari Vázquez MD        Disclaimer: This document was created using voice recognition software (M*Modal Fluency Direct). Although it may be edited, this document may contain errors related to incorrect recognition of the spoken word. Please call the physician if clarification is needed.

## 2022-10-08 NOTE — NURSING TRANSFER
Nursing Transfer Note      10/7/2022     Reason patient is being transferred: to floor from ICU 8    Transfer To: 312    Transfer via bed    Transfer with NC 4L to O2    Transported by Juan RN and transporter x1    Medicines sent: Inhaler and O2    Any special needs or follow-up needed: None    Chart send with patient: Yes    Notified: previous RN called report and stated she updated family    Patient reassessed at: 10/7/22 20:20     Upon arrival to floor: patient oriented to room, call bell in reach, and bed in lowest position. Clothes at bedside

## 2022-10-08 NOTE — PROGRESS NOTES
"Nephrology  Progress Note    Admit Date: 10/3/2022   LOS: 4 days     SUBJECTIVE:     Follow-up For:  Acute respiratory failure with hypoxia    History of Present Illness:  Patient is a retired  at [employer] 89 y.o. male presents with worsening SOB x 3 days and found to have pulm edema/CHF/hypoxia.  Labs revealing acute on chronic hyponatremia of 122.  Placed on fluid restriction, lasix, and admitted to hospital for monitoring.  Na remains 122 this am and consulted for evaluation.  Extensive medical history as outlined below including suprapubic catheter for neurogenic bladder, lower extremity edema currently with Ace wraps, mobility impairment, combined CHF and aortic stenosis, and chronic hyponatremia with sodium ranging from 119-137 since 2004.  Patient seen and examined with medical power of  and registered nurse at bedside.  Patient using accessory muscles and work of breathing with signs and symptoms of volume overload.  Admits to drinking 8-10 bottles of water per day.  Discussed treatment plan and agrees with Samsca.  Updated team.     Epic reviewed     Currently no chest pain, nausea, vomiting, diarrhea, fever, chills.  Positive shortness of breath and dyspnea on exertion          Interval History:     diuresing well and NA stable after samsca.  132 this a.m.    Review of Systems:  Constitutional: No fever or chills  Respiratory: shortness of breath better.   Cardiovascular: No chest pain or palpitations  Gastrointestinal: No nausea or vomiting  Neurological: No confusion or weakness    OBJECTIVE:     Vital Signs Range (Last 24H):  BP (!) 108/54   Pulse 79   Temp 98 °F (36.7 °C) (Oral)   Resp (!) 24   Ht 5' 8" (1.727 m)   Wt 90.7 kg (200 lb)   SpO2 95%   BMI 30.41 kg/m²     Temp:  [97.7 °F (36.5 °C)-98 °F (36.7 °C)]   Pulse:  []   Resp:  [14-28]   BP: (108-150)/(53-74)   SpO2:  [89 %-97 %]     I & O (Last 24H):  Intake/Output Summary (Last 24 hours) at 10/7/2022 0742  Last " data filed at 10/7/2022 0629  Gross per 24 hour   Intake 960 ml   Output 2800 ml   Net -1840 ml       Physical Exam:  General appearance:  Elderly, frail  Eyes:  Conjunctivae/corneas clear. PERRL.  Lungs:  Increased work of breathing is improved but still with abd breathing at times.   Diminished.   Heart: Regular rate and rhythm, S1, S2 normal, AS murmur  Abdomen: Soft, non-tender non-distended; bowel sounds normal; no masses,  no organomegaly, obese, suprapubic catheter  Extremities: No cyanosis or clubbing.  Compression bands on  Skin: Skin color, texture, turgor normal. No rashes or lesions  Neurologic:  Generally weak       Laboratory Data:  No results for input(s): WBC, RBC, HGB, HCT, PLT, MCV, MCH, MCHC in the last 24 hours.      BMP:   Recent Labs   Lab 10/07/22  0347   GLU 83   *   K 3.1*   CL 79*   CO2 40*   BUN 16   CREATININE 0.9   CALCIUM 8.8   MG 1.6     Lab Results   Component Value Date    CALCIUM 8.8 10/07/2022    PHOS 2.8 09/18/2020       Lab Results   Component Value Date    .1 (H) 08/31/2022    CALCIUM 8.8 10/07/2022    CAION 1.07 09/03/2019    PHOS 2.8 09/18/2020       Lab Results   Component Value Date    URICACID 5.1 10/04/2022       BNP  Recent Labs   Lab 10/03/22  1716   *       Medications:  Medication list was reviewed and changes noted under Assessment/Plan.    Diagnostic Results:    CT Chest Without Contrast   Final Result      Moderate left pleural effusion, small right pleural effusion, small pericardial effusion.  Much of the left lung is collapsed.  It would be difficult to exclude underlying malignancy, though I see no obvious mass.      Additional stable chronic findings as above.         Electronically signed by: Karen Teran   Date:    10/06/2022   Time:    08:15      X-Ray Chest AP Portable   Final Result      See above         Electronically signed by: Daron Burr MD   Date:    10/05/2022   Time:    07:23      US Lower Extremity Arteries Bilateral    Final Result      Diffuse atherosclerosis.  Based on reduced velocities and abnormal monophasic waveforms in the left posterior tibial artery, I suspect underlying hemodynamically significant stenosis.  No evidence for hemodynamically significant stenosis elsewhere.         Electronically signed by: Karen Teran   Date:    10/05/2022   Time:    08:27      X-Ray Chest AP Portable   Final Result      1.  Worsening pulmonary vascular congestion, suggestive of worsening fluid overload state.      2.  Persistent airspace opacity in the left lung base with associated suspected moderate parapneumonic effusion.         Electronically signed by: South Waters MD   Date:    10/03/2022   Time:    17:13      FL Fluoro of Diaphragm    (Results Pending)       ASSESSMENT/PLAN:     Acute on chronic hypervolemic hyponatremia secondary to CHF, excessive water intake, etc:  -urine studies hard to interpret with lasix on board.  -admit  and same following lasix/fluid restriction.  -dosed samsca and trending Q6.  Redosed 10/6.     -[phone]  -long standing hx of hyponatremia with variable ranges (119-137).  -TSH wnl  -continue lasix but decrease to daily for contraction alkalosis on 10/7. May need to decrease further; 42 today.-Renally dose meds, avoid nephrotoxins, and monitor I/O's closely.        Acute on chronic combined CHF/AS:  -Dr. Rdz following and discussed.        Hypokalemia:  -secondary to diuresis  -replace orally.  -mag stable        Hypoxia/Hypercapnia:  -better with Bipap  -CCT following  -diurese.           Neurogenic bladder:  -cath exchanged.  -defer.        Severe Left pleural effusion:  -CCT following.          Dispo:  Consulted palliative care for goals of care  He has 24 hr sitters and a MPOA.   DNR

## 2022-10-09 LAB
ANION GAP SERPL CALC-SCNC: 10 MMOL/L (ref 8–16)
BUN SERPL-MCNC: 21 MG/DL (ref 8–23)
CALCIUM SERPL-MCNC: 8.9 MG/DL (ref 8.7–10.5)
CHLORIDE SERPL-SCNC: 81 MMOL/L (ref 95–110)
CO2 SERPL-SCNC: 43 MMOL/L (ref 23–29)
CREAT SERPL-MCNC: 1.1 MG/DL (ref 0.5–1.4)
EST. GFR  (NO RACE VARIABLE): >60 ML/MIN/1.73 M^2
GLUCOSE SERPL-MCNC: 84 MG/DL (ref 70–110)
POTASSIUM SERPL-SCNC: 3.9 MMOL/L (ref 3.5–5.1)
SODIUM SERPL-SCNC: 134 MMOL/L (ref 136–145)

## 2022-10-09 PROCEDURE — 25000003 PHARM REV CODE 250: Performed by: INTERNAL MEDICINE

## 2022-10-09 PROCEDURE — 63600175 PHARM REV CODE 636 W HCPCS: Performed by: HOSPITALIST

## 2022-10-09 PROCEDURE — 25000242 PHARM REV CODE 250 ALT 637 W/ HCPCS: Performed by: HOSPITALIST

## 2022-10-09 PROCEDURE — 97535 SELF CARE MNGMENT TRAINING: CPT

## 2022-10-09 PROCEDURE — 97161 PT EVAL LOW COMPLEX 20 MIN: CPT

## 2022-10-09 PROCEDURE — 94640 AIRWAY INHALATION TREATMENT: CPT

## 2022-10-09 PROCEDURE — 97530 THERAPEUTIC ACTIVITIES: CPT

## 2022-10-09 PROCEDURE — 80048 BASIC METABOLIC PNL TOTAL CA: CPT | Performed by: PHYSICIAN ASSISTANT

## 2022-10-09 PROCEDURE — 25000003 PHARM REV CODE 250: Performed by: HOSPITALIST

## 2022-10-09 PROCEDURE — 99233 SBSQ HOSP IP/OBS HIGH 50: CPT | Mod: ,,, | Performed by: HOSPITALIST

## 2022-10-09 PROCEDURE — 99232 SBSQ HOSP IP/OBS MODERATE 35: CPT | Mod: ,,, | Performed by: INTERNAL MEDICINE

## 2022-10-09 PROCEDURE — 25000003 PHARM REV CODE 250: Performed by: NURSE PRACTITIONER

## 2022-10-09 PROCEDURE — 63600175 PHARM REV CODE 636 W HCPCS: Performed by: NURSE PRACTITIONER

## 2022-10-09 PROCEDURE — 99232 PR SUBSEQUENT HOSPITAL CARE,LEVL II: ICD-10-PCS | Mod: ,,, | Performed by: INTERNAL MEDICINE

## 2022-10-09 PROCEDURE — 97116 GAIT TRAINING THERAPY: CPT

## 2022-10-09 PROCEDURE — 97110 THERAPEUTIC EXERCISES: CPT

## 2022-10-09 PROCEDURE — 36415 COLL VENOUS BLD VENIPUNCTURE: CPT | Performed by: PHYSICIAN ASSISTANT

## 2022-10-09 PROCEDURE — 25000003 PHARM REV CODE 250: Performed by: PHYSICIAN ASSISTANT

## 2022-10-09 PROCEDURE — 99900035 HC TECH TIME PER 15 MIN (STAT)

## 2022-10-09 PROCEDURE — 63600175 PHARM REV CODE 636 W HCPCS: Performed by: PHYSICIAN ASSISTANT

## 2022-10-09 PROCEDURE — 11000001 HC ACUTE MED/SURG PRIVATE ROOM

## 2022-10-09 PROCEDURE — 94761 N-INVAS EAR/PLS OXIMETRY MLT: CPT

## 2022-10-09 PROCEDURE — 94660 CPAP INITIATION&MGMT: CPT

## 2022-10-09 PROCEDURE — 27000221 HC OXYGEN, UP TO 24 HOURS

## 2022-10-09 PROCEDURE — 97164 PT RE-EVAL EST PLAN CARE: CPT

## 2022-10-09 PROCEDURE — 99233 PR SUBSEQUENT HOSPITAL CARE,LEVL III: ICD-10-PCS | Mod: ,,, | Performed by: HOSPITALIST

## 2022-10-09 PROCEDURE — 94668 MNPJ CHEST WALL SBSQ: CPT

## 2022-10-09 PROCEDURE — 97168 OT RE-EVAL EST PLAN CARE: CPT

## 2022-10-09 RX ORDER — ACETAZOLAMIDE 500 MG/1
500 CAPSULE, EXTENDED RELEASE ORAL 2 TIMES DAILY
Status: DISCONTINUED | OUTPATIENT
Start: 2022-10-09 | End: 2022-10-09

## 2022-10-09 RX ADMIN — GABAPENTIN 400 MG: 400 CAPSULE ORAL at 08:10

## 2022-10-09 RX ADMIN — FUROSEMIDE 40 MG: 10 INJECTION, SOLUTION INTRAMUSCULAR; INTRAVENOUS at 09:10

## 2022-10-09 RX ADMIN — LEVOTHYROXINE SODIUM 50 MCG: 50 TABLET ORAL at 06:10

## 2022-10-09 RX ADMIN — HEPARIN SODIUM 5000 UNITS: 5000 INJECTION INTRAVENOUS; SUBCUTANEOUS at 06:10

## 2022-10-09 RX ADMIN — SPIRONOLACTONE 25 MG: 25 TABLET, FILM COATED ORAL at 09:10

## 2022-10-09 RX ADMIN — AMLODIPINE BESYLATE 2.5 MG: 2.5 TABLET ORAL at 09:10

## 2022-10-09 RX ADMIN — HEPARIN SODIUM 5000 UNITS: 5000 INJECTION INTRAVENOUS; SUBCUTANEOUS at 09:10

## 2022-10-09 RX ADMIN — HEPARIN SODIUM 5000 UNITS: 5000 INJECTION INTRAVENOUS; SUBCUTANEOUS at 03:10

## 2022-10-09 RX ADMIN — MIRTAZAPINE 15 MG: 15 TABLET, FILM COATED ORAL at 08:10

## 2022-10-09 RX ADMIN — METOPROLOL SUCCINATE 25 MG: 25 TABLET, EXTENDED RELEASE ORAL at 09:10

## 2022-10-09 RX ADMIN — IPRATROPIUM BROMIDE AND ALBUTEROL SULFATE 3 ML: 2.5; .5 SOLUTION RESPIRATORY (INHALATION) at 07:10

## 2022-10-09 RX ADMIN — GABAPENTIN 400 MG: 400 CAPSULE ORAL at 09:10

## 2022-10-09 RX ADMIN — IPRATROPIUM BROMIDE AND ALBUTEROL SULFATE 3 ML: 2.5; .5 SOLUTION RESPIRATORY (INHALATION) at 03:10

## 2022-10-09 RX ADMIN — ASPIRIN 81 MG: 81 TABLET, COATED ORAL at 09:10

## 2022-10-09 RX ADMIN — ACETAZOLAMIDE 250 MG: 500 INJECTION, POWDER, LYOPHILIZED, FOR SOLUTION INTRAVENOUS at 05:10

## 2022-10-09 RX ADMIN — IPRATROPIUM BROMIDE AND ALBUTEROL SULFATE 3 ML: 2.5; .5 SOLUTION RESPIRATORY (INHALATION) at 11:10

## 2022-10-09 RX ADMIN — ATORVASTATIN CALCIUM 20 MG: 20 TABLET, FILM COATED ORAL at 09:10

## 2022-10-09 NOTE — PROGRESS NOTES
Advised to use Debrox daily for at least 5 days prior to ear cleaning here in clinic  Patient follow-up for ear cleaning   "Nephrology  Progress Note    Admit Date: 10/3/2022   LOS: 4 days     SUBJECTIVE:     Follow-up For:  Acute respiratory failure with hypoxia    History of Present Illness:  Patient is a retired  at [employer] 89 y.o. male presents with worsening SOB x 3 days and found to have pulm edema/CHF/hypoxia.  Labs revealing acute on chronic hyponatremia of 122.  Placed on fluid restriction, lasix, and admitted to hospital for monitoring.  Na remains 122 this am and consulted for evaluation.  Extensive medical history as outlined below including suprapubic catheter for neurogenic bladder, lower extremity edema currently with Ace wraps, mobility impairment, combined CHF and aortic stenosis, and chronic hyponatremia with sodium ranging from 119-137 since 2004.  Patient seen and examined with medical power of  and registered nurse at bedside.  Patient using accessory muscles and work of breathing with signs and symptoms of volume overload.  Admits to drinking 8-10 bottles of water per day.  Discussed treatment plan and agrees with Samsca.  Updated team.     Epic reviewed     Currently no chest pain, nausea, vomiting, diarrhea, fever, chills.  Positive shortness of breath and dyspnea on exertion    Interval History:     Worsening bicarb; hold Lasix tomorrow. Has diuresed well overall. More sleepy today.    Review of Systems:  Constitutional: No fever or chills  Respiratory: shortness of breath better.   Cardiovascular: No chest pain or palpitations  Gastrointestinal: No nausea or vomiting  Neurological: No confusion or weakness    OBJECTIVE:     Vital Signs Range (Last 24H):  BP (!) 108/54   Pulse 79   Temp 98 °F (36.7 °C) (Oral)   Resp (!) 24   Ht 5' 8" (1.727 m)   Wt 90.7 kg (200 lb)   SpO2 95%   BMI 30.41 kg/m²     Temp:  [97.7 °F (36.5 °C)-98 °F (36.7 °C)]   Pulse:  []   Resp:  [14-28]   BP: (108-150)/(53-74)   SpO2:  [89 %-97 %]     I & O (Last 24H):  Intake/Output Summary (Last 24 hours) at " 10/7/2022 0742  Last data filed at 10/7/2022 0629  Gross per 24 hour   Intake 960 ml   Output 2800 ml   Net -1840 ml       Physical Exam:  General appearance:  Elderly, frail  Eyes:  Conjunctivae/corneas clear. PERRL.  Lungs:  Increased work of breathing is improved but still with abd breathing at times.   Diminished.   Heart: Regular rate and rhythm, S1, S2 normal, AS murmur  Abdomen: Soft, non-tender non-distended; bowel sounds normal; no masses,  no organomegaly, obese, suprapubic catheter  Extremities: No cyanosis or clubbing.  Compression bands on  Skin: Skin color, texture, turgor normal. No rashes or lesions  Neurologic:  Generally weak       Laboratory Data:  No results for input(s): WBC, RBC, HGB, HCT, PLT, MCV, MCH, MCHC in the last 24 hours.      BMP:   Recent Labs   Lab 10/07/22  0347   GLU 83   *   K 3.1*   CL 79*   CO2 40*   BUN 16   CREATININE 0.9   CALCIUM 8.8   MG 1.6     Lab Results   Component Value Date    CALCIUM 8.8 10/07/2022    PHOS 2.8 09/18/2020       Lab Results   Component Value Date    .1 (H) 08/31/2022    CALCIUM 8.8 10/07/2022    CAION 1.07 09/03/2019    PHOS 2.8 09/18/2020       Lab Results   Component Value Date    URICACID 5.1 10/04/2022       BNP  Recent Labs   Lab 10/03/22  1716   *       Medications:  Medication list was reviewed and changes noted under Assessment/Plan.    Diagnostic Results:    CT Chest Without Contrast   Final Result      Moderate left pleural effusion, small right pleural effusion, small pericardial effusion.  Much of the left lung is collapsed.  It would be difficult to exclude underlying malignancy, though I see no obvious mass.      Additional stable chronic findings as above.         Electronically signed by: Karen Teran   Date:    10/06/2022   Time:    08:15      X-Ray Chest AP Portable   Final Result      See above         Electronically signed by: Daron Burr MD   Date:    10/05/2022   Time:    07:23      US Lower Extremity  Arteries Bilateral   Final Result      Diffuse atherosclerosis.  Based on reduced velocities and abnormal monophasic waveforms in the left posterior tibial artery, I suspect underlying hemodynamically significant stenosis.  No evidence for hemodynamically significant stenosis elsewhere.         Electronically signed by: Karen Teran   Date:    10/05/2022   Time:    08:27      X-Ray Chest AP Portable   Final Result      1.  Worsening pulmonary vascular congestion, suggestive of worsening fluid overload state.      2.  Persistent airspace opacity in the left lung base with associated suspected moderate parapneumonic effusion.         Electronically signed by: South Waters MD   Date:    10/03/2022   Time:    17:13      FL Fluoro of Diaphragm    (Results Pending)       ASSESSMENT/PLAN:     Acute on chronic hypervolemic hyponatremia secondary to CHF, excessive water intake, etc:  -urine studies hard to interpret with lasix on board.  -admit  and same following lasix/fluid restriction.  -dosed samsca and trending Q6.  Redosed 10/6.     -[phone]  -long standing hx of hyponatremia with variable ranges (119-137).  -TSH wnl  -continue lasix but decrease to daily for contraction alkalosis on 10/7. Will hold tomorrow' dose as bicarb worsening  -Renally dose meds, avoid nephrotoxins, and monitor I/O's closely.        Acute on chronic combined CHF/AS:  -Dr. Rdz following and discussed.        Hypokalemia:  -secondary to diuresis  -replace orally.  -mag stable        Hypoxia/Hypercapnia:  -better with Bipap  -CCT following  -diurese.           Neurogenic bladder:  -cath exchanged.  -defer.        Severe Left pleural effusion:  -CCT following.          Dispo:  Consulted palliative care for goals of care  He has 24 hr sitters and a MPOA.   DNR

## 2022-10-09 NOTE — NURSING
2327 Called RRT for second time regarding placement of bi-pap.  RRT to send another therapist.

## 2022-10-09 NOTE — PT/OT/SLP EVAL
Physical Therapy Evaluation    Patient Name:  Chucho Prado   MRN:  [id number]    Recommendations:     Discharge Recommendations:  nursing facility, skilled   Discharge Equipment Recommendations: other (see comments) (TBD pending progress)   Barriers to discharge: Inaccessible home and Decreased caregiver support    Assessment:     Chucho Prado is a 89 y.o. male admitted with a medical diagnosis of Acute on chronic respiratory failure with hypoxia and hypercapnia.  He presents with the following impairments/functional limitations:  weakness, impaired endurance, impaired self care skills, impaired functional mobility, gait instability, impaired balance, decreased lower extremity function, decreased upper extremity function, pain, decreased ROM. Pt performed supine to sit/sit to supine transfers with Max assist of two.  He also required max assist of two to transfer sit to stand with a RW.  Pt was able to transfer sit to stand x 5 with Max assist of two and a RW..    Rehab Prognosis: Fair; patient would benefit from acute skilled PT services to address these deficits and reach maximum level of function.    Recent Surgery: * No surgery found *      Plan:     During this hospitalization, patient to be seen 5 x/week to address the identified rehab impairments via gait training, therapeutic activities, therapeutic exercises, neuromuscular re-education and progress toward the following goals:    Plan of Care Expires:  11/09/22    Subjective     Chief Complaint: Back pain  Patient/Family Comments/goals: none stated  Pain/Comfort:  Pain Rating 1: 3/10  Location - Side 1: Bilateral  Location 1: back  Pain Addressed 1: Reposition, Distraction    Patients cultural, spiritual, [employer] conflicts given the current situation: yes    Living Environment:  Lives in home alone with private caregivers daily  Prior to admission, patients level of function was dependent.  Equipment used at home: walker, rolling, hospital bed,  rollator, wheel chair.  DME owned (not currently used): rolling walker.  Upon discharge, patient will have assistance from paid care givers.    Objective:     Communicated with Nurse prior to session.  Patient found HOB elevated with BIPAP, pulse ox (continuous), Other (comments) (Supra pubic catheter)  upon PT entry to room.    General Precautions: Standard, fall   Orthopedic Precautions:N/A   Braces: N/A  Respiratory Status:  BiPAP    Exams:  RUE ROM: See OT note  RUE Strength: See OT note  LUE ROM: See OT note  LUE Strength: See OT note  RLE ROM: WFL  RLE Strength: Deficits: 2/5  LLE ROM: WFL  LLE Strength: 2/5    Functional Mobility:  Bed Mobility:     Supine to Sit: maximal assistance and of 2 persons  Sit to Supine: maximal assistance of 2 persons  Transfers:     Sit to Stand:  maximal assistance and of 2 persons with rolling walker  Gait: Pt was able to side step to his L 6 steps with a RW and Max assist of 2  Balance: Pt with Poor standing balance and CGA with static sitting balance    Therapeutic Activities and Exercises:   Transfer and gait training    AM-PAC 6 CLICK MOBILITY  Total Score:10     Patient left HOB elevated with all lines intact, call button in reach, and Nurse notified.    GOALS:   Multidisciplinary Problems       Physical Therapy Goals          Problem: Physical Therapy    Goal Priority Disciplines Outcome Goal Variances Interventions   Physical Therapy Goal     PT, PT/OT Ongoing, Progressing     Description: Goals to be met by: 2022    Patient will increase functional independence with mobility by performin. Sit<>stand with Mod assist of one with RW.  2. Gait x 10 feet with RW with max assist of one.  3. Pivot transfer bed to chair with mod assist of one                          History:     Past Medical History:   Diagnosis Date    Acquired hypothyroidism 2013    Arthritis     Blood transfusion     before  - whe had gangrenous gall bladder    Cataract     Chronic back  pain 12/4/2018    - Takes Percocet 5-325 at home - Can continue current abdominal pain control with Dilaudid 0.5 mg IV Q3H prn     CKD (chronic kidney disease) stage 3, GFR 30-59 ml/min     Compression fracture of lumbar vertebra     Depression     Dyslipidemia     Essential hypertension 7/30/2013    Gastroesophageal reflux disease without esophagitis 12/4/2018    - continue home Prilosec    General anesthetics causing adverse effect in therapeutic use     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    GERD (gastroesophageal reflux disease)     History of postoperative delirium 12/4/2018    - Patient reports 1 week history of post-op delirium 7/2018 - Monitor electrolytes, UA, and urine cx - Delirium precautions  - Can use Seroquel 25 mg QHS prn     Hypertension     Hyponatremia 10/23/2017    Impaired mobility and ADLs 6/28/2018    Neurogenic bladder 12/4/2018    Sleep disorder 12/4/2018    - continue Trazodone QHS    Thyroid disease     UTI (urinary tract infection)        Past Surgical History:   Procedure Laterality Date    BIOPSY OF BLADDER  9/1/2020    Procedure: BIOPSY, BLADDER;  Surgeon: Daron Manjarrez MD;  Location: [address];  Service: Urology;;    BLADDER FULGURATION  9/1/2020    Procedure: FULGURATION, BLADDER;  Surgeon: Daron Manjarrez MD;  Location: [address];  Service: Urology;;    CHOLECYSTECTOMY      CYSTOGRAM  9/1/2020    Procedure: CYSTOGRAM;  Surgeon: Daron Manjarrez MD;  Location: [address];  Service: Urology;;    CYSTOSCOPY N/A 9/1/2020    Procedure: CYSTOSCOPY;  Surgeon: Daron Manjarrez MD;  Location: [address];  Service: Urology;  Laterality: N/A;    DILATION OF URETHRA  9/1/2020    Procedure: DILATION, URETHRA;  Surgeon: Daron Manjarrez MD;  Location: [address];  Service: Urology;;    EYE SURGERY Right     IOL    HERNIA REPAIR      INTRAOCULAR PROSTHESES INSERTION Left 5/28/2018    Procedure: INSERTION-INTRAOCULAR LENS (IOL);  Surgeon: Trinity WISDOM  MD George;  Location: [facility];  Service: Ophthalmology;  Laterality: Left;    JOINT REPLACEMENT      LAMINECTOMY  12/27/2016    L2-L4    LUMBAR LAMINECTOMY  12/2016    PARATHYROIDECTOMY      PHACOEMULSIFICATION OF CATARACT Left 5/28/2018    Procedure: PHACOEMULSIFICATION-ASPIRATION-CATARACT;  Surgeon: Trinity Vazquez MD;  Location: [facility];  Service: Ophthalmology;  Laterality: Left;    REMOVAL OF BLOOD CLOT  9/1/2020    Procedure: REMOVAL, BLOOD CLOT;  Surgeon: Daron Manjarrez MD;  Location: [facility] OR 1ST FLR;  Service: Urology;;    REPAIR OF INCARCERATED INCISIONAL HERNIA WITHOUT HISTORY OF PRIOR REPAIR N/A 12/5/2018    Procedure: REPAIR, HERNIA, INCISIONAL, INCARCERATED, WITHOUT HISTORY OF PRIOR REPAIR;  Surgeon: Steve Valentin MD;  Location: [facility] OR 2ND FLR;  Service: General;  Laterality: N/A;    REPAIR OF INCARCERATED VENTRAL HERNIA WITHOUT HISTORY OF PRIOR REPAIR N/A 6/20/2018    Procedure: REPAIR, HERNIA, VENTRAL, INCARCERATED, WITHOUT HISTORY OF PRIOR REPAIR;  Surgeon: Guilherme Ordoñez MD;  [facility];  Service: General;  Laterality: N/A;    TOTAL KNEE ARTHROPLASTY Bilateral     TOTAL KNEE ARTHROPLASTY Left 10/25/2017    TKR       Time Tracking:     PT Received On: 10/09/22  PT Start Time: 1425     PT Stop Time: 1508  PT Total Time (min): 43 min     Billable Minutes: RE-evaluation 13 min, Therapeutic Activity 15 min, Gait training 15 min  Overlap with OT for portions of session due to complex nature of pt and need for increased safety.  Two skilled therapists needed during functional mobility to decrease patient fall risk and decrease risk of caregiver injury.       10/09/2022

## 2022-10-09 NOTE — SUBJECTIVE & OBJECTIVE
Interval History:  Sleepy today, not talkative.  Bicarb level up to 43.  Lasix decreased for contraction alkalosis.  He has significant desaturation with minimal activity.  Had to be put back on BiPAP this afternoon.    Review of Systems   Constitutional:  Negative for chills and fever.   Respiratory:  Positive for shortness of breath. Negative for cough.    Cardiovascular:  Negative for chest pain, palpitations and leg swelling.   Objective:     Vital Signs (Most Recent):  Temp: 97.9 °F (36.6 °C) (10/09/22 1534)  Pulse: 69 (10/09/22 1536)  Resp: 17 (10/09/22 1536)  BP: (!) 113/53 (10/09/22 1534)  SpO2: 97 % (10/09/22 1536)   Vital Signs (24h Range):  Temp:  [97.5 °F (36.4 °C)-99 °F (37.2 °C)] 97.9 °F (36.6 °C)  Pulse:  [69-86] 69  Resp:  [14-20] 17  SpO2:  [91 %-97 %] 97 %  BP: (111-137)/(53-74) 113/53     Weight: 90.7 kg (200 lb)  Body mass index is 30.41 kg/m².    Intake/Output Summary (Last 24 hours) at 10/9/2022 1612  Last data filed at 10/9/2022 0529  Gross per 24 hour   Intake --   Output 1620 ml   Net -1620 ml      Physical Exam  Constitutional:       Comments: Lethargic.   Cardiovascular:      Rate and Rhythm: Normal rate and regular rhythm.      Pulses: Normal pulses.      Heart sounds: Murmur heard.     No gallop.   Pulmonary:      Breath sounds: No wheezing.      Comments: Poor effort, breath sounds nearly inaudible.  Abdominal:      General: Bowel sounds are normal.      Palpations: Abdomen is soft.   Musculoskeletal:      Comments: Both legs with protective boots in place.     Skin:     General: Skin is warm and dry.   Neurological:      General: No focal deficit present.      Comments: Memory poor.       Significant Labs: All pertinent labs within the past 24 hours have been reviewed.    Significant Imaging: I have reviewed all pertinent imaging results/findings within the past 24 hours.

## 2022-10-09 NOTE — PT/OT/SLP RE-EVAL
Occupational Therapy   Re-evaluation and Treatment    Name: Chucho Prado  MRN: [id number]  Admitting Diagnosis:  Acute on chronic respiratory failure with hypoxia and hypercapnia  Recent Surgery: * No surgery found *      Recommendations:     Discharge Recommendations: nursing facility, skilled  Discharge Equipment Recommendations:   (Pt is needing custom power wheelchair and seating system.)  Barriers to discharge:   (Decreased functional level than PLOF and medical status.)    Assessment:     Chucho Prado is a 89 y.o. male with a medical diagnosis of Acute on chronic respiratory failure with hypoxia and hypercapnia.  He presents alert, oriented and agreeable to participating in OT re-evaluation and treatment session.  Performance deficits affecting function are weakness, impaired endurance, impaired self care skills, impaired functional mobility, impaired balance, decreased ROM, decreased upper extremity function, decreased lower extremity function, gait instability, impaired cardiopulmonary response to activity, impaired joint extensibility, impaired coordination, impaired fine motor, pain, decreased safety awareness, abnormal tone.      Rehab Prognosis:  Fair/Good to return to PLOF; patient would benefit from acute skilled OT services to address these deficits and reach maximum level of function.       Plan:     Patient to be seen 4 x/week to address the above listed problems via self-care/home management, therapeutic activities, therapeutic exercises  Plan of Care Expires: 11/05/22  Plan of Care Reviewed with: patient    Subjective     Chief Complaint: Back pain  Patient/Family stated goals: To progress with standing and transfers, to progress with OOB for functional activities  Communicated with: Nurse prior to session.  Pain/Comfort:  Pain Rating 1: 3/10  Location - Side 1: Bilateral  Location 1: back  Pain Addressed 1: Reposition, Distraction  Pain Rating Post-Intervention 1:  (Pt reporting being  comfortable after positioned in bed after therapy session.)    Objective:     Communicated with: nurse prior to session.  Patient found HOB elevated with: BIPAP, pulse ox (continuous) (Suprapubic catheter) upon OT entry to room.    General Precautions: Standard, fall, respiratory   Orthopedic Precautions: (Severe kyphosis with scoliosis)   Braces: N/A  Respiratory Status:  BiPAP    Occupational Performance:    Bed Mobility:    Supine to Sit: Max A x 2 persons  Sit to supine: Max A x 2 persons  Scooting to HOB: Total A x 2 persons    Functional Mobility/Transfers:  Sit to stand: Max A x 2 persons with RW; Pt performed 5 times; Varying lengths of time from 15 sec <> 1 minute during trials.  Functional Mobility: Pt able to take 6 side steps to left with RW with Max A x 2    Activities of Daily Living:  LB Dressing: Total A  UB Dressing: Max A  Grooming: Mod A  Feeding: Min A  Toileting: Total A for management of suprapubic catheter; Pt required Max A x 2 persons with RW to stand during Total A for toilet hygiene after BM    Cognitive/Visual Perceptual:  Cognitive/Psychosocial Skills:     -       Oriented to: Person, Place, Time, and Situation   -       Follows Commands/attention:Follows one-step commands  -       Communication: clear/fluent  -       Memory: No Deficits noted  -       Safety awareness/insight to disability: impaired   -       Mood/Affect/Coping skills/emotional control: Cooperative and Pleasant  Right eye deficits noted in chart    Physical Exam:  Sitting Balance: Progressed to SBA static sitting EOB  Standing Balance: Poor, Max A x 2 with RW  UE ROM: Right UE: dominant; Shoulder 0-80 degrees flexion; Elbow<>Hand is WFL                     Left UE: Shoulder 0- 60 degrees; Elbow <>Hand is WFL  UE Coordination:  Fair-  Sensation: Light touch is grossly intact  Skin: Visible skin intact; Tubigrip - bilateral feet to knees for edema management  Posture: Severe kyphosis with scoliosis, forward head and  rounded shoulders       [facility] 6 Click:  [facility] Total Score: 10    Treatment & Education:  Role of OT, POC, standing tolerance and balance for ADL and ADL mobility, safety    Patient left HOB elevated, pillow under each hip for pressure relief, with all lines intact, call button in reach, and nurse notified    GOALS:   Multidisciplinary Problems       Occupational Therapy Goals          Problem: Occupational Therapy    Goal Priority Disciplines Outcome Interventions   Occupational Therapy Goal     OT, PT/OT Ongoing, Progressing    Description: Goals to be met by: 10/22/22    Patient will increase functional independence with ADLs by performing:    Bilateral UE HEP at SBA for increased activity tolerance and strength to progress with sit to stand and standing balance for ADL transfers.   Supine to sit at Max A of one person to prepare for ADL participation.  Sitting EOB at SBA to perform 3 grooming tasks at SBA/Set up level and UB bathing at Mod A level.                    Multidisciplinary Problems (Resolved)          Problem: Occupational Therapy    Goal Priority Disciplines Outcome Interventions   Occupational Therapy Goal   (Resolved)     OT, PT/OT Met    Description: Orders received and evaluation complete.  Pt has private caregivers daily for years and has required assist with all ADL and ADL mobility.  Pt has has been wheelchair bound for many years though reports having only a basic wheelchair.  Recommend discharge to home with continued daily private caregivers.  Recommend Home Health PT or OT for custom wheelchair seating evaluation with a Seating and .                         History:     Past Medical History:   Diagnosis Date    Acquired hypothyroidism 7/30/2013    Arthritis     Blood transfusion     before 2005 - whe had gangrenous gall bladder    Cataract     Chronic back pain 12/4/2018    - Takes Percocet 5-325 at home - Can continue current abdominal pain control with Dilaudid 0.5 mg  IV Q3H prn     CKD (chronic kidney disease) stage 3, GFR 30-59 ml/min     Compression fracture of lumbar vertebra     Depression     Dyslipidemia     Essential hypertension 7/30/2013    Gastroesophageal reflux disease without esophagitis 12/4/2018    - continue home Prilosec    General anesthetics causing adverse effect in therapeutic use     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    GERD (gastroesophageal reflux disease)     History of postoperative delirium 12/4/2018    - Patient reports 1 week history of post-op delirium 7/2018 - Monitor electrolytes, UA, and urine cx - Delirium precautions  - Can use Seroquel 25 mg QHS prn     Hypertension     Hyponatremia 10/23/2017    Impaired mobility and ADLs 6/28/2018    Neurogenic bladder 12/4/2018    Sleep disorder 12/4/2018    - continue Trazodone QHS    Thyroid disease     UTI (urinary tract infection)          Past Surgical History:   Procedure Laterality Date    BIOPSY OF BLADDER  9/1/2020    Procedure: BIOPSY, BLADDER;  Surgeon: Daron Manjarrez MD;  Location: [address];  Service: Urology;;    BLADDER FULGURATION  9/1/2020    Procedure: FULGURATION, BLADDER;  Surgeon: Daron Manjarrez MD;  Location: [address];  Service: Urology;;    CHOLECYSTECTOMY      CYSTOGRAM  9/1/2020    Procedure: CYSTOGRAM;  Surgeon: Daron Manjarrez MD;  Location: [address];  Service: Urology;;    CYSTOSCOPY N/A 9/1/2020    Procedure: CYSTOSCOPY;  Surgeon: Daron Manjarrez MD;  Location: [address];  Service: Urology;  Laterality: N/A;    DILATION OF URETHRA  9/1/2020    Procedure: DILATION, URETHRA;  Surgeon: Daron Manjarrez MD;  Location: [address];  Service: Urology;;    EYE SURGERY Right     IOL    HERNIA REPAIR      INTRAOCULAR PROSTHESES INSERTION Left 5/28/2018    Procedure: INSERTION-INTRAOCULAR LENS (IOL);  Surgeon: Trinity Vazquez MD;  Location: [facility];  Service: Ophthalmology;  Laterality: Left;    JOINT REPLACEMENT      LAMINECTOMY   12/27/2016    L2-L4    LUMBAR LAMINECTOMY  12/2016    PARATHYROIDECTOMY      PHACOEMULSIFICATION OF CATARACT Left 5/28/2018    Procedure: PHACOEMULSIFICATION-ASPIRATION-CATARACT;  Surgeon: Trinity Vazquez MD;  Location: [facility];  Service: Ophthalmology;  Laterality: Left;    REMOVAL OF BLOOD CLOT  9/1/2020    Procedure: REMOVAL, BLOOD CLOT;  Surgeon: Daron Manjarrez MD;  Location: [facility] OR 1ST FLR;  Service: Urology;;    REPAIR OF INCARCERATED INCISIONAL HERNIA WITHOUT HISTORY OF PRIOR REPAIR N/A 12/5/2018    Procedure: REPAIR, HERNIA, INCISIONAL, INCARCERATED, WITHOUT HISTORY OF PRIOR REPAIR;  Surgeon: Steve Valentin MD;  Location: [facility] OR 2ND FLR;  Service: General;  Laterality: N/A;    REPAIR OF INCARCERATED VENTRAL HERNIA WITHOUT HISTORY OF PRIOR REPAIR N/A 6/20/2018    Procedure: REPAIR, HERNIA, VENTRAL, INCARCERATED, WITHOUT HISTORY OF PRIOR REPAIR;  Surgeon: Guilherme Ordoñez MD;  Location: [facility] OR 2ND FLR;  Service: General;  Laterality: N/A;    TOTAL KNEE ARTHROPLASTY Bilateral     TOTAL KNEE ARTHROPLASTY Left 10/25/2017    TKR       Time Tracking:     OT Date of Treatment: 10/09/22  OT Start Time: 1425  OT Stop Time: 1508  OT Total Time (min): 43 min    Billable Minutes:Re-eval 10  Self Care/Home Management 15  Therapeutic Activity 18    10/9/2022

## 2022-10-09 NOTE — PLAN OF CARE
Problem: Occupational Therapy  Goal: Occupational Therapy Goal  Description: Goals to be met by: 10/22/22    Patient will increase functional independence with ADLs by performing:    Bilateral UE HEP at SBA for increased activity tolerance and strength to progress with sit to stand and standing balance for ADL transfers.   Supine to sit at Max A of one person to prepare for ADL participation.  Sitting EOB at SBA to perform 3 grooming tasks at SBA/Set up level and UB bathing at Mod A level.     Outcome: Ongoing, Progressing   Re-evaluation this date with goals established for pt increasing activity tolerance and strength for improved ADL and ADL mobility performance.   Recommend SNF with OT and PT.

## 2022-10-09 NOTE — PROGRESS NOTES
"[facility]  Progress Note    Patient Name: Chucho Prado  MRN: [id number]  Patient Class: IP- Inpatient   Admission Date: 10/3/2022  Length of Stay: 6 days  Attending Physician: Xin Mason MD  Primary Care Provider: Obed Mcguire MD        Subjective:     Principal Problem:Acute on chronic respiratory failure with hypoxia and hypercapnia        HPI:  From H&P by Kerrie Torres PA:  "Mr. Chucho Prado is a 89 y.o. male, with PMH of CHF, HTN, hypothyroidism, HLD, MDD, CARLINE, CKD-3, neurogenic bladder s/p suprapubic catheter insertion, GERD, wheelchair bound, who presented to [facility] ED on 10/3/22 due to shortness of breath x 3 days. He states he feels out of breath when laying flat or with activity (ie. Being changed or transferring to the wheelchair). Additionally, he states he feels occasional chest tightness, has been having wheezing, and coughing (present for weeks). Upon awakening this morning he noted b/l lower extremity swelling, which he attributes to sitting up while sleeping last night and not elevating his legs. He denies fever. He was evaluated in the ED with labs showing hyponatremia with sodium of 122, chloride of 83. A BNP was elevated at 212, and troponin was negative. A CXR showed worsening pulmonary vascular congestion, and persistence of a left lung base airspace opacity. He was treated in the ED with 40 mg IV lasix."      Overview/Hospital Course:  Patient was admitted under a heart failure protocol on IV Lasix and Cardiology was consulted to follow.  His 2D echo was repeated.  Nephrology was consulted for the hyponatremia.  He was given a dose of tolvaptan and his sodium level was monitored closely.  Patient's oxygen level became difficult to manage on the floor and he was transferred to ICU overnight 10/4 when he required placement of a NRB and pulmonary/critical care was consulted.  Sodium level decreased to 120 upon transfer before stabilizing the " following morning.  Diuresis was continued. 2D echo showed severe aortic stenosis, pulmonary HTN and grade I diastolic dysfunction.      His CXR showed volume loss in the right lung and CT was done showing a left pleural effusion with much of the left lung collapsed.  There was a small effusion on the right and a small pericardial effusion.  Critical care evaluated and felt the volume loss was due to atelectasis due to neuromuscular failure, and patient would require significant mobilization and NIV for volume ventilation.  PT/OT consulted to work with him and patient was moved back to the floor.  He continued to have episodic desaturation with minimal activity and frequently had to hold off on therapy and be placed back on BiPAP.      Interval History:  Sleepy today, not talkative.  Bicarb level up to 43.  Lasix decreased for contraction alkalosis.  He has significant desaturation with minimal activity.  Had to be put back on BiPAP this afternoon.    Review of Systems   Constitutional:  Negative for chills and fever.   Respiratory:  Positive for shortness of breath. Negative for cough.    Cardiovascular:  Negative for chest pain, palpitations and leg swelling.   Objective:     Vital Signs (Most Recent):  Temp: 97.9 °F (36.6 °C) (10/09/22 1534)  Pulse: 69 (10/09/22 1536)  Resp: 17 (10/09/22 1536)  BP: (!) 113/53 (10/09/22 1534)  SpO2: 97 % (10/09/22 1536)   Vital Signs (24h Range):  Temp:  [97.5 °F (36.4 °C)-99 °F (37.2 °C)] 97.9 °F (36.6 °C)  Pulse:  [69-86] 69  Resp:  [14-20] 17  SpO2:  [91 %-97 %] 97 %  BP: (111-137)/(53-74) 113/53     Weight: 90.7 kg (200 lb)  Body mass index is 30.41 kg/m².    Intake/Output Summary (Last 24 hours) at 10/9/2022 1612  Last data filed at 10/9/2022 0529  Gross per 24 hour   Intake --   Output 1620 ml   Net -1620 ml      Physical Exam  Constitutional:       Comments: Lethargic.   Cardiovascular:      Rate and Rhythm: Normal rate and regular rhythm.      Pulses: Normal pulses.       Heart sounds: Murmur heard.     No gallop.   Pulmonary:      Breath sounds: No wheezing.      Comments: Poor effort, breath sounds nearly inaudible.  Abdominal:      General: Bowel sounds are normal.      Palpations: Abdomen is soft.   Musculoskeletal:      Comments: Both legs with protective boots in place.     Skin:     General: Skin is warm and dry.   Neurological:      General: No focal deficit present.      Comments: Memory poor.       Significant Labs: All pertinent labs within the past 24 hours have been reviewed.    Significant Imaging: I have reviewed all pertinent imaging results/findings within the past 24 hours.      Assessment/Plan:      * Acute on chronic respiratory failure with hypoxia and hypercapnia  Patient transferred to ICU overnight 10/4 with worsening hypoxemia and hyponatremia.  CXR showed worsening pulmonary vascular congestion and a persistent airspace opacity in the left base with an associated effusion.  Note the 2D echo showed a normal EF and only grade I diastolic dysfunction, but severe aortic stenosis and pulmonary HTN seen previously.  Unclear whether this is due to chronic lung disease, he is a former smoker with a 20 pack year history (quit 1990) but is not oxygen dependent at home and does not use respiratory medications chronically.  Cardiology consulted, PCC now following.  CT showed severe volume loss of left lung and associated effusion.      - PCC feels left lung findings due to severe atelectasis and thoracentesis not indicated.  - Needs aggressive mobilization, respiratory therapy with nebulized treatments, chest PT, cough assist.  - Started PT/OT, starting with getting up to chair.    - Continue oxygen supplementation, nightly BiPAP, diuretics.  Frequently needs BiPAP during the day.  - Not a candidate for intervention on aortic valve given age, frailty and comorbidities  - He will need volume ventilator given respiratory failure due to neuromuscular condition.  - PT notes  "severe kyphosis, holds himself hunched over and cannot support his trunk sitting up.      Hyponatremia  - suspect hypervolemic hyponatremia   - s/p 40 mg IV lasix in ED, then twice daily Lasix  - Nephrology consulted, tolvaptan given and fluid intake liberalized.    - After an initial further decrease hyponatremia stabilized.  - Lasix decreased to once daily given contraction alkalosis with low chloride.  Will give one dose acetazolamide for this.    Acute heart failure  - Patient presented with shortness of breath worse when supine   - BNP in ED was 212   - CXR with evidence of pulmonary edema and left pleural effusion associated with an airspace opacity on that side that is consistent with severe atelectasis.  He does not have pneumonia symptoms.  - s/p lasix 40 mg IV in ED   - monitor I&Os   - Heart Failure pathways initiated   - Echo showed normal EF, grade I diastolic dysfunction, severe AS, pulmonary HTN.    Neuromuscular respiratory weakness  See "acute on chronic respiratory failure"      Advanced care planning/counseling discussion  Patient seen by palliative care and has elected DNR status.  He does state that he doesn't want us to "give up" on him and has been assured that only the most aggressive measures (CPR, intubation, shocks) have been omitted from the care plan.  We will continue oxygen and NIV as needed, antibiotics if needed, diuresis, etc.      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  - Vancomycin given in ED due to previous culture with <50K MRSA (2 years ago)  - As he does not have fever or leukocytosis will hold off on further antibiotics.  - Repeated CBC, still no leukocytosis    Suprapubic catheter  - chronic   - Urine likely colonized with multiple organisms.        CKD (chronic kidney disease) stage 3, GFR 30-59 ml/min  - Cr is 1.0  - ranges from 0.8  - " 1.0   - avoid nephrotoxins   - renally dose meds     Iron deficiency anemia  - H&H are stable at present   - monitor       Essential hypertension  - hypertensive on presentation  - home meds: amlodipine 5 mg QD, metoprolol succinate 25 mg QD  - monitor.  Improving with diuresis.      VTE Risk Mitigation (From admission, onward)         Ordered     heparin (porcine) injection 5,000 Units  Every 8 hours         10/03/22 2007     IP VTE HIGH RISK PATIENT  Once         10/03/22 2007     Place sequential compression device  Until discontinued         10/03/22 2007                    Xin Sampson MD  Department of Hospital Medicine   [facility] - [facility] ([place])

## 2022-10-09 NOTE — NURSING
2357  Pt placed on bi-pap at this time. Yana Hein    8514 Bi-pap removed from pt. Pt placed back on 4L per NC.

## 2022-10-09 NOTE — PROGRESS NOTES
"    Cardiology Progress Note    10/9/2022  1:02 PM    Subjective/Interim:      No complaints.  O2 was in 80's so NC O2 was increased to 4L with improvement.     Objective:   24-hour Vitals:  Temp:  [97.5 °F (36.4 °C)-99 °F (37.2 °C)] 99 °F (37.2 °C)  Pulse:  [77-86] 85  Resp:  [14-20] 19  SpO2:  [91 %-96 %] 95 %  BP: (111-137)/(56-74) 121/58    Physical Examination:  Vitals: BP (!) 121/58 (BP Location: Right arm, Patient Position: Lying)   Pulse 85   Temp 99 °F (37.2 °C) (Oral)   Resp 19   Ht 5' 8" (1.727 m)   Wt 90.7 kg (200 lb)   SpO2 95%   BMI 30.41 kg/m²     Physical Exam  Constitutional:       General: He is not in acute distress.     Appearance: Normal appearance. He is well-developed. He is not ill-appearing.   Eyes:      Conjunctiva/sclera:      Right eye: Right conjunctiva is not injected. No hemorrhage.     Left eye: Left conjunctiva is not injected. No hemorrhage.     Pupils:      Right eye: Pupil is round.      Left eye: Pupil is round.   Neck:      Vascular: No JVD.   Cardiovascular:      Rate and Rhythm: Normal rate and regular rhythm.      Heart sounds: S1 normal and S2 normal. Murmur heard.   Midsystolic murmur is present with a grade of 3/6 at the upper right sternal border.     Gallop present. S4 sounds present. No S3 sounds.   Pulmonary:      Effort: Pulmonary effort is normal.      Breath sounds: Examination of the right-lower field reveals decreased breath sounds and rales. Examination of the left-lower field reveals decreased breath sounds and rales. Decreased breath sounds, rhonchi and rales present.   Chest:      Chest wall: No swelling or tenderness.   Abdominal:      General: There is no distension.      Palpations: Abdomen is soft.      Tenderness: There is no abdominal tenderness.   Musculoskeletal:      Cervical back: Neck supple.      Right lower leg: Swelling present. 1+ Pitting Edema present.      Left lower leg: Swelling present. 1+ Pitting Edema present.   Skin:     General: " Skin is warm and dry.      Findings: No rash.   Neurological:      Mental Status: He is alert and oriented to person, place, and time. He is not disoriented.   Psychiatric:         Attention and Perception: Attention normal.         Mood and Affect: Mood normal.         Speech: Speech normal.         Behavior: Behavior normal.         Thought Content: Thought content normal.         Cognition and Memory: Cognition and memory normal.         Judgment: Judgment normal.         Intake/Output Summary (Last 24 hours) at 10/9/2022 1302  Last data filed at 10/9/2022 0529  Gross per 24 hour   Intake --   Output 1860 ml   Net -1860 ml         Laboratory:  Trended Lab Data:  Recent Labs   Lab 10/03/22  1716 10/04/22  2346 10/05/22  0756   WBC 12.42  --  10.52   HGB 12.3*  --  11.7*   HCT 36.9* 36 34.8*     --  312         Recent Labs   Lab 10/04/22  0431 10/04/22  1017 10/07/22  0347 10/08/22  0512 10/09/22  0512   *   < > 135* 132* 134*   K 4.3   < > 3.1* 3.8 3.9   CL 82*   < > 79* 81* 81*   CO2 26   < > 40* 42* 43*   BUN 14   < > 16 19 21   GLU 99   < > 83 84 84   CALCIUM 8.6*   < > 8.8 9.0 8.9   MG 1.7  --  1.6  --   --     < > = values in this interval not displayed.         Recent Labs   Lab 10/03/22  1716   PROT 7.9   ALBUMIN 3.7   BILITOT 0.9   AST 20   ALT 12   ALKPHOS 100         No results for input(s): PROTIME, PTT, INR in the last 168 hours.    Cardiac:   Recent Labs   Lab 10/03/22  1716   TROPONINI <0.006   *         FLP:   Lab Results   Component Value Date    CHOL 234 (H) 08/31/2022    HDL 38 (L) 08/31/2022    LDLCALC 166.6 (H) 08/31/2022    TRIG 147 08/31/2022    CHOLHDL 16.2 (L) 08/31/2022     DM:   Lab Results   Component Value Date    HGBA1C 5.3 10/04/2022    HGBA1C 5.4 04/04/2019    LDLCALC 166.6 (H) 08/31/2022    CREATININE 1.1 10/09/2022     Thyroid:   Lab Results   Component Value Date    TSH 2.864 10/04/2022     Anemia:   Lab Results   Component Value Date    IRON 25 (L) 10/20/2016     TIBC 300 10/20/2016    FERRITIN 209 10/20/2016    FOLATE 6.0 10/20/2016     Urinalysis:   Lab Results   Component Value Date    LABURIN (A) 09/14/2020     METHICILLIN RESISTANT STAPHYLOCOCCUS AUREUS  50,000 - 99,999 cfu/ml      COLORU Yellow 09/14/2020    SPECGRAV >=1.030 (A) 09/14/2020    NITRITE Positive (A) 09/14/2020    KETONESU Negative 09/14/2020    UROBILINOGEN Negative 06/19/2018     @      Other Results:  EKG (my interpretation):    TELEMETRY:  N/A    Echo:   Results for orders placed or performed during the hospital encounter of 10/03/22   Echo   Result Value Ref Range    BSA 2.09 m2    RA Width 3.20 cm    LA WIDTH 4.10 cm    Left Ventricular Outflow Tract Mean Velocity 0.72 cm/s    Left Ventricular Outflow Tract Mean Gradient 2.22 mmHg    LVIDd 5.75 3.5 - 6.0 cm    IVS 0.92 0.6 - 1.1 cm    Posterior Wall 0.94 0.6 - 1.1 cm    LVIDs 3.36 2.1 - 4.0 cm    FS 42 28 - 44 %    LA volume 70.72 cm3    LV mass 209.10 g    LA size 3.46 cm    Left Ventricle Relative Wall Thickness 0.33 cm    AV mean gradient 44 mmHg    AV valve area 0.89 cm2    AV Velocity Ratio 0.24     AV index (prosthetic) 0.27     MV valve area p 1/2 method 3.47 cm2    E/A ratio 0.87     E wave deceleration time 241.04 msec    IVRT 83.73 msec    LVOT diameter 2.03 cm    LVOT area 3.2 cm2    LVOT peak angel 0.98 m/s    LVOT peak VTI 27.40 cm    Ao peak angel 4.09 m/s    Ao .1 cm    Mr max angel 5.50 m/s    LVOT stroke volume 88.64 cm3    AV peak gradient 67 mmHg    MV Peak E Angel 1.07 m/s    TR Max Angel 3.73 m/s    MV stenosis pressure 1/2 time 63.47 ms    MV Peak A Angel 1.23 m/s    LV Systolic Volume 46.05 mL    LV Systolic Volume Index 22.6 mL/m2    LV Diastolic Volume 163.07 mL    LV Diastolic Volume Index 79.94 mL/m2    LA Volume Index 34.7 mL/m2    LV Mass Index 103 g/m2    RA Major Axis 4.80 cm    Left Atrium Minor Axis 6.04 cm    Left Atrium Major Axis 5.70 cm    Triscuspid Valve Regurgitation Peak Gradient 56 mmHg    LA Volume Index (Mod)  52.5 mL/m2    LA volume (mod) 107.00 cm3    Right Atrial Pressure (from IVC) 3 mmHg    EF 65 %    TV rest pulmonary artery pressure 59 mmHg    Narrative    · The left ventricle is normal in size with normal systolic function.  · The estimated ejection fraction is 65%.  · Grade I left ventricular diastolic dysfunction.  · Mild left atrial enlargement.  · Normal right ventricular size with normal right ventricular systolic   function.  · There is severe aortic valve stenosis.  · Aortic valve area is 0.89 cm2; peak velocity is 4.09 m/s; mean gradient   is 44 mmHg.  · Mild tricuspid regurgitation.  · There is moderate pulmonary hypertension.  · The estimated PA systolic pressure is 59 mmHg.  · Normal central venous pressure (3 mmHg).          Radiology:  CT Chest Without Contrast    Result Date: 10/6/2022  EXAMINATION: CT CHEST WITHOUT CONTRAST CLINICAL HISTORY: Pleural effusion, malignancy suspected; TECHNIQUE: Using low dose technique the chest was surveyed from above the pulmonary apices through the posterior costophrenic angles without the use of intravenous contrast.  Coronal and sagittal reformats were obtained COMPARISON: 08/28/2019 FINDINGS: Base of Neck: No significant abnormality. Aorta: Left-sided aortic arch. The aorta maintains normal caliber, contour and course. Calcified plaque lines the aorta and coronary arteries. Heart/pericardium: Normal size.  Small pericardial effusion. Isabella/Mediastinum: No pathologic mediastinal mario enlargement. Airways: Patent. Lungs/pleura: Moderate left and small right pleural effusions.  Left lung is nearly completely collapsed with aeration of a small component of the left upper lobe.  Subsegmental right lower lobe atelectasis. Upper Abdomen: Atrophic kidneys.  Low-density focus upper pole left kidney measuring 1.6 cm, likely a cyst.  Extensive atherosclerosis. Bones: Severe compression deformity of approximately L1, stable compared to CT September 2020.     Moderate left  pleural effusion, small right pleural effusion, small pericardial effusion.  Much of the left lung is collapsed.  It would be difficult to exclude underlying malignancy, though I see no obvious mass. Additional stable chronic findings as above. Electronically signed by: Karen Teran Date:    10/06/2022 Time:    08:15    X-Ray Chest AP Portable    Result Date: 10/5/2022  EXAMINATION: XR CHEST AP PORTABLE CLINICAL HISTORY: follow up CHF; TECHNIQUE: Single frontal view of the chest was performed. COMPARISON: 10/03/2022 FINDINGS: Cardiac size remains enlarged aorta is tortuous.  Chest is similar to recent examination some patchy increased markings seen at the right lung base and there is loss of volume and pleural fluid at the left lung base.     See above Electronically signed by: Daron Burr MD Date:    10/05/2022 Time:    07:23    X-Ray Chest AP Portable    Result Date: 10/3/2022  EXAMINATION: XR CHEST AP PORTABLE CLINICAL HISTORY: Dyspnea, unspecified TECHNIQUE: Single frontal view of the chest was performed. COMPARISON: 08/08/2022. FINDINGS: The trachea is unremarkable.  There is stable enlargement of the cardiomediastinal silhouette.  There is no evidence of free air beneath the hemidiaphragms.  There is no pleural effusion right.  There is probable moderate left-sided pleural effusion.  There is no evidence of a pneumothorax.    There is no evidence of pneumomediastinum.  There is worsening pulmonary vascular congestion.  There is a stable airspace opacity in the left lung base.  There are degenerative changes in the osseous structures.     1.  Worsening pulmonary vascular congestion, suggestive of worsening fluid overload state. 2.  Persistent airspace opacity in the left lung base with associated suspected moderate parapneumonic effusion. Electronically signed by: South Waters MD Date:    10/03/2022 Time:    17:13    US Lower Extremity Arteries Bilateral    Result Date: 10/5/2022  EXAMINATION: US LOWER  EXTREMITY ARTERIES BILATERAL CLINICAL HISTORY: PAD; TECHNIQUE: Grayscale, color Doppler and duplex sonographic interrogation was performed through the bilateral lower extremity arterial system. COMPARISON: None FINDINGS: The peak systolic velocities on the right are as follows, in centimeters/second: Common femoral artery: 64 Superficial femoral artery, proximal: 56 Superficial femoral artery, mid portion: 96 Superficial femoral artery, distal: 58 Popliteal artery: 110 Posterior tibial artery: 27 Anterior tibial artery: 54 Peroneal artery: 78 Dorsalis pedis artery: 18 The peak systolic velocities on the left are as follows, in centimeters/second: Common femoral artery: 76 Superficial femoral artery, proximal: 78 Superficial femoral artery, mid portion: 111 Superficial femoral artery, distal: 66 Popliteal artery: 60 Posterior tibial artery: 13 Anterior tibial artery: 39 Peroneal artery: 63 Dorsalis pedis artery: 54 Waveforms in the femoral and popliteal arteries are triphasic and biphasic.  Many of the waveforms in the calf are monophasic.     Diffuse atherosclerosis.  Based on reduced velocities and abnormal monophasic waveforms in the left posterior tibial artery, I suspect underlying hemodynamically significant stenosis.  No evidence for hemodynamically significant stenosis elsewhere. Electronically signed by: Karen Teran Date:    10/05/2022 Time:    08:27    Echo    Result Date: 10/4/2022  · The left ventricle is normal in size with normal systolic function. · The estimated ejection fraction is 65%. · Grade I left ventricular diastolic dysfunction. · Mild left atrial enlargement. · Normal right ventricular size with normal right ventricular systolic function. · There is severe aortic valve stenosis. · Aortic valve area is 0.89 cm2; peak velocity is 4.09 m/s; mean gradient is 44 mmHg. · Mild tricuspid regurgitation. · There is moderate pulmonary hypertension. · The estimated PA systolic pressure is 59 mmHg. ·  Normal central venous pressure (3 mmHg).          Current Medications:     Infusions:       Scheduled:   albuterol-ipratropium  3 mL Nebulization Q4H WAKE    amLODIPine  2.5 mg Oral Daily    aspirin  81 mg Oral Daily    atorvastatin  20 mg Oral Daily    furosemide (LASIX) injection  40 mg Intravenous Daily    gabapentin  400 mg Oral BID    heparin (porcine)  5,000 Units Subcutaneous Q8H    levothyroxine  50 mcg Oral Before breakfast    metoprolol succinate  25 mg Oral Daily    mirtazapine  15 mg Oral QHS    spironolactone  25 mg Oral Daily    traZODone  50 mg Oral QHS        PRN:  acetaminophen, ondansetron, ondansetron, oxyCODONE, sodium chloride 0.9%     Assessment and Plan:     Chucho Prado is a 89 y.o.male with  Acute on chronic diastolic heart failure  Continue diuresis with furosemide 40mg IV QD    Severe aortic stenosis    Hypertension, stable    HLP    CKD         Khari Vázquez MD        Disclaimer: This document was created using voice recognition software ([employer]). Although it may be edited, this document may contain errors related to incorrect recognition of the spoken word. Please call the physician if clarification is needed.

## 2022-10-09 NOTE — PLAN OF CARE
Problem: Physical Therapy  Goal: Physical Therapy Goal  Description: Goals to be met by: 2022    Patient will increase functional independence with mobility by performin. Sit<>stand with Mod assist of one with RW.  2. Gait x 10 feet with RW with max assist of one.  3. Pivot transfer bed to chair with mod assist of one     Outcome: Ongoing, Progressing   Pt performed supine to sit/sit to supine transfers with Max assist of two.  He also required max assist of two to transfer sit to stand with a RW.  Pt was able to transfer sit to stand x 5 with Max assist of two and a RW.

## 2022-10-09 NOTE — ASSESSMENT & PLAN NOTE
Patient transferred to ICU overnight 10/4 with worsening hypoxemia and hyponatremia.  CXR showed worsening pulmonary vascular congestion and a persistent airspace opacity in the left base with an associated effusion.  Note the 2D echo showed a normal EF and only grade I diastolic dysfunction, but severe aortic stenosis and pulmonary HTN seen previously.  Unclear whether this is due to chronic lung disease, he is a former smoker with a 20 pack year history (quit 1990) but is not oxygen dependent at home and does not use respiratory medications chronically.  Cardiology consulted, PCC now following.  CT showed severe volume loss of left lung and associated effusion.      - PCC feels left lung findings due to severe atelectasis and thoracentesis not indicated.  - Needs aggressive mobilization, respiratory therapy with nebulized treatments, chest PT, cough assist.  - Started PT/OT, starting with getting up to chair.    - Continue oxygen supplementation, nightly BiPAP, diuretics.  Frequently needs BiPAP during the day.  - Not a candidate for intervention on aortic valve given age, frailty and comorbidities  - He will need volume ventilator given respiratory failure due to neuromuscular condition.  - PT notes severe kyphosis, holds himself hunched over and cannot support his trunk sitting up.

## 2022-10-09 NOTE — ASSESSMENT & PLAN NOTE
- suspect hypervolemic hyponatremia   - s/p 40 mg IV lasix in ED, then twice daily Lasix  - Nephrology consulted, tolvaptan given and fluid intake liberalized.    - After an initial further decrease hyponatremia stabilized.  - Lasix decreased to once daily given contraction alkalosis with low chloride.  Will give one dose acetazolamide for this.

## 2022-10-10 ENCOUNTER — PATIENT OUTREACH (OUTPATIENT)
Dept: ADMINISTRATIVE | Facility: OTHER | Age: 87
End: 2022-10-10
Payer: MEDICARE

## 2022-10-10 LAB
ANION GAP SERPL CALC-SCNC: 11 MMOL/L (ref 8–16)
BUN SERPL-MCNC: 25 MG/DL (ref 8–23)
CALCIUM SERPL-MCNC: 9.1 MG/DL (ref 8.7–10.5)
CHLORIDE SERPL-SCNC: 83 MMOL/L (ref 95–110)
CO2 SERPL-SCNC: 43 MMOL/L (ref 23–29)
CREAT SERPL-MCNC: 1.3 MG/DL (ref 0.5–1.4)
EST. GFR  (NO RACE VARIABLE): 53 ML/MIN/1.73 M^2
GLUCOSE SERPL-MCNC: 88 MG/DL (ref 70–110)
POTASSIUM SERPL-SCNC: 3.6 MMOL/L (ref 3.5–5.1)
SODIUM SERPL-SCNC: 137 MMOL/L (ref 136–145)

## 2022-10-10 PROCEDURE — 80048 BASIC METABOLIC PNL TOTAL CA: CPT | Performed by: PHYSICIAN ASSISTANT

## 2022-10-10 PROCEDURE — 25000003 PHARM REV CODE 250: Performed by: NURSE PRACTITIONER

## 2022-10-10 PROCEDURE — 25000242 PHARM REV CODE 250 ALT 637 W/ HCPCS: Performed by: HOSPITALIST

## 2022-10-10 PROCEDURE — 27000221 HC OXYGEN, UP TO 24 HOURS

## 2022-10-10 PROCEDURE — 97530 THERAPEUTIC ACTIVITIES: CPT

## 2022-10-10 PROCEDURE — 94668 MNPJ CHEST WALL SBSQ: CPT

## 2022-10-10 PROCEDURE — 63600175 PHARM REV CODE 636 W HCPCS: Performed by: PHYSICIAN ASSISTANT

## 2022-10-10 PROCEDURE — 99233 SBSQ HOSP IP/OBS HIGH 50: CPT | Mod: ,,, | Performed by: HOSPITALIST

## 2022-10-10 PROCEDURE — 99900035 HC TECH TIME PER 15 MIN (STAT)

## 2022-10-10 PROCEDURE — 99233 PR SUBSEQUENT HOSPITAL CARE,LEVL III: ICD-10-PCS | Mod: ,,, | Performed by: HOSPITALIST

## 2022-10-10 PROCEDURE — 25000003 PHARM REV CODE 250: Performed by: PHYSICIAN ASSISTANT

## 2022-10-10 PROCEDURE — 94761 N-INVAS EAR/PLS OXIMETRY MLT: CPT

## 2022-10-10 PROCEDURE — 97116 GAIT TRAINING THERAPY: CPT

## 2022-10-10 PROCEDURE — 94640 AIRWAY INHALATION TREATMENT: CPT

## 2022-10-10 PROCEDURE — 82803 BLOOD GASES ANY COMBINATION: CPT

## 2022-10-10 PROCEDURE — 36415 COLL VENOUS BLD VENIPUNCTURE: CPT | Performed by: PHYSICIAN ASSISTANT

## 2022-10-10 PROCEDURE — 25000003 PHARM REV CODE 250: Performed by: INTERNAL MEDICINE

## 2022-10-10 PROCEDURE — 94660 CPAP INITIATION&MGMT: CPT

## 2022-10-10 PROCEDURE — 36600 WITHDRAWAL OF ARTERIAL BLOOD: CPT

## 2022-10-10 PROCEDURE — 11000001 HC ACUTE MED/SURG PRIVATE ROOM

## 2022-10-10 PROCEDURE — 99233 SBSQ HOSP IP/OBS HIGH 50: CPT | Mod: ,,, | Performed by: INTERNAL MEDICINE

## 2022-10-10 PROCEDURE — 99233 PR SUBSEQUENT HOSPITAL CARE,LEVL III: ICD-10-PCS | Mod: ,,, | Performed by: INTERNAL MEDICINE

## 2022-10-10 PROCEDURE — 25000003 PHARM REV CODE 250: Performed by: HOSPITALIST

## 2022-10-10 RX ADMIN — ACETAMINOPHEN 1000 MG: 500 TABLET, FILM COATED ORAL at 11:10

## 2022-10-10 RX ADMIN — AMLODIPINE BESYLATE 2.5 MG: 2.5 TABLET ORAL at 08:10

## 2022-10-10 RX ADMIN — HEPARIN SODIUM 5000 UNITS: 5000 INJECTION INTRAVENOUS; SUBCUTANEOUS at 05:10

## 2022-10-10 RX ADMIN — ATORVASTATIN CALCIUM 20 MG: 20 TABLET, FILM COATED ORAL at 08:10

## 2022-10-10 RX ADMIN — SPIRONOLACTONE 25 MG: 25 TABLET, FILM COATED ORAL at 08:10

## 2022-10-10 RX ADMIN — IPRATROPIUM BROMIDE AND ALBUTEROL SULFATE 3 ML: 2.5; .5 SOLUTION RESPIRATORY (INHALATION) at 08:10

## 2022-10-10 RX ADMIN — MIRTAZAPINE 15 MG: 15 TABLET, FILM COATED ORAL at 08:10

## 2022-10-10 RX ADMIN — ASPIRIN 81 MG: 81 TABLET, COATED ORAL at 08:10

## 2022-10-10 RX ADMIN — METOPROLOL SUCCINATE 25 MG: 25 TABLET, EXTENDED RELEASE ORAL at 08:10

## 2022-10-10 RX ADMIN — IPRATROPIUM BROMIDE AND ALBUTEROL SULFATE 3 ML: 2.5; .5 SOLUTION RESPIRATORY (INHALATION) at 07:10

## 2022-10-10 RX ADMIN — LEVOTHYROXINE SODIUM 50 MCG: 50 TABLET ORAL at 05:10

## 2022-10-10 RX ADMIN — HEPARIN SODIUM 5000 UNITS: 5000 INJECTION INTRAVENOUS; SUBCUTANEOUS at 03:10

## 2022-10-10 RX ADMIN — GABAPENTIN 400 MG: 400 CAPSULE ORAL at 08:10

## 2022-10-10 RX ADMIN — HEPARIN SODIUM 5000 UNITS: 5000 INJECTION INTRAVENOUS; SUBCUTANEOUS at 09:10

## 2022-10-10 RX ADMIN — IPRATROPIUM BROMIDE AND ALBUTEROL SULFATE 3 ML: 2.5; .5 SOLUTION RESPIRATORY (INHALATION) at 03:10

## 2022-10-10 NOTE — ASSESSMENT & PLAN NOTE
- suspect hypervolemic hyponatremia   - s/p 40 mg IV lasix in ED, then twice daily Lasix  - Nephrology consulted, tolvaptan given and fluid intake liberalized.    - After an initial further decrease hyponatremia stabilized.  - Lasix decreased to once daily given contraction alkalosis with low chloride.  Acetazolamide infusion given yesterday, no improvement in bicarb/chloride levels. Will discuss with nephrology.

## 2022-10-10 NOTE — PLAN OF CARE
CM spoke with Dr. Mason who stated pt will be ready for discharge once respiratory therapy delivers and trains him on the volume vent.  Pt will need a power chair and a lift device.         10/10/22 5992   Discharge Reassessment   Assessment Type Discharge Planning Reassessment   Did the patient's condition or plan change since previous assessment? No   Discharge Plan A Home  (Pt has round the clock sitters and care.)   Discharge Plan B Home with family   DME Needed Upon Discharge  power chair;lift device   Discharge Barriers Identified None   Why the patient remains in the hospital Requires continued medical care   Post-Acute Status   Discharge Delays None known at this time

## 2022-10-10 NOTE — ASSESSMENT & PLAN NOTE
Patient transferred to ICU overnight 10/4 with worsening hypoxemia and hyponatremia.  CXR showed worsening pulmonary vascular congestion and a persistent airspace opacity in the left base with an associated effusion.  Note the 2D echo showed a normal EF and only grade I diastolic dysfunction, but severe aortic stenosis and pulmonary HTN seen previously.  Unclear whether this is due to chronic lung disease, he is a former smoker with a 20 pack year history (quit 1990) but is not oxygen dependent at home and does not use respiratory medications chronically.  Cardiology consulted, PCC now following.  CT showed severe volume loss of left lung and associated effusion.      - PCC feels left lung findings due to severe atelectasis and thoracentesis not indicated.  Working with case management on this.  - Needs aggressive mobilization, respiratory therapy with nebulized treatments, chest PT, cough assist.  - Started PT/OT, starting with getting up to chair.    - Continue oxygen supplementation, nightly BiPAP, diuretics.  Frequently needs BiPAP during the day.  - Not a candidate for intervention on aortic valve given age, frailty and comorbidities  - He will need volume ventilator given respiratory failure due to neuromuscular condition.  - PT notes severe kyphosis, holds himself hunched over and cannot support his trunk sitting up.

## 2022-10-10 NOTE — PROGRESS NOTES
[facility] Intensive Care [facility]  Cardiology  Progress Note    Patient Name: Chucho Prado  MRN: [id number]  Admission Date: 10/3/2022  Hospital Length of Stay: 7 days  Code Status: DNR   Attending Physician: Xin Mason MD   Primary Care Physician: Obed Mcguire MD  Expected Discharge Date:   Principal Problem:Acute on chronic respiratory failure with hypoxia and hypercapnia    Subjective:     Brief HPI:    Chucho Prado is a 89 y.o.male with hypertension and hypercholesterolemia. He has been unable to walk after a back injury in about 2015. He has chronic kidney disease, stage 3. He has a suprapubic catheter due to a neurogenic bladder. Beginning in mid 9/2022 he noted that he was becoming short of breath from lying down. The shortness of breath got worse. His legs became edematous. He presented to the emergency room on 10/3/2022 being fluid overloaded. He was noted to be hyponatremic. He says he has been drinking a lot of water. He was admitted.     Hospital Course:    10/4/2022: Received tolvaptan.    10/4/2022: Echo: Normal left ventricular size and systolic function. EF 65%. Mild diastolic dysfunction. Mildly enlarged LA. Severe AS - 4.1 m/s - MG 44 mmHg - 0.9 cm2. SPAP 59 mmHg.    10/4/2022: Transferred to ICU due to respiratory issues.    Interval History:    Says he is comfortable but weak.    Edema has resolved.      Review of Systems   Constitutional: Positive for malaise/fatigue. Negative for chills and fever.   HENT:  Negative for nosebleeds.    Eyes:  Negative for vision loss in left eye and vision loss in right eye.   Cardiovascular:  Negative for chest pain, leg swelling, orthopnea, palpitations and paroxysmal nocturnal dyspnea.   Respiratory:  Positive for cough and shortness of breath. Negative for hemoptysis, sputum production and wheezing.    Hematologic/Lymphatic: Negative for bleeding problem. Does not bruise/bleed easily.   Skin:  Negative for color change and rash.    Musculoskeletal:  Negative for muscle weakness and myalgias.   Gastrointestinal:  Negative for abdominal pain, heartburn, hematemesis, hematochezia, melena, nausea and vomiting.   Genitourinary:  Negative for hematuria.   Neurological:  Negative for dizziness, focal weakness, headaches, light-headedness, vertigo and weakness.   Psychiatric/Behavioral:  Negative for altered mental status. The patient is not nervous/anxious.    Allergic/Immunologic: Negative for persistent infections.       Objective:     Vital Signs (Most Recent):  Temp: 98.1 °F (36.7 °C) (10/10/22 1606)  Pulse: 68 (10/10/22 1606)  Resp: 17 (10/10/22 1556)  BP: (!) 111/55 (10/10/22 1606)  SpO2: 97 % (10/10/22 1606)   Vital Signs (24h Range):  Temp:  [97.5 °F (36.4 °C)-98.8 °F (37.1 °C)] 98.1 °F (36.7 °C)  Pulse:  [68-88] 68  Resp:  [17-20] 17  SpO2:  [92 %-100 %] 97 %  BP: (111-136)/(55-68) 111/55     Weight: 90.7 kg (200 lb)  Body mass index is 30.41 kg/m².    SpO2: 97 %  O2 Device (Oxygen Therapy): nasal cannula w/ humidification      Intake/Output Summary (Last 24 hours) at 10/10/2022 1734  Last data filed at 10/10/2022 1729  Gross per 24 hour   Intake 610 ml   Output 1450 ml   Net -840 ml         Lines/Drains/Airways       Drain  Duration                  Suprapubic Catheter 09/01/20 latex 18 Fr. 769 days              Peripheral Intravenous Line  Duration                  Peripheral IV - Single Lumen 10/03/22 1717 20 G Right Forearm 7 days                    Physical Exam  Constitutional:       General: He is not in acute distress.     Appearance: Normal appearance. He is well-developed. He is not ill-appearing.   Eyes:      Conjunctiva/sclera:      Right eye: Right conjunctiva is not injected. No hemorrhage.     Left eye: Left conjunctiva is not injected. No hemorrhage.     Pupils:      Right eye: Pupil is round.      Left eye: Pupil is round.   Neck:      Vascular: No JVD.   Cardiovascular:      Rate and Rhythm: Normal rate and regular  rhythm.      Heart sounds: S1 normal and S2 normal. Murmur heard.   Midsystolic murmur is present with a grade of 3/6 at the upper right sternal border.     Gallop present. S4 sounds present. No S3 sounds.   Pulmonary:      Effort: Pulmonary effort is normal.      Breath sounds: Normal breath sounds.   Chest:      Chest wall: No swelling or tenderness.   Abdominal:      General: There is no distension.      Palpations: Abdomen is soft.      Tenderness: There is no abdominal tenderness.   Musculoskeletal:      Cervical back: Neck supple.      Right lower leg: No swelling. No edema.      Left lower leg: No swelling. No edema.   Skin:     General: Skin is warm and dry.      Findings: No rash.   Neurological:      Mental Status: He is alert and oriented to person, place, and time. He is not disoriented.   Psychiatric:         Attention and Perception: Attention normal.         Mood and Affect: Mood normal.         Speech: Speech normal.         Behavior: Behavior normal.         Thought Content: Thought content normal.         Cognition and Memory: Cognition and memory normal.         Judgment: Judgment normal.       Current Medications:     albuterol-ipratropium  3 mL Nebulization Q4H WAKE    amLODIPine  2.5 mg Oral Daily    aspirin  81 mg Oral Daily    atorvastatin  20 mg Oral Daily    gabapentin  400 mg Oral BID    heparin (porcine)  5,000 Units Subcutaneous Q8H    levothyroxine  50 mcg Oral Before breakfast    metoprolol succinate  25 mg Oral Daily    mirtazapine  15 mg Oral QHS    spironolactone  25 mg Oral Daily    traZODone  50 mg Oral QHS     Current Laboratory Results:    Recent Results (from the past 24 hour(s))   Basic metabolic panel    Collection Time: 10/10/22  4:57 AM   Result Value Ref Range    Sodium 137 136 - 145 mmol/L    Potassium 3.6 3.5 - 5.1 mmol/L    Chloride 83 (L) 95 - 110 mmol/L    CO2 43 (HH) 23 - 29 mmol/L    Glucose 88 70 - 110 mg/dL    BUN 25 (H) 8 - 23 mg/dL    Creatinine 1.3 0.5 - 1.4  mg/dL    Calcium 9.1 8.7 - 10.5 mg/dL    Anion Gap 11 8 - 16 mmol/L    eGFR 53 (A) >60 mL/min/1.73 m^2     Current Imaging Results:    CT Chest Without Contrast   Final Result      Moderate left pleural effusion, small right pleural effusion, small pericardial effusion.  Much of the left lung is collapsed.  It would be difficult to exclude underlying malignancy, though I see no obvious mass.      Additional stable chronic findings as above.         Electronically signed by: Karen Teran   Date:    10/06/2022   Time:    08:15      X-Ray Chest AP Portable   Final Result      See above         Electronically signed by: Daron Burr MD   Date:    10/05/2022   Time:    07:23      US Lower Extremity Arteries Bilateral   Final Result      Diffuse atherosclerosis.  Based on reduced velocities and abnormal monophasic waveforms in the left posterior tibial artery, I suspect underlying hemodynamically significant stenosis.  No evidence for hemodynamically significant stenosis elsewhere.         Electronically signed by: Karen Teran   Date:    10/05/2022   Time:    08:27      X-Ray Chest AP Portable   Final Result      1.  Worsening pulmonary vascular congestion, suggestive of worsening fluid overload state.      2.  Persistent airspace opacity in the left lung base with associated suspected moderate parapneumonic effusion.         Electronically signed by: South Waters MD   Date:    10/03/2022   Time:    17:13          10/4/2022: Echo:    The left ventricle is normal in size with normal systolic function.  The estimated ejection fraction is 65%.  Grade I left ventricular diastolic dysfunction.  Mild left atrial enlargement.  Normal right ventricular size with normal right ventricular systolic function.  There is severe aortic valve stenosis.  Aortic valve area is 0.89 cm2; peak velocity is 4.09 m/s; mean gradient is 44 mmHg.  Mild tricuspid regurgitation.  There is moderate pulmonary hypertension.  The estimated PA  systolic pressure is 59 mmHg.  Normal central venous pressure (3 mmHg).      Assessment and Plan:     Problem List:    Active Diagnoses:    Diagnosis Date Noted POA    PRINCIPAL PROBLEM:  Acute on chronic respiratory failure with hypoxia and hypercapnia [J96.21, J96.22] 08/27/2020 Yes    Neuromuscular respiratory weakness [G70.9, J99] 10/07/2022 Yes    Pleural effusion, left [J90] 10/05/2022 Yes    Advanced care planning/counseling discussion [Z71.89] 10/05/2022 Not Applicable    Asymptomatic bacteriuria [R82.71] 10/04/2022 Yes    Acute heart failure [I50.9] 10/03/2022 Yes    Suprapubic catheter [Z93.59] 10/23/2017 Not Applicable    Hyponatremia [E87.1] 10/23/2017 Yes    CKD (chronic kidney disease) stage 3, GFR 30-59 ml/min [N18.30] 12/26/2016 Yes    Iron deficiency anemia [D50.9] 07/31/2013 Yes    Essential hypertension [I10] 07/30/2013 Yes      Problems Resolved During this Admission:       Assessment and Plan:     Heart Failure, Diastolic, Acute on Chronic              10/3/2022: Presents in HF with pulmonary edema and edema of legs.              8/28/2020: Echo: Normal left ventricular size and systolic function. EF 60%. Septal WMA. Moderate to severe AS - 3.1 m/s - MG 26 mmHg - 1.0 cm2.   10/4/2022: Echo: Normal left ventricular size and systolic function. EF 65%. Mild diastolic dysfunction. Mildly enlarged LA. Severe AS - 4.1 m/s - MG 44 mmHg - 0.9 cm2. SPAP 59 mmHg.              At home was on  furosemide 20 mg M,W & F. Has been taken additional furosemide but unclear on dosing.   10/4/2022: Received tolvaptan.              Received furosemide 40 mg iv Q24.              Fluid restriction 1,500 ml/day.   On spironolactone 25 mg Q24.   Furosemide currently on hold.                           2. Aortic Stenosis              8/28/2020: Echo: Moderate to severe AS - 3.1 m/s - MG 26 mmHg - 1.0 cm2.   10/4/2022: Echo: Severe AS - 4.1 m/s - MG 44 mmHg - 0.9 cm2.      3. Hypertension              At home been on  metoprolol 25 mg Q24, amlodipine 5 mg Q24, furosemide 20 mg 3/7 & KCl 10 meq 3/7.   On metoprolol 25 mg Q24, amlodipine 2.5 mg Q24 and spironolactone 25 mg Q24.      4. Hypercholesterolemia  At home been on atorvastatin 20 mg Q24.      5. Chronic Kidney Disease, Stage 3              10/4/2022: BUN/crea 14/0.9. CrCl >60.     6. Hyponatremia              10/4/2022: Na 122.   10/4/2022: Received tolvaptan.   10/10/2022: Na 137.              Fluid restriction.     7. Respiratory Failure   10/5/2022: PCO2 81. PO2 106.                 VTE Risk Mitigation (From admission, onward)           Ordered     heparin (porcine) injection 5,000 Units  Every 8 hours         10/03/22 2007     IP VTE HIGH RISK PATIENT  Once         10/03/22 2007     Place sequential compression device  Until discontinued         10/03/22 2007                    Tomasa Rdz MD  Cardiology  [facility] - Intensive Care ([place])

## 2022-10-10 NOTE — PROGRESS NOTES
"[facility] Medicine  Progress Note    Patient Name: Chucho Prado  MRN: [id number]  Patient Class: IP- Inpatient   Admission Date: 10/3/2022  Length of Stay: 7 days  Attending Physician: Xin Mason MD  Primary Care Provider: Obed Mcguire MD        Subjective:     Principal Problem:Acute on chronic respiratory failure with hypoxia and hypercapnia        HPI:  From H&P by Kerrie Torres PA:  "Mr. Chucho Prado is a 89 y.o. male, with PMH of CHF, HTN, hypothyroidism, HLD, MDD, CARLINE, CKD-3, neurogenic bladder s/p suprapubic catheter insertion, GERD, wheelchair bound, who presented to [facility] ED on 10/3/22 due to shortness of breath x 3 days. He states he feels out of breath when laying flat or with activity (ie. Being changed or transferring to the wheelchair). Additionally, he states he feels occasional chest tightness, has been having wheezing, and coughing (present for weeks). Upon awakening this morning he noted b/l lower extremity swelling, which he attributes to sitting up while sleeping last night and not elevating his legs. He denies fever. He was evaluated in the ED with labs showing hyponatremia with sodium of 122, chloride of 83. A BNP was elevated at 212, and troponin was negative. A CXR showed worsening pulmonary vascular congestion, and persistence of a left lung base airspace opacity. He was treated in the ED with 40 mg IV lasix."      Overview/Hospital Course:  Patient was admitted under a heart failure protocol on IV Lasix and Cardiology was consulted to follow.  His 2D echo was repeated.  Nephrology was consulted for the hyponatremia.  He was given a dose of tolvaptan and his sodium level was monitored closely.  Patient's oxygen level became difficult to manage on the floor and he was transferred to ICU overnight 10/4 when he required placement of a NRB and pulmonary/critical care was consulted.  Sodium level decreased to 120 upon transfer before stabilizing the " following morning.  Diuresis was continued. 2D echo showed severe aortic stenosis, pulmonary HTN and grade I diastolic dysfunction.      His CXR showed volume loss in the right lung and CT was done showing a left pleural effusion with much of the left lung collapsed.  There was a small effusion on the right and a small pericardial effusion.  Critical care evaluated and felt the volume loss was due to atelectasis due to neuromuscular failure, and patient would require significant mobilization and NIV for volume ventilation.  PT/OT consulted to work with him and patient was moved back to the floor.  He continued to have episodic desaturation with minimal activity and frequently had to hold off on therapy and be placed back on BiPAP.      Interval History:  A little better today, more able to converse freely and feels less short of breath.  Lasix held for now given increasing bicarb/decreasing chloride.    Review of Systems   Constitutional:  Negative for chills and fever.   Respiratory:  Positive for shortness of breath. Negative for cough.    Cardiovascular:  Negative for chest pain, palpitations and leg swelling.   Objective:     Vital Signs (Most Recent):  Temp: 98.1 °F (36.7 °C) (10/10/22 1120)  Pulse: 80 (10/10/22 1120)  Resp: 18 (10/10/22 1120)  BP: 117/68 (10/10/22 1120)  SpO2: 97 % (10/10/22 1120) Vital Signs (24h Range):  Temp:  [97.5 °F (36.4 °C)-98.8 °F (37.1 °C)] 98.1 °F (36.7 °C)  Pulse:  [69-88] 80  Resp:  [17-20] 18  SpO2:  [92 %-100 %] 97 %  BP: (113-136)/(53-68) 117/68     Weight: 90.7 kg (200 lb)  Body mass index is 30.41 kg/m².    Intake/Output Summary (Last 24 hours) at 10/10/2022 1418  Last data filed at 10/10/2022 1209  Gross per 24 hour   Intake 490 ml   Output 1450 ml   Net -960 ml      Physical Exam  Cardiovascular:      Rate and Rhythm: Normal rate and regular rhythm.      Pulses: Normal pulses.      Heart sounds: Murmur heard.     No gallop.      Comments: Harsh holosystolic murmur at the  RSB.  Pulmonary:      Breath sounds: No wheezing.      Comments: Poor effort, breath sounds nearly inaudible on the left.  Abdominal:      General: Bowel sounds are normal.      Palpations: Abdomen is soft.   Genitourinary:     Comments: Suprapubic catheter draining clear yellow urine.  Musculoskeletal:      Comments: Both legs with protective boots in place.     Skin:     General: Skin is warm and dry.   Neurological:      General: No focal deficit present.      Mental Status: He is alert and oriented to person, place, and time.       Significant Labs: All pertinent labs within the past 24 hours have been reviewed.    Significant Imaging: I have reviewed all pertinent imaging results/findings within the past 24 hours.      Assessment/Plan:      * Acute on chronic respiratory failure with hypoxia and hypercapnia  Patient transferred to ICU overnight 10/4 with worsening hypoxemia and hyponatremia.  CXR showed worsening pulmonary vascular congestion and a persistent airspace opacity in the left base with an associated effusion.  Note the 2D echo showed a normal EF and only grade I diastolic dysfunction, but severe aortic stenosis and pulmonary HTN seen previously.  Unclear whether this is due to chronic lung disease, he is a former smoker with a 20 pack year history (quit 1990) but is not oxygen dependent at home and does not use respiratory medications chronically.  Cardiology consulted, PCC now following.  CT showed severe volume loss of left lung and associated effusion.      - PCC feels left lung findings due to severe atelectasis and thoracentesis not indicated.  Working with case management on this.  - Needs aggressive mobilization, respiratory therapy with nebulized treatments, chest PT, cough assist.  - Started PT/OT, starting with getting up to chair.    - Continue oxygen supplementation, nightly BiPAP, diuretics.  Frequently needs BiPAP during the day.  - Not a candidate for intervention on aortic valve  "given age, frailty and comorbidities  - He will need volume ventilator given respiratory failure due to neuromuscular condition.  - PT notes severe kyphosis, holds himself hunched over and cannot support his trunk sitting up.      Hyponatremia  - suspect hypervolemic hyponatremia   - s/p 40 mg IV lasix in ED, then twice daily Lasix  - Nephrology consulted, tolvaptan given and fluid intake liberalized.    - After an initial further decrease hyponatremia stabilized.  - Lasix decreased to once daily given contraction alkalosis with low chloride.  Acetazolamide infusion given yesterday, no improvement in bicarb/chloride levels. Will discuss with nephrology.    Acute heart failure  - Patient presented with shortness of breath worse when supine   - BNP in ED was 212   - CXR with evidence of pulmonary edema and left pleural effusion associated with an airspace opacity on that side that is consistent with severe atelectasis.  He does not have pneumonia symptoms.  - s/p lasix 40 mg IV in ED   - monitor I&Os   - Heart Failure pathways initiated   - Echo showed normal EF, grade I diastolic dysfunction, severe AS, pulmonary HTN.    Neuromuscular respiratory weakness  See "acute on chronic respiratory failure"      Advanced care planning/counseling discussion  Patient seen by palliative care and has elected DNR status.  He does state that he doesn't want us to "give up" on him and has been assured that only the most aggressive measures (CPR, intubation, shocks) have been omitted from the care plan.  We will continue oxygen and NIV as needed, antibiotics if needed, diuresis, etc.      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  - Vancomycin given in ED due to previous culture with <50K MRSA (2 years ago)  - As he does not have fever or leukocytosis will hold off on further antibiotics.  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        CKD (chronic kidney disease) stage 3, GFR 30-59 ml/min  - Cr is 1.0  - ranges from 0.8  - 1.0   - avoid nephrotoxins   - renally dose meds     Iron deficiency anemia  - H&H are stable at present   - monitor       Essential hypertension  - hypertensive on presentation  - home meds: amlodipine 5 mg QD, metoprolol succinate 25 mg QD  - monitor.  Improving with diuresis.      VTE Risk Mitigation (From admission, onward)         Ordered     heparin (porcine) injection 5,000 Units  Every 8 hours         10/03/22 2007     IP VTE HIGH RISK PATIENT  Once         10/03/22 2007     Place sequential compression device  Until discontinued         10/03/22 2007                        Xin Sampson MD  Department of Hospital Medicine   [facility] - Med Surg ([place])

## 2022-10-10 NOTE — PROGRESS NOTES
"Nephrology  Progress Note    Admit Date: 10/3/2022   LOS: 7 days     SUBJECTIVE:     Follow-up For:  Acute on chronic respiratory failure with hypoxia and hypercapnia    History of Present Illness:  Patient is a retired  at [employer] 89 y.o. male presents with worsening SOB x 3 days and found to have pulm edema/CHF/hypoxia.  Labs revealing acute on chronic hyponatremia of 122.  Placed on fluid restriction, lasix, and admitted to hospital for monitoring.  Na remains 122 this am and consulted for evaluation.  Extensive medical history as outlined below including suprapubic catheter for neurogenic bladder, lower extremity edema currently with Ace wraps, mobility impairment, combined CHF and aortic stenosis, and chronic hyponatremia with sodium ranging from 119-137 since 2004.  Patient seen and examined with medical power of  and registered nurse at bedside.  Patient using accessory muscles and work of breathing with signs and symptoms of volume overload.  Admits to drinking 8-10 bottles of water per day.  Discussed treatment plan and agrees with Samsca.  Updated team.     Epic reviewed     Currently no chest pain, nausea, vomiting, diarrhea, fever, chills.  Positive shortness of breath and dyspnea on exertion    Interval History:     discussed with Dr. Mason and palliative care.  Pt eating breakfast w/o issues.  Still weak.  Labs stabilized in regards to contraction alkalosis.  No CP/SOB.     Review of Systems:  Constitutional: No fever or chills  Respiratory: shortness of breath better.   Cardiovascular: No chest pain or palpitations  Gastrointestinal: No nausea or vomiting  Neurological: No confusion or weakness    OBJECTIVE:     Vital Signs Range (Last 24H):  /65   Pulse 77   Temp 98.1 °F (36.7 °C)   Resp 17   Ht 5' 8" (1.727 m)   Wt 90.7 kg (200 lb)   SpO2 100%   BMI 30.41 kg/m²     Temp:  [97.5 °F (36.4 °C)-99 °F (37.2 °C)]   Pulse:  [69-88]   Resp:  [17-20]   BP: " (113-136)/(53-65)   SpO2:  [91 %-100 %]     I & O (Last 24H):  Intake/Output Summary (Last 24 hours) at 10/10/2022 0938  Last data filed at 10/10/2022 0759  Gross per 24 hour   Intake 370 ml   Output 800 ml   Net -430 ml       Physical Exam:  General appearance:  Elderly, frail and very weak.   Eyes:  Conjunctivae/corneas clear. PERRL.  Lungs:  Increased work of breathing is improved but still with abd breathing at times.   Diminished.   Heart: Regular rate and rhythm, S1, S2 normal, AS murmur  Abdomen: Soft, non-tender non-distended; bowel sounds normal; no masses,  no organomegaly, obese, suprapubic catheter  Extremities: No cyanosis or clubbing.  Compression bands on  Skin: Skin color, texture, turgor normal. No rashes or lesions  Neurologic:  Generally weak       Laboratory Data:  No results for input(s): WBC, RBC, HGB, HCT, PLT, MCV, MCH, MCHC in the last 24 hours.      BMP:   Recent Labs   Lab 10/07/22  0347 10/08/22  0512 10/10/22  0457   GLU 83   < > 88   *   < > 137   K 3.1*   < > 3.6   CL 79*   < > 83*   CO2 40*   < > 43*   BUN 16   < > 25*   CREATININE 0.9   < > 1.3   CALCIUM 8.8   < > 9.1   MG 1.6  --   --     < > = values in this interval not displayed.     Lab Results   Component Value Date    CALCIUM 9.1 10/10/2022    PHOS 2.8 09/18/2020       Lab Results   Component Value Date    .1 (H) 08/31/2022    CALCIUM 9.1 10/10/2022    CAION 1.07 09/03/2019    PHOS 2.8 09/18/2020       Lab Results   Component Value Date    URICACID 5.1 10/04/2022       BNP  Recent Labs   Lab 10/03/22  1716   *       Medications:  Medication list was reviewed and changes noted under Assessment/Plan.    Diagnostic Results:    CT Chest Without Contrast   Final Result      Moderate left pleural effusion, small right pleural effusion, small pericardial effusion.  Much of the left lung is collapsed.  It would be difficult to exclude underlying malignancy, though I see no obvious mass.      Additional stable  chronic findings as above.         Electronically signed by: Karen Teran   Date:    10/06/2022   Time:    08:15      X-Ray Chest AP Portable   Final Result      See above         Electronically signed by: Daron Burr MD   Date:    10/05/2022   Time:    07:23      US Lower Extremity Arteries Bilateral   Final Result      Diffuse atherosclerosis.  Based on reduced velocities and abnormal monophasic waveforms in the left posterior tibial artery, I suspect underlying hemodynamically significant stenosis.  No evidence for hemodynamically significant stenosis elsewhere.         Electronically signed by: Karen Teran   Date:    10/05/2022   Time:    08:27      X-Ray Chest AP Portable   Final Result      1.  Worsening pulmonary vascular congestion, suggestive of worsening fluid overload state.      2.  Persistent airspace opacity in the left lung base with associated suspected moderate parapneumonic effusion.         Electronically signed by: South Waters MD   Date:    10/03/2022   Time:    17:13          ASSESSMENT/PLAN:     Acute on chronic hypervolemic hyponatremia secondary to CHF, excessive water intake, etc:  -urine studies hard to interpret with lasix on board.  -admit  and same following lasix/fluid restriction.  -dosed samsca and trending Q6.  Redosed 10/6.     -[phone]  -long standing hx of hyponatremia with variable ranges (119-137).  -TSH wnl  -holding lasix with contraction alkalosis.   -Renally dose meds, avoid nephrotoxins, and monitor I/O's closely.       Contraction Alkalosis:  -as mentioned.  -diuresed well  -holding lasix  -received diamox yesterday.  -check gas today.      Acute on chronic combined CHF/AS:  -Dr. Rdz following and discussed.  -on sprio      Hypokalemia:  -secondary to diuresis  -replace orally.  -mag stable        Hypoxia/Hypercapnia/MAMI:  -better with Bipap  -CCT following  -diuresed as above.           Neurogenic bladder:  -cath  exchanged.  -defer.        Severe Left pleural effusion:  -CCT following and felt mostly atelectatic   -needs aggressive pulm rehab          Dispo:  Consulted palliative care for goals of care  He has 24 hr sitters and a MPOA.   DNR  Likely needs to transition to palliative approach.

## 2022-10-10 NOTE — PT/OT/SLP PROGRESS
Physical Therapy Treatment    Patient Name:  Chucho Prado   MRN:  [id number]    Recommendations:     Discharge Recommendations:  nursing facility, skilled   Discharge Equipment Recommendations: other (see comments) (TBD pending progress)   Barriers to discharge:  Increased mob and transfers needs, O2 desat    Assessment:     Chucho Prado is a 89 y.o. male admitted with a medical diagnosis of Acute on chronic respiratory failure with hypoxia and hypercapnia.  He presents with the following impairments/functional limitations:  weakness, impaired endurance, impaired self care skills, impaired functional mobility, gait instability, impaired balance, decreased coordination, decreased lower extremity function, decreased safety awareness, pain, decreased ROM, impaired cardiopulmonary response to activity, orthopedic precautions, abnormal tone . Upon arrival, pt was elevated HOB on 4L/min SpO2 96%. Pt was modAx2 sup>sit. Pt was modAx2-maxAx2 sit<>stand w/ RW, increased fatigue, increased posterior lean, needs vc for foot placement. 1st trial- 10secs, 2nd trial- 6secs 3rd trial 4secs. Pt desat to low 80s through session. PLB performed during session. RTB, TAx2 sit>sup    Rehab Prognosis: Good; patient would benefit from acute skilled PT services to address these deficits and reach maximum level of function.    Recent Surgery: * No surgery found *      Plan:     During this hospitalization, patient to be seen 5 x/week to address the identified rehab impairments via gait training, therapeutic activities, therapeutic exercises, neuromuscular re-education and progress toward the following goals:    Plan of Care Expires:  11/09/22    Subjective     Chief Complaint: Pain in Hip (reported a jolting pain, while in bed).  Patient/Family Comments/goals: None stated  Pain/Comfort:  Pain Rating 1: other (see comments) (did not rate)  Location - Side 1: Right  Location 1: hip  Pain Addressed 1: Distraction, Reposition, Cessation  of Activity      Objective:     Communicated with Jeanine RODRÍGUEZ prior to session.  Patient found HOB elevated with oxygen, pulse ox (continuous) (suprapubic catheter) upon PT entry to room.     General Precautions: Standard, fall, respiratory   Orthopedic Precautions:N/A   Braces: N/A  Respiratory Status: Nasal cannula, flow 4 L/min     Functional Mobility:  Bed Mobility:     Supine to Sit: moderate assistance and of 2 persons  Sit to Supine: total assistance and of 2 persons  Transfers:     Sit to Stand:  moderate assistance and maximal assistance and of 2 persons with rolling walker      AM-PAC 6 CLICK MOBILITY          Therapeutic Activities and Exercises:   importance of mob, safety awareness, transfer training, PLB    Patient left HOB elevated with all lines intact, call button in reach, RN notified, and Nursing students present..    GOALS:   Multidisciplinary Problems       Physical Therapy Goals          Problem: Physical Therapy    Goal Priority Disciplines Outcome Goal Variances Interventions   Physical Therapy Goal     PT, PT/OT Ongoing, Progressing     Description: Goals to be met by: 2022    Patient will increase functional independence with mobility by performin. Sit<>stand with Mod assist of one with RW.  2. Gait x 10 feet with RW with max assist of one.  3. Pivot transfer bed to chair with mod assist of one                          Time Tracking:     PT Received On: 10/10/22  PT Start Time: 1044     PT Stop Time: 1120  PT Total Time (min): 36 min     Billable Minutes: Therapeutic Activity 36    Treatment Type: Evaluation  PT/PTA: PT     PTA Visit Number: 0     10/10/2022

## 2022-10-10 NOTE — PLAN OF CARE
POC reviewed with pt and he verbalized understanding. 4L hi nayeli - O2 sats WDL. PRN medication given for c/o mild bilat hip pain after working with PT. Frequent weight shifting assistance provided. Safety precautions in place. Will continue with poc.    Problem: Adult Inpatient Plan of Care  Goal: Plan of Care Review  Outcome: Ongoing, Progressing  Goal: Patient-Specific Goal (Individualized)  Outcome: Ongoing, Progressing  Goal: Absence of Hospital-Acquired Illness or Injury  Outcome: Ongoing, Progressing  Goal: Optimal Comfort and Wellbeing  Outcome: Ongoing, Progressing  Goal: Readiness for Transition of Care  Outcome: Ongoing, Progressing     Problem: Fluid Imbalance (Pneumonia)  Goal: Fluid Balance  Outcome: Ongoing, Progressing     Problem: Infection (Pneumonia)  Goal: Resolution of Infection Signs and Symptoms  Outcome: Ongoing, Progressing     Problem: Respiratory Compromise (Pneumonia)  Goal: Effective Oxygenation and Ventilation  Outcome: Ongoing, Progressing     Problem: Impaired Wound Healing  Goal: Optimal Wound Healing  Outcome: Ongoing, Progressing     Problem: Fall Injury Risk  Goal: Absence of Fall and Fall-Related Injury  Outcome: Ongoing, Progressing     Problem: Skin Injury Risk Increased  Goal: Skin Health and Integrity  Outcome: Ongoing, Progressing     Problem: Coping Ineffective  Goal: Effective Coping  Outcome: Ongoing, Progressing

## 2022-10-11 LAB
ANION GAP SERPL CALC-SCNC: 11 MMOL/L (ref 8–16)
BUN SERPL-MCNC: 31 MG/DL (ref 8–23)
CALCIUM SERPL-MCNC: 8.9 MG/DL (ref 8.7–10.5)
CHLORIDE SERPL-SCNC: 84 MMOL/L (ref 95–110)
CO2 SERPL-SCNC: 41 MMOL/L (ref 23–29)
CREAT SERPL-MCNC: 1.4 MG/DL (ref 0.5–1.4)
EST. GFR  (NO RACE VARIABLE): 48 ML/MIN/1.73 M^2
GLUCOSE SERPL-MCNC: 89 MG/DL (ref 70–110)
POTASSIUM SERPL-SCNC: 3.7 MMOL/L (ref 3.5–5.1)
SODIUM SERPL-SCNC: 136 MMOL/L (ref 136–145)

## 2022-10-11 PROCEDURE — 63600175 PHARM REV CODE 636 W HCPCS: Performed by: PHYSICIAN ASSISTANT

## 2022-10-11 PROCEDURE — 94640 AIRWAY INHALATION TREATMENT: CPT

## 2022-10-11 PROCEDURE — 97110 THERAPEUTIC EXERCISES: CPT

## 2022-10-11 PROCEDURE — 94761 N-INVAS EAR/PLS OXIMETRY MLT: CPT

## 2022-10-11 PROCEDURE — 11000001 HC ACUTE MED/SURG PRIVATE ROOM

## 2022-10-11 PROCEDURE — 99233 SBSQ HOSP IP/OBS HIGH 50: CPT | Mod: ,,, | Performed by: INTERNAL MEDICINE

## 2022-10-11 PROCEDURE — 94660 CPAP INITIATION&MGMT: CPT

## 2022-10-11 PROCEDURE — 94668 MNPJ CHEST WALL SBSQ: CPT

## 2022-10-11 PROCEDURE — 27000646 HC AEROBIKA DEVICE

## 2022-10-11 PROCEDURE — 99900035 HC TECH TIME PER 15 MIN (STAT)

## 2022-10-11 PROCEDURE — 25000003 PHARM REV CODE 250: Performed by: HOSPITALIST

## 2022-10-11 PROCEDURE — 25000003 PHARM REV CODE 250: Performed by: PHYSICIAN ASSISTANT

## 2022-10-11 PROCEDURE — 80048 BASIC METABOLIC PNL TOTAL CA: CPT | Performed by: PHYSICIAN ASSISTANT

## 2022-10-11 PROCEDURE — 94664 DEMO&/EVAL PT USE INHALER: CPT

## 2022-10-11 PROCEDURE — 36415 COLL VENOUS BLD VENIPUNCTURE: CPT | Performed by: PHYSICIAN ASSISTANT

## 2022-10-11 PROCEDURE — 25000003 PHARM REV CODE 250: Performed by: NURSE PRACTITIONER

## 2022-10-11 PROCEDURE — 25000242 PHARM REV CODE 250 ALT 637 W/ HCPCS: Performed by: HOSPITALIST

## 2022-10-11 PROCEDURE — 99232 PR SUBSEQUENT HOSPITAL CARE,LEVL II: ICD-10-PCS | Mod: ,,, | Performed by: STUDENT IN AN ORGANIZED HEALTH CARE EDUCATION/TRAINING PROGRAM

## 2022-10-11 PROCEDURE — 27100171 HC OXYGEN HIGH FLOW UP TO 24 HOURS

## 2022-10-11 PROCEDURE — 99232 SBSQ HOSP IP/OBS MODERATE 35: CPT | Mod: ,,, | Performed by: STUDENT IN AN ORGANIZED HEALTH CARE EDUCATION/TRAINING PROGRAM

## 2022-10-11 PROCEDURE — 97530 THERAPEUTIC ACTIVITIES: CPT | Mod: CQ

## 2022-10-11 PROCEDURE — 97535 SELF CARE MNGMENT TRAINING: CPT

## 2022-10-11 PROCEDURE — 99233 PR SUBSEQUENT HOSPITAL CARE,LEVL III: ICD-10-PCS | Mod: ,,, | Performed by: INTERNAL MEDICINE

## 2022-10-11 RX ADMIN — METOPROLOL SUCCINATE 25 MG: 25 TABLET, EXTENDED RELEASE ORAL at 08:10

## 2022-10-11 RX ADMIN — AMLODIPINE BESYLATE 2.5 MG: 2.5 TABLET ORAL at 08:10

## 2022-10-11 RX ADMIN — GABAPENTIN 400 MG: 400 CAPSULE ORAL at 08:10

## 2022-10-11 RX ADMIN — LEVOTHYROXINE SODIUM 50 MCG: 50 TABLET ORAL at 05:10

## 2022-10-11 RX ADMIN — ATORVASTATIN CALCIUM 20 MG: 20 TABLET, FILM COATED ORAL at 08:10

## 2022-10-11 RX ADMIN — ACETAMINOPHEN 1000 MG: 500 TABLET, FILM COATED ORAL at 08:10

## 2022-10-11 RX ADMIN — ASPIRIN 81 MG: 81 TABLET, COATED ORAL at 08:10

## 2022-10-11 RX ADMIN — HEPARIN SODIUM 5000 UNITS: 5000 INJECTION INTRAVENOUS; SUBCUTANEOUS at 05:10

## 2022-10-11 RX ADMIN — IPRATROPIUM BROMIDE AND ALBUTEROL SULFATE 3 ML: 2.5; .5 SOLUTION RESPIRATORY (INHALATION) at 07:10

## 2022-10-11 RX ADMIN — IPRATROPIUM BROMIDE AND ALBUTEROL SULFATE 3 ML: 2.5; .5 SOLUTION RESPIRATORY (INHALATION) at 04:10

## 2022-10-11 RX ADMIN — HEPARIN SODIUM 5000 UNITS: 5000 INJECTION INTRAVENOUS; SUBCUTANEOUS at 03:10

## 2022-10-11 RX ADMIN — HEPARIN SODIUM 5000 UNITS: 5000 INJECTION INTRAVENOUS; SUBCUTANEOUS at 09:10

## 2022-10-11 RX ADMIN — MIRTAZAPINE 15 MG: 15 TABLET, FILM COATED ORAL at 08:10

## 2022-10-11 RX ADMIN — IPRATROPIUM BROMIDE AND ALBUTEROL SULFATE 3 ML: 2.5; .5 SOLUTION RESPIRATORY (INHALATION) at 12:10

## 2022-10-11 NOTE — PROGRESS NOTES
"Nephrology  Progress Note    Admit Date: 10/3/2022   LOS: 8 days     SUBJECTIVE:     Follow-up For:  Acute on chronic respiratory failure with hypoxia and hypercapnia    History of Present Illness:  Patient is a retired  at [employer] 89 y.o. male presents with worsening SOB x 3 days and found to have pulm edema/CHF/hypoxia.  Labs revealing acute on chronic hyponatremia of 122.  Placed on fluid restriction, lasix, and admitted to hospital for monitoring.  Na remains 122 this am and consulted for evaluation.  Extensive medical history as outlined below including suprapubic catheter for neurogenic bladder, lower extremity edema currently with Ace wraps, mobility impairment, combined CHF and aortic stenosis, and chronic hyponatremia with sodium ranging from 119-137 since 2004.  Patient seen and examined with medical power of  and registered nurse at bedside.  Patient using accessory muscles and work of breathing with signs and symptoms of volume overload.  Admits to drinking 8-10 bottles of water per day.  Discussed treatment plan and agrees with Samsca.  Updated team.     Epic reviewed     Currently no chest pain, nausea, vomiting, diarrhea, fever, chills.  Positive shortness of breath and dyspnea on exertion    Interval History:     getting trained on home CPAP device.  Labs noted with slight improvement in contraction alkalosis. Plans for home today.  Remains weak/lethargic but answers questions appropriately.         Review of Systems:  Constitutional: No fever or chills  Respiratory: shortness of breath better.   Cardiovascular: No chest pain or palpitations  Gastrointestinal: No nausea or vomiting  Neurological: No confusion or weakness    OBJECTIVE:     Vital Signs Range (Last 24H):  BP (!) 121/58 (BP Location: Right arm, Patient Position: Lying)   Pulse 74   Temp 98.2 °F (36.8 °C) (Oral)   Resp 18   Ht 5' 8" (1.727 m)   Wt 90.7 kg (199 lb 15.3 oz)   SpO2 98%   BMI 30.40 kg/m²     Temp:  " [97.7 °F (36.5 °C)-98.2 °F (36.8 °C)]   Pulse:  [66-80]   Resp:  [16-20]   BP: (105-121)/(53-68)   SpO2:  [94 %-98 %]     I & O (Last 24H):  Intake/Output Summary (Last 24 hours) at 10/11/2022 0844  Last data filed at 10/11/2022 0541  Gross per 24 hour   Intake 580 ml   Output 1300 ml   Net -720 ml       Physical Exam:  General appearance:  Elderly, frail and very weak.   Eyes:  Conjunctivae/corneas clear. PERRL.  Lungs:   Diminished.   Heart: Regular rate and rhythm, S1, S2 normal, AS murmur  Abdomen: Soft, non-tender non-distended; bowel sounds normal; no masses,  no organomegaly, obese, suprapubic catheter  Extremities: No cyanosis or clubbing.  edema resolved   Skin: Skin color, texture, turgor normal. No rashes or lesions  Neurologic:  Generally weak       Laboratory Data:  No results for input(s): WBC, RBC, HGB, HCT, PLT, MCV, MCH, MCHC in the last 24 hours.      BMP:   Recent Labs   Lab 10/07/22  0347 10/08/22  0512 10/11/22  0512   GLU 83   < > 89   *   < > 136   K 3.1*   < > 3.7   CL 79*   < > 84*   CO2 40*   < > 41*   BUN 16   < > 31*   CREATININE 0.9   < > 1.4   CALCIUM 8.8   < > 8.9   MG 1.6  --   --     < > = values in this interval not displayed.     Lab Results   Component Value Date    CALCIUM 8.9 10/11/2022    PHOS 2.8 09/18/2020       Lab Results   Component Value Date    .1 (H) 08/31/2022    CALCIUM 8.9 10/11/2022    CAION 1.07 09/03/2019    PHOS 2.8 09/18/2020       Lab Results   Component Value Date    URICACID 5.1 10/04/2022       BNP  No results for input(s): BNP, BNPTRIAGEBLO in the last 168 hours.      Medications:  Medication list was reviewed and changes noted under Assessment/Plan.    Diagnostic Results:    CT Chest Without Contrast   Final Result      Moderate left pleural effusion, small right pleural effusion, small pericardial effusion.  Much of the left lung is collapsed.  It would be difficult to exclude underlying malignancy, though I see no obvious mass.       Additional stable chronic findings as above.         Electronically signed by: Karen Teran   Date:    10/06/2022   Time:    08:15      X-Ray Chest AP Portable   Final Result      See above         Electronically signed by: Daron Burr MD   Date:    10/05/2022   Time:    07:23      US Lower Extremity Arteries Bilateral   Final Result      Diffuse atherosclerosis.  Based on reduced velocities and abnormal monophasic waveforms in the left posterior tibial artery, I suspect underlying hemodynamically significant stenosis.  No evidence for hemodynamically significant stenosis elsewhere.         Electronically signed by: Karen Teran   Date:    10/05/2022   Time:    08:27      X-Ray Chest AP Portable   Final Result      1.  Worsening pulmonary vascular congestion, suggestive of worsening fluid overload state.      2.  Persistent airspace opacity in the left lung base with associated suspected moderate parapneumonic effusion.         Electronically signed by: South Waters MD   Date:    10/03/2022   Time:    17:13          ASSESSMENT/PLAN:     Resolved Acute on chronic hypervolemic hyponatremia secondary to CHF, excessive water intake, etc:  -urine studies hard to interpret with lasix on board.  -admit  and same following lasix/fluid restriction.  -dosed samsca and trending Q6.  Redosed 10/6.     -[phone]  -long standing hx of hyponatremia with variable ranges (119-137).  -TSH wnl  -holding lasix with contraction alkalosis.   -would use lowest dose almas as below  -Renally dose meds, avoid nephrotoxins, and monitor I/O's closely.       Contraction Alkalosis:  -as mentioned.  -diuresed well  -holding lasix  -received diamox        Acute on chronic combined CHF/AS:  -Dr. Rdz following and discussed.  -on sprio but decrease to 12.5 but mild rise in creat and contraction alkalosis.       Hypokalemia:  -secondary to diuresis  -replaced orally and stable.   -mag  stable        Hypoxia/Hypercapnia/MAMI:  -better with Bipap  -CCT following  -diuresed as above.           Neurogenic bladder:  -cath exchanged.  -defer.        Severe Left pleural effusion:  -CCT following and felt mostly atelectatic   -needs aggressive pulm rehab          Dispo:  Consulted palliative care for goals of care  He has 24 hr sitters and a MPOA.   DNR  Likely needs to transition to palliative approach at home    DC home today  No need to f/up with us.   Labs per primary.  Will sign off  Thanks

## 2022-10-11 NOTE — PLAN OF CARE
JIMBO spoke with Triny from [facility], [phone].  She stated that pt uses their agency for care and he will resume with them.  She also stated that their PT/OT will set up everything with [employer] for patient to get his power chair through them.  JIMBO to fax HH orders, DCS and power chair orders to Triny at [phone].  JIMBO will fax once the HH orders are placed in Epic.

## 2022-10-11 NOTE — PLAN OF CARE
POC reviewed with pt and he verbalized understanding. PRN medication given for c/o back/bilat hip pain with relief. Frequent weight shifting assistance provided. Safety precautions in place. Will continue with  plan of care.     Problem: Adult Inpatient Plan of Care  Goal: Plan of Care Review  Outcome: Ongoing, Progressing  Goal: Patient-Specific Goal (Individualized)  Outcome: Ongoing, Progressing  Goal: Absence of Hospital-Acquired Illness or Injury  Outcome: Ongoing, Progressing  Goal: Optimal Comfort and Wellbeing  Outcome: Ongoing, Progressing  Goal: Readiness for Transition of Care  Outcome: Ongoing, Progressing     Problem: Fluid Imbalance (Pneumonia)  Goal: Fluid Balance  Outcome: Ongoing, Progressing     Problem: Infection (Pneumonia)  Goal: Resolution of Infection Signs and Symptoms  Outcome: Ongoing, Progressing     Problem: Respiratory Compromise (Pneumonia)  Goal: Effective Oxygenation and Ventilation  Outcome: Ongoing, Progressing     Problem: Impaired Wound Healing  Goal: Optimal Wound Healing  Outcome: Ongoing, Progressing     Problem: Fall Injury Risk  Goal: Absence of Fall and Fall-Related Injury  Outcome: Ongoing, Progressing     Problem: Skin Injury Risk Increased  Goal: Skin Health and Integrity  Outcome: Ongoing, Progressing     Problem: Coping Ineffective  Goal: Effective Coping  Outcome: Ongoing, Progressing

## 2022-10-11 NOTE — PROGRESS NOTES
[facility] ([place])  Adult Nutrition  Progress Note    SUMMARY       Recommendations    Recommendation/Intervention:   1. Continue to monitor po intake.    2. Weekly weights   3. Collaboration with medical providers    Goals: Patient to continue to consume >50% of EEN prior to RD follow up.    Nutrition Goal Status: progressing towards goal    Communication of RD Recs: other (comment) (poc)    Assessment and Plan  Nutrition Problem  Fluid overload    Related to (etiology):   Excessive fluid intake and condition associated with diagnosis    Signs and Symptoms (as evidenced by):   Recommended 800ML fluid restriction    Interventions/Recommendations (treatment strategy):  1. Continue to monitor po intake.    2. Weekly weights   3. Collaboration with medical providers    Nutrition Diagnosis Status:   Continues      Malnutrition Assessment                                       Reason for Assessment    Reason For Assessment: RD follow-up    Diagnosis: pulmonary disease, renal disease  Patient Active Problem List   Diagnosis    UTI (urinary tract infection)    Essential hypertension    Inguinal hernia, right    Recurrent major depressive disorder, in full remission    Iron deficiency anemia    Sciatica neuralgia    CKD (chronic kidney disease) stage 3, GFR 30-59 ml/min    S/P lumbar laminectomy    S/P kyphoplasty- L3 kyphoplasty     Gross hematuria    Suprapubic catheter    Hyponatremia    Nuclear sclerosis, bilateral    Nuclear sclerotic cataract of left eye    Hypokalemia    Impaired mobility and ADLs    Ventral hernia with obstruction    Incarcerated hernia    History of postoperative delirium    Gastroesophageal reflux disease without esophagitis    Sleep disorder    Chronic back pain    Preoperative evaluation to rule out surgical contraindication    Cough    Community acquired pneumonia    Hypoxia    Infection and inflammatory reaction due to other urinary catheter, subsequent encounter    Hypocalcemia     Weakness    Hypochloremia    Acute on chronic respiratory failure with hypoxia and hypercapnia    Moderate to severe aortic stenosis    Exertional dyspnea    Ankle ulcer, left, limited to breakdown of skin    Acute heart failure    Asymptomatic bacteriuria    Pleural effusion, left    Advanced care planning/counseling discussion    Neuromuscular respiratory weakness     Past Medical History:   Diagnosis Date    Acquired hypothyroidism 7/30/2013    Arthritis     Blood transfusion     before 2005 - whe had gangrenous gall bladder    Cataract     Chronic back pain 12/4/2018    - Takes Percocet 5-325 at home - Can continue current abdominal pain control with Dilaudid 0.5 mg IV Q3H prn     CKD (chronic kidney disease) stage 3, GFR 30-59 ml/min     Compression fracture of lumbar vertebra     Depression     Dyslipidemia     Essential hypertension 7/30/2013    Gastroesophageal reflux disease without esophagitis 12/4/2018    - continue home Prilosec    General anesthetics causing adverse effect in therapeutic use     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    GERD (gastroesophageal reflux disease)     History of postoperative delirium 12/4/2018    - Patient reports 1 week history of post-op delirium 7/2018 - Monitor electrolytes, UA, and urine cx - Delirium precautions  - Can use Seroquel 25 mg QHS prn     Hypertension     Hyponatremia 10/23/2017    Impaired mobility and ADLs 6/28/2018    Neurogenic bladder 12/4/2018    Sleep disorder 12/4/2018    - continue Trazodone QHS    Thyroid disease     UTI (urinary tract infection)        Interdisciplinary Rounds: did not attend (RD remote)    General Information Comments: Patient with a low na, 800ml FR diet at this time.  Patient with CKD and fluid overload.  PO intake between 50-75%.  Labs reviewed.  NKFA.  LBM:10/8.  RD will continue to monitor.  (RD remote)    Nutrition Discharge Planning: Patient to continue on recommended low na diet prior and post discharge.    Nutrition  "Risk Screen    Nutrition Risk Screen: no indicators present    Nutrition/Diet History    Spiritual, Cultural Beliefs, Moravian Practices, Values that Affect Care: no  Food Allergies: NKFA  Factors Affecting Nutritional Intake: None identified at this time    Anthropometrics    Temp: 98.2 °F (36.8 °C)  Height Method: Stated  Height: 5' 8" (172.7 cm)  Height (inches): 68 in  Weight Method: Bed Scale  Weight: 90.7 kg (199 lb 15.3 oz)  Weight (lb): 199.96 lb  Ideal Body Weight (IBW), Male: 154 lb  % Ideal Body Weight, Male (lb): 129.84 %  BMI (Calculated): 30.4  BMI Grade: 30 - 34.9- obesity - grade I     Wt Readings from Last 10 Encounters:   10/11/22 90.7 kg (199 lb 15.3 oz)   09/13/20 93.7 kg (206 lb 9.1 oz)   09/01/20 93.7 kg (206 lb 9.1 oz)   07/16/20 75.8 kg (167 lb)   10/01/19 75.8 kg (167 lb)   08/29/19 76.1 kg (167 lb 12.3 oz)   08/27/19 88.5 kg (195 lb)   06/22/19 89.4 kg (197 lb)   06/19/19 89.8 kg (198 lb)   05/22/19 89.8 kg (198 lb)         Lab/Procedures/Meds    Pertinent Labs Reviewed: reviewed  Pertinent Medications Reviewed: reviewed  BMP  Lab Results   Component Value Date     10/11/2022    K 3.7 10/11/2022    CL 84 (L) 10/11/2022    CO2 41 (HH) 10/11/2022    BUN 31 (H) 10/11/2022    CREATININE 1.4 10/11/2022    CALCIUM 8.9 10/11/2022    ANIONGAP 11 10/11/2022    ESTGFRAFRICA >60.0 09/18/2020    EGFRNONAA >60.0 09/18/2020     Lab Results   Component Value Date    CALCIUM 8.9 10/11/2022    PHOS 2.8 09/18/2020     Lab Results   Component Value Date    HGBA1C 5.3 10/04/2022     Scheduled Meds:   albuterol-ipratropium  3 mL Nebulization Q4H WAKE    amLODIPine  2.5 mg Oral Daily    aspirin  81 mg Oral Daily    atorvastatin  20 mg Oral Daily    gabapentin  400 mg Oral BID    heparin (porcine)  5,000 Units Subcutaneous Q8H    levothyroxine  50 mcg Oral Before breakfast    metoprolol succinate  25 mg Oral Daily    mirtazapine  15 mg Oral QHS    [START ON 10/12/2022] spironolactone  12.5 mg Oral Daily    " traZODone  50 mg Oral QHS     Continuous Infusions:  PRN Meds:.acetaminophen, ondansetron, ondansetron, oxyCODONE, sodium chloride 0.9%    Physical Findings/Assessment         Estimated/Assessed Needs    Weight Used For Calorie Calculations: 90.7 kg (199 lb 15.3 oz)  Energy Calorie Requirements (kcal): 25kcals/kg (2267kcals/day)  Energy Need Method: Kcal/kg  Protein Requirements: 0.8g/kg  Weight Used For Protein Calculations: 90.7 kg (199 lb 15.3 oz)  Fluid Requirements (mL): 800mls fluid restriction per MD  Estimated Fluid Requirement Method: other (see comments) (CKD)  RDA Method (mL): 25  CHO Requirement: 250      Nutrition Prescription Ordered    Current Diet Order: low na with 800ml fluid restriction    Evaluation of Received Nutrient/Fluid Intake    % Kcal Needs: 50-75%  % Protein Needs: 50-75%  Energy Calories Required: meeting needs  Protein Required: meeting needs  Fluid Required: meeting needs  Tolerance: tolerating  % Intake of Estimated Energy Needs: 50 - 75 %  % Meal Intake: 50 - 75 %  Intake/Output - Last 3 Shifts         10/09 0700  10/10 0659 10/10 0700  10/11 0659 10/11 0700  10/12 0659    P.O. 250 700 120    Total Intake(mL/kg) 250 (2.8) 700 (7.7) 120 (1.3)    Urine (mL/kg/hr) 800 (0.4) 1300 (0.6)     Stool  0     Total Output 800 1300     Net -550 -600 +120           Stool Occurrence  0 x             Nutrition Risk    Level of Risk/Frequency of Follow-up: moderate     Monitor and Evaluation    Food and Nutrient Intake: energy intake, food and beverage intake  Food and Nutrient Adminstration: diet order  Knowledge/Beliefs/Attitudes: food and nutrition knowledge/skill, beliefs and attitudes  Physical Activity and Function: nutrition-related ADLs and IADLs, factors affecting access to physical activity  Anthropometric Measurements: height/length, weight, weight change, body mass index  Biochemical Data, Medical Tests and Procedures: inflammatory profile, glucose/endocrine profile, gastrointestinal  profile, electrolyte and renal panel, lipid profile     Nutrition Follow-Up    RD Follow-up?: Yes  Jovanna Mace, MS, RDN, LDN

## 2022-10-11 NOTE — PROGRESS NOTES
[facility]  Cardiology  Progress Note    Patient Name: Chucho Prado  MRN: [id number]  Admission Date: 10/3/2022  Hospital Length of Stay: 8 days  Code Status: DNR   Attending Physician: Melchor Kramer MD   Primary Care Physician: Obed Mcguire MD  Expected Discharge Date:   Principal Problem:Acute on chronic respiratory failure with hypoxia and hypercapnia    Subjective:     Brief HPI:    Chucho Prado is a 89 y.o.male with hypertension and hypercholesterolemia. He has been unable to walk after a back injury in about 2015. He has chronic kidney disease, stage 3. He has a suprapubic catheter due to a neurogenic bladder. Beginning in mid 9/2022 he noted that he was becoming short of breath from lying down. The shortness of breath got worse. His legs became edematous. He presented to the emergency room on 10/3/2022 being fluid overloaded. He was noted to be hyponatremic. He says he has been drinking a lot of water. He was admitted.     Hospital Course:    10/4/2022: Received tolvaptan.    10/4/2022: Echo: Normal left ventricular size and systolic function. EF 65%. Mild diastolic dysfunction. Mildly enlarged LA. Severe AS - 4.1 m/s - MG 44 mmHg - 0.9 cm2. SPAP 59 mmHg.    10/4/2022: Transferred to ICU due to respiratory issues.    Interval History:    Says he is comfortable but weak.    Edema has resolved.      Review of Systems   Constitutional: Positive for malaise/fatigue. Negative for chills and fever.   HENT:  Negative for nosebleeds.    Eyes:  Negative for vision loss in left eye and vision loss in right eye.   Cardiovascular:  Negative for chest pain, leg swelling, orthopnea, palpitations and paroxysmal nocturnal dyspnea.   Respiratory:  Positive for cough and shortness of breath. Negative for hemoptysis, sputum production and wheezing.    Hematologic/Lymphatic: Negative for bleeding problem. Does not bruise/bleed easily.   Skin:  Negative for color change and rash.    Musculoskeletal:  Negative for muscle weakness and myalgias.   Gastrointestinal:  Negative for abdominal pain, heartburn, hematemesis, hematochezia, melena, nausea and vomiting.   Genitourinary:  Negative for hematuria.   Neurological:  Negative for dizziness, focal weakness, headaches, light-headedness, vertigo and weakness.   Psychiatric/Behavioral:  Negative for altered mental status. The patient is not nervous/anxious.    Allergic/Immunologic: Negative for persistent infections.       Objective:     Vital Signs (Most Recent):  Temp: 98.2 °F (36.8 °C) (10/11/22 0718)  Pulse: 74 (10/11/22 0746)  Resp: 18 (10/11/22 0746)  BP: (!) 121/58 (10/11/22 0718)  SpO2: 98 % (10/11/22 0746)   Vital Signs (24h Range):  Temp:  [97.7 °F (36.5 °C)-98.2 °F (36.8 °C)] 98.2 °F (36.8 °C)  Pulse:  [66-80] 74  Resp:  [16-20] 18  SpO2:  [94 %-98 %] 98 %  BP: (105-121)/(53-68) 121/58     Weight: 90.7 kg (199 lb 15.3 oz)  Body mass index is 30.4 kg/m².    SpO2: 98 %  O2 Device (Oxygen Therapy): High Flow nasal Cannula      Intake/Output Summary (Last 24 hours) at 10/11/2022 1005  Last data filed at 10/11/2022 0718  Gross per 24 hour   Intake 700 ml   Output 1300 ml   Net -600 ml         Lines/Drains/Airways       Drain  Duration                  Suprapubic Catheter 09/01/20 latex 18 Fr. 770 days              Peripheral Intravenous Line  Duration                  Peripheral IV - Single Lumen 10/03/22 1717 20 G Right Forearm 7 days                    Physical Exam  Constitutional:       General: He is not in acute distress.     Appearance: Normal appearance. He is well-developed. He is not ill-appearing.   Eyes:      Conjunctiva/sclera:      Right eye: Right conjunctiva is not injected. No hemorrhage.     Left eye: Left conjunctiva is not injected. No hemorrhage.     Pupils:      Right eye: Pupil is round.      Left eye: Pupil is round.   Neck:      Vascular: No JVD.   Cardiovascular:      Rate and Rhythm: Normal rate and regular rhythm.       Heart sounds: S1 normal and S2 normal. Murmur heard.   Midsystolic murmur is present with a grade of 3/6 at the upper right sternal border.     Gallop present. S4 sounds present. No S3 sounds.   Pulmonary:      Effort: Pulmonary effort is normal.      Breath sounds: Normal breath sounds.   Chest:      Chest wall: No swelling or tenderness.   Abdominal:      General: There is no distension.      Palpations: Abdomen is soft.      Tenderness: There is no abdominal tenderness.   Musculoskeletal:      Cervical back: Neck supple.      Right lower leg: No swelling. No edema.      Left lower leg: No swelling. No edema.   Skin:     General: Skin is warm and dry.      Findings: No rash.   Neurological:      Mental Status: He is alert and oriented to person, place, and time. He is not disoriented.   Psychiatric:         Attention and Perception: Attention normal.         Mood and Affect: Mood normal.         Speech: Speech normal.         Behavior: Behavior normal.         Thought Content: Thought content normal.         Cognition and Memory: Cognition and memory normal.         Judgment: Judgment normal.       Current Medications:     albuterol-ipratropium  3 mL Nebulization Q4H WAKE    amLODIPine  2.5 mg Oral Daily    aspirin  81 mg Oral Daily    atorvastatin  20 mg Oral Daily    gabapentin  400 mg Oral BID    heparin (porcine)  5,000 Units Subcutaneous Q8H    levothyroxine  50 mcg Oral Before breakfast    metoprolol succinate  25 mg Oral Daily    mirtazapine  15 mg Oral QHS    [START ON 10/12/2022] spironolactone  12.5 mg Oral Daily    traZODone  50 mg Oral QHS     Current Laboratory Results:    Recent Results (from the past 24 hour(s))   Basic metabolic panel    Collection Time: 10/11/22  5:12 AM   Result Value Ref Range    Sodium 136 136 - 145 mmol/L    Potassium 3.7 3.5 - 5.1 mmol/L    Chloride 84 (L) 95 - 110 mmol/L    CO2 41 (HH) 23 - 29 mmol/L    Glucose 89 70 - 110 mg/dL    BUN 31 (H) 8 - 23 mg/dL    Creatinine  1.4 0.5 - 1.4 mg/dL    Calcium 8.9 8.7 - 10.5 mg/dL    Anion Gap 11 8 - 16 mmol/L    eGFR 48 (A) >60 mL/min/1.73 m^2     Current Imaging Results:    CT Chest Without Contrast   Final Result      Moderate left pleural effusion, small right pleural effusion, small pericardial effusion.  Much of the left lung is collapsed.  It would be difficult to exclude underlying malignancy, though I see no obvious mass.      Additional stable chronic findings as above.         Electronically signed by: Karen Teran   Date:    10/06/2022   Time:    08:15      X-Ray Chest AP Portable   Final Result      See above         Electronically signed by: Daron Burr MD   Date:    10/05/2022   Time:    07:23      US Lower Extremity Arteries Bilateral   Final Result      Diffuse atherosclerosis.  Based on reduced velocities and abnormal monophasic waveforms in the left posterior tibial artery, I suspect underlying hemodynamically significant stenosis.  No evidence for hemodynamically significant stenosis elsewhere.         Electronically signed by: Karen Teran   Date:    10/05/2022   Time:    08:27      X-Ray Chest AP Portable   Final Result      1.  Worsening pulmonary vascular congestion, suggestive of worsening fluid overload state.      2.  Persistent airspace opacity in the left lung base with associated suspected moderate parapneumonic effusion.         Electronically signed by: South Waters MD   Date:    10/03/2022   Time:    17:13          10/4/2022: Echo:    The left ventricle is normal in size with normal systolic function.  The estimated ejection fraction is 65%.  Grade I left ventricular diastolic dysfunction.  Mild left atrial enlargement.  Normal right ventricular size with normal right ventricular systolic function.  There is severe aortic valve stenosis.  Aortic valve area is 0.89 cm2; peak velocity is 4.09 m/s; mean gradient is 44 mmHg.  Mild tricuspid regurgitation.  There is moderate pulmonary hypertension.  The  estimated PA systolic pressure is 59 mmHg.  Normal central venous pressure (3 mmHg).      Assessment and Plan:     Problem List:    Active Diagnoses:    Diagnosis Date Noted POA    PRINCIPAL PROBLEM:  Acute on chronic respiratory failure with hypoxia and hypercapnia [J96.21, J96.22] 08/27/2020 Yes    Neuromuscular respiratory weakness [G70.9, J99] 10/07/2022 Yes    Pleural effusion, left [J90] 10/05/2022 Yes    Advanced care planning/counseling discussion [Z71.89] 10/05/2022 Not Applicable    Asymptomatic bacteriuria [R82.71] 10/04/2022 Yes    Acute heart failure [I50.9] 10/03/2022 Yes    Suprapubic catheter [Z93.59] 10/23/2017 Not Applicable    Hyponatremia [E87.1] 10/23/2017 Yes    CKD (chronic kidney disease) stage 3, GFR 30-59 ml/min [N18.30] 12/26/2016 Yes    Iron deficiency anemia [D50.9] 07/31/2013 Yes    Essential hypertension [I10] 07/30/2013 Yes      Problems Resolved During this Admission:       Assessment and Plan:     Heart Failure, Diastolic, Acute on Chronic              10/3/2022: Presents in HF with pulmonary edema and edema of legs.              8/28/2020: Echo: Normal left ventricular size and systolic function. EF 60%. Septal WMA. Moderate to severe AS - 3.1 m/s - MG 26 mmHg - 1.0 cm2.   10/4/2022: Echo: Normal left ventricular size and systolic function. EF 65%. Mild diastolic dysfunction. Mildly enlarged LA. Severe AS - 4.1 m/s - MG 44 mmHg - 0.9 cm2. SPAP 59 mmHg.              At home was on  furosemide 20 mg M,W & F. Has been taken additional furosemide but unclear on dosing.   10/4/2022: Received tolvaptan.              Received furosemide 40 mg iv Q24.              Fluid restriction 1,500 ml/day.   On spironolactone 12.5 mg Q24.   K being replaced.   Furosemide currently on hold but think he will need furosemide 20 mg Q24 long term.                           2. Aortic Stenosis              8/28/2020: Echo: Moderate to severe AS - 3.1 m/s - MG 26 mmHg - 1.0 cm2.   10/4/2022: Echo: Severe AS  - 4.1 m/s - MG 44 mmHg - 0.9 cm2.      3. Hypertension              At home been on metoprolol 25 mg Q24, amlodipine 5 mg Q24, furosemide 20 mg 3/7 & KCl 10 meq 3/7.   On metoprolol 25 mg Q24, amlodipine 2.5 mg Q24 and spironolactone 12.5 mg Q24.      4. Hypercholesterolemia  At home been on atorvastatin 20 mg Q24.      5. Chronic Kidney Disease, Stage 3              10/4/2022: BUN/crea 14/0.9. CrCl >60.   10/11/2022: BUN/crea 31/1.4. CrCl 48.        6. Hyponatremia              10/4/2022: Na 122.   10/4/2022: Received tolvaptan.   10/10/2022: Na 137.              Fluid restriction.     7. Respiratory Failure   10/5/2022: PCO2 81. PO2 106.                 VTE Risk Mitigation (From admission, onward)           Ordered     heparin (porcine) injection 5,000 Units  Every 8 hours         10/03/22 2007     IP VTE HIGH RISK PATIENT  Once         10/03/22 2007     Place sequential compression device  Until discontinued         10/03/22 2007                    Tomasa Rdz MD  Cardiology  [facility] ([place])

## 2022-10-11 NOTE — PROCEDURES
Pt is unable to walk. Pt room air SATs at rest is 86% the patient is now on 4L Nasal Cannula with SATs of 95%

## 2022-10-11 NOTE — PROGRESS NOTES
"[facility] Medicine  Progress Note    Patient Name: Chucho Prado  MRN: [id number]  Patient Class: IP- Inpatient   Admission Date: 10/3/2022  Length of Stay: 8 days  Attending Physician: Melchor Kramer MD  Primary Care Provider: Obed Mcguire MD        Subjective:     Principal Problem:Acute on chronic respiratory failure with hypoxia and hypercapnia        HPI:  From H&P by Kerrie Torres PA:  ". Chucho Prado is a 89 y.o. male, with PMH of CHF, HTN, hypothyroidism, HLD, MDD, CARLINE, CKD-3, neurogenic bladder s/p suprapubic catheter insertion, GERD, wheelchair bound, who [place] [facility] [place] due to shortness of breath x 3 days. He states he feels out of breath when laying flat or with activity (ie. Being changed or transferring to the wheelchair). Additionally, he states he feels occasional chest tightness, has been having wheezing, and coughing (present for weeks). Upon awakening this morning he noted b/l lower extremity swelling, which he attributes to sitting up while sleeping last night and not elevating his legs. He denies fever. He was evaluated in the ED with labs showing hyponatremia with sodium of 122, chloride of 83. A BNP was elevated at 212, and troponin was negative. A CXR showed worsening pulmonary vascular congestion, and persistence of a left lung base airspace opacity. He was treated in the ED with 40 mg IV lasix."      Overview/Hospital Course:  Patient was admitted under a heart failure protocol on IV Lasix and Cardiology was consulted to follow.  His 2D echo was repeated.  Nephrology was consulted for the hyponatremia.  He was given a dose of tolvaptan and his sodium level was monitored closely.  Patient's oxygen level became difficult to manage on the floor and he was transferred to ICU overnight 10/4 when he required placement of a NRB and pulmonary/critical care was consulted.  Sodium level decreased to 120 upon transfer before stabilizing the " following morning.  Diuresis was continued. 2D echo showed severe aortic stenosis, pulmonary HTN and grade I diastolic dysfunction.      His CXR showed volume loss in the right lung and CT was done showing a left pleural effusion with much of the left lung collapsed.  There was a small effusion on the right and a small pericardial effusion.  Critical care evaluated and felt the volume loss was due to atelectasis due to neuromuscular failure, and patient would require significant mobilization and NIV for volume ventilation.  PT/OT consulted to work with him and patient was moved back to the floor.  He continued to have episodic desaturation with minimal activity and frequently had to hold off on therapy and be placed back on BiPAP. Trelegy brought to bedside.       Interval History:  He is seen on 4L nasal cannula this morning. He had trelegy provided and teaching done, but did not retain much of the information. Home oxygen evaluation not performed yet, but he is saturating well on 4L NC.    Review of Systems   Constitutional:  Negative for chills and fever.   Respiratory:  Positive for shortness of breath. Negative for cough.    Cardiovascular:  Negative for chest pain, palpitations and leg swelling.   Objective:     Vital Signs (Most Recent):  Temp: 98.2 °F (36.8 °C) (10/11/22 1127)  Pulse: 72 (10/11/22 1210)  Resp: 20 (10/11/22 1210)  BP: (!) 96/53 (10/11/22 1127)  SpO2: 97 % (10/11/22 1210) Vital Signs (24h Range):  Temp:  [97.7 °F (36.5 °C)-98.2 °F (36.8 °C)] 98.2 °F (36.8 °C)  Pulse:  [66-74] 72  Resp:  [16-20] 20  SpO2:  [94 %-98 %] 97 %  BP: ()/(53-58) 96/53     Weight: 90.7 kg (199 lb 15.3 oz)  Body mass index is 30.4 kg/m².    Intake/Output Summary (Last 24 hours) at 10/11/2022 6628  Last data filed at 10/11/2022 0718  Gross per 24 hour   Intake 580 ml   Output 650 ml   Net -70 ml        Physical Exam  Cardiovascular:      Rate and Rhythm: Normal rate and regular rhythm.      Pulses: Normal pulses.       Heart sounds: Murmur heard.     No gallop.      Comments: Harsh holosystolic murmur at the RSB.  Pulmonary:      Breath sounds: No wheezing.      Comments: Poor effort, breath sounds nearly inaudible on the left.  Abdominal:      General: Bowel sounds are normal.      Palpations: Abdomen is soft.   Genitourinary:     Comments: Suprapubic catheter draining clear yellow urine.  Musculoskeletal:      Comments: Both legs with protective boots in place.     Skin:     General: Skin is warm and dry.   Neurological:      General: No focal deficit present.      Mental Status: He is alert and oriented to person, place, and time.       Significant Labs: All pertinent labs within the past 24 hours have been reviewed.    Significant Imaging: I have reviewed all pertinent imaging results/findings within the past 24 hours.      Assessment/Plan:      * Acute on chronic respiratory failure with hypoxia and hypercapnia  Patient transferred to ICU overnight 10/4 with worsening hypoxemia and hyponatremia.  CXR showed worsening pulmonary vascular congestion and a persistent airspace opacity in the left base with an associated effusion.  Note the 2D echo showed a normal EF and only grade I diastolic dysfunction, but severe aortic stenosis and pulmonary HTN seen previously.  Unclear whether this is due to chronic lung disease, he is a former smoker with a 20 pack year history (quit 1990) but is not oxygen dependent at home and does not use respiratory medications chronically.  Cardiology consulted, PCC now following.  CT showed severe volume loss of left lung and associated effusion.      - PCC feels left lung findings due to severe atelectasis and thoracentesis not indicated.  Working with case management on this.  - Needs aggressive mobilization, respiratory therapy with nebulized treatments, chest PT, cough assist.  - Started PT/OT, starting with getting up to chair.    - Continue oxygen supplementation, nightly BiPAP, diuretics.   "Frequently needs BiPAP during the day.  - Not a candidate for intervention on aortic valve given age, frailty and comorbidities  - He will need volume ventilator given respiratory failure due to neuromuscular condition.  - PT notes severe kyphosis, holds himself hunched over and cannot support his trunk sitting up.  - home vs SNF      Neuromuscular respiratory weakness  See "acute on chronic respiratory failure"      Advanced care planning/counseling discussion  Patient seen by palliative care and has elected DNR status.  He does state that he doesn't want us to "give up" on him and has been assured that only the most aggressive measures (CPR, intubation, shocks) have been omitted from the care plan.  We will continue oxygen and NIV as needed, antibiotics if needed, diuresis, etc.      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  - Vancomycin given in ED due to previous culture with <50K MRSA (2 years ago)  - As he does not have fever or leukocytosis will hold off on further antibiotics.  - Repeated CBC, still no leukocytosis    Acute heart failure  - Patient presented with shortness of breath worse when supine   - BNP in ED was 212   - CXR with evidence of pulmonary edema and left pleural effusion associated with an airspace opacity on that side that is consistent with severe atelectasis.  He does not have pneumonia symptoms.  - s/p lasix 40 mg IV in ED   - monitor I&Os   - Heart Failure pathways initiated   - Echo showed normal EF, grade I diastolic dysfunction, severe AS, pulmonary HTN.  - lasix as discussed elsewhere    Hyponatremia  - suspect hypervolemic hyponatremia   - s/p 40 mg IV lasix in ED, then twice daily Lasix  - Nephrology consulted, tolvaptan given and fluid intake liberalized.    - After an initial further decrease hyponatremia stabilized.  - holding " furosemide    Suprapubic catheter  - chronic   - Urine likely colonized with multiple organisms.        CKD (chronic kidney disease) stage 3, GFR 30-59 ml/min  - Cr is 1.0  - ranges from 0.8  - 1.0   - avoid nephrotoxins   - renally dose meds     Iron deficiency anemia  - H&H are stable at present   - monitor       Essential hypertension  - hypertensive on presentation  - home meds: amlodipine 5 mg QD, metoprolol succinate 25 mg QD  - monitor.  Improving with diuresis.      VTE Risk Mitigation (From admission, onward)         Ordered     heparin (porcine) injection 5,000 Units  Every 8 hours         10/03/22 2007     IP VTE HIGH RISK PATIENT  Once         10/03/22 2007     Place sequential compression device  Until discontinued         10/03/22 2007                Discharge Planning   TISHA:      Code Status: DNR   Is the patient medically ready for discharge?:     Reason for patient still in hospital (select all that apply): Treatment and Pending disposition  Discharge Plan A: Home (Pt has round the clock sitters and care.)   Discharge Delays: None known at this time              Melchor Kramre MD  Department of Hospital Medicine   [facility] - Med Surg ([place])

## 2022-10-11 NOTE — ASSESSMENT & PLAN NOTE
- suspect hypervolemic hyponatremia   - s/p 40 mg IV lasix in ED, then twice daily Lasix  - Nephrology consulted, tolvaptan given and fluid intake liberalized.    - After an initial further decrease hyponatremia stabilized.  - holding furosemide

## 2022-10-11 NOTE — PLAN OF CARE
Nutrition Plan of Care:    Recommendations     Recommendation/Intervention:   1. Continue to monitor po intake.    2. Weekly weights   3. Collaboration with medical providers     Goals: Patient to continue to consume >50% of EEN prior to RD follow up.     Nutrition Goal Status: progressing towards goal     Communication of RD Recs: other (comment) (poc)     Assessment and Plan  Nutrition Problem  Fluid overload     Related to (etiology):   Excessive fluid intake and condition associated with diagnosis     Signs and Symptoms (as evidenced by):   Recommended 800ML fluid restriction     Interventions/Recommendations (treatment strategy):  1. Continue to monitor po intake.    2. Weekly weights   3. Collaboration with medical providers     Nutrition Diagnosis Status:   Continues    Jovanna Mace, MS, RDN, LDN

## 2022-10-11 NOTE — SUBJECTIVE & OBJECTIVE
Interval History:  He is seen on 4L nasal cannula this morning. He had trelegy provided and teaching done, but did not retain much of the information. Home oxygen evaluation not performed yet, but he is saturating well on 4L NC.    Review of Systems   Constitutional:  Negative for chills and fever.   Respiratory:  Positive for shortness of breath. Negative for cough.    Cardiovascular:  Negative for chest pain, palpitations and leg swelling.   Objective:     Vital Signs (Most Recent):  Temp: 98.2 °F (36.8 °C) (10/11/22 1127)  Pulse: 72 (10/11/22 1210)  Resp: 20 (10/11/22 1210)  BP: (!) 96/53 (10/11/22 1127)  SpO2: 97 % (10/11/22 1210) Vital Signs (24h Range):  Temp:  [97.7 °F (36.5 °C)-98.2 °F (36.8 °C)] 98.2 °F (36.8 °C)  Pulse:  [66-74] 72  Resp:  [16-20] 20  SpO2:  [94 %-98 %] 97 %  BP: ()/(53-58) 96/53     Weight: 90.7 kg (199 lb 15.3 oz)  Body mass index is 30.4 kg/m².    Intake/Output Summary (Last 24 hours) at 10/11/2022 1515  Last data filed at 10/11/2022 0718  Gross per 24 hour   Intake 580 ml   Output 650 ml   Net -70 ml        Physical Exam  Cardiovascular:      Rate and Rhythm: Normal rate and regular rhythm.      Pulses: Normal pulses.      Heart sounds: Murmur heard.     No gallop.      Comments: Harsh holosystolic murmur at the RSB.  Pulmonary:      Breath sounds: No wheezing.      Comments: Poor effort, breath sounds nearly inaudible on the left.  Abdominal:      General: Bowel sounds are normal.      Palpations: Abdomen is soft.   Genitourinary:     Comments: Suprapubic catheter draining clear yellow urine.  Musculoskeletal:      Comments: Both legs with protective boots in place.     Skin:     General: Skin is warm and dry.   Neurological:      General: No focal deficit present.      Mental Status: He is alert and oriented to person, place, and time.       Significant Labs: All pertinent labs within the past 24 hours have been reviewed.    Significant Imaging: I have reviewed all pertinent  imaging results/findings within the past 24 hours.

## 2022-10-11 NOTE — ASSESSMENT & PLAN NOTE
- Patient presented with shortness of breath worse when supine   - BNP in ED was 212   - CXR with evidence of pulmonary edema and left pleural effusion associated with an airspace opacity on that side that is consistent with severe atelectasis.  He does not have pneumonia symptoms.  - s/p lasix 40 mg IV in ED   - monitor I&Os   - Heart Failure pathways initiated   - Echo showed normal EF, grade I diastolic dysfunction, severe AS, pulmonary HTN.  - lasix as discussed elsewhere

## 2022-10-11 NOTE — ASSESSMENT & PLAN NOTE
Patient transferred to ICU overnight 10/4 with worsening hypoxemia and hyponatremia.  CXR showed worsening pulmonary vascular congestion and a persistent airspace opacity in the left base with an associated effusion.  Note the 2D echo showed a normal EF and only grade I diastolic dysfunction, but severe aortic stenosis and pulmonary HTN seen previously.  Unclear whether this is due to chronic lung disease, he is a former smoker with a 20 pack year history (quit 1990) but is not oxygen dependent at home and does not use respiratory medications chronically.  Cardiology consulted, PCC now following.  CT showed severe volume loss of left lung and associated effusion.      - PCC feels left lung findings due to severe atelectasis and thoracentesis not indicated.  Working with case management on this.  - Needs aggressive mobilization, respiratory therapy with nebulized treatments, chest PT, cough assist.  - Started PT/OT, starting with getting up to chair.    - Continue oxygen supplementation, nightly BiPAP, diuretics.  Frequently needs BiPAP during the day.  - Not a candidate for intervention on aortic valve given age, frailty and comorbidities  - He will need volume ventilator given respiratory failure due to neuromuscular condition.  - PT notes severe kyphosis, holds himself hunched over and cannot support his trunk sitting up.  - home vs SNF

## 2022-10-11 NOTE — PT/OT/SLP PROGRESS
Physical Therapy Treatment    Patient Name:  Chucho Prado   MRN:  [id number]    Recommendations:     Discharge Recommendations:  nursing facility, skilled   Discharge Equipment Recommendations: other (see comments) (TBD pending progress)   Barriers to discharge:  increased mobility and transfer needs, O2 needs    Assessment:     Chucho Prado is a 89 y.o. male admitted with a medical diagnosis of Acute on chronic respiratory failure with hypoxia and hypercapnia.  He presents with the following impairments/functional limitations:  weakness, pain, orthopedic precautions, abnormal tone, gait instability, impaired balance, impaired cardiopulmonary response to activity, impaired endurance, impaired functional mobility, impaired self care skills, decreased coordination, decreased lower extremity function, decreased ROM, decreased safety awareness.    Supine>sit with modA x 2  Sit>stand with RW and modA x 2 (x 2 trials)  Static stand x 90 secs and 3 mins with RW and modA x 2 (maverick hygiene performed)  Static sitting EOB x 10 mins with Devan for repositioning BLE for improved balance, then mostly SBA  Sit>supine with totalA x 2  Roll L with Devan, Roll R with modA  Pt agreeable to therapy, good tolerance to standing, sats remained above 90% on 4L O2  Rec SNF    Rehab Prognosis: Good; patient would benefit from acute skilled PT services to address these deficits and reach maximum level of function.    Recent Surgery: * No surgery found *      Plan:     During this hospitalization, patient to be seen 5 x/week to address the identified rehab impairments via gait training, therapeutic activities, therapeutic exercises, neuromuscular re-education and progress toward the following goals:    Plan of Care Expires:  11/09/22    Subjective     Chief Complaint: my hip acts up with movement  Patient/Family Comments/goals: pt agreeable and appreciative of therapy and assist with hygiene after BM  Pain/Comfort:  Pain Rating 1:  0/10  Pain Rating Post-Intervention 1: 0/10      Objective:     Communicated with nurse Solorzano prior to session.  Patient found HOB elevated with oxygen, bed alarm, peripheral IV upon PT entry to room.     General Precautions: Standard, fall, respiratory   Orthopedic Precautions:N/A   Braces:    Respiratory Status: Nasal cannula, flow 4 L/min     Functional Mobility:  Bed Mobility:     Rolling Left:  minimum assistance  Rolling Right: moderate assistance  Scooting: total assistance, of 2 persons, and toward HOB, in trendelenburg  Supine to Sit: moderate assistance and of 2 persons  Sit to Supine: total assistance and of 2 persons  Transfers:     Sit to Stand:  moderate assistance and of 2 persons with rolling walker      AM-PAC 6 CLICK MOBILITY  Turning over in bed (including adjusting bedclothes, sheets and blankets)?: 2  Sitting down on and standing up from a chair with arms (e.g., wheelchair, bedside commode, etc.): 2  Moving from lying on back to sitting on the side of the bed?: 2  Moving to and from a bed to a chair (including a wheelchair)?: 1  Need to walk in hospital room?: 2  Climbing 3-5 steps with a railing?: 1  Basic Mobility Total Score: 10       Therapeutic Activities and Exercises:   Static stand x 90 secs and 3 mins with RW and modA x 2 (maverick hygiene performed)  Static sitting EOB x 10 mins with Devan for repositioning BLE for improved balance, then mostly SBA    Patient left HOB elevated with all lines intact, call button in reach, bed alarm on, nurse Solorzano notified, and private sitter present..    GOALS:   Multidisciplinary Problems       Physical Therapy Goals          Problem: Physical Therapy    Goal Priority Disciplines Outcome Goal Variances Interventions   Physical Therapy Goal     PT, PT/OT Ongoing, Progressing     Description: Goals to be met by: 2022    Patient will increase functional independence with mobility by performin. Sit<>stand with Mod assist of one with RW.  2. Gait  x 10 feet with RW with max assist of one.  3. Pivot transfer bed to chair with mod assist of one                          Time Tracking:     PT Received On: 10/11/22  PT Start Time: 1424     PT Stop Time: 1504  PT Total Time (min): 40 min     Billable Minutes: Therapeutic Activity 40    Treatment Type: Treatment  PT/PTA: PTA     PTA Visit Number: 1     10/11/2022

## 2022-10-12 ENCOUNTER — PATIENT OUTREACH (OUTPATIENT)
Dept: ADMINISTRATIVE | Facility: OTHER | Age: 87
End: 2022-10-12
Payer: MEDICARE

## 2022-10-12 LAB
ALLENS TEST: ABNORMAL
ANION GAP SERPL CALC-SCNC: 9 MMOL/L (ref 8–16)
BUN SERPL-MCNC: 34 MG/DL (ref 8–23)
CALCIUM SERPL-MCNC: 8.9 MG/DL (ref 8.7–10.5)
CHLORIDE SERPL-SCNC: 84 MMOL/L (ref 95–110)
CO2 SERPL-SCNC: 39 MMOL/L (ref 23–29)
CREAT SERPL-MCNC: 1.5 MG/DL (ref 0.5–1.4)
DELSYS: ABNORMAL
EST. GFR  (NO RACE VARIABLE): 44 ML/MIN/1.73 M^2
FLOW: 4
GLUCOSE SERPL-MCNC: 87 MG/DL (ref 70–110)
HCO3 UR-SCNC: 51.1 MMOL/L (ref 24–28)
MODE: ABNORMAL
PCO2 BLDA: 82 MMHG (ref 35–45)
PH SMN: 7.4 [PH] (ref 7.35–7.45)
PO2 BLDA: 167 MMHG (ref 80–100)
POC BE: 26 MMOL/L
POC SATURATED O2: 99 % (ref 95–100)
POC TCO2: >50 MMOL/L (ref 23–27)
POTASSIUM SERPL-SCNC: 3.6 MMOL/L (ref 3.5–5.1)
SAMPLE: ABNORMAL
SITE: ABNORMAL
SODIUM SERPL-SCNC: 132 MMOL/L (ref 136–145)
SP02: 99

## 2022-10-12 PROCEDURE — 94640 AIRWAY INHALATION TREATMENT: CPT

## 2022-10-12 PROCEDURE — 97530 THERAPEUTIC ACTIVITIES: CPT | Mod: CQ

## 2022-10-12 PROCEDURE — 97110 THERAPEUTIC EXERCISES: CPT | Mod: CQ

## 2022-10-12 PROCEDURE — 25000242 PHARM REV CODE 250 ALT 637 W/ HCPCS: Performed by: HOSPITALIST

## 2022-10-12 PROCEDURE — 80048 BASIC METABOLIC PNL TOTAL CA: CPT | Performed by: PHYSICIAN ASSISTANT

## 2022-10-12 PROCEDURE — 94660 CPAP INITIATION&MGMT: CPT

## 2022-10-12 PROCEDURE — 97116 GAIT TRAINING THERAPY: CPT | Mod: CQ

## 2022-10-12 PROCEDURE — 97535 SELF CARE MNGMENT TRAINING: CPT

## 2022-10-12 PROCEDURE — 99232 PR SUBSEQUENT HOSPITAL CARE,LEVL II: ICD-10-PCS | Mod: ,,, | Performed by: STUDENT IN AN ORGANIZED HEALTH CARE EDUCATION/TRAINING PROGRAM

## 2022-10-12 PROCEDURE — 11000001 HC ACUTE MED/SURG PRIVATE ROOM

## 2022-10-12 PROCEDURE — 99233 PR SUBSEQUENT HOSPITAL CARE,LEVL III: ICD-10-PCS | Mod: ,,, | Performed by: INTERNAL MEDICINE

## 2022-10-12 PROCEDURE — 99232 SBSQ HOSP IP/OBS MODERATE 35: CPT | Mod: ,,, | Performed by: STUDENT IN AN ORGANIZED HEALTH CARE EDUCATION/TRAINING PROGRAM

## 2022-10-12 PROCEDURE — 99233 SBSQ HOSP IP/OBS HIGH 50: CPT | Mod: ,,, | Performed by: INTERNAL MEDICINE

## 2022-10-12 PROCEDURE — 36415 COLL VENOUS BLD VENIPUNCTURE: CPT | Performed by: PHYSICIAN ASSISTANT

## 2022-10-12 PROCEDURE — 99900035 HC TECH TIME PER 15 MIN (STAT)

## 2022-10-12 PROCEDURE — 99497 ADVNCD CARE PLAN 30 MIN: CPT | Mod: ,,, | Performed by: INTERNAL MEDICINE

## 2022-10-12 PROCEDURE — 63600175 PHARM REV CODE 636 W HCPCS: Performed by: PHYSICIAN ASSISTANT

## 2022-10-12 PROCEDURE — 25000003 PHARM REV CODE 250: Performed by: PHYSICIAN ASSISTANT

## 2022-10-12 PROCEDURE — 99497 PR ADVNCD CARE PLAN 30 MIN: ICD-10-PCS | Mod: ,,, | Performed by: INTERNAL MEDICINE

## 2022-10-12 PROCEDURE — 27000221 HC OXYGEN, UP TO 24 HOURS

## 2022-10-12 PROCEDURE — 94761 N-INVAS EAR/PLS OXIMETRY MLT: CPT

## 2022-10-12 RX ADMIN — ASPIRIN 81 MG: 81 TABLET, COATED ORAL at 09:10

## 2022-10-12 RX ADMIN — MIRTAZAPINE 15 MG: 15 TABLET, FILM COATED ORAL at 09:10

## 2022-10-12 RX ADMIN — TRAZODONE HYDROCHLORIDE 50 MG: 50 TABLET ORAL at 09:10

## 2022-10-12 RX ADMIN — ATORVASTATIN CALCIUM 20 MG: 20 TABLET, FILM COATED ORAL at 09:10

## 2022-10-12 RX ADMIN — HEPARIN SODIUM 5000 UNITS: 5000 INJECTION INTRAVENOUS; SUBCUTANEOUS at 05:10

## 2022-10-12 RX ADMIN — IPRATROPIUM BROMIDE AND ALBUTEROL SULFATE 3 ML: 2.5; .5 SOLUTION RESPIRATORY (INHALATION) at 07:10

## 2022-10-12 RX ADMIN — METOPROLOL SUCCINATE 25 MG: 25 TABLET, EXTENDED RELEASE ORAL at 09:10

## 2022-10-12 RX ADMIN — IPRATROPIUM BROMIDE AND ALBUTEROL SULFATE 3 ML: 2.5; .5 SOLUTION RESPIRATORY (INHALATION) at 11:10

## 2022-10-12 RX ADMIN — IPRATROPIUM BROMIDE AND ALBUTEROL SULFATE 3 ML: 2.5; .5 SOLUTION RESPIRATORY (INHALATION) at 03:10

## 2022-10-12 RX ADMIN — LEVOTHYROXINE SODIUM 50 MCG: 50 TABLET ORAL at 05:10

## 2022-10-12 RX ADMIN — GABAPENTIN 400 MG: 400 CAPSULE ORAL at 09:10

## 2022-10-12 RX ADMIN — HEPARIN SODIUM 5000 UNITS: 5000 INJECTION INTRAVENOUS; SUBCUTANEOUS at 09:10

## 2022-10-12 RX ADMIN — HEPARIN SODIUM 5000 UNITS: 5000 INJECTION INTRAVENOUS; SUBCUTANEOUS at 03:10

## 2022-10-12 NOTE — PT/OT/SLP PROGRESS
Occupational Therapy   Treatment    Name: Chucho Prado  MRN: [id number]  Admitting Diagnosis:  Acute on chronic respiratory failure with hypoxia and hypercapnia       Recommendations:     Discharge Recommendations:  (Per CM and chart, pt is discharging to home setting tomorrow with Home Health.  Educated pt on need for his primary caregiver to come to hospital today and tomorrow for caregiver training.)  Discharge Equipment Recommendations:   (Custom wheelchair and seating system.  Pressure relief mattress for his home hospital bed.)  Barriers to discharge:  None    Assessment:     Chucho Prado is a 89 y.o. male with a medical diagnosis of Acute on chronic respiratory failure with hypoxia and hypercapnia.  He presents alert and agreeable to participate in OT tx session.  Pt receptive to all education on discharge recs including caregiver participation in discharge training, need for custom wheelchair and seating system and HEP performed 3 times a day.  Pt participated in bilateral UE A/AAROM.  Pt reporting he has a HEP he performs for UB and LB, including standing that he performs when he first gets up in the morning.  Recommend pt perform HEP three times a day with assist of caregiver. Pt is scheduled to discharge to home tomorrow with Home Health services.  Performance deficits affecting function are weakness, impaired endurance, impaired self care skills, impaired functional mobility, gait instability, impaired balance, decreased lower extremity function, decreased ROM, decreased upper extremity function, decreased safety awareness, pain, impaired coordination, impaired fine motor, impaired cardiopulmonary response to activity, impaired joint extensibility.     Rehab Prognosis:  Fair; patient would benefit from acute skilled OT services to address these deficits and reach maximum level of function.       Plan:     Patient to be seen 4 x/week to address the above listed problems via self-care/home  management, therapeutic activities, therapeutic exercises  Plan of Care Expires: 11/05/22  Plan of Care Reviewed with: patient    Subjective     Pain/Comfort:  Pain Rating 1:  (Pain in right hip but not rated.)  Location - Side 1: Right  Location 1: hip  Pain Addressed 1: Reposition  Pain Rating Post-Intervention 1:  (Pt reporting pain relief after repositioning)    Objective:     Communicated with: nurse prior to session.  Patient found HOB elevated with bed alarm, oxygen, peripheral IV upon OT entry to room.    General Precautions: Standard, fall, respiratory   Orthopedic Precautions: (Severe kyphosis with scoliosis)   Braces: N/A  Respiratory Status: Nasal cannula, flow 4 L/min     Occupational Performance:     Bed Mobility:    Max A <>Total A    Functional Mobility/Transfers:  Functional Mobility: N/A today    Activities of Daily Living:  Feeding: Set up<>Min A, requires assist for set up, cutting up food and positioning    Bilateral UE A/AAROM:   Shoulder flexion/extension, forward punches, elbow flexion/extension,  10 reps x 1 set.    Education on wheelchair and seating systems for improved upright positioning/posture and pressure relief and increased Indep with pt mobility.   Recommend pt have pressure relief mattress for his hospital bed.    RRT present during part of tx session.  Education of RRT on need for pt to have his private caregiver participate in education on Trilogy due to pt unable to set up and use Trilogy machine on his own.  Pt's friend also educated on need for pt's caregiver attend education sessions with therapies prior to pt discharge.  Pt's friend to communicate need to caregiver since pt did not have his private caregiver's contact information in his cell phone.     [facility] 6 Click ADL: 10    Treatment & Education:  Role of OT, POC, bilateral UE A/AAROM, performing UE/LE therapeutic exercises three times a day instead of once a day, need for custom wheelchair and seating system,  positioning in bed and wheelchair for pressure relief, discharge recs    Patient left HOB elevated with all lines intact, call button in reach, nurse notified, and 2 friends present    GOALS:   Multidisciplinary Problems       Occupational Therapy Goals          Problem: Occupational Therapy    Goal Priority Disciplines Outcome Interventions   Occupational Therapy Goal     OT, PT/OT Ongoing, Progressing    Description: Goals to be met by: 10/22/22    Patient will increase functional independence with ADLs by performing:    Bilateral UE HEP at SBA for increased activity tolerance and strength to progress with sit to stand and standing balance for ADL transfers.   Supine to sit at Max A of one person to prepare for ADL participation.  Sitting EOB at SBA to perform 3 grooming tasks at SBA/Set up level and UB bathing at Mod A level.                    Multidisciplinary Problems (Resolved)          Problem: Occupational Therapy    Goal Priority Disciplines Outcome Interventions   Occupational Therapy Goal   (Resolved)     OT, PT/OT Met    Description: Orders received and evaluation complete.  Pt has private caregivers daily for years and has required assist with all ADL and ADL mobility.  Pt has has been wheelchair bound for many years though reports having only a basic wheelchair.  Recommend discharge to home with continued daily private caregivers.  Recommend Home Health PT or OT for custom wheelchair seating evaluation with a Seating and .                         Time Tracking:     OT Date of Treatment: 10/11/22  OT Start Time: 1200  OT Stop Time: 1258  OT Total Time (min): 58 min    Billable Minutes:Self Care/Home Management 40  Therapeutic Exercise 15    OT/FADIA: OT          10/11/2022

## 2022-10-12 NOTE — PLAN OF CARE
JIMBO spoke with pt about his choice for home hospice.  He asked CM to call his POA, Carolina Pak, [phone], and have her assist him in making a choice.  JIMBO spoke with Carolina and she chose [facility] because they are the agency that her mother used.    JIMBO called David with [facility] and spoke with him regarding pt.  David will call Carolina in 45 minutes to discuss the care they provide with her.  CM sending over referral now.

## 2022-10-12 NOTE — ASSESSMENT & PLAN NOTE
"Patient seen by palliative care and has elected DNR status.  He does state that he doesn't want us to "give up" on him and has been assured that only the most aggressive measures (CPR, intubation, shocks) have been omitted from the care plan.  We will continue oxygen and NIV as needed, antibiotics if needed, diuresis, etc.    Amenable to home hospice per discussion with Palliative Care  Home hospice consult placed  "

## 2022-10-12 NOTE — ASSESSMENT & PLAN NOTE
"See "acute on chronic respiratory failure"    [facility]    "

## 2022-10-12 NOTE — PLAN OF CARE
JIMBO spoke with pt's long term insurance provider Yoav Edwards, [phone], policy #[id number], claim #[id number].  JIMBO asked Yoav Edwards if pt's 24/7 sitter service would be interrupted or discontinued if he has home hospice.  The rep stated that as long as the pt is receiving the hospice in his home that the sitter services will not be interrupted.

## 2022-10-12 NOTE — ACP (ADVANCE CARE PLANNING)
Advance Care Planning     Date: 10/12/2022    [facility]  We engaged the patient in a conversation about advance care planning and we specifically addressed what the goals of care would be moving forward. We did specifically address the patient's likely prognosis, which is less than six months. We explored the patient's values and preferences for future care.  The patient endorses that what is most important right now is to focus on comfort and quality of life, and avoiding the hospital. While we discussed the option of SNF, he would prefer to spend his time at home.     Accordingly, we have decided that the best plan to meet the patient's goals includes enrolling in hospice care at home.     Provided an overview of this philosophy of care, and how it will maximize his quality of life. We will be making a hospice referral.    I spent a total of 30 minutes engaging the patient in this advance care planning discussion. Updated primary team and SW/CM during MDT rounds.    NELLY Benson  Palliative Medicine Staff   [phone]    Present and participating in visit was Jessika Alegre, Palliative RN.

## 2022-10-12 NOTE — PLAN OF CARE
CM spoke with pt regarding which [employer] supplies his hospital bed.  PT/OT have recommended a pressure relieving mattress.  Pt gave me the name of Josh Burr that lives on his property and manages his care.  CM called Josh and left a message for him to return the call.  CM will follow up.

## 2022-10-12 NOTE — ASSESSMENT & PLAN NOTE
- hypertensive on presentation  - home meds: metoprolol succinate 25 mg QD  - discontinued amlodipine  - holding lasix  - continue lower dose of spironolactone

## 2022-10-12 NOTE — PROGRESS NOTES
[facility]  Cardiology  Progress Note    Patient Name: Chucho Prado  MRN: [id number]  Admission Date: 10/3/2022  Hospital Length of Stay: 9 days  Code Status: DNR   Attending Physician: Melchor Kramer MD   Primary Care Physician: Obed Mcguire MD  Expected Discharge Date:   Principal Problem:Acute on chronic respiratory failure with hypoxia and hypercapnia    Subjective:     Brief HPI:    Chucho Prado is a 89 y.o.male with hypertension and hypercholesterolemia. He has been unable to walk after a back injury in about 2015. He has chronic kidney disease, stage 3. He has a suprapubic catheter due to a neurogenic bladder. Beginning in mid 9/2022 he noted that he was becoming short of breath from lying down. The shortness of breath got worse. His legs became edematous. He presented to the emergency room on 10/3/2022 being fluid overloaded. He was noted to be hyponatremic. He says he has been drinking a lot of water. He was admitted.     Hospital Course:    10/4/2022: Received tolvaptan.    10/4/2022: Echo: Normal left ventricular size and systolic function. EF 65%. Mild diastolic dysfunction. Mildly enlarged LA. Severe AS - 4.1 m/s - MG 44 mmHg - 0.9 cm2. SPAP 59 mmHg.    10/4/2022: Transferred to ICU due to respiratory issues.    Interval History:    Comfortable supine but weak.    Complaining about quality of food.    Edema has resolved.      Review of Systems   Constitutional: Positive for malaise/fatigue. Negative for chills and fever.   HENT:  Negative for nosebleeds.    Eyes:  Negative for vision loss in left eye and vision loss in right eye.   Cardiovascular:  Negative for chest pain, leg swelling, orthopnea, palpitations and paroxysmal nocturnal dyspnea.   Respiratory:  Positive for cough and shortness of breath. Negative for hemoptysis, sputum production and wheezing.    Hematologic/Lymphatic: Negative for bleeding problem. Does not bruise/bleed easily.   Skin:  Negative for  color change and rash.   Musculoskeletal:  Negative for muscle weakness and myalgias.   Gastrointestinal:  Negative for abdominal pain, heartburn, hematemesis, hematochezia, melena, nausea and vomiting.   Genitourinary:  Negative for hematuria.   Neurological:  Negative for dizziness, focal weakness, headaches, light-headedness, vertigo and weakness.   Psychiatric/Behavioral:  Negative for altered mental status. The patient is not nervous/anxious.    Allergic/Immunologic: Negative for persistent infections.       Objective:     Vital Signs (Most Recent):  Temp: 97.8 °F (36.6 °C) (10/12/22 0819)  Pulse: 74 (10/12/22 0819)  Resp: 17 (10/12/22 0819)  BP: (!) 120/56 (10/12/22 0819)  SpO2: 98 % (10/12/22 0726)   Vital Signs (24h Range):  Temp:  [97.5 °F (36.4 °C)-98.9 °F (37.2 °C)] 97.8 °F (36.6 °C)  Pulse:  [58-74] 74  Resp:  [16-20] 17  SpO2:  [95 %-99 %] 98 %  BP: ()/(45-60) 120/56     Weight: 90.7 kg (199 lb 15.3 oz)  Body mass index is 30.4 kg/m².    SpO2: 98 %  O2 Device (Oxygen Therapy): nasal cannula w/ humidification      Intake/Output Summary (Last 24 hours) at 10/12/2022 0848  Last data filed at 10/12/2022 0600  Gross per 24 hour   Intake 720 ml   Output 900 ml   Net -180 ml         Lines/Drains/Airways       Drain  Duration                  Suprapubic Catheter 09/01/20 latex 18 Fr. 771 days              Peripheral Intravenous Line  Duration                  Peripheral IV - Single Lumen 10/03/22 1717 20 G Right Forearm 8 days                    Physical Exam  Constitutional:       General: He is not in acute distress.     Appearance: Normal appearance. He is well-developed. He is not ill-appearing.   Eyes:      Conjunctiva/sclera:      Right eye: Right conjunctiva is not injected. No hemorrhage.     Left eye: Left conjunctiva is not injected. No hemorrhage.     Pupils:      Right eye: Pupil is round.      Left eye: Pupil is round.   Neck:      Vascular: No JVD.   Cardiovascular:      Rate and Rhythm:  Normal rate and regular rhythm.      Heart sounds: S1 normal and S2 normal. Murmur heard.   Midsystolic murmur is present with a grade of 3/6 at the upper right sternal border.     Gallop present. S4 sounds present. No S3 sounds.   Pulmonary:      Effort: Pulmonary effort is normal.      Breath sounds: Normal breath sounds.   Chest:      Chest wall: No swelling or tenderness.   Abdominal:      General: There is no distension.      Palpations: Abdomen is soft.      Tenderness: There is no abdominal tenderness.   Musculoskeletal:      Cervical back: Neck supple.      Right lower leg: No swelling. No edema.      Left lower leg: No swelling. No edema.   Skin:     General: Skin is warm and dry.      Findings: No rash.   Neurological:      Mental Status: He is alert and oriented to person, place, and time. He is not disoriented.   Psychiatric:         Attention and Perception: Attention normal.         Mood and Affect: Mood normal.         Speech: Speech normal.         Behavior: Behavior normal.         Thought Content: Thought content normal.         Cognition and Memory: Cognition and memory normal.         Judgment: Judgment normal.       Current Medications:     albuterol-ipratropium  3 mL Nebulization Q4H WAKE    aspirin  81 mg Oral Daily    atorvastatin  20 mg Oral Daily    gabapentin  400 mg Oral BID    heparin (porcine)  5,000 Units Subcutaneous Q8H    levothyroxine  50 mcg Oral Before breakfast    metoprolol succinate  25 mg Oral Daily    mirtazapine  15 mg Oral QHS    [START ON 10/13/2022] spironolactone  12.5 mg Oral Every other day    traZODone  50 mg Oral QHS     Current Laboratory Results:    Recent Results (from the past 24 hour(s))   Basic metabolic panel    Collection Time: 10/12/22  5:04 AM   Result Value Ref Range    Sodium 132 (L) 136 - 145 mmol/L    Potassium 3.6 3.5 - 5.1 mmol/L    Chloride 84 (L) 95 - 110 mmol/L    CO2 39 (H) 23 - 29 mmol/L    Glucose 87 70 - 110 mg/dL    BUN 34 (H) 8 - 23 mg/dL     Creatinine 1.5 (H) 0.5 - 1.4 mg/dL    Calcium 8.9 8.7 - 10.5 mg/dL    Anion Gap 9 8 - 16 mmol/L    eGFR 44 (A) >60 mL/min/1.73 m^2     Current Imaging Results:    CT Chest Without Contrast   Final Result      Moderate left pleural effusion, small right pleural effusion, small pericardial effusion.  Much of the left lung is collapsed.  It would be difficult to exclude underlying malignancy, though I see no obvious mass.      Additional stable chronic findings as above.         Electronically signed by: Karen Teran   Date:    10/06/2022   Time:    08:15      X-Ray Chest AP Portable   Final Result      See above         Electronically signed by: Daron Burr MD   Date:    10/05/2022   Time:    07:23      US Lower Extremity Arteries Bilateral   Final Result      Diffuse atherosclerosis.  Based on reduced velocities and abnormal monophasic waveforms in the left posterior tibial artery, I suspect underlying hemodynamically significant stenosis.  No evidence for hemodynamically significant stenosis elsewhere.         Electronically signed by: Karen Teran   Date:    10/05/2022   Time:    08:27      X-Ray Chest AP Portable   Final Result      1.  Worsening pulmonary vascular congestion, suggestive of worsening fluid overload state.      2.  Persistent airspace opacity in the left lung base with associated suspected moderate parapneumonic effusion.         Electronically signed by: South Waters MD   Date:    10/03/2022   Time:    17:13          10/4/2022: Echo:    The left ventricle is normal in size with normal systolic function.  The estimated ejection fraction is 65%.  Grade I left ventricular diastolic dysfunction.  Mild left atrial enlargement.  Normal right ventricular size with normal right ventricular systolic function.  There is severe aortic valve stenosis.  Aortic valve area is 0.89 cm2; peak velocity is 4.09 m/s; mean gradient is 44 mmHg.  Mild tricuspid regurgitation.  There is moderate pulmonary  hypertension.  The estimated PA systolic pressure is 59 mmHg.  Normal central venous pressure (3 mmHg).      Assessment and Plan:     Problem List:    Active Diagnoses:    Diagnosis Date Noted POA    PRINCIPAL PROBLEM:  Acute on chronic respiratory failure with hypoxia and hypercapnia [J96.21, J96.22] 08/27/2020 Yes    Neuromuscular respiratory weakness [G70.9, J99] 10/07/2022 Yes    Pleural effusion, left [J90] 10/05/2022 Yes    Advanced care planning/counseling discussion [Z71.89] 10/05/2022 Not Applicable    Asymptomatic bacteriuria [R82.71] 10/04/2022 Yes    Acute heart failure [I50.9] 10/03/2022 Yes    Suprapubic catheter [Z93.59] 10/23/2017 Not Applicable    Hyponatremia [E87.1] 10/23/2017 Yes    CKD (chronic kidney disease) stage 3, GFR 30-59 ml/min [N18.30] 12/26/2016 Yes    Iron deficiency anemia [D50.9] 07/31/2013 Yes    Essential hypertension [I10] 07/30/2013 Yes      Problems Resolved During this Admission:       Assessment and Plan:     Heart Failure, Diastolic, Acute on Chronic              10/3/2022: Presents in HF with pulmonary edema and edema of legs.              8/28/2020: Echo: Normal left ventricular size and systolic function. EF 60%. Septal WMA. Moderate to severe AS - 3.1 m/s - MG 26 mmHg - 1.0 cm2.   10/4/2022: Echo: Normal left ventricular size and systolic function. EF 65%. Mild diastolic dysfunction. Mildly enlarged LA. Severe AS - 4.1 m/s - MG 44 mmHg - 0.9 cm2. SPAP 59 mmHg.              At home was on  furosemide 20 mg M,W & F. Has been taken additional furosemide but unclear on dosing.   10/4/2022: Received tolvaptan.              Received furosemide 40 mg iv Q24.              Fluid restriction 1,500 ml/day.   On spironolactone 12.5 mg Q24.   Furosemide currently on hold.  Think he will need furosemide 20 mg Q24 long term.                           2. Aortic Stenosis              8/28/2020: Echo: Moderate to severe AS - 3.1 m/s - MG 26 mmHg - 1.0 cm2.   10/4/2022: Echo: Severe AS -  4.1 m/s - MG 44 mmHg - 0.9 cm2.      3. Hypertension              At home been on metoprolol 25 mg Q24, amlodipine 5 mg Q24, furosemide 20 mg 3/7 & KCl 10 meq 3/7.   On metoprolol 25 mg Q24 and spironolactone 12.5 mg Q24.      4. Hypercholesterolemia  On atorvastatin 20 mg Q24.      5. Chronic Kidney Disease, Stage 3              10/4/2022: BUN/crea 14/0.9. CrCl >60.   10/11/2022: BUN/crea 31/1.4. CrCl 48.      6. Hyponatremia              10/4/2022: Na 122.   10/4/2022: Received tolvaptan.   10/10/2022: Na 137.              Fluid restriction.     7. Respiratory Failure   10/5/2022: PCO2 81. PO2 106.    8. Disposition   Plan is discharge to his house.  He lives in a large house with three tenets. Sitters come in daily from 8 am to 12 MN.               VTE Risk Mitigation (From admission, onward)           Ordered     heparin (porcine) injection 5,000 Units  Every 8 hours         10/03/22 2007     IP VTE HIGH RISK PATIENT  Once         10/03/22 2007     Place sequential compression device  Until discontinued         10/03/22 2007                    Tomasa Rdz MD  Cardiology  [facility] ([place])

## 2022-10-12 NOTE — PT/OT/SLP PROGRESS
Physical Therapy Treatment    Patient Name:  Chucho Prado   MRN:  [id number]    Recommendations:     Discharge Recommendations:  nursing facility, skilled   Discharge Equipment Recommendations: other (see comments) (TBD pending progress)   Barriers to discharge:  increased mobility and transfer needs, O2 needs    Assessment:     Chucho Prado is a 89 y.o. male admitted with a medical diagnosis of Acute on chronic respiratory failure with hypoxia and hypercapnia.  He presents with the following impairments/functional limitations:  weakness, pain, gait instability, impaired balance, impaired cardiopulmonary response to activity, impaired coordination, impaired endurance, impaired fine motor, impaired functional mobility, impaired joint extensibility, impaired self care skills, decreased lower extremity function, decreased ROM, decreased safety awareness, decreased upper extremity function.    Supine>sit with modA x 2  Sit>stand with modA x 2 and RW (x 2 trials)  Amb 4 lat steps to left with RW and Devan x 2  Static stand x 1 min, 5 mins with RW and modA x 2 initially, progressing to Devan x 2 as pt shifts CoG forward, then Devan x 1 during maverick hygiene  Sit>supine with totalA x 2  Scoot toward HOB with totalA x 2  Pt with good tolerance for standing and able to ambulate laterally along EOB  Rec SNF    Rehab Prognosis: Good; patient would benefit from acute skilled PT services to address these deficits and reach maximum level of function.    Recent Surgery: * No surgery found *      Plan:     During this hospitalization, patient to be seen 5 x/week to address the identified rehab impairments via gait training, therapeutic activities, therapeutic exercises, neuromuscular re-education and progress toward the following goals:    Plan of Care Expires:  11/09/22    Subjective     Chief Complaint: pain with contact/movement of legs 2/2 edema  Patient/Family Comments/goals: At home I get up in the morning, and I walk some  during the day  Pain/Comfort:  Pain Rating 1: other (see comments) (unrated)  Location - Side 1: Right  Location - Orientation 1: posterior  Location 1: hip  Pain Addressed 1: Reposition, Distraction, Cessation of Activity  Pain Rating Post-Intervention 1: other (see comments) (unrated, no change)      Objective:     Communicated with nurse Alireza prior to session.  Patient found HOB elevated with bed alarm, oxygen, peripheral IV, pressure relief boots upon PT entry to room.     General Precautions: Standard, fall, respiratory   Orthopedic Precautions:N/A   Braces:    Respiratory Status: Nasal cannula, flow 4 L/min     Functional Mobility:  Bed Mobility:     Rolling Left:  moderate assistance  Rolling Right: moderate assistance  Scooting: total assistance and of 2 persons  Supine to Sit: moderate assistance and of 2 persons  Sit to Supine: total assistance and of 2 persons  Transfers:     Sit to Stand:  moderate assistance and of 2 persons with rolling walker  Gait: 4 lateral steps to L with RW and Devan x 2      AM-PAC 6 CLICK MOBILITY  Turning over in bed (including adjusting bedclothes, sheets and blankets)?: 2  Sitting down on and standing up from a chair with arms (e.g., wheelchair, bedside commode, etc.): 2  Moving from lying on back to sitting on the side of the bed?: 2  Moving to and from a bed to a chair (including a wheelchair)?: 2  Need to walk in hospital room?: 2  Climbing 3-5 steps with a railing?: 1  Basic Mobility Total Score: 11       Therapeutic Activities and Exercises:   Seated therex: heel raises, LAQ x 10  Static stand x 1 min, 5 mins with RW and modA x 2 initially, progressing to Devan x 2 as pt shifts CoG forward, then Devan x 1 during maverick hygiene  Pt had BM while in bed, and again during standing, necessitating maverick hygiene both in standing and in supine/sidelyiing    Patient left HOB elevated with all lines intact, call button in reach, bed alarm on, and pressure relief boots ..    GOALS:    Multidisciplinary Problems       Physical Therapy Goals          Problem: Physical Therapy    Goal Priority Disciplines Outcome Goal Variances Interventions   Physical Therapy Goal     PT, PT/OT Ongoing, Progressing     Description: Goals to be met by: 2022    Patient will increase functional independence with mobility by performin. Sit<>stand with Mod assist of one with RW.  2. Gait x 10 feet with RW with max assist of one.  3. Pivot transfer bed to chair with mod assist of one                          Time Tracking:     PT Received On: 10/12/22  PT Start Time: 1354     PT Stop Time: 1433  PT Total Time (min): 39 min     Billable Minutes: Gait Training 13, Therapeutic Activity 18, and Therapeutic Exercise 8  Co-treat with OT due to complex nature of patient, to insure patient safety, and to prevent injury to patient and caregivers, requiring intervention of two skilled therapists.    Treatment Type: Treatment  PT/PTA: PTA     PTA Visit Number: 2     10/12/2022

## 2022-10-12 NOTE — SUBJECTIVE & OBJECTIVE
Interval History:  He is breathing easier today. Patient amenable to home hospice. He will have additional trelegy teaching done when his caregiver is available. Home hospice consult placed.     Review of Systems   Constitutional:  Negative for chills and fever.   Respiratory:  Positive for shortness of breath. Negative for cough.    Cardiovascular:  Negative for chest pain, palpitations and leg swelling.   Objective:     Vital Signs (Most Recent):  Temp: 98.4 °F (36.9 °C) (10/12/22 1514)  Pulse: 72 (10/12/22 1514)  Resp: (!) 24 (10/12/22 1514)  BP: (!) 108/55 (10/12/22 1514)  SpO2: 96 % (10/12/22 1514) Vital Signs (24h Range):  Temp:  [97.5 °F (36.4 °C)-98.9 °F (37.2 °C)] 98.4 °F (36.9 °C)  Pulse:  [58-74] 72  Resp:  [16-24] 24  SpO2:  [95 %-99 %] 96 %  BP: ()/(45-60) 108/55     Weight: 90.7 kg (199 lb 15.3 oz)  Body mass index is 30.4 kg/m².    Intake/Output Summary (Last 24 hours) at 10/12/2022 1515  Last data filed at 10/12/2022 0600  Gross per 24 hour   Intake 600 ml   Output 900 ml   Net -300 ml        Physical Exam  Cardiovascular:      Rate and Rhythm: Normal rate and regular rhythm.      Pulses: Normal pulses.      Heart sounds: Murmur heard.     No gallop.      Comments: Harsh holosystolic murmur at the RSB.  Pulmonary:      Breath sounds: No wheezing.      Comments: Poor effort, breath sounds nearly inaudible on the left.  Abdominal:      General: Bowel sounds are normal.      Palpations: Abdomen is soft.   Genitourinary:     Comments: Suprapubic catheter draining clear yellow urine.  Musculoskeletal:      Comments: Both legs with protective boots in place.     Skin:     General: Skin is warm and dry.   Neurological:      General: No focal deficit present.      Mental Status: He is alert and oriented to person, place, and time.       Significant Labs: All pertinent labs within the past 24 hours have been reviewed.    Significant Imaging: I have reviewed all pertinent imaging results/findings within  the past 24 hours.

## 2022-10-12 NOTE — PROGRESS NOTES
"[facility] Medicine  Progress Note    Patient Name: Chucho Prado  MRN: [id number]  Patient Class: IP- Inpatient   Admission Date: 10/3/2022  Length of Stay: 9 days  Attending Physician: Melchor Kramer MD  Primary Care Provider: Obed Mcguire MD        Subjective:     Principal Problem:Acute on chronic respiratory failure with hypoxia and hypercapnia        HPI:  From H&P by Kerrie Torres PA:  ". Chucho Prado is a 89 y.o. male, with PMH of CHF, HTN, hypothyroidism, HLD, MDD, CARLINE, CKD-3, neurogenic bladder s/p suprapubic catheter insertion, GERD, wheelchair bound, who presented to [facility] ED on 10/3/22 due to shortness of breath x 3 days. He states he feels out of breath when laying flat or with activity (ie. Being changed or transferring to the wheelchair). Additionally, he states he feels occasional chest tightness, has been having wheezing, and coughing (present for weeks). Upon awakening this morning he noted b/l lower extremity swelling, which he attributes to sitting up while sleeping last night and not elevating his legs. He denies fever. He was evaluated in the ED with labs showing hyponatremia with sodium of 122, chloride of 83. A BNP was elevated at 212, and troponin was negative. A CXR showed worsening pulmonary vascular congestion, and persistence of a left lung base airspace opacity. He was treated in the ED with 40 mg IV lasix."      Overview/Hospital Course:  Patient was admitted under a heart failure protocol on IV Lasix and Cardiology was consulted to follow.  His 2D echo was repeated.  Nephrology was consulted for the hyponatremia.  He was given a dose of tolvaptan and his sodium level was monitored closely.  Patient's oxygen level became difficult to manage on the floor and he was transferred to ICU overnight 10/4 when he required placement of a NRB and pulmonary/critical care was consulted.  Sodium level decreased to 120 upon transfer before stabilizing the " following morning.  Diuresis was continued. 2D echo showed severe aortic stenosis, pulmonary HTN and grade I diastolic dysfunction.      His CXR showed volume loss in the right lung and CT was done showing a left pleural effusion with much of the left lung collapsed.  There was a small effusion on the right and a small pericardial effusion.  Critical care evaluated and felt the volume loss was due to atelectasis due to neuromuscular failure, and patient would require significant mobilization and NIV for volume ventilation.  PT/OT consulted to work with him and patient was moved back to the floor.  He continued to have episodic desaturation with minimal activity and frequently had to hold off on therapy and be placed back on BiPAP. Trelegy brought to bedside. Palliative care services discussed home hospice options with the patient, who is amenable to home hospice at this time.       Interval History:  He is breathing easier today. Patient amenable to home hospice. He will have additional trelegy teaching done when his caregiver is available. Home hospice consult placed.     Review of Systems   Constitutional:  Negative for chills and fever.   Respiratory:  Positive for shortness of breath. Negative for cough.    Cardiovascular:  Negative for chest pain, palpitations and leg swelling.   Objective:     Vital Signs (Most Recent):  Temp: 98.4 °F (36.9 °C) (10/12/22 1514)  Pulse: 72 (10/12/22 1514)  Resp: (!) 24 (10/12/22 1514)  BP: (!) 108/55 (10/12/22 1514)  SpO2: 96 % (10/12/22 1514) Vital Signs (24h Range):  Temp:  [97.5 °F (36.4 °C)-98.9 °F (37.2 °C)] 98.4 °F (36.9 °C)  Pulse:  [58-74] 72  Resp:  [16-24] 24  SpO2:  [95 %-99 %] 96 %  BP: ()/(45-60) 108/55     Weight: 90.7 kg (199 lb 15.3 oz)  Body mass index is 30.4 kg/m².    Intake/Output Summary (Last 24 hours) at 10/12/2022 1515  Last data filed at 10/12/2022 0600  Gross per 24 hour   Intake 600 ml   Output 900 ml   Net -300 ml        Physical  Exam  Cardiovascular:      Rate and Rhythm: Normal rate and regular rhythm.      Pulses: Normal pulses.      Heart sounds: Murmur heard.     No gallop.      Comments: Harsh holosystolic murmur at the RSB.  Pulmonary:      Breath sounds: No wheezing.      Comments: Poor effort, breath sounds nearly inaudible on the left.  Abdominal:      General: Bowel sounds are normal.      Palpations: Abdomen is soft.   Genitourinary:     Comments: Suprapubic catheter draining clear yellow urine.  Musculoskeletal:      Comments: Both legs with protective boots in place.     Skin:     General: Skin is warm and dry.   Neurological:      General: No focal deficit present.      Mental Status: He is alert and oriented to person, place, and time.       Significant Labs: All pertinent labs within the past 24 hours have been reviewed.    Significant Imaging: I have reviewed all pertinent imaging results/findings within the past 24 hours.      Assessment/Plan:      * Acute on chronic respiratory failure with hypoxia and hypercapnia  Patient transferred to ICU overnight 10/4 with worsening hypoxemia and hyponatremia.  CXR showed worsening pulmonary vascular congestion and a persistent airspace opacity in the left base with an associated effusion.  Note the 2D echo showed a normal EF and only grade I diastolic dysfunction, but severe aortic stenosis and pulmonary HTN seen previously.  Unclear whether this is due to chronic lung disease, he is a former smoker with a 20 pack year history (quit 1990) but is not oxygen dependent at home and does not use respiratory medications chronically.  Cardiology consulted, PCC now following.  CT showed severe volume loss of left lung and associated effusion.      - PCC feels left lung findings due to severe atelectasis and thoracentesis not indicated.  Working with case management on this.  - Needs aggressive mobilization, respiratory therapy with nebulized treatments, chest PT, cough assist.  - Started  "PT/OT, starting with getting up to chair.    - Continue oxygen supplementation, nightly BiPAP, diuretics.  Frequently needs BiPAP during the day.  - Not a candidate for intervention on aortic valve given age, frailty and comorbidities  - He will need volume ventilator given respiratory failure due to neuromuscular condition.  - PT notes severe kyphosis, holds himself hunched over and cannot support his trunk sitting up.  - home vs SNF      Neuromuscular respiratory weakness  See "acute on chronic respiratory failure"    Repeat Trelegy teaching with caregivers on day of discharge      Advanced care planning/counseling discussion  Patient seen by palliative care and has elected DNR status.  He does state that he doesn't want us to "give up" on him and has been assured that only the most aggressive measures (CPR, intubation, shocks) have been omitted from the care plan.  We will continue oxygen and NIV as needed, antibiotics if needed, diuresis, etc.    Amenable to home hospice per discussion with Palliative Care  Home hospice consult placed    Pleural effusion, left  See "acute respiratory failure"      Asymptomatic bacteriuria  - Patient likely colonized with multiple organisms as noted due to suprapubic catheter.  - Vancomycin given in ED due to previous culture with <50K MRSA (2 years ago)  - As he does not have fever or leukocytosis will hold off on further antibiotics.  - Repeated CBC, still no leukocytosis    Acute heart failure  - Patient presented with shortness of breath worse when supine   - BNP in ED was 212   - CXR with evidence of pulmonary edema and left pleural effusion associated with an airspace opacity on that side that is consistent with severe atelectasis.  He does not have pneumonia symptoms.  - s/p lasix 40 mg IV in ED   - monitor I&Os   - Heart Failure pathways initiated   - Echo showed normal EF, grade I diastolic dysfunction, severe AS, pulmonary HTN.  - lasix as discussed " elsewhere    Hyponatremia  - suspect hypervolemic hyponatremia   - s/p 40 mg IV lasix in ED, then twice daily Lasix  - Nephrology consulted, tolvaptan given and fluid intake liberalized.    - After an initial further decrease hyponatremia stabilized.  - holding furosemide    Suprapubic catheter  - chronic   - Urine likely colonized with multiple organisms.        CKD (chronic kidney disease) stage 3, GFR 30-59 ml/min  - Cr is 1.0  - ranges from 0.8  - 1.0   - avoid nephrotoxins   - renally dose meds     Iron deficiency anemia  - H&H are stable at present   - monitor       Essential hypertension  - hypertensive on presentation  - home meds: metoprolol succinate 25 mg QD  - discontinued amlodipine  - holding lasix  - continue lower dose of spironolactone      VTE Risk Mitigation (From admission, onward)         Ordered     heparin (porcine) injection 5,000 Units  Every 8 hours         10/03/22 2007     IP VTE HIGH RISK PATIENT  Once         10/03/22 2007     Place sequential compression device  Until discontinued         10/03/22 2007                Discharge Planning   TISHA:      Code Status: DNR   Is the patient medically ready for discharge?:     Reason for patient still in hospital (select all that apply): Pending disposition  Discharge Plan A: Home (Pt has round the clock sitters and care.)   Discharge Delays: None known at this time              Melchor Kramer MD  Department of Hospital Medicine   [facility])

## 2022-10-13 VITALS
BODY MASS INDEX: 29.7 KG/M2 | SYSTOLIC BLOOD PRESSURE: 130 MMHG | WEIGHT: 196 LBS | HEIGHT: 68 IN | RESPIRATION RATE: 18 BRPM | HEART RATE: 71 BPM | TEMPERATURE: 98 F | DIASTOLIC BLOOD PRESSURE: 62 MMHG | OXYGEN SATURATION: 96 %

## 2022-10-13 LAB
ANION GAP SERPL CALC-SCNC: 8 MMOL/L (ref 8–16)
BUN SERPL-MCNC: 27 MG/DL (ref 8–23)
CALCIUM SERPL-MCNC: 8.8 MG/DL (ref 8.7–10.5)
CHLORIDE SERPL-SCNC: 84 MMOL/L (ref 95–110)
CO2 SERPL-SCNC: 38 MMOL/L (ref 23–29)
CREAT SERPL-MCNC: 1.2 MG/DL (ref 0.5–1.4)
EST. GFR  (NO RACE VARIABLE): 58 ML/MIN/1.73 M^2
GLUCOSE SERPL-MCNC: 86 MG/DL (ref 70–110)
POTASSIUM SERPL-SCNC: 3.5 MMOL/L (ref 3.5–5.1)
SODIUM SERPL-SCNC: 130 MMOL/L (ref 136–145)

## 2022-10-13 PROCEDURE — 25000003 PHARM REV CODE 250: Performed by: NURSE PRACTITIONER

## 2022-10-13 PROCEDURE — 94761 N-INVAS EAR/PLS OXIMETRY MLT: CPT

## 2022-10-13 PROCEDURE — 94640 AIRWAY INHALATION TREATMENT: CPT

## 2022-10-13 PROCEDURE — 97116 GAIT TRAINING THERAPY: CPT | Mod: CQ

## 2022-10-13 PROCEDURE — 97530 THERAPEUTIC ACTIVITIES: CPT

## 2022-10-13 PROCEDURE — 99239 HOSP IP/OBS DSCHRG MGMT >30: CPT | Mod: ,,, | Performed by: STUDENT IN AN ORGANIZED HEALTH CARE EDUCATION/TRAINING PROGRAM

## 2022-10-13 PROCEDURE — 99239 PR HOSPITAL DISCHARGE DAY,>30 MIN: ICD-10-PCS | Mod: ,,, | Performed by: STUDENT IN AN ORGANIZED HEALTH CARE EDUCATION/TRAINING PROGRAM

## 2022-10-13 PROCEDURE — 27000646 HC AEROBIKA DEVICE

## 2022-10-13 PROCEDURE — 80048 BASIC METABOLIC PNL TOTAL CA: CPT | Performed by: PHYSICIAN ASSISTANT

## 2022-10-13 PROCEDURE — 97535 SELF CARE MNGMENT TRAINING: CPT

## 2022-10-13 PROCEDURE — 63600175 PHARM REV CODE 636 W HCPCS: Performed by: PHYSICIAN ASSISTANT

## 2022-10-13 PROCEDURE — 97530 THERAPEUTIC ACTIVITIES: CPT | Mod: CQ

## 2022-10-13 PROCEDURE — 25000003 PHARM REV CODE 250: Performed by: HOSPITALIST

## 2022-10-13 PROCEDURE — 36415 COLL VENOUS BLD VENIPUNCTURE: CPT | Performed by: PHYSICIAN ASSISTANT

## 2022-10-13 PROCEDURE — 25000003 PHARM REV CODE 250: Performed by: PHYSICIAN ASSISTANT

## 2022-10-13 PROCEDURE — 94664 DEMO&/EVAL PT USE INHALER: CPT

## 2022-10-13 PROCEDURE — 27000221 HC OXYGEN, UP TO 24 HOURS

## 2022-10-13 PROCEDURE — 25000242 PHARM REV CODE 250 ALT 637 W/ HCPCS: Performed by: HOSPITALIST

## 2022-10-13 PROCEDURE — 99900035 HC TECH TIME PER 15 MIN (STAT)

## 2022-10-13 RX ORDER — FUROSEMIDE 20 MG/1
20 TABLET ORAL
Start: 2022-10-17 | End: 2022-10-13 | Stop reason: SDUPTHER

## 2022-10-13 RX ORDER — IPRATROPIUM BROMIDE AND ALBUTEROL SULFATE 2.5; .5 MG/3ML; MG/3ML
3 SOLUTION RESPIRATORY (INHALATION) EVERY 6 HOURS PRN
Qty: 75 ML | Refills: 0
Start: 2022-10-13 | End: 2023-10-13

## 2022-10-13 RX ORDER — FUROSEMIDE 20 MG/1
20 TABLET ORAL
Start: 2022-10-17

## 2022-10-13 RX ORDER — SPIRONOLACTONE 25 MG/1
12.5 TABLET ORAL EVERY OTHER DAY
Qty: 8 TABLET | Refills: 11
Start: 2022-10-15 | End: 2023-10-15

## 2022-10-13 RX ADMIN — ACETAMINOPHEN 1000 MG: 500 TABLET, FILM COATED ORAL at 08:10

## 2022-10-13 RX ADMIN — ASPIRIN 81 MG: 81 TABLET, COATED ORAL at 08:10

## 2022-10-13 RX ADMIN — IPRATROPIUM BROMIDE AND ALBUTEROL SULFATE 3 ML: 2.5; .5 SOLUTION RESPIRATORY (INHALATION) at 07:10

## 2022-10-13 RX ADMIN — IPRATROPIUM BROMIDE AND ALBUTEROL SULFATE 3 ML: 2.5; .5 SOLUTION RESPIRATORY (INHALATION) at 03:10

## 2022-10-13 RX ADMIN — METOPROLOL SUCCINATE 25 MG: 25 TABLET, EXTENDED RELEASE ORAL at 08:10

## 2022-10-13 RX ADMIN — IPRATROPIUM BROMIDE AND ALBUTEROL SULFATE 3 ML: 2.5; .5 SOLUTION RESPIRATORY (INHALATION) at 11:10

## 2022-10-13 RX ADMIN — HEPARIN SODIUM 5000 UNITS: 5000 INJECTION INTRAVENOUS; SUBCUTANEOUS at 05:10

## 2022-10-13 RX ADMIN — GABAPENTIN 400 MG: 400 CAPSULE ORAL at 09:10

## 2022-10-13 RX ADMIN — LEVOTHYROXINE SODIUM 50 MCG: 50 TABLET ORAL at 05:10

## 2022-10-13 RX ADMIN — SPIRONOLACTONE 12.5 MG: 25 TABLET, FILM COATED ORAL at 08:10

## 2022-10-13 RX ADMIN — ATORVASTATIN CALCIUM 20 MG: 20 TABLET, FILM COATED ORAL at 08:10

## 2022-10-13 NOTE — PT/OT/SLP PROGRESS
Occupational Therapy   Treatment    Name: Chucho Prado  MRN: [id number]  Admitting Diagnosis:  Acute on chronic respiratory failure with hypoxia and hypercapnia       Recommendations:     Discharge Recommendations: nursing facility, skilled  Discharge Equipment Recommendations:   (Custom W/C and seating system. Pressure relief mattress for his home hospital bed)  Barriers to discharge:   (current functional level)    Assessment:   MOD A of 2 persons for sit<>stands with posterior lean noted during static stance requiring increased tactile/verbal cuing for more forward positioning to allow for stability.  Tolerated MOD A to MIN A with static stance while receiving TOTAL A for cleaning back/front maverick region and inner thighs after incontinent bowel episode.  Progressing towards goals.  Continued recommendation of post acute OT in SNF.  To benefit from continued acute care OT services to increase independence in self-care/functional transfers.  Continue POC.     Chucho Prado is a 89 y.o. male with a medical diagnosis of Acute on chronic respiratory failure with hypoxia and hypercapnia.  He presents with below deficits decreasing independence in self-care/functional transfers and increasing caregiver burden. Performance deficits affecting function are weakness, impaired endurance, impaired self care skills, impaired functional mobility, gait instability, impaired balance, pain, decreased safety awareness, decreased lower extremity function, decreased upper extremity function, impaired cardiopulmonary response to activity, edema, decreased ROM, impaired sensation, decreased coordination.     Rehab Prognosis:  Good; patient would benefit from acute skilled OT services to address these deficits and reach maximum level of function.       Plan:     Patient to be seen 4 x/week to address the above listed problems via self-care/home management, therapeutic activities, therapeutic exercises  Plan of Care Expires:  11/05/22  Plan of Care Reviewed with: patient    Subjective     Pain/Comfort:  Pain Rating 1:  (Not rated at this time.)  Location - Side 1: Right  Location - Orientation 1: posterior  Location 1: hip  Pain Addressed 1: Cessation of Activity, Nurse notified, Distraction  Pain Rating Post-Intervention 1:  (Not rated; no signs of pain at rest)    Objective:     Communicated with: Alireza GREGORY RN prior to session.  Patient found HOB elevated with bed alarm, peripheral IV, oxygen, pressure relief boots (suprapubic catheter) upon OT entry to room.    General Precautions: Standard, fall, respiratory   Orthopedic Precautions:N/A   Braces: N/A  Respiratory Status: Nasal cannula, flow 4 L/min     Occupational Performance:     Bed Mobility:    Supine<>sit with MOD A of 2 persons when coming into sit and MAX A of 2 persons when returning to supine; requiring verbal cuing for safe task sequencing.  Static sitting balance with CGA/SBA this day.  TOTAL A of 2 persons via draw sheet method for scooting towards HOB; able to bridge hips X 3 while supine in bed.      Functional Mobility/Transfers:  Sit<>stand for 2 trials EOB<>RW with MOD A of 2 persons during transitional movement and static stance with MOD A to MIN A of 2 persons and also MOD A of 1 person; instructed on safe hand placement during transitions with good follow through.  R-lateral steps X 4 with RW along EOB with MIN A of 2 persons.      Activities of Daily Living:  Found with stool soiled padding.  Receiving assist for cleaning front/back maverick and inner thighs in stance with BUE supported at RW with 2nd person to assist with maintaining static standing balance.  Pt. Having another bowel accident while in stance with back maverick hygiene completed in side lying and front maverick hygiene completed in supine with fresh padding noted.        [facility] 6 Click ADL: 10    Treatment & Education:  Supine<>sit with MOD A of 2 persons when coming into sit and MAX A of 2 persons when  returning to supine; requiring verbal cuing for safe task sequencing.  Static sitting balance with CGA/SBA this day.  TOTAL A of 2 persons via draw sheet method for scooting towards HOB; able to bridge hips X 3 while supine in bed.    Sit<>stand for 2 trials EOB<>RW with MOD A of 2 persons during transitional movement and static stance with MOD A to MIN A of 2 persons and also MOD A of 1 person; instructed on safe hand placement during transitions with good follow through.  R-lateral steps X 4 with RW along EOB with MIN A of 2 persons.    Found with stool soiled padding.  Receiving assist for cleaning front/back maverick and inner thighs in stance with BUE supported at RW with 2nd person to assist with maintaining static standing balance.  Pt. Having another bowel accident while in stance with back maverick hygiene completed in side lying and front maverick hygiene completed in supine with fresh padding noted.    Received call light review of call light and importance of calling for assist as needed.     Patient left HOB elevated with all lines intact, call button in reach, bed alarm on, and nursing notified    GOALS:   Multidisciplinary Problems       Occupational Therapy Goals          Problem: Occupational Therapy    Goal Priority Disciplines Outcome Interventions   Occupational Therapy Goal     OT, PT/OT Ongoing, Progressing    Description: Goals to be met by: 10/22/22    Patient will increase functional independence with ADLs by performing:    Bilateral UE HEP at SBA for increased activity tolerance and strength to progress with sit to stand and standing balance for ADL transfers.   Supine to sit at Max A of one person to prepare for ADL participation.  Sitting EOB at SBA to perform 3 grooming tasks at SBA/Set up level and UB bathing at Mod A level.                    Multidisciplinary Problems (Resolved)          Problem: Occupational Therapy    Goal Priority Disciplines Outcome Interventions   Occupational Therapy Goal    (Resolved)     OT, PT/OT Met    Description: Orders received and evaluation complete.  Pt has private caregivers daily for years and has required assist with all ADL and ADL mobility.  Pt has has been wheelchair bound for many years though reports having only a basic wheelchair.  Recommend discharge to home with continued daily private caregivers.  Recommend Home Health PT or OT for custom wheelchair seating evaluation with a Seating and .                         Time Tracking:     OT Date of Treatment: 10/12/22  OT Start Time: 1354  OT Stop Time: 1433  OT Total Time (min): 39 min    Overlap with PTA for portions of session due to complex nature of patient, tolerance for therapeutic modalities, and safety with mobility to decrease fall risk for patient and caregiver injury requiring two skilled therapists to provide interventions.    Billable Minutes:Self Care/Home Management 25    OT/FADIA: OT          10/12/2022

## 2022-10-13 NOTE — DISCHARGE SUMMARY
"[facility]  Discharge Summary      Patient Name: Chucho Prado  MRN: [id number]  Patient Class: IP- Inpatient  Admission Date: 10/3/2022  Hospital Length of Stay: 10 days  Discharge Date and Time:  10/13/2022 3:46 PM  Attending Physician: Melchor Kramer MD   Discharging Provider: Melchor Kramer MD  Primary Care Provider: Obed Mcguire MD      HPI:   From H&P by Kerrie Torres PA:  "Mr. Chucho Prado is a 89 y.o. male, with PMH of CHF, HTN, hypothyroidism, HLD, MDD, CARLINE, CKD-3, neurogenic bladder s/p suprapubic catheter insertion, GERD, wheelchair bound, who presented to [facility] ED on 10/3/22 due to shortness of breath x 3 days. He states he feels out of breath when laying flat or with activity (ie. Being changed or transferring to the wheelchair). Additionally, he states he feels occasional chest tightness, has been having wheezing, and coughing (present for weeks). Upon awakening this morning he noted b/l lower extremity swelling, which he attributes to sitting up while sleeping last night and not elevating his legs. He denies fever. He was evaluated in the ED with labs showing hyponatremia with sodium of 122, chloride of 83. A BNP was elevated at 212, and troponin was negative. A CXR showed worsening pulmonary vascular congestion, and persistence of a left lung base airspace opacity. He was treated in the ED with 40 mg IV lasix."      * No surgery found *      Hospital Course:   Patient was admitted under a heart failure protocol on IV Lasix and Cardiology was consulted to follow.  His 2D echo was repeated.  Nephrology was consulted for the hyponatremia.  He was given a dose of tolvaptan and his sodium level was monitored closely.  Patient's oxygen level became difficult to manage on the floor and he was transferred to ICU overnight 10/4 when he required placement of a NRB and pulmonary/critical care was consulted.  Sodium level decreased to 120 upon transfer before " stabilizing the following morning.  Diuresis was continued. 2D echo showed severe aortic stenosis, pulmonary HTN and grade I diastolic dysfunction.      His CXR showed volume loss in the right lung and CT was done showing a left pleural effusion with much of the left lung collapsed.  There was a small effusion on the right and a small pericardial effusion.  Critical care evaluated and felt the volume loss was due to atelectasis due to neuromuscular failure, and patient would require significant mobilization and NIV for volume ventilation.  PT/OT consulted to work with him and patient was moved back to the floor.  He continued to have episodic desaturation with minimal activity and frequently had to hold off on therapy and be placed back on BiPAP. Tralegy brought to bedside, but later decided to use BPAP through home hospice services. Palliative care services discussed home hospice options with the patient, who is amenable to home hospice at this time. He will discharge home with DME via the home hospice company. Aldactone dose decreased. Amlodipine was discontinued. Lasix frequency decreased to weekly and as needed.    Physical Exam:  General:     Conversant  Cardiovascular:      Rate and Rhythm: Normal rate and regular rhythm.      Pulses: Normal pulses.      Heart sounds: Murmur heard.     No gallop.      Comments: Harsh holosystolic murmur at the RSB.  Pulmonary:      Breath sounds: No wheezing.      Comments: Poor effort, breath sounds nearly inaudible on the left.  Abdominal:      General: Bowel sounds are normal.      Palpations: Abdomen is soft.   Genitourinary:     Comments: Suprapubic catheter draining clear yellow urine.  Musculoskeletal:      Comments: Both legs with protective boots in place.          Goals of Care Treatment Preferences:  Code Status: DNR    Health care agent: Carolina Pak (friend)  Health care agent number: [phone]    Living Will: Yes     What is most important right now is to  focus on improvement in condition but with limits to invasive therapies, comfort and QOL .  Accordingly, we have decided that the best plan to meet the patient's goals includes enrolling in hospice care.      Consults:   Consults (From admission, onward)        Status Ordering Provider     Inpatient consult to Social Work/Case Management  Once        Provider:  (Not yet assigned)    RIO Rosa     Inpatient consult to Pulmonary Critical Care  Once        Provider:  Izabel Ramirez MD    Completed CED MURILLO     Inpatient consult to Palliative Care  Once        Provider:  Jessika Alegre RN    Completed DEVORA WESTON     Inpatient consult to Cardiology  Once        Provider:  Angel Kiran MD    Completed GEREMIAS GABRIEL     Inpatient consult to Nephrology-[place]  Once        Provider:  Devora Weston NP    Completed GEREMIAS GABRIEL     Inpatient consult to Social Work/Case Management  Once        Provider:  (Not yet assigned)    GEREMIAS Michael     Inpatient consult to Registered Dietitian/Nutritionist  Once        Provider:  (Not yet assigned)    Completed GEREMIAS GABRIEL          No new Assessment & Plan notes have been filed under this hospital service since the last note was generated.  Service: Hospital Medicine    Final Active Diagnoses:    Diagnosis Date Noted POA    PRINCIPAL PROBLEM:  Acute on chronic respiratory failure with hypoxia and hypercapnia [J96.21, J96.22] 08/27/2020 Yes    Neuromuscular respiratory weakness [G70.9, J99] 10/07/2022 Yes    Pleural effusion, left [J90] 10/05/2022 Yes    Advanced care planning/counseling discussion [Z71.89] 10/05/2022 Not Applicable    Asymptomatic bacteriuria [R82.71] 10/04/2022 Yes    Acute heart failure [I50.9] 10/03/2022 Yes    Suprapubic catheter [Z93.59] 10/23/2017 Not Applicable    Hyponatremia [E87.1] 10/23/2017 Yes    CKD (chronic kidney disease) stage 3, GFR 30-59 ml/min [N18.30]  "12/26/2016 Yes    Iron deficiency anemia [D50.9] 07/31/2013 Yes    Essential hypertension [I10] 07/30/2013 Yes      Problems Resolved During this Admission:       Discharged Condition: fair    Disposition:     Follow Up:    Patient Instructions:      LIFT DEVICE FOR HOME USE     Order Specific Question Answer Comments   Height: 5' 8" (1.727 m)    Weight: 90.7 kg (199 lb 15.3 oz)    Does patient have medical equipment at home? hospital bed    Does patient have medical equipment at home? walker, rolling    Does patient have medical equipment at home? rollator    Does patient have medical equipment at home? wheelchair    Length of need (1-99 months): 99      MOTORIZED SCOOTER FOR HOME USE     Order Specific Question Answer Comments   Height: 5' 8" (1.727 m)    Weight: 90.7 kg (199 lb 15.3 oz)    Does patient have medical equipment at home? hospital bed    Does patient have medical equipment at home? walker, rolling    Does patient have medical equipment at home? rollator    Does patient have medical equipment at home? wheelchair    Length of need (1-99 months): 99      VENTILATOR FOR HOME USE     Order Specific Question Answer Comments   Height: 5' 8" (1.727 m)    Weight: 90.7 kg (200 lb)    Does patient have medical equipment at home? hospital bed    Does patient have medical equipment at home? wheelchair    Does patient have medical equipment at home? walker, rolling    Does patient have medical equipment at home? bedside commode    Length of need (1-99 months): 99    Type (): Non-invasive    Interface needed: Full face mask    Mode: AVAPS    Rate: 12    Tidal Volume 450    PEEP - EPAP 5.0 CM/H2O    FiO2% 40    Humidifier needed? Yes      HME - OTHER     Order Specific Question Answer Comments   Type of Equipment: Pressure releif mattress for home hospital bed    Height: 5' 8" (1.727 m)    Weight: 90.7 kg (199 lb 15.3 oz)    Does patient have medical equipment at home? hospital bed    Does patient have medical " "equipment at home? walker, rolling    Does patient have medical equipment at home? rollator    Does patient have medical equipment at home? wheelchair      HOSPITAL BED FOR HOME USE     Order Specific Question Answer Comments   Type: Electric    Length of need (1-99 months): 99    Does patient have medical equipment at home? hospital bed    Does patient have medical equipment at home? walker, rolling    Does patient have medical equipment at home? rollator    Does patient have medical equipment at home? wheelchair    Height: 5' 8" (1.727 m)    Weight: 90.7 kg (199 lb 15.3 oz)    Please check all that apply: Patient requires positioning of the body in ways not feasible in an ordinary bed due to a medical condition which is expected to last at least one month.    Please check all that apply: Patient requires, for the alleviation of pain, positioning of the body in ways not feasible in an ordinary bed.    Please check all that apply: Patient requires a bed height different than a fixed height hospital bed to permit transfers to chair, wheelchair, or standing.        Significant Diagnostic Studies: Labs:   CMP   Recent Labs   Lab 10/12/22  0504 10/13/22  0449   * 130*   K 3.6 3.5   CL 84* 84*   CO2 39* 38*   GLU 87 86   BUN 34* 27*   CREATININE 1.5* 1.2   CALCIUM 8.9 8.8   ANIONGAP 9 8    and CBC No results for input(s): WBC, HGB, HCT, PLT in the last 48 hours.    Pending Diagnostic Studies:     None         Medications:  Reconciled Home Medications:      Medication List      START taking these medications    albuterol-ipratropium 2.5 mg-0.5 mg/3 mL nebulizer solution  Commonly known as: DUO-NEB  Take 3 mLs by nebulization every 6 (six) hours as needed for Wheezing. Rescue     spironolactone 25 MG tablet  Commonly known as: ALDACTONE  Take 0.5 tablets (12.5 mg total) by mouth every other day.  Start taking on: October 15, 2022        CHANGE how you take these medications    furosemide 20 MG tablet  Commonly known " as: LASIX  Take 1 tablet (20 mg total) by mouth every Monday. Take 1 tablet by mouth every Monday. Take 1 tablet as needed for weight gain >5lbs or dyspnea  Start taking on: October 17, 2022  What changed:   · when to take this  · additional instructions        CONTINUE taking these medications    albuterol 90 mcg/actuation inhaler  Commonly known as: PROVENTIL/VENTOLIN HFA  Inhale 1-2 puffs into the lungs every 6 (six) hours as needed for Wheezing. Rescue     ammonium lactate 12 % lotion  Commonly known as: LAC-HYDRIN  APPLY TO LOWER EXTREMITIES TWICE DAILY AS NEEDED FOR DRY SKIN     aspirin 81 MG EC tablet  Commonly known as: ECOTRIN  TAKE 1 TABLET BY MOUTH EVERY DAY     atorvastatin 20 MG tablet  Commonly known as: LIPITOR  TAKE 1 TABLET BY MOUTH EVERY DAY     benzonatate 100 MG capsule  Commonly known as: TESSALON  Take 1 capsule (100 mg total) by mouth 3 (three) times daily as needed for Cough.     cetirizine 10 MG tablet  Commonly known as: ZYRTEC  Take 10 mg by mouth daily as needed for Allergies.     clotrimazole 1 % cream  Commonly known as: LOTRIMIN  Apply topically 2 (two) times daily.     gabapentin 400 MG capsule  Commonly known as: NEURONTIN  Take 1 capsule (400 mg total) by mouth 2 (two) times daily.     levothyroxine 50 MCG tablet  Commonly known as: SYNTHROID  TAKE 1 TABLET BY MOUTH EVERY DAY     metoprolol succinate 25 MG 24 hr tablet  Commonly known as: TOPROL-XL  TAKE 1 TABLET BY MOUTH EVERY DAY     mirtazapine 15 MG tablet  Commonly known as: REMERON  Take 1 tablet (15 mg total) by mouth every evening.     oxybutynin 5 MG Tab  Commonly known as: DITROPAN  Take 1 tablet (5 mg total) by mouth 3 (three) times daily as needed (bladder spasms).     traZODone 50 MG tablet  Commonly known as: DESYREL  TAKE 1 TABLET BY MOUTH NIGHTLY        STOP taking these medications    amLODIPine 5 MG tablet  Commonly known as: NORVASC     diclofenac sodium 1 % Gel  Commonly known as: VOLTAREN     ergocalciferol  50,000 unit Cap  Commonly known as: ERGOCALCIFEROL     nystatin powder  Commonly known as: MYCOSTATIN     omeprazole 40 MG capsule  Commonly known as: PRILOSEC     ondansetron 8 MG tablet  Commonly known as: ZOFRAN     potassium chloride 10 MEQ Tbsr  Commonly known as: KLOR-CON            Indwelling Lines/Drains at time of discharge:   Lines/Drains/Airways     Drain  Duration                Suprapubic Catheter 09/01/20 latex 18 Fr. 772 days                Time spent on the discharge of patient: 52 minutes         Melchor Kramer MD  Department of Hospital Medicine  [facility]

## 2022-10-13 NOTE — PT/OT/SLP PROGRESS
Physical Therapy Treatment    Patient Name:  Chucho Prado   MRN:  [id number]    Recommendations:     Discharge Recommendations:  nursing facility, skilled   Discharge Equipment Recommendations: other (see comments) (TBD pending progress)   Barriers to discharge:  increased mobility, transfer and oxygen needs    Assessment:     Chucho Prado is a 89 y.o. male admitted with a medical diagnosis of Acute on chronic respiratory failure with hypoxia and hypercapnia.  He presents with the following impairments/functional limitations:  weakness, pain, gait instability, impaired balance, impaired cardiopulmonary response to activity, impaired coordination, impaired endurance, impaired fine motor, impaired functional mobility, impaired joint extensibility, impaired self care skills, decreased lower extremity function, decreased ROM, decreased safety awareness, decreased upper extremity function.    Supine>sit with modA x 2  Sit>stand with RW and modA x 2 progressing to Devan x 2 by 3rd trial  Amb 8 lat steps with RW and Devan x 2  Sit>supine with totalA x 2  Scoot toward HOB with totalA x 2  Pt preparing to discharge to home with hospice care    Rehab Prognosis: Good; patient would benefit from acute skilled PT services to address these deficits and reach maximum level of function.    Recent Surgery: * No surgery found *      Plan:     During this hospitalization, patient to be seen 5 x/week to address the identified rehab impairments via gait training, therapeutic activities, therapeutic exercises, neuromuscular re-education and progress toward the following goals:    Plan of Care Expires:  11/09/22    Subjective     Chief Complaint: R hip/pelvis pain  Patient/Family Comments/goals: There[place] one place that [place] authentic pralines down here.  Pain/Comfort:  Pain Rating 1: 0/10  Pain Rating Post-Intervention 1: 0/10      Objective:     Communicated with nurse Lay prior to session.  Patient found HOB elevated with  bed alarm, oxygen, peripheral IV, pressure relief boots upon PT entry to room.     General Precautions: Standard, fall, respiratory   Orthopedic Precautions:N/A   Braces:    Respiratory Status: Nasal cannula, flow 4 L/min     Functional Mobility:  Bed Mobility:     Scooting: total assistance and of 2 persons  Supine to Sit: moderate assistance and of 2 persons  Sit to Supine: total assistance and of 2 persons  Transfers:     Sit to Stand:  minimum assistance, moderate assistance, and of 2 persons with rolling walker  Gait: 8 lat steps with RW and Devan x 2      AM-PAC 6 CLICK MOBILITY  Turning over in bed (including adjusting bedclothes, sheets and blankets)?: 2  Sitting down on and standing up from a chair with arms (e.g., wheelchair, bedside commode, etc.): 2  Moving from lying on back to sitting on the side of the bed?: 2  Moving to and from a bed to a chair (including a wheelchair)?: 2  Need to walk in hospital room?: 2  Climbing 3-5 steps with a railing?: 1  Basic Mobility Total Score: 11       Therapeutic Activities and Exercises:   Gait and transfer training as noted  Pt and caregiver educated on discharge disposition, including home hospice description, DME, O2, etc    Patient left HOB elevated with all lines intact, call button in reach, and private caregiver present..    GOALS:   Multidisciplinary Problems       Physical Therapy Goals          Problem: Physical Therapy    Goal Priority Disciplines Outcome Goal Variances Interventions   Physical Therapy Goal     PT, PT/OT Ongoing, Progressing     Description: Goals to be met by: 2022    Patient will increase functional independence with mobility by performin. Sit<>stand with Mod assist of one with RW.  2. Gait x 10 feet with RW with max assist of one.  3. Pivot transfer bed to chair with mod assist of one                          Time Tracking:     PT Received On: 10/13/22  PT Start Time: 1350     PT Stop Time: 1506  PT Total Time (min): 76 min      Billable Minutes: Gait Training 13 and Therapeutic Activity 13  Session divided into actual PT treatment (1:50-2:16, a total of 26 mins treatment time) and pt management and education on discharge disposition (2:16-3:06, a total of 50 mins)    Co-treat with OT due to complex nature of patient, to insure patient safety, and to prevent injury to patient and caregivers, requiring intervention of two skilled therapists.    Treatment Type: Treatment  PT/PTA: PTA     PTA Visit Number: 3     10/13/2022

## 2022-10-13 NOTE — NURSING
Pt dced home via ambulace. Gideon pubic cath intact. Iv dced. Sitter awaiting pt. Home health ready for pt

## 2022-10-13 NOTE — PT/OT/SLP PROGRESS
Occupational Therapy   Treatment    Name: Chucho Prado  MRN: [id number]  Admitting Diagnosis:  Acute on chronic respiratory failure with hypoxia and hypercapnia       Recommendations:     Discharge Recommendations: home with hospice  Discharge Equipment Recommendations:   (Defer to Hospice.  Pt is now discharging to home with Hospice.)  Barriers to discharge:  None    Assessment:     Chucho Prado is a 89 y.o. male with a medical diagnosis of Acute on chronic respiratory failure with hypoxia and hypercapnia.  He presents alert and oriented.  Pt's private caregiver present for discharge education and training.  Pt is discharging to home with hospice care today.  Performance deficits affecting function are weakness, impaired endurance, impaired self care skills, impaired functional mobility, gait instability, impaired balance, decreased lower extremity function, decreased upper extremity function, abnormal tone, decreased ROM, edema, impaired joint extensibility, impaired coordination, decreased safety awareness, impaired fine motor, pain, impaired cardiopulmonary response to activity.     Rehab Prognosis:   Pt is discharging to home with hospice care ; patient would benefit from acute skilled OT services to address these deficits and reach maximum level of function.       Plan:     Patient to be seen 4 x/week to address the above listed problems via self-care/home management, therapeutic activities, therapeutic exercises  Plan of Care Expires: 11/05/22  Plan of Care Reviewed with: patient, caregiver    Subjective     Pain/Comfort:  Pain Rating 1: 0/10 (No pain at beginning of tx session resting in bed.)  Location - Side 1: Right  Location 1: hip  Pain Addressed 1: Reposition, Distraction, Cessation of Activity (Secure chat to nurse and Palliative Care NP about pt's request for Lidocaine patch to right hip)  Pain Rating Post-Intervention 1:  (Pt reporting after repositioning, he felt pain relief.)    Objective:      Communicated with: nurse prior to session.  Patient found HOB elevated with bed alarm, oxygen, peripheral IV, pressure relief boots upon OT entry to room.  Pt's personal caregiver present.    General Precautions: Standard, fall, respiratory   Orthopedic Precautions:N/A   Braces: N/A  Respiratory Status: Nasal cannula, flow 4 L/min     Occupational Performance:     Bed Mobility:    Supine to sit: Mod A x 2 persons  Sit to supine: Total A x 2 persons    Functional Mobility/Transfers:  Sit to stand: Mod A x 2 persons with RW  Functional Mobility: Pt able to take ~8 side steps to left side next to bed with RW, Min A x 2 for balance but Max A for navigating RW    Activities of Daily Living:  Education of pt and caregiver on discharge recs and education.  Pt and caregiver receptive to all education.       [facility] 6 Click ADL: 10    Treatment & Education:  Role of OT, POC, ADL mobility, standing balance and tolerance for ADL, positioning in bed for comfort and decreased pain, discharge recs and education.    Patient left HOB elevated with all lines intact, call button in reach, and private caregiver present    GOALS:   Multidisciplinary Problems       Occupational Therapy Goals          Problem: Occupational Therapy    Goal Priority Disciplines Outcome Interventions   Occupational Therapy Goal     OT, PT/OT Problems resolved due to pt discharge    Description: Goals to be met by: 10/22/22    Patient will increase functional independence with ADLs by performing:    Bilateral UE HEP at SBA for increased activity tolerance and strength to progress with sit to stand and standing balance for ADL transfers.   Supine to sit at Max A of one person to prepare for ADL participation.  Sitting EOB at SBA to perform 3 grooming tasks at SBA/Set up level and UB bathing at Mod A level.                            Time Tracking:     OT Date of Treatment: 10/13/22  OT Start Time: 1350  OT Stop Time: 1503  OT Total Time (min): 73  min  Overlap with PT for portions of session due to complex nature of pt and need for increased safety.  Two skilled therapists needed during functional mobility to decrease patient fall risk and decrease risk of caregiver injury.        Billable Minutes:Self Care/Home Management 28  Therapeutic Activity 12  Full billable time was 40 minutes out of 73 minutes present with pt and caregiver.   OT/FADIA: OT          10/13/2022

## 2022-10-13 NOTE — PLAN OF CARE
Pt remained free of falls and injuries throughout shift. AAOx4. Pt calm and cooperative. Purposeful hourly rounding performed. Pt swallows meds whole. IV flushed and saline locked. Pt turned q2h, heel boots in place, BUE elevated on pillows. Suprapubic catheter in place to gravity. Malodorous urine noted in drainage bag with yellow drainage at catheter site, catheter care provided, skin cleansed and moisture barrier applied. VSS on BiPAP. Pt resting comfortably in bed, denies pain, denies needs at this time. Bed low and locked, bed alarm on, call light in reach. Side rails up x3. Will continue to monitor.

## 2022-10-13 NOTE — PLAN OF CARE
[facility]  Department of Hospital Medicine  [address] [place] [address]  [phone] after hours  [phone] fax    HOSPICE  ORDERS    10/13/2022    Admit to Hospice:  Home Service     Diagnoses:   Active Hospital Problems    Diagnosis  POA    *Acute on chronic respiratory failure with hypoxia and hypercapnia [J96.21, J96.22]  Yes    Neuromuscular respiratory weakness [G70.9, J99]  Yes    Pleural effusion, left [J90]  Yes    Advanced care planning/counseling discussion [Z71.89]  Not Applicable    Asymptomatic bacteriuria [R82.71]  Yes    Acute heart failure [I50.9]  Yes    Suprapubic catheter [Z93.59]  Not Applicable    Hyponatremia [E87.1]  Yes    CKD (chronic kidney disease) stage 3, GFR 30-59 ml/min [N18.30]  Yes    Iron deficiency anemia [D50.9]  Yes    Essential hypertension [I10]  Yes      Resolved Hospital Problems   No resolved problems to display.       Hospice Qualifying Diagnoses:        Patient has a life expectancy < 6 months due to:  Primary Hospice Diagnosis:  Acute on chronic respiratory failure    Comorbid Conditions Contributing to Decline: Neuromuscular respiratory weakness    Vital Signs: Routine per Hospice Protocol.    Code Status: DNR    Allergies:   Review of patient's allergies indicates:   Allergen Reactions    Thiazides Other (See Comments)     Multiple episodes of thiazide-induced hyponatremia       Diet: low sodium cardiac diet with 2L fluid restriction    Activities: As tolerated    Goals of Care Treatment Preferences:  Code Status: DNR    Health care agent: Carolina Pak (friend)  Health care agent number: [phone]    Living Will: Yes     What is most important right now is to focus on improvement in condition but with limits to invasive therapies, comfort and QOL .  Accordingly, we have decided that the best plan to meet the patient's goals includes enrolling in hospice care.      Nursing: Per Hospice Routine.     Suprapubic  catheter Care: Empty bag Q shift and PRN.      Routine Skin for Bedridden Patients: Apply moisture barrier cream to all skin folds and wet areas in perineal area daily and after baths and all bowel movements. Apply mepilex to sacrum 2/week and as needed. Clean abdominal pannus and pat dry, place interdry to folds as a single layer leaving 2 inches of product exposed to air, change every 5 days.     Oxygen: 4L oxygen via nasal cannula during the daytime.     BPAP: BPAP QHS with expiratory pressure 5cm H2O, FiO2 40% (or 4L bled into BPAP circuit), rate 12, inspiratory pressure 10 (may be titrated to achieve average tidal volume 450 cc).      Other Miscellaneous Care:     Wound Care:    Sacrum- apply mepilex sacral foam dressing to sacrum. Change 2x/week or prn if lifting.    Abdominal pannus- clean with Vashe. Pat dry.  Place Interdry to folds as a single layer leaving 2 inches of product exposed to air. Change every 5 days.    BLEs- keep tubigrip stockings on patient's legs. May be removed for nursing assessment but please place back on patient.    Medications:        Medication List        START taking these medications      albuterol-ipratropium 2.5 mg-0.5 mg/3 mL nebulizer solution  Commonly known as: DUO-NEB  Take 3 mLs by nebulization every 6 (six) hours as needed for Wheezing. Rescue     spironolactone 25 MG tablet  Commonly known as: ALDACTONE  Take 0.5 tablets (12.5 mg total) by mouth every other day.  Start taking on: October 15, 2022            CHANGE how you take these medications      furosemide 20 MG tablet  Commonly known as: LASIX  Take 1 tablet (20 mg total) by mouth every Monday. Take 1 tablet by mouth every Monday. Take 1 tablet as needed for weight gain >5lbs or dyspnea  Start taking on: October 17, 2022  What changed:   when to take this  additional instructions            CONTINUE taking these medications      albuterol 90 mcg/actuation inhaler  Commonly known as: PROVENTIL/VENTOLIN HFA  Inhale  1-2 puffs into the lungs every 6 (six) hours as needed for Wheezing. Rescue     ammonium lactate 12 % lotion  Commonly known as: LAC-HYDRIN  APPLY TO LOWER EXTREMITIES TWICE DAILY AS NEEDED FOR DRY SKIN     aspirin 81 MG EC tablet  Commonly known as: ECOTRIN  TAKE 1 TABLET BY MOUTH EVERY DAY     atorvastatin 20 MG tablet  Commonly known as: LIPITOR  TAKE 1 TABLET BY MOUTH EVERY DAY     benzonatate 100 MG capsule  Commonly known as: TESSALON  Take 1 capsule (100 mg total) by mouth 3 (three) times daily as needed for Cough.     cetirizine 10 MG tablet  Commonly known as: ZYRTEC  Take 10 mg by mouth daily as needed for Allergies.     clotrimazole 1 % cream  Commonly known as: LOTRIMIN  Apply topically 2 (two) times daily.     gabapentin 400 MG capsule  Commonly known as: NEURONTIN  Take 1 capsule (400 mg total) by mouth 2 (two) times daily.     levothyroxine 50 MCG tablet  Commonly known as: SYNTHROID  TAKE 1 TABLET BY MOUTH EVERY DAY     metoprolol succinate 25 MG 24 hr tablet  Commonly known as: TOPROL-XL  TAKE 1 TABLET BY MOUTH EVERY DAY     mirtazapine 15 MG tablet  Commonly known as: REMERON  Take 1 tablet (15 mg total) by mouth every evening.     oxybutynin 5 MG Tab  Commonly known as: DITROPAN  Take 1 tablet (5 mg total) by mouth 3 (three) times daily as needed (bladder spasms).     traZODone 50 MG tablet  Commonly known as: DESYREL  TAKE 1 TABLET BY MOUTH NIGHTLY            STOP taking these medications      amLODIPine 5 MG tablet  Commonly known as: NORVASC     diclofenac sodium 1 % Gel  Commonly known as: VOLTAREN     ergocalciferol 50,000 unit Cap  Commonly known as: ERGOCALCIFEROL     nystatin powder  Commonly known as: MYCOSTATIN     omeprazole 40 MG capsule  Commonly known as: PRILOSEC     ondansetron 8 MG tablet  Commonly known as: ZOFRAN     potassium chloride 10 MEQ Tbsr  Commonly known as: KLOR-CON                Future Orders:  Hospice Medical Director may dictate new orders for comfortable care  measures & sign death certificate.        _________________________________  Melchor Kramer MD  10/13/2022

## 2022-10-14 NOTE — PLAN OF CARE
10/13/22 1915   Final Note   Assessment Type Final Discharge Note   Anticipated Discharge Disposition [facility]   What phone number can be called within the next 1-3 days to see how you are doing after discharge? [phone]   Post-Acute Status   Post-Acute Authorization Hospice   Hospice Status Set-up Complete/Auth obtained   Patient choice form signed by patient/caregiver List from [employer]   Discharge Delays None known at this time     Patient was discharged home with [facility] on O2 with a BiPAP.  He has sitters at home. Patient went home via ambulance.   Patient has a hospital Bed.  Triny at [facility] was informed that the patient had selected Hospice.  Adelia Campbell informed that the patient will not be using the Trelegy noninvasive vent.

## 2022-10-17 ENCOUNTER — PATIENT OUTREACH (OUTPATIENT)
Dept: ADMINISTRATIVE | Facility: OTHER | Age: 87
End: 2022-10-17
Payer: MEDICARE

## 2022-10-17 NOTE — PROGRESS NOTES
IP Liaison - Final Visit Note    Patient: Chucho Prado  MRN:  [id number]  Date of Service:  10/17/2022  Completed by:  DANY Ruiz    Reason for Visit   Patient presents with    IP Liaison Chart Review        Patient discharged home with hospice.    Patient Summary     Discharge Date: 10/13/2022  Discharge telephone number/address: [phone] [address] [facility] [phone]  Follow up provider: n/a  Follow up appointments: n/a  Home Health agency & telephone number: n/a  DME ordered &  name: n/a  Assigned OPCM RN/SW: n/a  Report sent to follow up team (PCP/OPCM) via in basket message: n/a  Community Resources arranged including agency name & contact info: No support & services have been documented.      DANY Ruiz

## 2022-10-26 ENCOUNTER — TELEPHONE (OUTPATIENT)
Dept: HOME HEALTH SERVICES | Facility: CLINIC | Age: 87
End: 2022-10-26

## 2022-10-26 NOTE — TELEPHONE ENCOUNTER
"10/25/2022    Patient on Palliative Care NP's schedule tomorrow. Called patient to setup time for appointment. However, patient states he was discharged from hospital and placed on hospice with [facility]. States, "but I am not happy being on hospice. They aide uses wipes for my bath."    Requested Np come by for visit with him.       Sybil Adams, SOPHIE, FNP-C  [email or web address]  [phone]    "

## 2022-10-31 ENCOUNTER — EXTERNAL CHRONIC CARE MANAGEMENT (OUTPATIENT)
Dept: PRIMARY CARE CLINIC | Facility: CLINIC | Age: 87
End: 2022-10-31
Payer: MEDICARE

## 2022-10-31 PROCEDURE — 99490 CHRNC CARE MGMT STAFF 1ST 20: CPT | Mod: PBBFAC | Performed by: INTERNAL MEDICINE

## 2022-11-03 ENCOUNTER — IMMUNIZATION (OUTPATIENT)
Dept: PRIMARY CARE CLINIC | Facility: CLINIC | Age: 87
End: 2022-11-03
Payer: MEDICARE

## 2022-11-03 DIAGNOSIS — Z23 NEED FOR VACCINATION: Primary | ICD-10-CM

## 2022-11-03 PROCEDURE — 0134A COVID-19, MRNA, LNP-S, BIVALENT BOOSTER, PF, 50 MCG/0.5 ML: CPT | Mod: PBBFAC | Performed by: INTERNAL MEDICINE

## 2022-11-03 PROCEDURE — 91313 COVID-19, MRNA, LNP-S, BIVALENT BOOSTER, PF, 50 MCG/0.5 ML: CPT | Mod: PBBFAC | Performed by: INTERNAL MEDICINE

## 2022-11-07 ENCOUNTER — PATIENT MESSAGE (OUTPATIENT)
Dept: INTERNAL MEDICINE | Facility: CLINIC | Age: 87
End: 2022-11-07
Payer: MEDICARE

## 2022-11-07 DIAGNOSIS — Z98.890 S/P LUMBAR LAMINECTOMY: ICD-10-CM

## 2022-11-07 DIAGNOSIS — G95.9 LUMBAR MYELOPATHY: Primary | ICD-10-CM

## 2022-11-07 NOTE — TELEPHONE ENCOUNTER
Hi, please contact the patient to assist in scheduling    Orders Placed This Encounter    Ambulatory referral/consult to [facility]       Thank you, Obed Mcguire

## 2022-11-09 ENCOUNTER — TELEPHONE (OUTPATIENT)
Dept: SPINE | Facility: CLINIC | Age: 87
End: 2022-11-09
Payer: MEDICARE

## 2022-11-09 NOTE — TELEPHONE ENCOUNTER
Staff called patient in regards to his appointment on 11/10/22 patient informed staff that he needed to cancel because he wasn't going to have a ride and he would call call office back to reschedule when ready.

## 2022-11-15 ENCOUNTER — PATIENT MESSAGE (OUTPATIENT)
Dept: INTERNAL MEDICINE | Facility: CLINIC | Age: 87
End: 2022-11-15
Payer: MEDICARE

## 2022-11-30 ENCOUNTER — EXTERNAL CHRONIC CARE MANAGEMENT (OUTPATIENT)
Dept: PRIMARY CARE CLINIC | Facility: CLINIC | Age: 87
End: 2022-11-30
Payer: OTHER MISCELLANEOUS

## 2022-11-30 PROCEDURE — 99490 CHRNC CARE MGMT STAFF 1ST 20: CPT | Mod: PBBFAC | Performed by: INTERNAL MEDICINE

## 2022-11-30 PROCEDURE — 99439 CHRNC CARE MGMT STAF EA ADDL: CPT | Mod: PBBFAC | Performed by: INTERNAL MEDICINE

## 2023-02-09 NOTE — TELEPHONE ENCOUNTER
Reason for Disposition   Patient sounds very sick or weak to the triager    Protocols used: ST URINARY SYMPTOMS-A-AH    Pt reports dark urine this AM in suprapubic catheter. Pt also c/o dark black emesis this AM. Care advice given. Pt states he is not going to the ER he will wait a day to see how he feels.   
65

## 2023-06-24 NOTE — ASSESSMENT & PLAN NOTE
-was having vomiting with nausea through the weekend  -only with nausea today which is being managed with Zofran    Follow up with your primary care doctor for recheck, take bentyl and zofran for nausea and pain  Follow up with Gyn in 12 weeks for repeat ultrasound of ovarian cysts

## 2023-07-11 NOTE — ASSESSMENT & PLAN NOTE
- hypertensive on presentation  - home meds: amlodipine 5 mg QD, metoprolol succinate 25 mg QD  - monitor.  Improving with diuresis.   100% of the time

## 2023-08-10 VITALS
DIASTOLIC BLOOD PRESSURE: 60 MMHG | HEART RATE: 98 BPM | OXYGEN SATURATION: 89 % | RESPIRATION RATE: 18 BRPM | TEMPERATURE: 98 F | SYSTOLIC BLOOD PRESSURE: 128 MMHG

## 2023-08-11 NOTE — PATIENT INSTRUCTIONS
FOLLOW-UP INSTRUCTIONS:    Follow-up with palliative care NP in 4-6 weeks   Continue all medications, treatments, and therapies as ordered  Fall precautions at all times  Maintain Safety precautions at all times  Attend all medical appointments as scheduled  F/u with PCP as needed  Patient to have 6 feet between each other  In an Emergency, call 911 or go to ED; notify PCP's office  9. Limit Risks of environmental exposure to coronavirus as discussed including: social distancing, hand hygiene, and limiting departures from the home for necessities only.   10. Patient not to be left alone  11. Ordered CXR  12. Rx for Voltaren gel to bilateral knees

## 2023-11-16 NOTE — PT/OT/SLP PROGRESS
Physical Medicine & Rehabilitation Progress Note  _____________________________________  Interdisciplinary Team Conference   Most recent IDT on 11/16/2023    IMitchell M.D., was present and led the interdisciplinary team conference on 11/16/2023.  I led the IDT conference and agree with the IDT conference documentation and plan of care as noted below.     Nursing:  Diet Current Diet Order   Procedures    Diet Order Diet: Regular       Eating ADL Independent      % of Last Meal  Oral Nutrition: Breakfast, Between 50-75% Consumed   Sleep    Bowel Last BM: 11/14/23 (per patient)   Bladder    Barriers to Discharge Home: Weakness      Physical Therapy:  Bed Mobility    Transfers Contact Guard Assist  Adaptive equipment, Set-up of equipment, Supervision for safety, Verbal cueing   Mobility Minimal Assist   Stairs    Barriers to Discharge Home: Weakness, pain      Occupational Therapy:  Grooming Supervision, Seated   Bathing Standby Assist   UB Dressing Independent   LB Dressing Minimal Assist   Toileting Contact Guard Assist   Shower & Transfer    Barriers to Discharge Home:        Respiratory Therapy:  O2 (LPM): 0  O2 Delivery Device: None - Room Air    Case Management:  Continues to work on disposition and DME needs.      Discharge Date/Disposition:  11/24/23  _____________________________________   Encounter Date: 11/16/2023    Chief Complaint: Weakness    Interval Events (Subjective):  Seen in bed. Pain continues to be the worst factor. Pain goes down her leg, but she also states she has groin pain while standing.    Objective:  VITAL SIGNS: /56   Pulse 63   Temp 36.4 °C (97.5 °F) (Oral)   Resp 18   Wt 63.5 kg (140 lb)   LMP 11/01/1986   SpO2 96%   BMI 25.61 kg/m²   Gen: No acute distress, well developed well nourished adult  HEENT: Normal Cephalic Atraumatic, Normal conjunctiva.   CV: warm extremities, well perfused, 1+ edema  Resp: symmetric chest rise, breathing comfortably on room air  Abd:  Occupational Therapy  Treatment    Chucho Prado   MRN: [id number]   Admitting Diagnosis: Primary osteoarthritis of left knee    OT Date of Treatment: 10/29/17  Total Time (min): 50 min    Billable Minutes:  Self Care/Home Management 35 and Therapeutic Exercise 15    General Precautions: Standard, fall  Orthopedic Precautions: LLE weight bearing as tolerated  Braces: N/A    Do you have any cultural, spiritual, Baptism conflicts, given your current situation?: none noted    Subjective:  Communicated with nurse prior to session.      Pain/Comfort  Pain Rating 1: 0/10  Pain Rating Post-Intervention 1: 0/10    Objective:  Patient found with: galvan catheter    Functional Status:  MDS G  Transfer Functional Status: -Min (A) (transitioning from sit to standing from W/C to RW)    Dressing Functional Status: Min (A) (Pt donning gown like a robe with SBA, LBD performed with  reacher and sock aide donning socks and pants. Pt needing (A) only to pull pants over hips. RW utilized for Pt to steady when standing. )    Personal Hygiene Functional Status: SBA (set up provided with Pt performing grooming without (A).  Performed sitting sinkside.)      Moving from seated to standing position: Not steady, only able to stabilize with staff assistance  Turning around and facing the opposite direction while walking: Not steady, only able to stabilize with staff assistance  Surface-to-surface transfer (transfer between bed and chair or wheelchair): Not steady, only able to stabilize with staff assistance (RW to steady when standing.)    MDS GG  Eating Performance: Set-up or clean-up assistance.  Oral Hygiene Performance: Setup or clean-up assistance  Toileting Hygiene Performance: Substantial/maximal assistance.  Sit to lying Performance: Partial/moderate assistance.  Sit to lying Goal: Partial/moderate assistance.  Lying to sitting on side of bed Performance: Partial/moderate assistance.  Sit to Stand Performance:  Partial/moderate assistance.  Chair/bed-to-chair transfer Performance: Partial/moderate assistance.  Toilet transfer Performance: Partial/moderate assistance.     OT Exercises: UE Ergometer performed 15 minutes on UE UBE with mod resistance.   UE exercises performed to increase functional endurance and strength in order to allow Pt to  increase distance when performing W/C propulsion , functional activities in standing as well as to increase endurance and strength with functional ambulation.    Patient left up in chair with all lines intact, call button in reach, nurse notified and sitter present    ASSESSMENT:  Chucho Prado is a 84 y.o. male with a medical diagnosis of Primary osteoarthritis of left knee .  Pt was agreeable to OT and tolerated Tx without incident but continues to require (A) to perform functional activities to completion as well as to safely perform functional mobility and functional transfers.  Pt is making slow progress but continues to require OT Intervention to perform functional transfers, functional standing activities, and self care tasks with standing component more independently. OT is recommended to further his functional (I)ce and safety. Goals remain appropriate and continued OT is recommended.    Rehab identified problem list/impairments: weakness, impaired endurance, impaired self care skills, impaired functional mobilty, gait instability, impaired balance, impaired cognition, decreased upper extremity function, decreased lower extremity function, decreased safety awareness, pain, decreased ROM, impaired coordination    Rehab potential is good    Activity tolerance: Good    Discharge recommendations: home with home health, home health OT     Barriers to discharge: Decreased caregiver support     Equipment recommendations:  (TBD)     GOALS:    Occupational Therapy Goals        Problem: Occupational Therapy Goal    Goal Priority Disciplines Outcome Interventions  Soft, Non distended  Extremities: normal bulk, no atrophy  Skin: no visible rashes or lesions.   Neuro: alert, awake  Psych: Mood and affect appropriate and congruent    Laboratory Values:  Recent Results (from the past 72 hour(s))   CBC with Differential    Collection Time: 11/14/23  5:59 AM   Result Value Ref Range    WBC 6.9 4.8 - 10.8 K/uL    RBC 3.52 (L) 4.20 - 5.40 M/uL    Hemoglobin 9.8 (L) 12.0 - 16.0 g/dL    Hematocrit 31.0 (L) 37.0 - 47.0 %    MCV 88.1 81.4 - 97.8 fL    MCH 27.8 27.0 - 33.0 pg    MCHC 31.6 (L) 32.2 - 35.5 g/dL    RDW 47.3 35.9 - 50.0 fL    Platelet Count 230 164 - 446 K/uL    MPV 10.3 9.0 - 12.9 fL    Neutrophils-Polys 69.80 44.00 - 72.00 %    Lymphocytes 20.50 (L) 22.00 - 41.00 %    Monocytes 6.50 0.00 - 13.40 %    Eosinophils 2.00 0.00 - 6.90 %    Basophils 0.60 0.00 - 1.80 %    Immature Granulocytes 0.60 0.00 - 0.90 %    Nucleated RBC 0.00 0.00 - 0.20 /100 WBC    Neutrophils (Absolute) 4.84 1.82 - 7.42 K/uL    Lymphs (Absolute) 1.42 1.00 - 4.80 K/uL    Monos (Absolute) 0.45 0.00 - 0.85 K/uL    Eos (Absolute) 0.14 0.00 - 0.51 K/uL    Baso (Absolute) 0.04 0.00 - 0.12 K/uL    Immature Granulocytes (abs) 0.04 0.00 - 0.11 K/uL    NRBC (Absolute) 0.00 K/uL   Comp Metabolic Panel (CMP)    Collection Time: 11/14/23  5:59 AM   Result Value Ref Range    Sodium 142 135 - 145 mmol/L    Potassium 4.2 3.6 - 5.5 mmol/L    Chloride 108 96 - 112 mmol/L    Co2 24 20 - 33 mmol/L    Anion Gap 10.0 7.0 - 16.0    Glucose 98 65 - 99 mg/dL    Bun 20 8 - 22 mg/dL    Creatinine 0.72 0.50 - 1.40 mg/dL    Calcium 9.5 8.5 - 10.5 mg/dL    Correct Calcium 10.1 8.5 - 10.5 mg/dL    AST(SGOT) 14 12 - 45 U/L    ALT(SGPT) 11 2 - 50 U/L    Alkaline Phosphatase 74 30 - 99 U/L    Total Bilirubin 0.4 0.1 - 1.5 mg/dL    Albumin 3.3 3.2 - 4.9 g/dL    Total Protein 5.8 (L) 6.0 - 8.2 g/dL    Globulin 2.5 1.9 - 3.5 g/dL    A-G Ratio 1.3 g/dL   HEMOGLOBIN A1C    Collection Time: 11/14/23  5:59 AM   Result Value Ref Range       Occupational Therapy Goal     OT, PT/OT Ongoing (interventions implemented as appropriate)    Description:  Goals to be met by: 21 days  Patient will increase functional independence with ADLs by performing:    UE Dressing with Stand-by Assistance.  LE Dressing with Minimal Assistance.  Grooming while seated with Set-up Assistance.  Toileting from bedside commode with Stand-by Assistance for hygiene and clothing management.   Bathing from  edge of bed with Minimal Assistance.  Sitting at edge of bed x 15  minutes with Modified [place].  Stand pivot transfers with Supervision.  Toilet transfer to toilet with Stand-by Assistance.                      Plan:  Patient to be seen 5 x/week to address the above listed problems via self-care/home management, therapeutic activities, therapeutic exercises, neuromuscular re-education  Plan of Care expires:  11/26/17  Plan of Care reviewed with: patient    Lenin Vaca, OTR/L  10/29/2017   Glycohemoglobin 5.6 4.0 - 5.6 %    Est Avg Glucose 114 mg/dL   Magnesium    Collection Time: 11/14/23  5:59 AM   Result Value Ref Range    Magnesium 2.0 1.5 - 2.5 mg/dL   TSH with Reflex to FT4    Collection Time: 11/14/23  5:59 AM   Result Value Ref Range    TSH 0.430 0.380 - 5.330 uIU/mL   ESTIMATED GFR    Collection Time: 11/14/23  5:59 AM   Result Value Ref Range    GFR (CKD-EPI) 83 >60 mL/min/1.73 m 2       Medications:  Scheduled Medications   Medication Dose Frequency    methocarbamol  750 mg 4X/DAY    oxyCODONE immediate-release  10 mg BID    apixaban  10 mg BID    [START ON 11/21/2023] apixaban  5 mg BID    atorvastatin  40 mg QHS    calcium carbonate  500 mg BID    levothyroxine  88 mcg AM ES    liothyronine  5 mcg QAM    losartan  50 mg QAM    magnesium oxide  200 mg Q EVENING    polyethylene glycol/lytes  1 Packet DAILY    pregabalin  100 mg BID    spironolactone  25 mg DAILY    Pharmacy Consult Request  1 Each PHARMACY TO DOSE    omeprazole  20 mg DAILY     PRN medications: oxyCODONE immediate-release **OR** oxyCODONE immediate-release **OR** [DISCONTINUED] HYDROmorphone, Respiratory Therapy Consult, hydrALAZINE, [DISCONTINUED] senna-docusate **AND** polyethylene glycol/lytes **AND** magnesium hydroxide **AND** bisacodyl, ondansetron **OR** ondansetron, sodium chloride    Diet:  Current Diet Order   Procedures    Diet Order Diet: Regular       Medical Decision Making and Plan:  Polyradiculopathy  Status post lumbar laminectomy  -Patient has history of prior L4-L5 decompression  - Recent worsening of back pain with radicular pain into right lower extremity, impairing function and recently utilizing a wheelchair in the last month  - Per outpatient documentation, MRI of the lumbar spine reportedly showed an L5-S1 stenosis  - 11/10 patient taken to the OR for L5-S1 laminectomy performed by Dr. Silva  - Spinal precautions in place, does not require bracing  - Continue with PT/OT inpatient     Right Popliteal  DVT  Provoked from surgery and immobility  -Bilateral Lower extremity ultrasounds - shows right distal popliteal and peroneal occlusion thrombus. Left is negative  -SHERRELL hose on left  -started Eliquis 10mg BID for 7 days then Eliquis 5mg BID for 3 months  -NS aware and approves starting anticoagulation     Hypothyroidism  -On home dose Synthroid     Hypertension  - On home dose losartan and spironolactone  - Blood pressure well controlled     Neurogenic bladder:  - Timed voids with PVR q4H x3. If PVR > 400mL or if patient is unable to void, straight cath patient.     Neurogenic bowel:  -  Colace, Senna BID on admission  - Goal of 1BM/day.  -      Circadian Rhythm disorder:      Recommend lights on during the day/off at night, minimize nighttime interruptions as able.     Mood  - at risk of adjustment disorder, depression, and anxiety due to functional decline     ID:  - at risk for Urinary tract infection     Skin/Wounds:  - Pressure relief q2h while in bed. Close monitoring for signs of breakdown     Pain:  - Neuroceptic - On Tylenol prn, oxycodone 2.5-5mg PRN, Tizanidine PRN,   11/14- schedule oxycodone 5mg BID at 630am and noon  11/15- schedule tizanidine at night for sleep.  11/16- Robaxin 750 mg QID  11/16 increased oxycodone 10mg BID scheduled  - Neuropathic - On Lyrica  -consider left hip intraarticular injection     DVT prophylaxis:  continue eliquis     GI prophylaxis:  On Prilosec 20mg daily      -Follow-up Ortho, PCP    ____________________________________    Mitchell Chavis MD  Physical Medicine & Rehabilitation   Brain Injury Medicine   ____________________________________    Total time:  60 minutes. Time spent included pre-rounding, review of vitals and tests, unit/floor time, face-to-face time with the patient including physical examination, care coordination, counseling of patient and/or family, ordering medications/procedures/tests, discussion with CM, PT, OT, SLP and/or other healthcare providers, and  documentation in the electronic medical record. Topics discussed included pain, disposition.

## 2024-04-12 NOTE — PT/OT/SLP EVAL
Multiple stents, last to LAD, cath from 2014 showed LAD 50% in stent restenosis, 3rd proximal to mid Dx 40%. Asymptomatic.  Patient on aspirin 81 mg daily and statin, as well as low-dose metoprolol.  Last lipid profile to goal.  Blood pressure to target overall. Plan:  Continue optimal therapy  Monitor for chest pains   Physical Therapy   Evaluation    Chucho Prado   MRN: [id number]     PT Received On: 10/25/17  PT Start Time: 1635  PT Stop Time: 1655  PT Total Time (min): 20 min    Billable Minutes:  Evaluation 12 minutes; Therapeutic Activity 8    Diagnosis: S/p L TKA    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    GERD (gastroesophageal reflux disease)     Hypertension     Thyroid disease     UTI (urinary tract infection)      Past Surgical History:   Procedure Laterality Date    CHOLECYSTECTOMY      HERNIA REPAIR      JOINT REPLACEMENT      LAMINECTOMY  12/27/2016    L2-L4    LUMBAR LAMINECTOMY  12/2016    PARATHYROIDECTOMY      TOTAL KNEE ARTHROPLASTY      Right       Referring physician: [facility]  Date referred to PT: 10/25/2017     General Precautions: Standard, fall  Orthopedic Precautions: LLE weight bearing as tolerated   Braces: none    Pt found with blood pressure cuff, telemetry, PCEA, pulse ox, peripheral IV Polar Ice, and FCD.      Do you have any cultural, spiritual, Christianity conflicts, given your current situation?: none stated     Patient History:  Pt lives alone, but has sitters for 18 hours daily.  Pt has elevator access to his home.      DME owned: rolling walker, standard walker and bedside commode    Previous Level of Function:  Ambulation: Mod (I) with SW  Transfers: Mod (I) with SW  ADLs: Assist with bathing.      Subjective:  Communicated with RN prior to session.    Pt agreeable to PT eval    Chief Complaint: none  Patient goals: to return home safely    Pain level prior: 2/10 in L knee  Pain level post: 2/10 in L knee    Objective:  Patient  found supine in bed     Cognitive Exam:  Oriented to: Person, Place, Time and Situation  Follows Commands/attention: Follows multistep  commands  Communication: clear/fluent  Safety awareness/insight to disability: intact    Physical Exam:  Postural examination/scapula alignment: Rounded shoulder, Head forward and Posterior pelvic tilt    Skin integrity: Visible skin intact  Edema: Mild LLE    Sensation:   Intact    Lower Extremity Range of Motion:  Right Lower Extremity: WFL  Left Lower Extremity: WFL except knee    Lower Extremity Strength:  Right Lower Extremity: WFL  Left Lower Extremity: WFL except knee    Functional Mobility:    Bed Mobility:    Supine to sit: Min A   Sit to supine: Mod A     Transfers:    Sit<>Stand: Max A with no AD    Ambulation:    Pt able to take 2 side steps to R side with Max A and no AD.      Balance:   Static Sit: GOOD: Takes MODERATE challenges from all directions  Dynamic Sit:  GOOD: Maintains balance through MODERATE excursions of active trunk movement  Static Stand: POOR: Needs MODERATE assist to maintain  Dynamic stand: 0: N/A    Therapeutic Activities and Exercises:  PT evaluation performed.  Pt educated on role of PT, safety with functional mobility.      Patient left supine with all lines intact and RN notified.    AM-PAC 6 CLICK MOBILITY  Turning over in bed (including adjusting bedclothes, sheets and blankets)?: 4  Sitting down on and standing up from a chair with arms (e.g., wheelchair, bedside commode, etc.): 3  Moving from lying on back to sitting on the side of the bed?: 3  Moving to and from a bed to a chair (including a wheelchair)?: 3  Need to walk in hospital room?: 2  Climbing 3-5 steps with a railing?: 1  Total Score: 16    AM-PAC Raw Score   CMS G-Code Modifier   Level of Impairment   Assistance     6   CN   100%         Total / Unable   7 - 9   CM   80 - 100%   Maximal Assist     10 - 14   CL   60 - 80%   Moderate Assist     15 - 19   CK   40 - 60%   Moderate  Assist     20 - 22   CJ   20 - 40%   Minimal Assist     23   CI   1-20%         SBA / CGA     24 CH   0%   Independent/Modified Independent       Assessment:  Chucho Prado is a 84 y.o. male with a medical diagnosis of s/p L TKA. He presents with deficits listed below.  Pt tolerated evaluation well today.  Pt is a good candidate for skilled PT services to address the below deficits and to increase functional independence.      Rehab identified problem list/impairments: weakness, impaired endurance, impaired sensation, impaired self care skills, impaired functional mobilty, gait instability, impaired balance, decreased coordination, decreased lower extremity function, decreased safety awareness, pain, decreased ROM, impaired skin, edema, orthopedic precautions    Rehab potential is good.    Activity tolerance: good    Discharge recommendations: Skilled Nursing Facility     Barriers to discharge: Inaccessible home environment and Decreased caregiver support     Equipment recommendations: shower chair    GOALS:    Physical Therapy Goals        Problem: Physical Therapy Goal    Goal Priority Disciplines Outcome Goal Variances Interventions   Physical Therapy Goal     PT/OT, PT Ongoing (interventions implemented as appropriate)     Description:  Goals to be met by: 10/31/17     Patient will increase functional independence with mobility by performin. Supine to sit with Supervision  2. Sit to supine with Supervision  3. Sit to stand transfer with Stand-by Assistance  4. Gait  x 150 feet with Stand-by Assistance using Rolling Walker.   5. Lower extremity exercise program x 30 reps per handout, with independence                      PLAN:    Patient to be seen BID to address the above listed problems via gait training, therapeutic activity, therapeutic exercise, and neuromuscular re-education   Plan of Care reviewed with: patient    Shar Curran, PT, DPT  10/25/2017   [phone]

## 2024-08-29 NOTE — ASSESSMENT & PLAN NOTE
-  Found to have UTI on arrival  -  Rocephin started on 6/21/18 x 10 days-->end date 7/1/18  
-  On Neurontin   -  PT and OT following   
-  Presented with RUQ pain x 1 week with associated N/V and dark, foul-smelling urine  -  CT showed small bowel obstruction associated with hernia   -  S/p ventral hernia repair of incarcerated hernia on 6/20/18  
-  Prior to admission--> hired caregivers and mod(I) with functional mobility  -  [facility] admission 10/2017  -  [facility] admission 12/2016-2/2017  Recommendations  -  Encourage mobility, OOB in chair, and early ambulation as appropriate  -  PT/OT evaluate and treat  -  Pain management  -  Monitor for and prevent skin breakdown and pressure ulcers  · Early mobility, repositioning/weight shifting every 20-30 minutes when sitting, turn patient every 2 hours, proper mattress/overlay and chair cushioning, pressure relief/heel protector boots  -  DVT prophylaxis:  BEE, SCD  
-  See SBO  
83 yo male POD1 s/p ventral hernia repair of incarcerated hernia    Plan:  - PO pain  - Clears sparingly  - PT/OT  - DVT prophylaxis  - MIVF  - Replace lytes PRN    
83 yo male s/p ventral hernia repair of incarcerated hernia 6/20/18    - regular diet  - PO pain  - nausea meds PRN  - PT/OT/IS - home with HH vs SNF  - DVT prophylaxis  - Discussed placement options with patient. He is still considering his options. PT continues to recommend SNF. Appreciate  help for placement.    Dispo: awaiting placement  
83 yo male s/p ventral hernia repair of incarcerated hernia 6/20/18    - regular diet  - PO pain  - nausea meds PRN  - PT/OT/IS - patient amenable to going home with home health  - DVT prophylaxis  - Home today with home health  
83 yo male s/p ventral hernia repair of incarcerated hernia 6/20/18    - regular diet  - PO pain  - nausea meds PRN  - abx - IV rocephin started 6/21, end date 6/31  - IVF - d5 125  - PT/OT/IS - patient refusing SNF as recommended by PT/OT; plan for HH vs rehab on discharge tomorrow  - DVT prophylaxis  
83 yo male s/p ventral hernia repair of incarcerated hernia 6/20/18    - regular diet  - PO pain  - nausea meds PRN  - abx - IV rocephin started 6/21, end date 6/31  - PT/OT/IS - patient amenable to going home with home health  - DVT prophylaxis  
83 yo male s/p ventral hernia repair of incarcerated hernia 6/20/18    Plan:  - CT scan showed dilated loops of small bowel with contrast to TI.   - NPO  - NGT removed by patient. Will keep NPO and see how he tolerates having NG removed  - PO pain  - nausea meds PRN  - abx - IV rocephin started 6/21, end date 6/31  - IVF - d5 125  - PT/OT/IS  - DVT prophylaxis  
83 yo male s/p ventral hernia repair of incarcerated hernia 6/20/18    Plan:  - CT scan today  - NPO  - may dc NGT/start ice chips after CT scan  - PO pain  - nausea meds PRN  - abx - IV rocephin started 6/21, end date 6/31  - IVF - d5 125  - PT/OT/IS  - DVT prophylaxis  
83 yo male s/p ventral hernia repair of incarcerated hernia 6/20/18    Plan:  - PO pain  - NPO, continue NGT- will consider d/c'ing later today if decreased output continues  - PT/OT, must get up in chair again today  - DVT prophylaxis- subcu heparin  - MIVF  - Replace lytes PRN, creatinine continues trending down  - continue duonebs    
85 yo male POD1 s/p ventral hernia repair of incarcerated hernia    Plan:  - PO pain  - NPO  - PT/OT  - DVT prophylaxis  - MIVF  - Replace lytes PRN  - KUB  - CXR  - Will schedule neetu    
85 yo male POD1 s/p ventral hernia repair of incarcerated hernia    Plan:  - PO pain  - NPO, continue NGT  - PT/OT, must get up in chair today  - DVT prophylaxis- subcu heparin  - MIVF  - Replace lytes PRN, creatinine trending down  - continue duonebs    
85 yo male s/p ventral hernia repair of incarcerated hernia 6/20/18    - CLD  - PO pain  - nausea meds PRN  - abx - IV rocephin started 6/21, end date 6/31  - IVF - d5 125  - PT/OT/IS - patient refusing SNF as recommended by PT/OT; plan for HH on discharge   - DVT prophylaxis  
85 yo male s/p ventral hernia repair of incarcerated hernia 6/20/18    - regular diet  - PO pain  - nausea meds PRN  - KUB pending  - PT/OT/IS - home with HH  - DVT prophylaxis  - Plan for home with HH. Appreciate  help for discharge planning.    Dispo: Probable discharge tomorrow  
85 yo male s/p ventral hernia repair of incarcerated hernia 6/20/18    - regular diet  - PO pain  - nausea meds PRN  - KUB pending  - PT/OT/IS - home with HH  - DVT prophylaxis  - Plan for home with HH. Appreciate  help for discharge planning.    Dispo: Probable discharge tomorrow  
85 yo male s/p ventral hernia repair of incarcerated hernia 6/20/18    - regular diet  - PO pain  - nausea meds PRN  - KUB pending  - PT/OT/IS - home with HH  - DVT prophylaxis  - Plan for home with HH. Appreciate TAYLER help for discharge planning.    Dispo: Discharge today vs tomorrow  
85 yo male s/p ventral hernia repair of incarcerated hernia 6/20/18    - regular diet  - PO pain  - nausea meds PRN  - PT/OT/IS - home with HH vs SNF  - DVT prophylaxis  - Appreciate SW help for placement  
C.diff negative  
CT showed fat in ventral hernia  Reducible  
CT showed on fat in ventral hernia  Reducible  
Completed course of Rocephin  
Easily reducible, no surgical intervention at this time  
Home meds  -amlodipine  
Home meds  -gabapentin  
Home meds  -levothyroxine  
On rocephin for 10 days  
On rocephin for 10 days, started 6/21  
Reducible  Continue to monitor  
Replace   
Replace   
S/p repair   
Will obtain c.diff  
Quality 47: Advance Care Plan: Advance Care Planning discussed and documented; advance care plan or surrogate decision maker documented in the medical record.
Quality 431: Preventive Care And Screening: Unhealthy Alcohol Use - Screening: Patient not identified as an unhealthy alcohol user when screened for unhealthy alcohol use using a systematic screening method
Quality 226: Preventive Care And Screening: Tobacco Use: Screening And Cessation Intervention: Patient screened for tobacco use and is an ex/non-smoker
Name And Contact Information For Health Care Proxy: Oneida Pearce and Marquita Dick; daughters; [phone], [phone]
Detail Level: Detailed

## (undated) DEVICE — ELECTRODE REM PLYHSV RETURN 9

## (undated) DEVICE — SEE MEDLINE ITEM 157117

## (undated) DEVICE — KIT IRR SUCTION HND PIECE

## (undated) DEVICE — BLADE SAG 13.0 X1.27X90

## (undated) DEVICE — SEE MEDLINE ITEM 154981

## (undated) DEVICE — SPONGE LAP 18X18 PREWASHED

## (undated) DEVICE — SUT ETHILON 2-0 PSLX 30IN

## (undated) DEVICE — DILATOR URETERAL SET 6-18FRX60

## (undated) DEVICE — BOWL CEMENT

## (undated) DEVICE — BANDAGE ACE ELASTIC 6"

## (undated) DEVICE — GOWN X-LG STERILE BACK

## (undated) DEVICE — GLOVE BIOGEL SKINSENSE PI 7.5

## (undated) DEVICE — BLADE RECIP RIBBED

## (undated) DEVICE — GLOVE BIOGEL 7.5

## (undated) DEVICE — KIT TOTAL KNEE TKOFG

## (undated) DEVICE — SUT 2-0 12-18IN SILK

## (undated) DEVICE — CATH MALECOT 4WING BARDEX 24FR

## (undated) DEVICE — Device

## (undated) DEVICE — TRAY MINOR GEN SURG

## (undated) DEVICE — CATH COUNCIL TIP 18FR

## (undated) DEVICE — NDL 18GA X1 1/2 REG BEVEL

## (undated) DEVICE — OPTIFIX FIX 30 TACKS

## (undated) DEVICE — ADHESIVE DERMABOND ADVANCED

## (undated) DEVICE — TRAY SUPRAPUBIC INTRODUCER SET

## (undated) DEVICE — SUT VICRYL BR 1 GEN 27 CT-1

## (undated) DEVICE — SOL NS 1000CC

## (undated) DEVICE — PUMP COLD THERAPY

## (undated) DEVICE — SCRUB 10% POVIDONE IODINE 4OZ

## (undated) DEVICE — SEE MEDLINE ITEM 146298

## (undated) DEVICE — SUT CTD VICRYL 0 UND BR CPX

## (undated) DEVICE — SCREW HEX HEAD 3.5X38MM
Type: IMPLANTABLE DEVICE | Site: KNEE | Status: NON-FUNCTIONAL
Removed: 2017-10-25

## (undated) DEVICE — SOL IRR NACL .9% 3000ML

## (undated) DEVICE — SUT MONOCRYL 3-0 SH U/D

## (undated) DEVICE — GLASSES EYE PROTECTIVE

## (undated) DEVICE — BINDER ABDOMINAL 9 30-45

## (undated) DEVICE — SOL BETADINE 5%

## (undated) DEVICE — TAPE SURG DURAPORE 2 X10YD

## (undated) DEVICE — WIRE GD LUB STD 3CM .038 150CM

## (undated) DEVICE — SUT VICRYL PLUS 4-0 P3 18IN

## (undated) DEVICE — PAD COLD THERAPY KNEE WRAP ON

## (undated) DEVICE — NDL HYPO REG 25G X 1 1/2

## (undated) DEVICE — BANDAGE ESMARK 6X12

## (undated) DEVICE — CASSETTE INFINITI

## (undated) DEVICE — SET CEMENT (SCULP)

## (undated) DEVICE — CATH FOLLOWER HEYMAN 16FR

## (undated) DEVICE — SET IRR URLGY 2LINE UNIV SPIKE

## (undated) DEVICE — TRAY CYSTO BASIN

## (undated) DEVICE — SUT CTD VICRYL VIL BR UR-5

## (undated) DEVICE — SUT CTD VICRYL 0 UND BR CT

## (undated) DEVICE — SOL WATER STRL IRR 1000ML

## (undated) DEVICE — SYR 50ML CATH TIP

## (undated) DEVICE — TOURNIQUET SB QC DP 34X4IN

## (undated) DEVICE — DRAPE INCISE IOBAN 2 23X17IN

## (undated) DEVICE — DRAPE STERI INSTRUMENT 1018

## (undated) DEVICE — CATH MALECOT 22FR 6EA/BX

## (undated) DEVICE — CLIPPER BLADE MOD 4406 (CAREF)

## (undated) DEVICE — PACK CYSTO

## (undated) DEVICE — SEE MEDLINE ITEM 157131

## (undated) DEVICE — SUT 2-0 VICRYL / CT-1

## (undated) DEVICE — SYR 10CC LUER LOCK

## (undated) DEVICE — SEE MEDLINE ITEM 146417

## (undated) DEVICE — MASK FLYTE HOOD PEEL AWAY

## (undated) DEVICE — BRUSH SURGICAL SCRUB STERILE

## (undated) DEVICE — DRESSING AQUACEL AG ADV 3.5X12

## (undated) DEVICE — SUT CHROMIC 2-0 SH 27IN BRN

## (undated) DEVICE — COVER LIGHT HANDLE 80/CA

## (undated) DEVICE — WIRE GUIDE 0.038OLD

## (undated) DEVICE — CATH FOLLOWER HEYMAN 14FR

## (undated) DEVICE — DRESSING ABSRBNT ISLAND 3.6X8

## (undated) DEVICE — SEE MEDLINE ITEM 157148

## (undated) DEVICE — BLADE SAG 18.0X1.27X100

## (undated) DEVICE — CATH URETERAL RUTNER 5 FR

## (undated) DEVICE — SET Y-TYPE TUR IRRIGATION

## (undated) DEVICE — SEE L#95700

## (undated) DEVICE — SEE MEDLINE ITEM 152487

## (undated) DEVICE — GLOVE BIOGEL SKINSENSE PI 6.5

## (undated) DEVICE — GAUZE SPONGE 4X4 12PLY

## (undated) DEVICE — TRAY CATH UM FOLEY SIL W 16FR

## (undated) DEVICE — SEE MEDLINE ITEM 152622

## (undated) DEVICE — APPLICATOR CHLORAPREP ORN 26ML

## (undated) DEVICE — DILATERIA MEDIUM-THICK

## (undated) DEVICE — HOOD T-5 TEAR AWAY STERILE

## (undated) DEVICE — DRAPE ABDOMINAL TIBURON 14X11

## (undated) DEVICE — GOWN SURGICAL X-LARGE

## (undated) DEVICE — SUT VICRYL 3-0 27 SH

## (undated) DEVICE — SUT VICRYL CT-1 2-0 27IN

## (undated) DEVICE — PAD CAST SPECIALIST STRL 6

## (undated) DEVICE — BIT DRILL PIN TROCAR 3.2MM
Type: IMPLANTABLE DEVICE | Site: KNEE | Status: NON-FUNCTIONAL
Removed: 2017-10-25

## (undated) DEVICE — SCREW HEX HEAD
Type: IMPLANTABLE DEVICE | Site: KNEE | Status: NON-FUNCTIONAL
Removed: 2017-10-25

## (undated) DEVICE — SYR 30CC LUER LOCK

## (undated) DEVICE — SET SINGLE BASIN